# Patient Record
Sex: MALE | Race: BLACK OR AFRICAN AMERICAN | NOT HISPANIC OR LATINO | Employment: UNEMPLOYED | URBAN - METROPOLITAN AREA
[De-identification: names, ages, dates, MRNs, and addresses within clinical notes are randomized per-mention and may not be internally consistent; named-entity substitution may affect disease eponyms.]

---

## 2022-09-30 ENCOUNTER — HOSPITAL ENCOUNTER (EMERGENCY)
Facility: HOSPITAL | Age: 27
Discharge: LEFT BEFORE DISCHARGE | End: 2022-09-30
Attending: EMERGENCY MEDICINE

## 2022-09-30 VITALS
RESPIRATION RATE: 18 BRPM | OXYGEN SATURATION: 100 % | DIASTOLIC BLOOD PRESSURE: 80 MMHG | TEMPERATURE: 99 F | SYSTOLIC BLOOD PRESSURE: 152 MMHG | HEART RATE: 73 BPM

## 2022-09-30 DIAGNOSIS — R05.9 COUGH: ICD-10-CM

## 2022-09-30 DIAGNOSIS — R51.9 RIGHT-SIDED HEADACHE: Primary | ICD-10-CM

## 2022-09-30 PROCEDURE — 99284 EMERGENCY DEPT VISIT MOD MDM: CPT | Mod: ,,, | Performed by: EMERGENCY MEDICINE

## 2022-09-30 PROCEDURE — 25000003 PHARM REV CODE 250: Performed by: STUDENT IN AN ORGANIZED HEALTH CARE EDUCATION/TRAINING PROGRAM

## 2022-09-30 PROCEDURE — 99284 EMERGENCY DEPT VISIT MOD MDM: CPT | Mod: 25

## 2022-09-30 PROCEDURE — 99284 PR EMERGENCY DEPT VISIT,LEVEL IV: ICD-10-PCS | Mod: ,,, | Performed by: EMERGENCY MEDICINE

## 2022-09-30 PROCEDURE — 63600175 PHARM REV CODE 636 W HCPCS: Performed by: STUDENT IN AN ORGANIZED HEALTH CARE EDUCATION/TRAINING PROGRAM

## 2022-09-30 PROCEDURE — 96372 THER/PROPH/DIAG INJ SC/IM: CPT | Performed by: STUDENT IN AN ORGANIZED HEALTH CARE EDUCATION/TRAINING PROGRAM

## 2022-09-30 RX ORDER — ACETAMINOPHEN 500 MG
1000 TABLET ORAL
Status: COMPLETED | OUTPATIENT
Start: 2022-09-30 | End: 2022-09-30

## 2022-09-30 RX ORDER — KETOROLAC TROMETHAMINE 30 MG/ML
15 INJECTION, SOLUTION INTRAMUSCULAR; INTRAVENOUS
Status: COMPLETED | OUTPATIENT
Start: 2022-09-30 | End: 2022-09-30

## 2022-09-30 RX ADMIN — KETOROLAC TROMETHAMINE 15 MG: 30 INJECTION, SOLUTION INTRAMUSCULAR; INTRAVENOUS at 02:09

## 2022-09-30 RX ADMIN — ACETAMINOPHEN 1000 MG: 500 TABLET ORAL at 02:09

## 2022-09-30 NOTE — ED TRIAGE NOTES
Ton Donovan, a 27 y.o. male presents to the ED w/ complaint of right sided head and neck pain that began yesterday. Pt took advil with no relief. A&Ox4, GCS 15     Triage note:  Chief Complaint   Patient presents with    Headache     R sided headache x 24 hours took Advil no relief.     Review of patient's allergies indicates:  No Known Allergies  No past medical history on file.

## 2022-09-30 NOTE — ED PROVIDER NOTES
Encounter Date: 9/30/2022       History     Chief Complaint   Patient presents with    Headache     R sided headache x 24 hours took Advil no relief.     27 y.o. male with no significant past medical history presents for right-sided headache for the past several days.  Patient reports the headache is localized across the right side of his head, is intermittent, and improved with Aleve last night.  The pain is achy without sudden onset.  Patient reports associated rhinorrhea and tearing from his right eye.  Patient also reports a cough and mild shortness of breath.  Patient denies any associated chest pain, neck stiffness, fever/chills    The history is provided by the patient and medical records.   Review of patient's allergies indicates:  No Known Allergies  No past medical history on file.  No past surgical history on file.  No family history on file.  Social History     Tobacco Use    Smoking status: Former     Types: Cigarettes    Smokeless tobacco: Never   Substance Use Topics    Alcohol use: No    Drug use: No     Review of Systems   Constitutional:  Negative for fatigue and fever.   HENT:  Positive for rhinorrhea. Negative for sore throat.    Eyes:  Negative for discharge and redness.   Respiratory:  Positive for cough and shortness of breath.    Cardiovascular:  Negative for chest pain and palpitations.   Gastrointestinal:  Negative for diarrhea, nausea and vomiting.   Endocrine: Negative for cold intolerance and heat intolerance.   Genitourinary:  Negative for dysuria and frequency.   Musculoskeletal:  Negative for myalgias and neck stiffness.   Skin:  Negative for pallor and rash.   Neurological:  Negative for dizziness and headaches.   Psychiatric/Behavioral:  Negative for agitation and confusion.      Physical Exam     Initial Vitals [09/30/22 0115]   BP Pulse Resp Temp SpO2   (!) 152/80 73 18 98.9 °F (37.2 °C) 100 %      MAP       --         Physical Exam    Nursing note and vitals  reviewed.  Constitutional:   Alert, sitting up in chair, no acute distress   HENT:   Head: Normocephalic and atraumatic.   Mouth/Throat: Oropharynx is clear and moist.   Eyes: Conjunctivae and EOM are normal. Pupils are equal, round, and reactive to light. No scleral icterus.   Neck: Neck supple.   Normal range of motion.  Cardiovascular:  Normal rate, regular rhythm, normal heart sounds and intact distal pulses.           Pulmonary/Chest: Breath sounds normal. No stridor. No respiratory distress.   Abdominal: Abdomen is soft. He exhibits no distension. There is no abdominal tenderness.   Musculoskeletal:         General: No tenderness or edema.      Cervical back: Normal range of motion and neck supple.     Neurological: He is alert and oriented to person, place, and time. He has normal strength. No cranial nerve deficit or sensory deficit.   Skin: Skin is warm and dry. No rash noted.   Psychiatric: He has a normal mood and affect. Thought content normal.       ED Course   Procedures  Labs Reviewed   SARS-COV-2 RNA AMPLIFICATION, QUAL          Imaging Results    None          Medications   ketorolac injection 15 mg (15 mg Intramuscular Given 9/30/22 0237)   acetaminophen tablet 1,000 mg (1,000 mg Oral Given 9/30/22 0236)     Medical Decision Making:   History:   Old Medical Records: I decided to obtain old medical records.  Old Records Summarized: records from clinic visits and records from previous admission(s).  Initial Assessment:   27 y.o. male with no significant past medical history presents for right-sided headache for the past several days  Patient well-appearing, no focal deficits, no neck stiffness afebrile  Differentials include cluster headache, sinus headache, migraine, tension headache, URI  No sudden onset constant headache, no focal neurological deficits, no blood thinners, no known history of aneurysms, no history of neck manipulation, no fever, nontoxic appearing, no neck stiffness/pain,  inconsistent with SAH, carotid dissection, meningitis/encephalitis  Toradol, Tylenol ordered for symptoms  Will obtain chest x-ray to evaluate for patient does persistent cough and shortness of breath    Clinical Tests:   Lab Tests: Ordered and Reviewed  Radiological Study: Ordered and Reviewed           ED Course as of 09/30/22 0323   Fri Sep 30, 2022   0322 Unable to find patient on return to ED room.  Suspect patient eloped.  Discharge instructions placed in the chart digitally [OK]      ED Course User Index  [OK] Salinas Hair MD                 Clinical Impression:   Final diagnoses:  [R05.9] Cough  [R51.9] Right-sided headache (Primary)        ED Disposition Condition    Eloped Stable                Salinas Hair MD  Resident  09/30/22 0323

## 2022-09-30 NOTE — ED NOTES
Pt no longer in intake 2.  Called for Pt in lobby. Per ED registration Pt stated he was leaving and walked out of ED doors.

## 2023-07-03 ENCOUNTER — HOSPITAL ENCOUNTER (EMERGENCY)
Facility: HOSPITAL | Age: 28
Discharge: HOME | End: 2023-07-03
Attending: EMERGENCY MEDICINE
Payer: MEDICAID

## 2023-07-03 VITALS
DIASTOLIC BLOOD PRESSURE: 64 MMHG | TEMPERATURE: 100 F | WEIGHT: 175 LBS | OXYGEN SATURATION: 98 % | HEIGHT: 70 IN | BODY MASS INDEX: 25.05 KG/M2 | SYSTOLIC BLOOD PRESSURE: 130 MMHG | HEART RATE: 82 BPM | RESPIRATION RATE: 18 BRPM
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
## 2023-07-03 PROCEDURE — 99283 EMERGENCY DEPT VISIT LOW MDM: CPT

## 2023-07-03 PROCEDURE — 25000003 PHARM REV CODE 250: Performed by: EMERGENCY MEDICINE

## 2023-07-03 RX ORDER — SULFAMETHOXAZOLE AND TRIMETHOPRIM 800; 160 MG/1; MG/1
1 TABLET ORAL 2 TIMES DAILY
Qty: 14 TABLET | Refills: 0 | Status: ON HOLD | OUTPATIENT
Start: 2023-07-03 | End: 2023-07-08 | Stop reason: HOSPADM

## 2023-07-03 RX ORDER — SULFAMETHOXAZOLE AND TRIMETHOPRIM 800; 160 MG/1; MG/1
1 TABLET ORAL
Status: COMPLETED | OUTPATIENT
Start: 2023-07-03 | End: 2023-07-03

## 2023-07-03 RX ADMIN — SULFAMETHOXAZOLE AND TRIMETHOPRIM 1 TABLET: 800; 160 TABLET ORAL at 09:07

## 2023-07-04 NOTE — DISCHARGE INSTRUCTIONS
Your bites do appear infected, there is no drainable fluid collection.  Start antibiotics take as prescribed

## 2023-07-04 NOTE — ED PROVIDER NOTES
Encounter Date: 7/3/2023       History     Chief Complaint   Patient presents with    Insect Bite     R middle finger, fore arm and under L arm       Ton Donovan is a 28-year-old male past medical history of hypertension, inguinal hernia presenting today for multiple bug bites.  He is currently living in an abandoned house.  He woke up yesterday morning with multiple bites on his extremities however initially itchy and now has become red and painful.  On his located on his right middle finger, bilateral forearms, under left armpit.  He is no fever or chills.  No other complaints at this time    Review of patient's allergies indicates:  No Known Allergies  Past Medical History:   Diagnosis Date    Hypertension     Unilateral inguinal hernia, without obstruction or gangrene, not specified as recurrent      History reviewed. No pertinent surgical history.  History reviewed. No pertinent family history.  Social History     Tobacco Use    Smoking status: Former     Types: Cigarettes    Smokeless tobacco: Never   Substance Use Topics    Alcohol use: No    Drug use: Yes     Types: Marijuana     Review of Systems    Physical Exam     Initial Vitals [07/03/23 1843]   BP Pulse Resp Temp SpO2   130/64 82 18 99.6 °F (37.6 °C) 98 %      MAP       --         Physical Exam    Vitals reviewed.  Constitutional: He appears well-developed. No distress.   Eyes: Conjunctivae are normal.   Cardiovascular:  Normal rate and regular rhythm.           Pulmonary/Chest: He has no wheezes. He has no rales.   Abdominal: Abdomen is soft. There is no abdominal tenderness.     Neurological: He is alert and oriented to person, place, and time.   Skin:   + multiple indurated lesions on bilateral forearms, left axilla, right middle finger.  No fluctuance appreciated.  Some appear to have drained       ED Course   Procedures  Labs Reviewed   HIV 1 / 2 ANTIBODY   HEPATITIS C ANTIBODY          Imaging Results    None          Medications    sulfamethoxazole-trimethoprim 800-160mg per tablet 1 tablet (has no administration in time range)     Medical Decision Making:   Initial Assessment:   Urgent evaluation a 28-year-old male presenting with multiple bug bites to the extremities.    Vital signs are stable    Differential includes cellulitis, abscess, local inflammatory reaction    No drainage or fluctuance noted, no indication for I and D    Will discharge with Bactrim                          Clinical Impression:   Final diagnoses:  [W57.XXXA] Bug bite with infection, initial encounter (Primary)        ED Disposition Condition    Discharge Stable          ED Prescriptions       Medication Sig Dispense Start Date End Date Auth. Provider    sulfamethoxazole-trimethoprim 800-160mg (BACTRIM DS) 800-160 mg Tab Take 1 tablet by mouth 2 (two) times daily. for 7 days 14 tablet 7/3/2023 7/10/2023 Emiliana Dinh MD          Follow-up Information       Follow up With Specialties Details Why Contact Info    [facility] Emergency Medicine  If symptoms worsen [address]  [facility] [id number]  [phone]             Emiliana Dinh MD  07/05/23 1277

## 2023-07-04 NOTE — ED NOTES
Patient identifiers verified and correct for  Mr Donovan  C/C:  Mult areas of raised redness SEE NN  APPEARANCE: awake and alert in NAD. PAIN  10/10  SKIN: warm, dry and intact. No breakdown or bruising.  MUSCULOSKELETAL: Patient moving all extremities spontaneously, no obvious swelling or deformities noted. Ambulates independently.  RESPIRATORY: Denies shortness of breath.Respirations unlabored.   CARDIAC: Denies CP, 2+ distal pulses; no peripheral edema  ABDOMEN: S/ND/NT, Denies nausea  : voids spontaneously, denies difficulty  Neurologic: AAO x 4; follows commands equal strength in all extremities; denies numbness/tingling. Denies dizziness Denies new weakness, mainly pain to left hand finger, left elbow

## 2023-07-04 NOTE — ED NOTES
Patient states mult area of raised red bumps right hand middle finger near knuckle, outer RFA with small raised areasmall right forehead, LFA with very small raised area, 2 raised areas to left axilla

## 2023-07-06 ENCOUNTER — HOSPITAL ENCOUNTER (INPATIENT)
Facility: HOSPITAL | Age: 28
LOS: 2 days | Discharge: LEFT BEFORE DISCHARGE | DRG: 501 | End: 2023-07-08
Attending: EMERGENCY MEDICINE | Admitting: INTERNAL MEDICINE
Payer: MEDICAID

## 2023-07-06 DIAGNOSIS — R07.9 CHEST PAIN: ICD-10-CM

## 2023-07-06 DIAGNOSIS — S51.002A OPEN WOUND OF LEFT ELBOW, INITIAL ENCOUNTER: ICD-10-CM

## 2023-07-06 DIAGNOSIS — L02.511 ABSCESS OF RIGHT HAND: ICD-10-CM

## 2023-07-06 DIAGNOSIS — F19.90 SUBSTANCE USE DISORDER: ICD-10-CM

## 2023-07-06 DIAGNOSIS — M00.9 PYOGENIC ARTHRITIS OF RIGHT HAND, DUE TO UNSPECIFIED ORGANISM: Primary | ICD-10-CM

## 2023-07-06 DIAGNOSIS — M00.9 SEPTIC ARTHRITIS OF INTERPHALANGEAL JOINT OF FINGER OF RIGHT HAND: ICD-10-CM

## 2023-07-06 DIAGNOSIS — L02.414 ABSCESS OF LEFT UPPER EXTREMITY: ICD-10-CM

## 2023-07-06 DIAGNOSIS — M71.122 SEPTIC OLECRANON BURSITIS, LEFT: ICD-10-CM

## 2023-07-06 DIAGNOSIS — L02.91 ABSCESS: ICD-10-CM

## 2023-07-06 DIAGNOSIS — L03.011 CELLULITIS OF FINGER, RIGHT: ICD-10-CM

## 2023-07-06 LAB
ALBUMIN SERPL BCP-MCNC: 3.5 G/DL (ref 3.5–5.2)
ALP SERPL-CCNC: 77 U/L (ref 55–135)
ALT SERPL W/O P-5'-P-CCNC: 8 U/L (ref 10–44)
ANION GAP SERPL CALC-SCNC: 9 MMOL/L (ref 8–16)
AST SERPL-CCNC: 22 U/L (ref 10–40)
BASOPHILS # BLD AUTO: 0.03 K/UL (ref 0–0.2)
BASOPHILS NFR BLD: 0.4 % (ref 0–1.9)
BILIRUB SERPL-MCNC: 0.3 MG/DL (ref 0.1–1)
BUN SERPL-MCNC: 14 MG/DL (ref 6–20)
CALCIUM SERPL-MCNC: 9.6 MG/DL (ref 8.7–10.5)
CHLORIDE SERPL-SCNC: 103 MMOL/L (ref 95–110)
CO2 SERPL-SCNC: 27 MMOL/L (ref 23–29)
CREAT SERPL-MCNC: 0.9 MG/DL (ref 0.5–1.4)
CRP SERPL-MCNC: 64.2 MG/L (ref 0–8.2)
DIFFERENTIAL METHOD: ABNORMAL
EOSINOPHIL # BLD AUTO: 0.3 K/UL (ref 0–0.5)
EOSINOPHIL NFR BLD: 3.6 % (ref 0–8)
ERYTHROCYTE [DISTWIDTH] IN BLOOD BY AUTOMATED COUNT: 12.6 % (ref 11.5–14.5)
ERYTHROCYTE [SEDIMENTATION RATE] IN BLOOD BY PHOTOMETRIC METHOD: 58 MM/HR (ref 0–23)
EST. GFR  (NO RACE VARIABLE): >60 ML/MIN/1.73 M^2
GLUCOSE SERPL-MCNC: 102 MG/DL (ref 70–110)
HCT VFR BLD AUTO: 35.5 % (ref 40–54)
HCV AB SERPL QL IA: NORMAL
HGB BLD-MCNC: 11.4 G/DL (ref 14–18)
HIV 1+2 AB+HIV1 P24 AG SERPL QL IA: NORMAL
HIV 1+2 AB+HIV1 P24 AG SERPL QL IA: NORMAL
IMM GRANULOCYTES # BLD AUTO: 0.03 K/UL (ref 0–0.04)
IMM GRANULOCYTES NFR BLD AUTO: 0.4 % (ref 0–0.5)
LYMPHOCYTES # BLD AUTO: 2.4 K/UL (ref 1–4.8)
LYMPHOCYTES NFR BLD: 29.9 % (ref 18–48)
MCH RBC QN AUTO: 28.7 PG (ref 27–31)
MCHC RBC AUTO-ENTMCNC: 32.1 G/DL (ref 32–36)
MCV RBC AUTO: 89 FL (ref 82–98)
MONOCYTES # BLD AUTO: 0.8 K/UL (ref 0.3–1)
MONOCYTES NFR BLD: 10.6 % (ref 4–15)
NEUTROPHILS # BLD AUTO: 4.4 K/UL (ref 1.8–7.7)
NEUTROPHILS NFR BLD: 55.1 % (ref 38–73)
NRBC BLD-RTO: 0 /100 WBC
PLATELET # BLD AUTO: 218 K/UL (ref 150–450)
PMV BLD AUTO: 10.3 FL (ref 9.2–12.9)
POTASSIUM SERPL-SCNC: 4.2 MMOL/L (ref 3.5–5.1)
PROT SERPL-MCNC: 7.2 G/DL (ref 6–8.4)
RBC # BLD AUTO: 3.97 M/UL (ref 4.6–6.2)
SODIUM SERPL-SCNC: 139 MMOL/L (ref 136–145)
WBC # BLD AUTO: 7.95 K/UL (ref 3.9–12.7)

## 2023-07-06 PROCEDURE — 85652 RBC SED RATE AUTOMATED: CPT

## 2023-07-06 PROCEDURE — 25000003 PHARM REV CODE 250

## 2023-07-06 PROCEDURE — G0378 HOSPITAL OBSERVATION PER HR: HCPCS

## 2023-07-06 PROCEDURE — 96365 THER/PROPH/DIAG IV INF INIT: CPT

## 2023-07-06 PROCEDURE — 85025 COMPLETE CBC W/AUTO DIFF WBC: CPT

## 2023-07-06 PROCEDURE — 96366 THER/PROPH/DIAG IV INF ADDON: CPT

## 2023-07-06 PROCEDURE — 87591 N.GONORRHOEAE DNA AMP PROB: CPT

## 2023-07-06 PROCEDURE — 86140 C-REACTIVE PROTEIN: CPT

## 2023-07-06 PROCEDURE — 63600175 PHARM REV CODE 636 W HCPCS

## 2023-07-06 PROCEDURE — 87389 HIV-1 AG W/HIV-1&-2 AB AG IA: CPT | Performed by: EMERGENCY MEDICINE

## 2023-07-06 PROCEDURE — 12000002 HC ACUTE/MED SURGE SEMI-PRIVATE ROOM

## 2023-07-06 PROCEDURE — 80307 DRUG TEST PRSMV CHEM ANLYZR: CPT | Performed by: NURSE PRACTITIONER

## 2023-07-06 PROCEDURE — 86803 HEPATITIS C AB TEST: CPT | Performed by: EMERGENCY MEDICINE

## 2023-07-06 PROCEDURE — 99223 1ST HOSP IP/OBS HIGH 75: CPT | Mod: ,,, | Performed by: NURSE PRACTITIONER

## 2023-07-06 PROCEDURE — 99223 PR INITIAL HOSPITAL CARE,LEVL III: ICD-10-PCS | Mod: ,,, | Performed by: NURSE PRACTITIONER

## 2023-07-06 PROCEDURE — 99406 BEHAV CHNG SMOKING 3-10 MIN: CPT | Mod: ,,, | Performed by: NURSE PRACTITIONER

## 2023-07-06 PROCEDURE — 99406 PR TOBACCO USE CESSATION INTERMEDIATE 3-10 MINUTES: ICD-10-PCS | Mod: ,,, | Performed by: NURSE PRACTITIONER

## 2023-07-06 PROCEDURE — 87389 HIV-1 AG W/HIV-1&-2 AB AG IA: CPT | Mod: 91

## 2023-07-06 PROCEDURE — 81003 URINALYSIS AUTO W/O SCOPE: CPT | Performed by: NURSE PRACTITIONER

## 2023-07-06 PROCEDURE — 99285 EMERGENCY DEPT VISIT HI MDM: CPT

## 2023-07-06 PROCEDURE — 80053 COMPREHEN METABOLIC PANEL: CPT

## 2023-07-06 RX ORDER — SODIUM CHLORIDE 0.9 % (FLUSH) 0.9 %
10 SYRINGE (ML) INJECTION EVERY 12 HOURS PRN
Status: DISCONTINUED | OUTPATIENT
Start: 2023-07-06 | End: 2023-07-08 | Stop reason: HOSPADM

## 2023-07-06 RX ORDER — IBUPROFEN 200 MG
16 TABLET ORAL
Status: DISCONTINUED | OUTPATIENT
Start: 2023-07-06 | End: 2023-07-08 | Stop reason: HOSPADM

## 2023-07-06 RX ORDER — ONDANSETRON 2 MG/ML
4 INJECTION INTRAMUSCULAR; INTRAVENOUS EVERY 8 HOURS PRN
Status: DISCONTINUED | OUTPATIENT
Start: 2023-07-06 | End: 2023-07-08 | Stop reason: HOSPADM

## 2023-07-06 RX ORDER — GLUCAGON 1 MG
1 KIT INJECTION
Status: DISCONTINUED | OUTPATIENT
Start: 2023-07-06 | End: 2023-07-08 | Stop reason: HOSPADM

## 2023-07-06 RX ORDER — KETOROLAC TROMETHAMINE 30 MG/ML
15 INJECTION, SOLUTION INTRAMUSCULAR; INTRAVENOUS EVERY 6 HOURS PRN
Status: DISCONTINUED | OUTPATIENT
Start: 2023-07-06 | End: 2023-07-07

## 2023-07-06 RX ORDER — IBUPROFEN 200 MG
24 TABLET ORAL
Status: DISCONTINUED | OUTPATIENT
Start: 2023-07-06 | End: 2023-07-08 | Stop reason: HOSPADM

## 2023-07-06 RX ORDER — TALC
6 POWDER (GRAM) TOPICAL NIGHTLY PRN
Status: DISCONTINUED | OUTPATIENT
Start: 2023-07-06 | End: 2023-07-08 | Stop reason: HOSPADM

## 2023-07-06 RX ORDER — ACETAMINOPHEN 325 MG/1
650 TABLET ORAL EVERY 4 HOURS PRN
Status: DISCONTINUED | OUTPATIENT
Start: 2023-07-06 | End: 2023-07-07

## 2023-07-06 RX ORDER — NALOXONE HCL 0.4 MG/ML
0.02 VIAL (ML) INJECTION
Status: DISCONTINUED | OUTPATIENT
Start: 2023-07-06 | End: 2023-07-07

## 2023-07-06 RX ADMIN — SODIUM CHLORIDE 1000 ML: 9 INJECTION, SOLUTION INTRAVENOUS at 08:07

## 2023-07-06 RX ADMIN — VANCOMYCIN HYDROCHLORIDE 1500 MG: 1.5 INJECTION, POWDER, LYOPHILIZED, FOR SOLUTION INTRAVENOUS at 08:07

## 2023-07-06 NOTE — FIRST PROVIDER EVALUATION
Emergency Department TeleTriage Encounter Note      CHIEF COMPLAINT    Chief Complaint   Patient presents with    Cellulitis     Has cellulitis to right hand middle finger and under left arm and right forearm; was not able to buy antibiotics and infection is worse; open and draining       VITAL SIGNS   Initial Vitals [07/06/23 1704]   BP Pulse Resp Temp SpO2   (!) 155/77 75 18 98.1 °F (36.7 °C) 98 %      MAP       --            ALLERGIES    Review of patient's allergies indicates:  No Known Allergies    PROVIDER TRIAGE NOTE  This is a teletriage evaluation of a 28 y.o. male presenting to the ED complaining of wound infection. Patient reports multiple spider bites to extremities. He was seen here on 7/3/23 and prescribed antibiotics but could not afford to get them filled. Patient states wounds are worst today.     Patient is alert and oriented. He speaks in complete sentences. He is sitting upright in the chair in no distress. Open wounds with purulent drainage to elbow and finger.    Initial orders will be placed and care will be transferred to an alternate provider when patient is roomed for a full evaluation. Any additional orders and the final disposition will be determined by that provider.         ORDERS  Labs Reviewed   HIV 1 / 2 ANTIBODY   HEPATITIS C ANTIBODY       ED Orders (720h ago, onward)      Start Ordered     Status Ordering Provider    07/06/23 1706 07/06/23 1705  HIV 1/2 Ag/Ab (4th Gen)  STAT         Ordered FAY HARRIS    07/06/23 1706 07/06/23 1705  Hepatitis C Antibody  STAT         Ordered FAY HARRIS              Virtual Visit Note: The provider triage portion of this emergency department evaluation and documentation was performed via [employer], a HIPAA-compliant telemedicine application, in concert with a tele-presenter in the room. A face to face patient evaluation with one of my colleagues will occur once the patient is placed in an emergency department  room.      DISCLAIMER: This note was prepared with [employer] voice recognition transcription software. Garbled syntax, mangled pronouns, and other bizarre constructions may be attributed to that software system.

## 2023-07-07 ENCOUNTER — ANESTHESIA EVENT (OUTPATIENT)
Dept: SURGERY | Facility: HOSPITAL | Age: 28
DRG: 501 | End: 2023-07-07
Payer: MEDICAID

## 2023-07-07 ENCOUNTER — ANESTHESIA (OUTPATIENT)
Dept: SURGERY | Facility: HOSPITAL | Age: 28
DRG: 501 | End: 2023-07-07
Payer: MEDICAID

## 2023-07-07 PROBLEM — M00.9: Status: ACTIVE | Noted: 2023-07-07

## 2023-07-07 PROBLEM — L08.9 INFECTION OF RIGHT HAND: Status: ACTIVE | Noted: 2023-07-07

## 2023-07-07 PROBLEM — L02.414 ABSCESS OF LEFT UPPER EXTREMITY: Status: ACTIVE | Noted: 2023-07-07

## 2023-07-07 PROBLEM — F11.20 HEROIN DEPENDENCE: Status: ACTIVE | Noted: 2023-07-06

## 2023-07-07 PROBLEM — F17.210 CIGARETTE NICOTINE DEPENDENCE WITHOUT COMPLICATION: Status: ACTIVE | Noted: 2023-07-07

## 2023-07-07 PROBLEM — L03.114 CELLULITIS OF LEFT ELBOW: Status: ACTIVE | Noted: 2023-07-07

## 2023-07-07 PROBLEM — M71.122 SEPTIC OLECRANON BURSITIS, LEFT: Status: ACTIVE | Noted: 2023-07-07

## 2023-07-07 PROBLEM — L03.011 CELLULITIS OF FINGER, RIGHT: Status: ACTIVE | Noted: 2023-07-07

## 2023-07-07 PROBLEM — L02.511 ABSCESS OF RIGHT HAND: Status: ACTIVE | Noted: 2023-07-07

## 2023-07-07 PROBLEM — S51.002A OPEN WOUND OF LEFT ELBOW: Status: ACTIVE | Noted: 2023-07-07

## 2023-07-07 LAB
ALBUMIN SERPL BCP-MCNC: 3.1 G/DL (ref 3.5–5.2)
ALP SERPL-CCNC: 73 U/L (ref 55–135)
ALT SERPL W/O P-5'-P-CCNC: 5 U/L (ref 10–44)
AMPHET+METHAMPHET UR QL: NEGATIVE
ANION GAP SERPL CALC-SCNC: 7 MMOL/L (ref 8–16)
AST SERPL-CCNC: 18 U/L (ref 10–40)
BARBITURATES UR QL SCN>200 NG/ML: NEGATIVE
BASOPHILS # BLD AUTO: 0.04 K/UL (ref 0–0.2)
BASOPHILS NFR BLD: 0.5 % (ref 0–1.9)
BENZODIAZ UR QL SCN>200 NG/ML: NEGATIVE
BILIRUB SERPL-MCNC: 0.4 MG/DL (ref 0.1–1)
BILIRUB UR QL STRIP: NEGATIVE
BUN SERPL-MCNC: 12 MG/DL (ref 6–20)
BZE UR QL SCN: NEGATIVE
C TRACH DNA SPEC QL NAA+PROBE: NOT DETECTED
CALCIUM SERPL-MCNC: 9.3 MG/DL (ref 8.7–10.5)
CANNABINOIDS UR QL SCN: ABNORMAL
CHLORIDE SERPL-SCNC: 102 MMOL/L (ref 95–110)
CLARITY UR REFRACT.AUTO: CLEAR
CO2 SERPL-SCNC: 27 MMOL/L (ref 23–29)
COLOR UR AUTO: YELLOW
CREAT SERPL-MCNC: 0.8 MG/DL (ref 0.5–1.4)
CREAT UR-MCNC: 124 MG/DL (ref 23–375)
DIFFERENTIAL METHOD: ABNORMAL
EOSINOPHIL # BLD AUTO: 0.3 K/UL (ref 0–0.5)
EOSINOPHIL NFR BLD: 3.8 % (ref 0–8)
ERYTHROCYTE [DISTWIDTH] IN BLOOD BY AUTOMATED COUNT: 12.4 % (ref 11.5–14.5)
EST. GFR  (NO RACE VARIABLE): >60 ML/MIN/1.73 M^2
ETHANOL UR-MCNC: <10 MG/DL
GLUCOSE SERPL-MCNC: 92 MG/DL (ref 70–110)
GLUCOSE UR QL STRIP: NEGATIVE
GRAM STN SPEC: NORMAL
HCT VFR BLD AUTO: 37.9 % (ref 40–54)
HGB BLD-MCNC: 12 G/DL (ref 14–18)
HGB UR QL STRIP: NEGATIVE
IMM GRANULOCYTES # BLD AUTO: 0.02 K/UL (ref 0–0.04)
IMM GRANULOCYTES NFR BLD AUTO: 0.2 % (ref 0–0.5)
KETONES UR QL STRIP: NEGATIVE
LACTATE SERPL-SCNC: 0.9 MMOL/L (ref 0.5–2.2)
LEUKOCYTE ESTERASE UR QL STRIP: NEGATIVE
LYMPHOCYTES # BLD AUTO: 2.1 K/UL (ref 1–4.8)
LYMPHOCYTES NFR BLD: 24.7 % (ref 18–48)
MAGNESIUM SERPL-MCNC: 1.8 MG/DL (ref 1.6–2.6)
MCH RBC QN AUTO: 27.7 PG (ref 27–31)
MCHC RBC AUTO-ENTMCNC: 31.7 G/DL (ref 32–36)
MCV RBC AUTO: 88 FL (ref 82–98)
METHADONE UR QL SCN>300 NG/ML: NEGATIVE
MONOCYTES # BLD AUTO: 0.9 K/UL (ref 0.3–1)
MONOCYTES NFR BLD: 10.3 % (ref 4–15)
N GONORRHOEA DNA SPEC QL NAA+PROBE: NOT DETECTED
NEUTROPHILS # BLD AUTO: 5.2 K/UL (ref 1.8–7.7)
NEUTROPHILS NFR BLD: 60.5 % (ref 38–73)
NITRITE UR QL STRIP: NEGATIVE
NRBC BLD-RTO: 0 /100 WBC
OPIATES UR QL SCN: ABNORMAL
PCP UR QL SCN>25 NG/ML: NEGATIVE
PH UR STRIP: 7 [PH] (ref 5–8)
PLATELET # BLD AUTO: 232 K/UL (ref 150–450)
PMV BLD AUTO: 10.3 FL (ref 9.2–12.9)
POTASSIUM SERPL-SCNC: 4.4 MMOL/L (ref 3.5–5.1)
PROT SERPL-MCNC: 6.4 G/DL (ref 6–8.4)
PROT UR QL STRIP: NEGATIVE
RBC # BLD AUTO: 4.33 M/UL (ref 4.6–6.2)
SODIUM SERPL-SCNC: 136 MMOL/L (ref 136–145)
SP GR UR STRIP: 1.02 (ref 1–1.03)
TOXICOLOGY INFORMATION: ABNORMAL
URN SPEC COLLECT METH UR: NORMAL
WBC # BLD AUTO: 8.51 K/UL (ref 3.9–12.7)

## 2023-07-07 PROCEDURE — 87075 CULTR BACTERIA EXCEPT BLOOD: CPT

## 2023-07-07 PROCEDURE — 36000706: Performed by: ORTHOPAEDIC SURGERY

## 2023-07-07 PROCEDURE — 64415 NJX AA&/STRD BRCH PLXS IMG: CPT | Mod: 59,RT,, | Performed by: ANESTHESIOLOGY

## 2023-07-07 PROCEDURE — 36556 CENTRAL LINE: ICD-10-PCS | Mod: 59,RT,, | Performed by: ANESTHESIOLOGY

## 2023-07-07 PROCEDURE — 24105 PR REMOVAL OF ELBOW BURSA: ICD-10-PCS | Mod: 51,LT,, | Performed by: ORTHOPAEDIC SURGERY

## 2023-07-07 PROCEDURE — 26080 EXPLORE/TREAT FINGER JOINT: CPT | Mod: 51,,, | Performed by: ORTHOPAEDIC SURGERY

## 2023-07-07 PROCEDURE — 24105 EXCISION OLECRANON BURSA: CPT | Mod: 51,LT,, | Performed by: ORTHOPAEDIC SURGERY

## 2023-07-07 PROCEDURE — 99232 PR SUBSEQUENT HOSPITAL CARE,LEVL II: ICD-10-PCS | Mod: ,,, | Performed by: STUDENT IN AN ORGANIZED HEALTH CARE EDUCATION/TRAINING PROGRAM

## 2023-07-07 PROCEDURE — 63600175 PHARM REV CODE 636 W HCPCS: Performed by: NURSE PRACTITIONER

## 2023-07-07 PROCEDURE — 87040 BLOOD CULTURE FOR BACTERIA: CPT | Mod: 59 | Performed by: NURSE PRACTITIONER

## 2023-07-07 PROCEDURE — D9220A PRA ANESTHESIA: ICD-10-PCS | Mod: CRNA,,, | Performed by: NURSE ANESTHETIST, CERTIFIED REGISTERED

## 2023-07-07 PROCEDURE — 90792 PR PSYCHIATRIC DIAGNOSTIC EVALUATION W/MEDICAL SERVICES: ICD-10-PCS | Mod: ,,, | Performed by: PSYCHIATRY & NEUROLOGY

## 2023-07-07 PROCEDURE — 87077 CULTURE AEROBIC IDENTIFY: CPT | Mod: 59

## 2023-07-07 PROCEDURE — 63600175 PHARM REV CODE 636 W HCPCS: Performed by: STUDENT IN AN ORGANIZED HEALTH CARE EDUCATION/TRAINING PROGRAM

## 2023-07-07 PROCEDURE — 87070 CULTURE OTHR SPECIMN AEROBIC: CPT | Mod: 59 | Performed by: ORTHOPAEDIC SURGERY

## 2023-07-07 PROCEDURE — 83605 ASSAY OF LACTIC ACID: CPT | Performed by: STUDENT IN AN ORGANIZED HEALTH CARE EDUCATION/TRAINING PROGRAM

## 2023-07-07 PROCEDURE — 99223 PR INITIAL HOSPITAL CARE,LEVL III: ICD-10-PCS | Mod: 57,,, | Performed by: ORTHOPAEDIC SURGERY

## 2023-07-07 PROCEDURE — 25000003 PHARM REV CODE 250

## 2023-07-07 PROCEDURE — 83735 ASSAY OF MAGNESIUM: CPT | Performed by: NURSE PRACTITIONER

## 2023-07-07 PROCEDURE — 87102 FUNGUS ISOLATION CULTURE: CPT

## 2023-07-07 PROCEDURE — D9220A PRA ANESTHESIA: ICD-10-PCS | Mod: ANES,,, | Performed by: ANESTHESIOLOGY

## 2023-07-07 PROCEDURE — 14020 PR ADJ TISS XFER SCALP,EXTREM <10 SQCM: ICD-10-PCS | Mod: ,,, | Performed by: ORTHOPAEDIC SURGERY

## 2023-07-07 PROCEDURE — 25000003 PHARM REV CODE 250: Performed by: NURSE ANESTHETIST, CERTIFIED REGISTERED

## 2023-07-07 PROCEDURE — 63600175 PHARM REV CODE 636 W HCPCS: Performed by: ANESTHESIOLOGY

## 2023-07-07 PROCEDURE — 10060 I&D ABSCESS SIMPLE/SINGLE: CPT | Mod: 59,,, | Performed by: ORTHOPAEDIC SURGERY

## 2023-07-07 PROCEDURE — 10060 PR DRAIN SKIN ABSCESS SIMPLE: ICD-10-PCS | Mod: 59,,, | Performed by: ORTHOPAEDIC SURGERY

## 2023-07-07 PROCEDURE — 87205 SMEAR GRAM STAIN: CPT

## 2023-07-07 PROCEDURE — 87206 SMEAR FLUORESCENT/ACID STAI: CPT

## 2023-07-07 PROCEDURE — 64415 NJX AA&/STRD BRCH PLXS IMG: CPT

## 2023-07-07 PROCEDURE — D9220A PRA ANESTHESIA: Mod: CRNA,,, | Performed by: NURSE ANESTHETIST, CERTIFIED REGISTERED

## 2023-07-07 PROCEDURE — 71000015 HC POSTOP RECOV 1ST HR: Performed by: ORTHOPAEDIC SURGERY

## 2023-07-07 PROCEDURE — 63600175 PHARM REV CODE 636 W HCPCS: Performed by: NURSE ANESTHETIST, CERTIFIED REGISTERED

## 2023-07-07 PROCEDURE — 87116 MYCOBACTERIA CULTURE: CPT | Mod: 59 | Performed by: ORTHOPAEDIC SURGERY

## 2023-07-07 PROCEDURE — 71000033 HC RECOVERY, INTIAL HOUR: Performed by: ORTHOPAEDIC SURGERY

## 2023-07-07 PROCEDURE — 99232 SBSQ HOSP IP/OBS MODERATE 35: CPT | Mod: ,,, | Performed by: STUDENT IN AN ORGANIZED HEALTH CARE EDUCATION/TRAINING PROGRAM

## 2023-07-07 PROCEDURE — 36415 COLL VENOUS BLD VENIPUNCTURE: CPT | Performed by: NURSE PRACTITIONER

## 2023-07-07 PROCEDURE — 63600175 PHARM REV CODE 636 W HCPCS

## 2023-07-07 PROCEDURE — 87205 SMEAR GRAM STAIN: CPT | Mod: 59 | Performed by: ORTHOPAEDIC SURGERY

## 2023-07-07 PROCEDURE — 85025 COMPLETE CBC W/AUTO DIFF WBC: CPT | Performed by: NURSE PRACTITIONER

## 2023-07-07 PROCEDURE — 87075 CULTR BACTERIA EXCEPT BLOOD: CPT | Mod: 59 | Performed by: ORTHOPAEDIC SURGERY

## 2023-07-07 PROCEDURE — 37000009 HC ANESTHESIA EA ADD 15 MINS: Performed by: ORTHOPAEDIC SURGERY

## 2023-07-07 PROCEDURE — 80053 COMPREHEN METABOLIC PANEL: CPT | Performed by: NURSE PRACTITIONER

## 2023-07-07 PROCEDURE — 10061 I&D ABSCESS COMP/MULTIPLE: CPT | Mod: 59,,, | Performed by: ORTHOPAEDIC SURGERY

## 2023-07-07 PROCEDURE — 14020 TIS TRNFR S/A/L 10 SQ CM/<: CPT | Mod: ,,, | Performed by: ORTHOPAEDIC SURGERY

## 2023-07-07 PROCEDURE — 87186 SC STD MICRODIL/AGAR DIL: CPT | Mod: 59 | Performed by: ORTHOPAEDIC SURGERY

## 2023-07-07 PROCEDURE — 87015 SPECIMEN INFECT AGNT CONCNTJ: CPT

## 2023-07-07 PROCEDURE — 87206 SMEAR FLUORESCENT/ACID STAI: CPT | Mod: 91 | Performed by: ORTHOPAEDIC SURGERY

## 2023-07-07 PROCEDURE — 90792 PSYCH DIAG EVAL W/MED SRVCS: CPT | Mod: ,,, | Performed by: PSYCHIATRY & NEUROLOGY

## 2023-07-07 PROCEDURE — 26080 PR EXPLORE/TREAT INTERPHALANGEAL JT,EA: ICD-10-PCS | Mod: 51,,, | Performed by: ORTHOPAEDIC SURGERY

## 2023-07-07 PROCEDURE — 27200750 HC INSULATED NEEDLE/ STIMUPLEX: Performed by: ANESTHESIOLOGY

## 2023-07-07 PROCEDURE — 99223 1ST HOSP IP/OBS HIGH 75: CPT | Mod: 57,,, | Performed by: ORTHOPAEDIC SURGERY

## 2023-07-07 PROCEDURE — 36000707: Performed by: ORTHOPAEDIC SURGERY

## 2023-07-07 PROCEDURE — 25000003 PHARM REV CODE 250: Performed by: NURSE PRACTITIONER

## 2023-07-07 PROCEDURE — 99900035 HC TECH TIME PER 15 MIN (STAT)

## 2023-07-07 PROCEDURE — 11000001 HC ACUTE MED/SURG PRIVATE ROOM

## 2023-07-07 PROCEDURE — 64415 SUPRACLAVICULAR NERVE BLOCK: ICD-10-PCS | Mod: 59,RT,, | Performed by: ANESTHESIOLOGY

## 2023-07-07 PROCEDURE — 87070 CULTURE OTHR SPECIMN AEROBIC: CPT

## 2023-07-07 PROCEDURE — 37000008 HC ANESTHESIA 1ST 15 MINUTES: Performed by: ORTHOPAEDIC SURGERY

## 2023-07-07 PROCEDURE — 87015 SPECIMEN INFECT AGNT CONCNTJ: CPT | Mod: 59 | Performed by: ORTHOPAEDIC SURGERY

## 2023-07-07 PROCEDURE — 10061 PR DRAIN SKIN ABSCESS COMPLIC: ICD-10-PCS | Mod: 59,,, | Performed by: ORTHOPAEDIC SURGERY

## 2023-07-07 PROCEDURE — 25000003 PHARM REV CODE 250: Performed by: ANESTHESIOLOGY

## 2023-07-07 PROCEDURE — D9220A PRA ANESTHESIA: Mod: ANES,,, | Performed by: ANESTHESIOLOGY

## 2023-07-07 PROCEDURE — 87102 FUNGUS ISOLATION CULTURE: CPT | Mod: 59 | Performed by: ORTHOPAEDIC SURGERY

## 2023-07-07 PROCEDURE — 87077 CULTURE AEROBIC IDENTIFY: CPT | Performed by: ORTHOPAEDIC SURGERY

## 2023-07-07 PROCEDURE — 63600175 PHARM REV CODE 636 W HCPCS: Performed by: ORTHOPAEDIC SURGERY

## 2023-07-07 PROCEDURE — 36556 INSERT NON-TUNNEL CV CATH: CPT | Performed by: ANESTHESIOLOGY

## 2023-07-07 PROCEDURE — 94761 N-INVAS EAR/PLS OXIMETRY MLT: CPT

## 2023-07-07 PROCEDURE — 87186 SC STD MICRODIL/AGAR DIL: CPT | Mod: 59

## 2023-07-07 PROCEDURE — 87116 MYCOBACTERIA CULTURE: CPT

## 2023-07-07 PROCEDURE — 27000221 HC OXYGEN, UP TO 24 HOURS

## 2023-07-07 RX ORDER — DICYCLOMINE HYDROCHLORIDE 10 MG/1
10 CAPSULE ORAL EVERY 6 HOURS PRN
Status: DISCONTINUED | OUTPATIENT
Start: 2023-07-07 | End: 2023-07-07

## 2023-07-07 RX ORDER — LIDOCAINE HYDROCHLORIDE 10 MG/ML
INJECTION, SOLUTION EPIDURAL; INFILTRATION; INTRACAUDAL; PERINEURAL
Status: DISCONTINUED | OUTPATIENT
Start: 2023-07-07 | End: 2023-07-07

## 2023-07-07 RX ORDER — METHOCARBAMOL 500 MG/1
500 TABLET, FILM COATED ORAL EVERY 6 HOURS PRN
Status: DISCONTINUED | OUTPATIENT
Start: 2023-07-07 | End: 2023-07-08 | Stop reason: HOSPADM

## 2023-07-07 RX ORDER — PROPOFOL 10 MG/ML
VIAL (ML) INTRAVENOUS
Status: DISCONTINUED | OUTPATIENT
Start: 2023-07-07 | End: 2023-07-07

## 2023-07-07 RX ORDER — MIDAZOLAM HYDROCHLORIDE 1 MG/ML
2 INJECTION INTRAMUSCULAR; INTRAVENOUS ONCE
Status: COMPLETED | OUTPATIENT
Start: 2023-07-07 | End: 2023-07-07

## 2023-07-07 RX ORDER — PROCHLORPERAZINE EDISYLATE 5 MG/ML
5 INJECTION INTRAMUSCULAR; INTRAVENOUS EVERY 30 MIN PRN
Status: DISCONTINUED | OUTPATIENT
Start: 2023-07-07 | End: 2023-07-07 | Stop reason: HOSPADM

## 2023-07-07 RX ORDER — SUCCINYLCHOLINE CHLORIDE 20 MG/ML
INJECTION INTRAMUSCULAR; INTRAVENOUS
Status: DISCONTINUED | OUTPATIENT
Start: 2023-07-07 | End: 2023-07-07

## 2023-07-07 RX ORDER — IBUPROFEN 200 MG
1 TABLET ORAL DAILY
Status: DISCONTINUED | OUTPATIENT
Start: 2023-07-07 | End: 2023-07-07

## 2023-07-07 RX ORDER — FENTANYL CITRATE 50 UG/ML
25 INJECTION, SOLUTION INTRAMUSCULAR; INTRAVENOUS EVERY 5 MIN PRN
Status: DISCONTINUED | OUTPATIENT
Start: 2023-07-07 | End: 2023-07-07 | Stop reason: HOSPADM

## 2023-07-07 RX ORDER — LORAZEPAM 2 MG/ML
2 INJECTION INTRAMUSCULAR ONCE
Status: COMPLETED | OUTPATIENT
Start: 2023-07-07 | End: 2023-07-07

## 2023-07-07 RX ORDER — LOPERAMIDE HYDROCHLORIDE 2 MG/1
2 CAPSULE ORAL
Status: DISCONTINUED | OUTPATIENT
Start: 2023-07-07 | End: 2023-07-08 | Stop reason: HOSPADM

## 2023-07-07 RX ORDER — OXYCODONE HYDROCHLORIDE 5 MG/1
5 TABLET ORAL
Status: DISCONTINUED | OUTPATIENT
Start: 2023-07-07 | End: 2023-07-07 | Stop reason: HOSPADM

## 2023-07-07 RX ORDER — HALOPERIDOL 5 MG/ML
5 INJECTION INTRAMUSCULAR ONCE
Status: COMPLETED | OUTPATIENT
Start: 2023-07-07 | End: 2023-07-07

## 2023-07-07 RX ORDER — FENTANYL CITRATE 50 UG/ML
INJECTION, SOLUTION INTRAMUSCULAR; INTRAVENOUS
Status: DISCONTINUED | OUTPATIENT
Start: 2023-07-07 | End: 2023-07-07

## 2023-07-07 RX ORDER — FOLIC ACID 1 MG/1
1 TABLET ORAL DAILY
Status: DISCONTINUED | OUTPATIENT
Start: 2023-07-08 | End: 2023-07-08 | Stop reason: HOSPADM

## 2023-07-07 RX ORDER — OXYCODONE HYDROCHLORIDE 5 MG/1
5 TABLET ORAL EVERY 6 HOURS PRN
Status: DISCONTINUED | OUTPATIENT
Start: 2023-07-07 | End: 2023-07-07

## 2023-07-07 RX ORDER — IBUPROFEN 200 MG
1 TABLET ORAL DAILY
Status: DISCONTINUED | OUTPATIENT
Start: 2023-07-07 | End: 2023-07-08 | Stop reason: HOSPADM

## 2023-07-07 RX ORDER — METHOCARBAMOL 500 MG/1
500 TABLET, FILM COATED ORAL 4 TIMES DAILY
Status: DISCONTINUED | OUTPATIENT
Start: 2023-07-07 | End: 2023-07-07

## 2023-07-07 RX ORDER — DICYCLOMINE HYDROCHLORIDE 10 MG/1
10 CAPSULE ORAL EVERY 6 HOURS PRN
Status: DISCONTINUED | OUTPATIENT
Start: 2023-07-07 | End: 2023-07-08 | Stop reason: HOSPADM

## 2023-07-07 RX ORDER — ACETAMINOPHEN 10 MG/ML
INJECTION, SOLUTION INTRAVENOUS
Status: DISCONTINUED | OUTPATIENT
Start: 2023-07-07 | End: 2023-07-07

## 2023-07-07 RX ORDER — HALOPERIDOL 5 MG/ML
1 INJECTION INTRAMUSCULAR
Status: DISCONTINUED | OUTPATIENT
Start: 2023-07-07 | End: 2023-07-08 | Stop reason: HOSPADM

## 2023-07-07 RX ORDER — PHENYLEPHRINE HYDROCHLORIDE 10 MG/ML
INJECTION INTRAVENOUS
Status: DISCONTINUED | OUTPATIENT
Start: 2023-07-07 | End: 2023-07-07

## 2023-07-07 RX ORDER — METHADONE HYDROCHLORIDE 10 MG/1
10 TABLET ORAL EVERY 4 HOURS PRN
Status: DISCONTINUED | OUTPATIENT
Start: 2023-07-07 | End: 2023-07-07

## 2023-07-07 RX ORDER — MIDAZOLAM HYDROCHLORIDE 1 MG/ML
INJECTION INTRAMUSCULAR; INTRAVENOUS
Status: COMPLETED
Start: 2023-07-07 | End: 2023-07-07

## 2023-07-07 RX ORDER — HALOPERIDOL 5 MG/ML
1 INJECTION INTRAMUSCULAR ONCE
Status: DISCONTINUED | OUTPATIENT
Start: 2023-07-07 | End: 2023-07-07

## 2023-07-07 RX ORDER — MIDAZOLAM HYDROCHLORIDE 1 MG/ML
.5-4 INJECTION INTRAMUSCULAR; INTRAVENOUS
Status: DISCONTINUED | OUTPATIENT
Start: 2023-07-07 | End: 2023-07-07

## 2023-07-07 RX ORDER — FAMOTIDINE 10 MG/ML
INJECTION INTRAVENOUS
Status: DISCONTINUED | OUTPATIENT
Start: 2023-07-07 | End: 2023-07-07

## 2023-07-07 RX ORDER — KETAMINE HCL IN 0.9 % NACL 50 MG/5 ML
SYRINGE (ML) INTRAVENOUS
Status: DISCONTINUED | OUTPATIENT
Start: 2023-07-07 | End: 2023-07-07

## 2023-07-07 RX ORDER — VANCOMYCIN HYDROCHLORIDE 1 G/20ML
INJECTION, POWDER, LYOPHILIZED, FOR SOLUTION INTRAVENOUS
Status: DISCONTINUED | OUTPATIENT
Start: 2023-07-07 | End: 2023-07-07 | Stop reason: HOSPADM

## 2023-07-07 RX ORDER — MIDAZOLAM HYDROCHLORIDE 1 MG/ML
INJECTION, SOLUTION INTRAMUSCULAR; INTRAVENOUS
Status: DISCONTINUED | OUTPATIENT
Start: 2023-07-07 | End: 2023-07-07

## 2023-07-07 RX ORDER — CLONIDINE HYDROCHLORIDE 0.1 MG/1
0.1 TABLET ORAL EVERY 8 HOURS PRN
Status: DISCONTINUED | OUTPATIENT
Start: 2023-07-07 | End: 2023-07-08 | Stop reason: HOSPADM

## 2023-07-07 RX ORDER — ONDANSETRON 2 MG/ML
4 INJECTION INTRAMUSCULAR; INTRAVENOUS DAILY PRN
Status: DISCONTINUED | OUTPATIENT
Start: 2023-07-07 | End: 2023-07-07 | Stop reason: HOSPADM

## 2023-07-07 RX ORDER — SODIUM CHLORIDE 0.9 % (FLUSH) 0.9 %
10 SYRINGE (ML) INJECTION
Status: DISCONTINUED | OUTPATIENT
Start: 2023-07-07 | End: 2023-07-07 | Stop reason: HOSPADM

## 2023-07-07 RX ORDER — FENTANYL CITRATE 50 UG/ML
50 INJECTION, SOLUTION INTRAMUSCULAR; INTRAVENOUS ONCE AS NEEDED
Status: COMPLETED | OUTPATIENT
Start: 2023-07-07 | End: 2023-07-07

## 2023-07-07 RX ORDER — ACETAMINOPHEN 500 MG
1000 TABLET ORAL EVERY 8 HOURS
Status: DISCONTINUED | OUTPATIENT
Start: 2023-07-07 | End: 2023-07-08 | Stop reason: HOSPADM

## 2023-07-07 RX ORDER — ROCURONIUM BROMIDE 10 MG/ML
INJECTION, SOLUTION INTRAVENOUS
Status: DISCONTINUED | OUTPATIENT
Start: 2023-07-07 | End: 2023-07-07

## 2023-07-07 RX ORDER — MUPIROCIN 20 MG/G
OINTMENT TOPICAL
Status: DISCONTINUED | OUTPATIENT
Start: 2023-07-07 | End: 2023-07-07 | Stop reason: HOSPADM

## 2023-07-07 RX ORDER — ONDANSETRON 2 MG/ML
INJECTION INTRAMUSCULAR; INTRAVENOUS
Status: DISCONTINUED | OUTPATIENT
Start: 2023-07-07 | End: 2023-07-07

## 2023-07-07 RX ORDER — FENTANYL CITRATE 50 UG/ML
25-200 INJECTION, SOLUTION INTRAMUSCULAR; INTRAVENOUS
Status: DISCONTINUED | OUTPATIENT
Start: 2023-07-07 | End: 2023-07-07 | Stop reason: HOSPADM

## 2023-07-07 RX ORDER — THIAMINE HCL 100 MG
100 TABLET ORAL DAILY
Status: DISCONTINUED | OUTPATIENT
Start: 2023-07-08 | End: 2023-07-08 | Stop reason: HOSPADM

## 2023-07-07 RX ORDER — NALOXONE HCL 0.4 MG/ML
0.4 VIAL (ML) INJECTION
Status: DISCONTINUED | OUTPATIENT
Start: 2023-07-07 | End: 2023-07-08 | Stop reason: HOSPADM

## 2023-07-07 RX ORDER — METHOCARBAMOL 750 MG/1
750 TABLET, FILM COATED ORAL 4 TIMES DAILY
Status: DISCONTINUED | OUTPATIENT
Start: 2023-07-07 | End: 2023-07-08 | Stop reason: HOSPADM

## 2023-07-07 RX ORDER — HYDROXYZINE HYDROCHLORIDE 25 MG/1
25 TABLET, FILM COATED ORAL 3 TIMES DAILY PRN
Status: DISCONTINUED | OUTPATIENT
Start: 2023-07-07 | End: 2023-07-08 | Stop reason: HOSPADM

## 2023-07-07 RX ORDER — DEXMEDETOMIDINE HYDROCHLORIDE 100 UG/ML
INJECTION, SOLUTION INTRAVENOUS
Status: DISCONTINUED | OUTPATIENT
Start: 2023-07-07 | End: 2023-07-07

## 2023-07-07 RX ORDER — IBUPROFEN 400 MG/1
400 TABLET ORAL EVERY 6 HOURS PRN
Status: DISCONTINUED | OUTPATIENT
Start: 2023-07-07 | End: 2023-07-08 | Stop reason: HOSPADM

## 2023-07-07 RX ORDER — BUPIVACAINE HYDROCHLORIDE AND EPINEPHRINE 2.5; 5 MG/ML; UG/ML
INJECTION, SOLUTION EPIDURAL; INFILTRATION; INTRACAUDAL; PERINEURAL
Status: COMPLETED | OUTPATIENT
Start: 2023-07-07 | End: 2023-07-07

## 2023-07-07 RX ORDER — HYDROMORPHONE HYDROCHLORIDE 1 MG/ML
0.5 INJECTION, SOLUTION INTRAMUSCULAR; INTRAVENOUS; SUBCUTANEOUS EVERY 10 MIN PRN
Status: DISCONTINUED | OUTPATIENT
Start: 2023-07-07 | End: 2023-07-07 | Stop reason: HOSPADM

## 2023-07-07 RX ORDER — HYDROXYZINE HYDROCHLORIDE 25 MG/1
50 TABLET, FILM COATED ORAL EVERY 6 HOURS PRN
Status: DISCONTINUED | OUTPATIENT
Start: 2023-07-07 | End: 2023-07-07

## 2023-07-07 RX ADMIN — DEXMEDETOMIDINE 4 MCG: 100 INJECTION, SOLUTION, CONCENTRATE INTRAVENOUS at 03:07

## 2023-07-07 RX ADMIN — VANCOMYCIN HYDROCHLORIDE 1250 MG: 1.25 INJECTION, POWDER, LYOPHILIZED, FOR SOLUTION INTRAVENOUS at 09:07

## 2023-07-07 RX ADMIN — PHENYLEPHRINE HYDROCHLORIDE 100 MCG: 10 INJECTION INTRAVENOUS at 01:07

## 2023-07-07 RX ADMIN — METHOCARBAMOL 750 MG: 750 TABLET ORAL at 09:07

## 2023-07-07 RX ADMIN — ACETAMINOPHEN 1000 MG: 500 TABLET ORAL at 09:07

## 2023-07-07 RX ADMIN — HALOPERIDOL LACTATE 5 MG: 5 INJECTION, SOLUTION INTRAMUSCULAR at 04:07

## 2023-07-07 RX ADMIN — FENTANYL CITRATE 50 MCG: 50 INJECTION, SOLUTION INTRAMUSCULAR; INTRAVENOUS at 10:07

## 2023-07-07 RX ADMIN — DEXMEDETOMIDINE 8 MCG: 100 INJECTION, SOLUTION, CONCENTRATE INTRAVENOUS at 02:07

## 2023-07-07 RX ADMIN — MIDAZOLAM HYDROCHLORIDE 2 MG: 1 INJECTION INTRAMUSCULAR; INTRAVENOUS at 12:07

## 2023-07-07 RX ADMIN — Medication 50 MG: at 01:07

## 2023-07-07 RX ADMIN — PROPOFOL 50 MG: 10 INJECTION, EMULSION INTRAVENOUS at 02:07

## 2023-07-07 RX ADMIN — ACETAMINOPHEN 1000 MG: 10 INJECTION, SOLUTION INTRAVENOUS at 01:07

## 2023-07-07 RX ADMIN — METHOCARBAMOL 750 MG: 750 TABLET ORAL at 06:07

## 2023-07-07 RX ADMIN — FENTANYL CITRATE 50 MCG: 50 INJECTION, SOLUTION INTRAMUSCULAR; INTRAVENOUS at 12:07

## 2023-07-07 RX ADMIN — MIDAZOLAM 2 MG: 1 INJECTION INTRAMUSCULAR; INTRAVENOUS at 12:07

## 2023-07-07 RX ADMIN — LIDOCAINE HYDROCHLORIDE 50 MG: 10 INJECTION, SOLUTION EPIDURAL; INFILTRATION; INTRACAUDAL; PERINEURAL at 01:07

## 2023-07-07 RX ADMIN — FAMOTIDINE 20 MG: 10 INJECTION, SOLUTION INTRAVENOUS at 01:07

## 2023-07-07 RX ADMIN — MIDAZOLAM HYDROCHLORIDE 1 MG: 1 INJECTION, SOLUTION INTRAMUSCULAR; INTRAVENOUS at 03:07

## 2023-07-07 RX ADMIN — FENTANYL CITRATE 100 MCG: 50 INJECTION, SOLUTION INTRAMUSCULAR; INTRAVENOUS at 09:07

## 2023-07-07 RX ADMIN — LORAZEPAM 2 MG: 2 INJECTION INTRAMUSCULAR; INTRAVENOUS at 06:07

## 2023-07-07 RX ADMIN — CEFAZOLIN 2 G: 2 INJECTION, POWDER, FOR SOLUTION INTRAMUSCULAR; INTRAVENOUS at 01:07

## 2023-07-07 RX ADMIN — SUGAMMADEX 200 MG: 100 INJECTION, SOLUTION INTRAVENOUS at 03:07

## 2023-07-07 RX ADMIN — MUPIROCIN: 20 OINTMENT TOPICAL at 09:07

## 2023-07-07 RX ADMIN — ROCURONIUM BROMIDE 20 MG: 10 INJECTION, SOLUTION INTRAVENOUS at 02:07

## 2023-07-07 RX ADMIN — PROPOFOL 200 MG: 10 INJECTION, EMULSION INTRAVENOUS at 01:07

## 2023-07-07 RX ADMIN — BUPIVACAINE HYDROCHLORIDE AND EPINEPHRINE BITARTRATE 30 ML: 2.5; .0091 INJECTION, SOLUTION EPIDURAL; INFILTRATION; INTRACAUDAL; PERINEURAL at 10:07

## 2023-07-07 RX ADMIN — SODIUM CHLORIDE, SODIUM GLUCONATE, SODIUM ACETATE, POTASSIUM CHLORIDE, MAGNESIUM CHLORIDE, SODIUM PHOSPHATE, DIBASIC, AND POTASSIUM PHOSPHATE: .53; .5; .37; .037; .03; .012; .00082 INJECTION, SOLUTION INTRAVENOUS at 02:07

## 2023-07-07 RX ADMIN — PROPOFOL 25 MG: 10 INJECTION, EMULSION INTRAVENOUS at 02:07

## 2023-07-07 RX ADMIN — SODIUM CHLORIDE: 0.9 INJECTION, SOLUTION INTRAVENOUS at 01:07

## 2023-07-07 RX ADMIN — MIDAZOLAM 2 MG: 1 INJECTION INTRAMUSCULAR; INTRAVENOUS at 10:07

## 2023-07-07 RX ADMIN — PROPOFOL 25 MG: 10 INJECTION, EMULSION INTRAVENOUS at 03:07

## 2023-07-07 RX ADMIN — SUCCINYLCHOLINE CHLORIDE 140 MG: 20 INJECTION, SOLUTION INTRAMUSCULAR; INTRAVENOUS at 01:07

## 2023-07-07 RX ADMIN — ROCURONIUM BROMIDE 30 MG: 10 INJECTION, SOLUTION INTRAVENOUS at 01:07

## 2023-07-07 RX ADMIN — ONDANSETRON 4 MG: 2 INJECTION INTRAMUSCULAR; INTRAVENOUS at 02:07

## 2023-07-07 RX ADMIN — DEXMEDETOMIDINE 12 MCG: 100 INJECTION, SOLUTION, CONCENTRATE INTRAVENOUS at 03:07

## 2023-07-07 RX ADMIN — FENTANYL CITRATE 100 MCG: 50 INJECTION, SOLUTION INTRAMUSCULAR; INTRAVENOUS at 01:07

## 2023-07-07 NOTE — ASSESSMENT & PLAN NOTE
Rohan Donovan is a 28-year-old male with past medical history significant for hypertension, substance abuse, skin infections who presents today with right long finger pain and left elbow pain.  Patient has multiple draining wounds of the right upper extremity, left upper extremity, left axilla. Afebrile with no leukocytosis on labs. Sed rate 58, CRP 64.2. Xray R hand fingers with soft tissue injury along the dorsal aspect of the 3rd digit PIP joint. No radiopaque foreign body. Gross pus was expressed from the draining wound over the dorsal aspect of the PIP joint of the right long finger by orthopedics and sent for Gram stain, aerobic, anaerobic, AFB, fungal cultures. Orthopedics recs are as follows:     -NPO midnight for I&D tomorrow.   -MRI right hand/fingers pending.  -Multimodal pain control limiting narcotics.  -PRN dressing changes.  -Continue antibiotics  -WBAT right upper extremity, left upper extremity

## 2023-07-07 NOTE — NURSING TRANSFER
Nursing Transfer Note      7/7/2023     Reason patient is being transferred: post op     Transfer To: 1151    Transfer via bed    Transported by Charge RN and PCT    Medicines sent: none    Any special needs or follow-up needed: routine    Chart send with patient: Yes    Patient reassessed at: 1645      Pt. Very restless. I have communicated this with receiving RN, 11th floor charge, anesthesia and primary staff.  Team notified that patient could potentially benefit from having a sitter given that he is so restless/agitated and has a central line.

## 2023-07-07 NOTE — SUBJECTIVE & OBJECTIVE
Principal Problem:Cellulitis of finger, right    Principal Orthopedic Problem: infection of R long finger     Interval History: Patient was admitted to the floor overnight and is awaiting I&D today.     Review of patient's allergies indicates:  No Known Allergies    Current Facility-Administered Medications   Medication    acetaminophen tablet 1,000 mg    dextrose 10% bolus 125 mL 125 mL    dextrose 10% bolus 250 mL 250 mL    dicyclomine capsule 10 mg    glucagon (human recombinant) injection 1 mg    glucose chewable tablet 16 g    glucose chewable tablet 24 g    hydrOXYzine HCL tablet 50 mg    ketorolac injection 15 mg    melatonin tablet 6 mg    methocarbamoL tablet 750 mg    naloxone 0.4 mg/mL injection 0.02 mg    nicotine 21 mg/24 hr 1 patch    ondansetron injection 4 mg    sodium chloride 0.9% flush 10 mL    vancomycin 1,250 mg in dextrose 5 % (D5W) 250 mL IVPB (Vial-Mate)     Objective:     Vital Signs (Most Recent):  Temp: 98 °F (36.7 °C) (07/07/23 0010)  Pulse: 74 (07/07/23 0010)  Resp: 18 (07/07/23 0010)  BP: 138/82 (07/07/23 0010)  SpO2: 98 % (07/07/23 0010) Vital Signs (24h Range):  Temp:  [98 °F (36.7 °C)-98.1 °F (36.7 °C)] 98 °F (36.7 °C)  Pulse:  [74-79] 74  Resp:  [17-18] 18  SpO2:  [97 %-99 %] 98 %  BP: (123-155)/(62-82) 138/82     Weight: 79.4 kg (175 lb)     Body mass index is 25.11 kg/m².      Intake/Output Summary (Last 24 hours) at 7/7/2023 0526  Last data filed at 7/6/2023 2149  Gross per 24 hour   Intake 1250 ml   Output --   Net 1250 ml        Ortho/SPM Exam    RUE:   - Dressing c/d/I   - Extremities WWP   - Radial Pulse palpated   - TTP at and distal to the PIP joint of the right long finger  - AROM, PROM of the shoulder, elbow, wrist   - No migratory erythema     LUE:   - Dressing c/d/I   - Extremities WWP   - Radial Pulse palpated   - TTP over the olecranon of the L elbow   - AROM, PROM of the shoulder, elbow, wrist   - Median, ulnar, AIN, PIN, radial nerves grossly intact         Significant Labs: All pertinent labs within the past 24 hours have been reviewed.    Significant Imaging: MRI: I have reviewed all pertinent results/findings and my personal findings are:  There is a hyperintense fluid collection over the PIP joint of the right long finger likely indicative of inflammatory process and infection.

## 2023-07-07 NOTE — OP NOTE
OP NOTE    DOS:  07/07/2023    Preop Dx: Abscess dorsal right long finger    Tissue breakdown/abscess left elbow    Left elbow olecranon bursitis    Left forearm abscess    Left axillary abscess    Postop Dx: Abscess dorsal right long finger    Septic arthritis left long finger PIP joint    Tissue breakdown/abscess left elbow    Left elbow olecranon bursitis    Left forearm abscess    Left axillary abscess    Procedure: Incision and drainage dorsal abscess left long finger - 78749    Arthrotomy with irrigation left long finger proximal interphalangeal joint - 57632    Excisional debridement of skin and subcutaneous tissue 8 sq cm left elbow - 75195    Left olecranon bursectomy - 14956    Left elbow local tissue rearrangement/advancement 6cm with closure- 05734    Incision and drainage simple abscess left forearm - 93561    Incision and drainage left axillary abscess (other part of 38881)    Surgeon: Boy Lamb M.D.    Asst:  Elpidio Mcdaniel M.D    Anesthesia: GETA    EBL:  10 cc    IVF:  1000 cc crystalloid    Specimens: Swab cultures    Findings: As above    Dispo:  To PACU extubated/stable       Indications for Procedure:      28-year-old male presented with multiple abscesses to include his right long finger as well as his left mid forearm axilla and significant wound over the left olecranon bursa which appears to be subacute or chronic in nature.  I am taking the operating room for washout of all these with probable excision of damaged tissue and mobilization for closure.  The risks, benefits and alternatives to surgery were discussed the patient prior to going the operating room.  Informed consent was obtained.    Procedure in Detail:    Patient identified in preoperative holding area the sites were marked.  Patient was wheeled to the operating room placed on the operating table in supine position.  General endotracheal anesthesia induced.  Bilateral upper extremities were prepped and draped sterile  fashion with a tourniquet on the right upper extremity.  A time-out was undertaken to confirm patient, side, sites, surgeries, surgeon.  All agreed we proceeded.  The right arm tourniquet was elevated with the arm raised but not exsanguinated.    Patient had swelling and a small wound overlying the dorsal aspect of the long finger around the area of the PIP.  A dorsal incision was made liberating the abscess which was cultured.  Initial irrigation was undertaken in that area.  I explored down to the level of the PIP joint which was enlarged.  PIP joint arthrotomy was made with a 15 blade.  The joint was then copiously irrigated with normal saline solution as was all the surrounding tissue.  After through irrigation the incision was closed with 3-0 nylon suture in interrupted fashion leaving a small suctioned open for placement of a Penrose drain.  Attention was then turned to left upper extremity.      The patient had a midforearm small pinpoint area which was incised about 1 cm and a small amount of purulence was removed.  This was then irrigated with normal saline solution.  Attention was then turned to the elbow.      The patient had a quarter-sized wound overlying the proximal portion of the ulna just distal to the tip of the elbow.  I drew a straight line incision along the crest of the ulna traversing through the middle of that wound.  I incised through this.  The underlying tissue was very adherent.  I estimated that I could remove the walls of the wound proper and likely get closure after olecranon bursectomy if I were to develop full-thickness skin flaps enough to get a tension-free closure.    I excised the skin subcutaneous tissue in an elliptical fashion on either side with a 10 blade full-thickness.  This was a total about 6 sq cm.  At this point I had fairly straight skin edges but in immobile tissue flap.  I used a combination of a 10 blade electrocautery to mobilize a full-thickness fasciocutaneous  section of skin and underlying tissue radial and ulnar several cm until had a mobile tissue flap on either side for later tension-free closure.  At this point I used a combination of electrocautery and rongeur to excise the olecranon bursa which was clearly infected.    At this point I undertook copious irrigation with normal saline solution and with dilute peroxide.  After significant irrigation again checked and the make sure that the tissue flaps were sufficiently mobile.  I placed vancomycin and cefepime powder deep and closed the fascial layer with 0 Vicryl suture, the subcutaneous layer with 3-0 antimicrobial Vicryl suture and the skin with 3-0 nylon suture in interrupted fashion for a tension-free closure.  The dorsal forearm wound was left open.      Attention was then turned to the axilla.  The patient had a small abscess in the axilla which was enlarged with a 15 blade and tonsil.  Some minimal purulence existed in this area as well.  This was copiously irrigated with normal saline solution.  This was then left open to heal secondarily.  A dressing was placed throughout.  Dressings were also placed on the right upper extremity.      All instrument and sponge counts were reported correct in the case.  There were no complications.  The patient was extubated, awakened and taken to recovery room stable condition.      Plan the patient:     Patient had issues with abscesses several days ago and was given antibiotics in the ER but did not fill the prescription.  We will treat him with IV antibiotics now and follow the cultures.  I will try to ensure that he has oral antibiotics on his 1st when he leaves the hospital.  Multimodal pain management limiting narcotics.    Boy Lamb MD

## 2023-07-07 NOTE — PROGRESS NOTES
[facility]  Orthopedics  Progress Note    Patient Name: Ton Donovan  MRN: [id number]  Admission Date: 7/6/2023  Hospital Length of Stay: 0 days  Attending Provider: Melany Ho MD  Primary Care Provider: Primary Doctor No  Follow-up For: Procedure(s) (LRB):  IRRIGATION AND DEBRIDEMENT, UPPER EXTREMITY - RIGHT HAND (Right)    Subjective:     Principal Problem:Cellulitis of finger, right    Principal Orthopedic Problem: infection of R long finger     Interval History: Patient was admitted to the floor overnight and is awaiting I&D today.     Review of patient's allergies indicates:  No Known Allergies    Current Facility-Administered Medications   Medication    acetaminophen tablet 1,000 mg    dextrose 10% bolus 125 mL 125 mL    dextrose 10% bolus 250 mL 250 mL    dicyclomine capsule 10 mg    glucagon (human recombinant) injection 1 mg    glucose chewable tablet 16 g    glucose chewable tablet 24 g    hydrOXYzine HCL tablet 50 mg    ketorolac injection 15 mg    melatonin tablet 6 mg    methocarbamoL tablet 750 mg    naloxone 0.4 mg/mL injection 0.02 mg    nicotine 21 mg/24 hr 1 patch    ondansetron injection 4 mg    sodium chloride 0.9% flush 10 mL    vancomycin 1,250 mg in dextrose 5 % (D5W) 250 mL IVPB (Vial-Mate)     Objective:     Vital Signs (Most Recent):  Temp: 98 °F (36.7 °C) (07/07/23 0010)  Pulse: 74 (07/07/23 0010)  Resp: 18 (07/07/23 0010)  BP: 138/82 (07/07/23 0010)  SpO2: 98 % (07/07/23 0010) Vital Signs (24h Range):  Temp:  [98 °F (36.7 °C)-98.1 °F (36.7 °C)] 98 °F (36.7 °C)  Pulse:  [74-79] 74  Resp:  [17-18] 18  SpO2:  [97 %-99 %] 98 %  BP: (123-155)/(62-82) 138/82     Weight: 79.4 kg (175 lb)     Body mass index is 25.11 kg/m².      Intake/Output Summary (Last 24 hours) at 7/7/2023 0526  Last data filed at 7/6/2023 2149  Gross per 24 hour   Intake 1250 ml   Output --   Net 1250 ml        Ortho/SPM Exam    RUE:   - Dressing c/d/I   - Extremities WWP   - Radial  Pulse palpated   - TTP at and distal to the PIP joint of the right long finger  - AROM, PROM of the shoulder, elbow, wrist   - No migratory erythema     LUE:   - Dressing c/d/I   - Extremities WWP   - Radial Pulse palpated   - TTP over the olecranon of the L elbow   - AROM, PROM of the shoulder, elbow, wrist   - Median, ulnar, AIN, PIN, radial nerves grossly intact        Significant Labs: All pertinent labs within the past 24 hours have been reviewed.    Significant Imaging: MRI: I have reviewed all pertinent results/findings and my personal findings are:  There is a hyperintense fluid collection over the PIP joint of the right long finger likely indicative of inflammatory process and infection.    Assessment/Plan:     * Cellulitis of finger, right  Rohan Donovan is a 28-year-old male with past medical history significant for hypertension, substance abuse, skin infections who presents today with right long finger pain and left elbow pain.  Patient has multiple draining wounds of the right upper extremity, left upper extremity, left axilla.  Gross pus was expressed from the draining wound over the dorsal aspect of the PIP joint of the right long finger.  Did at the bedside in the ED and sent for Gram stain, aerobic, anaerobic, AFB, fungal cultures.  Patient was educated regarding his current condition, and the need for irrigation and debridement as this issue will likely not resolve without surgical intervention.  Patient understands the risks and benefits of surgery, was consented in the ED, and wishes to proceed with surgery.  MRI of the right hand completed overnight.  There is a hyperintense signal on T2 at the level of the signal on T2 at the dorsal aspect of the PIP joint of the right long finger which correlates with the patient's clinical picture.    NPO for I&D today   Multimodal pain control limiting narcotics.  PRN dressing changes.  Continue antibiotics  WBAT right upper extremity, left upper  arturo Hilliard MD   Orthopedics  [facility] - Observation 11H

## 2023-07-07 NOTE — ASSESSMENT & PLAN NOTE
-Afebrile, no leukocytosis.  -Sed rate 58, CRP 64.2.  -Lactate pending.  -Blood cxs in process.  -Xray L elbow with no visualized findings to suggest osteomyelitis or other acute abnormality involving the osseous structures or left elbow joint. Soft tissue swelling without associated radiopaque foreign body overlying the olecranon with question of laceration or skin breakdown as described.  -MRI L elbow pending.  -Started on vancomycin in the ED, continue for now.

## 2023-07-07 NOTE — PROGRESS NOTES
Patient is very restless in the bed and unable to relax and keep still. Dr. Morales at bedside. Another 2 mg IV Versed ordered for patient.

## 2023-07-07 NOTE — ASSESSMENT & PLAN NOTE
Rohan Donovan is a 28-year-old male with past medical history significant for hypertension, substance abuse, skin infections who presents today with right long finger pain and left elbow pain.  Patient has multiple draining wounds of the right upper extremity, left upper extremity, left axilla.  Gross pus was expressed from the draining wound over the dorsal aspect of the PIP joint of the right long finger.  Did at the bedside in the ED and sent for Gram stain, aerobic, anaerobic, AFB, fungal cultures.  Patient was educated regarding his current condition, and the need for irrigation and debridement as this issue will likely not resolve without surgical intervention.  Patient understands the risks and benefits of surgery, was consented in the ED, and wishes to proceed with surgery.    NPO midnight for I&D tomorrow.   Multimodal pain control limiting narcotics.  PRN dressing changes.  Continue antibiotics  WBAT right upper extremity, left upper extremity

## 2023-07-07 NOTE — PLAN OF CARE
SW met with patient at bedside and completed an assessment. According to the patient he lives in a house alone.     Later after speaking with other staff. Allegedly this patient was found in an abandoned building.    Patient may need homeless resources and help with getting a medical bed at the [place] [facility] upon discharge.     DAILY will send patient's information to [facility] to help him apply for Medicaid.    KATELYNN Marmolejo, MSW-SW  Medical Social Worker/  ER Department

## 2023-07-07 NOTE — HPI
Ton Donovan is a 28 y.o. male with a  PMHx of IVDU and HTN who presents to the ED with complaints of multiple wounds. The patient has been living in his family's old house which is now foreclosed on, so he has no running water or electricity. He states that he fell asleep and woke up with spider bites several days ago which have now progressed to large, open, draining wounds. Patient recently evaluated on 07/03 for wounds and discharged with Bactrim. States that he has been unable to fill the prescription because he can not afford it. Patient complaining of worsening infection with erythema and drainage to multiple wounds. States that wound on his R knuckle opened today. Additionally, patient has wound on his left elbow, fight forearm, and under his left armpit. Denies IVDU but does endorse snorting heroin. Additionally, states that he has had subjective fevers and chills for the last couple days. He reports some myalgias to his lower extremities and back that he attributes to the start of withdrawal symptoms. The patient denies CP, SOB, cough, N/V/D, or dysuria.    In the ED, VSS, afebrile. CBC unremarkable. Sed rate 58. CMP unremarkable. CRP 64.2. L elbow xray with no visualized findings to suggest osteomyelitis or other acute abnormality involving the osseous structures or left elbow joint. Soft tissue swelling without associated radiopaque foreign body overlying the olecranon with question of laceration or skin breakdown as described. Xray fingers R hand with soft tissue injury along the dorsal aspect of the 3rd digit PIP joint. No radiopaque foreign body. Correlation is needed to exclude exposed bone. The patient received IVF bolus and vancomycin. Ortho consulted in the ED who obtained wound cxs from R third digit and recommend NPO for I&D tomorrow.

## 2023-07-07 NOTE — SUBJECTIVE & OBJECTIVE
Past Medical History:   Diagnosis Date    Hypertension     Unilateral inguinal hernia, without obstruction or gangrene, not specified as recurrent        No past surgical history on file.    Review of patient's allergies indicates:  No Known Allergies    Current Facility-Administered Medications   Medication    acetaminophen tablet 650 mg    dextrose 10% bolus 125 mL 125 mL    dextrose 10% bolus 250 mL 250 mL    glucagon (human recombinant) injection 1 mg    glucose chewable tablet 16 g    glucose chewable tablet 24 g    ketorolac injection 15 mg    melatonin tablet 6 mg    naloxone 0.4 mg/mL injection 0.02 mg    ondansetron injection 4 mg    sodium chloride 0.9% flush 10 mL    vancomycin 1,250 mg in dextrose 5 % (D5W) 250 mL IVPB (Vial-Mate)     Current Outpatient Medications   Medication Sig    sulfamethoxazole-trimethoprim 800-160mg (BACTRIM DS) 800-160 mg Tab Take 1 tablet by mouth 2 (two) times daily. for 7 days     Family History    None       Tobacco Use    Smoking status: Former     Types: Cigarettes    Smokeless tobacco: Never   Substance and Sexual Activity    Alcohol use: No    Drug use: Yes     Types: Marijuana    Sexual activity: Not on file     ROS  Constitutional: negative for fevers or chills  Eyes: negative visual changes or eye discharge  ENT: negative for ear pain or sore throat  Respiratory: negative for shortness of breath or cough  Cardiovascular: negative for chest pain or palpitations  Gastrointestinal: negative for abdominal pain, nausea, or vomiting  Genitourinary: negative for dysuria and flank pain  Neurological: negative for headaches or dizziness  Musculoskeletal: positive for open, draining sores on the right ulnar forearm, right long finger, left elbow, left axilla   Objective:     Vital Signs (Most Recent):  Temp: 98 °F (36.7 °C) (07/07/23 0010)  Pulse: 74 (07/07/23 0010)  Resp: 18 (07/07/23 0010)  BP: 138/82 (07/07/23 0010)  SpO2: 98 % (07/07/23 0010) Vital Signs (24h Range):  Temp:   [98 °F (36.7 °C)-98.1 °F (36.7 °C)] 98 °F (36.7 °C)  Pulse:  [74-79] 74  Resp:  [17-18] 18  SpO2:  [97 %-99 %] 98 %  BP: (123-155)/(62-82) 138/82     Weight: 79.4 kg (175 lb)     Body mass index is 25.11 kg/m².      Intake/Output Summary (Last 24 hours) at 7/7/2023 0017  Last data filed at 7/6/2023 2149  Gross per 24 hour   Intake 1250 ml   Output --   Net 1250 ml        Ortho/SPM Exam  General:  no acute distress, appears stated age   Neuro: alert and oriented x3  Psych: normal mood  Head: normocephalic, atraumatic.  Eyes: no scleral icterus  Mouth: moist mucous membranes  CV: extremities warm and well perfused  Pulm: breathing comfortably, equal chest rise bilat  Skin:  Wounds with purulent drainage of the right forearm, right long finger, left elbow, left axilla    MSK:    RUE:  - Sore of the ulnar forearm with minimal drainage   - swollen, large, open draining wound on the dorsal aspect of the right long finger PIP joint; desquamation present  - TTP of the right long finger.  - AROM and PROM of the shoulder, elbow, wrist  - Axillary/AIN/PIN/Radial/Median/Ulnar Nerves assessed in isolation without deficit  - SILT throughout  - Compartments soft  - Radial artery palpated   - Capillary Refill <3s    LUE:  - open draining wound over the left elbow with mild swelling  - TTP of the left elbow  - AROM and PROM of the shoulder, elbow, wrist, and hand intact without pain  - Axillary/AIN/PIN/Radial/Median/Ulnar Nerves assessed in isolation without deficit  - SILT throughout  - Compartments soft  - Radial artery palpated   - Capillary Refill <3s    RLE:  - NonTTP throughout  - AROM and PROM of the hip, knee, ankle, and foot intact without pain  - TA/EHL/Gastroc/FHL assessed in isolation without deficit  - SILT throughout  - DP and PT palpated  - Capillary Refill <3s  - Negative Log roll    LLE:  - NonTTP throughout  - AROM and PROM of the hip, knee, ankle, and foot intact without pain  - TA/EHL/Gastroc/FHL assessed in  isolation without deficit  - SILT throughout  - DP and PT palpated  - Capillary Refill <3s  - Negative Log roll       Significant Labs: All pertinent labs within the past 24 hours have been reviewed.    Significant Imaging: X-Ray: I have reviewed all pertinent results/findings and my personal findings are:  There is soft tissue swelling over the PIP joint of the right long finger.  There is soft tissue swelling over the olecranon on the left elbow.  No evidence of fracture of the left elbow or the right hand.

## 2023-07-07 NOTE — ASSESSMENT & PLAN NOTE
-Afebrile, no leukocytosis.  -Sed rate 58, CRP 64.2.  -Blood cxs in process.  -Xray L elbow with no visualized findings to suggest osteomyelitis or other acute abnormality involving the osseous structures or left elbow joint. Soft tissue swelling without associated radiopaque foreign body overlying the olecranon with question of laceration or skin breakdown as described.  -MRI L elbow pending.  -Started on vancomycin in the ED, continue for now.

## 2023-07-07 NOTE — ED NOTES
Assumed care of pt at this time. VSS, RR even and unlabored. Resting in bed comfortably. No voiced compaints of pain or discomfort at this time. Safety protocols remain. Verified lines/leads attatched to patient at this time.      General: Awake and alert. Foul smell in room  Neck: Supple  Respiratory: Nonlabored respirations. No audible wheeze. Speaking in full sentences.  Cardiac: Well-perfused. No acrocyanosis.  Abdomen: Supple  Neurological: Moves all extremities symmetrically and equally. Answers questions appropriately.       Skin: Lesions noted to left elbow, left axilla, right fingers, and right posterior forearm.

## 2023-07-07 NOTE — ANESTHESIA PREPROCEDURE EVALUATION
[facility]  Anesthesia Pre-Operative Evaluation         Patient Name: Ton Donovan  YOB: 1995  MRN: [id number]    SUBJECTIVE:     Pre-operative evaluation for Procedure(s) (LRB):  IRRIGATION AND DEBRIDEMENT, UPPER EXTREMITY - RIGHT HAND (Right)     07/07/2023    Ton Donovan is a 28 y.o. male w/ a significant PMHx of HTN and drug abuse who presents due to pyogenic arthritis is R hand.    Patient now presents for the above procedure(s).    TTE: None documented.    LDA:       Peripheral IV - Single Lumen 07/06/23 1911 20 G Right Forearm (Active)   Dressing Intervention First dressing 07/06/23 1911   Number of days: 0       Prev airway: None documented.    Drips: None documented.    Patient Active Problem List   Diagnosis    Heroin dependence    Cellulitis of finger, right    Cellulitis of left elbow    Cigarette nicotine dependence without complication       Review of patient's allergies indicates:  No Known Allergies    Current Inpatient Medications:   acetaminophen  1,000 mg Oral Q8H    methocarbamoL  750 mg Oral QID    nicotine  1 patch Transdermal Daily    vancomycin (VANCOCIN) IV (PEDS and ADULTS)  1,250 mg Intravenous Q12H       No current facility-administered medications on file prior to encounter.     Current Outpatient Medications on File Prior to Encounter   Medication Sig Dispense Refill    sulfamethoxazole-trimethoprim 800-160mg (BACTRIM DS) 800-160 mg Tab Take 1 tablet by mouth 2 (two) times daily. for 7 days 14 tablet 0       History reviewed. No pertinent surgical history.    OBJECTIVE:     Vital Signs Range (Last 24H):  Temp:  [36.7 °C (98 °F)-36.7 °C (98.1 °F)]   Pulse:  [74-79]   Resp:  [17-18]   BP: (123-155)/(62-82)   SpO2:  [97 %-99 %]       Significant Labs:  Lab Results   Component Value Date    WBC 7.95 07/06/2023    HGB 11.4 (L) 07/06/2023    HCT 35.5 (L) 07/06/2023     07/06/2023    ALT 8 (L) 07/06/2023    AST 22 07/06/2023      07/06/2023    K 4.2 07/06/2023     07/06/2023    CREATININE 0.9 07/06/2023    BUN 14 07/06/2023    CO2 27 07/06/2023       Diagnostic Studies: No relevant studies.    EKG:   Results for orders placed or performed during the hospital encounter of 08/22/15   EKG 12-lead    Collection Time: 08/22/15  1:33 PM    Narrative    Test Reason : Chest Pain 786.50  Blood Pressure : **/** mmHG  Vent. Rate : 061 BPM     Atrial Rate : 061 BPM     P-R Int : 192 ms          QRS Dur : 088 ms      QT Int : 382 ms       P-R-T Axes : 058 072 037 degrees     QTc Int : 384 ms    Normal sinus rhythm  Normal ECG    Confirmed by Grace Gilliam MD ([id number]) on 8/24/2015 6:53:11 PM    Referred By: SELF REFERRAL           Confirmed By:Grace Gilliam MD       ASSESSMENT/PLAN:                                                                                                                  07/07/2023  Ton Donovan is a 28 y.o., male.      Pre-op Assessment    I have reviewed the Patient Summary Reports.     I have reviewed the Nursing Notes.    I have reviewed the Medications.     Review of Systems  Anesthesia Hx:  No problems with previous Anesthesia  History of prior surgery of interest to airway management or planning: Denies Family Hx of Anesthesia complications.   Denies Personal Hx of Anesthesia complications.   Social:  Drug abuse   Hematology/Oncology:     Oncology Normal     Cardiovascular:   Hypertension  Denies Angina. ECG has been reviewed.    Pulmonary:   Denies Shortness of breath.  Denies Recent URI.    Renal/:  Renal/ Normal     Hepatic/GI:   Denies GERD. Denies Liver Disease.    Musculoskeletal:   Pyogenic arthritis R hand   Neurological:   Denies CVA. Denies Seizures.    Endocrine:  Endocrine Normal Denies Diabetes.        Physical Exam  General: Well nourished, Cooperative and Alert    Airway:  Mallampati: II   Mouth Opening: Normal  TM Distance: Normal  Tongue: Normal  Neck ROM: Normal  ROM    Dental:  Intact        Anesthesia Plan  Type of Anesthesia, risks & benefits discussed:    Anesthesia Type: Gen ETT  Intra-op Monitoring Plan: Standard ASA Monitors  Post Op Pain Control Plan: multimodal analgesia, IV/PO Opioids PRN and peripheral nerve block  Induction:  IV  Airway Plan: Direct, Post-Induction  Informed Consent: Informed consent signed with the Patient and all parties understand the risks and agree with anesthesia plan.  All questions answered.   ASA Score: 3  Day of Surgery Review of History & Physical: H&P Update referred to the surgeon/provider.    Ready For Surgery From Anesthesia Perspective.     .

## 2023-07-07 NOTE — TRANSFER OF CARE
"Anesthesia Transfer of Care Note    Patient: Ton Donovan    Procedure(s) Performed: Procedure(s) (LRB):  IRRIGATION AND DEBRIDEMENT, UPPER EXTREMITY - RIGHT HAND (Bilateral)  ARTHROTOMY, HAND - R LF PIP (Right)  BURSECTOMY, OLECRANON (Left)    Patient location: PACU    Anesthesia Type: general    Transport from OR: Transported from OR on 6-10 L/min O2 by face mask with adequate spontaneous ventilation    Post pain: adequate analgesia    Post assessment: no apparent anesthetic complications    Post vital signs: stable    Level of consciousness: awake    Nausea/Vomiting: no nausea/vomiting    Complications: none    Transfer of care protocol was followed      Last vitals:   Visit Vitals  /89 (BP Location: Right arm, Patient Position: Lying)   Pulse 89   Temp 36.5 °C (97.7 °F) (Skin)   Resp 17   Ht 5' 10" (1.778 m)   Wt 79.4 kg (175 lb)   SpO2 96%   BMI 25.11 kg/m²     "

## 2023-07-07 NOTE — ANESTHESIA PROCEDURE NOTES
Central Line    Diagnosis: Pyogenic arthritis of R hand  Patient location during procedure: pre-op    Staffing  Authorizing Provider: Michael Love MD  Performing Provider: Michael Love MD    Staffing  Performed: anesthesiologist   Anesthesiologist: Michael Love MD  Anesthesiologist was present at the time of the procedure.  Indication   Indication: vascular access     Anesthesia   local infiltration and see MAR for details    Central Line   Skin Prep: skin prepped with ChloraPrep, skin prep agent completely dried prior to procedure  Sterile Barriers Followed: Yes    All five maximal barriers used- gloves, gown, cap, mask, and large sterile sheet    hand hygiene performed prior to central venous catheter insertion  Location: right internal jugular.   Catheter type: triple lumen  Catheter Size: 12 Fr  Inserted Catheter Length: 16 cm  Ultrasound: vascular probe with ultrasound   Vessel Caliber: small, medium, large, patent  Vascular Doppler:  not done, compressibility normal  Needle advanced into vessel with real time Ultrasound guidance.  Guidewire confirmed in vessel.  sterile gel and probe cover used in ultrasound-guided central venous catheter insertion   Manometry: Venous cannualation confirmed by visual estimation of blood vessel pressure using manometry.  Insertion Attempts: 1   Securement:line sutured, chlorhexidine patch, sterile dressing applied and blood return through all ports    Post-Procedure   X-Ray: placement verified by x-ray, successful placement and tip termination   Adverse Events:none      Guidewire  Guidewire removed intact, verified with nurse.

## 2023-07-07 NOTE — ANESTHESIA PROCEDURE NOTES
Supraclavicular nerve block    Patient location during procedure: pre-op   Block not for primary anesthetic.  Reason for block: at surgeon's request and post-op pain management   Post-op Pain Location: right arm   Start time: 7/7/2023 10:05 AM  Timeout: 7/7/2023 10:04 AM   End time: 7/7/2023 10:11 AM    Staffing  Authorizing Provider: Khushbu Brooks MD  Performing Provider: Baltazar Riley DO    Preanesthetic Checklist  Completed: patient identified, IV checked, site marked, risks and benefits discussed, surgical consent, monitors and equipment checked, pre-op evaluation and timeout performed  Peripheral Block  Patient position: supine  Prep: ChloraPrep  Patient monitoring: heart rate, cardiac monitor, continuous pulse ox, continuous capnometry and frequent blood pressure checks  Block type: supraclavicular  Laterality: right  Injection technique: single shot  Needle  Needle type: Stimuplex   Needle gauge: 22 G  Needle length: 2 in  Needle localization: anatomical landmarks and ultrasound guidance   -ultrasound image captured on disc.  Assessment  Injection assessment: negative aspiration, negative parasthesia and local visualized surrounding nerve  Paresthesia pain: none  Heart rate change: no  Slow fractionated injection: yes  Pain Tolerance: comfortable throughout block and no complaints  Medications:    Medications: bupivacaine 0.25%-EPINEPHrine (PF) 1:200,000 injection - Other   30 mL - 7/7/2023 10:11:00 AM    Additional Notes  VSS.  [facility] RN monitoring vitals throughout procedure.  Patient tolerated procedure well.

## 2023-07-07 NOTE — ASSESSMENT & PLAN NOTE
Rohan Donovan is a 28-year-old male with past medical history significant for hypertension, substance abuse, skin infections who presents today with right long finger pain and left elbow pain.  Patient has multiple draining wounds of the right upper extremity, left upper extremity, left axilla.  Gross pus was expressed from the draining wound over the dorsal aspect of the PIP joint of the right long finger.  Did at the bedside in the ED and sent for Gram stain, aerobic, anaerobic, AFB, fungal cultures.  Patient was educated regarding his current condition, and the need for irrigation and debridement as this issue will likely not resolve without surgical intervention.  Patient understands the risks and benefits of surgery, was consented in the ED, and wishes to proceed with surgery.  MRI of the right hand completed overnight.  There is a hyperintense signal on T2 at the level of the signal on T2 at the dorsal aspect of the PIP joint of the right long finger which correlates with the patient's clinical picture.    NPO for I&D today   Multimodal pain control limiting narcotics.  PRN dressing changes.  Continue antibiotics  WBAT right upper extremity, left upper extremity

## 2023-07-07 NOTE — SUBJECTIVE & OBJECTIVE
Interval History: Patient seen and examined shortly after being wheeled into PACU. He is awake but agitated. Answers some questions appropriately, but still not fully recovered from anesthesia    Review of Systems  Objective:     Vital Signs (Most Recent):  Temp: 97.7 °F (36.5 °C) (07/07/23 0932)  Pulse: 84 (07/07/23 1630)  Resp: (!) 25 (07/07/23 1630)  BP: (!) 142/68 (07/07/23 1630)  SpO2: 99 % (07/07/23 1630) Vital Signs (24h Range):  Temp:  [97.4 °F (36.3 °C)-98.4 °F (36.9 °C)] 97.7 °F (36.5 °C)  Pulse:  [] 84  Resp:  [10-54] 25  SpO2:  [85 %-100 %] 99 %  BP: (117-159)/(55-94) 142/68     Weight: 79.4 kg (175 lb)  Body mass index is 25.11 kg/m².    Intake/Output Summary (Last 24 hours) at 7/7/2023 1647  Last data filed at 7/7/2023 1554  Gross per 24 hour   Intake 3400 ml   Output 420 ml   Net 2980 ml         Physical Exam  Vitals reviewed.   Constitutional:       Appearance: He is not toxic-appearing.      Comments: Awake. Agitated    Eyes:      General: No scleral icterus.     Extraocular Movements: Extraocular movements intact.   Cardiovascular:      Rate and Rhythm: Normal rate.   Pulmonary:      Effort: Pulmonary effort is normal.   Musculoskeletal:      Cervical back: Normal range of motion.      Right lower leg: No edema.      Left lower leg: No edema.      Comments: Hands and R elbow with dressing in place   Skin:     General: Skin is warm and dry.      Capillary Refill: Capillary refill takes less than 2 seconds.   Neurological:      General: No focal deficit present.   Psychiatric:      Comments: Agitated, confused       Exam limited 2/2 patient cooperation and agitation     Significant Labs: All pertinent labs within the past 24 hours have been reviewed.    Significant Imaging: I have reviewed all pertinent imaging results/findings within the past 24 hours.

## 2023-07-07 NOTE — H&P
[facility] - Emergency Dept  [facility] Medicine  History & Physical    Patient Name: Ton Donovan  MRN: [id number]  Patient Class: OP- Observation  Admission Date: 7/6/2023  Attending Physician: Melany Ho MD   Primary Care Provider: Primary Doctor No         Patient information was obtained from patient, past medical records, and ER records.     Subjective:     Principal Problem:Cellulitis of finger, right    Chief Complaint:   Chief Complaint   Patient presents with    Cellulitis     Has cellulitis to right hand middle finger and under left arm and right forearm; was not able to buy antibiotics and infection is worse; open and draining        HPI: Ton Donovan is a 28 y.o. male with a  PMHx of IVDU and HTN who presents to the ED with complaints of multiple wounds. The patient has been living in his family's old house which is now foreclosed on, so he has no running water or electricity. He states that he fell asleep and woke up with spider bites several days ago which have now progressed to large, open, draining wounds. Patient recently evaluated on 07/03 for wounds and discharged with Bactrim. States that he has been unable to fill the prescription because he can not afford it. Patient complaining of worsening infection with erythema and drainage to multiple wounds. States that wound on his R knuckle opened today. Additionally, patient has wound on his left elbow, fight forearm, and under his left armpit. Denies IVDU but does endorse snorting heroin. Additionally, states that he has had subjective fevers and chills for the last couple days. He reports some myalgias to his lower extremities and back that he attributes to the start of withdrawal symptoms. The patient denies CP, SOB, cough, N/V/D, or dysuria.    In the ED, VSS, afebrile. CBC unremarkable. Sed rate 58. CMP unremarkable. CRP 64.2. L elbow xray with no visualized findings to suggest osteomyelitis or other acute abnormality involving the osseous  structures or left elbow joint. Soft tissue swelling without associated radiopaque foreign body overlying the olecranon with question of laceration or skin breakdown as described. Xray fingers R hand with soft tissue injury along the dorsal aspect of the 3rd digit PIP joint. No radiopaque foreign body. Correlation is needed to exclude exposed bone. The patient received IVF bolus and vancomycin. Ortho consulted in the ED who obtained wound cxs from R third digit and recommend NPO for I&D tomorrow.    Past Medical History:   Diagnosis Date    Hypertension     Unilateral inguinal hernia, without obstruction or gangrene, not specified as recurrent        No past surgical history on file.    Review of patient's allergies indicates:  No Known Allergies    No current facility-administered medications on file prior to encounter.     Current Outpatient Medications on File Prior to Encounter   Medication Sig    sulfamethoxazole-trimethoprim 800-160mg (BACTRIM DS) 800-160 mg Tab Take 1 tablet by mouth 2 (two) times daily. for 7 days     Family History    None       Tobacco Use    Smoking status: Former     Types: Cigarettes    Smokeless tobacco: Never   Substance and Sexual Activity    Alcohol use: No    Drug use: Yes     Types: Marijuana    Sexual activity: Not on file     Review of Systems   Constitutional:  Positive for chills and fever (subjective). Negative for appetite change, diaphoresis and fatigue.   HENT:  Negative for congestion, rhinorrhea and sore throat.    Eyes:  Negative for photophobia and visual disturbance.   Respiratory:  Negative for cough, shortness of breath and wheezing.    Cardiovascular:  Negative for chest pain, palpitations and leg swelling.   Gastrointestinal:  Negative for abdominal distention, abdominal pain, diarrhea, nausea and vomiting.   Genitourinary:  Negative for dysuria, frequency and hematuria.   Musculoskeletal:  Positive for arthralgias and joint swelling. Negative for gait problem,  myalgias and neck pain.   Skin:  Positive for color change and wound. Negative for pallor and rash.   Neurological:  Negative for dizziness, syncope, weakness, light-headedness and headaches.   Psychiatric/Behavioral:  Negative for agitation and hallucinations. The patient is not nervous/anxious.    Objective:     Vital Signs (Most Recent):  Temp: 98.1 °F (36.7 °C) (07/06/23 1704)  Pulse: 79 (07/06/23 2100)  Resp: 17 (07/06/23 2045)  BP: 134/62 (07/06/23 2100)  SpO2: 99 % (07/06/23 2100) Vital Signs (24h Range):  Temp:  [98.1 °F (36.7 °C)] 98.1 °F (36.7 °C)  Pulse:  [75-79] 79  Resp:  [17-18] 17  SpO2:  [97 %-99 %] 99 %  BP: (123-155)/(62-77) 134/62     Weight: 79.4 kg (175 lb)  Body mass index is 25.11 kg/m².     Physical Exam  Vitals and nursing note reviewed.   Constitutional:       General: He is not in acute distress.     Appearance: He is not toxic-appearing or diaphoretic.   HENT:      Head: Normocephalic and atraumatic.      Nose: Nose normal.      Mouth/Throat:      Mouth: Mucous membranes are moist.   Eyes:      Pupils: Pupils are equal, round, and reactive to light.   Cardiovascular:      Rate and Rhythm: Normal rate and regular rhythm.      Pulses: Normal pulses.           Radial pulses are 2+ on the right side and 2+ on the left side.      Heart sounds: No murmur heard.  Pulmonary:      Effort: Pulmonary effort is normal. No respiratory distress.      Breath sounds: No wheezing, rhonchi or rales.   Abdominal:      General: Bowel sounds are normal. There is no distension.      Palpations: Abdomen is soft.      Tenderness: There is no abdominal tenderness. There is no guarding.   Musculoskeletal:         General: Normal range of motion.      Left elbow: Tenderness present.      Right hand: Tenderness (Along R third digit) present.      Cervical back: Normal range of motion.   Skin:     General: Skin is warm and dry.      Capillary Refill: Capillary refill takes less than 2 seconds.      Findings: Wound  present.      Comments: Dressing noted to R hand and third digit; CDI. Dressing noted to L elbow, CDI. Wound noted to L axillae with scant amount of purulent drainage. See photos.   Neurological:      General: No focal deficit present.      Mental Status: He is alert and oriented to person, place, and time.      Sensory: No sensory deficit.      Motor: No weakness.   Psychiatric:         Mood and Affect: Mood normal.         Speech: Speech normal.         Behavior: Behavior normal.            CRANIAL NERVES     CN III, IV, VI   Pupils are equal, round, and reactive to light.               Significant Labs: All pertinent labs within the past 24 hours have been reviewed.  CBC:   Recent Labs   Lab 07/06/23 1910   WBC 7.95   HGB 11.4*   HCT 35.5*        CMP:   Recent Labs   Lab 07/06/23 1910      K 4.2      CO2 27      BUN 14   CREATININE 0.9   CALCIUM 9.6   PROT 7.2   ALBUMIN 3.5   BILITOT 0.3   ALKPHOS 77   AST 22   ALT 8*   ANIONGAP 9       Significant Imaging: I have reviewed all pertinent imaging results/findings within the past 24 hours.  Imaging Results              X-Ray Elbow Complete Left (Final result)  Result time 07/06/23 20:04:26      Final result by Uzma Lu MD (07/06/23 20:04:26)                   Impression:      No visualized findings to suggest osteomyelitis or other acute abnormality involving the osseous structures or left elbow joint.    Soft tissue swelling without associated radiopaque foreign body overlying the olecranon with question of laceration or skin breakdown as described.      Electronically signed by: Uzma Lu  Date:    07/06/2023  Time:    20:04               Narrative:    EXAMINATION:  XR ELBOW COMPLETE 3 VIEW LEFT    CLINICAL HISTORY:  Cutaneous abscess, unspecified    TECHNIQUE:  AP, lateral, and oblique views of the left elbow were performed.    COMPARISON:  None    FINDINGS:  Is there is no evidence of acute fracture or dislocation.   There is focal mild soft tissue swelling over the olecranon with skin surface irregularity raising question of superficial skin break down or laceration.  No visible gas in the deeper soft tissues, erosive changes or periostitis along the olecranon.  There is no elbow effusion.                                       X-Ray Finger 2 or More Views Right (Final result)  Result time 07/06/23 19:49:33      Final result by Haris Vieira MD (07/06/23 19:49:33)                   Impression:      1. Soft tissue injury along the dorsal aspect of the 3rd digit PIP joint.  No radiopaque foreign body.  Correlation is needed to exclude exposed bone.      Electronically signed by: Haris Vieira MD  Date:    07/06/2023  Time:    19:49               Narrative:    EXAMINATION:  XR FINGER 2 OR MORE VIEWS RIGHT    CLINICAL HISTORY:  abscess;    COMPARISON:  None    FINDINGS:  Three views right 3rd digit.    No acute displaced fracture or dislocation of the 3rd digit.  There is soft tissue injury along the dorsal aspect of the PIP joint.  There is diffuse edema about the region.  No radiopaque foreign body.                                      Assessment/Plan:     * Cellulitis of finger, right  Rohan Donovan is a 28-year-old male with past medical history significant for hypertension, substance abuse, skin infections who presents today with right long finger pain and left elbow pain.  Patient has multiple draining wounds of the right upper extremity, left upper extremity, left axilla. Afebrile with no leukocytosis on labs. Sed rate 58, CRP 64.2. Xray R hand fingers with soft tissue injury along the dorsal aspect of the 3rd digit PIP joint. No radiopaque foreign body. Gross pus was expressed from the draining wound over the dorsal aspect of the PIP joint of the right long finger by orthopedics and sent for Gram stain, aerobic, anaerobic, AFB, fungal cultures. Orthopedics recs are as follows:     -NPO midnight for I&D tomorrow.   -MRI  right hand/fingers pending.  -Multimodal pain control limiting narcotics.  -PRN dressing changes.  -Continue antibiotics  -WBAT right upper extremity, left upper extremity    Cellulitis of left elbow  -Afebrile, no leukocytosis.  -Sed rate 58, CRP 64.2.  -Lactate pending.  -Blood cxs in process.  -Xray L elbow with no visualized findings to suggest osteomyelitis or other acute abnormality involving the osseous structures or left elbow joint. Soft tissue swelling without associated radiopaque foreign body overlying the olecranon with question of laceration or skin breakdown as described.  -MRI L elbow pending.  -Started on vancomycin in the ED, continue for now.    Cigarette nicotine dependence without complication  Dangers of cigarette smoking were reviewed with patient in detail. Patient was Counseled for 3-10 minutes. Nicotine replacement options were discussed. Nicotine replacement was discussed- prescribed    Heroin dependence  Patient endorses snorting heroin daily, reports he is starting to have muscle aches in his legs and back that are consistent with previous withdrawal symptoms. Denies any IVDU.  -Scheduled robaxin.  -PRN bentyl, antiemetics, and atarax  -Psych consult, appreciate recs.  -UDS pending.  -Encouraged cessation.      VTE Risk Mitigation (From admission, onward)           Ordered     IP VTE LOW RISK PATIENT  Once         07/06/23 2249     Place sequential compression device  Until discontinued         07/06/23 2249                         On 07/06/2023, patient should be placed in hospital observation services under my care in collaboration with Renny Lorenzo MD.      Letty Mata, NP  Department of Hospital Medicine  [facility] - Emergency Dept

## 2023-07-07 NOTE — ED TRIAGE NOTES
Ton Donovan, a 28 y.o. male presents to the ED w/ complaint of cellulitis. Noted to right middle finger, under left arm and right forearm. Unable to purchase antibiotics. Wounds open and draining    Adult Physical Assessment  LOC: Ton Donovan, 28 y.o. male verified via two identifiers.  The patient is awake, alert, oriented and speaking appropriately at this time.  APPEARANCE: Patient resting comfortably and appears to be in no acute distress at this time. Patient is clean and well groomed, patient's clothing is properly fastened.  SKIN:The skin is warm and dry, color consistent with ethnicity, patient has normal skin turgor and moist mucus membranes. Cellulitis noted to right middle finger, under left arm and right forearm. Unable to purchase antibiotics. Wounds open and draining  MUSCULOSKELETAL: Patient moving all extremities well, no obvious swelling or deformities noted.  RESPIRATORY: Airway is open and patent, respirations are spontaneous, patient has a normal effort and rate, no accessory muscle use noted.  CARDIAC: Patient has a normal rate and rhythm, no periphreal edema noted in any extremity, capillary refill < 3 seconds in all extremities  ABDOMEN: Soft and non tender to palpation, no abdominal distention noted. Bowel sounds present in all four quadrants.  NEUROLOGIC: Eyes open spontaneously, behavior appropriate to situation, follows commands, facial expression symmetrical, bilateral hand grasp equal and even, purposeful motor response noted, normal sensation in all extremities when touched with a finger.      Triage note:  Chief Complaint   Patient presents with    Cellulitis     Has cellulitis to right hand middle finger and under left arm and right forearm; was not able to buy antibiotics and infection is worse; open and draining     Review of patient's allergies indicates:  No Known Allergies  Past Medical History:   Diagnosis Date    Hypertension     Unilateral inguinal hernia, without  obstruction or gangrene, not specified as recurrent

## 2023-07-07 NOTE — PLAN OF CARE
07/07/23 0142   Post-Acute Status   Post-Acute Authorization Other   Other Status No Post-Acute Service Needs   Discharge Delays None known at this time   Discharge Plan   Discharge Plan A Home     Patient stated that he lives at home alone. Patient stated that he can safely return to his home on discharge.    Patient needs to apply for medicaid.     Patient denies any post acute needs at this time.     KATELYNN Marmolejo, MSW-SW  Medical Social Worker/  ER Department

## 2023-07-07 NOTE — PLAN OF CARE
[facility] Dept  Initial Discharge Assessment       Primary Care Provider: Primary Doctor No    Admission Diagnosis: Abscess [L02.91]    Admission Date: 7/6/2023  Expected Discharge Date:     Transition of Care Barriers: (P) None    Payor: /     Extended Emergency Contact Information  Primary Emergency Contact: Trisha Donovan  Mobile Phone: [phone]  Relation: Sister  Preferred language: English   needed? No    Discharge Plan A: (P) Home       No Pharmacies Listed    Initial Assessment (most recent)       Adult Discharge Assessment - 07/07/23 0135          Discharge Assessment    Assessment Type Discharge Planning Assessment (P)      Confirmed/corrected address, phone number and insurance Yes (P)      Confirmed Demographics Correct on Facesheet (P)      Source of Information patient (P)      When was your last doctors appointment? -- (P)    Patient does not have a PCP at this time    Does patient/caregiver understand observation status Yes (P)      Communicated SENA with patient/caregiver Yes (P)      Reason For Admission Spider  Bites (P)      People in Home alone (P)      Facility Arrived From: Home (P)      Do you expect to return to your current living situation? Yes (P)      Do you have help at home or someone to help you manage your care at home? No (P)      Prior to hospitilization cognitive status: Alert/Oriented (P)      Current cognitive status: Alert/Oriented (P)      Home Accessibility wheelchair accessible (P)      Home Layout Able to live on 1st floor (P)      Equipment Currently Used at Home none (P)      Readmission within 30 days? No (P)      Patient currently being followed by outpatient case management? No (P)      Do you currently have service(s) that help you manage your care at home? No (P)      Do you take prescription medications? No (P)      Do you have prescription coverage? No (P)      Do you have any problems affording any of your prescribed medications? TBD (P)       Is the patient taking medications as prescribed? yes (P)      Who is going to help you get home at discharge? Self (P)      How do you get to doctors appointments? car, drives self (P)      Are you on dialysis? No (P)      Do you take coumadin? No (P)      Discharge Plan A Home (P)      DME Needed Upon Discharge  none (P)      Discharge Plan discussed with: Patient (P)      Transition of Care Barriers None (P)         Physical Activity    On average, how many days per week do you engage in moderate to strenuous exercise (like a brisk walk)? 3 days (P)      On average, how many minutes do you engage in exercise at this level? 60 min (P)         Financial Resource Strain    How hard is it for you to pay for the very basics like food, housing, medical care, and heating? Not hard at all (P)         Housing Stability    In the last 12 months, was there a time when you were not able to pay the mortgage or rent on time? No (P)      In the last 12 months, how many places have you lived? 1 (P)      In the last 12 months, was there a time when you did not have a steady place to sleep or slept in a shelter (including now)? No (P)         Transportation Needs    In the past 12 months, has lack of transportation kept you from medical appointments or from getting medications? No (P)      In the past 12 months, has lack of transportation kept you from meetings, work, or from getting things needed for daily living? No (P)         Food Insecurity    Within the past 12 months, you worried that your food would run out before you got the money to buy more. Never true (P)      Within the past 12 months, the food you bought just didn't last and you didn't have money to get more. Never true (P)         Stress    Do you feel stress - tense, restless, nervous, or anxious, or unable to sleep at night because your mind is troubled all the time - these days? Not at all (P)         Social Connections    In a typical week, how many times do  you talk on the phone with family, friends, or neighbors? Twice a week (P)      How often do you get together with friends or relatives? More than three times a week (P)      How often do you attend Holiness or Moravian services? Never (P)      Do you belong to any clubs or organizations such as Holiness groups, unions, fraternal or athletic groups, or school groups? No (P)      How often do you attend meetings of the clubs or organizations you belong to? Never (P)      Are you , , , , never , or living with a partner? Never  (P)         Alcohol Use    Q1: How often do you have a drink containing alcohol? Never (P)      Q2: How many drinks containing alcohol do you have on a typical day when you are drinking? Patient does not drink (P)      Q3: How often do you have six or more drinks on one occasion? Never (P)

## 2023-07-07 NOTE — CONSULTS
DEPARTMENT OF PSYCHIATRY  SITE: [facility], [address]    ADDICTION CONSULT INITIAL EVALUATION     7/7/2023 8:44 AM  Ton Donovan  1995  [id number]    DATE OF ADMISSION: 7/6/2023  6:30 PM  LENGTH OF STAY: 0 days    EXAMINING PRACTITIONER: Alex Durant    Consults        CHIEF COMPLAINT:     Patient Name: Ton Donovan  YOB: 1995  MRN: [id number]    Ton Donovan is a 28 y.o. male who is seen today for an initial psychiatric evaluation by the addiction psychiatry consult service.  Ton Donovan presents with the chief complaint of: problematic substance use/abuse and alcohol and/or drug addiction    CHART REVIEW:     Available documentation has been reviewed, and pertinent elements of the chart have been incorporated into this evaluation where appropriate.    Per Primary Team:    Mr. Manuel Donovan is a 28 y.o. male with a PMHx of heroin dependence and cigarette nicotine dependence without complication presenting to the ED with cellulitis of the finger and elbow. Patient stated he has been living in his family's prior home which has been foreclosed on, so he is without running water or electricity. He states he fell asleep several days ago and work up with spider bites that progressed to large, open wounds. Patient previously evaluated for the wounds on 7/03 and discharged with a prescription for Bactrim. Patient stated he was unable to fill the prescription and represented with worsening of the infections. Patient to undergo irrigation and debridement of the wounds.     PRESENTATION:     HISTORY OF PRESENT ILLNESS:       During the interview, patient was linear, organized, and cooperative with the interviewer. Interview was conducted in the preoperative staging area prior to induction of sedation or anesthesia. Patient stated he snorts heroin varying in frequency but generally once or twice daily. Patient states daily use has been fairly continuous for past 6 months. Patient  "stated he has had multiple attempts at sobriety, but none last more than a few days. Patient stated he "never intended on getting hooked on heroin" and endorsed motivation at achieving lifetime sobriety after discharge from the hospital. Patient stated he did not currently have a preference for a type of cessation program but was open to consider any options or support that is available.           RELIABILITY, ACCURACY & AGENCY:     The patient is deemed to be a historian of unknown reliability and accuracy.    Inconsistencies between patient reporting, collateral information, and/or chart review are not observed or manifest.    Legal Status: The patient is currently here on a voluntary legal status.      ADDICTION:     SUBSTANCE USE:    I[]I Patient denies any substance use history, and none is known.  I[]I Patient unable or unwilling to provide any substance use history.    Nicotine Use: no  Alcohol Misuse/Abuse: no  Illicit Drug Use/Misuse/Abuse: no  Misuse/Abuse of Prescription Medications: no       Current Alcohol Use:   I[]I U    I[x]I N    I[]I Rare    I[]I Social    I[]I Exceeds Recommended Health Limits    I[]I Binge Pattern    I[]I Daily or Near Daily    I[]I Physiologically Dependent    Average Consumption (e.g. type, quantity, frequency): n/a  Last drink: n/a    Substances Used (Lifetime): heroin    Tobacco Cessation ("Wants to Quit"):   I[]I N/A  I[]I U  II[][]II nterested in Quitting    II[x][]II Uninterested in Quitting   II[][]II Making Efforts to Cut Back or Quit    II[][]II Advised to Quit    II[][]II Assistance Provided    II[][]II Encouragement Provided    II[][]II Motivational Interviewing Provided    II[][]II Resources Provided    II[][]II Referral Made     Meets Criteria for Substance Use Disorder:  yes, severe  Advised to Quit/Cut Back: yes  Motivated to Do So: yes    ADDITIONAL FACTORS RELATED TO ADDICTION:    Hx of IVDU: no; patient states he does not utilize IV access, exclusively " ""snorting"  Hx of Accidental Overdose: no  Hx of DUI: unknown  Hx of Complicated Withdrawal (i.e. Seizures and/or Delirium Tremens): no  Hx of Known/Suspected Substance-Induced Psychiatric Disorder: no  Hx of Detox: no  Hx of Rehab: no  Hx of Medication Assisted Treatment: no  Hx of Twelve Step Program (or Equivalent) Involvement: no  Currently Exhibits Signs of Intoxication: no  Currently Exhibits Signs of Withdrawal: no  Currently Active in Recovery: no     Substance(s) of Choice: Heroin  Substances Used Currently/Recently: Heroin  Duration of Sobriety/Abstinence: Few days  Date of Last Use of Substances: States a day before admission  View/Acceptance of Twelve Step (or Equivalent) Programs: Open  Social Support: Poor, lives alone  Spouse/Partner Consumption: n/a     I[]I N/A  I[x]I Y  I[]I N  I[]I U  I[]I A  Awareness of Biomedical Complications: yes      Understands Need for Lifetime Sobriety: yes   I[]I N/A  I[x]I Y  I[]I N  I[]I U  I[]I A  Acknowledges/Accepts Addiction:   I[]I N/A  I[x]I Y  I[]I N  I[]I U  I[]I A  Cessation of Problematic Alcohol/Drug Use ("Wants to Quit"): Interested in Quitting, Encouragement Provided  I[]I N/A  I[]I Y  I[]I N  I[]I U  I[]I A  Motivation to Pursue Treatment: Enthusiastic, Agreeable    Substance Use Disorder Criteria:  Often take in larger amounts or over a longer period of time than was intended: yes  Persistent desire or unsuccessful efforts to cut down or control use: yes  Great deal of time spent in activities necessary to obtain substance, use, or recover from effects: no  Craving/strong desire for substance or urge to use: yes  Use resulting in failure to fulfill major role obligations at home, work or school: yes  Social, occupational, recreational activities decreased because of use: no  Continued use despite having persistent or recurrent social or interpersonal problems cause or exaserbated by the substance: no  Recurrent use in situations in which it is physically " hazardous: yes  Use despite physical or psychological problems that are likely to have been caused or exacerbated by the substance: No  Tolerance: Yes   Withdrawal: No  Severe (?6)      REVIEW OF SYSTEMS:     Medical Review of Systems:    Complete review of systems performed covering Constitutional, Eyes, ENT/Mouth, Cardiovascular, Respiratory, Gastrointestinal, Genitourinary, Musculoskeletal, Skin, Neurologic, Endocrine, Heme/Lymph, and Allergy/Immune.       Psychiatric Review of System: Is patient experiencing any changes from baseline in:  sleep:  yes   appetite: yes   weight: no   energy/anergy: yes  interest/pleasure/anhedonia: no  somatic symptoms: yes  guilty/hopelessness: no  concentration: no  S.I.B.s/risky behavior: no  SI/SA:  no     anxiety/panic: yes  Agoraphobia:  no  Social phobia:  no  Recurrent nightmares:  no  hyper startle response:  no  Avoidance: no  Recurrent thoughts:  no  Recurrent behaviors:  no     Irritability: no  Racing thoughts: no  Impulsive behaviors: no  Pressured speech:  no     Paranoia:yes  Delusions: no  AVH:no      HISTORY:     Past Psychiatric:  Psychiatric Diagnoses:  denies  Current Psychiatric Provider (if Applicable):  denies  Hx of Psychiatric Hospitalization:  denies  Hx of Outpatient Psychiatric Treatment (psychiatry/psychotherapy):  denies  Psychotropic Trials:  no  Prior Suicide Attempts: no  Hx of Suicidal Ideation: no  Hx of Homicidal Ideation: no  Hx of Self-Injurious Behavior (Non-Suicidal): no  Hx of Violence: no  Documented Hx of Malingering: no      Trauma:  Hx of Trauma/Neglect/Physical Abuse/Sexual Abuse:  denies    Family  Family History: unknown      Social:    Grew Up Locally?: yes  Happy Childhood?: yes  Significant Developmental Delay/Disability?: no  GED/High School Dipoloma?: yes  Post High School Education?: no  Currently Employed?: no  On or Applying for Disability?: unknown  Functions Independently?: yes  Financially Stable?: no  Domiciled?:   "Currenly "squatting" in prior family home which has foreclosed on  Lives Alone?: yes  Heterosexual/Cisgender?: unknown  Currently in a Romantic Relationship?: unknown  Ever ?: denies  Children/Dependents?: no  Evangelical/Spiritual?: no   History?: no  Engaged in Hobbies/Recreational Activities?: yes  Access to a Gun?: no    Legal:    Current Legal Issues: unknown  Past Charges/Convictions: unknown  Hx of Incarceration: unknown        Medical:  The patient's past medical history has been reviewed and updated as appropriate within the electronic medical record system.    General Medical History:     Active Ambulatory Problems     Diagnosis Date Noted    No Active Ambulatory Problems     Resolved Ambulatory Problems     Diagnosis Date Noted    No Resolved Ambulatory Problems     Past Medical History:   Diagnosis Date    Hypertension     Unilateral inguinal hernia, without obstruction or gangrene, not specified as recurrent          Neurological:  Hx of Seizure:  denies  Hx of Significant Head Trauma (e.g., Loss of Consciousness, Concussion, Coma):          MEDICATIONS:     Current Psychotropic Medications:   Hydroxyzine 50 mg PRN  Versed PRN    Scheduled and PRN Medications:   The electronic chart was reviewed and updated as appropriate.  See [facility] for details.    Current Facility-Administered Medications:     acetaminophen tablet 1,000 mg, 1,000 mg, Oral, Q8H, Letty Mata NP    ceFAZolin 2 g in dextrose 5 % in water (D5W) 5 % 50 mL IVPB (MB+), 2 g, Intravenous, On Call Procedure, Ministerio Rizzo MD    dextrose 10% bolus 125 mL 125 mL, 12.5 g, Intravenous, PRN, Letty Mata NP    dextrose 10% bolus 250 mL 250 mL, 25 g, Intravenous, PRN, Letty Mata NP    dicyclomine capsule 10 mg, 10 mg, Oral, Q6H PRN, Letty Mata NP    fentaNYL 50 mcg/mL injection  mcg,  mcg, Intravenous, PRN, William Cm DO, 50 mcg at 07/07/23 1040    glucagon (human recombinant) injection 1 " mg, 1 mg, Intramuscular, PRN, Letty Mata NP    glucose chewable tablet 16 g, 16 g, Oral, PRN, Letty Mata NP    glucose chewable tablet 24 g, 24 g, Oral, PRN, Letty Mata NP    hydrOXYzine HCL tablet 50 mg, 50 mg, Oral, Q6H PRN, Letty Mata NP    ketorolac injection 15 mg, 15 mg, Intravenous, Q6H PRN, Letty Mata NP    melatonin tablet 6 mg, 6 mg, Oral, Nightly PRN, Letty Mata NP    methocarbamoL tablet 750 mg, 750 mg, Oral, QID, Letty Mata NP    midazolam (VERSED) 1 mg/mL injection 0.5-4 mg, 0.5-4 mg, Intravenous, PRN, William Cm DO, 2 mg at 07/07/23 1005    mupirocin 2 % ointment, , Nasal, On Call Procedure, Ministerio Rizzo MD, Given at 07/07/23 0938    naloxone 0.4 mg/mL injection 0.02 mg, 0.02 mg, Intravenous, PRN, Letty Mata NP    nicotine 21 mg/24 hr 1 patch, 1 patch, Transdermal, Daily, Letty Mata NP    ondansetron injection 4 mg, 4 mg, Intravenous, Q8H PRN, Letty Mata NP    sodium chloride 0.9% flush 10 mL, 10 mL, Intravenous, Q12H PRN, Letty Mata NP    sodium chloride 0.9% flush 10 mL, 10 mL, Intravenous, PRN, Ministerio Rizzo MD    vancomycin 1,250 mg in dextrose 5 % (D5W) 250 mL IVPB (Vial-Mate), 1,250 mg, Intravenous, Q12H, Letty Mata NP, Last Rate: 166.7 mL/hr at 07/07/23 0939, 1,250 mg at 07/07/23 0939    ALLERGIES:     Review of patient's allergies indicates:  No Known Allergies        PRESCRIPTION DRUG MONITORING:     LA/MS  AWARE  Site reviewed -        FIREARMS:     Access to Firearms:   See above for screening on access to firearms.  NOTE: patient counseled on gun safety.  NOTE: patient counseled on increased risks associated with gun ownership.      RISK PARAMETERS:     The following risk parameters were assessed during this evaluation:    Suicidal Ideation/Behavior: no    Homicidal Ideation/Behavior: no  Violence: no  Self-Injurious Behavior: no  Minimization of Symptoms Suspected/Evident:  "no  Exaggeration of Symptoms Suspected/Evident: no    CLINICAL RISK ASSESSMENT:     The patient was able to satisfactorily contract for safety and was noted to be future oriented.    [id number]: Currently does not meet or exceed the threshold for psychiatric hospitalization, as the patient can be managed safely and successfully in a less restrictive level of care or the community.    CLINICAL RISK DETERMINATION:     Current risk is judged to be:  I[]I Low    I[]I Moderate   I[]I High    The following criteria were met for involuntary psychiatric admission:   Dangerous to Self: no  Dangerous to Others: no  Gravely Disabled: no      EXAMINATION:     Vitals:    07/07/23 0010 07/07/23 0500 07/07/23 0700 07/07/23 0813   BP: 138/82 133/79  131/67   BP Location:    Right arm   Patient Position:       Pulse: 74 77  74   Resp: 18   18   Temp: 98 °F (36.7 °C) 98.4 °F (36.9 °C)  97.4 °F (36.3 °C)   TempSrc: Oral   Oral   SpO2: 98%   98%   Weight:   79.4 kg (175 lb)    Height:   5' 10" (1.778 m)        MENTAL STATUS EXAMINATION:     General Appearance: appears stated age, well developed and nourished, adequately groomed and appropriately dressed, in no acute distress, dressed in hospital garb, lying in bed  Behavior: normal, cooperative, polite, agitated while IV access being attained  Involuntary Movements and Motor Activity: no abnormal involuntary movements noted; no tics, no tremors, no akathisia, no dystonia, no evidence of tardive dyskinesia; no psychomotor agitation or retardation  Gait and Station: unable to assess - patient lying down or seated  Speech: intact; normal rate, rhythm, volume, tone and pitch; conversational, spontaneous, and coherent  Language: intact; speaks and understands English fluently and proficiently; repeats words and phrases, no word finding difficulties are noted  Mood: "anxious about surgery"  Affect: normal, euthymic, reactive, full-range, mood-congruent, appropriate to " situation and context  Thought Process: intact, linear, goal-directed, organized, logical  Associations: intact, no loosening of associations  Thought Content and Perceptions: no suicidal or homicidal ideation, no auditory or visual hallucinations, no paranoid ideation, no ideas of reference, no evidence of delusions or psychosis  Sensorium: alert with clear sensorium  Orientation: intact; oriented fully to person, place, time and situation  Recent and Remote Memory: grossly intact, able to recall relevant and salient information from the recent and remote past  Attention and Concentration: grossly intact, attentive to the conversation and not readily distractible  Fund of Knowledge: grossly intact, used appropriate vocabulary and demonstrated an awareness of current events  Insight: intact, demonstrates awareness of illness and situation  Judgment: intact, behavior is adequate/appropriate to the circumstances, compliant with health provider's recommendations and instructions      ASSESSMENT:     A diagnostic psychiatric evaluation was performed and responsiveness to treatment was assessed.  The patient demonstrates robust ability/capacity to respond to treatment.     PSYCHOSOCIAL FACTORS  Stressors (Biopsychosocial, Cultural and Environmental): housing, substance use/addiction     STRENGTHS AND LIABILITIES   Strength: Patient is expressive/articulate.  Strength: Patient is motivated for change.  Strength: Patient is accepting of treatment.  Strength: Patient embraces recovery.  Liability: Patient has poor or no support network.  Liability: Patient is in active addiction.     INITIAL DIAGNOSES AND PROBLEMS:      Opioid use disorder, severe         PLAN:     IMPRESSION AND RECOMMENDATIONS:     MANAGEMENT PLAN, TREATMENT GOALS, THERAPEUTIC TECHNIQUES/APPROACHES & CLINICAL REASONING    -Patient currently undergoing procedure which may necessitate pain medication. Will approach topic of medication assisted therapy  during admission.   -- PRN Opioids for withdrawal symptoms:  Clonidine (Catapres) 0.1mg PO Q4H PRN for COWS >5; HOLD: SBP <100, DBP <60, HR <50  Dicyclomine (Bentyl) 10mg PO Q6H PRN for abdominal cramps, GI distress  Methocarbamol (Robaxin) 500mg PO Q8H PRN for muscle cramps, muscle spasms  Ondansetron (Zofran) 4mg PO Q6H PRN for nausea, vomiting  Loperamide (Imodium) 2 mg after each loose stool; not to exceed 16 mg/day  Ibuprofen (Motrin) 400mg PO Q6H PRN for pain   Hydroxyzine (Vistaril) 50mg PO Q6H PRN anxiety, insomnia  Vitals Q4H while awake   NALOXONE 0.4MG IV PRN FOR OPIOID REVERSAL  Enact behavioral protocols per unit policies  **PLEASE PROVIDE PATIENT WITH NALOXONE PRESCRIPTION UPON DISCHARGE**   - Resources provided in patient instructions upon discharge.       Shared medical decision making with the patient was employed (to the extent possible) when selecting, or considering but not selecting, proposed treatment and management options.    ADDICTION COUNSELING AND MANAGEMENT:     Timely and targeted counseling is an important intervention in the treatment of substance use disorders.  Active and ongoing management is a hallmark of good clinical care.    Addiction counseling and management WAS employed during this encounter.    --The patient was counseled on abstinence from alcohol and substances of abuse (illicit and prescription).  --Harm reduction techniques were discussed, as warranted, to mitigate risk from problematic behaviors.  --Serial alcohol and drug laboratory testing (e.g. PETH, urine toxicology) is recommended to provide accountability, as well as to guide and refine substance abuse treatment moving forward.  --Relapse prevention and motivational interviewing was provided.  --Education was provided on 12 step recovery programs.    PRESCRIPTION DRUG MANAGEMENT:     Prescription drug management WAS performed during the encounter.       Prescription Drug Management entails the following:  --The  review, recommendation, or consideration without recommendation of medications during the encounter.  --Discussion (to the extent possible) with the patient and/or other interested parties of the diagnosis, target symptoms, intended outcomes, and possible benefits and risks of medication, as well as alternatives (including no treatment), if not otherwise known or stated prior.  --Discussion (to the extent possible) with the patient and/or other interested parties of possible expectable adverse effects of any proposed individual psychotropic agents, as well as the inherent unpredictability of treatment, if not otherwise known or stated prior.  --Informed consent was sought from the patient (or a guardian if applicable) after a thorough discussion (to the extent possible) of the aforementioned points outlined above.  --The provision of counseling (to the extent possible) to the patient and/or other interested parties on the importance of full compliance with any prescribed medication, if not otherwise known or stated prior.    Information on psychotropic medication can be found at: [employer] [place] [employer]: Information on Mental Health Medications                In cases of emergency, daily coverage provided by Acute/ER Psych MD, NP, or SW, with contact numbers located in [facility] On Call Schedule    Case discussed with staff addiction psychiatrist: MD Alex Meyers MD  [facility] Psychiatry, PGY-2      DATA:     DIAGNOSTIC TESTING:     The chart was reviewed for recent diagnostic investigations, and pertinent results are noted below.      Recent Weights/BMI on File:    Wt Readings from Last 5 Encounters:   07/07/23 79.4 kg (175 lb)   07/03/23 79.4 kg (175 lb)   08/22/15 79.4 kg (175 lb)       Weight Change Since Last Encounter: 0 kg    Current body mass index is 25.11 kg/m².      Most Recent BP/HR on File:    BP Readings from Last 1 Encounters:   07/07/23 131/67       Pulse Readings  from Last 1 Encounters:   07/07/23 74         Most Recent EKG on File:    Results for orders placed or performed during the hospital encounter of 08/22/15   EKG 12-lead    Collection Time: 08/22/15  1:33 PM    Narrative    Test Reason : Chest Pain 786.50  Vent. Rate : 061 BPM     Atrial Rate : 061 BPM     P-R Int : 192 ms          QRS Dur : 088 ms      QT Int : 382 ms       P-R-T Axes : 058 072 037 degrees     QTc Int : 384 ms    Normal sinus rhythm  Normal ECG    Confirmed by Grace Gilliam MD ([id number]) on 8/24/2015 6:53:11 PM    Referred By: SELF REFERRAL           Confirmed By:Grace Gilliam MD           PERTINENT LABORATORY RESULTS    Most Recent Electrolytes on File:  (i.e. Na, K, Ca, Phos, Mg, CO2)    Sodium (mmol/L)   Date Value   07/07/2023 136     Potassium (mmol/L)   Date Value   07/07/2023 4.4     Calcium (mg/dL)   Date Value   07/07/2023 9.3     Magnesium (mg/dL)   Date Value   07/07/2023 1.8     CO2 (mmol/L)   Date Value   07/07/2023 27         Most Recent Blood Glucose & HgA1c on File:    Glucose (mg/dL)   Date Value   07/07/2023 92         Most Recent Renal Function Tests on File:  (i.e. Cr, BUN, GFR, Urine Specific Gravity, Urine Protein)    Creatinine (mg/dL)   Date Value   07/07/2023 0.8     BUN (mg/dL)   Date Value   07/07/2023 12     eGFR if African American (mL/min/1.73 m^2)   Date Value   08/22/2015 >60     eGFR if non African American (mL/min/1.73 m^2)   Date Value   08/22/2015 >60     Specific [place], UA (no units)   Date Value   07/06/2023 1.020     Protein, UA (no units)   Date Value   07/06/2023 Negative         Most Recent Liver Function Tests on File:  (i.e. AST, ALT, Total Bili, Ammonia, Albumin, INR)    AST (U/L)   Date Value   07/07/2023 18     ALT (U/L)   Date Value   07/07/2023 5 (L)     Total Bilirubin (mg/dL)   Date Value   07/07/2023 0.4     Albumin (g/dL)   Date Value   07/07/2023 3.1 (L)         Most Recent Pancreatic Function Tests on File:  (i.e. Amylase, Lipase)    Lipase  (U/L)   Date Value   08/22/2015 18         Most Recent Blood Counts on File:  (i.e. WBC, ANC, RBC, MCV, Platelets)    WBC (K/uL)   Date Value   07/07/2023 8.51     Gran # (ANC) (K/uL)   Date Value   07/07/2023 5.2     Gran % (%)   Date Value   07/07/2023 60.5     RBC (M/uL)   Date Value   07/07/2023 4.33 (L)     MCV (fL)   Date Value   07/07/2023 88     Platelets (K/uL)   Date Value   07/07/2023 232         Most Recent Thyroid/Parathyroid Function Tests on File:  (i.e. TSH, Free T4, Total T4, Free T3, Total T3, PTH)    No results found for: TSH, FREET4, J1LOIVZ, T3FREE, J4CHSLC, PTH      Most Recent Lipid Panel Results on File:  (i.e. Cholesterol, Triglycerides, LDH, HDL)    No results found for: CHOL, TRIG, LDLCALC, HDL      Most Recent CPK & Prolactin on File:    No results found for: CPK, PROLACTIN      Most Recent Vitamin Levels on File:  (i.e. Vitamin B12, Folate, Vitamin D)    No results found for: [id number], FOLATE, [id number]       Most Recent Infection Disease Panel on File:  (i.e. Syphilis, Hepatitis C, Hepatitis B, HIV)     Hepatitis C Ab (no units)   Date Value   07/06/2023 Non-reactive     HIV 1/2 Ag/Ab (no units)   Date Value   07/06/2023 Non-reactive   07/06/2023 Non-reactive         Pregnancy Screenings:    No results found for: PREGTESTUR, PREGNANCYTES, HCGQUANT      Lithium & Valproate Levels on File:    No results found for: LITHIUM    No results found for: VALPROATE      Most Recent Carbamazepine & Lamotrigine Levels on File:    No results found for: CBMZ, LAMOTRIGINE      Most Recent Clozapine Level on File:    No results found for: CLOZAPINE, NORCLOZAP, CLOZNORCLOZ      Results on File for Alcohol Screens (Blood, Urine):    No results found for: ALCOHOLMEDIC    Alcohol, Urine (mg/dL)   Date Value   07/06/2023 <10         Results on File for Urine Drug Screens (Benzodiazepines, Barbiturates, Methadone, Opiates, Cocaine, Amphetamines, THC, PCP):    Benzodiazepines (no units)   Date Value    07/06/2023 Negative     Barbiturate Screen, Ur (no units)   Date Value   07/06/2023 Negative     Methadone metabolites (no units)   Date Value   07/06/2023 Negative     Opiate Scrn, Ur (no units)   Date Value   07/06/2023 Presumptive Positive (A)     Cocaine (Metab.) (no units)   Date Value   07/06/2023 Negative     Amphetamine Screen, Ur (no units)   Date Value   07/06/2023 Negative     THC (no units)   Date Value   07/06/2023 Presumptive Positive (A)     Phencyclidine (no units)   Date Value   07/06/2023 Negative         Most Recent Results for Buprenorphine Testing:    No results found for: BUPRENORPH, NORBUPRENOR      Results on File for Phosphatidylethanol (PETH):    No results found for: PETH  No results found for: [id number]  No results found for: [id number]      Results on File for Ethyl Glucuronide (EtG) and Ethyl Sulfate (EtS):    No results found for: THEYLGLUCU, ETHYLSULF      Most Recent Results on File for Alcohol Biomarkers (GGT, CDT):    No results found for: GGT, CDT        RELEVANT NEUROLOGIC IMAGING:  (i.e. CT/MRI brain)      CT HEAD WITHOUT CONTRAST    No results found for this or any previous visit.      MRI HEAD WITHOUT CONTRAST    No results found for this or any previous visit.      MRI HEAD WITH AND WITHOUT CONTRAST    No results found for this or any previous visit.

## 2023-07-07 NOTE — ED NOTES
Patient identifiers for Ton Donovan 28 y.o. male checked and correct.  Chief Complaint   Patient presents with    Cellulitis     Has cellulitis to right hand middle finger and under left arm and right forearm; was not able to buy antibiotics and infection is worse; open and draining     Past Medical History:   Diagnosis Date    Hypertension     Unilateral inguinal hernia, without obstruction or gangrene, not specified as recurrent      Allergies reported: Review of patient's allergies indicates:  No Known Allergies    Pt reports to ED with complaints of multiple wounds/abscesses. Pt states he first noticed a wound about a week ago on his right hand and believes it may have been a spider bite. Pt reports he has been squatting in his family's abandoned house for the last year. Pt endorses fever, chills, nausea, night sweats over the last few days. Pt denies vomiting, HA, diarrhea.     LOC: Patient is awake, alert, and aware of environment with an appropriate affect. Patient is oriented x 4 and speaking appropriately.  APPEARANCE: Patient resting comfortably and in no acute distress. Patient is clean and well groomed, patient's clothing is properly fastened.  HEENT: WDL  SKIN: The skin is warm and dry. Patient has normal skin turgor and moist mucus membranes. Pt has wound/abscess to left armpit, right hand middle digit, left elbow and right forearm.   MUSKULOSKELETAL: Patient is moving all extremities well, no obvious deformities noted. Pulses intact.   RESPIRATORY: Airway is open and patent. Respirations are spontaneous and non-labored with normal effort and rate.  CARDIAC: Patient has a normal rate and rhythm 75 on cardiac monitor. No peripheral edema noted.   ABDOMEN: No distention noted. Soft and non-tender upon palpation.  NEUROLOGICAL: pupils 3 mm, PERRL. Facial expression is symmetrical. Hand grasps are equal bilaterally. Normal sensation in all extremities when touched with finger.

## 2023-07-07 NOTE — HOSPITAL COURSE
Admitted for abscesses of multiple sites. S/p I/D in the OR with ortho. On 7/8 he attempted to elope several times with a vasc cath in his neck. Removed before discharge with no issue. Advised him at length that IV antibiotics are the standard of care in the setting of septic arthritis, and he voiced understanding and agreement. Discussed that without IV antibiotics he was at risk for worsening infection and overwhelming sepsis that could lead to death. However, he still would like discharge home. AMA paperwork signed. PO doxy and augmentin x 4 weeks prescribed at discharge per peripheral discussion with ID (did not have time to see him before AMA).

## 2023-07-07 NOTE — ED PROVIDER NOTES
Encounter Date: 7/6/2023       History     Chief Complaint   Patient presents with    Cellulitis     Has cellulitis to right hand middle finger and under left arm and right forearm; was not able to buy antibiotics and infection is worse; open and draining     Ton Donovan is a 28 y.o. male with PMH of substance use disorder, presenting to [facility] ED for wounds.  Patient recently evaluated on 07/03 for wounds associated with possible bug bites.  Patient was discharged with Bactrim.  States that he has been unable to fill the prescription because he can not afford it.  Patient complaining of worsening infection.  States that wound on his knuckle opened today.  Additionally, patient has wound on his left elbow and under his left armpit.  Reports IV drug use.  Additionally, states that he has had fevers for the last couple days.      Review of patient's allergies indicates:  No Known Allergies  Past Medical History:   Diagnosis Date    Hypertension     Unilateral inguinal hernia, without obstruction or gangrene, not specified as recurrent      No past surgical history on file.  No family history on file.  Social History     Tobacco Use    Smoking status: Former     Types: Cigarettes    Smokeless tobacco: Never   Substance Use Topics    Alcohol use: No    Drug use: Yes     Types: Marijuana     Review of Systems   Constitutional:  Positive for fever.   HENT:  Negative for congestion, rhinorrhea and sore throat.    Eyes:  Negative for visual disturbance.   Respiratory:  Negative for cough and shortness of breath.    Cardiovascular:  Negative for chest pain and leg swelling.   Gastrointestinal:  Positive for nausea. Negative for abdominal pain, diarrhea and vomiting.   Genitourinary:  Negative for dysuria and hematuria.   Skin:  Positive for wound.   Neurological:  Negative for weakness.     Physical Exam     Initial Vitals [07/06/23 1704]   BP Pulse Resp Temp SpO2   (!) 155/77 75 18 98.1 °F (36.7 °C) 98 %      MAP       --          Physical Exam    Nursing note and vitals reviewed.  Constitutional: He appears well-developed and well-nourished. He is cooperative.  Non-toxic appearance. He does not appear ill.   HENT:   Head: Normocephalic and atraumatic.   Mouth/Throat: Mucous membranes are normal. Mucous membranes are not dry.   Eyes: Conjunctivae are normal. Pupils are equal, round, and reactive to light.   Neck: Trachea normal and phonation normal.   Cardiovascular:  Normal rate, regular rhythm, normal heart sounds, intact distal pulses and normal pulses.     Exam reveals no gallop, no S3, no S4 and no friction rub.       No murmur heard.  Pulmonary/Chest: Breath sounds normal. No respiratory distress. He has no wheezes. He has no rhonchi. He has no rales.   Abdominal: Abdomen is soft. He exhibits no distension. There is no abdominal tenderness.   Musculoskeletal:      Right lower leg: No edema.      Left lower leg: No edema.     Neurological: He is alert.   Skin: Skin is warm, dry and intact. Capillary refill takes less than 2 seconds.   Psychiatric: He has a normal mood and affect. His speech is normal.      Media Information         Media Information         Media Information          ED Course   Procedures  Labs Reviewed   CBC W/ AUTO DIFFERENTIAL - Abnormal; Notable for the following components:       Result Value    RBC 3.97 (*)     Hemoglobin 11.4 (*)     Hematocrit 35.5 (*)     All other components within normal limits    Narrative:     Release to patient->Immediate   COMPREHENSIVE METABOLIC PANEL - Abnormal; Notable for the following components:    ALT 8 (*)     All other components within normal limits    Narrative:     Release to patient->Immediate   SEDIMENTATION RATE - Abnormal; Notable for the following components:    Sed Rate 58 (*)     All other components within normal limits    Narrative:     Release to patient->Immediate   C-REACTIVE PROTEIN - Abnormal; Notable for the following components:    CRP 64.2 (*)     All  other components within normal limits    Narrative:     Release to patient->Immediate   C. TRACHOMATIS/N. GONORRHOEAE BY AMP DNA   CULTURE, BLOOD   CULTURE, BLOOD   HIV 1 / 2 ANTIBODY    Narrative:     Release to patient->Immediate   HEPATITIS C ANTIBODY    Narrative:     Release to patient->Immediate   HIV 1 / 2 ANTIBODY    Narrative:     Release to patient->Immediate   URINALYSIS, REFLEX TO URINE CULTURE   LACTIC ACID, PLASMA   TOXICOLOGY SCREEN, URINE, RANDOM (COMPLIANCE)          Imaging Results              X-Ray Elbow Complete Left (Final result)  Result time 07/06/23 20:04:26      Final result by Uzma Lu MD (07/06/23 20:04:26)                   Impression:      No visualized findings to suggest osteomyelitis or other acute abnormality involving the osseous structures or left elbow joint.    Soft tissue swelling without associated radiopaque foreign body overlying the olecranon with question of laceration or skin breakdown as described.      Electronically signed by: Uzma Lu  Date:    07/06/2023  Time:    20:04               Narrative:    EXAMINATION:  XR ELBOW COMPLETE 3 VIEW LEFT    CLINICAL HISTORY:  Cutaneous abscess, unspecified    TECHNIQUE:  AP, lateral, and oblique views of the left elbow were performed.    COMPARISON:  None    FINDINGS:  Is there is no evidence of acute fracture or dislocation.  There is focal mild soft tissue swelling over the olecranon with skin surface irregularity raising question of superficial skin break down or laceration.  No visible gas in the deeper soft tissues, erosive changes or periostitis along the olecranon.  There is no elbow effusion.                                       X-Ray Finger 2 or More Views Right (Final result)  Result time 07/06/23 19:49:33      Final result by Haris Vieira MD (07/06/23 19:49:33)                   Impression:      1. Soft tissue injury along the dorsal aspect of the 3rd digit PIP joint.  No radiopaque foreign  body.  Correlation is needed to exclude exposed bone.      Electronically signed by: Haris Vieira MD  Date:    07/06/2023  Time:    19:49               Narrative:    EXAMINATION:  XR FINGER 2 OR MORE VIEWS RIGHT    CLINICAL HISTORY:  abscess;    COMPARISON:  None    FINDINGS:  Three views right 3rd digit.    No acute displaced fracture or dislocation of the 3rd digit.  There is soft tissue injury along the dorsal aspect of the PIP joint.  There is diffuse edema about the region.  No radiopaque foreign body.                                       Medications   sodium chloride 0.9% flush 10 mL (has no administration in time range)   naloxone 0.4 mg/mL injection 0.02 mg (has no administration in time range)   glucose chewable tablet 16 g (has no administration in time range)   glucose chewable tablet 24 g (has no administration in time range)   glucagon (human recombinant) injection 1 mg (has no administration in time range)   dextrose 10% bolus 125 mL 125 mL (has no administration in time range)   dextrose 10% bolus 250 mL 250 mL (has no administration in time range)   acetaminophen tablet 650 mg (has no administration in time range)   ketorolac injection 15 mg (has no administration in time range)   ondansetron injection 4 mg (has no administration in time range)   melatonin tablet 6 mg (has no administration in time range)   vancomycin 1,250 mg in dextrose 5 % (D5W) 250 mL IVPB (Vial-Mate) (has no administration in time range)   vancomycin 1,500 mg in dextrose 5 % (D5W) 250 mL IVPB (Vial-Mate) (0 mg Intravenous Stopped 7/6/23 2149)   sodium chloride 0.9% bolus 1,000 mL 1,000 mL (0 mLs Intravenous Stopped 7/6/23 2149)     Medical Decision Making:   Initial Assessment:   28-year-old male with history of IV drug use presenting with multiple wounds.  Initially, patient is hemodynamically stable and well-appearing.  Differential Diagnosis:   Septic joint, MRSA, abscess  Clinical Tests:   Lab Tests: Ordered and  Reviewed  The following lab test(s) were unremarkable: CBC and CMP  Radiological Study: Ordered and Reviewed  Medical Tests: Ordered and Reviewed  ED Management:  Patient presents with multiple wounds.  Based on physical exam, concern for extension of infection into the joint of the right hand.  Additionally, concern for extension into the joint of the left elbow.    Treatments in the emergency department include vancomycin and normal saline.    Workup as detailed below in ED course.  Workup significant for no evidence of osteomyelitis on x-rays.  Patient has elevation in inflammatory markers.    Discussed patient's case with Orthopedic surgery who state patient should come to the OR for washout of his right knuckle.  Discussed patient's case with Hospital Medicine who agreed to admit patient to their service for further management/IV antibiotics.             ED Course as of 07/06/23 2340   Thu Jul 06, 2023 2004 X-Ray Finger 2 or More Views Right  No evidence fracture/dislocations/osteomyelitis [ES]   2007 X-Ray Elbow Complete Left  No evidence of osteomyelitis [ES]   2100 CRP(!): 64.2 [ES]   2101 Sed Rate(!): 58 [ES]   2135 HIV 1/2 Ag/Ab: Non-reactive [ES]      ED Course User Index  [ES] Lennie Kaplan MD                 Clinical Impression:   Final diagnoses:  [L02.91] Abscess  [F19.90] Substance use disorder  [M00.9] Pyogenic arthritis of right hand, due to unspecified organism (Primary)        ED Disposition Condition    Observation Stable                Lennie Kaplan MD  Resident  07/06/23 2326

## 2023-07-07 NOTE — PROGRESS NOTES
Pharmacokinetic Initial Assessment & Plan: IV Vancomycin    IV Vancomycin 1500 mg x once was given in the ED on 07/06 at 2000  Then Vancomycin 1250 mg every 12 hours  Obtain a Vancomycin trough 30 mins prior to the 4 th dose on 07/08 at 0730   Desired empiric serum trough concentration is 10 to 20 mcg/mL    Pharmacy will continue to follow and monitor vancomycin. [id number] with any questions regarding this assessment.     Thank you for the consult,   George Tolbert       Patient brief summary:  Ton Donovan is a 28 y.o. male initiated on antimicrobial therapy with IV Vancomycin for treatment of suspected cellulitis to right hand middle finger     Drug Allergies:   Review of patient's allergies indicates:  No Known Allergies    Actual Body Weight:   79.4 kg    Renal Function:   Estimated Creatinine Clearance: 123.9 mL/min (based on SCr of 0.9 mg/dL).,     CBC (last 72 hours):  Recent Labs   Lab Result Units 07/06/23  1910   WBC K/uL 7.95   Hemoglobin g/dL 11.4*   Hematocrit % 35.5*   Platelets K/uL 218   Gran % % 55.1   Lymph % % 29.9   Mono % % 10.6   Eosinophil % % 3.6   Basophil % % 0.4   Differential Method  Automated       Metabolic Panel (last 72 hours):  Recent Labs   Lab Result Units 07/06/23  1910   Sodium mmol/L 139   Potassium mmol/L 4.2   Chloride mmol/L 103   CO2 mmol/L 27   Glucose mg/dL 102   BUN mg/dL 14   Creatinine mg/dL 0.9   Albumin g/dL 3.5   Total Bilirubin mg/dL 0.3   Alkaline Phosphatase U/L 77   AST U/L 22   ALT U/L 8*       Drug levels (last 3 results):  No results for input(s): VANCOMYCINRA, VANCORANDOM, VANCOMYCINPE, VANCOPEAK, VANCOMYCINTR, VANCOTROUGH in the last 72 hours.    Microbiologic Results:  Microbiology Results (last 7 days)       Procedure Component Value Units Date/Time    Blood culture [[id number]]     Order Status: No result Specimen: Blood     Blood culture [[id number]]     Order Status: No result Specimen: Blood     C. trachomatis/N. gonorrhoeae by AMP DNA [facility]; Urine  [[id number]] Collected: 07/06/23 2145    Order Status: Sent Specimen: Genital Updated: 07/06/23 2148

## 2023-07-07 NOTE — SUBJECTIVE & OBJECTIVE
Past Medical History:   Diagnosis Date    Hypertension     Unilateral inguinal hernia, without obstruction or gangrene, not specified as recurrent        No past surgical history on file.    Review of patient's allergies indicates:  No Known Allergies    No current facility-administered medications on file prior to encounter.     Current Outpatient Medications on File Prior to Encounter   Medication Sig    sulfamethoxazole-trimethoprim 800-160mg (BACTRIM DS) 800-160 mg Tab Take 1 tablet by mouth 2 (two) times daily. for 7 days     Family History    None       Tobacco Use    Smoking status: Former     Types: Cigarettes    Smokeless tobacco: Never   Substance and Sexual Activity    Alcohol use: No    Drug use: Yes     Types: Marijuana    Sexual activity: Not on file     Review of Systems   Constitutional:  Positive for chills and fever (subjective). Negative for appetite change, diaphoresis and fatigue.   HENT:  Negative for congestion, rhinorrhea and sore throat.    Eyes:  Negative for photophobia and visual disturbance.   Respiratory:  Negative for cough, shortness of breath and wheezing.    Cardiovascular:  Negative for chest pain, palpitations and leg swelling.   Gastrointestinal:  Negative for abdominal distention, abdominal pain, diarrhea, nausea and vomiting.   Genitourinary:  Negative for dysuria, frequency and hematuria.   Musculoskeletal:  Positive for arthralgias and joint swelling. Negative for gait problem, myalgias and neck pain.   Skin:  Positive for color change and wound. Negative for pallor and rash.   Neurological:  Negative for dizziness, syncope, weakness, light-headedness and headaches.   Psychiatric/Behavioral:  Negative for agitation and hallucinations. The patient is not nervous/anxious.    Objective:     Vital Signs (Most Recent):  Temp: 98.1 °F (36.7 °C) (07/06/23 1704)  Pulse: 79 (07/06/23 2100)  Resp: 17 (07/06/23 2045)  BP: 134/62 (07/06/23 2100)  SpO2: 99 % (07/06/23 2100) Vital Signs  (24h Range):  Temp:  [98.1 °F (36.7 °C)] 98.1 °F (36.7 °C)  Pulse:  [75-79] 79  Resp:  [17-18] 17  SpO2:  [97 %-99 %] 99 %  BP: (123-155)/(62-77) 134/62     Weight: 79.4 kg (175 lb)  Body mass index is 25.11 kg/m².     Physical Exam  Vitals and nursing note reviewed.   Constitutional:       General: He is not in acute distress.     Appearance: He is not toxic-appearing or diaphoretic.   HENT:      Head: Normocephalic and atraumatic.      Nose: Nose normal.      Mouth/Throat:      Mouth: Mucous membranes are moist.   Eyes:      Pupils: Pupils are equal, round, and reactive to light.   Cardiovascular:      Rate and Rhythm: Normal rate and regular rhythm.      Pulses: Normal pulses.           Radial pulses are 2+ on the right side and 2+ on the left side.      Heart sounds: No murmur heard.  Pulmonary:      Effort: Pulmonary effort is normal. No respiratory distress.      Breath sounds: No wheezing, rhonchi or rales.   Abdominal:      General: Bowel sounds are normal. There is no distension.      Palpations: Abdomen is soft.      Tenderness: There is no abdominal tenderness. There is no guarding.   Musculoskeletal:         General: Normal range of motion.      Left elbow: Tenderness present.      Right hand: Tenderness (Along R third digit) present.      Cervical back: Normal range of motion.   Skin:     General: Skin is warm and dry.      Capillary Refill: Capillary refill takes less than 2 seconds.      Findings: Wound present.      Comments: Dressing noted to R hand and third digit; CDI. Dressing noted to L elbow, CDI. Wound noted to L axillae with scant amount of purulent drainage. See photos.   Neurological:      General: No focal deficit present.      Mental Status: He is alert and oriented to person, place, and time.      Sensory: No sensory deficit.      Motor: No weakness.   Psychiatric:         Mood and Affect: Mood normal.         Speech: Speech normal.         Behavior: Behavior normal.            CRANIAL  NERVES     CN III, IV, VI   Pupils are equal, round, and reactive to light.               Significant Labs: All pertinent labs within the past 24 hours have been reviewed.  CBC:   Recent Labs   Lab 07/06/23 1910   WBC 7.95   HGB 11.4*   HCT 35.5*        CMP:   Recent Labs   Lab 07/06/23 1910      K 4.2      CO2 27      BUN 14   CREATININE 0.9   CALCIUM 9.6   PROT 7.2   ALBUMIN 3.5   BILITOT 0.3   ALKPHOS 77   AST 22   ALT 8*   ANIONGAP 9       Significant Imaging: I have reviewed all pertinent imaging results/findings within the past 24 hours.  Imaging Results              X-Ray Elbow Complete Left (Final result)  Result time 07/06/23 20:04:26      Final result by Uzma Lu MD (07/06/23 20:04:26)                   Impression:      No visualized findings to suggest osteomyelitis or other acute abnormality involving the osseous structures or left elbow joint.    Soft tissue swelling without associated radiopaque foreign body overlying the olecranon with question of laceration or skin breakdown as described.      Electronically signed by: Uzma Lu  Date:    07/06/2023  Time:    20:04               Narrative:    EXAMINATION:  XR ELBOW COMPLETE 3 VIEW LEFT    CLINICAL HISTORY:  Cutaneous abscess, unspecified    TECHNIQUE:  AP, lateral, and oblique views of the left elbow were performed.    COMPARISON:  None    FINDINGS:  Is there is no evidence of acute fracture or dislocation.  There is focal mild soft tissue swelling over the olecranon with skin surface irregularity raising question of superficial skin break down or laceration.  No visible gas in the deeper soft tissues, erosive changes or periostitis along the olecranon.  There is no elbow effusion.                                       X-Ray Finger 2 or More Views Right (Final result)  Result time 07/06/23 19:49:33      Final result by Haris Vieira MD (07/06/23 19:49:33)                   Impression:      1. Soft  tissue injury along the dorsal aspect of the 3rd digit PIP joint.  No radiopaque foreign body.  Correlation is needed to exclude exposed bone.      Electronically signed by: Haris Vieira MD  Date:    07/06/2023  Time:    19:49               Narrative:    EXAMINATION:  XR FINGER 2 OR MORE VIEWS RIGHT    CLINICAL HISTORY:  abscess;    COMPARISON:  None    FINDINGS:  Three views right 3rd digit.    No acute displaced fracture or dislocation of the 3rd digit.  There is soft tissue injury along the dorsal aspect of the PIP joint.  There is diffuse edema about the region.  No radiopaque foreign body.

## 2023-07-07 NOTE — PROGRESS NOTES
[facility] ([address] [place] [address] Medicine  Progress Note    Patient Name: Ton Donovan  MRN: [id number]  Patient Class: IP- Inpatient   Admission Date: 7/6/2023  Length of Stay: 0 days  Attending Physician: Melany Ho MD  Primary Care Provider: Primary Doctor No        Subjective:     Principal Problem:Cellulitis of finger, right        HPI:  Ton Donovan is a 28 y.o. male with a  PMHx of IVDU and HTN who presents to the ED with complaints of multiple wounds. The patient has been living in his family's old house which is now foreclosed on, so he has no running water or electricity. He states that he fell asleep and woke up with spider bites several days ago which have now progressed to large, open, draining wounds. Patient recently evaluated on 07/03 for wounds and discharged with Bactrim. States that he has been unable to fill the prescription because he can not afford it. Patient complaining of worsening infection with erythema and drainage to multiple wounds. States that wound on his R knuckle opened today. Additionally, patient has wound on his left elbow, fight forearm, and under his left armpit. Denies IVDU but does endorse snorting heroin. Additionally, states that he has had subjective fevers and chills for the last couple days. He reports some myalgias to his lower extremities and back that he attributes to the start of withdrawal symptoms. The patient denies CP, SOB, cough, N/V/D, or dysuria.    In the ED, VSS, afebrile. CBC unremarkable. Sed rate 58. CMP unremarkable. CRP 64.2. L elbow xray with no visualized findings to suggest osteomyelitis or other acute abnormality involving the osseous structures or left elbow joint. Soft tissue swelling without associated radiopaque foreign body overlying the olecranon with question of laceration or skin breakdown as described. Xray fingers R hand with soft tissue injury along the dorsal aspect of the 3rd digit PIP joint. No radiopaque foreign body.  Correlation is needed to exclude exposed bone. The patient received IVF bolus and vancomycin. Ortho consulted in the ED who obtained wound cxs from R third digit and recommend NPO for I&D tomorrow.      Overview/Hospital Course:  Admitted for abscesses of multiple sites. S/p I/D in the OR with ortho. Remains admitted pending culture results.       Interval History: Patient seen and examined shortly after being wheeled into PACU. He is awake but agitated. Answers some questions appropriately, but still not fully recovered from anesthesia    Review of Systems  Objective:     Vital Signs (Most Recent):  Temp: 97.7 °F (36.5 °C) (07/07/23 0932)  Pulse: 84 (07/07/23 1630)  Resp: (!) 25 (07/07/23 1630)  BP: (!) 142/68 (07/07/23 1630)  SpO2: 99 % (07/07/23 1630) Vital Signs (24h Range):  Temp:  [97.4 °F (36.3 °C)-98.4 °F (36.9 °C)] 97.7 °F (36.5 °C)  Pulse:  [] 84  Resp:  [10-54] 25  SpO2:  [85 %-100 %] 99 %  BP: (117-159)/(55-94) 142/68     Weight: 79.4 kg (175 lb)  Body mass index is 25.11 kg/m².    Intake/Output Summary (Last 24 hours) at 7/7/2023 1647  Last data filed at 7/7/2023 1554  Gross per 24 hour   Intake 3400 ml   Output 420 ml   Net 2980 ml         Physical Exam  Vitals reviewed.   Constitutional:       Appearance: He is not toxic-appearing.      Comments: Awake. Agitated    Eyes:      General: No scleral icterus.     Extraocular Movements: Extraocular movements intact.   Cardiovascular:      Rate and Rhythm: Normal rate.   Pulmonary:      Effort: Pulmonary effort is normal.   Musculoskeletal:      Cervical back: Normal range of motion.      Right lower leg: No edema.      Left lower leg: No edema.      Comments: Hands and R elbow with dressing in place   Skin:     General: Skin is warm and dry.      Capillary Refill: Capillary refill takes less than 2 seconds.   Neurological:      General: No focal deficit present.   Psychiatric:      Comments: Agitated, confused       Exam limited 2/2 patient  cooperation and agitation     Significant Labs: All pertinent labs within the past 24 hours have been reviewed.    Significant Imaging: I have reviewed all pertinent imaging results/findings within the past 24 hours.      Assessment/Plan:      * Cellulitis of finger, right  Rohan Donovan is a 28-year-old male with past medical history significant for hypertension, substance abuse, skin infections who presents today with right long finger pain and left elbow pain.  Patient has multiple draining wounds of the right upper extremity, left upper extremity, left axilla. Afebrile with no leukocytosis on labs. Sed rate 58, CRP 64.2. Xray R hand fingers with soft tissue injury along the dorsal aspect of the 3rd digit PIP joint. No radiopaque foreign body. Gross pus was expressed from the draining wound over the dorsal aspect of the PIP joint of the right long finger by orthopedics and sent for Gram stain, aerobic, anaerobic, AFB, fungal cultures. Orthopedics recs are as follows:     -s/p I/D 7/7/23  -MRI right hand/fingers with no osteo  -Multimodal pain control limiting narcotics.  -PRN dressing changes.  -Continue antibiotics  -WBAT right upper extremity, left upper extremity    Open wound of left elbow  S/p I/D      Septic arthritis of interphalangeal joint of finger of right hand  Ortho following, continue antibiotics       Cigarette nicotine dependence without complication  Dangers of cigarette smoking were reviewed with patient in detail. Patient was Counseled for 3-10 minutes. Nicotine replacement options were discussed. Nicotine replacement was discussed- prescribed    Cellulitis of left elbow  -Afebrile, no leukocytosis.  -Sed rate 58, CRP 64.2.  -Blood cxs in process.  -Xray L elbow with no visualized findings to suggest osteomyelitis or other acute abnormality involving the osseous structures or left elbow joint. Soft tissue swelling without associated radiopaque foreign body overlying the olecranon with question of  laceration or skin breakdown as described.  -MRI L elbow pending.  -Started on vancomycin in the ED, continue for now.    Heroin dependence  Patient endorses snorting heroin daily, reports he is starting to have muscle aches in his legs and back that are consistent with previous withdrawal symptoms. Denies any IVDU.  -Scheduled robaxin.  -PRN bentyl, antiemetics, and atarax  -Psych consult, appreciate recs.  -UDS pending.  -Encouraged cessation.      VTE Risk Mitigation (From admission, onward)         Ordered     IP VTE LOW RISK PATIENT  Once         07/06/23 2249     Place sequential compression device  Until discontinued         07/06/23 2249                Discharge Planning   SENA: 7/10/2023     Code Status: Full Code   Is the patient medically ready for discharge?: No    Reason for patient still in hospital (select all that apply): Patient trending condition  Discharge Plan A: Home   Discharge Delays: None known at this time              May ALIREZA Ho MD  Department of Hospital Medicine   [facility] - Surgery ([facility])

## 2023-07-07 NOTE — ASSESSMENT & PLAN NOTE
Patient endorses snorting heroin daily, reports he is starting to have muscle aches in his legs and back that are consistent with previous withdrawal symptoms. Denies any IVDU.  -Scheduled robaxin.  -PRN bentyl, antiemetics, and atarax  -Psych consult, appreciate recs.  -UDS pending.  -Encouraged cessation.

## 2023-07-07 NOTE — HPI
Ton Donovan is a 28 y.o. male with PMH significant for HTN, substance abuse, idiopathic infections presenting with pain of his right middle finger and left elbow.  The patient has been living in an abandoned house and states that he fell asleep and woke up with spider bites which have now progressed to large, open, draining wounds.  He reports sores at the right forearm, right long finger, left elbow, left axilla.  Patient says that his best friend is an IV drug user, whom he is with all the time.  However, he denies IV drug use and states that he snorts.  He does not take any medications have.  He does not work.  He has been unable to adequately obtain food prior to today's presentation.    They deny IV drug use.   They endorses tobacco use. 1 ppd over the past 10 years  They deny alcohol use.   They are not on immunosuppressant medications.  They are not on chemotherapy.  They deny radiation therapy.

## 2023-07-07 NOTE — ASSESSMENT & PLAN NOTE
Rohan Donovan is a 28-year-old male with past medical history significant for hypertension, substance abuse, skin infections who presents today with right long finger pain and left elbow pain.  Patient has multiple draining wounds of the right upper extremity, left upper extremity, left axilla. Afebrile with no leukocytosis on labs. Sed rate 58, CRP 64.2. Xray R hand fingers with soft tissue injury along the dorsal aspect of the 3rd digit PIP joint. No radiopaque foreign body. Gross pus was expressed from the draining wound over the dorsal aspect of the PIP joint of the right long finger by orthopedics and sent for Gram stain, aerobic, anaerobic, AFB, fungal cultures. Orthopedics recs are as follows:     -s/p I/D 7/7/23  -MRI right hand/fingers with no osteo  -Multimodal pain control limiting narcotics.  -PRN dressing changes.  -Continue antibiotics  -WBAT right upper extremity, left upper extremity

## 2023-07-07 NOTE — DISCHARGE INSTRUCTIONS
REFERRAL RECOMMENDATIONS FOR SUBSTANCE ABUSE & MENTAL HEALTH      IN CASE OF SUICIDAL THINKING, call the National Suicide Hotline Number: 988    988 Suicide & Crisis Lifeline: 985 , 0-124-399-NIFT (1977)  https://988Simple-Fill.NuMedii       SUBSTANCE ABUSE:     [facility] (formerly known as the ABU)  [x] [phone], Option 2  [x] [address] [facility] 4th [facility], [place] [address]  [x] [email or web address]://[email or web address]/services/[email or web address]-recovery-program  [x] The [facility] delivers comprehensive and collaborative treatment for alcohol and substance use disorders.  Excellent program for working professionals or anyone else seeking recovery.  [x] Requires insurance approval prior to starting program, call number above for more information.  [x] Intensive Outpatient Rehabilitation Program - M-F 9am-3pm - daily groups with psychologists and social workers, sessions with MDs 3x per week   [x] Ambulatory detox and dual diagnosis available      SUBOXONE:     NOTE: some Suboxone clinics require their clients to participate in a structured program (such as an IOP) in order to be prescribed Suboxone.  Some clinics have a long waiting list.  Most of these clinics do not accept walk-in clients, so call first to to learn what must be done to get started on Suboxone.    [facility] - [phone] (can do Sublocade)  [address] [facility], [place] [address]    [address]  [phone]    [facility] - [phone] (can do Sublocade)  [address] [place] [address]    [facility]  [address] [place]  [phone]     [facility] (very short waiting list, may accept some walk-in's but call first if possible)  [address], [place] [address]  [phone]; [phone]    [facility]   [address] [place]    [phone]    [facility] (can usually be seen within a week but is cash only  for appointment)  [address] [place], [place]    [facility] ([facility])  [address] [facility], [address] [place] [address]  [phone]    [facility] ([facility])  [address], [place]  [phone]    [facility] ([facility])  [address] [facility] [address]  [phone]    METHADONE:     [facility] (the only methadone clinic in the [facility], has two locations)  [x] [address] [address], [phone]  [x] [facility] [address] [place] [address], [phone]    12 STEP PROGRAMS (and similar):     Alcoholics Anonymous (local)  [x] [phone]  [x] www.[place].org for schedules for in-person and online meetings  [x] There are AA meetings throughout the day all over [facility]  [x] AA costs nothing to attend; they pass a basket for donations but this is not required    Narcotics Anonymous  [x] [phone]  [x] www.noana.org  [x] There are NA meetings throughout the day all over [facility]  [x] NA costs nothing to attend; they pass a basket for donations but this is not required    Alcoholics Anonymous Online Intergroup (national)  [x] www.aa-intergroup.org  [x] Good resource for large, nation-wide meetings  [x] Can also attend smaller, local meetings in other cities  [x] Countless meetings all day and all night  [x] AA costs nothing to attend; they pass a basket for donations but this is not required    [facility] - 24/7 zoom meetings for women and coed - sign on anytime, anywhere!  https://TrovesoberAcomni/21-8-uiwltnbn/    Online Intergroup of AA - 121 Open AA [place] Meeting - 24/7 zoom meetings  https://aa-intergroup.org/meetings/    LOOKING FOR AN ALTERNATIVE TO 12 STEP PROGRAMS - check out:  SMART Recovery: https://www.smartrecovery.org/about-us  [facility] Recovery: https://recoverydharma.org      DETOX UNITS (USUALLY 5-7 DAYS):     [facility]: [address], [facility]  [phone], call first to ensure bed availability    [facility]: [address] [place]   [phone], Option 1, call first to ensure bed availability    [facility]: [address], [facility]  [phone] (intake by appointment only)    [facility]: [address], [facility]  [phone]      INTENSIVE OUTPATIENT PROGRAMS:     [facility] (formerly known as the ABU)  [x] [phone], Option 2  [x] [address], [facility] 4th Floor, [facility] [address]  [x] https://www.[email or web address]/services/[email or web address]-recovery-program  [x] The [facility] delivers comprehensive and collaborative treatment for alcohol and substance use disorders.  Excellent program for working professionals or anyone else seeking recovery.  [x] Requires insurance approval prior to starting program, call number above for more information.  [x] Intensive Outpatient Rehabilitation Program - M-F 9am-3pm - daily groups with psychologists and social workers, sessions with MDs 3x per week   [x] Ambulatory detox and dual diagnosis available    [facility] Intensive [facility]patient Program  [x] [phone]  [x] [facility] (the clinic not on [facility])  [x] Call number above for more info and to check insurance requirements    [facility]  [address] [place] [address] [phone]    [facility]:  [address] [facility], [address]?, [place], [place] [address]?, [phone]  [address] [facility]?, [place], [place] [address]?, [phone]    RESIDENTIAL REHABS (USUALLY 28 DAYS):     [facility]: [address], [phone]    [facility] Recovery [facility]: [address], [facility]  [phone] (intake by appointment only)    [facility] (men only) [address] [place], [phone]    [facility] (Female only) [address] [place], [phone]    [facility]: [address] [facility], men's program [phone], women's program [phone]    [facility]: [address] [place],  [phone]    [facility]: [address], [place], [phone]    [facility]: Men only, [phone], [address] [place] [facility]: [address] [place], [place], [phone]    [facility]: [facility] [place], [place],  [phone]  New Location: [address] [place] [address] [address], [place] [address], [phone]    [facility]:   ?[address], [place] [address]?[phone]    [place]: [address] [place] [address], [phone]    [place]: [place], [phone]     [facility]: [place], [place], [phone]    [facility]: [place], [place], [phone]    [facility]: [place], [place], [phone]    [place]: [place], [place], [phone]    [facility]: [address] [place] [address], [phone]    [facility] ADDICTION CLINICS:     [facility]: [address] [facility], [address] [place], [phone] -or- [address] [place] [address]    [facility] [place]: [address] [place]  [address]     [facility]: Clinics [phone]; Crisis [phone]    [facility]: [address] [facility] [address]    [facility]: [address] [place] [address]    [facility]: [address] [address],    [facility]: 2nd Floor [address], [place] [address]    [facility]: [address] [place] [address] , [place] [address]    [facility], [address] [place] [address]    [facility]: [address] [place] [address], [facility] [phone]  (serves youth 16-23 years old)    [facility]: [facility]. [place]/Todd/Estela/[facility] [phone]    [facility]: [address] [place] [address] [phone]    [facility]:  [address] [facility] [phone]    [facility]: [address] [facility], [address], [phone]     [facility]: [address] [facility].[place], [phone], [phone]    RESOURCES IN OTHER [facility]:     [facility]: [address] [place], [phone], [phone]    [facility]: [address] [facility], [address], [phone]    [facility], [address] [place] [address], [phone]    [facility]: [address] [facility], [phone]    [facility], [address] [facility], [phone] ([facility])    [facility], [address] [facility], [phone] ([facility])    [facility], [address] [facility], [address], [place], [phone] ([place], [place], and [facility])    [facility], [address] [place] [facility], [phone] ([facility])    [facility] Hotline [phone], [phone]    [facility], [address] [facility], [facility], [address], [facility], [address] [facility] Center, [address] [facility] [address] [facility] Center, [address] [facility], [address], [place] [phone]    [facility], [address] [facility] [phone]    [facility], [address] [facility], [phone]    [facility] for Addiction Recovery, [address] [facility], [phone]    MENTAL HEALTH:     [facility]  Department of Psychiatry - Outpatient  Clinic  [phone]    [facility] Psychiatry - General Psychiatry Intensive Outpatient Program  [facility] (formerly known as the BMU)  [phone], option 3    [facility] Psych[facility] - Dual Diagnosis Intensive Outpatient Program  [facility] (formerly known as the ABU)  [phone], option 2      COMMUNITY MENTAL HEALTH CENTERS:     [facility]  (aka [facility], [facility])  Serves [facility].  Serves uninsured patients & those with Medicaid.  Main location: [address] [place] [id number] [phone]  Walk-in's available during regular business hours.  24/7 Crisis Line: [phone]    [facility]  (aka [facility], [facility])  Serves [facility].  Serves uninsured patients, those with Medicaid and some private plans.  Walk-in's available during regular business hours.  Primary care services available as well.  [facility]: [address] [facility] [place] [address];  [phone]  [place]: [address] [place] [address];  [phone]  24/7 Crisis Line: [phone]    [facility]  Serves uninsured patients & those with Medicaid, call for more info.  Primary care, pediatrics, HIV treatment, and dentistry services available as well.  Three locations.  [phone]    [facility] [place]Corporate Office  Serves patients with Medicaid, Medicare, and private insurance  [address],?[place] [address]  [phone]    [facility]  Serves uninsured on a sliding scale, as well as Medicaid, Medicare, and private plans.  Eight locations around the [facility] area.  [phone]    [facility]  Serves uninsured patients & those with Medicaid, private insurances.  Primary care available  as well.  [phone]  [address] [place] [address]    [facility] Outpatient Psychiatry  Serves veterans who were honorably discharged.  [address] [place] [address]  [phone]  24/7 Veterans Crisis Line: [phone] (Press 1)    If you have private insurance and need to find a specialist, please contact your insurance network to request a list of providers covered by your benefits.      MENTAL HEALTH/ADDICTIVE DISORDERS:     AA ([phone]), NA ([phone])   National Suicide Prevention Lifeline- Call 1-687.179.5887 Available 24 hours everyday  [facility] 957-0544; Crisis Line 904-4344 - Call for options A-F:  Intensive Outpatient Treatment/ Day programs   [facility], please contact   [facility], please contact [phone] or [phone] to speak with an admissions counselor.  [facility] (Methadone Maintenance)   [address] [place] [address], [phone]  [address] [place] [address] [phone]  [facility], [address] [place] [address], [phone]  [facility] [phone]  [facility] [place] please contact [phone]  [facility], [address] [facility], 4th floor [place] [address] Phone: [phone]   [place] Office: [address] [address], [phone]  [place] Office: [address] [facility], [address], [place] [address], [phone]  [place] Office: [address] [place] [address] [phone]    Outpatient Substance Abuse Treatment   [facility] (Methadone Maintenance)   [address] [place] [address], [phone]  [address] [place] [address] [phone]  [facility], [address] [place] [address], [phone]  [place] Office: [address] [address], [phone]  [place] Office: [address]  [facility], [address], [place] [address], [phone]  [place] Office: [address] [place] [address] [phone]  [facility], [address] [place] [address] [phone]   [facility], [address] [facility], [address], [phone]  [facility], [address] [place], [place] [phone]    Residential Substance Abuse Treatment   [facility] [address] [facility], [phone] or [phone]  [facility], [address] [facility], [phone]  [facility] (men only) [address] [place] [address], [phone]  Women at the [facility] (women only) [address] [place] [address] [phone]  [facility], [address] [place] [address] [phone]  [facility] (women only), intakes at [address] [facility], [phone]  [facility] (7-day program, $100, [address], [phone], [phone], [phone])  [facility] (Men only, [phone]), [address], [place] (Vets*/Non-Vets)  [facility] (Men only, $400/month program fee) [address] [facility] [place], [phone]  [facility] (Women over age 39 only), [address] [facility], [phone]    Out of Area:    [place], [address] [facility] [address], [place], [place] [phone])  [facility] (men only), [address] [place] [address], [phone]  [facility], [address] [place], [place] ([phone])  [place], [address] [place] [address] Dr. Kolb, [place] ([phone])  [facility], [address] [place] [address], [phone]  [facility] (Women only, has to have mental illness, can be homeless or substance abuser), [phone]        IN CASE OF SUICIDAL THINKING, call the National Suicide Hotline Number: 988    988 Suicide & Crisis Lifeline: 988 , 4-2875-344-136-TALK (4376)  Provides 24/7, free and confidential support  for people in distress, prevention and crisis resources for you or your loved ones, and best practices for professionals.    Call, text or chat.  https://SIRS-Lab.org

## 2023-07-07 NOTE — ANESTHESIA PROCEDURE NOTES
Intubation    Date/Time: 7/7/2023 1:26 PM  Performed by: Deng Molina CRNA  Authorized by: Michael Love MD     Intubation:     Induction:  Rapid sequence induction    Intubated:  Postinduction    Mask Ventilation:  Not attempted    Attempts:  1    Attempted By:  CRNA    Method of Intubation:  Video laryngoscopy    Blade:  Olivier 4    Laryngeal View Grade: Grade I - full view of cords      Difficult Airway Encountered?: No      Complications:  None    Airway Device:  Oral endotracheal tube    Airway Device Size:  7.5    Style/Cuff Inflation:  Cuffed (inflated to minimal occlusive pressure)    Inflation Amount (mL):  8    Tube secured:  23    Secured at:  The lips    Placement Verified By:  Capnometry and Fiber optic visualization    Complicating Factors:  None    Findings Post-Intubation:  BS equal bilateral and atraumatic/condition of teeth unchanged

## 2023-07-07 NOTE — CONSULTS
[facility]  Orthopedics  Consult Note    Patient Name: Ton Donovan  MRN: [id number]  Admission Date: 7/6/2023  Hospital Length of Stay: 0 days  Attending Provider: Melany Ho MD  Primary Care Provider: Primary Doctor No    Inpatient consult to Orthopedic Surgery  Consult performed by: Jack Hilliard MD  Consult ordered by: Letty Mata NP        Subjective:     Principal Problem:<principal problem not specified>    Chief Complaint:   Chief Complaint   Patient presents with    Cellulitis     Has cellulitis to right hand middle finger and under left arm and right forearm; was not able to buy antibiotics and infection is worse; open and draining        HPI: Ton Donovan is a 28 y.o. male with PMH significant for HTN, substance abuse, idiopathic infections presenting with pain of his right middle finger and left elbow.  The patient has been living in an abandoned house and states that he fell asleep and woke up with spider bites which have now progressed to large, open, draining wounds.  He reports sores at the right forearm, right long finger, left elbow, left axilla.  Patient says that his best friend is an IV drug user, whom he is with all the time.  However, he denies IV drug use and states that he snorts.  He does not take any medications have.  He does not work.  He has been unable to adequately obtain food prior to today's presentation.    They deny IV drug use.   They endorses tobacco use. 1 ppd over the past 10 years  They deny alcohol use.   They are not on immunosuppressant medications.  They are not on chemotherapy.  They deny radiation therapy.        Past Medical History:   Diagnosis Date    Hypertension     Unilateral inguinal hernia, without obstruction or gangrene, not specified as recurrent        No past surgical history on file.    Review of patient's allergies indicates:  No Known Allergies    Current Facility-Administered Medications   Medication    acetaminophen  tablet 650 mg    dextrose 10% bolus 125 mL 125 mL    dextrose 10% bolus 250 mL 250 mL    glucagon (human recombinant) injection 1 mg    glucose chewable tablet 16 g    glucose chewable tablet 24 g    ketorolac injection 15 mg    melatonin tablet 6 mg    naloxone 0.4 mg/mL injection 0.02 mg    ondansetron injection 4 mg    sodium chloride 0.9% flush 10 mL    vancomycin 1,250 mg in dextrose 5 % (D5W) 250 mL IVPB (Vial-Mate)     Current Outpatient Medications   Medication Sig    sulfamethoxazole-trimethoprim 800-160mg (BACTRIM DS) 800-160 mg Tab Take 1 tablet by mouth 2 (two) times daily. for 7 days     Family History    None       Tobacco Use    Smoking status: Former     Types: Cigarettes    Smokeless tobacco: Never   Substance and Sexual Activity    Alcohol use: No    Drug use: Yes     Types: Marijuana    Sexual activity: Not on file     ROS  Constitutional: negative for fevers or chills  Eyes: negative visual changes or eye discharge  ENT: negative for ear pain or sore throat  Respiratory: negative for shortness of breath or cough  Cardiovascular: negative for chest pain or palpitations  Gastrointestinal: negative for abdominal pain, nausea, or vomiting  Genitourinary: negative for dysuria and flank pain  Neurological: negative for headaches or dizziness  Musculoskeletal: positive for open, draining sores on the right ulnar forearm, right long finger, left elbow, left axilla   Objective:     Vital Signs (Most Recent):  Temp: 98 °F (36.7 °C) (07/07/23 0010)  Pulse: 74 (07/07/23 0010)  Resp: 18 (07/07/23 0010)  BP: 138/82 (07/07/23 0010)  SpO2: 98 % (07/07/23 0010) Vital Signs (24h Range):  Temp:  [98 °F (36.7 °C)-98.1 °F (36.7 °C)] 98 °F (36.7 °C)  Pulse:  [74-79] 74  Resp:  [17-18] 18  SpO2:  [97 %-99 %] 98 %  BP: (123-155)/(62-82) 138/82     Weight: 79.4 kg (175 lb)     Body mass index is 25.11 kg/m².      Intake/Output Summary (Last 24 hours) at 7/7/2023 0017  Last data filed at 7/6/2023  2149  Gross per 24 hour   Intake 1250 ml   Output --   Net 1250 ml        Ortho/SPM Exam  General:  no acute distress, appears stated age   Neuro: alert and oriented x3  Psych: normal mood  Head: normocephalic, atraumatic.  Eyes: no scleral icterus  Mouth: moist mucous membranes  CV: extremities warm and well perfused  Pulm: breathing comfortably, equal chest rise bilat  Skin:  Wounds with purulent drainage of the right forearm, right long finger, left elbow, left axilla    MSK:    RUE:  - Sore of the ulnar forearm with minimal drainage   - swollen, large, open draining wound on the dorsal aspect of the right long finger PIP joint; desquamation present  - TTP of the right long finger.  - AROM and PROM of the shoulder, elbow, wrist  - Axillary/AIN/PIN/Radial/Median/Ulnar Nerves assessed in isolation without deficit  - SILT throughout  - Compartments soft  - Radial artery palpated   - Capillary Refill <3s    LUE:  - open draining wound over the left elbow with mild swelling  - TTP of the left elbow  - AROM and PROM of the shoulder, elbow, wrist, and hand intact without pain  - Axillary/AIN/PIN/Radial/Median/Ulnar Nerves assessed in isolation without deficit  - SILT throughout  - Compartments soft  - Radial artery palpated   - Capillary Refill <3s    RLE:  - NonTTP throughout  - AROM and PROM of the hip, knee, ankle, and foot intact without pain  - TA/EHL/Gastroc/FHL assessed in isolation without deficit  - SILT throughout  - DP and PT palpated  - Capillary Refill <3s  - Negative Log roll    LLE:  - NonTTP throughout  - AROM and PROM of the hip, knee, ankle, and foot intact without pain  - TA/EHL/Gastroc/FHL assessed in isolation without deficit  - SILT throughout  - DP and PT palpated  - Capillary Refill <3s  - Negative Log roll       Significant Labs: All pertinent labs within the past 24 hours have been reviewed.    Significant Imaging: X-Ray: I have reviewed all pertinent results/findings and my personal findings  are:  There is soft tissue swelling over the PIP joint of the right long finger.  There is soft tissue swelling over the olecranon on the left elbow.  No evidence of fracture of the left elbow or the right hand.    Assessment/Plan:     Infection of right hand  Rohan Donovan is a 28-year-old male with past medical history significant for hypertension, substance abuse, skin infections who presents today with right long finger pain and left elbow pain.  Patient has multiple draining wounds of the right upper extremity, left upper extremity, left axilla.  Gross pus was expressed from the draining wound over the dorsal aspect of the PIP joint of the right long finger.  Did at the bedside in the ED and sent for Gram stain, aerobic, anaerobic, AFB, fungal cultures.  Patient was educated regarding his current condition, and the need for irrigation and debridement as this issue will likely not resolve without surgical intervention.  Patient understands the risks and benefits of surgery, was consented in the ED, and wishes to proceed with surgery.    NPO midnight for I&D tomorrow.   Multimodal pain control limiting narcotics.  PRN dressing changes.  Continue antibiotics  WBAT right upper extremity, left upper extremity          RILEY Hilliard MD   Orthopedics  [facility]

## 2023-07-07 NOTE — ANESTHESIA RELEASE NOTE
"Anesthesia Release from PACU Note    Patient: Ton Donovan    Procedure(s) Performed: Procedure(s) (LRB):  IRRIGATION AND DEBRIDEMENT, UPPER EXTREMITY - RIGHT HAND (Bilateral)  ARTHROTOMY, HAND - R LF PIP (Right)  BURSECTOMY, OLECRANON (Left)    Anesthesia type: general    Post pain: Adequate analgesia    Post assessment: no apparent anesthetic complications, tolerated procedure well and no evidence of recall    Last Vitals:   Visit Vitals  BP (!) 142/68   Pulse 84   Temp 36.5 °C (97.7 °F) (Skin)   Resp (!) 25   Ht 5' 10" (1.778 m)   Wt 79.4 kg (175 lb)   SpO2 99%   BMI 25.11 kg/m²       Post vital signs: stable    Level of consciousness: awake and alert     Nausea/Vomiting: no nausea/no vomiting    Complications: none    Airway Patency: patent    Respiratory: unassisted, spontaneous ventilation, room air    Cardiovascular: stable and blood pressure at baseline    Hydration: euvolemic  "

## 2023-07-08 VITALS
HEIGHT: 70 IN | BODY MASS INDEX: 25.05 KG/M2 | DIASTOLIC BLOOD PRESSURE: 61 MMHG | OXYGEN SATURATION: 100 % | RESPIRATION RATE: 17 BRPM | TEMPERATURE: 99 F | HEART RATE: 91 BPM | SYSTOLIC BLOOD PRESSURE: 127 MMHG | WEIGHT: 175 LBS

## 2023-07-08 LAB
ALBUMIN SERPL BCP-MCNC: 3.3 G/DL (ref 3.5–5.2)
ALP SERPL-CCNC: 73 U/L (ref 55–135)
ALT SERPL W/O P-5'-P-CCNC: 6 U/L (ref 10–44)
ANION GAP SERPL CALC-SCNC: 9 MMOL/L (ref 8–16)
AST SERPL-CCNC: 29 U/L (ref 10–40)
BASOPHILS # BLD AUTO: 0.01 K/UL (ref 0–0.2)
BASOPHILS NFR BLD: 0.1 % (ref 0–1.9)
BILIRUB SERPL-MCNC: 0.6 MG/DL (ref 0.1–1)
BUN SERPL-MCNC: 11 MG/DL (ref 6–20)
CALCIUM SERPL-MCNC: 9.1 MG/DL (ref 8.7–10.5)
CHLORIDE SERPL-SCNC: 104 MMOL/L (ref 95–110)
CO2 SERPL-SCNC: 25 MMOL/L (ref 23–29)
CREAT SERPL-MCNC: 0.8 MG/DL (ref 0.5–1.4)
DIFFERENTIAL METHOD: ABNORMAL
EOSINOPHIL # BLD AUTO: 0 K/UL (ref 0–0.5)
EOSINOPHIL NFR BLD: 0 % (ref 0–8)
ERYTHROCYTE [DISTWIDTH] IN BLOOD BY AUTOMATED COUNT: 12.1 % (ref 11.5–14.5)
EST. GFR  (NO RACE VARIABLE): >60 ML/MIN/1.73 M^2
GLUCOSE SERPL-MCNC: 139 MG/DL (ref 70–110)
HCT VFR BLD AUTO: 34.1 % (ref 40–54)
HGB BLD-MCNC: 11.4 G/DL (ref 14–18)
IMM GRANULOCYTES # BLD AUTO: 0.03 K/UL (ref 0–0.04)
IMM GRANULOCYTES NFR BLD AUTO: 0.3 % (ref 0–0.5)
LYMPHOCYTES # BLD AUTO: 1.3 K/UL (ref 1–4.8)
LYMPHOCYTES NFR BLD: 13.7 % (ref 18–48)
MAGNESIUM SERPL-MCNC: 1.9 MG/DL (ref 1.6–2.6)
MCH RBC QN AUTO: 28 PG (ref 27–31)
MCHC RBC AUTO-ENTMCNC: 33.4 G/DL (ref 32–36)
MCV RBC AUTO: 84 FL (ref 82–98)
MONOCYTES # BLD AUTO: 0.4 K/UL (ref 0.3–1)
MONOCYTES NFR BLD: 4 % (ref 4–15)
NEUTROPHILS # BLD AUTO: 8 K/UL (ref 1.8–7.7)
NEUTROPHILS NFR BLD: 81.9 % (ref 38–73)
NRBC BLD-RTO: 0 /100 WBC
PLATELET # BLD AUTO: 231 K/UL (ref 150–450)
PMV BLD AUTO: 9.7 FL (ref 9.2–12.9)
POTASSIUM SERPL-SCNC: 3.6 MMOL/L (ref 3.5–5.1)
PROT SERPL-MCNC: 6.8 G/DL (ref 6–8.4)
RBC # BLD AUTO: 4.07 M/UL (ref 4.6–6.2)
SODIUM SERPL-SCNC: 138 MMOL/L (ref 136–145)
VANCOMYCIN TROUGH SERPL-MCNC: 4.1 UG/ML (ref 10–22)
WBC # BLD AUTO: 9.76 K/UL (ref 3.9–12.7)

## 2023-07-08 PROCEDURE — 80202 ASSAY OF VANCOMYCIN: CPT | Performed by: NURSE PRACTITIONER

## 2023-07-08 PROCEDURE — 99239 PR HOSPITAL DISCHARGE DAY,>30 MIN: ICD-10-PCS | Mod: ,,, | Performed by: STUDENT IN AN ORGANIZED HEALTH CARE EDUCATION/TRAINING PROGRAM

## 2023-07-08 PROCEDURE — 99239 HOSP IP/OBS DSCHRG MGMT >30: CPT | Mod: ,,, | Performed by: STUDENT IN AN ORGANIZED HEALTH CARE EDUCATION/TRAINING PROGRAM

## 2023-07-08 PROCEDURE — 83735 ASSAY OF MAGNESIUM: CPT | Performed by: NURSE PRACTITIONER

## 2023-07-08 PROCEDURE — 36415 COLL VENOUS BLD VENIPUNCTURE: CPT | Performed by: NURSE PRACTITIONER

## 2023-07-08 PROCEDURE — 25000003 PHARM REV CODE 250: Performed by: NURSE PRACTITIONER

## 2023-07-08 PROCEDURE — 80053 COMPREHEN METABOLIC PANEL: CPT | Performed by: NURSE PRACTITIONER

## 2023-07-08 PROCEDURE — 25000003 PHARM REV CODE 250: Performed by: STUDENT IN AN ORGANIZED HEALTH CARE EDUCATION/TRAINING PROGRAM

## 2023-07-08 PROCEDURE — S4991 NICOTINE PATCH NONLEGEND: HCPCS | Performed by: NURSE PRACTITIONER

## 2023-07-08 PROCEDURE — 85025 COMPLETE CBC W/AUTO DIFF WBC: CPT | Performed by: NURSE PRACTITIONER

## 2023-07-08 RX ORDER — DOXYCYCLINE 100 MG/1
100 CAPSULE ORAL 2 TIMES DAILY
Qty: 56 CAPSULE | Refills: 0 | Status: SHIPPED | OUTPATIENT
Start: 2023-07-08 | End: 2023-08-05

## 2023-07-08 RX ORDER — AMOXICILLIN AND CLAVULANATE POTASSIUM 875; 125 MG/1; MG/1
1 TABLET, FILM COATED ORAL EVERY 12 HOURS
Qty: 56 TABLET | Refills: 0 | Status: SHIPPED | OUTPATIENT
Start: 2023-07-08 | End: 2023-08-05

## 2023-07-08 RX ADMIN — FOLIC ACID 1 MG: 1 TABLET ORAL at 09:07

## 2023-07-08 RX ADMIN — THERA TABS 1 TABLET: TAB at 09:07

## 2023-07-08 RX ADMIN — METHOCARBAMOL 750 MG: 750 TABLET ORAL at 09:07

## 2023-07-08 RX ADMIN — Medication 100 MG: at 09:07

## 2023-07-08 RX ADMIN — Medication 1 PATCH: at 09:07

## 2023-07-08 RX ADMIN — ACETAMINOPHEN 1000 MG: 500 TABLET ORAL at 06:07

## 2023-07-08 NOTE — ANESTHESIA POSTPROCEDURE EVALUATION
Anesthesia Post Evaluation    Patient: Ton Donovan    Procedure(s) Performed: Procedure(s) (LRB):  IRRIGATION AND DEBRIDEMENT, UPPER EXTREMITY - RIGHT HAND (Bilateral)  ARTHROTOMY, HAND - R LF PIP (Right)  BURSECTOMY, OLECRANON (Left)    Final Anesthesia Type: general      Patient location during evaluation: PACU  Patient participation: Yes- Able to Participate  Level of consciousness: agitated and awake  Post-procedure vital signs: reviewed and stable  Pain management: adequate  Airway patency: patent  ALEX mitigation strategies: Extubation while patient is awake, Multimodal analgesia, Verification of full reversal of neuromuscular block, Extubation and recovery carried out in lateral, semiupright, or other nonsupine position and Use of major conduction anesthesia (spinal/epidural) or peripheral nerve block  PONV status at discharge: No PONV  Anesthetic complications: no      Cardiovascular status: stable  Respiratory status: unassisted and spontaneous ventilation  Hydration status: euvolemic  Follow-up not needed.          Vitals Value Taken Time   /61 07/08/23 0809   Temp 37.3 °C (99.1 °F) 07/08/23 0809   Pulse 91 07/08/23 0809   Resp 17 07/08/23 0809   SpO2 100 % 07/08/23 0809         Event Time   Out of Recovery 07/07/2023 16:30:00         Pain/Brittanie Score: Pain Rating Prior to Med Admin: 0 (7/8/2023  6:51 AM)  Brittanie Score: 9 (7/7/2023  4:30 PM)

## 2023-07-08 NOTE — PROGRESS NOTES
[facility]  Orthopedics  Progress Note    Patient Name: Ton Donovan  MRN: [id number]  Admission Date: 7/6/2023  Hospital Length of Stay: 1 days  Attending Provider: Melany Ho MD  Primary Care Provider: Primary Doctor No  Follow-up For: Procedure(s) (LRB):  IRRIGATION AND DEBRIDEMENT, UPPER EXTREMITY - RIGHT HAND (Bilateral)  ARTHROTOMY, HAND - R LF PIP (Right)  BURSECTOMY, OLECRANON (Left)    Post-Operative Day: 1 Day Post-Op  Subjective:     Principal Problem:Cellulitis of finger, right    Principal Orthopedic Problem: POD1 I&D      Interval History: NAEON, VSS, AF.     Continue IV abx.   Will continue to monitor.     Review of patient's allergies indicates:  No Known Allergies    Current Facility-Administered Medications   Medication    acetaminophen tablet 1,000 mg    cloNIDine tablet 0.1 mg    dextrose 10% bolus 125 mL 125 mL    dextrose 10% bolus 250 mL 250 mL    dicyclomine capsule 10 mg    folic acid tablet 1 mg    glucagon (human recombinant) injection 1 mg    glucose chewable tablet 16 g    glucose chewable tablet 24 g    haloperidol lactate injection 1 mg    hydrOXYzine HCL tablet 25 mg    ibuprofen tablet 400 mg    loperamide capsule 2 mg    melatonin tablet 6 mg    methocarbamoL tablet 500 mg    methocarbamoL tablet 750 mg    multivitamin tablet    naloxone 0.4 mg/mL injection 0.4 mg    nicotine 21 mg/24 hr 1 patch    ondansetron injection 4 mg    sodium chloride 0.9% flush 10 mL    thiamine tablet 100 mg    vancomycin 1,250 mg in dextrose 5 % (D5W) 250 mL IVPB (Vial-Mate)     Objective:     Vital Signs (Most Recent):  Temp: 98.2 °F (36.8 °C) (07/08/23 0451)  Pulse: 80 (07/08/23 0451)  Resp: 18 (07/08/23 0451)  BP: (!) 141/108 (07/08/23 0451)  SpO2: 100 % (07/08/23 0451) Vital Signs (24h Range):  Temp:  [97.4 °F (36.3 °C)-98.2 °F (36.8 °C)] 98.2 °F (36.8 °C)  Pulse:  [] 80  Resp:  [10-54] 18  SpO2:  [85 %-100 %] 100 %  BP: (117-166)/() 141/108  "    Weight: 79.4 kg (175 lb)  Height: 5' 10" (177.8 cm)  Body mass index is 25.11 kg/m².      Intake/Output Summary (Last 24 hours) at 7/8/2023 0731  Last data filed at 7/7/2023 2100  Gross per 24 hour   Intake 2150 ml   Output 1070 ml   Net 1080 ml         Ortho/SPM Exam    RUE:   - Dressing c/d/I   - Extremities WWP   - Radial Pulse palpated   - TTP at and distal to the PIP joint of the right long finger  - AROM, PROM of the shoulder, elbow, wrist   - No migratory erythema     LUE:   - Dressing c/d/I   - Extremities WWP   - Radial Pulse palpated   - TTP over the olecranon of the L elbow   - AROM, PROM of the shoulder, elbow, wrist   - Median, ulnar, AIN, PIN, radial nerves grossly intact        Significant Labs: All pertinent labs within the past 24 hours have been reviewed.    Significant Imaging: MRI: I have reviewed all pertinent results/findings and my personal findings are:  There is a hyperintense fluid collection over the PIP joint of the right long finger likely indicative of inflammatory process and infection.    Assessment/Plan:     * Cellulitis of finger, right  Rohan Donovan is a 28-year-old male with past medical history significant for hypertension, substance abuse, skin infections who presents today with right long finger pain and left elbow pain.  Patient has multiple draining wounds of the right upper extremity, left upper extremity, left axilla.  Gross pus was expressed from the draining wound over the dorsal aspect of the PIP joint of the right long finger.  Did at the bedside in the ED and sent for Gram stain, aerobic, anaerobic, AFB, fungal cultures.  Patient was educated regarding his current condition, and the need for irrigation and debridement as this issue will likely not resolve without surgical intervention.  Patient understands the risks and benefits of surgery, was consented in the ED, and wishes to proceed with surgery.  MRI of the right hand completed overnight.  There is a " hyperintense signal on T2 at the level of the signal on T2 at the dorsal aspect of the PIP joint of the right long finger which correlates with the patient's clinical picture.    POD 1 I&D   Multimodal pain control limiting narcotics.  PRN dressing changes.  Continue antibiotics  WBAT right upper extremity, left upper extremity            IRASEMA SAMAYOA MD  Orthopedics  [facility]

## 2023-07-08 NOTE — ASSESSMENT & PLAN NOTE
Rohan Donovan is a 28-year-old male with past medical history significant for hypertension, substance abuse, skin infections who presents today with right long finger pain and left elbow pain.  Patient has multiple draining wounds of the right upper extremity, left upper extremity, left axilla.  Gross pus was expressed from the draining wound over the dorsal aspect of the PIP joint of the right long finger.  Did at the bedside in the ED and sent for Gram stain, aerobic, anaerobic, AFB, fungal cultures.  Patient was educated regarding his current condition, and the need for irrigation and debridement as this issue will likely not resolve without surgical intervention.  Patient understands the risks and benefits of surgery, was consented in the ED, and wishes to proceed with surgery.  MRI of the right hand completed overnight.  There is a hyperintense signal on T2 at the level of the signal on T2 at the dorsal aspect of the PIP joint of the right long finger which correlates with the patient's clinical picture.    POD 1 I&D   Multimodal pain control limiting narcotics.  PRN dressing changes.  Continue antibiotics  WBAT right upper extremity, left upper extremity

## 2023-07-08 NOTE — PROGRESS NOTES
Pharmacokinetic Assessment Follow Up: IV Vancomycin    Vancomycin serum concentration assessment(s):    The trough level was drawn correctly and can be used to guide therapy at this time. The measurement is below the desired definitive target range of 10 to 20 mcg/mL.    Vancomycin Regimen Plan:    Change regimen to Vancomycin 1500 mg IV every 8 hours with next serum trough concentration measured at 10:30 prior to 4th dose on 7/9/23    Drug levels (last 3 results):  Recent Labs   Lab Result Units 07/08/23  0930   Vancomycin-Trough ug/mL 4.1*       Pharmacy will continue to follow and monitor vancomycin.    Please contact pharmacy at extension  [phone] for questions regarding this assessment.    Thank you for the consult,   Lucas Castillo       Patient brief summary:  Ton Donovan is a 28 y.o. male initiated on antimicrobial therapy with IV Vancomycin for treatment of skin & soft tissue infection    The patient's current regimen is Vancomycin 1250 mg IV every 12 hours    Drug Allergies:   Review of patient's allergies indicates:  No Known Allergies    Actual Body Weight:   79.4 kg    Renal Function:   Estimated Creatinine Clearance: 139.4 mL/min (based on SCr of 0.8 mg/dL).,     Dialysis Method (if applicable):  N/A    CBC (last 72 hours):  Recent Labs   Lab Result Units 07/06/23 1910 07/07/23  0531 07/08/23  0930   WBC K/uL 7.95 8.51 9.76   Hemoglobin g/dL 11.4* 12.0* 11.4*   Hematocrit % 35.5* 37.9* 34.1*   Platelets K/uL 218 232 231   Gran % % 55.1 60.5 81.9*   Lymph % % 29.9 24.7 13.7*   Mono % % 10.6 10.3 4.0   Eosinophil % % 3.6 3.8 0.0   Basophil % % 0.4 0.5 0.1   Differential Method  Automated Automated Automated       Metabolic Panel (last 72 hours):  Recent Labs   Lab Result Units 07/06/23 1910 07/06/23 2358 07/07/23  0531 07/08/23  0930   Sodium mmol/L 139  --  136 138   Potassium mmol/L 4.2  --  4.4 3.6   Chloride mmol/L 103  --  102 104   CO2 mmol/L 27  --  27 25   Glucose mg/dL 102  --  92 139*    Glucose, UA   --  Negative  --   --    BUN mg/dL 14  --  12 11   Creatinine mg/dL 0.9  --  0.8 0.8   Creatinine, Urine mg/dL  --  124.0  --   --    Albumin g/dL 3.5  --  3.1* 3.3*   Total Bilirubin mg/dL 0.3  --  0.4 0.6   Alkaline Phosphatase U/L 77  --  73 73   AST U/L 22  --  18 29   ALT U/L 8*  --  5* 6*   Magnesium mg/dL  --   --  1.8 1.9       Vancomycin Administrations:  vancomycin given in the last 96 hours                     vancomycin 1,250 mg in dextrose 5 % (D5W) 250 mL IVPB (Vial-Mate) (mg) 1,250 mg New Bag 07/07/23 2100     1,250 mg New Bag  0939    vancomycin injection (g) 1 g Given 07/07/23 1432    vancomycin 1,500 mg in dextrose 5 % (D5W) 250 mL IVPB (Vial-Mate) (mg) 1,500 mg New Bag 07/06/23 2005                    Microbiologic Results:  Microbiology Results (last 7 days)       Procedure Component Value Units Date/Time    AFB Culture & Smear [[id number]] Collected: 07/07/23 0010    Order Status: Completed Specimen: Wound from Finger, Right Hand Updated: 07/08/23 0927     AFB Culture & Smear Culture in progress     AFB CULTURE STAIN No acid fast bacilli seen.    Aerobic culture [[id number]]  (Abnormal) Collected: 07/07/23 0010    Order Status: Completed Specimen: Wound from Finger, Right Hand Updated: 07/08/23 0726     Aerobic Bacterial Culture STAPHYLOCOCCUS AUREUS  Many  Susceptibility pending      Aerobic culture [[id number]] Collected: 07/07/23 1451    Order Status: Completed Specimen: Elbow, Left Updated: 07/08/23 0717     Aerobic Bacterial Culture No growth    Narrative:      Left elbow tissue    Blood culture [[id number]] Collected: 07/07/23 0153    Order Status: Completed Specimen: Blood from Peripheral, Antecubital, Right Updated: 07/08/23 0613     Blood Culture, Routine No Growth to date      No Growth to date    Blood culture [[id number]] Collected: 07/07/23 0008    Order Status: Completed Specimen: Blood from Peripheral, Forearm, Right Updated: 07/08/23 0613     Blood Culture, Routine No  Growth to date      No Growth to date    C. trachomatis/N. gonorrhoeae by AMP DNA [facility]; Urine [[id number]] Collected: 07/06/23 2145    Order Status: Completed Specimen: Genital Updated: 07/07/23 1708     Chlamydia, Amplified DNA Not Detected     N gonorrhoeae, amplified DNA Not Detected    Narrative:      Sources by Resulting Lab:->[facility]  Release to patient->Immediate    Gram stain [[id number]] Collected: 07/07/23 1451    Order Status: Completed Specimen: Elbow, Left Updated: 07/07/23 1624     Gram Stain Result No WBC's      No organisms seen    Narrative:      Left elbow tissue    Gram stain [[id number]] Collected: 07/07/23 1439    Order Status: Completed Specimen: Abscess from Arm, Left Updated: 07/07/23 1622     Gram Stain Result No WBC's      No organisms seen    Narrative:      Left forearm abscess    Gram stain [[id number]] Collected: 07/07/23 1439    Order Status: Completed Specimen: Finger, Right Hand Updated: 07/07/23 1621     Gram Stain Result No WBC's      Rare Gram positive cocci    Narrative:      Right long finger #2    Gram stain [[id number]] Collected: 07/07/23 1439    Order Status: Completed Specimen: Finger, Right Hand Updated: 07/07/23 1619     Gram Stain Result No WBC's      Rare Gram positive cocci    Narrative:      Right long finger #1    Fungus culture [[id number]] Collected: 07/07/23 1439    Order Status: Sent Specimen: Abscess from Arm, Left Updated: 07/07/23 1524    AFB Culture & Smear [[id number]] Collected: 07/07/23 1439    Order Status: Sent Specimen: Abscess from Arm, Left Updated: 07/07/23 1524    Culture, Anaerobic [[id number]] Collected: 07/07/23 1439    Order Status: Sent Specimen: Abscess from Arm, Left Updated: 07/07/23 1523    Aerobic culture [[id number]] Collected: 07/07/23 1439    Order Status: Sent Specimen: Abscess from Arm, Left Updated: 07/07/23 1522    AFB Culture & Smear [[id number]] Collected: 07/07/23 1451    Order Status: Sent Specimen: Elbow, Left Updated: 07/07/23 1521     Fungus culture [[id number]] Collected: 07/07/23 1451    Order Status: Sent Specimen: Elbow, Left Updated: 07/07/23 1521    Culture, Anaerobic [[id number]] Collected: 07/07/23 1451    Order Status: Sent Specimen: Elbow, Left Updated: 07/07/23 1520    Fungus culture [[id number]] Collected: 07/07/23 1439    Order Status: Sent Specimen: Finger, Right Hand Updated: 07/07/23 1517    AFB Culture & Smear [[id number]] Collected: 07/07/23 1439    Order Status: Sent Specimen: Finger, Right Hand Updated: 07/07/23 1516    Culture, Anaerobic [[id number]] Collected: 07/07/23 1439    Order Status: Sent Specimen: Finger, Right Hand Updated: 07/07/23 1516    Aerobic culture [[id number]] Collected: 07/07/23 1439    Order Status: Sent Specimen: Finger, Right Hand Updated: 07/07/23 1515    Culture, Anaerobe [[id number]] Collected: 07/07/23 1439    Order Status: Sent Specimen: Finger, Right Hand Updated: 07/07/23 1512    AFB Culture & Smear [[id number]] Collected: 07/07/23 1439    Order Status: Sent Specimen: Finger, Right Hand Updated: 07/07/23 1512    Aerobic culture [[id number]] Collected: 07/07/23 1439    Order Status: Sent Specimen: Finger, Right Hand Updated: 07/07/23 1511    Fungus culture [[id number]] Collected: 07/07/23 1439    Order Status: Sent Specimen: Finger, Right Hand Updated: 07/07/23 1511    Fungus culture [[id number]] Collected: 07/07/23 0010    Order Status: Completed Specimen: Wound from Finger, Right Hand Updated: 07/07/23 0714     Fungus (Mycology) Culture Culture in progress    Gram stain [[id number]] Collected: 07/07/23 0010    Order Status: Completed Specimen: Wound from Finger, Right Hand Updated: 07/07/23 0131     Gram Stain Result Rare WBC's      Moderate Gram positive cocci      Rare Gram negative rods    Culture, Anaerobic [[id number]] Collected: 07/07/23 0010    Order Status: Sent Specimen: Wound from Finger, Right Hand Updated: 07/07/23 0050

## 2023-07-08 NOTE — DISCHARGE SUMMARY
[facility] - Observation [address] [place] [address] Medicine  Discharge Summary      Patient Name: Ton Donovan  MRN: [id number]  BOZENA: [id number]  Patient Class: IP- Inpatient  Admission Date: 7/6/2023  Hospital Length of Stay: 1 days  Discharge Date and Time:  07/08/2023 11:44 AM  Attending Physician: Melany Ho MD   Discharging Provider: Melany Ho MD  Primary Care Provider: Primary Doctor [facility] Medicine Team: [facility] MED  Melany Ho MD  Primary Care Team: [facility]    HPI:   Ton Donovan is a 28 y.o. male with a  PMHx of IVDU and HTN who presents to the ED with complaints of multiple wounds. The patient has been living in his family's old house which is now foreclosed on, so he has no running water or electricity. He states that he fell asleep and woke up with spider bites several days ago which have now progressed to large, open, draining wounds. Patient recently evaluated on 07/03 for wounds and discharged with Bactrim. States that he has been unable to fill the prescription because he can not afford it. Patient complaining of worsening infection with erythema and drainage to multiple wounds. States that wound on his R knuckle opened today. Additionally, patient has wound on his left elbow, fight forearm, and under his left armpit. Denies IVDU but does endorse snorting heroin. Additionally, states that he has had subjective fevers and chills for the last couple days. He reports some myalgias to his lower extremities and back that he attributes to the start of withdrawal symptoms. The patient denies CP, SOB, cough, N/V/D, or dysuria.    In the ED, VSS, afebrile. CBC unremarkable. Sed rate 58. CMP unremarkable. CRP 64.2. L elbow xray with no visualized findings to suggest osteomyelitis or other acute abnormality involving the osseous structures or left elbow joint. Soft tissue swelling without associated radiopaque foreign body overlying the olecranon with question of laceration or skin  breakdown as described. Xray fingers R hand with soft tissue injury along the dorsal aspect of the 3rd digit PIP joint. No radiopaque foreign body. Correlation is needed to exclude exposed bone. The patient received IVF bolus and vancomycin. Ortho consulted in the ED who obtained wound cxs from R third digit and recommend NPO for I&D tomorrow.      Procedure(s) (LRB):  IRRIGATION AND DEBRIDEMENT, UPPER EXTREMITY - RIGHT HAND (Bilateral)  ARTHROTOMY, HAND - R LF PIP (Right)  BURSECTOMY, OLECRANON (Left)      Hospital Course:   Admitted for abscesses of multiple sites. S/p I/D in the OR with ortho. On 7/8 he attempted to elope several times with a vasc cath in his neck. Removed before discharge with no issue. Advised him at length that IV antibiotics are the standard of care in the setting of septic arthritis, and he voiced understanding and agreement. Discussed that without IV antibiotics he was at risk for worsening infection and overwhelming sepsis that could lead to death. However, he still would like discharge home. AMA paperwork signed. PO doxy and augmentin x 4 weeks prescribed at discharge per peripheral discussion with ID (did not have time to see him before AMA).        Goals of Care Treatment Preferences:  Code Status: Full Code      Consults:   Consults (From admission, onward)        Status Ordering Provider     Inpatient consult to Infectious Diseases  Once        Provider:  (Not yet assigned)    Acknowledged RADHA SERRANO     Inpatient consult to Psychiatry  Once        Provider:  (Not yet assigned)    Acknowledged JANICE HOWARD     Pharmacy to dose Vancomycin consult  Once        Provider:  (Not yet assigned)   See [facility] for [facility] Orders Report.    Acknowledged JANICE HOWARD     Inpatient consult to Orthopedic Surgery  Once        Provider:  (Not yet assigned)    Completed JANICE HOWARD          No new Assessment & Plan notes have been filed under this hospital service  since the last note was generated.  Service: Hospital Medicine    Final Active Diagnoses:    Diagnosis Date Noted POA    PRINCIPAL PROBLEM:  Cellulitis of finger, right [L03.011] 07/07/2023 Yes    Cellulitis of left elbow [L03.114] 07/07/2023 Yes    Cigarette nicotine dependence without complication [F17.210] 07/07/2023 Yes    Septic olecranon bursitis, left [M71.122] 07/07/2023 Yes    Septic arthritis of interphalangeal joint of finger of right hand [M00.9] 07/07/2023 Yes    Open wound of left elbow [S51.002A] 07/07/2023 Yes    Abscess of left upper extremity [L02.414] 07/07/2023 Yes    Abscess of right hand [L02.511] 07/07/2023 Yes    Heroin dependence [F11.20] 07/06/2023 Yes      Problems Resolved During this Admission:       Discharged Condition: against medical advice    Disposition:     Follow Up:    Patient Instructions:   No discharge procedures on file.    Significant Diagnostic Studies: Labs: All labs within the past 24 hours have been reviewed    Pending Diagnostic Studies:     None         Medications:  Reconciled Home Medications:      Medication List      START taking these medications    amoxicillin-clavulanate 875-125mg 875-125 mg per tablet  Commonly known as: AUGMENTIN  Take 1 tablet by mouth every 12 (twelve) hours.     doxycycline 100 MG Cap  Commonly known as: VIBRAMYCIN  Take 1 capsule (100 mg total) by mouth 2 (two) times daily.        STOP taking these medications    sulfamethoxazole-trimethoprim 800-160mg 800-160 mg Tab  Commonly known as: BACTRIM DS            Indwelling Lines/Drains at time of discharge:   Lines/Drains/Airways     Drain  Duration                Open Drain 07/07/23 1511 Right;Dorsal Hand Penrose <1 day                Time spent on the discharge of patient: 35 minutes spent at bedside and on discharge         May ALIREZA Ho MD  Department of Hospital Medicine  [facility] - Observation 11H

## 2023-07-08 NOTE — NURSING
Pt came to nursing station stating he wants to leave ama. Dr. Ho notified and she is coming to bedside. Explained to pt that he cannot leave with permacath. Verbalizes understanding.

## 2023-07-08 NOTE — NURSING
Security called and at bedside after pt eloped down stairwell with permacath in place after explaining multiple times that it is not safe for him to leave with it. Dr. Ho notified and at bedside removing cath at this time. AMA paperwork provided, signed and placed in chart. Pt escorted out by security.

## 2023-07-08 NOTE — SUBJECTIVE & OBJECTIVE
"Principal Problem:Cellulitis of finger, right    Principal Orthopedic Problem: POD1 I&D      Interval History: NAEON, VSS, AF.     Continue IV abx.   Will continue to monitor.     Review of patient's allergies indicates:  No Known Allergies    Current Facility-Administered Medications   Medication    acetaminophen tablet 1,000 mg    cloNIDine tablet 0.1 mg    dextrose 10% bolus 125 mL 125 mL    dextrose 10% bolus 250 mL 250 mL    dicyclomine capsule 10 mg    folic acid tablet 1 mg    glucagon (human recombinant) injection 1 mg    glucose chewable tablet 16 g    glucose chewable tablet 24 g    haloperidol lactate injection 1 mg    hydrOXYzine HCL tablet 25 mg    ibuprofen tablet 400 mg    loperamide capsule 2 mg    melatonin tablet 6 mg    methocarbamoL tablet 500 mg    methocarbamoL tablet 750 mg    multivitamin tablet    naloxone 0.4 mg/mL injection 0.4 mg    nicotine 21 mg/24 hr 1 patch    ondansetron injection 4 mg    sodium chloride 0.9% flush 10 mL    thiamine tablet 100 mg    vancomycin 1,250 mg in dextrose 5 % (D5W) 250 mL IVPB (Vial-Mate)     Objective:     Vital Signs (Most Recent):  Temp: 98.2 °F (36.8 °C) (07/08/23 0451)  Pulse: 80 (07/08/23 0451)  Resp: 18 (07/08/23 0451)  BP: (!) 141/108 (07/08/23 0451)  SpO2: 100 % (07/08/23 0451) Vital Signs (24h Range):  Temp:  [97.4 °F (36.3 °C)-98.2 °F (36.8 °C)] 98.2 °F (36.8 °C)  Pulse:  [] 80  Resp:  [10-54] 18  SpO2:  [85 %-100 %] 100 %  BP: (117-166)/() 141/108     Weight: 79.4 kg (175 lb)  Height: 5' 10" (177.8 cm)  Body mass index is 25.11 kg/m².      Intake/Output Summary (Last 24 hours) at 7/8/2023 0731  Last data filed at 7/7/2023 2100  Gross per 24 hour   Intake 2150 ml   Output 1070 ml   Net 1080 ml          Ortho/SPM Exam    RUE:   - Dressing c/d/I   - Extremities WWP   - Radial Pulse palpated   - TTP at and distal to the PIP joint of the right long finger  - AROM, PROM of the shoulder, elbow, wrist   - No migratory erythema     LUE:   - " Dressing c/d/I   - Extremities WWP   - Radial Pulse palpated   - TTP over the olecranon of the L elbow   - AROM, PROM of the shoulder, elbow, wrist   - Median, ulnar, AIN, PIN, radial nerves grossly intact        Significant Labs: All pertinent labs within the past 24 hours have been reviewed.    Significant Imaging: MRI: I have reviewed all pertinent results/findings and my personal findings are:  There is a hyperintense fluid collection over the PIP joint of the right long finger likely indicative of inflammatory process and infection.

## 2023-07-10 LAB
BACTERIA SPEC AEROBE CULT: ABNORMAL
BACTERIA SPEC AEROBE CULT: NO GROWTH

## 2023-07-11 LAB
BACTERIA SPEC ANAEROBE CULT: NORMAL

## 2023-07-12 LAB
BACTERIA BLD CULT: NORMAL
BACTERIA BLD CULT: NORMAL

## 2023-07-14 LAB — BACTERIA SPEC ANAEROBE CULT: NORMAL

## 2023-07-21 ENCOUNTER — TELEPHONE (OUTPATIENT)
Dept: ORTHOPEDICS | Facility: CLINIC | Age: 28
End: 2023-07-21

## 2023-07-21 NOTE — TELEPHONE ENCOUNTER
Left a voice mail for pt to return my call regarding his missed appointment today for 2 wk post op visit.  Pending a return call from pt.

## 2023-08-09 LAB
FUNGUS SPEC CULT: NORMAL

## 2023-08-25 LAB
ACID FAST MOD KINY STN SPEC: NORMAL
MYCOBACTERIUM SPEC QL CULT: NORMAL

## 2023-10-10 ENCOUNTER — HOSPITAL ENCOUNTER (INPATIENT)
Facility: HOSPITAL | Age: 28
LOS: 1 days | Discharge: LEFT BEFORE DISCHARGE | DRG: 603 | End: 2023-10-11
Attending: EMERGENCY MEDICINE | Admitting: STUDENT IN AN ORGANIZED HEALTH CARE EDUCATION/TRAINING PROGRAM
Payer: MEDICAID

## 2023-10-10 DIAGNOSIS — L02.213 CHEST WALL ABSCESS: ICD-10-CM

## 2023-10-10 DIAGNOSIS — R07.9 CHEST PAIN: ICD-10-CM

## 2023-10-10 LAB
ABO + RH BLD: NORMAL
ALBUMIN SERPL BCP-MCNC: 2.2 G/DL (ref 3.5–5.2)
ALLENS TEST: NORMAL
ALP SERPL-CCNC: 147 U/L (ref 55–135)
ALT SERPL W/O P-5'-P-CCNC: 16 U/L (ref 10–44)
ANION GAP SERPL CALC-SCNC: 10 MMOL/L (ref 8–16)
ANISOCYTOSIS BLD QL SMEAR: SLIGHT
AST SERPL-CCNC: 47 U/L (ref 10–40)
BASOPHILS # BLD AUTO: 0.07 K/UL (ref 0–0.2)
BASOPHILS NFR BLD: 0.3 % (ref 0–1.9)
BILIRUB SERPL-MCNC: 0.3 MG/DL (ref 0.1–1)
BILIRUB UR QL STRIP: NEGATIVE
BLD GP AB SCN CELLS X3 SERPL QL: NORMAL
BUN SERPL-MCNC: 15 MG/DL (ref 6–20)
BURR CELLS BLD QL SMEAR: ABNORMAL
CALCIUM SERPL-MCNC: 8.9 MG/DL (ref 8.7–10.5)
CHLORIDE SERPL-SCNC: 93 MMOL/L (ref 95–110)
CLARITY UR REFRACT.AUTO: CLEAR
CO2 SERPL-SCNC: 24 MMOL/L (ref 23–29)
COLOR UR AUTO: COLORLESS
CREAT SERPL-MCNC: 0.8 MG/DL (ref 0.5–1.4)
DACRYOCYTES BLD QL SMEAR: ABNORMAL
DIFFERENTIAL METHOD: ABNORMAL
EOSINOPHIL # BLD AUTO: 0 K/UL (ref 0–0.5)
EOSINOPHIL NFR BLD: 0.1 % (ref 0–8)
ERYTHROCYTE [DISTWIDTH] IN BLOOD BY AUTOMATED COUNT: 16.2 % (ref 11.5–14.5)
EST. GFR  (NO RACE VARIABLE): >60 ML/MIN/1.73 M^2
GLUCOSE SERPL-MCNC: 123 MG/DL (ref 70–110)
GLUCOSE UR QL STRIP: NEGATIVE
HCT VFR BLD AUTO: 24.6 % (ref 40–54)
HGB BLD-MCNC: 8.4 G/DL (ref 14–18)
HGB UR QL STRIP: NEGATIVE
HYPOCHROMIA BLD QL SMEAR: ABNORMAL
IMM GRANULOCYTES # BLD AUTO: 0.9 K/UL (ref 0–0.04)
IMM GRANULOCYTES NFR BLD AUTO: 3.3 % (ref 0–0.5)
KETONES UR QL STRIP: NEGATIVE
LDH SERPL L TO P-CCNC: 1.58 MMOL/L (ref 0.5–2.2)
LEUKOCYTE ESTERASE UR QL STRIP: NEGATIVE
LYMPHOCYTES # BLD AUTO: 2.2 K/UL (ref 1–4.8)
LYMPHOCYTES NFR BLD: 7.9 % (ref 18–48)
MAGNESIUM SERPL-MCNC: 1.8 MG/DL (ref 1.6–2.6)
MCH RBC QN AUTO: 24.7 PG (ref 27–31)
MCHC RBC AUTO-ENTMCNC: 34.1 G/DL (ref 32–36)
MCV RBC AUTO: 72 FL (ref 82–98)
MONOCYTES # BLD AUTO: 2 K/UL (ref 0.3–1)
MONOCYTES NFR BLD: 7.3 % (ref 4–15)
NEUTROPHILS # BLD AUTO: 22.4 K/UL (ref 1.8–7.7)
NEUTROPHILS NFR BLD: 81.1 % (ref 38–73)
NITRITE UR QL STRIP: NEGATIVE
NRBC BLD-RTO: 0 /100 WBC
OVALOCYTES BLD QL SMEAR: ABNORMAL
PH UR STRIP: 6 [PH] (ref 5–8)
PHOSPHATE SERPL-MCNC: 3.4 MG/DL (ref 2.7–4.5)
PLATELET # BLD AUTO: 489 K/UL (ref 150–450)
PLATELET BLD QL SMEAR: ABNORMAL
PMV BLD AUTO: 9.7 FL (ref 9.2–12.9)
POIKILOCYTOSIS BLD QL SMEAR: SLIGHT
POLYCHROMASIA BLD QL SMEAR: ABNORMAL
POTASSIUM SERPL-SCNC: 3.5 MMOL/L (ref 3.5–5.1)
PROT SERPL-MCNC: 7.1 G/DL (ref 6–8.4)
PROT UR QL STRIP: NEGATIVE
RBC # BLD AUTO: 3.4 M/UL (ref 4.6–6.2)
SAMPLE: NORMAL
SITE: NORMAL
SODIUM SERPL-SCNC: 127 MMOL/L (ref 136–145)
SP GR UR STRIP: 1 (ref 1–1.03)
SPECIMEN OUTDATE: NORMAL
TARGETS BLD QL SMEAR: ABNORMAL
TROPONIN I SERPL DL<=0.01 NG/ML-MCNC: <0.006 NG/ML (ref 0–0.03)
URN SPEC COLLECT METH UR: ABNORMAL
WBC # BLD AUTO: 27.57 K/UL (ref 3.9–12.7)

## 2023-10-10 PROCEDURE — 63600175 PHARM REV CODE 636 W HCPCS: Performed by: STUDENT IN AN ORGANIZED HEALTH CARE EDUCATION/TRAINING PROGRAM

## 2023-10-10 PROCEDURE — 83605 ASSAY OF LACTIC ACID: CPT

## 2023-10-10 PROCEDURE — 84100 ASSAY OF PHOSPHORUS: CPT | Performed by: EMERGENCY MEDICINE

## 2023-10-10 PROCEDURE — 63600175 PHARM REV CODE 636 W HCPCS: Performed by: EMERGENCY MEDICINE

## 2023-10-10 PROCEDURE — 83735 ASSAY OF MAGNESIUM: CPT | Performed by: EMERGENCY MEDICINE

## 2023-10-10 PROCEDURE — 93010 EKG 12-LEAD: ICD-10-PCS | Mod: ,,, | Performed by: INTERNAL MEDICINE

## 2023-10-10 PROCEDURE — 80053 COMPREHEN METABOLIC PANEL: CPT | Performed by: EMERGENCY MEDICINE

## 2023-10-10 PROCEDURE — 96366 THER/PROPH/DIAG IV INF ADDON: CPT

## 2023-10-10 PROCEDURE — 93005 ELECTROCARDIOGRAM TRACING: CPT

## 2023-10-10 PROCEDURE — 86850 RBC ANTIBODY SCREEN: CPT | Performed by: EMERGENCY MEDICINE

## 2023-10-10 PROCEDURE — 25000003 PHARM REV CODE 250: Performed by: EMERGENCY MEDICINE

## 2023-10-10 PROCEDURE — 85025 COMPLETE CBC W/AUTO DIFF WBC: CPT | Performed by: EMERGENCY MEDICINE

## 2023-10-10 PROCEDURE — 81003 URINALYSIS AUTO W/O SCOPE: CPT | Performed by: EMERGENCY MEDICINE

## 2023-10-10 PROCEDURE — 87077 CULTURE AEROBIC IDENTIFY: CPT | Performed by: EMERGENCY MEDICINE

## 2023-10-10 PROCEDURE — 87186 SC STD MICRODIL/AGAR DIL: CPT | Performed by: EMERGENCY MEDICINE

## 2023-10-10 PROCEDURE — 96367 TX/PROPH/DG ADDL SEQ IV INF: CPT

## 2023-10-10 PROCEDURE — 99900035 HC TECH TIME PER 15 MIN (STAT)

## 2023-10-10 PROCEDURE — 84484 ASSAY OF TROPONIN QUANT: CPT | Performed by: EMERGENCY MEDICINE

## 2023-10-10 PROCEDURE — 10061 I&D ABSCESS COMP/MULTIPLE: CPT

## 2023-10-10 PROCEDURE — 25500020 PHARM REV CODE 255: Performed by: EMERGENCY MEDICINE

## 2023-10-10 PROCEDURE — 12000002 HC ACUTE/MED SURGE SEMI-PRIVATE ROOM

## 2023-10-10 PROCEDURE — 82962 GLUCOSE BLOOD TEST: CPT

## 2023-10-10 PROCEDURE — 87040 BLOOD CULTURE FOR BACTERIA: CPT | Performed by: EMERGENCY MEDICINE

## 2023-10-10 PROCEDURE — 87150 DNA/RNA AMPLIFIED PROBE: CPT | Performed by: EMERGENCY MEDICINE

## 2023-10-10 PROCEDURE — 96365 THER/PROPH/DIAG IV INF INIT: CPT

## 2023-10-10 RX ORDER — KETOROLAC TROMETHAMINE 30 MG/ML
10 INJECTION, SOLUTION INTRAMUSCULAR; INTRAVENOUS
Status: COMPLETED | OUTPATIENT
Start: 2023-10-10 | End: 2023-10-10

## 2023-10-10 RX ORDER — ACETAMINOPHEN 500 MG
1000 TABLET ORAL
Status: COMPLETED | OUTPATIENT
Start: 2023-10-10 | End: 2023-10-10

## 2023-10-10 RX ORDER — LIDOCAINE HYDROCHLORIDE 10 MG/ML
5 INJECTION, SOLUTION EPIDURAL; INFILTRATION; INTRACAUDAL; PERINEURAL
Status: COMPLETED | OUTPATIENT
Start: 2023-10-10 | End: 2023-10-10

## 2023-10-10 RX ORDER — BUPRENORPHINE AND NALOXONE 8; 2 MG/1; MG/1
1 FILM, SOLUBLE BUCCAL; SUBLINGUAL DAILY
Status: DISCONTINUED | OUTPATIENT
Start: 2023-10-11 | End: 2023-10-10

## 2023-10-10 RX ORDER — BUPRENORPHINE AND NALOXONE 8; 2 MG/1; MG/1
1 FILM, SOLUBLE BUCCAL; SUBLINGUAL DAILY
Status: DISCONTINUED | OUTPATIENT
Start: 2023-10-10 | End: 2023-10-11 | Stop reason: HOSPADM

## 2023-10-10 RX ADMIN — KETOROLAC TROMETHAMINE 10 MG: 30 INJECTION INTRAMUSCULAR; INTRAVENOUS at 09:10

## 2023-10-10 RX ADMIN — IOHEXOL 75 ML: 350 INJECTION, SOLUTION INTRAVENOUS at 10:10

## 2023-10-10 RX ADMIN — BUPRENORPHINE AND NALOXONE 1 FILM: 8; 2 FILM BUCCAL; SUBLINGUAL at 10:10

## 2023-10-10 RX ADMIN — ACETAMINOPHEN 1000 MG: 500 TABLET ORAL at 09:10

## 2023-10-10 RX ADMIN — SODIUM CHLORIDE, POTASSIUM CHLORIDE, SODIUM LACTATE AND CALCIUM CHLORIDE 2382 ML: 600; 310; 30; 20 INJECTION, SOLUTION INTRAVENOUS at 07:10

## 2023-10-10 RX ADMIN — VANCOMYCIN HYDROCHLORIDE 2000 MG: 500 INJECTION, POWDER, LYOPHILIZED, FOR SOLUTION INTRAVENOUS at 07:10

## 2023-10-10 RX ADMIN — LIDOCAINE HYDROCHLORIDE 50 MG: 10 INJECTION, SOLUTION EPIDURAL; INFILTRATION; INTRACAUDAL at 06:10

## 2023-10-10 RX ADMIN — PIPERACILLIN AND TAZOBACTAM 4.5 G: 4; .5 INJECTION, POWDER, LYOPHILIZED, FOR SOLUTION INTRAVENOUS; PARENTERAL at 06:10

## 2023-10-10 NOTE — FIRST PROVIDER EVALUATION
"Medical screening examination initiated.  I have conducted a focused provider triage encounter, findings are as follows:    Brief history of present illness:  Chest pain, confusion, hematoma to chest which she can not explain.  IVDA in the last couple of days.  States he has felt bad for few days.    Vitals:    10/10/23 1650 10/10/23 1724   BP: 130/86 137/66   Pulse: 105 100   Resp: 20 16   Temp: 99.2 °F (37.3 °C) 100.2 °F (37.9 °C)   TempSrc: Oral    SpO2: 98% 97%   Weight: 79.4 kg (175 lb)    Height: 5' 10" (1.778 m)        Pertinent physical exam:  Chest hematoma, diaphoretic, ill-appearing.    Brief workup plan:  Sepsis workup, to main ED.    Preliminary workup initiated; this workup will be continued and followed by the physician or advanced practice provider that is assigned to the patient when roomed.  "

## 2023-10-10 NOTE — ED TRIAGE NOTES
"Ton Donovan, a 28 y.o. male presents to the ED w/ complaint of chest pain. Homeless, hx of IV heroin use, pt reports he last used IV heroin last night and "snored" some today right before coming to the hospital. Lumps noted on bilateral arms and right upper chest.     Triage note:  Chief Complaint   Patient presents with    Chest Pain     Chest pain x few days and left leg pain, denies cardiac history.      Review of patient's allergies indicates:  No Known Allergies  Past Medical History:   Diagnosis Date    Hypertension     Unilateral inguinal hernia, without obstruction or gangrene, not specified as recurrent       "

## 2023-10-10 NOTE — ED NOTES
CN and physician at bedside, patient alert to person and place, reports heart started hurting again, EKG with CP, patient diaphoretic, last use IVDU immed prior to admit, states he is homeless, does not know how he obtained right chest swelling, noted to possibly be increasing, physician made aware

## 2023-10-10 NOTE — ED PROVIDER NOTES
CC: Chest Pain (Chest pain x few days and left leg pain, denies cardiac history. )      History provided by:   Patient     HPI: Ton Donovan is a 28 y.o. year old male hx of IVDA and skin abscesses who presents to the ED for chest pain, right upper chest swelling  Hx of heroin IVDA, injecting in the UE b/l with multiple abscesses  Unsure how he got the selling of the chest, unsure if any trauma but he noticed the pain and swelling approx 1 week ago    Denies pmh or meds at home  Snorted heroin today      PHYSICAL EXAM:  Vitals:    10/10/23 1724   BP: 137/66   Pulse: 100   Resp: 16   Temp: 100.2 °F (37.9 °C)     VS: triage VS reviewed    general: diaphoretic, acutely sick  HENT: neck symmetric, trachea midline  Eyes: PERRL,no conjunctival injection and symmetrical eyelids  CV: RRR, no  murmurs, no rubs, no gallops, no LE edema  Resp: comfortable breathing, speaks in full sentences, CTAB, no wheezing, no crackles, no ronchi  ABD:  soft, ND, no masses, + normal BS, NT  Renal: No CVAT  Neuro: AAO x 3, 5/5 strength x 4 extremities, sensation intact, face symmetric, speech normal  MSK: NC/AT, extremities w/out deformity   Skin: warm, dry  UE: left forearm 5 cm abscess with fluctuance, right medial elbow 3 cm skin induration and fluctuance. Small other areas of skin induration no fluctuance no ttp    Right upper chest large area of swelling with ttp no fluctuance no cellulitic changes    MDM:  Ton Donovan is a 28 y.o. year old male who presents to the ED for right upper chest swelling and pain, also abscess to left forearm and right medial surface antecubital fossa        Right upper chest abscess vs hematoma vs mass    DDX includes but not limited to: abscess vs hematoma vs mass    Labs ordered and reviewed:   Cbc wbc 27, Hg 8.4, plt 489  Cmp na 127  Ua  Trop wnl  Mg wnl  Phos wnl  Ua  Blood cultures  Lactate wnl    Medication given in the ED: ivf, vanc, zosyn, tylenol, toradol, suboxone    CXR (ordered and  reviewed): No acute process.   Interval removal of the previous right-sided IJ catheter.     CT chest with contrast pending  Imagings independently visualized: n/a    Bedside Ultrasound (see report under imaging tab in Epic): Y    EKG (independantly reviewed): , nsr, no acute ischemia    Old records obtained and reviewed: yes    Case discussed with the consultant: n/a    Left forearm medial surface proximal 3rd 5 cm diameter abscess I and D   Right antecubital fossas medial surface 3 cm diameter abscess I and D       Right upper chest large mass fluid collection on ultrasound concern for abscess versus hematoma, close proximity to the subclavian vessels, looks to be intramuscular, will obtain CT chest with contrast for better visualization    I & D - Incision and Drainage    Date/Time: 10/10/2023 8:25 PM  Location procedure was performed: [facility] EMERGENCY DEPARTMENT    Performed by: Eloisa Mills MD  Authorized by: Eloisa Mills MD  Consent Done: Yes  Consent: Verbal consent obtained.  Consent given by: patient  Patient understanding: patient states understanding of the procedure being performed  Patient consent: the patient's understanding of the procedure matches consent given  Site marked: the operative site was not marked  Imaging studies: imaging studies not available  Patient identity confirmed: verbally with patient  Type: abscess  Body area: upper extremity (left forearm)  Anesthesia method: n/a.    Anesthesia:  Anesthetic total: 0 mL    Patient sedated: no  Description of findings: 16 gauge needle aspiration   Drainage: pus  Drainage amount: copious  Wound treatment: wound left open  Complications: No  Specimens: No  Implants: No  Patient tolerance: Patient tolerated the procedure well with no immediate complications      I & D - Incision and Drainage    Date/Time: 10/10/2023 8:27 PM  Location procedure was performed: [facility] EMERGENCY DEPARTMENT    Performed by: Eolisa Mills  MD  Authorized by: Eloisa Mills MD  Consent Done: Yes  Consent: Verbal consent obtained.  Consent given by: patient  Patient identity confirmed: verbally with patient  Type: abscess  Body area: upper extremity (medial surface right antecubital fossa)  Anesthesia: local infiltration    Anesthesia:  Local Anesthetic: lidocaine 1% without epinephrine    Patient sedated: no  Scalpel size: 11  Incision type: single straight  Complexity: simple  Drainage: pus  Drainage amount: copious  Wound treatment: wound left open  Complications: No  Estimated blood loss (mL): 0  Specimens: No  Implants: No  Patient tolerance: Patient tolerated the procedure well with no immediate complications          9:05 PM  Patient reports that he does not want morphine or Dilaudid, he does not want any opiate medication he will like to stop using drugs, he would like to start Suboxone.    Patient was signed-out to Dr. Hair at the change of shift with plan for: CT chest pending, likely admit        IMPRESSION:  1.) right upper chest abscess, sepsis  2. Left forearm abscess, right medial ant cubital fossa abscess  3. IVDA, opioid withdrawal  4. hypoNa      Dispo: admit    Aggregate Critical Care Time by Attending (exclusive of procedural time) = 30 minutes         During the Emergency Depment visit, there was a high probability of imminent or life threatening deterioration in the patient's condition necessitating medical decision  making of a high complexity. Organ systems that were compromised/potentially compromised            sepsis    Pt required constant monitoring because of the potential for them to deteriorate at any moment. Critical care was time spent personally by me on the following activities:  Development of treatment plan with patient or surrogate; discussion with consultant, evaluation of patient's response to treatment, examination of patient, obtaining history from patient or surrogate, ordering and performing  treatments and interventions, ordering and reviewing of laboratory studies, ordering and review of radiographic studies, vitals, re-evaluation of patient' condition and review of old charts            The failure to initiate the interventions performed in the Emergency Department on an urgent basis would have likely resulted in sudden, clinically significant or life threatening deterioration in the patient's condition.                                Past Medical History:   Diagnosis Date    Hypertension     Unilateral inguinal hernia, without obstruction or gangrene, not specified as recurrent      Past Surgical History:   Procedure Laterality Date    HAND ARTHROTOMY Right 7/7/2023    Procedure: ARTHROTOMY, HAND - R LF PIP;  Surgeon: Boy Lamb MD;  Location: [address];  Service: Orthopedics;  Laterality: Right;    IRRIGATION AND DEBRIDEMENT OF UPPER EXTREMITY Bilateral 7/7/2023    Procedure: IRRIGATION AND DEBRIDEMENT, UPPER EXTREMITY - RIGHT HAND;  Surgeon: Boy Lamb MD;  Location: [address];  Service: Orthopedics;  Laterality: Bilateral;    OLECRANON BURSECTOMY Left 7/7/2023    Procedure: BURSECTOMY, OLECRANON;  Surgeon: Boy Lamb MD;  Location: [address];  Service: Orthopedics;  Laterality: Left;     No family history on file.  No current facility-administered medications on file prior to encounter.     No current outpatient medications on file prior to encounter.     Patient has no known allergies.  Social History     Socioeconomic History    Marital status: Single   Tobacco Use    Smoking status: Former     Types: Cigarettes    Smokeless tobacco: Never   Substance and Sexual Activity    Alcohol use: No    Drug use: Yes     Types: Marijuana     Social Determinants of Health     Financial Resource Strain: Low Risk  (7/7/2023)    Overall Financial Resource Strain (CARDIA)     Difficulty of Paying Living Expenses: Not hard at all   Food Insecurity: No Food Insecurity  (7/7/2023)    Hunger Vital Sign     Worried About Running Out of Food in the Last Year: Never true     Ran Out of Food in the Last Year: Never true   Transportation Needs: No Transportation Needs (7/7/2023)    PRAPARE - Transportation     Lack of Transportation (Medical): No     Lack of Transportation (Non-Medical): No   Physical Activity: Sufficiently Active (7/7/2023)    Exercise Vital Sign     Days of Exercise per Week: 3 days     Minutes of Exercise per Session: 60 min   Stress: No Stress Concern Present (7/7/2023)    [employer] [place] [employer] - Occupational Stress Questionnaire     Feeling of Stress : Not at all   Social Connections: Socially Isolated (7/7/2023)    Social Connection and Isolation Panel [NHANES]     Frequency of Communication with Friends and Family: Twice a week     Frequency of Social Gatherings with Friends and Family: More than three times a week     Attends Restorationist Services: Never     Active Member of Clubs or Organizations: No     Attends Club or Organization Meetings: Never     Marital Status: Never    Housing Stability: Low Risk  (7/7/2023)    Housing Stability Vital Sign     Unable to Pay for Housing in the Last Year: No     Number of Places Lived in the Last Year: 1     Unstable Housing in the Last Year: No                 DATA & INTERVENTIONS:    LABS reviewed:  Labs Reviewed   CULTURE, BLOOD   CULTURE, BLOOD   CBC W/ AUTO DIFFERENTIAL   COMPREHENSIVE METABOLIC PANEL   URINALYSIS, REFLEX TO URINE CULTURE   TROPONIN I   MAGNESIUM   PHOSPHORUS   TYPE & SCREEN       RADIOLOGY reviewed:  Imaging Results    None         MEDICATIONS/FLUIDS:  Medications   lactated ringers bolus 2,382 mL (has no administration in time range)   piperacillin-tazobactam (ZOSYN) 4.5 g in dextrose 5 % in water (D5W) 100 mL IVPB (MB+) (has no administration in time range)   vancomycin 2 g in dextrose 5 % 500 mL IVPB (has no administration in time range)              Eloisa Mills,  MD  10/11/23 0796

## 2023-10-11 ENCOUNTER — HOSPITAL ENCOUNTER (INPATIENT)
Facility: HOSPITAL | Age: 28
LOS: 1 days | Discharge: HOME | DRG: 178 | End: 2023-10-12
Attending: EMERGENCY MEDICINE | Admitting: STUDENT IN AN ORGANIZED HEALTH CARE EDUCATION/TRAINING PROGRAM
Payer: MEDICAID

## 2023-10-11 VITALS
HEART RATE: 79 BPM | WEIGHT: 175 LBS | DIASTOLIC BLOOD PRESSURE: 83 MMHG | BODY MASS INDEX: 25.05 KG/M2 | SYSTOLIC BLOOD PRESSURE: 144 MMHG | TEMPERATURE: 98 F | OXYGEN SATURATION: 95 % | RESPIRATION RATE: 18 BRPM | HEIGHT: 70 IN

## 2023-10-11 DIAGNOSIS — J86.9 ABSCESS OF CHEST: ICD-10-CM

## 2023-10-11 DIAGNOSIS — R07.9 CHEST PAIN: ICD-10-CM

## 2023-10-11 DIAGNOSIS — B95.62 MRSA BACTEREMIA: Primary | ICD-10-CM

## 2023-10-11 DIAGNOSIS — L02.213 CHEST WALL ABSCESS: ICD-10-CM

## 2023-10-11 DIAGNOSIS — L02.414 ABSCESS OF LEFT FOREARM: ICD-10-CM

## 2023-10-11 DIAGNOSIS — R78.81 MRSA BACTEREMIA: Primary | ICD-10-CM

## 2023-10-11 PROBLEM — B99.9 ANEMIA OF INFECTION AND CHRONIC DISEASE: Status: ACTIVE | Noted: 2023-10-11

## 2023-10-11 PROBLEM — E87.1 HYPONATREMIA: Status: ACTIVE | Noted: 2023-10-11

## 2023-10-11 PROBLEM — D63.8 ANEMIA OF INFECTION AND CHRONIC DISEASE: Status: ACTIVE | Noted: 2023-10-11

## 2023-10-11 PROBLEM — D50.9 IRON DEFICIENCY ANEMIA: Status: ACTIVE | Noted: 2023-10-11

## 2023-10-11 PROBLEM — F11.93 OPIOID WITHDRAWAL: Status: ACTIVE | Noted: 2023-10-11

## 2023-10-11 LAB
ALBUMIN SERPL BCP-MCNC: 1.8 G/DL (ref 3.5–5.2)
ALBUMIN SERPL BCP-MCNC: 2.1 G/DL (ref 3.5–5.2)
ALP SERPL-CCNC: 124 U/L (ref 55–135)
ALP SERPL-CCNC: 130 U/L (ref 55–135)
ALT SERPL W/O P-5'-P-CCNC: 12 U/L (ref 10–44)
ALT SERPL W/O P-5'-P-CCNC: 15 U/L (ref 10–44)
ANION GAP SERPL CALC-SCNC: 10 MMOL/L (ref 8–16)
ANION GAP SERPL CALC-SCNC: 12 MMOL/L (ref 8–16)
ANISOCYTOSIS BLD QL SMEAR: SLIGHT
ANISOCYTOSIS BLD QL SMEAR: SLIGHT
AST SERPL-CCNC: 35 U/L (ref 10–40)
AST SERPL-CCNC: 40 U/L (ref 10–40)
BASOPHILS # BLD AUTO: 0.07 K/UL (ref 0–0.2)
BASOPHILS # BLD AUTO: 0.1 K/UL (ref 0–0.2)
BASOPHILS NFR BLD: 0.2 % (ref 0–1.9)
BASOPHILS NFR BLD: 0.3 % (ref 0–1.9)
BILIRUB SERPL-MCNC: 0.3 MG/DL (ref 0.1–1)
BILIRUB SERPL-MCNC: 0.5 MG/DL (ref 0.1–1)
BUN SERPL-MCNC: 10 MG/DL (ref 6–20)
BUN SERPL-MCNC: 9 MG/DL (ref 6–20)
BURR CELLS BLD QL SMEAR: ABNORMAL
BURR CELLS BLD QL SMEAR: ABNORMAL
CALCIUM SERPL-MCNC: 8.4 MG/DL (ref 8.7–10.5)
CALCIUM SERPL-MCNC: 8.7 MG/DL (ref 8.7–10.5)
CHLORIDE SERPL-SCNC: 96 MMOL/L (ref 95–110)
CHLORIDE SERPL-SCNC: 97 MMOL/L (ref 95–110)
CO2 SERPL-SCNC: 21 MMOL/L (ref 23–29)
CO2 SERPL-SCNC: 21 MMOL/L (ref 23–29)
CREAT SERPL-MCNC: 0.6 MG/DL (ref 0.5–1.4)
CREAT SERPL-MCNC: 0.7 MG/DL (ref 0.5–1.4)
DACRYOCYTES BLD QL SMEAR: ABNORMAL
DIFFERENTIAL METHOD: ABNORMAL
DIFFERENTIAL METHOD: ABNORMAL
DOHLE BOD BLD QL SMEAR: PRESENT
DOHLE BOD BLD QL SMEAR: PRESENT
EOSINOPHIL # BLD AUTO: 0 K/UL (ref 0–0.5)
EOSINOPHIL # BLD AUTO: 0.1 K/UL (ref 0–0.5)
EOSINOPHIL NFR BLD: 0.1 % (ref 0–8)
EOSINOPHIL NFR BLD: 0.2 % (ref 0–8)
ERYTHROCYTE [DISTWIDTH] IN BLOOD BY AUTOMATED COUNT: 16.1 % (ref 11.5–14.5)
ERYTHROCYTE [DISTWIDTH] IN BLOOD BY AUTOMATED COUNT: 16.5 % (ref 11.5–14.5)
EST. GFR  (NO RACE VARIABLE): >60 ML/MIN/1.73 M^2
EST. GFR  (NO RACE VARIABLE): >60 ML/MIN/1.73 M^2
GLUCOSE SERPL-MCNC: 117 MG/DL (ref 70–110)
GLUCOSE SERPL-MCNC: 96 MG/DL (ref 70–110)
HCT VFR BLD AUTO: 23.9 % (ref 40–54)
HCT VFR BLD AUTO: 24.8 % (ref 40–54)
HGB BLD-MCNC: 8.1 G/DL (ref 14–18)
HGB BLD-MCNC: 8.4 G/DL (ref 14–18)
HYPOCHROMIA BLD QL SMEAR: ABNORMAL
HYPOCHROMIA BLD QL SMEAR: ABNORMAL
IMM GRANULOCYTES # BLD AUTO: 0.74 K/UL (ref 0–0.04)
IMM GRANULOCYTES # BLD AUTO: 1.22 K/UL (ref 0–0.04)
IMM GRANULOCYTES NFR BLD AUTO: 2.5 % (ref 0–0.5)
IMM GRANULOCYTES NFR BLD AUTO: 4.1 % (ref 0–0.5)
IRON SERPL-MCNC: 20 UG/DL (ref 45–160)
LYMPHOCYTES # BLD AUTO: 1.8 K/UL (ref 1–4.8)
LYMPHOCYTES # BLD AUTO: 2.5 K/UL (ref 1–4.8)
LYMPHOCYTES NFR BLD: 6.2 % (ref 18–48)
LYMPHOCYTES NFR BLD: 8.4 % (ref 18–48)
MAGNESIUM SERPL-MCNC: 1.7 MG/DL (ref 1.6–2.6)
MCH RBC QN AUTO: 24.6 PG (ref 27–31)
MCH RBC QN AUTO: 25.1 PG (ref 27–31)
MCHC RBC AUTO-ENTMCNC: 33.9 G/DL (ref 32–36)
MCHC RBC AUTO-ENTMCNC: 33.9 G/DL (ref 32–36)
MCV RBC AUTO: 73 FL (ref 82–98)
MCV RBC AUTO: 74 FL (ref 82–98)
MONOCYTES # BLD AUTO: 2.7 K/UL (ref 0.3–1)
MONOCYTES # BLD AUTO: 2.7 K/UL (ref 0.3–1)
MONOCYTES NFR BLD: 8.9 % (ref 4–15)
MONOCYTES NFR BLD: 9.3 % (ref 4–15)
MRSA ID BY PCR: POSITIVE
NEUTROPHILS # BLD AUTO: 23.5 K/UL (ref 1.8–7.7)
NEUTROPHILS # BLD AUTO: 23.9 K/UL (ref 1.8–7.7)
NEUTROPHILS NFR BLD: 78.1 % (ref 38–73)
NEUTROPHILS NFR BLD: 81.7 % (ref 38–73)
NRBC BLD-RTO: 0 /100 WBC
NRBC BLD-RTO: 0 /100 WBC
OSMOLALITY SERPL: 279 MOSM/KG (ref 280–300)
OSMOLALITY UR: 66 MOSM/KG (ref 50–1200)
OVALOCYTES BLD QL SMEAR: ABNORMAL
OVALOCYTES BLD QL SMEAR: ABNORMAL
PHOSPHATE SERPL-MCNC: 3 MG/DL (ref 2.7–4.5)
PLATELET # BLD AUTO: 446 K/UL (ref 150–450)
PLATELET # BLD AUTO: 498 K/UL (ref 150–450)
PLATELET BLD QL SMEAR: ABNORMAL
PLATELET BLD QL SMEAR: ABNORMAL
PMV BLD AUTO: 9.2 FL (ref 9.2–12.9)
PMV BLD AUTO: 9.6 FL (ref 9.2–12.9)
POCT GLUCOSE: 136 MG/DL (ref 70–110)
POIKILOCYTOSIS BLD QL SMEAR: SLIGHT
POIKILOCYTOSIS BLD QL SMEAR: SLIGHT
POLYCHROMASIA BLD QL SMEAR: ABNORMAL
POTASSIUM SERPL-SCNC: 3.8 MMOL/L (ref 3.5–5.1)
POTASSIUM SERPL-SCNC: 3.8 MMOL/L (ref 3.5–5.1)
PROT SERPL-MCNC: 6.2 G/DL (ref 6–8.4)
PROT SERPL-MCNC: 7 G/DL (ref 6–8.4)
RBC # BLD AUTO: 3.29 M/UL (ref 4.6–6.2)
RBC # BLD AUTO: 3.34 M/UL (ref 4.6–6.2)
SATURATED IRON: 11 % (ref 20–50)
SCHISTOCYTES BLD QL SMEAR: ABNORMAL
SCHISTOCYTES BLD QL SMEAR: ABNORMAL
SCHISTOCYTES BLD QL SMEAR: PRESENT
SMUDGE CELLS BLD QL SMEAR: PRESENT
SODIUM SERPL-SCNC: 128 MMOL/L (ref 136–145)
SODIUM SERPL-SCNC: 129 MMOL/L (ref 136–145)
SODIUM UR-SCNC: <10 MMOL/L (ref 20–250)
STAPH AUREUS ID BY PCR: POSITIVE
TARGETS BLD QL SMEAR: ABNORMAL
TARGETS BLD QL SMEAR: ABNORMAL
TOTAL IRON BINDING CAPACITY: 175 UG/DL (ref 250–450)
TOXIC GRANULES BLD QL SMEAR: PRESENT
TRANSFERRIN SERPL-MCNC: 118 MG/DL (ref 200–375)
WBC # BLD AUTO: 29.25 K/UL (ref 3.9–12.7)
WBC # BLD AUTO: 30.03 K/UL (ref 3.9–12.7)
WBC TOXIC VACUOLES BLD QL SMEAR: PRESENT
WBC TOXIC VACUOLES BLD QL SMEAR: PRESENT

## 2023-10-11 PROCEDURE — 25000003 PHARM REV CODE 250: Performed by: STUDENT IN AN ORGANIZED HEALTH CARE EDUCATION/TRAINING PROGRAM

## 2023-10-11 PROCEDURE — 25000003 PHARM REV CODE 250: Performed by: NURSE PRACTITIONER

## 2023-10-11 PROCEDURE — 63600175 PHARM REV CODE 636 W HCPCS: Performed by: STUDENT IN AN ORGANIZED HEALTH CARE EDUCATION/TRAINING PROGRAM

## 2023-10-11 PROCEDURE — 84300 ASSAY OF URINE SODIUM: CPT

## 2023-10-11 PROCEDURE — 83935 ASSAY OF URINE OSMOLALITY: CPT

## 2023-10-11 PROCEDURE — 99285 EMERGENCY DEPT VISIT HI MDM: CPT

## 2023-10-11 PROCEDURE — 84466 ASSAY OF TRANSFERRIN: CPT

## 2023-10-11 PROCEDURE — 63600175 PHARM REV CODE 636 W HCPCS

## 2023-10-11 PROCEDURE — 83540 ASSAY OF IRON: CPT

## 2023-10-11 PROCEDURE — 83735 ASSAY OF MAGNESIUM: CPT

## 2023-10-11 PROCEDURE — 96366 THER/PROPH/DIAG IV INF ADDON: CPT

## 2023-10-11 PROCEDURE — 80053 COMPREHEN METABOLIC PANEL: CPT

## 2023-10-11 PROCEDURE — 25000003 PHARM REV CODE 250

## 2023-10-11 PROCEDURE — 63600175 PHARM REV CODE 636 W HCPCS: Performed by: NURSE PRACTITIONER

## 2023-10-11 PROCEDURE — 96367 TX/PROPH/DG ADDL SEQ IV INF: CPT

## 2023-10-11 PROCEDURE — 84100 ASSAY OF PHOSPHORUS: CPT

## 2023-10-11 PROCEDURE — 99285 EMERGENCY DEPT VISIT HI MDM: CPT | Mod: 25,27

## 2023-10-11 PROCEDURE — 96375 TX/PRO/DX INJ NEW DRUG ADDON: CPT

## 2023-10-11 PROCEDURE — 83930 ASSAY OF BLOOD OSMOLALITY: CPT

## 2023-10-11 PROCEDURE — 12000002 HC ACUTE/MED SURGE SEMI-PRIVATE ROOM

## 2023-10-11 PROCEDURE — 80053 COMPREHEN METABOLIC PANEL: CPT | Mod: 91 | Performed by: NURSE PRACTITIONER

## 2023-10-11 PROCEDURE — 85025 COMPLETE CBC W/AUTO DIFF WBC: CPT

## 2023-10-11 PROCEDURE — 85025 COMPLETE CBC W/AUTO DIFF WBC: CPT | Mod: 91 | Performed by: NURSE PRACTITIONER

## 2023-10-11 RX ORDER — HYDROXYZINE PAMOATE 25 MG/1
25 CAPSULE ORAL EVERY 8 HOURS PRN
Status: DISCONTINUED | OUTPATIENT
Start: 2023-10-11 | End: 2023-10-11 | Stop reason: HOSPADM

## 2023-10-11 RX ORDER — LOPERAMIDE HYDROCHLORIDE 2 MG/1
2 CAPSULE ORAL ONCE AS NEEDED
Status: DISCONTINUED | OUTPATIENT
Start: 2023-10-11 | End: 2023-10-11 | Stop reason: HOSPADM

## 2023-10-11 RX ORDER — NALOXONE HCL 0.4 MG/ML
0.02 VIAL (ML) INJECTION
Status: DISCONTINUED | OUTPATIENT
Start: 2023-10-11 | End: 2023-10-11 | Stop reason: HOSPADM

## 2023-10-11 RX ORDER — TALC
6 POWDER (GRAM) TOPICAL NIGHTLY PRN
Status: DISCONTINUED | OUTPATIENT
Start: 2023-10-11 | End: 2023-10-11 | Stop reason: HOSPADM

## 2023-10-11 RX ORDER — GLUCAGON 1 MG
1 KIT INJECTION
Status: DISCONTINUED | OUTPATIENT
Start: 2023-10-11 | End: 2023-10-12 | Stop reason: HOSPADM

## 2023-10-11 RX ORDER — TALC
6 POWDER (GRAM) TOPICAL NIGHTLY PRN
Status: DISCONTINUED | OUTPATIENT
Start: 2023-10-11 | End: 2023-10-12 | Stop reason: HOSPADM

## 2023-10-11 RX ORDER — IBUPROFEN 200 MG
16 TABLET ORAL
Status: DISCONTINUED | OUTPATIENT
Start: 2023-10-11 | End: 2023-10-11 | Stop reason: HOSPADM

## 2023-10-11 RX ORDER — ONDANSETRON 2 MG/ML
4 INJECTION INTRAMUSCULAR; INTRAVENOUS EVERY 8 HOURS PRN
Status: DISCONTINUED | OUTPATIENT
Start: 2023-10-11 | End: 2023-10-11 | Stop reason: HOSPADM

## 2023-10-11 RX ORDER — SODIUM CHLORIDE 0.9 % (FLUSH) 0.9 %
10 SYRINGE (ML) INJECTION EVERY 12 HOURS PRN
Status: DISCONTINUED | OUTPATIENT
Start: 2023-10-11 | End: 2023-10-12 | Stop reason: HOSPADM

## 2023-10-11 RX ORDER — GLUCAGON 1 MG
1 KIT INJECTION
Status: DISCONTINUED | OUTPATIENT
Start: 2023-10-11 | End: 2023-10-11 | Stop reason: HOSPADM

## 2023-10-11 RX ORDER — IBUPROFEN 200 MG
24 TABLET ORAL
Status: DISCONTINUED | OUTPATIENT
Start: 2023-10-11 | End: 2023-10-12 | Stop reason: HOSPADM

## 2023-10-11 RX ORDER — NALOXONE HCL 0.4 MG/ML
0.02 VIAL (ML) INJECTION
Status: DISCONTINUED | OUTPATIENT
Start: 2023-10-11 | End: 2023-10-12 | Stop reason: HOSPADM

## 2023-10-11 RX ORDER — OXYCODONE AND ACETAMINOPHEN 5; 325 MG/1; MG/1
1 TABLET ORAL EVERY 8 HOURS PRN
Status: DISCONTINUED | OUTPATIENT
Start: 2023-10-11 | End: 2023-10-12 | Stop reason: HOSPADM

## 2023-10-11 RX ORDER — IBUPROFEN 200 MG
16 TABLET ORAL
Status: DISCONTINUED | OUTPATIENT
Start: 2023-10-11 | End: 2023-10-12 | Stop reason: HOSPADM

## 2023-10-11 RX ORDER — MUPIROCIN 20 MG/G
OINTMENT TOPICAL 2 TIMES DAILY
Status: DISCONTINUED | OUTPATIENT
Start: 2023-10-11 | End: 2023-10-11 | Stop reason: HOSPADM

## 2023-10-11 RX ORDER — ACETAMINOPHEN 500 MG
1000 TABLET ORAL
Status: COMPLETED | OUTPATIENT
Start: 2023-10-11 | End: 2023-10-11

## 2023-10-11 RX ORDER — ACETAMINOPHEN 325 MG/1
650 TABLET ORAL EVERY 4 HOURS PRN
Status: DISCONTINUED | OUTPATIENT
Start: 2023-10-11 | End: 2023-10-12 | Stop reason: HOSPADM

## 2023-10-11 RX ORDER — SODIUM CHLORIDE 0.9 % (FLUSH) 0.9 %
10 SYRINGE (ML) INJECTION EVERY 12 HOURS PRN
Status: DISCONTINUED | OUTPATIENT
Start: 2023-10-11 | End: 2023-10-11 | Stop reason: HOSPADM

## 2023-10-11 RX ORDER — HYDROMORPHONE HYDROCHLORIDE 1 MG/ML
1 INJECTION, SOLUTION INTRAMUSCULAR; INTRAVENOUS; SUBCUTANEOUS
Status: COMPLETED | OUTPATIENT
Start: 2023-10-11 | End: 2023-10-11

## 2023-10-11 RX ORDER — IBUPROFEN 200 MG
24 TABLET ORAL
Status: DISCONTINUED | OUTPATIENT
Start: 2023-10-11 | End: 2023-10-11 | Stop reason: HOSPADM

## 2023-10-11 RX ORDER — CLONIDINE HYDROCHLORIDE 0.1 MG/1
0.1 TABLET ORAL EVERY 8 HOURS PRN
Status: DISCONTINUED | OUTPATIENT
Start: 2023-10-11 | End: 2023-10-11 | Stop reason: HOSPADM

## 2023-10-11 RX ORDER — DICYCLOMINE HYDROCHLORIDE 10 MG/1
10 CAPSULE ORAL EVERY 6 HOURS PRN
Status: DISCONTINUED | OUTPATIENT
Start: 2023-10-11 | End: 2023-10-11 | Stop reason: HOSPADM

## 2023-10-11 RX ORDER — ACETAMINOPHEN 325 MG/1
650 TABLET ORAL EVERY 4 HOURS PRN
Status: DISCONTINUED | OUTPATIENT
Start: 2023-10-11 | End: 2023-10-11 | Stop reason: HOSPADM

## 2023-10-11 RX ORDER — SODIUM CHLORIDE 9 MG/ML
INJECTION, SOLUTION INTRAVENOUS CONTINUOUS
Status: ACTIVE | OUTPATIENT
Start: 2023-10-11 | End: 2023-10-12

## 2023-10-11 RX ORDER — VANCOMYCIN HCL IN 5 % DEXTROSE 1.25 G/25
15 PLASTIC BAG, INJECTION (ML) INTRAVENOUS
Status: DISCONTINUED | OUTPATIENT
Start: 2023-10-11 | End: 2023-10-11 | Stop reason: CLARIF

## 2023-10-11 RX ADMIN — HYDROMORPHONE HYDROCHLORIDE 1 MG: 1 INJECTION, SOLUTION INTRAMUSCULAR; INTRAVENOUS; SUBCUTANEOUS at 08:10

## 2023-10-11 RX ADMIN — VANCOMYCIN HYDROCHLORIDE 1250 MG: 1.25 INJECTION, POWDER, LYOPHILIZED, FOR SOLUTION INTRAVENOUS at 09:10

## 2023-10-11 RX ADMIN — PIPERACILLIN AND TAZOBACTAM 4.5 G: 4; .5 INJECTION, POWDER, LYOPHILIZED, FOR SOLUTION INTRAVENOUS; PARENTERAL at 03:10

## 2023-10-11 RX ADMIN — PIPERACILLIN SODIUM AND TAZOBACTAM SODIUM 4.5 G: 4; .5 INJECTION, POWDER, FOR SOLUTION INTRAVENOUS at 08:10

## 2023-10-11 RX ADMIN — MUPIROCIN: 20 OINTMENT TOPICAL at 08:10

## 2023-10-11 RX ADMIN — SODIUM CHLORIDE: 9 INJECTION, SOLUTION INTRAVENOUS at 11:10

## 2023-10-11 RX ADMIN — ACETAMINOPHEN 1000 MG: 500 TABLET ORAL at 09:10

## 2023-10-11 RX ADMIN — VANCOMYCIN HYDROCHLORIDE 1250 MG: 1.25 INJECTION, POWDER, LYOPHILIZED, FOR SOLUTION INTRAVENOUS at 08:10

## 2023-10-11 RX ADMIN — HYDROXYZINE PAMOATE 25 MG: 25 CAPSULE ORAL at 08:10

## 2023-10-11 NOTE — HPI
Ton Donovan is a 28 year old man with history of heroin use and IVDU who presents to the ED due to right upper chest swelling and pain. Patient reports this swelling started about 1 week ago and has gotten larger and more painful. He reports recent IV injection of heroin and he says he snorted heroin shortly before presenting to the ED on this afternoon of 10/10. He says he was told to present to the ED by his friends who were concerned for him. Pt was recently hospitalized from 7/6 to 7/8 due to infected wounds, abscesses, and septic arthritis in his Right hand for which he underwent I&D and received Right IJ catheter for IV antibiotics; however, pt had repeated attempts to elope with RIJ catheter, and patient wished to leave AMA so the line was removed prior to discharge and pt was given PO antibiotics. He reports resolution of his hand wounds. He denies any fevers, shortness of breath, n/v/d, or abdominal pain.    In the ED, patient tachycardic HR 90-100s, with T up to 100.2; otherwise hemodynamically stable and on room air. Labs notable for leukocytosis WBC 27.57, Hgb 8.4, MCV 72, Na 127, Cl 93, albumin 2.2, and . U/S of upper body showed multiple abscesses (left forearm and right medial antecubital fossa), right upper chest fluid collection likely abscess. CT chest showed abscess in the Right pectoralis major muscle as well as findings concerning for osteomyelitis vs septic arthritis of 1st rib and 1st costochondral joint. Pt given doses of vanc and zosyn. Gen surg consulted in the ED, will plan for I&D in OR on 10/11, and discuss further with thoracic surgery regarding rib findings. Patient admitted to hospital medicine for further management of abscess and possible OM vs septic arthritis.

## 2023-10-11 NOTE — ED NOTES
Pt reminded about the need of a urine sample. Urinal provided to pt @ bedside, pt verbalized understanding.

## 2023-10-11 NOTE — SUBJECTIVE & OBJECTIVE
Past Medical History:   Diagnosis Date    Hypertension     Unilateral inguinal hernia, without obstruction or gangrene, not specified as recurrent        Past Surgical History:   Procedure Laterality Date    HAND ARTHROTOMY Right 7/7/2023    Procedure: ARTHROTOMY, HAND - R LF PIP;  Surgeon: Boy Lamb MD;  Location: [address];  Service: Orthopedics;  Laterality: Right;    IRRIGATION AND DEBRIDEMENT OF UPPER EXTREMITY Bilateral 7/7/2023    Procedure: IRRIGATION AND DEBRIDEMENT, UPPER EXTREMITY - RIGHT HAND;  Surgeon: Boy Lamb MD;  Location: [address];  Service: Orthopedics;  Laterality: Bilateral;    OLECRANON BURSECTOMY Left 7/7/2023    Procedure: BURSECTOMY, OLECRANON;  Surgeon: Boy Lamb MD;  Location: [address];  Service: Orthopedics;  Laterality: Left;       Review of patient's allergies indicates:  No Known Allergies    No current facility-administered medications on file prior to encounter.     No current outpatient medications on file prior to encounter.     Family History    None       Tobacco Use    Smoking status: Former     Types: Cigarettes    Smokeless tobacco: Never   Substance and Sexual Activity    Alcohol use: No    Drug use: Yes     Types: Marijuana, IV     Comment: 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    Sexual activity: Not on file     Review of Systems   Constitutional:  Negative for chills and fever.   HENT:  Negative for congestion and sore throat.    Eyes:  Negative for photophobia and visual disturbance.   Respiratory:  Negative for cough and shortness of breath.    Cardiovascular:  Positive for chest pain. Negative for palpitations.   Gastrointestinal:  Negative for abdominal pain.   Genitourinary:  Negative for dysuria and hematuria.   Musculoskeletal:  Positive for myalgias. Negative for arthralgias and back pain.   Skin:  Negative for rash and wound.   Neurological:  Negative for dizziness and syncope.   Psychiatric/Behavioral:  Positive for  decreased concentration. Negative for agitation and behavioral problems.      Objective:     Vital Signs (Most Recent):  Temp: 99.1 °F (37.3 °C) (10/10/23 2043)  Pulse: 90 (10/11/23 0010)  Resp: 16 (10/10/23 2357)  BP: (!) 150/80 (10/11/23 0005)  SpO2: 100 % (10/11/23 0010) Vital Signs (24h Range):  Temp:  [99.1 °F (37.3 °C)-100.2 °F (37.9 °C)] 99.1 °F (37.3 °C)  Pulse:  [] 90  Resp:  [14-20] 16  SpO2:  [95 %-100 %] 100 %  BP: (119-184)/(58-91) 150/80     Weight: 79.4 kg (175 lb)  Body mass index is 25.11 kg/m².     Physical Exam  Vitals reviewed.   Constitutional:       General: He is not in acute distress.     Appearance: Normal appearance.   HENT:      Head: Normocephalic and atraumatic.      Nose: No congestion or rhinorrhea.      Mouth/Throat:      Mouth: Mucous membranes are dry.      Pharynx: Oropharynx is clear.   Cardiovascular:      Rate and Rhythm: Regular rhythm. Tachycardia present.      Pulses: Normal pulses.      Heart sounds: Normal heart sounds.   Pulmonary:      Effort: Pulmonary effort is normal. No respiratory distress.      Breath sounds: Normal breath sounds.   Abdominal:      General: Bowel sounds are normal.      Palpations: Abdomen is soft.      Tenderness: There is no abdominal tenderness.   Musculoskeletal:         General: Swelling and tenderness present.      Cervical back: Full passive range of motion without pain and normal range of motion.      Comments: Moderate size protuberance on the right upper chest, inferior to the clavicle  Warm to touch and tender to palpation  Some tenderness to palpation of left calf   Skin:     General: Skin is warm and dry.   Neurological:      General: No focal deficit present.      Mental Status: He is alert and oriented to person, place, and time.   Psychiatric:         Mood and Affect: Mood normal.         Behavior: Behavior normal.                Significant Labs: All pertinent labs within the past 24 hours have been reviewed.    Significant  Imaging: I have reviewed all pertinent imaging results/findings within the past 24 hours.

## 2023-10-11 NOTE — CONSULTS
[facility] - Emergency Dept  General Surgery  Consult Note    Inpatient consult to General Surgery  Consult performed by: Vinnie Savage MD  Consult ordered by: Salinas Hair MD        Subjective:     Chief Complaint/Reason for Admission:   Chest wall abscess     History of Present Illness:   28 y.o. male with a  PMHx of IVDU and HTN who presents to the ED with complaints of multiple wounds and chest pain. Patient has been admitted multiple times for superficial abscess related to IVDU. Last hospital stay was on 07/23 for septic arthritis.     Patient was seen in the ED today, stable at arrival Labs Na 128, white count of 27, H/H 8.4/24.6. CT with Fluid collection within the right pectoralis major muscle measuring 6.7 x 12.5 cm, likely an abscess.  Presumed extension of this fluid collection into the pleural space of the anterior right lung apex.  Possible osteomyelitis versus septic arthritis of the anterior aspect of the right 1st rib and 1st costochondral joint.    General surgery was consulted for evaluation. Currently at the ED withdrawing and asking for medications for pain and sleep.     No current facility-administered medications on file prior to encounter.     No current outpatient medications on file prior to encounter.       Review of patient's allergies indicates:  No Known Allergies    Past Medical History:   Diagnosis Date    Hypertension     Unilateral inguinal hernia, without obstruction or gangrene, not specified as recurrent      Past Surgical History:   Procedure Laterality Date    HAND ARTHROTOMY Right 7/7/2023    Procedure: ARTHROTOMY, HAND - R LF PIP;  Surgeon: Boy Lamb MD;  Location: [address];  Service: Orthopedics;  Laterality: Right;    IRRIGATION AND DEBRIDEMENT OF UPPER EXTREMITY Bilateral 7/7/2023    Procedure: IRRIGATION AND DEBRIDEMENT, UPPER EXTREMITY - RIGHT HAND;  Surgeon: Boy Lamb MD;  Location: [facility] OR [address] [place] [address];  Service: Orthopedics;  Laterality:  Bilateral;    OLECRANON BURSECTOMY Left 7/7/2023    Procedure: BURSECTOMY, OLECRANON;  Surgeon: Boy Lamb MD;  Location: [facility] OR [address] [place] [address];  Service: Orthopedics;  Laterality: Left;     Family History    None       Tobacco Use    Smoking status: Former     Types: Cigarettes    Smokeless tobacco: Never   Substance and Sexual Activity    Alcohol use: No    Drug use: Yes     Types: Marijuana, IV     Comment: 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    Sexual activity: Not on file     Review of Systems   HENT:  Positive for dental problem. Negative for congestion.    Cardiovascular:  Positive for chest pain. Negative for leg swelling.   Gastrointestinal:  Negative for abdominal distention and abdominal pain.   Musculoskeletal:  Positive for back pain. Negative for arthralgias.   Psychiatric/Behavioral:  Positive for agitation and behavioral problems.      Objective:     Vital Signs (Most Recent):  Temp: 99.1 °F (37.3 °C) (10/10/23 2043)  Pulse: 92 (10/10/23 2335)  Resp: 16 (10/10/23 2357)  BP: 138/82 (10/10/23 2335)  SpO2: 99 % (10/10/23 2335) Vital Signs (24h Range):  Temp:  [99.1 °F (37.3 °C)-100.2 °F (37.9 °C)] 99.1 °F (37.3 °C)  Pulse:  [] 92  Resp:  [14-20] 16  SpO2:  [95 %-100 %] 99 %  BP: (119-184)/(58-91) 138/82     Weight: 79.4 kg (175 lb)  Body mass index is 25.11 kg/m².      Intake/Output Summary (Last 24 hours) at 10/11/2023 0018  Last data filed at 10/10/2023 2134  Gross per 24 hour   Intake 2900 ml   Output --   Net 2900 ml       Physical Exam  Constitutional:       Appearance: Normal appearance.   Cardiovascular:      Rate and Rhythm: Tachycardia present.   Pulmonary:      Effort: Pulmonary effort is normal.      Comments: Right side chest mass  Fluctuance  Erythematous   No drainage  Painful to touch   Abdominal:      General: Abdomen is flat. Bowel sounds are normal. There is no distension.      Palpations: Abdomen is soft.   Neurological:      Mental Status: He is alert.         Significant  Labs:  CBC:   Recent Labs   Lab 10/10/23  1826   WBC 27.57*   RBC 3.40*   HGB 8.4*   HCT 24.6*   *   MCV 72*   MCH 24.7*   MCHC 34.1     CMP:   Recent Labs   Lab 10/10/23  1826   *   CALCIUM 8.9   ALBUMIN 2.2*   PROT 7.1   *   K 3.5   CO2 24   CL 93*   BUN 15   CREATININE 0.8   ALKPHOS 147*   ALT 16   AST 47*   BILITOT 0.3       Significant Diagnostics:  I have reviewed all pertinent imaging results/findings within the past 24 hours.    Assessment/Plan:     28 y.o. male with a  PMHx of IVDU and HTN who presents to the ED with complaints of multiple wounds and chest pain.     - Physical exam and CT chest consistent with a chest wall abscess.   - Will plan for incision and drainage in the OR today  - NPO.   - Recommend broad spectrum antibiotics   - CT scan with presumed extension of the fluid collection into the pleural space of the anterior right lung apex, and possible sternoclavicular joint septic arthritis.   - Will discuss with thoracic surgery in the morning.   - Patient has a history of leaving AMA, and he is activity withdrawing in the ED.       Thank you for your consult. I will follow-up with patient. Please contact us if you have any additional questions.    Vinnie Hawkins MD  General Surgery  [facility] - Emergency Dept

## 2023-10-11 NOTE — MEDICAL/APP STUDENT
HISTORY & PHYSICAL  Hospital Medicine    Team: [facility]    PRESENTING HISTORY     Chief Complaint/Reason for Admission:  R chest abscess    History of Present Illness:  Mr. Ton Donovan is a 28 y.o. male with HTN and IVDU (heroin) who presented with right upper chest pain and swelling progressively worsening over the past week. Patient reports he is unsure how the mass originated. Endorses subjective fevers, chills, and night sweats for the past couple nights. Denies chest pain, palpitations, SOB, radiating pain, abdominal pain, or dysuria/urgency/frequency. Patient denies any alleviating or aggravating factors.He reports daily heroin use (3x/day on average) with his last use being just prior to ED presentation (10/10 4pm). Denies any other drug use or alcohol use but does report smoking cigarettes. Of note, patient was recently hospitalized 7/6-7/8 for multiple abscesses of BUE and septic arthritis of the R hand for which he underwent I&D, arthrotomy, and bursectomy with ortho and received a R IJ catheter for IV abx. Unfortunately, patient left AMA prompting removal of IJ cath and discharge on PO antibiotics (doxy and augmentin x 4 weeks). He reports completing his antibiotic course with resolution of symptoms.     In ED on arrival, patient was tachycardiac HR 90-100s, temp up to 100.2, HDS and comfortable on RA. Labs significant for leukocytosis WBC 27.57, Hgb 8.4, MCV 72, Na 127, Cl 83. U/S BUE with evidence of left forearm and R medial antecubital abscess. CT chest demonstrated R pectoralis major abscess with findings concerning for ostemyelitis vs septic arthritis of 1st rib and 1st costochondral joint.     Interval hx: Patient in bed with complaints of 2 episodes of diarrhea, piloerection, insomnia, and restlessness. Denies N/V, chest pain, palpitations, SOB. Patient persistently expresses intent to leave AMA after the surgery today despite medical recommendations to stay at the hospital for treatment.      Review of Systems:  Constitutional: positive for chills, fevers, and sweats  Eyes: negative for lacrimation, injection, visual changes  ENT: negative for epistaxis and rhinorrhea  Respiratory: no cough or shortness of breath  Cardiovascular: no chest pain or palpitations  Gastrointestinal: Positive for diarrhea. Negative for abdominal pain, cramping, constipation, or vomiting  Genitourinary: no hematuria or dysuria  Integument/Breast: Negative for pruritis or rash. Positive for piloerection and multiple fluctuant abscesses.   Musculoskeletal: no arthralgias or myalgias  Neurological: Negative for seizures or tremor. Positive for restless legs.     PAST HISTORY:     Past Medical History:   Diagnosis Date    Hypertension     Unilateral inguinal hernia, without obstruction or gangrene, not specified as recurrent        Past Surgical History:   Procedure Laterality Date    HAND ARTHROTOMY Right 7/7/2023    Procedure: ARTHROTOMY, HAND - R LF PIP;  Surgeon: Boy Lamb MD;  Location: [address];  Service: Orthopedics;  Laterality: Right;    IRRIGATION AND DEBRIDEMENT OF UPPER EXTREMITY Bilateral 7/7/2023    Procedure: IRRIGATION AND DEBRIDEMENT, UPPER EXTREMITY - RIGHT HAND;  Surgeon: Boy Lamb MD;  Location: [address];  Service: Orthopedics;  Laterality: Bilateral;    OLECRANON BURSECTOMY Left 7/7/2023    Procedure: BURSECTOMY, OLECRANON;  Surgeon: Boy Lamb MD;  Location: [address];  Service: Orthopedics;  Laterality: Left;       History reviewed. No pertinent family history.    Social History     Socioeconomic History    Marital status: Single   Tobacco Use    Smoking status: Former     Types: Cigarettes    Smokeless tobacco: Never   Substance and Sexual Activity    Alcohol use: No    Drug use: Yes     Types: Marijuana, IV     Comment: 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     Social Determinants of Health     Financial Resource Strain: Low Risk  (7/7/2023)    Overall  Financial Resource Strain (CARDIA)     Difficulty of Paying Living Expenses: Not hard at all   Food Insecurity: No Food Insecurity (7/7/2023)    Hunger Vital Sign     Worried About Running Out of Food in the Last Year: Never true     Ran Out of Food in the Last Year: Never true   Transportation Needs: No Transportation Needs (7/7/2023)    PRAPARE - Transportation     Lack of Transportation (Medical): No     Lack of Transportation (Non-Medical): No   Physical Activity: Sufficiently Active (7/7/2023)    Exercise Vital Sign     Days of Exercise per Week: 3 days     Minutes of Exercise per Session: 60 min   Stress: No Stress Concern Present (7/7/2023)    [employer] - Occupational Stress Questionnaire     Feeling of Stress : Not at all   Social Connections: Socially Isolated (7/7/2023)    Social Connection and Isolation Panel [NHANES]     Frequency of Communication with Friends and Family: Twice a week     Frequency of Social Gatherings with Friends and Family: More than three times a week     Attends [employer] Services: Never     Active Member of Clubs or Organizations: No     Attends Club or Organization Meetings: Never     Marital Status: Never    Housing Stability: Low Risk  (7/7/2023)    Housing Stability Vital Sign     Unable to Pay for Housing in the Last Year: No     Number of Places Lived in the Last Year: 1     Unstable Housing in the Last Year: No       MEDICATIONS & ALLERGIES:     No current facility-administered medications on file prior to encounter.     No current outpatient medications on file prior to encounter.        Review of patient's allergies indicates:  No Known Allergies    OBJECTIVE:     Vital Signs:  Temp:  [98.7 °F (37.1 °C)-100.2 °F (37.9 °C)] 98.7 °F (37.1 °C)  Pulse:  [] 83  Resp:  [14-20] 18  SpO2:  [95 %-100 %] 100 %  BP: (119-184)/(58-91) 159/86  Body mass index is 25.11 kg/m².     Physical Exam:  Constitutional:       General: Restless and  fidgety in bed.   HENT:      Head: Normocephalic and atraumatic.      Nose: No congestion or rhinorrhea.      Mouth/Throat:      Mouth: Mucous membranes are dry.      Pharynx: Oropharynx is clear. Normal dentition.     Eyes: PERRLA  Cardiovascular:      Rate and Rhythm: Regular rhythm.     Pulses: Normal, symmetrical pulses.      Heart sounds: Normal heart sounds. No murmur.   Pulmonary:      Effort: Pulmonary effort is normal. No respiratory distress.      Breath sounds: Normal breath sounds.   Abdominal:      General: Bowel sounds are normal.      Palpations: Abdomen is soft.      Tenderness: There is no abdominal tenderness.   Musculoskeletal:      Moderate sized, fluctuant, erythematous mass in R upper chest. Warm and tender to palpation. Small abscess on left forearm and right antecubital fossa with overlying scab. No erythema, no drainage.   Skin:     General: Skin is warm and dry. Piloerection noted.   Neurological:      General: No focal deficit present.      Mental Status: He is alert and oriented to person, place, and time.   Psychiatric:         Mood and Affect: Mood normal.         Behavior: Behavior normal.      Labs:  CBC reviewed and notable for WBC 29.25, H/H 8.1/23.9, MCV 73  Iron studies reviewed and notable for Iron 20, TIBC 175, Transferrin 118, Saturated Iron 11  CMP reviewed and notable for Na 128, serum osmolality 279  Urine osmolality 66    Diagnostic Results:  UA WNL.   CXR: Trachea midline. Cardiac silhouette normal. Osseous structures normal. No evidence of pleural effusion or pneumothorax. No evidence of any masses, opacities, or consolidation.   CT Chest w/ contrast: Fluid collection within R pectoralis major measuring 6.7 x 12.5 cm with presumed extension of this fluid collection into the pleural space of the anterior R lung apex. Osteomyelitis vs septic arthritis of R 1st rib and 1st costochondral joint. Scatter pulmonary nodules bilaterally. Mild diffuse interlobular septal  thickening.     ASSESSMENT & PLAN:     Current Problems List:  Active Hospital Problems    Diagnosis  POA    *Abscess of chest [J86.9]  Yes    Opioid withdrawal [F11.93]  Yes    Iron deficiency anemia [D50.9]  Yes    Hyponatremia [E87.1]  Yes    Heroin dependence [F11.20]  Yes      Resolved Hospital Problems   No resolved problems to display.     Problem Assessment & Treatment Plan:  28 y.o. male with HTN and IVDU (heroin) admitted for R upper chest abscess with osteomyelitis vs septic arthritis of 1st rib and 1st costochondral joint as demonstrated on CT c/b opioid withdrawal.     Chest Abscess:  R pectoralis major fluid collection measuring 6.7 x 12.5 cm with presumed extension of this fluid collection into the pleural space of the anterior R lung apex. Osteomyelitis vs septic arthritis of R 1st rib and 1st costochondral joint.   - Vanc/zosyn  - F/u Bcx  - Gen surg consulted with plan for I&D 10/11  - NPO in preparation for OR    Opiod dependence:  Patient with active, daily heroin use with last use just prior to arrival, now exhibiting active withdrawal symptoms including diarrhea, piloerection, and restlessness exacerbated by suboxone adminstration in ED.   - Hold suboxone in setting of upcoming surgery with potential need for opioids for post-op pain management.    - Hold suboxone for a minimum of 12 hrs from last opioid use to minimize risk of precipitating withdrawal   - Not amenable to addiction psych at this time    Hyponatremia:  Patient asymptomatic but notable decrease from previous admission (Na 127 today, Na 138 3 months ago). Serum osm 179, urine osm 66. Stable renal function. Likely 2/2 poor nutrition/PO intake  - Fluid restrict  - Recheck BMP    Iron deficiency anemia:   Patient with hgb 8.1 and low iron studies in setting of homelessness and IVDU with presumed poor nutritional intake, consistent with iron deficiency anemia which is likely chronic  - Supplement with oral iron  - Avoid iron infusion  in setting of active infection    Hypertension:  Stable, chronic condition. Not on any home meds  - Clonidine 0.1 mg prn for htn of SBP > 180        Kye Carcamo, MS3

## 2023-10-11 NOTE — PROGRESS NOTES
"Pharmacokinetic Initial Assessment: IV Vancomycin    Assessment/Plan:    Initiate intravenous vancomycin with loading dose of 2000 mg once followed by a maintenance dose of vancomycin 1250mg IV every 12 hours  Desired empiric serum trough concentration is 10 to 20 mcg/mL  Draw vancomycin trough level 30 min prior to fourth dose on 10/12 at approximately 0730  Pharmacy will continue to follow and monitor vancomycin.      Please contact pharmacy at extension [phone] with any questions regarding this assessment.     Thank you for the consult,   Yamila Mehta       Patient brief summary:  Ton Donovan is a 28 y.o. male initiated on antimicrobial therapy with IV Vancomycin for treatment of suspected skin & soft tissue infection    Drug Allergies:   Review of patient's allergies indicates:  No Known Allergies    Actual Body Weight:   79.4kg    Renal Function:   Estimated Creatinine Clearance: 139.4 mL/min (based on SCr of 0.8 mg/dL).,     Dialysis Method (if applicable):  N/A    CBC (last 72 hours):  Recent Labs   Lab Result Units 10/10/23  1826   WBC K/uL 27.57*   Hemoglobin g/dL 8.4*   Hematocrit % 24.6*   Platelets K/uL 489*   Gran % % 81.1*   Lymph % % 7.9*   Mono % % 7.3   Eosinophil % % 0.1   Basophil % % 0.3   Differential Method  Automated       Metabolic Panel (last 72 hours):  Recent Labs   Lab Result Units 10/10/23  1826 10/10/23  1955   Sodium mmol/L 127*  --    Potassium mmol/L 3.5  --    Chloride mmol/L 93*  --    CO2 mmol/L 24  --    Glucose mg/dL 123*  --    Glucose, UA   --  Negative   BUN mg/dL 15  --    Creatinine mg/dL 0.8  --    Albumin g/dL 2.2*  --    Total Bilirubin mg/dL 0.3  --    Alkaline Phosphatase U/L 147*  --    AST U/L 47*  --    ALT U/L 16  --    Magnesium mg/dL 1.8  --    Phosphorus mg/dL 3.4  --        Drug levels (last 3 results):  No results for input(s): "VANCOMYCINRA", "VANCORANDOM", "VANCOMYCINPE", "VANCOPEAK", "VANCOMYCINTR", "VANCOTROUGH" in the last 72 hours.    Microbiologic " Results:  Microbiology Results (last 7 days)       Procedure Component Value Units Date/Time    Blood culture x two cultures. Draw prior to antibiotics. [[id number]] Collected: 10/10/23 1827    Order Status: Sent Specimen: Blood from Peripheral, Upper Arm, Right Updated: 10/10/23 1852    Blood culture x two cultures. Draw prior to antibiotics. [[id number]] Collected: 10/10/23 1827    Order Status: Sent Specimen: Blood from Peripheral, Lower Arm, Right Updated: 10/10/23 1852

## 2023-10-11 NOTE — ED NOTES
Report received from JIMY Raphael. Care assumed at this time. Pt is resting comfortably in ED stretcher, and is awake, alert, and oriented x4. Respirations are even and unlabored, no distress noted at this time. Pt oriented to room and educated on use of call bell. Fall risk reviewed with patient and understanding verbalized. Explanation of wait time and plan of care update provided to patient and patient's family at bedside. Pt verbalized understanding and all questions and concerns addressed. Pt was offered restroom assistance and denies need to void at this time. Pt remains on continuous cardiac monitor, automated BP cuff cycling Q30 minutes, and continuous pulse oximeter. Pt denies pain at this time and verbalizes no further needs. SR raised x2, bed locked and in lowest position. Pt encouraged to call for assistance using call bell when needed and verbalized understanding.

## 2023-10-11 NOTE — PROVIDER PROGRESS NOTES - EMERGENCY DEPT.
"ED [place] of Care Note  9:44 PM 10/10/2023  Ton Donovan is a 28 y.o. male who presented to the ED on 10/10/2023 and has been managed by Dr. Mills, who reports patient C/O chest wall swelling. I assumed care of patient from off-going ED physician team at 9:44 PM pending CT.    On my evaluation, Ton Donovan appears uncomfortable and is hemodynamically stable. Thus far, Ton Donovan has received:  Medications   piperacillin-tazobactam (ZOSYN) 4.5 g in dextrose 5 % in water (D5W) 100 mL IVPB (MB+) (0 g Intravenous Stopped 10/10/23 1915)   buprenorphine-naloxone 8-2 mg SL film 1 Film (has no administration in time range)   lactated ringers bolus 2,382 mL (0 mLs Intravenous Stopped 10/10/23 2134)   vancomycin 2 g in dextrose 5 % 500 mL IVPB (0 mg Intravenous Stopped 10/10/23 2133)   LIDOcaine (PF) 10 mg/ml (1%) injection 50 mg (50 mg Infiltration Given by Provider 10/10/23 1832)   acetaminophen tablet 1,000 mg (1,000 mg Oral Given 10/10/23 2128)   ketorolac injection 9.999 mg (9.999 mg Intravenous Given 10/10/23 2128)       On my exam, I appreciate:  BP (!) 155/72   Pulse 93   Temp 99.1 °F (37.3 °C) (Oral)   Resp 14   Ht 5' 10" (1.778 m)   Wt 79.4 kg (175 lb)   SpO2 98%   BMI 25.11 kg/m²           Additional ED course:  Patient with improvement in withdrawal symptoms after Suboxone.  CT chest shows fluid collection in the right pectoralis major consistent with abscess.  Discussed with general surgery who evaluated patient and will likely proceed with washout in the OR tomorrow, patient admitted to hospital medicine    Disposition:  Admit  I have discussed and counseled Ton Donovan regarding exam, results, diagnosis, treatment, and plan.  ______________________  Salinas Hair MD   Emergency Medicine Physician  10/10/2023    "

## 2023-10-11 NOTE — ASSESSMENT & PLAN NOTE
28M with IVDU and previous septic arthritis who presents with 1 week of increased swelling and pain in right upper chest. WBC elevated to 27.57. Low grade temperature of 100.2 in the ED. Found to have abscess within the right pectoralis major muscle measuring 6.7 x 12.5 cm as well as findings concerning for osteomyelitis vs septic arthritis of 1st rib and 1st costochondral joint. Multiple other abscesses seen on ultrasound in the bilateral upper extremities. Gen Surg consulted in the ED and will take to OR for I&D on 10/11 with further discussion with thoracic surgery regarding bone/joint findings.    Plan:  - Appreciate Gen Surgery recommendations  - F/u cultures  - Continue vanc and zosyn until culture data results to tailor antibiotic therapy  - Pain control with tylenol PRN and can use heating packs or other multimodal methods   - Can possibly use suboxone for pain control but will defer to addiction psych recs  - NPO at MN for OR on 10/11

## 2023-10-11 NOTE — ASSESSMENT & PLAN NOTE
Hgb 8.4 with MCV of 72, with history of housing and financial instability with ongoing IVDU, likely due to iron deficiency as part of nutritional deficiencies.    - Iron studies ordered for the AM, can supplement if MANUEL confirmed

## 2023-10-11 NOTE — DISCHARGE SUMMARY
[facility] - Emergency Dept  Hospital Medicine  Discharge Summary      Patient Name: Ton Donovan  MRN: [id number]  BOZENA: [id number]  Patient Class: IP- Inpatient  Admission Date: 10/10/2023  Hospital Length of Stay: 1 days  Discharge Date and Time:  10/11/2023 2:52 PM  Attending Physician: Leeroy Pringle, *   Discharging Provider: Leeroy Pringle MD  Primary Care Provider: Emily Primary Doctor  [facility] Medicine Team: [facility] 5 Devang Quinonez MD  Primary Care Team: [facility] 5    HPI:   Ton Donovan is a 28 year old man with history of heroin use and IVDU who presents to the ED due to right upper chest swelling and pain. Patient reports this swelling started about 1 week ago and has gotten larger and more painful. He reports recent IV injection of heroin and he says he snorted heroin shortly before presenting to the ED on this afternoon of 10/10. He says he was told to present to the ED by his friends who were concerned for him. Pt was recently hospitalized from 7/6 to 7/8 due to infected wounds, abscesses, and septic arthritis in his Right hand for which he underwent I&D and received Right IJ catheter for IV antibiotics; however, pt had repeated attempts to elope with RIJ catheter, and patient wished to leave A so the line was removed prior to discharge and pt was given PO antibiotics. He reports resolution of his hand wounds. He denies any fevers, shortness of breath, n/v/d, or abdominal pain.    In the ED, patient tachycardic HR 90-100s, with T up to 100.2; otherwise hemodynamically stable and on room air. Labs notable for leukocytosis WBC 27.57, Hgb 8.4, MCV 72, Na 127, Cl 93, albumin 2.2, and . U/S of upper body showed multiple abscesses (left forearm and right medial antecubital fossa), right upper chest fluid collection likely abscess. CT chest showed abscess in the Right pectoralis major muscle as well as findings concerning for osteomyelitis vs septic arthritis of 1st rib and 1st  costochondral joint. Pt given doses of vanc and zosyn. Gen surg consulted in the ED, will plan for I&D in OR on 10/11, and discuss further with thoracic surgery regarding rib findings. Patient admitted to hospital medicine for further management of abscess and possible OM vs septic arthritis.      Procedure(s) (LRB):  Incision and Drainage (N/A)      Hospital Course:   See HPI. Pt eloped while in ED, awaiting I&D procedure. Call from lab after patient already left, stating cultures came back positive for GPCs resembling staph. Tried to call patient but his phone is not currently working. Got in contact with patients sister informing her of blood culture results and that we advise he come back to the hospital. Sister stated that she would get in touch with patient and tell him of our recommendations.        Goals of Care Treatment Preferences:  Code Status: Full Code      Consults:   Consults (From admission, onward)          Status Ordering Provider     Inpatient consult to Psychiatry  Once        Provider:  (Not yet assigned)    Completed AINSLEY TYLER     Pharmacy to dose Vancomycin consult  Once        Provider:  (Not yet assigned)   See [facility] for full Linked Orders Report.    Acknowledged AINSLEY TYLER     Inpatient consult to General Surgery  Once        Provider:  (Not yet assigned)    Completed EBENEZER AMARO              Final Active Diagnoses:    Diagnosis Date Noted POA    PRINCIPAL PROBLEM:  Abscess of chest [J86.9] 10/11/2023 Yes    Opioid withdrawal [F11.93] 10/11/2023 Yes    Iron deficiency anemia [D50.9] 10/11/2023 Yes    Hyponatremia [E87.1] 10/11/2023 Yes    Heroin dependence [F11.20] 07/06/2023 Yes      Problems Resolved During this Admission:       Discharged Condition: against medical advice    Disposition: Home     Follow Up: Did not get to      Patient Instructions:   Did not get opportunity to provide     Significant Diagnostic Studies: Labs: All labs within the past 24 hours have  been reviewed    Pending Diagnostic Studies:       None           Medications:  None    Indwelling Lines/Drains at time of discharge:   Lines/Drains/Airways       Drain  Duration                  Open Drain 07/07/23 1511 Right;Dorsal Hand [place] 95 days                    Time spent on the discharge of patient: 30 minutes         Leeroy Pringle MD  Department of Hospital Medicine  [facility] - Emergency Dept

## 2023-10-11 NOTE — HOSPITAL COURSE
See HPI. Pt elopefavian while in ED, awaiting I&D procedure. Call from lab after patient already left, stating cultures came back positive for GPCs resembling staph. Tried to call patient but his phone is not currently working. Got in contact with patients sister informing her of blood culture results and that we advise he come back to the hospital. Sister stated that she would get in touch with patient and tell him of our recommendations.

## 2023-10-11 NOTE — CARE UPDATE
Was able to reach sister Trisha. She states that patient does not have a phone and will try to reach him after work to advise him to come back to the hospital for treatment for bacteremia.

## 2023-10-11 NOTE — MEDICAL/APP STUDENT
"{  ADHD   AIMS   AUDIT   AUDIT-C   C-SSRS (Screen)   C-SSRS (Short)   C-SSRS (Full)   DAST   DAST-10   RITU-7   MMSE   MOCA   PCL-5   PHQ-9   ELLY   [facility]   :[id number]}        INITIAL VISIT: ADDICTION PSYCHIATRY CONSULTATION SERVICE      ASSESSMENT AND PLAN:     DIAGNOSES & PROBLEMS:  Heroin Dependence  Abscess of chest  Tobacco abuse    In Summary:  Ton Donovan is a 28 year old man with a history of heroin use and IVDU, who presented yesterday evening to the ED with right upper chest swelling and pain for about a week. Reported 1 recent IV heroin injection and snorted heroin shortly prior to arrival in the ED on 10/10 afternoon. Came to ED because friends were concerned about him. Hospitalized in July 2023 for wound infections, abscesses, and septic arthritis in his right hand. Patient now in EDOU, awaiting treatment for possible chest abscess. Received suboxone in the ED on 10/10.     Spoke with patient briefly on 10/11 am, appeared agitated and endorses being in pain and discomfort. He agreed to speak with addiction psychiatry and initially expressed interest in addiction services. Mid way through the interview he asked to stop and requested that psychiatry not come back to speak with him.       Plan:  Hold suboxone, patient refusing, currently in withdrawal, suspect precipitated withdrawal due to recent heroin use prior to admission. Optimize use of PRNs.   {Dispo and Management Options:63995::" "," "}    PRESENTATION:     Ton Donovan presents with the following chief complaint: problematic substance use/abuse    Per Chart:      Per Patient:  Spoke with patient briefly. He endorses using heroin for several months, last used yesterday prior to coming to the hospital. Says his longest period of abstinence has been since coming to the hospital yesterday. When asked about how much heroin he uses, he said "I'm good" and did not elaborate. Also reports regular tobacco use but did not say how much he uses. Reports " "no other current drug use but has also used cocaine, ecstasy, weed, and meth in his lifetime. Endorses having had an OD in the past as well as complicated withdrawal. Endorses having a good support system including family and close friends, who urged him to come to the hospital yesterday. With regard to quitting heroin, he says sometimes he "thinks about it a lot" and might have an interest in sober living but not inpatient treatment, and does not want to use suboxone. Briefly reviewed psych ROS before patient ended the interview and asked for psychiatry not to return; he wants to get his surgery so he can get out of the hospital.     Collateral:   N/A      REVIEW OF SYSTEMS:  I[]I Patient denies any pertinent ROS, and none is known.  I[x]I Patient unable or unwilling to provide any ROS.    [x] Y  [] N  sleep disturbance: ** {Globally:35053:::1} Positive for: insomnia  [x] Y  [] N  appetite/weight change: ** Globally: endorsed but not observed {Positive for:66327:::1}  [] Y  [] N  fatigue/anergia: ** {Globally:00404:::1} {Positive for:71056:::1}  [] Y  [] N  impairment in focus/concentration: ** {Globally:95628:::1} {Positive for:38525:::1}     [] Y  [] N  depression: ** {Globally:40777:::1} {Positive for:42044:::1} {Negative for:10589:::1}  [] Y  [] N  anxiety/worry: ** {Globally:50127:::1} {Positive for:25539:::1} {Negative for:30072:::1}  [] Y  [] N  dysregulated mood/behavior: ** {Globally:64851:::1} {Positive for:79604:::1} {Negative for:84518:::1}  [] Y  [] N  manic symptomatology: ** {Globally:30550:::1} {Positive for:03155:::1} {Negative for:61633:::1}  [] Y  [] N  psychosis: ** {Globally:16646:::1} {Positive for:05929:::1} {Negative for:14708:::1}  {Pertinent +/-:08146::" "} {Positive for:59364:::1} {Negative for:41083:::1}    A pertinent medical review of systems was performed with the following notable findings: ***    CURRENT PSYCHOTROPIC REGIMEN:  I[]I Y  I[]I N  " "I[x]I U    ***      ADDICTION:     I[]I Y  I[]I N  I[]I U  I[x]I Current  I[]I Former  Nicotine Use:   I[]I Y  I[]I N  I[]I U  I[]I Current  I[x]I Former  Alcohol Use:   I[]I Y  I[]I N  I[x]I U  I[]I Current  I[]I Former  Alcohol Misuse/Abuse:   I[]I Y  I[]I N  I[]I U  I[x]I Current  I[]I Former  Illicit Drug Use/Misuse/Abuse:   I[]I Y  I[]I N  I[x]I U  I[]I Current  I[]I Former  Misuse/Abuse of Rx Medications:   I[]I Cannabis  I[]I Cocaine  I[]I Heroin  I[]I Meth  I[]I Opioids  I[]I Stimulants  I[]I Benzos  I[]I Other:     I[]I N/A  I[]I U  Substance(s) of Choice: Heroin  I[]I N/A  I[]I U  Substance(s) Used Currently/Recently: Heroin  I[]I N/A  I[x]I U  Alcohol Consumption: {Alcohol Intake:07769} ***  I[]I N/A  I[x]I U  Last Drink: ***  I[]I N/A  I[x]I U  Last Drug Use: ***  I[]I N/A  I[x]I U  Duration of Sobriety/Abstinence: ***    I[]I Y  I[x]I N  I[]I U  Hx of Detox:   I[]I Y  I[x]I N  I[]I U  Hx of Rehab:   I[x]I Y  I[]I N  I[]I U  Hx of IVDU:   I[x]I Y  I[]I N  I[]I U  Hx of Accidental Overdose:   I[]I Y  I[x]I N  I[]I U  Hx of DUI:   I[x]I Y  I[]I N  I[]I U  Hx of Complicated Withdrawal (i.e. Seizures and/or Delirium Tremens):   I[]I Y  I[]I N  I[x]I U  Hx of Known/Suspected Substance-Induced Psychiatric Disorder:   I[]I Y  I[x]I N  I[]I U  Hx of Medication Assisted Treatment:   I[]I Y  I[x]I N  I[]I U  Hx of Twelve Step Program (or Equivalent) Involvement:   I[]I Y  I[x]I N  I[]I U  Currently Exhibits Signs of Intoxication:   I[x]I Y  I[]I N  I[]I U  Currently Exhibits Signs of Withdrawal:   I[]I Y  I[x]I N  I[]I U  Currently Active in Recovery:   I[x]I Y  I[]I N  I[]I U  Social Support:   I[]I Y  I[]I N  I[x]I U  Spouse/Partner Consumption:     I[]I N/A  I[]I Y  I[]I N  I[x]I U  Acknowledges/Accepts Addiction:   I[]I N/A  I[]I Y  I[]I N  I[x]I U  Advised to Quit/Cut Back:   I[]I N/A  I[]I Y  I[]I N  I[]I U  Alcohol/Drug Cessation ("Wants to Quit"): Interested in Quitting  I[]I N/A  I[]I Y  I[]I N  I[]I U  " "Motivation to Pursue Treatment: Low  I[]I N/A  I[]I Y  I[]I N  I[]I U  Tobacco Cessation ("Wants to Quit"): uninterested in quitting or cutting back    DSM-5-TR SUBSTANCE USE DISORDER CRITERIA:     -- Impaired Control:  I[]I Y  I[]I N  I[x]I U  I[]I A  I[]I D  Often take in larger amounts or over a longer period of time than was intended:   I[]I Y  I[]I N  I[x]I U  I[]I A  I[]I D  Persistent desire or unsuccessful efforts to cut down or control use:   I[]I Y  I[]I N  I[x]I U  I[]I A  I[]I D  Great deal of time spent in activities necessary to obtain substance, use, or recover from effects:   I[]I Y  I[]I N  I[x]I U  I[]I A  I[]I D  Craving/strong desire for substance or urge to use:   -- Social Impairment:  I[]I Y  I[]I N  I[x]I U  I[]I A  I[]I D  Use resulting in failure to fulfill major role obligations at home, work or school:   I[]I Y  I[]I N  I[x]I U  I[]I A  I[]I D  Social, occupational, recreational activities decreased because of use:   I[]I Y  I[]I N  I[x]I U  I[]I A  I[]I D  Continued use despite having persistent or recurrent social or interpersonal problems caused or exacerbated by the substance:   -- Risky Use:  I[]I Y  I[]I N  I[x]I U  I[]I A  I[]I D  Recurrent use in situations in which it is physically hazardous:   I[]I Y  I[]I N  I[x]I U  I[]I A  I[]I D  Use despite physical or psychological problems that are likely to have been caused or exacerbated by the substance:   -- Neuroadaptation:  I[]I Y  I[]I N  I[x]I U  I[]I A  I[]I D  Tolerance, as defined by either of the following: (1) a need for markedly increased amounts of substance to achieve intoxication or desired effect.  -OR- (2) a markedly diminished effect with continued use of the same amount of substance:   I[x]I Y  I[]I N  I[]I U  I[]I A  I[]I D  Withdrawal, as manifested by either of the following: (1) the characteristic withdrawal syndrome for substance.  -OR- (2) substance is taken to relieve or avoid withdrawal symptoms:   -- Mild " (1-3), Moderate (4-5), Severe (?6)    I[]I N/A  I[x]I Y  I[]I N  I[]I U  I[]I A  I[]I D  Active Substance Use Disorder:       HISTORY:     I[]I Patient denies any history, and none is known.  I[]I Patient unable or unwilling to provide any history.    I[]I Y  I[]I N  I[x]I U  Psychiatric Diagnoses: ***  I[]I Y  I[]I N  I[x]I U  Current Psychiatric Provider (if Applicable):   I[]I Y  I[]I N  I[x]I U  Hx of Psychiatric Hospitalization:   I[]I Y  I[]I N  I[x]I U  Hx of Outpatient Psychiatric Treatment (psychiatry/psychotherapy):   I[]I Y  I[]I N  I[x]I U  Psychotropic Trials: ***  I[]I Y  I[]I N  I[x]I U  Prior Suicide Attempts:   I[]I Y  I[]I N  I[x]I U  Hx of Suicidal Ideation:   I[]I Y  I[]I N  I[x]I U  Hx of Homicidal Ideation:   I[]I Y  I[]I N  I[x]I U  Hx of Self-Injurious Behavior (Non-Suicidal):   I[]I Y  I[]I N  I[x]I U  Hx of Violence:   I[]I Y  I[]I N  I[x]I U  Documented Hx of Malingering:     FAMILY HISTORY:  I[]I Y  I[]I N  I[x]I U    ***    I[]I Y  I[]I N  I[x]I U  Hx of Trauma/Neglect:   I[]I Y  I[]I N  I[x]I U  Hx of Physical Abuse:   I[]I Y  I[]I N  I[x]I U  Hx of Sexual Abuse:   I[]I Y  I[]I N  I[x]I U  Grew Up Locally?:   I[]I Y  I[]I N  I[x]I U  Happy Childhood?:   I[]I Y  I[]I N  I[x]I U  Significant Developmental Delay/Disability?:   I[]I Y  I[]I N  I[x]I U  GED/High School Dipoloma?:   I[]I Y  I[]I N  I[x]I U  Post High School Education?:   I[]I Y  I[]I N  I[x]I U  Currently Employed?:   I[]I Y  I[]I N  I[x]I U  On or Applying for Disability?:   I[]I Y  I[]I N  I[x]I U  Functions Independently?:   I[]I Y  I[]I N  I[x]I U  Financially Stable?:   I[]I Y  I[]I N  I[x]I U  Domiciled?:   I[]I Y  I[]I N  I[x]I U  Lives Alone?:   I[]I Y  I[]I N  I[x]I U  Heterosexual/Cisgender?:   I[]I Y  I[]I N  I[x]I U  Currently in a Romantic Relationship?:   I[]I Y  I[]I N  I[x]I U  Ever ?:   I[]I Y  I[]I N  I[x]I U  Children/Dependents?:   I[]I Y  I[]I N  I[x]I U  Protestant/Spiritual?:   I[]I Y  I[]I N   "I[x]I U   History?:   I[]I Y  I[]I N  I[x]I U  Current Legal Issues:   I[]I Y  I[]I N  I[x]I U  Past Charges/Convictions:   I[]I Y  I[]I N  I[x]I U  Hx of Incarceration:   I[]I Y  I[]I N  I[x]I U  Engaged in Hobbies/Recreational Activities?:   I[]I Y  I[]I N  I[x]I U  Access to a Gun?: ***  {XX-GunSafety:65715::"NOTE: patient counseled on gun safety, including safe storage.","NOTE: patient counseled on inherent risks associated with gun ownership."}    I[]I Y  I[]I N  I[x]I U  Hx of Seizure:   I[]I Y  I[]I N  I[x]I U  Hx of Significant Head Trauma (e.g., Loss of Consciousness, Concussion, Coma):    I[]I Y  I[]I N  I[x]I U  Medical History & Diagnoses:       The patient's past medical history has been reviewed and updated as appropriate within the electronic medical record system.  {Problem List & Past Medical History:09671::"     "}    Scheduled and PRN Medications: The electronic chart was reviewed and updated as appropriate.  See Medcard for details.  {Current Medications:24435::"     "}    Allergies:  Patient has no known allergies.    PSYCHOSOCIAL FACTORS:  Stressors (Biopsychosocial, Cultural and Environmental): Unable to Assess  Functioning Relationships: {Psychfxinrelationships:94757}    STRENGTHS AND LIABILITIES:   Strengths: Unable to Assess  Liabilities: Unable to Assess    Additional Relevant History, As Applicable:       EXAMINATION:     BP (!) 144/83 (BP Location: Right arm, Patient Position: Lying)   Pulse 79   Temp 98.3 °F (36.8 °C) (Oral)   Resp 18   Ht 5' 10" (1.778 m)   Wt 79.4 kg (175 lb)   SpO2 95%   BMI 25.11 kg/m²     MENTAL STATUS EXAMINATION:  General Appearance: ** {Free Text:62541::"***"} appears stated age, normal weight, adequately groomed, lying in bed, malodorous  Behavior: ** {Free Text:54086::"***"} under good behavioral control, reluctant to participate, minimal responses, restless and fidgety  Involuntary Movements and Motor Activity: ** {Free Text:65644::"***"} no " "abnormal involuntary movements noted  Gait and Station: ** {Free Text:90398::"***"} normal gait and station  Speech and Language: ** {Free Text:92524::"***"} conversational, spontaneous, speaks and understands English proficiently  Mood: "anxious"  Affect: ** {Free Text:92356::"***"} reactive, mood congruent  Thought Process and Associations: ** {Free Text:80634::"***"} linear and goal-directed, with no loosening of associations  Thought Content and Perceptions: ** {Free Text:28525::"***"} no suicidal or homicidal ideation, no evidence of psychosis  Sensorium: ** {Free Text:09232::"***"} alert and oriented, with clear sensorium  Recent and Remote Memory: ** {Free Text:29702::"***"} grossly intact, no significant impairments noted  Attention and Concentration: ** {Free Text:03817::"***"} attentive to conversation  Fund of Knowledge: ** {Free Text:01135::"***"} grossly intact, used appropriate vocabulary, no significant deficits noted  Insight: ** {Free Text:93132::"***"} demonstrates awareness of situation  Judgment: ** {Free Text:45627::"***"} behavior is adequate/appropriate to circumstances      RISK MANAGEMENT:     I[]I Y  I[x]I N  I[]I U  I[]I A  Suicidal Ideation/Behavior: ** {Specifiers:20428}  I[]I Y  I[x]I N  I[]I U  I[]I A  Homicidal Ideation/Behavior: **  I[]I Y  I[]I N  I[x]I U  I[]I A  Violence: **  I[]I Y  I[x]I N  I[]I U  I[]I A  Self-Injurious Behavior: **    The patient is deemed to be a historian of unknown reliability and accuracy.    I[]I Y  I[]I N  I[x]I U  I[]I A  I[]I N/A  Minimization of Risk Parameters Suspected/Evident: **  I[]I Y  I[]I N  I[x]I U  I[]I A  I[]I N/A  Exaggeration of Risk Parameters Suspected/Evident: **  ***    [] Y  [x] N  Danger to Self:   [] Y  [x] N  Danger to Others:   [] Y  [x] N  Grave Disability:       In cases of emergency, daily coverage provided by Acute/ER Psych MD, NP, PA, or SW, with contact numbers located in [facility] On Call " "Schedule.    [facility]  Department of Psychiatry  [facility]        KEY:     I[]I Y = Yes / Present / Endorses  I[]I N = No / Absent / Denies  I[]I U = Unknown / Unable to Assess / Unwilling to Participate  I[]I A = Ambiguity Exists / Accuracy Uncertain  I[]I D = Denial or Minimization is Suspected/Evident  I[]I N/A = Non-Applicable    CHART REVIEW:     Available documentation has been reviewed, and pertinent elements of the chart have been incorporated into this evaluation where appropriate.    The patient's last Epic encounter in the psychiatry department was on: Visit date not found  The patient's first Epic encounter in the psychiatry department was on:      LA/MS  [facility]WARE  Site reviewed - { Options:52217::" "}  ***    ADVICE AND COUNSELING:     [x] In cases of emergencies (e.g. SI/HI resulting in danger to self or others, functioning deteriorates to the level of grave disability), call 911 or [phone], or present to the emergency department for immediate assistance.  [x] Patient should not operate a motor vehicle or heavy machinery if effects of medications or underlying symptoms/condition impair the ability to safely do so.    Alcohol, Tobacco, and Drug Counseling, as well as resources, has been provided, as warranted.     Shared medical decision making and informed consent are the hallmark and bedrock of good clinical care, and as such have been employed and obtained, respectively, to the degree possible.      Risk Mitigation Strategies, Harm Reduction Techniques, and Safety Netting are important interventions that can reduce acute and chronic risk, and as such have been employed to the degree possible.    Prescription Drug Management entails the review, recommendation, or consideration without recommendation of medications, and as such was employed during the encounter.    Additional Psychoeducation has been provided, as warranted.    Discussed, to the extent possible, diagnosis, risks and benefits " of proposed treatment vs alternative treatments vs no treatment, potential side effects of these treatments and the inherent unpredictability of treatment. {Ability to Consent:30208}    Written material*** has been provided to supplement, augment, and reinforce any discussions and interventions, via the AVS or other pre-printed handouts, as warranted.      DIAGNOSTIC TESTING:     The chart was reviewed for recent diagnostic procedures and investigations, and pertinent results are noted below.    Na 128 (L)  10/11/2023   K 3.8  10/11/2023   Ca 8.4 (L)  10/11/2023  Phos 3.0  10/11/2023   Mg 1.7  10/11/2023     Glu 117 (H)  10/11/2023   HgA1c *   *    BUN 10  10/11/2023   Cr 0.7  10/11/2023   GFR >60.0  10/11/2023   Specific [place] 1.005  10/10/2023   Protein (Urine) Negative  10/10/2023   Microalbumin *   *     T Prot 6.2  10/11/2023   Alb 1.8 (L)  10/11/2023   T Bili 0.5  10/11/2023   Alk Phos 124  10/11/2023   AST 35  10/11/2023   ALT 12  10/11/2023   GGT *   *   NH3 *   *   Amylase *   *   Lipase *   *    TSH *   *   Free T4 *   *  PTH *   *  Prolactin *   *   CPK *   *   Troponin I <0.006  10/10/2023   PT *   *   INR *   *    WBC 29.25 (H)  10/11/2023   RBC 3.29 (L)  10/11/2023   Hgb 8.1 (L)  10/11/2023   HCT 23.9 (L)  10/11/2023   MCV 73 (L)  10/11/2023    10/11/2023   ANC 23.9; 81.7 (H);  (H)  10/11/2023    Cholesterol *   *   Triglycerides *   *   LDL *   *   HDL *   *     B12 *   *   Folate *   *   Thiamine *   *   Vit D *   *     HIV 1/2 Ag/Ab Non-reactive; Non-reactive;   7/6/2023   Hep B Surface *   *   Hep B Core *   *   Hep A *   *   Hep C Non-reactive  7/6/2023   RPR *   *     Lithium *   *   VPA *   *   Clozapine *   *     Alcohol <10  7/6/2023   Benzodiazepines Negative  7/6/2023   Barbiturates Negative  7/6/2023   Cannabis Presumptive Positive (A)  7/6/2023   Cocaine Negative  7/6/2023   Amphetamines Negative  7/6/2023   PCP Negative   7/6/2023   Opiates Presumptive Positive (A)  7/6/2023   Methadone Negative  7/6/2023   Buprenorphine *   *   Fentanyl *   *   Oxycodone *   *   Tramadol *   *     Ethanol *   *  PETH *   *   EtG *   *   EtS *   *   Buprenorphine *   *   Norbuprenorphine *   *     Results for orders placed or performed during the hospital encounter of 08/22/15   EKG 12-lead    Collection Time: 08/22/15  1:33 PM    Narrative    Test Reason : Chest Pain 786.50  Blood Pressure : ***/*** mmHG  Vent. Rate : 061 BPM     Atrial Rate : 061 BPM     P-R Int : 192 ms          QRS Dur : 088 ms      QT Int : 382 ms       P-R-T Axes : 058 072 037 degrees     QTc Int : 384 ms    Normal sinus rhythm  Normal ECG    Confirmed by Grace Gilliam MD ([id number] on 8/24/2015 6:53:11 PM    Referred By: SELF REFERRAL           Confirmed By:Grace Gilliam MD       No results found for this or any previous visit.    CONSULTATION:     A diagnostic psychiatric evaluation was performed and responsiveness to treatment was assessed.  {XX-ResponseTX:31968}    Consults    {XX-Courtesy:60043}

## 2023-10-11 NOTE — CARE UPDATE
Notified by nursing that patient had eloped. Blood cultures came back positive for GPCs resembling staph. Called patient and sister but no answer.

## 2023-10-11 NOTE — H&P
[facility] - Emergency Dept  [facility]  History & Physical    Patient Name: Ton Donovan  MRN: [id number]  Patient Class: IP- Inpatient  Admission Date: 10/10/2023  Attending Physician: Leeroy Pringle, *   Primary Care Provider: Emily, Primary Doctor         Patient information was obtained from patient and ER records.     Subjective:     Principal Problem:Abscess of chest    Chief Complaint:   Chief Complaint   Patient presents with    Chest Pain     Chest pain x few days and left leg pain, denies cardiac history.         HPI: Ton Donovan is a 28 year old man with history of heroin use and IVDU who presents to the ED due to right upper chest swelling and pain. Patient reports this swelling started about 1 week ago and has gotten larger and more painful. He reports recent IV injection of heroin and he says he snorted heroin shortly before presenting to the ED on this afternoon of 10/10. He says he was told to present to the ED by his friends who were concerned for him. Pt was recently hospitalized from 7/6 to 7/8 due to infected wounds, abscesses, and septic arthritis in his Right hand for which he underwent I&D and received Right IJ catheter for IV antibiotics; however, pt had repeated attempts to elope with RIJ catheter, and patient wished to leave AMA so the line was removed prior to discharge and pt was given PO antibiotics. He reports resolution of his hand wounds. He denies any fevers, shortness of breath, n/v/d, or abdominal pain.    In the ED, patient tachycardic HR 90-100s, with T up to 100.2; otherwise hemodynamically stable and on room air. Labs notable for leukocytosis WBC 27.57, Hgb 8.4, MCV 72, Na 127, Cl 93, albumin 2.2, and . U/S of upper body showed multiple abscesses (left forearm and right medial antecubital fossa), right upper chest fluid collection likely abscess. CT chest showed abscess in the Right pectoralis major muscle as well as findings concerning for osteomyelitis  vs septic arthritis of 1st rib and 1st costochondral joint. Pt given doses of vanc and zosyn. Gen surg consulted in the ED, will plan for I&D in OR on 10/11, and discuss further with thoracic surgery regarding rib findings. Patient admitted to hospital medicine for further management of abscess and possible OM vs septic arthritis.      Past Medical History:   Diagnosis Date    Hypertension     Unilateral inguinal hernia, without obstruction or gangrene, not specified as recurrent        Past Surgical History:   Procedure Laterality Date    HAND ARTHROTOMY Right 7/7/2023    Procedure: ARTHROTOMY, HAND - R LF PIP;  Surgeon: Boy Lamb MD;  Location: [address];  Service: Orthopedics;  Laterality: Right;    IRRIGATION AND DEBRIDEMENT OF UPPER EXTREMITY Bilateral 7/7/2023    Procedure: IRRIGATION AND DEBRIDEMENT, UPPER EXTREMITY - RIGHT HAND;  Surgeon: Boy Lamb MD;  Location: [facility] OR [address] [place] [address];  Service: Orthopedics;  Laterality: Bilateral;    OLECRANON BURSECTOMY Left 7/7/2023    Procedure: BURSECTOMY, OLECRANON;  Surgeon: Boy Lamb MD;  Location: [address];  Service: Orthopedics;  Laterality: Left;       Review of patient's allergies indicates:  No Known Allergies    No current facility-administered medications on file prior to encounter.     No current outpatient medications on file prior to encounter.     Family History    None       Tobacco Use    Smoking status: Former     Types: Cigarettes    Smokeless tobacco: Never   Substance and Sexual Activity    Alcohol use: No    Drug use: Yes     Types: Marijuana, IV     Comment: 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    Sexual activity: Not on file     Review of Systems   Constitutional:  Negative for chills and fever.   HENT:  Negative for congestion and sore throat.    Eyes:  Negative for photophobia and visual disturbance.   Respiratory:  Negative for cough and shortness of breath.    Cardiovascular:  Positive for chest pain.  Negative for palpitations.   Gastrointestinal:  Negative for abdominal pain.   Genitourinary:  Negative for dysuria and hematuria.   Musculoskeletal:  Positive for myalgias. Negative for arthralgias and back pain.   Skin:  Negative for rash and wound.   Neurological:  Negative for dizziness and syncope.   Psychiatric/Behavioral:  Positive for decreased concentration. Negative for agitation and behavioral problems.      Objective:     Vital Signs (Most Recent):  Temp: 99.1 °F (37.3 °C) (10/10/23 2043)  Pulse: 90 (10/11/23 0010)  Resp: 16 (10/10/23 2357)  BP: (!) 150/80 (10/11/23 0005)  SpO2: 100 % (10/11/23 0010) Vital Signs (24h Range):  Temp:  [99.1 °F (37.3 °C)-100.2 °F (37.9 °C)] 99.1 °F (37.3 °C)  Pulse:  [] 90  Resp:  [14-20] 16  SpO2:  [95 %-100 %] 100 %  BP: (119-184)/(58-91) 150/80     Weight: 79.4 kg (175 lb)  Body mass index is 25.11 kg/m².     Physical Exam  Vitals reviewed.   Constitutional:       General: He is not in acute distress.     Appearance: Normal appearance.   HENT:      Head: Normocephalic and atraumatic.      Nose: No congestion or rhinorrhea.      Mouth/Throat:      Mouth: Mucous membranes are dry.      Pharynx: Oropharynx is clear.   Cardiovascular:      Rate and Rhythm: Regular rhythm. Tachycardia present.      Pulses: Normal pulses.      Heart sounds: Normal heart sounds.   Pulmonary:      Effort: Pulmonary effort is normal. No respiratory distress.      Breath sounds: Normal breath sounds.   Abdominal:      General: Bowel sounds are normal.      Palpations: Abdomen is soft.      Tenderness: There is no abdominal tenderness.   Musculoskeletal:         General: Swelling and tenderness present.      Cervical back: Full passive range of motion without pain and normal range of motion.      Comments: Moderate size protuberance on the right upper chest, inferior to the clavicle  Warm to touch and tender to palpation  Some tenderness to palpation of left calf   Skin:     General: Skin is  warm and dry.   Neurological:      General: No focal deficit present.      Mental Status: He is alert and oriented to person, place, and time.   Psychiatric:         Mood and Affect: Mood normal.         Behavior: Behavior normal.                Significant Labs: All pertinent labs within the past 24 hours have been reviewed.    Significant Imaging: I have reviewed all pertinent imaging results/findings within the past 24 hours.    Assessment/Plan:     * Abscess of chest  28M with IVDU and previous septic arthritis who presents with 1 week of increased swelling and pain in right upper chest. WBC elevated to 27.57. Low grade temperature of 100.2 in the ED. Found to have abscess within the right pectoralis major muscle measuring 6.7 x 12.5 cm as well as findings concerning for osteomyelitis vs septic arthritis of 1st rib and 1st costochondral joint. Multiple other abscesses seen on ultrasound in the bilateral upper extremities. Gen Surg consulted in the ED and will take to OR for I&D on 10/11 with further discussion with thoracic surgery regarding bone/joint findings.    Plan:  - Appreciate Gen Surgery recommendations  - F/u cultures  - Continue vanc and zosyn until culture data results to tailor antibiotic therapy  - Pain control with tylenol PRN and can use heating packs or other multimodal methods   - Can possibly use suboxone for pain control but will defer to addiction psych recs  - NPO at [facility] for OR on 10/11    Hyponatremia  Patient has hyponatremia Na 127, vs 138 3 months ago.. We will aim to correct the sodium by 4-6mEq in 24 hours. We will monitor sodium Daily.  We will obtain the following studies: Urine sodium, urine osmolality, serum osmolality.   Recent Labs   Lab 10/10/23  1826   *     Plan:  - HypoNA labs ordered to assess for etiology, possibly hypotonic due to poor nutrition or hypovolemia from decreased PO intake and pt does appear dry on exam.  - Management pending labs, pt not overtly  symptomatic    Iron deficiency anemia  Hgb 8.4 with MCV of 72, with history of housing and financial instability with ongoing IVDU, likely due to iron deficiency as part of nutritional deficiencies.    - Iron studies ordered for the AM, can supplement if MANUEL confirmed    Heroin dependence  Opioid withdrawal    Patient with chronic use of heroin, was seen by addiction psychiatry on last admission in July 2023. He reports ongoing regular use of heroin with last use shortly prior to presentation on 10/10. Pt is saying he wishes to quit illicit opioid use and refused morphine or dilaudid in the ED. Was given a dose of suboxone 8-2 by the ED, and patient then reported feeling withdrawal symptoms afterwards.     Plan:  - Addiction psychiatry consult for acute withdrawal management as well as addiction counseling, appreciate assistance  - Continue Suboxone 8-2 mg qd   - If refractory withdrawal, can give a 4-1mg dose  - Supportive care for withdrawal symptoms with adjuncts:    - Clonidine 0.1mg TID PRN for tachycardia, hypertension.   - Insomnia/anxiety with Hydroxyzine 25mg q8h PRN.    - GI upset with Bentyl 10mg q6h PRN, Loperamide 2mg PRN for diarrhea.   - Can offer nicotine patch for tobacco cravings      VTE Risk Mitigation (From admission, onward)         Ordered     IP VTE HIGH RISK PATIENT  Once         10/11/23 0209     Place sequential compression device  Until discontinued         10/11/23 0209     Reason for No Pharmacological VTE Prophylaxis  Once        Question:  Reasons:  Answer:  IV Heparin w/in 24 hrs. Pre or Post-Op    10/11/23 0209                           Nicholas Chapman MD  Department of Hospital Medicine  [facility] - Emergency Dept

## 2023-10-11 NOTE — PLAN OF CARE
"Addiction Psychiatry Plan of Care Note:   Chart reviewed, patient seen.      Upon entering the patients room, I introduced myself with Addiction Psychiatry; patient appeared irritable, anxious. He reported to me that he was not interested in speaking with me, stating that he "didn't need an addiction psychiatrist." When asked if he was feeling like he was in acute withdrawal or if he was interested in speaking with me about medications to manage his withdrawal, he replied, "no." Patient then discontinued interview and stated he did not wish to speak with Addiction Psychiatry any more this admission.    Later on chart review noted pt left AMA.     Recommendations:   -Pt left AMA  -Should he return to the hospital and seek addiction treatment, would recommend waiting at least 12-18 hours after last opioid use or until patient is in mild withdrawal before initiating suboxone to reduce risk of precipitating withdrawal.   -Additionally, hold Suboxone if patient requiring upcoming surgery or full-agonist opioid management  -should patient return to the hospital to continue treatment, please feel free to re-consult Addiction Psychiatry to assist with his management if he would be willing to speak with us.   "

## 2023-10-11 NOTE — NURSING
This nurse and tech went into the patients room to check on patient and saw that patient was no longer in room or bathroom, IV was unhooked, and patients belongings were gone. Charge nurse and provider notified that patient has eloped.

## 2023-10-11 NOTE — ASSESSMENT & PLAN NOTE
Patient has hyponatremia Na 127, vs 138 3 months ago.. We will aim to correct the sodium by 4-6mEq in 24 hours. We will monitor sodium Daily.  We will obtain the following studies: Urine sodium, urine osmolality, serum osmolality.   Recent Labs   Lab 10/10/23  1826   *     Plan:  - HypoNA labs ordered to assess for etiology, possibly hypotonic due to poor nutrition or hypovolemia from decreased PO intake and pt does appear dry on exam.  - Management pending labs, pt not overtly symptomatic

## 2023-10-11 NOTE — ASSESSMENT & PLAN NOTE
Opioid withdrawal    Patient with chronic use of heroin, was seen by addiction psychiatry on last admission in July 2023. He reports ongoing regular use of heroin with last use shortly prior to presentation on 10/10. Pt is saying he wishes to quit illicit opioid use and refused morphine or dilaudid in the ED. Was given a dose of suboxone 8-2 by the ED, and patient then reported feeling withdrawal symptoms afterwards.     Plan:  - Addiction psychiatry consult for acute withdrawal management as well as addiction counseling, appreciate assistance  - Continue Suboxone 8-2 mg qd   - If refractory withdrawal, can give a 4-1mg dose  - Supportive care for withdrawal symptoms with adjuncts:    - Clonidine 0.1mg TID PRN for tachycardia, hypertension.   - Insomnia/anxiety with Hydroxyzine 25mg q8h PRN.    - GI upset with Bentyl 10mg q6h PRN, Loperamide 2mg PRN for diarrhea.   - Can offer nicotine patch for tobacco cravings

## 2023-10-12 ENCOUNTER — ANESTHESIA EVENT (OUTPATIENT)
Dept: SURGERY | Facility: HOSPITAL | Age: 28
DRG: 178 | End: 2023-10-12
Payer: MEDICAID

## 2023-10-12 ENCOUNTER — ANESTHESIA (OUTPATIENT)
Dept: SURGERY | Facility: HOSPITAL | Age: 28
DRG: 178 | End: 2023-10-12
Payer: MEDICAID

## 2023-10-12 VITALS
DIASTOLIC BLOOD PRESSURE: 70 MMHG | TEMPERATURE: 98 F | BODY MASS INDEX: 28.25 KG/M2 | HEART RATE: 76 BPM | HEIGHT: 70 IN | SYSTOLIC BLOOD PRESSURE: 130 MMHG | WEIGHT: 197.31 LBS | OXYGEN SATURATION: 100 % | RESPIRATION RATE: 20 BRPM

## 2023-10-12 PROBLEM — F11.20 HEROIN DEPENDENCE: Chronic | Status: ACTIVE | Noted: 2023-07-06

## 2023-10-12 PROBLEM — B99.9 ANEMIA OF INFECTION AND CHRONIC DISEASE: Chronic | Status: ACTIVE | Noted: 2023-10-11

## 2023-10-12 PROBLEM — D63.8 ANEMIA OF INFECTION AND CHRONIC DISEASE: Chronic | Status: ACTIVE | Noted: 2023-10-11

## 2023-10-12 LAB
ALBUMIN SERPL BCP-MCNC: 1.9 G/DL (ref 3.5–5.2)
ALP SERPL-CCNC: 121 U/L (ref 55–135)
ALT SERPL W/O P-5'-P-CCNC: 13 U/L (ref 10–44)
ANION GAP SERPL CALC-SCNC: 11 MMOL/L (ref 8–16)
ANISOCYTOSIS BLD QL SMEAR: SLIGHT
AST SERPL-CCNC: 31 U/L (ref 10–40)
BASOPHILS # BLD AUTO: 0.07 K/UL (ref 0–0.2)
BASOPHILS NFR BLD: 0.2 % (ref 0–1.9)
BILIRUB SERPL-MCNC: 0.4 MG/DL (ref 0.1–1)
BUN SERPL-MCNC: 7 MG/DL (ref 6–20)
BURR CELLS BLD QL SMEAR: ABNORMAL
CALCIUM SERPL-MCNC: 8.4 MG/DL (ref 8.7–10.5)
CHLORIDE SERPL-SCNC: 94 MMOL/L (ref 95–110)
CO2 SERPL-SCNC: 22 MMOL/L (ref 23–29)
CREAT SERPL-MCNC: 0.7 MG/DL (ref 0.5–1.4)
DIFFERENTIAL METHOD: ABNORMAL
EOSINOPHIL # BLD AUTO: 0.1 K/UL (ref 0–0.5)
EOSINOPHIL NFR BLD: 0.2 % (ref 0–8)
ERYTHROCYTE [DISTWIDTH] IN BLOOD BY AUTOMATED COUNT: 16.4 % (ref 11.5–14.5)
EST. GFR  (NO RACE VARIABLE): >60 ML/MIN/1.73 M^2
GLUCOSE SERPL-MCNC: 96 MG/DL (ref 70–110)
GRAM STN SPEC: NORMAL
GRAM STN SPEC: NORMAL
HCT VFR BLD AUTO: 25 % (ref 40–54)
HGB BLD-MCNC: 8.6 G/DL (ref 14–18)
HYPOCHROMIA BLD QL SMEAR: ABNORMAL
IMM GRANULOCYTES # BLD AUTO: 0.86 K/UL (ref 0–0.04)
IMM GRANULOCYTES NFR BLD AUTO: 3 % (ref 0–0.5)
LYMPHOCYTES # BLD AUTO: 2.2 K/UL (ref 1–4.8)
LYMPHOCYTES NFR BLD: 7.9 % (ref 18–48)
MAGNESIUM SERPL-MCNC: 1.6 MG/DL (ref 1.6–2.6)
MCH RBC QN AUTO: 24.7 PG (ref 27–31)
MCHC RBC AUTO-ENTMCNC: 34.4 G/DL (ref 32–36)
MCV RBC AUTO: 72 FL (ref 82–98)
MONOCYTES # BLD AUTO: 2.5 K/UL (ref 0.3–1)
MONOCYTES NFR BLD: 8.7 % (ref 4–15)
NEUTROPHILS # BLD AUTO: 22.8 K/UL (ref 1.8–7.7)
NEUTROPHILS NFR BLD: 80 % (ref 38–73)
NRBC BLD-RTO: 0 /100 WBC
OVALOCYTES BLD QL SMEAR: ABNORMAL
PHOSPHATE SERPL-MCNC: 3.4 MG/DL (ref 2.7–4.5)
PLATELET # BLD AUTO: 489 K/UL (ref 150–450)
PLATELET BLD QL SMEAR: ABNORMAL
PMV BLD AUTO: 8.9 FL (ref 9.2–12.9)
POIKILOCYTOSIS BLD QL SMEAR: SLIGHT
POLYCHROMASIA BLD QL SMEAR: ABNORMAL
POTASSIUM SERPL-SCNC: 3.9 MMOL/L (ref 3.5–5.1)
PROT SERPL-MCNC: 6.5 G/DL (ref 6–8.4)
RBC # BLD AUTO: 3.48 M/UL (ref 4.6–6.2)
SODIUM SERPL-SCNC: 127 MMOL/L (ref 136–145)
TARGETS BLD QL SMEAR: ABNORMAL
VANCOMYCIN TROUGH SERPL-MCNC: 3.4 UG/ML (ref 10–22)
WBC # BLD AUTO: 28.45 K/UL (ref 3.9–12.7)

## 2023-10-12 PROCEDURE — 87206 SMEAR FLUORESCENT/ACID STAI: CPT | Performed by: SURGERY

## 2023-10-12 PROCEDURE — 87070 CULTURE OTHR SPECIMN AEROBIC: CPT | Performed by: SURGERY

## 2023-10-12 PROCEDURE — 83735 ASSAY OF MAGNESIUM: CPT

## 2023-10-12 PROCEDURE — 99222 1ST HOSP IP/OBS MODERATE 55: CPT | Mod: ,,, | Performed by: STUDENT IN AN ORGANIZED HEALTH CARE EDUCATION/TRAINING PROGRAM

## 2023-10-12 PROCEDURE — 63600175 PHARM REV CODE 636 W HCPCS

## 2023-10-12 PROCEDURE — 21501 PR I&D DEEP ABSC/HEMATOMA NECK/CHEST: ICD-10-PCS | Mod: ,,, | Performed by: SURGERY

## 2023-10-12 PROCEDURE — 80053 COMPREHEN METABOLIC PANEL: CPT

## 2023-10-12 PROCEDURE — D9220A PRA ANESTHESIA: ICD-10-PCS | Mod: CRNA,,, | Performed by: NURSE ANESTHETIST, CERTIFIED REGISTERED

## 2023-10-12 PROCEDURE — 87186 SC STD MICRODIL/AGAR DIL: CPT | Performed by: SURGERY

## 2023-10-12 PROCEDURE — D9220A PRA ANESTHESIA: ICD-10-PCS | Mod: ANES,,, | Performed by: ANESTHESIOLOGY

## 2023-10-12 PROCEDURE — 25000003 PHARM REV CODE 250: Performed by: STUDENT IN AN ORGANIZED HEALTH CARE EDUCATION/TRAINING PROGRAM

## 2023-10-12 PROCEDURE — 99222 PR INITIAL HOSPITAL CARE,LEVL II: ICD-10-PCS | Mod: ,,, | Performed by: STUDENT IN AN ORGANIZED HEALTH CARE EDUCATION/TRAINING PROGRAM

## 2023-10-12 PROCEDURE — 37000009 HC ANESTHESIA EA ADD 15 MINS: Performed by: SURGERY

## 2023-10-12 PROCEDURE — 80202 ASSAY OF VANCOMYCIN: CPT | Performed by: STUDENT IN AN ORGANIZED HEALTH CARE EDUCATION/TRAINING PROGRAM

## 2023-10-12 PROCEDURE — 99223 1ST HOSP IP/OBS HIGH 75: CPT | Mod: ,,, | Performed by: STUDENT IN AN ORGANIZED HEALTH CARE EDUCATION/TRAINING PROGRAM

## 2023-10-12 PROCEDURE — 25000003 PHARM REV CODE 250: Performed by: NURSE ANESTHETIST, CERTIFIED REGISTERED

## 2023-10-12 PROCEDURE — 36000705 HC OR TIME LEV I EA ADD 15 MIN: Performed by: SURGERY

## 2023-10-12 PROCEDURE — D9220A PRA ANESTHESIA: Mod: ANES,,, | Performed by: ANESTHESIOLOGY

## 2023-10-12 PROCEDURE — 63600175 PHARM REV CODE 636 W HCPCS: Performed by: NURSE ANESTHETIST, CERTIFIED REGISTERED

## 2023-10-12 PROCEDURE — 71000039 HC RECOVERY, EACH ADD'L HOUR: Performed by: SURGERY

## 2023-10-12 PROCEDURE — 99223 PR INITIAL HOSPITAL CARE,LEVL III: ICD-10-PCS | Mod: ,,, | Performed by: STUDENT IN AN ORGANIZED HEALTH CARE EDUCATION/TRAINING PROGRAM

## 2023-10-12 PROCEDURE — 85025 COMPLETE CBC W/AUTO DIFF WBC: CPT

## 2023-10-12 PROCEDURE — 87205 SMEAR GRAM STAIN: CPT | Performed by: SURGERY

## 2023-10-12 PROCEDURE — 25000003 PHARM REV CODE 250

## 2023-10-12 PROCEDURE — 63600175 PHARM REV CODE 636 W HCPCS: Performed by: ANESTHESIOLOGY

## 2023-10-12 PROCEDURE — 94761 N-INVAS EAR/PLS OXIMETRY MLT: CPT

## 2023-10-12 PROCEDURE — 36000704 HC OR TIME LEV I 1ST 15 MIN: Performed by: SURGERY

## 2023-10-12 PROCEDURE — 87077 CULTURE AEROBIC IDENTIFY: CPT | Performed by: SURGERY

## 2023-10-12 PROCEDURE — 21501 I&D DP ABSC/HMTMA SFT TS NCK: CPT | Mod: ,,, | Performed by: SURGERY

## 2023-10-12 PROCEDURE — 84100 ASSAY OF PHOSPHORUS: CPT

## 2023-10-12 PROCEDURE — D9220A PRA ANESTHESIA: Mod: CRNA,,, | Performed by: NURSE ANESTHETIST, CERTIFIED REGISTERED

## 2023-10-12 PROCEDURE — 71000033 HC RECOVERY, INTIAL HOUR: Performed by: SURGERY

## 2023-10-12 PROCEDURE — 87102 FUNGUS ISOLATION CULTURE: CPT | Performed by: SURGERY

## 2023-10-12 PROCEDURE — 63600175 PHARM REV CODE 636 W HCPCS: Performed by: STUDENT IN AN ORGANIZED HEALTH CARE EDUCATION/TRAINING PROGRAM

## 2023-10-12 PROCEDURE — 87116 MYCOBACTERIA CULTURE: CPT | Performed by: SURGERY

## 2023-10-12 PROCEDURE — 71000015 HC POSTOP RECOV 1ST HR: Performed by: SURGERY

## 2023-10-12 PROCEDURE — 37000008 HC ANESTHESIA 1ST 15 MINUTES: Performed by: SURGERY

## 2023-10-12 PROCEDURE — 87075 CULTR BACTERIA EXCEPT BLOOD: CPT | Performed by: SURGERY

## 2023-10-12 RX ORDER — ONDANSETRON 2 MG/ML
INJECTION INTRAMUSCULAR; INTRAVENOUS
Status: DISCONTINUED | OUTPATIENT
Start: 2023-10-12 | End: 2023-10-12

## 2023-10-12 RX ORDER — HYDROMORPHONE HYDROCHLORIDE 1 MG/ML
1 INJECTION, SOLUTION INTRAMUSCULAR; INTRAVENOUS; SUBCUTANEOUS EVERY 4 HOURS PRN
Status: DISCONTINUED | OUTPATIENT
Start: 2023-10-12 | End: 2023-10-12 | Stop reason: HOSPADM

## 2023-10-12 RX ORDER — ENOXAPARIN SODIUM 100 MG/ML
40 INJECTION SUBCUTANEOUS EVERY 24 HOURS
Status: DISCONTINUED | OUTPATIENT
Start: 2023-10-12 | End: 2023-10-12 | Stop reason: HOSPADM

## 2023-10-12 RX ORDER — ROCURONIUM BROMIDE 10 MG/ML
INJECTION, SOLUTION INTRAVENOUS
Status: DISCONTINUED | OUTPATIENT
Start: 2023-10-12 | End: 2023-10-12

## 2023-10-12 RX ORDER — PROPOFOL 10 MG/ML
VIAL (ML) INTRAVENOUS
Status: DISCONTINUED | OUTPATIENT
Start: 2023-10-12 | End: 2023-10-12

## 2023-10-12 RX ORDER — LIDOCAINE HYDROCHLORIDE 20 MG/ML
INJECTION, SOLUTION EPIDURAL; INFILTRATION; INTRACAUDAL; PERINEURAL
Status: DISCONTINUED | OUTPATIENT
Start: 2023-10-12 | End: 2023-10-12

## 2023-10-12 RX ORDER — ACETAMINOPHEN 10 MG/ML
INJECTION, SOLUTION INTRAVENOUS
Status: DISCONTINUED | OUTPATIENT
Start: 2023-10-12 | End: 2023-10-12

## 2023-10-12 RX ORDER — FENTANYL CITRATE 50 UG/ML
INJECTION, SOLUTION INTRAMUSCULAR; INTRAVENOUS
Status: DISCONTINUED | OUTPATIENT
Start: 2023-10-12 | End: 2023-10-12

## 2023-10-12 RX ORDER — KETAMINE HCL IN 0.9 % NACL 50 MG/5 ML
SYRINGE (ML) INTRAVENOUS
Status: DISCONTINUED | OUTPATIENT
Start: 2023-10-12 | End: 2023-10-12

## 2023-10-12 RX ORDER — LINEZOLID 600 MG/1
600 TABLET, FILM COATED ORAL EVERY 12 HOURS
Qty: 28 TABLET | Refills: 0 | Status: SHIPPED | OUTPATIENT
Start: 2023-10-12 | End: 2023-10-26

## 2023-10-12 RX ORDER — MIDAZOLAM HYDROCHLORIDE 1 MG/ML
INJECTION, SOLUTION INTRAMUSCULAR; INTRAVENOUS
Status: DISCONTINUED | OUTPATIENT
Start: 2023-10-12 | End: 2023-10-12

## 2023-10-12 RX ORDER — HYDROMORPHONE HYDROCHLORIDE 1 MG/ML
0.2 INJECTION, SOLUTION INTRAMUSCULAR; INTRAVENOUS; SUBCUTANEOUS EVERY 5 MIN PRN
Status: DISCONTINUED | OUTPATIENT
Start: 2023-10-12 | End: 2023-10-12 | Stop reason: HOSPADM

## 2023-10-12 RX ORDER — MUPIROCIN 20 MG/G
OINTMENT TOPICAL 2 TIMES DAILY
Status: DISCONTINUED | OUTPATIENT
Start: 2023-10-12 | End: 2023-10-12 | Stop reason: HOSPADM

## 2023-10-12 RX ORDER — DEXMEDETOMIDINE HYDROCHLORIDE 100 UG/ML
INJECTION, SOLUTION INTRAVENOUS
Status: DISCONTINUED | OUTPATIENT
Start: 2023-10-12 | End: 2023-10-12

## 2023-10-12 RX ORDER — SODIUM CHLORIDE 0.9 % (FLUSH) 0.9 %
10 SYRINGE (ML) INJECTION
Status: DISCONTINUED | OUTPATIENT
Start: 2023-10-12 | End: 2023-10-12 | Stop reason: HOSPADM

## 2023-10-12 RX ADMIN — HYDROMORPHONE HYDROCHLORIDE 0.2 MG: 1 INJECTION, SOLUTION INTRAMUSCULAR; INTRAVENOUS; SUBCUTANEOUS at 12:10

## 2023-10-12 RX ADMIN — ENOXAPARIN SODIUM 40 MG: 40 INJECTION SUBCUTANEOUS at 05:10

## 2023-10-12 RX ADMIN — SUGAMMADEX 200 MG: 100 INJECTION, SOLUTION INTRAVENOUS at 11:10

## 2023-10-12 RX ADMIN — PIPERACILLIN SODIUM AND TAZOBACTAM SODIUM 4.5 G: 4; .5 INJECTION, POWDER, FOR SOLUTION INTRAVENOUS at 04:10

## 2023-10-12 RX ADMIN — Medication 30 MG: at 11:10

## 2023-10-12 RX ADMIN — HYDROMORPHONE HYDROCHLORIDE 1 MG: 1 INJECTION, SOLUTION INTRAMUSCULAR; INTRAVENOUS; SUBCUTANEOUS at 03:10

## 2023-10-12 RX ADMIN — HYDROMORPHONE HYDROCHLORIDE 0.5 MG: 1 INJECTION, SOLUTION INTRAMUSCULAR; INTRAVENOUS; SUBCUTANEOUS at 11:10

## 2023-10-12 RX ADMIN — MIDAZOLAM HYDROCHLORIDE 2 MG: 1 INJECTION, SOLUTION INTRAMUSCULAR; INTRAVENOUS at 11:10

## 2023-10-12 RX ADMIN — Medication 10 MG: at 11:10

## 2023-10-12 RX ADMIN — PROPOFOL 200 MG: 10 INJECTION, EMULSION INTRAVENOUS at 11:10

## 2023-10-12 RX ADMIN — FENTANYL CITRATE 100 MCG: 50 INJECTION, SOLUTION INTRAMUSCULAR; INTRAVENOUS at 11:10

## 2023-10-12 RX ADMIN — VANCOMYCIN HYDROCHLORIDE 1500 MG: 1.5 INJECTION, POWDER, LYOPHILIZED, FOR SOLUTION INTRAVENOUS at 01:10

## 2023-10-12 RX ADMIN — ONDANSETRON 4 MG: 2 INJECTION INTRAMUSCULAR; INTRAVENOUS at 11:10

## 2023-10-12 RX ADMIN — DEXMEDETOMIDINE 8 MCG: 100 INJECTION, SOLUTION, CONCENTRATE INTRAVENOUS at 11:10

## 2023-10-12 RX ADMIN — OXYCODONE HYDROCHLORIDE AND ACETAMINOPHEN 1 TABLET: 5; 325 TABLET ORAL at 12:10

## 2023-10-12 RX ADMIN — MUPIROCIN: 20 OINTMENT TOPICAL at 01:10

## 2023-10-12 RX ADMIN — LIDOCAINE HYDROCHLORIDE 100 MG: 20 INJECTION, SOLUTION EPIDURAL; INFILTRATION; INTRACAUDAL; PERINEURAL at 11:10

## 2023-10-12 RX ADMIN — ROCURONIUM BROMIDE 20 MG: 10 INJECTION INTRAVENOUS at 11:10

## 2023-10-12 RX ADMIN — PIPERACILLIN SODIUM,TAZOBACTAM SODIUM 4.5 G: 3; .375 INJECTION, POWDER, FOR SOLUTION INTRAVENOUS at 11:10

## 2023-10-12 RX ADMIN — SODIUM CHLORIDE: 9 INJECTION, SOLUTION INTRAVENOUS at 11:10

## 2023-10-12 RX ADMIN — OXYCODONE HYDROCHLORIDE AND ACETAMINOPHEN 1 TABLET: 5; 325 TABLET ORAL at 04:10

## 2023-10-12 RX ADMIN — ROCURONIUM BROMIDE 50 MG: 10 INJECTION INTRAVENOUS at 11:10

## 2023-10-12 RX ADMIN — ACETAMINOPHEN 1000 MG: 10 INJECTION, SOLUTION INTRAVENOUS at 11:10

## 2023-10-12 NOTE — H&P
[facility] - Emergency Dept  [facility] Medicine  History & Physical    Patient Name: Ton Donovan  MRN: [id number]  Patient Class: IP- Inpatient  Admission Date: 10/11/2023  Attending Physician: Leeroy Pringle, *   Primary Care Provider: Emily, Primary Doctor         Patient information was obtained from patient and ER records.     Subjective:     Principal Problem:Abscess of chest    Chief Complaint:   Chief Complaint   Patient presents with    Abscess     Abscess to chest x1 week. Was seen here earlier and states nurse told him to come back d/t infection in blood and to drain abscess        HPI: Ton Donovan is a 28 year old man with history of heroin use and IVDU who re-presents to the ED on 10/11/23 due to right upper chest swelling and pain after having left AMA on that same morning. Patient reports that he was brought back by his sister for treatment. Patient tells me that he left AMA because he panicked at the thought of surgery and felt like he was withdrawing, so he left to do more heroin. He initially presented to [facility] on 10/10 for the same problem. From the last HPI: patient reports this swelling started about 1 week ago and has gotten larger and more painful. He reports recent IV injection of heroin and he says he snorted heroin shortly before presenting to the ED on this afternoon of 10/10. He says he was told to present to the ED by his friends who were concerned for him. Pt was recently hospitalized from 7/6 to 7/8 due to infected wounds, abscesses, and septic arthritis in his Right hand for which he underwent I&D and received Right IJ catheter for IV antibiotics; however, pt had repeated attempts to elope with RIJ catheter, and patient wished to leave AMA so the line was removed prior to discharge and pt was given PO antibiotics. He reports resolution of these hand wounds. He denies any fevers, shortness of breath, n/v/d, or abdominal pain.    CT chest showed abscess in the Right pectoralis major  muscle as well as findings concerning for osteomyelitis vs septic arthritis of 1st rib and 1st costochondral joint. Pt given doses of vanc and zosyn. Patient was admitted to hospital medicine for management of abscess and possible OM vs septic arthritis. Gen surgery planned to take pt to OR for I&D on 10/11. After admission to hospital medicine on 10/10, the following morning patient left AMA before being evaluated by a physician from the primary team. In the interval, his blood cultures returned positive with gram positive cocci with PCR testing positive for MRSA. His sister was contacted via phone by the primary team and was asked to reach out to her brother as he was not able to be reached by phone, who then located him and brought him back to the hospital. Patient reports that he snorted and injected heroin this afternoon after leaving AMA.    In the ED, patient afebrile, tachycardic HR 93, and hypertensive . Labs notable for WBC 30, Hgb 8.4, Na 129; generally stable from prior presentation. Pt was restarted on vanc and zosyn and was given 1mg dilaudid in the ED. Gen surg consulted again for I&D. Patient admitted to hospital medicine.      Past Medical History:   Diagnosis Date    Hypertension     Unilateral inguinal hernia, without obstruction or gangrene, not specified as recurrent        Past Surgical History:   Procedure Laterality Date    HAND ARTHROTOMY Right 7/7/2023    Procedure: ARTHROTOMY, HAND - R LF PIP;  Surgeon: Boy Lamb MD;  Location: [address];  Service: Orthopedics;  Laterality: Right;    IRRIGATION AND DEBRIDEMENT OF UPPER EXTREMITY Bilateral 7/7/2023    Procedure: IRRIGATION AND DEBRIDEMENT, UPPER EXTREMITY - RIGHT HAND;  Surgeon: Boy Lamb MD;  Location: [address];  Service: Orthopedics;  Laterality: Bilateral;    OLECRANON BURSECTOMY Left 7/7/2023    Procedure: BURSECTOMY, OLECRANON;  Surgeon: Boy Lamb MD;  Location: [address];   Service: Orthopedics;  Laterality: Left;       Review of patient's allergies indicates:  No Known Allergies    Current Facility-Administered Medications on File Prior to Encounter   Medication    [COMPLETED] acetaminophen tablet 1,000 mg    [COMPLETED] iohexoL (OMNIPAQUE 350) injection 75 mL    [COMPLETED] ketorolac injection 9.999 mg    [COMPLETED] lactated ringers bolus 2,382 mL    [COMPLETED] vancomycin 2 g in dextrose 5 % 500 mL IVPB    [DISCONTINUED] acetaminophen tablet 650 mg    [DISCONTINUED] buprenorphine-naloxone 8-2 mg SL film 1 Film    [DISCONTINUED] buprenorphine-naloxone 8-2 mg SL film 1 Film    [DISCONTINUED] buprenorphine-naloxone 8-2 mg SL film 1 Film    [DISCONTINUED] cloNIDine tablet 0.1 mg    [DISCONTINUED] dextrose 10% bolus 125 mL 125 mL    [DISCONTINUED] dextrose 10% bolus 250 mL 250 mL    [DISCONTINUED] dicyclomine capsule 10 mg    [DISCONTINUED] glucagon (human recombinant) injection 1 mg    [DISCONTINUED] glucose chewable tablet 16 g    [DISCONTINUED] glucose chewable tablet 24 g    [DISCONTINUED] hydrOXYzine pamoate capsule 25 mg    [DISCONTINUED] loperamide capsule 2 mg    [DISCONTINUED] melatonin tablet 6 mg    [DISCONTINUED] mupirocin 2 % ointment    [DISCONTINUED] naloxone 0.4 mg/mL injection 0.02 mg    [DISCONTINUED] ondansetron injection 4 mg    [DISCONTINUED] piperacillin-tazobactam (ZOSYN) 4.5 g in dextrose 5 % in water (D5W) 100 mL IVPB (MB+)    [DISCONTINUED] piperacillin-tazobactam (ZOSYN) 4.5 g in dextrose 5 % in water (D5W) 100 mL IVPB (MB+)    [DISCONTINUED] sodium chloride 0.9% flush 10 mL    [DISCONTINUED] vancomycin (VANCOCIN) 1250 mg in 5 % dextrose 250 mL IVPB    [DISCONTINUED] vancomycin - pharmacy to dose    [DISCONTINUED] vancomycin 1,250 mg in dextrose 5 % (D5W) 250 mL IVPB (Vial-Mate)     No current outpatient medications on file prior to encounter.     Family History    None       Tobacco Use    Smoking status: Former     Types:  Cigarettes    Smokeless tobacco: Never   Substance and Sexual Activity    Alcohol use: No    Drug use: Yes     Types: Marijuana, IV     Comment: 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    Sexual activity: Not on file     Review of Systems   Constitutional:  Negative for chills and fever.   HENT:  Negative for congestion and sore throat.    Eyes:  Negative for photophobia and visual disturbance.   Respiratory:  Negative for cough and shortness of breath.    Cardiovascular:  Positive for chest pain. Negative for palpitations.   Gastrointestinal:  Negative for abdominal pain.   Genitourinary:  Negative for dysuria and hematuria.   Musculoskeletal:  Positive for myalgias. Negative for arthralgias and back pain.   Skin:  Negative for rash and wound.   Neurological:  Negative for dizziness and syncope.   Psychiatric/Behavioral:  Positive for decreased concentration. Negative for agitation and behavioral problems.      Objective:     Vital Signs (Most Recent):  Temp: 98.4 °F (36.9 °C) (10/11/23 1903)  Pulse: 93 (10/11/23 1903)  Resp: 18 (10/11/23 1903)  BP: (!) 164/78 (10/11/23 1903)  SpO2: 100 % (10/11/23 1903) Vital Signs (24h Range):  Temp:  [98.3 °F (36.8 °C)-99.1 °F (37.3 °C)] 98.4 °F (36.9 °C)  Pulse:  [79-97] 93  Resp:  [14-20] 18  SpO2:  [95 %-100 %] 100 %  BP: (138-184)/(72-91) 164/78     Weight: 79.4 kg (175 lb)  Body mass index is 25.11 kg/m².     Physical Exam  Vitals reviewed.   Constitutional:       General: He is not in acute distress.     Appearance: Normal appearance.   HENT:      Head: Normocephalic and atraumatic.      Nose: No congestion or rhinorrhea.      Mouth/Throat:      Mouth: Mucous membranes are dry.      Pharynx: Oropharynx is clear.   Cardiovascular:      Rate and Rhythm: Regular rhythm. Tachycardia present.      Pulses: Normal pulses.      Heart sounds: Normal heart sounds.   Pulmonary:      Effort: Pulmonary effort is normal. No respiratory distress.      Breath sounds: Normal breath  sounds.   Abdominal:      General: Bowel sounds are normal.      Palpations: Abdomen is soft.      Tenderness: There is no abdominal tenderness.   Musculoskeletal:         General: Swelling and tenderness present.      Cervical back: Full passive range of motion without pain and normal range of motion.      Comments: Moderate size protuberance on the right upper chest, inferior to the clavicle  Warm to touch and tender to palpation  Some tenderness to palpation of left calf   Skin:     General: Skin is warm and dry.   Neurological:      General: No focal deficit present.      Mental Status: He is alert and oriented to person, place, and time.   Psychiatric:         Mood and Affect: Mood normal.         Behavior: Behavior normal.                Significant Labs: All pertinent labs within the past 24 hours have been reviewed.    Significant Imaging: I have reviewed all pertinent imaging results/findings within the past 24 hours.    Assessment/Plan:     * Abscess of chest  28M with IVDU and previous septic arthritis who presents with 1 week of increased swelling and pain in right upper chest. WBC elevated to 30. CT chest showed abscess within the right pectoralis major muscle measuring 6.7 x 12.5 cm as well as findings concerning for osteomyelitis vs septic arthritis of 1st rib and 1st costochondral joint. Multiple other abscesses seen on ultrasound in the bilateral upper extremities. Gen Surg had planned to take pt to OR for I&D on 10/11 with further discussion with thoracic surgery regarding bone/joint findings, however patient left AMA prior to receiving treatment. Was brought back by family to the ED for treatment. Bcx from previous admission showing staph aureus with PCR testing positive for MRSA. Unusual location for abscess however likely 2/2 to hematogenous spread of MRSA.     Plan:  - Gen Surgery consulted for I&D, appreciate assistance  - Continue marvel and zosyn, will follow cultures obtained during I&D  -  Pain control with tylenol PRN and full-agonist opioids (Percocet 5 q8h PRN) at this time, per previous Psychiatry recommendations.  - NPO at MN    MRSA bacteremia  Blood cultures from admission showing GPCs, PCR testing positive for MRSA. Patient previously had MRSA growing in right hand wounds on admission in July 2023 for septic arthritis, though blood cultures at that time were negative.    - Continue IV vancomycin  - ID consulted for MRSA bacteremia management, appreciate recs  - TTE ordered to assess for endocarditis in setting of regular IVDU    Hyponatremia  Patient has hyponatremia which is controlled,We will aim to correct the sodium by 4-6mEq in 24 hours. We will monitor sodium Daily. The hyponatremia is due to Dehydration/hypovolemia. We will obtain the following studies: Urine sodium, urine osmolality, serum osmolality. We will treat the hyponatremia with monitoring. The patient's sodium results have been reviewed and are listed below.  Recent Labs   Lab 10/11/23  2001   *     - Previously obtained labs more suggestive of a hypovolemic hyponatremia, possibly due to decreased PO intake.  - Will give slow IVF infusion of NS 75mL/hr for 1L  - Stable and slowly improved from yesterday, can continue to monitor with daily CMPs    Anemia of infection and chronic disease  Pt with Hgb 8.4 and MCV of 74, iron studies showed decreased levels of iron, TIBC, transferrin, more suggestive of anemia of infection given that TIBC is decreased, as expected with an acute phase reactant.    - Will continue to monitor as infection is treated  - May also still have element of iron deficiency given poor nutritional status    Heroin dependence  Patient with chronic use of heroin, was seen by addiction psychiatry on last admission in July 2023. He reports ongoing regular use of heroin with last use shortly prior to presentations on both 10/10 and 10/11. Pt is saying he wishes to quit illicit opioid use and refused morphine  or dilaudid in the ED. Was given a dose of suboxone 8-2 by the ED yesterday, and patient then reported feeling withdrawal symptoms afterwards. Psych had been consulted regarding withdrawal management and recommended against starting suboxone in the setting of recent heroin use due to concern for precipitating more severe withdrawal.    Plan:   - Patient declined Addiction psychiatry consult. On previous admission, Psych recommended waiting 12-18 hours before initiating Suboxone.  - At present, will continue use of full agonist opioids for pain management given that pt is still using heroin, and may require more potent analgesia after surgical interventions.      VTE Risk Mitigation (From admission, onward)         Ordered     IP VTE HIGH RISK PATIENT  Once         10/11/23 2006     Place sequential compression device  Until discontinued         10/11/23 2006     Reason for No Pharmacological VTE Prophylaxis  Once        Question:  Reasons:  Answer:  IV Heparin w/in 24 hrs. Pre or Post-Op    10/11/23 2006                           Nicholas Chapman MD  Department of Hospital Medicine  [facility] - Emergency Dept

## 2023-10-12 NOTE — ED PROVIDER NOTES
"Encounter Date: 10/11/2023       History     Chief Complaint   Patient presents with    Abscess     Abscess to chest x1 week. Was seen here earlier and states nurse told him to come back d/t infection in blood and to drain abscess     27 y/o male which presents back to the ED "They told me to come back." Pt states he was seen yesterday in the ED for multiple abscesses and left because he wanted to use drugs and felt like he was starting to withdraw. Pt states he uses heroin about 3 times a day. Pt is feeling very fatigued. Denies fevers.    The history is provided by the patient.     Review of patient's allergies indicates:  No Known Allergies  Past Medical History:   Diagnosis Date    Hypertension     Unilateral inguinal hernia, without obstruction or gangrene, not specified as recurrent      Past Surgical History:   Procedure Laterality Date    HAND ARTHROTOMY Right 7/7/2023    Procedure: ARTHROTOMY, HAND - R LF PIP;  Surgeon: Boy Lamb MD;  Location: [address];  Service: Orthopedics;  Laterality: Right;    IRRIGATION AND DEBRIDEMENT OF UPPER EXTREMITY Bilateral 7/7/2023    Procedure: IRRIGATION AND DEBRIDEMENT, UPPER EXTREMITY - RIGHT HAND;  Surgeon: Boy Lamb MD;  Location: [address];  Service: Orthopedics;  Laterality: Bilateral;    OLECRANON BURSECTOMY Left 7/7/2023    Procedure: BURSECTOMY, OLECRANON;  Surgeon: Boy Lamb MD;  Location: [address];  Service: Orthopedics;  Laterality: Left;     No family history on file.  Social History     Tobacco Use    Smoking status: Former     Types: Cigarettes    Smokeless tobacco: Never   Substance Use Topics    Alcohol use: No    Drug use: Yes     Types: Marijuana, IV     Comment: 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     Review of Systems   Constitutional:  Negative for fever.   HENT:  Negative for sore throat.    Respiratory:  Negative for shortness of breath.    Cardiovascular:  Negative for chest pain.   Gastrointestinal:  " Negative for nausea.   Genitourinary:  Negative for dysuria.   Musculoskeletal:  Negative for back pain.   Skin:  Positive for color change and wound. Negative for rash.   Neurological:  Negative for weakness.   Hematological:  Does not bruise/bleed easily.   All other systems reviewed and are negative.      Physical Exam     Initial Vitals [10/11/23 1903]   BP Pulse Resp Temp SpO2   (!) 164/78 93 18 98.4 °F (36.9 °C) 100 %      MAP       --         Physical Exam    Constitutional: He appears well-developed and well-nourished. He is cooperative.  Non-toxic appearance.   HENT:   Head: Normocephalic and atraumatic.   Mouth/Throat: Uvula is midline and mucous membranes are normal.   Eyes: Conjunctivae and EOM are normal. Pupils are equal, round, and reactive to light.   Neck:   Normal range of motion.  Cardiovascular:  Normal rate, regular rhythm, S1 normal, S2 normal, normal heart sounds, intact distal pulses and normal pulses.     Exam reveals no gallop and no friction rub.       No murmur heard.  Pulmonary/Chest: Breath sounds normal. No stridor. No respiratory distress. He has no wheezes. He has no rhonchi. He has no rales. He exhibits tenderness.   Abdominal: Abdomen is soft. Bowel sounds are normal. There is no abdominal tenderness.   Musculoskeletal:         General: Normal range of motion.      Cervical back: Normal range of motion.     Lymphadenopathy:     He has no cervical adenopathy.   Neurological: He is alert and oriented to person, place, and time. He has normal strength. GCS eye subscore is 4. GCS verbal subscore is 5. GCS motor subscore is 6.   Skin: Skin is warm and intact. Capillary refill takes less than 2 seconds. Abscess noted. No rash noted. There is erythema.   Large right upper chest wall abscess with erythema and warmth; multiple abscesses in different stages noted to bilateral forearms   Psychiatric: He has a normal mood and affect.             ED Course   Critical Care    Date/Time:  10/11/2023 9:46 PM    Performed by: Juliette Robledo FNP  Authorized by: Leeroy Pringle MD  Direct patient critical care time: 10 minutes  Additional history critical care time: 5 minutes  Ordering / reviewing critical care time: 0 minutes  Documentation critical care time: 5 minutes  Consulting other physicians critical care time: 5 minutes  Total critical care time (exclusive of procedural time) : 25 minutes  Critical care was necessary to treat or prevent imminent or life-threatening deterioration of the following conditions: sepsis.  Critical care was time spent personally by me on the following activities: discussions with consultants, evaluation of patient's response to treatment, examination of patient, obtaining history from patient or surrogate, ordering and performing treatments and interventions, ordering and review of laboratory studies, re-evaluation of patient's condition and review of old charts.        Labs Reviewed   CBC W/ AUTO DIFFERENTIAL - Abnormal; Notable for the following components:       Result Value    WBC 30.03 (*)     RBC 3.34 (*)     Hemoglobin 8.4 (*)     Hematocrit 24.8 (*)     MCV 74 (*)     MCH 25.1 (*)     RDW 16.5 (*)     Platelets 498 (*)     Immature Granulocytes 4.1 (*)     Gran # (ANC) 23.5 (*)     Immature Grans (Abs) 1.22 (*)     Mono # 2.7 (*)     Gran % 78.1 (*)     Lymph % 8.4 (*)     Platelet Estimate Increased (*)     All other components within normal limits   COMPREHENSIVE METABOLIC PANEL - Abnormal; Notable for the following components:    Sodium 129 (*)     CO2 21 (*)     Albumin 2.1 (*)     All other components within normal limits          Imaging Results    None          Medications   vancomycin 1,250 mg in dextrose 5 % (D5W) 250 mL IVPB (Vial-Mate) (1,250 mg Intravenous New Bag 10/11/23 2121)   sodium chloride 0.9% flush 10 mL (has no administration in time range)   naloxone 0.4 mg/mL injection 0.02 mg (has no administration in time range)   glucose  chewable tablet 16 g (has no administration in time range)   glucose chewable tablet 24 g (has no administration in time range)   glucagon (human recombinant) injection 1 mg (has no administration in time range)   acetaminophen tablet 650 mg (has no administration in time range)   melatonin tablet 6 mg (has no administration in time range)   dextrose 10% bolus 125 mL 125 mL (has no administration in time range)   dextrose 10% bolus 250 mL 250 mL (has no administration in time range)   0.9%  NaCl infusion (has no administration in time range)   HYDROmorphone injection 1 mg (1 mg Intravenous Given 10/11/23 2037)   piperacillin-tazobactam (ZOSYN) 4.5 g in dextrose 5 % in water (D5W) 100 mL IVPB (MB+) (0 g Intravenous Stopped 10/11/23 2107)     Medical Decision Making  27 y/o IVDU which presents back to the ED, after eloping yesterday, after he received a phone call that he should return. Pt has positive MRSA blood cultures. He has agreed to be admitted for treatment and would like to go to rehab after getting treated for bacteremia. Pt will need to go to the OR for chest abscess due to size and location. He was given vanc and zosyn in the ED and has been admitted back to Dr. Pringle's team for IV abx. Discussion with Dr. Anderson and Dr. Pringle regarding plan of care and admission.     Known chest wall/pectoralis abscess which extends to the right apex of his lung which was seen on CT chest dated 10/10/23. Pt and I had a very long discussion regarding the importance of getting treated and staying in the hospital for full treatment. He has agreed that if he starts to feel like he is going through withdrawals, that he will notify someone instead of eloping.     Problems Addressed:  Abscess of left forearm: acute illness or injury  Chest wall abscess: acute illness or injury  MRSA bacteremia: acute illness or injury    Amount and/or Complexity of Data Reviewed  Labs: ordered. Decision-making details documented in ED  Course.    Risk  Prescription drug management.  Decision regarding hospitalization.  Minor surgery with identified risk factors.               ED Course as of 10/11/23 2147   Wed Oct 11, 2023   2109 Sodium(!): 129 [AT]   2109 WBC(!): 30.03 [AT]   2109 Hemoglobin(!): 8.4 [AT]   2109 Hematocrit(!): 24.8 [AT]   2109 Sodium(!): 129 [AT]   2109 BP(!): 170/78 [AT]   2109 Temp: 98.6 °F (37 °C) [AT]   2109 Temp Source: Oral [AT]   2109 Resp: 17 [AT]   2109 Pulse: 88 [AT]   2109 SpO2: 98 % [AT]      ED Course User Index  [AT] Juliette Robledo FNP          This patient does have evidence of infective focus  My overall impression is sepsis.  Source: Skin and Soft Tissue (location right chest wall and left forearm)  Antibiotics given-   Antibiotics (72h ago, onward)      Start     Stop Route Frequency Ordered    10/11/23 2100  vancomycin 1,250 mg in dextrose 5 % (D5W) 250 mL IVPB (Vial-Mate)         10/12/23 0859 IV ED 1 Time 10/11/23 2000          Latest lactate reviewed-  Recent Labs   Lab 10/10/23  1830   POCLAC 1.58     Organ dysfunction indicated by  n/a    Fluid challenge Not needed - patient is not hypotensive      Post- resuscitation assessment No - Post resuscitation assessment not needed       Will Not start Pressors  Source control achieved by: vancomycin and zosyn            Clinical Impression:   Final diagnoses:  [R78.81, B95.62] MRSA bacteremia (Primary)  [L02.213] Chest wall abscess  [L02.414] Abscess of left forearm        ED Disposition Condition    Admit Stable                Juliette Robledo FNP  10/11/23 2147

## 2023-10-12 NOTE — PLAN OF CARE
Problem: Adult Inpatient Plan of Care  Goal: Plan of Care Review  Outcome: Ongoing, Progressing  Goal: Patient-Specific Goal (Individualized)  Outcome: Ongoing, Progressing  Goal: Absence of Hospital-Acquired Illness or Injury  Outcome: Ongoing, Progressing  Goal: Optimal Comfort and Wellbeing  Outcome: Ongoing, Progressing  Goal: Readiness for Transition of Care  Outcome: Ongoing, Progressing     Problem: Infection  Goal: Absence of Infection Signs and Symptoms  Outcome: Ongoing, Progressing     Problem: Impaired Wound Healing  Goal: Optimal Wound Healing  Outcome: Ongoing, Progressing     Patient admitted to room 528 after surgery, medicated for pain x1, pt stating he wants to leave MD CATRACHITA notified.

## 2023-10-12 NOTE — CONSULTS
"[facility] - [facility]  Infectious Disease  Consult Note    Patient Name: Ton Donovan  MRN: [id number]  Admission Date: 10/11/2023  Hospital Length of Stay: 1 days  Attending Physician: Leeroy Pringle, *  Primary Care Provider: No, Primary Doctor     Isolation Status: Contact    Patient information was obtained from patient and ER records.      Inpatient consult to Infectious Diseases  Consult performed by: Reema Collins DO  Consult ordered by: Nicholas Chapman MD        Assessment/Plan:     Pulmonary  * Abscess of chest  28M with PMH of IVDU / heroin use, L hand septic arthritis and multiple infected skin wounds in July 2023, presents with 1 week of R upper chest pain and swelling. Leukocytosis in high 20s/30s. CT chest shows R pectoralis muscle abscess, osteomyelitis vs septic arthritis of R first rib / costochondral joint, and scattered subcentimeter pulmonary nodules. Also noted to have L forearm and R antecubital fossa lesions. Blood cultures from 10/10 showing GPCs, rapid ID positive for MRSA. Patient is currently on vancomycin and zosyn. S/p R chest I&D on 10/12.     Recommendations  · Stop zosyn. Continue vancomycin  · Obtain repeat blood cultures today  · Obtain TTE  · Check HIV, hep B, hep C        Thank you for your consult. I will follow-up with patient. Please contact us if you have any additional questions.    Reema Collins DO  Infectious Disease  [facility]    Subjective:     Principal Problem: Abscess of chest    HPI: Mr. Donovan is a 28M with PMH of IVDU / heroin use, L hand septic arthritis and multiple infected skin wounds in July 2023, presents with 1 week of R upper chest pain and swelling. Infectious disease consulted for "28M with regular heroin IVDU, previous septic arthritis, now with large right chest abscess and numerous abscesses in bilateral arms, with concern for sternoclavicular OM vs septic arthritis. Bcx with staph and PCR testing positive for MRSA. TTE pending.".     Per " chart review, patient was hospitalized in July for L hand and elbow septic arthritis and had undergone I&D with ortho. Patient had left AMA (prior to being seen by ID), and was prescribed 4 weeks of doxycycline and augmentin to go home with. Patient states that his wounds had resolved at that time. Cultures at that time had grown MRSA.     Patient had been afebrile here but has leukocytosis in high 20s/30s. CT chest shows R pectoralis muscle abscess, osteomyelitis vs septic arthritis of R first rib / costochondral joint, and scattered subcentimeter pulmonary nodules. Also noted to have L forearm and R antecubital fossa lesions. Blood cultures from 10/10 showing GPCs, rapid ID positive for MRSA. Patient is currently on vancomycin and zosyn.       Past Medical History:   Diagnosis Date    Hypertension     Unilateral inguinal hernia, without obstruction or gangrene, not specified as recurrent        Past Surgical History:   Procedure Laterality Date    HAND ARTHROTOMY Right 7/7/2023    Procedure: ARTHROTOMY, HAND - R LF PIP;  Surgeon: Boy Lamb MD;  Location: [facility] OR [address] [place] [address];  Service: Orthopedics;  Laterality: Right;    IRRIGATION AND DEBRIDEMENT OF UPPER EXTREMITY Bilateral 7/7/2023    Procedure: IRRIGATION AND DEBRIDEMENT, UPPER EXTREMITY - RIGHT HAND;  Surgeon: Boy Lamb MD;  Location: [facility] OR [address] [place] [address];  Service: Orthopedics;  Laterality: Bilateral;    OLECRANON BURSECTOMY Left 7/7/2023    Procedure: BURSECTOMY, OLECRANON;  Surgeon: Boy Lamb MD;  Location: [facility] OR [address] [place] [address];  Service: Orthopedics;  Laterality: Left;       Review of patient's allergies indicates:  No Known Allergies    Medications:  No medications prior to admission.     Antibiotics (From admission, onward)      Start     Stop Route Frequency Ordered    10/12/23 1400  vancomycin 1,500 mg in dextrose 5 % (D5W) 250 mL IVPB (Vial-Mate)         -- IV Every 8 hours 10/12/23 1153    10/12/23 1300  mupirocin 2 % ointment          10/17/23 0859 Nasl 2 times daily 10/12/23 1150    10/12/23 1230  cefTRIAXone (ROCEPHIN) 2 g in dextrose 5 % in water (D5W) 100 mL IVPB (MB+)         -- IV Every 24 hours (non-standard times) 10/12/23 1120    10/12/23 0512  vancomycin - pharmacy to dose  (vancomycin IVPB (PEDS and ADULTS))        See Hyperspace for full Linked Orders Report.    -- IV pharmacy to manage frequency 10/12/23 8822          Antifungals (From admission, onward)      None          Antivirals (From admission, onward)      None               There is no immunization history on file for this patient.    Family History    None       Social History     Socioeconomic History    Marital status: Single   Tobacco Use    Smoking status: Former     Types: Cigarettes    Smokeless tobacco: Never   Substance and Sexual Activity    Alcohol use: No    Drug use: Yes     Types: Marijuana, IV     Comment: 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     Social Determinants of Health     Financial Resource Strain: Low Risk  (7/7/2023)    Overall Financial Resource Strain (CARDIA)     Difficulty of Paying Living Expenses: Not hard at all   Food Insecurity: No Food Insecurity (7/7/2023)    Hunger Vital Sign     Worried About Running Out of Food in the Last Year: Never true     Ran Out of Food in the Last Year: Never true   Transportation Needs: No Transportation Needs (7/7/2023)    PRAPARE - Transportation     Lack of Transportation (Medical): No     Lack of Transportation (Non-Medical): No   Physical Activity: Sufficiently Active (7/7/2023)    Exercise Vital Sign     Days of Exercise per Week: 3 days     Minutes of Exercise per Session: 60 min   Stress: No Stress Concern Present (7/7/2023)    [employer] - Occupational Stress Questionnaire     Feeling of Stress : Not at all   Social Connections: Socially Isolated (7/7/2023)    Social Connection and Isolation Panel [NHANES]     Frequency of Communication with Friends and  Family: Twice a week     Frequency of Social Gatherings with Friends and Family: More than three times a week     Attends Evangelical Services: Never     Active Member of Clubs or Organizations: No     Attends Club or Organization Meetings: Never     Marital Status: Never    Housing Stability: Low Risk  (7/7/2023)    Housing Stability Vital Sign     Unable to Pay for Housing in the Last Year: No     Number of Places Lived in the Last Year: 1     Unstable Housing in the Last Year: No     Review of Systems   Constitutional:  Negative for chills and fever.   Respiratory:  Negative for cough and shortness of breath.    Cardiovascular:  Negative for chest pain.   Gastrointestinal:  Negative for abdominal pain, constipation, diarrhea, nausea and vomiting.   Genitourinary:  Negative for dysuria and hematuria.   Musculoskeletal:  Negative for arthralgias and myalgias.        L leg pain   Neurological:  Negative for weakness and headaches.     Objective:     Vital Signs (Most Recent):  Temp: 98.4 °F (36.9 °C) (10/12/23 1423)  Pulse: 76 (10/12/23 1423)  Resp: 18 (10/12/23 1423)  BP: 130/70 (10/12/23 1423)  SpO2: 100 % (10/12/23 1423) Vital Signs (24h Range):  Temp:  [98 °F (36.7 °C)-99.7 °F (37.6 °C)] 98.4 °F (36.9 °C)  Pulse:  [76-93] 76  Resp:  [14-21] 18  SpO2:  [98 %-100 %] 100 %  BP: (130-170)/(65-82) 130/70     Weight: 79.4 kg (175 lb)  Body mass index is 25.11 kg/m².    Estimated Creatinine Clearance: 159.3 mL/min (based on SCr of 0.7 mg/dL).     Physical Exam  Vitals reviewed.   Constitutional:       General: He is not in acute distress.     Appearance: Normal appearance. He is not ill-appearing.   HENT:      Head: Normocephalic and atraumatic.   Eyes:      Extraocular Movements: Extraocular movements intact.      Conjunctiva/sclera: Conjunctivae normal.   Cardiovascular:      Rate and Rhythm: Normal rate and regular rhythm.      Heart sounds: No murmur heard.  Pulmonary:      Effort: Pulmonary effort is  normal. No respiratory distress.      Breath sounds: Normal breath sounds.   Abdominal:      General: Abdomen is flat. Bowel sounds are normal.      Palpations: Abdomen is soft.      Tenderness: There is no abdominal tenderness.   Musculoskeletal:      Cervical back: Normal range of motion.      Comments: R chest dressing in place, gauze is soaked with serosanguinous fluid. L forearm lesion, no active drainage. R antecubital fossa lesion wrapped in dressing that is clean, dry, intact. L shin firm swelling   Skin:     General: Skin is warm and dry.   Neurological:      Mental Status: He is alert.   Psychiatric:         Behavior: Behavior normal.         Thought Content: Thought content normal.          Significant Labs: All pertinent labs within the past 24 hours have been reviewed.  Recent Lab Results         10/12/23  1134   10/12/23  1040   10/12/23  0431   10/11/23  2001        Albumin     1.9   2.1       ALP     121   130       ALT     13   15       Anion Gap     11   12       Aniso     Slight   Slight       AST     31   40       Baso #     0.07   0.10       Basophil %     0.2   0.3       BILIRUBIN TOTAL     0.4  Comment: For infants and newborns, interpretation of results should be based  on gestational age, weight and in agreement with clinical  observations.    Premature Infant recommended reference ranges:  Up to 24 hours.............<8.0 mg/dL  Up to 48 hours............<12.0 mg/dL  3-5 days..................<15.0 mg/dL  6-29 days.................<15.0 mg/dL     0.3  Comment: For infants and newborns, interpretation of results should be based  on gestational age, weight and in agreement with clinical  observations.    Premature Infant recommended reference ranges:  Up to 24 hours.............<8.0 mg/dL  Up to 48 hours............<12.0 mg/dL  3-5 days..................<15.0 mg/dL  6-29 days.................<15.0 mg/dL         BUN     7   9       Gaurav/Echinocytes     Occasional   Occasional       Calcium      8.4   8.7       Chloride     94   96       CO2     22   21       Creatinine     0.7   0.6       Differential Method     Automated   Automated       Dohle Bodies       Present       eGFR     >60.0   >60.0       Eos #     0.1   0.1       Eosinophil %     0.2   0.2       Fragmented Cells       Occasional       Glucose     96   96       Gram Stain Result No WBC's              No organisms seen             Gran # (ANC)     22.8   23.5       Gran %     80.0   78.1       Hematocrit     25.0   24.8       Hemoglobin     8.6   8.4       Hypo     Occasional   Occasional       Immature Grans (Abs)     0.86  Comment: Mild elevation in immature granulocytes is non specific and   can be seen in a variety of conditions including stress response,   acute inflammation, trauma and pregnancy. Correlation with other   laboratory and clinical findings is essential.     1.22  Comment: Mild elevation in immature granulocytes is non specific and   can be seen in a variety of conditions including stress response,   acute inflammation, trauma and pregnancy. Correlation with other   laboratory and clinical findings is essential.         Immature Granulocytes     3.0   4.1       Lymph #     2.2   2.5       Lymph %     7.9   8.4       Magnesium      1.6         MCH     24.7   25.1       MCHC     34.4   33.9       MCV     72   74       Mono #     2.5   2.7       Mono %     8.7   8.9       MPV     8.9   9.6       nRBC     0   0       Ovalocytes     Occasional   Occasional       Phosphorus Level     3.4         Platelet Estimate     Increased   Increased       Platelet Count     489   498       Poikilocytosis     Slight   Slight       Poly     Occasional   Occasional       Potassium     3.9   3.8       PROTEIN TOTAL     6.5   7.0       RBC     3.48   3.34       RDW     16.4   16.5       Smudge Cells       Present  Comment: Smudge cells present;Substantial numbers may affect the   accuracy of the differential.         Sodium     127   129        Target Cells     Occasional   Occasional       Teardrop Cells       Occasional       Toxic Granulation       Present       Vacuolated Granulocytes       Present       Vancomycin-Trough   3.4           WBC     28.45   30.03               Significant Imaging: I have reviewed all pertinent imaging results/findings within the past 24 hours.

## 2023-10-12 NOTE — TRANSFER OF CARE
"Anesthesia Transfer of Care Note    Patient: Ton Donovan    Procedure(s) Performed: Procedure(s) (LRB):  INCISION AND DRAINAGE, ABSCESS (Right)    Patient location: PACU    Transport from OR: Transported from OR on 6-10 L/min O2 by face mask with adequate spontaneous ventilation    Post pain: adequate analgesia    Post assessment: no apparent anesthetic complications    Post vital signs: stable    Level of consciousness: awake and alert    Nausea/Vomiting: no nausea/vomiting    Complications: none    Transfer of care protocol was followed      Last vitals:   Visit Vitals  /68 (BP Location: Right arm, Patient Position: Lying)   Pulse 88   Temp 37.6 °C (99.7 °F) (Temporal)   Resp 18   Ht 5' 10" (1.778 m)   Wt 79.4 kg (175 lb)   SpO2 100%   BMI 25.11 kg/m²     "

## 2023-10-12 NOTE — PROGRESS NOTES
Pharmacokinetic Assessment Follow Up: IV Vancomycin    Vancomycin serum concentration assessment(s):    The trough level was drawn slightly late, but is still rather below goal. The measurement is below the desired definitive target range of 15 to 20 mcg/mL.    Vancomycin Regimen Plan:    Change regimen to Vancomycin 1500 mg IV every 8 hours with next serum trough concentration measured at 0500 prior to 3rd dose on 10/13    Drug levels (last 3 results):  Recent Labs   Lab Result Units 10/12/23  1040   Vancomycin-Trough ug/mL 3.4*       Pharmacy will continue to follow and monitor vancomycin.    Please contact pharmacy at extension [phone] for questions regarding this assessment.    Thank you for the consult,   Lala Haas       Patient brief summary:  Ton Donovan is a 28 y.o. male initiated on antimicrobial therapy with IV Vancomycin for treatment of bacteremia    Drug Allergies:   Review of patient's allergies indicates:  No Known Allergies    Actual Body Weight:   79.4 kg    Renal Function:   Estimated Creatinine Clearance: 159.3 mL/min (based on SCr of 0.7 mg/dL).,     Dialysis Method (if applicable):  N/A    CBC (last 72 hours):  Recent Labs   Lab Result Units 10/10/23  1826 10/11/23  0543 10/11/23  2001 10/12/23  0431   WBC K/uL 27.57* 29.25* 30.03* 28.45*   Hemoglobin g/dL 8.4* 8.1* 8.4* 8.6*   Hematocrit % 24.6* 23.9* 24.8* 25.0*   Platelets K/uL 489* 446 498* 489*   Gran % % 81.1* 81.7* 78.1* 80.0*   Lymph % % 7.9* 6.2* 8.4* 7.9*   Mono % % 7.3 9.3 8.9 8.7   Eosinophil % % 0.1 0.1 0.2 0.2   Basophil % % 0.3 0.2 0.3 0.2   Differential Method  Automated Automated Automated Automated       Metabolic Panel (last 72 hours):  Recent Labs   Lab Result Units 10/10/23  1826 10/10/23  1955 10/11/23  0543 10/11/23  2001 10/12/23  0431   Sodium mmol/L 127*  --  128* 129* 127*   Potassium mmol/L 3.5  --  3.8 3.8 3.9   Chloride mmol/L 93*  --  97 96 94*   CO2 mmol/L 24  --  21* 21* 22*   Glucose mg/dL 123*  --  117*  96 96   Glucose, UA   --  Negative  --   --   --    BUN mg/dL 15  --  10 9 7   Creatinine mg/dL 0.8  --  0.7 0.6 0.7   Albumin g/dL 2.2*  --  1.8* 2.1* 1.9*   Total Bilirubin mg/dL 0.3  --  0.5 0.3 0.4   Alkaline Phosphatase U/L 147*  --  124 130 121   AST U/L 47*  --  35 40 31   ALT U/L 16  --  12 15 13   Magnesium mg/dL 1.8  --  1.7  --  1.6   Phosphorus mg/dL 3.4  --  3.0  --  3.4       Vancomycin Administrations:  vancomycin given in the last 96 hours                     vancomycin 1,250 mg in dextrose 5 % (D5W) 250 mL IVPB (Vial-Mate) (mg) 1,250 mg New Bag 10/11/23 2126    vancomycin 1,250 mg in dextrose 5 % (D5W) 250 mL IVPB (Vial-Mate) (mg) 1,250 mg New Bag 10/11/23 0802    vancomycin 2 g in dextrose 5 % 500 mL IVPB (mg) 2,000 mg New Bag 10/10/23 1921                    Microbiologic Results:  Microbiology Results (last 7 days)       Procedure Component Value Units Date/Time    AFB Culture & Smear [[id number]] Collected: 10/12/23 1134    Order Status: Sent Specimen: Abscess from Chest, Right Updated: 10/12/23 1142    Fungus culture [[id number]] Collected: 10/12/23 1134    Order Status: Sent Specimen: Abscess from Chest, Right Updated: 10/12/23 1141    Gram stain [[id number]] Collected: 10/12/23 1134    Order Status: Sent Specimen: Abscess from Chest, Right Updated: 10/12/23 1141    Culture, Anaerobic [[id number]] Collected: 10/12/23 1134    Order Status: Sent Specimen: Abscess from Chest, Right Updated: 10/12/23 1140    Aerobic culture [[id number]] Collected: 10/12/23 1134    Order Status: Sent Specimen: Abscess from Chest, Right Updated: 10/12/23 1140    Blood culture [[id number]]     Order Status: No result Specimen: Blood     Blood culture [[id number]]     Order Status: No result Specimen: Blood

## 2023-10-12 NOTE — NURSING
Patient again stating that he wants to leave AMA, again explained the importance of staying and getting IV ABX, pt states he still wants to leave, Hospital Med 5 on call resident paged .     1723: Secure chatted with Dr. Pringle and Dr. Mcgrath regarding above.

## 2023-10-12 NOTE — ED NOTES
"Report received from nightshift. Care taken over. Pt awake and alert; resting quietly on stretcher.  VS stable. Pt denies pain at this time; no acute distress or discomfort reported or observed.  Pt ambulated to restroom with steady gait. Bed locked in lowest position; side rails up and locked x 1; bedside table, and personal belongings within reach. Room assessed for safety measures and cleanliness; no action needed at this time. Plan of care discussed. Pt denies needs or complaints at this time, pt states "I'm just ready to get this surgery over with"          "

## 2023-10-12 NOTE — ASSESSMENT & PLAN NOTE
28M with IVDU and previous septic arthritis who presents with 1 week of increased swelling and pain in right upper chest. WBC elevated to 30. CT chest showed abscess within the right pectoralis major muscle measuring 6.7 x 12.5 cm as well as findings concerning for osteomyelitis vs septic arthritis of 1st rib and 1st costochondral joint. Multiple other abscesses seen on ultrasound in the bilateral upper extremities. Gen Surg had planned to take pt to OR for I&D on 10/11 with further discussion with thoracic surgery regarding bone/joint findings, however patient left AMA prior to receiving treatment. Was brought back by family to the ED for treatment. Bcx from previous admission showing staph aureus with PCR testing positive for MRSA. Unusual location for abscess however likely 2/2 to hematogenous spread of MRSA.     Plan:  - Gen Surgery consulted for I&D, appreciate assistance  - Continue vanc and zosyn, will follow cultures obtained during I&D  - Pain control with tylenol PRN and full-agonist opioids (Percocet 5 q8h PRN) at this time, per previous Psychiatry recommendations.  - NPO at MN

## 2023-10-12 NOTE — ASSESSMENT & PLAN NOTE
Patient has hyponatremia which is controlled,We will aim to correct the sodium by 4-6mEq in 24 hours. We will monitor sodium Daily. The hyponatremia is due to Dehydration/hypovolemia. We will obtain the following studies: Urine sodium, urine osmolality, serum osmolality. We will treat the hyponatremia with monitoring. The patient's sodium results have been reviewed and are listed below.  Recent Labs   Lab 10/11/23  2001   *     - Previously obtained labs more suggestive of a hypovolemic hyponatremia, possibly due to decreased PO intake.  - Will give slow IVF infusion of NS 75mL/hr for 1L  - Stable and slowly improved from yesterday, can continue to monitor with daily CMPs

## 2023-10-12 NOTE — ASSESSMENT & PLAN NOTE
Pt with Hgb 8.4 and MCV of 74, iron studies showed decreased levels of iron, TIBC, transferrin, more suggestive of anemia of infection given that TIBC is decreased, as expected with an acute phase reactant.    - Will continue to monitor as infection is treated  - May also still have element of iron deficiency given poor nutritional status

## 2023-10-12 NOTE — CONSULTS
[facility] - Emergency Dept  General Surgery  Consult Note    Patient Name: Ton Donovan  MRN: [id number]  Code Status: Full Code  Admission Date: 10/11/2023  Hospital Length of Stay: 0 days  Attending Physician: Leeroy Pringle, *  Primary Care Provider: Emily, Primary Doctor    Patient information was obtained from patient, past medical records and ER records.     Inpatient consult to General Surgery  Consult performed by: Maryann Jalloh MD  Consult ordered by: Nicholas Chapman MD        Subjective:     Principal Problem: Abscess of chest    History of Present Illness: 28 y.o. male with a  PMHx of IVDU and HTN who presents to the ED with complaints of multiple wounds and chest pain. Patient has been admitted multiple times for superficial abscess related to IVDU. Last hospital stay was on 07/23 for septic arthritis.      Patient was seen in the ED today, stable at arrival Labs Na 128, white count of 27, H/H 8.4/24.6. CT with Fluid collection within the right pectoralis major muscle measuring 6.7 x 12.5 cm, likely an abscess.  Presumed extension of this fluid collection into the pleural space of the anterior right lung apex.  Possible osteomyelitis versus septic arthritis of the anterior aspect of the right 1st rib and 1st costochondral joint.     General surgery was consulted for evaluation. Currently at the ED withdrawing and asking for medications for pain and sleep.       Interval History   Had planned to undergo incision and drainage of right chest abscess earlier today, but left AMA as he felt he was withdrawing. He had blood cultures which returned with MRSA but he was unable to be reached to return to the hospital so his sister was notified and brought him back for treatment. Upon arrival he is AF and HDS. Labs stable from earlier. Surgery re-consulted for I&D.      Current Facility-Administered Medications on File Prior to Encounter   Medication    [COMPLETED] iohexoL (OMNIPAQUE 350) injection 75 mL     [DISCONTINUED] acetaminophen tablet 650 mg    [DISCONTINUED] buprenorphine-naloxone 8-2 mg SL film 1 Film    [DISCONTINUED] buprenorphine-naloxone 8-2 mg SL film 1 Film    [DISCONTINUED] cloNIDine tablet 0.1 mg    [DISCONTINUED] dextrose 10% bolus 125 mL 125 mL    [DISCONTINUED] dextrose 10% bolus 250 mL 250 mL    [DISCONTINUED] dicyclomine capsule 10 mg    [DISCONTINUED] glucagon (human recombinant) injection 1 mg    [DISCONTINUED] glucose chewable tablet 16 g    [DISCONTINUED] glucose chewable tablet 24 g    [DISCONTINUED] hydrOXYzine pamoate capsule 25 mg    [DISCONTINUED] loperamide capsule 2 mg    [DISCONTINUED] melatonin tablet 6 mg    [DISCONTINUED] mupirocin 2 % ointment    [DISCONTINUED] naloxone 0.4 mg/mL injection 0.02 mg    [DISCONTINUED] ondansetron injection 4 mg    [DISCONTINUED] piperacillin-tazobactam (ZOSYN) 4.5 g in dextrose 5 % in water (D5W) 100 mL IVPB (MB+)    [DISCONTINUED] piperacillin-tazobactam (ZOSYN) 4.5 g in dextrose 5 % in water (D5W) 100 mL IVPB (MB+)    [DISCONTINUED] sodium chloride 0.9% flush 10 mL    [DISCONTINUED] vancomycin (VANCOCIN) 1250 mg in 5 % dextrose 250 mL IVPB    [DISCONTINUED] vancomycin - pharmacy to dose    [DISCONTINUED] vancomycin 1,250 mg in dextrose 5 % (D5W) 250 mL IVPB (Vial-Mate)     No current outpatient medications on file prior to encounter.       Review of patient's allergies indicates:  No Known Allergies    Past Medical History:   Diagnosis Date    Hypertension     Unilateral inguinal hernia, without obstruction or gangrene, not specified as recurrent      Past Surgical History:   Procedure Laterality Date    HAND ARTHROTOMY Right 7/7/2023    Procedure: ARTHROTOMY, HAND - R LF PIP;  Surgeon: Boy Lamb MD;  Location: [facility] OR [address] [place] [address];  Service: Orthopedics;  Laterality: Right;    IRRIGATION AND DEBRIDEMENT OF UPPER EXTREMITY Bilateral 7/7/2023    Procedure: IRRIGATION AND DEBRIDEMENT, UPPER EXTREMITY - RIGHT HAND;   Surgeon: Boy Lamb MD;  Location: [facility] OR [address] [place] [address];  Service: Orthopedics;  Laterality: Bilateral;    OLECRANON BURSECTOMY Left 7/7/2023    Procedure: BURSECTOMY, OLECRANON;  Surgeon: Boy Lamb MD;  Location: [facility] OR [address] [place] [address];  Service: Orthopedics;  Laterality: Left;     Family History    None       Tobacco Use    Smoking status: Former     Types: Cigarettes    Smokeless tobacco: Never   Substance and Sexual Activity    Alcohol use: No    Drug use: Yes     Types: Marijuana, IV     Comment: 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    Sexual activity: Not on file     Review of Systems   Constitutional:  Negative for chills and fever.   HENT:  Negative for congestion and sore throat.    Eyes:  Negative for photophobia and visual disturbance.   Respiratory:  Negative for cough and shortness of breath.    Cardiovascular:  Positive for chest pain. Negative for palpitations.   Gastrointestinal:  Negative for abdominal pain.   Genitourinary:  Negative for dysuria and hematuria.   Musculoskeletal:  Positive for myalgias. Negative for arthralgias and back pain.   Skin:  Positive for wound. Negative for rash.   Neurological:  Negative for dizziness and syncope.   Psychiatric/Behavioral:  Positive for decreased concentration. Negative for agitation and behavioral problems.      Objective:     Vital Signs (Most Recent):  Temp: 99.5 °F (37.5 °C) (10/11/23 2137)  Pulse: 92 (10/11/23 2137)  Resp: 17 (10/11/23 2137)  BP: (!) 144/72 (10/11/23 2137)  SpO2: 99 % (10/11/23 2137) Vital Signs (24h Range):  Temp:  [98.3 °F (36.8 °C)-99.5 °F (37.5 °C)] 99.5 °F (37.5 °C)  Pulse:  [79-97] 92  Resp:  [16-20] 17  SpO2:  [95 %-100 %] 99 %  BP: (138-184)/(72-91) 144/72     Weight: 79.4 kg (175 lb)  Body mass index is 25.11 kg/m².     Physical Exam  Vitals and nursing note reviewed.   Constitutional:       General: He is not in acute distress.     Appearance: He is well-developed. He is not diaphoretic.   HENT:       Mouth/Throat:      Pharynx: Oropharynx is clear. No oropharyngeal exudate.   Eyes:      General: No scleral icterus.     Extraocular Movements: Extraocular movements intact.   Cardiovascular:      Rate and Rhythm: Normal rate and regular rhythm.   Pulmonary:      Effort: Pulmonary effort is normal. No respiratory distress.   Chest:          Comments: Large fluctuant tender mass involving the right chest  Abdominal:      General: There is no distension.      Palpations: Abdomen is soft.      Tenderness: There is no abdominal tenderness.   Musculoskeletal:         General: No deformity. Normal range of motion.   Skin:     General: Skin is warm and dry.      Coloration: Skin is not jaundiced.   Neurological:      General: No focal deficit present.      Mental Status: He is alert.      Cranial Nerves: No cranial nerve deficit.   Psychiatric:         Mood and Affect: Mood normal.         Behavior: Behavior normal.            I have reviewed all pertinent lab results within the past 24 hours.  CBC:   Recent Labs   Lab 10/11/23  2001   WBC 30.03*   RBC 3.34*   HGB 8.4*   HCT 24.8*   *   MCV 74*   MCH 25.1*   MCHC 33.9     CMP:   Recent Labs   Lab 10/11/23  2001   GLU 96   CALCIUM 8.7   ALBUMIN 2.1*   PROT 7.0   *   K 3.8   CO2 21*   CL 96   BUN 9   CREATININE 0.6   ALKPHOS 130   ALT 15   AST 40   BILITOT 0.3       Significant Diagnostics:  I have reviewed all pertinent imaging results/findings within the past 24 hours.        Assessment/Plan:     * Abscess of chest  Ton Donovan is a 28 y.o. male with hx of IVDU presenting with MRSA bacteremia and a right chest abscess    - Patient seen and examined. Labs and imaging reviewed.   - Will plan for I&D in OR tomorrow  - New consent obtained and placed in folder at OR   - Okay for reg diet, NPO at midnight  - Okay for chemical DVT ppx  - Broad spec abx with MRSA coverage       VTE Risk Mitigation (From admission, onward)         Ordered     IP VTE HIGH  RISK PATIENT  Once         10/11/23 2006     Place sequential compression device  Until discontinued         10/11/23 2006     Reason for No Pharmacological VTE Prophylaxis  Once        Question:  Reasons:  Answer:  IV Heparin w/in 24 hrs. Pre or Post-Op    10/11/23 2006                Thank you for your consult. I will follow-up with patient. Please contact us if you have any additional questions.    Maryann Jalloh MD  General Surgery  [facility] - Emergency Dept

## 2023-10-12 NOTE — TRANSFER OF CARE
"Anesthesia Transfer of Care Note    Patient: Ton Donovan    Procedure(s) Performed: Procedure(s) (LRB):  INCISION AND DRAINAGE, ABSCESS (Right)    Patient location: PACU    Anesthesia Type: general    Transport from OR: Transported from OR on 6-10 L/min O2 by face mask with adequate spontaneous ventilation    Post pain: adequate analgesia    Post assessment: no apparent anesthetic complications    Post vital signs: stable    Level of consciousness: awake    Nausea/Vomiting: no nausea/vomiting    Complications: none    Transfer of care protocol was followed      Last vitals:   Visit Vitals  /68 (BP Location: Right arm, Patient Position: Lying)   Pulse 88   Temp 37.6 °C (99.7 °F) (Temporal)   Resp 18   Ht 5' 10" (1.778 m)   Wt 79.4 kg (175 lb)   SpO2 100%   BMI 25.11 kg/m²     "

## 2023-10-12 NOTE — ASSESSMENT & PLAN NOTE
Blood cultures from admission showing GPCs, PCR testing positive for MRSA. Patient previously had MRSA growing in right hand wounds on admission in July 2023 for septic arthritis, though blood cultures at that time were negative.    - Continue IV vancomycin  - ID consulted for MRSA bacteremia management, appreciate recs  - TTE ordered to assess for endocarditis in setting of regular IVDU

## 2023-10-12 NOTE — HPI
Ton Donovan is a 28 year old man with history of heroin use and IVDU who re-presents to the ED on 10/11/23 due to right upper chest swelling and pain after having left AMA on that same morning. Patient reports that he was brought back by his sister for treatment. Patient tells me that he left AMA because he panicked at the thought of surgery and felt like he was withdrawing, so he left to do more heroin. [place] [facility] [place] the same problem. From the last HPI: patient reports this swelling started about 1 week ago and has gotten larger and more painful. He reports recent IV injection of heroin and he says he snorted heroin shortly before presenting to the ED on this afternoon of 10/10. He says he was told to present to the ED by his friends who were concerned for him. Pt was recently hospitalized from 7/6 to 7/8 due to infected wounds, abscesses, and septic arthritis in his Right hand for which he underwent I&D and received Right IJ catheter for IV antibiotics; however, pt had repeated attempts to elope with RIJ catheter, and patient wished to leave AMA so the line was removed prior to discharge and pt was given PO antibiotics. He reports resolution of these hand wounds. He denies any fevers, shortness of breath, n/v/d, or abdominal pain.    CT chest showed abscess in the Right pectoralis major muscle as well as findings concerning for osteomyelitis vs septic arthritis of 1st rib and 1st costochondral joint. Pt given doses of vanc and zosyn. Patient was admitted to hospital medicine for management of abscess and possible OM vs septic arthritis. Gen surgery planned to take pt to OR for I&D on 10/11. After admission to hospital medicine on 10/10, the following morning patient left AMA before being evaluated by a physician from the primary team. In the interval, his blood cultures returned positive with gram positive cocci with PCR testing positive for MRSA. His sister was contacted via phone  by the primary team and was asked to reach out to her brother as he was not able to be reached by phone, who then located him and brought him back to the hospital. Patient reports that he snorted and injected heroin this afternoon after leaving AMA.    In the ED, patient afebrile, tachycardic HR 93, and hypertensive . Labs notable for WBC 30, Hgb 8.4, Na 129; generally stable from prior presentation. Pt was restarted on vanc and zosyn and was given 1mg dilaudid in the ED. Gen surg consulted again for I&D. Patient admitted to hospital medicine.

## 2023-10-12 NOTE — PHARMACY MED REC
"Admission Medication History     The home medication history was taken by Chicho Dsouza.    You may go to "Admission" then "Reconcile Home Medications" tabs to review and/or act upon these items.     The home medication list has been updated by the Pharmacy department.   Please read ALL comments highlighted in yellow.   Please address this information as you see fit.    Feel free to contact us if you have any questions or require assistance.      PATIENT REPORTS NOT TAKING ANY MEDICATIONS AT THIS TIME.          Potential issues to be addressed PRIOR TO DISCHARGE  Please discuss with the patient barriers to adherence with medication treatment plans  Patient requires education regarding drug therapies     Chicho Dsouza  EXT [id number]                  .          "

## 2023-10-12 NOTE — NURSING
Nurses Note -- 4 Eyes      10/12/2023   2:41 PM      Skin assessed during: Admit      [] No Altered Skin Integrity Present    []Prevention Measures Documented      [x] Yes- Altered Skin Integrity Present or Discovered   [x] LDA Added if Not in Epic (Describe Wound)   [x] New Altered Skin Integrity was Present on Admit and Documented in LDA   [] Wound Image Taken    Multiple injection site wounds bilateral upper extremities.     Wound Care Consulted? Yes    Attending Nurse:  Juliette Cardoso RN    Second RN/Staff Member:   Clif Croft RN

## 2023-10-12 NOTE — PLAN OF CARE
Post-operative care:  -Ok for regular diet  -Wound will continue to drain  -Maintain penrose  -Continue broad spectrum antibiotics, and narrow as able based on culture date  -OK to change wound dressing PRN (gauze and ABD)  -General Surgery will continue to follow    PRASHANTH Caraballo-4  General Surgery

## 2023-10-12 NOTE — SUBJECTIVE & OBJECTIVE
Past Medical History:   Diagnosis Date    Hypertension     Unilateral inguinal hernia, without obstruction or gangrene, not specified as recurrent        Past Surgical History:   Procedure Laterality Date    HAND ARTHROTOMY Right 7/7/2023    Procedure: ARTHROTOMY, HAND - R LF PIP;  Surgeon: Boy Lamb MD;  Location: [address];  Service: Orthopedics;  Laterality: Right;    IRRIGATION AND DEBRIDEMENT OF UPPER EXTREMITY Bilateral 7/7/2023    Procedure: IRRIGATION AND DEBRIDEMENT, UPPER EXTREMITY - RIGHT HAND;  Surgeon: Boy Lamb MD;  Location: [address];  Service: Orthopedics;  Laterality: Bilateral;    OLECRANON BURSECTOMY Left 7/7/2023    Procedure: BURSECTOMY, OLECRANON;  Surgeon: Boy Lamb MD;  Location: [address];  Service: Orthopedics;  Laterality: Left;       Review of patient's allergies indicates:  No Known Allergies    Medications:  No medications prior to admission.     Antibiotics (From admission, onward)      Start     Stop Route Frequency Ordered    10/12/23 1400  vancomycin 1,500 mg in dextrose 5 % (D5W) 250 mL IVPB (Vial-Mate)         -- IV Every 8 hours 10/12/23 1153    10/12/23 1300  mupirocin 2 % ointment         10/17/23 0859 Nasl 2 times daily 10/12/23 1150    10/12/23 1230  cefTRIAXone (ROCEPHIN) 2 g in dextrose 5 % in water (D5W) 100 mL IVPB (MB+)         -- IV Every 24 hours (non-standard times) 10/12/23 1120    10/12/23 0512  vancomycin - pharmacy to dose  (vancomycin IVPB (PEDS and ADULTS))        See Hyperspace for full Linked Orders Report.    -- IV pharmacy to manage frequency 10/12/23 0413          Antifungals (From admission, onward)      None          Antivirals (From admission, onward)      None               There is no immunization history on file for this patient.    Family History    None       Social History     Socioeconomic History    Marital status: Single   Tobacco Use    Smoking status: Former     Types: Cigarettes    Smokeless tobacco:  Never   Substance and Sexual Activity    Alcohol use: No    Drug use: Yes     Types: Marijuana, IV     Comment: 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     Social Determinants of Health     Financial Resource Strain: Low Risk  (7/7/2023)    Overall Financial Resource Strain (CARDIA)     Difficulty of Paying Living Expenses: Not hard at all   Food Insecurity: No Food Insecurity (7/7/2023)    Hunger Vital Sign     Worried About Running Out of Food in the Last Year: Never true     Ran Out of Food in the Last Year: Never true   Transportation Needs: No Transportation Needs (7/7/2023)    PRAPARE - Transportation     Lack of Transportation (Medical): No     Lack of Transportation (Non-Medical): No   Physical Activity: Sufficiently Active (7/7/2023)    Exercise Vital Sign     Days of Exercise per Week: 3 days     Minutes of Exercise per Session: 60 min   Stress: No Stress Concern Present (7/7/2023)    [employer] - Occupational Stress Questionnaire     Feeling of Stress : Not at all   Social Connections: Socially Isolated (7/7/2023)    Social Connection and Isolation Panel [NHANES]     Frequency of Communication with Friends and Family: Twice a week     Frequency of Social Gatherings with Friends and Family: More than three times a week     Attends Mu-ism Services: Never     Active Member of Clubs or Organizations: No     Attends Club or Organization Meetings: Never     Marital Status: Never    Housing Stability: Low Risk  (7/7/2023)    Housing Stability Vital Sign     Unable to Pay for Housing in the Last Year: No     Number of Places Lived in the Last Year: 1     Unstable Housing in the Last Year: No     Review of Systems   Constitutional:  Negative for chills and fever.   Respiratory:  Negative for cough and shortness of breath.    Cardiovascular:  Negative for chest pain.   Gastrointestinal:  Negative for abdominal pain, constipation, diarrhea, nausea and vomiting.   Genitourinary:   Negative for dysuria and hematuria.   Musculoskeletal:  Negative for arthralgias and myalgias.        L leg pain   Neurological:  Negative for weakness and headaches.     Objective:     Vital Signs (Most Recent):  Temp: 98.4 °F (36.9 °C) (10/12/23 1423)  Pulse: 76 (10/12/23 1423)  Resp: 18 (10/12/23 1423)  BP: 130/70 (10/12/23 1423)  SpO2: 100 % (10/12/23 1423) Vital Signs (24h Range):  Temp:  [98 °F (36.7 °C)-99.7 °F (37.6 °C)] 98.4 °F (36.9 °C)  Pulse:  [76-93] 76  Resp:  [14-21] 18  SpO2:  [98 %-100 %] 100 %  BP: (130-170)/(65-82) 130/70     Weight: 79.4 kg (175 lb)  Body mass index is 25.11 kg/m².    Estimated Creatinine Clearance: 159.3 mL/min (based on SCr of 0.7 mg/dL).     Physical Exam  Vitals reviewed.   Constitutional:       General: He is not in acute distress.     Appearance: Normal appearance. He is not ill-appearing.   HENT:      Head: Normocephalic and atraumatic.   Eyes:      Extraocular Movements: Extraocular movements intact.      Conjunctiva/sclera: Conjunctivae normal.   Cardiovascular:      Rate and Rhythm: Normal rate and regular rhythm.      Heart sounds: No murmur heard.  Pulmonary:      Effort: Pulmonary effort is normal. No respiratory distress.      Breath sounds: Normal breath sounds.   Abdominal:      General: Abdomen is flat. Bowel sounds are normal.      Palpations: Abdomen is soft.      Tenderness: There is no abdominal tenderness.   Musculoskeletal:      Cervical back: Normal range of motion.      Comments: R chest dressing in place, gauze is soaked with serosanguinous fluid. L forearm lesion, no active drainage. R antecubital fossa lesion wrapped in dressing that is clean, dry, intact. L shin firm swelling   Skin:     General: Skin is warm and dry.   Neurological:      Mental Status: He is alert.   Psychiatric:         Behavior: Behavior normal.         Thought Content: Thought content normal.          Significant Labs: All pertinent labs within the past 24 hours have been  reviewed.  Recent Lab Results         10/12/23  1134   10/12/23  1040   10/12/23  0431   10/11/23  2001        Albumin     1.9   2.1       ALP     121   130       ALT     13   15       Anion Gap     11   12       Aniso     Slight   Slight       AST     31   40       Baso #     0.07   0.10       Basophil %     0.2   0.3       BILIRUBIN TOTAL     0.4  Comment: For infants and newborns, interpretation of results should be based  on gestational age, weight and in agreement with clinical  observations.    Premature Infant recommended reference ranges:  Up to 24 hours.............<8.0 mg/dL  Up to 48 hours............<12.0 mg/dL  3-5 days..................<15.0 mg/dL  6-29 days.................<15.0 mg/dL     0.3  Comment: For infants and newborns, interpretation of results should be based  on gestational age, weight and in agreement with clinical  observations.    Premature Infant recommended reference ranges:  Up to 24 hours.............<8.0 mg/dL  Up to 48 hours............<12.0 mg/dL  3-5 days..................<15.0 mg/dL  6-29 days.................<15.0 mg/dL         BUN     7   9       Winslow/Echinocytes     Occasional   Occasional       Calcium     8.4   8.7       Chloride     94   96       CO2     22   21       Creatinine     0.7   0.6       Differential Method     Automated   Automated       Dohle Bodies       Present       eGFR     >60.0   >60.0       Eos #     0.1   0.1       Eosinophil %     0.2   0.2       Fragmented Cells       Occasional       Glucose     96   96       Gram Stain Result No WBC's              No organisms seen             Gran # (ANC)     22.8   23.5       Gran %     80.0   78.1       Hematocrit     25.0   24.8       Hemoglobin     8.6   8.4       Hypo     Occasional   Occasional       Immature Grans (Abs)     0.86  Comment: Mild elevation in immature granulocytes is non specific and   can be seen in a variety of conditions including stress response,   acute inflammation, trauma and pregnancy.  Correlation with other   laboratory and clinical findings is essential.     1.22  Comment: Mild elevation in immature granulocytes is non specific and   can be seen in a variety of conditions including stress response,   acute inflammation, trauma and pregnancy. Correlation with other   laboratory and clinical findings is essential.         Immature Granulocytes     3.0   4.1       Lymph #     2.2   2.5       Lymph %     7.9   8.4       Magnesium      1.6         MCH     24.7   25.1       MCHC     34.4   33.9       MCV     72   74       Mono #     2.5   2.7       Mono %     8.7   8.9       MPV     8.9   9.6       nRBC     0   0       Ovalocytes     Occasional   Occasional       Phosphorus Level     3.4         Platelet Estimate     Increased   Increased       Platelet Count     489   498       Poikilocytosis     Slight   Slight       Poly     Occasional   Occasional       Potassium     3.9   3.8       PROTEIN TOTAL     6.5   7.0       RBC     3.48   3.34       RDW     16.4   16.5       Smudge Cells       Present  Comment: Smudge cells present;Substantial numbers may affect the   accuracy of the differential.         Sodium     127   129       Target Cells     Occasional   Occasional       Teardrop Cells       Occasional       Toxic Granulation       Present       Vacuolated Granulocytes       Present       Vancomycin-Trough   3.4           WBC     28.45   30.03               Significant Imaging: I have reviewed all pertinent imaging results/findings within the past 24 hours.

## 2023-10-12 NOTE — NURSING TRANSFER
Nursing Transfer Note      10/12/2023   1:53 PM    Nurse giving handoff:LUIS Pizano RN  Nurse receiving handoff:JIMY Segovia    Reason patient is being transferred: per md order    Transfer To: [id number]    Transfer via stretcher    Transfer with cardiac monitoring    Transported by pct    Order for Tele Monitor? Yes    Medicines sent: vancomycin infusing    Any special needs or follow-up needed: n/a    Patient belongings transferred with patient: Yes. Belonging bags x2    Chart send with patient: Yes    Notified: mother

## 2023-10-12 NOTE — ANESTHESIA PREPROCEDURE EVALUATION
[facility]  Anesthesia Pre-Operative Evaluation         Patient Name: Ton Donovan  YOB: 1995  MRN: [id number]    SUBJECTIVE:     Pre-operative evaluation for Procedure(s) (LRB):  INCISION AND DRAINAGE, ABSCESS (Right)     10/12/2023    Ton Donovan is a 28 y.o. male w/ a significant PMHx of HTN and drug abuse who presents due to osteo vs septic arthritis of 1st rib/costochondral joint.    Patient now presents for the above procedure(s).      LDA:        Peripheral IV - Single Lumen 10/11/23 2036 20 G Right Forearm (Active)   Site Assessment Clean;Dry;Intact 10/11/23 2036   Number of days: 0            Open Drain 07/07/23 1511 Right;Dorsal Hand Penrose (Active)   Number of days: 96       Prev airway:   Intubation:     Induction:  Rapid sequence induction    Intubated:  Postinduction    Mask Ventilation:  Not attempted    Attempts:  1    Attempted By:  CRNA    Method of Intubation:  Video laryngoscopy    Blade:  Olivier 4    Laryngeal View Grade: Grade I - full view of cords      Difficult Airway Encountered?: No      Complications:  None    Airway Device:  Oral endotracheal tube    Airway Device Size:  7.5    Style/Cuff Inflation:  Cuffed (inflated to minimal occlusive pressure)    Inflation Amount (mL):  8    Tube secured:  23    Secured at:  The lips    Placement Verified By:  Capnometry and Fiber optic visualization    Complicating Factors:  None    Findings Post-Intubation:  BS equal bilateral and atraumatic/condition of teeth unchanged     Drips:    sodium chloride 0.9% 75 mL/hr at 10/11/23 2312       Patient Active Problem List   Diagnosis    Heroin dependence    Cellulitis of finger, right    Cellulitis of left elbow    Cigarette nicotine dependence without complication    Septic olecranon bursitis, left    Septic arthritis of interphalangeal joint of finger of right hand    Open wound of left elbow    Abscess of left upper extremity    Abscess of right hand     Opioid withdrawal    Abscess of chest    Anemia of infection and chronic disease    Hyponatremia    MRSA bacteremia       Review of patient's allergies indicates:  No Known Allergies    Current Inpatient Medications:   enoxparin  40 mg Subcutaneous Q24H (prophylaxis, 1700)    piperacillin-tazobactam (Zosyn) IV (PEDS and ADULTS) (extended infusion is not appropriate)  4.5 g Intravenous Q8H       No current facility-administered medications on file prior to encounter.     No current outpatient medications on file prior to encounter.       Past Surgical History:   Procedure Laterality Date    HAND ARTHROTOMY Right 7/7/2023    Procedure: ARTHROTOMY, HAND - R LF PIP;  Surgeon: Boy Lamb MD;  Location: [address];  Service: Orthopedics;  Laterality: Right;    IRRIGATION AND DEBRIDEMENT OF UPPER EXTREMITY Bilateral 7/7/2023    Procedure: IRRIGATION AND DEBRIDEMENT, UPPER EXTREMITY - RIGHT HAND;  Surgeon: Boy Lamb MD;  Location: [address];  Service: Orthopedics;  Laterality: Bilateral;    OLECRANON BURSECTOMY Left 7/7/2023    Procedure: BURSECTOMY, OLECRANON;  Surgeon: Boy Lamb MD;  Location: [facility] OR [address] [place] [address];  Service: Orthopedics;  Laterality: Left;       Social History     Socioeconomic History    Marital status: Single   Tobacco Use    Smoking status: Former     Types: Cigarettes    Smokeless tobacco: Never   Substance and Sexual Activity    Alcohol use: No    Drug use: Yes     Types: Marijuana, IV     Comment: 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     Social Determinants of Health     Financial Resource Strain: Low Risk  (7/7/2023)    Overall Financial Resource Strain (CARDIA)     Difficulty of Paying Living Expenses: Not hard at all   Food Insecurity: No Food Insecurity (7/7/2023)    Hunger Vital Sign     Worried About Running Out of Food in the Last Year: Never true     Ran Out of Food in the Last Year: Never true   Transportation Needs: No Transportation Needs  (7/7/2023)    PRAPARE - Transportation     Lack of Transportation (Medical): No     Lack of Transportation (Non-Medical): No   Physical Activity: Sufficiently Active (7/7/2023)    Exercise Vital Sign     Days of Exercise per Week: 3 days     Minutes of Exercise per Session: 60 min   Stress: No Stress Concern Present (7/7/2023)    [employer] - Occupational Stress Questionnaire     Feeling of Stress : Not at all   Social Connections: Socially Isolated (7/7/2023)    Social Connection and Isolation Panel [NHANES]     Frequency of Communication with Friends and Family: Twice a week     Frequency of Social Gatherings with Friends and Family: More than three times a week     Attends Congregational Services: Never     Active Member of Clubs or Organizations: No     Attends Club or Organization Meetings: Never     Marital Status: Never    Housing Stability: Low Risk  (7/7/2023)    Housing Stability Vital Sign     Unable to Pay for Housing in the Last Year: No     Number of Places Lived in the Last Year: 1     Unstable Housing in the Last Year: No       OBJECTIVE:     Vital Signs Range (Last 24H):  Temp:  [36.7 °C (98 °F)-37.5 °C (99.5 °F)]   Pulse:  [79-93]   Resp:  [16-18]   BP: (140-170)/(70-86)   SpO2:  [95 %-100 %]       Significant Labs:  Lab Results   Component Value Date    WBC 28.45 (H) 10/12/2023    HGB 8.6 (L) 10/12/2023    HCT 25.0 (L) 10/12/2023     (H) 10/12/2023    ALT 13 10/12/2023    AST 31 10/12/2023     (L) 10/12/2023    K 3.9 10/12/2023    CL 94 (L) 10/12/2023    CREATININE 0.7 10/12/2023    BUN 7 10/12/2023    CO2 22 (L) 10/12/2023       Diagnostic Studies: No relevant studies.    EKG:   Results for orders placed or performed during the hospital encounter of 10/10/23   EKG 12-lead    Collection Time: 10/10/23  5:26 PM    Narrative    Test Reason : R07.9,    Vent. Rate : 100 BPM     Atrial Rate : 100 BPM     P-R Int : 134 ms          QRS Dur : 084  ms      QT Int : 330 ms       P-R-T Axes : 062 048 051 degrees     QTc Int : 425 ms    Normal sinus rhythm  Possible Left atrial enlargement  Borderline Abnormal ECG  When compared with ECG of 10-OCT-2023 17:25,  No significant change was found  Confirmed by Fito FAIRBANKS MD ([id number]) on 10/11/2023 10:55:15 AM    Referred By: [email or web address]   SELF           Confirmed By:Fito FAIRBANKS MD       2D ECHO:  TTE:  No results found for this or any previous visit.    JULIO CESAR:  No results found for this or any previous visit.    ASSESSMENT/PLAN:                                                                                                                  10/12/2023  Ton Donovan is a 28 y.o., male.      Pre-op Assessment    I have reviewed the Patient Summary Reports.     I have reviewed the Nursing Notes.    I have reviewed the Medications.     Review of Systems  Anesthesia Hx:  No problems with previous Anesthesia  History of prior surgery of interest to airway management or planning: Denies Family Hx of Anesthesia complications.   Denies Personal Hx of Anesthesia complications.   Social:  Drug abuse   Hematology/Oncology:     Oncology Normal     Cardiovascular:   Hypertension  Denies Angina. ECG has been reviewed.    Pulmonary:   Denies Shortness of breath.  Denies Recent URI.    Renal/:  Renal/ Normal     Hepatic/GI:   Denies GERD. Denies Liver Disease.    Neurological:   Denies CVA. Denies Seizures.    Endocrine:  Endocrine Normal Denies Diabetes.        Physical Exam  General: Well nourished, Cooperative and Alert    Airway:  Mallampati: II   Mouth Opening: Normal  TM Distance: Normal  Tongue: Normal  Neck ROM: Normal ROM    Dental:  Intact        Anesthesia Plan  Type of Anesthesia, risks & benefits discussed:    Anesthesia Type: Gen ETT  Intra-op Monitoring Plan: Standard ASA Monitors  Post Op Pain Control Plan: multimodal analgesia, IV/PO Opioids PRN and peripheral nerve block  Induction:  IV  Airway Plan: Direct,  Post-Induction  Informed Consent: Informed consent signed with the Patient and all parties understand the risks and agree with anesthesia plan.  All questions answered.   ASA Score: 3  Day of Surgery Review of History & Physical: H&P Update referred to the surgeon/provider.    Ready For Surgery From Anesthesia Perspective.     .

## 2023-10-12 NOTE — HPI
28 y.o. male with a  PMHx of IVDU and HTN who presents to the ED with complaints of multiple wounds and chest pain. Patient has been admitted multiple times for superficial abscess related to IVDU. Last hospital stay was on 07/23 for septic arthritis.      Patient was seen in the ED today, stable at arrival Labs Na 128, white count of 27, H/H 8.4/24.6. CT with Fluid collection within the right pectoralis major muscle measuring 6.7 x 12.5 cm, likely an abscess.  Presumed extension of this fluid collection into the pleural space of the anterior right lung apex.  Possible osteomyelitis versus septic arthritis of the anterior aspect of the right 1st rib and 1st costochondral joint.     General surgery was consulted for evaluation. Currently at the ED withdrawing and asking for medications for pain and sleep.       Interval History   Had planned to undergo incision and drainage of right chest abscess earlier today, but left AMA as he felt he was withdrawing. He had blood cultures which returned with MRSA but he was unable to be reached to return to the hospital so his sister was notified and brought him back for treatment. Upon arrival he is AF and HDS. Labs stable from earlier. Surgery re-consulted for I&D.

## 2023-10-12 NOTE — ASSESSMENT & PLAN NOTE
Patient with chronic use of heroin, was seen by addiction psychiatry on last admission in July 2023. He reports ongoing regular use of heroin with last use shortly prior to presentations on both 10/10 and 10/11. Pt is saying he wishes to quit illicit opioid use and refused morphine or dilaudid in the ED. Was given a dose of suboxone 8-2 by the ED yesterday, and patient then reported feeling withdrawal symptoms afterwards. Psych had been consulted regarding withdrawal management and recommended against starting suboxone in the setting of recent heroin use due to concern for precipitating more severe withdrawal.    Plan:   - Patient declined Addiction psychiatry consult. On previous admission, Psych recommended waiting 12-18 hours before initiating Suboxone.  - At present, will continue use of full agonist opioids for pain management given that pt is still using heroin, and may require more potent analgesia after surgical interventions.

## 2023-10-12 NOTE — PROGRESS NOTES
Vital signs stable. Afebrile. Alert, oriented and following commands. Pain controlled with PRN meds. Denies nausea. Dressing remains intact with moist drainage present. IV ABX per MD order. POC reviewed and understanding verbalized.

## 2023-10-12 NOTE — HOSPITAL COURSE
Ton Donovan is a 28 year old man with history of heroin use and IVDU who re-presented to the ED on 10/11/23 due to right upper chest swelling and pain after having left AMA on that same morning. Blood cultures positive for MRSA bacteremia. Patient started on IV Vanc and Zosyn. Echo ordered. ID re-consulted. General surgery was re-consulted for I&D of abscess to left chest. Patient underwent I&D with placement of penrose. Abnormal bone/joint findings were noted on the CT and during the I&D procedure for which general surgery suggested discussing with thoracic surgery for intervention. After the procedure the patient started to voice that he was going to leave the hospital against medical advice. Echo was not yet completed. Several discussions were held with the patient about the importance of him receiving IV antibiotics for treatment of his MRSA bacteremia. He was informed that if he left the hospital, even with oral antibiotics, there is a high likelihood that he may die from this condition. We asked if there was anything we could do to get him to stay in the hospital for treatment. He declined. He verbalized that he understands the I&D did not treat his overall infection. He verbalized that he still has a bloodstream infection and may die if he leaves the hospital. He was instructed to return to the emergency department as soon as possible for treatment, at the latest to return in 2-4 days for penrose removal. He voiced understanding and reported that he would report back to the hospital in 2-4 days if not sooner. Patient signed paperwork to leave [place]. Linezolid 600mg PO BID x14 days sent to patient's pharmacy.

## 2023-10-12 NOTE — MEDICAL/APP STUDENT
HISTORY & PHYSICAL  Hospital Medicine    [facility]: [facility]    PRESENTING HISTORY     Chief Complaint/Reason for Admission:  Chest Abscess    History of Present Illness:  Ton Donovan is a 28 y.o. male with HTN and IVDU (heroin) who re-presents to the ED for progressively worsening right upper chest pain and swelling for which he was seen and subsequently left AMA earlier that morning. Patient states he left AMA due to worsening withdrawal symptoms and anxiety regarding his surgery but was subsequently brought back for treatment by his sister who was notified earlier in the day regarding his positive blood cultures. Of note, patient did additionally present with BUE abscesses treated with bedside I&D in the ED on initial presentation. He reports daily heroin use (3x/day on average) with his last use being in the afternoon of 10/11 prior to re-presenting to the ED. Endorses subjective fevers, chills, and night sweats for the past couple nights. Denies chest pain, palpitations, SOB, radiating pain, abdominal pain, or dysuria/urgency/frequency. Patient denies any alleviating or aggravating factors. Denies any other drug use or alcohol use but does report smoking cigarettes. Of note, patient was recently hospitalized 7/6-7/8 for multiple abscesses of BUE and septic arthritis of the R hand for which he underwent I&D, arthrotomy, and bursectomy with ortho and received a R IJ catheter for IV abx. Unfortunately, patient left AMA prompting removal of IJ cath and discharge on PO antibiotics (doxy and augmentin x 4 weeks). He reports completing his antibiotic course with resolution of symptoms.      In ED on re-presentation, patient was AF, tachycardiac to HR 93, ad hypertensive with . Labs significant for leukocytosis WBC 30, Hgb 8.4, MCV 74, Na 129; stable from prior presentation. Imaging from prior presentation includes U/S BUE with evidence of left forearm and R medial antecubital abscess and CT chest  demonstrating R pectoralis major abscess with findings concerning for ostemyelitis vs septic arthritis of 1st rib and 1st costochondral joint. Blood cultures from previous presentation positive for staph aureus with PCR testing positive for MRSA. Patient restarted on vanc and zosyn. Gen surg re-consulted for I&D.     Interval hx: NAEON. HDS. Afebrile. Patient resting comfortably in bed, appears somewhat drowsy. Not exhibiting signs of withdrawal. Reports 5/10 pain over R chest abscess. NPO since midnight, awaiting surgery this AM.     Review of Systems:  Constitutional: positive for chills and fatigue  Eyes: no visual changes  ENT: negative for rhinorrhea or congestion  Respiratory: no cough or shortness of breath  Cardiovascular: no chest pain or palpitations  Gastrointestinal: no nausea or vomiting, no abdominal pain or change in bowel habits  Genitourinary: no hematuria or dysuria  Integument/Breast: no rash or pruritis  Musculoskeletal: no arthralgias or myalgias  Neurological: no seizures or tremors  Behavioral/Psych: negative for visual/auditory hallucinations.     PAST HISTORY:     Past Medical History:   Diagnosis Date    Hypertension     Unilateral inguinal hernia, without obstruction or gangrene, not specified as recurrent        Past Surgical History:   Procedure Laterality Date    HAND ARTHROTOMY Right 7/7/2023    Procedure: ARTHROTOMY, HAND - R LF PIP;  Surgeon: Boy Lamb MD;  Location: [address];  Service: Orthopedics;  Laterality: Right;    IRRIGATION AND DEBRIDEMENT OF UPPER EXTREMITY Bilateral 7/7/2023    Procedure: IRRIGATION AND DEBRIDEMENT, UPPER EXTREMITY - RIGHT HAND;  Surgeon: Boy Lamb MD;  Location: [address];  Service: Orthopedics;  Laterality: Bilateral;    OLECRANON BURSECTOMY Left 7/7/2023    Procedure: BURSECTOMY, OLECRANON;  Surgeon: Boy Lamb MD;  Location: [address];  Service: Orthopedics;  Laterality: Left;       No family history on  file.    Social History     Socioeconomic History    Marital status: Single   Tobacco Use    Smoking status: Former     Types: Cigarettes    Smokeless tobacco: Never   Substance and Sexual Activity    Alcohol use: No    Drug use: Yes     Types: Marijuana, IV     Comment: 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     Social Determinants of Health     Financial Resource Strain: Low Risk  (7/7/2023)    Overall Financial Resource Strain (CARDIA)     Difficulty of Paying Living Expenses: Not hard at all   Food Insecurity: No Food Insecurity (7/7/2023)    Hunger Vital Sign     Worried About Running Out of Food in the Last Year: Never true     Ran Out of Food in the Last Year: Never true   Transportation Needs: No Transportation Needs (7/7/2023)    PRAPARE - Transportation     Lack of Transportation (Medical): No     Lack of Transportation (Non-Medical): No   Physical Activity: Sufficiently Active (7/7/2023)    Exercise Vital Sign     Days of Exercise per Week: 3 days     Minutes of Exercise per Session: 60 min   Stress: No Stress Concern Present (7/7/2023)    [place] [employer] - Occupational Stress Questionnaire     Feeling of Stress : Not at all   Social Connections: Socially Isolated (7/7/2023)    Social Connection and Isolation Panel [NHANES]     Frequency of Communication with Friends and Family: Twice a week     Frequency of Social Gatherings with Friends and Family: More than three times a week     Attends Sabianist Services: Never     Active Member of Clubs or Organizations: No     Attends Club or Organization Meetings: Never     Marital Status: Never    Housing Stability: Low Risk  (7/7/2023)    Housing Stability Vital Sign     Unable to Pay for Housing in the Last Year: No     Number of Places Lived in the Last Year: 1     Unstable Housing in the Last Year: No       MEDICATIONS & ALLERGIES:     Current Facility-Administered Medications on File Prior to Encounter   Medication Dose Route  Frequency Provider Last Rate Last Admin    [DISCONTINUED] acetaminophen tablet 650 mg  650 mg Oral Q4H PRN Nicholas Chapman MD        [DISCONTINUED] buprenorphine-naloxone 8-2 mg SL film 1 Film  1 Film Sublingual Daily Salinas Hair MD   1 Film at 10/10/23 2256    [DISCONTINUED] cloNIDine tablet 0.1 mg  0.1 mg Oral Q8H PRN Nicholas Chapman MD        [DISCONTINUED] dextrose 10% bolus 125 mL 125 mL  12.5 g Intravenous PRN Nicholas Chapman MD        [DISCONTINUED] dextrose 10% bolus 250 mL 250 mL  25 g Intravenous PRN Nicholas Chapman MD        [DISCONTINUED] dicyclomine capsule 10 mg  10 mg Oral Q6H PRN Nicholas Chapman MD        [DISCONTINUED] glucagon (human recombinant) injection 1 mg  1 mg Intramuscular PRN Nicholas Chapman MD        [DISCONTINUED] glucose chewable tablet 16 g  16 g Oral PRN Nicholas Chapman MD        [DISCONTINUED] glucose chewable tablet 24 g  24 g Oral PRN Nicholas Chapman MD        [DISCONTINUED] hydrOXYzine pamoate capsule 25 mg  25 mg Oral Q8H PRN Nicholas Chapman MD   25 mg at 10/11/23 0829    [DISCONTINUED] loperamide capsule 2 mg  2 mg Oral Once PRN Nicholas Chapman MD        [DISCONTINUED] melatonin tablet 6 mg  6 mg Oral Nightly PRN Nicholas Chapman MD        [DISCONTINUED] mupirocin 2 % ointment   Nasal BID Leeroy Pringle MD   Given at 10/11/23 0829    [DISCONTINUED] naloxone 0.4 mg/mL injection 0.02 mg  0.02 mg Intravenous PRN Nicholas Chapman MD        [DISCONTINUED] ondansetron injection 4 mg  4 mg Intravenous Q8H PRN Nicholas Chapman MD        [DISCONTINUED] piperacillin-tazobactam (ZOSYN) 4.5 g in dextrose 5 % in water (D5W) 100 mL IVPB (MB+)  4.5 g Intravenous Q8H Nicholas Chapman MD   Stopped at 10/11/23 0703    [DISCONTINUED] sodium chloride 0.9% flush 10 mL  10 mL Intravenous Q12H PRN Nicholas Chapman MD        [DISCONTINUED] vancomycin - pharmacy to dose   Intravenous pharmacy to manage frequency Nicholas Chapman MD        [DISCONTINUED] vancomycin 1,250 mg in dextrose 5 % (D5W) 250 mL IVPB (Vial-Mate)  15  mg/kg Intravenous Q12H Leeroy Pringle .7 mL/hr at 10/11/23 0802 1,250 mg at 10/11/23 0802     No current outpatient medications on file prior to encounter.        Review of patient's allergies indicates:  No Known Allergies    OBJECTIVE:     Vital Signs:  Temp:  [98 °F (36.7 °C)-99.5 °F (37.5 °C)] 98.4 °F (36.9 °C)  Pulse:  [79-93] 88  Resp:  [16-18] 18  SpO2:  [95 %-100 %] 98 %  BP: (140-170)/(70-86) 159/73  Body mass index is 25.11 kg/m².     Physical Exam:  Constitutional:       General: Resting comfortably in bed. No acute distress.   HENT:      Head: Normocephalic and atraumatic.      Nose: No congestion or rhinorrhea.      Mouth/Throat:      Mouth: Mucous membranes are dry.      Pharynx: Oropharynx is clear. Normal dentition.     Eyes: PERRLA  Cardiovascular:      Rate and Rhythm: Regular rhythm.     Pulses: Normal, symmetrical pulses.      Heart sounds: Normal heart sounds. No murmur.   Pulmonary:      Effort: Pulmonary effort is normal. No respiratory distress.      Breath sounds: Normal breath sounds.   Abdominal:      General: Bowel sounds are normal.      Palpations: Abdomen is soft.      Tenderness: There is no abdominal tenderness.   Musculoskeletal:      Large fluctuant, erythematous mass in R upper chest. Warm and tender to palpation. No drainage.   Skin:     General: Skin is warm and dry.  Neurological:      General: No focal deficit present.      Mental Status: He is alert and oriented to person, place, and time.   Psychiatric:         Mood and Affect: Mood normal.         Behavior: Behavior normal.      Laboratory  CBC reviewed and notable for leukocytosis WBC 28.45, H/H stable 8.6/25, MCV 72, RDW 16.4,   Iron studies reviewed and notable for Iron 20, TIBC 175, Transferrin 118, Saturated Iron 11  CMP reviewed and notable for Na 127    Diagnostic Results:  CXR: Trachea midline. Cardiac silhouette normal. Osseous structures normal. No evidence of pleural effusion or  pneumothorax. No evidence of any masses, opacities, or consolidation.   CT Chest w/ contrast: Fluid collection within R pectoralis major measuring 6.7 x 12.5 cm with presumed extension of this fluid collection into the pleural space of the anterior R lung apex. Osteomyelitis vs septic arthritis of R 1st rib and 1st costochondral joint. Scatter pulmonary nodules bilaterally. Mild diffuse interlobular septal thickening.   Blood cultures positive for staph, PCR positive for MRSA.     ASSESSMENT & PLAN:     Current Problems List:  Active Hospital Problems    Diagnosis  POA    *Abscess of chest [J86.9]  Yes    MRSA bacteremia [R78.81, B95.62]  Yes    Hyponatremia [E87.1]  Yes    Anemia of infection and chronic disease [B99.9, D63.8]  Yes    Heroin dependence [F11.20]  Yes      Resolved Hospital Problems   No resolved problems to display.       Problem Assessment & Treatment Plan:    28 y.o. male with HTN and IVDU (heroin) admitted for R upper chest abscess with osteomyelitis vs septic arthritis of 1st rib and 1st costochondral joint as demonstrated on CT c/b opioid withdrawal.      Chest Abscess:  R pectoralis major fluid collection measuring 6.7 x 12.5 cm with presumed extension of this fluid collection into the pleural space of the anterior R lung apex. Osteomyelitis vs septic arthritis of R 1st rib and 1st costochondral joint.   - Gen surg consulted for I&D, plan for OR 10/12  - Vanc/zosyn  - F/u I&D cultures  - Tylenol & percocet 5mg q8hrs prn for pain    MRSA Bacteremia:  Blood cultures from initial presentation w/ gram positive cocci growth resembling staph with PCR testing positive for MRSA. Patient with previous septic arthritis of R hand in July 2023 positive for MRSA growth as well.   - Continue IV vanc  - ID consulted for MRSA bacteremia management  - TTE ordered for IE workup in setting of MRSA positive blood cultures and known IVDU     Opiod dependence:  Patient with active, daily heroin use with last use  just prior to arrival, now exhibiting active withdrawal symptoms including diarrhea, piloerection, and restlessness previously exacerbated by suboxone adminstration in ED on initial presentation.   - Continue use of full agonist opioids for periop pain control per previous psych recs  - If considering suboxone initiation following post-op period, will wait a minimum of 12-18 hrs from last opioid use to minimize risk of precipitating withdrawal per addiction psych recs  - Not amenable to addiction psych at this time     Hyponatremia:  Patient asymptomatic with labs suggestive of dehyrdation/hypovolemia. Serum osm 179, urine osm 66. Stable renal function. Likely 2/2 poor nutrition/PO intake.   - IVF with 1L NS (75ml/hr) given without improvement in Na level (Na 127 this AM)  - Patient asymptomatic, will continue to monitor following surgery today  - Recheck BMP daily     Iron deficiency anemia:   Patient with hgb 8.6, MCV 72, and low iron studies in setting of homelessness and IVDU with presumed poor nutritional intake consistent with iron deficiency anemia, however low TIBC more consistent with anemia of chronic disease. Likely mixed picture. Patient is asymptomatic.   - continue to monitor with daily CBC, may improve with treatment of infection. Can consider supplementing with oral iron.   - Avoid iron infusion in setting of active infection     Hypertension:  Stable, chronic condition. Not on any home meds  - Clonidine 0.1 mg prn for htn of SBP > 180         Kye Carcamo, MS3

## 2023-10-12 NOTE — DISCHARGE SUMMARY
[facility] - Surgery  [facility] Medicine  Discharge Summary      Patient Name: Ton Donovan  MRN: [id number]  [facility]: [id number]  Patient Class: IP- Inpatient  Admission Date: 10/11/2023  Hospital Length of Stay: 1 days  Discharge Date and Time:  10/12/2023 6:36 PM  Attending Physician: Emily att. providers found   Discharging Provider: Ev Moreno MD  Primary Care Provider: Emily Primary Doctor  [facility] Medicine Team: [facility] Ev Moreno MD  Primary Care Team: [facility]    HPI:   Ton Donovan is a 28 year old man with history of heroin use and IVDU who re-presents to the ED on 10/11/23 due to right upper chest swelling and pain after having left AMA on that same morning. Patient reports that he was brought back by his sister for treatment. Patient tells me that he left AMA because he panicked at the thought of surgery and felt like he was withdrawing, so he left to do more heroin. [facility] [place] [facility] [place] for the same problem. From the last HPI: patient reports this swelling started about 1 week ago and has gotten larger and more painful. He reports recent IV injection of heroin and he says he snorted heroin shortly before presenting to the ED on this afternoon of 10/10. He says he was told to present to the ED by his friends who were concerned for him. Pt was recently hospitalized from 7/6 to 7/8 due to infected wounds, abscesses, and septic arthritis in his Right hand for which he underwent I&D and received Right IJ catheter for IV antibiotics; however, pt had repeated attempts to elope with RIJ catheter, and patient wished to leave AMA so the line was removed prior to discharge and pt was given PO antibiotics. He reports resolution of these hand wounds. He denies any fevers, shortness of breath, n/v/d, or abdominal pain.    CT chest showed abscess in the Right pectoralis major muscle as well as findings concerning for osteomyelitis vs septic arthritis of 1st rib and 1st costochondral joint.  Pt given doses of vanc and zosyn. Patient was admitted to hospital medicine for management of abscess and possible OM vs septic arthritis. Gen surgery planned to take pt to OR for I&D on 10/11. After admission to hospital medicine on 10/10, the following morning patient left AMA before being evaluated by a physician from the primary team. In the interval, his blood cultures returned positive with gram positive cocci with PCR testing positive for MRSA. His sister was contacted via phone by the primary team and was asked to reach out to her brother as he was not able to be reached by phone, who then located him and brought him back to the hospital. Patient reports that he snorted and injected heroin this afternoon after leaving AMA.    In the ED, patient afebrile, tachycardic HR 93, and hypertensive . Labs notable for WBC 30, Hgb 8.4, Na 129; generally stable from prior presentation. Pt was restarted on vanc and zosyn and was given 1mg dilaudid in the ED. Gen surg consulted again for I&D. Patient admitted to hospital medicine.      Procedure(s) (LRB):  INCISION AND DRAINAGE, ABSCESS (Right)      Hospital Course:   Ton Donovan is a 28 year old man with history of heroin use and IVDU who re-presented to the ED on 10/11/23 due to right upper chest swelling and pain after having left AMA on that same morning. Blood cultures positive for MRSA bacteremia. Patient started on IV Vanc and Zosyn. Echo ordered. ID re-consulted. General surgery was re-consulted for I&D of abscess to left chest. Patient underwent I&D with placement of penrose. Abnormal bone/joint findings were noted on the CT and during the I&D procedure for which general surgery suggested discussing with thoracic surgery for intervention. After the procedure the patient started to voice that he was going to leave the hospital against medical advice. Echo was not yet completed. Several discussions were held with the patient about the importance of him  receiving IV antibiotics for treatment of his MRSA bacteremia. He was informed that if he left the hospital, even with oral antibiotics, there is a high likelihood that he may die from this condition. We asked if there was anything we could do to get him to stay in the hospital for treatment. He declined. He verbalized that he understands the I&D did not treat his overall infection. He verbalized that he still has a bloodstream infection and may die if he leaves the hospital. He was instructed to return to the emergency department as soon as possible for treatment, at the latest to return in 2-4 days for penrose removal. He voiced understanding and reported that he would report back to the hospital in 2-4 days if not sooner. Patient signed paperwork to leave AMA. Linezolid 600mg PO BID x14 days sent to patient's pharmacy.       Goals of Care Treatment Preferences:  Code Status: Full Code      Consults:   Consults (From admission, onward)        Status Ordering Provider     Pharmacy to dose Vancomycin consult  Once        Provider:  (Not yet assigned)   See [facility] for full Linked Orders Report.    Acknowledged AINSLEY TYLER     Inpatient consult to Infectious Diseases  Once        Provider:  (Not yet assigned)    Completed AINSLEY TYLER     Inpatient consult to General Surgery  Once        Provider:  (Not yet assigned)    Completed AINSLEY TYLER          No new Assessment & Plan notes have been filed under this hospital service since the last note was generated.  Service: Hospital Medicine    Final Active Diagnoses:    Diagnosis Date Noted POA    PRINCIPAL PROBLEM:  Abscess of chest [J86.9] 10/11/2023 Yes    MRSA bacteremia [R78.81, B95.62] 10/11/2023 Yes    Hyponatremia [E87.1] 10/11/2023 Yes    Anemia of infection and chronic disease [B99.9, D63.8] 10/11/2023 Yes     Chronic    Heroin dependence [F11.20] 07/06/2023 Yes     Chronic      Problems Resolved During this Admission:       Discharged Condition:  against medical advice    Disposition: Home or Self Care    Follow Up: Patient instructed to return to the ED as soon as possible and 2-4 days at the latest for penrose removal.    Patient Instructions:   No discharge procedures on file.    Significant Diagnostic Studies: Labs:   CMP   Recent Labs   Lab 10/11/23  0543 10/11/23  2001 10/12/23  0431   * 129* 127*   K 3.8 3.8 3.9   CL 97 96 94*   CO2 21* 21* 22*   * 96 96   BUN 10 9 7   CREATININE 0.7 0.6 0.7   CALCIUM 8.4* 8.7 8.4*   PROT 6.2 7.0 6.5   ALBUMIN 1.8* 2.1* 1.9*   BILITOT 0.5 0.3 0.4   ALKPHOS 124 130 121   AST 35 40 31   ALT 12 15 13   ANIONGAP 10 12 11    and CBC   Recent Labs   Lab 10/11/23  0543 10/11/23  2001 10/12/23  0431   WBC 29.25* 30.03* 28.45*   HGB 8.1* 8.4* 8.6*   HCT 23.9* 24.8* 25.0*    498* 489*       Pending Diagnostic Studies:     Procedure Component Value Units Date/Time    Echo [[id number]]     Order Status: Sent Lab Status: No result          Medications:  Reconciled Home Medications:      Medication List      START taking these medications    linezolid 600 mg Tab  Commonly known as: ZYVOX  Take 1 tablet (600 mg total) by mouth every 12 (twelve) hours. for 14 days            Indwelling Lines/Drains at time of discharge:   Lines/Drains/Airways     Drain  Duration                Open Drain 07/07/23 1511 Right;Dorsal Hand Penrose 97 days         Open Drain 10/12/23 1139 Tube - 1 Right Chest Penrose 1 inch <1 day                Time spent on the discharge of patient: 35 minutes         Ev Moreno MD   PGY-1  Department of Hospital Medicine  [facility] - Surgery

## 2023-10-12 NOTE — NURSING
Patient stating that he wants to be discharged AMA, explained to patient that he is receiving IV ABX to help resolve the recurring abscesses, and that they will not improve without proper treatment, pt stated that he will stay the night but would like to talk to his sister, sister called for patient, call transferred into pt room.

## 2023-10-12 NOTE — SUBJECTIVE & OBJECTIVE
Current Facility-Administered Medications on File Prior to Encounter   Medication    [COMPLETED] iohexoL (OMNIPAQUE 350) injection 75 mL    [DISCONTINUED] acetaminophen tablet 650 mg    [DISCONTINUED] buprenorphine-naloxone 8-2 mg SL film 1 Film    [DISCONTINUED] buprenorphine-naloxone 8-2 mg SL film 1 Film    [DISCONTINUED] cloNIDine tablet 0.1 mg    [DISCONTINUED] dextrose 10% bolus 125 mL 125 mL    [DISCONTINUED] dextrose 10% bolus 250 mL 250 mL    [DISCONTINUED] dicyclomine capsule 10 mg    [DISCONTINUED] glucagon (human recombinant) injection 1 mg    [DISCONTINUED] glucose chewable tablet 16 g    [DISCONTINUED] glucose chewable tablet 24 g    [DISCONTINUED] hydrOXYzine pamoate capsule 25 mg    [DISCONTINUED] loperamide capsule 2 mg    [DISCONTINUED] melatonin tablet 6 mg    [DISCONTINUED] mupirocin 2 % ointment    [DISCONTINUED] naloxone 0.4 mg/mL injection 0.02 mg    [DISCONTINUED] ondansetron injection 4 mg    [DISCONTINUED] piperacillin-tazobactam (ZOSYN) 4.5 g in dextrose 5 % in water (D5W) 100 mL IVPB (MB+)    [DISCONTINUED] piperacillin-tazobactam (ZOSYN) 4.5 g in dextrose 5 % in water (D5W) 100 mL IVPB (MB+)    [DISCONTINUED] sodium chloride 0.9% flush 10 mL    [DISCONTINUED] vancomycin (VANCOCIN) 1250 mg in 5 % dextrose 250 mL IVPB    [DISCONTINUED] vancomycin - pharmacy to dose    [DISCONTINUED] vancomycin 1,250 mg in dextrose 5 % (D5W) 250 mL IVPB (Vial-Mate)     No current outpatient medications on file prior to encounter.       Review of patient's allergies indicates:  No Known Allergies    Past Medical History:   Diagnosis Date    Hypertension     Unilateral inguinal hernia, without obstruction or gangrene, not specified as recurrent      Past Surgical History:   Procedure Laterality Date    HAND ARTHROTOMY Right 7/7/2023    Procedure: ARTHROTOMY, HAND - R LF PIP;  Surgeon: Boy Lamb MD;  Location: [facility] OR [address] [place] [address];  Service: Orthopedics;  Laterality: Right;    IRRIGATION AND DEBRIDEMENT OF  UPPER EXTREMITY Bilateral 7/7/2023    Procedure: IRRIGATION AND DEBRIDEMENT, UPPER EXTREMITY - RIGHT HAND;  Surgeon: Boy Lamb MD;  Location: [facility] OR [address] [place] [address];  Service: Orthopedics;  Laterality: Bilateral;    OLECRANON BURSECTOMY Left 7/7/2023    Procedure: BURSECTOMY, OLECRANON;  Surgeon: Boy Lamb MD;  Location: [facility] OR [address] [place] [address];  Service: Orthopedics;  Laterality: Left;     Family History    None       Tobacco Use    Smoking status: Former     Types: Cigarettes    Smokeless tobacco: Never   Substance and Sexual Activity    Alcohol use: No    Drug use: Yes     Types: Marijuana, IV     Comment: 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    Sexual activity: Not on file     Review of Systems   Constitutional:  Negative for chills and fever.   HENT:  Negative for congestion and sore throat.    Eyes:  Negative for photophobia and visual disturbance.   Respiratory:  Negative for cough and shortness of breath.    Cardiovascular:  Positive for chest pain. Negative for palpitations.   Gastrointestinal:  Negative for abdominal pain.   Genitourinary:  Negative for dysuria and hematuria.   Musculoskeletal:  Positive for myalgias. Negative for arthralgias and back pain.   Skin:  Positive for wound. Negative for rash.   Neurological:  Negative for dizziness and syncope.   Psychiatric/Behavioral:  Positive for decreased concentration. Negative for agitation and behavioral problems.      Objective:     Vital Signs (Most Recent):  Temp: 99.5 °F (37.5 °C) (10/11/23 2137)  Pulse: 92 (10/11/23 2137)  Resp: 17 (10/11/23 2137)  BP: (!) 144/72 (10/11/23 2137)  SpO2: 99 % (10/11/23 2137) Vital Signs (24h Range):  Temp:  [98.3 °F (36.8 °C)-99.5 °F (37.5 °C)] 99.5 °F (37.5 °C)  Pulse:  [79-97] 92  Resp:  [16-20] 17  SpO2:  [95 %-100 %] 99 %  BP: (138-184)/(72-91) 144/72     Weight: 79.4 kg (175 lb)  Body mass index is 25.11 kg/m².     Physical Exam  Vitals and nursing note reviewed.   Constitutional:       General: He is not in  acute distress.     Appearance: He is well-developed. He is not diaphoretic.   HENT:      Mouth/Throat:      Pharynx: Oropharynx is clear. No oropharyngeal exudate.   Eyes:      General: No scleral icterus.     Extraocular Movements: Extraocular movements intact.   Cardiovascular:      Rate and Rhythm: Normal rate and regular rhythm.   Pulmonary:      Effort: Pulmonary effort is normal. No respiratory distress.   Chest:          Comments: Large fluctuant tender mass involving the right chest  Abdominal:      General: There is no distension.      Palpations: Abdomen is soft.      Tenderness: There is no abdominal tenderness.   Musculoskeletal:         General: No deformity. Normal range of motion.   Skin:     General: Skin is warm and dry.      Coloration: Skin is not jaundiced.   Neurological:      General: No focal deficit present.      Mental Status: He is alert.      Cranial Nerves: No cranial nerve deficit.   Psychiatric:         Mood and Affect: Mood normal.         Behavior: Behavior normal.            I have reviewed all pertinent lab results within the past 24 hours.  CBC:   Recent Labs   Lab 10/11/23  2001   WBC 30.03*   RBC 3.34*   HGB 8.4*   HCT 24.8*   *   MCV 74*   MCH 25.1*   MCHC 33.9     CMP:   Recent Labs   Lab 10/11/23  2001   GLU 96   CALCIUM 8.7   ALBUMIN 2.1*   PROT 7.0   *   K 3.8   CO2 21*   CL 96   BUN 9   CREATININE 0.6   ALKPHOS 130   ALT 15   AST 40   BILITOT 0.3       Significant Diagnostics:  I have reviewed all pertinent imaging results/findings within the past 24 hours.

## 2023-10-12 NOTE — ASSESSMENT & PLAN NOTE
Ton Donovan is a 28 y.o. male with hx of IVDU presenting with MRSA bacteremia and a right chest abscess    - Patient seen and examined. Labs and imaging reviewed.   - Will plan for I&D in OR tomorrow  - New consent obtained and placed in folder at OR   - Okay for reg diet, NPO at midnight  - Okay for chemical DVT ppx  - Broad spec abx with MRSA coverage

## 2023-10-12 NOTE — ED TRIAGE NOTES
Pt presents to the ED with a painful R. Sided shoulder cyst for the past 2 weeks. Patient reports being scheduled for surgery for cyst drainage this afternoon but he did not go.     Patient identifiers for Ton Donovan checked and correct.    LOC: The patient is awake, alert and aware of environment with an appropriate affect, the patient is oriented x 4 and speaking appropriately.    APPEARANCE: Patient resting comfortably and in no acute distress. Patient presents with a R. Sided painful shoulder cyst.    SKIN: The skin is warm and dry, color consistent with ethnicity, patient has normal skin turgor and moist mucus membranes, skin intact, no breakdown or bruising noted.    MUSCULOSKELETAL: Patient moving all extremities well, no obvious swelling or deformities noted.    RESPIRATORY: Airway is open and patent, respirations are spontaneous and even, patient has a normal effort and rate.    CARDIAC: Patient has a normal rate and rhythm, no periphreal edema noted, capillary refill < 3 seconds.    ABDOMEN: Soft and non tender to palpation, no distention noted. Patient denies any nausea, vomiting, diarrhea, or constipation.     NEUROLOGIC: Eyes open spontaneously, PERRL, behavior appropriate to situation, follows commands, facial expression symmetrical, bilateral hand grasp equal and even, purposeful motor response noted, normal sensation in all extremities.     HEENT: No abnormalities noted. White sclera and pupils equal round and reactive to light. Denies headache, dizziness.     : Pt voids independently, denies dysuria, hematuria, frequency.

## 2023-10-12 NOTE — NURSING
Pt admitted to floor via stretcher. Transferred to bed without difficulty. Oriented to room and call light system. Pt displays flat affect and unwilling to answer admission questions at this time. VSS and resting comfortably at this time. Will continue to monitor.

## 2023-10-12 NOTE — ASSESSMENT & PLAN NOTE
28M with PMH of IVDU / heroin use, L hand septic arthritis and multiple infected skin wounds in July 2023, presents with 1 week of R upper chest pain and swelling. Leukocytosis in high 20s/30s. CT chest shows R pectoralis muscle abscess, osteomyelitis vs septic arthritis of R first rib / costochondral joint, and scattered subcentimeter pulmonary nodules. Also noted to have L forearm and R antecubital fossa lesions. Blood cultures from 10/10 showing GPCs, rapid ID positive for MRSA. Patient is currently on vancomycin and zosyn. S/p R chest I&D on 10/12.     Recommendations  · Stop zosyn. Continue vancomycin  · Obtain repeat blood cultures today  · Obtain TTE  · Check HIV, hep B, hep C

## 2023-10-12 NOTE — PROGRESS NOTES
"Received report, pt states pain 8/10 says "ya'll gonna give me the oxy you promised".  Pain med given.  Mod amt serosang drainage under upper right chest abd. pad  "

## 2023-10-12 NOTE — SUBJECTIVE & OBJECTIVE
Past Medical History:   Diagnosis Date    Hypertension     Unilateral inguinal hernia, without obstruction or gangrene, not specified as recurrent        Past Surgical History:   Procedure Laterality Date    HAND ARTHROTOMY Right 7/7/2023    Procedure: ARTHROTOMY, HAND - R LF PIP;  Surgeon: Boy Lamb MD;  Location: [address];  Service: Orthopedics;  Laterality: Right;    IRRIGATION AND DEBRIDEMENT OF UPPER EXTREMITY Bilateral 7/7/2023    Procedure: IRRIGATION AND DEBRIDEMENT, UPPER EXTREMITY - RIGHT HAND;  Surgeon: Boy Lamb MD;  Location: [address];  Service: Orthopedics;  Laterality: Bilateral;    OLECRANON BURSECTOMY Left 7/7/2023    Procedure: BURSECTOMY, OLECRANON;  Surgeon: Boy Lamb MD;  Location: [address];  Service: Orthopedics;  Laterality: Left;       Review of patient's allergies indicates:  No Known Allergies    Current Facility-Administered Medications on File Prior to Encounter   Medication    [COMPLETED] acetaminophen tablet 1,000 mg    [COMPLETED] iohexoL (OMNIPAQUE 350) injection 75 mL    [COMPLETED] ketorolac injection 9.999 mg    [COMPLETED] lactated ringers bolus 2,382 mL    [COMPLETED] vancomycin 2 g in dextrose 5 % 500 mL IVPB    [DISCONTINUED] acetaminophen tablet 650 mg    [DISCONTINUED] buprenorphine-naloxone 8-2 mg SL film 1 Film    [DISCONTINUED] buprenorphine-naloxone 8-2 mg SL film 1 Film    [DISCONTINUED] buprenorphine-naloxone 8-2 mg SL film 1 Film    [DISCONTINUED] cloNIDine tablet 0.1 mg    [DISCONTINUED] dextrose 10% bolus 125 mL 125 mL    [DISCONTINUED] dextrose 10% bolus 250 mL 250 mL    [DISCONTINUED] dicyclomine capsule 10 mg    [DISCONTINUED] glucagon (human recombinant) injection 1 mg    [DISCONTINUED] glucose chewable tablet 16 g    [DISCONTINUED] glucose chewable tablet 24 g    [DISCONTINUED] hydrOXYzine pamoate capsule 25 mg    [DISCONTINUED] loperamide capsule 2 mg    [DISCONTINUED] melatonin tablet 6 mg    [DISCONTINUED]  mupirocin 2 % ointment    [DISCONTINUED] naloxone 0.4 mg/mL injection 0.02 mg    [DISCONTINUED] ondansetron injection 4 mg    [DISCONTINUED] piperacillin-tazobactam (ZOSYN) 4.5 g in dextrose 5 % in water (D5W) 100 mL IVPB (MB+)    [DISCONTINUED] piperacillin-tazobactam (ZOSYN) 4.5 g in dextrose 5 % in water (D5W) 100 mL IVPB (MB+)    [DISCONTINUED] sodium chloride 0.9% flush 10 mL    [DISCONTINUED] vancomycin (VANCOCIN) 1250 mg in 5 % dextrose 250 mL IVPB    [DISCONTINUED] vancomycin - pharmacy to dose    [DISCONTINUED] vancomycin 1,250 mg in dextrose 5 % (D5W) 250 mL IVPB (Vial-Mate)     No current outpatient medications on file prior to encounter.     Family History    None       Tobacco Use    Smoking status: Former     Types: Cigarettes    Smokeless tobacco: Never   Substance and Sexual Activity    Alcohol use: No    Drug use: Yes     Types: Marijuana, IV     Comment: 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    Sexual activity: Not on file     Review of Systems   Constitutional:  Negative for chills and fever.   HENT:  Negative for congestion and sore throat.    Eyes:  Negative for photophobia and visual disturbance.   Respiratory:  Negative for cough and shortness of breath.    Cardiovascular:  Positive for chest pain. Negative for palpitations.   Gastrointestinal:  Negative for abdominal pain.   Genitourinary:  Negative for dysuria and hematuria.   Musculoskeletal:  Positive for myalgias. Negative for arthralgias and back pain.   Skin:  Negative for rash and wound.   Neurological:  Negative for dizziness and syncope.   Psychiatric/Behavioral:  Positive for decreased concentration. Negative for agitation and behavioral problems.      Objective:     Vital Signs (Most Recent):  Temp: 98.4 °F (36.9 °C) (10/11/23 1903)  Pulse: 93 (10/11/23 1903)  Resp: 18 (10/11/23 1903)  BP: (!) 164/78 (10/11/23 1903)  SpO2: 100 % (10/11/23 1903) Vital Signs (24h Range):  Temp:  [98.3 °F (36.8 °C)-99.1 °F (37.3 °C)] 98.4 °F (36.9  °C)  Pulse:  [79-97] 93  Resp:  [14-20] 18  SpO2:  [95 %-100 %] 100 %  BP: (138-184)/(72-91) 164/78     Weight: 79.4 kg (175 lb)  Body mass index is 25.11 kg/m².     Physical Exam  Vitals reviewed.   Constitutional:       General: He is not in acute distress.     Appearance: Normal appearance.   HENT:      Head: Normocephalic and atraumatic.      Nose: No congestion or rhinorrhea.      Mouth/Throat:      Mouth: Mucous membranes are dry.      Pharynx: Oropharynx is clear.   Cardiovascular:      Rate and Rhythm: Regular rhythm. Tachycardia present.      Pulses: Normal pulses.      Heart sounds: Normal heart sounds.   Pulmonary:      Effort: Pulmonary effort is normal. No respiratory distress.      Breath sounds: Normal breath sounds.   Abdominal:      General: Bowel sounds are normal.      Palpations: Abdomen is soft.      Tenderness: There is no abdominal tenderness.   Musculoskeletal:         General: Swelling and tenderness present.      Cervical back: Full passive range of motion without pain and normal range of motion.      Comments: Moderate size protuberance on the right upper chest, inferior to the clavicle  Warm to touch and tender to palpation  Some tenderness to palpation of left calf   Skin:     General: Skin is warm and dry.   Neurological:      General: No focal deficit present.      Mental Status: He is alert and oriented to person, place, and time.   Psychiatric:         Mood and Affect: Mood normal.         Behavior: Behavior normal.                Significant Labs: All pertinent labs within the past 24 hours have been reviewed.    Significant Imaging: I have reviewed all pertinent imaging results/findings within the past 24 hours.

## 2023-10-12 NOTE — PROGRESS NOTES
Pharmacokinetic Initial Assessment: IV Vancomycin    Assessment/Plan:    Initiated intravenous vancomycin with loading dose of 2000 mg once followed by a maintenance dose of vancomycin 1250 mg IV every 12 hours  Desired empiric serum trough concentration is 15 to 20 mcg/mL  Draw vancomycin trough level at 0800 on 10/12/23 to determine subsequent regimen. Pharmacy will continue to follow and monitor vancomycin.      Please contact pharmacy at extension [phone] with any questions regarding this assessment.     Thank you for the consult,   Deneen Brown       Patient brief summary:  Ton Donovan is a 28 y.o. male initiated on antimicrobial therapy with IV Vancomycin for treatment of suspected bacteremia    Drug Allergies:   Review of patient's allergies indicates:  No Known Allergies    Actual Body Weight:   79.4 kg    Renal Function:   Estimated Creatinine Clearance: 185.9 mL/min (based on SCr of 0.6 mg/dL).,     Dialysis Method (if applicable):  N/A    CBC (last 72 hours):  Recent Labs   Lab Result Units 10/10/23  1826 10/11/23  0543 10/11/23  2001   WBC K/uL 27.57* 29.25* 30.03*   Hemoglobin g/dL 8.4* 8.1* 8.4*   Hematocrit % 24.6* 23.9* 24.8*   Platelets K/uL 489* 446 498*   Gran % % 81.1* 81.7* 78.1*   Lymph % % 7.9* 6.2* 8.4*   Mono % % 7.3 9.3 8.9   Eosinophil % % 0.1 0.1 0.2   Basophil % % 0.3 0.2 0.3   Differential Method  Automated Automated Automated       Metabolic Panel (last 72 hours):  Recent Labs   Lab Result Units 10/10/23  1826 10/10/23  1955 10/11/23  0543 10/11/23  2001   Sodium mmol/L 127*  --  128* 129*   Potassium mmol/L 3.5  --  3.8 3.8   Chloride mmol/L 93*  --  97 96   CO2 mmol/L 24  --  21* 21*   Glucose mg/dL 123*  --  117* 96   Glucose, UA   --  Negative  --   --    BUN mg/dL 15  --  10 9   Creatinine mg/dL 0.8  --  0.7 0.6   Albumin g/dL 2.2*  --  1.8* 2.1*   Total Bilirubin mg/dL 0.3  --  0.5 0.3   Alkaline Phosphatase U/L 147*  --  124 130   AST U/L 47*  --  35 40   ALT U/L 16  --  12  "15   Magnesium mg/dL 1.8  --  1.7  --    Phosphorus mg/dL 3.4  --  3.0  --        Drug levels (last 3 results):  No results for input(s): "VANCOMYCINRA", "VANCORANDOM", "VANCOMYCINPE", "VANCOPEAK", "VANCOMYCINTR", "VANCOTROUGH" in the last 72 hours.    Microbiologic Results:  Microbiology Results (last 7 days)       ** No results found for the last 168 hours. **            "

## 2023-10-12 NOTE — HPI
"Mr. Donovan is a 28M with PMH of IVDU / heroin use, L hand septic arthritis and multiple infected skin wounds in July 2023, presents with 1 week of R upper chest pain and swelling. Infectious disease consulted for "28M with regular heroin IVDU, previous septic arthritis, now with large right chest abscess and numerous abscesses in bilateral arms, with concern for sternoclavicular OM vs septic arthritis. Bcx with staph and PCR testing positive for MRSA. TTE pending.".     Per chart review, patient was hospitalized in July for L hand and elbow septic arthritis and had undergone I&D with ortho. Patient had left AMA (prior to being seen by ID), and was prescribed 4 weeks of doxycycline and augmentin to go home with. Patient states that his wounds had resolved at that time. Cultures at that time had grown MRSA.     Patient had been afebrile here but has leukocytosis in high 20s/30s. CT chest shows R pectoralis muscle abscess, osteomyelitis vs septic arthritis of R first rib / costochondral joint, and scattered subcentimeter pulmonary nodules. Also noted to have L forearm and R antecubital fossa lesions. Blood cultures from 10/10 showing GPCs, rapid ID positive for MRSA. Patient is currently on vancomycin and zosyn.   "

## 2023-10-13 LAB
BACTERIA BLD CULT: ABNORMAL

## 2023-10-13 NOTE — OP NOTE
DATE: 10/12/23.    PREOPERATIVE DIAGNOSIS:  Right pectoralis/subpectoral abscess.    POSTOPERATIVE DIAGNOSIS:  Right pectoralis/subpectoral abscess.    PROCEDURE:  Drainage of complex right intramuscular/sub pectoral abscess.    ATTENDING SURGEON: Steve Monteiro MD.    RESIDENT: Jed Feliz MD.    ANESTHESIA:  General.    INDICATION:  Patient is a 28-year-old man with a history of IV drug abuse who presented with a large right chest wall abscess that required operative drainage.    PROCEDURE IN DETAIL:  Patient was taken to the operating room and placed supine.  After induction of general endotracheal tube anesthesia, the right chest was prepped and draped in the standard fashion.  Time-out was performed.  Most lateral extent of the fluctuance was identified incision was made in this area.  Abscess cavity was bluntly entered and approximately 250 mL of purulent material was evacuated.  Cultures were taken.  Loculations were carefully broken throughout the pictorial and subpectoral area with suction and surgical clamps.  After appropriate clearance of purulent material and debris, the pocket was generously irrigated with saline until clear.  A counter incision was made in the most medial aspect of the pocket and a Penrose drain was passed through each incision and was secured with nylon suture.  Hemostasis was confirmed.  Sterile dressing was applied.  Patient was awakened from anesthesia and transferred to the PACU in good condition.  All needle and sponge counts were correct at the conclusion of the case.  I was present for the procedure in its entirety.    EBL:  10 mL.    FINDINGS:  250 mL large right subpectoral abscess drained.

## 2023-10-14 ENCOUNTER — HOSPITAL ENCOUNTER (INPATIENT)
Facility: HOSPITAL | Age: 28
LOS: 2 days | Discharge: LEFT BEFORE DISCHARGE | DRG: 603 | End: 2023-10-16
Attending: EMERGENCY MEDICINE | Admitting: EMERGENCY MEDICINE
Payer: MEDICAID

## 2023-10-14 DIAGNOSIS — B95.62 MRSA BACTEREMIA: Primary | ICD-10-CM

## 2023-10-14 DIAGNOSIS — R07.9 CHEST PAIN: ICD-10-CM

## 2023-10-14 DIAGNOSIS — I38 ENDOCARDITIS: ICD-10-CM

## 2023-10-14 DIAGNOSIS — R78.81 MRSA BACTEREMIA: Primary | ICD-10-CM

## 2023-10-14 DIAGNOSIS — R00.0 TACHYCARDIA: ICD-10-CM

## 2023-10-14 LAB
ABO + RH BLD: NORMAL
ALBUMIN SERPL BCP-MCNC: 1.9 G/DL (ref 3.5–5.2)
ALLENS TEST: NORMAL
ALP SERPL-CCNC: 105 U/L (ref 55–135)
ALT SERPL W/O P-5'-P-CCNC: 14 U/L (ref 10–44)
ANION GAP SERPL CALC-SCNC: 11 MMOL/L (ref 8–16)
AST SERPL-CCNC: 60 U/L (ref 10–40)
BACTERIA SPEC AEROBE CULT: ABNORMAL
BASOPHILS # BLD AUTO: 0.06 K/UL (ref 0–0.2)
BASOPHILS NFR BLD: 0.3 % (ref 0–1.9)
BILIRUB SERPL-MCNC: 0.3 MG/DL (ref 0.1–1)
BILIRUB UR QL STRIP: NEGATIVE
BLD GP AB SCN CELLS X3 SERPL QL: NORMAL
BNP SERPL-MCNC: 36 PG/ML (ref 0–99)
BUN SERPL-MCNC: 17 MG/DL (ref 6–20)
CALCIUM SERPL-MCNC: 8.4 MG/DL (ref 8.7–10.5)
CHLORIDE SERPL-SCNC: 91 MMOL/L (ref 95–110)
CLARITY UR REFRACT.AUTO: CLEAR
CO2 SERPL-SCNC: 23 MMOL/L (ref 23–29)
COLOR UR AUTO: YELLOW
CREAT SERPL-MCNC: 0.8 MG/DL (ref 0.5–1.4)
DIFFERENTIAL METHOD: ABNORMAL
EOSINOPHIL # BLD AUTO: 0.2 K/UL (ref 0–0.5)
EOSINOPHIL NFR BLD: 0.7 % (ref 0–8)
ERYTHROCYTE [DISTWIDTH] IN BLOOD BY AUTOMATED COUNT: 16.6 % (ref 11.5–14.5)
EST. GFR  (NO RACE VARIABLE): >60 ML/MIN/1.73 M^2
GLUCOSE SERPL-MCNC: 115 MG/DL (ref 70–110)
GLUCOSE UR QL STRIP: NEGATIVE
HCT VFR BLD AUTO: 20.4 % (ref 40–54)
HGB BLD-MCNC: 6.9 G/DL (ref 14–18)
HGB UR QL STRIP: NEGATIVE
IMM GRANULOCYTES # BLD AUTO: 0.6 K/UL (ref 0–0.04)
IMM GRANULOCYTES NFR BLD AUTO: 2.7 % (ref 0–0.5)
INR PPP: 1.5 (ref 0.8–1.2)
KETONES UR QL STRIP: NEGATIVE
LDH SERPL L TO P-CCNC: 1.69 MMOL/L (ref 0.5–2.2)
LEUKOCYTE ESTERASE UR QL STRIP: NEGATIVE
LYMPHOCYTES # BLD AUTO: 2.5 K/UL (ref 1–4.8)
LYMPHOCYTES NFR BLD: 11.1 % (ref 18–48)
MCH RBC QN AUTO: 24.2 PG (ref 27–31)
MCHC RBC AUTO-ENTMCNC: 33.8 G/DL (ref 32–36)
MCV RBC AUTO: 72 FL (ref 82–98)
MONOCYTES # BLD AUTO: 1.6 K/UL (ref 0.3–1)
MONOCYTES NFR BLD: 7.1 % (ref 4–15)
NEUTROPHILS # BLD AUTO: 17.7 K/UL (ref 1.8–7.7)
NEUTROPHILS NFR BLD: 78.1 % (ref 38–73)
NITRITE UR QL STRIP: NEGATIVE
NRBC BLD-RTO: 0 /100 WBC
PH UR STRIP: 6 [PH] (ref 5–8)
PLATELET # BLD AUTO: 405 K/UL (ref 150–450)
PMV BLD AUTO: 9.7 FL (ref 9.2–12.9)
POTASSIUM SERPL-SCNC: 4.7 MMOL/L (ref 3.5–5.1)
PROT SERPL-MCNC: 6.6 G/DL (ref 6–8.4)
PROT UR QL STRIP: NEGATIVE
PROTHROMBIN TIME: 15.3 SEC (ref 9–12.5)
RBC # BLD AUTO: 2.85 M/UL (ref 4.6–6.2)
SAMPLE: NORMAL
SITE: NORMAL
SODIUM SERPL-SCNC: 125 MMOL/L (ref 136–145)
SODIUM SERPL-SCNC: 129 MMOL/L (ref 136–145)
SP GR UR STRIP: 1.02 (ref 1–1.03)
SPECIMEN OUTDATE: NORMAL
TROPONIN I SERPL DL<=0.01 NG/ML-MCNC: <0.006 NG/ML (ref 0–0.03)
URN SPEC COLLECT METH UR: NORMAL
WBC # BLD AUTO: 22.61 K/UL (ref 3.9–12.7)

## 2023-10-14 PROCEDURE — 87186 SC STD MICRODIL/AGAR DIL: CPT | Performed by: EMERGENCY MEDICINE

## 2023-10-14 PROCEDURE — 93010 EKG 12-LEAD: ICD-10-PCS | Mod: ,,, | Performed by: INTERNAL MEDICINE

## 2023-10-14 PROCEDURE — 36430 TRANSFUSION BLD/BLD COMPNT: CPT

## 2023-10-14 PROCEDURE — 81003 URINALYSIS AUTO W/O SCOPE: CPT | Performed by: EMERGENCY MEDICINE

## 2023-10-14 PROCEDURE — 63600175 PHARM REV CODE 636 W HCPCS

## 2023-10-14 PROCEDURE — 85610 PROTHROMBIN TIME: CPT | Performed by: EMERGENCY MEDICINE

## 2023-10-14 PROCEDURE — 25000003 PHARM REV CODE 250: Performed by: EMERGENCY MEDICINE

## 2023-10-14 PROCEDURE — 96375 TX/PRO/DX INJ NEW DRUG ADDON: CPT

## 2023-10-14 PROCEDURE — 86901 BLOOD TYPING SEROLOGIC RH(D): CPT | Performed by: EMERGENCY MEDICINE

## 2023-10-14 PROCEDURE — 36410 VNPNXR 3YR/> PHY/QHP DX/THER: CPT | Mod: RT

## 2023-10-14 PROCEDURE — 99223 1ST HOSP IP/OBS HIGH 75: CPT | Mod: ,,, | Performed by: STUDENT IN AN ORGANIZED HEALTH CARE EDUCATION/TRAINING PROGRAM

## 2023-10-14 PROCEDURE — 36415 COLL VENOUS BLD VENIPUNCTURE: CPT

## 2023-10-14 PROCEDURE — 84295 ASSAY OF SERUM SODIUM: CPT

## 2023-10-14 PROCEDURE — 99900035 HC TECH TIME PER 15 MIN (STAT)

## 2023-10-14 PROCEDURE — 25000003 PHARM REV CODE 250

## 2023-10-14 PROCEDURE — 96366 THER/PROPH/DIAG IV INF ADDON: CPT

## 2023-10-14 PROCEDURE — 84484 ASSAY OF TROPONIN QUANT: CPT | Performed by: EMERGENCY MEDICINE

## 2023-10-14 PROCEDURE — 85025 COMPLETE CBC W/AUTO DIFF WBC: CPT | Performed by: EMERGENCY MEDICINE

## 2023-10-14 PROCEDURE — 83880 ASSAY OF NATRIURETIC PEPTIDE: CPT | Performed by: EMERGENCY MEDICINE

## 2023-10-14 PROCEDURE — 87077 CULTURE AEROBIC IDENTIFY: CPT | Performed by: EMERGENCY MEDICINE

## 2023-10-14 PROCEDURE — 99285 EMERGENCY DEPT VISIT HI MDM: CPT | Mod: 25

## 2023-10-14 PROCEDURE — 80053 COMPREHEN METABOLIC PANEL: CPT | Performed by: EMERGENCY MEDICINE

## 2023-10-14 PROCEDURE — 93005 ELECTROCARDIOGRAM TRACING: CPT

## 2023-10-14 PROCEDURE — 86920 COMPATIBILITY TEST SPIN: CPT | Performed by: EMERGENCY MEDICINE

## 2023-10-14 PROCEDURE — 94761 N-INVAS EAR/PLS OXIMETRY MLT: CPT

## 2023-10-14 PROCEDURE — 11000001 HC ACUTE MED/SURG PRIVATE ROOM

## 2023-10-14 PROCEDURE — 96365 THER/PROPH/DIAG IV INF INIT: CPT

## 2023-10-14 PROCEDURE — 83605 ASSAY OF LACTIC ACID: CPT

## 2023-10-14 PROCEDURE — 93010 ELECTROCARDIOGRAM REPORT: CPT | Mod: ,,, | Performed by: INTERNAL MEDICINE

## 2023-10-14 PROCEDURE — 87040 BLOOD CULTURE FOR BACTERIA: CPT | Mod: 59 | Performed by: EMERGENCY MEDICINE

## 2023-10-14 PROCEDURE — 99223 PR INITIAL HOSPITAL CARE,LEVL III: ICD-10-PCS | Mod: ,,, | Performed by: STUDENT IN AN ORGANIZED HEALTH CARE EDUCATION/TRAINING PROGRAM

## 2023-10-14 PROCEDURE — 63600175 PHARM REV CODE 636 W HCPCS: Performed by: EMERGENCY MEDICINE

## 2023-10-14 RX ORDER — HYDROMORPHONE HYDROCHLORIDE 1 MG/ML
1 INJECTION, SOLUTION INTRAMUSCULAR; INTRAVENOUS; SUBCUTANEOUS
Status: DISCONTINUED | OUTPATIENT
Start: 2023-10-14 | End: 2023-10-15

## 2023-10-14 RX ORDER — SODIUM CHLORIDE 0.9 % (FLUSH) 0.9 %
10 SYRINGE (ML) INJECTION EVERY 12 HOURS PRN
Status: DISCONTINUED | OUTPATIENT
Start: 2023-10-14 | End: 2023-10-16 | Stop reason: HOSPADM

## 2023-10-14 RX ORDER — HYDROMORPHONE HYDROCHLORIDE 1 MG/ML
1 INJECTION, SOLUTION INTRAMUSCULAR; INTRAVENOUS; SUBCUTANEOUS EVERY 4 HOURS PRN
Status: DISCONTINUED | OUTPATIENT
Start: 2023-10-14 | End: 2023-10-14

## 2023-10-14 RX ORDER — NALOXONE HCL 0.4 MG/ML
0.02 VIAL (ML) INJECTION
Status: DISCONTINUED | OUTPATIENT
Start: 2023-10-14 | End: 2023-10-16 | Stop reason: HOSPADM

## 2023-10-14 RX ORDER — ACETAMINOPHEN 325 MG/1
650 TABLET ORAL EVERY 4 HOURS PRN
Status: DISCONTINUED | OUTPATIENT
Start: 2023-10-14 | End: 2023-10-16 | Stop reason: HOSPADM

## 2023-10-14 RX ORDER — HYDROCODONE BITARTRATE AND ACETAMINOPHEN 500; 5 MG/1; MG/1
TABLET ORAL
Status: DISCONTINUED | OUTPATIENT
Start: 2023-10-14 | End: 2023-10-16 | Stop reason: HOSPADM

## 2023-10-14 RX ORDER — FUROSEMIDE 10 MG/ML
40 INJECTION INTRAMUSCULAR; INTRAVENOUS
Status: COMPLETED | OUTPATIENT
Start: 2023-10-14 | End: 2023-10-14

## 2023-10-14 RX ORDER — ONDANSETRON 8 MG/1
8 TABLET, ORALLY DISINTEGRATING ORAL EVERY 8 HOURS PRN
Status: DISCONTINUED | OUTPATIENT
Start: 2023-10-14 | End: 2023-10-16 | Stop reason: HOSPADM

## 2023-10-14 RX ORDER — ACETAMINOPHEN 325 MG/1
650 TABLET ORAL
Status: COMPLETED | OUTPATIENT
Start: 2023-10-14 | End: 2023-10-14

## 2023-10-14 RX ORDER — POLYETHYLENE GLYCOL 3350 17 G/17G
17 POWDER, FOR SOLUTION ORAL DAILY
Status: DISCONTINUED | OUTPATIENT
Start: 2023-10-15 | End: 2023-10-16 | Stop reason: HOSPADM

## 2023-10-14 RX ORDER — TALC
6 POWDER (GRAM) TOPICAL NIGHTLY PRN
Status: CANCELLED | OUTPATIENT
Start: 2023-10-14

## 2023-10-14 RX ORDER — IBUPROFEN 200 MG
24 TABLET ORAL
Status: DISCONTINUED | OUTPATIENT
Start: 2023-10-14 | End: 2023-10-16 | Stop reason: HOSPADM

## 2023-10-14 RX ORDER — IBUPROFEN 200 MG
16 TABLET ORAL
Status: DISCONTINUED | OUTPATIENT
Start: 2023-10-14 | End: 2023-10-16 | Stop reason: HOSPADM

## 2023-10-14 RX ORDER — GLUCAGON 1 MG
1 KIT INJECTION
Status: DISCONTINUED | OUTPATIENT
Start: 2023-10-14 | End: 2023-10-16 | Stop reason: HOSPADM

## 2023-10-14 RX ORDER — SODIUM CHLORIDE 0.9 % (FLUSH) 0.9 %
10 SYRINGE (ML) INJECTION
Status: CANCELLED | OUTPATIENT
Start: 2023-10-14

## 2023-10-14 RX ORDER — SODIUM CHLORIDE 9 MG/ML
INJECTION, SOLUTION INTRAVENOUS CONTINUOUS
Status: ACTIVE | OUTPATIENT
Start: 2023-10-14 | End: 2023-10-15

## 2023-10-14 RX ORDER — OXYCODONE HYDROCHLORIDE 10 MG/1
10 TABLET ORAL EVERY 4 HOURS PRN
Status: DISCONTINUED | OUTPATIENT
Start: 2023-10-14 | End: 2023-10-16 | Stop reason: HOSPADM

## 2023-10-14 RX ORDER — AMOXICILLIN 250 MG
1 CAPSULE ORAL 2 TIMES DAILY
Status: DISCONTINUED | OUTPATIENT
Start: 2023-10-14 | End: 2023-10-16 | Stop reason: HOSPADM

## 2023-10-14 RX ADMIN — FUROSEMIDE 40 MG: 10 INJECTION, SOLUTION INTRAVENOUS at 05:10

## 2023-10-14 RX ADMIN — SENNOSIDES AND DOCUSATE SODIUM 1 TABLET: 50; 8.6 TABLET ORAL at 10:10

## 2023-10-14 RX ADMIN — HYDROMORPHONE HYDROCHLORIDE 1 MG: 1 INJECTION, SOLUTION INTRAMUSCULAR; INTRAVENOUS; SUBCUTANEOUS at 10:10

## 2023-10-14 RX ADMIN — ACETAMINOPHEN 650 MG: 325 TABLET ORAL at 04:10

## 2023-10-14 RX ADMIN — HYDROMORPHONE HYDROCHLORIDE 1 MG: 1 INJECTION, SOLUTION INTRAMUSCULAR; INTRAVENOUS; SUBCUTANEOUS at 05:10

## 2023-10-14 RX ADMIN — VANCOMYCIN HYDROCHLORIDE 1500 MG: 1.5 INJECTION, POWDER, LYOPHILIZED, FOR SOLUTION INTRAVENOUS at 11:10

## 2023-10-14 RX ADMIN — VANCOMYCIN HYDROCHLORIDE 2000 MG: 500 INJECTION, POWDER, LYOPHILIZED, FOR SOLUTION INTRAVENOUS at 03:10

## 2023-10-14 RX ADMIN — SODIUM CHLORIDE: 9 INJECTION, SOLUTION INTRAVENOUS at 08:10

## 2023-10-14 NOTE — ASSESSMENT & PLAN NOTE
Blood cultures from admission showing GPCs, PCR testing positive for MRSA. Patient previously had MRSA growing in right hand wounds on admission in July 2023 for septic arthritis, though blood cultures at that time were negative    - Continue IV vancomycin  - ID consulted for MRSA bacteremia management, appreciate recs  - TTE ordered to assess for endocarditis in setting of regular IVDU and new onset LE swelling

## 2023-10-14 NOTE — ASSESSMENT & PLAN NOTE
Patient has hyponatremia which is uncontrolled,We will aim to correct the sodium by 4-6mEq in 24 hours. We will monitor sodium Every 6 hours. The hyponatremia is due to Dehydration/hypovolemia. We will obtain the following studies: N/a. We will treat the hyponatremia with IV fluids as follows: NS 50cc/hr. The patient's sodium results have been reviewed and are listed below.  Recent Labs   Lab 10/14/23  1512   *        - Previously obtained labs more suggestive of a hypovolemic hyponatremia, possibly due to decreased PO intake.  - Will give slow IVF infusion of NS 50mL/hr for 1L  - Stable and slowly improved from yesterday, can continue to monitor with daily CMPs

## 2023-10-14 NOTE — H&P
Aleksandr Bray - Emergency Dept  [facility]  History & Physical    Patient Name: Ton Donovan  MRN: [id number]  Patient Class: IP- Inpatient  Admission Date: 10/14/2023  Attending Physician: Vinod Bae MD   Primary Care Provider: Emily Primary Doctor         Patient information was obtained from ER records.     Subjective:     Principal Problem:MRSA bacteremia    Chief Complaint:   Chief Complaint   Patient presents with    Wound Check     Abscess to r chest removed 2 d ago, wanting tubes removed to go to rehab    Leg Swelling        HPI: Ton Donovan is a 28 year old man with history of heroin use and IVDU who re-presents to the ED on 10/11/23 due to right upper chest swelling and pain after having left AMA on that same morning. Patient reports that he was brought back by his sister for treatment. Patient tells me that he left AMA because he panicked at the thought of surgery and felt like he was withdrawing, so he left to do more heroin. [place] [facility] [place] the same problem. From the last HPI: patient reports this swelling started about 1 week ago and has gotten larger and more painful. He reports recent IV injection of heroin and he says he snorted heroin shortly before presenting to the ED on this afternoon of 10/10. He says he was told to present to the ED by his friends who were concerned for him. Pt was recently hospitalized from 7/6 to 7/8 due to infected wounds, abscesses, and septic arthritis in his Right hand for which he underwent I&D and received Right IJ catheter for IV antibiotics; however, pt had repeated attempts to elope with RIJ catheter, and patient wished to leave AMA so the line was removed prior to discharge and pt was given PO antibiotics. He reports resolution of these hand wounds. He denies any fevers, shortness of breath, n/v/d, or abdominal pain.     CT chest showed abscess in the Right pectoralis major muscle as well as findings concerning for osteomyelitis vs  septic arthritis of 1st rib and 1st costochondral joint. Pt given doses of vanc and zosyn. Patient was admitted to hospital medicine for management of abscess and possible OM vs septic arthritis. Gen surgery planned to take pt to OR for I&D on 10/11. After admission to hospital medicine on 10/10, the following morning patient left AMA before being evaluated by a physician from the primary team. In the interval, his blood cultures returned positive with gram positive cocci with PCR testing positive for MRSA. His sister was contacted via phone by the primary team and was asked to reach out to her brother as he was not able to be reached by phone, who then located him and brought him back to the hospital. Patient reports that he snorted and injected heroin this afternoon after leaving AMA.    Patient left AMA 2 days ago. He was prescribed antibiotics but he never filled them. He is returning today initially stating he just wanted his drain removed so he could go to rehab however he is also complaining of new onset bilateral lower extremity edema.    In ED: Patient AFVSS, WBC 22.6 hxh 6.9x20.4, Na 125, albumin 1.9. Patient ordered 1U pRBCs, and started on IV abx      Past Medical History:   Diagnosis Date    Hypertension     Unilateral inguinal hernia, without obstruction or gangrene, not specified as recurrent        Past Surgical History:   Procedure Laterality Date    HAND ARTHROTOMY Right 7/7/2023    Procedure: ARTHROTOMY, HAND - R LF PIP;  Surgeon: Boy Lamb MD;  Location: [facility] OR [address] [place] [address];  Service: Orthopedics;  Laterality: Right;    INCISION AND DRAINAGE OF ABSCESS Right 10/12/2023    Procedure: INCISION AND DRAINAGE, ABSCESS;  Surgeon: Steve Monteiro MD;  Location: [facility] OR [address] [place] [address];  Service: General;  Laterality: Right;  right chest    IRRIGATION AND DEBRIDEMENT OF UPPER EXTREMITY Bilateral 7/7/2023    Procedure: IRRIGATION AND DEBRIDEMENT, UPPER EXTREMITY - RIGHT HAND;  Surgeon: Boy Lamb  MD;  Location: [address];  Service: Orthopedics;  Laterality: Bilateral;    OLECRANON BURSECTOMY Left 7/7/2023    Procedure: BURSECTOMY, OLECRANON;  Surgeon: Boy Lamb MD;  Location: [address];  Service: Orthopedics;  Laterality: Left;       Review of patient's allergies indicates:  No Known Allergies    No current facility-administered medications on file prior to encounter.     Current Outpatient Medications on File Prior to Encounter   Medication Sig    linezolid (ZYVOX) 600 mg Tab Take 1 tablet (600 mg total) by mouth every 12 (twelve) hours. for 14 days     Family History    None       Tobacco Use    Smoking status: Former     Types: Cigarettes    Smokeless tobacco: Never   Substance and Sexual Activity    Alcohol use: No    Drug use: Yes     Types: Marijuana, IV     Comment: 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    Sexual activity: Not on file     Review of Systems   Constitutional:  Positive for fatigue and fever.   Respiratory:  Negative for cough and shortness of breath.    Cardiovascular:  Positive for chest pain and leg swelling. Negative for palpitations.   Gastrointestinal:  Negative for abdominal distention, blood in stool, constipation, diarrhea and nausea.   Genitourinary:  Negative for dysuria, hematuria and urgency.   Musculoskeletal:  Negative for arthralgias.   Skin:  Negative for rash.     Objective:     Vital Signs (Most Recent):  Temp: 99.1 °F (37.3 °C) (10/14/23 1358)  Pulse: 100 (10/14/23 1514)  Resp: 12 (10/14/23 1514)  BP: (!) 143/68 (10/14/23 1358)  SpO2: 96 % (10/14/23 1514) Vital Signs (24h Range):  Temp:  [99.1 °F (37.3 °C)] 99.1 °F (37.3 °C)  Pulse:  [100] 100  Resp:  [12-20] 12  SpO2:  [96 %-97 %] 96 %  BP: (143)/(68) 143/68     Weight: 87.7 kg (193 lb 5.5 oz)  Body mass index is 26.97 kg/m².     Physical Exam  Constitutional:       Appearance: Normal appearance.   HENT:      Head: Normocephalic.      Mouth/Throat:      Mouth: Mucous membranes are moist.   Eyes:       Extraocular Movements: Extraocular movements intact.      Pupils: Pupils are equal, round, and reactive to light.   Cardiovascular:      Rate and Rhythm: Normal rate.      Pulses: Normal pulses.   Pulmonary:      Effort: Pulmonary effort is normal.   Abdominal:      Palpations: Abdomen is soft.   Musculoskeletal:      Cervical back: Normal range of motion.      Comments: Drain from chest incision from prior abcess drainage w/out purulence surrounding wound   Skin:     Capillary Refill: Capillary refill takes less than 2 seconds.   Neurological:      General: No focal deficit present.      Mental Status: He is alert.   Psychiatric:         Mood and Affect: Mood normal.              CRANIAL NERVES     CN III, IV, VI   Pupils are equal, round, and reactive to light.       Significant Labs: All pertinent labs within the past 24 hours have been reviewed.  CBC:   Recent Labs   Lab 10/14/23  1512   WBC 22.61*   HGB 6.9*   HCT 20.4*        CMP:   Recent Labs   Lab 10/14/23  1512   *   K 4.7   CL 91*   CO2 23   *   BUN 17   CREATININE 0.8   CALCIUM 8.4*   PROT 6.6   ALBUMIN 1.9*   BILITOT 0.3   ALKPHOS 105   AST 60*   ALT 14   ANIONGAP 11       Significant Imaging: I have reviewed all pertinent imaging results/findings within the past 24 hours.    Assessment/Plan:     MRSA bacteremia  Blood cultures from admission showing GPCs, PCR testing positive for MRSA. Patient previously had MRSA growing in right hand wounds on admission in July 2023 for septic arthritis, though blood cultures at that time were negative    - Continue IV vancomycin  - ID consulted for MRSA bacteremia management, appreciate recs  - TTE ordered to assess for endocarditis in setting of regular IVDU and new onset LE swelling      Hyponatremia  Patient has hyponatremia which is uncontrolled,We will aim to correct the sodium by 4-6mEq in 24 hours. We will monitor sodium Every 6 hours. The hyponatremia is due to  Dehydration/hypovolemia. We will obtain the following studies: N/a. We will treat the hyponatremia with IV fluids as follows: NS 50cc/hr. The patient's sodium results have been reviewed and are listed below.  Recent Labs   Lab 10/14/23  1512   *        - Previously obtained labs more suggestive of a hypovolemic hyponatremia, possibly due to decreased PO intake.  - Will give slow IVF infusion of NS 50mL/hr for 1L  - Stable and slowly improved from yesterday, can continue to monitor with daily CMPs    Abscess of chest  28M with IVDU and previous septic arthritis who presents with 1 week of increased swelling and pain in right upper chest. WBC elevated to 30. CT chest showed abscess within the right pectoralis major muscle measuring 6.7 x 12.5 cm as well as findings concerning for osteomyelitis vs septic arthritis of 1st rib and 1st costochondral joint. Multiple other abscesses seen on ultrasound in the bilateral upper extremities. Gen Surg had planned to take pt to OR for I&D on 10/11 with further discussion with thoracic surgery regarding bone/joint findings, however patient left AMA prior to receiving treatment. Was brought back by family to the ED for treatment. Bcx from previous admission showing staph aureus with PCR testing positive for MRSA. Unusual location for abscess however likely 2/2 to hematogenous spread of MRSA. Patient recently left AMA following I&D    - Vancomycin  - gen surg consult for drain removal  - Daily CBC, CMP, mag, phos  - Keep K > 4, Phos > 3, Mg > 2  - Replete electrolytes prn  - q4h vitals        Heroin dependence  Patient with chronic use of heroin, was seen by addiction psychiatry on last admission in July 2023. He reports ongoing regular use of heroin with last use shortly prior to presentations on both 10/10 and 10/11. Pt is saying he wishes to quit illicit opioid use and refused morphine or dilaudid in the ED. Was given a dose of suboxone 8-2 by the ED yesterday, and patient  then reported feeling withdrawal symptoms afterwards. Psych had been consulted regarding withdrawal management and recommended against starting suboxone in the setting of recent heroin use due to concern for precipitating more severe withdrawal. Patient recently discharged after being given suboxone    - Consult psychiatry  - pain control w/ oxy and dilaudid PRN        VTE Risk Mitigation (From admission, onward)           Ordered     IP VTE HIGH RISK PATIENT  Once         10/14/23 1654     Place sequential compression device  Until discontinued         10/14/23 1654                               Terence Toribio MD  Department of Hospital Medicine  [facility] - Emergency Dept

## 2023-10-14 NOTE — SUBJECTIVE & OBJECTIVE
Past Medical History:   Diagnosis Date    Hypertension     Unilateral inguinal hernia, without obstruction or gangrene, not specified as recurrent        Past Surgical History:   Procedure Laterality Date    HAND ARTHROTOMY Right 7/7/2023    Procedure: ARTHROTOMY, HAND - R LF PIP;  Surgeon: Boy Lamb MD;  Location: [address];  Service: Orthopedics;  Laterality: Right;    INCISION AND DRAINAGE OF ABSCESS Right 10/12/2023    Procedure: INCISION AND DRAINAGE, ABSCESS;  Surgeon: Steve Monteiro MD;  Location: [address];  Service: General;  Laterality: Right;  right chest    IRRIGATION AND DEBRIDEMENT OF UPPER EXTREMITY Bilateral 7/7/2023    Procedure: IRRIGATION AND DEBRIDEMENT, UPPER EXTREMITY - RIGHT HAND;  Surgeon: Boy Lamb MD;  Location: [address];  Service: Orthopedics;  Laterality: Bilateral;    OLECRANON BURSECTOMY Left 7/7/2023    Procedure: BURSECTOMY, OLECRANON;  Surgeon: Byo Lamb MD;  Location: [address];  Service: Orthopedics;  Laterality: Left;       Review of patient's allergies indicates:  No Known Allergies    No current facility-administered medications on file prior to encounter.     Current Outpatient Medications on File Prior to Encounter   Medication Sig    linezolid (ZYVOX) 600 mg Tab Take 1 tablet (600 mg total) by mouth every 12 (twelve) hours. for 14 days     Family History    None       Tobacco Use    Smoking status: Former     Types: Cigarettes    Smokeless tobacco: Never   Substance and Sexual Activity    Alcohol use: No    Drug use: Yes     Types: Marijuana, IV     Comment: 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    Sexual activity: Not on file     Review of Systems   Constitutional:  Positive for fatigue and fever.   Respiratory:  Negative for cough and shortness of breath.    Cardiovascular:  Positive for chest pain and leg swelling. Negative for palpitations.   Gastrointestinal:  Negative for abdominal distention, blood in stool,  constipation, diarrhea and nausea.   Genitourinary:  Negative for dysuria, hematuria and urgency.   Musculoskeletal:  Negative for arthralgias.   Skin:  Negative for rash.     Objective:     Vital Signs (Most Recent):  Temp: 99.1 °F (37.3 °C) (10/14/23 1358)  Pulse: 100 (10/14/23 1514)  Resp: 12 (10/14/23 1514)  BP: (!) 143/68 (10/14/23 1358)  SpO2: 96 % (10/14/23 1514) Vital Signs (24h Range):  Temp:  [99.1 °F (37.3 °C)] 99.1 °F (37.3 °C)  Pulse:  [100] 100  Resp:  [12-20] 12  SpO2:  [96 %-97 %] 96 %  BP: (143)/(68) 143/68     Weight: 87.7 kg (193 lb 5.5 oz)  Body mass index is 26.97 kg/m².     Physical Exam  Constitutional:       Appearance: Normal appearance.   HENT:      Head: Normocephalic.      Mouth/Throat:      Mouth: Mucous membranes are moist.   Eyes:      Extraocular Movements: Extraocular movements intact.      Pupils: Pupils are equal, round, and reactive to light.   Cardiovascular:      Rate and Rhythm: Normal rate.      Pulses: Normal pulses.   Pulmonary:      Effort: Pulmonary effort is normal.   Abdominal:      Palpations: Abdomen is soft.   Musculoskeletal:      Cervical back: Normal range of motion.      Comments: Drain from chest incision from prior abcess drainage w/out purulence surrounding wound   Skin:     Capillary Refill: Capillary refill takes less than 2 seconds.   Neurological:      General: No focal deficit present.      Mental Status: He is alert.   Psychiatric:         Mood and Affect: Mood normal.              CRANIAL NERVES     CN III, IV, VI   Pupils are equal, round, and reactive to light.       Significant Labs: All pertinent labs within the past 24 hours have been reviewed.  CBC:   Recent Labs   Lab 10/14/23  1512   WBC 22.61*   HGB 6.9*   HCT 20.4*        CMP:   Recent Labs   Lab 10/14/23  1512   *   K 4.7   CL 91*   CO2 23   *   BUN 17   CREATININE 0.8   CALCIUM 8.4*   PROT 6.6   ALBUMIN 1.9*   BILITOT 0.3   ALKPHOS 105   AST 60*   ALT 14   ANIONGAP 11        Significant Imaging: I have reviewed all pertinent imaging results/findings within the past 24 hours.

## 2023-10-14 NOTE — PHARMACY MED REC
"Admission Medication History     The home medication history was taken by Chicho Dsouza.    You may go to "Admission" then "Reconcile Home Medications" tabs to review and/or act upon these items.     The home medication list has been updated by the Pharmacy department.   Please read ALL comments highlighted in yellow.   Please address this information as you see fit.    Feel free to contact us if you have any questions or require assistance.          Current Outpatient Medications on File Prior to Encounter   Medication Sig    linezolid (ZYVOX) 600 mg Tab Take 1 tablet (600 mg total) by mouth every 12 (twelve) hours. for 14 days   (Patient not taking: Reported on 10/14/2023)           Potential issues to be addressed PRIOR TO DISCHARGE  Please discuss with the patient barriers to adherence with medication treatment plans  Patient requires education regarding drug therapies     Chicho Dsouza  EXT [id number]                  .          " Discharge Instructions for  [address] [place] [address], [address] [place] [address]  Telephone: [phone]    NAME:  Wilner Moyer. YOB: 1946  MEDICAL RECORD NUMBER:  [id number]  DATE:  1/23/2023      Return Appointment:  [] Dressing supply provider:   [x] Home Healthcare:  [facility]   [x] Return Appointment: 1 Week(s)  [] Nurse Visit: [facility])    [] Discharge from [facility]  [] Ordered tests:   [x] Referrals: Please see a podiatrist for toe nails  [x] Rx: measured and orderd Juxtalite stockings BLE  [x] Other: Use Lymphedema pump twice a day x 1 hour each    Wound Cleansing:   Do not scrub or use excessive force. Cleanse wound prior to applying a clean dressing with:  [] Normal Saline   [x] Mild Soap & Water/Baby Shampoo   [] Keep Wound Dry in Shower  [] Other:      Topical Treatments:  Do not apply lotions, creams, or ointments to wound bed unless directed. [x] Apply moisturizing lotion to skin surrounding the wound prior to dressing change.  [] Apply antifungal ointment to skin surrounding the wound prior to dressing change.  [] Apply thin film of moisture barrier/zinc ointment to skin immediately around wound.   [] Other:       Dressings:           Wound Location L & R leg   [x] Apply Primary Dressing:       [] MediHoney Gel    [] MediHoney Alginate               [] Calcium Alginate      [] Calcium Alginate with Silver   [] Collagen                   [] Collagen with Silver                [] Santyl with Moistened saline gauze              [] Hydrofera Blue (cut to size and moistened)  [] Hydrofera Blue Ready (Cut to size)   [] Vashe wet to dry    [] Betadine wet to dry              [] Hydrogel                [] Mepitel     [] Mepilex Transfer                [x] Other: Drawtex    [x] Cover and Secure with:     [x] Gauze [] Orest Drilling [] Kerlix   [] Foam [] Super Absorbant [x] ABD     [] ACE Wrap            [] Other:    Limit contact of tape with skin. [x] Change dressing: [] Daily    [] Every Other Day [] Once per week   [] Twice per week   [x] Three times per week every week  [] Do Not Change Dressing   [] Other:     Edema Control:  Apply: [] Compression Stocking:  mmHg  []Right Leg []Left Leg   [] Tubigrip []Right Leg Double Layer []Left Leg Double Layer      []Right Leg Single Layer []Left Leg Single Layer     [] Elevate leg(s) above the level of the heart when sitting. [] Avoid prolonged standing in one place. Compression:  Apply: [x] Multilayer Compression Wrap: [x]RightLeg [x]Left Leg                                 []Coflex   []Coflex Lite             [x]Unna Lite     [x] Do not get leg(s) with wrap wet. If wraps become too tight call the center or completely remove the wrap. [x] Elevate leg(s) above the level of the heart when sitting. [x] Avoid prolonged standing in one place. Dietary:  [x] Diet as tolerated: [] Calorie Diabetic Diet: [x] No Added Salt:  [x] Increase Protein: [] Other:     Activity:  [x] Activity as tolerated:    [] Patient has no activity restrictions       [] Strict Bedrest:   [] Remain off Work:       [] May return to full duty work:                                     [] Return to work with restrictions:               [address] Information: Should you experience any significant changes in your wound(s) or have questions about your wound care, please contact Errol Barry at [place] [phone] 8:00 am - 4:30. If you need help with your wound outside of these hours and cannot wait until we are again available, contact your PCP or go to the hospital emergency room. PLEASE NOTE: IF YOU ARE UNABLE TO OBTAIN WOUND SUPPLIES, CONTINUE TO USE THE SUPPLIES YOU HAVE AVAILABLE UNTIL YOU ARE ABLE TO REACH US. IT IS MOST IMPORTANT TO KEEP THE WOUND COVERED AT ALL TIMES.     [x] Dr. Sherin Beyer   [] Dr. Kee Becerra  [] Dr. Noemi Ivory

## 2023-10-14 NOTE — ASSESSMENT & PLAN NOTE
Patient with chronic use of heroin, was seen by addiction psychiatry on last admission in July 2023. He reports ongoing regular use of heroin with last use shortly prior to presentations on both 10/10 and 10/11. Pt is saying he wishes to quit illicit opioid use and refused morphine or dilaudid in the ED. Was given a dose of suboxone 8-2 by the ED yesterday, and patient then reported feeling withdrawal symptoms afterwards. Psych had been consulted regarding withdrawal management and recommended against starting suboxone in the setting of recent heroin use due to concern for precipitating more severe withdrawal. Patient recently discharged after being given suboxone    - Consult psychiatry  - pain control w/ oxy and dilaudid PRN

## 2023-10-14 NOTE — ED PROVIDER NOTES
Encounter Date: 10/14/2023       History     Chief Complaint   Patient presents with    Wound Check     Abscess to r chest removed 2 d ago, wanting tubes removed to go to rehab    Leg Swelling      28-year-old male with a history of substance use disorder, IBD a, just admitted this past week for a large right chest wall abscess, taken to the OR by General surgery and I and D performed, blood cultures from October 10th positive for MRSA, then patient left AMA 2 days ago, given a prescription for antibiotics but never filled them, now returning, asking that his Penrose drain be removed that he can go to rehab.  Patient denies any fevers or chills since discharge and states that his right upper chest wound has improved in appearance.  He does however complain of new bilateral lower extremity edema, onset about a week ago.  He denies any shortness of breath, dyspnea on exertion, orthopnea or PND.  Patient  not have a history of heart failure and did not have an echo during his last admission.    The history is provided by the patient and a friend.     Review of patient's allergies indicates:  No Known Allergies  Past Medical History:   Diagnosis Date    Hypertension     Unilateral inguinal hernia, without obstruction or gangrene, not specified as recurrent      Past Surgical History:   Procedure Laterality Date    HAND ARTHROTOMY Right 7/7/2023    Procedure: ARTHROTOMY, HAND - R LF PIP;  Surgeon: Boy Lamb MD;  Location: [address];  Service: Orthopedics;  Laterality: Right;    INCISION AND DRAINAGE OF ABSCESS Right 10/12/2023    Procedure: INCISION AND DRAINAGE, ABSCESS;  Surgeon: Steve Monteiro MD;  Location: [address];  Service: General;  Laterality: Right;  right chest    IRRIGATION AND DEBRIDEMENT OF UPPER EXTREMITY Bilateral 7/7/2023    Procedure: IRRIGATION AND DEBRIDEMENT, UPPER EXTREMITY - RIGHT HAND;  Surgeon: Boy Lamb MD;  Location: [address];  Service: Orthopedics;   Laterality: Bilateral;    OLECRANON BURSECTOMY Left 7/7/2023    Procedure: BURSECTOMY, OLECRANON;  Surgeon: Boy Lamb MD;  Location: [facility] OR [address] [place] [address];  Service: Orthopedics;  Laterality: Left;     No family history on file.  Social History     Tobacco Use    Smoking status: Former     Types: Cigarettes    Smokeless tobacco: Never   Substance Use Topics    Alcohol use: No    Drug use: Yes     Types: Marijuana, IV     Comment: 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     Review of Systems    Physical Exam     Initial Vitals [10/14/23 1358]   BP Pulse Resp Temp SpO2   (!) 143/68 100 20 99.1 °F (37.3 °C) 97 %      MAP       --         Physical Exam    Nursing note and vitals reviewed.  Constitutional: He appears well-developed and well-nourished. He is not diaphoretic. No distress.   HENT:   Mouth/Throat: Oropharynx is clear and moist.   Eyes: Conjunctivae are normal.   Neck: Neck supple.   Cardiovascular:    Tachycardia present.         Pulmonary/Chest: Breath sounds normal. No respiratory distress.   His right upper chest wall abscess with Penrose drains in place.  There is some purulent drainage on the overlying gauze.  The  overlying skin has minimal induration, there is no area of fluctuance.   Abdominal: Abdomen is soft. He exhibits no distension. There is no abdominal tenderness. There is no rebound and no guarding.   Musculoskeletal:         General: Edema present.      Cervical back: Neck supple.      Comments: 2+ edema to the lower extremities bilaterally     Neurological: He is alert and oriented to person, place, and time. He has normal strength.   Skin: Skin is warm and dry.   Psychiatric: He has a normal mood and affect. Thought content normal.         ED Course   External Jugular IV    Date/Time: 10/14/2023 3:01 PM    Performed by: Lala Garner MD  Authorized by: Lala Garner MD  Consent Done: Emergent Situation  Location (Ext Jugular): Right.  Area Prepped With: Chlorohexidine and  Alcohol.  Catheter Size: 20 ga.  Number of attempts: 1  Fixation/Dressing: Sterile Dressing and Tegaderm.  Patient tolerance: Patient tolerated the procedure well with no immediate complications        Labs Reviewed   CBC W/ AUTO DIFFERENTIAL - Abnormal; Notable for the following components:       Result Value    WBC 22.61 (*)     RBC 2.85 (*)     Hemoglobin 6.9 (*)     Hematocrit 20.4 (*)     MCV 72 (*)     MCH 24.2 (*)     RDW 16.6 (*)     Immature Granulocytes 2.7 (*)     Gran # (ANC) 17.7 (*)     Immature Grans (Abs) 0.60 (*)     Mono # 1.6 (*)     Gran % 78.1 (*)     Lymph % 11.1 (*)     All other components within normal limits   COMPREHENSIVE METABOLIC PANEL - Abnormal; Notable for the following components:    Sodium 125 (*)     Chloride 91 (*)     Glucose 115 (*)     Calcium 8.4 (*)     Albumin 1.9 (*)     AST 60 (*)     All other components within normal limits   PROTIME-INR - Abnormal; Notable for the following components:    Prothrombin Time 15.3 (*)     INR 1.5 (*)     All other components within normal limits   SODIUM - Abnormal; Notable for the following components:    Sodium 129 (*)     All other components within normal limits   CULTURE, BLOOD   CULTURE, BLOOD   URINALYSIS, REFLEX TO URINE CULTURE    Narrative:     Specimen Source->Urine   B-TYPE NATRIURETIC PEPTIDE   TROPONIN I   TYPE & SCREEN   ISTAT LACTATE   PREPARE RBC SOFT          Imaging Results              X-Ray Chest AP Portable (Final result)  Result time 10/14/23 15:13:07      Final result by Haris Vieira MD (10/14/23 15:13:07)                   Impression:      1. Pulmonary findings may reflect edema or other nonspecific pneumonitis noting shallow inspiratory effort.  No large focal consolidation.      Electronically signed by: Haris Vieira MD  Date:    10/14/2023  Time:    15:13               Narrative:    EXAMINATION:  XR CHEST AP PORTABLE    CLINICAL HISTORY:  Sepsis;    TECHNIQUE:  Single frontal view of the chest was  performed.    COMPARISON:  10/10/2023, CT chest 10/10/2023    FINDINGS:  The cardiomediastinal silhouette is not enlarged, stable..  There is no pleural effusion.  The trachea is midline.  The lungs are symmetrically expanded bilaterally with coarse interstitial attenuation bilaterally noting left basilar subsegmental atelectasis..  No large focal consolidation seen.  There is no pneumothorax.  The osseous structures are unremarkable.  There is surgical change of the right anterior chest wall..                                       Medications   0.9%  NaCl infusion (for blood administration) (has no administration in time range)   sodium chloride 0.9% flush 10 mL (has no administration in time range)   naloxone 0.4 mg/mL injection 0.02 mg (has no administration in time range)   glucose chewable tablet 16 g (has no administration in time range)   glucose chewable tablet 24 g (has no administration in time range)   glucagon (human recombinant) injection 1 mg (has no administration in time range)   acetaminophen tablet 650 mg (has no administration in time range)   oxyCODONE immediate release tablet 10 mg (has no administration in time range)   polyethylene glycol packet 17 g (has no administration in time range)   senna-docusate 8.6-50 mg per tablet 1 tablet (has no administration in time range)   ondansetron disintegrating tablet 8 mg (has no administration in time range)   dextrose 10% bolus 125 mL 125 mL (has no administration in time range)   dextrose 10% bolus 250 mL 250 mL (has no administration in time range)   HYDROmorphone injection 1 mg (1 mg Intravenous Given 10/14/23 1705)   vancomycin 1,500 mg in dextrose 5 % (D5W) 250 mL IVPB (Vial-Mate) (1,500 mg Intravenous Trough Due As Scheduled Before Dose 10/15/23 2330)   0.9%  NaCl infusion (has no administration in time range)   vancomycin 2 g in dextrose 5 % 500 mL IVPB (2,000 mg Intravenous New Bag 10/14/23 1557)   acetaminophen tablet 650 mg (650 mg Oral Given  10/14/23 1604)   furosemide injection 40 mg (40 mg Intravenous Given 10/14/23 1705)     Medical Decision Making   Patient here for drain removal but on further review of his chart and on exam, patient has MRSA bacteremia  and has only received 2 days of IV antibiotics for this.  In addition he now has new onset bilateral lower extremity edema.  I am worried that there could be cardiac involvement of his bacteremia, I.e. endocarditis causing some valvular dysfunction    Plan  - repeat labs including blood cultures, and will add on CBC, CMP, BNP, troponin  - chest x-ray  - restart vancomycin  - anticipate admission,  needs ECHO  - will also likely need IV Lasix    Amount and/or Complexity of Data Reviewed  External Data Reviewed: labs and ECG.  Labs: ordered. Decision-making details documented in ED Course.  Radiology: ordered and independent interpretation performed.  ECG/medicine tests: ordered and independent interpretation performed.    Risk  OTC drugs.  Prescription drug management.  Decision regarding hospitalization.              Attending Attestation:         Attending Critical Care:   Critical Care Times:   ==============================================================  Total Critical Care Time - exclusive of procedural time: 35 minutes.  ==============================================================  Critical care was necessary to treat or prevent imminent or life-threatening deterioration of the following conditions: sepsis.   The following critical care procedures were done by me (see procedure notes): pulse oximetry.   Critical care was time spent personally by me on the following activities: obtaining history from patient or relative, examination of patient, review of old charts, ordering lab, x-rays, and/or EKG, development of treatment plan with patient or relative, review of x-rays / CT sent with the patient, ordering and performing treatments and interventions, evaluation of patient's response to  treatment, discussion with consultants and re-evaluation of patient's conition.   Critical Care Condition: potentially life-threatening           ED Course as of 10/14/23 1850   Sat Oct 14, 2023   1554 INR(!): 1.5 [AS]   1554 WBC(!): 22.61 [AS]   1554 Hemoglobin(!): 6.9 [AS]   1554 Hematocrit(!): 20.4 [AS]   1554 Platelet Count: 405 [AS]   1554 MCV(!): 72 [AS]   1554  Patient with worsening anemia.  Will consent for 1 unit of blood [AS]      ED Course User Index  [AS] Llaa Garner MD                    Clinical Impression:   Final diagnoses:  [R00.0] Tachycardia  [R78.81, B95.62] MRSA bacteremia (Primary)        ED Disposition Condition    Admit Stable                Lala Garner MD  10/14/23 1850

## 2023-10-14 NOTE — PROGRESS NOTES
Pharmacokinetic Initial Assessment & Plan: IV Vancomycin     IV Vancomycin 2000 mg x once was given in the ED on 10/14 at 1557  Then Vancomycin 1500 mg every 12 hours, next dose to be given 8 hours from the first dose)  Obtain a Vancomycin trough 30 mins prior to the 4 th dose on 10/15 at 2330   Desired empiric serum trough concentration is 10 to 20 mcg/mL     Pharmacy will continue to follow and monitor vancomycin. [id number] with any questions regarding this assessment.      Thank you for the consult,   George Tolbert       Patient brief summary:  Ton Donovan is a 28 y.o. male initiated on antimicrobial therapy with IV Vancomycin for treatment of suspected bacteremia    Drug Allergies:   Review of patient's allergies indicates:  No Known Allergies    Actual Body Weight:   87.7 kg    Renal Function:   Estimated Creatinine Clearance: 143.8 mL/min (based on SCr of 0.8 mg/dL).,     CBC (last 72 hours):  Recent Labs   Lab Result Units 10/11/23  2001 10/12/23  0431 10/14/23  1512   WBC K/uL 30.03* 28.45* 22.61*   Hemoglobin g/dL 8.4* 8.6* 6.9*   Hematocrit % 24.8* 25.0* 20.4*   Platelets K/uL 498* 489* 405   Gran % % 78.1* 80.0* 78.1*   Lymph % % 8.4* 7.9* 11.1*   Mono % % 8.9 8.7 7.1   Eosinophil % % 0.2 0.2 0.7   Basophil % % 0.3 0.2 0.3   Differential Method  Automated Automated Automated       Metabolic Panel (last 72 hours):  Recent Labs   Lab Result Units 10/11/23  2001 10/12/23  0431 10/14/23  1512   Sodium mmol/L 129* 127* 125*   Potassium mmol/L 3.8 3.9 4.7   Chloride mmol/L 96 94* 91*   CO2 mmol/L 21* 22* 23   Glucose mg/dL 96 96 115*   BUN mg/dL 9 7 17   Creatinine mg/dL 0.6 0.7 0.8   Albumin g/dL 2.1* 1.9* 1.9*   Total Bilirubin mg/dL 0.3 0.4 0.3   Alkaline Phosphatase U/L 130 121 105   AST U/L 40 31 60*   ALT U/L 15 13 14   Magnesium mg/dL  --  1.6  --    Phosphorus mg/dL  --  3.4  --        Drug levels (last 3 results):  Recent Labs   Lab Result Units 10/12/23  1040   Vancomycin-Trough ug/mL 3.4*        Microbiologic Results:  Microbiology Results (last 7 days)       Procedure Component Value Units Date/Time    Blood culture x two cultures. Draw prior to antibiotics. [[id number]] Collected: 10/14/23 1512    Order Status: Sent Specimen: Blood from Line, Jugular, External Right Updated: 10/14/23 1530    Blood culture x two cultures. Draw prior to antibiotics. [[id number]] Collected: 10/14/23 1512    Order Status: Sent Specimen: Blood from Line, Jugular, External Right Updated: 10/14/23 153

## 2023-10-14 NOTE — ED NOTES
States he was going to be going to rehab and was told to come to ED to have drain removed from recent abscess removal site. On arrival, pt has abd distention, EASTON pitting edema +2. States the leg swelling  started few days ago. States he has not started the PO antibiotics ordered for him since the procedure - planned to go after this visit.     Patient identifiers for Ton Donovan 28 y.o. male checked and correct.  Chief Complaint   Patient presents with    Wound Check     Abscess to r chest removed 2 d ago, wanting tubes removed to go to rehab    Leg Swelling     Past Medical History:   Diagnosis Date    Hypertension     Unilateral inguinal hernia, without obstruction or gangrene, not specified as recurrent      Allergies reported: Review of patient's allergies indicates:  No Known Allergies    Appearance: Pt awake, alert & oriented to person, place & time. Pt in no acute distress at present time. Pt is unkempt. Flat affect  Skin: Skin warm, dry. Color consistent with ethnicity. Mucous membranes moist. Dressed wound to R upper chest  Musculoskeletal: Patient moving all extremities well, no obvious swelling or deformities noted. Generalized weakness  Respiratory: Respirations spontaneous, even, and non-labored. Visible chest rise noted. Airway is open and patent. No accessory muscle use noted.   Neurologic: Sensation is intact. Speech is clear and appropriate. Eyes open spontaneously, behavior appropriate to situation, follows commands, facial expression symmetrical, bilateral hand grasp equal and even, purposeful motor response noted.  Cardiac: All peripheral pulses present. Bilateral lower extremity edema 2+. Cap refill is <3 seconds.  Abdomen: Abdomen firm and distended, non tender to palpation.   : Pt voids independently, denies dysuria, hematuria, frequency.

## 2023-10-14 NOTE — ASSESSMENT & PLAN NOTE
28M with IVDU and previous septic arthritis who presents with 1 week of increased swelling and pain in right upper chest. WBC elevated to 30. CT chest showed abscess within the right pectoralis major muscle measuring 6.7 x 12.5 cm as well as findings concerning for osteomyelitis vs septic arthritis of 1st rib and 1st costochondral joint. Multiple other abscesses seen on ultrasound in the bilateral upper extremities. Gen Surg had planned to take pt to OR for I&D on 10/11 with further discussion with thoracic surgery regarding bone/joint findings, however patient left AMA prior to receiving treatment. Was brought back by family to the ED for treatment. Bcx from previous admission showing staph aureus with PCR testing positive for MRSA. Unusual location for abscess however likely 2/2 to hematogenous spread of MRSA. Patient recently left AMA following I&D    - Vancomycin  - gen surg consult for drain removal  - Daily CBC, CMP, mag, phos  - Keep K > 4, Phos > 3, Mg > 2  - Replete electrolytes prn  - q4h vitals

## 2023-10-14 NOTE — HPI
Ton Donovan is a 28 year old man with history of heroin use and IVDU who re-presents to the ED on 10/11/23 due to right upper chest swelling and pain after having left AMA on that same morning. Patient reports that he was brought back by his sister for treatment. Patient tells me that he left AMA because he panicked at the thought of surgery and felt like he was withdrawing, so he left to do more heroin. He initially presented to [facility] on 10/10 for the same problem. From the last HPI: patient reports this swelling started about 1 week ago and has gotten larger and more painful. He reports recent IV injection of heroin and he says he snorted heroin shortly before presenting to the ED on this afternoon of 10/10. He says he was told to present to the ED by his friends who were concerned for him. Pt was recently hospitalized from 7/6 to 7/8 due to infected wounds, abscesses, and septic arthritis in his Right hand for which he underwent I&D and received Right IJ catheter for IV antibiotics; however, pt had repeated attempts to elope with RIJ catheter, and patient wished to leave AMA so the line was removed prior to discharge and pt was given PO antibiotics. He reports resolution of these hand wounds. He denies any fevers, shortness of breath, n/v/d, or abdominal pain.     CT chest showed abscess in the Right pectoralis major muscle as well as findings concerning for osteomyelitis vs septic arthritis of 1st rib and 1st costochondral joint. Pt given doses of vanc and zosyn. Patient was admitted to hospital medicine for management of abscess and possible OM vs septic arthritis. Gen surgery planned to take pt to OR for I&D on 10/11. After admission to hospital medicine on 10/10, the following morning patient left AMA before being evaluated by a physician from the primary team. In the interval, his blood cultures returned positive with gram positive cocci with PCR testing positive for MRSA. His sister was contacted via phone  by the primary team and was asked to reach out to her brother as he was not able to be reached by phone, who then located him and brought him back to the hospital. Patient reports that he snorted and injected heroin this afternoon after leaving AMA.    Patient left AMA 2 days ago. He was prescribed antibiotics but he never filled them. He is returning today initially stating he just wanted his drain removed so he could go to rehab however he is also complaining of new onset bilateral lower extremity edema.    In ED: Patient AFVSS, WBC 22.6 hxh 6.9x20.4, Na 125, albumin 1.9. Patient ordered 1U pRBCs, and started on IV abx

## 2023-10-15 LAB
ALBUMIN SERPL BCP-MCNC: 1.8 G/DL (ref 3.5–5.2)
ALP SERPL-CCNC: 102 U/L (ref 55–135)
ALT SERPL W/O P-5'-P-CCNC: 16 U/L (ref 10–44)
ANION GAP SERPL CALC-SCNC: 10 MMOL/L (ref 8–16)
ASCENDING AORTA: 2.43 CM
AST SERPL-CCNC: 49 U/L (ref 10–40)
AV INDEX (PROSTH): 0.79
AV MEAN GRADIENT: 6 MMHG
AV PEAK GRADIENT: 12 MMHG
AV VALVE AREA BY VELOCITY RATIO: 2.57 CM²
AV VALVE AREA: 2.87 CM²
AV VELOCITY RATIO: 0.71
BASOPHILS # BLD AUTO: 0.07 K/UL (ref 0–0.2)
BASOPHILS NFR BLD: 0.3 % (ref 0–1.9)
BILIRUB SERPL-MCNC: 0.6 MG/DL (ref 0.1–1)
BLD PROD TYP BPU: NORMAL
BLOOD UNIT EXPIRATION DATE: NORMAL
BLOOD UNIT TYPE CODE: 6200
BLOOD UNIT TYPE: NORMAL
BSA FOR ECHO PROCEDURE: 2.08 M2
BUN SERPL-MCNC: 11 MG/DL (ref 6–20)
CALCIUM SERPL-MCNC: 8.4 MG/DL (ref 8.7–10.5)
CHLORIDE SERPL-SCNC: 96 MMOL/L (ref 95–110)
CO2 SERPL-SCNC: 24 MMOL/L (ref 23–29)
CODING SYSTEM: NORMAL
CREAT SERPL-MCNC: 0.7 MG/DL (ref 0.5–1.4)
CROSSMATCH INTERPRETATION: NORMAL
CV ECHO LV RWT: 0.35 CM
DIFFERENTIAL METHOD: ABNORMAL
DISPENSE STATUS: NORMAL
DOP CALC AO PEAK VEL: 1.71 M/S
DOP CALC AO VTI: 31.07 CM
DOP CALC LVOT AREA: 3.6 CM2
DOP CALC LVOT DIAMETER: 2.15 CM
DOP CALC LVOT PEAK VEL: 1.21 M/S
DOP CALC LVOT STROKE VOLUME: 89.12 CM3
DOP CALCLVOT PEAK VEL VTI: 24.56 CM
E WAVE DECELERATION TIME: 281.95 MSEC
E/A RATIO: 1.35
E/E' RATIO: 5.83 M/S
ECHO LV POSTERIOR WALL: 0.9 CM (ref 0.6–1.1)
EJECTION FRACTION: 65 %
EOSINOPHIL # BLD AUTO: 0.1 K/UL (ref 0–0.5)
EOSINOPHIL NFR BLD: 0.3 % (ref 0–8)
ERYTHROCYTE [DISTWIDTH] IN BLOOD BY AUTOMATED COUNT: 16.9 % (ref 11.5–14.5)
EST. GFR  (NO RACE VARIABLE): >60 ML/MIN/1.73 M^2
FRACTIONAL SHORTENING: 27 % (ref 28–44)
GLUCOSE SERPL-MCNC: 104 MG/DL (ref 70–110)
HCT VFR BLD AUTO: 27.3 % (ref 40–54)
HGB BLD-MCNC: 9.1 G/DL (ref 14–18)
IMM GRANULOCYTES # BLD AUTO: 0.67 K/UL (ref 0–0.04)
IMM GRANULOCYTES NFR BLD AUTO: 2.9 % (ref 0–0.5)
INTERVENTRICULAR SEPTUM: 0.76 CM (ref 0.6–1.1)
IVRT: 74.22 MSEC
LA MAJOR: 6.3 CM
LA MINOR: 5.94 CM
LA WIDTH: 4.88 CM
LEFT ATRIUM SIZE: 3.53 CM
LEFT ATRIUM VOLUME INDEX MOD: 44.6 ML/M2
LEFT ATRIUM VOLUME INDEX: 43.3 ML/M2
LEFT ATRIUM VOLUME MOD: 92.3 CM3
LEFT ATRIUM VOLUME: 89.53 CM3
LEFT INTERNAL DIMENSION IN SYSTOLE: 3.76 CM (ref 2.1–4)
LEFT VENTRICLE DIASTOLIC VOLUME INDEX: 61.39 ML/M2
LEFT VENTRICLE DIASTOLIC VOLUME: 127.08 ML
LEFT VENTRICLE MASS INDEX: 73 G/M2
LEFT VENTRICLE SYSTOLIC VOLUME INDEX: 29.3 ML/M2
LEFT VENTRICLE SYSTOLIC VOLUME: 60.59 ML
LEFT VENTRICULAR INTERNAL DIMENSION IN DIASTOLE: 5.16 CM (ref 3.5–6)
LEFT VENTRICULAR MASS: 150.22 G
LV LATERAL E/E' RATIO: 5.25 M/S
LV SEPTAL E/E' RATIO: 6.56 M/S
LYMPHOCYTES # BLD AUTO: 2.4 K/UL (ref 1–4.8)
LYMPHOCYTES NFR BLD: 10.4 % (ref 18–48)
MAGNESIUM SERPL-MCNC: 1.7 MG/DL (ref 1.6–2.6)
MCH RBC QN AUTO: 24.5 PG (ref 27–31)
MCHC RBC AUTO-ENTMCNC: 33.3 G/DL (ref 32–36)
MCV RBC AUTO: 74 FL (ref 82–98)
MONOCYTES # BLD AUTO: 1.4 K/UL (ref 0.3–1)
MONOCYTES NFR BLD: 6 % (ref 4–15)
MV PEAK A VEL: 0.78 M/S
MV PEAK E VEL: 1.05 M/S
NEUTROPHILS # BLD AUTO: 18.5 K/UL (ref 1.8–7.7)
NEUTROPHILS NFR BLD: 80.1 % (ref 38–73)
NRBC BLD-RTO: 0 /100 WBC
PHOSPHATE SERPL-MCNC: 2.9 MG/DL (ref 2.7–4.5)
PISA TR MAX VEL: 2.48 M/S
PLATELET # BLD AUTO: 462 K/UL (ref 150–450)
PMV BLD AUTO: 9.5 FL (ref 9.2–12.9)
POTASSIUM SERPL-SCNC: 3.8 MMOL/L (ref 3.5–5.1)
PROT SERPL-MCNC: 6.4 G/DL (ref 6–8.4)
RA MAJOR: 5.13 CM
RA PRESSURE ESTIMATED: 3 MMHG
RA WIDTH: 4.62 CM
RBC # BLD AUTO: 3.71 M/UL (ref 4.6–6.2)
RIGHT VENTRICULAR END-DIASTOLIC DIMENSION: 3.75 CM
RV TB RVSP: 5 MMHG
SINUS: 2.72 CM
SODIUM SERPL-SCNC: 130 MMOL/L (ref 136–145)
SODIUM SERPL-SCNC: 132 MMOL/L (ref 136–145)
STJ: 2.41 CM
TDI LATERAL: 0.2 M/S
TDI SEPTAL: 0.16 M/S
TDI: 0.18 M/S
TR MAX PG: 25 MMHG
TRANS ERYTHROCYTES VOL PATIENT: NORMAL ML
TRICUSPID ANNULAR PLANE SYSTOLIC EXCURSION: 3.19 CM
TV REST PULMONARY ARTERY PRESSURE: 28 MMHG
WBC # BLD AUTO: 23.07 K/UL (ref 3.9–12.7)
Z-SCORE OF LEFT VENTRICULAR DIMENSION IN END DIASTOLE: -1.99
Z-SCORE OF LEFT VENTRICULAR DIMENSION IN END SYSTOLE: -0.19

## 2023-10-15 PROCEDURE — 99233 PR SUBSEQUENT HOSPITAL CARE,LEVL III: ICD-10-PCS | Mod: ,,, | Performed by: STUDENT IN AN ORGANIZED HEALTH CARE EDUCATION/TRAINING PROGRAM

## 2023-10-15 PROCEDURE — 84295 ASSAY OF SERUM SODIUM: CPT

## 2023-10-15 PROCEDURE — 90792 PSYCH DIAG EVAL W/MED SRVCS: CPT | Mod: ,,, | Performed by: PSYCHIATRY & NEUROLOGY

## 2023-10-15 PROCEDURE — 99233 SBSQ HOSP IP/OBS HIGH 50: CPT | Mod: ,,, | Performed by: STUDENT IN AN ORGANIZED HEALTH CARE EDUCATION/TRAINING PROGRAM

## 2023-10-15 PROCEDURE — 85025 COMPLETE CBC W/AUTO DIFF WBC: CPT

## 2023-10-15 PROCEDURE — P9021 RED BLOOD CELLS UNIT: HCPCS | Performed by: EMERGENCY MEDICINE

## 2023-10-15 PROCEDURE — 90792 PR PSYCHIATRIC DIAGNOSTIC EVALUATION W/MEDICAL SERVICES: ICD-10-PCS | Mod: ,,, | Performed by: PSYCHIATRY & NEUROLOGY

## 2023-10-15 PROCEDURE — 36430 TRANSFUSION BLD/BLD COMPNT: CPT

## 2023-10-15 PROCEDURE — 25000003 PHARM REV CODE 250

## 2023-10-15 PROCEDURE — 36415 COLL VENOUS BLD VENIPUNCTURE: CPT

## 2023-10-15 PROCEDURE — 21400001 HC TELEMETRY ROOM

## 2023-10-15 PROCEDURE — 63600175 PHARM REV CODE 636 W HCPCS

## 2023-10-15 PROCEDURE — 80053 COMPREHEN METABOLIC PANEL: CPT

## 2023-10-15 PROCEDURE — 99222 PR INITIAL HOSPITAL CARE,LEVL II: ICD-10-PCS | Mod: ,,, | Performed by: STUDENT IN AN ORGANIZED HEALTH CARE EDUCATION/TRAINING PROGRAM

## 2023-10-15 PROCEDURE — 99222 1ST HOSP IP/OBS MODERATE 55: CPT | Mod: ,,, | Performed by: STUDENT IN AN ORGANIZED HEALTH CARE EDUCATION/TRAINING PROGRAM

## 2023-10-15 PROCEDURE — 84100 ASSAY OF PHOSPHORUS: CPT

## 2023-10-15 PROCEDURE — 83735 ASSAY OF MAGNESIUM: CPT

## 2023-10-15 RX ORDER — HYDROMORPHONE HYDROCHLORIDE 2 MG/1
2 TABLET ORAL EVERY 4 HOURS PRN
Status: DISCONTINUED | OUTPATIENT
Start: 2023-10-15 | End: 2023-10-15

## 2023-10-15 RX ORDER — SODIUM CHLORIDE 9 MG/ML
INJECTION, SOLUTION INTRAVENOUS CONTINUOUS
Status: DISCONTINUED | OUTPATIENT
Start: 2023-10-15 | End: 2023-10-15

## 2023-10-15 RX ORDER — HYDROMORPHONE HYDROCHLORIDE 1 MG/ML
1 INJECTION, SOLUTION INTRAMUSCULAR; INTRAVENOUS; SUBCUTANEOUS EVERY 4 HOURS PRN
Status: DISCONTINUED | OUTPATIENT
Start: 2023-10-15 | End: 2023-10-15

## 2023-10-15 RX ORDER — IBUPROFEN 200 MG
1 TABLET ORAL DAILY
Status: DISCONTINUED | OUTPATIENT
Start: 2023-10-16 | End: 2023-10-16 | Stop reason: HOSPADM

## 2023-10-15 RX ORDER — HYDROMORPHONE HYDROCHLORIDE 1 MG/ML
0.5 INJECTION, SOLUTION INTRAMUSCULAR; INTRAVENOUS; SUBCUTANEOUS EVERY 4 HOURS PRN
Status: DISCONTINUED | OUTPATIENT
Start: 2023-10-15 | End: 2023-10-15

## 2023-10-15 RX ORDER — HYDROMORPHONE HYDROCHLORIDE 2 MG/1
2 TABLET ORAL EVERY 4 HOURS
Status: DISCONTINUED | OUTPATIENT
Start: 2023-10-15 | End: 2023-10-16 | Stop reason: HOSPADM

## 2023-10-15 RX ADMIN — HYDROMORPHONE HYDROCHLORIDE 1 MG: 1 INJECTION, SOLUTION INTRAMUSCULAR; INTRAVENOUS; SUBCUTANEOUS at 04:10

## 2023-10-15 RX ADMIN — SENNOSIDES AND DOCUSATE SODIUM 1 TABLET: 50; 8.6 TABLET ORAL at 08:10

## 2023-10-15 RX ADMIN — VANCOMYCIN HYDROCHLORIDE 1500 MG: 1.5 INJECTION, POWDER, LYOPHILIZED, FOR SOLUTION INTRAVENOUS at 12:10

## 2023-10-15 RX ADMIN — HYDROMORPHONE HYDROCHLORIDE 2 MG: 2 TABLET ORAL at 10:10

## 2023-10-15 RX ADMIN — OXYCODONE HYDROCHLORIDE 10 MG: 10 TABLET ORAL at 07:10

## 2023-10-15 RX ADMIN — OXYCODONE HYDROCHLORIDE 10 MG: 10 TABLET ORAL at 08:10

## 2023-10-15 RX ADMIN — POLYETHYLENE GLYCOL 3350 17 G: 17 POWDER, FOR SOLUTION ORAL at 08:10

## 2023-10-15 RX ADMIN — OXYCODONE HYDROCHLORIDE 10 MG: 10 TABLET ORAL at 02:10

## 2023-10-15 RX ADMIN — HYDROMORPHONE HYDROCHLORIDE 2 MG: 2 TABLET ORAL at 05:10

## 2023-10-15 NOTE — HPI
"Mr. Donovan is a 28M with PMH of IVDU / heroin use, L hand septic arthritis and multiple infected skin wounds in July 2023, R chest abscess s/p I&D on 10/12, left AMA, now re-admitted. Infectious disease consulted for "staph bacteremia known IVDU and leaves AMA frequently or elopes. consider dalbavancin or another long acting abx?".     CT chest had shown R pectoralis muscle abscess, osteomyelitis vs septic arthritis of R first rib / costochondral joint, and scattered subcentimeter pulmonary nodules. Also noted to have L forearm and R antecubital fossa lesions. S/p R chest I&D on 10/12. Blood and abscess cultures growing MRSA. Repeat blood cultures on 10/14 without any growth to date.   "

## 2023-10-15 NOTE — NURSING
Nurses Note -- 4 Eyes      10/15/2023   2:55 AM      Skin assessed during: Admit      [] No Altered Skin Integrity Present    []Prevention Measures Documented      [x] Yes- Altered Skin Integrity Present or Discovered   [] LDA Added if Not in Epic (Describe Wound)   [x] New Altered Skin Integrity was Present on Admit and Documented in LDA   [] Wound Image Taken    Wound Care Consulted? Yes    Attending Nurse:  Kaylynn Woo RN/Staff Member:   Queta HECK

## 2023-10-15 NOTE — SUBJECTIVE & OBJECTIVE
Interval History: Patient seen this morning. Patient stated that he wanted to leave. Discussed with patient the current diagnosis and that if patient leaves, he may die from his bacteremia. Patient understood the diagnosis and the possibility of dying if he left. Patient w/ friend at bedside who stated he will do his best to keep him there.    Review of Systems   Constitutional:  Negative for chills, fatigue and fever.   HENT:  Negative for sore throat.    Respiratory:  Negative for cough, choking and wheezing.    Cardiovascular:  Positive for leg swelling. Negative for chest pain and palpitations.   Gastrointestinal:  Negative for abdominal distention, constipation, diarrhea, nausea and vomiting.   Genitourinary:  Negative for dysuria, frequency and urgency.   Musculoskeletal:  Negative for arthralgias and myalgias.   Skin:  Negative for rash.   Neurological:  Negative for syncope.     Objective:     Vital Signs (Most Recent):  Temp: 98.8 °F (37.1 °C) (10/15/23 1148)  Pulse: 77 (10/15/23 1148)  Resp: 18 (10/15/23 1409)  BP: (!) 143/81 (10/15/23 1148)  SpO2: 96 % (10/15/23 1148) Vital Signs (24h Range):  Temp:  [97.9 °F (36.6 °C)-100.7 °F (38.2 °C)] 98.8 °F (37.1 °C)  Pulse:  [] 77  Resp:  [12-22] 18  SpO2:  [93 %-100 %] 96 %  BP: (127-157)/(59-87) 143/81     Weight: 86.6 kg (191 lb)  Body mass index is 26.64 kg/m².    Intake/Output Summary (Last 24 hours) at 10/15/2023 1444  Last data filed at 10/15/2023 0620  Gross per 24 hour   Intake 1288.25 ml   Output 825 ml   Net 463.25 ml         Physical Exam  HENT:      Head: Normocephalic.      Right Ear: External ear normal.      Left Ear: External ear normal.      Mouth/Throat:      Mouth: Mucous membranes are moist.   Eyes:      Extraocular Movements: Extraocular movements intact.   Cardiovascular:      Rate and Rhythm: Normal rate.   Pulmonary:      Effort: Pulmonary effort is normal.      Comments: Coarse breath sounds, crackles in lung bases  Abdominal:       General: Abdomen is flat.      Palpations: Abdomen is soft.   Musculoskeletal:         General: Normal range of motion.   Skin:     General: Skin is warm.      Capillary Refill: Capillary refill takes less than 2 seconds.   Neurological:      General: No focal deficit present.      Mental Status: He is alert.   Psychiatric:         Mood and Affect: Mood normal.             Significant Labs: All pertinent labs within the past 24 hours have been reviewed.  CBC:   Recent Labs   Lab 10/14/23  1512 10/15/23  0610   WBC 22.61* 23.07*   HGB 6.9* 9.1*   HCT 20.4* 27.3*    462*     CMP:   Recent Labs   Lab 10/14/23  1512 10/14/23  1758 10/14/23  2359 10/15/23  0610 10/15/23  1141   *   < > 130* 130*  130* 130*   K 4.7  --   --  3.8  --    CL 91*  --   --  96  --    CO2 23  --   --  24  --    *  --   --  104  --    BUN 17  --   --  11  --    CREATININE 0.8  --   --  0.7  --    CALCIUM 8.4*  --   --  8.4*  --    PROT 6.6  --   --  6.4  --    ALBUMIN 1.9*  --   --  1.8*  --    BILITOT 0.3  --   --  0.6  --    ALKPHOS 105  --   --  102  --    AST 60*  --   --  49*  --    ALT 14  --   --  16  --    ANIONGAP 11  --   --  10  --     < > = values in this interval not displayed.       Significant Imaging: I have reviewed all pertinent imaging results/findings within the past 24 hours.

## 2023-10-15 NOTE — ASSESSMENT & PLAN NOTE
Patient has hyponatremia which is uncontrolled,We will aim to correct the sodium by 4-6mEq in 24 hours. We will monitor sodium Every 6 hours. The hyponatremia is due to Dehydration/hypovolemia. We will obtain the following studies: N/a. We will treat the hyponatremia with IV fluids as follows: NS 50cc/hr. The patient's sodium results have been reviewed and are listed below.  Recent Labs   Lab 10/15/23  1141   *        - Previously obtained labs more suggestive of a hypovolemic hyponatremia, possibly due to decreased PO intake.  - Will give slow IVF infusion of NS 50mL/hr for 500cc  - Stable and slowly improved from yesterday, can continue to monitor with daily CMPs

## 2023-10-15 NOTE — NURSING
0720 Echo tech in doing Echo on patient    0749 Pt in bed resting with eyes closed, aroused to verbal with tactile stimuli, vitals done, no right jugular distention with palpitation, dressing to right IJ IV intact dressing to left FA CDI, communication board updated , telemetry box reapplied as patient had removed it    0820 Doctor came in med room and discussed patient , wants Oxycodone given around the clock and Dilaudid  given when he is showing pain or anxiety to help keep him from having withdraw symptoms in hopes to be able to prevent him from wanting to go AMA, states if he goes AMA patient will die, he had discussed this with patient prior    0838 Pt noted up in w/c rolling down hallway to elevators to leave the floor    0857 Pt coming back to room, handed cigarettes to , assisted back to room, assessment done, BLE edema 2+ , dressing to right chest with some shadow blood scant, noted lots of raised knots under the skin with skin intact to BUE , reinforced what doctor told patient about infection and affecting his heart and risk of death if he does not remain for treatment, patient voiced understanding, scheduled meds given with Oxycodone will continue to monitor     1025 Micro called reported patient anaerobic and aerobic blood cultures positive for gram + cocci resembling staph , ID doctor Dr. Menard leaving room notified and stated was aware and to put him in contact isolation, room fitted with isolation , Dr. Toribio also notified of culture results and came back and clarified that we are to give opioids only for pain when patient requests for pain    1125 Patient's friend visiting and patient being wheeled down tto go outside and smoke by friends    1410 Patient able to stay in room long enough to complete his antibiotic, bag of fluids still not completed, going downstairs with friend to smoke currently, prn pain medication given for pain when breathing

## 2023-10-15 NOTE — PLAN OF CARE
Problem: Adult Inpatient Plan of Care  Goal: Plan of Care Review  Outcome: Ongoing, Progressing  Goal: Patient-Specific Goal (Individualized)  Outcome: Ongoing, Progressing  Goal: Absence of Hospital-Acquired Illness or Injury  Outcome: Ongoing, Progressing  Goal: Optimal Comfort and Wellbeing  Outcome: Ongoing, Progressing  Goal: Readiness for Transition of Care  Outcome: Ongoing, Progressing     Problem: Impaired Wound Healing  Goal: Optimal Wound Healing  Outcome: Ongoing, Progressing   Alert and orient times 4. Pt with determination to go off the unit to smoke. He disconnected his IV with Blood Infusing . Causing approx 50 Ml of blood to go into floor,Pt informed he cannot leave the unit while he is recieiving blood.. He requested pain med which 1 mg of dilaudid was given.following he slept. He was able to finish the unit of blood then the IV NS was restarted.His gait if unsteady  but no falls this shift

## 2023-10-15 NOTE — CONSULTS
"[facility]  Infectious Disease  Consult Note    Patient Name: Ton Donovan  MRN: [id number]  Admission Date: 10/14/2023  Hospital Length of Stay: 1 days  Attending Physician: Vinod Bae MD  Primary Care Provider: No, Primary Doctor     Isolation Status: No active isolations    Patient information was obtained from patient and ER records.      Inpatient consult to Infectious Diseases  Consult performed by: Reema Collins DO  Consult ordered by: Maria T Toledo MD        Assessment/Plan:     Pulmonary  Abscess of chest  S/p I&D on 10/12. Intraop cultures growing MRSA.     Recommendations  · Continue vancomycin    ID  * MRSA bacteremia  28M with PMH of IVDU / heroin use, L hand septic arthritis and multiple infected skin wounds in July 2023, R chest abscess s/p I&D on 10/12, left AMA, now re-admitted. CT chest had shown R pectoralis muscle abscess, osteomyelitis vs septic arthritis of R first rib / costochondral joint, and scattered subcentimeter pulmonary nodules. Also noted to have L forearm and R antecubital fossa lesions. S/p R chest I&D on 10/12. Blood and abscess cultures growing MRSA. Repeat blood cultures on 10/14 without any growth to date.     Recommendations  · Continue vancomycin  · Repeat blood cultures tomorrow AM (10/16)        Thank you for your consult. I will follow-up with patient. Please contact us if you have any additional questions.    Reema Collins DO  Infectious Disease  [facility]    Subjective:     Principal Problem: MRSA bacteremia    HPI: Mr. Donovan is a 28M with PMH of IVDU / heroin use, L hand septic arthritis and multiple infected skin wounds in July 2023, R chest abscess s/p I&D on 10/12, left AMA, now re-admitted. Infectious disease consulted for "staph bacteremia known IVDU and leaves AMA frequently or elopes. consider dalbavancin or another long acting abx?".     CT chest had shown R pectoralis muscle abscess, osteomyelitis vs septic arthritis of R first rib / " costochondral joint, and scattered subcentimeter pulmonary nodules. Also noted to have L forearm and R antecubital fossa lesions. S/p R chest I&D on 10/12. Blood and abscess cultures growing MRSA. Repeat blood cultures on 10/14 without any growth to date.       Past Medical History:   Diagnosis Date    Hypertension     Unilateral inguinal hernia, without obstruction or gangrene, not specified as recurrent        Past Surgical History:   Procedure Laterality Date    HAND ARTHROTOMY Right 7/7/2023    Procedure: ARTHROTOMY, HAND - R LF PIP;  Surgeon: Boy Lamb MD;  Location: [address];  Service: Orthopedics;  Laterality: Right;    INCISION AND DRAINAGE OF ABSCESS Right 10/12/2023    Procedure: INCISION AND DRAINAGE, ABSCESS;  Surgeon: Steve Monteiro MD;  Location: [address];  Service: General;  Laterality: Right;  right chest    IRRIGATION AND DEBRIDEMENT OF UPPER EXTREMITY Bilateral 7/7/2023    Procedure: IRRIGATION AND DEBRIDEMENT, UPPER EXTREMITY - RIGHT HAND;  Surgeon: Boy Lamb MD;  Location: [address];  Service: Orthopedics;  Laterality: Bilateral;    OLECRANON BURSECTOMY Left 7/7/2023    Procedure: BURSECTOMY, OLECRANON;  Surgeon: Boy Lamb MD;  Location: [facility] OR [address] [place] [address];  Service: Orthopedics;  Laterality: Left;       Review of patient's allergies indicates:  No Known Allergies    Medications:  Medications Prior to Admission   Medication Sig    linezolid (ZYVOX) 600 mg Tab Take 1 tablet (600 mg total) by mouth every 12 (twelve) hours. for 14 days (Patient not taking: Reported on 10/14/2023)     Antibiotics (From admission, onward)      Start     Stop Route Frequency Ordered    10/15/23 0000  vancomycin 1,500 mg in dextrose 5 % (D5W) 250 mL IVPB (Vial-Mate)         -- IV Every 12 hours (non-standard times) 10/14/23 1708          Antifungals (From admission, onward)      None          Antivirals (From admission, onward)      None               There is no  immunization history on file for this patient.    Family History    None       Social History     Socioeconomic History    Marital status: Single   Tobacco Use    Smoking status: Former     Types: Cigarettes    Smokeless tobacco: Never   Substance and Sexual Activity    Alcohol use: No    Drug use: Yes     Types: Marijuana, IV     Comment: 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     Social Determinants of Health     Financial Resource Strain: Low Risk  (7/7/2023)    Overall Financial Resource Strain (CARDIA)     Difficulty of Paying Living Expenses: Not hard at all   Food Insecurity: No Food Insecurity (7/7/2023)    Hunger Vital Sign     Worried About Running Out of Food in the Last Year: Never true     Ran Out of Food in the Last Year: Never true   Transportation Needs: No Transportation Needs (7/7/2023)    PRAPARE - Transportation     Lack of Transportation (Medical): No     Lack of Transportation (Non-Medical): No   Physical Activity: Sufficiently Active (7/7/2023)    Exercise Vital Sign     Days of Exercise per Week: 3 days     Minutes of Exercise per Session: 60 min   Stress: No Stress Concern Present (7/7/2023)    [employer] - Occupational Stress Questionnaire     Feeling of Stress : Not at all   Social Connections: Socially Isolated (7/7/2023)    Social Connection and Isolation Panel [NHANES]     Frequency of Communication with Friends and Family: Twice a week     Frequency of Social Gatherings with Friends and Family: More than three times a week     Attends Orthodoxy Services: Never     Active Member of Clubs or Organizations: No     Attends Club or Organization Meetings: Never     Marital Status: Never    Housing Stability: Low Risk  (7/7/2023)    Housing Stability Vital Sign     Unable to Pay for Housing in the Last Year: No     Number of Places Lived in the Last Year: 1     Unstable Housing in the Last Year: No     Review of Systems    Constitutional:  Negative for chills and fever.   Respiratory:  Negative for cough and shortness of breath.    Cardiovascular:  Positive for chest pain (At I&D site).   Gastrointestinal:  Negative for abdominal pain, constipation, diarrhea, nausea and vomiting.   Genitourinary:  Negative for dysuria and hematuria.   Musculoskeletal:  Negative for arthralgias and myalgias.   Skin:  Positive for wound.   Neurological:  Negative for weakness and headaches.     Objective:     Vital Signs (Most Recent):  Temp: 98.8 °F (37.1 °C) (10/15/23 1148)  Pulse: 77 (10/15/23 1148)  Resp: 18 (10/15/23 1148)  BP: (!) 143/81 (10/15/23 1148)  SpO2: 96 % (10/15/23 1148) Vital Signs (24h Range):  Temp:  [97.9 °F (36.6 °C)-100.7 °F (38.2 °C)] 98.8 °F (37.1 °C)  Pulse:  [] 77  Resp:  [12-22] 18  SpO2:  [93 %-100 %] 96 %  BP: (127-157)/(59-87) 143/81     Weight: 86.6 kg (191 lb)  Body mass index is 26.64 kg/m².    Estimated Creatinine Clearance: 164.3 mL/min (based on SCr of 0.7 mg/dL).     Physical Exam  Vitals reviewed.   Constitutional:       General: He is not in acute distress.     Appearance: Normal appearance. He is not ill-appearing.   HENT:      Head: Normocephalic and atraumatic.   Eyes:      Extraocular Movements: Extraocular movements intact.      Conjunctiva/sclera: Conjunctivae normal.   Cardiovascular:      Rate and Rhythm: Normal rate and regular rhythm.      Heart sounds: No murmur heard.  Pulmonary:      Effort: Pulmonary effort is normal. No respiratory distress.      Breath sounds: Normal breath sounds.   Abdominal:      General: Abdomen is flat. Bowel sounds are normal.      Palpations: Abdomen is soft.      Tenderness: There is no abdominal tenderness.   Musculoskeletal:      Cervical back: Normal range of motion.      Comments: R chest abscess with dressing in place, is clean, dry, intact. Packing present in wound   Skin:     General: Skin is warm and dry.      Findings: Lesion present.   Neurological:       General: No focal deficit present.      Mental Status: He is alert and oriented to person, place, and time.   Psychiatric:         Mood and Affect: Mood normal.         Behavior: Behavior normal.         Thought Content: Thought content normal.          Significant Labs: All pertinent labs within the past 24 hours have been reviewed.  Recent Lab Results  (Last 5 results in the past 24 hours)        10/15/23  1141   10/15/23  0730   10/15/23  0610   10/14/23  2359   10/14/23  1758        Unit Blood Type Code               Unit Expiration               Unit Blood Type               Albumin     1.8           ALP     102           ALT     16           Anion Gap     10           Ascending aorta   2.43             Ao peak reggie   1.71             Ao VTI   31.07             Appearance, UA               AST     49           AV valve area   2.87             LAURY by Velocity Ratio   2.57             AV mean gradient   6             AV index (prosthetic)   0.79             AV peak gradient   12             AV Velocity Ratio   0.71             Baso #     0.07           Basophil %     0.3           Bilirubin (UA)               BILIRUBIN TOTAL     0.6  Comment: For infants and newborns, interpretation of results should be based  on gestational age, weight and in agreement with clinical  observations.    Premature Infant recommended reference ranges:  Up to 24 hours.............<8.0 mg/dL  Up to 48 hours............<12.0 mg/dL  3-5 days..................<15.0 mg/dL  6-29 days.................<15.0 mg/dL             BSA   2.08             BUN     11           Calcium     8.4           Chloride     96           CO2     24           CODING SYSTEM               Color, UA               Creatinine     0.7           Crossmatch Interpretation               Left Ventricle Relative Wall Thickness   0.35             Differential Method     Automated           DISPENSE STATUS               E/A ratio   1.35             E/E' ratio   5.83              eGFR     >60.0           EF   65             Eos #     0.1           Eosinophil %     0.3           E wave deceleration time   281.95             FS   27             Glucose     104           Glucose, UA               Gran # (ANC)     18.5           Gran %     80.1           Group & Rh               Hematocrit     27.3           Hemoglobin     9.1           Immature Grans (Abs)     0.67  Comment: Mild elevation in immature granulocytes is non specific and   can be seen in a variety of conditions including stress response,   acute inflammation, trauma and pregnancy. Correlation with other   laboratory and clinical findings is essential.             Immature Granulocytes     2.9           INDIRECT VARGHESE               IVRT   74.22             IVSd   0.76             Ketones, UA               LA WIDTH   4.88             Left Atrium Major Axis   6.30             Left Atrium Minor Axis   5.94             LA size   3.53             LA volume   89.53                92.30             LA vol index   43.3             LA Volume Index (Mod)   44.6             LVOT area   3.6             Leukocytes, UA               LV LATERAL E/E' RATIO   5.25             LV SEPTAL E/E' RATIO   6.56             LV EDV BP   127.08             LV Diastolic Volume Index   61.39             LVIDd   5.16             LVIDs   3.76             LV mass   150.22             LV Mass Index   73             LVOT diameter   2.15             LVOT peak terell   1.21             LVOT stroke volume   89.12             LVOT peak VTI   24.56             LV ESV BP   60.59             LV Systolic Volume Index   29.3             Lymph #     2.4           Lymph %     10.4           Magnesium      1.7           MCH     24.5           MCHC     33.3           MCV     74           Mean e'   0.18             Mono #     1.4           Mono %     6.0           MPV     9.5           MV Peak A Terell   0.78             MV Peak E Terell   1.05             NITRITE UA               nRBC      0           Occult Blood UA               pH, UA               Phosphorus Level     2.9           Platelet Count     462           Potassium     3.8           Product Code               PROTEIN TOTAL     6.4           Protein, UA               Posterior Wall   0.9             RA Major Axis   5.13             Est. RA pres   3             RA Width   4.62             RBC     3.71           RDW     16.9           RV TB RVSP   5             RVDD   3.75             Sinus   2.72             Sodium 130     130   130   129            130           Specific [place], UA               Specimen Outdate               Specimen UA               STJ   2.41             TAPSE   3.19             TDI SEPTAL   0.16             TDI LATERAL   0.20             Triscuspid Valve Regurgitation Peak Gradient   25             TR Max Terell   2.48             TV resting pulmonary artery pressure   28             UNIT NUMBER               WBC     23.07           ZLVIDD   -1.99             ZLVIDS   -0.19                                    Significant Imaging: I have reviewed all pertinent imaging results/findings within the past 24 hours.

## 2023-10-15 NOTE — HOSPITAL COURSE
Ton Donovan is a 28 year old man with history of heroin use and IVDU who re-presented to the ED on 10/11/23 due to right upper chest swelling and pain after having left AMA on that same morning. Blood cultures positive for MRSA bacteremia. Patient started on IV Vanc and Zosyn. Echo ordered. ID re-consulted. General surgery was re-consulted for I&D of abscess to left chest. Patient underwent I&D with placement of penrose. Patient left AMA 2 days ago. He was prescribed antibiotics but he never filled them. He is returning today initially stating he just wanted his drain removed so he could go to rehab however he is also complaining of new onset bilateral lower extremity edema. In ED: Patient AFVSS, WBC 22.6 hxh 6.9x20.4, Na 125, albumin 1.9, BNP 36. Patient ordered 1U pRBCs, and started on IV abx, sitter at bedside, psychiatry consulted given concern for capacity in setting of patient wanting treating, but repeatedly leaving AMA. Blood cx remain (+), repeating blood cx until (-ve), ID following. Psych consulted and stated that patient has capacity to refuse medical treatment. Patient eloped again on 10/16 and attempted to call patient but was unable to reach him. Patient has prior prescription for linezolid and will discuss with ID about outpatient antibiotic plan.

## 2023-10-15 NOTE — ASSESSMENT & PLAN NOTE
28M with PMH of IVDU / heroin use, L hand septic arthritis and multiple infected skin wounds in July 2023, R chest abscess s/p I&D on 10/12, left AMA, now re-admitted. CT chest had shown R pectoralis muscle abscess, osteomyelitis vs septic arthritis of R first rib / costochondral joint, and scattered subcentimeter pulmonary nodules. Also noted to have L forearm and R antecubital fossa lesions. S/p R chest I&D on 10/12. Blood and abscess cultures growing MRSA. Repeat blood cultures on 10/14 without any growth to date.     Recommendations  · Continue vancomycin  · Repeat blood cultures tomorrow AM (10/16)

## 2023-10-15 NOTE — CONSULTS
10/15/2023 11:01 AM  Ton Donovan  1995  [id number]    Capacity Evaluation    History of Present Illness   Ton Donovan is a 28 year old man with history of heroin use and IVDU who re-presents to the ED on 10/11/23 due to right upper chest swelling and pain after having left AMA on that same morning. Patient reports that he was brought back by his sister for treatment. Patient tells me that he left AMA because he panicked at the thought of surgery and felt like he was withdrawing, so he left to do more heroin. He initially presented to [facility] on 10/10 for the same problem. From the last HPI: patient reports this swelling started about 1 week ago and has gotten larger and more painful. He reports recent IV injection of heroin and he says he snorted heroin shortly before presenting to the ED on this afternoon of 10/10. He says he was told to present to the ED by his friends who were concerned for him. Pt was recently hospitalized from 7/6 to 7/8 due to infected wounds, abscesses, and septic arthritis in his Right hand for which he underwent I&D and received Right IJ catheter for IV antibiotics; however, pt had repeated attempts to elope with RIJ catheter, and patient wished to leave AMA so the line was removed prior to discharge and pt was given PO antibiotics. He reports resolution of these hand wounds. He denies any fevers, shortness of breath, n/v/d, or abdominal pain.     Mental Status Exam     CAM-ICU +: no     Mental Status Exam:  General Appearance: appears stated age, well developed and nourished, adequately groomed and appropriately dressed, in no acute distress  Behavior: normal; cooperative; reasonably friendly, pleasant, and polite; appropriate eye-contact; under good behavioral control  Involuntary Movements and Motor Activity: no abnormal involuntary movements noted; no tics, no tremors, no akathisia, no dystonia, no evidence of tardive dyskinesia; no psychomotor agitation or retardation  Gait and  "Station: unable to assess - patient lying down or seated  Speech and Language: intact; normal rate, rhythm, volume, tone, and pitch; conversational, spontaneous, and coherent; speaks and understands English proficiently and fluently; repeats words and phrases, no word finding difficulties are noted  Mood: "okay"  Affect: blunted, low-key  Thought Process and Associations: intact; linear, goal-directed, organized, and logical; no loosening of associations noted, concrete  Thought Content and Perceptions:: no suicidal or homicidal ideation, no auditory or visual hallucinations, no paranoid ideation, no ideas of reference, no evidence of delusions or psychosis  Sensorium and Orientation: intact; alert with clear sensorium; oriented fully to person, place, time and situation  Recent and Remote Memory: grossly intact, able to recall relevant and salient information from the recent and remote past  Attention and Concentration: grossly intact, attentive to the conversation and not readily distractible  Fund of Knowledge: grossly intact, used appropriate vocabulary and demonstrated an awareness of current events, consistent with educational level achieved  Insight: adequate  Judgment: limited    Capacity Evaluation     Psychiatry was consulted to evaluate the patient's capacity to refuse medical treatment for multiple infections requiring IV antibiotics after leaving the hospital AMA on multiple occasions.     Definition of Capacity for a given decision requires that the patient:  Understands the proposed treatment and/or the proposed options for his care.  Appreciates his own medical situation and can abstract how the treatments/proposed options for care apply to his medical situation.  Understands the consequences of his medical decisions in a reasonable and factual manner supported by the facts and his own values in a consistent fashion.  Demonstrates the ability to communicate a choice clearly and " "consistently.    Assessment of capacity for the above reason for Ton Donovan  :  This patient understands the clinical condition relation to the evaluation:  Yes.  This patient understands the nature of the proposed assessments and/or treatments:  Yes.  This patient understands the risks and benefits of the proposed assessments and/or treatments:  Yes   This patient understands the risks and benefits of refusing the proposed assessments and/or treatments:   patient has limited understanding of the severity of his medical condition and the repercussions if he were to leave AMA. However, patient states "I know I could die so I am going to stay".Patient also reported that in the past he has left because he was withdrawing and would be compliant with treatment if they could keep him comfortable and allow him to smoke.    This patient is in agreement with the assessment and Consents to treatment.  This patient has evidence of impaired insight and/or judgement:  Yes, 2/2 patient leaving AMA on multiple occasions and not receiving proper care for his severe infection.    Recommendations     Based upon my evaluation of Ton Donovan regarding his decision-making capacity for proposed plan of care, I found with reasonable certainty that he does have capacity to make medical decisions on his behalf at the time of this evaluation.    Severe opiate use disorder, nicotine use disorder   Recommend keeping patient out of withdrawals and proper pain management with scheduled dosing of hydromorphone q4 or q6 PO or IV  PRNs:oxycodone 10  mg q4 for break through pain/withdrawals, symptom relief PRNs including zofran, robaxin, hydroxyzine,bentyl   Would also recommend consulting pain management for guidance on pain control if pain regimen is not effective  Recommend starting patient on nicotine patch to limit nicotine cravings and patient leaving the floor for smoke breaks  Patient  not interested in starting suboxone at this time. " If patient is later open to addiction treatment please consult addiction psychiatry.       Please contact ON CALL psychiatry service (24/7) for any acute issues that may arise.    Dr. Juanita Fragoso   Psychiatry  [facility]  10/15/2023 10:40 AM        --------------------------------------------------------------------------------------------------------------------------------------------------------------------------------------------------------------------------------------    CONTINUED HISTORY & OBJECTIVE clinical data & findings reviewed and noted for above decision making    Past Medical/Surgical History:   Past Medical History:   Diagnosis Date    Hypertension     Unilateral inguinal hernia, without obstruction or gangrene, not specified as recurrent      Past Surgical History:   Procedure Laterality Date    HAND ARTHROTOMY Right 7/7/2023    Procedure: ARTHROTOMY, HAND - R LF PIP;  Surgeon: Boy Lamb MD;  Location: [facility] OR [address] [place] [address];  Service: Orthopedics;  Laterality: Right;    INCISION AND DRAINAGE OF ABSCESS Right 10/12/2023    Procedure: INCISION AND DRAINAGE, ABSCESS;  Surgeon: Steve Monteiro MD;  Location: [facility] OR [address] [place] [address];  Service: General;  Laterality: Right;  right chest    IRRIGATION AND DEBRIDEMENT OF UPPER EXTREMITY Bilateral 7/7/2023    Procedure: IRRIGATION AND DEBRIDEMENT, UPPER EXTREMITY - RIGHT HAND;  Surgeon: Boy Lamb MD;  Location: [facility] OR [address] [place] [address];  Service: Orthopedics;  Laterality: Bilateral;    OLECRANON BURSECTOMY Left 7/7/2023    Procedure: BURSECTOMY, OLECRANON;  Surgeon: Boy Lamb MD;  Location: [facility] OR [address] [place] [address];  Service: Orthopedics;  Laterality: Left;       Current Medications:   Scheduled Meds:    polyethylene glycol  17 g Oral Daily    senna-docusate 8.6-50 mg  1 tablet Oral BID    vancomycin (VANCOCIN) IV (PEDS and ADULTS)  1,500 mg Intravenous Q12H     PRN Meds: 0.9%  NaCl infusion (for blood administration), acetaminophen,  dextrose 10%, dextrose 10%, glucagon (human recombinant), glucose, glucose, HYDROmorphone, naloxone, ondansetron, oxyCODONE, sodium chloride 0.9%    Allergies:   Review of patient's allergies indicates:  No Known Allergies    Vitals  Vitals:    10/15/23 0857   BP:    Pulse:    Resp: 18   Temp:        Labs/Imaging/Studies:  Recent Results (from the past 24 hour(s))   Blood culture x two cultures. Draw prior to antibiotics.    Collection Time: 10/14/23  3:12 PM    Specimen: Line, Jugular, External Right; Blood   Result Value Ref Range    Blood Culture, Routine       Gram stain aer bottle: Gram positive cocci in clusters resembling Staph    Blood Culture, Routine       Results called to and read back by: DAIS CAMEJO  10/15/2023  10:25    Blood Culture, Routine       Gram stain manas bottle: Gram positive cocci in clusters resembling Staph    Blood Culture, Routine Positive results previously called 10/15/2023    Blood culture x two cultures. Draw prior to antibiotics.    Collection Time: 10/14/23  3:12 PM    Specimen: Line, Jugular, External Right; Blood   Result Value Ref Range    Blood Culture, Routine       Gram stain aer bottle: Gram positive cocci in clusters resembling Staph    Blood Culture, Routine       Results called to and read back by: ADIS CAMEJO  10/15/2023  10:25    Blood Culture, Routine       Gram stain manas bottle: Gram positive cocci in clusters resembling Staph    Blood Culture, Routine Positive results previously called 10/15/2023    CBC auto differential    Collection Time: 10/14/23  3:12 PM   Result Value Ref Range    WBC 22.61 (H) 3.90 - 12.70 K/uL    RBC 2.85 (L) 4.60 - 6.20 M/uL    Hemoglobin 6.9 (L) 14.0 - 18.0 g/dL    Hematocrit 20.4 (L) 40.0 - 54.0 %    MCV 72 (L) 82 - 98 fL    MCH 24.2 (L) 27.0 - 31.0 pg    MCHC 33.8 32.0 - 36.0 g/dL    RDW 16.6 (H) 11.5 - 14.5 %    Platelets 405 150 - 450 K/uL    MPV 9.7 9.2 - 12.9 fL    Immature Granulocytes 2.7 (H) 0.0 - 0.5 %    Gran # (ANC) 17.7 (H)  1.8 - 7.7 K/uL    Immature Grans (Abs) 0.60 (H) 0.00 - 0.04 K/uL    Lymph # 2.5 1.0 - 4.8 K/uL    Mono # 1.6 (H) 0.3 - 1.0 K/uL    Eos # 0.2 0.0 - 0.5 K/uL    Baso # 0.06 0.00 - 0.20 K/uL    nRBC 0 0 /100 WBC    Gran % 78.1 (H) 38.0 - 73.0 %    Lymph % 11.1 (L) 18.0 - 48.0 %    Mono % 7.1 4.0 - 15.0 %    Eosinophil % 0.7 0.0 - 8.0 %    Basophil % 0.3 0.0 - 1.9 %    Differential Method Automated    Comprehensive metabolic panel    Collection Time: 10/14/23  3:12 PM   Result Value Ref Range    Sodium 125 (L) 136 - 145 mmol/L    Potassium 4.7 3.5 - 5.1 mmol/L    Chloride 91 (L) 95 - 110 mmol/L    CO2 23 23 - 29 mmol/L    Glucose 115 (H) 70 - 110 mg/dL    BUN 17 6 - 20 mg/dL    Creatinine 0.8 0.5 - 1.4 mg/dL    Calcium 8.4 (L) 8.7 - 10.5 mg/dL    Total Protein 6.6 6.0 - 8.4 g/dL    Albumin 1.9 (L) 3.5 - 5.2 g/dL    Total Bilirubin 0.3 0.1 - 1.0 mg/dL    Alkaline Phosphatase 105 55 - 135 U/L    AST 60 (H) 10 - 40 U/L    ALT 14 10 - 44 U/L    eGFR >60.0 >60 mL/min/1.73 m^2    Anion Gap 11 8 - 16 mmol/L   Brain natriuretic peptide    Collection Time: 10/14/23  3:12 PM   Result Value Ref Range    BNP 36 0 - 99 pg/mL   Protime-INR    Collection Time: 10/14/23  3:12 PM   Result Value Ref Range    Prothrombin Time 15.3 (H) 9.0 - 12.5 sec    INR 1.5 (H) 0.8 - 1.2   Troponin I    Collection Time: 10/14/23  3:12 PM   Result Value Ref Range    Troponin I <0.006 0.000 - 0.026 ng/mL   ISTAT Lactate    Collection Time: 10/14/23  3:14 PM   Result Value Ref Range    POC Lactate 1.69 0.5 - 2.2 mmol/L    Sample VENOUS     Site Other     Allens Test N/A    Prepare RBC 1 Unit    Collection Time: 10/14/23  4:29 PM   Result Value Ref Range    UNIT NUMBER [id number]     Product Code [id number]     DISPENSE STATUS ISSUED     CODING SYSTEM [id number]     Unit Blood Type Code 6200     Unit Blood Type A POS     Unit Expiration [id number]     CROSSMATCH INTERPRETATION Compatible    Type & Screen    Collection Time: 10/14/23  4:29 PM   Result Value  Ref Range    Group & Rh A POS     Indirect Jordan NEG     Specimen Outdate 10/17/2023 23:59    Urinalysis, Reflex to Urine Culture Urine, Clean Catch    Collection Time: 10/14/23  5:14 PM    Specimen: Urine   Result Value Ref Range    Specimen UA Urine, Clean Catch     Color, UA Yellow Yellow, Straw, Elida    Appearance, UA Clear Clear    pH, UA 6.0 5.0 - 8.0    Specific Gravity, UA 1.020 1.005 - 1.030    Protein, UA Negative Negative    Glucose, UA Negative Negative    Ketones, UA Negative Negative    Bilirubin (UA) Negative Negative    Occult Blood UA Negative Negative    Nitrite, UA Negative Negative    Leukocytes, UA Negative Negative   Sodium    Collection Time: 10/14/23  5:58 PM   Result Value Ref Range    Sodium 129 (L) 136 - 145 mmol/L   Sodium    Collection Time: 10/14/23 11:59 PM   Result Value Ref Range    Sodium 130 (L) 136 - 145 mmol/L   CBC auto differential    Collection Time: 10/15/23  6:10 AM   Result Value Ref Range    WBC 23.07 (H) 3.90 - 12.70 K/uL    RBC 3.71 (L) 4.60 - 6.20 M/uL    Hemoglobin 9.1 (L) 14.0 - 18.0 g/dL    Hematocrit 27.3 (L) 40.0 - 54.0 %    MCV 74 (L) 82 - 98 fL    MCH 24.5 (L) 27.0 - 31.0 pg    MCHC 33.3 32.0 - 36.0 g/dL    RDW 16.9 (H) 11.5 - 14.5 %    Platelets 462 (H) 150 - 450 K/uL    MPV 9.5 9.2 - 12.9 fL    Immature Granulocytes 2.9 (H) 0.0 - 0.5 %    Gran # (ANC) 18.5 (H) 1.8 - 7.7 K/uL    Immature Grans (Abs) 0.67 (H) 0.00 - 0.04 K/uL    Lymph # 2.4 1.0 - 4.8 K/uL    Mono # 1.4 (H) 0.3 - 1.0 K/uL    Eos # 0.1 0.0 - 0.5 K/uL    Baso # 0.07 0.00 - 0.20 K/uL    nRBC 0 0 /100 WBC    Gran % 80.1 (H) 38.0 - 73.0 %    Lymph % 10.4 (L) 18.0 - 48.0 %    Mono % 6.0 4.0 - 15.0 %    Eosinophil % 0.3 0.0 - 8.0 %    Basophil % 0.3 0.0 - 1.9 %    Differential Method Automated    Comprehensive metabolic panel    Collection Time: 10/15/23  6:10 AM   Result Value Ref Range    Sodium 130 (L) 136 - 145 mmol/L    Potassium 3.8 3.5 - 5.1 mmol/L    Chloride 96 95 - 110 mmol/L    CO2 24 23 -  29 mmol/L    Glucose 104 70 - 110 mg/dL    BUN 11 6 - 20 mg/dL    Creatinine 0.7 0.5 - 1.4 mg/dL    Calcium 8.4 (L) 8.7 - 10.5 mg/dL    Total Protein 6.4 6.0 - 8.4 g/dL    Albumin 1.8 (L) 3.5 - 5.2 g/dL    Total Bilirubin 0.6 0.1 - 1.0 mg/dL    Alkaline Phosphatase 102 55 - 135 U/L    AST 49 (H) 10 - 40 U/L    ALT 16 10 - 44 U/L    eGFR >60.0 >60 mL/min/1.73 m^2    Anion Gap 10 8 - 16 mmol/L   Magnesium    Collection Time: 10/15/23  6:10 AM   Result Value Ref Range    Magnesium 1.7 1.6 - 2.6 mg/dL   Phosphorus    Collection Time: 10/15/23  6:10 AM   Result Value Ref Range    Phosphorus 2.9 2.7 - 4.5 mg/dL   Sodium    Collection Time: 10/15/23  6:10 AM   Result Value Ref Range    Sodium 130 (L) 136 - 145 mmol/L   Echo    Collection Time: 10/15/23  7:30 AM   Result Value Ref Range    Ascending aorta 2.43 cm    STJ 2.41 cm    AV mean gradient 6 mmHg    Ao peak terell 1.71 m/s    Ao VTI 31.07 cm    IVRT 74.22 msec    IVS 0.76 0.6 - 1.1 cm    LA size 3.53 cm    Left Atrium Major Axis 6.30 cm    Left Atrium Minor Axis 5.94 cm    LVIDd 5.16 3.5 - 6.0 cm    LVIDs 3.76 2.1 - 4.0 cm    LVOT diameter 2.15 cm    LVOT peak VTI 24.56 cm    Posterior Wall 1.05 0.6 - 1.1 cm    MV Peak A Terell 0.78 m/s    E wave deceleration time 281.95 msec    MV Peak E Terell 1.05 m/s    RA Major Axis 5.13 cm    RA Width 4.62 cm    RVDD 3.75 cm    Sinus 2.72 cm    TAPSE 3.19 cm    TR Max Terell 2.48 m/s    LA WIDTH 4.88 cm    LV Diastolic Volume 127.08 mL    LV Systolic Volume 60.59 mL    LVOT peak terell 1.21 m/s    TDI LATERAL 0.20 m/s    TDI SEPTAL 0.16 m/s    LA volume (mod) 92.30 cm3    LV LATERAL E/E' RATIO 5.25 m/s    LV SEPTAL E/E' RATIO 6.56 m/s    FS 27 %    LA volume 89.53 cm3    LV mass 168.02 g    ZLVIDD -1.99     ZLVIDS -0.19     Left Ventricle Relative Wall Thickness 0.41 cm    AV valve area 2.87 cm²    AV Velocity Ratio 0.71     AV index (prosthetic) 0.79     E/A ratio 1.35     Mean e' 0.18 m/s    LVOT area 3.6 cm2    LVOT stroke volume 89.12  cm3    AV peak gradient 12 mmHg    E/E' ratio 5.83 m/s    LV Systolic Volume Index 29.3 mL/m2    LV Diastolic Volume Index 61.39 mL/m2    LA Volume Index 43.3 mL/m2    LV Mass Index 81 g/m2    Triscuspid Valve Regurgitation Peak Gradient 25 mmHg    LA Volume Index (Mod) 44.6 mL/m2    LAURY by Velocity Ratio 2.57 cm²    BSA 2.08 m2     Imaging Results              X-Ray Chest AP Portable (Final result)  Result time 10/14/23 15:13:07      Final result by Haris Vieira MD (10/14/23 15:13:07)                   Impression:      1. Pulmonary findings may reflect edema or other nonspecific pneumonitis noting shallow inspiratory effort.  No large focal consolidation.      Electronically signed by: Haris Vieira MD  Date:    10/14/2023  Time:    15:13               Narrative:    EXAMINATION:  XR CHEST AP PORTABLE    CLINICAL HISTORY:  Sepsis;    TECHNIQUE:  Single frontal view of the chest was performed.    COMPARISON:  10/10/2023, CT chest 10/10/2023    FINDINGS:  The cardiomediastinal silhouette is not enlarged, stable..  There is no pleural effusion.  The trachea is midline.  The lungs are symmetrically expanded bilaterally with coarse interstitial attenuation bilaterally noting left basilar subsegmental atelectasis..  No large focal consolidation seen.  There is no pneumothorax.  The osseous structures are unremarkable.  There is surgical change of the right anterior chest wall..

## 2023-10-15 NOTE — ASSESSMENT & PLAN NOTE
28M with IVDU and previous septic arthritis who presents with 1 week of increased swelling and pain in right upper chest. WBC elevated to 30. CT chest showed abscess within the right pectoralis major muscle measuring 6.7 x 12.5 cm as well as findings concerning for osteomyelitis vs septic arthritis of 1st rib and 1st costochondral joint. Multiple other abscesses seen on ultrasound in the bilateral upper extremities. Gen Surg had planned to take pt to OR for I&D on 10/11 with further discussion with thoracic surgery regarding bone/joint findings, however patient left AMA prior to receiving treatment. Was brought back by family to the ED for treatment. Bcx from previous admission showing staph aureus with PCR testing positive for MRSA. Unusual location for abscess however likely 2/2 to hematogenous spread of MRSA. Patient recently left AMA following I&D    - Vancomycin  - gen surg consult for drain removal  - pain control w/ oxy and dilaudid PRN  - Daily CBC, CMP, mag, phos  - Keep K > 4, Phos > 3, Mg > 2  - Replete electrolytes prn  - q4h vitals

## 2023-10-15 NOTE — PROGRESS NOTES
Patient smoking in his room. Queta HECK had a discussion with patient that no one is allowed to smoke in their rooms. IF he has to go smoke he can go outside . Queta HECK retrieved patient cigarettes and lighter. A few minutes after pt was found smoking He wanted to be disconnected from his IV fluids to go off unit to smoke. I discuss with him about my concern due to his gait is very unsteady. He said he wanted to go before he received his blood. Pt walk to nursing station and was provided a wheelchair. He stated I just want to smoke one more then I want do any more,Instructed to stay for a few minutes. MARIA R Birch informed him she will go with him but just this one time.

## 2023-10-15 NOTE — SUBJECTIVE & OBJECTIVE
Past Medical History:   Diagnosis Date    Hypertension     Unilateral inguinal hernia, without obstruction or gangrene, not specified as recurrent        Past Surgical History:   Procedure Laterality Date    HAND ARTHROTOMY Right 7/7/2023    Procedure: ARTHROTOMY, HAND - R LF PIP;  Surgeon: Boy Lamb MD;  Location: [address];  Service: Orthopedics;  Laterality: Right;    INCISION AND DRAINAGE OF ABSCESS Right 10/12/2023    Procedure: INCISION AND DRAINAGE, ABSCESS;  Surgeon: Steve Monterio MD;  Location: [address];  Service: General;  Laterality: Right;  right chest    IRRIGATION AND DEBRIDEMENT OF UPPER EXTREMITY Bilateral 7/7/2023    Procedure: IRRIGATION AND DEBRIDEMENT, UPPER EXTREMITY - RIGHT HAND;  Surgeon: Boy Lamb MD;  Location: [facility] OR [address] [place] [address];  Service: Orthopedics;  Laterality: Bilateral;    OLECRANON BURSECTOMY Left 7/7/2023    Procedure: BURSECTOMY, OLECRANON;  Surgeon: Boy Lamb MD;  Location: [address];  Service: Orthopedics;  Laterality: Left;       Review of patient's allergies indicates:  No Known Allergies    Medications:  Medications Prior to Admission   Medication Sig    linezolid (ZYVOX) 600 mg Tab Take 1 tablet (600 mg total) by mouth every 12 (twelve) hours. for 14 days (Patient not taking: Reported on 10/14/2023)     Antibiotics (From admission, onward)      Start     Stop Route Frequency Ordered    10/15/23 0000  vancomycin 1,500 mg in dextrose 5 % (D5W) 250 mL IVPB (Vial-Mate)         -- IV Every 12 hours (non-standard times) 10/14/23 1708          Antifungals (From admission, onward)      None          Antivirals (From admission, onward)      None               There is no immunization history on file for this patient.    Family History    None       Social History     Socioeconomic History    Marital status: Single   Tobacco Use    Smoking status: Former     Types: Cigarettes    Smokeless tobacco: Never   Substance and Sexual Activity     Alcohol use: No    Drug use: Yes     Types: Marijuana, IV     Comment: 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     Social Determinants of Health     Financial Resource Strain: Low Risk  (7/7/2023)    Overall Financial Resource Strain (CARDIA)     Difficulty of Paying Living Expenses: Not hard at all   Food Insecurity: No Food Insecurity (7/7/2023)    Hunger Vital Sign     Worried About Running Out of Food in the Last Year: Never true     Ran Out of Food in the Last Year: Never true   Transportation Needs: No Transportation Needs (7/7/2023)    PRAPARE - Transportation     Lack of Transportation (Medical): No     Lack of Transportation (Non-Medical): No   Physical Activity: Sufficiently Active (7/7/2023)    Exercise Vital Sign     Days of Exercise per Week: 3 days     Minutes of Exercise per Session: 60 min   Stress: No Stress Concern Present (7/7/2023)    [employer] - Occupational Stress Questionnaire     Feeling of Stress : Not at all   Social Connections: Socially Isolated (7/7/2023)    Social Connection and Isolation Panel [NHANES]     Frequency of Communication with Friends and Family: Twice a week     Frequency of Social Gatherings with Friends and Family: More than three times a week     Attends Yarsanism Services: Never     Active Member of Clubs or Organizations: No     Attends Club or Organization Meetings: Never     Marital Status: Never    Housing Stability: Low Risk  (7/7/2023)    Housing Stability Vital Sign     Unable to Pay for Housing in the Last Year: No     Number of Places Lived in the Last Year: 1     Unstable Housing in the Last Year: No     Review of Systems   Constitutional:  Negative for chills and fever.   Respiratory:  Negative for cough and shortness of breath.    Cardiovascular:  Positive for chest pain (At I&D site).   Gastrointestinal:  Negative for abdominal pain, constipation, diarrhea, nausea and vomiting.   Genitourinary:  Negative for dysuria and  hematuria.   Musculoskeletal:  Negative for arthralgias and myalgias.   Skin:  Positive for wound.   Neurological:  Negative for weakness and headaches.     Objective:     Vital Signs (Most Recent):  Temp: 98.8 °F (37.1 °C) (10/15/23 1148)  Pulse: 77 (10/15/23 1148)  Resp: 18 (10/15/23 1148)  BP: (!) 143/81 (10/15/23 1148)  SpO2: 96 % (10/15/23 1148) Vital Signs (24h Range):  Temp:  [97.9 °F (36.6 °C)-100.7 °F (38.2 °C)] 98.8 °F (37.1 °C)  Pulse:  [] 77  Resp:  [12-22] 18  SpO2:  [93 %-100 %] 96 %  BP: (127-157)/(59-87) 143/81     Weight: 86.6 kg (191 lb)  Body mass index is 26.64 kg/m².    Estimated Creatinine Clearance: 164.3 mL/min (based on SCr of 0.7 mg/dL).     Physical Exam  Vitals reviewed.   Constitutional:       General: He is not in acute distress.     Appearance: Normal appearance. He is not ill-appearing.   HENT:      Head: Normocephalic and atraumatic.   Eyes:      Extraocular Movements: Extraocular movements intact.      Conjunctiva/sclera: Conjunctivae normal.   Cardiovascular:      Rate and Rhythm: Normal rate and regular rhythm.      Heart sounds: No murmur heard.  Pulmonary:      Effort: Pulmonary effort is normal. No respiratory distress.      Breath sounds: Normal breath sounds.   Abdominal:      General: Abdomen is flat. Bowel sounds are normal.      Palpations: Abdomen is soft.      Tenderness: There is no abdominal tenderness.   Musculoskeletal:      Cervical back: Normal range of motion.      Comments: R chest abscess with dressing in place, is clean, dry, intact. Packing present in wound   Skin:     General: Skin is warm and dry.      Findings: Lesion present.   Neurological:      General: No focal deficit present.      Mental Status: He is alert and oriented to person, place, and time.   Psychiatric:         Mood and Affect: Mood normal.         Behavior: Behavior normal.         Thought Content: Thought content normal.          Significant Labs: All pertinent labs within the past 24  hours have been reviewed.  Recent Lab Results  (Last 5 results in the past 24 hours)        10/15/23  1141   10/15/23  0730   10/15/23  0610   10/14/23  2359   10/14/23  1758        Unit Blood Type Code               Unit Expiration               Unit Blood Type               Albumin     1.8           ALP     102           ALT     16           Anion Gap     10           Ascending aorta   2.43             Ao peak reggie   1.71             Ao VTI   31.07             Appearance, UA               AST     49           AV valve area   2.87             LAURY by Velocity Ratio   2.57             AV mean gradient   6             AV index (prosthetic)   0.79             AV peak gradient   12             AV Velocity Ratio   0.71             Baso #     0.07           Basophil %     0.3           Bilirubin (UA)               BILIRUBIN TOTAL     0.6  Comment: For infants and newborns, interpretation of results should be based  on gestational age, weight and in agreement with clinical  observations.    Premature Infant recommended reference ranges:  Up to 24 hours.............<8.0 mg/dL  Up to 48 hours............<12.0 mg/dL  3-5 days..................<15.0 mg/dL  6-29 days.................<15.0 mg/dL             BSA   2.08             BUN     11           Calcium     8.4           Chloride     96           CO2     24           CODING SYSTEM               Color, UA               Creatinine     0.7           Crossmatch Interpretation               Left Ventricle Relative Wall Thickness   0.35             Differential Method     Automated           DISPENSE STATUS               E/A ratio   1.35             E/E' ratio   5.83             eGFR     >60.0           EF   65             Eos #     0.1           Eosinophil %     0.3           E wave deceleration time   281.95             FS   27             Glucose     104           Glucose, UA               Gran # (ANC)     18.5           Gran %     80.1           Group & Rh                Hematocrit     27.3           Hemoglobin     9.1           Immature Grans (Abs)     0.67  Comment: Mild elevation in immature granulocytes is non specific and   can be seen in a variety of conditions including stress response,   acute inflammation, trauma and pregnancy. Correlation with other   laboratory and clinical findings is essential.             Immature Granulocytes     2.9           INDIRECT VARGHESE               IVRT   74.22             IVSd   0.76             Ketones, UA               LA WIDTH   4.88             Left Atrium Major Axis   6.30             Left Atrium Minor Axis   5.94             LA size   3.53             LA volume   89.53                92.30             LA vol index   43.3             LA Volume Index (Mod)   44.6             LVOT area   3.6             Leukocytes, UA               LV LATERAL E/E' RATIO   5.25             LV SEPTAL E/E' RATIO   6.56             LV EDV BP   127.08             LV Diastolic Volume Index   61.39             LVIDd   5.16             LVIDs   3.76             LV mass   150.22             LV Mass Index   73             LVOT diameter   2.15             LVOT peak terell   1.21             LVOT stroke volume   89.12             LVOT peak VTI   24.56             LV ESV BP   60.59             LV Systolic Volume Index   29.3             Lymph #     2.4           Lymph %     10.4           Magnesium      1.7           MCH     24.5           MCHC     33.3           MCV     74           Mean e'   0.18             Mono #     1.4           Mono %     6.0           MPV     9.5           MV Peak A Terell   0.78             MV Peak E Terell   1.05             NITRITE UA               nRBC     0           Occult Blood UA               pH, UA               Phosphorus Level     2.9           Platelet Count     462           Potassium     3.8           Product Code               PROTEIN TOTAL     6.4           Protein, UA               Posterior Wall   0.9             RA Major Axis   5.13              Est. RA pres   3             RA Width   4.62             RBC     3.71           RDW     16.9           RV TB RVSP   5             RVDD   3.75             Sinus   2.72             Sodium 130     130   130   129            130           Specific [place], UA               Specimen Outdate               Specimen UA               STJ   2.41             TAPSE   3.19             TDI SEPTAL   0.16             TDI LATERAL   0.20             Triscuspid Valve Regurgitation Peak Gradient   25             TR Max Terell   2.48             TV resting pulmonary artery pressure   28             UNIT NUMBER               WBC     23.07           ZLVIDD   -1.99             ZLVIDS   -0.19                                    Significant Imaging: I have reviewed all pertinent imaging results/findings within the past 24 hours.

## 2023-10-15 NOTE — PROGRESS NOTES
[facility] Medicine  Progress Note    Patient Name: Ton Donovan  MRN: [id number]  Patient Class: IP- Inpatient   Admission Date: 10/14/2023  Length of Stay: 1 days  Attending Physician: Vinod Bae MD  Primary Care Provider: Emily, Primary Doctor        Subjective:     Principal Problem:MRSA bacteremia        HPI:  Ton Donovan is a 28 year old man with history of heroin use and IVDU who re-presents to the ED on 10/11/23 due to right upper chest swelling and pain after having left AMA on that same morning. Patient reports that he was brought back by his sister for treatment. Patient tells me that he left AMA because he panicked at the thought of surgery and felt like he was withdrawing, so he left to do more heroin. He initially presented to [facility] on 10/10 for the same problem. From the last HPI: patient reports this swelling started about 1 week ago and has gotten larger and more painful. He reports recent IV injection of heroin and he says he snorted heroin shortly before presenting to the ED on this afternoon of 10/10. He says he was told to present to the ED by his friends who were concerned for him. Pt was recently hospitalized from 7/6 to 7/8 due to infected wounds, abscesses, and septic arthritis in his Right hand for which he underwent I&D and received Right IJ catheter for IV antibiotics; however, pt had repeated attempts to elope with RIJ catheter, and patient wished to leave AMA so the line was removed prior to discharge and pt was given PO antibiotics. He reports resolution of these hand wounds. He denies any fevers, shortness of breath, n/v/d, or abdominal pain.     CT chest showed abscess in the Right pectoralis major muscle as well as findings concerning for osteomyelitis vs septic arthritis of 1st rib and 1st costochondral joint. Pt given doses of vanc and zosyn. Patient was admitted to hospital medicine for management of abscess and possible OM vs septic arthritis. Gen surgery  planned to take pt to OR for I&D on 10/11. After admission to hospital medicine on 10/10, the following morning patient left AMA before being evaluated by a physician from the primary team. In the interval, his blood cultures returned positive with gram positive cocci with PCR testing positive for MRSA. His sister was contacted via phone by the primary team and was asked to reach out to her brother as he was not able to be reached by phone, who then located him and brought him back to the hospital. Patient reports that he snorted and injected heroin this afternoon after leaving AMA.    Patient left AMA 2 days ago. He was prescribed antibiotics but he never filled them. He is returning today initially stating he just wanted his drain removed so he could go to rehab however he is also complaining of new onset bilateral lower extremity edema.    In ED: Patient AFVSS, WBC 22.6 hxh 6.9x20.4, Na 125, albumin 1.9. Patient ordered 1U pRBCs, and started on IV abx      Overview/Hospital Course:  No notes on file    Interval History: Patient seen this morning. Patient stated that he wanted to leave. Discussed with patient the current diagnosis and that if patient leaves, he may die from his bacteremia. Patient understood the diagnosis and the possibility of dying if he left. Patient w/ friend at bedside who stated he will do his best to keep him there.    Review of Systems   Constitutional:  Negative for chills, fatigue and fever.   HENT:  Negative for sore throat.    Respiratory:  Negative for cough, choking and wheezing.    Cardiovascular:  Positive for leg swelling. Negative for chest pain and palpitations.   Gastrointestinal:  Negative for abdominal distention, constipation, diarrhea, nausea and vomiting.   Genitourinary:  Negative for dysuria, frequency and urgency.   Musculoskeletal:  Negative for arthralgias and myalgias.   Skin:  Negative for rash.   Neurological:  Negative for syncope.     Objective:     Vital Signs  (Most Recent):  Temp: 98.8 °F (37.1 °C) (10/15/23 1148)  Pulse: 77 (10/15/23 1148)  Resp: 18 (10/15/23 1409)  BP: (!) 143/81 (10/15/23 1148)  SpO2: 96 % (10/15/23 1148) Vital Signs (24h Range):  Temp:  [97.9 °F (36.6 °C)-100.7 °F (38.2 °C)] 98.8 °F (37.1 °C)  Pulse:  [] 77  Resp:  [12-22] 18  SpO2:  [93 %-100 %] 96 %  BP: (127-157)/(59-87) 143/81     Weight: 86.6 kg (191 lb)  Body mass index is 26.64 kg/m².    Intake/Output Summary (Last 24 hours) at 10/15/2023 1444  Last data filed at 10/15/2023 0620  Gross per 24 hour   Intake 1288.25 ml   Output 825 ml   Net 463.25 ml         Physical Exam  HENT:      Head: Normocephalic.      Right Ear: External ear normal.      Left Ear: External ear normal.      Mouth/Throat:      Mouth: Mucous membranes are moist.   Eyes:      Extraocular Movements: Extraocular movements intact.   Cardiovascular:      Rate and Rhythm: Normal rate.   Pulmonary:      Effort: Pulmonary effort is normal.      Comments: Coarse breath sounds, crackles in lung bases  Abdominal:      General: Abdomen is flat.      Palpations: Abdomen is soft.   Musculoskeletal:         General: Normal range of motion.   Skin:     General: Skin is warm.      Capillary Refill: Capillary refill takes less than 2 seconds.   Neurological:      General: No focal deficit present.      Mental Status: He is alert.   Psychiatric:         Mood and Affect: Mood normal.             Significant Labs: All pertinent labs within the past 24 hours have been reviewed.  CBC:   Recent Labs   Lab 10/14/23  1512 10/15/23  0610   WBC 22.61* 23.07*   HGB 6.9* 9.1*   HCT 20.4* 27.3*    462*     CMP:   Recent Labs   Lab 10/14/23  1512 10/14/23  1758 10/14/23  2359 10/15/23  0610 10/15/23  1141   *   < > 130* 130*  130* 130*   K 4.7  --   --  3.8  --    CL 91*  --   --  96  --    CO2 23  --   --  24  --    *  --   --  104  --    BUN 17  --   --  11  --    CREATININE 0.8  --   --  0.7  --    CALCIUM 8.4*  --   --  8.4*   --    PROT 6.6  --   --  6.4  --    ALBUMIN 1.9*  --   --  1.8*  --    BILITOT 0.3  --   --  0.6  --    ALKPHOS 105  --   --  102  --    AST 60*  --   --  49*  --    ALT 14  --   --  16  --    ANIONGAP 11  --   --  10  --     < > = values in this interval not displayed.       Significant Imaging: I have reviewed all pertinent imaging results/findings within the past 24 hours.      Assessment/Plan:      * MRSA bacteremia  Blood cultures from admission showing GPCs, PCR testing positive for MRSA. Patient previously had MRSA growing in right hand wounds on admission in July 2023 for septic arthritis, though blood cultures at that time were negative    - Continue IV vancomycin  - ID consulted for MRSA bacteremia management, appreciate recs  - TTE ordered to assess for endocarditis in setting of regular IVDU and new onset LE swelling      Hyponatremia  Patient has hyponatremia which is uncontrolled,We will aim to correct the sodium by 4-6mEq in 24 hours. We will monitor sodium Every 6 hours. The hyponatremia is due to Dehydration/hypovolemia. We will obtain the following studies: N/a. We will treat the hyponatremia with IV fluids as follows: NS 50cc/hr. The patient's sodium results have been reviewed and are listed below.  Recent Labs   Lab 10/15/23  1141   *        - Previously obtained labs more suggestive of a hypovolemic hyponatremia, possibly due to decreased PO intake.  - Will give slow IVF infusion of NS 50mL/hr for 500cc  - Stable and slowly improved from yesterday, can continue to monitor with daily CMPs    Abscess of chest  28M with IVDU and previous septic arthritis who presents with 1 week of increased swelling and pain in right upper chest. WBC elevated to 30. CT chest showed abscess within the right pectoralis major muscle measuring 6.7 x 12.5 cm as well as findings concerning for osteomyelitis vs septic arthritis of 1st rib and 1st costochondral joint. Multiple other abscesses seen on ultrasound in  the bilateral upper extremities. Gen Surg had planned to take pt to OR for I&D on 10/11 with further discussion with thoracic surgery regarding bone/joint findings, however patient left AMA prior to receiving treatment. Was brought back by family to the ED for treatment. Bcx from previous admission showing staph aureus with PCR testing positive for MRSA. Unusual location for abscess however likely 2/2 to hematogenous spread of MRSA. Patient recently left AMA following I&D    - Vancomycin  - gen surg consult for drain removal  - pain control w/ oxy and dilaudid PRN  - Daily CBC, CMP, mag, phos  - Keep K > 4, Phos > 3, Mg > 2  - Replete electrolytes prn  - q4h vitals        Heroin dependence  Patient with chronic use of heroin, was seen by addiction psychiatry on last admission in July 2023. He reports ongoing regular use of heroin with last use shortly prior to presentations on both 10/10 and 10/11. Pt is saying he wishes to quit illicit opioid use and refused morphine or dilaudid in the ED. Was given a dose of suboxone 8-2 by the ED yesterday, and patient then reported feeling withdrawal symptoms afterwards. Psych had been consulted regarding withdrawal management and recommended against starting suboxone in the setting of recent heroin use due to concern for precipitating more severe withdrawal. Patient recently discharged after being given suboxone    - Consult psychiatry          VTE Risk Mitigation (From admission, onward)         Ordered     IP VTE HIGH RISK PATIENT  Once         10/14/23 1654     Place sequential compression device  Until discontinued         10/14/23 1654                Discharge Planning   SENA:      Code Status: Full Code   Is the patient medically ready for discharge?:     Reason for patient still in hospital (select all that apply): Patient trending condition                     Terence Toribio MD  Department of Hospital Medicine   [facility]

## 2023-10-15 NOTE — ASSESSMENT & PLAN NOTE
Patient with chronic use of heroin, was seen by addiction psychiatry on last admission in July 2023. He reports ongoing regular use of heroin with last use shortly prior to presentations on both 10/10 and 10/11. Pt is saying he wishes to quit illicit opioid use and refused morphine or dilaudid in the ED. Was given a dose of suboxone 8-2 by the ED yesterday, and patient then reported feeling withdrawal symptoms afterwards. Psych had been consulted regarding withdrawal management and recommended against starting suboxone in the setting of recent heroin use due to concern for precipitating more severe withdrawal. Patient recently discharged after being given suboxone    - Consult psychiatry

## 2023-10-16 VITALS
SYSTOLIC BLOOD PRESSURE: 122 MMHG | DIASTOLIC BLOOD PRESSURE: 87 MMHG | TEMPERATURE: 98 F | WEIGHT: 191 LBS | HEART RATE: 76 BPM | BODY MASS INDEX: 26.74 KG/M2 | HEIGHT: 71 IN | RESPIRATION RATE: 16 BRPM | OXYGEN SATURATION: 96 %

## 2023-10-16 LAB
BACTERIA SPEC ANAEROBE CULT: NORMAL
SODIUM SERPL-SCNC: 129 MMOL/L (ref 136–145)
VANCOMYCIN TROUGH SERPL-MCNC: 5.8 UG/ML (ref 10–22)

## 2023-10-16 PROCEDURE — 63600175 PHARM REV CODE 636 W HCPCS

## 2023-10-16 PROCEDURE — 84295 ASSAY OF SERUM SODIUM: CPT

## 2023-10-16 PROCEDURE — 25000003 PHARM REV CODE 250

## 2023-10-16 PROCEDURE — 80202 ASSAY OF VANCOMYCIN: CPT | Performed by: STUDENT IN AN ORGANIZED HEALTH CARE EDUCATION/TRAINING PROGRAM

## 2023-10-16 RX ORDER — DICYCLOMINE HYDROCHLORIDE 10 MG/1
10 CAPSULE ORAL 4 TIMES DAILY
Status: DISCONTINUED | OUTPATIENT
Start: 2023-10-16 | End: 2023-10-16 | Stop reason: HOSPADM

## 2023-10-16 RX ORDER — HYDROXYZINE HYDROCHLORIDE 25 MG/1
25 TABLET, FILM COATED ORAL 3 TIMES DAILY PRN
Status: DISCONTINUED | OUTPATIENT
Start: 2023-10-16 | End: 2023-10-16 | Stop reason: HOSPADM

## 2023-10-16 RX ORDER — METHOCARBAMOL 500 MG/1
500 TABLET, FILM COATED ORAL 4 TIMES DAILY PRN
Status: DISCONTINUED | OUTPATIENT
Start: 2023-10-16 | End: 2023-10-16 | Stop reason: HOSPADM

## 2023-10-16 RX ADMIN — HYDROMORPHONE HYDROCHLORIDE 2 MG: 2 TABLET ORAL at 02:10

## 2023-10-16 RX ADMIN — OXYCODONE HYDROCHLORIDE 10 MG: 10 TABLET ORAL at 12:10

## 2023-10-16 RX ADMIN — HYDROMORPHONE HYDROCHLORIDE 2 MG: 2 TABLET ORAL at 06:10

## 2023-10-16 RX ADMIN — VANCOMYCIN HYDROCHLORIDE 1500 MG: 1.5 INJECTION, POWDER, LYOPHILIZED, FOR SOLUTION INTRAVENOUS at 12:10

## 2023-10-16 NOTE — CARE UPDATE
Patient not returned to room x 4 hours. Patient presumed eloped also given prior elopement. Attempted to call patient x3 no answer. Prior antibiotic outpatient therapy was not picked up by patient and is still active. Will discuss with ID about outpatient antibiotic plan.

## 2023-10-16 NOTE — NURSING
Pt alert orient times 4. Pt by wheel chair twice and ambulated once  took himself to go off unit to smoke.  Discuss with pt his gait is unsteady to walk that far. He then use wheelchair to go smoke. Each time he remove tele box. I informed telemetry that patient is non compliant of keeping monitor on.. Patient has received both schedule pain med as well as PRN . He has remain calm and not as anxious as last PM shift so far

## 2023-10-16 NOTE — PLAN OF CARE
[facility] - Med Surg  Discharge Final Note    Primary Care Provider: Emily, Primary Doctor    Expected Discharge Date: 10/16/2023    Patient left AMA.  Final Discharge Note (most recent)       Final Note - 10/16/23 1216          Final Note    Assessment Type Final Discharge Note (P)      Anticipated Discharge Disposition -- (P)    Patient left AMA       Post-Acute Status    Discharge Delays None known at this time (P)                      Important Message from Medicare             Contact Info       No, Primary Doctor   Relationship: PCP - General        Next Steps: Follow up            KARLO Oviedo  Case Management  [phone]

## 2023-10-16 NOTE — PROGRESS NOTES
Pharmacokinetic Assessment Follow Up: IV Vancomycin    Vancomycin serum concentration assessment(s):    The trough level was drawn correctly and can be used to guide therapy at this time. The measurement is below the desired definitive target range of 15 to 20 mcg/mL.    Vancomycin Regimen Plan:    Change regimen to Vancomycin 1250 mg IV every 8 hours with next serum trough concentration measured at 0730 prior to 4th dose on 10/17    Drug levels (last 3 results):  Recent Labs   Lab Result Units 10/16/23  0028   Vancomycin-Trough ug/mL 5.8*       Pharmacy will continue to follow and monitor vancomycin.    Please contact pharmacy at extension [phone] for questions regarding this assessment.    Thank you for the consult,   Juliette Segura       Patient brief summary:  Ton Donovan is a 28 y.o. male initiated on antimicrobial therapy with IV Vancomycin for treatment of bacteremia      Drug Allergies:   Review of patient's allergies indicates:  No Known Allergies    Actual Body Weight:   86.6 kg    Renal Function:   Estimated Creatinine Clearance: 164.3 mL/min (based on SCr of 0.7 mg/dL).    Dialysis Method (if applicable):  N/A    CBC (last 72 hours):  Recent Labs   Lab Result Units 10/14/23  1512 10/15/23  0610   WBC K/uL 22.61* 23.07*   Hemoglobin g/dL 6.9* 9.1*   Hematocrit % 20.4* 27.3*   Platelets K/uL 405 462*   Gran % % 78.1* 80.1*   Lymph % % 11.1* 10.4*   Mono % % 7.1 6.0   Eosinophil % % 0.7 0.3   Basophil % % 0.3 0.3   Differential Method  Automated Automated       Metabolic Panel (last 72 hours):  Recent Labs   Lab Result Units 10/14/23  1512 10/14/23  1714 10/14/23  1758 10/14/23  2359 10/15/23  0610 10/15/23  1141 10/15/23  1745 10/16/23  0028   Sodium mmol/L 125*  --  129* 130* 130*  130* 130* 132* 129*   Potassium mmol/L 4.7  --   --   --  3.8  --   --   --    Chloride mmol/L 91*  --   --   --  96  --   --   --    CO2 mmol/L 23  --   --   --  24  --   --   --    Glucose mg/dL 115*  --   --   --  104  --    --   --    Glucose, UA   --  Negative  --   --   --   --   --   --    BUN mg/dL 17  --   --   --  11  --   --   --    Creatinine mg/dL 0.8  --   --   --  0.7  --   --   --    Albumin g/dL 1.9*  --   --   --  1.8*  --   --   --    Total Bilirubin mg/dL 0.3  --   --   --  0.6  --   --   --    Alkaline Phosphatase U/L 105  --   --   --  102  --   --   --    AST U/L 60*  --   --   --  49*  --   --   --    ALT U/L 14  --   --   --  16  --   --   --    Magnesium mg/dL  --   --   --   --  1.7  --   --   --    Phosphorus mg/dL  --   --   --   --  2.9  --   --   --        Vancomycin Administrations:  vancomycin given in the last 96 hours                     vancomycin 1,500 mg in dextrose 5 % (D5W) 250 mL IVPB (Vial-Mate) (mg) 1,500 mg New Bag 10/16/23 0029     1,500 mg New Bag 10/15/23 1225     1,500 mg New Bag 10/14/23 2318    vancomycin 2 g in dextrose 5 % 500 mL IVPB (mg) 2,000 mg New Bag 10/14/23 1557    vancomycin 1,500 mg in dextrose 5 % (D5W) 250 mL IVPB (Vial-Mate) (mg) 1,500 mg New Bag 10/12/23 1332                    Microbiologic Results:  Microbiology Results (last 7 days)       Procedure Component Value Units Date/Time    Blood culture [[id number]]     Order Status: Sent Specimen: Blood     Blood culture [[id number]]     Order Status: Sent Specimen: Blood     Blood culture x two cultures. Draw prior to antibiotics. [[id number]] Collected: 10/14/23 1512    Order Status: Completed Specimen: Blood from Line, Jugular, External Right Updated: 10/15/23 1026     Blood Culture, Routine Gram stain aer bottle: Gram positive cocci in clusters resembling Staph      Results called to and read back by: ADIS CAMEJO  10/15/2023  10:25      Gram stain manas bottle: Gram positive cocci in clusters resembling Staph      Positive results previously called 10/15/2023    Narrative:      Aerobic and anaerobic    Blood culture x two cultures. Draw prior to antibiotics. [[id number]] Collected: 10/14/23 1512    Order Status: Completed  Specimen: Blood from Line, Jugular, External Right Updated: 10/15/23 1026     Blood Culture, Routine Gram stain aer bottle: Gram positive cocci in clusters resembling Staph      Results called to and read back by: ADIS CAMEJO  10/15/2023  10:25      Gram stain manas bottle: Gram positive cocci in clusters resembling Staph      Positive results previously called 10/15/2023    Narrative:      Aerobic and anaerobic

## 2023-10-16 NOTE — PROGRESS NOTES
I entered patient room Patient report he was in a hurry to go to the bathroom and pulled out . The old IV was from the external jugular on Rt side of his neck out. I place a gauge over old IV site . It had mostly stopped bleeding when I entered his room. Blood was on his chest. He did a partial bath to remove the blood.I attempt to place one peripheral but was unsuccessful.Pt with scattered nodule looking area to Nile arm. Md on call was page and inform . A midline consult was ordered. Dressing was removed by patient to old Drain site to his Rt chest And removed from HIs Left arm. Both area was clean with NS pat dry and placed sterile 4x4 dressing . The drain site was covered with ABD pad as well. Secure with silk tape since paper tape keeps coming off. Patient instructed to please call his nurse if he has to go to the BR while being hook up to his IV. And not reo remove the dressing .Leaving area open can increase possible chance of infectio

## 2023-10-16 NOTE — DISCHARGE SUMMARY
[facility] Medicine  Discharge Summary      Patient Name: Ton Donovan  MRN: [id number]  [facility]: [id number]  Patient Class: IP- Inpatient  Admission Date: 10/14/2023  Hospital Length of Stay: 2 days  Discharge Date and Time:  10/16/2023 11:18 AM  Attending Physician: Vinod Bae MD   Discharging Provider: Maria T Toledo MD  Primary Care Provider: Emily Primary Doctor  [facility] Medicine Team: [facility] 5 Maria T Toledo MD  Primary Care Team: [facility] 5    HPI:   Ton Donovan is a 28 year old man with history of heroin use and IVDU who re-presents to the ED on 10/11/23 due to right upper chest swelling and pain after having left AMA on that same morning. Patient reports that he was brought back by his sister for treatment. Patient tells me that he left AMA because he panicked at the thought of surgery and felt like he was withdrawing, so he left to do more heroin. He initially presented to [facility] on 10/10 for the same problem. From the last HPI: patient reports this swelling started about 1 week ago and has gotten larger and more painful. He reports recent IV injection of heroin and he says he snorted heroin shortly before presenting to the ED on this afternoon of 10/10. He says he was told to present to the ED by his friends who were concerned for him. Pt was recently hospitalized from 7/6 to 7/8 due to infected wounds, abscesses, and septic arthritis in his Right hand for which he underwent I&D and received Right IJ catheter for IV antibiotics; however, pt had repeated attempts to elope with RIJ catheter, and patient wished to leave AMA so the line was removed prior to discharge and pt was given PO antibiotics. He reports resolution of these hand wounds. He denies any fevers, shortness of breath, n/v/d, or abdominal pain.     CT chest showed abscess in the Right pectoralis major muscle as well as findings concerning for osteomyelitis vs septic arthritis of 1st rib and 1st costochondral joint. Pt  given doses of vanc and zosyn. Patient was admitted to hospital medicine for management of abscess and possible OM vs septic arthritis. Gen surgery planned to take pt to OR for I&D on 10/11. After admission to hospital medicine on 10/10, the following morning patient left AMA before being evaluated by a physician from the primary team. In the interval, his blood cultures returned positive with gram positive cocci with PCR testing positive for MRSA. His sister was contacted via phone by the primary team and was asked to reach out to her brother as he was not able to be reached by phone, who then located him and brought him back to the hospital. Patient reports that he snorted and injected heroin this afternoon after leaving AMA.    Patient left AMA 2 days ago. He was prescribed antibiotics but he never filled them. He is returning today initially stating he just wanted his drain removed so he could go to rehab however he is also complaining of new onset bilateral lower extremity edema.    In ED: Patient AFVSS, WBC 22.6 hxh 6.9x20.4, Na 125, albumin 1.9. Patient ordered 1U pRBCs, and started on IV abx      * No surgery found *      Hospital Course:   Ton Donovan is a 28 year old man with history of heroin use and IVDU who re-presented to the ED on 10/11/23 due to right upper chest swelling and pain after having left AMA on that same morning. Blood cultures positive for MRSA bacteremia. Patient started on IV Vanc and Zosyn. Echo ordered. ID re-consulted. General surgery was re-consulted for I&D of abscess to left chest. Patient underwent I&D with placement of penrose. Patient left AMA 2 days ago. He was prescribed antibiotics but he never filled them. He is returning today initially stating he just wanted his drain removed so he could go to rehab however he is also complaining of new onset bilateral lower extremity edema. In ED: Patient AFVSS, WBC 22.6 hxh 6.9x20.4, Na 125, albumin 1.9, BNP 36. Patient ordered 1U  "pRBCs, and started on IV abx, sitter at bedside, psychiatry consulted given concern for capacity in setting of patient wanting treating, but repeatedly leaving AMA. Blood cx remain (+), repeating blood cx until (-ve), ID following. Psych consulted and stated that patient has capacity to refuse medical treatment. Patient eloped again on 10/16 and attempted to call patient but was unable to reach him. Patient has prior prescription for linezolid and will discuss with ID about outpatient antibiotic plan.        Goals of Care Treatment Preferences:  Code Status: Full Code    /87   Pulse 76   Temp 98.2 °F (36.8 °C)   Resp 16   Ht 5' 11" (1.803 m)   Wt 86.6 kg (191 lb)   SpO2 96%   BMI 26.64 kg/m²     Physical exam:  -patient eloped and was unable to perform physical exam    Consults:   Consults (From admission, onward)        Status Ordering Provider     Inpatient consult to Midline team  Once        Provider:  (Not yet assigned)    Completed JANET CARVALHO     Inpatient consult to Psychiatry  Once        Provider:  (Not yet assigned)    Completed DANYELLE PEREZ     Inpatient consult to Infectious Diseases  Once        Provider:  (Not yet assigned)    Completed FLORENTIN TORRES     Pharmacy to dose Vancomycin consult  Once        Provider:  (Not yet assigned)   See [facility] for full Linked Orders Report.    Acknowledged DANYELLE PEREZ          No new Assessment & Plan notes have been filed under this hospital service since the last note was generated.  Service: Hospital Medicine    Final Active Diagnoses:    Diagnosis Date Noted POA    PRINCIPAL PROBLEM:  MRSA bacteremia [R78.81, B95.62] 10/11/2023 Yes    Abscess of chest [J86.9] 10/11/2023 Yes    Hyponatremia [E87.1] 10/11/2023 Yes    Heroin dependence [F11.20] 07/06/2023 Yes     Chronic      Problems Resolved During this Admission:       Discharged Condition: elopment    Disposition:      Follow Up:    Patient Instructions:   No discharge procedures on " file.    Significant Diagnostic Studies: Labs: All labs within the past 24 hours have been reviewed    Pending Diagnostic Studies:     Procedure Component Value Units Date/Time    CBC auto differential [[id number]]     Order Status: Sent Lab Status: No result     Specimen: Blood     Comprehensive metabolic panel [[id number]]     Order Status: Sent Lab Status: No result     Specimen: Blood     HIV 1/2 Ag/Ab (4th Gen) [[id number]]     Order Status: Sent Lab Status: No result     Specimen: Blood     Hepatitis B surface antibody [[id number]]     Order Status: Sent Lab Status: No result     Specimen: Blood     Hepatitis B surface antigen [[id number]]     Order Status: Sent Lab Status: No result     Specimen: Blood     Hepatitis C antibody [[id number]]     Order Status: Sent Lab Status: No result     Specimen: Blood     Magnesium [[id number]]     Order Status: Sent Lab Status: No result     Specimen: Blood     Phosphorus [[id number]]     Order Status: Sent Lab Status: No result     Specimen: Blood     Sodium [[id number]]     Order Status: Sent Lab Status: No result     Specimen: Blood     Sodium [[id number]]     Order Status: Sent Lab Status: No result     Specimen: Blood          Medications:  Reconciled Home Medications:      Medication List      START taking these medications    linezolid 600 mg Tab  Commonly known as: ZYVOX  Take 1 tablet (600 mg total) by mouth every 12 (twelve) hours. for 14 days            Indwelling Lines/Drains at time of discharge:   Lines/Drains/Airways     Drain  Duration                Open Drain 07/07/23 1511 Right;Dorsal Hand Penrose 100 days         Open Drain 10/12/23 1139 Tube - 1 Right Chest Penrose 1 inch 3 days                Time spent on the discharge of patient: 45 minutes         Maria T Toledo MD  Department of Hospital Medicine  [facility] S[facility]

## 2023-10-16 NOTE — PROGRESS NOTES
In wheelchair to go off unit for ultrasound. Pt is seating when asked why was I sweating by unit Charge RN pt stated I am going through withdrawal

## 2023-10-17 LAB
BACTERIA BLD CULT: ABNORMAL

## 2023-11-16 LAB — FUNGUS SPEC CULT: NORMAL

## 2023-11-20 ENCOUNTER — HOSPITAL ENCOUNTER (INPATIENT)
Facility: HOSPITAL | Age: 28
LOS: 2 days | Discharge: LEFT BEFORE DISCHARGE | DRG: 988 | End: 2023-11-22
Attending: EMERGENCY MEDICINE | Admitting: STUDENT IN AN ORGANIZED HEALTH CARE EDUCATION/TRAINING PROGRAM
Payer: MEDICAID

## 2023-11-20 DIAGNOSIS — M79.89 LEG SWELLING: ICD-10-CM

## 2023-11-20 DIAGNOSIS — F19.90 IVDU (INTRAVENOUS DRUG USER): ICD-10-CM

## 2023-11-20 DIAGNOSIS — B95.62 MRSA BACTEREMIA: ICD-10-CM

## 2023-11-20 DIAGNOSIS — M79.605 BILATERAL LEG PAIN: ICD-10-CM

## 2023-11-20 DIAGNOSIS — I38 ENDOCARDITIS: ICD-10-CM

## 2023-11-20 DIAGNOSIS — M79.604 BILATERAL LEG PAIN: ICD-10-CM

## 2023-11-20 DIAGNOSIS — R07.9 CHEST PAIN: ICD-10-CM

## 2023-11-20 DIAGNOSIS — R78.81 MRSA BACTEREMIA: ICD-10-CM

## 2023-11-20 DIAGNOSIS — L02.414 ABSCESS OF LEFT UPPER EXTREMITY: ICD-10-CM

## 2023-11-20 DIAGNOSIS — K68.12 PSOAS ABSCESS, LEFT: Primary | ICD-10-CM

## 2023-11-20 PROBLEM — L02.413 ABSCESS OF RIGHT UPPER EXTREMITY: Status: ACTIVE | Noted: 2023-07-07

## 2023-11-20 PROBLEM — M46.58 SEPTIC ARTHRITIS OF SACROILIAC JOINT: Status: ACTIVE | Noted: 2023-11-20

## 2023-11-20 PROBLEM — M47.817 OSTEOARTHRITIS OF FACET JOINT AT L5-S1 LEVEL OF LUMBOSACRAL SPINE: Status: ACTIVE | Noted: 2023-11-20

## 2023-11-20 PROBLEM — R60.0 BILATERAL LOWER EXTREMITY EDEMA: Status: ACTIVE | Noted: 2023-11-20

## 2023-11-20 LAB
ALBUMIN SERPL BCP-MCNC: 2.2 G/DL (ref 3.5–5.2)
ALP SERPL-CCNC: 75 U/L (ref 55–135)
ALT SERPL W/O P-5'-P-CCNC: 6 U/L (ref 10–44)
ANION GAP SERPL CALC-SCNC: 9 MMOL/L (ref 8–16)
ANISOCYTOSIS BLD QL SMEAR: SLIGHT
AST SERPL-CCNC: 32 U/L (ref 10–40)
BASOPHILS # BLD AUTO: 0.02 K/UL (ref 0–0.2)
BASOPHILS NFR BLD: 0.2 % (ref 0–1.9)
BILIRUB SERPL-MCNC: 0.2 MG/DL (ref 0.1–1)
BNP SERPL-MCNC: 18 PG/ML (ref 0–99)
BUN SERPL-MCNC: 12 MG/DL (ref 6–20)
CALCIUM SERPL-MCNC: 8.6 MG/DL (ref 8.7–10.5)
CHLORIDE SERPL-SCNC: 99 MMOL/L (ref 95–110)
CO2 SERPL-SCNC: 22 MMOL/L (ref 23–29)
CREAT SERPL-MCNC: 0.7 MG/DL (ref 0.5–1.4)
DIFFERENTIAL METHOD: ABNORMAL
EOSINOPHIL # BLD AUTO: 0.2 K/UL (ref 0–0.5)
EOSINOPHIL NFR BLD: 1.6 % (ref 0–8)
ERYTHROCYTE [DISTWIDTH] IN BLOOD BY AUTOMATED COUNT: 18.8 % (ref 11.5–14.5)
EST. GFR  (NO RACE VARIABLE): >60 ML/MIN/1.73 M^2
GLUCOSE SERPL-MCNC: 87 MG/DL (ref 70–110)
HCT VFR BLD AUTO: 23.5 % (ref 40–54)
HGB BLD-MCNC: 7.2 G/DL (ref 14–18)
HYPOCHROMIA BLD QL SMEAR: ABNORMAL
IMM GRANULOCYTES # BLD AUTO: 0.05 K/UL (ref 0–0.04)
IMM GRANULOCYTES NFR BLD AUTO: 0.5 % (ref 0–0.5)
LACTATE SERPL-SCNC: 1 MMOL/L (ref 0.5–2.2)
LYMPHOCYTES # BLD AUTO: 2.6 K/UL (ref 1–4.8)
LYMPHOCYTES NFR BLD: 25.8 % (ref 18–48)
MCH RBC QN AUTO: 22.5 PG (ref 27–31)
MCHC RBC AUTO-ENTMCNC: 30.6 G/DL (ref 32–36)
MCV RBC AUTO: 73 FL (ref 82–98)
MONOCYTES # BLD AUTO: 0.9 K/UL (ref 0.3–1)
MONOCYTES NFR BLD: 8.9 % (ref 4–15)
NEUTROPHILS # BLD AUTO: 6.4 K/UL (ref 1.8–7.7)
NEUTROPHILS NFR BLD: 63 % (ref 38–73)
NRBC BLD-RTO: 0 /100 WBC
PLATELET # BLD AUTO: 340 K/UL (ref 150–450)
PLATELET BLD QL SMEAR: ABNORMAL
PMV BLD AUTO: 9.3 FL (ref 9.2–12.9)
POIKILOCYTOSIS BLD QL SMEAR: SLIGHT
POLYCHROMASIA BLD QL SMEAR: ABNORMAL
POTASSIUM SERPL-SCNC: 4.9 MMOL/L (ref 3.5–5.1)
PROT SERPL-MCNC: 8.4 G/DL (ref 6–8.4)
RBC # BLD AUTO: 3.2 M/UL (ref 4.6–6.2)
SCHISTOCYTES BLD QL SMEAR: ABNORMAL
SCHISTOCYTES BLD QL SMEAR: PRESENT
SODIUM SERPL-SCNC: 130 MMOL/L (ref 136–145)
TARGETS BLD QL SMEAR: ABNORMAL
TOXIC GRANULES BLD QL SMEAR: PRESENT
TROPONIN I SERPL DL<=0.01 NG/ML-MCNC: <0.006 NG/ML (ref 0–0.03)
WBC # BLD AUTO: 10.17 K/UL (ref 3.9–12.7)

## 2023-11-20 PROCEDURE — 11000001 HC ACUTE MED/SURG PRIVATE ROOM

## 2023-11-20 PROCEDURE — 96365 THER/PROPH/DIAG IV INF INIT: CPT

## 2023-11-20 PROCEDURE — 96375 TX/PRO/DX INJ NEW DRUG ADDON: CPT

## 2023-11-20 PROCEDURE — 80053 COMPREHEN METABOLIC PANEL: CPT | Performed by: EMERGENCY MEDICINE

## 2023-11-20 PROCEDURE — 25000003 PHARM REV CODE 250

## 2023-11-20 PROCEDURE — 63600175 PHARM REV CODE 636 W HCPCS

## 2023-11-20 PROCEDURE — 63600175 PHARM REV CODE 636 W HCPCS: Performed by: EMERGENCY MEDICINE

## 2023-11-20 PROCEDURE — 83605 ASSAY OF LACTIC ACID: CPT | Performed by: EMERGENCY MEDICINE

## 2023-11-20 PROCEDURE — 25000003 PHARM REV CODE 250: Performed by: EMERGENCY MEDICINE

## 2023-11-20 PROCEDURE — 87040 BLOOD CULTURE FOR BACTERIA: CPT | Performed by: EMERGENCY MEDICINE

## 2023-11-20 PROCEDURE — 25500020 PHARM REV CODE 255: Performed by: EMERGENCY MEDICINE

## 2023-11-20 PROCEDURE — 99285 EMERGENCY DEPT VISIT HI MDM: CPT | Mod: 25

## 2023-11-20 PROCEDURE — 96361 HYDRATE IV INFUSION ADD-ON: CPT

## 2023-11-20 PROCEDURE — 83880 ASSAY OF NATRIURETIC PEPTIDE: CPT | Performed by: EMERGENCY MEDICINE

## 2023-11-20 PROCEDURE — 84484 ASSAY OF TROPONIN QUANT: CPT | Performed by: EMERGENCY MEDICINE

## 2023-11-20 PROCEDURE — 85025 COMPLETE CBC W/AUTO DIFF WBC: CPT | Performed by: EMERGENCY MEDICINE

## 2023-11-20 RX ORDER — GLUCAGON 1 MG
1 KIT INJECTION
Status: DISCONTINUED | OUTPATIENT
Start: 2023-11-20 | End: 2023-11-22 | Stop reason: HOSPADM

## 2023-11-20 RX ORDER — IBUPROFEN 200 MG
16 TABLET ORAL
Status: DISCONTINUED | OUTPATIENT
Start: 2023-11-20 | End: 2023-11-22 | Stop reason: HOSPADM

## 2023-11-20 RX ORDER — TALC
6 POWDER (GRAM) TOPICAL NIGHTLY PRN
Status: DISCONTINUED | OUTPATIENT
Start: 2023-11-20 | End: 2023-11-22 | Stop reason: HOSPADM

## 2023-11-20 RX ORDER — IBUPROFEN 200 MG
800 TABLET ORAL EVERY 6 HOURS PRN
Status: ON HOLD | COMMUNITY
End: 2023-11-22 | Stop reason: HOSPADM

## 2023-11-20 RX ORDER — METHOCARBAMOL 500 MG/1
500 TABLET, FILM COATED ORAL 4 TIMES DAILY PRN
Status: DISCONTINUED | OUTPATIENT
Start: 2023-11-20 | End: 2023-11-21

## 2023-11-20 RX ORDER — NALOXONE HCL 0.4 MG/ML
0.02 VIAL (ML) INJECTION
Status: DISCONTINUED | OUTPATIENT
Start: 2023-11-20 | End: 2023-11-22 | Stop reason: HOSPADM

## 2023-11-20 RX ORDER — SODIUM CHLORIDE 0.9 % (FLUSH) 0.9 %
10 SYRINGE (ML) INJECTION EVERY 12 HOURS PRN
Status: DISCONTINUED | OUTPATIENT
Start: 2023-11-20 | End: 2023-11-22 | Stop reason: HOSPADM

## 2023-11-20 RX ORDER — ENOXAPARIN SODIUM 100 MG/ML
40 INJECTION SUBCUTANEOUS EVERY 24 HOURS
Status: DISCONTINUED | OUTPATIENT
Start: 2023-11-20 | End: 2023-11-22 | Stop reason: HOSPADM

## 2023-11-20 RX ORDER — ACETAMINOPHEN 500 MG
1000 TABLET ORAL EVERY 8 HOURS PRN
Status: DISCONTINUED | OUTPATIENT
Start: 2023-11-20 | End: 2023-11-21

## 2023-11-20 RX ORDER — ONDANSETRON 2 MG/ML
4 INJECTION INTRAMUSCULAR; INTRAVENOUS
Status: COMPLETED | OUTPATIENT
Start: 2023-11-20 | End: 2023-11-20

## 2023-11-20 RX ORDER — ACETAMINOPHEN 500 MG
1000 TABLET ORAL
Status: COMPLETED | OUTPATIENT
Start: 2023-11-20 | End: 2023-11-20

## 2023-11-20 RX ORDER — LOPERAMIDE HYDROCHLORIDE 2 MG/1
2 CAPSULE ORAL 4 TIMES DAILY PRN
Status: DISCONTINUED | OUTPATIENT
Start: 2023-11-20 | End: 2023-11-22 | Stop reason: HOSPADM

## 2023-11-20 RX ORDER — IBUPROFEN 200 MG
24 TABLET ORAL
Status: DISCONTINUED | OUTPATIENT
Start: 2023-11-20 | End: 2023-11-22 | Stop reason: HOSPADM

## 2023-11-20 RX ORDER — ONDANSETRON 2 MG/ML
4 INJECTION INTRAMUSCULAR; INTRAVENOUS EVERY 8 HOURS PRN
Status: DISCONTINUED | OUTPATIENT
Start: 2023-11-20 | End: 2023-11-22 | Stop reason: HOSPADM

## 2023-11-20 RX ORDER — ACETAMINOPHEN 325 MG/1
650 TABLET ORAL EVERY 4 HOURS PRN
Status: DISCONTINUED | OUTPATIENT
Start: 2023-11-20 | End: 2023-11-20

## 2023-11-20 RX ADMIN — ONDANSETRON 4 MG: 2 INJECTION INTRAMUSCULAR; INTRAVENOUS at 01:11

## 2023-11-20 RX ADMIN — METHOCARBAMOL 500 MG: 500 TABLET ORAL at 08:11

## 2023-11-20 RX ADMIN — VANCOMYCIN HYDROCHLORIDE 1000 MG: 1 INJECTION, POWDER, LYOPHILIZED, FOR SOLUTION INTRAVENOUS at 01:11

## 2023-11-20 RX ADMIN — PIPERACILLIN SODIUM AND TAZOBACTAM SODIUM 4.5 G: 4; .5 INJECTION, POWDER, FOR SOLUTION INTRAVENOUS at 11:11

## 2023-11-20 RX ADMIN — PIPERACILLIN SODIUM AND TAZOBACTAM SODIUM 4.5 G: 4; .5 INJECTION, POWDER, FOR SOLUTION INTRAVENOUS at 08:11

## 2023-11-20 RX ADMIN — Medication 6 MG: at 08:11

## 2023-11-20 RX ADMIN — IOHEXOL 100 ML: 350 INJECTION, SOLUTION INTRAVENOUS at 01:11

## 2023-11-20 RX ADMIN — ACETAMINOPHEN 1000 MG: 500 TABLET ORAL at 11:11

## 2023-11-20 NOTE — ED TRIAGE NOTES
Ton Donovan, a 28 y.o. male presents to the ED w/ complaint of BLE edema x 2 weeks.  Denies CP or SOB.  Patient states he also has pain mainly in the left hip/buttock that radiates down the leg.  Hx IVDU    Triage note:  Chief Complaint   Patient presents with    Leg Swelling     Pt presents with bilateral lower extremity edema x 2 weeks; Hx of IVDU     Review of patient's allergies indicates:  No Known Allergies  Past Medical History:   Diagnosis Date    Hypertension     Unilateral inguinal hernia, without obstruction or gangrene, not specified as recurrent

## 2023-11-20 NOTE — ED NOTES
Assumed care of pt at this time. VSS, RR even and unlabored. Resting in bed comfortably. No voiced compaints of pain or discomfort at this time. Safety protocols remain intact. Patient changed into gown and placed on continuous cardiac monitoring, blood pressure cuff and pulse ox. Family at bedside.

## 2023-11-20 NOTE — ASSESSMENT & PLAN NOTE
27 yo M w/ hx of IVDU and MRSA bacteremia presenting w/ left buttock/hip pain x 1 week. Has significant history for multiple wounds and abscesses. Admitted in October and found to have MRSA bacteremia; unclear if patient obtained outpatient abx for treatment after eloping from hospital. Febrile to 101F in ED. No leukocytosis. CT A/P w/ concern for osteomyelitis of the S1 vertebral body, septic arthritis of the L sacroiliac joint and a left iliopsoas muscle abscess. Patient received IVFs, vanc/zosyn and admitted to  for further management.     - Blood cultures ordered  - Empiric vancomycin and zosyn ordered  - MRI pending for further evaluation infections. Will consult [facility] if there is evidence of epidural abscess/fluid collection. Otherwise, will likely medically management S1 OM and sacroiliac septic arthritis w/ IV abx. Will consider ID consult in the AM for antimicrobial management.   - IR consulted for L iliopsoas abscess. NPO at midnight for possible percutaneous drain placement tomorrow.

## 2023-11-20 NOTE — HPI
Mr. Donovan is a 28 year old male w/ hx of IVDU and MRSA bacteremia who presents w/ LE edema x 1 week. Additionally, he has noticed bilateral upper extremity masses that he believes are abscesses from where he injects heroin. He also reports significant left buttock and hip pain for the last week which is worse with ambulation and pressure. Patient last used heroin before presenting to the ED. He is unfortunately homeless but has a sister who is his main support system. Patient denies HA, vision changes, SOB, CP, orthopnea, PND, abdominal pain, urinary sxs or stool changes. Of note, patient has had multiple recent admissions for infected wounds and abscesses. Most recent admission in 10/2023 for abscess of R pectoralis major muscle and septic arthritis of 1st rib and 1st costochondral joint. He underwent I&D with placement of a penrose but left AMA shortly afterward w/ plans to obtain outpatient abx. Blood cultures (+) MRSA in the interim. He returned to have his penrose drain removed but eloped again; unclear if he filled prescription for Linezolid at that time.     In the ED, T 101F but otherwise HDS. Labs notable for WBC 10.17k, Hgb 7.2, Na 130. CT A/P w/ concern for osteomyelitis of the S1 vertebral body, septic arthritis of the L sacroiliac joint and a left iliopsoas muscle abscess. Patient received IVFs, vanc/zosyn and admitted to  for further management.

## 2023-11-20 NOTE — PROGRESS NOTES
"Pharmacokinetic Initial Assessment: IV Vancomycin    Assessment/Plan:    Vancomycin 1000 mg IVPB x 1 given in ED.  Administer additional vancomycin 1000 mg IVPB x 1 for 2000 mg total load dose.   Follow with vancomycin 1500 mg IVPB every 12 hours.  Desired empiric serum trough concentration is 10 to 20 mcg/mL.  Draw vancomycin trough level 60 min prior to fourth dose on 11/22/2023 at approximately 0030.  Pharmacy will continue to follow and monitor vancomycin.      Please contact pharmacy at extension [phone] with any questions regarding this assessment.     Thank you for the consult,   Nessa Morrison       Patient brief summary:  Ton Donovan is a 28 y.o. male initiated on antimicrobial therapy with IV Vancomycin for treatment of suspected skin & soft tissue infection.    Drug Allergies:   Review of patient's allergies indicates:  No Known Allergies    Actual Body Weight:   74.8 kg    Renal Function:   Estimated Creatinine Clearance: 163.3 mL/min (based on SCr of 0.7 mg/dL).     CBC (last 72 hours):  Recent Labs   Lab Result Units 11/20/23  1155   WBC K/uL 10.17   Hemoglobin g/dL 7.2*   Hematocrit % 23.5*   Platelets K/uL 340   Gran % % 63.0   Lymph % % 25.8   Mono % % 8.9   Eosinophil % % 1.6   Basophil % % 0.2   Differential Method  Automated       Metabolic Panel (last 72 hours):  Recent Labs   Lab Result Units 11/20/23  1155   Sodium mmol/L 130*   Potassium mmol/L 4.9   Chloride mmol/L 99   CO2 mmol/L 22*   Glucose mg/dL 87   BUN mg/dL 12   Creatinine mg/dL 0.7   Albumin g/dL 2.2*   Total Bilirubin mg/dL 0.2   Alkaline Phosphatase U/L 75   AST U/L 32   ALT U/L 6*       Drug levels (last 3 results):  No results for input(s): "VANCOMYCINRA", "VANCORANDOM", "VANCOMYCINPE", "VANCOPEAK", "VANCOMYCINTR", "VANCOTROUGH" in the last 72 hours.    Microbiologic Results:  Microbiology Results (last 7 days)       Procedure Component Value Units Date/Time    Blood culture #2 **CANNOT BE ORDERED STAT** [[id number]] " Collected: 11/20/23 1155    Order Status: Sent Specimen: Blood from Peripheral, Hand, Right Updated: 11/20/23 1206    Blood culture #1 **CANNOT BE ORDERED STAT** [[id number]] Collected: 11/20/23 1155    Order Status: Sent Specimen: Blood from Peripheral, Hand, Left Updated: 11/20/23 1204

## 2023-11-20 NOTE — ASSESSMENT & PLAN NOTE
Patient's anemia is currently controlled. Has not received any PRBCs to date. Etiology likely d/t chronic disease due to infection.   Current CBC reviewed-   Lab Results   Component Value Date    HGB 7.2 (L) 11/20/2023    HCT 23.5 (L) 11/20/2023     Type and screen ordered   Monitor serial CBC and transfuse if patient becomes hemodynamically unstable, symptomatic or H/H drops below 7/21.

## 2023-11-20 NOTE — ASSESSMENT & PLAN NOTE
- Na 130 on admission; possible in the setting of infection/hypovolemia   - Will continue to monitor w/ daily BMP

## 2023-11-20 NOTE — SUBJECTIVE & OBJECTIVE
Past Medical History:   Diagnosis Date    Hypertension     Unilateral inguinal hernia, without obstruction or gangrene, not specified as recurrent        Past Surgical History:   Procedure Laterality Date    HAND ARTHROTOMY Right 7/7/2023    Procedure: ARTHROTOMY, HAND - R LF PIP;  Surgeon: Boy Lamb MD;  Location: [address];  Service: Orthopedics;  Laterality: Right;    INCISION AND DRAINAGE OF ABSCESS Right 10/12/2023    Procedure: INCISION AND DRAINAGE, ABSCESS;  Surgeon: Steve Monteiro MD;  Location: [address];  Service: General;  Laterality: Right;  right chest    IRRIGATION AND DEBRIDEMENT OF UPPER EXTREMITY Bilateral 7/7/2023    Procedure: IRRIGATION AND DEBRIDEMENT, UPPER EXTREMITY - RIGHT HAND;  Surgeon: Boy Lamb MD;  Location: [address];  Service: Orthopedics;  Laterality: Bilateral;    OLECRANON BURSECTOMY Left 7/7/2023    Procedure: BURSECTOMY, OLECRANON;  Surgeon: Boy Lamb MD;  Location: [address];  Service: Orthopedics;  Laterality: Left;       Review of patient's allergies indicates:  No Known Allergies    No current facility-administered medications on file prior to encounter.     Current Outpatient Medications on File Prior to Encounter   Medication Sig    ibuprofen (ADVIL,MOTRIN) 200 MG tablet Take 800 mg by mouth every 6 (six) hours as needed for Pain.    mv-min/vit C/glut/lysine/hb124 (IMMUNE SUPPORT ORAL) Take 1 tablet by mouth once daily.     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   Respiratory:  Negative for cough and shortness of breath.    Cardiovascular:  Positive for leg swelling. Negative  for chest pain and palpitations.   Gastrointestinal:  Negative for abdominal distention, abdominal pain, diarrhea, nausea and vomiting.   Musculoskeletal:  Positive for back pain and gait problem.   Skin:  Negative for color change and pallor.   Neurological:  Negative for dizziness and headaches.     Objective:     Vital Signs (Most Recent):  Temp: 98.5 °F (36.9 °C) (11/20/23 1342)  Pulse: 89 (11/20/23 1605)  Resp: 16 (11/20/23 1605)  BP: 117/61 (11/20/23 1605)  SpO2: 100 % (11/20/23 1605) Vital Signs (24h Range):  Temp:  [98.5 °F (36.9 °C)-101 °F (38.3 °C)] 98.5 °F (36.9 °C)  Pulse:  [] 89  Resp:  [14-18] 16  SpO2:  [97 %-100 %] 100 %  BP: (117-140)/(53-80) 117/61     Weight: 74.8 kg (165 lb)  Body mass index is 23.01 kg/m².     Physical Exam  Constitutional:       Appearance: Normal appearance.   HENT:      Head: Normocephalic and atraumatic.      Mouth/Throat:      Mouth: Mucous membranes are moist.      Pharynx: Oropharynx is clear.   Eyes:      Extraocular Movements: Extraocular movements intact.      Conjunctiva/sclera: Conjunctivae normal.   Pulmonary:      Effort: No respiratory distress.      Breath sounds: Normal breath sounds.   Abdominal:      General: Bowel sounds are normal.      Palpations: Abdomen is soft.   Musculoskeletal:         General: No swelling or tenderness. Normal range of motion.   Skin:     Capillary Refill: Capillary refill takes less than 2 seconds.   Neurological:      General: No focal deficit present.      Mental Status: He is alert and oriented to person, place, and time.                Significant Labs: All pertinent labs within the past 24 hours have been reviewed.  CBC:   Recent Labs   Lab 11/20/23  1155   WBC 10.17   HGB 7.2*   HCT 23.5*        CMP:   Recent Labs   Lab 11/20/23  1155   *   K 4.9   CL 99   CO2 22*   GLU 87   BUN 12   CREATININE 0.7   CALCIUM 8.6*   PROT 8.4   ALBUMIN 2.2*   BILITOT 0.2   ALKPHOS 75   AST 32   ALT 6*   ANIONGAP 9        Significant Imaging: I have reviewed all pertinent imaging results/findings within the past 24 hours.    CT Abdomen Pelvis With IV Contrast  Narrative: EXAMINATION:  CT ABDOMEN PELVIS WITH IV CONTRAST    CLINICAL HISTORY:  LLQ abdominal pain;    TECHNIQUE:  Routine axial CT images of the abdomen were obtained after administration 100cc of IV Omnipaque 350.  Coronal and Sagittal reformatted images were also obtained.    COMPARISON:  None.    FINDINGS:  Heart: Normal in size with no pericardial effusion.    Lungs: Bilateral bandlike opacifications in the lung bases favored to represent scarring or subsegmental atelectasis.  no consolidation or pleural effusion.    Liver: The liver is enlarged measures 22.5 cm.  Normal hepatic attenuation.  No focal hepatic lesion.    Gallbladder/biliary system: No calcified gallstones.  No intrahepatic or extrahepatic ductal dilatation.    Pancreas: No mass or peripancreatic fat stranding.    Spleen: Unremarkable.    GI Tract/ Mesentery: The stomach is unremarkable.  No evidence of bowel obstruction or inflammation. Structure thought to likely represent the appendix is seen in the right lower quadrant and visualized portions appear normal in size with foci of air within the lumen, however, the tip is not definitely visualized.    Adrenals: Unremarkable.    Kidneys/ Ureters: Normal in size and location. No hydronephrosis or nephrolithiasis. Both ureters are normal in course caliber with no ureteral stones or hydroureter.    Bladder: Normal in contour with no bladder wall thickening.    Reproductive organs: The prostate is unremarkable.    Retroperitoneum: No significant adenopathy.  There is loss of the normal fat planes between the left iliacus and psoas muscles within the upper pelvis in the muscles appear enlarged compared to the right.  Ill-defined low-density areas within the left psoas muscle are noted measuring approximately 3.2 x 1.3 cm (image 119, series 2) and 2.1 x 1.1  cm (image 134, series 2) worrisome for abscesses.    Peritoneal space: Small volume of free fluid within the abdomen.  No free air.    Abdominal wall: Mild body wall edema.    Vasculature: No calcific atherosclerosis of the abdominal aorta.  No evidence of aneurysmal dilatation.  The celiac artery, FER, bilateral renal arteries, and FER are patent.    Bones: Degenerative disease of the spine.  Erosion of the S1 vertebral body with cortical irregularity of the superior endplate.  Additional unilateral erosive changes of the left sacroiliac joint.  Impression: 1. No acute intra-abdominal finding.  2. Hepatomegaly.  3. Small volume peritoneal free fluid.  4. Erosion of the S1 vertebral body with cortical irregularity of the superior endplate.  Finding concerning for destructive osseous process such as osteomyelitis/discitis.  Consider further imaging with MRI.  5. Unilateral erosive changes of the left sacroiliac joint.  Finding also concerning for possible septic arthritis.  Consider further evaluation with MRI.  6. Low-density foci within the left psoas muscle with enlargement of the iliopsoas muscles on the left compared to the right with stranding of the adjacent fat.  Findings are worrisome for intramuscular abscesses.  This report was flagged in Epic as abnormal.    Electronically signed by resident: Lennie Byrnes  Date:    11/20/2023  Time:    13:41    Electronically signed by: Champ Yanez MD  Date:    11/20/2023  Time:    14:12

## 2023-11-20 NOTE — PHARMACY MED REC
"Admission Medication History     The home medication history was taken by Justin Silva.    You may go to "Admission" then "Reconcile Home Medications" tabs to review and/or act upon these items.     The home medication list has been updated by the Pharmacy department.   Please read ALL comments highlighted in yellow.   Please address this information as you see fit.    Feel free to contact us if you have any questions or require assistance.      Medications listed below were obtained from: Patient/family  Current Outpatient Medications on File Prior to Encounter   Medication Sig    ibuprofen (ADVIL,MOTRIN) 200 MG tablet Take 800 mg by mouth every 6 (six) hours as needed for Pain.      mv-min/vit C/glut/lysine/hb124 (IMMUNE SUPPORT ORAL) Take 1 tablet by mouth once daily.               Justin Silva  EXT [id number]                  .          "

## 2023-11-20 NOTE — ED PROVIDER NOTES
Encounter Date: 11/20/2023       History     Chief Complaint   Patient presents with    Leg Swelling     Pt presents with bilateral lower extremity edema x 2 weeks; Hx of IVDU     Patient is a 28-year-old male with past medical history of hypertension, IV drug abuse who presents with concern for bilateral lower extremity swelling.  He also notes an abscess to his left upper arm. He also notes pain in his groin and buttocks.  He states he has left against medical advice twice and was told he had bacteria in his blood stream.  Pt reports ongoing subjective fevers.    Blood cultures 10/14/23 positive for MRSA- sensitive to vanc    The history is provided by the patient and medical records. The history is limited by the condition of the patient.     Review of patient's allergies indicates:  No Known Allergies  Past Medical History:   Diagnosis Date    Hypertension     Unilateral inguinal hernia, without obstruction or gangrene, not specified as recurrent      Past Surgical History:   Procedure Laterality Date    HAND ARTHROTOMY Right 7/7/2023    Procedure: ARTHROTOMY, HAND - R LF PIP;  Surgeon: Boy Lamb MD;  Location: [address];  Service: Orthopedics;  Laterality: Right;    INCISION AND DRAINAGE OF ABSCESS Right 10/12/2023    Procedure: INCISION AND DRAINAGE, ABSCESS;  Surgeon: Steve Monteiro MD;  Location: [address];  Service: General;  Laterality: Right;  right chest    IRRIGATION AND DEBRIDEMENT OF UPPER EXTREMITY Bilateral 7/7/2023    Procedure: IRRIGATION AND DEBRIDEMENT, UPPER EXTREMITY - RIGHT HAND;  Surgeon: Boy Lamb MD;  Location: [address];  Service: Orthopedics;  Laterality: Bilateral;    OLECRANON BURSECTOMY Left 7/7/2023    Procedure: BURSECTOMY, OLECRANON;  Surgeon: Boy Lamb MD;  Location: [address];  Service: Orthopedics;  Laterality: Left;     History reviewed. No pertinent family history.  Social History     Tobacco Use    Smoking status: Former      Types: Cigarettes    Smokeless tobacco: Never   Substance Use Topics    Alcohol use: No    Drug use: Yes     Types: Marijuana, IV     Comment: 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         Physical Exam     Initial Vitals [11/20/23 1029]   BP Pulse Resp Temp SpO2   (!) 140/80 107 14 (!) 101 °F (38.3 °C) 99 %      MAP       --         Physical Exam    Nursing note and vitals reviewed.  Constitutional: He appears well-developed and well-nourished. He is not diaphoretic. No distress.   HENT:   Head: Normocephalic and atraumatic.   Eyes: EOM are normal.   Cardiovascular:  Normal rate, regular rhythm and intact distal pulses.           Pulmonary/Chest: No respiratory distress.   Abdominal: He exhibits no distension.   Genitourinary:    Genitourinary Comments: Nurse chaperone present  No pain to perineum or perirectal area to palpation  No crepitus  There is tenderness in upper buttocks without overlying skin changes     Musculoskeletal:         General: Tenderness (to bilateral lower extremities) and edema (pitting edema to BLE) present. Normal range of motion.      Comments: L anterior upper arm with 3 x 3 cm area of induration and fluctuance     Neurological: He is alert and oriented to person, place, and time. GCS score is 15. GCS eye subscore is 4. GCS verbal subscore is 5. GCS motor subscore is 6.   Skin: Skin is warm and dry.   Psychiatric: He has a normal mood and affect. His behavior is normal. Judgment and thought content normal.         ED Course   Procedures  Labs Reviewed   COMPREHENSIVE METABOLIC PANEL - Abnormal; Notable for the following components:       Result Value    Sodium 130 (*)     CO2 22 (*)     Calcium 8.6 (*)     Albumin 2.2 (*)     ALT 6 (*)     All other components within normal limits   CBC W/ AUTO DIFFERENTIAL - Abnormal; Notable for the following components:    RBC 3.20 (*)     Hemoglobin 7.2 (*)     Hematocrit 23.5 (*)     MCV 73 (*)     MCH 22.5 (*)     MCHC 30.6 (*)     RDW 18.8 (*)      Immature Grans (Abs) 0.05 (*)     All other components within normal limits   LACTIC ACID, PLASMA   B-TYPE NATRIURETIC PEPTIDE   TROPONIN I   TROPONIN I    Narrative:     add on troponin per Iveth Fuentes RN/Goldie Bergman MD   order# [id number] 11/20/2023 @ 12:47    B-TYPE NATRIURETIC PEPTIDE    Narrative:     Add on BNP per Iveth Bueno RN on  11/20/2023  13:48   add on troponin per Iveth Fuentes RN/Goldie Bergman MD   order# [id number] 11/20/2023 @ 12:47           Imaging Results               MRI Pelvis W WO Contrast (Final result)  Result time 11/21/23 01:24:46      Final result by Son Wolf MD (11/21/23 01:24:46)                   Impression:      Osteomyelitis/Spondylodiscitis centered about the S1 vertebral body, which extends from L5-S2, please see same day MRI report dictated separately for further details including associated epidural phlegmon L5 through S2.    Marrow edema involving the sacrum (left sacral ala greater than right) with erosive change and increased fluid signal about the left SI joint, compatible with septic arthritis of the left SI joint.    Large left psoas abscess extends into the left iliacus muscle.  This abscess appears to arise from/communicate with the L5-S1 disc.    Muscle edema, likely myositis, extending to the involve the posterior paraspinal muscles, left more than right as well as the left gluteal muscles.    This report was flagged in Epic as abnormal.    Electronically signed by resident: Gilma Burdick  Date:    11/21/2023  Time:    00:59    Electronically signed by: Son Wolf MD  Date:    11/21/2023  Time:    01:24               Narrative:    EXAMINATION:  MRI PELVIS W WO CONTRAST    CLINICAL HISTORY:  Osteomyelitis suspected, pelvis, xray done;Soft tissue infection suspected, pelvis, xray done;Soft tissue mass, pelvis, deep;.    TECHNIQUE:  MRI pelvis performed before and after administration of 8 mL Gadavist intravenous  contrast.    COMPARISON:  CT 11/20/2023    FINDINGS:  Spondylodiscitis centered about the S1 vertebral body please see same day MRI report for further details.    Large left psoas collection, compatible with abscess, appears to extend from the L5-S1 disc to involve the left psoas muscle and the left iliacus.    There is marrow edema extending through the sacrum (left greater than right) with increased fluid signal and erosive change about the left SI joint, compatible with septic sacroiliitis.    Muscle edema, likely myositis, extending to the involve the posterior paraspinal muscles, left more than right as well as the left gluteal muscles.    No pelvic ascites or lymphadenopathy.    Visualized bowel loops are unremarkable.  Partially visualized bilateral hydroceles.                                        MRI Lumbar Spine W WO Cont (Final result)  Result time 11/21/23 01:17:56      Final result by Son Wolf MD (11/21/23 01:17:56)                   Impression:      Findings compatible with osteomyelitis/Spondylodiscitis most severe at the S1 vertebral body, which extends from L5-S2.  There are osseous destructive changes at these levels and apparent disease involvement of the L5-S1 disc.    Phlegmon within the spinal canal extending from L5-S2 and resulting in at least moderate spinal canal narrowing at these levels.    Bilobed left psoas abscess which measures up to 10 cm in maximum dimension, which may be contiguous with the L5-S1 disc space.    This report was flagged in Epic as abnormal..    Electronically signed by resident: Gilma Burdick  Date:    11/21/2023  Time:    00:40    Electronically signed by: Son Wolf MD  Date:    11/21/2023  Time:    01:17               Narrative:    EXAMINATION:  MRI LUMBAR SPINE W WO CONTRAST    CLINICAL HISTORY:  Low back pain, infection suspected;    TECHNIQUE:  Multiplanar, multisequence MR images were acquired from the thoracolumbar junction to the sacrum  before and after the administration of 8 mL IV Gadavist.    COMPARISON:  CT 11/20/2023    FINDINGS:  Evaluation is somewhat limited by motion artifact.    Alignment: Normal.    Vertebrae: There is increased STIR signal within the S1 and S2 vertebral bodies.  There is cortical erosion and osseous destruction at these levels.  This there is abnormal postcontrast enhancement.  There is mild edema within the L5 inferior endplate and abnormal postcontrast enhancement.    Discs: Abnormal disc signal and enhancement within the L5-S1 disc.  Normal height and signal elsewhere..    Canal: There is diffusely enhancing material within the canal which extends from L5-S2 and is concerning for phlegmon.    Soft tissues: Bilobed left psoas fluid collection measuring 10.3 x 3.0 cm in maximum dimension, which may be contiguous with the L5-S1 disc space.  This is concerning compatible with abscess.    Degenerative findings:    No significant degenerative changes.  Probable small disc protrusion at L5-S1 with annular fissure, though this is difficult to differentiate from active discitis.  There is probable moderate to severe neural foraminal narrowing at L5-S1 as a result of spondylo discitis and phlegmon.                                       US Lower Extremity Veins Bilateral (Final result)  Result time 11/20/23 19:02:57      Final result by Chi Cade MD (11/20/23 19:02:57)                   Impression:      No evidence of deep venous thrombosis in either lower extremity.  See above comments.      Electronically signed by: Chi Cade  Date:    11/20/2023  Time:    19:02               Narrative:    EXAMINATION:  US LOWER EXTREMITY VEINS BILATERAL    CLINICAL HISTORY:  Pain in right leg    TECHNIQUE:  Duplex and color flow Doppler and dynamic compression was performed of the bilateral lower extremity veins was performed.    COMPARISON:  None    FINDINGS:  Right thigh veins: The common femoral, femoral, popliteal, upper  greater saphenous, and deep femoral veins are patent and free of thrombus. The veins are normally compressible and have normal phasic flow and augmentation response.    Right calf veins: The visualized calf veins are patent.    Left thigh veins: The common femoral, femoral, popliteal, upper greater saphenous, and deep femoral veins are patent and free of thrombus. The veins are normally compressible and have normal phasic flow and augmentation response.    Left calf veins: The visualized calf veins are patent.    Miscellaneous: Few nonspecific borderline enlarged lymph nodes in the inguinal region bilaterally.                                        US Soft Tissue Upper Extremity, Right (Final result)  Result time 11/20/23 19:16:10      Final result by Chi Cade MD (11/20/23 19:16:10)                   Impression:      See above comments.  Recommend clinical correlation and follow-up.    This report was flagged in Epic as abnormal.      Electronically signed by: Chi Cade  Date:    11/20/2023  Time:    19:16               Narrative:    EXAMINATION:  US SOFT TISSUE, UPPER EXTREMITY, RIGHT    CLINICAL HISTORY:  R forearm abscess;    TECHNIQUE:  Grayscale and color flow images of the right superficial forearm.    COMPARISON:  None    FINDINGS:  There is a small superficial complex collection in the subcutaneous tissues measuring approximately 1.9 x 0.5 x 1.3 cm.  No significant color flow within the collection.  This could represent a small superficial abscess.  Recommend clinical correlation and follow-up.                                       US Soft Tissue Upper Extremity, Left (Final result)  Result time 11/20/23 19:24:01      Final result by Chi aCde MD (11/20/23 19:24:01)                   Impression:      Probable mildly enlarged superficial lymph node in the left upper arm.  See above comments.  Follow-up is recommended.      Electronically signed by: Chi  Hector  Date:    11/20/2023  Time:    19:24               Narrative:    EXAMINATION:  US SOFT TISSUE, UPPER EXTREMITY, LEFT    CLINICAL HISTORY:  L upper arm abscess;    TECHNIQUE:  Grayscale and color flow images of the left upper arm.    COMPARISON:  None    FINDINGS:  There is a superficial mass in the left upper arm measuring approximately 4.4 x 1.4 x 2.8 cm.  Lobulations and/or septations at the margins.  Mild hypervascularity.  Static image 3 suggest a minimally fatty hilum.  I favor a mildly enlarged lymph node.  An abscess is unlikely.  Follow-up recommended.                                        CT Abdomen Pelvis With IV Contrast (Final result)  Result time 11/20/23 14:12:30      Final result by Champ Yanez MD (11/20/23 14:12:30)                   Impression:      1. No acute intra-abdominal finding.  2. Hepatomegaly.  3. Small volume peritoneal free fluid.  4. Erosion of the S1 vertebral body with cortical irregularity of the superior endplate.  Finding concerning for destructive osseous process such as osteomyelitis/discitis.  Consider further imaging with MRI.  5. Unilateral erosive changes of the left sacroiliac joint.  Finding also concerning for possible septic arthritis.  Consider further evaluation with MRI.  6. Low-density foci within the left psoas muscle with enlargement of the iliopsoas muscles on the left compared to the right with stranding of the adjacent fat.  Findings are worrisome for intramuscular abscesses.  This report was flagged in Epic as abnormal.    Electronically signed by resident: Lennie Byrnes  Date:    11/20/2023  Time:    13:41    Electronically signed by: Champ Yanez MD  Date:    11/20/2023  Time:    14:12               Narrative:    EXAMINATION:  CT ABDOMEN PELVIS WITH IV CONTRAST    CLINICAL HISTORY:  LLQ abdominal pain;    TECHNIQUE:  Routine axial CT images of the abdomen were obtained after administration 100cc of IV Omnipaque 350.  Coronal and Sagittal  reformatted images were also obtained.    COMPARISON:  None.    FINDINGS:  Heart: Normal in size with no pericardial effusion.    Lungs: Bilateral bandlike opacifications in the lung bases favored to represent scarring or subsegmental atelectasis.  no consolidation or pleural effusion.    Liver: The liver is enlarged measures 22.5 cm.  Normal hepatic attenuation.  No focal hepatic lesion.    Gallbladder/biliary system: No calcified gallstones.  No intrahepatic or extrahepatic ductal dilatation.    Pancreas: No mass or peripancreatic fat stranding.    Spleen: Unremarkable.    GI Tract/ Mesentery: The stomach is unremarkable.  No evidence of bowel obstruction or inflammation. Structure thought to likely represent the appendix is seen in the right lower quadrant and visualized portions appear normal in size with foci of air within the lumen, however, the tip is not definitely visualized.    Adrenals: Unremarkable.    Kidneys/ Ureters: Normal in size and location. No hydronephrosis or nephrolithiasis. Both ureters are normal in course caliber with no ureteral stones or hydroureter.    Bladder: Normal in contour with no bladder wall thickening.    Reproductive organs: The prostate is unremarkable.    Retroperitoneum: No significant adenopathy.  There is loss of the normal fat planes between the left iliacus and psoas muscles within the upper pelvis in the muscles appear enlarged compared to the right.  Ill-defined low-density areas within the left psoas muscle are noted measuring approximately 3.2 x 1.3 cm (image 119, series 2) and 2.1 x 1.1 cm (image 134, series 2) worrisome for abscesses.    Peritoneal space: Small volume of free fluid within the abdomen.  No free air.    Abdominal wall: Mild body wall edema.    Vasculature: No calcific atherosclerosis of the abdominal aorta.  No evidence of aneurysmal dilatation.  The celiac artery, FER, bilateral renal arteries, and FER are patent.    Bones: Degenerative disease of  the spine.  Erosion of the S1 vertebral body with cortical irregularity of the superior endplate.  Additional unilateral erosive changes of the left sacroiliac joint.                                       Medications   sodium chloride 0.9% flush 10 mL (has no administration in time range)   naloxone 0.4 mg/mL injection 0.02 mg (has no administration in time range)   glucose chewable tablet 16 g (has no administration in time range)   glucose chewable tablet 24 g (has no administration in time range)   glucagon (human recombinant) injection 1 mg (has no administration in time range)   enoxaparin injection 40 mg (has no administration in time range)   ondansetron injection 4 mg (has no administration in time range)   dextrose 10% bolus 125 mL 125 mL (has no administration in time range)   dextrose 10% bolus 250 mL 250 mL (has no administration in time range)   vancomycin - pharmacy to dose (has no administration in time range)   piperacillin-tazobactam (ZOSYN) 4.5 g in dextrose 5 % in water (D5W) 100 mL IVPB (MB+) (0 g Intravenous Stopped 11/21/23 0734)   vancomycin (VANCOCIN) 1,000 mg in dextrose 5 % (D5W) 250 mL IVPB (Vial-Mate) (1,000 mg Intravenous Not Given 11/20/23 1615)   vancomycin 1,500 mg in dextrose 5 % (D5W) 250 mL IVPB (Vial-Mate) (1,500 mg Intravenous Trough Due As Scheduled Before Dose 11/22/23 0030)   acetaminophen tablet 1,000 mg (has no administration in time range)   methocarbamoL tablet 500 mg (500 mg Oral Given 11/20/23 2037)   melatonin tablet 6 mg (6 mg Oral Given 11/20/23 2037)   loperamide capsule 2 mg (has no administration in time range)   lactated ringers bolus 1,000 mL (has no administration in time range)   acetaminophen tablet 1,000 mg (1,000 mg Oral Given 11/20/23 1151)   piperacillin-tazobactam (ZOSYN) 4.5 g in dextrose 5 % in water (D5W) 100 mL IVPB (MB+) (0 g Intravenous Stopped 11/20/23 1318)   vancomycin (VANCOCIN) 1,000 mg in dextrose 5 % (D5W) 250 mL IVPB (Vial-Mate) (0 mg  Intravenous Stopped 11/20/23 1514)   ondansetron injection 4 mg (4 mg Intravenous Given 11/20/23 1315)   iohexoL (OMNIPAQUE 350) injection 100 mL (100 mLs Intravenous Given 11/20/23 1334)   gadobutroL (GADAVIST) injection 8 mL (8 mLs Intravenous Given 11/21/23 0023)   midazolam (VERSED) 1 mg/mL injection (1 mg  Given 11/21/23 1034)   fentaNYL 50 mcg/mL injection (50 mcg Intravenous Given 11/21/23 1034)     Medical Decision Making  DDX: abscess, bacteremia, endocarditis, osteomyelitis  Septic work up initiated and broad spectrum antibiotics started  CT concerning for S1 osteomyelitis and psoas abscess  Discussed with gen surgery and they defer to IR  Discussed with CM And HM and patient admitted for further treatment    Amount and/or Complexity of Data Reviewed  Labs: ordered.  Radiology: ordered.    Risk  OTC drugs.  Prescription drug management.  Decision regarding hospitalization.              Attending Attestation:         Attending Critical Care:   Critical Care Times:   ==============================================================  Total Critical Care Time - exclusive of procedural time: 35 minutes.  ==============================================================  Critical care was time spent personally by me on the following activities: obtaining history from patient or relative, examination of patient, review of old charts, development of treatment plan with patient or relative, ordering and performing treatments and interventions, evaluation of patient's response to treatment, discussion with consultants and re-evaluation of patient's conition.   Critical Care Condition: potentially life-threatening                               Clinical Impression:  Final diagnoses:  [M79.604, M79.605] Bilateral leg pain  [L02.414] Abscess of left upper extremity  [M79.89] Leg swelling  [K68.12] Psoas abscess, left (Primary)          ED Disposition Condition    Admit                 Goldie Iqbal MD  11/21/23  [id number]

## 2023-11-20 NOTE — H&P
[facility] - Emergency Dept  [facility]  History & Physical    Patient Name: Ton Donovan  MRN: [id number]  Patient Class: IP- Inpatient  Admission Date: 11/20/2023  Attending Physician: Shoaib Campbell MD   Primary Care Provider: Emily, Primary Doctor         Patient information was obtained from patient and ER records.     Subjective:     Principal Problem:Psoas abscess, left    Chief Complaint:   Chief Complaint   Patient presents with    Leg Swelling     Pt presents with bilateral lower extremity edema x 2 weeks; Hx of IVDU        HPI: Mr. Donovan is a 28 year old male w/ hx of IVDU and MRSA bacteremia who presents w/ LE edema x 1 week. Additionally, he has noticed bilateral upper extremity masses that he believes are abscesses from where he injects heroin. He also reports significant left buttock and hip pain for the last week which is worse with ambulation and pressure. Patient last used heroin before presenting to the ED. He is unfortunately homeless but has a sister who is his main support system. Patient denies HA, vision changes, SOB, CP, orthopnea, PND, abdominal pain, urinary sxs or stool changes. Of note, patient has had multiple recent admissions for infected wounds and abscesses. Most recent admission in 10/2023 for abscess of R pectoralis major muscle and septic arthritis of 1st rib and 1st costochondral joint. He underwent I&D with placement of a penrose but left AMA shortly afterward w/ plans to obtain outpatient abx. Blood cultures (+) MRSA in the interim. He returned to have his penrose drain removed but eloped again; unclear if he filled prescription for Linezolid at that time.     In the ED, T 101F but otherwise HDS. Labs notable for WBC 10.17k, Hgb 7.2, Na 130. CT A/P w/ concern for osteomyelitis of the S1 vertebral body, septic arthritis of the L sacroiliac joint and a left iliopsoas muscle abscess. Patient received IVFs, vanc/zosyn and admitted to  for further management.     Past  Medical History:   Diagnosis Date    Hypertension     Unilateral inguinal hernia, without obstruction or gangrene, not specified as recurrent        Past Surgical History:   Procedure Laterality Date    HAND ARTHROTOMY Right 7/7/2023    Procedure: ARTHROTOMY, HAND - R LF PIP;  Surgeon: Boy Lamb MD;  Location: [address];  Service: Orthopedics;  Laterality: Right;    INCISION AND DRAINAGE OF ABSCESS Right 10/12/2023    Procedure: INCISION AND DRAINAGE, ABSCESS;  Surgeon: Steve Monteiro MD;  Location: [address];  Service: General;  Laterality: Right;  right chest    IRRIGATION AND DEBRIDEMENT OF UPPER EXTREMITY Bilateral 7/7/2023    Procedure: IRRIGATION AND DEBRIDEMENT, UPPER EXTREMITY - RIGHT HAND;  Surgeon: Boy Lamb MD;  Location: [address];  Service: Orthopedics;  Laterality: Bilateral;    OLECRANON BURSECTOMY Left 7/7/2023    Procedure: BURSECTOMY, OLECRANON;  Surgeon: Boy Lamb MD;  Location: [address];  Service: Orthopedics;  Laterality: Left;       Review of patient's allergies indicates:  No Known Allergies    No current facility-administered medications on file prior to encounter.     Current Outpatient Medications on File Prior to Encounter   Medication Sig    ibuprofen (ADVIL,MOTRIN) 200 MG tablet Take 800 mg by mouth every 6 (six) hours as needed for Pain.    mv-min/vit C/glut/lysine/hb124 (IMMUNE SUPPORT ORAL) Take 1 tablet by mouth once daily.     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   Respiratory:  Negative for cough and shortness of breath.    Cardiovascular:  Positive for leg swelling. Negative for  chest pain and palpitations.   Gastrointestinal:  Negative for abdominal distention, abdominal pain, diarrhea, nausea and vomiting.   Musculoskeletal:  Positive for back pain and gait problem.   Skin:  Negative for color change and pallor.   Neurological:  Negative for dizziness and headaches.     Objective:     Vital Signs (Most Recent):  Temp: 98.5 °F (36.9 °C) (11/20/23 1342)  Pulse: 89 (11/20/23 1605)  Resp: 16 (11/20/23 1605)  BP: 117/61 (11/20/23 1605)  SpO2: 100 % (11/20/23 1605) Vital Signs (24h Range):  Temp:  [98.5 °F (36.9 °C)-101 °F (38.3 °C)] 98.5 °F (36.9 °C)  Pulse:  [] 89  Resp:  [14-18] 16  SpO2:  [97 %-100 %] 100 %  BP: (117-140)/(53-80) 117/61     Weight: 74.8 kg (165 lb)  Body mass index is 23.01 kg/m².     Physical Exam  Constitutional:       Appearance: Normal appearance.   HENT:      Head: Normocephalic and atraumatic.      Mouth/Throat:      Mouth: Mucous membranes are moist.      Pharynx: Oropharynx is clear.   Eyes:      Extraocular Movements: Extraocular movements intact.      Conjunctiva/sclera: Conjunctivae normal.   Pulmonary:      Effort: No respiratory distress.      Breath sounds: Normal breath sounds.   Abdominal:      General: Bowel sounds are normal.      Palpations: Abdomen is soft.   Musculoskeletal:         General: No swelling or tenderness. Normal range of motion.   Skin:     Capillary Refill: Capillary refill takes less than 2 seconds.   Neurological:      General: No focal deficit present.      Mental Status: He is alert and oriented to person, place, and time.                Significant Labs: All pertinent labs within the past 24 hours have been reviewed.  CBC:   Recent Labs   Lab 11/20/23  1155   WBC 10.17   HGB 7.2*   HCT 23.5*        CMP:   Recent Labs   Lab 11/20/23  1155   *   K 4.9   CL 99   CO2 22*   GLU 87   BUN 12   CREATININE 0.7   CALCIUM 8.6*   PROT 8.4   ALBUMIN 2.2*   BILITOT 0.2   ALKPHOS 75   AST 32   ALT 6*   ANIONGAP 9       Significant  Imaging: I have reviewed all pertinent imaging results/findings within the past 24 hours.    CT Abdomen Pelvis With IV Contrast  Narrative: EXAMINATION:  CT ABDOMEN PELVIS WITH IV CONTRAST    CLINICAL HISTORY:  LLQ abdominal pain;    TECHNIQUE:  Routine axial CT images of the abdomen were obtained after administration 100cc of IV Omnipaque 350.  Coronal and Sagittal reformatted images were also obtained.    COMPARISON:  None.    FINDINGS:  Heart: Normal in size with no pericardial effusion.    Lungs: Bilateral bandlike opacifications in the lung bases favored to represent scarring or subsegmental atelectasis.  no consolidation or pleural effusion.    Liver: The liver is enlarged measures 22.5 cm.  Normal hepatic attenuation.  No focal hepatic lesion.    Gallbladder/biliary system: No calcified gallstones.  No intrahepatic or extrahepatic ductal dilatation.    Pancreas: No mass or peripancreatic fat stranding.    Spleen: Unremarkable.    GI Tract/ Mesentery: The stomach is unremarkable.  No evidence of bowel obstruction or inflammation. Structure thought to likely represent the appendix is seen in the right lower quadrant and visualized portions appear normal in size with foci of air within the lumen, however, the tip is not definitely visualized.    Adrenals: Unremarkable.    Kidneys/ Ureters: Normal in size and location. No hydronephrosis or nephrolithiasis. Both ureters are normal in course caliber with no ureteral stones or hydroureter.    Bladder: Normal in contour with no bladder wall thickening.    Reproductive organs: The prostate is unremarkable.    Retroperitoneum: No significant adenopathy.  There is loss of the normal fat planes between the left iliacus and psoas muscles within the upper pelvis in the muscles appear enlarged compared to the right.  Ill-defined low-density areas within the left psoas muscle are noted measuring approximately 3.2 x 1.3 cm (image 119, series 2) and 2.1 x 1.1 cm (image 134,  series 2) worrisome for abscesses.    Peritoneal space: Small volume of free fluid within the abdomen.  No free air.    Abdominal wall: Mild body wall edema.    Vasculature: No calcific atherosclerosis of the abdominal aorta.  No evidence of aneurysmal dilatation.  The celiac artery, FER, bilateral renal arteries, and FER are patent.    Bones: Degenerative disease of the spine.  Erosion of the S1 vertebral body with cortical irregularity of the superior endplate.  Additional unilateral erosive changes of the left sacroiliac joint.  Impression: 1. No acute intra-abdominal finding.  2. Hepatomegaly.  3. Small volume peritoneal free fluid.  4. Erosion of the S1 vertebral body with cortical irregularity of the superior endplate.  Finding concerning for destructive osseous process such as osteomyelitis/discitis.  Consider further imaging with MRI.  5. Unilateral erosive changes of the left sacroiliac joint.  Finding also concerning for possible septic arthritis.  Consider further evaluation with MRI.  6. Low-density foci within the left psoas muscle with enlargement of the iliopsoas muscles on the left compared to the right with stranding of the adjacent fat.  Findings are worrisome for intramuscular abscesses.  This report was flagged in Epic as abnormal.    Electronically signed by resident: Lennie Byrnes  Date:    11/20/2023  Time:    13:41    Electronically signed by: Champ Yanez MD  Date:    11/20/2023  Time:    14:12      Assessment/Plan:     Psoas abscess, left  Septic arthritis of sacroiliac joint  Osteoarthritis of S1  - see above  29 yo M w/ hx of IVDU and MRSA bacteremia presenting w/ left buttock/hip pain x 1 week. Has significant history for multiple wounds and abscesses. Admitted in October and found to have MRSA bacteremia; unclear if patient obtained outpatient abx for treatment after eloping from hospital. Febrile to 101F in ED. No leukocytosis. CT A/P w/ concern for osteomyelitis of the S1 vertebral  body, septic arthritis of the L sacroiliac joint and a left iliopsoas muscle abscess. Patient received IVFs, vanc/zosyn and admitted to  for further management.     - Blood cultures ordered  - Empiric vancomycin and zosyn ordered  - MRI pelvis pending for further evaluation. Will consult [facility] if there is evidence of epidural abscess/fluid collection. Otherwise, will likely medically management S1 OM and sacroiliac septic arthritis w/ IV abx. Will consider ID consult in the AM for antimicrobial management.   - IR consulted for L iliopsoas abscess. NPO at midnight for possible percutaneous drain placement tomorrow.   - TTE pending to evaluate for endocarditis       Bilateral lower extremity edema  - Unclear etiology of LE edema. 1+ pitting edema w/ overlying warmth. No open wounds or erythematous changes. On broad spectrum abx for empiric treatment of soft tissue infection   - Low suspicion for acute heart failure given patient's lack of SOB, orthopnea etc. Additionally, BNP wnl. However, given history of untreated MRSA bacteremia, will obtain TTE to evaluate for valvular abnormalities.   - LE US ordered to rule out DVT      Hyponatremia  - Na 130 on admission; possible in the setting of infection/hypovolemia   - Will continue to monitor w/ daily BMP      Anemia of infection and chronic disease  Patient's anemia is currently controlled. Has not received any PRBCs to date. Etiology likely d/t chronic disease due to infection.   Current CBC reviewed-   Lab Results   Component Value Date    HGB 7.2 (L) 11/20/2023    HCT 23.5 (L) 11/20/2023     Type and screen ordered   Monitor serial CBC and transfuse if patient becomes hemodynamically unstable, symptomatic or H/H drops below 7/21.    Abscess of right upper extremity  Abscess of left upper extremity  - soft tissue ultrasounds ordered. Will consider general surgery consult for potential I&D based on findings.       Heroin dependence  - PRN medications available for  withdrawal symptoms        VTE Risk Mitigation (From admission, onward)           Ordered     enoxaparin injection 40 mg  Daily         11/20/23 1555     IP VTE HIGH RISK PATIENT  Once         11/20/23 1555     Place sequential compression device  Until discontinued         11/20/23 1555                      Jackelyn Anthony MD  Department of Hospital Medicine  [facility] - Emergency Dept    DUE - declined by patient  Family history is reviewed and has not changed   Pertinent information:

## 2023-11-20 NOTE — ASSESSMENT & PLAN NOTE
- Unclear etiology of LE edema. 1+ pitting edema w/ overlying warmth. No open wounds or erythematous changes. On broad spectrum abx for empiric treatment of soft tissue infection   - Low suspicion for acute heart failure given patient's lack of SOB, orthopnea etc. Additionally, BNP wnl. However, given history of untreated MRSA bacteremia, will obtain TTE to evaluate for valvular abnormalities.   - LE US ordered to rule out DVT       115

## 2023-11-21 PROBLEM — G06.2 EPIDURAL ABSCESS: Status: ACTIVE | Noted: 2023-11-21

## 2023-11-21 PROBLEM — F19.90 IVDU (INTRAVENOUS DRUG USER): Status: ACTIVE | Noted: 2023-11-21

## 2023-11-21 PROBLEM — R22.32 MASS OF LEFT UPPER EXTREMITY: Status: ACTIVE | Noted: 2023-07-07

## 2023-11-21 LAB
ABO + RH BLD: NORMAL
ALBUMIN SERPL BCP-MCNC: 1.9 G/DL (ref 3.5–5.2)
ALP SERPL-CCNC: 75 U/L (ref 55–135)
ALT SERPL W/O P-5'-P-CCNC: 6 U/L (ref 10–44)
ANION GAP SERPL CALC-SCNC: 9 MMOL/L (ref 8–16)
ANISOCYTOSIS BLD QL SMEAR: SLIGHT
AST SERPL-CCNC: 17 U/L (ref 10–40)
BASOPHILS # BLD AUTO: 0.02 K/UL (ref 0–0.2)
BASOPHILS NFR BLD: 0.2 % (ref 0–1.9)
BILIRUB SERPL-MCNC: 0.3 MG/DL (ref 0.1–1)
BLD GP AB SCN CELLS X3 SERPL QL: NORMAL
BUN SERPL-MCNC: 7 MG/DL (ref 6–20)
CALCIUM SERPL-MCNC: 8.5 MG/DL (ref 8.7–10.5)
CHLORIDE SERPL-SCNC: 98 MMOL/L (ref 95–110)
CO2 SERPL-SCNC: 25 MMOL/L (ref 23–29)
CREAT SERPL-MCNC: 0.7 MG/DL (ref 0.5–1.4)
DIFFERENTIAL METHOD: ABNORMAL
EOSINOPHIL # BLD AUTO: 0.1 K/UL (ref 0–0.5)
EOSINOPHIL NFR BLD: 1 % (ref 0–8)
ERYTHROCYTE [DISTWIDTH] IN BLOOD BY AUTOMATED COUNT: 18.7 % (ref 11.5–14.5)
EST. GFR  (NO RACE VARIABLE): >60 ML/MIN/1.73 M^2
GLUCOSE SERPL-MCNC: 85 MG/DL (ref 70–110)
GRAM STN SPEC: NORMAL
GRAM STN SPEC: NORMAL
HCT VFR BLD AUTO: 23.3 % (ref 40–54)
HGB BLD-MCNC: 7.4 G/DL (ref 14–18)
HYPOCHROMIA BLD QL SMEAR: ABNORMAL
IMM GRANULOCYTES # BLD AUTO: 0.04 K/UL (ref 0–0.04)
IMM GRANULOCYTES NFR BLD AUTO: 0.4 % (ref 0–0.5)
INR PPP: 1.3 (ref 0.8–1.2)
LYMPHOCYTES # BLD AUTO: 2.7 K/UL (ref 1–4.8)
LYMPHOCYTES NFR BLD: 26.4 % (ref 18–48)
MAGNESIUM SERPL-MCNC: 1.7 MG/DL (ref 1.6–2.6)
MCH RBC QN AUTO: 22.8 PG (ref 27–31)
MCHC RBC AUTO-ENTMCNC: 31.8 G/DL (ref 32–36)
MCV RBC AUTO: 72 FL (ref 82–98)
MONOCYTES # BLD AUTO: 1 K/UL (ref 0.3–1)
MONOCYTES NFR BLD: 9.9 % (ref 4–15)
NEUTROPHILS # BLD AUTO: 6.4 K/UL (ref 1.8–7.7)
NEUTROPHILS NFR BLD: 62.1 % (ref 38–73)
NRBC BLD-RTO: 0 /100 WBC
PLATELET # BLD AUTO: 390 K/UL (ref 150–450)
PLATELET BLD QL SMEAR: ABNORMAL
PMV BLD AUTO: 9 FL (ref 9.2–12.9)
POIKILOCYTOSIS BLD QL SMEAR: SLIGHT
POTASSIUM SERPL-SCNC: 4.2 MMOL/L (ref 3.5–5.1)
PROT SERPL-MCNC: 7.6 G/DL (ref 6–8.4)
PROTHROMBIN TIME: 13.8 SEC (ref 9–12.5)
RBC # BLD AUTO: 3.25 M/UL (ref 4.6–6.2)
SODIUM SERPL-SCNC: 132 MMOL/L (ref 136–145)
SPECIMEN OUTDATE: NORMAL
WBC # BLD AUTO: 10.29 K/UL (ref 3.9–12.7)

## 2023-11-21 PROCEDURE — 87015 SPECIMEN INFECT AGNT CONCNTJ: CPT

## 2023-11-21 PROCEDURE — 99223 1ST HOSP IP/OBS HIGH 75: CPT | Mod: ,,,

## 2023-11-21 PROCEDURE — 87070 CULTURE OTHR SPECIMN AEROBIC: CPT

## 2023-11-21 PROCEDURE — 85610 PROTHROMBIN TIME: CPT

## 2023-11-21 PROCEDURE — 25000003 PHARM REV CODE 250

## 2023-11-21 PROCEDURE — 87102 FUNGUS ISOLATION CULTURE: CPT

## 2023-11-21 PROCEDURE — 36415 COLL VENOUS BLD VENIPUNCTURE: CPT

## 2023-11-21 PROCEDURE — 63600175 PHARM REV CODE 636 W HCPCS: Performed by: STUDENT IN AN ORGANIZED HEALTH CARE EDUCATION/TRAINING PROGRAM

## 2023-11-21 PROCEDURE — 87206 SMEAR FLUORESCENT/ACID STAI: CPT

## 2023-11-21 PROCEDURE — 11000001 HC ACUTE MED/SURG PRIVATE ROOM

## 2023-11-21 PROCEDURE — 63600175 PHARM REV CODE 636 W HCPCS

## 2023-11-21 PROCEDURE — 87186 SC STD MICRODIL/AGAR DIL: CPT

## 2023-11-21 PROCEDURE — 25000003 PHARM REV CODE 250: Performed by: STUDENT IN AN ORGANIZED HEALTH CARE EDUCATION/TRAINING PROGRAM

## 2023-11-21 PROCEDURE — 83735 ASSAY OF MAGNESIUM: CPT

## 2023-11-21 PROCEDURE — 87116 MYCOBACTERIA CULTURE: CPT

## 2023-11-21 PROCEDURE — 87075 CULTR BACTERIA EXCEPT BLOOD: CPT

## 2023-11-21 PROCEDURE — A9585 GADOBUTROL INJECTION: HCPCS | Performed by: STUDENT IN AN ORGANIZED HEALTH CARE EDUCATION/TRAINING PROGRAM

## 2023-11-21 PROCEDURE — 87077 CULTURE AEROBIC IDENTIFY: CPT

## 2023-11-21 PROCEDURE — 86901 BLOOD TYPING SEROLOGIC RH(D): CPT

## 2023-11-21 PROCEDURE — 80053 COMPREHEN METABOLIC PANEL: CPT

## 2023-11-21 PROCEDURE — 25500020 PHARM REV CODE 255: Performed by: STUDENT IN AN ORGANIZED HEALTH CARE EDUCATION/TRAINING PROGRAM

## 2023-11-21 PROCEDURE — 85025 COMPLETE CBC W/AUTO DIFF WBC: CPT

## 2023-11-21 PROCEDURE — 87205 SMEAR GRAM STAIN: CPT

## 2023-11-21 PROCEDURE — 99223 PR INITIAL HOSPITAL CARE,LEVL III: ICD-10-PCS | Mod: ,,,

## 2023-11-21 PROCEDURE — S4991 NICOTINE PATCH NONLEGEND: HCPCS | Performed by: STUDENT IN AN ORGANIZED HEALTH CARE EDUCATION/TRAINING PROGRAM

## 2023-11-21 RX ORDER — NICOTINE 7MG/24HR
1 PATCH, TRANSDERMAL 24 HOURS TRANSDERMAL DAILY
Status: DISCONTINUED | OUTPATIENT
Start: 2023-11-21 | End: 2023-11-22 | Stop reason: HOSPADM

## 2023-11-21 RX ORDER — NICOTINE 7MG/24HR
1 PATCH, TRANSDERMAL 24 HOURS TRANSDERMAL DAILY
Status: DISCONTINUED | OUTPATIENT
Start: 2023-11-22 | End: 2023-11-21

## 2023-11-21 RX ORDER — FENTANYL CITRATE 50 UG/ML
INJECTION, SOLUTION INTRAMUSCULAR; INTRAVENOUS
Status: COMPLETED | OUTPATIENT
Start: 2023-11-21 | End: 2023-11-21

## 2023-11-21 RX ORDER — DICYCLOMINE HYDROCHLORIDE 10 MG/1
10 CAPSULE ORAL EVERY 6 HOURS PRN
Status: DISCONTINUED | OUTPATIENT
Start: 2023-11-21 | End: 2023-11-22 | Stop reason: HOSPADM

## 2023-11-21 RX ORDER — IBUPROFEN 400 MG/1
400 TABLET ORAL EVERY 6 HOURS PRN
Status: DISCONTINUED | OUTPATIENT
Start: 2023-11-21 | End: 2023-11-22 | Stop reason: HOSPADM

## 2023-11-21 RX ORDER — ACETAMINOPHEN 500 MG
1000 TABLET ORAL EVERY 8 HOURS
Status: DISCONTINUED | OUTPATIENT
Start: 2023-11-21 | End: 2023-11-21

## 2023-11-21 RX ORDER — MIDAZOLAM HYDROCHLORIDE 1 MG/ML
INJECTION INTRAMUSCULAR; INTRAVENOUS
Status: COMPLETED | OUTPATIENT
Start: 2023-11-21 | End: 2023-11-21

## 2023-11-21 RX ORDER — GADOBUTROL 604.72 MG/ML
8 INJECTION INTRAVENOUS
Status: COMPLETED | OUTPATIENT
Start: 2023-11-21 | End: 2023-11-21

## 2023-11-21 RX ORDER — TIZANIDINE 4 MG/1
4 TABLET ORAL EVERY 8 HOURS PRN
Status: DISCONTINUED | OUTPATIENT
Start: 2023-11-21 | End: 2023-11-22 | Stop reason: HOSPADM

## 2023-11-21 RX ORDER — METHOCARBAMOL 500 MG/1
500 TABLET, FILM COATED ORAL 4 TIMES DAILY
Status: DISCONTINUED | OUTPATIENT
Start: 2023-11-21 | End: 2023-11-22 | Stop reason: HOSPADM

## 2023-11-21 RX ORDER — HYDROXYZINE HYDROCHLORIDE 25 MG/1
50 TABLET, FILM COATED ORAL EVERY 6 HOURS PRN
Status: DISCONTINUED | OUTPATIENT
Start: 2023-11-21 | End: 2023-11-22 | Stop reason: HOSPADM

## 2023-11-21 RX ORDER — ACETAMINOPHEN 500 MG
1000 TABLET ORAL EVERY 8 HOURS
Status: DISCONTINUED | OUTPATIENT
Start: 2023-11-21 | End: 2023-11-22 | Stop reason: HOSPADM

## 2023-11-21 RX ADMIN — METHOCARBAMOL 500 MG: 500 TABLET ORAL at 08:11

## 2023-11-21 RX ADMIN — FENTANYL CITRATE 50 MCG: 50 INJECTION, SOLUTION INTRAMUSCULAR; INTRAVENOUS at 10:11

## 2023-11-21 RX ADMIN — ACETAMINOPHEN 1000 MG: 500 TABLET ORAL at 05:11

## 2023-11-21 RX ADMIN — MIDAZOLAM HYDROCHLORIDE 1 MG: 2 INJECTION, SOLUTION INTRAMUSCULAR; INTRAVENOUS at 10:11

## 2023-11-21 RX ADMIN — GADOBUTROL 8 ML: 604.72 INJECTION INTRAVENOUS at 12:11

## 2023-11-21 RX ADMIN — NICOTINE 1 PATCH: 7 PATCH, EXTENDED RELEASE TRANSDERMAL at 09:11

## 2023-11-21 RX ADMIN — VANCOMYCIN HYDROCHLORIDE 1500 MG: 1.5 INJECTION, POWDER, LYOPHILIZED, FOR SOLUTION INTRAVENOUS at 03:11

## 2023-11-21 RX ADMIN — VANCOMYCIN HYDROCHLORIDE 1500 MG: 1.5 INJECTION, POWDER, LYOPHILIZED, FOR SOLUTION INTRAVENOUS at 12:11

## 2023-11-21 RX ADMIN — ACETAMINOPHEN 1000 MG: 500 TABLET ORAL at 09:11

## 2023-11-21 RX ADMIN — PIPERACILLIN SODIUM AND TAZOBACTAM SODIUM 4.5 G: 4; .5 INJECTION, POWDER, FOR SOLUTION INTRAVENOUS at 08:11

## 2023-11-21 RX ADMIN — PIPERACILLIN SODIUM AND TAZOBACTAM SODIUM 4.5 G: 4; .5 INJECTION, POWDER, FOR SOLUTION INTRAVENOUS at 03:11

## 2023-11-21 RX ADMIN — METHOCARBAMOL 500 MG: 500 TABLET ORAL at 04:11

## 2023-11-21 NOTE — H&P
Please see IR consult note dated 11/21/2023    Haritha Leach PA-C  Interventional Radiology  Spectra [phone]  11/21/2023

## 2023-11-21 NOTE — ASSESSMENT & PLAN NOTE
- Unclear etiology of LE edema. 1+ pitting edema w/ overlying warmth. No open wounds or erythematous changes. On broad spectrum abx for empiric treatment of soft tissue infection   - Low suspicion for acute heart failure given patient's lack of SOB, orthopnea etc. Additionally, BNP wnl. However, given history of untreated MRSA bacteremia, will obtain TTE to evaluate for valvular abnormalities.   - LE US w/ no evidence of DVT

## 2023-11-21 NOTE — PLAN OF CARE
Procedure complete. Pt tolerated well. Recovery for 1 hr.  Site CDI. Pt transferred to MPU bay7 and report to be given bedside.

## 2023-11-21 NOTE — ASSESSMENT & PLAN NOTE
- LUE US w/ superficial mass measuring 4.4 x 1.4 x 2.8cm thought to be a enlarged superficial lymph node

## 2023-11-21 NOTE — PLAN OF CARE
Mr. Donovan is a 29 yo M with ongoing IVDU, recent hx of MRSA bactermia 10/2023 with elopement who presented with lower extremity edema and bilateral upper extremity masses near heroin injection sites x 1 week. He also complains of left buttocks and hip pain worsened with ambulation. Recently admitted 10/2023 for abscess of R pectoralis major muscle and septic arthritis of 1st rib and costochondral joint. Underwnt I&D with penrose placement,but eloped shortly after. Was discharged with e-script linezolid (unsure if filled). Bcx from october returned MRSA.     On this admission, MRI pelvis with epidural phlegmon L5-S2, Osteomyelitis/Spondylodiscitis S1 extends from L5-S2, Septic arthritis of the left SI joint. Bcx NGTD. Patient now s/p L iliopsoas abscess drainage, aspirate cultures pending.    Patient not seen, FERNANDO     Recommendations  --obtain echo  --screen HIV, HepC  --agree with empiric vancomycin and zosyn, pending abscess cultures    Thank you for the consult, will continue to follow.    Allan Izquierdo MD  Department of Internal Medicine  [facility] - [facility]

## 2023-11-21 NOTE — NURSING
Offered to straighten up the patient's bed 2x. Patient refused and stated the bed was fine and did not want to move too much

## 2023-11-21 NOTE — NURSING
Nurses Note -- 4 Eyes      11/20/2023   11:51 PM      Skin assessed during: Admit      [] No Altered Skin Integrity Present    []Prevention Measures Documented      [x] Yes- Altered Skin Integrity Present or Discovered   [x] LDA Added if Not in Epic (Describe Wound)   [x] New Altered Skin Integrity was Present on Admit and Documented in LDA   [x] Wound Image Taken    Wound Care Consulted? No    Attending Nurse:  Chris     Second RN/Staff Member:   Herman Mireles RN

## 2023-11-21 NOTE — PROGRESS NOTES
[facility] Medicine  Progress Note    Patient Name: Ton Donovan  MRN: [id number]  Patient Class: IP- Inpatient   Admission Date: 11/20/2023  Length of Stay: 1 days  Attending Physician: Shoaib Campbell MD  Primary Care Provider: Emily, Primary Doctor        Subjective:     Principal Problem:Psoas abscess, left        HPI:  Mr. Donovan is a 28 year old male w/ hx of IVDU and MRSA bacteremia who presents w/ LE edema x 1 week. Additionally, he has noticed bilateral upper extremity masses that he believes are abscesses from where he injects heroin. He also reports significant left buttock and hip pain for the last week which is worse with ambulation and pressure. Patient last used heroin before presenting to the ED. He is unfortunately homeless but has a sister who is his main support system. Patient denies HA, vision changes, SOB, CP, orthopnea, PND, abdominal pain, urinary sxs or stool changes. Of note, patient has had multiple recent admissions for infected wounds and abscesses. Most recent admission in 10/2023 for abscess of R pectoralis major muscle and septic arthritis of 1st rib and 1st costochondral joint. He underwent I&D with placement of a penrose but left AMA shortly afterward w/ plans to obtain outpatient abx. Blood cultures (+) MRSA in the interim. He returned to have his penrose drain removed but eloped again; unclear if he filled prescription for Linezolid at that time.     In the ED, T 101F but otherwise HDS. Labs notable for WBC 10.17k, Hgb 7.2, Na 130. CT A/P w/ concern for osteomyelitis of the S1 vertebral body, septic arthritis of the L sacroiliac joint and a left iliopsoas muscle abscess. Patient received IVFs, vanc/zosyn and admitted to  for further management.     Overview/Hospital Course:  MRI pelvis and lumbar spine with OM/spondylodiscitis of the S1 vertebral body extending from L5-S2, phlegmon within the spinal canal extending from L5-S2 resulting in moderate spinal canal  narrowing and a bilobed left psoas abscess measuring up to 10cm which may be contiguous with the L5-S1 disc space. IR consulted for aspiration of L psoas abscess; intra-operative cultures ordered. NRSY consulted for epidural phegmon. ID consulted for antibiotic management. Bcx NGTD.    Interval History: Patient HDS and afebrile. No acute concerns this morning. Neurological examination intact. No urinary retention. Updated brother at bedside this morning. Patient motivated to stop IVDU. IR, NRSY and ID consulted.     Review of Systems   Constitutional:  Positive for activity change, chills, fatigue and fever.   Respiratory:  Negative for cough and shortness of breath.    Cardiovascular:  Positive for leg swelling. Negative for chest pain and palpitations.   Gastrointestinal:  Negative for abdominal distention, abdominal pain, diarrhea, nausea and vomiting.   Musculoskeletal:  Positive for back pain and gait problem.   Skin:  Negative for color change and pallor.   Neurological:  Negative for dizziness and headaches.     Objective:     Vital Signs (Most Recent):  Temp: 98.3 °F (36.8 °C) (11/21/23 0723)  Pulse: 80 (11/21/23 0723)  Resp: 20 (11/21/23 0723)  BP: (!) 104/51 (11/21/23 0723)  SpO2: 97 % (11/21/23 0723) Vital Signs (24h Range):  Temp:  [98.2 °F (36.8 °C)-101 °F (38.3 °C)] 98.3 °F (36.8 °C)  Pulse:  [] 80  Resp:  [14-20] 20  SpO2:  [97 %-100 %] 97 %  BP: (104-140)/(51-80) 104/51     Weight: 74.8 kg (165 lb)  Body mass index is 23.01 kg/m².    Intake/Output Summary (Last 24 hours) at 11/21/2023 0904  Last data filed at 11/21/2023 0623  Gross per 24 hour   Intake 330 ml   Output 3200 ml   Net -2870 ml         Physical Exam  Constitutional:       Appearance: Normal appearance.   HENT:      Head: Normocephalic and atraumatic.      Mouth/Throat:      Mouth: Mucous membranes are moist.      Pharynx: Oropharynx is clear.   Eyes:      Extraocular Movements: Extraocular movements intact.      Conjunctiva/sclera:  Conjunctivae normal.   Pulmonary:      Effort: No respiratory distress.      Breath sounds: Normal breath sounds.   Abdominal:      General: Bowel sounds are normal.      Palpations: Abdomen is soft.   Musculoskeletal:         General: Tenderness (Lower back spinal tenderness and L superior gluteal tenderess to deep palptation) present. No swelling. Normal range of motion.   Skin:     Capillary Refill: Capillary refill takes less than 2 seconds.   Neurological:      General: No focal deficit present.      Mental Status: He is alert and oriented to person, place, and time.             Significant Labs: All pertinent labs within the past 24 hours have been reviewed.  CBC:   Recent Labs   Lab 11/20/23  1155   WBC 10.17   HGB 7.2*   HCT 23.5*        CMP:   Recent Labs   Lab 11/20/23  1155 11/21/23  0555   * 132*   K 4.9 4.2   CL 99 98   CO2 22* 25   GLU 87 85   BUN 12 7   CREATININE 0.7 0.7   CALCIUM 8.6* 8.5*   PROT 8.4 7.6   ALBUMIN 2.2* 1.9*   BILITOT 0.2 0.3   ALKPHOS 75 75   AST 32 17   ALT 6* 6*   ANIONGAP 9 9       Significant Imaging: I have reviewed all pertinent imaging results/findings within the past 24 hours.    MRI Pelvis W WO Contrast  Narrative: EXAMINATION:  MRI PELVIS W WO CONTRAST    CLINICAL HISTORY:  Osteomyelitis suspected, pelvis, xray done;Soft tissue infection suspected, pelvis, xray done;Soft tissue mass, pelvis, deep;.    TECHNIQUE:  MRI pelvis performed before and after administration of 8 mL Gadavist intravenous contrast.    COMPARISON:  CT 11/20/2023    FINDINGS:  Spondylodiscitis centered about the S1 vertebral body please see same day MRI report for further details.    Large left psoas collection, compatible with abscess, appears to extend from the L5-S1 disc to involve the left psoas muscle and the left iliacus.    There is marrow edema extending through the sacrum (left greater than right) with increased fluid signal and erosive change about the left SI joint, compatible  with septic sacroiliitis.    Muscle edema, likely myositis, extending to the involve the posterior paraspinal muscles, left more than right as well as the left gluteal muscles.    No pelvic ascites or lymphadenopathy.    Visualized bowel loops are unremarkable.  Partially visualized bilateral hydroceles.  Impression: Osteomyelitis/Spondylodiscitis centered about the S1 vertebral body, which extends from L5-S2, please see same day MRI report dictated separately for further details including associated epidural phlegmon L5 through S2.    Marrow edema involving the sacrum (left sacral ala greater than right) with erosive change and increased fluid signal about the left SI joint, compatible with septic arthritis of the left SI joint.    Large left psoas abscess extends into the left iliacus muscle.  This abscess appears to arise from/communicate with the L5-S1 disc.    Muscle edema, likely myositis, extending to the involve the posterior paraspinal muscles, left more than right as well as the left gluteal muscles.    This report was flagged in Epic as abnormal.    Electronically signed by resident: Gilma Burdick  Date:    11/21/2023  Time:    00:59    Electronically signed by: Son Wolf MD  Date:    11/21/2023  Time:    01:24  MRI Lumbar Spine W WO Cont  Narrative: EXAMINATION:  MRI LUMBAR SPINE W WO CONTRAST    CLINICAL HISTORY:  Low back pain, infection suspected;    TECHNIQUE:  Multiplanar, multisequence MR images were acquired from the thoracolumbar junction to the sacrum before and after the administration of 8 mL IV Gadavist.    COMPARISON:  CT 11/20/2023    FINDINGS:  Evaluation is somewhat limited by motion artifact.    Alignment: Normal.    Vertebrae: There is increased STIR signal within the S1 and S2 vertebral bodies.  There is cortical erosion and osseous destruction at these levels.  This there is abnormal postcontrast enhancement.  There is mild edema within the L5 inferior endplate and abnormal  postcontrast enhancement.    Discs: Abnormal disc signal and enhancement within the L5-S1 disc.  Normal height and signal elsewhere..    Canal: There is diffusely enhancing material within the canal which extends from L5-S2 and is concerning for phlegmon.    Soft tissues: Bilobed left psoas fluid collection measuring 10.3 x 3.0 cm in maximum dimension, which may be contiguous with the L5-S1 disc space.  This is concerning compatible with abscess.    Degenerative findings:    No significant degenerative changes.  Probable small disc protrusion at L5-S1 with annular fissure, though this is difficult to differentiate from active discitis.  There is probable moderate to severe neural foraminal narrowing at L5-S1 as a result of spondylo discitis and phlegmon.  Impression: Findings compatible with osteomyelitis/Spondylodiscitis most severe at the S1 vertebral body, which extends from L5-S2.  There are osseous destructive changes at these levels and apparent disease involvement of the L5-S1 disc.    Phlegmon within the spinal canal extending from L5-S2 and resulting in at least moderate spinal canal narrowing at these levels.    Bilobed left psoas abscess which measures up to 10 cm in maximum dimension, which may be contiguous with the L5-S1 disc space.    This report was flagged in Epic as abnormal..    Electronically signed by resident: Gilma Burdick  Date:    11/21/2023  Time:    00:40    Electronically signed by: Son Wolf MD  Date:    11/21/2023  Time:    01:17        Assessment/Plan:      * Psoas abscess, left  Septic arthritis of sacroiliac joint  Osteoarthritis of S1  L5-S2 epidural phlegmon  29 yo M w/ hx of IVDU and MRSA bacteremia presenting w/ left buttock/hip pain x 1 week. Has significant history for multiple wounds and abscesses. Admitted in October and found to have MRSA bacteremia; unclear if patient obtained outpatient abx for treatment after eloping from hospital. Febrile to 101F in ED. No  leukocytosis. CT A/P w/ concern for osteomyelitis of the S1 vertebral body, septic arthritis of the L sacroiliac joint and a left iliopsoas muscle abscess. Patient received IVFs, vanc/zosyn and admitted to  for further management.     - Blood cultures NGTD  - Infectious disease consulted; appreciate recs   - Empiric vancomycin and zosyn ordered  - MRI pelvis and lumbar spine with OM/spondylodiscitis of the S1 vertebral body extending from L5-S2, phlegmon within the spinal canal extending from L5-S2 resulting in moderate spinal canal narrowing and a bilobed left psoas abscess measuring up to 10cm which may be contiguous with the L5-S1 disc space.   - IR consulted for aspiration of L psoas abscess; intra-operative cultures ordered.   - NRSY consulted for epidural phegmon      Bilateral lower extremity edema  - Unclear etiology of LE edema. 1+ pitting edema w/ overlying warmth. No open wounds or erythematous changes. On broad spectrum abx for empiric treatment of soft tissue infection   - Low suspicion for acute heart failure given patient's lack of SOB, orthopnea etc. Additionally, BNP wnl. However, given history of untreated MRSA bacteremia, will obtain TTE to evaluate for valvular abnormalities.   - LE US w/ no evidence of DVT      Hyponatremia  - Na 130 on admission; possible in the setting of infection/hypovolemia   - Will continue to monitor w/ daily BMP  - improving       Anemia of infection and chronic disease  Patient's anemia is currently controlled. Has not received any PRBCs to date. Etiology likely d/t chronic disease due to infection.   Current CBC reviewed-   Lab Results   Component Value Date    HGB 7.2 (L) 11/20/2023    HCT 23.5 (L) 11/20/2023     Type and screen ordered   Monitor serial CBC and transfuse if patient becomes hemodynamically unstable, symptomatic or H/H drops below 7/21.    Abscess of right upper extremity  -RUE US w/ small superficial mass measuring 1.9 x 0.5 x 1.3cm thought to be a  small superficial abscess  - Will consult general surgery for potential I&D    Mass of left upper extremity  - LUE US w/ superficial mass measuring 4.4 x 1.4 x 2.8cm thought to be a enlarged superficial lymph node       Heroin dependence  - PRN medications available for withdrawal symptoms        VTE Risk Mitigation (From admission, onward)           Ordered     enoxaparin injection 40 mg  Daily         11/20/23 1555     IP VTE HIGH RISK PATIENT  Once         11/20/23 1555     Place sequential compression device  Until discontinued         11/20/23 1555                    Discharge Planning   SENA: 11/24/2023     Code Status: Full Code   Is the patient medically ready for discharge?: No    Reason for patient still in hospital (select all that apply): Patient trending condition             Jackelyn Anthony MD  Department of Hospital Medicine   [facility]

## 2023-11-21 NOTE — NURSING
"Pt had IV abx infusing through IV. Pt called and requested to be unhooked to go to the restroom for BM. Pt was unhooked from his IV abx to go to the restroom   when pt got back in bed bedside nurse went to hook him back to infusion. Machine beeped that it was occluded. Bedside nurse attempted to flush IV, and was unable to. Upon assessing IV there was a white substance in the extension tubing of the IV. Bedside nurse asked pt if he did anything to the IV or if he put anything in the IV. Pt laughed and stated, "no, I dont even have anything, and how would I know how to mess with an IV."     Bedside nurse changed extension tubing and hub of IV and attempted to flush with no success. IV removed and placed a new one.     MD and Charge nurse made aware.   "

## 2023-11-21 NOTE — SUBJECTIVE & OBJECTIVE
Interval History: Patient HDS and afebrile. No acute concerns this morning. Neurological examination intact. No urinary retention. Updated brother at bedside this morning. Patient motivated to stop IVDU. IR, NRSY and ID consulted.     Review of Systems   Constitutional:  Positive for activity change, chills, fatigue and fever.   Respiratory:  Negative for cough and shortness of breath.    Cardiovascular:  Positive for leg swelling. Negative for chest pain and palpitations.   Gastrointestinal:  Negative for abdominal distention, abdominal pain, diarrhea, nausea and vomiting.   Musculoskeletal:  Positive for back pain and gait problem.   Skin:  Negative for color change and pallor.   Neurological:  Negative for dizziness and headaches.     Objective:     Vital Signs (Most Recent):  Temp: 98.3 °F (36.8 °C) (11/21/23 0723)  Pulse: 80 (11/21/23 0723)  Resp: 20 (11/21/23 0723)  BP: (!) 104/51 (11/21/23 0723)  SpO2: 97 % (11/21/23 0723) Vital Signs (24h Range):  Temp:  [98.2 °F (36.8 °C)-101 °F (38.3 °C)] 98.3 °F (36.8 °C)  Pulse:  [] 80  Resp:  [14-20] 20  SpO2:  [97 %-100 %] 97 %  BP: (104-140)/(51-80) 104/51     Weight: 74.8 kg (165 lb)  Body mass index is 23.01 kg/m².    Intake/Output Summary (Last 24 hours) at 11/21/2023 0904  Last data filed at 11/21/2023 0623  Gross per 24 hour   Intake 330 ml   Output 3200 ml   Net -2870 ml         Physical Exam  Constitutional:       Appearance: Normal appearance.   HENT:      Head: Normocephalic and atraumatic.      Mouth/Throat:      Mouth: Mucous membranes are moist.      Pharynx: Oropharynx is clear.   Eyes:      Extraocular Movements: Extraocular movements intact.      Conjunctiva/sclera: Conjunctivae normal.   Pulmonary:      Effort: No respiratory distress.      Breath sounds: Normal breath sounds.   Abdominal:      General: Bowel sounds are normal.      Palpations: Abdomen is soft.   Musculoskeletal:         General: Tenderness (Lower back spinal tenderness and L  superior gluteal tenderess to deep palptation) present. No swelling. Normal range of motion.   Skin:     Capillary Refill: Capillary refill takes less than 2 seconds.   Neurological:      General: No focal deficit present.      Mental Status: He is alert and oriented to person, place, and time.             Significant Labs: All pertinent labs within the past 24 hours have been reviewed.  CBC:   Recent Labs   Lab 11/20/23  1155   WBC 10.17   HGB 7.2*   HCT 23.5*        CMP:   Recent Labs   Lab 11/20/23  1155 11/21/23  0555   * 132*   K 4.9 4.2   CL 99 98   CO2 22* 25   GLU 87 85   BUN 12 7   CREATININE 0.7 0.7   CALCIUM 8.6* 8.5*   PROT 8.4 7.6   ALBUMIN 2.2* 1.9*   BILITOT 0.2 0.3   ALKPHOS 75 75   AST 32 17   ALT 6* 6*   ANIONGAP 9 9       Significant Imaging: I have reviewed all pertinent imaging results/findings within the past 24 hours.    MRI Pelvis W WO Contrast  Narrative: EXAMINATION:  MRI PELVIS W WO CONTRAST    CLINICAL HISTORY:  Osteomyelitis suspected, pelvis, xray done;Soft tissue infection suspected, pelvis, xray done;Soft tissue mass, pelvis, deep;.    TECHNIQUE:  MRI pelvis performed before and after administration of 8 mL Gadavist intravenous contrast.    COMPARISON:  CT 11/20/2023    FINDINGS:  Spondylodiscitis centered about the S1 vertebral body please see same day MRI report for further details.    Large left psoas collection, compatible with abscess, appears to extend from the L5-S1 disc to involve the left psoas muscle and the left iliacus.    There is marrow edema extending through the sacrum (left greater than right) with increased fluid signal and erosive change about the left SI joint, compatible with septic sacroiliitis.    Muscle edema, likely myositis, extending to the involve the posterior paraspinal muscles, left more than right as well as the left gluteal muscles.    No pelvic ascites or lymphadenopathy.    Visualized bowel loops are unremarkable.  Partially visualized  bilateral hydroceles.  Impression: Osteomyelitis/Spondylodiscitis centered about the S1 vertebral body, which extends from L5-S2, please see same day MRI report dictated separately for further details including associated epidural phlegmon L5 through S2.    Marrow edema involving the sacrum (left sacral ala greater than right) with erosive change and increased fluid signal about the left SI joint, compatible with septic arthritis of the left SI joint.    Large left psoas abscess extends into the left iliacus muscle.  This abscess appears to arise from/communicate with the L5-S1 disc.    Muscle edema, likely myositis, extending to the involve the posterior paraspinal muscles, left more than right as well as the left gluteal muscles.    This report was flagged in Epic as abnormal.    Electronically signed by resident: Gilma Burdick  Date:    11/21/2023  Time:    00:59    Electronically signed by: Son Wolf MD  Date:    11/21/2023  Time:    01:24  MRI Lumbar Spine W WO Cont  Narrative: EXAMINATION:  MRI LUMBAR SPINE W WO CONTRAST    CLINICAL HISTORY:  Low back pain, infection suspected;    TECHNIQUE:  Multiplanar, multisequence MR images were acquired from the thoracolumbar junction to the sacrum before and after the administration of 8 mL IV Gadavist.    COMPARISON:  CT 11/20/2023    FINDINGS:  Evaluation is somewhat limited by motion artifact.    Alignment: Normal.    Vertebrae: There is increased STIR signal within the S1 and S2 vertebral bodies.  There is cortical erosion and osseous destruction at these levels.  This there is abnormal postcontrast enhancement.  There is mild edema within the L5 inferior endplate and abnormal postcontrast enhancement.    Discs: Abnormal disc signal and enhancement within the L5-S1 disc.  Normal height and signal elsewhere..    Canal: There is diffusely enhancing material within the canal which extends from L5-S2 and is concerning for phlegmon.    Soft tissues: Bilobed left  psoas fluid collection measuring 10.3 x 3.0 cm in maximum dimension, which may be contiguous with the L5-S1 disc space.  This is concerning compatible with abscess.    Degenerative findings:    No significant degenerative changes.  Probable small disc protrusion at L5-S1 with annular fissure, though this is difficult to differentiate from active discitis.  There is probable moderate to severe neural foraminal narrowing at L5-S1 as a result of spondylo discitis and phlegmon.  Impression: Findings compatible with osteomyelitis/Spondylodiscitis most severe at the S1 vertebral body, which extends from L5-S2.  There are osseous destructive changes at these levels and apparent disease involvement of the L5-S1 disc.    Phlegmon within the spinal canal extending from L5-S2 and resulting in at least moderate spinal canal narrowing at these levels.    Bilobed left psoas abscess which measures up to 10 cm in maximum dimension, which may be contiguous with the L5-S1 disc space.    This report was flagged in Epic as abnormal..    Electronically signed by resident: Gilma Burdick  Date:    11/21/2023  Time:    00:40    Electronically signed by: Son Wolf MD  Date:    11/21/2023  Time:    01:17

## 2023-11-21 NOTE — CONSULTS
Pt off the floor at time of ID rounds.   See plan of care note from today.  Full consult note to follow pending formal eval of patient tomorrow.

## 2023-11-21 NOTE — HOSPITAL COURSE
MRI pelvis and lumbar spine with OM/spondylodiscitis of the S1 vertebral body extending from L5-S2, phlegmon within the spinal canal extending from L5-S2 resulting in moderate spinal canal narrowing and a bilobed left psoas abscess measuring up to 10cm which may be contiguous with the L5-S1 disc space. S/p IR guided aspiration of L psoas abscess on 11/21 of which 0.5cc of bloody fluid was aspirated. Preliminary intra op abscess cultures w/ staph aureus pending susceptibilities. NRSY consulted for epidural phegmon. Given lack of neurological deficits, deemed stable for outpatient follow up w/ plans for repeat MRI in 6 weeks. ID consulted for antibiotic management; recommended continuing vanc and zosyn pending culture data and to obtain TTE. Bcx from 11/20 NGTD.     On 11/22, notified by nursing staff that patient unhooked his IV and left the unit despite being told not to leave. Security was called to have patient return to have IV removed. As per security, patient was found at the [facility] on [facility]. He did not return to his room afterward and was deemed to have eloped AMA.

## 2023-11-21 NOTE — PLAN OF CARE
Pt arrived to ct rm 173 for Left psoas abscess drainage. Pt oriented to unit and staff, Pt safely transferred from stretcher to procedural table. Fall risk reviewed and comfort measures utilized with interventions. Safety strap applied, position pillows to minimize pressure points. Blankets applied. Pt prepped and draped utilizing standard sterile technique. Patient placed on continuous monitoring, as required by sedation policy. Timeouts implemented utilizing standard universal time-out per department and facility policy. CRNA to remain at bedside with continuous monitoring. Pt resting comfortably. Denies pain/discomfort. Will continue to monitor. See flow sheets for monitoring, medication administration, and updates. patient verbalizes understanding.

## 2023-11-21 NOTE — PLAN OF CARE
[facility]r [facility] - Med Surg  Initial Discharge Assessment       Primary Care Provider: No, Primary Doctor    Admission Diagnosis: Leg swelling [M79.89]  Bilateral leg pain [M79.604, M79.605]  Psoas abscess, left [K68.12]  Chest pain [R07.9]  MRSA bacteremia [R78.81, B95.62]  Abscess of left upper extremity [L02.414]    Admission Date: 11/20/2023  Expected Discharge Date: 11/24/2023    Transition of Care Barriers: None    Payor: MEDICAID / Plan: [place] [employer] / Product Type: Managed Medicaid /     Extended Emergency Contact Information  Primary Emergency Contact: Trisha Donovan  Mobile Phone: [phone]  Relation: Sister  Preferred language: English   needed? No  Secondary Emergency Contact: Benjamin Morales  Mobile Phone: [phone]  Relation: Brother  Preferred language: English   needed? No    Discharge Plan A: Long-term acute care facility (LTAC)  Discharge Plan B: Shelter      [id number] - [place] - [address]  [address]  [facility] [address]  Phone: [phone] Fax: [phone]      Initial Assessment (most recent)       Adult Discharge Assessment - 11/21/23 1624          Discharge Assessment    Assessment Type Discharge Planning Assessment     Confirmed/corrected address, phone number and insurance --   Pt is homeless.    Confirmed Demographics --   Pt is homeless.    Source of Information patient     Communicated SENA with patient/caregiver Yes     Reason For Admission Psoas abscess     People in Home alone   Homeless    Do you expect to return to your current living situation? No   Homeless-may need IV abx.    Do you have help at home or someone to help you manage your care at home? No     Current cognitive status: Alert/Oriented     Equipment Currently Used at Home none     Readmission within 30 days? No     Patient currently being followed by outpatient case management? No     Do you currently have service(s) that help you manage your care at home? No      Do you take prescription medications? Yes     Do you have prescription coverage? Yes     Coverage [place] [employer]     Do you have any problems affording any of your prescribed medications? No     Is the patient taking medications as prescribed? yes     Who is going to help you get home at discharge? Pt will need transportation.     How do you get to doctors appointments? public transportation     Are you on dialysis? No     Do you take coumadin? No     DME Needed Upon Discharge  none     Discharge Plan discussed with: Patient     Transition of Care Barriers None     Discharge Plan A Long-term acute care facility (LTAC)     Discharge Plan B Shelter        Physical Activity    On average, how many days per week do you engage in moderate to strenuous exercise (like a brisk walk)? 0 days     On average, how many minutes do you engage in exercise at this level? 0 min        Financial Resource Strain    How hard is it for you to pay for the very basics like food, housing, medical care, and heating? Somewhat hard        Housing Stability    In the last 12 months, was there a time when you were not able to pay the mortgage or rent on time? Yes   Homeless for the last year.    In the last 12 months, was there a time when you did not have a steady place to sleep or slept in a shelter (including now)? Yes        Transportation Needs    In the past 12 months, has lack of transportation kept you from medical appointments or from getting medications? Yes     In the past 12 months, has lack of transportation kept you from meetings, work, or from getting things needed for daily living? Yes        Food Insecurity    Within the past 12 months, you worried that your food would run out before you got the money to buy more. Often true     Within the past 12 months, the food you bought just didn't last and you didn't have money to get more. Often true        Stress    Do you feel stress - tense, restless, nervous, or anxious, or unable  to sleep at night because your mind is troubled all the time - these days? Very much        Social Connections    In a typical week, how many times do you talk on the phone with family, friends, or neighbors? Never     How often do you get together with friends or relatives? Never     How often do you attend Episcopalian or Gnosticism services? Never     Do you belong to any clubs or organizations such as Episcopalian groups, unions, fraternal or athletic groups, or school groups? No     How often do you attend meetings of the clubs or organizations you belong to? Never     Are you , , , , never , or living with a partner? Never         Alcohol Use    Q1: How often do you have a drink containing alcohol? --   Heroin user.    Q2: How many drinks containing alcohol do you have on a typical day when you are drinking? --   Heroin/opiate user.                  W completed initial discharge assessment with patient.  No hospital readmissions within the last 30 days.  Pt will need transportation.     Pt is homeless and does not have a home.  Pt independent with ADLs, and mobility.  Pt does not use any DME for ambulation.  Pt's parents .  Pt has a brother an sister but does not have interaction with them.      Pt does not receive coumadin, dialysis, or HH.  Pt has history of heroin use.      Disp:  1.  LTAC    Discharge Plan A and Plan B have been determined by review of patient's clinical status, future medical and therapeutic needs, and coverage/benefits for post-acute care in coordination with multidisciplinary team members.    Ting Youngblood LMSW  Part-Time-  [facility]  Ext. [phone]

## 2023-11-21 NOTE — PROCEDURES
IR Post-Procedure Note      Pre Op Diagnosis:  L psoas fluid collection    Post Op Diagnosis:  same    Procedure:  Image guided aspiration    Procedure performed by:  Dayana Pacheco MD  /  Cassidy Nolan MD    Written Informed Consent Obtained: Yes    Specimen Removed:  Yes; aspirate from fluid collection    Estimated Blood Loss: minimal    Findings:    CT was used for localization of abnormal fluid collection.     Aspiration of 0.5 cc of bloody fluid. A specimen was sent to the lab for further analysis and culture.    The patient tolerated procedure well and there were no complications. Please see procedure report under Imaging for further details.    Plan:    Sent specimen to lab for analysis.      Dayana Pacheco MD MSCR  PGY-5 Radiology Resident

## 2023-11-21 NOTE — CONSULTS
Consult Note  Interventional Radiology      Date: 11/21/2023   Primary team: [facility] Adrian ROUSE Patrick, MD   Room/bed: 622/622 A    Inpatient consult to Interventional Radiology  Consult performed by: Haritha Leach PA-C  Consult ordered by: Jackelyn Anthony MD         SUBJECTIVE:     Chief Complaint:  L psoas abscess    History of Present Illness:  Ton Donovan is a 28 y.o. male with a past medical history of IVDU who was admitted on 11/20/23 for a L psoas abscess.    Recent MRI on 11/21/23 which revealed a large left psoas abscess extending into the left iliacus muscle - the abscess appears to arise from/communicate with the L5-S1 disc space. The pt's WBC is 10, currently afebrile but spiked a temp to 101F in the ED, and is hemodynamically stable. He has a history of MRSA bacteremia during 10/2023 admission. Blood cultures from 11/20/23 currently without growth.     Interventional Radiology has been consulted for image guided percutaneous aspiration/drain placement.       Review of Systems   Constitutional: Negative.    HENT: Negative.     Respiratory: Negative.     Cardiovascular: Negative.    Gastrointestinal: Negative.    Musculoskeletal: Negative.    Skin: Negative.    Neurological: Negative.    Psychiatric/Behavioral: Negative.          Scheduled Meds:   enoxparin  40 mg Subcutaneous Daily    lactated ringers  1,000 mL Intravenous Once    piperacillin-tazobactam (Zosyn) IV (PEDS and ADULTS) (extended infusion is not appropriate)  4.5 g Intravenous Q8H    vancomycin (VANCOCIN) IV (PEDS and ADULTS)  1,500 mg Intravenous Q12H     Continuous Infusions:  PRN Meds:acetaminophen, dextrose 10%, dextrose 10%, glucagon (human recombinant), glucose, glucose, loperamide, melatonin, methocarbamoL, naloxone, ondansetron, sodium chloride 0.9%, Pharmacy to dose Vancomycin consult **AND** vancomycin - pharmacy to dose    Review of patient's allergies indicates:  No Known Allergies    Past Medical History:    Diagnosis Date    Hypertension     Unilateral inguinal hernia, without obstruction or gangrene, not specified as recurrent      Past Surgical History:   Procedure Laterality Date    HAND ARTHROTOMY Right 7/7/2023    Procedure: ARTHROTOMY, HAND - R LF PIP;  Surgeon: Boy Lamb MD;  Location: [address];  Service: Orthopedics;  Laterality: Right;    INCISION AND DRAINAGE OF ABSCESS Right 10/12/2023    Procedure: INCISION AND DRAINAGE, ABSCESS;  Surgeon: Steve Monteiro MD;  Location: [facility] OR [address] [place] [address];  Service: General;  Laterality: Right;  right chest    IRRIGATION AND DEBRIDEMENT OF UPPER EXTREMITY Bilateral 7/7/2023    Procedure: IRRIGATION AND DEBRIDEMENT, UPPER EXTREMITY - RIGHT HAND;  Surgeon: Boy Lamb MD;  Location: [facility] OR [address] [place] [address];  Service: Orthopedics;  Laterality: Bilateral;    OLECRANON BURSECTOMY Left 7/7/2023    Procedure: BURSECTOMY, OLECRANON;  Surgeon: Boy Lamb MD;  Location: [address];  Service: Orthopedics;  Laterality: Left;     History reviewed. No pertinent family history.  Social History     Tobacco Use    Smoking status: Former     Types: Cigarettes    Smokeless tobacco: Never   Substance Use Topics    Alcohol use: No    Drug use: Yes     Types: Marijuana, IV     Comment: 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       OBJECTIVE:     Vital Signs (Most Recent)  Temp: 98.3 °F (36.8 °C) (11/21/23 0723)  Pulse: 80 (11/21/23 0723)  Resp: 20 (11/21/23 0723)  BP: (!) 104/51 (11/21/23 0723)  SpO2: 97 % (11/21/23 0723)    Physical Exam:   Physical Exam  Constitutional:       General: He is not in acute distress.     Appearance: Normal appearance.   HENT:      Head: Normocephalic.   Cardiovascular:      Rate and Rhythm: Normal rate.   Pulmonary:      Effort: Pulmonary effort is normal.   Abdominal:      General: Abdomen is flat.   Neurological:      Mental Status: He is alert and oriented to person, place, and time. Mental status is at baseline.   Psychiatric:          Mood and Affect: Mood normal.         Behavior: Behavior normal.         Laboratory  Lab Results   Component Value Date    INR 1.5 (H) 10/14/2023       Lab Results   Component Value Date    WBC 10.17 11/20/2023    HGB 7.2 (L) 11/20/2023    HCT 23.5 (L) 11/20/2023    MCV 73 (L) 11/20/2023     11/20/2023      Lab Results   Component Value Date    GLU 85 11/21/2023     (L) 11/21/2023    K 4.2 11/21/2023    CL 98 11/21/2023    CO2 25 11/21/2023    BUN 7 11/21/2023    CREATININE 0.7 11/21/2023    CALCIUM 8.5 (L) 11/21/2023    MG 1.7 11/21/2023    ALT 6 (L) 11/21/2023    AST 17 11/21/2023    ALBUMIN 1.9 (L) 11/21/2023    BILITOT 0.3 11/21/2023         Anticoagulants/Antiplatelets:   No anticoagulation    ASA/Mallampati  ASA: 2  Mallampati: 2    Imaging:  Reviewed by Cassidy Nolan MD.     ASSESSMENT/PLAN:     Assessment:  28 y.o. male with a past medical history of IVDU who has been referred to IR for image guided percutaneous drain placement/aspiration for a L psoas abscess. Will attempt drain placement- however, the abscess is small and there may be difficulty accessing the collection. Additionally, there is an increased damage to bowel and adjacent vasculature given the location of the psoas abscess.     Plan:  Will proceed with CT guided percutaneous aspiration/drain placement for a L psoas abscess on 11/21/23.   Sedation plan: up to moderate  Anticoagulation history reviewed.Coagulation labs reviewed.  Thank you for the consult. Please contact with questions via [employer] secure chat.    Haritha Leach PA-C  Interventional Radiology  11/21/2023

## 2023-11-21 NOTE — ASSESSMENT & PLAN NOTE
-RUE US w/ small superficial mass measuring 1.9 x 0.5 x 1.3cm thought to be a small superficial abscess  - Will consult general surgery for potential I&D

## 2023-11-21 NOTE — HPI
Pt is a 28M with pmh of IVDU and MRSA bacteremia and multiple wound infection who presented yesterday with back pain and buttock pain bilaterally. He was found to have fever, S1 vertebral osteo, L SI joint septic arthritis, L IP abscesses and L5-S2 anterior plegmon with compression on CT spine.

## 2023-11-22 VITALS
HEART RATE: 91 BPM | TEMPERATURE: 97 F | WEIGHT: 165 LBS | DIASTOLIC BLOOD PRESSURE: 68 MMHG | SYSTOLIC BLOOD PRESSURE: 127 MMHG | OXYGEN SATURATION: 100 % | BODY MASS INDEX: 23.01 KG/M2 | RESPIRATION RATE: 19 BRPM

## 2023-11-22 LAB
ALBUMIN SERPL BCP-MCNC: 2 G/DL (ref 3.5–5.2)
ALP SERPL-CCNC: 68 U/L (ref 55–135)
ALT SERPL W/O P-5'-P-CCNC: <5 U/L (ref 10–44)
ANION GAP SERPL CALC-SCNC: 11 MMOL/L (ref 8–16)
AST SERPL-CCNC: 15 U/L (ref 10–40)
BASOPHILS # BLD AUTO: 0.03 K/UL (ref 0–0.2)
BASOPHILS NFR BLD: 0.3 % (ref 0–1.9)
BILIRUB SERPL-MCNC: 0.5 MG/DL (ref 0.1–1)
BUN SERPL-MCNC: 7 MG/DL (ref 6–20)
CALCIUM SERPL-MCNC: 8.9 MG/DL (ref 8.7–10.5)
CHLORIDE SERPL-SCNC: 101 MMOL/L (ref 95–110)
CO2 SERPL-SCNC: 22 MMOL/L (ref 23–29)
CREAT SERPL-MCNC: 0.7 MG/DL (ref 0.5–1.4)
DIFFERENTIAL METHOD: ABNORMAL
EOSINOPHIL # BLD AUTO: 0 K/UL (ref 0–0.5)
EOSINOPHIL NFR BLD: 0.3 % (ref 0–8)
ERYTHROCYTE [DISTWIDTH] IN BLOOD BY AUTOMATED COUNT: 19.3 % (ref 11.5–14.5)
EST. GFR  (NO RACE VARIABLE): >60 ML/MIN/1.73 M^2
GLUCOSE SERPL-MCNC: 100 MG/DL (ref 70–110)
HBV CORE AB SERPL QL IA: NORMAL
HBV SURFACE AB SER-ACNC: 58.74 MIU/ML
HBV SURFACE AB SER-ACNC: REACTIVE M[IU]/ML
HBV SURFACE AG SERPL QL IA: NORMAL
HCT VFR BLD AUTO: 29.1 % (ref 40–54)
HCV AB SERPL QL IA: NORMAL
HGB BLD-MCNC: 9.2 G/DL (ref 14–18)
HIV 1+2 AB+HIV1 P24 AG SERPL QL IA: NORMAL
IMM GRANULOCYTES # BLD AUTO: 0.05 K/UL (ref 0–0.04)
IMM GRANULOCYTES NFR BLD AUTO: 0.5 % (ref 0–0.5)
LYMPHOCYTES # BLD AUTO: 2.2 K/UL (ref 1–4.8)
LYMPHOCYTES NFR BLD: 20.7 % (ref 18–48)
MAGNESIUM SERPL-MCNC: 1.8 MG/DL (ref 1.6–2.6)
MCH RBC QN AUTO: 22.5 PG (ref 27–31)
MCHC RBC AUTO-ENTMCNC: 31.6 G/DL (ref 32–36)
MCV RBC AUTO: 71 FL (ref 82–98)
MONOCYTES # BLD AUTO: 0.6 K/UL (ref 0.3–1)
MONOCYTES NFR BLD: 5.7 % (ref 4–15)
NEUTROPHILS # BLD AUTO: 7.6 K/UL (ref 1.8–7.7)
NEUTROPHILS NFR BLD: 72.5 % (ref 38–73)
NRBC BLD-RTO: 0 /100 WBC
PLATELET # BLD AUTO: 454 K/UL (ref 150–450)
PMV BLD AUTO: 9.1 FL (ref 9.2–12.9)
POTASSIUM SERPL-SCNC: 4.2 MMOL/L (ref 3.5–5.1)
PROT SERPL-MCNC: 8.2 G/DL (ref 6–8.4)
RBC # BLD AUTO: 4.08 M/UL (ref 4.6–6.2)
SODIUM SERPL-SCNC: 134 MMOL/L (ref 136–145)
VANCOMYCIN TROUGH SERPL-MCNC: 6.7 UG/ML (ref 10–22)
WBC # BLD AUTO: 10.46 K/UL (ref 3.9–12.7)

## 2023-11-22 PROCEDURE — 86704 HEP B CORE ANTIBODY TOTAL: CPT | Performed by: INTERNAL MEDICINE

## 2023-11-22 PROCEDURE — 25000003 PHARM REV CODE 250

## 2023-11-22 PROCEDURE — 80202 ASSAY OF VANCOMYCIN: CPT | Performed by: STUDENT IN AN ORGANIZED HEALTH CARE EDUCATION/TRAINING PROGRAM

## 2023-11-22 PROCEDURE — 63600175 PHARM REV CODE 636 W HCPCS: Performed by: STUDENT IN AN ORGANIZED HEALTH CARE EDUCATION/TRAINING PROGRAM

## 2023-11-22 PROCEDURE — 87340 HEPATITIS B SURFACE AG IA: CPT | Performed by: INTERNAL MEDICINE

## 2023-11-22 PROCEDURE — 83735 ASSAY OF MAGNESIUM: CPT

## 2023-11-22 PROCEDURE — 80053 COMPREHEN METABOLIC PANEL: CPT

## 2023-11-22 PROCEDURE — 86706 HEP B SURFACE ANTIBODY: CPT | Mod: 91 | Performed by: INTERNAL MEDICINE

## 2023-11-22 PROCEDURE — 85025 COMPLETE CBC W/AUTO DIFF WBC: CPT

## 2023-11-22 PROCEDURE — 86803 HEPATITIS C AB TEST: CPT | Performed by: INTERNAL MEDICINE

## 2023-11-22 PROCEDURE — 36415 COLL VENOUS BLD VENIPUNCTURE: CPT | Performed by: INTERNAL MEDICINE

## 2023-11-22 PROCEDURE — 63600175 PHARM REV CODE 636 W HCPCS

## 2023-11-22 PROCEDURE — 87389 HIV-1 AG W/HIV-1&-2 AB AG IA: CPT | Performed by: INTERNAL MEDICINE

## 2023-11-22 PROCEDURE — 25000003 PHARM REV CODE 250: Performed by: STUDENT IN AN ORGANIZED HEALTH CARE EDUCATION/TRAINING PROGRAM

## 2023-11-22 PROCEDURE — 36415 COLL VENOUS BLD VENIPUNCTURE: CPT | Performed by: STUDENT IN AN ORGANIZED HEALTH CARE EDUCATION/TRAINING PROGRAM

## 2023-11-22 RX ORDER — BUPRENORPHINE AND NALOXONE 2; .5 MG/1; MG/1
1 FILM, SOLUBLE BUCCAL; SUBLINGUAL DAILY
Status: DISCONTINUED | OUTPATIENT
Start: 2023-11-22 | End: 2023-11-22

## 2023-11-22 RX ADMIN — PIPERACILLIN SODIUM AND TAZOBACTAM SODIUM 4.5 G: 4; .5 INJECTION, POWDER, FOR SOLUTION INTRAVENOUS at 04:11

## 2023-11-22 RX ADMIN — VANCOMYCIN HYDROCHLORIDE 1500 MG: 1.5 INJECTION, POWDER, LYOPHILIZED, FOR SOLUTION INTRAVENOUS at 01:11

## 2023-11-22 RX ADMIN — ACETAMINOPHEN 1000 MG: 500 TABLET ORAL at 06:11

## 2023-11-22 NOTE — NURSING
0725 - While receiving report by night nurse, informed by night nurse Me'Eva that pt has left hospital and was on [address] by [facility] [employer].  Went to pt's room and pt was not in room and belongings was gone but walking cane was still in room. IV antibiotics was still beeping and running, this nurse turned off pump.

## 2023-11-22 NOTE — CONSULTS
[facility]  Neurosurgery  Consult Note    Inpatient consult to Neurosurgery  Consult performed by: Gary Mireles MD  Consult ordered by: Jackelyn Anthony MD        Subjective:     Chief Complaint/Reason for Admission: Epidural abscess    History of Present Illness: Pt is a 28M with pmh of IVDU and MRSA bacteremia and multiple wound infection who presented yesterday with back pain and buttock pain bilaterally. He was found to have fever, S1 vertebral osteo, L SI joint septic arthritis, L IP abscesses and L5-S2 anterior plegmon with compression on CT spine.     Medications Prior to Admission   Medication Sig Dispense Refill Last Dose    ibuprofen (ADVIL,MOTRIN) 200 MG tablet Take 800 mg by mouth every 6 (six) hours as needed for Pain.       mv-min/vit C/glut/lysine/hb124 (IMMUNE SUPPORT ORAL) Take 1 tablet by mouth once daily.          Review of patient's allergies indicates:  No Known Allergies    Past Medical History:   Diagnosis Date    Hypertension     Unilateral inguinal hernia, without obstruction or gangrene, not specified as recurrent      Past Surgical History:   Procedure Laterality Date    HAND ARTHROTOMY Right 7/7/2023    Procedure: ARTHROTOMY, HAND - R LF PIP;  Surgeon: Boy Lamb MD;  Location: [address];  Service: Orthopedics;  Laterality: Right;    INCISION AND DRAINAGE OF ABSCESS Right 10/12/2023    Procedure: INCISION AND DRAINAGE, ABSCESS;  Surgeon: Steve Monteiro MD;  Location: [address];  Service: General;  Laterality: Right;  right chest    IRRIGATION AND DEBRIDEMENT OF UPPER EXTREMITY Bilateral 7/7/2023    Procedure: IRRIGATION AND DEBRIDEMENT, UPPER EXTREMITY - RIGHT HAND;  Surgeon: Boy Lamb MD;  Location: [address];  Service: Orthopedics;  Laterality: Bilateral;    OLECRANON BURSECTOMY Left 7/7/2023    Procedure: BURSECTOMY, OLECRANON;  Surgeon: Boy Lamb MD;  Location: [address];  Service: Orthopedics;  Laterality: Left;     Family  History    None       Tobacco Use    Smoking status: Former     Types: Cigarettes    Smokeless tobacco: Never   Substance and Sexual Activity    Alcohol use: No    Drug use: Yes     Types: Marijuana, IV     Comment: 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    Sexual activity: Not on file     Review of Systems   Constitutional:  Positive for fever.   HENT:  Negative for congestion.    Respiratory:  Negative for apnea.    Cardiovascular:  Negative for chest pain.   Gastrointestinal:  Negative for abdominal distention.   Genitourinary:  Negative for difficulty urinating.   Musculoskeletal:  Positive for back pain. Negative for myalgias.   Skin:  Negative for wound.   Neurological:  Negative for dizziness, syncope and weakness.   Psychiatric/Behavioral:  Negative for agitation.      Objective:     Weight: 74.8 kg (165 lb)  Body mass index is 23.01 kg/m².  Vital Signs (Most Recent):  Temp: 98 °F (36.7 °C) (11/21/23 1633)  Pulse: 85 (11/21/23 1633)  Resp: 18 (11/21/23 1633)  BP: (!) 124/59 (11/21/23 1633)  SpO2: 98 % (11/21/23 1633) Vital Signs (24h Range):  Temp:  [97.8 °F (36.6 °C)-98.7 °F (37.1 °C)] 98 °F (36.7 °C)  Pulse:  [74-94] 85  Resp:  [9-20] 18  SpO2:  [97 %-100 %] 98 %  BP: (104-152)/(51-84) 124/59                                 Physical Exam  Constitutional:       Appearance: He is well-developed and well-nourished.   Eyes:      Pupils: Pupils are equal, round, and reactive to light.   Cardiovascular:      Rate and Rhythm: Normal rate.   Abdominal:      Palpations: Abdomen is soft.   Neurological:      Mental Status: He is alert and oriented to person, place, and time.              Physical Exam:    Constitutional: He appears well-developed and well-nourished.     Eyes: Pupils are equal, round, and reactive to light.     Cardiovascular: Normal rate.     Abdominal: Soft.     Psych/Behavior: He is alert. He is oriented to person, place, and time.     Musculoskeletal:        Neck: Range of motion is full.      Pulm: Comf on RA    Neuro Exam: intact except HF weakness 3/5 L>R no saddle anesthesia     Significant Labs:  Recent Labs   Lab 11/20/23  1155 11/21/23  0555   GLU 87 85   * 132*   K 4.9 4.2   CL 99 98   CO2 22* 25   BUN 12 7   CREATININE 0.7 0.7   CALCIUM 8.6* 8.5*   MG  --  1.7     Recent Labs   Lab 11/20/23  1155 11/21/23  0555   WBC 10.17 10.29   HGB 7.2* 7.4*   HCT 23.5* 23.3*    390     Recent Labs   Lab 11/21/23  0900   INR 1.3*     Microbiology Results (last 7 days)       Procedure Component Value Units Date/Time    Blood culture #2 **CANNOT BE ORDERED STAT** [[id number]] Collected: 11/20/23 1155    Order Status: Completed Specimen: Blood from Peripheral, Hand, Right Updated: 11/21/23 1412     Blood Culture, Routine No Growth to date      No Growth to date    Blood culture #1 **CANNOT BE ORDERED STAT** [[id number]] Collected: 11/20/23 1155    Order Status: Completed Specimen: Blood from Peripheral, Hand, Left Updated: 11/21/23 1412     Blood Culture, Routine No Growth to date      No Growth to date    Gram stain [[id number]] Collected: 11/21/23 1041    Order Status: Completed Specimen: Abscess from Buttocks, Left Updated: 11/21/23 1347     Gram Stain Result No WBC's      No organisms seen    AFB Culture & Smear [[id number]] Collected: 11/21/23 1041    Order Status: Sent Specimen: Abscess from Buttocks, Left Updated: 11/21/23 1256    Fungus culture [[id number]] Collected: 11/21/23 1041    Order Status: Sent Specimen: Abscess from Buttocks, Left Updated: 11/21/23 1256    Culture, Anaerobic [[id number]] Collected: 11/21/23 1041    Order Status: Sent Specimen: Abscess from Buttocks, Left Updated: 11/21/23 1255    Aerobic culture [[id number]] Collected: 11/21/23 1041    Order Status: Sent Specimen: Abscess from Buttocks, Left Updated: 11/21/23 1254          All pertinent labs from the last 24 hours have been reviewed.    Significant Diagnostics:  I have reviewed and interpreted all pertinent  imaging results/findings within the past 24 hours.  Assessment/Plan:     L5-S2 epidural phlegmon  Pt is a 28M with pmh of IVDU and MRSA bacteremia and multiple wound infection who presented yesterday with back pain and buttock pain bilaterally. He was found to have fever, S1 vertebral osteo, L SI joint septic arthritis, L IP abscesses and L5-S2 anterior plegmon with compression on CT spine.         No acute neurosurgical intervention  Pt should be on Abx per ID for the abscess  Bug specificity based on IR biopsy  Pt can follow up in 6 weeks with repeat MRI in out patient neurosurgery [place] clinic  Post void residuals have been O  NSGY will follow peripherally, page with any questions    Dispo per primary  Discussed with Dr. Avilez          Thank you for your consult. I will sign off. Please contact us if you have any additional questions.    Gary Mireles MD  Neurosurgery  [facility]

## 2023-11-22 NOTE — NURSING
0705: Patient witnessed walking off floor.   0705: Security notified/ MD notified  0705: house supervisor notified patient left unit with IV in place.   0710: Attempted to contact next of kin, no response. Patient has no address on file.   0800: Patient still has not returned to unit.

## 2023-11-22 NOTE — SUBJECTIVE & OBJECTIVE
Medications Prior to Admission   Medication Sig Dispense Refill Last Dose    ibuprofen (ADVIL,MOTRIN) 200 MG tablet Take 800 mg by mouth every 6 (six) hours as needed for Pain.       mv-min/vit C/glut/lysine/hb124 (IMMUNE SUPPORT ORAL) Take 1 tablet by mouth once daily.          Review of patient's allergies indicates:  No Known Allergies    Past Medical History:   Diagnosis Date    Hypertension     Unilateral inguinal hernia, without obstruction or gangrene, not specified as recurrent      Past Surgical History:   Procedure Laterality Date    HAND ARTHROTOMY Right 7/7/2023    Procedure: ARTHROTOMY, HAND - R LF PIP;  Surgeon: Boy Lamb MD;  Location: [address];  Service: Orthopedics;  Laterality: Right;    INCISION AND DRAINAGE OF ABSCESS Right 10/12/2023    Procedure: INCISION AND DRAINAGE, ABSCESS;  Surgeon: Steve Monteiro MD;  Location: [address];  Service: General;  Laterality: Right;  right chest    IRRIGATION AND DEBRIDEMENT OF UPPER EXTREMITY Bilateral 7/7/2023    Procedure: IRRIGATION AND DEBRIDEMENT, UPPER EXTREMITY - RIGHT HAND;  Surgeon: Boy Lamb MD;  Location: [address];  Service: Orthopedics;  Laterality: Bilateral;    OLECRANON BURSECTOMY Left 7/7/2023    Procedure: BURSECTOMY, OLECRANON;  Surgeon: Boy Lamb MD;  Location: [address];  Service: Orthopedics;  Laterality: Left;     Family History    None       Tobacco Use    Smoking status: Former     Types: Cigarettes    Smokeless tobacco: Never   Substance and Sexual Activity    Alcohol use: No    Drug use: Yes     Types: Marijuana, IV     Comment: 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    Sexual activity: Not on file     Review of Systems   Constitutional:  Positive for fever.   HENT:  Negative for congestion.    Respiratory:  Negative for apnea.    Cardiovascular:  Negative for chest pain.   Gastrointestinal:  Negative for abdominal distention.   Genitourinary:  Negative for difficulty urinating.    Musculoskeletal:  Positive for back pain. Negative for myalgias.   Skin:  Negative for wound.   Neurological:  Negative for dizziness, syncope and weakness.   Psychiatric/Behavioral:  Negative for agitation.      Objective:     Weight: 74.8 kg (165 lb)  Body mass index is 23.01 kg/m².  Vital Signs (Most Recent):  Temp: 98 °F (36.7 °C) (11/21/23 1633)  Pulse: 85 (11/21/23 1633)  Resp: 18 (11/21/23 1633)  BP: (!) 124/59 (11/21/23 1633)  SpO2: 98 % (11/21/23 1633) Vital Signs (24h Range):  Temp:  [97.8 °F (36.6 °C)-98.7 °F (37.1 °C)] 98 °F (36.7 °C)  Pulse:  [74-94] 85  Resp:  [9-20] 18  SpO2:  [97 %-100 %] 98 %  BP: (104-152)/(51-84) 124/59                                 Physical Exam  Constitutional:       Appearance: He is well-developed and well-nourished.   Eyes:      Pupils: Pupils are equal, round, and reactive to light.   Cardiovascular:      Rate and Rhythm: Normal rate.   Abdominal:      Palpations: Abdomen is soft.   Neurological:      Mental Status: He is alert and oriented to person, place, and time.              Physical Exam:    Constitutional: He appears well-developed and well-nourished.     Eyes: Pupils are equal, round, and reactive to light.     Cardiovascular: Normal rate.     Abdominal: Soft.     Psych/Behavior: He is alert. He is oriented to person, place, and time.     Musculoskeletal:        Neck: Range of motion is full.     Pulm: Comf on RA    Neuro Exam: intact except HF weakness 3/5 L>R no saddle anesthesia     Significant Labs:  Recent Labs   Lab 11/20/23  1155 11/21/23  0555   GLU 87 85   * 132*   K 4.9 4.2   CL 99 98   CO2 22* 25   BUN 12 7   CREATININE 0.7 0.7   CALCIUM 8.6* 8.5*   MG  --  1.7     Recent Labs   Lab 11/20/23  1155 11/21/23  0555   WBC 10.17 10.29   HGB 7.2* 7.4*   HCT 23.5* 23.3*    390     Recent Labs   Lab 11/21/23  0900   INR 1.3*     Microbiology Results (last 7 days)       Procedure Component Value Units Date/Time    Blood culture #2 **CANNOT BE  ORDERED STAT** [[id number]] Collected: 11/20/23 1155    Order Status: Completed Specimen: Blood from Peripheral, Hand, Right Updated: 11/21/23 1412     Blood Culture, Routine No Growth to date      No Growth to date    Blood culture #1 **CANNOT BE ORDERED STAT** [[id number]] Collected: 11/20/23 1155    Order Status: Completed Specimen: Blood from Peripheral, Hand, Left Updated: 11/21/23 1412     Blood Culture, Routine No Growth to date      No Growth to date    Gram stain [[id number]] Collected: 11/21/23 1041    Order Status: Completed Specimen: Abscess from Buttocks, Left Updated: 11/21/23 1347     Gram Stain Result No WBC's      No organisms seen    AFB Culture & Smear [[id number]] Collected: 11/21/23 1041    Order Status: Sent Specimen: Abscess from Buttocks, Left Updated: 11/21/23 1256    Fungus culture [[id number]] Collected: 11/21/23 1041    Order Status: Sent Specimen: Abscess from Buttocks, Left Updated: 11/21/23 1256    Culture, Anaerobic [[id number]] Collected: 11/21/23 1041    Order Status: Sent Specimen: Abscess from Buttocks, Left Updated: 11/21/23 1255    Aerobic culture [[id number]] Collected: 11/21/23 1041    Order Status: Sent Specimen: Abscess from Buttocks, Left Updated: 11/21/23 1254          All pertinent labs from the last 24 hours have been reviewed.    Significant Diagnostics:  I have reviewed and interpreted all pertinent imaging results/findings within the past 24 hours.

## 2023-11-22 NOTE — PROGRESS NOTES
Pharmacokinetic Assessment Follow Up: IV Vancomycin    Vancomycin serum concentration assessment(s):    The trough level was drawn correctly and can be used to guide therapy at this time. The measurement is below the desired definitive target range of 10 to 20 mcg/mL.    Vancomycin Regimen Plan:    Change regimen to Vancomycin 1500 mg IV every 8 hours with next serum trough concentration measured at 08:30 prior to 4th  dose on 11/23/23  or sooner if clinically indicated    Drug levels (last 3 results):  Recent Labs   Lab Result Units 11/22/23  0016   Vancomycin-Trough ug/mL 6.7*       Pharmacy will continue to follow and monitor vancomycin.    Please contact pharmacy at extension [phone] for questions regarding this assessment.    Thank you for the consult,   Lucas Castillo       Patient brief summary:  Ton Donovan is a 28 y.o. male initiated on antimicrobial therapy with IV Vancomycin for treatment of skin & soft tissue infection    The patient's current regimen is Vancomycin 1500 mg iv every 12 hrs    Drug Allergies:   Review of patient's allergies indicates:  No Known Allergies    Actual Body Weight:   74.8 kg    Renal Function:   Estimated Creatinine Clearance: 163.3 mL/min (based on SCr of 0.7 mg/dL).,     Dialysis Method (if applicable):  N/A    CBC (last 72 hours):  Recent Labs   Lab Result Units 11/20/23  1155 11/21/23  0555   WBC K/uL 10.17 10.29   Hemoglobin g/dL 7.2* 7.4*   Hematocrit % 23.5* 23.3*   Platelets K/uL 340 390   Gran % % 63.0 62.1   Lymph % % 25.8 26.4   Mono % % 8.9 9.9   Eosinophil % % 1.6 1.0   Basophil % % 0.2 0.2   Differential Method  Automated Automated       Metabolic Panel (last 72 hours):  Recent Labs   Lab Result Units 11/20/23  1155 11/21/23  0555   Sodium mmol/L 130* 132*   Potassium mmol/L 4.9 4.2   Chloride mmol/L 99 98   CO2 mmol/L 22* 25   Glucose mg/dL 87 85   BUN mg/dL 12 7   Creatinine mg/dL 0.7 0.7   Albumin g/dL 2.2* 1.9*   Total Bilirubin mg/dL 0.2 0.3   Alkaline  Phosphatase U/L 75 75   AST U/L 32 17   ALT U/L 6* 6*   Magnesium mg/dL  --  1.7       Vancomycin Administrations:  vancomycin given in the last 96 hours                     vancomycin 1,500 mg in dextrose 5 % (D5W) 250 mL IVPB (Vial-Mate) (mg) 1,500 mg New Bag 11/21/23 1531     1,500 mg New Bag  0058    vancomycin (VANCOCIN) 1,000 mg in dextrose 5 % (D5W) 250 mL IVPB (Vial-Mate) (mg) 1,000 mg New Bag 11/20/23 1340                    Microbiologic Results:  Microbiology Results (last 7 days)       Procedure Component Value Units Date/Time    Blood culture #2 **CANNOT BE ORDERED STAT** [[id number]] Collected: 11/20/23 1155    Order Status: Completed Specimen: Blood from Peripheral, Hand, Right Updated: 11/21/23 1412     Blood Culture, Routine No Growth to date      No Growth to date    Blood culture #1 **CANNOT BE ORDERED STAT** [[id number]] Collected: 11/20/23 1155    Order Status: Completed Specimen: Blood from Peripheral, Hand, Left Updated: 11/21/23 1412     Blood Culture, Routine No Growth to date      No Growth to date    Gram stain [[id number]] Collected: 11/21/23 1041    Order Status: Completed Specimen: Abscess from Buttocks, Left Updated: 11/21/23 1347     Gram Stain Result No WBC's      No organisms seen    AFB Culture & Smear [[id number]] Collected: 11/21/23 1041    Order Status: Sent Specimen: Abscess from Buttocks, Left Updated: 11/21/23 1256    Fungus culture [[id number]] Collected: 11/21/23 1041    Order Status: Sent Specimen: Abscess from Buttocks, Left Updated: 11/21/23 1256    Culture, Anaerobic [[id number]] Collected: 11/21/23 1041    Order Status: Sent Specimen: Abscess from Buttocks, Left Updated: 11/21/23 1255    Aerobic culture [[id number]] Collected: 11/21/23 1041    Order Status: Sent Specimen: Abscess from Buttocks, Left Updated: 11/21/23 1254

## 2023-11-22 NOTE — ASSESSMENT & PLAN NOTE
Pt is a 28M with pmh of IVDU and MRSA bacteremia and multiple wound infection who presented yesterday with back pain and buttock pain bilaterally. He was found to have fever, S1 vertebral osteo, L SI joint septic arthritis, L IP abscesses and L5-S2 anterior plegmon with compression on CT spine.         No acute neurosurgical intervention  Pt should be on Abx per ID for the abscess  Bug specificity based on IR biopsy  Pt can follow up in 6 weeks with repeat MRI in out patient neurosurgery [facility] clinic  Post void residuals have been O  NSGY will follow peripherally, page with any questions    Dispo per primary  Discussed with Dr. Avilez

## 2023-11-22 NOTE — NURSING
Patient is aware that he is not suppose to leave the unit or unhook himself from the IV. The patient was told this several times during day and night shift and stated that he understood. Patient was just seen walking off the unit at 0705.

## 2023-11-22 NOTE — PLAN OF CARE
[facility] - Med Surg  Discharge Final Note    Primary Care Provider: No, Primary Doctor    Expected Discharge Date: 11/22/2023    Per medical team, pt left AMA.      Final Discharge Note (most recent)       Final Note - 11/22/23 0915          Final Note    Assessment Type Final Discharge Note     Anticipated Discharge Disposition Left Against Medical Advice        Post-Acute Status    Post-Acute Authorization Other     Coverage [facility] Connect.     Discharge Delays None known at this time                     Important Message from Medicare Sandra Encalade, LMSW  Part-Time-  [facility]  Ext. [phone]

## 2023-11-22 NOTE — PROGRESS NOTES
11/22/23 0600   WOCN Assessment   WOCN Total Time (mins) 20   Visit Date 11/22/23   Visit Time 0600   Consult Type New   WOCN Speciality Wound   Intervention assessed;changed;applied;chart review;coordination of care;orders   Positioning   Body Position position changed independently   Head of Bed (HOB) Positioning HOB elevated   Positioning/Transfer Devices pillows        Altered Skin Integrity 10/12/23 1435 Left anterior;distal;upper Arm Other (comment) Partial thickness tissue loss. Shallow open ulcer with a red or pink wound bed, without slough. Intact or Open/Ruptured Serum-filled blister.   Date First Assessed/Time First Assessed: 10/12/23 1435   Altered Skin Integrity Present on Admission - Did Patient arrive to the hospital with altered skin?: yes  Side: Left  Orientation: anterior;distal;upper  Location: Arm  Primary Wound Type: Other...   Wound Image    Description of Altered Skin Integrity Partial thickness tissue loss. Shallow open ulcer with a red or pink wound bed, without slough. Intact or Open/Ruptured Serum-filled blister.   Drainage Amount None   Red (%), Wound Tissue Color 100 %     [facility] Surg  Wound Care    Patient Name:  Tno Donovan   MRN:  [id number]  Date: 11/22/2023  Diagnosis: Psoas abscess, left    History:     Past Medical History:   Diagnosis Date    Hypertension     Unilateral inguinal hernia, without obstruction or gangrene, not specified as recurrent        Social History     Socioeconomic History    Marital status: Single   Tobacco Use    Smoking status: Former     Types: Cigarettes    Smokeless tobacco: Never   Substance and Sexual Activity    Alcohol use: No    Drug use: Yes     Types: Marijuana, IV     Comment: 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     Social Determinants of Health     Financial Resource Strain: Medium Risk (11/21/2023)    Overall Financial Resource Strain (CARDIA)     Difficulty of Paying Living Expenses: Somewhat hard   Food Insecurity: Food  Insecurity Present (11/21/2023)    Hunger Vital Sign     Worried About Running Out of Food in the Last Year: Often true     Ran Out of Food in the Last Year: Often true   Transportation Needs: Unmet Transportation Needs (11/21/2023)    PRAPARE - Transportation     Lack of Transportation (Medical): Yes     Lack of Transportation (Non-Medical): Yes   Physical Activity: Inactive (11/21/2023)    Exercise Vital Sign     Days of Exercise per Week: 0 days     Minutes of Exercise per Session: 0 min   Stress: Stress Concern Present (11/21/2023)    [employer] [employer] - Occupational Stress Questionnaire     Feeling of Stress : Very much   Social Connections: Socially Isolated (11/21/2023)    Social Connection and Isolation Panel [NHANES]     Frequency of Communication with Friends and Family: Never     Frequency of Social Gatherings with Friends and Family: Never     Attends Congregational Services: Never     Active Member of Clubs or Organizations: No     Attends Club or Organization Meetings: Never     Marital Status: Never    Housing Stability: High Risk (11/21/2023)    Housing Stability Vital Sign     Unable to Pay for Housing in the Last Year: Yes     Number of Places Lived in the Last Year: 1     Unstable Housing in the Last Year: Yes       Precautions:     Allergies as of 11/20/2023    (No Known Allergies)       WO Assessment Details/Treatment   Patient consult for wound care to left arm above the A.C. there is an abscess. The patient states that he received the abscess from shooting up heroin. The patient is currently receiving I.V. antibiotic therapy. The treatment to the left arm abscess to cleanse site with normal saline apply Aquacel ag border every two days and prn for saturation.    Recommendations made to primary team for  the above Orders placed.     11/22/2023

## 2023-11-22 NOTE — DISCHARGE SUMMARY
[facility] Medicine  Discharge Summary      Patient Name: Ton Donovan  MRN: [id number]  BOZENA: [id number]  Patient Class: IP- Inpatient  Admission Date: 11/20/2023  Hospital Length of Stay: 2 days  Discharge Date and Time:  11/22/2023 9:01 AM  Attending Physician: Shoaib Campbell MD   Discharging Provider: Jackelyn Anthony MD  Primary Care Provider: Emily Primary Doctor  [facility] Medicine Team: [facility]OSP MED V Jackelyn Anthony MD  Primary Care Team: [facility] HOSP MED V    HPI:   Mr. Donovan is a 28 year old male w/ hx of IVDU and MRSA bacteremia who presents w/ LE edema x 1 week. Additionally, he has noticed bilateral upper extremity masses that he believes are abscesses from where he injects heroin. He also reports significant left buttock and hip pain for the last week which is worse with ambulation and pressure. Patient last used heroin before presenting to the ED. He is unfortunately homeless but has a sister who is his main support system. Patient denies HA, vision changes, SOB, CP, orthopnea, PND, abdominal pain, urinary sxs or stool changes. Of note, patient has had multiple recent admissions for infected wounds and abscesses. Most recent admission in 10/2023 for abscess of R pectoralis major muscle and septic arthritis of 1st rib and 1st costochondral joint. He underwent I&D with placement of a penrose but left AMA shortly afterward w/ plans to obtain outpatient abx. Blood cultures (+) MRSA in the interim. He returned to have his penrose drain removed but eloped again; unclear if he filled prescription for Linezolid at that time.     In the ED, T 101F but otherwise HDS. Labs notable for WBC 10.17k, Hgb 7.2, Na 130. CT A/P w/ concern for osteomyelitis of the S1 vertebral body, septic arthritis of the L sacroiliac joint and a left iliopsoas muscle abscess. Patient received IVFs, vanc/zosyn and admitted to  for further management.     * No surgery found *      Hospital Course:   MRI pelvis and  lumbar spine with OM/spondylodiscitis of the S1 vertebral body extending from L5-S2, phlegmon within the spinal canal extending from L5-S2 resulting in moderate spinal canal narrowing and a bilobed left psoas abscess measuring up to 10cm which may be contiguous with the L5-S1 disc space. S/p IR guided aspiration of L psoas abscess on 11/21 of which 0.5cc of bloody fluid was aspirated. Preliminary intra op abscess cultures w/ staph aureus pending susceptibilities. NRSY consulted for epidural phegmon. Given lack of neurological deficits, deemed stable for outpatient follow up w/ plans for repeat MRI in 6 weeks. ID consulted for antibiotic management; recommended continuing vanc and zosyn pending culture data and to obtain TTE. Bcx from 11/20 NGTD.     On 11/22, notified by nursing staff that patient unhooked his IV and left the unit despite being told not to leave. Security was called to have patient return to have IV removed. As per security, patient was found at the [facility]. He did not return to his room afterward and was deemed to have eloped AMA.      Goals of Care Treatment Preferences:  Code Status: Full Code      Consults:   Consults (From admission, onward)          Status Ordering Provider     Inpatient consult to Infectious Diseases  Once        Provider:  (Not yet assigned)    Completed WALLACE LYMAN     Inpatient consult to Neurosurgery  Once        Provider:  (Not yet assigned)    Completed WALLACE LYMAN     Inpatient consult to Social Work  Once        Provider:  (Not yet assigned)    Acknowledged WALLACE LYMAN     Inpatient consult to Interventional Radiology  Once        Provider:  (Not yet assigned)    Completed WALLACE LYMAN     Pharmacy to dose Vancomycin consult  Once        Provider:  (Not yet assigned)   See [facility] for full Linked Orders Report.    Acknowledged WALLACE LYMAN            No new Assessment & Plan notes have been filed under this hospital  service since the last note was generated.  Service: Hospital Medicine    Final Active Diagnoses:    Diagnosis Date Noted POA    PRINCIPAL PROBLEM:  Psoas abscess, left [K68.12] 11/20/2023 Unknown    Septic arthritis of sacroiliac joint [M46.58] 11/20/2023 Unknown    Osteoarthritis of S1 [M47.817] 11/20/2023 Unknown    L5-S2 epidural phlegmon [G06.2] 11/21/2023 Yes    IVDU (intravenous drug user) [F19.90] 11/21/2023 Unknown    Bilateral lower extremity edema [R60.0] 11/20/2023 Unknown    Anemia of infection and chronic disease [B99.9, D63.8] 10/11/2023 Yes     Chronic    Hyponatremia [E87.1] 10/11/2023 Yes    Mass of left upper extremity [R22.32] 07/07/2023 Yes    Abscess of right upper extremity [L02.413] 07/07/2023 Yes    Heroin dependence [F11.20] 07/06/2023 Yes     Chronic      Problems Resolved During this Admission:       Discharged Condition: against medical advice    Disposition:     Follow Up:    Patient Instructions:      MRI Lumbar Spine W WO Contrast   Standing Status: Future Standing Exp. Date: 11/21/24     Order Specific Question Answer Comments   Does the patient have or ever had a pacemaker or a defibrillator (Note: Some facilities may not be able to schedule an MRI for patients with pacemakers and defibrillators. You should contact your local radiology dept to determine if this is the case.)? No    Does the patient have an aneurysm or surgical clip, pump, nerve/brain stimulator, middle/inner ear prosthesis, or other metal implant or foreign object (bullet, shrapnel)? If they have a card related to their implant, ask them to bring it. Issues related to the implant may cause the MRI to be delayed. No    Will the patient require sedation? No    May the Radiologist modify the order per protocol to meet the clinical needs of the patient? Yes    Recist criteria? Yes    Does the patient have on a skin patch for medication with aluminized backing? No        Significant Diagnostic Studies: Labs: All labs  within the past 24 hours have been reviewed  Microbiology: Blood Culture   Lab Results   Component Value Date    LABBLOO No Growth to date 11/20/2023    LABBLOO No Growth to date 11/20/2023    LABBLOO No Growth to date 11/20/2023    LABBLOO No Growth to date 11/20/2023     Radiology: Reviewed     Pending Diagnostic Studies:       Procedure Component Value Units Date/Time    Echo [[id number]]     Order Status: Sent Lab Status: No result            Medications:  Reconciled Home Medications:      Medication List        CONTINUE taking these medications      IMMUNE SUPPORT ORAL  Take 1 tablet by mouth once daily.            STOP taking these medications      ibuprofen 200 MG tablet  Commonly known as: ADVIL,MOTRIN              Indwelling Lines/Drains at time of discharge:   Lines/Drains/Airways       None                   Time spent on the discharge of patient: 35 minutes         Jackelyn Anthony MD  Department of Hospital Medicine  [facility] Surg

## 2023-11-23 LAB — BACTERIA SPEC AEROBE CULT: ABNORMAL

## 2023-11-25 LAB
BACTERIA BLD CULT: NORMAL
BACTERIA BLD CULT: NORMAL

## 2023-11-26 LAB — BACTERIA SPEC ANAEROBE CULT: NORMAL

## 2023-11-30 LAB
ACID FAST MOD KINY STN SPEC: NORMAL
MYCOBACTERIUM SPEC QL CULT: NORMAL

## 2023-12-20 LAB — FUNGUS SPEC CULT: NORMAL

## 2024-01-09 LAB
ACID FAST MOD KINY STN SPEC: NORMAL
MYCOBACTERIUM SPEC QL CULT: NORMAL

## 2024-02-04 ENCOUNTER — HOSPITAL ENCOUNTER (INPATIENT)
Facility: HOSPITAL | Age: 29
LOS: 18 days | Discharge: ANOTHER HOSPITAL | DRG: 459 | End: 2024-02-22
Attending: EMERGENCY MEDICINE | Admitting: STUDENT IN AN ORGANIZED HEALTH CARE EDUCATION/TRAINING PROGRAM
Payer: MEDICAID

## 2024-02-04 DIAGNOSIS — M48.061 SPINAL STENOSIS OF LUMBAR REGION WITHOUT NEUROGENIC CLAUDICATION: ICD-10-CM

## 2024-02-04 DIAGNOSIS — M54.41 ACUTE BILATERAL LOW BACK PAIN WITH BILATERAL SCIATICA: ICD-10-CM

## 2024-02-04 DIAGNOSIS — F19.90 IVDU (INTRAVENOUS DRUG USER): Primary | ICD-10-CM

## 2024-02-04 DIAGNOSIS — K65.1 PELVIC ABSCESS IN MALE: ICD-10-CM

## 2024-02-04 DIAGNOSIS — F11.93 OPIOID WITHDRAWAL: ICD-10-CM

## 2024-02-04 DIAGNOSIS — F19.94 SUBSTANCE OR MEDICATION-INDUCED DEPRESSIVE DISORDER: Chronic | ICD-10-CM

## 2024-02-04 DIAGNOSIS — R07.9 CHEST PAIN: ICD-10-CM

## 2024-02-04 DIAGNOSIS — G06.2 EPIDURAL ABSCESS: ICD-10-CM

## 2024-02-04 DIAGNOSIS — M46.27 OSTEOMYELITIS OF VERTEBRA OF LUMBOSACRAL REGION: ICD-10-CM

## 2024-02-04 DIAGNOSIS — F17.210 CIGARETTE NICOTINE DEPENDENCE WITHOUT COMPLICATION: ICD-10-CM

## 2024-02-04 DIAGNOSIS — F14.20 COCAINE USE DISORDER, SEVERE, DEPENDENCE: Chronic | ICD-10-CM

## 2024-02-04 DIAGNOSIS — M54.42 ACUTE BILATERAL LOW BACK PAIN WITH BILATERAL SCIATICA: ICD-10-CM

## 2024-02-04 DIAGNOSIS — Z22.322 MRSA (METHICILLIN RESISTANT STAPH AUREUS) CULTURE POSITIVE: ICD-10-CM

## 2024-02-04 DIAGNOSIS — F11.20 HEROIN USE DISORDER, SEVERE: ICD-10-CM

## 2024-02-04 DIAGNOSIS — M46.1 SACROILIITIS: ICD-10-CM

## 2024-02-04 PROBLEM — Z00.8 MEDICAL CLEARANCE FOR PSYCHIATRIC ADMISSION: Status: ACTIVE | Noted: 2019-11-13

## 2024-02-04 LAB
ALBUMIN SERPL BCP-MCNC: 2.5 G/DL (ref 3.5–5.2)
ALP SERPL-CCNC: 68 U/L (ref 55–135)
ALT SERPL W/O P-5'-P-CCNC: 7 U/L (ref 10–44)
ANION GAP SERPL CALC-SCNC: 7 MMOL/L (ref 8–16)
AST SERPL-CCNC: 20 U/L (ref 10–40)
BASOPHILS # BLD AUTO: 0.02 K/UL (ref 0–0.2)
BASOPHILS NFR BLD: 0.2 % (ref 0–1.9)
BILIRUB SERPL-MCNC: 0.2 MG/DL (ref 0.1–1)
BUN SERPL-MCNC: 25 MG/DL (ref 6–20)
CALCIUM SERPL-MCNC: 8.6 MG/DL (ref 8.7–10.5)
CHLORIDE SERPL-SCNC: 102 MMOL/L (ref 95–110)
CK SERPL-CCNC: 168 U/L (ref 20–200)
CO2 SERPL-SCNC: 24 MMOL/L (ref 23–29)
CREAT SERPL-MCNC: 1 MG/DL (ref 0.5–1.4)
CRP SERPL-MCNC: 33.7 MG/L (ref 0–8.2)
DIFFERENTIAL METHOD BLD: ABNORMAL
EOSINOPHIL # BLD AUTO: 0 K/UL (ref 0–0.5)
EOSINOPHIL NFR BLD: 0.1 % (ref 0–8)
ERYTHROCYTE [DISTWIDTH] IN BLOOD BY AUTOMATED COUNT: 18.6 % (ref 11.5–14.5)
ERYTHROCYTE [SEDIMENTATION RATE] IN BLOOD BY PHOTOMETRIC METHOD: 75 MM/HR (ref 0–23)
EST. GFR  (NO RACE VARIABLE): >60 ML/MIN/1.73 M^2
GLUCOSE SERPL-MCNC: 109 MG/DL (ref 70–110)
HCT VFR BLD AUTO: 26.1 % (ref 40–54)
HGB BLD-MCNC: 8 G/DL (ref 14–18)
IMM GRANULOCYTES # BLD AUTO: 0.03 K/UL (ref 0–0.04)
IMM GRANULOCYTES NFR BLD AUTO: 0.3 % (ref 0–0.5)
LACTATE SERPL-SCNC: 0.6 MMOL/L (ref 0.5–2.2)
LYMPHOCYTES # BLD AUTO: 1.7 K/UL (ref 1–4.8)
LYMPHOCYTES NFR BLD: 16.5 % (ref 18–48)
MCH RBC QN AUTO: 21.1 PG (ref 27–31)
MCHC RBC AUTO-ENTMCNC: 30.7 G/DL (ref 32–36)
MCV RBC AUTO: 69 FL (ref 82–98)
MONOCYTES # BLD AUTO: 0.7 K/UL (ref 0.3–1)
MONOCYTES NFR BLD: 7.4 % (ref 4–15)
NEUTROPHILS # BLD AUTO: 7.6 K/UL (ref 1.8–7.7)
NEUTROPHILS NFR BLD: 75.5 % (ref 38–73)
NRBC BLD-RTO: 0 /100 WBC
PLATELET # BLD AUTO: 410 K/UL (ref 150–450)
PMV BLD AUTO: 8.3 FL (ref 9.2–12.9)
POTASSIUM SERPL-SCNC: 4 MMOL/L (ref 3.5–5.1)
PROT SERPL-MCNC: 8 G/DL (ref 6–8.4)
RBC # BLD AUTO: 3.79 M/UL (ref 4.6–6.2)
SODIUM SERPL-SCNC: 133 MMOL/L (ref 136–145)
WBC # BLD AUTO: 10.02 K/UL (ref 3.9–12.7)

## 2024-02-04 PROCEDURE — 86140 C-REACTIVE PROTEIN: CPT | Performed by: PHYSICIAN ASSISTANT

## 2024-02-04 PROCEDURE — A9585 GADOBUTROL INJECTION: HCPCS | Performed by: EMERGENCY MEDICINE

## 2024-02-04 PROCEDURE — 12000002 HC ACUTE/MED SURGE SEMI-PRIVATE ROOM

## 2024-02-04 PROCEDURE — 83605 ASSAY OF LACTIC ACID: CPT | Performed by: PHYSICIAN ASSISTANT

## 2024-02-04 PROCEDURE — 25000003 PHARM REV CODE 250: Performed by: PHYSICIAN ASSISTANT

## 2024-02-04 PROCEDURE — 87040 BLOOD CULTURE FOR BACTERIA: CPT | Mod: 59 | Performed by: PHYSICIAN ASSISTANT

## 2024-02-04 PROCEDURE — 96361 HYDRATE IV INFUSION ADD-ON: CPT

## 2024-02-04 PROCEDURE — 63600175 PHARM REV CODE 636 W HCPCS: Performed by: PHYSICIAN ASSISTANT

## 2024-02-04 PROCEDURE — 96375 TX/PRO/DX INJ NEW DRUG ADDON: CPT

## 2024-02-04 PROCEDURE — 99285 EMERGENCY DEPT VISIT HI MDM: CPT | Mod: 25

## 2024-02-04 PROCEDURE — 85652 RBC SED RATE AUTOMATED: CPT | Performed by: PHYSICIAN ASSISTANT

## 2024-02-04 PROCEDURE — 25500020 PHARM REV CODE 255: Performed by: EMERGENCY MEDICINE

## 2024-02-04 PROCEDURE — 82550 ASSAY OF CK (CPK): CPT | Performed by: PHYSICIAN ASSISTANT

## 2024-02-04 PROCEDURE — 85025 COMPLETE CBC W/AUTO DIFF WBC: CPT | Performed by: PHYSICIAN ASSISTANT

## 2024-02-04 PROCEDURE — 80053 COMPREHEN METABOLIC PANEL: CPT | Performed by: PHYSICIAN ASSISTANT

## 2024-02-04 RX ORDER — GADOBUTROL 604.72 MG/ML
8 INJECTION INTRAVENOUS
Status: COMPLETED | OUTPATIENT
Start: 2024-02-04 | End: 2024-02-04

## 2024-02-04 RX ORDER — NALOXONE HYDROCHLORIDE 4 MG/.1ML
4 SPRAY NASAL
COMMUNITY
Start: 2023-05-07

## 2024-02-04 RX ORDER — KETOROLAC TROMETHAMINE 30 MG/ML
10 INJECTION, SOLUTION INTRAMUSCULAR; INTRAVENOUS
Status: COMPLETED | OUTPATIENT
Start: 2024-02-04 | End: 2024-02-04

## 2024-02-04 RX ADMIN — GADOBUTROL 8 ML: 604.72 INJECTION INTRAVENOUS at 10:02

## 2024-02-04 RX ADMIN — SODIUM CHLORIDE 1000 ML: 9 INJECTION, SOLUTION INTRAVENOUS at 07:02

## 2024-02-04 RX ADMIN — KETOROLAC TROMETHAMINE 10 MG: 30 INJECTION, SOLUTION INTRAMUSCULAR; INTRAVENOUS at 07:02

## 2024-02-05 PROBLEM — M54.42 ACUTE BILATERAL LOW BACK PAIN WITH BILATERAL SCIATICA: Status: ACTIVE | Noted: 2024-02-05

## 2024-02-05 PROBLEM — M46.27: Status: ACTIVE | Noted: 2024-02-05

## 2024-02-05 PROBLEM — K65.1 PELVIC ABSCESS IN MALE: Status: ACTIVE | Noted: 2024-02-05

## 2024-02-05 PROBLEM — M54.41 ACUTE BILATERAL LOW BACK PAIN WITH BILATERAL SCIATICA: Status: ACTIVE | Noted: 2024-02-05

## 2024-02-05 PROBLEM — F14.20 COCAINE USE DISORDER, SEVERE, DEPENDENCE: Chronic | Status: ACTIVE | Noted: 2024-02-05

## 2024-02-05 PROBLEM — F19.94 SUBSTANCE OR MEDICATION-INDUCED DEPRESSIVE DISORDER: Chronic | Status: ACTIVE | Noted: 2024-02-05

## 2024-02-05 PROBLEM — M48.061 SPINAL STENOSIS OF LUMBAR REGION WITHOUT NEUROGENIC CLAUDICATION: Status: ACTIVE | Noted: 2024-02-05

## 2024-02-05 LAB
ALBUMIN SERPL BCP-MCNC: 2.1 G/DL (ref 3.5–5.2)
ALP SERPL-CCNC: 58 U/L (ref 55–135)
ALT SERPL W/O P-5'-P-CCNC: 6 U/L (ref 10–44)
ANION GAP SERPL CALC-SCNC: 6 MMOL/L (ref 8–16)
AST SERPL-CCNC: 16 U/L (ref 10–40)
BASOPHILS # BLD AUTO: 0.01 K/UL (ref 0–0.2)
BASOPHILS NFR BLD: 0.2 % (ref 0–1.9)
BILIRUB SERPL-MCNC: 0.2 MG/DL (ref 0.1–1)
BUN SERPL-MCNC: 21 MG/DL (ref 6–20)
CALCIUM SERPL-MCNC: 8.2 MG/DL (ref 8.7–10.5)
CHLORIDE SERPL-SCNC: 106 MMOL/L (ref 95–110)
CO2 SERPL-SCNC: 21 MMOL/L (ref 23–29)
CREAT SERPL-MCNC: 0.8 MG/DL (ref 0.5–1.4)
DIFFERENTIAL METHOD BLD: ABNORMAL
EOSINOPHIL # BLD AUTO: 0 K/UL (ref 0–0.5)
EOSINOPHIL NFR BLD: 0.6 % (ref 0–8)
ERYTHROCYTE [DISTWIDTH] IN BLOOD BY AUTOMATED COUNT: 18.7 % (ref 11.5–14.5)
EST. GFR  (NO RACE VARIABLE): >60 ML/MIN/1.73 M^2
ESTIMATED AVG GLUCOSE: 131 MG/DL (ref 68–131)
FERRITIN SERPL-MCNC: 199 NG/ML (ref 20–300)
GLUCOSE SERPL-MCNC: 90 MG/DL (ref 70–110)
HBA1C MFR BLD: 6.2 % (ref 4–5.6)
HCT VFR BLD AUTO: 25.3 % (ref 40–54)
HGB BLD-MCNC: 7.7 G/DL (ref 14–18)
HIV 1+2 AB+HIV1 P24 AG SERPL QL IA: NORMAL
IMM GRANULOCYTES # BLD AUTO: 0.03 K/UL (ref 0–0.04)
IMM GRANULOCYTES NFR BLD AUTO: 0.5 % (ref 0–0.5)
IRON SERPL-MCNC: 12 UG/DL (ref 45–160)
LYMPHOCYTES # BLD AUTO: 2 K/UL (ref 1–4.8)
LYMPHOCYTES NFR BLD: 30.5 % (ref 18–48)
MAGNESIUM SERPL-MCNC: 1.7 MG/DL (ref 1.6–2.6)
MCH RBC QN AUTO: 21.1 PG (ref 27–31)
MCHC RBC AUTO-ENTMCNC: 30.4 G/DL (ref 32–36)
MCV RBC AUTO: 69 FL (ref 82–98)
MONOCYTES # BLD AUTO: 0.7 K/UL (ref 0.3–1)
MONOCYTES NFR BLD: 10.6 % (ref 4–15)
NEUTROPHILS # BLD AUTO: 3.8 K/UL (ref 1.8–7.7)
NEUTROPHILS NFR BLD: 57.6 % (ref 38–73)
NRBC BLD-RTO: 0 /100 WBC
PHOSPHATE SERPL-MCNC: 3.3 MG/DL (ref 2.7–4.5)
PLATELET # BLD AUTO: 402 K/UL (ref 150–450)
PMV BLD AUTO: 8.6 FL (ref 9.2–12.9)
POTASSIUM SERPL-SCNC: 3.7 MMOL/L (ref 3.5–5.1)
PROT SERPL-MCNC: 7 G/DL (ref 6–8.4)
RBC # BLD AUTO: 3.65 M/UL (ref 4.6–6.2)
SATURATED IRON: 5 % (ref 20–50)
SODIUM SERPL-SCNC: 133 MMOL/L (ref 136–145)
TOTAL IRON BINDING CAPACITY: 243 UG/DL (ref 250–450)
TRANSFERRIN SERPL-MCNC: 164 MG/DL (ref 200–375)
WBC # BLD AUTO: 6.5 K/UL (ref 3.9–12.7)

## 2024-02-05 PROCEDURE — 83735 ASSAY OF MAGNESIUM: CPT | Performed by: STUDENT IN AN ORGANIZED HEALTH CARE EDUCATION/TRAINING PROGRAM

## 2024-02-05 PROCEDURE — 63600175 PHARM REV CODE 636 W HCPCS: Performed by: EMERGENCY MEDICINE

## 2024-02-05 PROCEDURE — 83540 ASSAY OF IRON: CPT | Performed by: STUDENT IN AN ORGANIZED HEALTH CARE EDUCATION/TRAINING PROGRAM

## 2024-02-05 PROCEDURE — 94761 N-INVAS EAR/PLS OXIMETRY MLT: CPT

## 2024-02-05 PROCEDURE — 25000003 PHARM REV CODE 250: Performed by: STUDENT IN AN ORGANIZED HEALTH CARE EDUCATION/TRAINING PROGRAM

## 2024-02-05 PROCEDURE — 84100 ASSAY OF PHOSPHORUS: CPT | Performed by: STUDENT IN AN ORGANIZED HEALTH CARE EDUCATION/TRAINING PROGRAM

## 2024-02-05 PROCEDURE — 25000003 PHARM REV CODE 250: Performed by: EMERGENCY MEDICINE

## 2024-02-05 PROCEDURE — 87389 HIV-1 AG W/HIV-1&-2 AB AG IA: CPT | Performed by: STUDENT IN AN ORGANIZED HEALTH CARE EDUCATION/TRAINING PROGRAM

## 2024-02-05 PROCEDURE — 83036 HEMOGLOBIN GLYCOSYLATED A1C: CPT | Performed by: STUDENT IN AN ORGANIZED HEALTH CARE EDUCATION/TRAINING PROGRAM

## 2024-02-05 PROCEDURE — 85025 COMPLETE CBC W/AUTO DIFF WBC: CPT | Performed by: STUDENT IN AN ORGANIZED HEALTH CARE EDUCATION/TRAINING PROGRAM

## 2024-02-05 PROCEDURE — 11000001 HC ACUTE MED/SURG PRIVATE ROOM

## 2024-02-05 PROCEDURE — 80053 COMPREHEN METABOLIC PANEL: CPT | Performed by: STUDENT IN AN ORGANIZED HEALTH CARE EDUCATION/TRAINING PROGRAM

## 2024-02-05 PROCEDURE — 63600175 PHARM REV CODE 636 W HCPCS: Performed by: STUDENT IN AN ORGANIZED HEALTH CARE EDUCATION/TRAINING PROGRAM

## 2024-02-05 PROCEDURE — 82728 ASSAY OF FERRITIN: CPT | Performed by: STUDENT IN AN ORGANIZED HEALTH CARE EDUCATION/TRAINING PROGRAM

## 2024-02-05 PROCEDURE — 96365 THER/PROPH/DIAG IV INF INIT: CPT

## 2024-02-05 PROCEDURE — 51798 US URINE CAPACITY MEASURE: CPT

## 2024-02-05 PROCEDURE — 99222 1ST HOSP IP/OBS MODERATE 55: CPT | Mod: ,,, | Performed by: PSYCHIATRY & NEUROLOGY

## 2024-02-05 RX ORDER — SODIUM CHLORIDE 0.9 % (FLUSH) 0.9 %
10 SYRINGE (ML) INJECTION
Status: DISCONTINUED | OUTPATIENT
Start: 2024-02-05 | End: 2024-02-22 | Stop reason: HOSPADM

## 2024-02-05 RX ORDER — ACETAMINOPHEN 500 MG
1000 TABLET ORAL EVERY 8 HOURS PRN
Status: DISCONTINUED | OUTPATIENT
Start: 2024-02-05 | End: 2024-02-07

## 2024-02-05 RX ORDER — CLONIDINE HYDROCHLORIDE 0.1 MG/1
0.1 TABLET ORAL EVERY 12 HOURS
Status: DISCONTINUED | OUTPATIENT
Start: 2024-02-05 | End: 2024-02-22 | Stop reason: HOSPADM

## 2024-02-05 RX ORDER — IBUPROFEN 200 MG
24 TABLET ORAL
Status: DISCONTINUED | OUTPATIENT
Start: 2024-02-05 | End: 2024-02-22 | Stop reason: HOSPADM

## 2024-02-05 RX ORDER — ENOXAPARIN SODIUM 100 MG/ML
40 INJECTION SUBCUTANEOUS EVERY 24 HOURS
Status: DISCONTINUED | OUTPATIENT
Start: 2024-02-05 | End: 2024-02-13

## 2024-02-05 RX ORDER — LOPERAMIDE HYDROCHLORIDE 2 MG/1
2 CAPSULE ORAL
Status: DISCONTINUED | OUTPATIENT
Start: 2024-02-05 | End: 2024-02-22 | Stop reason: HOSPADM

## 2024-02-05 RX ORDER — GLUCAGON 1 MG
1 KIT INJECTION
Status: DISCONTINUED | OUTPATIENT
Start: 2024-02-05 | End: 2024-02-22 | Stop reason: HOSPADM

## 2024-02-05 RX ORDER — DICYCLOMINE HYDROCHLORIDE 10 MG/1
10 CAPSULE ORAL EVERY 6 HOURS PRN
Status: DISCONTINUED | OUTPATIENT
Start: 2024-02-05 | End: 2024-02-22 | Stop reason: HOSPADM

## 2024-02-05 RX ORDER — NALOXONE HCL 0.4 MG/ML
0.02 VIAL (ML) INJECTION
Status: DISCONTINUED | OUTPATIENT
Start: 2024-02-05 | End: 2024-02-22 | Stop reason: HOSPADM

## 2024-02-05 RX ORDER — ACETAMINOPHEN 325 MG/1
650 TABLET ORAL EVERY 4 HOURS PRN
Status: DISCONTINUED | OUTPATIENT
Start: 2024-02-05 | End: 2024-02-07

## 2024-02-05 RX ORDER — HYDROXYZINE HYDROCHLORIDE 25 MG/1
50 TABLET, FILM COATED ORAL EVERY 6 HOURS PRN
Status: DISCONTINUED | OUTPATIENT
Start: 2024-02-05 | End: 2024-02-22 | Stop reason: HOSPADM

## 2024-02-05 RX ORDER — MAGNESIUM SULFATE HEPTAHYDRATE 40 MG/ML
2 INJECTION, SOLUTION INTRAVENOUS ONCE
Status: COMPLETED | OUTPATIENT
Start: 2024-02-05 | End: 2024-02-05

## 2024-02-05 RX ORDER — IBUPROFEN 600 MG/1
600 TABLET ORAL EVERY 6 HOURS PRN
Status: DISCONTINUED | OUTPATIENT
Start: 2024-02-05 | End: 2024-02-05

## 2024-02-05 RX ORDER — IBUPROFEN 600 MG/1
600 TABLET ORAL EVERY 6 HOURS PRN
Status: DISCONTINUED | OUTPATIENT
Start: 2024-02-05 | End: 2024-02-13

## 2024-02-05 RX ORDER — PROMETHAZINE HYDROCHLORIDE 25 MG/1
25 TABLET ORAL EVERY 6 HOURS PRN
Status: DISCONTINUED | OUTPATIENT
Start: 2024-02-05 | End: 2024-02-22 | Stop reason: HOSPADM

## 2024-02-05 RX ORDER — TALC
6 POWDER (GRAM) TOPICAL NIGHTLY PRN
Status: DISCONTINUED | OUTPATIENT
Start: 2024-02-05 | End: 2024-02-22 | Stop reason: HOSPADM

## 2024-02-05 RX ORDER — IBUPROFEN 200 MG
16 TABLET ORAL
Status: DISCONTINUED | OUTPATIENT
Start: 2024-02-05 | End: 2024-02-22 | Stop reason: HOSPADM

## 2024-02-05 RX ORDER — HYDROMORPHONE HYDROCHLORIDE 1 MG/ML
1 INJECTION, SOLUTION INTRAMUSCULAR; INTRAVENOUS; SUBCUTANEOUS EVERY 6 HOURS PRN
Status: DISCONTINUED | OUTPATIENT
Start: 2024-02-05 | End: 2024-02-07

## 2024-02-05 RX ORDER — SODIUM CHLORIDE 0.9 % (FLUSH) 0.9 %
10 SYRINGE (ML) INJECTION
Status: DISCONTINUED | OUTPATIENT
Start: 2024-02-05 | End: 2024-02-05

## 2024-02-05 RX ORDER — HYDROMORPHONE HYDROCHLORIDE 1 MG/ML
1 INJECTION, SOLUTION INTRAMUSCULAR; INTRAVENOUS; SUBCUTANEOUS
Status: COMPLETED | OUTPATIENT
Start: 2024-02-05 | End: 2024-02-05

## 2024-02-05 RX ORDER — HYDROMORPHONE HYDROCHLORIDE 1 MG/ML
0.5 INJECTION, SOLUTION INTRAMUSCULAR; INTRAVENOUS; SUBCUTANEOUS EVERY 6 HOURS PRN
Status: DISCONTINUED | OUTPATIENT
Start: 2024-02-05 | End: 2024-02-07

## 2024-02-05 RX ADMIN — POTASSIUM BICARBONATE 25 MEQ: 978 TABLET, EFFERVESCENT ORAL at 10:02

## 2024-02-05 RX ADMIN — CLONIDINE HYDROCHLORIDE 0.1 MG: 0.1 TABLET ORAL at 09:02

## 2024-02-05 RX ADMIN — MAGNESIUM SULFATE HEPTAHYDRATE 2 G: 40 INJECTION, SOLUTION INTRAVENOUS at 10:02

## 2024-02-05 RX ADMIN — HYDROMORPHONE HYDROCHLORIDE 1 MG: 1 INJECTION, SOLUTION INTRAMUSCULAR; INTRAVENOUS; SUBCUTANEOUS at 12:02

## 2024-02-05 RX ADMIN — MELATONIN TAB 3 MG 6 MG: 3 TAB at 09:02

## 2024-02-05 RX ADMIN — IBUPROFEN 600 MG: 600 TABLET ORAL at 09:02

## 2024-02-05 RX ADMIN — SODIUM CHLORIDE 1000 ML: 9 INJECTION, SOLUTION INTRAVENOUS at 03:02

## 2024-02-05 RX ADMIN — HYDROMORPHONE HYDROCHLORIDE 1 MG: 1 INJECTION, SOLUTION INTRAMUSCULAR; INTRAVENOUS; SUBCUTANEOUS at 06:02

## 2024-02-05 RX ADMIN — CLONIDINE HYDROCHLORIDE 0.1 MG: 0.1 TABLET ORAL at 10:02

## 2024-02-05 RX ADMIN — HYDROXYZINE HYDROCHLORIDE 50 MG: 25 TABLET, FILM COATED ORAL at 09:02

## 2024-02-05 RX ADMIN — PIPERACILLIN SODIUM AND TAZOBACTAM SODIUM 4.5 G: 4; .5 INJECTION, POWDER, FOR SOLUTION INTRAVENOUS at 12:02

## 2024-02-05 RX ADMIN — HYDROMORPHONE HYDROCHLORIDE 1 MG: 1 INJECTION, SOLUTION INTRAMUSCULAR; INTRAVENOUS; SUBCUTANEOUS at 11:02

## 2024-02-05 RX ADMIN — IBUPROFEN 600 MG: 600 TABLET ORAL at 06:02

## 2024-02-05 RX ADMIN — ENOXAPARIN SODIUM 40 MG: 40 INJECTION SUBCUTANEOUS at 06:02

## 2024-02-05 NOTE — ED PROVIDER NOTES
"Encounter Date: 2/4/2024       History     Chief Complaint   Patient presents with    Back Pain     Pt arrived via EMS with complaint of back pain that started 4 weeks ago claims its from a blood infection, back is hot to the touch per ems and pt is having spasms, pt received 30mg IV toroaol     27 y/o male with PMHx of MRSA bacteremia, IVDU, and psoas abscess presents with back pain that intermittent radiation down bilateral legs x 2w. He complains of bilateral leg weakness in which he has issues walking and moving his legs. Pt states "it feels like there are pins and needles in my legs." He states that laying on his stomach provides slight relief. Pt has tried Ibuprofen for the pain with relief. Pt last injected heroin before EMS arrived. Pt states he normally injects in his arms if he can find a vein. Pt denies fever, N/V/D, and dizziness/fainting. Pt denies urinary/bowel incontinence and numbness in rectal/genital area.       Review of patient's allergies indicates:  No Known Allergies  Past Medical History:   Diagnosis Date    Hypertension     Unilateral inguinal hernia, without obstruction or gangrene, not specified as recurrent      Past Surgical History:   Procedure Laterality Date    HAND ARTHROTOMY Right 7/7/2023    Procedure: ARTHROTOMY, HAND - R LF PIP;  Surgeon: Boy Lamb MD;  Location: [address];  Service: Orthopedics;  Laterality: Right;    INCISION AND DRAINAGE OF ABSCESS Right 10/12/2023    Procedure: INCISION AND DRAINAGE, ABSCESS;  Surgeon: Steve Monteiro MD;  Location: [address];  Service: General;  Laterality: Right;  right chest    IRRIGATION AND DEBRIDEMENT OF UPPER EXTREMITY Bilateral 7/7/2023    Procedure: IRRIGATION AND DEBRIDEMENT, UPPER EXTREMITY - RIGHT HAND;  Surgeon: Boy Lamb MD;  Location: [address];  Service: Orthopedics;  Laterality: Bilateral;    OLECRANON BURSECTOMY Left 7/7/2023    Procedure: BURSECTOMY, OLECRANON;  Surgeon: Boy BOX" MD Adonis;  Location: [facility] OR [address] [place] [address];  Service: Orthopedics;  Laterality: Left;     History reviewed. No pertinent family history.  Social History     Tobacco Use    Smoking status: Former     Types: Cigarettes    Smokeless tobacco: Never   Substance Use Topics    Alcohol use: No    Drug use: Yes     Types: Marijuana, IV     Comment: 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     Review of Systems    Physical Exam     Initial Vitals [02/04/24 1647]   BP Pulse Resp Temp SpO2   (!) 145/84 102 18 98.5 °F (36.9 °C) 100 %      MAP       --         Physical Exam    Nursing note and vitals reviewed.  Constitutional: He appears well-developed and well-nourished. He is not diaphoretic. No distress.   HENT:   Head: Normocephalic and atraumatic.   Eyes: EOM are normal. Pupils are equal, round, and reactive to light.   Neck: Neck supple.   Normal range of motion.  Cardiovascular:  Normal rate, regular rhythm, normal heart sounds and intact distal pulses.     Exam reveals no gallop and no friction rub.       No murmur heard.  Pulmonary/Chest: Breath sounds normal. No respiratory distress. He has no wheezes. He has no rhonchi. He has no rales. He exhibits no tenderness.   Abdominal: Abdomen is soft. Bowel sounds are normal. He exhibits no distension. There is no abdominal tenderness.   Musculoskeletal:         General: Normal range of motion.      Cervical back: Normal range of motion and neck supple.      Comments: No midline tenderness to palpation to C or T-spine.  There is swelling overlying the lumbar spine and sacrum, no significant tenderness.     Neurological: He is alert and oriented to person, place, and time. He has normal strength. A sensory deficit is present.   PA student present as chaperone for rectal exam.  Normal rectal tone and sensation.    Subjectively decreased sensation to light touch on the distal posterior calves.  Strength intact   Skin: Skin is warm and dry.   Psychiatric: He has a normal mood and  affect.         ED Course   Procedures  Labs Reviewed   CBC W/ AUTO DIFFERENTIAL - Abnormal; Notable for the following components:       Result Value    RBC 3.79 (*)     Hemoglobin 8.0 (*)     Hematocrit 26.1 (*)     MCV 69 (*)     MCH 21.1 (*)     MCHC 30.7 (*)     RDW 18.6 (*)     MPV 8.3 (*)     Gran % 75.5 (*)     Lymph % 16.5 (*)     All other components within normal limits    Narrative:     ADD ON CPK PER [facility]/ORDER# [id number] @ 19:02   COMPREHENSIVE METABOLIC PANEL - Abnormal; Notable for the following components:    Sodium 133 (*)     BUN 25 (*)     Calcium 8.6 (*)     Albumin 2.5 (*)     ALT 7 (*)     Anion Gap 7 (*)     All other components within normal limits    Narrative:     ADD ON CPK PER [facility]/ORDER# [id number] @ 19:02   C-REACTIVE PROTEIN - Abnormal; Notable for the following components:    CRP 33.7 (*)     All other components within normal limits    Narrative:     ADD ON CPK PER [facility]/ORDER# [id number] @ 19:02   SEDIMENTATION RATE - Abnormal; Notable for the following components:    Sed Rate 75 (*)     All other components within normal limits    Narrative:     ADD ON CPK PER [facility]/ORDER# [id number] @ 19:02   CULTURE, BLOOD   CULTURE, BLOOD   LACTIC ACID, PLASMA   CK   CK    Narrative:     ADD ON CPK PER [facility]/ORDER# [id number] @ 19:02          Imaging Results              MRI Lumbar Spine W WO Cont (In process)  Result time 02/04/24 20:55:45                     MRI Pelvis W WO Contrast (In process)  Result time 02/04/24 20:55:39                     Medications   ketorolac injection 9.999 mg (9.999 mg Intravenous Given 2/4/24 1930)   sodium chloride 0.9% bolus 1,000 mL 1,000 mL (1,000 mLs Intravenous New Bag 2/4/24 1933)     Medical Decision Making  28-year-old male with history of multiple spinal infections presenting for back pain.  /84, mildly tachycardic with heart rate of 102.  Otherwise normal vitals.  Nontoxic  appearing.    Differential diagnosis:  Initial concern for recurrence of the epidural abscess   Osteomyelitis  Diskitis  So as abscess felt less likely   Clinical presentation seems inconsistent with cauda equina syndrome  Bacteremia    Will check labs, give analgesics, MRI lumbar spine and pelvis and reassess.    Lab workup is notable for elevated inflammatory markers.  No leukocytosis.  Dispo pending results of MRI.  I discussed this patient with my supervising physician and I am signing out to Miguel Whitney MD.    9:24 PM  Fabienne Zhao PA-C      Amount and/or Complexity of Data Reviewed  Labs: ordered. Decision-making details documented in ED Course.  Radiology: ordered.    Risk  Prescription drug management.  Decision regarding hospitalization.               ED Course as of 02/04/24 2124   Sun Feb 04, 2024 1909 WBC: 10.02 [CC]   1909 Hemoglobin(!): 8.0  Chronic anemia [CC]   1927 CRP(!): 33.7 [CC]   1927 CPK: 168 [CC]   1933 Lactic Acid Level: 0.6 [CC]   1933 BUN(!): 25 [CC]   1933 Creatinine: 1.0 [CC]   2019 Sed Rate(!): 75 [CC]   2019 CPK: 168  Doubt myositis [CC]      ED Course User Index  [CC] Fabienne Zhao PA-C                           Clinical Impression:  Final diagnoses:  [F19.90] IVDU (intravenous drug user) (Primary)  [M54.42, M54.41] Acute bilateral low back pain with bilateral sciatica                 Fabienne Zhao PA-C  02/04/24 2124

## 2024-02-05 NOTE — ASSESSMENT & PLAN NOTE
Patient is a 28-year-old male with past medical history significant for IVDU who is presenting with worsening abdominal pain, back pain, and lower extremity sensory changes.  Patient has a known intra-abdominal and intrapelvic abscesses and has recently had septic arthritis of the sacroiliac joint.  Overall clinical picture is concerning for progression of epidural abscess.  There was abnormal signal on MRI of L5-S1 disc space with anteriolisthesis and large epidural abscess at L5 with worsening central canal stenosis (severe question sean).  MRI also showed additional abscess in the left psoas muscle, left iliacus muscle, possible SI joint septic arthritis, possible pyomyositis of the paraspinal muscles, possible facet joint arthritis, and possible left iliac osteomyelitis.    Patient received 1/3 of a dose of piperacillin/tazobactam before was stopped by the emergency physician.    Plan:  - NPO  - follow up neurosurgery note  - per handoff by emergency physician, no antibiotics at this time.

## 2024-02-05 NOTE — H&P
[facility] - Emergency Dept  [facility]  History & Physical    Patient Name: Ton Donovan  MRN: [id number]  Patient Class: IP- Inpatient  Admission Date: 2/4/2024  Attending Physician: Brenna Belcher MD   Primary Care Provider: [facility]         Patient information was obtained from patient, past medical records, and ER records.     Subjective:     Principal Problem:Osteomyelitis of vertebra of lumbosacral region    Chief Complaint:   Chief Complaint   Patient presents with    Back Pain     Pt arrived via EMS with complaint of back pain that started 4 weeks ago claims its from a blood infection, back is hot to the touch per ems and pt is having spasms, pt received 30mg IV toroaol        HPI: Patient is a 28-year-old male with past medical history significant for MRSA bacteremia, IV drug use, and multiple septic joints and pelvic/intra-abdominal abscesses who presents with progressive back pain with radiation down the legs.  He has had bilateral numb this and neuropathic pain over the dorsum of his foot.  This has been ongoing and is not necessarily a new problem.  He does state though that the pins and needles that have been moving down his legs have been progressively worsening and that laying on his stomach provide significant relief.  The patient is actively using heroin and did an injection prior to being taken in by EMS.  He denies any fevers, chills, night sweats, he is not having any shortness of breath or cough.  He denies any bowel or bladder incontinence or saddle anesthesia.  He has good muscle strength in his lower extremities bilaterally.    Past Medical History:   Diagnosis Date    Hypertension     Unilateral inguinal hernia, without obstruction or gangrene, not specified as recurrent        Past Surgical History:   Procedure Laterality Date    HAND ARTHROTOMY Right 7/7/2023    Procedure: ARTHROTOMY, HAND - R LF PIP;  Surgeon: Boy Lamb MD;  Location: [facility] OR [address] [place] [address];   Service: Orthopedics;  Laterality: Right;    INCISION AND DRAINAGE OF ABSCESS Right 10/12/2023    Procedure: INCISION AND DRAINAGE, ABSCESS;  Surgeon: Steve Monteiro MD;  Location: [address];  Service: General;  Laterality: Right;  right chest    IRRIGATION AND DEBRIDEMENT OF UPPER EXTREMITY Bilateral 7/7/2023    Procedure: IRRIGATION AND DEBRIDEMENT, UPPER EXTREMITY - RIGHT HAND;  Surgeon: Boy Lamb MD;  Location: [address];  Service: Orthopedics;  Laterality: Bilateral;    OLECRANON BURSECTOMY Left 7/7/2023    Procedure: BURSECTOMY, OLECRANON;  Surgeon: Boy Lamb MD;  Location: [address];  Service: Orthopedics;  Laterality: Left;       Review of patient's allergies indicates:  No Known Allergies    No current facility-administered medications on file prior to encounter.     Current Outpatient Medications on File Prior to Encounter   Medication Sig    naloxone (NARCAN) 4 mg/actuation Spry 4 mg by Nasal route.    mv-min/vit C/glut/lysine/hb124 (IMMUNE SUPPORT ORAL) Take 1 tablet by mouth once daily.     Family History    None       Tobacco Use    Smoking status: Former     Types: Cigarettes    Smokeless tobacco: Never   Substance and Sexual Activity    Alcohol use: No    Drug use: Yes     Types: Marijuana, IV     Comment: 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    Sexual activity: Not on file     Review of Systems   Constitutional:  Positive for activity change and fatigue. Negative for chills, diaphoresis and fever.   Respiratory:  Negative for cough and shortness of breath.    Cardiovascular:  Negative for chest pain and palpitations.   Gastrointestinal:  Positive for diarrhea, nausea and vomiting. Negative for abdominal pain.   Genitourinary:  Negative for flank pain.   Musculoskeletal:  Positive for arthralgias and back pain.   Neurological:  Negative for dizziness, tremors, light-headedness, numbness and headaches.     Objective:     Vital Signs (Most Recent):  Temp: 99 °F  (37.2 °C) (02/05/24 0103)  Pulse: 68 (02/05/24 0002)  Resp: 16 (02/05/24 0044)  BP: 135/74 (02/05/24 0002)  SpO2: 100 % (02/05/24 0002) Vital Signs (24h Range):  Temp:  [98.5 °F (36.9 °C)-99 °F (37.2 °C)] 99 °F (37.2 °C)  Pulse:  [] 68  Resp:  [16-18] 16  SpO2:  [100 %] 100 %  BP: (129-145)/(60-84) 135/74     Weight: 74.8 kg (165 lb)  Body mass index is 23.01 kg/m².     Physical Exam  Vitals and nursing note reviewed.   Constitutional:       Appearance: Normal appearance.      Comments: Muscle strength is normal.  Patient has numbness and decreased sensation of the dorsum of both feet bilaterally.  He has a abdominal tenderness and pelvic tenderness to deep palpation. He is somnolent   HENT:      Head: Normocephalic and atraumatic.   Eyes:      General: No scleral icterus.     Pupils: Pupils are equal, round, and reactive to light.   Cardiovascular:      Rate and Rhythm: Regular rhythm. Tachycardia present.      Pulses: Normal pulses.      Heart sounds: Normal heart sounds.   Pulmonary:      Effort: Pulmonary effort is normal.      Breath sounds: Normal breath sounds.   Abdominal:      General: Abdomen is flat. There is no distension.      Palpations: Abdomen is soft.      Tenderness: There is no abdominal tenderness.   Musculoskeletal:      Right lower leg: No edema.      Left lower leg: No edema.   Lymphadenopathy:      Cervical: No cervical adenopathy.   Skin:     General: Skin is warm and dry.      Capillary Refill: Capillary refill takes less than 2 seconds.   Neurological:      General: No focal deficit present.      Mental Status: He is alert and oriented to person, place, and time.              CRANIAL NERVES     CN III, IV, VI   Pupils are equal, round, and reactive to light.       Significant Labs: All pertinent labs within the past 24 hours have been reviewed.  CBC:   Recent Labs   Lab 02/04/24 1853   WBC 10.02   HGB 8.0*   HCT 26.1*        CMP:   Recent Labs   Lab 02/04/24 1853   *    K 4.0      CO2 24      BUN 25*   CREATININE 1.0   CALCIUM 8.6*   PROT 8.0   ALBUMIN 2.5*   BILITOT 0.2   ALKPHOS 68   AST 20   ALT 7*   ANIONGAP 7*       Significant Imaging: I have reviewed all pertinent imaging results/findings within the past 24 hours.  Assessment/Plan:     * Osteomyelitis of vertebra of lumbosacral region  Patient is a 28-year-old male with past medical history significant for IVDU who is presenting with worsening abdominal pain, back pain, and lower extremity sensory changes.  Patient has a known intra-abdominal and intrapelvic abscesses and has recently had septic arthritis of the sacroiliac joint.  Overall clinical picture is concerning for progression of epidural abscess.  There was abnormal signal on MRI of L5-S1 disc space with anteriolisthesis and large epidural abscess at L5 with worsening central canal stenosis (severe question sean).  MRI also showed additional abscess in the left psoas muscle, left iliacus muscle, possible SI joint septic arthritis, possible pyomyositis of the paraspinal muscles, possible facet joint arthritis, and possible left iliac osteomyelitis.    Patient received 1/3 of a dose of piperacillin/tazobactam before was stopped by the emergency physician.    Plan:  - NPO  - follow up neurosurgery note  - per handoff by emergency physician, no antibiotics at this time.      Spinal stenosis of lumbar region without neurogenic claudication  See osteomyelitis      Pelvic abscess in male  Presumed osteomyelitis/discitis at L5-S1 with osteomyelitis in the sacrum and left iliac bone and probable septic arthritis in the left SI joint and lower lumbar spine facet joints.     Worsening anterolisthesis of L5 with respect to S1 with increased size of epidural abscess at the level of L5-S1, and probable severe central canal stenosis at this level.     Additional abscesses in the left iliopsoas muscle, paraspinal muscles, pelvis, and left sacrum as discussed above.      Additional findings discussed in the body of the report and on same day pelvic MRI.      Septic arthritis of sacroiliac joint  See osteomyelitis      IVDU (intravenous drug user)  Patient has history of IV drug. Used heroin early in the night.    - Opiate Withdrawal Protocol:  clonidine 0.1 mg po q4 hours prn opiate withdrawal HTN  dicylomine 10 mg q6 hours prn abdominal muscle cramps  loperamide 2 mg q 1 hour prn diarrhea (max= 16 mg/24 hours)  methocarbamol 500 mg po q 6 hours prn muscle spasm  ibuprofen 400mg po q6hrs PRN pain  zofran 4mg PO q8hrs PRN OR phenergan 12.5mg PO q8hrs PRN nausea vistaril 50mg PO q8hrs PRN anxiety         Hyponatremia  Patient has hyponatremia which is controlled,We will aim to correct the sodium by 4-6mEq in 24 hours. We will monitor sodium Daily. The hyponatremia is due to Dehydration/hypovolemia. We will obtain the following studies:  No additional studies until repeat CMP. We will treat the hyponatremia with IV fluids as follows: 1L. The patient's sodium results have been reviewed and are listed below.  Recent Labs   Lab 02/04/24  1853   *       Anemia of infection and chronic disease  Patient's anemia is currently controlled. Has not received any PRBCs to date. Etiology likely d/t chronic disease due to chronic infection  Current CBC reviewed-   Lab Results   Component Value Date    HGB 8.0 (L) 02/04/2024    HCT 26.1 (L) 02/04/2024     Monitor serial CBC and transfuse if patient becomes hemodynamically unstable, symptomatic or H/H drops below 7/21.    Cigarette nicotine dependence without complication  Dangers of cigarette smoking were reviewed with patient in detail. Patient was  offered a nicotinepatch  Nicotine replacement options were discussed. Nicotine replacement was discussed      VTE Risk Mitigation (From admission, onward)           Ordered     enoxaparin injection 40 mg  Daily         02/05/24 0126     IP VTE HIGH RISK PATIENT  Once         02/05/24 0126      Place sequential compression device  Until discontinued         02/05/24 0126     IP VTE HIGH RISK PATIENT  Once         02/05/24 0126     Place sequential compression device  Until discontinued         02/05/24 0126                                    Vinod Bae MD  Department of Hospital Medicine  [facility] - Emergency Dept

## 2024-02-05 NOTE — CONSULTS
[id number]}        INITIAL VISIT: ADDICTION PSYCHIATRY CONSULTATION SERVICE      ASSESSMENT AND PLAN:     DIAGNOSES & PROBLEMS:  Heroin use disorder, severe  Substance-induced mood disorder    In Summary:  Patient is a 28-year-old male with past medical history significant for MRSA bacteremia, IV drug use, and multiple septic joints and pelvic/intra-abdominal abscesses who presents with progressive back pain with radiation down the legs.  He has had bilateral numb this and neuropathic pain over the dorsum of his foot. Pt reports he has been using heroin for about one year and that he has not had treatment for his substance use. Endorses many depressive symptoms in the context of homelessness; denies SI or thoughts of self-harm.     Plan:  -Start Remeron 15mg QHS  -Hold off on starting suboxone as there is potentially need for neurosurgical intervention with need for subsequent pain regimen.   -Resources in AVS.      With reasonable medical certainty, based on history, chart review, available collateral information, and a present-state examination:  - the patient is deemed to be currently best managed on a medical unit, owing to the need for medical stabilization  - the need for psychiatric hospitalization is currently NOT anticipated upon stabilization of acute medical condition  - PEC is not indicated  - options for medication-assisted treatment (MAT) for opioid use disorder were discussed  - recommend patient enroll in addiction rehab program after discharge and medical stabilization  - counseled on full abstinence from alcohol and substances of abuse (illicit and prescription)  - full engagement in 12 step (or equivalent) recovery program(s), including meeting attendance and acquisition/maintenance of sponsor  - relapse prevention and motivational interviewing provided  - provided resources for various addiction rehabilitation options as part of aftercare planning   Addiction Psychiatry will sign off now; please do not  hesitate to reach out for further questions.     PRESENTATION:     Ton Donovan presents with the following chief complaint: problematic substance use/abuse and alcohol and/or drug addiction    Per Chart:  Primary Team HPI:   Patient is a 28-year-old male with past medical history significant for MRSA bacteremia, IV drug use, and multiple septic joints and pelvic/intra-abdominal abscesses who presents with progressive back pain with radiation down the legs.  He has had bilateral numb this and neuropathic pain over the dorsum of his foot.  This has been ongoing and is not necessarily a new problem.  He does state though that the pins and needles that have been moving down his legs have been progressively worsening and that laying on his stomach provide significant relief.  The patient is actively using heroin and did an injection prior to being taken in by EMS.  He denies any fevers, chills, night sweats, he is not having any shortness of breath or cough.  He denies any bowel or bladder incontinence or saddle anesthesia.  He has good muscle strength in his lower extremities bilaterally.     Per Patient:  Patient reports that he has been using heroin for the past year due to great stressors of his mother, aunt, and grandmother passing away in a short amountOf time.  Patient reports that he lost his interface with housing and has been homeless on the streets since then.  Patient reports that he has been dealing with depression in this time but denies thoughts of suicide or self-harm currently.  Patient was initially irritable in the beginning of the interview but more pleasant and cooperative towards the end.  Patient reports that he was not always like this as he had worked at [employer] previously and had held down a job in the past.  When speaking about substance use specifically, patient reports that if it were not for his abscess he would be out there using heroin once again.  When asked what is stopping him from  receiving help on the journey to sobriety, patient reports I do not know.   Patient is receptive to starting antidepressant and discussion of Suboxone after potential surgery takes place.     Collateral:         REVIEW OF SYSTEMS:  I[]I Patient denies any pertinent ROS, and none is known.  I[]I Patient unable or unwilling to provide any ROS.    [x] Y  [] N  sleep disturbance: ** Globally: endorsed but not observed Positive for: insomnia  [] Y  [x] N  appetite/weight change: **   [x] Y  [] N  fatigue/anergia: ** Globally: observed Positive for: sedation  [x] Y  [] N  impairment in focus/concentration: ** Globally: observed Positive for: diminished ability to think or concentrate     [x] Y  [] N  depression: ** Globally: observed Positive for: intermittent sadness, depressed mood, anhedonia, helplessness, clinical depression   [x] Y  [] N  anxiety/worry: ** Globally: endorsed but not observed Positive for: more anxiety than the average person   [] Y  [x] N  dysregulated mood/behavior: **     [] Y  [x] N  manic symptomatology: **     [] Y  [x] N  psychosis: **           A pertinent medical review of systems was performed with the following notable findings:     CURRENT PSYCHOTROPIC REGIMEN:  I[]I Y  I[x]I N  I[]I U          ADDICTION:     I[]I Y  I[x]I N  I[]I U  I[]I Current  I[]I Former  Nicotine Use:   I[]I Y  I[x]I N  I[]I U  I[]I Current  I[]I Former  Alcohol Use:   I[]I Y  I[x]I N  I[]I U  I[]I Current  I[]I Former  Alcohol Misuse/Abuse:   I[x]I Y  I[]I N  I[]I U  I[x]I Current  I[]I Former  Illicit Drug Use/Misuse/Abuse: heroin   I[]I Y  I[]I N  I[]I U  I[]I Current  I[]I Former  Misuse/Abuse of Rx Medications:   I[]I Cannabis  I[]I Cocaine  I[x]I Heroin  I[]I Meth  I[]I Opioids  I[]I Stimulants  I[]I Benzos  I[]I Other:     I[]I N/A  I[]I U  Substance(s) of Choice: heroin  I[]I N/A  I[]I U  Substance(s) Used Currently/Recently: heroin  I[]I N/A  I[]I U  Alcohol Consumption:  "rare   I[]I N/A  I[]I U  Last Drink: unk  I[]I N/A  I[]I U  Last Drug Use: right before admission  I[]I N/A  I[]I U  Duration of Sobriety/Abstinence: before 1 year ago, for many years    I[]I Y  I[x]I N  I[]I U  Hx of Detox:   I[]I Y  I[x]I N  I[]I U  Hx of Rehab:   I[x]I Y  I[]I N  I[]I U  Hx of IVDU:   I[x]I Y  I[]I N  I[]I U  Hx of Accidental Overdose: reports that he has woken up on the ground after injecting heroin  I[]I Y  I[]I N  I[x]I U  Hx of DUI:   I[]I Y  I[]I N  I[x]I U  Hx of Complicated Withdrawal (i.e. Seizures and/or Delirium Tremens):   I[]I Y  I[]I N  I[x]I U  Hx of Known/Suspected Substance-Induced Psychiatric Disorder:   I[]I Y  I[x]I N  I[]I U  Hx of Medication Assisted Treatment:   I[]I Y  I[x]I N  I[]I U  Hx of Twelve Step Program (or Equivalent) Involvement:   I[]I Y  I[x]I N  I[]I U  Currently Exhibits Signs of Intoxication:   I[x]I Y  I[]I N  I[]I U  Currently Exhibits Signs of Withdrawal:   I[]I Y  I[x]I N  I[]I U  Currently Active in Recovery:   I[]I Y  I[]I N  I[]I U  Social Support: reports he has family nearby who helps him   I[]I Y  I[]I N  I[x]I U  Spouse/Partner Consumption:     I[]I N/A  I[x]I Y  I[]I N  I[]I U  Acknowledges/Accepts Addiction:   I[]I N/A  I[x]I Y  I[]I N  I[]I U  Advised to Quit/Cut Back:   I[]I N/A  I[]I Y  I[x]I N  I[]I U  Alcohol/Drug Cessation ("Wants to Quit"): Uninterested in Quitting or Cutting Back  I[]I N/A  I[]I Y  I[]I N  I[]I U  Motivation to Pursue Treatment: Virtually Non-Existent  I[]I N/A  I[]I Y  I[]I N  I[]I U  Tobacco Cessation ("Wants to Quit"): uninterested in quitting or cutting back    DSM-5-TR SUBSTANCE USE DISORDER CRITERIA:     -- Impaired Control:  I[x]I Y  I[]I N  I[]I U  I[x]I A  I[]I D  Often take in larger amounts or over a longer period of time than was intended:   I[x]I Y  I[]I N  I[]I U  I[x]I A  I[]I D  Persistent desire or unsuccessful efforts to cut down or control use:   I[x]I Y  I[]I N  I[]I U  I[x]I A  I[]I D  Great deal of " time spent in activities necessary to obtain substance, use, or recover from effects:   I[x]I Y  I[]I N  I[]I U  I[x]I A  I[]I D  Craving/strong desire for substance or urge to use:   -- Social Impairment:  I[x]I Y  I[]I N  I[]I U  I[x]I A  I[]I D  Use resulting in failure to fulfill major role obligations at home, work or school:   I[x]I Y  I[]I N  I[]I U  I[x]I A  I[]I D  Social, occupational, recreational activities decreased because of use:   I[x]I Y  I[]I N  I[]I U  I[x]I A  I[]I D  Continued use despite having persistent or recurrent social or interpersonal problems caused or exacerbated by the substance:   -- Risky Use:  I[]I Y  I[x]I N  I[]I U  I[]I A  I[x]I D  Recurrent use in situations in which it is physically hazardous:   I[x]I Y  I[]I N  I[]I U  I[]I A  I[]I D  Use despite physical or psychological problems that are likely to have been caused or exacerbated by the substance:   -- Neuroadaptation:  I[x]I Y  I[]I N  I[]I U  I[]I A  I[]I D  Tolerance, as defined by either of the following: (1) a need for markedly increased amounts of substance to achieve intoxication or desired effect.  -OR- (2) a markedly diminished effect with continued use of the same amount of substance:   I[x]I Y  I[]I N  I[]I U  I[]I A  I[]I D  Withdrawal, as manifested by either of the following: (1) the characteristic withdrawal syndrome for substance.  -OR- (2) substance is taken to relieve or avoid withdrawal symptoms:   -- Mild (1-3), Moderate (4-5), Severe (?6)    I[]I N/A  I[x]I Y  I[]I N  I[]I U  I[]I A  I[]I D  Active Substance Use Disorder:       HISTORY:     I[]I Patient denies any history, and none is known.  I[]I Patient unable or unwilling to provide any history.    On chart review; updated as necessary    Past Psychiatric:  Psychiatric Diagnoses:  denies  Current Psychiatric Provider (if Applicable):  denies  Hx of Psychiatric Hospitalization:  denies  Hx of Outpatient Psychiatric Treatment (psychiatry/psychotherapy):  " denies  Psychotropic Trials:  no  Prior Suicide Attempts: no  Hx of Suicidal Ideation: no  Hx of Homicidal Ideation: no  Hx of Self-Injurious Behavior (Non-Suicidal): no  Hx of Violence: no  Documented Hx of Malingering: no        Trauma:  Hx of Trauma/Neglect/Physical Abuse/Sexual Abuse:  denies     Family  Family History: unknown     Social:     Grew Up Locally?: yes  Happy Childhood?: yes  Significant Developmental Delay/Disability?: no  GED/High School Dipoloma?: yes  Post High School Education?: no  Currently Employed?: no  On or Applying for Disability?: unknown  Functions Independently?: yes  Financially Stable?: no  Domiciled?:  Currenly "squatting" in prior family home which has foreclosed on  Lives Alone?: yes  Heterosexual/Cisgender?: unknown  Currently in a Romantic Relationship?: unknown  Ever ?: denies  Children/Dependents?: no  Denominational/Spiritual?: no   History?: no  Engaged in Hobbies/Recreational Activities?: yes  Access to a Gun?: no     Legal:     Current Legal Issues: unknown  Past Charges/Convictions: unknown  Hx of Incarceration: unknown     Medical:  The patient's past medical history has been reviewed and updated as appropriate within the electronic medical record system.    I[]I Y  I[x]I N  I[]I U  Hx of Seizure:   I[]I Y  I[x]I N  I[]I U  Hx of Significant Head Trauma (e.g., Loss of Consciousness, Concussion, Coma):    I[x]I Y  I[]I N  I[]I U  Medical History & Diagnoses:       The patient's past medical history has been reviewed and updated as appropriate within the electronic medical record system.    Osteomyelitis of vertebra of lumbosacral region    Heroin use disorder, severe    Cigarette nicotine dependence without complication    Opioid withdrawal    Anemia of infection and chronic disease    Hyponatremia    Septic arthritis of sacroiliac joint    IVDU (intravenous drug user)    Pelvic abscess in male    Spinal stenosis of lumbar region without neurogenic " claudication    Cocaine use disorder, severe, dependence    Substance or medication-induced depressive disorder    Acute bilateral low back pain with bilateral sciatica     Scheduled and PRN Medications: The electronic chart was reviewed and updated as appropriate.  See Medcard for details.    Current Facility-Administered Medications:     acetaminophen tablet 1,000 mg, 1,000 mg, Oral, Q8H PRN, Vinod Bae MD    acetaminophen tablet 650 mg, 650 mg, Oral, Q4H PRN, Vinod Bae MD    cloNIDine tablet 0.1 mg, 0.1 mg, Oral, Q12H, Vinod Bae MD, 0.1 mg at 02/05/24 1009    dextrose 10% bolus 125 mL 125 mL, 12.5 g, Intravenous, PRN, Vinod Bae MD    dextrose 10% bolus 250 mL 250 mL, 25 g, Intravenous, PRN, Vinod Bae MD    dicyclomine capsule 10 mg, 10 mg, Oral, Q6H PRN, Vinod Bae MD    enoxaparin injection 40 mg, 40 mg, Subcutaneous, Daily, Vinod Bae MD    glucagon (human recombinant) injection 1 mg, 1 mg, Intramuscular, PRN, Vinod Bae MD    glucose chewable tablet 16 g, 16 g, Oral, PRN, Vinod Bae MD    glucose chewable tablet 24 g, 24 g, Oral, PRNCatalino Graham, MD    HYDROmorphone injection 0.5 mg, 0.5 mg, Intravenous, Q6H PRN, Vinod Bae MD    HYDROmorphone injection 1 mg, 1 mg, Intravenous, Q6H PRN, Vinod Bae MD, 1 mg at 02/05/24 1103    hydrOXYzine HCL tablet 50 mg, 50 mg, Oral, Q6H PRNCatalino Graham, MD    ibuprofen tablet 600 mg, 600 mg, Oral, Q6H PRN, Vinod Bae MD, 600 mg at 02/05/24 0622    loperamide capsule 2 mg, 2 mg, Oral, PRN, Vinod Bae MD    melatonin tablet 6 mg, 6 mg, Oral, Nightly PRN, Andres Whitney MD    naloxone 0.4 mg/mL injection 0.02 mg, 0.02 mg, Intravenous, PRN, Vinod Bae MD    promethazine tablet 25 mg, 25 mg, Oral, Q6H PRN, Vinod Bae MD    sodium chloride 0.9% flush 10 mL, 10 mL, Intravenous, PRN, Andres Whitney MD    Allergies:  Patient has no known allergies.    PSYCHOSOCIAL FACTORS:  Stressors (Biopsychosocial, Cultural and  "Environmental): job/career, housing, mental health, physical health, pain, substance use/addiction, grief  Functioning Relationships:     STRENGTHS AND LIABILITIES:   Reports he has family members who help him out  Liability: Patient is defensive.  Liability: Patient is dependent.  Liability: Patient is impulsive.  Liability: Patient has poor health.  Liability: Patient has limited or diminished insight.  Liability: Patient is unstable.  Liability: Patient is acutely decompensated.  Liability: Patient is help-rejecting.  Liability: Patient is in active addiction.    Additional Relevant History, As Applicable:       EXAMINATION:     /69   Pulse 68   Temp 99 °F (37.2 °C) (Oral)   Resp 18   Wt 74.8 kg (165 lb)   SpO2 99%   BMI 23.01 kg/m²     MENTAL STATUS EXAMINATION:  General Appearance: **   dressed in hospital garb  Behavior: **   cooperative, at first irritable and angry but becomes calmer throughout interview  Involuntary Movements and Motor Activity: **   no abnormal involuntary movements noted, no psychomotor agitation or retardation  Gait and Station: **   unable to assess - patient lying down or seated  Speech and Language: **     conversational, spontaneous, speaks and understands English proficiently  Mood: "Not feeling great"  Affect: **   reactive, mood congruent  Thought Process and Associations: **   linear and goal-directed, with no loosening of associations  Thought Content and Perceptions: **   no suicidal or homicidal ideation, no evidence of psychosis  Sensorium: **   alert and oriented, with clear sensorium  Recent and Remote Memory: **   grossly intact, no significant impairments noted  Attention and Concentration: **   attentive, not readily distractible  Fund of Knowledge: **   grossly intact, used appropriate vocabulary, no significant deficits noted  Insight: **   intact, demonstrates awareness of illness  Judgment: **   poor as he is in active substance use       RISK MANAGEMENT: "     I[]I Y  I[x]I N  I[]I U  I[]I A  Suicidal Ideation/Behavior: **   I[]I Y  I[x]I N  I[]I U  I[]I A  Homicidal Ideation/Behavior: **  I[]I Y  I[x]I N  I[]I U  I[]I A  Violence: **  I[]I Y  I[x]I N  I[]I U  I[]I A  Self-Injurious Behavior: **    The patient is deemed to be a historian of unknown reliability and accuracy.    I[]I Y  I[x]I N  I[]I U  I[]I A  I[]I N/A  Minimization of Risk Parameters Suspected/Evident: **  I[]I Y  I[x]I N  I[]I U  I[]I A  I[]I N/A  Exaggeration of Risk Parameters Suspected/Evident: **      [] Y  [x] N  Danger to Self:   [] Y  [x] N  Danger to Others:   [] Y  [x] N  Grave Disability:       In cases of emergency, daily coverage provided by Acute/ER Psych MD, NP, PA, or SW, with contact numbers located in [facility] On Call Schedule.    Hermelindo Perales  Department of Psychiatry  [facility]        KEY:     I[]I Y = Yes / Present / Endorses  I[]I N = No / Absent / Denies  I[]I U = Unknown / Unable to Assess / Unwilling to Participate  I[]I A = Ambiguity Exists / Accuracy Uncertain  I[]I D = Denial or Minimization is Suspected/Evident  I[]I N/A = Non-Applicable    CHART REVIEW:     Available documentation has been reviewed, and pertinent elements of the chart have been incorporated into this evaluation where appropriate.    The patient's last Epic encounter in the psychiatry department was on: Visit date not found  The patient's first Epic encounter in the psychiatry department was on:      LA/MS  [place]  Site reviewed - No recent discrepancies or irregularities are noted.      ADVICE AND COUNSELING:     [x] In cases of emergencies (e.g. SI/HI resulting in danger to self or others, functioning deteriorates to the level of grave disability), call 911 or [phone], or present to the emergency department for immediate assistance.  [x] Patient should not operate a motor vehicle or heavy machinery if effects of medications or underlying symptoms/condition impair the  ability to safely do so.    Alcohol, Tobacco, and Drug Counseling, as well as resources, has been provided, as warranted.     Shared medical decision making and informed consent are the hallmark and bedrock of good clinical care, and as such have been employed and obtained, respectively, to the degree possible.      Risk Mitigation Strategies, Harm Reduction Techniques, and Safety Netting are important interventions that can reduce acute and chronic risk, and as such have been employed to the degree possible.    Prescription Drug Management entails the review, recommendation, or consideration without recommendation of medications, and as such was employed during the encounter.    Additional Psychoeducation has been provided, as warranted.    Discussed, to the extent possible, diagnosis, risks and benefits of proposed treatment vs alternative treatments vs no treatment, potential side effects of these treatments and the inherent unpredictability of treatment. The patient expresses understanding of the above and displays the capacity to agree with this treatment given said understanding. Patient also agrees that, currently, the benefits outweigh the risks and consents to treatment at this time.     Written material has been provided to supplement, augment, and reinforce any discussions and interventions, via the AVS or other pre-printed handouts, as warranted.      DIAGNOSTIC TESTING:     The chart was reviewed for recent diagnostic procedures and investigations, and pertinent results are noted below.    Na 133 (L)  2/5/2024   K 3.7  2/5/2024   Ca 8.2 (L)  2/5/2024  Phos 3.3  2/5/2024   Mg 1.7  2/5/2024     Glu 90  2/5/2024   HgA1c 6.2 (H)  2/5/2024    BUN 21 (H)  2/5/2024   Cr 0.8  2/5/2024   GFR >60.0  2/5/2024   Specific Gravity 1.020  10/14/2023   Protein (Urine) Negative  10/14/2023   Microalbumin *   *     T Prot 7.0  2/5/2024   Alb 2.1 (L)  2/5/2024   T Bili 0.2  2/5/2024   Alk Phos 58   2/5/2024   AST 16  2/5/2024   ALT 6 (L)  2/5/2024   GGT *   *   NH3 *   *   Amylase *   *   Lipase *   *    TSH *   *   Free T4 *   *  PTH *   *  Prolactin *   *     2/4/2024   Troponin I <0.006  11/20/2023   PT 13.8 (H)  11/21/2023   INR 1.3 (H)  11/21/2023    WBC 6.50  2/5/2024   RBC 3.65 (L)  2/5/2024   Hgb 7.7 (L)  2/5/2024   HCT 25.3 (L)  2/5/2024   MCV 69 (L)  2/5/2024    2/5/2024   ANC 3.8; 57.6;   2/5/2024    Cholesterol *   *   Triglycerides *   *   LDL *   *   HDL *   *     B12 *   *   Folate *   *   Thiamine *   *   Vit D *   *     HIV 1/2 Ag/Ab Non-reactive  2/5/2024   Hep B Surface Non-reactive  11/22/2023   Hep B Core *   *   Hep A *   *   Hep C Non-reactive  11/22/2023   RPR *   *     Lithium *   *   VPA *   *   Clozapine *   *     Alcohol <10  7/6/2023   Benzodiazepines Negative  7/6/2023   Barbiturates Negative  7/6/2023   Cannabis Presumptive Positive (A)  7/6/2023   Cocaine Negative  7/6/2023   Amphetamines Negative  7/6/2023   PCP Negative  7/6/2023   Opiates Presumptive Positive (A)  7/6/2023   Methadone Negative  7/6/2023   Buprenorphine *   *   Fentanyl *   *   Oxycodone *   *   Tramadol *   *     Ethanol *   *  PETH *   *   EtG *   *   EtS *   *   Buprenorphine *   *   Norbuprenorphine *   *     Results for orders placed or performed in visit on 11/20/23   EKG 12-lead    Collection Time: 11/20/23 11:02 AM    Narrative    Test Reason :     Vent. Rate : 097 BPM     Atrial Rate : 097 BPM     P-R Int : 126 ms          QRS Dur : 078 ms      QT Int : 332 ms       P-R-T Axes : 026 045 049 degrees     QTc Int : 421 ms    Normal sinus rhythm  Normal ECG  When compared with ECG of 14-OCT-2023 14:47,  No significant change was found  Confirmed by JAYE SCHMID MD ([id number]) on 11/20/2023 1:10:28 PM    Referred By: System System           Confirmed By:JAYE SCHMID MD       No results found for this or any previous  visit.    CONSULTATION:     A diagnostic psychiatric evaluation was performed and responsiveness to treatment was assessed.  The patient demonstrates adequate ability/capacity to respond to treatment.    Inpatient consult to Psychiatry  Consult performed by: Hermleindo Perales MD  Consult ordered by: Brenna Belcher MD        - The case was discussed and care was coordinated with the treatment team, including clinical impression, assessment, and treatment recommendations.

## 2024-02-05 NOTE — CONSULTS
Aleksandr Bray - Neurosurgery ([facility])  Neurosurgery  Consult Note    Inpatient consult to Neurosurgery  Consult performed by: Dee Dee Phipps MD  Consult ordered by: Andres Whitney MD        Subjective:     Chief Complaint/Reason for Admission: lumbar osteo    History of Present Illness: 28M w/ hx MRSA bactermia, IVDU, pelvic/intra-abdominal abscesses who presents with progressive back pain with radiation down the leg over the past couple of weeks.  He has had bilateral numbness of his anterior thigh as well. He denies bowel/bladder sx or saddle anesthesia. He denies focal weakness. Per chart review, the patient is actively using heroin and did an injection prior to being taken in by EMS.      Medications Prior to Admission   Medication Sig Dispense Refill Last Dose    naloxone (NARCAN) 4 mg/actuation Spry 4 mg by Nasal route.       mv-min/vit C/glut/lysine/hb124 (IMMUNE SUPPORT ORAL) Take 1 tablet by mouth once daily.          Review of patient's allergies indicates:  No Known Allergies    Past Medical History:   Diagnosis Date    Hypertension     Unilateral inguinal hernia, without obstruction or gangrene, not specified as recurrent      Past Surgical History:   Procedure Laterality Date    HAND ARTHROTOMY Right 7/7/2023    Procedure: ARTHROTOMY, HAND - R LF PIP;  Surgeon: Boy Lamb MD;  Location: [facility] OR [address] [place] [address];  Service: Orthopedics;  Laterality: Right;    INCISION AND DRAINAGE OF ABSCESS Right 10/12/2023    Procedure: INCISION AND DRAINAGE, ABSCESS;  Surgeon: Steve Monteiro MD;  Location: [address];  Service: General;  Laterality: Right;  right chest    IRRIGATION AND DEBRIDEMENT OF UPPER EXTREMITY Bilateral 7/7/2023    Procedure: IRRIGATION AND DEBRIDEMENT, UPPER EXTREMITY - RIGHT HAND;  Surgeon: Boy Lamb MD;  Location: [address];  Service: Orthopedics;  Laterality: Bilateral;    OLECRANON BURSECTOMY Left 7/7/2023    Procedure: BURSECTOMY, OLECRANON;  Surgeon: Boy BOX  "MD Adonis;  Location: [facility] OR [address] [place] [address];  Service: Orthopedics;  Laterality: Left;     Family History    None       Tobacco Use    Smoking status: Former     Types: Cigarettes    Smokeless tobacco: Never   Substance and Sexual Activity    Alcohol use: No    Drug use: Yes     Types: Marijuana, IV     Comment: 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    Sexual activity: Not on file     Review of Systems   Unable to perform ROS: Other     Objective:     Weight: 74.8 kg (165 lb)  Body mass index is 23.01 kg/m².  Vital Signs (Most Recent):  Temp: 98.6 °F (37 °C) (02/05/24 0905)  Pulse: 70 (02/05/24 0905)  Resp: 16 (02/05/24 0905)  BP: 127/74 (02/05/24 0905)  SpO2: 98 % (02/05/24 0905) Vital Signs (24h Range):  Temp:  [98.5 °F (36.9 °C)-99 °F (37.2 °C)] 98.6 °F (37 °C)  Pulse:  [] 70  Resp:  [16-18] 16  SpO2:  [97 %-100 %] 98 %  BP: (127-145)/(60-84) 127/74                                 Physical Exam         Neurosurgery Physical Exam    AOX  CN grossly intact  Motor 5/5  RLE decreased sensation nondermatomal below R knee to R foot  No hyperreflexia    Significant Labs:  Recent Labs   Lab 02/04/24 1853 02/05/24  0308    90   * 133*   K 4.0 3.7    106   CO2 24 21*   BUN 25* 21*   CREATININE 1.0 0.8   CALCIUM 8.6* 8.2*   MG  --  1.7     Recent Labs   Lab 02/04/24 1853 02/05/24  0308   WBC 10.02 6.50   HGB 8.0* 7.7*   HCT 26.1* 25.3*    402     No results for input(s): "LABPT", "INR", "APTT" in the last 48 hours.  Microbiology Results (last 7 days)       Procedure Component Value Units Date/Time    Blood culture #1 **CANNOT BE ORDERED STAT** [[id number]] Collected: 02/04/24 1853    Order Status: Completed Specimen: Blood from Peripheral, Forearm, Left Updated: 02/05/24 0315     Blood Culture, Routine No Growth to date    Blood culture #2 **CANNOT BE ORDERED STAT** [[id number]] Collected: 02/04/24 1822    Order Status: Completed Specimen: Blood from Peripheral, Forearm, Left Updated: 02/05/24 " 0313     Blood Culture, Routine No Growth to date          All pertinent labs from the last 24 hours have been reviewed.    Significant Diagnostics:  CT: No results found in the last 24 hours.  MRI: No results found in the last 24 hours.  Assessment/Plan:     * Osteomyelitis of vertebra of lumbosacral region  28M w/ hx MRSA bactermia, IVDU, pelvic/intra-abdominal abscesses who presents with progressive back pain with radiation down the leg over the past couple of weeks. Motor intact.     MRI L Sp w/wo 2/4: L5 epidural absess with central stenosis, additional abscesses inferior to sacrum, in psoas. spondylodiscitis at L5-S1     - admitted to   - ID consult, f/u Bcx  - 2/4 ESR 75, CRP 33.7    - abx held pending intervention, if patient begins to show signs of sepsis, please start abx  - f/u CT L spine  - f/u PVR  - operative plan pending further discussion with spine staff, will discuss with Dr. Pozo. Consideration of sacral washout          Thank you for your consult. I will follow-up with patient. Please contact us if you have any additional questions.    Dee Dee Phipps MD  Neurosurgery  Aleksandr lakesha - Neurosurgery ([facility])

## 2024-02-05 NOTE — HPI
28M w/ hx MRSA bactermia, IVDU, pelvic/intra-abdominal abscesses who presents with progressive back pain with radiation down the leg over the past couple of weeks.  He has had bilateral numbness of his anterior thigh as well. He denies bowel/bladder sx or saddle anesthesia. He denies focal weakness. Per chart review, the patient is actively using heroin and did an injection prior to being taken in by EMS.

## 2024-02-05 NOTE — ASSESSMENT & PLAN NOTE
Patient has hyponatremia which is controlled,We will aim to correct the sodium by 4-6mEq in 24 hours. We will monitor sodium Daily. The hyponatremia is due to Dehydration/hypovolemia. We will obtain the following studies:  No additional studies until repeat CMP. We will treat the hyponatremia with IV fluids as follows: 1L. The patient's sodium results have been reviewed and are listed below.  Recent Labs   Lab 02/04/24  1853   *

## 2024-02-05 NOTE — ASSESSMENT & PLAN NOTE
Patient's anemia is currently controlled. Has not received any PRBCs to date. Etiology likely d/t chronic disease due to chronic infection  Current CBC reviewed-   Lab Results   Component Value Date    HGB 8.0 (L) 02/04/2024    HCT 26.1 (L) 02/04/2024     Monitor serial CBC and transfuse if patient becomes hemodynamically unstable, symptomatic or H/H drops below 7/21.

## 2024-02-05 NOTE — ASSESSMENT & PLAN NOTE
Presumed osteomyelitis/discitis at L5-S1 with osteomyelitis in the sacrum and left iliac bone and probable septic arthritis in the left SI joint and lower lumbar spine facet joints.     Worsening anterolisthesis of L5 with respect to S1 with increased size of epidural abscess at the level of L5-S1, and probable severe central canal stenosis at this level.     Additional abscesses in the left iliopsoas muscle, paraspinal muscles, pelvis, and left sacrum as discussed above.     Additional findings discussed in the body of the report and on same day pelvic MRI.

## 2024-02-05 NOTE — HPI
"Ton Donovan is a 28-year-old male with PMHx of IVDU w/ assoc complications of MRSA bacteremia, multiple septic joints & abscesses, who presented 2/4/24 with progressive back pain with radiation down his legs b/l. Endorses bilateral numbness & neuropathic pain over the dorsum of his feet. This has been ongoing and is not necessarily a new problem, but has been worsening. Reports "pins & needles"/paresthesias down his legs that have been progressing, although laying on his stomach seems to provide significant relief. Admits to continued active IV heroin use; last used just prior to EMS arrival. Denies any fevers, chills, night sweats, cough, or shortness of breath. Denies any bowel or bladder incontinence/retention or saddle anesthesia.   "

## 2024-02-05 NOTE — ASSESSMENT & PLAN NOTE
Patient has history of IV drug. Used heroin early in the night.    - Opiate Withdrawal Protocol:  clonidine 0.1 mg po q4 hours prn opiate withdrawal HTN  dicylomine 10 mg q6 hours prn abdominal muscle cramps  loperamide 2 mg q 1 hour prn diarrhea (max= 16 mg/24 hours)  methocarbamol 500 mg po q 6 hours prn muscle spasm  ibuprofen 400mg po q6hrs PRN pain  zofran 4mg PO q8hrs PRN OR phenergan 12.5mg PO q8hrs PRN nausea vistaril 50mg PO q8hrs PRN anxiety

## 2024-02-05 NOTE — ASSESSMENT & PLAN NOTE
28M w/ hx MRSA bactermia, IVDU, pelvic/intra-abdominal abscesses who presents with progressive back pain with radiation down the leg over the past couple of weeks. Motor intact.     MRI L Sp w/wo 2/4: L5 epidural absess with central stenosis, additional abscesses inferior to sacrum, in psoas. spondylodiscitis at L5-S1     - admitted to   - ID consult, f/u Bcx  - 2/4 ESR 75, CRP 33.7    - abx held pending intervention, if patient begins to show signs of sepsis, please start abx  - f/u CT L spine  - f/u PVR  - operative plan pending further discussion with spine staff, will discuss with Dr. Pozo. Consideration of sacral washout

## 2024-02-05 NOTE — SUBJECTIVE & OBJECTIVE
Past Medical History:   Diagnosis Date    Hypertension     Unilateral inguinal hernia, without obstruction or gangrene, not specified as recurrent        Past Surgical History:   Procedure Laterality Date    HAND ARTHROTOMY Right 7/7/2023    Procedure: ARTHROTOMY, HAND - R LF PIP;  Surgeon: Boy Lamb MD;  Location: [address];  Service: Orthopedics;  Laterality: Right;    INCISION AND DRAINAGE OF ABSCESS Right 10/12/2023    Procedure: INCISION AND DRAINAGE, ABSCESS;  Surgeon: Steve Monteiro MD;  Location: [address];  Service: General;  Laterality: Right;  right chest    IRRIGATION AND DEBRIDEMENT OF UPPER EXTREMITY Bilateral 7/7/2023    Procedure: IRRIGATION AND DEBRIDEMENT, UPPER EXTREMITY - RIGHT HAND;  Surgeon: Boy Lamb MD;  Location: [address];  Service: Orthopedics;  Laterality: Bilateral;    OLECRANON BURSECTOMY Left 7/7/2023    Procedure: BURSECTOMY, OLECRANON;  Surgeon: Boy Lamb MD;  Location: [address];  Service: Orthopedics;  Laterality: Left;       Review of patient's allergies indicates:  No Known Allergies    No current facility-administered medications on file prior to encounter.     Current Outpatient Medications on File Prior to Encounter   Medication Sig    naloxone (NARCAN) 4 mg/actuation Spry 4 mg by Nasal route.    mv-min/vit C/glut/lysine/hb124 (IMMUNE SUPPORT ORAL) Take 1 tablet by mouth once daily.     Family History    None       Tobacco Use    Smoking status: Former     Types: Cigarettes    Smokeless tobacco: Never   Substance and Sexual Activity    Alcohol use: No    Drug use: Yes     Types: Marijuana, IV     Comment: 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    Sexual activity: Not on file     Review of Systems   Constitutional:  Positive for activity change and fatigue. Negative for chills, diaphoresis and fever.   Respiratory:  Negative for cough and shortness of breath.    Cardiovascular:  Negative for chest pain and palpitations.    Gastrointestinal:  Positive for diarrhea, nausea and vomiting. Negative for abdominal pain.   Genitourinary:  Negative for flank pain.   Musculoskeletal:  Positive for arthralgias and back pain.   Neurological:  Negative for dizziness, tremors, light-headedness, numbness and headaches.     Objective:     Vital Signs (Most Recent):  Temp: 99 °F (37.2 °C) (02/05/24 0103)  Pulse: 68 (02/05/24 0002)  Resp: 16 (02/05/24 0044)  BP: 135/74 (02/05/24 0002)  SpO2: 100 % (02/05/24 0002) Vital Signs (24h Range):  Temp:  [98.5 °F (36.9 °C)-99 °F (37.2 °C)] 99 °F (37.2 °C)  Pulse:  [] 68  Resp:  [16-18] 16  SpO2:  [100 %] 100 %  BP: (129-145)/(60-84) 135/74     Weight: 74.8 kg (165 lb)  Body mass index is 23.01 kg/m².     Physical Exam  Vitals and nursing note reviewed.   Constitutional:       Appearance: Normal appearance.      Comments: Muscle strength is normal.  Patient has numbness and decreased sensation of the dorsum of both feet bilaterally.  He has a abdominal tenderness and pelvic tenderness to deep palpation. He is somnolent   HENT:      Head: Normocephalic and atraumatic.   Eyes:      General: No scleral icterus.     Pupils: Pupils are equal, round, and reactive to light.   Cardiovascular:      Rate and Rhythm: Regular rhythm. Tachycardia present.      Pulses: Normal pulses.      Heart sounds: Normal heart sounds.   Pulmonary:      Effort: Pulmonary effort is normal.      Breath sounds: Normal breath sounds.   Abdominal:      General: Abdomen is flat. There is no distension.      Palpations: Abdomen is soft.      Tenderness: There is no abdominal tenderness.   Musculoskeletal:      Right lower leg: No edema.      Left lower leg: No edema.   Lymphadenopathy:      Cervical: No cervical adenopathy.   Skin:     General: Skin is warm and dry.      Capillary Refill: Capillary refill takes less than 2 seconds.   Neurological:      General: No focal deficit present.      Mental Status: He is alert and oriented to  person, place, and time.              CRANIAL NERVES     CN III, IV, VI   Pupils are equal, round, and reactive to light.       Significant Labs: All pertinent labs within the past 24 hours have been reviewed.  CBC:   Recent Labs   Lab 02/04/24  1853   WBC 10.02   HGB 8.0*   HCT 26.1*        CMP:   Recent Labs   Lab 02/04/24  1853   *   K 4.0      CO2 24      BUN 25*   CREATININE 1.0   CALCIUM 8.6*   PROT 8.0   ALBUMIN 2.5*   BILITOT 0.2   ALKPHOS 68   AST 20   ALT 7*   ANIONGAP 7*       Significant Imaging: I have reviewed all pertinent imaging results/findings within the past 24 hours.

## 2024-02-05 NOTE — ED NOTES
Patient identifiers for Ton Donovan checked and correct.    LOC: The patient is awake, alert and aware of environment with an appropriate affect, the patient is oriented x 4 and speaking appropriately.    APPEARANCE: Patient resting comfortably and in no acute distress, patient is clean and well groomed, patient's clothing is properly fastened.    SKIN: The skin is warm and dry, color consistent with ethnicity, patient has normal skin turgor and moist mucus membranes, skin intact, no breakdown or bruising noted.    MUSCULOSKELETAL: Pt endorsing lower back pain w/ bowel and bladder incontinence.    RESPIRATORY: Airway is open and patent, respirations are spontaneous and even, patient has a normal effort and rate.    CARDIAC: Patient has a normal rate and rhythm, no periphreal edema noted, capillary refill < 3 seconds.    ABDOMEN: Soft and non tender to palpation, no distention noted. Patient denies any nausea, vomiting, diarrhea, or constipation.     NEUROLOGIC: Eyes open spontaneously, PERRL, behavior appropriate to situation, follows commands, facial expression symmetrical, bilateral hand grasp equal and even, purposeful motor response noted, normal sensation in all extremities.     HEENT: No abnormalities noted. White sclera and pupils equal round and reactive to light. Denies headache, dizziness.

## 2024-02-05 NOTE — PROVIDER PROGRESS NOTES - EMERGENCY DEPT.
Encounter Date: 2/4/2024    ED Physician Progress Notes        Physician Note:   Signed out to me.  Here with history of active IVDU history of psoas abscess epidural abscess coming in with low back pain.  Inflammatory markers are up, pending MRI of the pelvis and lower back to look for signs of infection.  High suspicion for infection at this time.  Likely admission.    Update:  MRI as noted, multiple abscesses, spinal epidural abscess, diskitis.  Neurosurgery consulted.  They asked that we hold antibiotics for now until biopsy given the fact that he is hemodynamically stable.  Given 1 mg of Dilaudid for further pain control as he is having 10/10 pain.  Admit to Hospital Medicine with Neurosurgery consult.

## 2024-02-05 NOTE — DISCHARGE INSTRUCTIONS
REFERRAL RECOMMENDATIONS FOR SUBSTANCE ABUSE & MENTAL HEALTH      IN CASE OF SUICIDAL THINKING, call the National Suicide Hotline Number: 988    988 Suicide & Crisis Lifeline: 980 , 7-500-358-HJIY (3046)  https://988[employer]       SUBSTANCE ABUSE:     [facility] (formerly known as the ABU)  [x] [phone], Option 2  [x] [address] [facility] 4th [facility], [place] [address]  [x] [email or web address]/services/[email or web address]-program  [x] The [facility] delivers comprehensive and collaborative treatment for alcohol and substance use disorders.  Excellent program for working professionals or anyone else seeking recovery.  [x] Requires insurance approval prior to starting program, call number above for more information.  [x] Intensive Outpatient Rehabilitation Program - M-F 9am-3pm - daily groups with psychologists and social workers, sessions with MDs 3x per week   [x] Ambulatory detox and dual diagnosis available      SUBOXONE:     NOTE: some Suboxone clinics require their clients to participate in a structured program (such as an IOP) in order to be prescribed Suboxone.  Some clinics have a long waiting list.  Most of these clinics do not accept walk-in clients, so call first to to learn what must be done to get started on Suboxone.    [facility] - [phone] (can do Sublocade)  [address] [facility], [place] [address]    [address]  [phone]    [facility] - [phone] (can do Sublocade)  [address] [place] [address]    [facility]  [address] [place]  [phone]     [facility] (very short waiting list, may accept some walk-in's but call first if possible)  [address], [place] [address]  [phone]; [phone]    [facility]   [address] [place]    [phone]    [facility] (can usually be seen within a week but is cash only  for appointment)  [address] [place], [place]    [facility] ([facility])  [address] [facility] [facility], [address] [place] [id number] [phone]    [facility] ([facility])  [address], [place]  [phone]    [facility] ([facility])  [address] [facility] [address]  [phone]    [place], [place]:    [facility] - [address] [place] [address] - Tel: [phone]    Elpidio Abdi - [address] [place] [address] - Tel: [phone]    Naren Katz - [address] [place] [place] [address] - Tel: [phone]    Félix Robledo - [address] [place] [address] - Tel: [phone]    Darren Martin - [address] [place] [address] - Tel: [phone]    [place], [place]:     Dr. Mindy Baron and Dr. Nicanor Hui - [address] [place], [place] - Tel: [phone]    Dr. Kylah Benz - [address], [place] - Tel: [phone]    Dr. Clay Jiang - Tel: [phone]    Dr. Jorge A Lim - [facility] - [phone]      METHADONE:     [facility] (the only methadone clinic in the [facility], has two locations)  [x] [place] [facility] [place] [address], [phone]  [x] [facility] [place] [address], [phone]    12 STEP PROGRAMS (and similar):     Alcoholics Anonymous (local)  [x] [phone]  [x] www.[place].org for schedules for in-person and online meetings  [x] There are AA meetings throughout the day all over town  [x] AA costs nothing to attend; they pass a basket for donations but this is not required    Narcotics Anonymous  [x] [phone]  [x] www.noana.org  [x] There are NA meetings throughout the day all over town  [x] NA costs nothing to attend; they pass a basket for donations but this is not required    Alcoholics Anonymous Online Intergroup (national)  [x] www.aa-intergroup.org  [x] Good resource for large, nation-wide meetings  [x] Can also attend smaller, local meetings in other cities  [x] Countless meetings  all day and all night  [x] AA costs nothing to attend; they pass a basket for donations but this is not required    [employer]lying Sober - 24/7 zoom meetings for women and coed - sign on anytime, anywhere!  [email or web address].comsober.Storwize/85-1-peifoyuh/    Online Intergroup of AA - 121 Open AA [place] Meeting - 24/7 zoom meetings  https://aa-intergroup.org/meetings/    LOOKING FOR AN ALTERNATIVE TO 12 STEP PROGRAMS - check out:  SMART Recovery: https://www.smartrecovery.org/about-us  Luc Recovery: https://recoverydharma.org      DETOX UNITS (USUALLY 5-7 DAYS):     [facility]: [address], [place]  [phone], call first to ensure bed availability    [facility]: [address] [place]  [phone], Option 1, call first to ensure bed availability    [facility]: [facility] [address], [facility]  [phone] (intake by appointment only)    [facility]: [address], [place]  [phone]      INTENSIVE OUTPATIENT PROGRAMS:     [facility] (formerly known as the ABU)  [x] [phone], Option 2  [x] [address] [facility] 4th Floor, [facility] [address]  [x] [email or web address]://www.[email or web address]/[email or web address]-program  [x] [facility] delivers comprehensive and collaborative treatment for alcohol and substance use disorders.  Excellent program for working professionals or anyone else seeking recovery.  [x] Requires insurance approval prior to starting program, call number above for more information.  [x] Intensive Outpatient Rehabilitation Program - M-F 9am-3pm - daily groups with psychologists and social workers, sessions with MDs 3x per week   [x] Ambulatory detox and dual diagnosis available    [facility] Intensive Outpatient Program  [x] [phone]  [x] [address] [facility] (the clinic not [facility])  [x] Call number above for more info and to check insurance requirements    [address] [place] [address]  [phone]    [place] [facility]:  [address] [facility], [address]?, [place], [place] [address]?, [phone]  [address] [facility]?, [place], [place] [address]?, [phone]    RESIDENTIAL REHABS (USUALLY 28 DAYS):     [facility]: [address], [phone]    [place] [facility]: [address] [place] [address]  [phone] (intake by appointment only)    [facility] (men only) [address], [phone]    [facility] (Female only) [address], [phone]    [facility]: [address], [place], men's program [phone], women's program [phone]    [facility]: [address] [place] [address], [phone]    [facility]: [address], [place], [place], [phone]    [facility]: Men only, [phone], [address] [facility] Brandon Malik [facility]: [address], [place], [place], [phone]    [facility]: [address] [place] [address],  [phone]  New Location: [address] [place] [address] [address], [place] [address], [phone]    [place] [facility]:   ?[address]?[place], [place] [address]?[phone]    [place]: [address] [place] [address], [place] [address], [phone]    [place]: [place], [phone]     [facility]: [place], [place], [phone]    [facility]: GANESH Pinon, [phone]    [facility]: [place], [place], [phone]    [place]: GANESH [address]on, [phone]    [facility]: [address], [place] [address], [phone]    COMMUNITY ADDICTION CLINICS:     [facility]: [address] [facility], [address] [place] [phone] -or- [address] [place] [address]    [facility] [place]: [address] [facility] [facility] [facility], [place]  [address]     [facility]: Clinics [phone]; Crisis [phone]    [facility]: [address] [place] [address]    [facility]: [address] [facility] [place] [address]    [facility]: [address] [place] [address],    [facility]: 2nd Floor [address] [place] [address]    [facility]: [address], [place] [address]    [facility], [address] [place] [address]    [facility]: [address] [facility], [phone]  (serves youth 16-23 years old)    [facility]: [facility] [phone]    [facility]: [address] [facility], [phone]    [facility]: [address] [facility] [phone]    [facility]al [facility]: [address] [facility] [facility], [address], [phone]     [facility]: [address] [facility], [phone], [phone]    RESOURCES IN OTHER [facility]:     [facility]: [address] [facility], [phone], [phone]    [facility]: [address] [facility], [address], [phone]    [facility], [address] [place] [address], [phone]    [facility]: [address] [facility], [phone]    [facility], [address] [facility], [phone] ([facility])    [facility], [address] [facility], [phone] ([facility])    [facility], [address], [address], [place], [phone] ([facility], and [facility])    [facility], [address] [place], [phone] ([facility])    [facility] Hotline [phone], [phone]    [facility], [address] [facility], [facility], [address] [place] [address]  "Blvd[address], [facility], [address] [facility] [address], [facility], [address] [facility] [address] [place] [facility], [address], [place]    [facility] Human Services, [address], [place] [phone]    [facility], 302 [facility] [phone]    [facility], [address] [facility] [phone]    [facility], 8[address] [facility], [phone]      Kazakh SPEAKING (en español):     Información de la reunión de Alcohólicos Anónimos  [facility]in [facility], 10:00 am  Habla [facility]  Esta reunión está abierta y cualquiera puede asistir.    Burundian speaking Alcoholics anonymous meetings:  El "Aydin [place] AA Skype" es un aydin on line de Alcohólicos Anónimos en [facility]. El aydin es bertha, gratuito y virtual a través de Skype Audio. El aydin funciona mediante audie llamada grupal de voz, por lo que no se utiliza la videollamada, ni se pueden mariela las imágenes o rostros de los participantes. Hace river años y medio abrimos el primer Aydin de AA por Skype en [facility], mayela actualmente asisten personas desde [place], [place], [place], [place], [place], [place], [place], [place], [place], [place], [place] y [place], entre otros.    El aydin es muy útil para los alcohólicos que residen en lugares donde no se celebran reuniones de AA, o residen en lugares donde las reuniones de AA son un número limitado de días a la semana, o para aquellos compañeros que se hayan de viaje o que, por cualquier motivo, se hayan convalecientes y no pueden desplazarse. Todos los días nos reuniones a las 21:00 (hora española)    Podéis obtener más información sobre el aydin y skylar sesiones en la página web [email or web address]      MENTAL HEALTH:     [facility]  Department of Psychiatry - " Outpatient Clinic  [phone]    [facility] of Psychiatry - General Psychiatry Intensive Outpatient Program  [facility] (formerly known as the BMU)  [phone], option 3    [facility] of Psychiat[facility] - Dual Diagnosis Intensive Outpatient Program  [facility] (formerly known as the ABU)  [phone], option 2      [facility] MENTAL HEALTH CENTERS:     [facility]  (aka [facility], [facility])  Serves [facility].  Serves uninsured patients & those with Medicaid.  Main location: [address] [place] [id number] [phone]  Walk-in's available during regular business hours.  24/7 Crisis Line: [phone]    [facility]  ([facility])  Serves [facility].  Serves uninsured patients, those with Medicaid and some private plans.  Walk-in's available during regular business hours.  Primary care services available as well.  [facility]: [address] [facility] [place] [address];  [phone]  [place]: [address] [place] [address];  [phone]  24/7 Crisis Line: [phone]    [facility]  Serves uninsured patients & those with Medicaid, call for more info.  Primary care, pediatrics, HIV treatment, and dentistry services available as well.  Three locations.  [phone]    [facility] [place]Corporate Office  Serves patients with Medicaid, Medicare, and private insurance  [address]  [place],?[place] [address]  [phone]    [facility]  Serves uninsured on a sliding scale, as well as Medicaid, Medicare, and private plans.  Eight locations around the [facility] area.  [phone]    [facility]  Serves uninsured patients & those with Medicaid, private insurances.  Primary care  available as well.  [phone]  [address] [place] [address]    [facility]  Serves veterans who were honorably discharged.  [address] [place] [address]  [phone]  24/7 Veterans Crisis Line: [phone] (Press 1)    If you have private insurance and need to find a specialist, please contact your insurance network to request a list of providers covered by your benefits.      MENTAL HEALTH/ADDICTIVE DISORDERS:     AA ([phone]), NA ([phone])   National Suicide Prevention Lifeline- Call 1-483.426.7686 Available 24 hours everyday  [facility] [phone]; Crisis Line 055-2372 - Call for options A-F:  Intensive Outpatient Treatment/ Day programs   [facility], please contact   [facility], please contact [phone] or [phone] to speak with an admissions counselor.  [facility] (Methadone Maintenance)   [address] [place] [address], [phone]  [address] [place] [address] [phone]  [facility], [address] [place] [address], [phone]  [place] [phone]  [facility] please contact [phone]  [facility], [address] [facility], 4th floor [place] [address] Phone: [phone]   [place] [facility] Office: [address] [address], [phone]  [place] Office: [address] [facility], [address], [place] [address], [phone]  [place] Office: [address] [place] [address] [phone]    Outpatient Substance Abuse Treatment   [facility] (Methadone Maintenance)   [address] [place] [address], [phone]  [address] [place] [address] [phone]  [facility], [address] [place] [address], [phone]  [facility] Office: [address], [place] [address], [phone]  [place]  Office: [address] [facility], [address], [place] [address], [phone]  [place] Office: [address] [facility], [place] [address] [phone]  [facility], [address] [place] [address] [phone]   [facility], [address] [facility], [address], [phone]  [facility]over, [address] [place], [place] [phone]    Residential Substance Abuse Treatment   [facility] [address] [facility], [phone]  [facility], [address] [facility], [phone]  [facility] (men only) [address] [place] [address], [phone]  [facility] at the [facility] (women only) [address] [place] [address] [phone]  [facility], [address] [place] [address] [phone]  [facility] (women only), intakes at 4[address] [facility], [phone]  [facility] (7-day program, $100, [address] Center[facility], [phone], [phone], [phone])  [facility] (Men only, [phone]), [address], [place] (Vets*/Non-Vets)  [facility] (Men only, $400/month program fee) [address] [facility], [phone]  [place] (Women over age 39 only), [address] [facility], [phone]    Out of Area:    [place], [address] [facility] [address], [place], [place] [phone])  [facility] (men only), [address] [facility] [address], [phone]  [facility], [address] [place], [place] [phone])  [place], [address] [place] [address] Dr. Kolb, [place] ([phone])  [facility], [address] [place] [address], [phone]  [facility] (Women only, has to have mental illness, can be homeless or substance abuser), [phone]        DOMESTIC VIOLENCE RESOURCES:     Advocacy  [facility] (NOFJC)  [address] [address]    [facility] [place] ? [phone]  Services  provided: emergency shelter, individual advocacy, information and referrals, group support, children's program, medical advocacy, forensic medical exams, primary care, legal assistance, counseling, safety planning, and caregiver support    [facility] HEALING AND EMPOWERMENT [place]  Confidential location  [facility] ? [phone]  Services provided: short term emergency shelter, all services provided are free of charge    [facility] FOR COMMUNITY ADVOCACY  Multiple locations in [facility], [place], and [facility] ([place], [place], and [place])    [facility] ? [phone]  Services provided: emergency shelter, individual advocacy, information and referrals, group support, children's program, medical advocacy, legal assistance in obtaining restraining orders, counseling, safety planning, and caregiver support    [facility]   Emergency Shelter   [phone]  Emergency Services ,Legal and Financial Assistance Services ,Housing Services ,Support Services     [facility]ial   [phone]  Emergency Services ,Counseling Services , Housing Services ,Support Services ,Children's Services     [facility]OMEN WITH A VISION  [address] [place] [address]  WWAV ? [phone]  Services provided: advocacy, health education and supportive services, specializing in free healing services for marginalized groups, including LGBTQ individuals and sex workers    SEXUAL TRAUMA AWARENESS AND RESPONSE (STAR)  [address] [place] [address]    STAR ? [phone] [phone]  Services provided: individual advocacy, information and referrals, group support, medical advocacy, legal assistance, counseling, and safety planning for survivors of sexual assault    [facility])  [address] [place] [address]  [facility] Forensic Program ? [phone]  Services provided: free forensic medical exams for sexual assault and  domestic violence, which can be performed up to 5 days after an incident. It is not necessary to make a police report to receive a forensic medical exam    Legal  [facility]  [address], [place] [address]  Project [facility] ? [phone]  Services provided: free emergency legal representation for survivors of doemstic violence residing in [facility]. Legal services may include temporary restraining orders, temporary child support, custody, and use of property    [facility] LEGAL SERVICES (SLLS)  [address], [place] [address]  SLLS ? [phone]  Services provided: free legal representation for survivors of domestic violence residing in [facility]. Legal services may include temporary child support, custody, and divorce      HOTLINES:     [facility] DOMESTIC VIOLENCE HOTLINE  [phone]    Services provided: free and confidential hotline for victims and survivors of domestic violence. All calls will be routed to a domestic violence service provided in the victim or survivor's area    NATIONAL HUMAN TRAFFICKING HOTLINE  (211) 880-6548    Services provided: national anti-trafficking hotline serving victims and survivors of human trafficking. Provides information about local resources, and access to safe space to report tips, seek services, and ask for help    VIA LINK  211 or [phone]    Service provided: counselors can provide crisis counseling. Counselors can also provide information and referrals to programs which can help with needs such as food, shelter, medical care, financial assistance, mental health services, substance abuse treatment, senior services, childcare, and more      HOMELESS SHELTERS:      Homeless shelters  [facility]  Emergency shelter for individuals and families  [address]  [phone]  [facility]  Emergency shelter for men only  Meals daily 6am, 2pm, & 6pm  Clothing, case management M-F by appointment  (ID/job/housing/legal assistance), mail  [address] [facility]  [phone]  [place] [facility]  Emergency shelter for men  [address] [facility]  [phone]  Emergency shelter for women  [address] [facility]  [phone]  Breakfast & lunch daily, dinner M-F  Case management, job counseling services   [facility]  Emergency shelter for teens and young adults up to 20yo  [address]  [phone]  [place] [facility]  Emergency shelter for women over 17yo and their kids  [address] [place] [address]  [phone]  [facility]  Day program, meals M-F 1PM (arrive early)  Showers, laundry, hygiene kits, showers, phones, , notary services, case management, ID assistance  [address] [facility]  [phone] M-F 8am-2:30pm  [employer]  Day program  [facility] 7:30am-3:30pm,  8:30am-3:30pm  Crisis intervention, employment assistance, food/clothing, hygiene kits, bus tokens, mail  [address] [facility] [address]  [phone]  [facility]  Mobile outreach for homeless persons in [facility]  [phone]  [facility] for the Homeless  Primary healthcare, case management, dental services, TB placement  Call ahead  [address] [facility] 2nd Floor  [phone]  [facility] at the [facility]  Connects homeless people with their loved ones in other cities by providing transportation costs   [phone]      [place] RESOURCES:     [place] Mobile Mental Health Crisis Response Team:    Region 12 ([place], [place], [place], and [facility]) ([facility])  [phone]      Outpatient Mental Health & Addiction Clinic Resources for both [facility] and [facility]:    [facility] Mental Healthcare Resources  Website: www.pbmhr.org  Main Number: [phone]    [facility] ([facility])  [address] ([address] [facility]) [place] [address]  [phone]    [facility] ([place]  Area)  [address] ([address]) [place] [address]  [phone]    [facility]  [address] ([address]) [place] [address]  [phone]    [facility]  [address] ([address] [facility]) [place] [address]  [phone]      Addiction Treatment Resources for both [facility] and [facility]:    [facility] (Detox, Residential, PHP, IOP, and Aftercare Programs)  [address] [place] [address]  [phone]    [facility] (Residential, IOP, Transitional Living, and Aftercare Programs)  [address] [place] [address]  [phone]    [facility] (Inpatient, Residential, Detox, IOP, Outpatient, and Aftercare Programs)  [address] [place] [address]  [phone] or toll free at [phone]      Outpatient Mental Health Psychotherapy Resources for both [facility] and [facility] [place]:    Carrie Jay, [facility]  [address]  [place] [address]  [phone]  Specialties: Depression, Anxiety, and Life Transitions    Bella Gutierrez, PhD  [address]  [place] [address]  [phone]  Specialties: Testing and Evaluation, Education and Learning Disabilities, and ADHD    Xena Trevizo, [facility] [address] [facility] [place] [address]  [phone]  Specialties: Obsessive-Compulsive (OCD), Depression, and Relationship Issues    Keke Rios LPC [address] [place] [address]  [phone]  Specialties: Trauma & PTSD, Mood Disorders, and Anxiety    Keke Brunson, PhD, [facility] [address] [place] [address]  [phone]  Specialties: Family Conflict, Child, and Relationship Issues    Bella Bartholomew LPC [facility] [place] [address] [phone]  Specialties: Anxiety, Depression, and  Anger Management        IN CASE OF SUICIDAL THINKING, call the National Suicide Hotline Number: 988    988 Suicide & Crisis Lifeline: 131 , 2-814-637-JRDR (5994)  Provides 24/7, free and confidential support for people in distress, prevention and crisis resources for you or your loved ones, and best practices for professionals.    Call, text or chat.  https://988Realm.Lively                Post op Spine Patient Instructions    Activity Restrictions:  [x]  Return to work will be determined on an individual basis.  [x]  No lifting greater than 5-10 pounds.  [x]  Avoid bending and twisting the area of your surgery more than 45 degrees from neutral position in any direction.  [x]  No driving or operating machinery:  [x]  until cleared by your surgeon.  [x]  while taking narcotic pain medications or muscle relaxants.  [x]  Wear your brace when walking.   [x]  Increase ambulation over the next 2 weeks. Walk on paved surfaces only. It is okay to walk up and down stairs while holding onto a side rail.    Discharge Medication/Follow-up:  [x]  Please refer to discharge medication reconciliation form.  [x]  Take Tylenol (acetaminophen) 650 mg every 6 hours as needed for additional pain control.  [x]  Do not take ANY Aspirin or Aspirin containing products for 2 weeks after surgery (unless otherwise directed in discharge medication list).   [x]  Do not take ANY herbal supplements for 2 weeks after surgery.    [x]  Do not take ANY non-steroidal anti-inflammatory drugs (NSAIDS), including the following: ibuprofen, naprosyn, Aleve, Advil, Indocin, Mobic, or Celebrex for 12 weeks after surgery.   [x]  Prescriptions for appropriate medication will be given upon discharge.  [x]  Take the pain medication as prescribed. We recommend to wean use of your pain medication after 1 week of taking as prescribed (Ex: After 1 week of taking every 4 hours as needed, wean down to taking your pain medication every 6 hours as needed).  [x]  Take  docusate (Colace 100 mg): take one capsule a day as needed for constipation. You can get this over the counter.  [x]  Follow-up appointment:  [x]  4-6 weeks with MD:  [x]  with x-rays  [x]  An appointment will be mailed to you.    Wound Care:  [x]  Remove the small dressing near your incision in 2 days.   [x]  No bandage required. Keep your incision open to the air.   [x]  You may shower on the 2nd day after your surgery. Keep the incision clean and dry at all times. Do not allow the force of water to hit the incision. If the incision gets damp, pat it dry. Do not rub or scrub the incision.  [x]  You cannot take a bath until 8 weeks after surgery.  [x]  Remove staples/sutures 3/1 at LTAC/IPR if incision is healed.      Call your doctor or go to the Emergency Room for any signs of infection, including: increased redness, drainage, pain, or fever (temperature ?101). Call your doctor or go to the Emergency Room if there are any localized neurological changes; problems with speech, vision, numbness, tingling, weakness, or severe headache; inability to control urination or bowel movements; inability to urinate; or for other concerns.      Special Instructions:  [x]  No use of tobacco products.  [x]  Diet: Please eat a regular diet as tolerated.      Physicians need 3 days' notice for pain medicine to be refilled. Pain medicine will only be refilled between 8 AM and 5 PM, Monday through Friday, due to Food and Drug Administration regulation of documentation.    If you have any questions about this form, please call [phone].    Form No. 37435 (Revised 10/31/2013)

## 2024-02-05 NOTE — ASSESSMENT & PLAN NOTE
Dangers of cigarette smoking were reviewed with patient in detail. Patient was  offered a nicotinepatch  Nicotine replacement options were discussed. Nicotine replacement was discussed

## 2024-02-05 NOTE — SUBJECTIVE & OBJECTIVE
Medications Prior to Admission   Medication Sig Dispense Refill Last Dose    naloxone (NARCAN) 4 mg/actuation Spry 4 mg by Nasal route.       mv-min/vit C/glut/lysine/hb124 (IMMUNE SUPPORT ORAL) Take 1 tablet by mouth once daily.          Review of patient's allergies indicates:  No Known Allergies    Past Medical History:   Diagnosis Date    Hypertension     Unilateral inguinal hernia, without obstruction or gangrene, not specified as recurrent      Past Surgical History:   Procedure Laterality Date    HAND ARTHROTOMY Right 7/7/2023    Procedure: ARTHROTOMY, HAND - R LF PIP;  Surgeon: Boy Lamb MD;  Location: [facility] OR [address] [place] [address];  Service: Orthopedics;  Laterality: Right;    INCISION AND DRAINAGE OF ABSCESS Right 10/12/2023    Procedure: INCISION AND DRAINAGE, ABSCESS;  Surgeon: Steve Monteiro MD;  Location: [address];  Service: General;  Laterality: Right;  right chest    IRRIGATION AND DEBRIDEMENT OF UPPER EXTREMITY Bilateral 7/7/2023    Procedure: IRRIGATION AND DEBRIDEMENT, UPPER EXTREMITY - RIGHT HAND;  Surgeon: Boy Lamb MD;  Location: [address];  Service: Orthopedics;  Laterality: Bilateral;    OLECRANON BURSECTOMY Left 7/7/2023    Procedure: BURSECTOMY, OLECRANON;  Surgeon: Boy Lamb MD;  Location: [address];  Service: Orthopedics;  Laterality: Left;     Family History    None       Tobacco Use    Smoking status: Former     Types: Cigarettes    Smokeless tobacco: Never   Substance and Sexual Activity    Alcohol use: No    Drug use: Yes     Types: Marijuana, IV     Comment: 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    Sexual activity: Not on file     Review of Systems   Unable to perform ROS: Other     Objective:     Weight: 74.8 kg (165 lb)  Body mass index is 23.01 kg/m².  Vital Signs (Most Recent):  Temp: 98.6 °F (37 °C) (02/05/24 0905)  Pulse: 70 (02/05/24 0905)  Resp: 16 (02/05/24 0905)  BP: 127/74 (02/05/24 0905)  SpO2: 98 % (02/05/24 0905) Vital Signs (24h  "Range):  Temp:  [98.5 °F (36.9 °C)-99 °F (37.2 °C)] 98.6 °F (37 °C)  Pulse:  [] 70  Resp:  [16-18] 16  SpO2:  [97 %-100 %] 98 %  BP: (127-145)/(60-84) 127/74                                 Physical Exam         Neurosurgery Physical Exam    AOX  CN grossly intact  Motor 5/5  RLE decreased sensation nondermatomal below R knee to R foot  No hyperreflexia    Significant Labs:  Recent Labs   Lab 02/04/24 1853 02/05/24  0308    90   * 133*   K 4.0 3.7    106   CO2 24 21*   BUN 25* 21*   CREATININE 1.0 0.8   CALCIUM 8.6* 8.2*   MG  --  1.7     Recent Labs   Lab 02/04/24 1853 02/05/24  0308   WBC 10.02 6.50   HGB 8.0* 7.7*   HCT 26.1* 25.3*    402     No results for input(s): "LABPT", "INR", "APTT" in the last 48 hours.  Microbiology Results (last 7 days)       Procedure Component Value Units Date/Time    Blood culture #1 **CANNOT BE ORDERED STAT** [[id number]] Collected: 02/04/24 1853    Order Status: Completed Specimen: Blood from Peripheral, Forearm, Left Updated: 02/05/24 0315     Blood Culture, Routine No Growth to date    Blood culture #2 **CANNOT BE ORDERED STAT** [[id number]] Collected: 02/04/24 1822    Order Status: Completed Specimen: Blood from Peripheral, Forearm, Left Updated: 02/05/24 0315     Blood Culture, Routine No Growth to date          All pertinent labs from the last 24 hours have been reviewed.    Significant Diagnostics:  CT: No results found in the last 24 hours.  MRI: No results found in the last 24 hours.  "

## 2024-02-06 PROBLEM — R73.03 PREDIABETES: Status: ACTIVE | Noted: 2024-02-06

## 2024-02-06 PROBLEM — Z59.819 HOUSING INSECURITY: Status: ACTIVE | Noted: 2024-02-06

## 2024-02-06 LAB
ANION GAP SERPL CALC-SCNC: 5 MMOL/L (ref 8–16)
BASOPHILS # BLD AUTO: 0.02 K/UL (ref 0–0.2)
BASOPHILS NFR BLD: 0.3 % (ref 0–1.9)
BUN SERPL-MCNC: 10 MG/DL (ref 6–20)
CALCIUM SERPL-MCNC: 8.6 MG/DL (ref 8.7–10.5)
CHLORIDE SERPL-SCNC: 106 MMOL/L (ref 95–110)
CO2 SERPL-SCNC: 22 MMOL/L (ref 23–29)
CREAT SERPL-MCNC: 0.7 MG/DL (ref 0.5–1.4)
DIFFERENTIAL METHOD BLD: ABNORMAL
EOSINOPHIL # BLD AUTO: 0 K/UL (ref 0–0.5)
EOSINOPHIL NFR BLD: 0.2 % (ref 0–8)
ERYTHROCYTE [DISTWIDTH] IN BLOOD BY AUTOMATED COUNT: 18.3 % (ref 11.5–14.5)
EST. GFR  (NO RACE VARIABLE): >60 ML/MIN/1.73 M^2
GLUCOSE SERPL-MCNC: 87 MG/DL (ref 70–110)
HCT VFR BLD AUTO: 27.5 % (ref 40–54)
HGB BLD-MCNC: 8.5 G/DL (ref 14–18)
IMM GRANULOCYTES # BLD AUTO: 0.01 K/UL (ref 0–0.04)
IMM GRANULOCYTES NFR BLD AUTO: 0.2 % (ref 0–0.5)
LYMPHOCYTES # BLD AUTO: 1.8 K/UL (ref 1–4.8)
LYMPHOCYTES NFR BLD: 28 % (ref 18–48)
MCH RBC QN AUTO: 21.5 PG (ref 27–31)
MCHC RBC AUTO-ENTMCNC: 30.9 G/DL (ref 32–36)
MCV RBC AUTO: 69 FL (ref 82–98)
MONOCYTES # BLD AUTO: 0.7 K/UL (ref 0.3–1)
MONOCYTES NFR BLD: 10.4 % (ref 4–15)
NEUTROPHILS # BLD AUTO: 4 K/UL (ref 1.8–7.7)
NEUTROPHILS NFR BLD: 60.9 % (ref 38–73)
NRBC BLD-RTO: 0 /100 WBC
PLATELET # BLD AUTO: 466 K/UL (ref 150–450)
PMV BLD AUTO: 9 FL (ref 9.2–12.9)
POTASSIUM SERPL-SCNC: 3.8 MMOL/L (ref 3.5–5.1)
RBC # BLD AUTO: 3.96 M/UL (ref 4.6–6.2)
SODIUM SERPL-SCNC: 133 MMOL/L (ref 136–145)
WBC # BLD AUTO: 6.47 K/UL (ref 3.9–12.7)

## 2024-02-06 PROCEDURE — 36415 COLL VENOUS BLD VENIPUNCTURE: CPT | Performed by: STUDENT IN AN ORGANIZED HEALTH CARE EDUCATION/TRAINING PROGRAM

## 2024-02-06 PROCEDURE — 63600175 PHARM REV CODE 636 W HCPCS: Performed by: STUDENT IN AN ORGANIZED HEALTH CARE EDUCATION/TRAINING PROGRAM

## 2024-02-06 PROCEDURE — S4991 NICOTINE PATCH NONLEGEND: HCPCS | Performed by: HOSPITALIST

## 2024-02-06 PROCEDURE — 25000003 PHARM REV CODE 250: Performed by: STUDENT IN AN ORGANIZED HEALTH CARE EDUCATION/TRAINING PROGRAM

## 2024-02-06 PROCEDURE — 11000001 HC ACUTE MED/SURG PRIVATE ROOM

## 2024-02-06 PROCEDURE — 25000003 PHARM REV CODE 250: Performed by: HOSPITALIST

## 2024-02-06 PROCEDURE — 99232 SBSQ HOSP IP/OBS MODERATE 35: CPT | Mod: ,,, | Performed by: PHYSICIAN ASSISTANT

## 2024-02-06 PROCEDURE — 80048 BASIC METABOLIC PNL TOTAL CA: CPT | Performed by: STUDENT IN AN ORGANIZED HEALTH CARE EDUCATION/TRAINING PROGRAM

## 2024-02-06 PROCEDURE — 85025 COMPLETE CBC W/AUTO DIFF WBC: CPT | Performed by: STUDENT IN AN ORGANIZED HEALTH CARE EDUCATION/TRAINING PROGRAM

## 2024-02-06 RX ORDER — MIRTAZAPINE 15 MG/1
15 TABLET, FILM COATED ORAL NIGHTLY
Status: DISCONTINUED | OUTPATIENT
Start: 2024-02-06 | End: 2024-02-22 | Stop reason: HOSPADM

## 2024-02-06 RX ORDER — IBUPROFEN 200 MG
1 TABLET ORAL EVERY 24 HOURS
Status: DISCONTINUED | OUTPATIENT
Start: 2024-02-06 | End: 2024-02-22 | Stop reason: HOSPADM

## 2024-02-06 RX ADMIN — CLONIDINE HYDROCHLORIDE 0.1 MG: 0.1 TABLET ORAL at 08:02

## 2024-02-06 RX ADMIN — CLONIDINE HYDROCHLORIDE 0.1 MG: 0.1 TABLET ORAL at 11:02

## 2024-02-06 RX ADMIN — ENOXAPARIN SODIUM 40 MG: 40 INJECTION SUBCUTANEOUS at 04:02

## 2024-02-06 RX ADMIN — HYDROMORPHONE HYDROCHLORIDE 1 MG: 1 INJECTION, SOLUTION INTRAMUSCULAR; INTRAVENOUS; SUBCUTANEOUS at 10:02

## 2024-02-06 RX ADMIN — IBUPROFEN 600 MG: 600 TABLET ORAL at 08:02

## 2024-02-06 RX ADMIN — MIRTAZAPINE 15 MG: 15 TABLET, FILM COATED ORAL at 08:02

## 2024-02-06 RX ADMIN — HYDROXYZINE HYDROCHLORIDE 50 MG: 25 TABLET, FILM COATED ORAL at 08:02

## 2024-02-06 RX ADMIN — NICOTINE 1 PATCH: 14 PATCH, EXTENDED RELEASE TRANSDERMAL at 10:02

## 2024-02-06 RX ADMIN — HYDROMORPHONE HYDROCHLORIDE 1 MG: 1 INJECTION, SOLUTION INTRAMUSCULAR; INTRAVENOUS; SUBCUTANEOUS at 04:02

## 2024-02-06 NOTE — PLAN OF CARE
Problem: Adult Inpatient Plan of Care  Goal: Optimal Comfort and Wellbeing  2/6/2024 0326 by Saray Baron RN  Outcome: Ongoing, Progressing  2/6/2024 0325 by Saray Baron RN  Outcome: Ongoing, Progressing     Problem: Adult Inpatient Plan of Care  Goal: Readiness for Transition of Care  2/6/2024 0326 by Saray Baron RN  Outcome: Ongoing, Progressing  2/6/2024 0325 by Saray Baron RN  Outcome: Ongoing, Progressing     Problem: Impaired Wound Healing  Goal: Optimal Wound Healing  2/6/2024 0326 by Saray Baron RN  Outcome: Ongoing, Progressing  2/6/2024 0325 by Saray Baron RN  Outcome: Ongoing, Progressing     Problem: Skin Injury Risk Increased  Goal: Skin Health and Integrity  2/6/2024 0326 by Saray Baron RN  Outcome: Ongoing, Progressing  2/6/2024 0325 by Saray Baron RN  Outcome: Ongoing, Progressing     Problem: Infection  Goal: Absence of Infection Signs and Symptoms  Outcome: Ongoing, Progressing     Problem: Fall Injury Risk  Goal: Absence of Fall and Fall-Related Injury  Outcome: Ongoing, Progressing     Problem: Pain Acute  Goal: Acceptable Pain Control and Functional Ability  Outcome: Ongoing, Progressing     Care plan reviewed with patient, goals of care discussed patient voiced understanding, patient slept all shift.

## 2024-02-06 NOTE — ASSESSMENT & PLAN NOTE
On admission, Hgb 8.0 (more or less c/w baseline as has been downtrending for past year) w/ MCV 69. No e/o active bleeding. Iron panel w/ low iron, TIBC, %sat & transferrin; ferritin nml. Although ferritin nml, likely anemia of chronic dx 2/2 chronic infxn re: IVDU.   - Monitor CBC & for e/o active bleeding  - Transfuse PRN for goal Hgb > 7  - Mgmt of infxns as above

## 2024-02-06 NOTE — ASSESSMENT & PLAN NOTE
28M w/ hx MRSA bactermia, IVDU, pelvic/intra-abdominal abscesses who presents with progressive back pain with radiation down the leg over the past couple of weeks. Motor intact.     MRI L Sp w/wo 2/4: L5 epidural absess with central stenosis, additional abscesses inferior to sacrum, in psoas. spondylodiscitis at L5-S1     - admitted to   - Neuro exam stable  - No emergent neurosurgical intervention warranted at this time  - Neuro checks q4h  - All pertinent labs and imaging reviewed  - 2/4 ESR 75, CRP 33.7. Blood cx NGTD.   - MRI lumbar w/wo 2/4: L5-S1 osteodiscitis extending into the sacrum, left iliac bone, SI joint, facet joints. Anterolisthesis L5-S1, epidural abscess L5-S1 with canal stenosis. Left psoas and paraspinal muscle abscesses. No clinical evidence of cauda equina syndrome.   - CT lumbar obtained with destructive changes at L5-S1.   - Abx held pending intervention, if patient begins to show signs of sepsis, please start abx. ID consult.   - Voiding spontaneously. Bladder scan prn.   - Plan for OR 2/14 for L4-pelvis fusion with debridement.  - Discussed with Dr. Pozo  - Contact with acute changes in exam

## 2024-02-06 NOTE — PROGRESS NOTES
"Aleksandr Bray - Neurosurgery ([facility])  [facility] Medicine  Progress Note    Patient Name: Ton Donovan  MRN: [id number]  Patient Class: IP- Inpatient   Admission Date: 2/4/2024  Length of Stay: 1 days  Attending Physician: Brenna Belcher MD  Primary Care Provider: Emily, Primary Doctor        Subjective:     Principal Problem:Osteomyelitis of vertebra of lumbosacral region      HPI:  Ton Donovan is a 28-year-old male with PMHx of IVDU w/ assoc complications of MRSA bacteremia, multiple septic joints & abscesses, who presented 2/4/24 with progressive back pain with radiation down his legs b/l. Endorses bilateral numbness & neuropathic pain over the dorsum of his feet. This has been ongoing and is not necessarily a new problem, but has been worsening. Reports "pins & needles"/paresthesias down his legs that have been progressing, although laying on his stomach seems to provide significant relief. Admits to continued active IV heroin use; last used just prior to EMS arrival. Denies any fevers, chills, night sweats, cough, or shortness of breath. Denies any bowel or bladder incontinence/retention or saddle anesthesia.     On arrival, HDS & 0/4 SIRS. MRI showed osteomyelitis/discitis of L5-S1, osteomyelitis of sacrum & L iliac bone, probable septic arthritis of L SI joint & lower lumbar spine facet joints, worsening anterolisthesis of L5 with respect to S1 with increased size of epidural abscess at the level of L5-S1 and probable severe central canal stenosis at this level, along w/ additional abscesses in the L iliopsoas muscle, paraspinal muscles, pelvis, & L sacrum. Received single dose of Zosyn in ED on arrival, but given that pt HDS w/o sepsis, further abx held to optimize intra-op cx/pathology. Admitted to hospital medicine for further mgmt of osteomyelitis, septic joints & abscesses.    Overview/Hospital Course:  Addiction medicine, along w/ NSGY, orthopedic surgery, IR & ID consulted.       Interval History: Doing " alright this afternoon. Endorsing mild sx of opioid withdrawal w/ anxiety, jitteriness & aches/pains; no N/V/D. Reports back pain is doing okay while supine, but exacerbated upon standing w/ assoc b/l sciatica & weakness w/ standing. Endorses diminished sensation of BLE (R>L) up to shins.     Review of Systems   Constitutional:  Positive for activity change, diaphoresis and fatigue. Negative for chills and fever.   HENT:  Negative for congestion, rhinorrhea and sore throat.    Respiratory:  Negative for cough and shortness of breath.    Cardiovascular:  Negative for chest pain and palpitations.   Gastrointestinal:  Positive for abdominal pain. Negative for blood in stool, constipation, diarrhea, nausea and vomiting.   Genitourinary:  Negative for difficulty urinating and dysuria.   Musculoskeletal:  Positive for arthralgias, back pain and gait problem. Negative for neck pain.   Skin:  Negative for rash and wound.   Neurological:  Positive for weakness and numbness. Negative for dizziness, light-headedness and headaches.   Psychiatric/Behavioral:  Negative for agitation and confusion. The patient is nervous/anxious.      Objective:     Vital Signs (Most Recent):  Temp: 98.7 °F (37.1 °C) (02/06/24 1605)  Pulse: 61 (02/06/24 1605)  Resp: (!) 24 (02/06/24 1605)  BP: 128/79 (02/06/24 1605)  SpO2: 98 % (02/06/24 1605) Vital Signs (24h Range):  Temp:  [98 °F (36.7 °C)-99 °F (37.2 °C)] 98.7 °F (37.1 °C)  Pulse:  [57-69] 61  Resp:  [16-28] 24  SpO2:  [98 %-100 %] 98 %  BP: (128-155)/(70-81) 128/79     Weight: 74.8 kg (164 lb 14.5 oz)  Body mass index is 23 kg/m².    Intake/Output Summary (Last 24 hours) at 2/6/2024 1722  Last data filed at 2/6/2024 0410  Gross per 24 hour   Intake 580 ml   Output 2450 ml   Net -1870 ml      Physical Exam  Constitutional:       Appearance: He is normal weight. He is not toxic-appearing.   HENT:      Head: Normocephalic and atraumatic.   Eyes:      Conjunctiva/sclera: Conjunctivae normal.  "  Cardiovascular:      Rate and Rhythm: Normal rate and regular rhythm.      Heart sounds: Normal heart sounds.   Pulmonary:      Effort: Pulmonary effort is normal. No respiratory distress.      Breath sounds: Normal breath sounds. No wheezing.   Abdominal:      General: Bowel sounds are normal. There is no distension.      Palpations: Abdomen is soft.      Tenderness: There is abdominal tenderness.   Skin:     General: Skin is warm and dry.      Findings: No rash.   Neurological:      Mental Status: He is alert and oriented to person, place, and time.      Sensory: Sensory deficit (diminished sensation of BLE (R>L)) present.      Motor: No weakness.   Psychiatric:         Mood and Affect: Mood normal.         Behavior: Behavior normal.         Significant Labs:  CBC:  Recent Labs   Lab 02/04/24 1853 02/05/24 0308 02/06/24  0344   WBC 10.02 6.50 6.47   HGB 8.0* 7.7* 8.5*   HCT 26.1* 25.3* 27.5*    402 466*     CMP:  Recent Labs   Lab 02/04/24 1853 02/05/24 0308 02/06/24  0344   * 133* 133*   K 4.0 3.7 3.8    106 106   CO2 24 21* 22*    90 87   BUN 25* 21* 10   CREATININE 1.0 0.8 0.7   CALCIUM 8.6* 8.2* 8.6*   PROT 8.0 7.0  --    ALBUMIN 2.5* 2.1*  --    BILITOT 0.2 0.2  --    ALKPHOS 68 58  --    AST 20 16  --    ALT 7* 6*  --    ANIONGAP 7* 6* 5*     PTINR:  No results for input(s): "INR" in the last 48 hours.      Assessment/Plan:      Osteomyelitis, discitis, & myositis of lumbosacral region  Septic arthritis of SI joint(s)  Epidural abscesses   Multiple abscesses of pelvis, sacrum, psoas, iliacus, & paraspinals  Lower back pain w/ b/l sciatica   Severe spinal canal stenosis   Hx of known spinal osteomyelitis, various abscesses, & MRSA bacteremia i/s/o IVDU. Presented w/ progressive lower back & abdominal pain w/ assoc LE weakness & sensory changes. HDS & 0/4 SIRS on admission. MRI showed osteomyelitis/discitis of L5-S1, osteomyelitis of sacrum & L iliac bone, probable septic " arthritis of L SI joint & lower lumbar spine facet joints, worsening anterolisthesis of L5 with respect to S1 with increased size of epidural abscess at the level of L5-S1 and probable severe central canal stenosis at this level, along w/ additional abscesses in the L iliopsoas muscle, paraspinal muscles, pelvis, & L sacrum. CT L-spine w/ similar findings. No urinary or bowel sx retention/incontinence. Received single dose of Zosyn in ED on arrival, but given that pt HDS w/o sepsis, further abx held to optimize intra-op cx/pathology.   - Neurosurgery consulted; appreciate recs/assistance   - No plans for intervention/OR until 2/16  - IR consulted for potential aspiration of abscess(es); appreciate assistance  - ID consulted; appreciate recs  - Ortho consulted for mgmt of septic SI joint; appreciate recs/assistance  - Holding antibiotics given absence of sepsis  - Multimodal analgesia  - PT/OT     IVDU (intravenous drug user)  Heroin abuse w/ opioid withdrawal   PSDU  Tobacco use   Known hx of IVDU (heroin) w/ extensive assoc medical complications (discussed above). No recent UDS (only one on file from July 2023- positive for opiates & THC) and none collected on admission. Endorses daily IV heroin use (1g/day); last used just prior to calling EMS. Active tobacco use, but denies EtOH or other illicit substance use. Endorsing sx of opioid w/d.   - Addiction medicine consulted; appreciate recs  - Deferring initiating methadone/suboxone given potential anesthesia/surgical intervention  - PRN dilaudid per COWS protocol  - Supportive care w/ PRN antiemetics, analgesics, & anxiolytics   - NRT PRN  - Cessation strongly encouraged     Anemia of infection and chronic disease  On admission, Hgb 8.0 (more or less c/w baseline as has been downtrending for past year) w/ MCV 69. No e/o active bleeding. Iron panel w/ low iron, TIBC, %sat & transferrin; ferritin nml. Although ferritin nml, likely anemia of chronic dx 2/2 chronic  infxn re: IVDU.   - Monitor CBC & for e/o active bleeding  - Transfuse PRN for goal Hgb > 7  - Mgmt of infxns as above    Chronic hyponatremia  On admission, Na 133 (baseline 129-133). 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   - Deferring IVF  - Encourage good PO intake  - Monitor BMP  - Mgmt of infxns as above    Substance or medication-induced depressive disorder  Pt endorses sx of depression, no SI/SA.   - Addiction medicine/psychiatry consult as above; appreciate recs   - Initiating mirtazapine 15mg QHS    Prediabetes  A1c 6.2% w/ glucose 109 on admission.   - Deferring SSI or CBGs   - Hypoglycemia protocol     Housing insecurity  Previously worked at [employer] & held down job in the past but started using heroin last year following multiple family deaths (mother, aunt & grandmother) in a short period of time. Subsequently lost housing & has been living on streets since then.   - SW/CM        VTE Risk Mitigation (From admission, onward)           Ordered     enoxaparin injection 40 mg  Daily         02/05/24 0126     Place sequential compression device  Until discontinued         02/05/24 0126     IP VTE HIGH RISK PATIENT  Once         02/05/24 0126                    Discharge Planning   SENA: 2/11/2024     Code Status: Full Code   Is the patient medically ready for discharge?: No    Reason for patient still in hospital (select all that apply): Patient trending condition, Treatment, and Consult recommendations                 Brenna Belcher MD  Department of Hospital Medicine   [facility] - Neurosurgery ([facility])

## 2024-02-06 NOTE — SUBJECTIVE & OBJECTIVE
Interval History: NAEON. AFVSS. Exam stable. Lying flat in bed 2/2 severe back and radiating leg pain down the posterior aspect with standing/walking. Also notes numbness to the plantar surface. Plan for OR next week for debridement and L4-pelvis fusion.     Medications:  Continuous Infusions:  Scheduled Meds:   cloNIDine  0.1 mg Oral Q12H    enoxparin  40 mg Subcutaneous Daily    mirtazapine  15 mg Oral QHS     PRN Meds:acetaminophen, acetaminophen, dextrose 10%, dextrose 10%, dicyclomine, glucagon (human recombinant), glucose, glucose, HYDROmorphone, HYDROmorphone, hydrOXYzine HCL, ibuprofen, loperamide, melatonin, naloxone, promethazine, sodium chloride 0.9%     Review of Systems  Objective:     Weight: 74.8 kg (164 lb 14.5 oz)  Body mass index is 23 kg/m².  Vital Signs (Most Recent):  Temp: 98.6 °F (37 °C) (02/06/24 1302)  Pulse: (!) 57 (02/06/24 1302)  Resp: (!) 28 (02/06/24 1302)  BP: 128/70 (02/06/24 1302)  SpO2: 100 % (02/06/24 1302) Vital Signs (24h Range):  Temp:  [98 °F (36.7 °C)-99 °F (37.2 °C)] 98.6 °F (37 °C)  Pulse:  [57-69] 57  Resp:  [16-28] 28  SpO2:  [99 %-100 %] 100 %  BP: (128-155)/(70-81) 128/70                                 Physical Exam     Neurosurgery Physical Exam    General: well developed, well nourished, no distress.   Head: normocephalic, atraumatic  Neurologic: Alert and oriented. Thought content appropriate.  GCS: Motor: 6/Verbal: 5/Eyes: 4 GCS Total: 15  Mental Status: Awake, Alert, Oriented x 4  Language: No aphasia  Speech: No dysarthria  Cranial nerves: face symmetric, tongue midline, CN II-XII grossly intact, EOMI.   Pulmonary: normal respirations, no signs of respiratory distress  Sensory: intact to light touch throughout  Motor Strength: Moves all extremities spontaneously with good tone.  No abnormal movements seen.     Strength  Deltoids Triceps Biceps Wrist Extension Wrist Flexion Hand    Upper: R 5/5 5/5 5/5 5/5 5/5 5/5    L 5/5 5/5 5/5 5/5 5/5 5/5     Iliopsoas  "Quadriceps Knee  Flexion Tibialis  anterior Gastro- cnemius EHL   Lower: R 5/5 5/5 5/5 5/5 5/5 4+/5    L 5/5 5/5 5/5 5/5 5/5 4+/5     Khalil: absent  Skin: Skin is warm, dry and intact.        Significant Labs:  Recent Labs   Lab 02/04/24 1853 02/05/24 0308 02/06/24  0344    90 87   * 133* 133*   K 4.0 3.7 3.8    106 106   CO2 24 21* 22*   BUN 25* 21* 10   CREATININE 1.0 0.8 0.7   CALCIUM 8.6* 8.2* 8.6*   MG  --  1.7  --      Recent Labs   Lab 02/04/24 1853 02/05/24 0308 02/06/24  0344   WBC 10.02 6.50 6.47   HGB 8.0* 7.7* 8.5*   HCT 26.1* 25.3* 27.5*    402 466*     No results for input(s): "LABPT", "INR", "APTT" in the last 48 hours.  Microbiology Results (last 7 days)       Procedure Component Value Units Date/Time    Blood culture #1 **CANNOT BE ORDERED STAT** [[id number]] Collected: 02/04/24 1853    Order Status: Completed Specimen: Blood from Peripheral, Forearm, Left Updated: 02/05/24 2012     Blood Culture, Routine No Growth to date      No Growth to date    Blood culture #2 **CANNOT BE ORDERED STAT** [[id number]] Collected: 02/04/24 1822    Order Status: Completed Specimen: Blood from Peripheral, Forearm, Left Updated: 02/05/24 2012     Blood Culture, Routine No Growth to date      No Growth to date          Recent Lab Results         02/06/24  0344        Anion Gap 5       Baso # 0.02       Basophil % 0.3       BUN 10       Calcium 8.6       Chloride 106       CO2 22       Creatinine 0.7       Differential Method Automated       eGFR >60.0       Eos # 0.0       Eos % 0.2       Glucose 87       Gran # (ANC) 4.0       Gran % 60.9       Hematocrit 27.5       Hemoglobin 8.5       Immature Grans (Abs) 0.01  Comment: Mild elevation in immature granulocytes is non specific and   can be seen in a variety of conditions including stress response,   acute inflammation, trauma and pregnancy. Correlation with other   laboratory and clinical findings is essential.         Immature " Granulocytes 0.2       Lymph # 1.8       Lymph % 28.0       MCH 21.5       MCHC 30.9       MCV 69       Mono # 0.7       Mono % 10.4       MPV 9.0       nRBC 0       Platelet Count 466       Potassium 3.8       RBC 3.96       RDW 18.3       Sodium 133       WBC 6.47             All pertinent labs from the last 24 hours have been reviewed.    Significant Diagnostics:  I have reviewed all pertinent imaging results/findings within the past 24 hours.

## 2024-02-06 NOTE — PROGRESS NOTES
Aleksandr Bray - Neurosurgery ([facility])  Neurosurgery  Progress Note    Subjective:     History of Present Illness: 28M w/ hx MRSA bactermia, IVDU, pelvic/intra-abdominal abscesses who presents with progressive back pain with radiation down the leg over the past couple of weeks.  He has had bilateral numbness of his anterior thigh as well. He denies bowel/bladder sx or saddle anesthesia. He denies focal weakness. Per chart review, the patient is actively using heroin and did an injection prior to being taken in by EMS.      Post-Op Info:  Procedure(s) (LRB):  FUSION, SPINE, LUMBAR (N/A)       Interval History: NAEON. AFVSS. Exam stable. Lying flat in bed 2/2 severe back and radiating leg pain down the posterior aspect with standing/walking. Also notes numbness to the plantar surface. Plan for OR next week for debridement and L4-pelvis fusion.     Medications:  Continuous Infusions:  Scheduled Meds:   cloNIDine  0.1 mg Oral Q12H    enoxparin  40 mg Subcutaneous Daily    mirtazapine  15 mg Oral QHS     PRN Meds:acetaminophen, acetaminophen, dextrose 10%, dextrose 10%, dicyclomine, glucagon (human recombinant), glucose, glucose, HYDROmorphone, HYDROmorphone, hydrOXYzine HCL, ibuprofen, loperamide, melatonin, naloxone, promethazine, sodium chloride 0.9%     Review of Systems  Objective:     Weight: 74.8 kg (164 lb 14.5 oz)  Body mass index is 23 kg/m².  Vital Signs (Most Recent):  Temp: 98.6 °F (37 °C) (02/06/24 1302)  Pulse: (!) 57 (02/06/24 1302)  Resp: (!) 28 (02/06/24 1302)  BP: 128/70 (02/06/24 1302)  SpO2: 100 % (02/06/24 1302) Vital Signs (24h Range):  Temp:  [98 °F (36.7 °C)-99 °F (37.2 °C)] 98.6 °F (37 °C)  Pulse:  [57-69] 57  Resp:  [16-28] 28  SpO2:  [99 %-100 %] 100 %  BP: (128-155)/(70-81) 128/70                                 Physical Exam     Neurosurgery Physical Exam    General: well developed, well nourished, no distress.   Head: normocephalic, atraumatic  Neurologic: Alert and oriented. Thought content  "appropriate.  GCS: Motor: 6/Verbal: 5/Eyes: 4 GCS Total: 15  Mental Status: Awake, Alert, Oriented x 4  Language: No aphasia  Speech: No dysarthria  Cranial nerves: face symmetric, tongue midline, CN II-XII grossly intact, EOMI.   Pulmonary: normal respirations, no signs of respiratory distress  Sensory: intact to light touch throughout  Motor Strength: Moves all extremities spontaneously with good tone.  No abnormal movements seen.     Strength  Deltoids Triceps Biceps Wrist Extension Wrist Flexion Hand    Upper: R 5/5 5/5 5/5 5/5 5/5 5/5    L 5/5 5/5 5/5 5/5 5/5 5/5     Iliopsoas Quadriceps Knee  Flexion Tibialis  anterior Gastro- cnemius EHL   Lower: R 5/5 5/5 5/5 5/5 5/5 4+/5    L 5/5 5/5 5/5 5/5 5/5 4+/5     Khalil: absent  Skin: Skin is warm, dry and intact.        Significant Labs:  Recent Labs   Lab 02/04/24 1853 02/05/24  0308 02/06/24  0344    90 87   * 133* 133*   K 4.0 3.7 3.8    106 106   CO2 24 21* 22*   BUN 25* 21* 10   CREATININE 1.0 0.8 0.7   CALCIUM 8.6* 8.2* 8.6*   MG  --  1.7  --      Recent Labs   Lab 02/04/24 1853 02/05/24  0308 02/06/24  0344   WBC 10.02 6.50 6.47   HGB 8.0* 7.7* 8.5*   HCT 26.1* 25.3* 27.5*    402 466*     No results for input(s): "LABPT", "INR", "APTT" in the last 48 hours.  Microbiology Results (last 7 days)       Procedure Component Value Units Date/Time    Blood culture #1 **CANNOT BE ORDERED STAT** [[id number]] Collected: 02/04/24 1853    Order Status: Completed Specimen: Blood from Peripheral, Forearm, Left Updated: 02/05/24 2012     Blood Culture, Routine No Growth to date      No Growth to date    Blood culture #2 **CANNOT BE ORDERED STAT** [[id number]] Collected: 02/04/24 1822    Order Status: Completed Specimen: Blood from Peripheral, Forearm, Left Updated: 02/05/24 2012     Blood Culture, Routine No Growth to date      No Growth to date          Recent Lab Results         02/06/24  0344        Anion Gap 5       Baso # 0.02       " Basophil % 0.3       BUN 10       Calcium 8.6       Chloride 106       CO2 22       Creatinine 0.7       Differential Method Automated       eGFR >60.0       Eos # 0.0       Eos % 0.2       Glucose 87       Gran # (ANC) 4.0       Gran % 60.9       Hematocrit 27.5       Hemoglobin 8.5       Immature Grans (Abs) 0.01  Comment: Mild elevation in immature granulocytes is non specific and   can be seen in a variety of conditions including stress response,   acute inflammation, trauma and pregnancy. Correlation with other   laboratory and clinical findings is essential.         Immature Granulocytes 0.2       Lymph # 1.8       Lymph % 28.0       MCH 21.5       MCHC 30.9       MCV 69       Mono # 0.7       Mono % 10.4       MPV 9.0       nRBC 0       Platelet Count 466       Potassium 3.8       RBC 3.96       RDW 18.3       Sodium 133       WBC 6.47             All pertinent labs from the last 24 hours have been reviewed.    Significant Diagnostics:  I have reviewed all pertinent imaging results/findings within the past 24 hours.  Assessment/Plan:     * Osteomyelitis of vertebra of lumbosacral region  28M w/ hx MRSA bactermia, IVDU, pelvic/intra-abdominal abscesses who presents with progressive back pain with radiation down the leg over the past couple of weeks. Motor intact.     MRI L Sp w/wo 2/4: L5 epidural absess with central stenosis, additional abscesses inferior to sacrum, in psoas. spondylodiscitis at L5-S1     - admitted to   - Neuro exam stable  - No emergent neurosurgical intervention warranted at this time  - Neuro checks q4h  - All pertinent labs and imaging reviewed  - 2/4 ESR 75, CRP 33.7. Blood cx NGTD.   - MRI lumbar w/wo 2/4: L5-S1 osteodiscitis extending into the sacrum, left iliac bone, SI joint, facet joints. Anterolisthesis L5-S1, epidural abscess L5-S1 with canal stenosis. Left psoas and paraspinal muscle abscesses. No clinical evidence of cauda equina syndrome.   - CT lumbar obtained with  destructive changes at L5-S1.   - Abx held pending intervention, if patient begins to show signs of sepsis, please start abx. ID consult.   - Voiding spontaneously. Bladder scan prn.   - Plan for OR 2/14 for L4-pelvis fusion with debridement.  - Discussed with Dr. Pozo  - Contact with acute changes in exam          Joselyn Ventura PA-C  Neurosurgery  Aleksandr Bray - Neurosurgery ([facility])
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
## 2024-02-06 NOTE — NURSING
Nurses Note -- 4 Eyes      2/5/2024   8:00 AM      Skin assessed during: Admit      [x] No Altered Skin Integrity Present    [x]Prevention Measures Documented    Wound Care Consulted? No    Attending Nurse:  JIMY So    Second RN/Staff Member:  JIMY Grant

## 2024-02-06 NOTE — SUBJECTIVE & OBJECTIVE
Interval History: Doing alright this afternoon. Endorsing mild sx of opioid withdrawal w/ anxiety, jitteriness & aches/pains; no N/V/D. Reports back pain is doing okay while supine, but exacerbated upon standing w/ assoc b/l sciatica & weakness w/ standing. Endorses diminished sensation of BLE (R>L) up to shins.     Review of Systems   Constitutional:  Positive for activity change, diaphoresis and fatigue. Negative for chills and fever.   HENT:  Negative for congestion, rhinorrhea and sore throat.    Respiratory:  Negative for cough and shortness of breath.    Cardiovascular:  Negative for chest pain and palpitations.   Gastrointestinal:  Positive for abdominal pain. Negative for blood in stool, constipation, diarrhea, nausea and vomiting.   Genitourinary:  Negative for difficulty urinating and dysuria.   Musculoskeletal:  Positive for arthralgias, back pain and gait problem. Negative for neck pain.   Skin:  Negative for rash and wound.   Neurological:  Positive for weakness and numbness. Negative for dizziness, light-headedness and headaches.   Psychiatric/Behavioral:  Negative for agitation and confusion. The patient is nervous/anxious.      Objective:     Vital Signs (Most Recent):  Temp: 98.7 °F (37.1 °C) (02/06/24 1605)  Pulse: 61 (02/06/24 1605)  Resp: (!) 24 (02/06/24 1605)  BP: 128/79 (02/06/24 1605)  SpO2: 98 % (02/06/24 1605) Vital Signs (24h Range):  Temp:  [98 °F (36.7 °C)-99 °F (37.2 °C)] 98.7 °F (37.1 °C)  Pulse:  [57-69] 61  Resp:  [16-28] 24  SpO2:  [98 %-100 %] 98 %  BP: (128-155)/(70-81) 128/79     Weight: 74.8 kg (164 lb 14.5 oz)  Body mass index is 23 kg/m².    Intake/Output Summary (Last 24 hours) at 2/6/2024 1722  Last data filed at 2/6/2024 0410  Gross per 24 hour   Intake 580 ml   Output 2450 ml   Net -1870 ml      Physical Exam  Constitutional:       Appearance: He is normal weight. He is not toxic-appearing.   HENT:      Head: Normocephalic and atraumatic.   Eyes:      Conjunctiva/sclera:  "Conjunctivae normal.   Cardiovascular:      Rate and Rhythm: Normal rate and regular rhythm.      Heart sounds: Normal heart sounds.   Pulmonary:      Effort: Pulmonary effort is normal. No respiratory distress.      Breath sounds: Normal breath sounds. No wheezing.   Abdominal:      General: Bowel sounds are normal. There is no distension.      Palpations: Abdomen is soft.      Tenderness: There is abdominal tenderness.   Skin:     General: Skin is warm and dry.      Findings: No rash.   Neurological:      Mental Status: He is alert and oriented to person, place, and time.      Sensory: Sensory deficit (diminished sensation of BLE (R>L)) present.      Motor: No weakness.   Psychiatric:         Mood and Affect: Mood normal.         Behavior: Behavior normal.         Significant Labs:  CBC:  Recent Labs   Lab 02/04/24 1853 02/05/24  0308 02/06/24  0344   WBC 10.02 6.50 6.47   HGB 8.0* 7.7* 8.5*   HCT 26.1* 25.3* 27.5*    402 466*     CMP:  Recent Labs   Lab 02/04/24 1853 02/05/24  0308 02/06/24  0344   * 133* 133*   K 4.0 3.7 3.8    106 106   CO2 24 21* 22*    90 87   BUN 25* 21* 10   CREATININE 1.0 0.8 0.7   CALCIUM 8.6* 8.2* 8.6*   PROT 8.0 7.0  --    ALBUMIN 2.5* 2.1*  --    BILITOT 0.2 0.2  --    ALKPHOS 68 58  --    AST 20 16  --    ALT 7* 6*  --    ANIONGAP 7* 6* 5*     PTINR:  No results for input(s): "INR" in the last 48 hours.    "

## 2024-02-06 NOTE — ASSESSMENT & PLAN NOTE
Heroin abuse w/ opioid withdrawal   PSDU  Tobacco use   Known hx of IVDU (heroin) w/ extensive assoc medical complications (discussed above). No recent UDS (only one on file from July 2023- positive for opiates & THC) and none collected on admission. Endorses daily IV heroin use (1g/day); last used just prior to calling EMS. Active tobacco use, but denies EtOH or other illicit substance use. Endorsing sx of opioid w/d.   - Addiction medicine consulted; appreciate recs  - Deferring initiating methadone/suboxone given potential anesthesia/surgical intervention  - PRN dilaudid per [facility]OWS protocol  - Supportive care w/ PRN antiemetics, analgesics, & anxiolytics   - NRT PRN  - Cessation strongly encouraged

## 2024-02-06 NOTE — HOSPITAL COURSE
On arrival, HDS & 0/4 SIRS. MRI showed osteomyelitis/discitis of L5-S1, osteomyelitis of sacrum & L iliac bone, probable septic arthritis of L SI joint & lower lumbar spine facet joints, worsening anterolisthesis of L5 with respect to S1 with increased size of epidural abscess at the level of L5-S1 and probable severe central canal stenosis at this level, along w/ additional abscesses in the L iliopsoas muscle, paraspinal muscles, pelvis, & L sacrum. Received single dose of Zosyn in ED on arrival, but given that pt HDS w/o sepsis, further abx held to optimize intra-op cx/pathology. Admitted to hospital medicine for further mgmt of osteomyelitis, septic joints & abscesses.    Addiction medicine, along w/ NSGY, orthopedic surgery, IR & ID consulted. Underwent aspiration of psoas abscess per IR on 2/7. Aspiration of SI joint attempted but unable to aspirate hardly any fluid. Underwent LOOP X FUSION, SPINE, LUMBAR and epidural phlegmon debridement, intra-op cx's growing MRSA. Echo unremarkable. Antibiotics per ID. Plan for discharge to LTAC to complete ABX. Patient deemed ready for discharge. Plan discussed with pt, who was agreeable and amenable; medications were discussed and reviewed, outpatient follow-up arranged, ER precautions were given, all questions were answered to the pt's satisfaction, and Ton Donovan  was subsequently discharged.

## 2024-02-06 NOTE — ASSESSMENT & PLAN NOTE
Osteomyelitis, discitis, & myositis of lumbosacral region  Septic arthritis of SI joint(s)  Epidural abscesses   Multiple abscesses of pelvis, sacrum, psoas, iliacus, & paraspinals  Lower back pain w/ b/l sciatica   Severe spinal canal stenosis   Hx of known spinal osteomyelitis, various abscesses, & MRSA bacteremia i/s/o IVDU. Presented w/ progressive lower back & abdominal pain w/ assoc LE weakness & sensory changes. HDS & 0/4 SIRS on admission. MRI showed osteomyelitis/discitis of L5-S1, osteomyelitis of sacrum & L iliac bone, probable septic arthritis of L SI joint & lower lumbar spine facet joints, worsening anterolisthesis of L5 with respect to S1 with increased size of epidural abscess at the level of L5-S1 and probable severe central canal stenosis at this level, along w/ additional abscesses in the L iliopsoas muscle, paraspinal muscles, pelvis, & L sacrum. CT L-spine w/ similar findings. No urinary or bowel sx retention/incontinence. Received single dose of Zosyn in ED on arrival, but given that pt HDS w/o sepsis, further abx held to optimize intra-op cx/pathology.   - Neurosurgery consulted; appreciate recs/assistance   - No plans for intervention/OR until 2/16  - IR consulted for potential aspiration of abscess(es); appreciate assistance  - ID consulted; appreciate recs  - Ortho consulted for mgmt of septic SI joint; appreciate recs/assistance  - Holding antibiotics given absence of sepsis  - Multimodal analgesia   - PT/OT

## 2024-02-06 NOTE — ASSESSMENT & PLAN NOTE
On admission, Na 133 (baseline 129-133). 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   - Deferring IVF  - Encourage good PO intake  - Monitor BMP  - Mgmt of infxns as above

## 2024-02-06 NOTE — ASSESSMENT & PLAN NOTE
Pt endorses sx of depression, no SI/SA.   - Addiction medicine/psychiatry consult as above; appreciate recs   - Initiating mirtazapine 15mg QHS

## 2024-02-07 PROBLEM — R52 PAIN: Status: ACTIVE | Noted: 2024-02-07

## 2024-02-07 PROBLEM — R20.0 SENSORY LOSS: Status: ACTIVE | Noted: 2024-02-07

## 2024-02-07 PROBLEM — F19.20 DRUG DEPENDENCE: Status: ACTIVE | Noted: 2023-11-21

## 2024-02-07 PROBLEM — R53.81 DEBILITY: Status: ACTIVE | Noted: 2024-02-07

## 2024-02-07 PROBLEM — F19.939 DRUG WITHDRAWAL: Status: ACTIVE | Noted: 2023-10-11

## 2024-02-07 PROBLEM — M46.47 DISCITIS OF LUMBOSACRAL REGION: Status: ACTIVE | Noted: 2024-02-07

## 2024-02-07 PROBLEM — M46.1 SACROILIITIS: Status: ACTIVE | Noted: 2024-02-07

## 2024-02-07 LAB
ANION GAP SERPL CALC-SCNC: 9 MMOL/L (ref 8–16)
BUN SERPL-MCNC: 13 MG/DL (ref 6–20)
CALCIUM SERPL-MCNC: 8.9 MG/DL (ref 8.7–10.5)
CHLORIDE SERPL-SCNC: 108 MMOL/L (ref 95–110)
CO2 SERPL-SCNC: 16 MMOL/L (ref 23–29)
CREAT SERPL-MCNC: 0.8 MG/DL (ref 0.5–1.4)
ERYTHROCYTE [DISTWIDTH] IN BLOOD BY AUTOMATED COUNT: 18.6 % (ref 11.5–14.5)
EST. GFR  (NO RACE VARIABLE): >60 ML/MIN/1.73 M^2
GLUCOSE SERPL-MCNC: 87 MG/DL (ref 70–110)
GRAM STN SPEC: NORMAL
GRAM STN SPEC: NORMAL
HCT VFR BLD AUTO: 31.4 % (ref 40–54)
HGB BLD-MCNC: 9.9 G/DL (ref 14–18)
INR PPP: 1.2 (ref 0.8–1.2)
MCH RBC QN AUTO: 21.5 PG (ref 27–31)
MCHC RBC AUTO-ENTMCNC: 31.5 G/DL (ref 32–36)
MCV RBC AUTO: 68 FL (ref 82–98)
PLATELET # BLD AUTO: 428 K/UL (ref 150–450)
PMV BLD AUTO: 9.6 FL (ref 9.2–12.9)
POTASSIUM SERPL-SCNC: 4.5 MMOL/L (ref 3.5–5.1)
PROTHROMBIN TIME: 12.5 SEC (ref 9–12.5)
RBC # BLD AUTO: 4.61 M/UL (ref 4.6–6.2)
SODIUM SERPL-SCNC: 133 MMOL/L (ref 136–145)
WBC # BLD AUTO: 5.91 K/UL (ref 3.9–12.7)

## 2024-02-07 PROCEDURE — 63600175 PHARM REV CODE 636 W HCPCS

## 2024-02-07 PROCEDURE — 87176 TISSUE HOMOGENIZATION CULTR: CPT | Performed by: STUDENT IN AN ORGANIZED HEALTH CARE EDUCATION/TRAINING PROGRAM

## 2024-02-07 PROCEDURE — 87075 CULTR BACTERIA EXCEPT BLOOD: CPT | Performed by: STUDENT IN AN ORGANIZED HEALTH CARE EDUCATION/TRAINING PROGRAM

## 2024-02-07 PROCEDURE — S4991 NICOTINE PATCH NONLEGEND: HCPCS | Performed by: HOSPITALIST

## 2024-02-07 PROCEDURE — 87206 SMEAR FLUORESCENT/ACID STAI: CPT | Performed by: STUDENT IN AN ORGANIZED HEALTH CARE EDUCATION/TRAINING PROGRAM

## 2024-02-07 PROCEDURE — 63600175 PHARM REV CODE 636 W HCPCS: Performed by: STUDENT IN AN ORGANIZED HEALTH CARE EDUCATION/TRAINING PROGRAM

## 2024-02-07 PROCEDURE — 85610 PROTHROMBIN TIME: CPT | Performed by: PHYSICIAN ASSISTANT

## 2024-02-07 PROCEDURE — 36415 COLL VENOUS BLD VENIPUNCTURE: CPT | Performed by: STUDENT IN AN ORGANIZED HEALTH CARE EDUCATION/TRAINING PROGRAM

## 2024-02-07 PROCEDURE — 87070 CULTURE OTHR SPECIMN AEROBIC: CPT | Performed by: STUDENT IN AN ORGANIZED HEALTH CARE EDUCATION/TRAINING PROGRAM

## 2024-02-07 PROCEDURE — 87070 CULTURE OTHR SPECIMN AEROBIC: CPT | Mod: 59 | Performed by: STUDENT IN AN ORGANIZED HEALTH CARE EDUCATION/TRAINING PROGRAM

## 2024-02-07 PROCEDURE — 87102 FUNGUS ISOLATION CULTURE: CPT | Mod: 59 | Performed by: STUDENT IN AN ORGANIZED HEALTH CARE EDUCATION/TRAINING PROGRAM

## 2024-02-07 PROCEDURE — 36415 COLL VENOUS BLD VENIPUNCTURE: CPT | Mod: XB | Performed by: PHYSICIAN ASSISTANT

## 2024-02-07 PROCEDURE — 94761 N-INVAS EAR/PLS OXIMETRY MLT: CPT

## 2024-02-07 PROCEDURE — 25000003 PHARM REV CODE 250: Performed by: HOSPITALIST

## 2024-02-07 PROCEDURE — 87205 SMEAR GRAM STAIN: CPT | Performed by: STUDENT IN AN ORGANIZED HEALTH CARE EDUCATION/TRAINING PROGRAM

## 2024-02-07 PROCEDURE — 99223 1ST HOSP IP/OBS HIGH 75: CPT | Mod: 25,,, | Performed by: PHYSICIAN ASSISTANT

## 2024-02-07 PROCEDURE — 87116 MYCOBACTERIA CULTURE: CPT | Performed by: STUDENT IN AN ORGANIZED HEALTH CARE EDUCATION/TRAINING PROGRAM

## 2024-02-07 PROCEDURE — 87102 FUNGUS ISOLATION CULTURE: CPT | Performed by: STUDENT IN AN ORGANIZED HEALTH CARE EDUCATION/TRAINING PROGRAM

## 2024-02-07 PROCEDURE — 87075 CULTR BACTERIA EXCEPT BLOOD: CPT | Mod: 59 | Performed by: STUDENT IN AN ORGANIZED HEALTH CARE EDUCATION/TRAINING PROGRAM

## 2024-02-07 PROCEDURE — 87186 SC STD MICRODIL/AGAR DIL: CPT | Performed by: STUDENT IN AN ORGANIZED HEALTH CARE EDUCATION/TRAINING PROGRAM

## 2024-02-07 PROCEDURE — 11000001 HC ACUTE MED/SURG PRIVATE ROOM

## 2024-02-07 PROCEDURE — 25000003 PHARM REV CODE 250: Performed by: STUDENT IN AN ORGANIZED HEALTH CARE EDUCATION/TRAINING PROGRAM

## 2024-02-07 PROCEDURE — 85027 COMPLETE CBC AUTOMATED: CPT | Performed by: STUDENT IN AN ORGANIZED HEALTH CARE EDUCATION/TRAINING PROGRAM

## 2024-02-07 PROCEDURE — 97530 THERAPEUTIC ACTIVITIES: CPT

## 2024-02-07 PROCEDURE — 97116 GAIT TRAINING THERAPY: CPT

## 2024-02-07 PROCEDURE — 87205 SMEAR GRAM STAIN: CPT | Mod: 59 | Performed by: STUDENT IN AN ORGANIZED HEALTH CARE EDUCATION/TRAINING PROGRAM

## 2024-02-07 PROCEDURE — 87077 CULTURE AEROBIC IDENTIFY: CPT | Performed by: STUDENT IN AN ORGANIZED HEALTH CARE EDUCATION/TRAINING PROGRAM

## 2024-02-07 PROCEDURE — 99223 1ST HOSP IP/OBS HIGH 75: CPT | Mod: ,,, | Performed by: STUDENT IN AN ORGANIZED HEALTH CARE EDUCATION/TRAINING PROGRAM

## 2024-02-07 PROCEDURE — 97166 OT EVAL MOD COMPLEX 45 MIN: CPT

## 2024-02-07 PROCEDURE — 87206 SMEAR FLUORESCENT/ACID STAI: CPT | Mod: 91 | Performed by: STUDENT IN AN ORGANIZED HEALTH CARE EDUCATION/TRAINING PROGRAM

## 2024-02-07 PROCEDURE — 87116 MYCOBACTERIA CULTURE: CPT | Mod: 59 | Performed by: STUDENT IN AN ORGANIZED HEALTH CARE EDUCATION/TRAINING PROGRAM

## 2024-02-07 PROCEDURE — 99232 SBSQ HOSP IP/OBS MODERATE 35: CPT | Mod: ,,, | Performed by: PHYSICIAN ASSISTANT

## 2024-02-07 PROCEDURE — 97162 PT EVAL MOD COMPLEX 30 MIN: CPT

## 2024-02-07 PROCEDURE — 80048 BASIC METABOLIC PNL TOTAL CA: CPT | Performed by: STUDENT IN AN ORGANIZED HEALTH CARE EDUCATION/TRAINING PROGRAM

## 2024-02-07 PROCEDURE — 63600175 PHARM REV CODE 636 W HCPCS: Performed by: INTERNAL MEDICINE

## 2024-02-07 RX ORDER — OXYCODONE HYDROCHLORIDE 5 MG/1
5 TABLET ORAL EVERY 6 HOURS
Status: DISCONTINUED | OUTPATIENT
Start: 2024-02-07 | End: 2024-02-08

## 2024-02-07 RX ORDER — HYDROMORPHONE HYDROCHLORIDE 2 MG/1
4 TABLET ORAL EVERY 4 HOURS PRN
Status: DISCONTINUED | OUTPATIENT
Start: 2024-02-07 | End: 2024-02-08

## 2024-02-07 RX ORDER — ACETAMINOPHEN 500 MG
1000 TABLET ORAL EVERY 8 HOURS
Status: DISCONTINUED | OUTPATIENT
Start: 2024-02-08 | End: 2024-02-22 | Stop reason: HOSPADM

## 2024-02-07 RX ORDER — HYDROMORPHONE HYDROCHLORIDE 1 MG/ML
INJECTION, SOLUTION INTRAMUSCULAR; INTRAVENOUS; SUBCUTANEOUS
Status: COMPLETED
Start: 2024-02-07 | End: 2024-02-07

## 2024-02-07 RX ORDER — KETOROLAC TROMETHAMINE 30 MG/ML
15 INJECTION, SOLUTION INTRAMUSCULAR; INTRAVENOUS ONCE
Status: COMPLETED | OUTPATIENT
Start: 2024-02-07 | End: 2024-02-07

## 2024-02-07 RX ORDER — MIDAZOLAM HYDROCHLORIDE 1 MG/ML
INJECTION INTRAMUSCULAR; INTRAVENOUS
Status: COMPLETED | OUTPATIENT
Start: 2024-02-07 | End: 2024-02-07

## 2024-02-07 RX ORDER — HYDROMORPHONE HYDROCHLORIDE 1 MG/ML
1 INJECTION, SOLUTION INTRAMUSCULAR; INTRAVENOUS; SUBCUTANEOUS EVERY 6 HOURS PRN
Status: DISCONTINUED | OUTPATIENT
Start: 2024-02-07 | End: 2024-02-14

## 2024-02-07 RX ORDER — ACETAMINOPHEN 325 MG/1
650 TABLET ORAL EVERY 4 HOURS PRN
Status: DISCONTINUED | OUTPATIENT
Start: 2024-02-07 | End: 2024-02-22 | Stop reason: HOSPADM

## 2024-02-07 RX ORDER — DIPHENHYDRAMINE HYDROCHLORIDE 50 MG/ML
INJECTION INTRAMUSCULAR; INTRAVENOUS
Status: COMPLETED | OUTPATIENT
Start: 2024-02-07 | End: 2024-02-07

## 2024-02-07 RX ORDER — OXYCODONE HYDROCHLORIDE 5 MG/1
5 TABLET ORAL EVERY 4 HOURS PRN
Status: DISCONTINUED | OUTPATIENT
Start: 2024-02-07 | End: 2024-02-18

## 2024-02-07 RX ADMIN — HYDROMORPHONE HYDROCHLORIDE 1 MG: 1 INJECTION, SOLUTION INTRAMUSCULAR; INTRAVENOUS; SUBCUTANEOUS at 04:02

## 2024-02-07 RX ADMIN — CLONIDINE HYDROCHLORIDE 0.1 MG: 0.1 TABLET ORAL at 08:02

## 2024-02-07 RX ADMIN — HYDROXYZINE HYDROCHLORIDE 50 MG: 25 TABLET, FILM COATED ORAL at 08:02

## 2024-02-07 RX ADMIN — DIPHENHYDRAMINE HYDROCHLORIDE 25 MG: 50 INJECTION, SOLUTION INTRAMUSCULAR; INTRAVENOUS at 01:02

## 2024-02-07 RX ADMIN — HYDROMORPHONE HYDROCHLORIDE 1 MG: 1 INJECTION, SOLUTION INTRAMUSCULAR; INTRAVENOUS; SUBCUTANEOUS at 09:02

## 2024-02-07 RX ADMIN — MIDAZOLAM HYDROCHLORIDE 1 MG: 2 INJECTION, SOLUTION INTRAMUSCULAR; INTRAVENOUS at 01:02

## 2024-02-07 RX ADMIN — MIRTAZAPINE 15 MG: 15 TABLET, FILM COATED ORAL at 08:02

## 2024-02-07 RX ADMIN — HYDROMORPHONE HYDROCHLORIDE 1 MG: 0.5 INJECTION, SOLUTION INTRAMUSCULAR; INTRAVENOUS; SUBCUTANEOUS at 03:02

## 2024-02-07 RX ADMIN — OXYCODONE 5 MG: 5 TABLET ORAL at 11:02

## 2024-02-07 RX ADMIN — NICOTINE 1 PATCH: 14 PATCH, EXTENDED RELEASE TRANSDERMAL at 09:02

## 2024-02-07 RX ADMIN — DIPHENHYDRAMINE HYDROCHLORIDE 25 MG: 50 INJECTION, SOLUTION INTRAMUSCULAR; INTRAVENOUS at 02:02

## 2024-02-07 RX ADMIN — HYDROMORPHONE HYDROCHLORIDE 1 MG: 1 INJECTION, SOLUTION INTRAMUSCULAR; INTRAVENOUS; SUBCUTANEOUS at 03:02

## 2024-02-07 RX ADMIN — ENOXAPARIN SODIUM 40 MG: 40 INJECTION SUBCUTANEOUS at 05:02

## 2024-02-07 RX ADMIN — KETOROLAC TROMETHAMINE 15 MG: 30 INJECTION, SOLUTION INTRAMUSCULAR; INTRAVENOUS at 06:02

## 2024-02-07 RX ADMIN — MIDAZOLAM HYDROCHLORIDE 0.5 MG: 2 INJECTION, SOLUTION INTRAMUSCULAR; INTRAVENOUS at 02:02

## 2024-02-07 RX ADMIN — OXYCODONE 5 MG: 5 TABLET ORAL at 06:02

## 2024-02-07 RX ADMIN — HYDROXYZINE HYDROCHLORIDE 50 MG: 25 TABLET, FILM COATED ORAL at 05:02

## 2024-02-07 RX ADMIN — ACETAMINOPHEN 1000 MG: 500 TABLET ORAL at 05:02

## 2024-02-07 RX ADMIN — MIDAZOLAM HYDROCHLORIDE 0.5 MG: 2 INJECTION, SOLUTION INTRAMUSCULAR; INTRAVENOUS at 01:02

## 2024-02-07 RX ADMIN — HYDROMORPHONE HYDROCHLORIDE 1 MG: 1 INJECTION, SOLUTION INTRAMUSCULAR; INTRAVENOUS; SUBCUTANEOUS at 10:02

## 2024-02-07 RX ADMIN — HYDROMORPHONE HYDROCHLORIDE 0.5 MG: 1 INJECTION, SOLUTION INTRAMUSCULAR; INTRAVENOUS; SUBCUTANEOUS at 05:02

## 2024-02-07 NOTE — PLAN OF CARE
Problem: Occupational Therapy  Goal: Occupational Therapy Goal  Description: Goals to be met by: 3/7/23     Patient will increase functional independence with ADLs by performing:    LE Dressing with Minimal Assistance.  Grooming while standing with Moderate Assistance.  Toileting from toilet with Moderate Assistance for hygiene and clothing management.   Stand pivot transfers with Minimal Assistance.  Step transfer with Minimal Assistance    Outcome: Ongoing, Progressing
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
## 2024-02-07 NOTE — PLAN OF CARE
POC established and functional mobility goals were created to help pt return to PLOF. Will be reassessed as appropriate to measure pt progress.    Problem: Physical Therapy  Goal: Physical Therapy Goal  Description: Goals to be met by: 24     Patient will increase functional independence with mobility by performin. Supine to sit with Supervision  2. Sit to supine with Supervision  3. Sit to stand transfer with Supervision  4. Bed to chair transfer with Supervision using LRAD as needed  5. Gait  x 150 feet with Supervision using LRAD as needed.   6. Ascend/descend 5 stair with right Handrails and Supervision  7. Lower extremity exercise program x15 reps per handout, with assistance as needed    Outcome: Ongoing, Progressing

## 2024-02-07 NOTE — SEDATION DOCUMENTATION
Pt arrived to IR Room 73 for Psoas aspiration. Pt oriented to unit and staff, Pt safely transferred from stretcher to procedural table. Fall risk reviewed and comfort measures utilized with interventions. Safety strap applied, position pillows to minimize pressure points. Blankets applied. Pt prepped and draped utilizing standard sterile technique. Patient placed on continuous monitoring, as required by sedation policy. Timeouts implemented utilizing standard universal time-out per department and facility policy.

## 2024-02-07 NOTE — SEDATION DOCUMENTATION
Psaos and SI joint aspiration complete. Pt tolerated well with no signs of distress noted. Dressing to each site is clean, dry, and intact. Pt will be transported to MPU for 1 hour recovery prior to returning to unit.

## 2024-02-07 NOTE — PROCEDURES
VIR Post-Procedure Note      Pre Op Diagnosis:  psoas fluid collection    Post Op Diagnosis:  same    Procedure:  Image Guided aspiration X2    Procedure performed by:  Ash Padron MD  /  ALEXANDRA Nolan MD    Written Informed Consent Obtained: Yes    Specimen Removed:  Yes; aspirate from fluid collections    Estimated Blood Loss: minimal    Findings:    CT was used for localization of SI joint and abnormal fluid collections.     Aspiration of left SI joint yeiled only 0.1 cc fluid.     Successful aspiration of psoas fluid collection yielding 4 mL serosanguinous fluid.             Ash Padron MD  VIR Fellow

## 2024-02-07 NOTE — HPI
28M with h/o IVDU (injects heroin, last used 02/03), with associated complex infectious h/o of disseminated MRSA bacteremia, and multiple septic joints / abscesses, c/b poor adherence, and deferring care / leaving AMA.  - Pt returns now (02/04) for progression of chronic back pain with radiculopathy (numbness / pain) to legs B/L.     MRI L-spine / pelvis (02/04): showed osteomyelitis/discitis of L5-S1, osteomyelitis of sacrum & L iliac bone, probable septic arthritis of L SI joint & lower lumbar spine facet joints; worsening anterolisthesis of L5 with respect to S1 with increased size of epidural abscess at the level of L5-S1 and probable severe central canal stenosis at this level, along w/ additional abscesses in the L iliopsoas muscle, paraspinal muscles, pelvis, & L sacrum; -- the additional abscesses in the left iliopsoas muscle [3.4 x 1.7cm; with other micro-abscesses adjacent measuring 2.3cm), paraspinal muscles, pelvis, and left sacrum. Additional abscess in the superior rectal/presacral region measuring roughly 3.1 x 2.4 cm; and probable additional micro abscesses at left sacrum.  CT lumbar obtained with destructive changes at L5-S1.       -- Received single dose of Zosyn in ED on arrival, but given that pt HDS w/o sepsis, further abx held to optimize intra-op cx/pathology. Admitted to hospital medicine for further mgmt of osteomyelitis, septic joints & abscesses. Addiction medicine, along w/ NSGY, orthopedic surgery, IR & ID consulted.     Reports back pain is doing okay while supine, but exacerbated upon standing w/ assoc b/l sciatica & weakness w/ standing. Endorses diminished sensation of BLE (R>L) up to shins.   He denies bowel/bladder sx or saddle anesthesia. He denies focal weakness.    Pt off abx pending tentative spinal bx for path / cx; pt remains AF, VSS, HDS. CRP 33. Bld cxs NGTD.    Abx held pending intervention, if patient begins to show signs of sepsis, please start abx. ID consult.   Voiding  spontaneously. Bladder scan prn.     Plan for OR 2/14 for L4-pelvis fusion with debridement. IR consulted for source control of SI / L psoas abscesses, tentative aspiration today (02/07) of L SI joint and L psoas. Orders placed for micro send of samples  (gram stain, AFB, fungal, aerobic, and anaerobic).

## 2024-02-07 NOTE — PT/OT/SLP EVAL
Physical Therapy Co-Evaluation and Co-Treatment with OT    Patient Name:  Ton Donovan   MRN:  [id number]    Recent Surgery: Procedure(s) (LRB):  **AIRO** FUSION, SPINE, LUMBAR (N/A)      Patient required co-tx with OT secondary to need for multiple set of skilled hands to provide safest therapy and best outcomes.      Recommendations:     Discharge Recommendations:    High Intensity Therapy  Discharge Equipment Recommendations: walker, rolling, bedside commode   Barriers to discharge: Increased level of assist    Highest Level of Mobility: Gait 14'  Assistance Required: Min(A) w/ RW    Assessment:     Ton Donovan is a 28 y.o. male admitted with a medical diagnosis of Osteomyelitis of vertebra of lumbosacral region. He presents with the following impairments/functional limitations:  weakness, impaired endurance, impaired sensation, impaired coordination, impaired self care skills, impaired functional mobility, gait instability, pain, impaired balance, decreased safety awareness, decreased lower extremity function    Pt met with HOB flat and agreeable to PT session. Pt reports his PLOF is (I) with functional mobility using no DME. Pt reports increased difficulty with ambulation due to R LE weakness, pain, and poor balance. He reports multiple near falls at home. Currently, pt requires min(A) to ambulate with a RW. He has increased knee FL throughout gait trial and relies on B UEs heavily. He is a high fall risk at this time.    Pt would benefit from continued skilled acute PT 4x/wk to address above listed functional deficits, provide patient/caregiver education, reduce fall risk, and maximize (I) and safety with functional mobility.     Rehab Prognosis: Good; patient would benefit from acute skilled PT services to address these deficits and reach maximum level of function.      Plan:     During this hospitalization, patient to be seen 4 x/week to address the identified rehab impairments via gait training,  "therapeutic activities, therapeutic exercises, neuromuscular re-education and progress toward the following goals:    Plan of Care Expires:  03/07/24    This plan of care has been discussed with the patient/caregiver, who was included in its development and is in agreement with the identified goals and treatment plan.     Subjective     Communicated with RN prior to session.  Patient agreeable to participate.     Chief Complaint: Osteomyelitis of vertebra  Patient/Family Comments/goals: "I'm too young to be walking like this"    Pain/Comfort:  Pain Rating 1:  (unrated)  Location - Orientation 1: generalized  Location 1: back  Pain Addressed 1: Reposition, Distraction, Cessation of Activity  Pain Rating Post-Intervention 1:  (unrated)    Patients cultural, spiritual, Anabaptism conflicts given the current situation: no    Patient's living environment is as follows:  Living Environment: Pt lives alone in an abandoned house in [place]. SSH with 4-5 MALINA, R HR. He states the bathroom is functional. Bathroom set-up: walk-in shower  Prior Level of Function: Pt reports his PLOF is (I) with functional mobility using no DME. Pt reports increased difficulty with ambulation due to R LE weakness, pain, and poor balance. He reports multiple near falls at home.  DME used: none  DME owned (not currently used): none  Upon discharge, patient will have assistance from: Unknown    Objective:     Patient found HOB elevated with telemetry  upon PT entry to room.    General Precautions: Standard, fall   Orthopedic Precautions:N/A   Braces: N/A   BP (!) 152/94 (BP Location: Left arm)   Pulse 60   Temp 97.1 °F (36.2 °C) (Temporal)   Resp 15   Ht 5' 10.98" (1.803 m)   Wt 76.7 kg (169 lb 1.5 oz)   SpO2 100%   BMI 23.59 kg/m²   Oxygen Device:  room air      Exams:    Cognition:  Patient is oriented to Person, Place, Time, Situation  Follows multistep  commands  Insight to deficits/safety awareness: intact    Edema: None " present    Tone: None present    Postural examination/scapula alignment: Rounded shoulder    Lower Extremity Range of Motion:  Right Lower Extremity: WNL  Left Lower Extremity: WNL    Lower Extremity Strength  *Pain-limited exam    Right LE  Left LE    Hip Flexion: 3/5 Hip Flexion: 3+/5   Knee Extension: 3+/5 Knee Extension: 4/5   Knee Flexion: 3+/5 Knee Flexion: 4/5   Ankle Dorsiflexion:  3+/5 Ankle Dorsiflexion: 4/5   Ankle Plantarflexion: 3+/5 Ankle Plantarflexion: 4/5        Sensation:   Light touch sensation: Impaired LLE  and Impaired RLE   Pt reports numbness to B LEs, R>L.    Functional Mobility:    Bed Mobility:  Supine to Sit: Moderate Assistance on L side of bed  Via logroll   Sit to Supine: Minimal Assistance  Scooting anteriorly to EOB to plant feet on floor: Contact Guard Assistance    Transfers:   Sit to Stand Transfer: Contact Guard Assistance  from EOB with RW   Increased time    Gait:  Patient received gait training 14 feet with Minimal Assistance and rolling walker  Gait Assessment: unsteady gait, narrow base of support, flexed posture, decreased samantha, and decreased bilateral knee stability  Gait Pattern Observed: Step-to  Comments: All lines remained intact throughout ambulation trial, gait belt utilized. Pt relies heavily on use of B UEs on RW. He demos a shuffled gait with increased knee FL B. PT provided cues for RW management and pursed lip breathing due to increased pain    Balance:  Static Sit:   Supervision at EOB  Static Stand:   Contact-Guard Assist with Rolling walker  Dynamic Stand:  Min Assist with Rolling walker    Therapeutic Activities/Exercises     Patient assisted with functional mobility as noted above  Discussed at length benefits of PT as well as d/c recommendations. Pt agreeable  Patient educated on the importance of early mobility, OOB to prevent functional decline during hospital stay  Patient was instructed to utilize staff assistance for mobility/transfers.  Patient is  appropriate to transfer with min(A) and RN/PCT assist  Patient educated on PT POC and role of PT in acute care  White board updated to include patient's safest level of mobility with staff assistance, RN also updated    AM-PAC 6 CLICK MOBILITY  Turning over in bed (including adjusting bedclothes, sheets and blankets)?: 3  Sitting down on and standing up from a chair with arms (e.g., wheelchair, bedside commode, etc.): 3  Moving from lying on back to sitting on the side of the bed?: 2  Moving to and from a bed to a chair (including a wheelchair)?: 3  Need to walk in hospital room?: 3  Climbing 3-5 steps with a railing?: 2  Basic Mobility Total Score: 16      Patient left HOB elevated with all lines intact, call button in reach, bed alarm on, and rn notified.      History/Goals:     PAST MEDICAL HISTORY:  Past Medical History:   Diagnosis Date    Hypertension     Unilateral inguinal hernia, without obstruction or gangrene, not specified as recurrent        Past Surgical History:   Procedure Laterality Date    HAND ARTHROTOMY Right 7/7/2023    Procedure: ARTHROTOMY, HAND - R LF PIP;  Surgeon: Boy Lamb MD;  Location: [address];  Service: Orthopedics;  Laterality: Right;    INCISION AND DRAINAGE OF ABSCESS Right 10/12/2023    Procedure: INCISION AND DRAINAGE, ABSCESS;  Surgeon: Steve Monteiro MD;  Location: [address];  Service: General;  Laterality: Right;  right chest    IRRIGATION AND DEBRIDEMENT OF UPPER EXTREMITY Bilateral 7/7/2023    Procedure: IRRIGATION AND DEBRIDEMENT, UPPER EXTREMITY - RIGHT HAND;  Surgeon: Boy Lamb MD;  Location: [address];  Service: Orthopedics;  Laterality: Bilateral;    OLECRANON BURSECTOMY Left 7/7/2023    Procedure: BURSECTOMY, OLECRANON;  Surgeon: Boy Lamb MD;  Location: [address];  Service: Orthopedics;  Laterality: Left;       GOALS:   Multidisciplinary Problems       Physical Therapy Goals          Problem: Physical Therapy     Goal Priority Disciplines Outcome Goal Variances Interventions   Physical Therapy Goal     PT, PT/OT Ongoing, Progressing     Description: Goals to be met by: 24     Patient will increase functional independence with mobility by performin. Supine to sit with Supervision  2. Sit to supine with Supervision  3. Sit to stand transfer with Supervision  4. Bed to chair transfer with Supervision using LRAD as needed  5. Gait  x 150 feet with Supervision using LRAD as needed.   6. Ascend/descend 5 stair with right Handrails and Supervision  7. Lower extremity exercise program x15 reps per handout, with assistance as needed                         Time Tracking:     PT Received On: 24  PT Start Time: 1105     PT Stop Time: 1125  PT Total Time (min): 20 min     Billable Minutes: Evaluation 10 and Gait Training 10      Cathi Sanchez, PT  2024  Pager# [phone]

## 2024-02-07 NOTE — HPI
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  He has been seen by Neurosurgery who are planning a lumbar spinal fusion from L4 to pelvis on 02/14.  Orthopedics has been consulted for evaluation of SI joint septic arthritis. Patient states that he was using injection drugs as recently as 4 days ago, and complains numbness the lower extremities bilaterally but worse on the right both of which start from the hips and progress down of the toes. Denies any other musculoskeletal pain or injuries. He has prior history of PIP joint septic arthritis and olecranon bursitis requiring I&D and soft tissue abscess. Patient denies bowel or bladder incontinence, saddle anesthesia, or muscle weakness. Walks w/out assisted devices at baseline. Doesn't take any anticoagulation at baseline.   They endorse tobacco use.   They deny alcohol use.   They deny immunosuppressant medications.  They deny chemotherapy.  They deny radiation therapy.

## 2024-02-07 NOTE — NURSING
Nurses Note -- 4 Eyes      2/7/2024   10:47 AM      Skin assessed during: Daily Assessment      [x] No Altered Skin Integrity Present    []Prevention Measures Documented      [] Yes- Altered Skin Integrity Present or Discovered   [] LDA Added if Not in Epic (Describe Wound)   [] New Altered Skin Integrity was Present on Admit and Documented in LDA   [] Wound Image Taken    Wound Care Consulted? No    Attending Nurse:  Maggie Woo RN/Staff Member:  Valentina Guerra

## 2024-02-07 NOTE — SUBJECTIVE & OBJECTIVE
Interval History: NAEON. Exam stable. IR aspiration today. OR next week.    Medications:  Continuous Infusions:  Scheduled Meds:   cloNIDine  0.1 mg Oral Q12H    enoxparin  40 mg Subcutaneous Daily    mirtazapine  15 mg Oral QHS    nicotine  1 patch Transdermal Daily     PRN Meds:acetaminophen, acetaminophen, dextrose 10%, dextrose 10%, dicyclomine, glucagon (human recombinant), glucose, glucose, HYDROmorphone, HYDROmorphone, hydrOXYzine HCL, ibuprofen, loperamide, melatonin, naloxone, promethazine, sodium chloride 0.9%     Review of Systems  Objective:     Weight: 76.7 kg (169 lb 1.5 oz)  Body mass index is 23.59 kg/m².  Vital Signs (Most Recent):  Temp: 97.1 °F (36.2 °C) (02/07/24 1445)  Pulse: 72 (02/07/24 1545)  Resp: 20 (02/07/24 1545)  BP: (!) 152/92 (02/07/24 1545)  SpO2: 100 % (02/07/24 1545) Vital Signs (24h Range):  Temp:  [97.1 °F (36.2 °C)-98.6 °F (37 °C)] 97.1 °F (36.2 °C)  Pulse:  [49-75] 72  Resp:  [15-20] 20  SpO2:  [99 %-100 %] 100 %  BP: (109-160)/(56-96) 152/92     Date 02/07/24 0700 - 02/08/24 0659   Shift 4948-8119 0150-1673 3454-9584 24 Hour Total   INTAKE   Shift Total(mL/kg)       OUTPUT   Urine(mL/kg/hr) 1300(2.1)   1300   Shift Total(mL/kg) 1300(16.9)   1300(16.9)   Weight (kg) 76.7 76.7 76.7 76.7                               Physical Exam     Neurosurgery Physical Exam    General: well developed, well nourished, no distress.   Head: normocephalic, atraumatic  Neurologic: Alert and oriented. Thought content appropriate.  GCS: Motor: 6/Verbal: 5/Eyes: 4 GCS Total: 15  Mental Status: Awake, Alert, Oriented x 4  Language: No aphasia  Speech: No dysarthria  Cranial nerves: face symmetric, tongue midline, CN II-XII grossly intact, EOMI.   Pulmonary: normal respirations, no signs of respiratory distress  Sensory: intact to light touch throughout  Motor Strength: Moves all extremities spontaneously with good tone.  No abnormal movements seen. Pain limited weakness b/l hip flexors.     Strength   Deltoids Triceps Biceps Wrist Extension Wrist Flexion Hand    Upper: R 5/5 5/5 5/5 5/5 5/5 5/5    L 5/5 5/5 5/5 5/5 5/5 5/5     Iliopsoas Quadriceps Knee  Flexion Tibialis  anterior Gastro- cnemius EHL   Lower: R 4/5 5/5 5/5 5/5 5/5 4+/5    L 4/5 5/5 5/5 5/5 5/5 4+/5     Khalil: absent  Clonus: absent  Skin: Skin is warm, dry and intact.        Significant Labs:  Recent Labs   Lab 02/06/24  0344 02/07/24  0502   GLU 87 87   * 133*   K 3.8 4.5    108   CO2 22* 16*   BUN 10 13   CREATININE 0.7 0.8   CALCIUM 8.6* 8.9       Recent Labs   Lab 02/06/24  0344 02/07/24  0502   WBC 6.47 5.91   HGB 8.5* 9.9*   HCT 27.5* 31.4*   * 428       Recent Labs   Lab 02/07/24  0947   INR 1.2     Microbiology Results (last 7 days)       Procedure Component Value Units Date/Time    Culture, Anaerobe [[id number]] Collected: 02/07/24 1423    Order Status: Sent Specimen: Abscess from Back Updated: 02/07/24 1423    Aerobic culture [[id number]] Collected: 02/07/24 1423    Order Status: Sent Specimen: Abscess from Back Updated: 02/07/24 1423    AFB Culture & Smear [[id number]] Collected: 02/07/24 1423    Order Status: Sent Specimen: Abscess from Back Updated: 02/07/24 1423    Gram stain [[id number]] Collected: 02/07/24 1423    Order Status: Sent Specimen: Abscess from Back Updated: 02/07/24 1423    Fungus culture [[id number]] Collected: 02/07/24 1423    Order Status: Sent Specimen: Abscess from Back Updated: 02/07/24 1423    Gram stain [[id number]] Collected: 02/07/24 1413    Order Status: Sent Specimen: Joint Fluid from Back Updated: 02/07/24 1413    AFB Culture & Smear [[id number]] Collected: 02/07/24 1413    Order Status: Sent Specimen: Joint Fluid from Back Updated: 02/07/24 1413    Fungus culture [[id number]] Collected: 02/07/24 1413    Order Status: Sent Specimen: Joint Fluid from Back Updated: 02/07/24 1413    Aerobic culture [[id number]] Collected: 02/07/24 1412    Order Status: Sent Specimen: Bone from Back  Updated: 02/07/24 1412    Culture, Anaerobe [[id number]] Collected: 02/07/24 1411    Order Status: Sent Specimen: Joint Fluid from Back Updated: 02/07/24 1412    Culture, Anaerobe [[id number]]     Order Status: No result Specimen: Abscess from Sacrum     Aerobic culture [[id number]]     Order Status: No result Specimen: Abscess from Sacrum     AFB Culture & Smear [[id number]]     Order Status: No result Specimen: Abscess from Sacrum     Gram stain [[id number]]     Order Status: No result Specimen: Abscess from Sacrum     Fungus culture [[id number]]     Order Status: No result Specimen: Abscess from Sacrum     Culture, Anaerobe [[id number]]     Order Status: No result Specimen: Abscess     Aerobic culture [[id number]]     Order Status: No result Specimen: Abscess     Fungus culture [[id number]]     Order Status: No result Specimen: Abscess     AFB Culture & Smear [[id number]]     Order Status: No result Specimen: Abscess     Gram stain [[id number]]     Order Status: No result Specimen: Abscess     Blood culture #1 **CANNOT BE ORDERED STAT** [[id number]] Collected: 02/04/24 1853    Order Status: Completed Specimen: Blood from Peripheral, Forearm, Left Updated: 02/06/24 2012     Blood Culture, Routine No Growth to date      No Growth to date      No Growth to date    Blood culture #2 **CANNOT BE ORDERED STAT** [[id number]] Collected: 02/04/24 1822    Order Status: Completed Specimen: Blood from Peripheral, Forearm, Left Updated: 02/06/24 2012     Blood Culture, Routine No Growth to date      No Growth to date      No Growth to date          Recent Lab Results         02/07/24  0947   02/07/24  0502        Anion Gap   9       BUN   13       Calcium   8.9       Chloride   108       CO2   16       Creatinine   0.8       eGFR   >60.0       Glucose   87       Hematocrit   31.4       Hemoglobin   9.9       INR 1.2  Comment: Coumadin Therapy:  2.0 - 3.0 for INR for all indicators except mechanical heart valves  and  antiphospholipid syndromes which should use 2.5 - 3.5.           MCH   21.5       MCHC   31.5       MCV   68       MPV   9.6       Platelet Count   428       Potassium   4.5       PT 12.5         RBC   4.61       RDW   18.6       Sodium   133       WBC   5.91             All pertinent labs from the last 24 hours have been reviewed.    Significant Diagnostics:  I have reviewed all pertinent imaging results/findings within the past 24 hours.

## 2024-02-07 NOTE — PT/OT/SLP EVAL
Occupational Therapy  Co- Evaluation    Name: Ton Donovan  MRN: [id number]  Admitting Diagnosis: Osteomyelitis of vertebra of lumbosacral region  Recent Surgery: Procedure(s) (LRB):  **AIRO** FUSION, SPINE, LUMBAR (N/A)      Recommendations:     Discharge Recommendations: High Intensity Therapy  Discharge Equipment Recommendations:  walker, rolling, bedside commode  Barriers to discharge:  None    Assessment:     Ton Donovan is a 28 y.o. male with a medical diagnosis of Osteomyelitis of vertebra of lumbosacral region.  He presents with decrease functional status secondary to medical diagnosis. Performance deficits affecting function: weakness, impaired endurance, impaired sensation, impaired self care skills, gait instability, impaired functional mobility, impaired balance, decreased lower extremity function, decreased ROM.  Patient with fair tolerance to OT session but patient limited by pain and weakness at this time. Patient reporting increased difficulty with mobility and self care tasks over past month due to LE instability. Patient is therefore appropriate for acute OT services to increase patient self care performance and functional mobility. Following DC from [facility] patient should continue with a high intensity therapy to ensure patient safety and promote return to independence.     Rehab Prognosis: Fair; patient would benefit from acute skilled OT services to address these deficits and reach maximum level of function.       Plan:     Patient to be seen 4 x/week to address the above listed problems via self-care/home management, therapeutic activities, therapeutic exercises, neuromuscular re-education  Plan of Care Expires: 03/07/24  Plan of Care Reviewed with: patient    Subjective     Chief Complaint: P! In legs  Patient/Family Comments/goals: Increase functional status     Occupational Profile:  Living Environment: Pt lives alone in an abandoned house in [place]. [facility] with 4-5 MALINA, R HR. He states the  bathroom is functional. Bathroom set-up: walk-in shower   Previous level of function: Pt reports his PLOF is (I) with functional mobility using no DME. Pt reports increased difficulty with ambulation due to R LE weakness, pain, and poor balance. He reports multiple near falls at home.   Roles and Routines: Unknown   Equipment Used at Home: none  Assistance upon Discharge: Unknown     Pain/Comfort:  Pain Rating 1: 0/10  Location - Orientation 1: generalized  Location 1: back  Pain Addressed 1: Reposition, Distraction, Cessation of Activity    Patients cultural, spiritual, Protestant conflicts given the current situation: no    Objective:     Communicated with: RN prior to session.  Patient found supine with telemetry upon OT entry to room.    General Precautions: Standard, fall  Orthopedic Precautions: N/A  Braces: N/A  Respiratory Status: Room air    Occupational Performance:    Bed Mobility:    Patient completed Scooting/Bridging with moderate assistance  Patient completed Supine to Sit with minimum assistance  Patient completed Sit to Supine with contact guard assistance  Patient sat EOB with CGA-supervision    Functional Mobility/Transfers:  Patient completed Sit <> Stand Transfer with contact guard assistance  with  rolling walker   Functional Mobility: Patient ambulated 14 feet with Minimal Assistance and rolling walker; patient with noted narrow RUPINDER and decreased LE stability/strength    Activities of Daily Living:  Upper Body Dressing: maximal assistance    Lower Body Dressing: maximal assistance      Cognitive/Visual Perceptual:  Cognitive/Psychosocial Skills:     -       Oriented to: Person, Place, Time, and Situation   -       Follows Commands/attention:Follows multistep  commands  -       Communication: clear/fluent  -       Safety awareness/insight to disability: intact   Visual/Perceptual:      -Intact      Physical Exam:  Sensation:    -       Intact  Upper Extremity Range of Motion:     -       Right  Upper Extremity: WFL  -       Left Upper Extremity: WFL  Upper Extremity Strength:    -       Right Upper Extremity: WFL  -       Left Upper Extremity: WFL   Strength:    -       Right Upper Extremity: WFL  -       Left Upper Extremity: WFL  Fine Motor Coordination:    -       Intact    AMPAC 6 Click ADL:  AMPAC Total Score: 16    Treatment & Education:  Co-evaluation completed due to patient medical instability and to ensure patient safety. Provided education regarding role of OT, POC, & discharge recommendations.  Pt with no further questions/concerns at this time. OT provided education on home recommendations and fall prevention in preparation for D/C.     Patient left supine with all lines intact and call button in reach    GOALS:   Multidisciplinary Problems       Occupational Therapy Goals          Problem: Occupational Therapy    Goal Priority Disciplines Outcome Interventions   Occupational Therapy Goal     OT, PT/OT Ongoing, Progressing    Description: Goals to be met by: 3/7/23     Patient will increase functional independence with ADLs by performing:    LE Dressing with Minimal Assistance.  Grooming while standing with Moderate Assistance.  Toileting from toilet with Moderate Assistance for hygiene and clothing management.   Stand pivot transfers with Minimal Assistance.  Step transfer with Minimal Assistance                         History:     Past Medical History:   Diagnosis Date    Hypertension     Unilateral inguinal hernia, without obstruction or gangrene, not specified as recurrent          Past Surgical History:   Procedure Laterality Date    HAND ARTHROTOMY Right 7/7/2023    Procedure: ARTHROTOMY, HAND - R LF PIP;  Surgeon: Boy Lamb MD;  Location: [facility] OR [address] [place] [address];  Service: Orthopedics;  Laterality: Right;    INCISION AND DRAINAGE OF ABSCESS Right 10/12/2023    Procedure: INCISION AND DRAINAGE, ABSCESS;  Surgeon: Steve Monteiro MD;  Location: [facility] OR [address] [place] [address];  Service: General;   Laterality: Right;  right chest    IRRIGATION AND DEBRIDEMENT OF UPPER EXTREMITY Bilateral 7/7/2023    Procedure: IRRIGATION AND DEBRIDEMENT, UPPER EXTREMITY - RIGHT HAND;  Surgeon: Boy Lamb MD;  Location: [facility] OR [address] [place] [address];  Service: Orthopedics;  Laterality: Bilateral;    OLECRANON BURSECTOMY Left 7/7/2023    Procedure: BURSECTOMY, OLECRANON;  Surgeon: Boy Lamb MD;  Location: [facility] OR [address] [place] [address];  Service: Orthopedics;  Laterality: Left;       Time Tracking:     OT Date of Treatment: 02/07/24  OT Start Time: 1105  OT Stop Time: 1126  OT Total Time (min): 21 min    Billable Minutes:Evaluation 10 minutes  Therapeutic Activity 11 minutes    2/7/2024

## 2024-02-07 NOTE — PLAN OF CARE
Problem: Adult Inpatient Plan of Care  Goal: Optimal Comfort and Wellbeing  Outcome: Ongoing, Progressing     Problem: Adult Inpatient Plan of Care  Goal: Readiness for Transition of Care  Outcome: Ongoing, Progressing     Problem: Impaired Wound Healing  Goal: Optimal Wound Healing  Outcome: Ongoing, Progressing     Problem: Skin Injury Risk Increased  Goal: Skin Health and Integrity  Outcome: Ongoing, Progressing     Problem: Infection  Goal: Absence of Infection Signs and Symptoms  Outcome: Ongoing, Progressing     Problem: Fall Injury Risk  Goal: Absence of Fall and Fall-Related Injury  Outcome: Ongoing, Progressing     Problem: Pain Acute  Goal: Acceptable Pain Control and Functional Ability  Outcome: Ongoing, Progressing     Care plan reviewed with patient, patient understands and agrees, NPO for IR procedure today, patient with c/o pain, no other complaints voiced

## 2024-02-07 NOTE — PLAN OF CARE
Problem: Pain Acute  Goal: Acceptable Pain Control and Functional Ability  Outcome: Ongoing, Progressing     Problem: Skin or Soft Tissue Infection  Goal: Absence of Infection Signs and Symptoms  Outcome: Ongoing, Progressing     POC reviewed with the patient and they verbalized understanding. PRN around the clock, waiting for IR guided abscess drain. All comments and concerns addressed. Bed locked in lowest position with bed alarm set, call light within reach. Safety precautions maintained. VSS, see flowsheets. . Will continue to monitor for changes to POC and clinical condition.

## 2024-02-07 NOTE — CONSULTS
Percutaneous Drain Placement/Aspiration Consult Note  Interventional Radiology    Consult Requested By: Brenna Belcher MD  Reason for Consult: Abscess drainage    SUBJECTIVE:     Chief Complaint:  LBP with LLE radiculopathy    History of Present Illness:  Ton Donovan is a 28 y.o. male with a PMHx of IVDU, h/o MRSA bacteremia with multiple septic joints and pelvic/intraabdominal abscesses who was admitted on 2/5/24 for LBP and LLE radiculopathy, was found to have multilevel osteomyelitis/diskitis and likely left sided SI joint septic arthritis. Interventional Radiology has been consulted for image guided percutaneous aspiration of his L SI joint as well one/multiple of his L sided pelvic abscesses. Pt has had recent imaging including a MRI L spine (2/4/24) and a CT L spine (2/5/24) which revealed L sided sacroilitis with associated abscess as well as additional abscesses in the L psoas muscle at the level of L4-L5 measuring 2.3 x 1.4 cm and in the L iliacus muscle abscess measuring roughly 2.5 x 1.6 cm. The pt has been evaluated by NSGY, plan for L4-pelvis fusion on 2/16/24. ID also consulted who recommend IR aspiration for source control and to identify a target for antibiotics as his blood cultures drawn on admission remain NGTD. The pt's WBC is 5.91 and is trending down. He is currently afebrile. The pt is hemodynamically stable.       Scheduled Meds:   cloNIDine  0.1 mg Oral Q12H    enoxparin  40 mg Subcutaneous Daily    mirtazapine  15 mg Oral QHS    nicotine  1 patch Transdermal Daily     Continuous Infusions:  PRN Meds:acetaminophen, acetaminophen, dextrose 10%, dextrose 10%, dicyclomine, glucagon (human recombinant), glucose, glucose, HYDROmorphone, HYDROmorphone, hydrOXYzine HCL, ibuprofen, loperamide, melatonin, naloxone, promethazine, sodium chloride 0.9%    Review of patient's allergies indicates:  No Known Allergies    Past Medical History:   Diagnosis Date    Hypertension     Unilateral inguinal  hernia, without obstruction or gangrene, not specified as recurrent      Past Surgical History:   Procedure Laterality Date    HAND ARTHROTOMY Right 7/7/2023    Procedure: ARTHROTOMY, HAND - R LF PIP;  Surgeon: Boy Lamb MD;  [facility];  Service: Orthopedics;  Laterality: Right;    INCISION AND DRAINAGE OF ABSCESS Right 10/12/2023    Procedure: INCISION AND DRAINAGE, ABSCESS;  Surgeon: Steve Monteiro MD;  [facility];  Service: General;  Laterality: Right;  right chest    IRRIGATION AND DEBRIDEMENT OF UPPER EXTREMITY Bilateral 7/7/2023    Procedure: IRRIGATION AND DEBRIDEMENT, UPPER EXTREMITY - RIGHT HAND;  Surgeon: Boy Lamb MD;  [facility];  Service: Orthopedics;  Laterality: Bilateral;    OLECRANON BURSECTOMY Left 7/7/2023    Procedure: BURSECTOMY, OLECRANON;  Surgeon: Boy Lamb MD;  Location: [facility] OR [address] [place] [address];  Service: Orthopedics;  Laterality: Left;     History reviewed. No pertinent family history.  Social History     Tobacco Use    Smoking status: Former     Types: Cigarettes    Smokeless tobacco: Never   Substance Use Topics    Alcohol use: No    Drug use: Yes     Types: Marijuana, IV     Comment: 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       OBJECTIVE:     Vital Signs (Most Recent)  Temp: 98.5 °F (36.9 °C) (02/07/24 0734)  Pulse: (!) 50 (02/07/24 0734)  Resp: 16 (02/07/24 0734)  BP: 129/78 (02/07/24 0734)  SpO2: 100 % (02/07/24 0734)    Physical Exam:  Physical Exam  Vitals and nursing note reviewed.   Constitutional:       General: He is not in acute distress.     Appearance: He is not ill-appearing.   HENT:      Head: Normocephalic and atraumatic.   Eyes:      Extraocular Movements: Extraocular movements intact.      Conjunctiva/sclera: Conjunctivae normal.      Pupils: Pupils are equal, round, and reactive to light.   Pulmonary:      Effort: Pulmonary effort is normal. No respiratory distress.   Abdominal:      General: Abdomen is  "flat. There is no distension.   Skin:     General: Skin is warm and dry.      Coloration: Skin is not jaundiced.   Neurological:      General: No focal deficit present.      Mental Status: He is alert and oriented to person, place, and time.   Psychiatric:         Mood and Affect: Mood normal.         Behavior: Behavior normal.         Thought Content: Thought content normal.         Judgment: Judgment normal.         Laboratory  I have reviewed all pertinent lab results within the past 24 hours.  CBC:   Recent Labs   Lab 02/07/24  0502   WBC 5.91   RBC 4.61   HGB 9.9*   HCT 31.4*      MCV 68*   MCH 21.5*   MCHC 31.5*     BMP:   Recent Labs   Lab 02/05/24  0308 02/06/24  0344 02/07/24  0502   GLU 90   < > 87   *   < > 133*   K 3.7   < > 4.5      < > 108   CO2 21*   < > 16*   BUN 21*   < > 13   CREATININE 0.8   < > 0.8   CALCIUM 8.2*   < > 8.9   MG 1.7  --   --     < > = values in this interval not displayed.     CMP:   Recent Labs   Lab 02/05/24  0308 02/06/24  0344 02/07/24  0502   GLU 90   < > 87   CALCIUM 8.2*   < > 8.9   ALBUMIN 2.1*  --   --    PROT 7.0  --   --    *   < > 133*   K 3.7   < > 4.5   CO2 21*   < > 16*      < > 108   BUN 21*   < > 13   CREATININE 0.8   < > 0.8   ALKPHOS 58  --   --    ALT 6*  --   --    AST 16  --   --    BILITOT 0.2  --   --     < > = values in this interval not displayed.     LFTs:   Recent Labs   Lab 02/05/24  0308   ALT 6*   AST 16   ALKPHOS 58   BILITOT 0.2   PROT 7.0   ALBUMIN 2.1*     Coagulation: No results for input(s): "LABPROT", "INR", "APTT" in the last 168 hours.  Microbiology Results (last 7 days)       Procedure Component Value Units Date/Time    Culture, Anaerobe [[id number]]     Order Status: No result Specimen: Abscess     Aerobic culture [[id number]]     Order Status: No result Specimen: Abscess     Culture, Anaerobe [[id number]]     Order Status: No result Specimen: Joint Fluid     Aerobic culture [[id number]]     Order Status: No " result Specimen: Bone     Fungus culture [[id number]]     Order Status: No result Specimen: Abscess     Fungus culture [[id number]]     Order Status: No result Specimen: Joint Fluid     AFB Culture & Smear [[id number]]     Order Status: No result Specimen: Abscess     AFB Culture & Smear [[id number]]     Order Status: No result Specimen: Joint Fluid     Gram stain [[id number]]     Order Status: No result Specimen: Abscess     Gram stain [[id number]]     Order Status: No result Specimen: Joint Fluid     Blood culture #1 **CANNOT BE ORDERED STAT** [[id number]] Collected: 02/04/24 1853    Order Status: Completed Specimen: Blood from Peripheral, Forearm, Left Updated: 02/06/24 2012     Blood Culture, Routine No Growth to date      No Growth to date      No Growth to date    Blood culture #2 **CANNOT BE ORDERED STAT** [[id number]] Collected: 02/04/24 1822    Order Status: Completed Specimen: Blood from Peripheral, Forearm, Left Updated: 02/06/24 2012     Blood Culture, Routine No Growth to date      No Growth to date      No Growth to date            ASA/Mallampati  ASA: 2  Mallampati: 2    Imaging:  Recent imaging studies including  MRI L spine  on 2/4/24 which was independently reviewed by Cassidy Nolan MD.     EXAMINATION:  MRI LUMBAR SPINE W WO CONTRAST     CLINICAL HISTORY:  Low back pain, infection suspected;     TECHNIQUE:  Multiplanar, multisequence MR imaging of the lumbar spine was obtained with and without 8 mL Gadavist IV contrast.     COMPARISON:  Lumbar spine MRI, 11/20/2023.     FINDINGS:  Exam quality is limited by motion.     The distal cord terminates at L1 and demonstrates no obvious signal abnormality in this region.     L1 through L4 vertebral bodies demonstrate normal signal intensity.  There is abnormal signal of the L1 vertebral body and sacrum with abnormal height loss and abnormal signal and enhancement involving the L5-S1 disc space.  Worsening anterolisthesis of L5 with respect to S1.  Small  epidural abscess at the level of L4-L5.  Larger epidural abscess at the level of L5 vertebral body measuring roughly 1.2 cm in thickness (series 19, image 8).  Worsening central canal stenosis at this level, likely severe.  Additional epidural abscesses extend along the sacrum.  Worsening anterolisthesis of L5 with respect to S1, presumably related to spondylodiscitis.     No additional central canal stenosis throughout the lumbar spine.  Minimal multilevel disc undulation throughout the lumbar spine without severe central canal stenosis.  There is probable bilateral neural foraminal narrowing at L5-S1.  Minimal neural foraminal narrowing at L4-L5.     Abscess along the anterior aspect S1 measuring approximately 4.4 x 2.4 cm (series 13, image 7).     Additional abscesses in the left psoas muscle which appears smaller compared to prior study.  Abscess in the left psoas muscle at the level of L4-L5 measuring 2.3 x 1.4 cm (series 17, image 39; previously 3.4 x 1.9 cm).  Left iliacus muscle abscess measuring roughly 2.5 x 1.6 cm (series 17, image 50; previously 3.1 x 2.3 cm).  Inflammatory changes in the left SI joint with probable micro abscesses in the left sacrum (series 17, image 48).  Inflammatory changes in the paraspinal muscles in the lower lumbar spine.  Associated micro abscesses or phlegmon in this region not excluded.  Facet joint infection or inflammation would also be difficult to exclude.  There are questionable micro abscesses adjacent to the facets at L4-S1.  Abnormal signal in the left iliac bone better evaluated on same day pelvic MRI.     Impression:     Presumed osteomyelitis/discitis at L5-S1 with osteomyelitis in the sacrum and left iliac bone and probable septic arthritis in the left SI joint and lower lumbar spine facet joints.     Worsening anterolisthesis of L5 with respect to S1 with increased size of epidural abscess at the level of L5-S1, and probable severe central canal stenosis at this  level.     Additional abscesses in the left iliopsoas muscle, paraspinal muscles, pelvis, and left sacrum as discussed above.     Additional findings discussed in the body of the report and on same day pelvic MRI.     Motion limited study.     This report was flagged in Epic as abnormal.        Electronically signed by: Tre Pham MD  Date:                                            02/05/2024  Time:                                           00:02    ASSESSMENT/PLAN:     Assessment:  28 y.o. male with a PMHx of IVDU, h/o MRSA bacteremia with multiple septic joints and pelvic/intraabdominal abscesses  who has been referred to IR for image guided percutaneous aspiration of his L SI joint and associated L sided pelvic/paraspinal asbcesses. Case discussed with Dr. Nolan. Plan to aspirate both the L SI joint as well as either the L psoas abscess or the L iliacus abscess, which ever appears larger during time of procedure. The procedure was discussed in great detail with the patient including thorough explanations of the potential risks and benefits of percutaneous drain placement/aspiration. Risks include but are not limited to sepsis, severe infection, hemorrhage, damage to surrounding structures, catheter malfunction, inability to remove catheter, and catheter dislodgment. The patient is a candidate for CT guided percutaneous drain aspiration of L SI joint and L psoas abscess vs L iliacus abscess under moderate sedation. Pt currently agreeable to proceeding with moderate sedation. He is aware that if he is unable to tolerate moderate sedation, we will need to arrange for anesthesia for his case which will likely delay his procedure and his subsequent NSGY surgery for several days. Plan discussed with ordering physician and pt who verbalized understanding of the plan and would like to proceed.    Plan:  Will proceed with  CT guided percutaneous drain aspiration of L SI joint and L psoas abscess vs L iliacus  abscess under moderate sedation on 2/7/24 if scheduling allows  Please keep pt NPO   Primary team is responsible for ordering any labs/cultures to be drawn on fluid sample collected during the procedure.  Anticoagulation history reviewed.   Coagulation labs reviewed. STAT INR pending  Thank you for the consult. Please contact with questions via [employer] secure chat or [employer]    Rand Del Castillo PA-C  Interventional Radiology  Spectra: [id number]

## 2024-02-07 NOTE — PROGRESS NOTES
Aleksandr Bray - Neurosurgery ([facility])  Neurosurgery  Progress Note    Subjective:     History of Present Illness: 28M w/ hx MRSA bactermia, IVDU, pelvic/intra-abdominal abscesses who presents with progressive back pain with radiation down the leg over the past couple of weeks.  He has had bilateral numbness of his anterior thigh as well. He denies bowel/bladder sx or saddle anesthesia. He denies focal weakness. Per chart review, the patient is actively using heroin and did an injection prior to being taken in by EMS.      Post-Op Info:  Procedure(s) (LRB):  **AIRO** FUSION, SPINE, LUMBAR (N/A)       Interval History: NAEON. Exam stable. IR aspiration today. OR next week.    Medications:  Continuous Infusions:  Scheduled Meds:   cloNIDine  0.1 mg Oral Q12H    enoxparin  40 mg Subcutaneous Daily    mirtazapine  15 mg Oral QHS    nicotine  1 patch Transdermal Daily     PRN Meds:acetaminophen, acetaminophen, dextrose 10%, dextrose 10%, dicyclomine, glucagon (human recombinant), glucose, glucose, HYDROmorphone, HYDROmorphone, hydrOXYzine HCL, ibuprofen, loperamide, melatonin, naloxone, promethazine, sodium chloride 0.9%     Review of Systems  Objective:     Weight: 76.7 kg (169 lb 1.5 oz)  Body mass index is 23.59 kg/m².  Vital Signs (Most Recent):  Temp: 97.1 °F (36.2 °C) (02/07/24 1445)  Pulse: 72 (02/07/24 1545)  Resp: 20 (02/07/24 1545)  BP: (!) 152/92 (02/07/24 1545)  SpO2: 100 % (02/07/24 1545) Vital Signs (24h Range):  Temp:  [97.1 °F (36.2 °C)-98.6 °F (37 °C)] 97.1 °F (36.2 °C)  Pulse:  [49-75] 72  Resp:  [15-20] 20  SpO2:  [99 %-100 %] 100 %  BP: (109-160)/(56-96) 152/92     Date 02/07/24 0700 - 02/08/24 0659   Shift 0679-7865 1032-4699 3845-3787 24 Hour Total   INTAKE   Shift Total(mL/kg)       OUTPUT   Urine(mL/kg/hr) 1300(2.1)   1300   Shift Total(mL/kg) 1300(16.9)   1300(16.9)   Weight (kg) 76.7 76.7 76.7 76.7                              Physical Exam     Neurosurgery Physical Exam    General: well developed,  well nourished, no distress.   Head: normocephalic, atraumatic  Neurologic: Alert and oriented. Thought content appropriate.  GCS: Motor: 6/Verbal: 5/Eyes: 4 GCS Total: 15  Mental Status: Awake, Alert, Oriented x 4  Language: No aphasia  Speech: No dysarthria  Cranial nerves: face symmetric, tongue midline, CN II-XII grossly intact, EOMI.   Pulmonary: normal respirations, no signs of respiratory distress  Sensory: intact to light touch throughout  Motor Strength: Moves all extremities spontaneously with good tone.  No abnormal movements seen. Pain limited weakness b/l hip flexors.     Strength  Deltoids Triceps Biceps Wrist Extension Wrist Flexion Hand    Upper: R 5/5 5/5 5/5 5/5 5/5 5/5    L 5/5 5/5 5/5 5/5 5/5 5/5     Iliopsoas Quadriceps Knee  Flexion Tibialis  anterior Gastro- cnemius EHL   Lower: R 4/5 5/5 5/5 5/5 5/5 4+/5    L 4/5 5/5 5/5 5/5 5/5 4+/5     Khalil: absent  Clonus: absent  Skin: Skin is warm, dry and intact.        Significant Labs:  Recent Labs   Lab 02/06/24  0344 02/07/24  0502   GLU 87 87   * 133*   K 3.8 4.5    108   CO2 22* 16*   BUN 10 13   CREATININE 0.7 0.8   CALCIUM 8.6* 8.9       Recent Labs   Lab 02/06/24  0344 02/07/24  0502   WBC 6.47 5.91   HGB 8.5* 9.9*   HCT 27.5* 31.4*   * 428       Recent Labs   Lab 02/07/24  0947   INR 1.2     Microbiology Results (last 7 days)       Procedure Component Value Units Date/Time    Culture, Anaerobe [[id number]] Collected: 02/07/24 1423    Order Status: Sent Specimen: Abscess from Back Updated: 02/07/24 1423    Aerobic culture [[id number]] Collected: 02/07/24 1423    Order Status: Sent Specimen: Abscess from Back Updated: 02/07/24 1423    AFB Culture & Smear [[id number]] Collected: 02/07/24 1423    Order Status: Sent Specimen: Abscess from Back Updated: 02/07/24 1423    Gram stain [[id number]] Collected: 02/07/24 1423    Order Status: Sent Specimen: Abscess from Back Updated: 02/07/24 1423    Fungus culture [[id number]]  Collected: 02/07/24 1423    Order Status: Sent Specimen: Abscess from Back Updated: 02/07/24 1423    Gram stain [[id number]] Collected: 02/07/24 1413    Order Status: Sent Specimen: Joint Fluid from Back Updated: 02/07/24 1413    AFB Culture & Smear [[id number]] Collected: 02/07/24 1413    Order Status: Sent Specimen: Joint Fluid from Back Updated: 02/07/24 1413    Fungus culture [[id number]] Collected: 02/07/24 1413    Order Status: Sent Specimen: Joint Fluid from Back Updated: 02/07/24 1413    Aerobic culture [[id number]] Collected: 02/07/24 1412    Order Status: Sent Specimen: Bone from Back Updated: 02/07/24 1412    Culture, Anaerobe [[id number]] Collected: 02/07/24 1411    Order Status: Sent Specimen: Joint Fluid from Back Updated: 02/07/24 1412    Culture, Anaerobe [[id number]]     Order Status: No result Specimen: Abscess from Sacrum     Aerobic culture [[id number]]     Order Status: No result Specimen: Abscess from Sacrum     AFB Culture & Smear [[id number]]     Order Status: No result Specimen: Abscess from Sacrum     Gram stain [[id number]]     Order Status: No result Specimen: Abscess from Sacrum     Fungus culture [[id number]]     Order Status: No result Specimen: Abscess from Sacrum     Culture, Anaerobe [[id number]]     Order Status: No result Specimen: Abscess     Aerobic culture [[id number]]     Order Status: No result Specimen: Abscess     Fungus culture [[id number]]     Order Status: No result Specimen: Abscess     AFB Culture & Smear [[id number]]     Order Status: No result Specimen: Abscess     Gram stain [[id number]]     Order Status: No result Specimen: Abscess     Blood culture #1 **CANNOT BE ORDERED STAT** [[id number]] Collected: 02/04/24 1853    Order Status: Completed Specimen: Blood from Peripheral, Forearm, Left Updated: 02/06/24 2012     Blood Culture, Routine No Growth to date      No Growth to date      No Growth to date    Blood culture #2 **CANNOT BE ORDERED STAT** [[id number]]  Collected: 02/04/24 1822    Order Status: Completed Specimen: Blood from Peripheral, Forearm, Left Updated: 02/06/24 2012     Blood Culture, Routine No Growth to date      No Growth to date      No Growth to date          Recent Lab Results         02/07/24  0947   02/07/24  0502        Anion Gap   9       BUN   13       Calcium   8.9       Chloride   108       CO2   16       Creatinine   0.8       eGFR   >60.0       Glucose   87       Hematocrit   31.4       Hemoglobin   9.9       INR 1.2  Comment: Coumadin Therapy:  2.0 - 3.0 for INR for all indicators except mechanical heart valves  and antiphospholipid syndromes which should use 2.5 - 3.5.           MCH   21.5       MCHC   31.5       MCV   68       MPV   9.6       Platelet Count   428       Potassium   4.5       PT 12.5         RBC   4.61       RDW   18.6       Sodium   133       WBC   5.91             All pertinent labs from the last 24 hours have been reviewed.    Significant Diagnostics:  I have reviewed all pertinent imaging results/findings within the past 24 hours.  Assessment/Plan:     * Osteomyelitis of vertebra of lumbosacral region  28M w/ hx MRSA bactermia, IVDU, pelvic/intra-abdominal abscesses who presents with progressive back pain with radiation down the leg over the past couple of weeks. Motor intact.     MRI L Sp w/wo 2/4: L5 epidural absess with central stenosis, additional abscesses inferior to sacrum, in psoas. spondylodiscitis at L5-S1     - admitted to   - Neuro exam stable  - No emergent neurosurgical intervention warranted at this time  - Neuro checks q4h  - All pertinent labs and imaging reviewed  - 2/4 ESR 75, CRP 33.7. Blood cx NGTD.   - MRI lumbar w/wo 2/4: L5-S1 osteodiscitis extending into the sacrum, left iliac bone, SI joint, facet joints. Anterolisthesis L5-S1, epidural abscess L5-S1 with canal stenosis. Left psoas and paraspinal muscle abscesses. No clinical evidence of cauda equina syndrome.   - CT lumbar obtained with  destructive changes at L5-S1.   - Abx held pending intervention, if patient begins to show signs of sepsis, please start abx. ID consulted. IR aspiration of psoas abscess today.   - Voiding spontaneously. Bladder scan prn.   - Plan for OR 2/14 for L4-pelvis fusion with debridement.  - Discussed with Dr. Pozo  - Contact with acute changes in exam          Joselyn Ventura PA-C  Neurosurgery  Aleksandr Bray - Neurosurgery ([facility])

## 2024-02-07 NOTE — SUBJECTIVE & OBJECTIVE
Past Medical History:   Diagnosis Date    Hypertension     Unilateral inguinal hernia, without obstruction or gangrene, not specified as recurrent        Past Surgical History:   Procedure Laterality Date    HAND ARTHROTOMY Right 7/7/2023    Procedure: ARTHROTOMY, HAND - R LF PIP;  Surgeon: Boy Lamb MD;  Location: [address];  Service: Orthopedics;  Laterality: Right;    INCISION AND DRAINAGE OF ABSCESS Right 10/12/2023    Procedure: INCISION AND DRAINAGE, ABSCESS;  Surgeon: Steve Monteiro MD;  Location: [address];  Service: General;  Laterality: Right;  right chest    IRRIGATION AND DEBRIDEMENT OF UPPER EXTREMITY Bilateral 7/7/2023    Procedure: IRRIGATION AND DEBRIDEMENT, UPPER EXTREMITY - RIGHT HAND;  Surgeon: Boy Lamb MD;  Location: [address];  Service: Orthopedics;  Laterality: Bilateral;    OLECRANON BURSECTOMY Left 7/7/2023    Procedure: BURSECTOMY, OLECRANON;  Surgeon: Boy Lamb MD;  Location: [address];  Service: Orthopedics;  Laterality: Left;       Review of patient's allergies indicates:  No Known Allergies    Current Facility-Administered Medications   Medication    acetaminophen tablet 1,000 mg    acetaminophen tablet 650 mg    cloNIDine tablet 0.1 mg    dextrose 10% bolus 125 mL 125 mL    dextrose 10% bolus 250 mL 250 mL    dicyclomine capsule 10 mg    enoxaparin injection 40 mg    glucagon (human recombinant) injection 1 mg    glucose chewable tablet 16 g    glucose chewable tablet 24 g    HYDROmorphone injection 0.5 mg    HYDROmorphone injection 1 mg    hydrOXYzine HCL tablet 50 mg    ibuprofen tablet 600 mg    loperamide capsule 2 mg    melatonin tablet 6 mg    mirtazapine tablet 15 mg    naloxone 0.4 mg/mL injection 0.02 mg    promethazine tablet 25 mg    sodium chloride 0.9% flush 10 mL     Family History    None       Tobacco Use    Smoking status: Former     Types: Cigarettes    Smokeless tobacco: Never   Substance and Sexual Activity     "Alcohol use: No    Drug use: Yes     Types: Marijuana, IV     Comment: 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    Sexual activity: Not on file     ROS    Constitutional: negative for fevers  Eyes: negative visual changes  ENT: negative for hearing loss  Respiratory: negative for dyspnea  Cardiovascular: negative for chest pain  Gastrointestinal: negative for abdominal pain  Genitourinary: negative for dysuria  Neurological: negative for headaches  Behavioral/Psych: negative for hallucinations  Endocrine: negative for temperature intolerance      Objective:     Vital Signs (Most Recent):  Temp: 98.7 °F (37.1 °C) (02/06/24 1605)  Pulse: 61 (02/06/24 1605)  Resp: (!) 24 (02/06/24 1605)  BP: 128/79 (02/06/24 1605)  SpO2: 98 % (02/06/24 1605) Vital Signs (24h Range):  Temp:  [98 °F (36.7 °C)-99 °F (37.2 °C)] 98.7 °F (37.1 °C)  Pulse:  [57-65] 61  Resp:  [16-28] 24  SpO2:  [98 %-100 %] 98 %  BP: (128-155)/(70-81) 128/79     Weight: 74.8 kg (164 lb 14.5 oz)  Height: 5' 11" (180.3 cm)  Body mass index is 23 kg/m².      Intake/Output Summary (Last 24 hours) at 2/6/2024 1846  Last data filed at 2/6/2024 0410  Gross per 24 hour   Intake --   Output 1350 ml   Net -1350 ml        Ortho/SPM Exam     Gen:  No acute distress  CV:  Peripherally well-perfused.    Lungs:  Normal respiratory effort.  Head/Neck:  Normocephalic.  Atraumatic.     Spine/pelvis/axial body:  No tenderness to palpation of cervical thoracic spine  Tender to palpation over lumbar spine and sacroiliac joints bilaterally  Pain with compression of pelvis  No chest wall or abdominal tenderness  No decubitus ulcers    Motor                                                                                                    RIGHT             LEFT  Deltoid(C5)                                             5/5                  5/5  Biceps(C5)                                             5/5                  5/5  Extensor Carpi Radialis Longus(C6)     5/5                  5/5 "       Triceps(C7)                                            5/5                  5/5                               Flexor Carpi Radialis(C7)                      5/5                  5/5  Flexor Digitorum Superficialis(C8)         5/5                  5/5  Interossei(T1)                                         5/5                  5/5          Sensation (a=absent, i=impaired, n=normal)                                                              RIGHT             LEFT  C5 dermatome                                    n                         n  C6 dermatome                                    n                         n  C7 dermatome                                    n                         n  C8 dermatome                                    n                         n  T1 dermatome                                    n                         n     Reflexes                                                              RIGHT             LEFT  Triceps                                                2+                       2+  Biceps                                                 2+                       2+  Brachioradialis                                    2+                       2+  Hoffmans's                                          neg                    neg        Motor                                                                                                                RIGHT             LEFT  Iliopsoas (L2,3)                                      3/5                  3/5  Quadriceps (L3,4)                                  5/5                  5/5   Tibialis Anterior (L4.5)                            5/5                  5/5       Extensor Halucis Longus (L5)               5/5                  5/5                               Gastrocnemius/Soleus (S1)                   4/5                  5/5  Flexor Hallucis Longus(S2)                   4/5                  5/5     Sensation (a=absent, i=impaired,  n=normal)                                                              RIGHT             LEFT  L2 dermatome                                         i                     i  L3 dermatome                                         i                     i  L4 dermatome                                         i                     i  L5 dermatome                                         i                     i  S1 dermatome                                        i                     i  S2 dermatome                                        i                     i     Reflexes                                                              RIGHT             LEFT  Patellar                                                   2+                    2+  Achilles                                                   2+                    2+  Babinski                                                neg                  neg  Clonus                                                   neg                  neg      Significant Labs: Blood Culture:   Recent Labs   Lab 02/04/24 1853   LABBLOO No Growth to date  No Growth to date     CBC:   Recent Labs   Lab 02/04/24 1853 02/05/24 0308 02/06/24 0344   WBC 10.02 6.50 6.47   HGB 8.0* 7.7* 8.5*   HCT 26.1* 25.3* 27.5*    402 466*     CMP:   Recent Labs   Lab 02/04/24 1853 02/05/24 0308 02/06/24  0344   * 133* 133*   K 4.0 3.7 3.8    106 106   CO2 24 21* 22*    90 87   BUN 25* 21* 10   CREATININE 1.0 0.8 0.7   CALCIUM 8.6* 8.2* 8.6*   PROT 8.0 7.0  --    ALBUMIN 2.5* 2.1*  --    BILITOT 0.2 0.2  --    ALKPHOS 68 58  --    AST 20 16  --    ALT 7* 6*  --    ANIONGAP 7* 6* 5*     CRP:   Recent Labs   Lab 02/04/24 1853   CRP 33.7*     Lactic Acid:   Recent Labs   Lab 02/04/24 1853   LACTATE 0.6     All pertinent labs within the past 24 hours have been reviewed.    Significant Imaging: CT: I have reviewed all pertinent results/findings and my personal findings are:  CT lumbar  spine shows erosive changes to left-sided sacroiliac joint.  No acute fractures, dislocations, or tumors visualized at this time.  X-Ray: I have reviewed all pertinent results/findings and my personal findings are:  X-ray lumbar spine pending.  MRI: I have reviewed all pertinent results/findings and my personal findings are:  MRI lumbar spine and pelvis show disc height loss between L5-S1, anterolisthesis of L5 over S1 and small epidural abscess of the level L4-L5 extending the level of L5 roughly 1.2 cm in thickness.  There is also an abscess anterior to S1 4.4 x 2.4 cm as well as the left psoas and left iliacus muscle.  Erosive T2 hyperintensity of the left SI joint likely due to infection.  I have reviewed and interpreted all pertinent imaging results/findings.

## 2024-02-07 NOTE — CARE UPDATE
I have reviewed the chart of Ton Donovan and participated in the care of the patient who is hospitalized for the following:    Active Hospital Problems    Diagnosis    *Osteomyelitis of vertebra of lumbosacral region    Sacroiliitis    Debility     Weak therapy recc high intensive therapy      Discitis of lumbosacral region     See primary problem      Sensory loss      Also notes numbness to the plantar surface.   See primary problem      Pain     Lying flat in bed 2/2 severe back and radiating leg pain down the posterior aspect with standing/walking.   Prn tylenol  Pending surgery        Prediabetes    Housing insecurity    Pelvic abscess in male    Spinal stenosis of lumbar region without neurogenic claudication    Substance or medication-induced depressive disorder    Acute bilateral low back pain with bilateral sciatica    Drug dependence    L5-S2 epidural phlegmon     See primary problem      Septic arthritis of sacroiliac joint    Anemia of infection and chronic disease    Chronic hyponatremia    Drug withdrawal    Cigarette nicotine dependence without complication    Heroin use disorder, severe          I have reviewed the Ton Donovan with the multidisciplinary team during rounds.      Harleen Sam NP  [facility]

## 2024-02-07 NOTE — H&P
See IR consult dated 2/7/24    Rand Del Castillo PA-C  Interventional Radiology   Spectra: [phone]

## 2024-02-07 NOTE — ASSESSMENT & PLAN NOTE
"Ton Donovan is a 28 y.o. male with PMH of IVDU and abscess presenting with multilevel osteomyelitis/diskitis and likely left sided SI joint septic arthritis. Blood cultures NGTD, Pending L4-pelvis fusion with NSGY 2/14. Pending aspiration of abscess and SI joint per IR. Currently, he is not receiving antibiotics as HDS pending organism identification per NSGY recommendations. ID consulted.     - once an organism is identified, recommend antibiotic therapy per Infectious Disease recommendations  - weightbearing as tolerated  - fusion to pelvis should allow for joint decompression and drainage thereby treating the septic arthritis    The literature pertaining to septic arthritis of the sacroiliac joint recommends an extended 6-8 week course of IV antibiotic therapy that varies depending on the organism.  Should that fail, surgical decompression is generally warranted usually in the form of drilling decompression of the SI joint through the trajectory taken for  S1 alar iliac screw placement.       Mariaelena Reno, et al. "Septic arthritis in heroin addicts." Seminars in arthritis and rheumatism. Vol. 21. No. 2. GINETTE Dorman, 1991.     Maribel Medrano, and Juan Rehman. "Acute bacterial sacroiliitis in an adult: a case report and review of the literature." Archives of physical medicine and rehabilitation 88.10 (2007): 0287-5151.     Idris QUIROS, Elias Delgado, and David Altman. "Septic sacroiliitis." Seminars in arthritis and rheumatism. Vol. 26. No. 3. GINETTE Dorman, 1996.   "

## 2024-02-07 NOTE — CONSULTS
Aleksandr [facility]ray - Neurosurgery ([facility])  Orthopedics  Consult Note    Patient Name: Ton Donovan  MRN: [id number]  Admission Date: 2/4/2024  Hospital Length of Stay: 1 days  Attending Provider: Brenna Belcher MD  Primary Care Provider: Emily, Primary Doctor    Inpatient consult to Orthopedics  Consult performed by: Nagi Chacon MD  Consult ordered by: Brenna Belcher MD        Subjective:     Principal Problem:Osteomyelitis of vertebra of lumbosacral region    Chief Complaint:   Chief Complaint   Patient presents with    Back Pain     Pt arrived via EMS with complaint of back pain that started 4 weeks ago claims its from a blood infection, back is hot to the touch per ems and pt is having spasms, pt received 30mg IV toroaol        HPI: Ton Donovan is a 28-year-old male with PMHx of IVDU w/ assoc complications of MRSA bacteremia, multiple septic joints & abscesses, who presented 2/4/24 with progressive back pain with radiation down his legs b/l.  He has been seen by Neurosurgery who are planning a lumbar spinal fusion from L4 to pelvis on 02/14.  Orthopedics has been consulted for evaluation of SI joint septic arthritis. Patient states that he was using injection drugs as recently as 4 days ago, and complains numbness the lower extremities bilaterally but worse on the right both of which start from the hips and progress down of the toes. Denies any other musculoskeletal pain or injuries. He has prior history of PIP joint septic arthritis and olecranon bursitis requiring I&D and soft tissue abscess. Patient denies bowel or bladder incontinence, saddle anesthesia, or muscle weakness. Walks w/out assisted devices at baseline. Doesn't take any anticoagulation at baseline.   They endorse tobacco use.   They deny alcohol use.   They deny immunosuppressant medications.  They deny chemotherapy.  They deny radiation therapy.     Past Medical History:   Diagnosis Date    Hypertension     Unilateral inguinal hernia,  without obstruction or gangrene, not specified as recurrent        Past Surgical History:   Procedure Laterality Date    HAND ARTHROTOMY Right 7/7/2023    Procedure: ARTHROTOMY, HAND - R LF PIP;  Surgeon: Boy Lamb MD;  [facility];  Service: Orthopedics;  Laterality: Right;    INCISION AND DRAINAGE OF ABSCESS Right 10/12/2023    Procedure: INCISION AND DRAINAGE, ABSCESS;  Surgeon: Steve Monteiro MD;  Location: [address];  Service: General;  Laterality: Right;  right chest    IRRIGATION AND DEBRIDEMENT OF UPPER EXTREMITY Bilateral 7/7/2023    Procedure: IRRIGATION AND DEBRIDEMENT, UPPER EXTREMITY - RIGHT HAND;  Surgeon: Boy Lamb MD;  Location: [facility] OR [address] [place] [address];  Service: Orthopedics;  Laterality: Bilateral;    OLECRANON BURSECTOMY Left 7/7/2023    Procedure: BURSECTOMY, OLECRANON;  Surgeon: Boy Lamb MD;  Location: [facility] OR [address] [place] [address];  Service: Orthopedics;  Laterality: Left;       Review of patient's allergies indicates:  No Known Allergies    Current Facility-Administered Medications   Medication    acetaminophen tablet 1,000 mg    acetaminophen tablet 650 mg    cloNIDine tablet 0.1 mg    dextrose 10% bolus 125 mL 125 mL    dextrose 10% bolus 250 mL 250 mL    dicyclomine capsule 10 mg    enoxaparin injection 40 mg    glucagon (human recombinant) injection 1 mg    glucose chewable tablet 16 g    glucose chewable tablet 24 g    HYDROmorphone injection 0.5 mg    HYDROmorphone injection 1 mg    hydrOXYzine HCL tablet 50 mg    ibuprofen tablet 600 mg    loperamide capsule 2 mg    melatonin tablet 6 mg    mirtazapine tablet 15 mg    naloxone 0.4 mg/mL injection 0.02 mg    promethazine tablet 25 mg    sodium chloride 0.9% flush 10 mL     Family History    None       Tobacco Use    Smoking status: Former     Types: Cigarettes    Smokeless tobacco: Never   Substance and Sexual Activity    Alcohol use: No    Drug use: Yes     Types: Marijuana, IV     Comment: IVDU heroin immed  "prior to admit to ED    Sexual activity: Not on file     ROS    Constitutional: negative for fevers  Eyes: negative visual changes  ENT: negative for hearing loss  Respiratory: negative for dyspnea  Cardiovascular: negative for chest pain  Gastrointestinal: negative for abdominal pain  Genitourinary: negative for dysuria  Neurological: negative for headaches  Behavioral/Psych: negative for hallucinations  Endocrine: negative for temperature intolerance      Objective:     Vital Signs (Most Recent):  Temp: 98.7 °F (37.1 °C) (02/06/24 1605)  Pulse: 61 (02/06/24 1605)  Resp: (!) 24 (02/06/24 1605)  BP: 128/79 (02/06/24 1605)  SpO2: 98 % (02/06/24 1605) Vital Signs (24h Range):  Temp:  [98 °F (36.7 °C)-99 °F (37.2 °C)] 98.7 °F (37.1 °C)  Pulse:  [57-65] 61  Resp:  [16-28] 24  SpO2:  [98 %-100 %] 98 %  BP: (128-155)/(70-81) 128/79     Weight: 74.8 kg (164 lb 14.5 oz)  Height: 5' 11" (180.3 cm)  Body mass index is 23 kg/m².      Intake/Output Summary (Last 24 hours) at 2/6/2024 1846  Last data filed at 2/6/2024 0410  Gross per 24 hour   Intake --   Output 1350 ml   Net -1350 ml        Ortho/SPM Exam     Gen:  No acute distress  CV:  Peripherally well-perfused.    Lungs:  Normal respiratory effort.  Head/Neck:  Normocephalic.  Atraumatic.     Spine/pelvis/axial body:  No tenderness to palpation of cervical thoracic spine  Tender to palpation over lumbar spine and sacroiliac joints bilaterally  Pain with compression of pelvis  No chest wall or abdominal tenderness  No decubitus ulcers    Motor                                                                                                    RIGHT             LEFT  Deltoid(C5)                                             5/5                  5/5  Biceps(C5)                                             5/5                  5/5  Extensor Carpi Radialis Longus(C6)     5/5                  5/5       Triceps(C7)                                            5/5                  5/5    "                            Flexor Carpi Radialis(C7)                      5/5                  5/5  Flexor Digitorum Superficialis(C8)         5/5                  5/5  Interossei(T1)                                         5/5                  5/5          Sensation (a=absent, i=impaired, n=normal)                                                              RIGHT             LEFT  C5 dermatome                                    n                         n  C6 dermatome                                    n                         n  C7 dermatome                                    n                         n  C8 dermatome                                    n                         n  T1 dermatome                                    n                         n     Reflexes                                                              RIGHT             LEFT  Triceps                                                2+                       2+  Biceps                                                 2+                       2+  Brachioradialis                                    2+                       2+  Hoffmans's                                          neg                    neg        Motor                                                                                                                RIGHT             LEFT  Iliopsoas (L2,3)                                      3/5                  3/5  Quadriceps (L3,4)                                  5/5                  5/5   Tibialis Anterior (L4.5)                            5/5                  5/5       Extensor Halucis Longus (L5)               5/5                  5/5                               Gastrocnemius/Soleus (S1)                   4/5                  5/5  Flexor Hallucis Longus(S2)                   4/5                  5/5     Sensation (a=absent, i=impaired, n=normal)                                                              RIGHT              LEFT  L2 dermatome                                         i                     i  L3 dermatome                                         i                     i  L4 dermatome                                         i                     i  L5 dermatome                                         i                     i  S1 dermatome                                        i                     i  S2 dermatome                                        i                     i     Reflexes                                                              RIGHT             LEFT  Patellar                                                   2+                    2+  Achilles                                                   2+                    2+  Babinski                                                neg                  neg  Clonus                                                   neg                  neg      Significant Labs: Blood Culture:   Recent Labs   Lab 02/04/24 1853   LABBLOO No Growth to date  No Growth to date     CBC:   Recent Labs   Lab 02/04/24 1853 02/05/24 0308 02/06/24  0344   WBC 10.02 6.50 6.47   HGB 8.0* 7.7* 8.5*   HCT 26.1* 25.3* 27.5*    402 466*     CMP:   Recent Labs   Lab 02/04/24 1853 02/05/24 0308 02/06/24  0344   * 133* 133*   K 4.0 3.7 3.8    106 106   CO2 24 21* 22*    90 87   BUN 25* 21* 10   CREATININE 1.0 0.8 0.7   CALCIUM 8.6* 8.2* 8.6*   PROT 8.0 7.0  --    ALBUMIN 2.5* 2.1*  --    BILITOT 0.2 0.2  --    ALKPHOS 68 58  --    AST 20 16  --    ALT 7* 6*  --    ANIONGAP 7* 6* 5*     CRP:   Recent Labs   Lab 02/04/24 1853   CRP 33.7*     Lactic Acid:   Recent Labs   Lab 02/04/24 1853   LACTATE 0.6     All pertinent labs within the past 24 hours have been reviewed.    Significant Imaging: CT: I have reviewed all pertinent results/findings and my personal findings are:  CT lumbar spine shows erosive changes to left-sided sacroiliac joint.  No acute fractures,  "dislocations, or tumors visualized at this time.  X-Ray: I have reviewed all pertinent results/findings and my personal findings are:  X-ray lumbar spine pending.  MRI: I have reviewed all pertinent results/findings and my personal findings are:  MRI lumbar spine and pelvis show disc height loss between L5-S1, anterolisthesis of L5 over S1 and small epidural abscess of the level L4-L5 extending the level of L5 roughly 1.2 cm in thickness.  There is also an abscess anterior to S1 4.4 x 2.4 cm as well as the left psoas and left iliacus muscle.  Erosive T2 hyperintensity of the left SI joint likely due to infection.  I have reviewed and interpreted all pertinent imaging results/findings.  Assessment/Plan:     Septic arthritis of sacroiliac joint  Ton Donovan is a 28 y.o. male with PMH of IVDU and abscess presenting with multilevel osteomyelitis/diskitis and likely left sided SI joint septic arthritis. Blood cultures NGTD, Pending L4-pelvis fusion with NSGY 2/14. Pending aspiration of abscess and SI joint per IR. Currently, he is not receiving antibiotics as HDS pending organism identification per NSGY recommendations. ID consulted.     - once an organism is identified, recommend antibiotic therapy per Infectious Disease recommendations  - weightbearing as tolerated  - fusion to pelvis should allow for joint decompression and drainage thereby treating the septic arthritis    The literature pertaining to septic arthritis of the sacroiliac joint recommends an extended 6-8 week course of IV antibiotic therapy that varies depending on the organism usually resulting in resolution without surgical debridement.  Should that fail, surgical decompression is generally warranted usually in the form of drilling decompression of the SI joint through the trajectory taken for  S1 alar iliac screw placement.       Mariaelena Reno et al. "Septic arthritis in heroin addicts." Seminars in arthritis and rheumatism. Vol. 21. No. 2. WB " "[place], 1991.     Maribel Medrano and Juan Rehman. "Acute bacterial sacroiliitis in an adult: a case report and review of the literature." Archives of physical medicine and rehabilitation 88.10 (2007): 9462-7295.     Idris QUIROS, Elias Delgado, and David Altman. "Septic sacroiliitis." Seminars in arthritis and rheumatism. Vol. 26. No. 3. WB [place], 1996.       IRASEMA SAMAYOA MD  Orthopedics  [facility] - Neurosurgery ([facility])        "

## 2024-02-07 NOTE — ASSESSMENT & PLAN NOTE
28M w/ hx MRSA bactermia, IVDU, pelvic/intra-abdominal abscesses who presents with progressive back pain with radiation down the leg over the past couple of weeks. Motor intact.     MRI L Sp w/wo 2/4: L5 epidural absess with central stenosis, additional abscesses inferior to sacrum, in psoas. spondylodiscitis at L5-S1     - admitted to   - Neuro exam stable  - No emergent neurosurgical intervention warranted at this time  - Neuro checks q4h  - All pertinent labs and imaging reviewed  - 2/4 ESR 75, CRP 33.7. Blood cx NGTD.   - MRI lumbar w/wo 2/4: L5-S1 osteodiscitis extending into the sacrum, left iliac bone, SI joint, facet joints. Anterolisthesis L5-S1, epidural abscess L5-S1 with canal stenosis. Left psoas and paraspinal muscle abscesses. No clinical evidence of cauda equina syndrome.   - CT lumbar obtained with destructive changes at L5-S1.   - Abx held pending intervention, if patient begins to show signs of sepsis, please start abx. ID consulted. IR aspiration of psoas abscess today.   - Voiding spontaneously. Bladder scan prn.   - Plan for OR 2/14 for L4-pelvis fusion with debridement.  - Discussed with Dr. Pozo  - Contact with acute changes in exam

## 2024-02-08 LAB
ANION GAP SERPL CALC-SCNC: 9 MMOL/L (ref 8–16)
BUN SERPL-MCNC: 19 MG/DL (ref 6–20)
CALCIUM SERPL-MCNC: 9.1 MG/DL (ref 8.7–10.5)
CHLORIDE SERPL-SCNC: 106 MMOL/L (ref 95–110)
CO2 SERPL-SCNC: 20 MMOL/L (ref 23–29)
CREAT SERPL-MCNC: 0.8 MG/DL (ref 0.5–1.4)
ERYTHROCYTE [DISTWIDTH] IN BLOOD BY AUTOMATED COUNT: 18.9 % (ref 11.5–14.5)
EST. GFR  (NO RACE VARIABLE): >60 ML/MIN/1.73 M^2
GLUCOSE SERPL-MCNC: 107 MG/DL (ref 70–110)
GRAM STN SPEC: NORMAL
GRAM STN SPEC: NORMAL
HCT VFR BLD AUTO: 31.9 % (ref 40–54)
HGB BLD-MCNC: 9.8 G/DL (ref 14–18)
MCH RBC QN AUTO: 21.2 PG (ref 27–31)
MCHC RBC AUTO-ENTMCNC: 30.7 G/DL (ref 32–36)
MCV RBC AUTO: 69 FL (ref 82–98)
PLATELET # BLD AUTO: 490 K/UL (ref 150–450)
PMV BLD AUTO: 8.8 FL (ref 9.2–12.9)
POTASSIUM SERPL-SCNC: 4.3 MMOL/L (ref 3.5–5.1)
RBC # BLD AUTO: 4.63 M/UL (ref 4.6–6.2)
SODIUM SERPL-SCNC: 135 MMOL/L (ref 136–145)
WBC # BLD AUTO: 4.95 K/UL (ref 3.9–12.7)

## 2024-02-08 PROCEDURE — 85027 COMPLETE CBC AUTOMATED: CPT | Performed by: STUDENT IN AN ORGANIZED HEALTH CARE EDUCATION/TRAINING PROGRAM

## 2024-02-08 PROCEDURE — 63600175 PHARM REV CODE 636 W HCPCS: Performed by: STUDENT IN AN ORGANIZED HEALTH CARE EDUCATION/TRAINING PROGRAM

## 2024-02-08 PROCEDURE — 99232 SBSQ HOSP IP/OBS MODERATE 35: CPT | Mod: ,,, | Performed by: PHYSICIAN ASSISTANT

## 2024-02-08 PROCEDURE — 25000003 PHARM REV CODE 250: Performed by: STUDENT IN AN ORGANIZED HEALTH CARE EDUCATION/TRAINING PROGRAM

## 2024-02-08 PROCEDURE — S4991 NICOTINE PATCH NONLEGEND: HCPCS | Performed by: HOSPITALIST

## 2024-02-08 PROCEDURE — 80048 BASIC METABOLIC PNL TOTAL CA: CPT | Performed by: STUDENT IN AN ORGANIZED HEALTH CARE EDUCATION/TRAINING PROGRAM

## 2024-02-08 PROCEDURE — 25000003 PHARM REV CODE 250: Performed by: EMERGENCY MEDICINE

## 2024-02-08 PROCEDURE — 36415 COLL VENOUS BLD VENIPUNCTURE: CPT | Performed by: STUDENT IN AN ORGANIZED HEALTH CARE EDUCATION/TRAINING PROGRAM

## 2024-02-08 PROCEDURE — 99233 SBSQ HOSP IP/OBS HIGH 50: CPT | Mod: ,,, | Performed by: STUDENT IN AN ORGANIZED HEALTH CARE EDUCATION/TRAINING PROGRAM

## 2024-02-08 PROCEDURE — 11000001 HC ACUTE MED/SURG PRIVATE ROOM

## 2024-02-08 PROCEDURE — 25000003 PHARM REV CODE 250: Performed by: HOSPITALIST

## 2024-02-08 RX ORDER — OXYCODONE HYDROCHLORIDE 10 MG/1
10 TABLET ORAL EVERY 4 HOURS PRN
Status: DISCONTINUED | OUTPATIENT
Start: 2024-02-08 | End: 2024-02-20

## 2024-02-08 RX ADMIN — HYDROMORPHONE HYDROCHLORIDE 1 MG: 1 INJECTION, SOLUTION INTRAMUSCULAR; INTRAVENOUS; SUBCUTANEOUS at 01:02

## 2024-02-08 RX ADMIN — ACETAMINOPHEN 1000 MG: 500 TABLET ORAL at 05:02

## 2024-02-08 RX ADMIN — OXYCODONE 5 MG: 5 TABLET ORAL at 05:02

## 2024-02-08 RX ADMIN — ACETAMINOPHEN 1000 MG: 500 TABLET ORAL at 09:02

## 2024-02-08 RX ADMIN — ENOXAPARIN SODIUM 40 MG: 40 INJECTION SUBCUTANEOUS at 04:02

## 2024-02-08 RX ADMIN — NICOTINE 1 PATCH: 14 PATCH, EXTENDED RELEASE TRANSDERMAL at 09:02

## 2024-02-08 RX ADMIN — CLONIDINE HYDROCHLORIDE 0.1 MG: 0.1 TABLET ORAL at 07:02

## 2024-02-08 RX ADMIN — MELATONIN TAB 3 MG 6 MG: 3 TAB at 09:02

## 2024-02-08 RX ADMIN — HYDROMORPHONE HYDROCHLORIDE 1 MG: 1 INJECTION, SOLUTION INTRAMUSCULAR; INTRAVENOUS; SUBCUTANEOUS at 06:02

## 2024-02-08 RX ADMIN — OXYCODONE HYDROCHLORIDE 10 MG: 10 TABLET ORAL at 04:02

## 2024-02-08 RX ADMIN — CEFTRIAXONE SODIUM 2 G: 2 INJECTION, POWDER, FOR SOLUTION INTRAMUSCULAR; INTRAVENOUS at 05:02

## 2024-02-08 RX ADMIN — OXYCODONE HYDROCHLORIDE 10 MG: 10 TABLET ORAL at 09:02

## 2024-02-08 RX ADMIN — MIRTAZAPINE 15 MG: 15 TABLET, FILM COATED ORAL at 09:02

## 2024-02-08 RX ADMIN — VANCOMYCIN HYDROCHLORIDE 2000 MG: 500 INJECTION, POWDER, LYOPHILIZED, FOR SOLUTION INTRAVENOUS at 05:02

## 2024-02-08 RX ADMIN — CLONIDINE HYDROCHLORIDE 0.1 MG: 0.1 TABLET ORAL at 09:02

## 2024-02-08 RX ADMIN — HYDROMORPHONE HYDROCHLORIDE 4 MG: 2 TABLET ORAL at 07:02

## 2024-02-08 RX ADMIN — ACETAMINOPHEN 1000 MG: 500 TABLET ORAL at 01:02

## 2024-02-08 RX ADMIN — HYDROXYZINE HYDROCHLORIDE 50 MG: 25 TABLET, FILM COATED ORAL at 07:02

## 2024-02-08 RX ADMIN — HYDROMORPHONE HYDROCHLORIDE 1 MG: 1 INJECTION, SOLUTION INTRAMUSCULAR; INTRAVENOUS; SUBCUTANEOUS at 04:02

## 2024-02-08 RX ADMIN — OXYCODONE 5 MG: 5 TABLET ORAL at 11:02

## 2024-02-08 NOTE — SUBJECTIVE & OBJECTIVE
Interval History: Briefly seen this AM, although pt on phone at the time. Attempted to return later, but pt down for IR procedure. Plan for surgical intervention per NSG on 2/16, however in attempt to progress pt care in the interim, pursued aspiration of one of pt's abscesses and SI joint in order to obtain cx data prior to 2/16 procedure. Undergoing IR aspiration or psoas abscess & hopefully SI joint w/ IR this afternoon.    Review of Systems   Constitutional:  Positive for activity change, diaphoresis and fatigue. Negative for chills and fever.   HENT:  Negative for congestion, rhinorrhea and sore throat.    Respiratory:  Negative for cough and shortness of breath.    Cardiovascular:  Negative for chest pain and palpitations.   Gastrointestinal:  Positive for abdominal pain. Negative for blood in stool, constipation, diarrhea, nausea and vomiting.   Genitourinary:  Negative for difficulty urinating and dysuria.   Musculoskeletal:  Positive for arthralgias, back pain and gait problem. Negative for neck pain.   Skin:  Negative for rash and wound.   Neurological:  Positive for weakness and numbness. Negative for dizziness, light-headedness and headaches.   Psychiatric/Behavioral:  Negative for agitation and confusion. The patient is nervous/anxious.      Objective:     Vital Signs (Most Recent):  Temp: 98 °F (36.7 °C) (02/07/24 1928)  Pulse: 62 (02/07/24 1928)  Resp: 18 (02/07/24 2112)  BP: (!) 126/57 (02/07/24 1928)  SpO2: 99 % (02/07/24 1928) Vital Signs (24h Range):  Temp:  [97.1 °F (36.2 °C)-98.6 °F (37 °C)] 98 °F (36.7 °C)  Pulse:  [49-75] 62  Resp:  [15-20] 18  SpO2:  [98 %-100 %] 99 %  BP: (109-160)/(56-96) 126/57     Weight: 76.7 kg (169 lb 1.5 oz)  Body mass index is 23.59 kg/m².    Intake/Output Summary (Last 24 hours) at 2/7/2024 6131  Last data filed at 2/7/2024 1217  Gross per 24 hour   Intake --   Output 2200 ml   Net -2200 ml        Physical Exam  Constitutional:       Appearance: He is normal weight.  He is not toxic-appearing.   HENT:      Head: Normocephalic and atraumatic.   Eyes:      Conjunctiva/sclera: Conjunctivae normal.   Cardiovascular:      Rate and Rhythm: Normal rate and regular rhythm.      Heart sounds: Normal heart sounds.   Pulmonary:      Effort: Pulmonary effort is normal. No respiratory distress.      Breath sounds: Normal breath sounds. No wheezing.   Abdominal:      General: Bowel sounds are normal. There is no distension.      Palpations: Abdomen is soft.      Tenderness: There is abdominal tenderness. There is no guarding.   Skin:     General: Skin is warm and dry.      Findings: No rash.   Neurological:      Mental Status: He is alert and oriented to person, place, and time.      Sensory: Sensory deficit (diminished sensation of BLE (R>L)) present.      Motor: No weakness.   Psychiatric:         Mood and Affect: Mood normal.         Behavior: Behavior normal.         Significant Labs:  CBC:  Recent Labs   Lab 02/06/24  0344 02/07/24  0502   WBC 6.47 5.91   HGB 8.5* 9.9*   HCT 27.5* 31.4*   * 428       CMP:  Recent Labs   Lab 02/06/24  0344 02/07/24  0502   * 133*   K 3.8 4.5    108   CO2 22* 16*   GLU 87 87   BUN 10 13   CREATININE 0.7 0.8   CALCIUM 8.6* 8.9   ANIONGAP 5* 9       PTINR:  Recent Labs   Lab 02/07/24  0947   INR 1.2

## 2024-02-08 NOTE — PROGRESS NOTES
"Aleksandr Bray - Neurosurgery ([facility])  Hospital Medicine  Progress Note    Patient Name: Ton Donovan  MRN: [id number]  Patient Class: IP- Inpatient   Admission Date: 2/4/2024  Length of Stay: 3 days  Attending Physician: Nagi Angel*  Primary Care Provider: Emily, Primary Doctor        Subjective:     Principal Problem:Osteomyelitis of vertebra of lumbosacral region        HPI:  Ton Donovan is a 28-year-old male with PMHx of IVDU w/ assoc complications of MRSA bacteremia, multiple septic joints & abscesses, who presented 2/4/24 with progressive back pain with radiation down his legs b/l. Endorses bilateral numbness & neuropathic pain over the dorsum of his feet. This has been ongoing and is not necessarily a new problem, but has been worsening. Reports "pins & needles"/paresthesias down his legs that have been progressing, although laying on his stomach seems to provide significant relief. Admits to continued active IV heroin use; last used just prior to EMS arrival. Denies any fevers, chills, night sweats, cough, or shortness of breath. Denies any bowel or bladder incontinence/retention or saddle anesthesia.     On arrival, HDS & 0/4 SIRS. MRI showed osteomyelitis/discitis of L5-S1, osteomyelitis of sacrum & L iliac bone, probable septic arthritis of L SI joint & lower lumbar spine facet joints, worsening anterolisthesis of L5 with respect to S1 with increased size of epidural abscess at the level of L5-S1 and probable severe central canal stenosis at this level, along w/ additional abscesses in the L iliopsoas muscle, paraspinal muscles, pelvis, & L sacrum. Received single dose of Zosyn in ED on arrival, but given that pt HDS w/o sepsis, further abx held to optimize intra-op cx/pathology. Admitted to hospital medicine for further mgmt of osteomyelitis, septic joints & abscesses.    Overview/Hospital Course:  Addiction medicine, along w/ NSGY, orthopedic surgery, IR & ID consulted. Underwent aspiration " of psoas abscess per IR on 2/7. Aspiration of SI joint attempted but unable to aspirate hardly any fluid.     Interval History: No acute events. No growth on cultures from IR aspiration  Afebrile    Review of Systems  Objective:     Vital Signs (Most Recent):  Temp: 98.5 °F (36.9 °C) (02/08/24 1546)  Pulse: 71 (02/08/24 1546)  Resp: 20 (02/08/24 1546)  BP: 131/60 (02/08/24 1546)  SpO2: 100 % (02/08/24 1546) Vital Signs (24h Range):  Temp:  [97.4 °F (36.3 °C)-99.5 °F (37.5 °C)] 98.5 °F (36.9 °C)  Pulse:  [49-72] 71  Resp:  [16-20] 20  SpO2:  [95 %-100 %] 100 %  BP: (111-134)/(57-85) 131/60     Weight: 76.7 kg (169 lb 1.5 oz)  Body mass index is 23.59 kg/m².    Intake/Output Summary (Last 24 hours) at 2/8/2024 1547  Last data filed at 2/8/2024 0035  Gross per 24 hour   Intake --   Output 800 ml   Net -800 ml         Physical Exam  Constitutional:       Appearance: He is normal weight. He is not toxic-appearing.   HENT:      Head: Normocephalic and atraumatic.   Eyes:      Conjunctiva/sclera: Conjunctivae normal.   Cardiovascular:      Rate and Rhythm: Normal rate and regular rhythm.      Heart sounds: Normal heart sounds.   Pulmonary:      Effort: Pulmonary effort is normal. No respiratory distress.      Breath sounds: Normal breath sounds. No wheezing.   Abdominal:      General: Bowel sounds are normal. There is no distension.      Palpations: Abdomen is soft.      Tenderness: There is no abdominal tenderness. There is no guarding.   Skin:     General: Skin is warm and dry.      Findings: No rash.   Neurological:      Mental Status: He is alert and oriented to person, place, and time.      Sensory: Sensory deficit (diminished sensation of BLE (R>L)) present.      Motor: No weakness.   Psychiatric:         Mood and Affect: Mood normal.         Behavior: Behavior normal.             Significant Labs: All pertinent labs within the past 24 hours have been reviewed.    Significant Imaging: I have reviewed all pertinent  imaging results/findings within the past 24 hours.    Assessment/Plan:      * Osteomyelitis of vertebra of lumbosacral region  Osteomyelitis, discitis, & myositis of lumbosacral region  Septic arthritis of SI joint(s)  Epidural abscesses   Multiple abscesses of pelvis, sacrum, psoas, iliacus, & paraspinals  Lower back pain w/ b/l sciatica   Severe spinal canal stenosis   Hx of known spinal osteomyelitis, various abscesses, & MRSA bacteremia i/s/o IVDU. Presented w/ progressive lower back & abdominal pain w/ assoc LE weakness & sensory changes. HDS & 0/4 SIRS on admission. MRI showed osteomyelitis/discitis of L5-S1, osteomyelitis of sacrum & L iliac bone, probable septic arthritis of L SI joint & lower lumbar spine facet joints, worsening anterolisthesis of L5 with respect to S1 with increased size of epidural abscess at the level of L5-S1 and probable severe central canal stenosis at this level, along w/ additional abscesses in the L iliopsoas muscle, paraspinal muscles, pelvis, & L sacrum. CT L-spine w/ similar findings. No urinary or bowel sx retention/incontinence. Received single dose of Zosyn in ED on arrival, but given that pt HDS w/o sepsis, further abx held to optimize intra-op cx/pathology.   - Neurosurgery consulted; appreciate recs/assistance   - No plans for intervention/OR until 2/14  - IR consulted for potential aspiration of abscess(es); appreciate assistance   - Plan for aspiration of psoas abscess & SI joint 2/7   - ID consulted; appreciate recs  - Ortho consulted for mgmt of septic SI joint; appreciate recs/assistance  - Holding antibiotics given absence of sepsis  - Multimodal analgesia   - PT/OT    Housing insecurity  Previously worked at [employer] & held down job in the past but started using heroin last year following multiple family deaths (mother, aunt & grandmother) in a short period of time. Subsequently lost housing & has been living on streets since then.   - SW/CM    Prediabetes  A1c 6.2%  w/ glucose 109 on admission.   - Deferring SSI or CBGs   - Hypoglycemia protocol     Substance or medication-induced depressive disorder  Pt endorses sx of depression, no SI/SA.   - Addiction medicine/psychiatry consult as above; appreciate recs   - Initiating mirtazapine 15mg QHS    Spinal stenosis of lumbar region without neurogenic claudication  See osteomyelitis      Pelvic abscess in male  Presumed osteomyelitis/discitis at L5-S1 with osteomyelitis in the sacrum and left iliac bone and probable septic arthritis in the left SI joint and lower lumbar spine facet joints.     Worsening anterolisthesis of L5 with respect to S1 with increased size of epidural abscess at the level of L5-S1, and probable severe central canal stenosis at this level.     Additional abscesses in the left iliopsoas muscle, paraspinal muscles, pelvis, and left sacrum as discussed above.     Additional findings discussed in the body of the report and on same day pelvic MRI.      IVDU (intravenous drug user)  Heroin abuse w/ opioid withdrawal   PSDU  Tobacco use   Known hx of IVDU (heroin) w/ extensive assoc medical complications (discussed above). No recent UDS (only one on file from July 2023- positive for opiates & THC) and none collected on admission. Endorses daily IV heroin use (1g/day); last used just prior to calling EMS. Active tobacco use, but denies EtOH or other illicit substance use. Endorsing sx of opioid w/d.   - Addiction medicine consulted; appreciate recs  - Deferring initiating methadone/suboxone given potential anesthesia/surgical intervention  - PRN oxycodone per [facility]OWS protocol  - Supportive care w/ PRN antiemetics, analgesics, & anxiolytics   - NRT PRN  - Cessation strongly encouraged     Septic arthritis of sacroiliac joint  See osteomyelitis      Chronic hyponatremia  On admission, Na 133 (baseline 129-133). 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   - Deferring IVF  -  Encourage good PO intake  - Monitor BMP  - Mgmt of infxns as above    Anemia of infection and chronic disease  On admission, Hgb 8.0 (more or less c/w baseline as has been downtrending for past year) w/ MCV 69. No e/o active bleeding. Iron panel w/ low iron, TIBC, %sat & transferrin; ferritin nml. Although ferritin nml, likely anemia of chronic dx 2/2 chronic infxn re: IVDU.   - Monitor CBC & for e/o active bleeding  - Transfuse PRN for goal Hgb > 7  - Mgmt of infxns as above    Cigarette nicotine dependence without complication  Dangers of cigarette smoking were reviewed with patient in detail. Patient was  offered a nicotinepatch  Nicotine replacement options were discussed. Nicotine replacement was discussed      VTE Risk Mitigation (From admission, onward)           Ordered     enoxaparin injection 40 mg  Daily         02/05/24 0126     Place sequential compression device  Until discontinued         02/05/24 0126     IP VTE HIGH RISK PATIENT  Once         02/05/24 0126                    Discharge Planning   SENA: 2/19/2024     Code Status: Full Code   Is the patient medically ready for discharge?: No    Reason for patient still in hospital (select all that apply): Patient trending condition, Treatment, and Consult recommendations  Discharge Plan A: Long-term acute care facility (LTAC)          Nagi Angel MD  Department of Hospital Medicine   [facility] - Neurosurgery ([facility])

## 2024-02-08 NOTE — SUBJECTIVE & OBJECTIVE
Interval History: NAEON. Exam stable. S/p IR aspiration psoas w/ 4 ml serosanguinous fluid removed. OR next week.    Medications:  Continuous Infusions:  Scheduled Meds:   acetaminophen  1,000 mg Oral Q8H    cefTRIAXone (Rocephin) IV (PEDS and ADULTS)  2 g Intravenous Q24H    cloNIDine  0.1 mg Oral Q12H    enoxparin  40 mg Subcutaneous Daily    mirtazapine  15 mg Oral QHS    nicotine  1 patch Transdermal Daily    [START ON 2/9/2024] vancomycin (VANCOCIN) IV (PEDS and ADULTS)  15 mg/kg Intravenous Q12H    vancomycin (VANCOCIN) IV (PEDS and ADULTS)  2,000 mg Intravenous Once     PRN Meds:acetaminophen, dextrose 10%, dextrose 10%, dicyclomine, glucagon (human recombinant), glucose, glucose, HYDROmorphone, hydrOXYzine HCL, ibuprofen, loperamide, melatonin, naloxone, oxyCODONE, oxyCODONE, promethazine, sodium chloride 0.9%, Pharmacy to dose Vancomycin consult **AND** vancomycin - pharmacy to dose     Review of Systems  Objective:     Weight: 76.7 kg (169 lb 1.5 oz)  Body mass index is 23.59 kg/m².  Vital Signs (Most Recent):  Temp: 98.5 °F (36.9 °C) (02/08/24 1646)  Pulse: 74 (02/08/24 1646)  Resp: 16 (02/08/24 1651)  BP: (!) 135/59 (02/08/24 1646)  SpO2: 99 % (02/08/24 1646) Vital Signs (24h Range):  Temp:  [97.4 °F (36.3 °C)-99.5 °F (37.5 °C)] 98.5 °F (36.9 °C)  Pulse:  [49-74] 74  Resp:  [16-20] 16  SpO2:  [95 %-100 %] 99 %  BP: (111-135)/(57-85) 135/59     Date 02/08/24 0700 - 02/09/24 0659   Shift 9355-5611 6372-3145 0829-9921 24 Hour Total   INTAKE   Shift Total(mL/kg)       OUTPUT   Urine(mL/kg/hr)  500  500   Shift Total(mL/kg)  500(6.5)  500(6.5)   Weight (kg) 76.7 76.7 76.7 76.7                                 Physical Exam     Neurosurgery Physical Exam    General: well developed, well nourished, no distress.   Head: normocephalic, atraumatic  Neurologic: Alert and oriented. Thought content appropriate.  GCS: Motor: 6/Verbal: 5/Eyes: 4 GCS Total: 15  Mental Status: Awake, Alert, Oriented x 4  Language: No  aphasia  Speech: No dysarthria  Cranial nerves: face symmetric, tongue midline, CN II-XII grossly intact, EOMI.   Pulmonary: normal respirations, no signs of respiratory distress  Sensory: intact to light touch throughout  Motor Strength: Moves all extremities spontaneously with good tone.  No abnormal movements seen. Pain limited weakness b/l hip flexors.     Strength  Deltoids Triceps Biceps Wrist Extension Wrist Flexion Hand    Upper: R 5/5 5/5 5/5 5/5 5/5 5/5    L 5/5 5/5 5/5 5/5 5/5 5/5     Iliopsoas Quadriceps Knee  Flexion Tibialis  anterior Gastro- cnemius EHL   Lower: R 4+/5 5/5 5/5 5/5 5/5 4+/5    L 4-/5 5/5 5/5 5/5 5/5 4+/5     Khalil: absent  Clonus: absent  Skin: Skin is warm, dry and intact.        Significant Labs:  Recent Labs   Lab 02/07/24  0502 02/08/24  0432   GLU 87 107   * 135*   K 4.5 4.3    106   CO2 16* 20*   BUN 13 19   CREATININE 0.8 0.8   CALCIUM 8.9 9.1       Recent Labs   Lab 02/07/24  0502 02/08/24  0432   WBC 5.91 4.95   HGB 9.9* 9.8*   HCT 31.4* 31.9*    490*       Recent Labs   Lab 02/07/24  0947   INR 1.2       Microbiology Results (last 7 days)       Procedure Component Value Units Date/Time    AFB Culture & Smear [[id number]] Collected: 02/07/24 1423    Order Status: Completed Specimen: Abscess from Back Updated: 02/08/24 1152     AFB CULTURE STAIN No acid fast bacilli seen.    Narrative:      L psoas    AFB Culture & Smear [[id number]] Collected: 02/07/24 1413    Order Status: Completed Specimen: Joint Fluid from Back Updated: 02/08/24 1152     AFB CULTURE STAIN No acid fast bacilli seen.    Culture, Anaerobe [[id number]] Collected: 02/07/24 1423    Order Status: Completed Specimen: Abscess from Back Updated: 02/08/24 0852     Anaerobic Culture Culture in progress    Narrative:      L psoas    Aerobic culture [[id number]] Collected: 02/07/24 1423    Order Status: Completed Specimen: Abscess from Back Updated: 02/08/24 0718     Aerobic Bacterial Culture No  growth    Narrative:      L psoas    Gram stain [[id number]] Collected: 02/07/24 1413    Order Status: Completed Specimen: Joint Fluid from Back Updated: 02/08/24 0041     Gram Stain Result No WBC's      No organisms seen    Fungus culture [[id number]] Collected: 02/07/24 1413    Order Status: Sent Specimen: Joint Fluid from Back Updated: 02/07/24 2110    Aerobic culture [[id number]] Collected: 02/07/24 1412    Order Status: Sent Specimen: Bone from Back Updated: 02/07/24 2109    Culture, Anaerobe [[id number]] Collected: 02/07/24 1411    Order Status: Sent Specimen: Joint Fluid from Back Updated: 02/07/24 2109    Blood culture #2 **CANNOT BE ORDERED STAT** [[id number]] Collected: 02/04/24 1822    Order Status: Completed Specimen: Blood from Peripheral, Forearm, Left Updated: 02/07/24 2012     Blood Culture, Routine No Growth to date      No Growth to date      No Growth to date      No Growth to date    Blood culture #1 **CANNOT BE ORDERED STAT** [[id number]] Collected: 02/04/24 1853    Order Status: Completed Specimen: Blood from Peripheral, Forearm, Left Updated: 02/07/24 2012     Blood Culture, Routine No Growth to date      No Growth to date      No Growth to date      No Growth to date    Gram stain [[id number]] Collected: 02/07/24 1423    Order Status: Completed Specimen: Abscess from Back Updated: 02/07/24 1934     Gram Stain Result Few WBC's      No organisms seen    Narrative:      L psoas    Fungus culture [[id number]] Collected: 02/07/24 1423    Order Status: Sent Specimen: Abscess from Back Updated: 02/07/24 1733    Culture, Anaerobe [[id number]]     Order Status: No result Specimen: Abscess from Sacrum     Aerobic culture [[id number]]     Order Status: No result Specimen: Abscess from Sacrum     AFB Culture & Smear [[id number]]     Order Status: No result Specimen: Abscess from Sacrum     Gram stain [[id number]]     Order Status: No result Specimen: Abscess from Sacrum     Fungus culture [[id number]]      Order Status: No result Specimen: Abscess from Sacrum     Culture, Anaerobe [[id number]]     Order Status: No result Specimen: Abscess     Aerobic culture [[id number]]     Order Status: No result Specimen: Abscess     Fungus culture [[id number]]     Order Status: No result Specimen: Abscess     AFB Culture & Smear [[id number]]     Order Status: No result Specimen: Abscess     Gram stain [[id number]]     Order Status: No result Specimen: Abscess           Recent Lab Results         02/08/24  0432        Anion Gap 9       BUN 19       Calcium 9.1       Chloride 106       CO2 20       Creatinine 0.8       eGFR >60.0       Glucose 107       Hematocrit 31.9       Hemoglobin 9.8       MCH 21.2       MCHC 30.7       MCV 69       MPV 8.8       Platelet Count 490       Potassium 4.3       RBC 4.63       RDW 18.9       Sodium 135       WBC 4.95             All pertinent labs from the last 24 hours have been reviewed.    Significant Diagnostics:  I have reviewed all pertinent imaging results/findings within the past 24 hours.

## 2024-02-08 NOTE — PROGRESS NOTES
"Pharmacokinetic Initial Assessment: IV Vancomycin    Assessment/Plan:    Initiate intravenous vancomycin with loading dose of 2000 mg once followed by a maintenance dose of vancomycin 1250mg IV every 12 hours  Desired empiric serum trough concentration is 10 to 20 mcg/mL  Draw vancomycin trough level 60 min prior to fourth dose on 2/10 at approximately 0500  Pharmacy will continue to follow and monitor vancomycin.      Please contact pharmacy at extension [phone] with any questions regarding this assessment.     Thank you for the consult,   Lucy Chinchilla       Patient brief summary:  Ton Donovan is a 28 y.o. male initiated on antimicrobial therapy with IV Vancomycin for treatment of suspected bone/joint infection    Drug Allergies:   Review of patient's allergies indicates:  No Known Allergies    Actual Body Weight:   76.7 kg    Renal Function:   Estimated Creatinine Clearance: 143.8 mL/min (based on SCr of 0.8 mg/dL).,     Dialysis Method (if applicable):  N/A    CBC (last 72 hours):  Recent Labs   Lab Result Units 02/06/24 0344 02/07/24 0502 02/08/24  0432   WBC K/uL 6.47 5.91 4.95   Hemoglobin g/dL 8.5* 9.9* 9.8*   Hematocrit % 27.5* 31.4* 31.9*   Platelets K/uL 466* 428 490*   Gran % % 60.9  --   --    Lymph % % 28.0  --   --    Mono % % 10.4  --   --    Eosinophil % % 0.2  --   --    Basophil % % 0.3  --   --    Differential Method  Automated  --   --        Metabolic Panel (last 72 hours):  Recent Labs   Lab Result Units 02/06/24 0344 02/07/24  0502 02/08/24  0432   Sodium mmol/L 133* 133* 135*   Potassium mmol/L 3.8 4.5 4.3   Chloride mmol/L 106 108 106   CO2 mmol/L 22* 16* 20*   Glucose mg/dL 87 87 107   BUN mg/dL 10 13 19   Creatinine mg/dL 0.7 0.8 0.8       Drug levels (last 3 results):  No results for input(s): "VANCOMYCINRA", "VANCORANDOM", "VANCOMYCINPE", "VANCOPEAK", "VANCOMYCINTR", "VANCOTROUGH" in the last 72 hours.    Microbiologic Results:  Microbiology Results (last 7 days)       Procedure " Component Value Units Date/Time    AFB Culture & Smear [[id number]] Collected: 02/07/24 1423    Order Status: Completed Specimen: Abscess from Back Updated: 02/08/24 1152     AFB CULTURE STAIN No acid fast bacilli seen.    Narrative:      L psoas    AFB Culture & Smear [[id number]] Collected: 02/07/24 1413    Order Status: Completed Specimen: Joint Fluid from Back Updated: 02/08/24 1152     AFB CULTURE STAIN No acid fast bacilli seen.    Culture, Anaerobe [[id number]] Collected: 02/07/24 1423    Order Status: Completed Specimen: Abscess from Back Updated: 02/08/24 0852     Anaerobic Culture Culture in progress    Narrative:      L psoas    Aerobic culture [[id number]] Collected: 02/07/24 1423    Order Status: Completed Specimen: Abscess from Back Updated: 02/08/24 0718     Aerobic Bacterial Culture No growth    Narrative:      L psoas    Gram stain [[id number]] Collected: 02/07/24 1413    Order Status: Completed Specimen: Joint Fluid from Back Updated: 02/08/24 0041     Gram Stain Result No WBC's      No organisms seen    Fungus culture [[id number]] Collected: 02/07/24 1413    Order Status: Sent Specimen: Joint Fluid from Back Updated: 02/07/24 2110    Aerobic culture [[id number]] Collected: 02/07/24 1412    Order Status: Sent Specimen: Bone from Back Updated: 02/07/24 2109    Culture, Anaerobe [[id number]] Collected: 02/07/24 1411    Order Status: Sent Specimen: Joint Fluid from Back Updated: 02/07/24 2109    Blood culture #2 **CANNOT BE ORDERED STAT** [[id number]] Collected: 02/04/24 1822    Order Status: Completed Specimen: Blood from Peripheral, Forearm, Left Updated: 02/07/24 2012     Blood Culture, Routine No Growth to date      No Growth to date      No Growth to date      No Growth to date    Blood culture #1 **CANNOT BE ORDERED STAT** [[id number]] Collected: 02/04/24 1853    Order Status: Completed Specimen: Blood from Peripheral, Forearm, Left Updated: 02/07/24 2012     Blood Culture, Routine No Growth to  date      No Growth to date      No Growth to date      No Growth to date    Gram stain [[id number]] Collected: 02/07/24 1423    Order Status: Completed Specimen: Abscess from Back Updated: 02/07/24 1934     Gram Stain Result Few WBC's      No organisms seen    Narrative:      L psoas    Fungus culture [[id number]] Collected: 02/07/24 1423    Order Status: Sent Specimen: Abscess from Back Updated: 02/07/24 1733    Culture, Anaerobe [[id number]]     Order Status: No result Specimen: Abscess from Sacrum     Aerobic culture [[id number]]     Order Status: No result Specimen: Abscess from Sacrum     AFB Culture & Smear [[id number]]     Order Status: No result Specimen: Abscess from Sacrum     Gram stain [[id number]]     Order Status: No result Specimen: Abscess from Sacrum     Fungus culture [[id number]]     Order Status: No result Specimen: Abscess from Sacrum     Culture, Anaerobe [[id number]]     Order Status: No result Specimen: Abscess     Aerobic culture [[id number]]     Order Status: No result Specimen: Abscess     Fungus culture [[id number]]     Order Status: No result Specimen: Abscess     AFB Culture & Smear [[id number]]     Order Status: No result Specimen: Abscess     Gram stain [[id number]]     Order Status: No result Specimen: Abscess

## 2024-02-08 NOTE — SUBJECTIVE & OBJECTIVE
Past Medical History:   Diagnosis Date    Hypertension     Unilateral inguinal hernia, without obstruction or gangrene, not specified as recurrent        Past Surgical History:   Procedure Laterality Date    HAND ARTHROTOMY Right 7/7/2023    Procedure: ARTHROTOMY, HAND - R LF PIP;  Surgeon: Boy Lamb MD;  Location: [address];  Service: Orthopedics;  Laterality: Right;    INCISION AND DRAINAGE OF ABSCESS Right 10/12/2023    Procedure: INCISION AND DRAINAGE, ABSCESS;  Surgeon: Steve Monteiro MD;  Location: [address];  Service: General;  Laterality: Right;  right chest    IRRIGATION AND DEBRIDEMENT OF UPPER EXTREMITY Bilateral 7/7/2023    Procedure: IRRIGATION AND DEBRIDEMENT, UPPER EXTREMITY - RIGHT HAND;  Surgeon: Boy Lamb MD;  Location: [address];  Service: Orthopedics;  Laterality: Bilateral;    OLECRANON BURSECTOMY Left 7/7/2023    Procedure: BURSECTOMY, OLECRANON;  Surgeon: Boy Lamb MD;  Location: [address];  Service: Orthopedics;  Laterality: Left;       Review of patient's allergies indicates:  No Known Allergies    Medications:  Medications Prior to Admission   Medication Sig    mv-min/vit C/glut/lysine/hb124 (IMMUNE SUPPORT ORAL) Take 1 tablet by mouth once daily.    naloxone (NARCAN) 4 mg/actuation Spry 4 mg by Nasal route.     Antibiotics (From admission, onward)      None          Antifungals (From admission, onward)      None          Antivirals (From admission, onward)      None             Immunization History   Administered Date(s) Administered    COVID-19, MRNA, LN-S, PF (Pfizer) ([place]) 03/09/2022    Hepatitis B 10/18/2001, 10/21/2002, 08/14/2004    IPV 10/18/2001, 10/21/2002, 08/14/2004, 03/30/2009    MMR 10/18/2001, 08/14/2004, 03/25/2009    Meningococcal Conjugate (MCV4P) 05/12/2009    Td (ADULT) 10/18/2001, 10/21/2002, 08/14/2004    Tdap 03/25/2009       Family History    None       Social History     Socioeconomic History    Marital  status: Single   Tobacco Use    Smoking status: Former     Types: Cigarettes    Smokeless tobacco: Never   Substance and Sexual Activity    Alcohol use: No    Drug use: Yes     Types: Marijuana, IV     Comment: 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     Social Determinants of Health     Financial Resource Strain: High Risk (2/6/2024)    Overall Financial Resource Strain (CARDIA)     Difficulty of Paying Living Expenses: Very hard   Food Insecurity: Food Insecurity Present (2/6/2024)    Hunger Vital Sign     Worried About Running Out of Food in the Last Year: Sometimes true     Ran Out of Food in the Last Year: Sometimes true   Transportation Needs: Unmet Transportation Needs (2/6/2024)    PRAPARE - Transportation     Lack of Transportation (Medical): Yes     Lack of Transportation (Non-Medical): No   Physical Activity: Inactive (11/21/2023)    Exercise Vital Sign     Days of Exercise per Week: 0 days     Minutes of Exercise per Session: 0 min   Stress: Stress Concern Present (2/6/2024)    [employer] [place] [place] [employer] - Occupational Stress Questionnaire     Feeling of Stress : Very much   Social Connections: Socially Isolated (11/21/2023)    Social Connection and Isolation Panel [NHANES]     Frequency of Communication with Friends and Family: Never     Frequency of Social Gatherings with Friends and Family: Never     Attends Gnosticism Services: Never     Active Member of Clubs or Organizations: No     Attends Club or Organization Meetings: Never     Marital Status: Never    Housing Stability: High Risk (2/6/2024)    Housing Stability Vital Sign     Unable to Pay for Housing in the Last Year: Yes     Number of Places Lived in the Last Year: 0     Unstable Housing in the Last Year: Yes     Review of Systems   Constitutional:  Positive for diaphoresis and fatigue. Negative for chills and fever.   HENT:  Negative for congestion, mouth sores and sore throat.    Eyes:  Negative for pain.   Respiratory:   Positive for chest tightness. Negative for cough and shortness of breath.    Cardiovascular:  Negative for chest pain and leg swelling.   Gastrointestinal:  Positive for nausea. Negative for abdominal pain and vomiting.   Genitourinary:  Negative for difficulty urinating, dysuria and flank pain.   Musculoskeletal:  Positive for back pain. Negative for arthralgias, joint swelling, myalgias and neck pain.   Skin:  Negative for color change, rash and wound.   Neurological:  Positive for numbness. Negative for seizures and syncope.   Psychiatric/Behavioral:  Negative for confusion.      Objective:     Vital Signs (Most Recent):  Temp: 98.5 °F (36.9 °C) (02/07/24 1625)  Pulse: 60 (02/07/24 1625)  Resp: 16 (02/07/24 1714)  BP: 133/63 (02/07/24 1625)  SpO2: 98 % (02/07/24 1625) Vital Signs (24h Range):  Temp:  [97.1 °F (36.2 °C)-98.6 °F (37 °C)] 98.5 °F (36.9 °C)  Pulse:  [49-75] 60  Resp:  [15-20] 16  SpO2:  [98 %-100 %] 98 %  BP: (109-160)/(56-96) 133/63     Weight: 76.7 kg (169 lb 1.5 oz)  Body mass index is 23.59 kg/m².    Estimated Creatinine Clearance: 143.8 mL/min (based on SCr of 0.8 mg/dL).     Physical Exam  Vitals and nursing note reviewed.   Constitutional:       General: He is not in acute distress.     Appearance: He is not toxic-appearing or diaphoretic.   HENT:      Nose: No congestion.      Mouth/Throat:      Pharynx: Oropharynx is clear.   Eyes:      General: No scleral icterus.     Conjunctiva/sclera: Conjunctivae normal.   Cardiovascular:      Rate and Rhythm: Normal rate.      Heart sounds: Normal heart sounds. No murmur heard.  Pulmonary:      Effort: Pulmonary effort is normal. No respiratory distress.      Breath sounds: Normal breath sounds.   Abdominal:      General: Abdomen is flat. Bowel sounds are normal. There is no distension.      Palpations: Abdomen is soft.      Tenderness: There is no abdominal tenderness.   Musculoskeletal:         General: No swelling.      Cervical back: Normal range  of motion. No rigidity or tenderness.      Right lower leg: No edema.      Left lower leg: No edema.   Skin:     General: Skin is warm and dry.      Coloration: Skin is not jaundiced.      Findings: No erythema, lesion or rash.   Neurological:      Mental Status: He is alert and oriented to person, place, and time. Mental status is at baseline.   Psychiatric:         Behavior: Behavior normal.         Thought Content: Thought content normal.          Significant Labs: Blood Culture:   Recent Labs   Lab 10/10/23  1827 10/14/23  1512 11/20/23  1155 02/04/24  1822 02/04/24  1853   LABBLOO Gram stain manas bottle: Gram positive cocci in clusters resembling Staph  Results called to and read back by: Dr. Childers  10/11/2023  11:26  Gram stain aer bottle: Gram positive cocci in clusters resembling Staph  Positive results previously called 10/11/2023  14:31  METHICILLIN RESISTANT STAPHYLOCOCCUS AUREUS  For susceptibility see order #[id number]  *  Gram stain aer bottle: Gram positive cocci in clusters resembling Staph  Results called to and read back by: Dr. Childers  10/11/2023  11:26  Gram stain manas bottle: Gram positive cocci in clusters resembling Staph  Positive results previously called 10/11/2023  12:57  METHICILLIN RESISTANT STAPHYLOCOCCUS AUREUS  ID consult required at [facility].  * Gram stain aer bottle: Gram positive cocci in clusters resembling Staph  Results called to and read back by: ADIS CAMEJO  10/15/2023  10:25  Gram stain manas bottle: Gram positive cocci in clusters resembling Staph  Positive results previously called 10/15/2023  METHICILLIN RESISTANT STAPHYLOCOCCUS AUREUS  ID consult required at [facility].  For susceptibility see order #[id number]  *  Gram stain aer bottle: Gram positive cocci in clusters resembling Staph  Results called to and read back by: ADIS CAMEJO  10/15/2023  10:25  Gram stain manas bottle: Gram  positive cocci in clusters resembling Staph  Positive results previously called 10/15/2023  METHICILLIN RESISTANT STAPHYLOCOCCUS AUREUS  ID consult required at [facility] and [facility] locations.  * No growth after 5 days.  No growth after 5 days. No Growth to date  No Growth to date  No Growth to date No Growth to date  No Growth to date  No Growth to date     CBC:   Recent Labs   Lab 02/06/24  0344 02/07/24  0502   WBC 6.47 5.91   HGB 8.5* 9.9*   HCT 27.5* 31.4*   * 428     CMP:   Recent Labs   Lab 02/06/24  0344 02/07/24  0502   * 133*   K 3.8 4.5    108   CO2 22* 16*   GLU 87 87   BUN 10 13   CREATININE 0.7 0.8   CALCIUM 8.6* 8.9   ANIONGAP 5* 9     Microbiology Results (last 7 days)       Procedure Component Value Units Date/Time    AFB Culture & Smear [[id number]] Collected: 02/07/24 1423    Order Status: Sent Specimen: Abscess from Back Updated: 02/07/24 1734    Culture, Anaerobe [[id number]] Collected: 02/07/24 1423    Order Status: Sent Specimen: Abscess from Back Updated: 02/07/24 1734    Gram stain [[id number]] Collected: 02/07/24 1423    Order Status: Sent Specimen: Abscess from Back Updated: 02/07/24 1734    Aerobic culture [[id number]] Collected: 02/07/24 1423    Order Status: Sent Specimen: Abscess from Back Updated: 02/07/24 1733    Fungus culture [[id number]] Collected: 02/07/24 1423    Order Status: Sent Specimen: Abscess from Back Updated: 02/07/24 1733    Gram stain [[id number]] Collected: 02/07/24 1413    Order Status: Sent Specimen: Joint Fluid from Back Updated: 02/07/24 1413    AFB Culture & Smear [[id number]] Collected: 02/07/24 1413    Order Status: Sent Specimen: Joint Fluid from Back Updated: 02/07/24 1413    Fungus culture [[id number]] Collected: 02/07/24 1413    Order Status: Sent Specimen: Joint Fluid from Back Updated: 02/07/24 1413    Aerobic culture [[id number]] Collected: 02/07/24 1412    Order Status: Sent Specimen: Bone from Back Updated: 02/07/24  1412    Culture, Anaerobe [[id number]] Collected: 02/07/24 1411    Order Status: Sent Specimen: Joint Fluid from Back Updated: 02/07/24 1412    Culture, Anaerobe [[id number]]     Order Status: No result Specimen: Abscess from Sacrum     Aerobic culture [[id number]]     Order Status: No result Specimen: Abscess from Sacrum     AFB Culture & Smear [[id number]]     Order Status: No result Specimen: Abscess from Sacrum     Gram stain [[id number]]     Order Status: No result Specimen: Abscess from Sacrum     Fungus culture [[id number]]     Order Status: No result Specimen: Abscess from Sacrum     Culture, Anaerobe [[id number]]     Order Status: No result Specimen: Abscess     Aerobic culture [[id number]]     Order Status: No result Specimen: Abscess     Fungus culture [[id number]]     Order Status: No result Specimen: Abscess     AFB Culture & Smear [[id number]]     Order Status: No result Specimen: Abscess     Gram stain [[id number]]     Order Status: No result Specimen: Abscess     Blood culture #1 **CANNOT BE ORDERED STAT** [[id number]] Collected: 02/04/24 1853    Order Status: Completed Specimen: Blood from Peripheral, Forearm, Left Updated: 02/06/24 2012     Blood Culture, Routine No Growth to date      No Growth to date      No Growth to date    Blood culture #2 **CANNOT BE ORDERED STAT** [[id number]] Collected: 02/04/24 1822    Order Status: Completed Specimen: Blood from Peripheral, Forearm, Left Updated: 02/06/24 2012     Blood Culture, Routine No Growth to date      No Growth to date      No Growth to date          Pathology Results  (Last 10 years)      None          All pertinent labs within the past 24 hours have been reviewed.    Significant Imaging: I have reviewed all pertinent imaging results/findings within the past 24 hours.    I have personally reviewed records / hospital notes from   service and other specialty providers. I have also reviewed CBC, CMP/BMP,  cultures and imaging with my  interpretation as documented in my assessment / plan.    Patient is high risk for infectious complications given pt's age, multiple co-morbidities, and case complexity.      Time: 75 minutes   50% of time spent on face-to-face counseling and coordination of care. Counseling included review of test results, diagnosis, and treatment plan with patient and/or family.

## 2024-02-08 NOTE — CONSULTS
Aleksandr Bray - Neurosurgery ([facility])  Infectious Disease  Consult Note    Patient Name: Ton Donovan  MRN: [id number]  Admission Date: 2/4/2024  Hospital Length of Stay: 2 days  Attending Physician: Brenna Belcher MD  Primary Care Provider: No, Primary Doctor     Isolation Status: No active isolations    Patient information was obtained from patient and ER records.      Inpatient consult to Infectious Diseases  Consult performed by: Elpidio Paredes PA-C  Consult ordered by: Brenna Belcher MD        Assessment/Plan:     ID  * Osteomyelitis of vertebra of lumbosacral region  I have reviewed hospital notes from   service and other specialty providers. I have also reviewed CBC, CMP/BMP,  cultures and imaging with my interpretation as documented.      28M with h/o IVDU (injects heroin, last used 02/03), with associated complex infectious h/o of disseminated MRSA bacteremia, and multiple septic joints / abscesses, c/b poor adherence, and deferring care / leaving AMA.  - Pt returns now (02/04) for progression of chronic back pain with radiculopathy (numbness / pain) to legs B/L.     MRI L-spine / pelvis (02/04): showed osteomyelitis/discitis of L5-S1, osteomyelitis of sacrum & L iliac bone, probable septic arthritis of L SI joint & lower lumbar spine facet joints; worsening anterolisthesis of L5 with respect to S1 with increased size of epidural abscess at the level of L5-S1 and probable severe central canal stenosis at this level, along w/ additional abscesses in the L iliopsoas muscle, paraspinal muscles, pelvis, & L sacrum; -- the additional abscesses in the left iliopsoas muscle [3.4 x 1.7cm; with other micro-abscesses adjacent measuring 2.3cm), paraspinal muscles, pelvis, and left sacrum. Additional abscess in the superior rectal/presacral region measuring roughly 3.1 x 2.4 cm; and probable additional micro abscesses at left sacrum.  CT lumbar obtained with destructive changes at L5-S1.     Pt off abx pending  tentative spinal bx for path / cx; pt remains AF, VSS, HDS. CRP 33. Bld cxs NGTD.    NSGY plans for OR 2/14 for L4-pelvis fusion with debridement.  IR consulted for source control of SI / L psoas abscesses, tentative aspiration today (02/07) of L SI joint and L psoas. Orders placed for micro send of samples  (gram stain, AFB, fungal, aerobic, and anaerobic).      Recommendations / Plan:  So long as pt remains AF, stable, non-septic; may continue to monitor off abx while pending results of IR cxs.   Will f/u IR cxs and adjust abx accordingly.  Anticipate abx duration of at-least 6wks s/p NSGY debridement (02/14).    -- Discussed with ID staff and primary team   -- ID will continue to follow w/ further recs.          Thank you for your consult. I will follow-up with patient. Please contact us if you have any additional questions.    Elpidio Paredes PA-C  Infectious Disease  [facility] - Neurosurgery ([facility])    Subjective:     Principal Problem: Osteomyelitis of vertebra of lumbosacral region    HPI: 28M with h/o IVDU (injects heroin, last used 02/03), with associated complex infectious h/o of disseminated MRSA bacteremia, and multiple septic joints / abscesses, c/b poor adherence, and deferring care / leaving AMA.  - Pt returns now (02/04) for progression of chronic back pain with radiculopathy (numbness / pain) to legs B/L.     MRI L-spine / pelvis (02/04): showed osteomyelitis/discitis of L5-S1, osteomyelitis of sacrum & L iliac bone, probable septic arthritis of L SI joint & lower lumbar spine facet joints; worsening anterolisthesis of L5 with respect to S1 with increased size of epidural abscess at the level of L5-S1 and probable severe central canal stenosis at this level, along w/ additional abscesses in the L iliopsoas muscle, paraspinal muscles, pelvis, & L sacrum; -- the additional abscesses in the left iliopsoas muscle [3.4 x 1.7cm; with other micro-abscesses adjacent measuring 2.3cm), paraspinal muscles,  pelvis, and left sacrum. Additional abscess in the superior rectal/presacral region measuring roughly 3.1 x 2.4 cm; and probable additional micro abscesses at left sacrum.  CT lumbar obtained with destructive changes at L5-S1.       -- Received single dose of Zosyn in ED on arrival, but given that pt HDS w/o sepsis, further abx held to optimize intra-op cx/pathology. Admitted to hospital medicine for further mgmt of osteomyelitis, septic joints & abscesses. Addiction medicine, along w/ NSGY, orthopedic surgery, IR & ID consulted.     Reports back pain is doing okay while supine, but exacerbated upon standing w/ assoc b/l sciatica & weakness w/ standing. Endorses diminished sensation of BLE (R>L) up to shins.   He denies bowel/bladder sx or saddle anesthesia. He denies focal weakness.    Pt off abx pending tentative spinal bx for path / cx; pt remains AF, VSS, HDS. CRP 33. Bld cxs NGTD.    Abx held pending intervention, if patient begins to show signs of sepsis, please start abx. ID consult.   Voiding spontaneously. Bladder scan prn.     Plan for OR 2/14 for L4-pelvis fusion with debridement. IR consulted for source control of SI / L psoas abscesses, tentative aspiration today (02/07) of L SI joint and L psoas. Orders placed for micro send of samples  (gram stain, AFB, fungal, aerobic, and anaerobic).              Past Medical History:   Diagnosis Date    Hypertension     Unilateral inguinal hernia, without obstruction or gangrene, not specified as recurrent        Past Surgical History:   Procedure Laterality Date    HAND ARTHROTOMY Right 7/7/2023    Procedure: ARTHROTOMY, HAND - R LF PIP;  Surgeon: Boy Lamb MD;  Location: [facility] OR [address] [place] [address];  Service: Orthopedics;  Laterality: Right;    INCISION AND DRAINAGE OF ABSCESS Right 10/12/2023    Procedure: INCISION AND DRAINAGE, ABSCESS;  Surgeon: Steve Monteiro MD;  Location: [facility] OR [address] [place] [address];  Service: General;  Laterality: Right;  right chest    IRRIGATION  AND DEBRIDEMENT OF UPPER EXTREMITY Bilateral 7/7/2023    Procedure: IRRIGATION AND DEBRIDEMENT, UPPER EXTREMITY - RIGHT HAND;  Surgeon: Boy Lamb MD;  Location: [facility] OR [address] [place] [address];  Service: Orthopedics;  Laterality: Bilateral;    OLECRANON BURSECTOMY Left 7/7/2023    Procedure: BURSECTOMY, OLECRANON;  Surgeon: Boy Lamb MD;  Location: [facility] OR [address] [place] [address];  Service: Orthopedics;  Laterality: Left;       Review of patient's allergies indicates:  No Known Allergies    Medications:  Medications Prior to Admission   Medication Sig    mv-min/vit C/glut/lysine/hb124 (IMMUNE SUPPORT ORAL) Take 1 tablet by mouth once daily.    naloxone (NARCAN) 4 mg/actuation Spry 4 mg by Nasal route.     Antibiotics (From admission, onward)      None          Antifungals (From admission, onward)      None          Antivirals (From admission, onward)      None             Immunization History   Administered Date(s) Administered    COVID-19, MRNA, LN-S, PF (Pfizer) ([place]) 03/09/2022    Hepatitis B 10/18/2001, 10/21/2002, 08/14/2004    IPV 10/18/2001, 10/21/2002, 08/14/2004, 03/30/2009    MMR 10/18/2001, 08/14/2004, 03/25/2009    Meningococcal Conjugate (MCV4P) 05/12/2009    Td (ADULT) 10/18/2001, 10/21/2002, 08/14/2004    Tdap 03/25/2009       Family History    None       Social History     Socioeconomic History    Marital status: Single   Tobacco Use    Smoking status: Former     Types: Cigarettes    Smokeless tobacco: Never   Substance and Sexual Activity    Alcohol use: No    Drug use: Yes     Types: Marijuana, IV     Comment: 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     Social Determinants of Health     Financial Resource Strain: High Risk (2/6/2024)    Overall Financial Resource Strain (CARDIA)     Difficulty of Paying Living Expenses: Very hard   Food Insecurity: Food Insecurity Present (2/6/2024)    Hunger Vital Sign     Worried About Running Out of Food in the Last Year: Sometimes true     Ran Out of Food in the Last Year:  Sometimes true   Transportation Needs: Unmet Transportation Needs (2/6/2024)    PRAPARE - Transportation     Lack of Transportation (Medical): Yes     Lack of Transportation (Non-Medical): No   Physical Activity: Inactive (11/21/2023)    Exercise Vital Sign     Days of Exercise per Week: 0 days     Minutes of Exercise per Session: 0 min   Stress: Stress Concern Present (2/6/2024)    [place] [employer] - Occupational Stress Questionnaire     Feeling of Stress : Very much   Social Connections: Socially Isolated (11/21/2023)    Social Connection and Isolation Panel [NHANES]     Frequency of Communication with Friends and Family: Never     Frequency of Social Gatherings with Friends and Family: Never     Attends [employer] Services: Never     Active Member of Clubs or Organizations: No     Attends Club or Organization Meetings: Never     Marital Status: Never    Housing Stability: High Risk (2/6/2024)    Housing Stability Vital Sign     Unable to Pay for Housing in the Last Year: Yes     Number of Places Lived in the Last Year: 0     Unstable Housing in the Last Year: Yes     Review of Systems   Constitutional:  Positive for diaphoresis and fatigue. Negative for chills and fever.   HENT:  Negative for congestion, mouth sores and sore throat.    Eyes:  Negative for pain.   Respiratory:  Positive for chest tightness. Negative for cough and shortness of breath.    Cardiovascular:  Negative for chest pain and leg swelling.   Gastrointestinal:  Positive for nausea. Negative for abdominal pain and vomiting.   Genitourinary:  Negative for difficulty urinating, dysuria and flank pain.   Musculoskeletal:  Positive for back pain. Negative for arthralgias, joint swelling, myalgias and neck pain.   Skin:  Negative for color change, rash and wound.   Neurological:  Positive for numbness. Negative for seizures and syncope.   Psychiatric/Behavioral:  Negative for confusion.      Objective:     Vital Signs  (Most Recent):  Temp: 98.5 °F (36.9 °C) (02/07/24 1625)  Pulse: 60 (02/07/24 1625)  Resp: 16 (02/07/24 1714)  BP: 133/63 (02/07/24 1625)  SpO2: 98 % (02/07/24 1625) Vital Signs (24h Range):  Temp:  [97.1 °F (36.2 °C)-98.6 °F (37 °C)] 98.5 °F (36.9 °C)  Pulse:  [49-75] 60  Resp:  [15-20] 16  SpO2:  [98 %-100 %] 98 %  BP: (109-160)/(56-96) 133/63     Weight: 76.7 kg (169 lb 1.5 oz)  Body mass index is 23.59 kg/m².    Estimated Creatinine Clearance: 143.8 mL/min (based on SCr of 0.8 mg/dL).     Physical Exam  Vitals and nursing note reviewed.   Constitutional:       General: He is not in acute distress.     Appearance: He is not toxic-appearing or diaphoretic.   HENT:      Nose: No congestion.      Mouth/Throat:      Pharynx: Oropharynx is clear.   Eyes:      General: No scleral icterus.     Conjunctiva/sclera: Conjunctivae normal.   Cardiovascular:      Rate and Rhythm: Normal rate.      Heart sounds: Normal heart sounds. No murmur heard.  Pulmonary:      Effort: Pulmonary effort is normal. No respiratory distress.      Breath sounds: Normal breath sounds.   Abdominal:      General: Abdomen is flat. Bowel sounds are normal. There is no distension.      Palpations: Abdomen is soft.      Tenderness: There is no abdominal tenderness.   Musculoskeletal:         General: No swelling.      Cervical back: Normal range of motion. No rigidity or tenderness.      Right lower leg: No edema.      Left lower leg: No edema.   Skin:     General: Skin is warm and dry.      Coloration: Skin is not jaundiced.      Findings: No erythema, lesion or rash.   Neurological:      Mental Status: He is alert and oriented to person, place, and time. Mental status is at baseline.   Psychiatric:         Behavior: Behavior normal.         Thought Content: Thought content normal.          Significant Labs: Blood Culture:   Recent Labs   Lab 10/10/23  1827 10/14/23  1512 11/20/23  1155 02/04/24  1822 02/04/24  1853   LABBLOO Gram stain manas bottle:  Gram positive cocci in clusters resembling Staph  Results called to and read back by: Dr. Childers  10/11/2023  11:26  Gram stain aer bottle: Gram positive cocci in clusters resembling Staph  Positive results previously called 10/11/2023  14:31  METHICILLIN RESISTANT STAPHYLOCOCCUS AUREUS  For susceptibility see order #[id number]  *  Gram stain aer bottle: Gram positive cocci in clusters resembling Staph  Results called to and read back by: Dr. Childers  10/11/2023  11:26  Gram stain manas bottle: Gram positive cocci in clusters resembling Staph  Positive results previously called 10/11/2023  12:57  METHICILLIN RESISTANT STAPHYLOCOCCUS AUREUS  ID consult required at [facility].  * Gram stain aer bottle: Gram positive cocci in clusters resembling Staph  Results called to and read back by: ADIS CAMEJO  10/15/2023  10:25  Gram stain manas bottle: Gram positive cocci in clusters resembling Staph  Positive results previously called 10/15/2023  METHICILLIN RESISTANT STAPHYLOCOCCUS AUREUS  ID consult required at [facility].  For susceptibility see order #[id number]  *  Gram stain aer bottle: Gram positive cocci in clusters resembling Staph  Results called to and read back by: ADIS CAMEJO  10/15/2023  10:25  Gram stain manas bottle: Gram positive cocci in clusters resembling Staph  Positive results previously called 10/15/2023  METHICILLIN RESISTANT STAPHYLOCOCCUS AUREUS  ID consult required at [facility].  * No growth after 5 days.  No growth after 5 days. No Growth to date  No Growth to date  No Growth to date No Growth to date  No Growth to date  No Growth to date     CBC:   Recent Labs   Lab 02/06/24  0344 02/07/24  0502   WBC 6.47 5.91   HGB 8.5* 9.9*   HCT 27.5* 31.4*   * 428     CMP:   Recent Labs   Lab 02/06/24  0344 02/07/24  0502   * 133*   K 3.8 4.5    108   CO2 22* 16*   GLU 87 87    BUN 10 13   CREATININE 0.7 0.8   CALCIUM 8.6* 8.9   ANIONGAP 5* 9     Microbiology Results (last 7 days)       Procedure Component Value Units Date/Time    AFB Culture & Smear [[id number]] Collected: 02/07/24 1423    Order Status: Sent Specimen: Abscess from Back Updated: 02/07/24 1734    Culture, Anaerobe [[id number]] Collected: 02/07/24 1423    Order Status: Sent Specimen: Abscess from Back Updated: 02/07/24 1734    Gram stain [[id number]] Collected: 02/07/24 1423    Order Status: Sent Specimen: Abscess from Back Updated: 02/07/24 1734    Aerobic culture [[id number]] Collected: 02/07/24 1423    Order Status: Sent Specimen: Abscess from Back Updated: 02/07/24 1733    Fungus culture [[id number]] Collected: 02/07/24 1423    Order Status: Sent Specimen: Abscess from Back Updated: 02/07/24 1733    Gram stain [[id number]] Collected: 02/07/24 1413    Order Status: Sent Specimen: Joint Fluid from Back Updated: 02/07/24 1413    AFB Culture & Smear [[id number]] Collected: 02/07/24 1413    Order Status: Sent Specimen: Joint Fluid from Back Updated: 02/07/24 1413    Fungus culture [[id number]] Collected: 02/07/24 1413    Order Status: Sent Specimen: Joint Fluid from Back Updated: 02/07/24 1413    Aerobic culture [[id number]] Collected: 02/07/24 1412    Order Status: Sent Specimen: Bone from Back Updated: 02/07/24 1412    Culture, Anaerobe [[id number]] Collected: 02/07/24 1411    Order Status: Sent Specimen: Joint Fluid from Back Updated: 02/07/24 1412    Culture, Anaerobe [[id number]]     Order Status: No result Specimen: Abscess from Sacrum     Aerobic culture [[id number]]     Order Status: No result Specimen: Abscess from Sacrum     AFB Culture & Smear [[id number]]     Order Status: No result Specimen: Abscess from Sacrum     Gram stain [[id number]]     Order Status: No result Specimen: Abscess from Sacrum     Fungus culture [[id number]]     Order Status: No result Specimen: Abscess from Sacrum     Culture, Anaerobe  [[id number]]     Order Status: No result Specimen: Abscess     Aerobic culture [[id number]]     Order Status: No result Specimen: Abscess     Fungus culture [[id number]]     Order Status: No result Specimen: Abscess     AFB Culture & Smear [[id number]]     Order Status: No result Specimen: Abscess     Gram stain [[id number]]     Order Status: No result Specimen: Abscess     Blood culture #1 **CANNOT BE ORDERED STAT** [[id number]] Collected: 02/04/24 1853    Order Status: Completed Specimen: Blood from Peripheral, Forearm, Left Updated: 02/06/24 2012     Blood Culture, Routine No Growth to date      No Growth to date      No Growth to date    Blood culture #2 **CANNOT BE ORDERED STAT** [[id number]] Collected: 02/04/24 1822    Order Status: Completed Specimen: Blood from Peripheral, Forearm, Left Updated: 02/06/24 2012     Blood Culture, Routine No Growth to date      No Growth to date      No Growth to date          Pathology Results  (Last 10 years)      None          All pertinent labs within the past 24 hours have been reviewed.    Significant Imaging: I have reviewed all pertinent imaging results/findings within the past 24 hours.    I have personally reviewed records / hospital notes from   service and other specialty providers. I have also reviewed CBC, CMP/BMP,  cultures and imaging with my interpretation as documented in my assessment / plan.    Patient is high risk for infectious complications given pt's age, multiple co-morbidities, and case complexity.      Time: 75 minutes   50% of time spent on face-to-face counseling and coordination of care. Counseling included review of test results, diagnosis, and treatment plan with patient and/or family.

## 2024-02-08 NOTE — PLAN OF CARE
Problem: Adult Inpatient Plan of Care  Goal: Plan of Care Review  Outcome: Ongoing, Progressing  Goal: Patient-Specific Goal (Individualized)  Outcome: Ongoing, Progressing  Goal: Absence of Hospital-Acquired Illness or Injury  Outcome: Ongoing, Progressing  Goal: Optimal Comfort and Wellbeing  Outcome: Ongoing, Progressing  Goal: Readiness for Transition of Care  Outcome: Ongoing, Progressing     Problem: Impaired Wound Healing  Goal: Optimal Wound Healing  Outcome: Ongoing, Progressing     Problem: Skin Injury Risk Increased  Goal: Skin Health and Integrity  Outcome: Ongoing, Progressing     Problem: Infection  Goal: Absence of Infection Signs and Symptoms  Outcome: Ongoing, Progressing     Problem: Fall Injury Risk  Goal: Absence of Fall and Fall-Related Injury  Outcome: Ongoing, Progressing     Problem: Pain Acute  Goal: Acceptable Pain Control and Functional Ability  Outcome: Ongoing, Progressing     Problem: Skin or Soft Tissue Infection  Goal: Absence of Infection Signs and Symptoms  Outcome: Ongoing, Progressing

## 2024-02-08 NOTE — ASSESSMENT & PLAN NOTE
Osteomyelitis, discitis, & myositis of lumbosacral region  Septic arthritis of SI joint(s)  Epidural abscesses   Multiple abscesses of pelvis, sacrum, psoas, iliacus, & paraspinals  Lower back pain w/ b/l sciatica   Severe spinal canal stenosis   Hx of known spinal osteomyelitis, various abscesses, & MRSA bacteremia i/s/o IVDU. Presented w/ progressive lower back & abdominal pain w/ assoc LE weakness & sensory changes. HDS & 0/4 SIRS on admission. MRI showed osteomyelitis/discitis of L5-S1, osteomyelitis of sacrum & L iliac bone, probable septic arthritis of L SI joint & lower lumbar spine facet joints, worsening anterolisthesis of L5 with respect to S1 with increased size of epidural abscess at the level of L5-S1 and probable severe central canal stenosis at this level, along w/ additional abscesses in the L iliopsoas muscle, paraspinal muscles, pelvis, & L sacrum. CT L-spine w/ similar findings. No urinary or bowel sx retention/incontinence. Received single dose of Zosyn in ED on arrival, but given that pt HDS w/o sepsis, further abx held to optimize intra-op cx/pathology.   - Neurosurgery consulted; appreciate recs/assistance   - No plans for intervention/OR until 2/16  - IR consulted for potential aspiration of abscess(es); appreciate assistance   - Plan for aspiration of psoas abscess & SI joint 2/7   - ID consulted; appreciate recs  - Ortho consulted for mgmt of septic SI joint; appreciate recs/assistance  - Holding antibiotics given absence of sepsis  - Multimodal analgesia   - PT/OT

## 2024-02-08 NOTE — ASSESSMENT & PLAN NOTE
I have reviewed hospital notes from   service and other specialty providers. I have also reviewed CBC, CMP/BMP,  cultures and imaging with my interpretation as documented.      28M with h/o IVDU (injects heroin, last used 02/03), with associated complex infectious h/o of disseminated MRSA bacteremia, and multiple septic joints / abscesses, c/b poor adherence, and deferring care / leaving AMA.  - Pt returns now (02/04) for progression of chronic back pain with radiculopathy (numbness / pain) to legs B/L.     MRI L-spine / pelvis (02/04): showed osteomyelitis/discitis of L5-S1, osteomyelitis of sacrum & L iliac bone, probable septic arthritis of L SI joint & lower lumbar spine facet joints; worsening anterolisthesis of L5 with respect to S1 with increased size of epidural abscess at the level of L5-S1 and probable severe central canal stenosis at this level, along w/ additional abscesses in the L iliopsoas muscle, paraspinal muscles, pelvis, & L sacrum; -- the additional abscesses in the left iliopsoas muscle [3.4 x 1.7cm; with other micro-abscesses adjacent measuring 2.3cm), paraspinal muscles, pelvis, and left sacrum. Additional abscess in the superior rectal/presacral region measuring roughly 3.1 x 2.4 cm; and probable additional micro abscesses at left sacrum.  CT lumbar obtained with destructive changes at L5-S1.     Pt off abx pending tentative spinal bx for path / cx; pt remains AF, VSS, HDS. CRP 33. Bld cxs NGTD.    NSGY plans for OR 2/14 for L4-pelvis fusion with debridement.  IR consulted for source control of SI / L psoas abscesses, tentative aspiration today (02/07) of L SI joint and L psoas. Orders placed for micro send of samples  (gram stain, AFB, fungal, aerobic, and anaerobic).      Recommendations / Plan:  So long as pt remains AF, stable, non-septic; may continue to monitor off abx while pending results of IR cxs.   Will f/u IR cxs and adjust abx accordingly.  Anticipate abx duration of at-least  6wks s/p NSGY debridement (02/14).    -- Discussed with ID staff and primary team   -- ID will continue to follow w/ further recs.

## 2024-02-08 NOTE — PROGRESS NOTES
"[facility] - Neurosurgery ([facility])  [facility] Medicine  Progress Note    Patient Name: Ton Donovan  MRN: [id number]  Patient Class: IP- Inpatient   Admission Date: 2/4/2024  Length of Stay: 2 days  Attending Physician: Brenna Belcher MD  Primary Care Provider: Emily, Primary Doctor      Subjective:     Principal Problem:Osteomyelitis of vertebra of lumbosacral region      HPI:  Ton Donovan is a 28-year-old male with PMHx of IVDU w/ assoc complications of MRSA bacteremia, multiple septic joints & abscesses, who presented 2/4/24 with progressive back pain with radiation down his legs b/l. Endorses bilateral numbness & neuropathic pain over the dorsum of his feet. This has been ongoing and is not necessarily a new problem, but has been worsening. Reports "pins & needles"/paresthesias down his legs that have been progressing, although laying on his stomach seems to provide significant relief. Admits to continued active IV heroin use; last used just prior to EMS arrival. Denies any fevers, chills, night sweats, cough, or shortness of breath. Denies any bowel or bladder incontinence/retention or saddle anesthesia.     On arrival, HDS & 0/4 SIRS. MRI showed osteomyelitis/discitis of L5-S1, osteomyelitis of sacrum & L iliac bone, probable septic arthritis of L SI joint & lower lumbar spine facet joints, worsening anterolisthesis of L5 with respect to S1 with increased size of epidural abscess at the level of L5-S1 and probable severe central canal stenosis at this level, along w/ additional abscesses in the L iliopsoas muscle, paraspinal muscles, pelvis, & L sacrum. Received single dose of Zosyn in ED on arrival, but given that pt HDS w/o sepsis, further abx held to optimize intra-op cx/pathology. Admitted to hospital medicine for further mgmt of osteomyelitis, septic joints & abscesses.    Overview/Hospital Course:  Addiction medicine, along w/ NSGY, orthopedic surgery, IR & ID consulted. Underwent aspiration of psoas " abscess per IR on 2/7. Aspiration of SI joint attempted but unable to aspirate hardly any fluid.       Interval History: Briefly seen this AM, although pt on phone at the time. Attempted to return later, but pt down for IR procedure. Plan for surgical intervention per NSG on 2/16, however in attempt to progress pt care in the interim, pursued aspiration of one of pt's abscesses and SI joint in order to obtain cx data prior to 2/16 procedure. Undergoing IR aspiration or psoas abscess & hopefully SI joint w/ IR this afternoon.    Review of Systems   Constitutional:  Positive for activity change, diaphoresis and fatigue. Negative for chills and fever.   HENT:  Negative for congestion, rhinorrhea and sore throat.    Respiratory:  Negative for cough and shortness of breath.    Cardiovascular:  Negative for chest pain and palpitations.   Gastrointestinal:  Positive for abdominal pain. Negative for blood in stool, constipation, diarrhea, nausea and vomiting.   Genitourinary:  Negative for difficulty urinating and dysuria.   Musculoskeletal:  Positive for arthralgias, back pain and gait problem. Negative for neck pain.   Skin:  Negative for rash and wound.   Neurological:  Positive for weakness and numbness. Negative for dizziness, light-headedness and headaches.   Psychiatric/Behavioral:  Negative for agitation and confusion. The patient is nervous/anxious.      Objective:     Vital Signs (Most Recent):  Temp: 98 °F (36.7 °C) (02/07/24 1928)  Pulse: 62 (02/07/24 1928)  Resp: 18 (02/07/24 2112)  BP: (!) 126/57 (02/07/24 1928)  SpO2: 99 % (02/07/24 1928) Vital Signs (24h Range):  Temp:  [97.1 °F (36.2 °C)-98.6 °F (37 °C)] 98 °F (36.7 °C)  Pulse:  [49-75] 62  Resp:  [15-20] 18  SpO2:  [98 %-100 %] 99 %  BP: (109-160)/(56-96) 126/57     Weight: 76.7 kg (169 lb 1.5 oz)  Body mass index is 23.59 kg/m².    Intake/Output Summary (Last 24 hours) at 2/7/2024 3571  Last data filed at 2/7/2024 1217  Gross per 24 hour   Intake --    Output 2200 ml   Net -2200 ml        Physical Exam  Constitutional:       Appearance: He is normal weight. He is not toxic-appearing.   HENT:      Head: Normocephalic and atraumatic.   Eyes:      Conjunctiva/sclera: Conjunctivae normal.   Cardiovascular:      Rate and Rhythm: Normal rate and regular rhythm.      Heart sounds: Normal heart sounds.   Pulmonary:      Effort: Pulmonary effort is normal. No respiratory distress.      Breath sounds: Normal breath sounds. No wheezing.   Abdominal:      General: Bowel sounds are normal. There is no distension.      Palpations: Abdomen is soft.      Tenderness: There is abdominal tenderness. There is no guarding.   Skin:     General: Skin is warm and dry.      Findings: No rash.   Neurological:      Mental Status: He is alert and oriented to person, place, and time.      Sensory: Sensory deficit (diminished sensation of BLE (R>L)) present.      Motor: No weakness.   Psychiatric:         Mood and Affect: Mood normal.         Behavior: Behavior normal.         Significant Labs:  CBC:  Recent Labs   Lab 02/06/24  0344 02/07/24  0502   WBC 6.47 5.91   HGB 8.5* 9.9*   HCT 27.5* 31.4*   * 428       CMP:  Recent Labs   Lab 02/06/24  0344 02/07/24  0502   * 133*   K 3.8 4.5    108   CO2 22* 16*   GLU 87 87   BUN 10 13   CREATININE 0.7 0.8   CALCIUM 8.6* 8.9   ANIONGAP 5* 9       PTINR:  Recent Labs   Lab 02/07/24  0947   INR 1.2         Assessment/Plan:     Osteomyelitis, discitis, & myositis of lumbosacral region  Septic arthritis of SI joint(s)  Epidural abscesses   Multiple abscesses of pelvis, sacrum, psoas, iliacus, & paraspinals  Lower back pain w/ b/l sciatica   Severe spinal canal stenosis   Hx of known spinal osteomyelitis, various abscesses, & MRSA bacteremia i/s/o IVDU. Presented w/ progressive lower back & abdominal pain w/ assoc LE weakness & sensory changes. HDS & 0/4 SIRS on admission. MRI showed osteomyelitis/discitis of L5-S1, osteomyelitis of  sacrum & L iliac bone, probable septic arthritis of L SI joint & lower lumbar spine facet joints, worsening anterolisthesis of L5 with respect to S1 with increased size of epidural abscess at the level of L5-S1 and probable severe central canal stenosis at this level, along w/ additional abscesses in the L iliopsoas muscle, paraspinal muscles, pelvis, & L sacrum. CT L-spine w/ similar findings. No urinary or bowel sx retention/incontinence. Received single dose of Zosyn in ED on arrival, but given that pt HDS w/o sepsis, further abx held to optimize intra-op cx/pathology.   - Neurosurgery consulted; appreciate recs/assistance   - No plans for intervention/OR until 2/16  - IR consulted for potential aspiration of abscess(es); appreciate assistance   - Plan for aspiration of psoas abscess & SI joint 2/7   - ID consulted; appreciate recs  - Ortho consulted for mgmt of septic SI joint; appreciate recs/assistance  - Holding antibiotics given absence of sepsis  - Multimodal analgesia   - PT/OT    IVDU (intravenous drug user)   Heroin abuse w/ opioid withdrawal   PSDU  Tobacco use   Known hx of IVDU (heroin) w/ extensive assoc medical complications (discussed above). No recent UDS (only one on file from July 2023- positive for opiates & THC) and none collected on admission. Endorses daily IV heroin use (1g/day); last used just prior to calling EMS. Active tobacco use, but denies EtOH or other illicit substance use. Endorsing sx of opioid w/d.   - Addiction medicine consulted; appreciate recs  - Deferring initiating methadone/suboxone given potential anesthesia/surgical intervention  - PRN oxycodone per COWS protocol  - Supportive care w/ PRN antiemetics, analgesics, & anxiolytics   - NRT PRN  - Cessation strongly encouraged     Anemia of infection and chronic disease  On admission, Hgb 8.0 (more or less c/w baseline as has been downtrending for past year) w/ MCV 69. No e/o active bleeding. Iron panel w/ low iron, TIBC,  %sat & transferrin; ferritin nml. Although ferritin nml, likely anemia of chronic dx 2/2 chronic infxn re: IVDU.   - Monitor CBC & for e/o active bleeding  - Transfuse PRN for goal Hgb > 7  - Mgmt of infxns as above    Chronic hyponatremia  On admission, Na 133 (baseline 129-133). 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   - Deferring IVF  - Encourage good PO intake  - Monitor BMP  - Mgmt of infxns as above    Substance or medication-induced depressive disorder  Pt endorses sx of depression, no SI/SA.   - Addiction medicine/psychiatry consult as above; appreciate recs   - Initiating mirtazapine 15mg QHS    Prediabetes  A1c 6.2% w/ glucose 109 on admission.   - Deferring SSI or CBGs   - Hypoglycemia protocol     Housing insecurity  Previously worked at [employer] & held down job in the past but started using heroin last year following multiple family deaths (mother, aunt & grandmother) in a short period of time. Subsequently lost housing & has been living on streets since then.   - SW/CM        VTE Risk Mitigation (From admission, onward)           Ordered     enoxaparin injection 40 mg  Daily         02/05/24 0126     Place sequential compression device  Until discontinued         02/05/24 0126     IP VTE HIGH RISK PATIENT  Once         02/05/24 0126                    Discharge Planning   SENA: 2/19/2024     Code Status: Full Code   Is the patient medically ready for discharge?: No    Reason for patient still in hospital (select all that apply): Patient trending condition  Discharge Plan A: Long-term acute care facility (LTAC)            Brenna Belcher MD  Department of Hospital Medicine   [facility] - Neurosurgery ([facility])

## 2024-02-08 NOTE — ASSESSMENT & PLAN NOTE
Previously worked at [employer] & held down job in the past but started using heroin last year following multiple family deaths (mother, aunt & grandmother) in a short period of time. Subsequently lost housing & has been living on streets since then.   - SW/NAM

## 2024-02-08 NOTE — ASSESSMENT & PLAN NOTE
Heroin abuse w/ opioid withdrawal   PSDU  Tobacco use   Known hx of IVDU (heroin) w/ extensive assoc medical complications (discussed above). No recent UDS (only one on file from July 2023- positive for opiates & THC) and none collected on admission. Endorses daily IV heroin use (1g/day); last used just prior to calling EMS. Active tobacco use, but denies EtOH or other illicit substance use. Endorsing sx of opioid w/d.   - Addiction medicine consulted; appreciate recs  - Deferring initiating methadone/suboxone given potential anesthesia/surgical intervention  - PRN oxycodone per COWS protocol  - Supportive care w/ PRN antiemetics, analgesics, & anxiolytics   - NRT PRN  - Cessation strongly encouraged

## 2024-02-08 NOTE — PROGRESS NOTES
[facility]r [facility]ray - Neurosurgery ([facility])  Neurosurgery  Progress Note    Subjective:     History of Present Illness: 28M w/ hx MRSA bactermia, IVDU, pelvic/intra-abdominal abscesses who presents with progressive back pain with radiation down the leg over the past couple of weeks.  He has had bilateral numbness of his anterior thigh as well. He denies bowel/bladder sx or saddle anesthesia. He denies focal weakness. Per chart review, the patient is actively using heroin and did an injection prior to being taken in by EMS.      Post-Op Info:  Procedure(s) (LRB):  **AIRO** FUSION, SPINE, LUMBAR (N/A)       Interval History: NAEON. Exam stable. S/p IR aspiration psoas w/ 4 ml serosanguinous fluid removed. OR next week.    Medications:  Continuous Infusions:  Scheduled Meds:   acetaminophen  1,000 mg Oral Q8H    cefTRIAXone (Rocephin) IV (PEDS and ADULTS)  2 g Intravenous Q24H    cloNIDine  0.1 mg Oral Q12H    enoxparin  40 mg Subcutaneous Daily    mirtazapine  15 mg Oral QHS    nicotine  1 patch Transdermal Daily    [START ON 2/9/2024] vancomycin (VANCOCIN) IV (PEDS and ADULTS)  15 mg/kg Intravenous Q12H    vancomycin (VANCOCIN) IV (PEDS and ADULTS)  2,000 mg Intravenous Once     PRN Meds:acetaminophen, dextrose 10%, dextrose 10%, dicyclomine, glucagon (human recombinant), glucose, glucose, HYDROmorphone, hydrOXYzine HCL, ibuprofen, loperamide, melatonin, naloxone, oxyCODONE, oxyCODONE, promethazine, sodium chloride 0.9%, Pharmacy to dose Vancomycin consult **AND** vancomycin - pharmacy to dose     Review of Systems  Objective:     Weight: 76.7 kg (169 lb 1.5 oz)  Body mass index is 23.59 kg/m².  Vital Signs (Most Recent):  Temp: 98.5 °F (36.9 °C) (02/08/24 1646)  Pulse: 74 (02/08/24 1646)  Resp: 16 (02/08/24 1651)  BP: (!) 135/59 (02/08/24 1646)  SpO2: 99 % (02/08/24 1646) Vital Signs (24h Range):  Temp:  [97.4 °F (36.3 °C)-99.5 °F (37.5 °C)] 98.5 °F (36.9 °C)  Pulse:  [49-74] 74  Resp:  [16-20] 16  SpO2:  [95 %-100 %] 99  %  BP: (111-135)/(57-85) 135/59     Date 02/08/24 0700 - 02/09/24 0659   Shift 2649-8692 7397-3454 0749-0310 24 Hour Total   INTAKE   Shift Total(mL/kg)       OUTPUT   Urine(mL/kg/hr)  500  500   Shift Total(mL/kg)  500(6.5)  500(6.5)   Weight (kg) 76.7 76.7 76.7 76.7                                Physical Exam     Neurosurgery Physical Exam    General: well developed, well nourished, no distress.   Head: normocephalic, atraumatic  Neurologic: Alert and oriented. Thought content appropriate.  GCS: Motor: 6/Verbal: 5/Eyes: 4 GCS Total: 15  Mental Status: Awake, Alert, Oriented x 4  Language: No aphasia  Speech: No dysarthria  Cranial nerves: face symmetric, tongue midline, CN II-XII grossly intact, EOMI.   Pulmonary: normal respirations, no signs of respiratory distress  Sensory: intact to light touch throughout  Motor Strength: Moves all extremities spontaneously with good tone.  No abnormal movements seen. Pain limited weakness b/l hip flexors.     Strength  Deltoids Triceps Biceps Wrist Extension Wrist Flexion Hand    Upper: R 5/5 5/5 5/5 5/5 5/5 5/5    L 5/5 5/5 5/5 5/5 5/5 5/5     Iliopsoas Quadriceps Knee  Flexion Tibialis  anterior Gastro- cnemius EHL   Lower: R 4+/5 5/5 5/5 5/5 5/5 4+/5    L 4-/5 5/5 5/5 5/5 5/5 4+/5     Khalil: absent  Clonus: absent  Skin: Skin is warm, dry and intact.        Significant Labs:  Recent Labs   Lab 02/07/24  0502 02/08/24  0432   GLU 87 107   * 135*   K 4.5 4.3    106   CO2 16* 20*   BUN 13 19   CREATININE 0.8 0.8   CALCIUM 8.9 9.1       Recent Labs   Lab 02/07/24  0502 02/08/24  0432   WBC 5.91 4.95   HGB 9.9* 9.8*   HCT 31.4* 31.9*    490*       Recent Labs   Lab 02/07/24  0947   INR 1.2       Microbiology Results (last 7 days)       Procedure Component Value Units Date/Time    AFB Culture & Smear [[id number]] Collected: 02/07/24 1423    Order Status: Completed Specimen: Abscess from Back Updated: 02/08/24 1152     AFB CULTURE STAIN No acid fast  bacilli seen.    Narrative:      L psoas    AFB Culture & Smear [[id number]] Collected: 02/07/24 1413    Order Status: Completed Specimen: Joint Fluid from Back Updated: 02/08/24 1152     AFB CULTURE STAIN No acid fast bacilli seen.    Culture, Anaerobe [[id number]] Collected: 02/07/24 1423    Order Status: Completed Specimen: Abscess from Back Updated: 02/08/24 0852     Anaerobic Culture Culture in progress    Narrative:      L psoas    Aerobic culture [[id number]] Collected: 02/07/24 1423    Order Status: Completed Specimen: Abscess from Back Updated: 02/08/24 0718     Aerobic Bacterial Culture No growth    Narrative:      L psoas    Gram stain [[id number]] Collected: 02/07/24 1413    Order Status: Completed Specimen: Joint Fluid from Back Updated: 02/08/24 0041     Gram Stain Result No WBC's      No organisms seen    Fungus culture [[id number]] Collected: 02/07/24 1413    Order Status: Sent Specimen: Joint Fluid from Back Updated: 02/07/24 2110    Aerobic culture [[id number]] Collected: 02/07/24 1412    Order Status: Sent Specimen: Bone from Back Updated: 02/07/24 2109    Culture, Anaerobe [[id number]] Collected: 02/07/24 1411    Order Status: Sent Specimen: Joint Fluid from Back Updated: 02/07/24 2109    Blood culture #2 **CANNOT BE ORDERED STAT** [[id number]] Collected: 02/04/24 1822    Order Status: Completed Specimen: Blood from Peripheral, Forearm, Left Updated: 02/07/24 2012     Blood Culture, Routine No Growth to date      No Growth to date      No Growth to date      No Growth to date    Blood culture #1 **CANNOT BE ORDERED STAT** [[id number]] Collected: 02/04/24 1853    Order Status: Completed Specimen: Blood from Peripheral, Forearm, Left Updated: 02/07/24 2012     Blood Culture, Routine No Growth to date      No Growth to date      No Growth to date      No Growth to date    Gram stain [[id number]] Collected: 02/07/24 1423    Order Status: Completed Specimen: Abscess from Back Updated: 02/07/24 1934      Gram Stain Result Few WBC's      No organisms seen    Narrative:      L psoas    Fungus culture [[id number]] Collected: 02/07/24 1423    Order Status: Sent Specimen: Abscess from Back Updated: 02/07/24 1733    Culture, Anaerobe [[id number]]     Order Status: No result Specimen: Abscess from Sacrum     Aerobic culture [[id number]]     Order Status: No result Specimen: Abscess from Sacrum     AFB Culture & Smear [[id number]]     Order Status: No result Specimen: Abscess from Sacrum     Gram stain [[id number]]     Order Status: No result Specimen: Abscess from Sacrum     Fungus culture [[id number]]     Order Status: No result Specimen: Abscess from Sacrum     Culture, Anaerobe [[id number]]     Order Status: No result Specimen: Abscess     Aerobic culture [[id number]]     Order Status: No result Specimen: Abscess     Fungus culture [[id number]]     Order Status: No result Specimen: Abscess     AFB Culture & Smear [[id number]]     Order Status: No result Specimen: Abscess     Gram stain [[id number]]     Order Status: No result Specimen: Abscess           Recent Lab Results         02/08/24  0432        Anion Gap 9       BUN 19       Calcium 9.1       Chloride 106       CO2 20       Creatinine 0.8       eGFR >60.0       Glucose 107       Hematocrit 31.9       Hemoglobin 9.8       MCH 21.2       MCHC 30.7       MCV 69       MPV 8.8       Platelet Count 490       Potassium 4.3       RBC 4.63       RDW 18.9       Sodium 135       WBC 4.95             All pertinent labs from the last 24 hours have been reviewed.    Significant Diagnostics:  I have reviewed all pertinent imaging results/findings within the past 24 hours.  Assessment/Plan:     * Osteomyelitis of vertebra of lumbosacral region  28M w/ hx MRSA bactermia, IVDU, pelvic/intra-abdominal abscesses who presents with progressive back pain with radiation down the leg over the past couple of weeks. Motor intact.     MRI L Sp w/wo 2/4: L5 epidural absess with central  stenosis, additional abscesses inferior to sacrum, in psoas. spondylodiscitis at L5-S1     - admitted to   - Neuro exam stable  - No emergent neurosurgical intervention warranted at this time  - Neuro checks q4h  - All pertinent labs and imaging reviewed  - 2/4 ESR 75, CRP 33.7. Blood cx NGTD.   - MRI lumbar w/wo 2/4: L5-S1 osteodiscitis extending into the sacrum, left iliac bone, SI joint, facet joints. Anterolisthesis L5-S1, epidural abscess L5-S1 with canal stenosis. Left psoas and paraspinal muscle abscesses. No clinical evidence of cauda equina syndrome.   - CT lumbar obtained with destructive changes at L5-S1.   - Abx held pending intervention, if patient begins to show signs of sepsis, please start abx. ID consulted. F/u IR cx 2/7.  - Voiding spontaneously. Bladder scan prn.   - Plan for OR 2/14 for L4-pelvis fusion with debridement.  - Discussed with Dr. Pozo  - Contact with acute changes in exam          Joselyn Ventura PA-C  Neurosurgery  [facility] - Neurosurgery ([facility])

## 2024-02-08 NOTE — NURSING
Care assumed from Me'Eva Perla RN. Introduced self to patient who was resting in bed. No s/s nor c/o pain. Emptied urinal. CLWR, BR upx3, bed low, locked in position, bed alarm engaged. Safety maintained, will continue to monitor.

## 2024-02-08 NOTE — ASSESSMENT & PLAN NOTE
Osteomyelitis, discitis, & myositis of lumbosacral region  Septic arthritis of SI joint(s)  Epidural abscesses   Multiple abscesses of pelvis, sacrum, psoas, iliacus, & paraspinals  Lower back pain w/ b/l sciatica   Severe spinal canal stenosis   Hx of known spinal osteomyelitis, various abscesses, & MRSA bacteremia i/s/o IVDU. Presented w/ progressive lower back & abdominal pain w/ assoc LE weakness & sensory changes. HDS & 0/4 SIRS on admission. MRI showed osteomyelitis/discitis of L5-S1, osteomyelitis of sacrum & L iliac bone, probable septic arthritis of L SI joint & lower lumbar spine facet joints, worsening anterolisthesis of L5 with respect to S1 with increased size of epidural abscess at the level of L5-S1 and probable severe central canal stenosis at this level, along w/ additional abscesses in the L iliopsoas muscle, paraspinal muscles, pelvis, & L sacrum. CT L-spine w/ similar findings. No urinary or bowel sx retention/incontinence. Received single dose of Zosyn in ED on arrival, but given that pt HDS w/o sepsis, further abx held to optimize intra-op cx/pathology.   - Neurosurgery consulted; appreciate recs/assistance   - No plans for intervention/OR until 2/14  - IR consulted for potential aspiration of abscess(es); appreciate assistance   - Plan for aspiration of psoas abscess & SI joint 2/7   - ID consulted; appreciate recs  - Ortho consulted for mgmt of septic SI joint; appreciate recs/assistance  - Holding antibiotics given absence of sepsis  - Multimodal analgesia   - PT/OT

## 2024-02-08 NOTE — ASSESSMENT & PLAN NOTE
28M w/ hx MRSA bactermia, IVDU, pelvic/intra-abdominal abscesses who presents with progressive back pain with radiation down the leg over the past couple of weeks. Motor intact.     MRI L Sp w/wo 2/4: L5 epidural absess with central stenosis, additional abscesses inferior to sacrum, in psoas. spondylodiscitis at L5-S1     - admitted to   - Neuro exam stable  - No emergent neurosurgical intervention warranted at this time  - Neuro checks q4h  - All pertinent labs and imaging reviewed  - 2/4 ESR 75, CRP 33.7. Blood cx NGTD.   - MRI lumbar w/wo 2/4: L5-S1 osteodiscitis extending into the sacrum, left iliac bone, SI joint, facet joints. Anterolisthesis L5-S1, epidural abscess L5-S1 with canal stenosis. Left psoas and paraspinal muscle abscesses. No clinical evidence of cauda equina syndrome.   - CT lumbar obtained with destructive changes at L5-S1.   - Abx held pending intervention, if patient begins to show signs of sepsis, please start abx. ID consulted. F/u IR cx 2/7.  - Voiding spontaneously. Bladder scan prn.   - Plan for OR 2/14 for L4-pelvis fusion with debridement.  - Discussed with Dr. Pozo  - Contact with acute changes in exam

## 2024-02-08 NOTE — PLAN OF CARE
Problem: Infection  Goal: Absence of Infection Signs and Symptoms  Outcome: Ongoing, Progressing     Problem: Pain Acute  Goal: Acceptable Pain Control and Functional Ability  Outcome: Ongoing, Progressing   POC reviewed with the patient and they verbalized understanding. All comments and concerns addressed. Bed locked in lowest position with bed alarm set, call light within reach. Safety precautions maintained. VSS, see flowsheets.  Will continue to monitor for changes to POC and clinical condition.

## 2024-02-08 NOTE — PLAN OF CARE
Problem: Adult Inpatient Plan of Care  Goal: Plan of Care Review  2/7/2024 2203 by Chris Perla RN  Outcome: Ongoing, Progressing  2/7/2024 2203 by Chris Perla RN  Outcome: Ongoing, Progressing  Goal: Patient-Specific Goal (Individualized)  2/7/2024 2203 by Chris Perla RN  Outcome: Ongoing, Progressing  2/7/2024 2203 by Chris Pelra RN  Outcome: Ongoing, Progressing  Goal: Absence of Hospital-Acquired Illness or Injury  2/7/2024 2203 by Chris Perla RN  Outcome: Ongoing, Progressing  2/7/2024 2203 by Chris Perla RN  Outcome: Ongoing, Progressing  Goal: Optimal Comfort and Wellbeing  2/7/2024 2203 by Chris Perla RN  Outcome: Ongoing, Progressing  2/7/2024 2203 by Chris Perla RN  Outcome: Ongoing, Progressing  Goal: Readiness for Transition of Care  2/7/2024 2203 by Chris Perla RN  Outcome: Ongoing, Progressing  2/7/2024 2203 by Chris Perla RN  Outcome: Ongoing, Progressing     Problem: Impaired Wound Healing  Goal: Optimal Wound Healing  2/7/2024 2203 by Chris Perla RN  Outcome: Ongoing, Progressing  2/7/2024 2203 by Chris Perla RN  Outcome: Ongoing, Progressing     Problem: Skin Injury Risk Increased  Goal: Skin Health and Integrity  2/7/2024 2203 by Chris Perla RN  Outcome: Ongoing, Progressing  2/7/2024 2203 by Chris Perla RN  Outcome: Ongoing, Progressing     Problem: Infection  Goal: Absence of Infection Signs and Symptoms  2/7/2024 2203 by Chris Perla RN  Outcome: Ongoing, Progressing  2/7/2024 2203 by Chris Perla RN  Outcome: Ongoing, Progressing     Problem: Fall Injury Risk  Goal: Absence of Fall and Fall-Related Injury  2/7/2024 2203 by Chris Perla RN  Outcome: Ongoing, Progressing  2/7/2024 2203 by Chris Perla RN  Outcome: Ongoing, Progressing     Problem: Pain Acute  Goal: Acceptable Pain Control and Functional Ability  2/7/2024 2203 by Chris Perla RN  Outcome: Ongoing, Progressing  2/7/2024  2203 by Chris Perla, JIMY  Outcome: Ongoing, Progressing     Problem: Skin or Soft Tissue Infection  Goal: Absence of Infection Signs and Symptoms  2/7/2024 2203 by Chris Perla RN  Outcome: Ongoing, Progressing  2/7/2024 2203 by Chris Perla, JIMY  Outcome: Ongoing, Progressing

## 2024-02-08 NOTE — SUBJECTIVE & OBJECTIVE
Interval History: No acute events. No growth on cultures from IR aspiration  Afebrile    Review of Systems  Objective:     Vital Signs (Most Recent):  Temp: 98.5 °F (36.9 °C) (02/08/24 1546)  Pulse: 71 (02/08/24 1546)  Resp: 20 (02/08/24 1546)  BP: 131/60 (02/08/24 1546)  SpO2: 100 % (02/08/24 1546) Vital Signs (24h Range):  Temp:  [97.4 °F (36.3 °C)-99.5 °F (37.5 °C)] 98.5 °F (36.9 °C)  Pulse:  [49-72] 71  Resp:  [16-20] 20  SpO2:  [95 %-100 %] 100 %  BP: (111-134)/(57-85) 131/60     Weight: 76.7 kg (169 lb 1.5 oz)  Body mass index is 23.59 kg/m².    Intake/Output Summary (Last 24 hours) at 2/8/2024 1547  Last data filed at 2/8/2024 0035  Gross per 24 hour   Intake --   Output 800 ml   Net -800 ml         Physical Exam  Constitutional:       Appearance: He is normal weight. He is not toxic-appearing.   HENT:      Head: Normocephalic and atraumatic.   Eyes:      Conjunctiva/sclera: Conjunctivae normal.   Cardiovascular:      Rate and Rhythm: Normal rate and regular rhythm.      Heart sounds: Normal heart sounds.   Pulmonary:      Effort: Pulmonary effort is normal. No respiratory distress.      Breath sounds: Normal breath sounds. No wheezing.   Abdominal:      General: Bowel sounds are normal. There is no distension.      Palpations: Abdomen is soft.      Tenderness: There is no abdominal tenderness. There is no guarding.   Skin:     General: Skin is warm and dry.      Findings: No rash.   Neurological:      Mental Status: He is alert and oriented to person, place, and time.      Sensory: Sensory deficit (diminished sensation of BLE (R>L)) present.      Motor: No weakness.   Psychiatric:         Mood and Affect: Mood normal.         Behavior: Behavior normal.             Significant Labs: All pertinent labs within the past 24 hours have been reviewed.    Significant Imaging: I have reviewed all pertinent imaging results/findings within the past 24 hours.

## 2024-02-09 LAB
ANION GAP SERPL CALC-SCNC: 7 MMOL/L (ref 8–16)
BACTERIA BLD CULT: NORMAL
BACTERIA BLD CULT: NORMAL
BUN SERPL-MCNC: 15 MG/DL (ref 6–20)
CALCIUM SERPL-MCNC: 8.7 MG/DL (ref 8.7–10.5)
CHLORIDE SERPL-SCNC: 106 MMOL/L (ref 95–110)
CO2 SERPL-SCNC: 22 MMOL/L (ref 23–29)
CREAT SERPL-MCNC: 0.8 MG/DL (ref 0.5–1.4)
ERYTHROCYTE [DISTWIDTH] IN BLOOD BY AUTOMATED COUNT: 18.9 % (ref 11.5–14.5)
EST. GFR  (NO RACE VARIABLE): >60 ML/MIN/1.73 M^2
GLUCOSE SERPL-MCNC: 99 MG/DL (ref 70–110)
HCT VFR BLD AUTO: 30.2 % (ref 40–54)
HGB BLD-MCNC: 8.9 G/DL (ref 14–18)
MCH RBC QN AUTO: 20.8 PG (ref 27–31)
MCHC RBC AUTO-ENTMCNC: 29.5 G/DL (ref 32–36)
MCV RBC AUTO: 71 FL (ref 82–98)
PLATELET # BLD AUTO: 455 K/UL (ref 150–450)
PMV BLD AUTO: 8.8 FL (ref 9.2–12.9)
POTASSIUM SERPL-SCNC: 4.5 MMOL/L (ref 3.5–5.1)
RBC # BLD AUTO: 4.28 M/UL (ref 4.6–6.2)
SODIUM SERPL-SCNC: 135 MMOL/L (ref 136–145)
WBC # BLD AUTO: 6.53 K/UL (ref 3.9–12.7)

## 2024-02-09 PROCEDURE — 97535 SELF CARE MNGMENT TRAINING: CPT

## 2024-02-09 PROCEDURE — S4991 NICOTINE PATCH NONLEGEND: HCPCS | Performed by: HOSPITALIST

## 2024-02-09 PROCEDURE — 36415 COLL VENOUS BLD VENIPUNCTURE: CPT | Performed by: STUDENT IN AN ORGANIZED HEALTH CARE EDUCATION/TRAINING PROGRAM

## 2024-02-09 PROCEDURE — 11000001 HC ACUTE MED/SURG PRIVATE ROOM

## 2024-02-09 PROCEDURE — 99232 SBSQ HOSP IP/OBS MODERATE 35: CPT | Mod: ,,, | Performed by: PHYSICIAN ASSISTANT

## 2024-02-09 PROCEDURE — 85027 COMPLETE CBC AUTOMATED: CPT | Performed by: STUDENT IN AN ORGANIZED HEALTH CARE EDUCATION/TRAINING PROGRAM

## 2024-02-09 PROCEDURE — C1751 CATH, INF, PER/CENT/MIDLINE: HCPCS

## 2024-02-09 PROCEDURE — 63600175 PHARM REV CODE 636 W HCPCS: Performed by: STUDENT IN AN ORGANIZED HEALTH CARE EDUCATION/TRAINING PROGRAM

## 2024-02-09 PROCEDURE — 80048 BASIC METABOLIC PNL TOTAL CA: CPT | Performed by: STUDENT IN AN ORGANIZED HEALTH CARE EDUCATION/TRAINING PROGRAM

## 2024-02-09 PROCEDURE — 25000003 PHARM REV CODE 250: Performed by: EMERGENCY MEDICINE

## 2024-02-09 PROCEDURE — 25000003 PHARM REV CODE 250: Performed by: STUDENT IN AN ORGANIZED HEALTH CARE EDUCATION/TRAINING PROGRAM

## 2024-02-09 PROCEDURE — 25000003 PHARM REV CODE 250: Performed by: HOSPITALIST

## 2024-02-09 PROCEDURE — 36410 VNPNXR 3YR/> PHY/QHP DX/THER: CPT

## 2024-02-09 PROCEDURE — 97116 GAIT TRAINING THERAPY: CPT | Mod: CQ

## 2024-02-09 PROCEDURE — 97530 THERAPEUTIC ACTIVITIES: CPT

## 2024-02-09 PROCEDURE — 76937 US GUIDE VASCULAR ACCESS: CPT

## 2024-02-09 PROCEDURE — 97110 THERAPEUTIC EXERCISES: CPT | Mod: CQ

## 2024-02-09 RX ADMIN — IBUPROFEN 600 MG: 600 TABLET ORAL at 05:02

## 2024-02-09 RX ADMIN — OXYCODONE HYDROCHLORIDE 10 MG: 10 TABLET ORAL at 08:02

## 2024-02-09 RX ADMIN — HYDROMORPHONE HYDROCHLORIDE 1 MG: 1 INJECTION, SOLUTION INTRAMUSCULAR; INTRAVENOUS; SUBCUTANEOUS at 04:02

## 2024-02-09 RX ADMIN — MELATONIN TAB 3 MG 6 MG: 3 TAB at 11:02

## 2024-02-09 RX ADMIN — NICOTINE 1 PATCH: 14 PATCH, EXTENDED RELEASE TRANSDERMAL at 10:02

## 2024-02-09 RX ADMIN — CEFTRIAXONE SODIUM 2 G: 2 INJECTION, POWDER, FOR SOLUTION INTRAMUSCULAR; INTRAVENOUS at 05:02

## 2024-02-09 RX ADMIN — ENOXAPARIN SODIUM 40 MG: 40 INJECTION SUBCUTANEOUS at 05:02

## 2024-02-09 RX ADMIN — MIRTAZAPINE 15 MG: 15 TABLET, FILM COATED ORAL at 09:02

## 2024-02-09 RX ADMIN — CLONIDINE HYDROCHLORIDE 0.1 MG: 0.1 TABLET ORAL at 08:02

## 2024-02-09 RX ADMIN — OXYCODONE HYDROCHLORIDE 10 MG: 10 TABLET ORAL at 11:02

## 2024-02-09 RX ADMIN — OXYCODONE 5 MG: 5 TABLET ORAL at 12:02

## 2024-02-09 RX ADMIN — HYDROMORPHONE HYDROCHLORIDE 1 MG: 1 INJECTION, SOLUTION INTRAMUSCULAR; INTRAVENOUS; SUBCUTANEOUS at 07:02

## 2024-02-09 RX ADMIN — CLONIDINE HYDROCHLORIDE 0.1 MG: 0.1 TABLET ORAL at 09:02

## 2024-02-09 RX ADMIN — ACETAMINOPHEN 1000 MG: 500 TABLET ORAL at 09:02

## 2024-02-09 RX ADMIN — OXYCODONE HYDROCHLORIDE 10 MG: 10 TABLET ORAL at 02:02

## 2024-02-09 RX ADMIN — VANCOMYCIN HYDROCHLORIDE 1250 MG: 1.25 INJECTION, POWDER, LYOPHILIZED, FOR SOLUTION INTRAVENOUS at 06:02

## 2024-02-09 RX ADMIN — ACETAMINOPHEN 1000 MG: 500 TABLET ORAL at 06:02

## 2024-02-09 RX ADMIN — HYDROXYZINE HYDROCHLORIDE 50 MG: 25 TABLET, FILM COATED ORAL at 02:02

## 2024-02-09 RX ADMIN — IBUPROFEN 600 MG: 600 TABLET ORAL at 11:02

## 2024-02-09 RX ADMIN — ACETAMINOPHEN 1000 MG: 500 TABLET ORAL at 03:02

## 2024-02-09 RX ADMIN — OXYCODONE HYDROCHLORIDE 10 MG: 10 TABLET ORAL at 03:02

## 2024-02-09 NOTE — PROGRESS NOTES
"[facility] - Neurosurgery ([facility])  Hospital Medicine  Progress Note    Patient Name: Ton Donovan  MRN: [id number]  Patient Class: IP- Inpatient   Admission Date: 2/4/2024  Length of Stay: 4 days  Attending Physician: Nagi Angel*  Primary Care Provider: Emily, Primary Doctor        Subjective:     Principal Problem:Osteomyelitis of vertebra of lumbosacral region        HPI:  Ton Donovan is a 28-year-old male with PMHx of IVDU w/ assoc complications of MRSA bacteremia, multiple septic joints & abscesses, who presented 2/4/24 with progressive back pain with radiation down his legs b/l. Endorses bilateral numbness & neuropathic pain over the dorsum of his feet. This has been ongoing and is not necessarily a new problem, but has been worsening. Reports "pins & needles"/paresthesias down his legs that have been progressing, although laying on his stomach seems to provide significant relief. Admits to continued active IV heroin use; last used just prior to EMS arrival. Denies any fevers, chills, night sweats, cough, or shortness of breath. Denies any bowel or bladder incontinence/retention or saddle anesthesia.     On arrival, HDS & 0/4 SIRS. MRI showed osteomyelitis/discitis of L5-S1, osteomyelitis of sacrum & L iliac bone, probable septic arthritis of L SI joint & lower lumbar spine facet joints, worsening anterolisthesis of L5 with respect to S1 with increased size of epidural abscess at the level of L5-S1 and probable severe central canal stenosis at this level, along w/ additional abscesses in the L iliopsoas muscle, paraspinal muscles, pelvis, & L sacrum. Received single dose of Zosyn in ED on arrival, but given that pt HDS w/o sepsis, further abx held to optimize intra-op cx/pathology. Admitted to hospital medicine for further mgmt of osteomyelitis, septic joints & abscesses.    Overview/Hospital Course:  Addiction medicine, along w/ NSGY, orthopedic surgery, IR & ID consulted. Underwent aspiration " of psoas abscess per IR on 2/7. Aspiration of SI joint attempted but unable to aspirate hardly any fluid.     Interval History: No acute changes. Afebrile. Patient has no new complaints. Awaiting surgery next week  Cultures with SA, susceptibility pending    Review of Systems  Objective:     Vital Signs (Most Recent):  Temp: 97.5 °F (36.4 °C) (02/09/24 1245)  Pulse: 102 (02/09/24 1245)  Resp: 20 (02/09/24 1503)  BP: (!) 111/55 (02/09/24 1245)  SpO2: 100 % (02/09/24 1245) Vital Signs (24h Range):  Temp:  [97.5 °F (36.4 °C)-99.7 °F (37.6 °C)] 97.5 °F (36.4 °C)  Pulse:  [] 102  Resp:  [16-20] 20  SpO2:  [99 %-100 %] 100 %  BP: (111-135)/(55-73) 111/55     Weight: 76.7 kg (169 lb 1.5 oz)  Body mass index is 23.59 kg/m².    Intake/Output Summary (Last 24 hours) at 2/9/2024 1538  Last data filed at 2/9/2024 1056  Gross per 24 hour   Intake --   Output 1825 ml   Net -1825 ml         Physical Exam  Constitutional:       Appearance: He is normal weight. He is not toxic-appearing.   HENT:      Head: Normocephalic and atraumatic.   Eyes:      Conjunctiva/sclera: Conjunctivae normal.   Cardiovascular:      Rate and Rhythm: Normal rate and regular rhythm.      Heart sounds: Normal heart sounds.   Pulmonary:      Effort: Pulmonary effort is normal. No respiratory distress.      Breath sounds: Normal breath sounds. No wheezing.   Abdominal:      General: Bowel sounds are normal. There is no distension.      Palpations: Abdomen is soft.      Tenderness: There is no abdominal tenderness. There is no guarding.   Skin:     General: Skin is warm and dry.      Findings: No rash.   Neurological:      Mental Status: He is alert and oriented to person, place, and time.      Sensory: Sensory deficit (diminished sensation of BLE (R>L)) present.      Motor: No weakness.   Psychiatric:         Mood and Affect: Mood normal.         Behavior: Behavior normal.             Significant Labs: All pertinent labs within the past 24 hours have  been reviewed.    Significant Imaging: I have reviewed all pertinent imaging results/findings within the past 24 hours.    Assessment/Plan:      * Osteomyelitis of vertebra of lumbosacral region  Osteomyelitis, discitis, & myositis of lumbosacral region  Septic arthritis of SI joint(s)  Epidural abscesses   Multiple abscesses of pelvis, sacrum, psoas, iliacus, & paraspinals  Lower back pain w/ b/l sciatica   Severe spinal canal stenosis   Hx of known spinal osteomyelitis, various abscesses, & MRSA bacteremia i/s/o IVDU. Presented w/ progressive lower back & abdominal pain w/ assoc LE weakness & sensory changes. HDS & 0/4 SIRS on admission. MRI showed osteomyelitis/discitis of L5-S1, osteomyelitis of sacrum & L iliac bone, probable septic arthritis of L SI joint & lower lumbar spine facet joints, worsening anterolisthesis of L5 with respect to S1 with increased size of epidural abscess at the level of L5-S1 and probable severe central canal stenosis at this level, along w/ additional abscesses in the L iliopsoas muscle, paraspinal muscles, pelvis, & L sacrum. CT L-spine w/ similar findings. No urinary or bowel sx retention/incontinence. Received single dose of Zosyn in ED on arrival, but given that pt HDS w/o sepsis, further abx held to optimize intra-op cx/pathology.   - Neurosurgery consulted; appreciate recs/assistance   - Plan for OR 2/14 for L4-pelvis fusion with debridement.   - IR consulted for potential aspiration of abscess(es); appreciate assistance   - Plan for aspiration of psoas abscess & SI joint 2/7   - ID consulted; appreciate recs  - Ortho consulted for mgmt of septic SI joint; appreciate recs/assistance  - Holding antibiotics given absence of sepsis  - Multimodal analgesia   - PT/OT    Housing insecurity  Previously worked at [employer] & held down job in the past but started using heroin last year following multiple family deaths (mother, aunt & grandmother) in a short period of time. Subsequently  lost housing & has been living on streets since then.   - SW/CM    Prediabetes  A1c 6.2% w/ glucose 109 on admission.   - Deferring SSI or CBGs   - Hypoglycemia protocol     Substance or medication-induced depressive disorder  Pt endorses sx of depression, no SI/SA.   - Addiction medicine/psychiatry consult as above; appreciate recs   - Initiating mirtazapine 15mg QHS    Spinal stenosis of lumbar region without neurogenic claudication  See osteomyelitis      Pelvic abscess in male  Presumed osteomyelitis/discitis at L5-S1 with osteomyelitis in the sacrum and left iliac bone and probable septic arthritis in the left SI joint and lower lumbar spine facet joints.     Worsening anterolisthesis of L5 with respect to S1 with increased size of epidural abscess at the level of L5-S1, and probable severe central canal stenosis at this level.     Additional abscesses in the left iliopsoas muscle, paraspinal muscles, pelvis, and left sacrum as discussed above.     Additional findings discussed in the body of the report and on same day pelvic MRI.      IVDU (intravenous drug user)  Heroin abuse w/ opioid withdrawal   PSDU  Tobacco use   Known hx of IVDU (heroin) w/ extensive assoc medical complications (discussed above). No recent UDS (only one on file from July 2023- positive for opiates & THC) and none collected on admission. Endorses daily IV heroin use (1g/day); last used just prior to calling EMS. Active tobacco use, but denies EtOH or other illicit substance use. Endorsing sx of opioid w/d.   - Addiction medicine consulted; appreciate recs  - Deferring initiating methadone/suboxone given potential anesthesia/surgical intervention  - PRN oxycodone per [facility] protocol  - Supportive care w/ PRN antiemetics, analgesics, & anxiolytics   - NRT PRN  - Cessation strongly encouraged     Septic arthritis of sacroiliac joint  See osteomyelitis      Chronic hyponatremia  On admission, Na 133 (baseline 129-133). Likely SIADH 2/2 chronic  infxn, but w/ tea/toast & poor PO intake likely also contributing.   - Deferring IVF  - Encourage good PO intake  - Monitor BMP  - Mgmt of infxns as above    Anemia of infection and chronic disease  On admission, Hgb 8.0 (more or less c/w baseline as has been downtrending for past year) w/ MCV 69. No e/o active bleeding. Iron panel w/ low iron, TIBC, %sat & transferrin; ferritin nml. Although ferritin nml, likely anemia of chronic dx 2/2 chronic infxn re: IVDU.   - Monitor CBC & for e/o active bleeding  - Transfuse PRN for goal Hgb > 7  - Mgmt of infxns as above    Cigarette nicotine dependence without complication  Dangers of cigarette smoking were reviewed with patient in detail. Patient was  offered a nicotinepatch  Nicotine replacement options were discussed. Nicotine replacement was discussed      VTE Risk Mitigation (From admission, onward)           Ordered     enoxaparin injection 40 mg  Daily         02/05/24 0126     Place sequential compression device  Until discontinued         02/05/24 0126     IP VTE HIGH RISK PATIENT  Once         02/05/24 0126                    Discharge Planning   SENA: 2/19/2024     Code Status: Full Code   Is the patient medically ready for discharge?: No    Reason for patient still in hospital (select all that apply): Patient trending condition, Treatment, and Consult recommendations  Discharge Plan A: Long-term acute care facility (LTAC)          Nagi Angel MD  Department of Hospital Medicine   [facility] - Neurosurgery ([facility])

## 2024-02-09 NOTE — PLAN OF CARE
Problem: Adult Inpatient Plan of Care  Goal: Plan of Care Review  Outcome: Ongoing, Progressing  Goal: Patient-Specific Goal (Individualized)  Outcome: Ongoing, Progressing  Goal: Absence of Hospital-Acquired Illness or Injury  Outcome: Ongoing, Progressing  Goal: Optimal Comfort and Wellbeing  Outcome: Ongoing, Progressing  Goal: Readiness for Transition of Care  Outcome: Ongoing, Progressing   Pt slept well this shift. A&Ox4, VSS see flow sheet,  Pain treated per Orders-see Mar.Safety precautions in place. Call light in reach. No further concerns noted at this time.

## 2024-02-09 NOTE — PROGRESS NOTES
Aleksandr Bray - Neurosurgery ([facility])  Infectious Disease  Progress Note    Patient Name: Ton Donovan  MRN: [id number]  Admission Date: 2/4/2024  Length of Stay: 3 days  Attending Physician: Nagi Angel*  Primary Care Provider: No, Primary Doctor    Isolation Status: No active isolations  Assessment/Plan:      ID  * Osteomyelitis of vertebra of lumbosacral region  I have reviewed hospital notes from   service and other specialty providers. I have also reviewed CBC, CMP/BMP,  cultures and imaging with my interpretation as documented.      28M with h/o IVDU (injects heroin, last used 02/03), with associated complex infectious h/o of disseminated MRSA bacteremia, and multiple septic joints / abscesses, c/b poor adherence, and deferring care / leaving AMA.  - Pt returns now (02/04) for progression of chronic back pain with radiculopathy (numbness / pain) to legs B/L.     MRI L-spine / pelvis (02/04): showed osteomyelitis/discitis of L5-S1, osteomyelitis of sacrum & L iliac bone, probable septic arthritis of L SI joint & lower lumbar spine facet joints; worsening anterolisthesis of L5 with respect to S1 with increased size of epidural abscess at the level of L5-S1 and probable severe central canal stenosis at this level, along w/ additional abscesses in the L iliopsoas muscle, paraspinal muscles, pelvis, & L sacrum; -- the additional abscesses in the left iliopsoas muscle [3.4 x 1.7cm; with other micro-abscesses adjacent measuring 2.3cm), paraspinal muscles, pelvis, and left sacrum. Additional abscess in the superior rectal/presacral region measuring roughly 3.1 x 2.4 cm; and probable additional micro abscesses at left sacrum.  CT lumbar obtained with destructive changes at L5-S1.       NSGY plans for OR 2/14 for L4-pelvis fusion with debridement.  IR consulted; s/p IR aspirtion (02/07) of left SI joint (yeilding only 0.1 cc fluid) and psoas fluid collection (yielding 4 mL SS fluid); sent for micro.   -- IR  cxs (02/07) NGTD.  Post-op 02/08, started on Iv-vanc / ctx. Pt remains AF, VSS, HDS. CRP 33. Bld cxs NGTD.      Recommendations / Plan:  Continue Iv-vanc and empiric CTX.  Inpatient / (infusion if outpt) pharmD to dose vanc with target trough level of 15-20.   Will f/u IR cxs and adjust abx accordingly.  Anticipate abx duration of at-least 6-8wks s/p NSGY debridement (02/14). [Consider 8wks considering likely incomplete source control multiple abscess of pelvis / sacrum].   Pt will require placement for IV-abx, PT, rehab.     -- Discussed with ID staff and primary team   -- ID will continue to follow w/ further recs.        Thank you for your consult. I will follow-up with patient. Please contact us if you have any additional questions.    Elpidio Paredes PA-C  Infectious Disease  [facility] - Neurosurgery ([facility])    Subjective:     Principal Problem:Osteomyelitis of vertebra of lumbosacral region    HPI: 28M with h/o IVDU (injects heroin, last used 02/03), with associated complex infectious h/o of disseminated MRSA bacteremia, and multiple septic joints / abscesses, c/b poor adherence, and deferring care / leaving AMA.  - Pt returns now (02/04) for progression of chronic back pain with radiculopathy (numbness / pain) to legs B/L.     MRI L-spine / pelvis (02/04): showed osteomyelitis/discitis of L5-S1, osteomyelitis of sacrum & L iliac bone, probable septic arthritis of L SI joint & lower lumbar spine facet joints; worsening anterolisthesis of L5 with respect to S1 with increased size of epidural abscess at the level of L5-S1 and probable severe central canal stenosis at this level, along w/ additional abscesses in the L iliopsoas muscle, paraspinal muscles, pelvis, & L sacrum; -- the additional abscesses in the left iliopsoas muscle [3.4 x 1.7cm; with other micro-abscesses adjacent measuring 2.3cm), paraspinal muscles, pelvis, and left sacrum. Additional abscess in the superior rectal/presacral region measuring  roughly 3.1 x 2.4 cm; and probable additional micro abscesses at left sacrum.  CT lumbar obtained with destructive changes at L5-S1.       -- Received single dose of Zosyn in ED on arrival, but given that pt HDS w/o sepsis, further abx held to optimize intra-op cx/pathology. Admitted to hospital medicine for further mgmt of osteomyelitis, septic joints & abscesses. Addiction medicine, along w/ NSGY, orthopedic surgery, IR & ID consulted.     Reports back pain is doing okay while supine, but exacerbated upon standing w/ assoc b/l sciatica & weakness w/ standing. Endorses diminished sensation of BLE (R>L) up to shins.   He denies bowel/bladder sx or saddle anesthesia. He denies focal weakness.    Pt off abx pending tentative spinal bx for path / cx; pt remains AF, VSS, HDS. CRP 33. Bld cxs NGTD.    Abx held pending intervention, if patient begins to show signs of sepsis, please start abx. ID consult.   Voiding spontaneously. Bladder scan prn.     Plan for OR 2/14 for L4-pelvis fusion with debridement. IR consulted for source control of SI / L psoas abscesses, tentative aspiration today (02/07) of L SI joint and L psoas. Orders placed for micro send of samples  (gram stain, AFB, fungal, aerobic, and anaerobic).            Interval History: Remains AF, VSS, HDS, wbc nml. S/p IR bx 02/07; post-op 02/08 started on Iv-vanc / CTX.     Review of Systems   Constitutional:  Positive for chills (opiod withdrawl?) and fatigue. Negative for diaphoresis and fever.   HENT:  Negative for congestion, mouth sores and sore throat.    Eyes:  Negative for pain.   Respiratory:  Positive for chest tightness. Negative for cough and shortness of breath.    Cardiovascular:  Negative for chest pain and leg swelling.   Gastrointestinal:  Positive for nausea. Negative for abdominal pain and vomiting.   Genitourinary:  Negative for difficulty urinating, dysuria and flank pain.   Musculoskeletal:  Positive for back pain. Negative for arthralgias,  joint swelling, myalgias and neck pain.   Skin:  Negative for color change, rash and wound.   Neurological:  Positive for numbness. Negative for seizures and syncope.   Psychiatric/Behavioral:  Negative for confusion.      Objective:     Vital Signs (Most Recent):  Temp: 98.5 °F (36.9 °C) (02/08/24 1646)  Pulse: 74 (02/08/24 1646)  Resp: 16 (02/08/24 1841)  BP: (!) 135/59 (02/08/24 1646)  SpO2: 99 % (02/08/24 1646) Vital Signs (24h Range):  Temp:  [97.4 °F (36.3 °C)-99.5 °F (37.5 °C)] 98.5 °F (36.9 °C)  Pulse:  [49-74] 74  Resp:  [16-20] 16  SpO2:  [95 %-100 %] 99 %  BP: (111-135)/(57-85) 135/59     Weight: 76.7 kg (169 lb 1.5 oz)  Body mass index is 23.59 kg/m².    Estimated Creatinine Clearance: 143.8 mL/min (based on SCr of 0.8 mg/dL).     Physical Exam  Vitals and nursing note reviewed.   Constitutional:       General: He is not in acute distress.     Appearance: He is not toxic-appearing or diaphoretic.   HENT:      Nose: No congestion.      Mouth/Throat:      Pharynx: Oropharynx is clear.   Eyes:      General: No scleral icterus.     Conjunctiva/sclera: Conjunctivae normal.   Cardiovascular:      Rate and Rhythm: Normal rate.      Heart sounds: Normal heart sounds. No murmur heard.  Pulmonary:      Effort: Pulmonary effort is normal. No respiratory distress.      Breath sounds: Normal breath sounds.   Abdominal:      General: Abdomen is flat. Bowel sounds are normal. There is no distension.      Palpations: Abdomen is soft.      Tenderness: There is no abdominal tenderness.   Musculoskeletal:         General: Tenderness (lumbar/sacral) present. No swelling.      Cervical back: Normal range of motion. No rigidity or tenderness.      Right lower leg: No edema.      Left lower leg: No edema.   Skin:     General: Skin is warm and dry.      Coloration: Skin is not jaundiced.      Findings: No erythema, lesion or rash.   Neurological:      Mental Status: He is alert and oriented to person, place, and time. Mental  status is at baseline.      Sensory: Sensory deficit present.   Psychiatric:         Behavior: Behavior normal.         Thought Content: Thought content normal.          Significant Labs: Blood Culture:   Recent Labs   Lab 10/10/23  1827 10/14/23  1512 11/20/23  1155 02/04/24  1822 02/04/24  1853   LABBLOO Gram stain manas bottle: Gram positive cocci in clusters resembling Staph  Results called to and read back by: Dr. Childers  10/11/2023  11:26  Gram stain aer bottle: Gram positive cocci in clusters resembling Staph  Positive results previously called 10/11/2023  14:31  METHICILLIN RESISTANT STAPHYLOCOCCUS AUREUS  For susceptibility see order #[id number]  *  Gram stain aer bottle: Gram positive cocci in clusters resembling Staph  Results called to and read back by: Dr. Childers  10/11/2023  11:26  Gram stain manas bottle: Gram positive cocci in clusters resembling Staph  Positive results previously called 10/11/2023  12:57  METHICILLIN RESISTANT STAPHYLOCOCCUS AUREUS  ID consult required at [facility].  * Gram stain aer bottle: Gram positive cocci in clusters resembling Staph  Results called to and read back by: ADIS CAMEJO  10/15/2023  10:25  Gram stain manas bottle: Gram positive cocci in clusters resembling Staph  Positive results previously called 10/15/2023  METHICILLIN RESISTANT STAPHYLOCOCCUS AUREUS  ID consult required at [facility].  For susceptibility see order #[id number]  *  Gram stain aer bottle: Gram positive cocci in clusters resembling Staph  Results called to and read back by: ADIS CAMEJO  10/15/2023  10:25  Gram stain manas bottle: Gram positive cocci in clusters resembling Staph  Positive results previously called 10/15/2023  METHICILLIN RESISTANT STAPHYLOCOCCUS AUREUS  ID consult required at [facility].  * No growth after 5 days.  No growth after 5 days. No Growth to date  No Growth to  date  No Growth to date  No Growth to date No Growth to date  No Growth to date  No Growth to date  No Growth to date     CBC:   Recent Labs   Lab 02/07/24  0502 02/08/24  0432   WBC 5.91 4.95   HGB 9.9* 9.8*   HCT 31.4* 31.9*    490*     CMP:   Recent Labs   Lab 02/07/24  0502 02/08/24  0432   * 135*   K 4.5 4.3    106   CO2 16* 20*   GLU 87 107   BUN 13 19   CREATININE 0.8 0.8   CALCIUM 8.9 9.1   ANIONGAP 9 9     Microbiology Results (last 7 days)       Procedure Component Value Units Date/Time    AFB Culture & Smear [[id number]] Collected: 02/07/24 1423    Order Status: Completed Specimen: Abscess from Back Updated: 02/08/24 1152     AFB CULTURE STAIN No acid fast bacilli seen.    Narrative:      L psoas    AFB Culture & Smear [[id number]] Collected: 02/07/24 1413    Order Status: Completed Specimen: Joint Fluid from Back Updated: 02/08/24 1152     AFB CULTURE STAIN No acid fast bacilli seen.    Culture, Anaerobe [[id number]] Collected: 02/07/24 1423    Order Status: Completed Specimen: Abscess from Back Updated: 02/08/24 0852     Anaerobic Culture Culture in progress    Narrative:      L psoas    Aerobic culture [[id number]] Collected: 02/07/24 1423    Order Status: Completed Specimen: Abscess from Back Updated: 02/08/24 0718     Aerobic Bacterial Culture No growth    Narrative:      L psoas    Gram stain [[id number]] Collected: 02/07/24 1413    Order Status: Completed Specimen: Joint Fluid from Back Updated: 02/08/24 0041     Gram Stain Result No WBC's      No organisms seen    Fungus culture [[id number]] Collected: 02/07/24 1413    Order Status: Sent Specimen: Joint Fluid from Back Updated: 02/07/24 2110    Aerobic culture [[id number]] Collected: 02/07/24 1412    Order Status: Sent Specimen: Bone from Back Updated: 02/07/24 2109    Culture, Anaerobe [[id number]] Collected: 02/07/24 1411    Order Status: Sent Specimen: Joint Fluid from Back Updated: 02/07/24 2109    Blood culture #2  **CANNOT BE ORDERED STAT** [[id number]] Collected: 02/04/24 1822    Order Status: Completed Specimen: Blood from Peripheral, Forearm, Left Updated: 02/07/24 2012     Blood Culture, Routine No Growth to date      No Growth to date      No Growth to date      No Growth to date    Blood culture #1 **CANNOT BE ORDERED STAT** [[id number]] Collected: 02/04/24 1853    Order Status: Completed Specimen: Blood from Peripheral, Forearm, Left Updated: 02/07/24 2012     Blood Culture, Routine No Growth to date      No Growth to date      No Growth to date      No Growth to date    Gram stain [[id number]] Collected: 02/07/24 1423    Order Status: Completed Specimen: Abscess from Back Updated: 02/07/24 1934     Gram Stain Result Few WBC's      No organisms seen    Narrative:      L psoas    Fungus culture [[id number]] Collected: 02/07/24 1423    Order Status: Sent Specimen: Abscess from Back Updated: 02/07/24 1733    Culture, Anaerobe [[id number]]     Order Status: No result Specimen: Abscess from Sacrum     Aerobic culture [[id number]]     Order Status: No result Specimen: Abscess from Sacrum     AFB Culture & Smear [[id number]]     Order Status: No result Specimen: Abscess from Sacrum     Gram stain [[id number]]     Order Status: No result Specimen: Abscess from Sacrum     Fungus culture [[id number]]     Order Status: No result Specimen: Abscess from Sacrum     Culture, Anaerobe [[id number]]     Order Status: No result Specimen: Abscess     Aerobic culture [[id number]]     Order Status: No result Specimen: Abscess     Fungus culture [[id number]]     Order Status: No result Specimen: Abscess     AFB Culture & Smear [[id number]]     Order Status: No result Specimen: Abscess     Gram stain [[id number]]     Order Status: No result Specimen: Abscess           Pathology Results  (Last 10 years)      None          All pertinent labs within the past 24 hours have been reviewed.    Significant Imaging: I have reviewed all pertinent  imaging results/findings within the past 24 hours.    I have personally reviewed records / hospital notes from   service and other specialty providers. I have also reviewed CBC, CMP/BMP,  cultures and imaging with my interpretation as documented in my assessment / plan.    Patient is high risk for infectious complications given pt's age, multiple co-morbidities, and case complexity.      Time: 50 minutes   50% of time spent on face-to-face counseling and coordination of care. Counseling included review of test results, diagnosis, and treatment plan with patient and/or family.

## 2024-02-09 NOTE — ASSESSMENT & PLAN NOTE
I have reviewed hospital notes from   service and other specialty providers. I have also reviewed CBC, CMP/BMP,  cultures and imaging with my interpretation as documented.      28M with h/o IVDU (injects heroin, last used 02/03), with associated complex infectious h/o of disseminated MRSA bacteremia, and multiple septic joints / abscesses, c/b poor adherence, and deferring care / leaving AMA.  - Pt returns now (02/04) for progression of chronic back pain with radiculopathy (numbness / pain) to legs B/L.     MRI L-spine / pelvis (02/04): showed osteomyelitis/discitis of L5-S1, osteomyelitis of sacrum & L iliac bone, probable septic arthritis of L SI joint & lower lumbar spine facet joints; worsening anterolisthesis of L5 with respect to S1 with increased size of epidural abscess at the level of L5-S1 and probable severe central canal stenosis at this level, along w/ additional abscesses in the L iliopsoas muscle, paraspinal muscles, pelvis, & L sacrum; -- the additional abscesses in the left iliopsoas muscle [3.4 x 1.7cm; with other micro-abscesses adjacent measuring 2.3cm), paraspinal muscles, pelvis, and left sacrum. Additional abscess in the superior rectal/presacral region measuring roughly 3.1 x 2.4 cm; and probable additional micro abscesses at left sacrum.  CT lumbar obtained with destructive changes at L5-S1.       NSGY plans for OR 2/14 for L4-pelvis fusion with debridement.  IR consulted; s/p IR aspirtion (02/07) of left SI joint (yeilding only 0.1 cc fluid) and psoas fluid collection (yielding 4 mL SS fluid); sent for micro.   -- IR cxs (02/07) NGTD.  Post-op 02/08, started on Iv-vanc / ctx. Pt remains AF, VSS, HDS. CRP 33. Bld cxs NGTD.      Recommendations / Plan:  Continue Iv-vanc and empiric CTX.  Inpatient / (infusion if outpt) pharmD to dose vanc with target trough level of 15-20.   Will f/u IR cxs and adjust abx accordingly.  Anticipate abx duration of at-least 6-8wks s/p NSGY debridement (02/14).  [Consider 8wks considering likely incomplete source control multiple abscess of pelvis / sacrum].   Pt will require placement for IV-abx, PT, rehab.     -- Discussed with ID staff and primary team   -- ID will continue to follow w/ further recs.

## 2024-02-09 NOTE — ASSESSMENT & PLAN NOTE
28M w/ hx MRSA bactermia, IVDU, pelvic/intra-abdominal abscesses who presents with progressive back pain with radiation down the leg over the past couple of weeks. Motor intact.     MRI L Sp w/wo 2/4: L5 epidural absess with central stenosis, additional abscesses inferior to sacrum, in psoas. spondylodiscitis at L5-S1     - admitted to   - Neuro exam stable  - No emergent neurosurgical intervention warranted at this time  - Neuro checks q4h  - All pertinent labs and imaging reviewed  - 2/4 ESR 75, CRP 33.7. Blood cx NGTD.   - MRI lumbar w/wo 2/4: L5-S1 osteodiscitis extending into the sacrum, left iliac bone, SI joint, facet joints. Anterolisthesis L5-S1, epidural abscess L5-S1 with canal stenosis. Left psoas and paraspinal muscle abscesses. No clinical evidence of cauda equina syndrome.   - CT lumbar obtained with destructive changes at L5-S1.   - ID following and started patient on abx following IR bx. Currently on vanc + rocephin.   - Voiding spontaneously. Bladder scan prn.   - Plan for OR 2/14 for L4-pelvis fusion with debridement.  - Discussed with Dr. Pozo  - Contact with acute changes in exam

## 2024-02-09 NOTE — ASSESSMENT & PLAN NOTE
Osteomyelitis, discitis, & myositis of lumbosacral region  Septic arthritis of SI joint(s)  Epidural abscesses   Multiple abscesses of pelvis, sacrum, psoas, iliacus, & paraspinals  Lower back pain w/ b/l sciatica   Severe spinal canal stenosis   Hx of known spinal osteomyelitis, various abscesses, & MRSA bacteremia i/s/o IVDU. Presented w/ progressive lower back & abdominal pain w/ assoc LE weakness & sensory changes. HDS & 0/4 SIRS on admission. MRI showed osteomyelitis/discitis of L5-S1, osteomyelitis of sacrum & L iliac bone, probable septic arthritis of L SI joint & lower lumbar spine facet joints, worsening anterolisthesis of L5 with respect to S1 with increased size of epidural abscess at the level of L5-S1 and probable severe central canal stenosis at this level, along w/ additional abscesses in the L iliopsoas muscle, paraspinal muscles, pelvis, & L sacrum. CT L-spine w/ similar findings. No urinary or bowel sx retention/incontinence. Received single dose of Zosyn in ED on arrival, but given that pt HDS w/o sepsis, further abx held to optimize intra-op cx/pathology.   - Neurosurgery consulted; appreciate recs/assistance   - Plan for OR 2/14 for L4-pelvis fusion with debridement.   - IR consulted for potential aspiration of abscess(es); appreciate assistance   - Plan for aspiration of psoas abscess & SI joint 2/7   - ID consulted; appreciate recs  - Ortho consulted for mgmt of septic SI joint; appreciate recs/assistance  - Holding antibiotics given absence of sepsis  - Multimodal analgesia   - PT/OT

## 2024-02-09 NOTE — SUBJECTIVE & OBJECTIVE
Interval History: No acute changes. Afebrile. Patient has no new complaints. Awaiting surgery next week  Cultures with SA, susceptibility pending    Review of Systems  Objective:     Vital Signs (Most Recent):  Temp: 97.5 °F (36.4 °C) (02/09/24 1245)  Pulse: 102 (02/09/24 1245)  Resp: 20 (02/09/24 1503)  BP: (!) 111/55 (02/09/24 1245)  SpO2: 100 % (02/09/24 1245) Vital Signs (24h Range):  Temp:  [97.5 °F (36.4 °C)-99.7 °F (37.6 °C)] 97.5 °F (36.4 °C)  Pulse:  [] 102  Resp:  [16-20] 20  SpO2:  [99 %-100 %] 100 %  BP: (111-135)/(55-73) 111/55     Weight: 76.7 kg (169 lb 1.5 oz)  Body mass index is 23.59 kg/m².    Intake/Output Summary (Last 24 hours) at 2/9/2024 1538  Last data filed at 2/9/2024 1056  Gross per 24 hour   Intake --   Output 1825 ml   Net -1825 ml         Physical Exam  Constitutional:       Appearance: He is normal weight. He is not toxic-appearing.   HENT:      Head: Normocephalic and atraumatic.   Eyes:      Conjunctiva/sclera: Conjunctivae normal.   Cardiovascular:      Rate and Rhythm: Normal rate and regular rhythm.      Heart sounds: Normal heart sounds.   Pulmonary:      Effort: Pulmonary effort is normal. No respiratory distress.      Breath sounds: Normal breath sounds. No wheezing.   Abdominal:      General: Bowel sounds are normal. There is no distension.      Palpations: Abdomen is soft.      Tenderness: There is no abdominal tenderness. There is no guarding.   Skin:     General: Skin is warm and dry.      Findings: No rash.   Neurological:      Mental Status: He is alert and oriented to person, place, and time.      Sensory: Sensory deficit (diminished sensation of BLE (R>L)) present.      Motor: No weakness.   Psychiatric:         Mood and Affect: Mood normal.         Behavior: Behavior normal.             Significant Labs: All pertinent labs within the past 24 hours have been reviewed.    Significant Imaging: I have reviewed all pertinent imaging results/findings within the past 24  hours.

## 2024-02-09 NOTE — SUBJECTIVE & OBJECTIVE
Interval History: Remains AF, VSS, HDS, wbc nml. S/p IR bx 02/07; post-op 02/08 started on Iv-vanc / CTX.     Review of Systems   Constitutional:  Positive for chills (opiod withdrawl?) and fatigue. Negative for diaphoresis and fever.   HENT:  Negative for congestion, mouth sores and sore throat.    Eyes:  Negative for pain.   Respiratory:  Positive for chest tightness. Negative for cough and shortness of breath.    Cardiovascular:  Negative for chest pain and leg swelling.   Gastrointestinal:  Positive for nausea. Negative for abdominal pain and vomiting.   Genitourinary:  Negative for difficulty urinating, dysuria and flank pain.   Musculoskeletal:  Positive for back pain. Negative for arthralgias, joint swelling, myalgias and neck pain.   Skin:  Negative for color change, rash and wound.   Neurological:  Positive for numbness. Negative for seizures and syncope.   Psychiatric/Behavioral:  Negative for confusion.      Objective:     Vital Signs (Most Recent):  Temp: 98.5 °F (36.9 °C) (02/08/24 1646)  Pulse: 74 (02/08/24 1646)  Resp: 16 (02/08/24 1841)  BP: (!) 135/59 (02/08/24 1646)  SpO2: 99 % (02/08/24 1646) Vital Signs (24h Range):  Temp:  [97.4 °F (36.3 °C)-99.5 °F (37.5 °C)] 98.5 °F (36.9 °C)  Pulse:  [49-74] 74  Resp:  [16-20] 16  SpO2:  [95 %-100 %] 99 %  BP: (111-135)/(57-85) 135/59     Weight: 76.7 kg (169 lb 1.5 oz)  Body mass index is 23.59 kg/m².    Estimated Creatinine Clearance: 143.8 mL/min (based on SCr of 0.8 mg/dL).     Physical Exam  Vitals and nursing note reviewed.   Constitutional:       General: He is not in acute distress.     Appearance: He is not toxic-appearing or diaphoretic.   HENT:      Nose: No congestion.      Mouth/Throat:      Pharynx: Oropharynx is clear.   Eyes:      General: No scleral icterus.     Conjunctiva/sclera: Conjunctivae normal.   Cardiovascular:      Rate and Rhythm: Normal rate.      Heart sounds: Normal heart sounds. No murmur heard.  Pulmonary:      Effort:  Pulmonary effort is normal. No respiratory distress.      Breath sounds: Normal breath sounds.   Abdominal:      General: Abdomen is flat. Bowel sounds are normal. There is no distension.      Palpations: Abdomen is soft.      Tenderness: There is no abdominal tenderness.   Musculoskeletal:         General: Tenderness (lumbar/sacral) present. No swelling.      Cervical back: Normal range of motion. No rigidity or tenderness.      Right lower leg: No edema.      Left lower leg: No edema.   Skin:     General: Skin is warm and dry.      Coloration: Skin is not jaundiced.      Findings: No erythema, lesion or rash.   Neurological:      Mental Status: He is alert and oriented to person, place, and time. Mental status is at baseline.      Sensory: Sensory deficit present.   Psychiatric:         Behavior: Behavior normal.         Thought Content: Thought content normal.          Significant Labs: Blood Culture:   Recent Labs   Lab 10/10/23  1827 10/14/23  1512 11/20/23  1155 02/04/24  1822 02/04/24  1853   LABBLOO Gram stain manas bottle: Gram positive cocci in clusters resembling Staph  Results called to and read back by: Dr. Childers  10/11/2023  11:26  Gram stain aer bottle: Gram positive cocci in clusters resembling Staph  Positive results previously called 10/11/2023  14:31  METHICILLIN RESISTANT STAPHYLOCOCCUS AUREUS  For susceptibility see order #[id number]  *  Gram stain aer bottle: Gram positive cocci in clusters resembling Staph  Results called to and read back by: Dr. Childers  10/11/2023  11:26  Gram stain manas bottle: Gram positive cocci in clusters resembling Staph  Positive results previously called 10/11/2023  12:57  METHICILLIN RESISTANT STAPHYLOCOCCUS AUREUS  ID consult required at [facility] [place].[facility],[place] and [facility] locations.  * Gram stain aer bottle: Gram positive cocci in clusters resembling Staph  Results called to and read back by: ADIS CAMEJO  10/15/2023  10:25  Gram stain manas bottle: Gram  positive cocci in clusters resembling Staph  Positive results previously called 10/15/2023  METHICILLIN RESISTANT STAPHYLOCOCCUS AUREUS  ID consult required at [facility] and [facility].  For susceptibility see order #[id number]  *  Gram stain aer bottle: Gram positive cocci in clusters resembling Staph  Results called to and read back by: ADIS CAMEJO  10/15/2023  10:25  Gram stain manas bottle: Gram positive cocci in clusters resembling Staph  Positive results previously called 10/15/2023  METHICILLIN RESISTANT STAPHYLOCOCCUS AUREUS  ID consult required at [facility] and [facility].  * No growth after 5 days.  No growth after 5 days. No Growth to date  No Growth to date  No Growth to date  No Growth to date No Growth to date  No Growth to date  No Growth to date  No Growth to date     CBC:   Recent Labs   Lab 02/07/24  0502 02/08/24  0432   WBC 5.91 4.95   HGB 9.9* 9.8*   HCT 31.4* 31.9*    490*     CMP:   Recent Labs   Lab 02/07/24  0502 02/08/24  0432   * 135*   K 4.5 4.3    106   CO2 16* 20*   GLU 87 107   BUN 13 19   CREATININE 0.8 0.8   CALCIUM 8.9 9.1   ANIONGAP 9 9     Microbiology Results (last 7 days)       Procedure Component Value Units Date/Time    AFB Culture & Smear [[id number]] Collected: 02/07/24 1423    Order Status: Completed Specimen: Abscess from Back Updated: 02/08/24 1152     AFB CULTURE STAIN No acid fast bacilli seen.    Narrative:      L psoas    AFB Culture & Smear [[id number]] Collected: 02/07/24 1413    Order Status: Completed Specimen: Joint Fluid from Back Updated: 02/08/24 1152     AFB CULTURE STAIN No acid fast bacilli seen.    Culture, Anaerobe [[id number]] Collected: 02/07/24 1423    Order Status: Completed Specimen: Abscess from Back Updated: 02/08/24 0852     Anaerobic Culture Culture in progress    Narrative:      L psoas    Aerobic culture [[id number]] Collected: 02/07/24 1423    Order Status: Completed Specimen:  Abscess from Back Updated: 02/08/24 0718     Aerobic Bacterial Culture No growth    Narrative:      L psoas    Gram stain [[id number]] Collected: 02/07/24 1413    Order Status: Completed Specimen: Joint Fluid from Back Updated: 02/08/24 0041     Gram Stain Result No WBC's      No organisms seen    Fungus culture [[id number]] Collected: 02/07/24 1413    Order Status: Sent Specimen: Joint Fluid from Back Updated: 02/07/24 2110    Aerobic culture [[id number]] Collected: 02/07/24 1412    Order Status: Sent Specimen: Bone from Back Updated: 02/07/24 2109    Culture, Anaerobe [[id number]] Collected: 02/07/24 1411    Order Status: Sent Specimen: Joint Fluid from Back Updated: 02/07/24 2109    Blood culture #2 **CANNOT BE ORDERED STAT** [[id number]] Collected: 02/04/24 1822    Order Status: Completed Specimen: Blood from Peripheral, Forearm, Left Updated: 02/07/24 2012     Blood Culture, Routine No Growth to date      No Growth to date      No Growth to date      No Growth to date    Blood culture #1 **CANNOT BE ORDERED STAT** [[id number]] Collected: 02/04/24 1853    Order Status: Completed Specimen: Blood from Peripheral, Forearm, Left Updated: 02/07/24 2012     Blood Culture, Routine No Growth to date      No Growth to date      No Growth to date      No Growth to date    Gram stain [[id number]] Collected: 02/07/24 1423    Order Status: Completed Specimen: Abscess from Back Updated: 02/07/24 1934     Gram Stain Result Few WBC's      No organisms seen    Narrative:      L psoas    Fungus culture [[id number]] Collected: 02/07/24 1423    Order Status: Sent Specimen: Abscess from Back Updated: 02/07/24 1733    Culture, Anaerobe [[id number]]     Order Status: No result Specimen: Abscess from Sacrum     Aerobic culture [[id number]]     Order Status: No result Specimen: Abscess from Sacrum     AFB Culture & Smear [[id number]]     Order Status: No result Specimen: Abscess from Sacrum     Gram stain [[id number]]     Order Status:  No result Specimen: Abscess from Sacrum     Fungus culture [[id number]]     Order Status: No result Specimen: Abscess from Sacrum     Culture, Anaerobe [[id number]]     Order Status: No result Specimen: Abscess     Aerobic culture [[id number]]     Order Status: No result Specimen: Abscess     Fungus culture [[id number]]     Order Status: No result Specimen: Abscess     AFB Culture & Smear [[id number]]     Order Status: No result Specimen: Abscess     Gram stain [[id number]]     Order Status: No result Specimen: Abscess           Pathology Results  (Last 10 years)      None          All pertinent labs within the past 24 hours have been reviewed.    Significant Imaging: I have reviewed all pertinent imaging results/findings within the past 24 hours.    I have personally reviewed records / hospital notes from   service and other specialty providers. I have also reviewed CBC, CMP/BMP,  cultures and imaging with my interpretation as documented in my assessment / plan.    Patient is high risk for infectious complications given pt's age, multiple co-morbidities, and case complexity.      Time: 50 minutes   50% of time spent on face-to-face counseling and coordination of care. Counseling included review of test results, diagnosis, and treatment plan with patient and/or family.

## 2024-02-09 NOTE — PROCEDURES
RAPID RESPONSE VASCULAR ACCESS NOTE       Single lumen 18G, 10CM midline placed in the left basilic vein. Needle advanced into the vessel under real time ultrasound guidance.    Max dwell date: 3/10/24   Lot number: [id number]

## 2024-02-09 NOTE — SUBJECTIVE & OBJECTIVE
"Interval History: NAEON. Neuro exam stable. Stable paresthesias to BLE. Cx pending. Pending OR next week for L4-pelvis     Medications:  Continuous Infusions:  Scheduled Meds:   acetaminophen  1,000 mg Oral Q8H    cefTRIAXone (Rocephin) IV (PEDS and ADULTS)  2 g Intravenous Q24H    cloNIDine  0.1 mg Oral Q12H    enoxparin  40 mg Subcutaneous Daily    mirtazapine  15 mg Oral QHS    nicotine  1 patch Transdermal Daily    vancomycin (VANCOCIN) IV (PEDS and ADULTS)  15 mg/kg Intravenous Q12H     PRN Meds:acetaminophen, dextrose 10%, dextrose 10%, dicyclomine, glucagon (human recombinant), glucose, glucose, HYDROmorphone, hydrOXYzine HCL, ibuprofen, loperamide, melatonin, naloxone, oxyCODONE, oxyCODONE, promethazine, sodium chloride 0.9%, Pharmacy to dose Vancomycin consult **AND** vancomycin - pharmacy to dose     Review of Systems  Objective:     Weight: 76.7 kg (169 lb 1.5 oz)  Body mass index is 23.59 kg/m².  Vital Signs (Most Recent):  Temp: 98.9 °F (37.2 °C) (02/09/24 0809)  Pulse: 63 (02/09/24 0809)  Resp: 18 (02/09/24 0853)  BP: 133/73 (02/09/24 0809)  SpO2: 100 % (02/09/24 0809) Vital Signs (24h Range):  Temp:  [98 °F (36.7 °C)-98.9 °F (37.2 °C)] 98.9 °F (37.2 °C)  Pulse:  [63-76] 63  Resp:  [16-20] 18  SpO2:  [99 %-100 %] 100 %  BP: (112-135)/(58-73) 133/73                                 Physical Exam         Neurosurgery Physical Exam    Significant Labs:  Recent Labs   Lab 02/08/24  0432 02/09/24  0234    99   * 135*   K 4.3 4.5    106   CO2 20* 22*   BUN 19 15   CREATININE 0.8 0.8   CALCIUM 9.1 8.7     Recent Labs   Lab 02/08/24  0432 02/09/24  0234   WBC 4.95 6.53   HGB 9.8* 8.9*   HCT 31.9* 30.2*   * 455*     No results for input(s): "LABPT", "INR", "APTT" in the last 48 hours.  Microbiology Results (last 7 days)       Procedure Component Value Units Date/Time    Culture, Anaerobe [[id number]] Collected: 02/07/24 1423    Order Status: Completed Specimen: Abscess from Back " Updated: 02/09/24 0934     Anaerobic Culture Culture in progress    Narrative:      L psoas    AFB Culture & Smear [[id number]] Collected: 02/07/24 1413    Order Status: Completed Specimen: Joint Fluid from Back Updated: 02/09/24 0927     AFB Culture & Smear Culture in progress     AFB CULTURE STAIN No acid fast bacilli seen.    Aerobic culture [[id number]] Collected: 02/07/24 1412    Order Status: Completed Specimen: Bone from Back Updated: 02/09/24 0745     Aerobic Bacterial Culture No growth    Narrative:      Joint    Culture, Anaerobe [[id number]] Collected: 02/07/24 1411    Order Status: Completed Specimen: Joint Fluid from Back Updated: 02/09/24 0728     Anaerobic Culture Culture in progress    AFB Culture & Smear [[id number]] Collected: 02/07/24 1423    Order Status: Completed Specimen: Abscess from Back Updated: 02/08/24 2127     AFB Culture & Smear Culture in progress     AFB CULTURE STAIN No acid fast bacilli seen.    Narrative:      L psoas    Blood culture #1 **CANNOT BE ORDERED STAT** [[id number]] Collected: 02/04/24 1853    Order Status: Completed Specimen: Blood from Peripheral, Forearm, Left Updated: 02/08/24 2012     Blood Culture, Routine No Growth to date      No Growth to date      No Growth to date      No Growth to date      No Growth to date    Blood culture #2 **CANNOT BE ORDERED STAT** [[id number]] Collected: 02/04/24 1822    Order Status: Completed Specimen: Blood from Peripheral, Forearm, Left Updated: 02/08/24 2012     Blood Culture, Routine No Growth to date      No Growth to date      No Growth to date      No Growth to date      No Growth to date    Aerobic culture [[id number]] Collected: 02/07/24 1423    Order Status: Completed Specimen: Abscess from Back Updated: 02/08/24 0718     Aerobic Bacterial Culture No growth    Narrative:      L psoas    Gram stain [[id number]] Collected: 02/07/24 1413    Order Status: Completed Specimen: Joint Fluid from Back Updated: 02/08/24 0041     Gram  Stain Result No WBC's      No organisms seen    Fungus culture [[id number]] Collected: 02/07/24 1413    Order Status: Sent Specimen: Joint Fluid from Back Updated: 02/07/24 2110    Gram stain [[id number]] Collected: 02/07/24 1423    Order Status: Completed Specimen: Abscess from Back Updated: 02/07/24 1934     Gram Stain Result Few WBC's      No organisms seen    Narrative:      L psoas    Fungus culture [[id number]] Collected: 02/07/24 1423    Order Status: Sent Specimen: Abscess from Back Updated: 02/07/24 1733    Culture, Anaerobe [[id number]]     Order Status: No result Specimen: Abscess from Sacrum     Aerobic culture [[id number]]     Order Status: No result Specimen: Abscess from Sacrum     AFB Culture & Smear [[id number]]     Order Status: No result Specimen: Abscess from Sacrum     Gram stain [[id number]]     Order Status: No result Specimen: Abscess from Sacrum     Fungus culture [[id number]]     Order Status: No result Specimen: Abscess from Sacrum     Culture, Anaerobe [[id number]]     Order Status: No result Specimen: Abscess     Aerobic culture [[id number]]     Order Status: No result Specimen: Abscess     Fungus culture [[id number]]     Order Status: No result Specimen: Abscess     AFB Culture & Smear [[id number]]     Order Status: No result Specimen: Abscess     Gram stain [[id number]]     Order Status: No result Specimen: Abscess             Significant Diagnostics:

## 2024-02-09 NOTE — PROGRESS NOTES
Aleksandr Bray - Neurosurgery ([facility])  Neurosurgery  Progress Note    Subjective:     History of Present Illness: 28M w/ hx MRSA bactermia, IVDU, pelvic/intra-abdominal abscesses who presents with progressive back pain with radiation down the leg over the past couple of weeks.  He has had bilateral numbness of his anterior thigh as well. He denies bowel/bladder sx or saddle anesthesia. He denies focal weakness. Per chart review, the patient is actively using heroin and did an injection prior to being taken in by EMS.      Post-Op Info:  Procedure(s) (LRB):  **AIRO** FUSION, SPINE, LUMBAR (N/A)       Interval History: NAEON. Neuro exam stable. Stable paresthesias to BLE. Cx pending. Pending OR next week for L4-pelvis     Medications:  Continuous Infusions:  Scheduled Meds:   acetaminophen  1,000 mg Oral Q8H    cefTRIAXone (Rocephin) IV (PEDS and ADULTS)  2 g Intravenous Q24H    cloNIDine  0.1 mg Oral Q12H    enoxparin  40 mg Subcutaneous Daily    mirtazapine  15 mg Oral QHS    nicotine  1 patch Transdermal Daily    vancomycin (VANCOCIN) IV (PEDS and ADULTS)  15 mg/kg Intravenous Q12H     PRN Meds:acetaminophen, dextrose 10%, dextrose 10%, dicyclomine, glucagon (human recombinant), glucose, glucose, HYDROmorphone, hydrOXYzine HCL, ibuprofen, loperamide, melatonin, naloxone, oxyCODONE, oxyCODONE, promethazine, sodium chloride 0.9%, Pharmacy to dose Vancomycin consult **AND** vancomycin - pharmacy to dose     Review of Systems  Objective:     Weight: 76.7 kg (169 lb 1.5 oz)  Body mass index is 23.59 kg/m².  Vital Signs (Most Recent):  Temp: 98.9 °F (37.2 °C) (02/09/24 0809)  Pulse: 63 (02/09/24 0809)  Resp: 18 (02/09/24 0853)  BP: 133/73 (02/09/24 0809)  SpO2: 100 % (02/09/24 0809) Vital Signs (24h Range):  Temp:  [98 °F (36.7 °C)-98.9 °F (37.2 °C)] 98.9 °F (37.2 °C)  Pulse:  [63-76] 63  Resp:  [16-20] 18  SpO2:  [99 %-100 %] 100 %  BP: (112-135)/(58-73) 133/73                                 Physical Exam    "      Neurosurgery Physical Exam    Significant Labs:  Recent Labs   Lab 02/08/24  0432 02/09/24  0234    99   * 135*   K 4.3 4.5    106   CO2 20* 22*   BUN 19 15   CREATININE 0.8 0.8   CALCIUM 9.1 8.7     Recent Labs   Lab 02/08/24  0432 02/09/24  0234   WBC 4.95 6.53   HGB 9.8* 8.9*   HCT 31.9* 30.2*   * 455*     No results for input(s): "LABPT", "INR", "APTT" in the last 48 hours.  Microbiology Results (last 7 days)       Procedure Component Value Units Date/Time    Culture, Anaerobe [[id number]] Collected: 02/07/24 1423    Order Status: Completed Specimen: Abscess from Back Updated: 02/09/24 0934     Anaerobic Culture Culture in progress    Narrative:      L psoas    AFB Culture & Smear [[id number]] Collected: 02/07/24 1413    Order Status: Completed Specimen: Joint Fluid from Back Updated: 02/09/24 0927     AFB Culture & Smear Culture in progress     AFB CULTURE STAIN No acid fast bacilli seen.    Aerobic culture [[id number]] Collected: 02/07/24 1412    Order Status: Completed Specimen: Bone from Back Updated: 02/09/24 0745     Aerobic Bacterial Culture No growth    Narrative:      Joint    Culture, Anaerobe [[id number]] Collected: 02/07/24 1411    Order Status: Completed Specimen: Joint Fluid from Back Updated: 02/09/24 0728     Anaerobic Culture Culture in progress    AFB Culture & Smear [[id number]] Collected: 02/07/24 1423    Order Status: Completed Specimen: Abscess from Back Updated: 02/08/24 2127     AFB Culture & Smear Culture in progress     AFB CULTURE STAIN No acid fast bacilli seen.    Narrative:      L psoas    Blood culture #1 **CANNOT BE ORDERED STAT** [[id number]] Collected: 02/04/24 1853    Order Status: Completed Specimen: Blood from Peripheral, Forearm, Left Updated: 02/08/24 2012     Blood Culture, Routine No Growth to date      No Growth to date      No Growth to date      No Growth to date      No Growth to date    Blood culture #2 **CANNOT BE ORDERED STAT** " [[id number]] Collected: 02/04/24 1822    Order Status: Completed Specimen: Blood from Peripheral, Forearm, Left Updated: 02/08/24 2012     Blood Culture, Routine No Growth to date      No Growth to date      No Growth to date      No Growth to date      No Growth to date    Aerobic culture [[id number]] Collected: 02/07/24 1423    Order Status: Completed Specimen: Abscess from Back Updated: 02/08/24 0718     Aerobic Bacterial Culture No growth    Narrative:      L psoas    Gram stain [[id number]] Collected: 02/07/24 1413    Order Status: Completed Specimen: Joint Fluid from Back Updated: 02/08/24 0041     Gram Stain Result No WBC's      No organisms seen    Fungus culture [[id number]] Collected: 02/07/24 1413    Order Status: Sent Specimen: Joint Fluid from Back Updated: 02/07/24 2110    Gram stain [[id number]] Collected: 02/07/24 1423    Order Status: Completed Specimen: Abscess from Back Updated: 02/07/24 1934     Gram Stain Result Few WBC's      No organisms seen    Narrative:      L psoas    Fungus culture [[id number]] Collected: 02/07/24 1423    Order Status: Sent Specimen: Abscess from Back Updated: 02/07/24 1733    Culture, Anaerobe [[id number]]     Order Status: No result Specimen: Abscess from Sacrum     Aerobic culture [[id number]]     Order Status: No result Specimen: Abscess from Sacrum     AFB Culture & Smear [[id number]]     Order Status: No result Specimen: Abscess from Sacrum     Gram stain [[id number]]     Order Status: No result Specimen: Abscess from Sacrum     Fungus culture [[id number]]     Order Status: No result Specimen: Abscess from Sacrum     Culture, Anaerobe [[id number]]     Order Status: No result Specimen: Abscess     Aerobic culture [[id number]]     Order Status: No result Specimen: Abscess     Fungus culture [[id number]]     Order Status: No result Specimen: Abscess     AFB Culture & Smear [[id number]]     Order Status: No result Specimen: Abscess     Gram stain [[id number]]     Order  Status: No result Specimen: Abscess             Significant Diagnostics:    Assessment/Plan:     * Osteomyelitis of vertebra of lumbosacral region  28M w/ hx MRSA bactermia, IVDU, pelvic/intra-abdominal abscesses who presents with progressive back pain with radiation down the leg over the past couple of weeks. Motor intact.     MRI L Sp w/wo 2/4: L5 epidural absess with central stenosis, additional abscesses inferior to sacrum, in psoas. spondylodiscitis at L5-S1     - admitted to   - Neuro exam stable  - No emergent neurosurgical intervention warranted at this time  - Neuro checks q4h  - All pertinent labs and imaging reviewed  - 2/4 ESR 75, CRP 33.7. Blood cx NGTD.   - MRI lumbar w/wo 2/4: L5-S1 osteodiscitis extending into the sacrum, left iliac bone, SI joint, facet joints. Anterolisthesis L5-S1, epidural abscess L5-S1 with canal stenosis. Left psoas and paraspinal muscle abscesses. No clinical evidence of cauda equina syndrome.   - CT lumbar obtained with destructive changes at L5-S1.   - ID following and started patient on abx following IR bx. Currently on vanc + rocephin.   - Voiding spontaneously. Bladder scan prn.   - Plan for OR 2/14 for L4-pelvis fusion with debridement.  - Discussed with Dr. Pozo  - Contact with acute changes in exam          Karina Lozada PA-C  Neurosurgery  [facility]ray - Neurosurgery ([facility])

## 2024-02-09 NOTE — PT/OT/SLP PROGRESS
"Occupational Therapy   Treatment    Name: Ton Donovan  MRN: [id number]  Admitting Diagnosis:  Osteomyelitis of vertebra of lumbosacral region       Recommendations:     Discharge Recommendations: High Intensity Therapy  Discharge Equipment Recommendations:  walker, rolling, bedside commode  Barriers to discharge:  None    Assessment:     Ton Donovan is a 28 y.o. male with a medical diagnosis of Osteomyelitis of vertebra of lumbosacral region.  He presents with the following performance deficits affecting function: weakness, impaired endurance, impaired self care skills, impaired functional mobility, gait instability, decreased lower extremity function, impaired fine motor. Pt willing to participate and tolerated well overall. Pt anticipated pain during t/f's but able to complete without assistance. Pt demonstrated improved activity tolerance standing at sink to brush teeth, wash face and is motivated to progress with therapy.    Rehab Prognosis:  Good; patient would benefit from acute skilled OT services to address these deficits and reach maximum level of function.       Plan:     Patient to be seen 4 x/week to address the above listed problems via self-care/home management, therapeutic activities, therapeutic exercises, neuromuscular re-education  Plan of Care Expires: 03/07/24  Plan of Care Reviewed with: patient    Subjective     Chief Complaint: back pain  Patient/Family Comments/goals: "I've been in bed 5 days I've never been like this."  Pain/Comfort:  Pain Rating 1: 8/10  Location - Orientation 1: generalized  Location 1: back  Pain Addressed 1: Pre-medicate for activity, Reposition    Objective:     Communicated with: RN prior to session.  Patient found supine with bed alarm upon OT entry to room.    General Precautions: Standard, fall    Orthopedic Precautions:N/A  Braces: N/A  Respiratory Status: Room air     Occupational Performance:     Bed Mobility:    Patient completed Rolling/Turning to Left with "  stand by assistance  Patient completed Scooting/Bridging with stand by assistance  Patient completed Supine to Sit with stand by assistance  Patient completed Sit to Supine with stand by assistance     Functional Mobility/Transfers:  Patient completed Sit <> Stand Transfer with stand by assistance  with  rolling walker   Functional Mobility: from bed to sink back to bed; CGA c/ RW; pt walking on toes c/ extended lumbar spine posture 2/2 to anticipating back pain; min vc's for walker management    Activities of Daily Living:  Grooming: contact guard assistance brushed teeth, washed face, used wipes to clean upper body; stood at sink ~12 min; no LOB      AMPAC 6 Click ADL: 17    Treatment & Education:  Pt edu on role of OT, POC, safety when performing self care tasks , benefit of performing OOB activity, and safety when performing functional transfers and mobility.  - White board updated  - Self care tasks completed-- as noted above      Patient left supine with all lines intact, call button in reach, bed alarm on, and RN notified    GOALS:   Multidisciplinary Problems       Occupational Therapy Goals          Problem: Occupational Therapy    Goal Priority Disciplines Outcome Interventions   Occupational Therapy Goal     OT, PT/OT Ongoing, Progressing    Description: Goals to be met by: 3/7/23     Patient will increase functional independence with ADLs by performing:    LE Dressing with Minimal Assistance.  Grooming while standing with Moderate Assistance.  Toileting from toilet with Moderate Assistance for hygiene and clothing management.   Stand pivot transfers with Minimal Assistance.  Step transfer with Minimal Assistance                         Time Tracking:     OT Date of Treatment: 02/09/24  OT Start Time: 1458  OT Stop Time: 1525  OT Total Time (min): 27 min    Billable Minutes:Self Care/Home Management 12  Therapeutic Activity 15    OT/ASHLEY: OT          2/9/2024

## 2024-02-09 NOTE — PT/OT/SLP PROGRESS
Physical Therapy Treatment    Patient Name:  Ton Donovan   MRN:  [id number]    Recommendations:     Discharge Recommendations: High Intensity Therapy  Discharge Equipment Recommendations: walker, rolling, bedside commode  Barriers to discharge: Inaccessible home and Decreased caregiver support    Assessment:     Ton Donovan is a 28 y.o. male admitted with a medical diagnosis of Osteomyelitis of vertebra of lumbosacral region.  He presents with the following impairments/functional limitations: weakness, impaired endurance, impaired functional mobility, gait instability, decreased coordination, decreased lower extremity function, impaired fine motor.  Pt was agreeable and tolerated session well. Pt demonstrated better bed mobility and increase total gait distance today. Pt was initially more unsteady, but improved to more fluid gait towards the end. Pt is progressing well and has demonstrated sufficient progression to warrant high intensity therapy evidenced by objectives noted below.    Rehab Prognosis: Good; patient would benefit from acute skilled PT services to address these deficits and reach maximum level of function.    Recent Surgery: Procedure(s) (LRB):  **AIRO** FUSION, SPINE, LUMBAR (N/A)      Plan:     During this hospitalization, patient to be seen 4 x/week to address the identified rehab impairments via gait training, therapeutic activities, therapeutic exercises, neuromuscular re-education and progress toward the following goals:    Plan of Care Expires:  03/07/24    Subjective     Chief Complaint: pain   Patient/Family Comments/goals: to improve   Pain/Comfort:  Pain Rating 1: 2/10  Location - Orientation 1: generalized  Location 1: back  Pain Addressed 1: Pre-medicate for activity, Reposition  Pain Rating Post-Intervention 1: 2/10      Objective:     Communicated with RN prior to session.  Patient found supine with bed alarm (no active lines) upon PT entry to room.     General Precautions:  Standard, fall  Orthopedic Precautions: N/A  Braces: N/A  Respiratory Status: Room air     Functional Mobility:  Additional staff present: N/A  Bed Mobility:   Scooting   to HOB: via supine bridge: minimum assistance  to EOB: minimum assistance  Supine to Sit: minimum assistance; HOB elevated  Assisted with trunk management   Sit to Supine: minimum assistance  Assisted with LE management   Transfers:   Sit <> Stand Transfer: contact guard assistance with rolling walker   Cues to push from bed/chair for safety   Gait:  Pt ambulated ~120 ft with minimum assistance and rolling walker. Bedside chair following   Occasional standing rest break and no overt LOB  Donned gown on backside   Gait Deviation(s): initially more unsteady step to gait, but improve for a more fluid and steadier gait with decrease samantha and decrease heel strike and decrease step length   Verbal/tactile cues for pacing as needed     AM-PAC 6 CLICK MOBILITY  Turning over in bed (including adjusting bedclothes, sheets and blankets)?: 3  Sitting down on and standing up from a chair with arms (e.g., wheelchair, bedside commode, etc.): 3  Moving from lying on back to sitting on the side of the bed?: 3  Moving to and from a bed to a chair (including a wheelchair)?: 3  Need to walk in hospital room?: 3  Climbing 3-5 steps with a railing?: 2  Basic Mobility Total Score: 17       Treatment & Education:  Supine BLE therex x10 reps: ankle pumps, glut sets, heel slides, hip abduction  Patient educated on role of therapy, goals of session, and benefits of out of bed mobility.   Instructed on use of call button and importance of calling nursing staff for assistance with mobility   Questions/concerns addressed within PTA scope of practice  Pt verbalized understanding.      Patient left HOB elevated with all lines intact, call button in reach, and bed alarm on..    GOALS:   Multidisciplinary Problems       Physical Therapy Goals          Problem: Physical Therapy     Goal Priority Disciplines Outcome Goal Variances Interventions   Physical Therapy Goal     PT, PT/OT Ongoing, Progressing     Description: Goals to be met by: 24     Patient will increase functional independence with mobility by performin. Supine to sit with Supervision  2. Sit to supine with Supervision  3. Sit to stand transfer with Supervision  4. Bed to chair transfer with Supervision using LRAD as needed  5. Gait  x 150 feet with Supervision using LRAD as needed.   6. Ascend/descend 5 stair with right Handrails and Supervision  7. Lower extremity exercise program x15 reps per handout, with assistance as needed                         Time Tracking:     PT Received On: 24  PT Start Time: 1123     PT Stop Time: 1146  PT Total Time (min): 23 min     Billable Minutes: Gait Training 12 and Therapeutic Exercise 11    Treatment Type: Treatment  PT/PTA: PTA     Number of PTA visits since last PT visit: 2024

## 2024-02-10 LAB
ANION GAP SERPL CALC-SCNC: 7 MMOL/L (ref 8–16)
BUN SERPL-MCNC: 20 MG/DL (ref 6–20)
CALCIUM SERPL-MCNC: 8.8 MG/DL (ref 8.7–10.5)
CHLORIDE SERPL-SCNC: 105 MMOL/L (ref 95–110)
CO2 SERPL-SCNC: 26 MMOL/L (ref 23–29)
CREAT SERPL-MCNC: 0.9 MG/DL (ref 0.5–1.4)
ERYTHROCYTE [DISTWIDTH] IN BLOOD BY AUTOMATED COUNT: 19.4 % (ref 11.5–14.5)
EST. GFR  (NO RACE VARIABLE): >60 ML/MIN/1.73 M^2
GLUCOSE SERPL-MCNC: 98 MG/DL (ref 70–110)
HCT VFR BLD AUTO: 32.9 % (ref 40–54)
HGB BLD-MCNC: 9.6 G/DL (ref 14–18)
MCH RBC QN AUTO: 20.8 PG (ref 27–31)
MCHC RBC AUTO-ENTMCNC: 29.2 G/DL (ref 32–36)
MCV RBC AUTO: 71 FL (ref 82–98)
PLATELET # BLD AUTO: 471 K/UL (ref 150–450)
PMV BLD AUTO: 9 FL (ref 9.2–12.9)
POTASSIUM SERPL-SCNC: 4.7 MMOL/L (ref 3.5–5.1)
RBC # BLD AUTO: 4.61 M/UL (ref 4.6–6.2)
SODIUM SERPL-SCNC: 138 MMOL/L (ref 136–145)
VANCOMYCIN TROUGH SERPL-MCNC: 11.7 UG/ML (ref 10–22)
WBC # BLD AUTO: 6.92 K/UL (ref 3.9–12.7)

## 2024-02-10 PROCEDURE — 25000003 PHARM REV CODE 250: Performed by: EMERGENCY MEDICINE

## 2024-02-10 PROCEDURE — 99232 SBSQ HOSP IP/OBS MODERATE 35: CPT | Mod: ,,, | Performed by: STUDENT IN AN ORGANIZED HEALTH CARE EDUCATION/TRAINING PROGRAM

## 2024-02-10 PROCEDURE — 25000003 PHARM REV CODE 250: Performed by: HOSPITALIST

## 2024-02-10 PROCEDURE — 63600175 PHARM REV CODE 636 W HCPCS: Performed by: STUDENT IN AN ORGANIZED HEALTH CARE EDUCATION/TRAINING PROGRAM

## 2024-02-10 PROCEDURE — 85027 COMPLETE CBC AUTOMATED: CPT | Performed by: STUDENT IN AN ORGANIZED HEALTH CARE EDUCATION/TRAINING PROGRAM

## 2024-02-10 PROCEDURE — S4991 NICOTINE PATCH NONLEGEND: HCPCS | Performed by: HOSPITALIST

## 2024-02-10 PROCEDURE — 11000001 HC ACUTE MED/SURG PRIVATE ROOM

## 2024-02-10 PROCEDURE — 25000003 PHARM REV CODE 250: Performed by: STUDENT IN AN ORGANIZED HEALTH CARE EDUCATION/TRAINING PROGRAM

## 2024-02-10 PROCEDURE — 80202 ASSAY OF VANCOMYCIN: CPT | Performed by: STUDENT IN AN ORGANIZED HEALTH CARE EDUCATION/TRAINING PROGRAM

## 2024-02-10 PROCEDURE — 80048 BASIC METABOLIC PNL TOTAL CA: CPT | Performed by: STUDENT IN AN ORGANIZED HEALTH CARE EDUCATION/TRAINING PROGRAM

## 2024-02-10 RX ADMIN — ENOXAPARIN SODIUM 40 MG: 40 INJECTION SUBCUTANEOUS at 04:02

## 2024-02-10 RX ADMIN — HYDROMORPHONE HYDROCHLORIDE 1 MG: 1 INJECTION, SOLUTION INTRAMUSCULAR; INTRAVENOUS; SUBCUTANEOUS at 10:02

## 2024-02-10 RX ADMIN — VANCOMYCIN HYDROCHLORIDE 1500 MG: 1.5 INJECTION, POWDER, LYOPHILIZED, FOR SOLUTION INTRAVENOUS at 08:02

## 2024-02-10 RX ADMIN — IBUPROFEN 600 MG: 600 TABLET ORAL at 03:02

## 2024-02-10 RX ADMIN — VANCOMYCIN HYDROCHLORIDE 1500 MG: 1.5 INJECTION, POWDER, LYOPHILIZED, FOR SOLUTION INTRAVENOUS at 10:02

## 2024-02-10 RX ADMIN — NICOTINE 1 PATCH: 14 PATCH, EXTENDED RELEASE TRANSDERMAL at 10:02

## 2024-02-10 RX ADMIN — ACETAMINOPHEN 1000 MG: 500 TABLET ORAL at 10:02

## 2024-02-10 RX ADMIN — CEFTRIAXONE SODIUM 2 G: 2 INJECTION, POWDER, FOR SOLUTION INTRAMUSCULAR; INTRAVENOUS at 05:02

## 2024-02-10 RX ADMIN — MELATONIN TAB 3 MG 6 MG: 3 TAB at 10:02

## 2024-02-10 RX ADMIN — CLONIDINE HYDROCHLORIDE 0.1 MG: 0.1 TABLET ORAL at 08:02

## 2024-02-10 RX ADMIN — CLONIDINE HYDROCHLORIDE 0.1 MG: 0.1 TABLET ORAL at 10:02

## 2024-02-10 RX ADMIN — OXYCODONE HYDROCHLORIDE 10 MG: 10 TABLET ORAL at 08:02

## 2024-02-10 RX ADMIN — HYDROMORPHONE HYDROCHLORIDE 1 MG: 1 INJECTION, SOLUTION INTRAMUSCULAR; INTRAVENOUS; SUBCUTANEOUS at 04:02

## 2024-02-10 RX ADMIN — OXYCODONE HYDROCHLORIDE 10 MG: 10 TABLET ORAL at 01:02

## 2024-02-10 RX ADMIN — MIRTAZAPINE 15 MG: 15 TABLET, FILM COATED ORAL at 10:02

## 2024-02-10 RX ADMIN — ACETAMINOPHEN 1000 MG: 500 TABLET ORAL at 01:02

## 2024-02-10 RX ADMIN — OXYCODONE HYDROCHLORIDE 10 MG: 10 TABLET ORAL at 04:02

## 2024-02-10 NOTE — PLAN OF CARE
Problem: Adult Inpatient Plan of Care  Goal: Plan of Care Review  Outcome: Ongoing, Progressing  Goal: Patient-Specific Goal (Individualized)  Outcome: Ongoing, Progressing  Goal: Absence of Hospital-Acquired Illness or Injury  Outcome: Ongoing, Progressing  Goal: Optimal Comfort and Wellbeing  Outcome: Ongoing, Progressing  Goal: Readiness for Transition of Care  Outcome: Ongoing, Progressing  POC and meds reviewed.Patient is awake and alert, asks pain meds around the clock.Pain better between doses.Will continue to monitor.

## 2024-02-10 NOTE — ASSESSMENT & PLAN NOTE
28M w/ hx MRSA bactermia, IVDU, pelvic/intra-abdominal abscesses who presents with progressive back pain with radiation down the leg over the past couple of weeks. Motor intact.     MRI L Sp w/wo 2/4: L5 epidural absess with central stenosis, additional abscesses inferior to sacrum, in psoas. spondylodiscitis at L5-S1     - admitted to   - Neuro exam stable  - Neuro checks q4h  - All pertinent labs and imaging reviewed  - 2/4 ESR 75, CRP 33.7. Blood cx NGTD.   - MRI lumbar w/wo 2/4: L5-S1 osteodiscitis extending into the sacrum, left iliac bone, SI joint, facet joints. Anterolisthesis L5-S1, epidural abscess L5-S1 with canal stenosis. Left psoas and paraspinal muscle abscesses. No clinical evidence of cauda equina syndrome.   - CT lumbar obtained with destructive changes at L5-S1.   - ID following and started patient on abx following IR bx. Currently on vanc + rocephin.   - Voiding spontaneously. Bladder scan prn.   - Plan for OR 2/14 for L4-pelvis fusion with debridement.  - Discussed with Dr. Pozo  - Contact with acute changes in exam    Case and plan discussed with attending physician Dr. Pozo

## 2024-02-10 NOTE — PROGRESS NOTES
Pharmacokinetic Assessment Follow Up: IV Vancomycin    Vancomycin serum concentration assessment(s):    The trough level was drawn correctly and can be used to guide therapy at this time. The measurement is below the desired definitive target range of 15 to 20 mcg/mL.    Vancomycin Regimen Plan:    Change regimen to Vancomycin 1500 mg IV every 12 hours with next serum trough concentration measured at 1830 prior to 4th dose on 2/11/2024    Drug levels (last 3 results):  Recent Labs   Lab Result Units 02/10/24  0525   Vancomycin-Trough ug/mL 11.7       Pharmacy will continue to follow and monitor vancomycin.    Please contact pharmacy at extension [phone] for questions regarding this assessment.    Thank you for the consult,   Jina Adam       Patient brief summary:  Ton Donovan is a 28 y.o. male initiated on antimicrobial therapy with IV Vancomycin for treatment of bone/joint infection    The patient's current regimen is 1500 mg q12h    Drug Allergies:   Review of patient's allergies indicates:  No Known Allergies    Actual Body Weight:   76.7 kg    Renal Function:   Estimated Creatinine Clearance: 127.8 mL/min (based on SCr of 0.9 mg/dL).,     Dialysis Method (if applicable):  N/A

## 2024-02-10 NOTE — HOSPITAL COURSE
2/10: NAEO. AFVSS. Remains on Ceftriaxone and Rocephin. Planning for OR 2/14.   2/11: NAEON. AFVSS. On Vanc/Rocephin. Back culture 2/7 with Staph aureus. OR 2/14. Exam stable  2/19: NAEO. Denies any new complaints. Symptoms stable. HV drain in place, no output recordings x 3 days. Will plan to discontinue today. ID recommends Daptomycin through 4/10.

## 2024-02-10 NOTE — PROGRESS NOTES
"[facility] - Neurosurgery ([facility])  Hospital Medicine  Progress Note    Patient Name: Ton Donovan  MRN: [id number]  Patient Class: IP- Inpatient   Admission Date: 2/4/2024  Length of Stay: 5 days  Attending Physician: Nagi Angel*  Primary Care Provider: Emily, Primary Doctor        Subjective:     Principal Problem:Osteomyelitis of vertebra of lumbosacral region        HPI:  Ton Donovan is a 28-year-old male with PMHx of IVDU w/ assoc complications of MRSA bacteremia, multiple septic joints & abscesses, who presented 2/4/24 with progressive back pain with radiation down his legs b/l. Endorses bilateral numbness & neuropathic pain over the dorsum of his feet. This has been ongoing and is not necessarily a new problem, but has been worsening. Reports "pins & needles"/paresthesias down his legs that have been progressing, although laying on his stomach seems to provide significant relief. Admits to continued active IV heroin use; last used just prior to EMS arrival. Denies any fevers, chills, night sweats, cough, or shortness of breath. Denies any bowel or bladder incontinence/retention or saddle anesthesia.     On arrival, HDS & 0/4 SIRS. MRI showed osteomyelitis/discitis of L5-S1, osteomyelitis of sacrum & L iliac bone, probable septic arthritis of L SI joint & lower lumbar spine facet joints, worsening anterolisthesis of L5 with respect to S1 with increased size of epidural abscess at the level of L5-S1 and probable severe central canal stenosis at this level, along w/ additional abscesses in the L iliopsoas muscle, paraspinal muscles, pelvis, & L sacrum. Received single dose of Zosyn in ED on arrival, but given that pt HDS w/o sepsis, further abx held to optimize intra-op cx/pathology. Admitted to hospital medicine for further mgmt of osteomyelitis, septic joints & abscesses.    Overview/Hospital Course:  Addiction medicine, along w/ NSGY, orthopedic surgery, IR & ID consulted. Underwent aspiration " of psoas abscess per IR on 2/7. Aspiration of SI joint attempted but unable to aspirate hardly any fluid.     Interval History: No acute changes. Afebrile. Patient has no new complaints. Awaiting surgery next week  Cultures with SA, susceptibility pending    Physical Exam  Constitutional:       Appearance: He is normal weight. He is not toxic-appearing.   HENT:      Head: Normocephalic and atraumatic.   Eyes:      Conjunctiva/sclera: Conjunctivae normal.   Cardiovascular:      Rate and Rhythm: Normal rate and regular rhythm.      Heart sounds: Normal heart sounds.   Pulmonary:      Effort: Pulmonary effort is normal. No respiratory distress.      Breath sounds: Normal breath sounds. No wheezing.   Abdominal:      General: Bowel sounds are normal. There is no distension.      Palpations: Abdomen is soft.      Tenderness: There is no abdominal tenderness. There is no guarding.   Skin:     General: Skin is warm and dry.      Findings: No rash.   Neurological:      Mental Status: He is alert and oriented to person, place, and time.      Sensory: Sensory deficit (diminished sensation of BLE (R>L)) present.      Motor: No weakness.   Psychiatric:         Mood and Affect: Mood normal.         Behavior: Behavior normal.   Assessment/Plan:      * Osteomyelitis of vertebra of lumbosacral region  Osteomyelitis, discitis, & myositis of lumbosacral region  Septic arthritis of SI joint(s)  Epidural abscesses   Multiple abscesses of pelvis, sacrum, psoas, iliacus, & paraspinals  Lower back pain w/ b/l sciatica   Severe spinal canal stenosis   Hx of known spinal osteomyelitis, various abscesses, & MRSA bacteremia i/s/o IVDU. Presented w/ progressive lower back & abdominal pain w/ assoc LE weakness & sensory changes. HDS & 0/4 SIRS on admission. MRI showed osteomyelitis/discitis of L5-S1, osteomyelitis of sacrum & L iliac bone, probable septic arthritis of L SI joint & lower lumbar spine facet joints, worsening anterolisthesis of L5  with respect to S1 with increased size of epidural abscess at the level of L5-S1 and probable severe central canal stenosis at this level, along w/ additional abscesses in the L iliopsoas muscle, paraspinal muscles, pelvis, & L sacrum. CT L-spine w/ similar findings. No urinary or bowel sx retention/incontinence. Received single dose of Zosyn in ED on arrival, but given that pt HDS w/o sepsis, further abx held to optimize intra-op cx/pathology.   - Neurosurgery consulted; appreciate recs/assistance   - Plan for OR 2/14 for L4-pelvis fusion with debridement.   - IR consulted for potential aspiration of abscess(es); appreciate assistance   - Plan for aspiration of psoas abscess & SI joint 2/7   - ID consulted; appreciate recs  - Ortho consulted for mgmt of septic SI joint; appreciate recs/assistance  - Holding antibiotics given absence of sepsis  - Multimodal analgesia   - PT/OT    Housing insecurity  Previously worked at [employer] & held down job in the past but started using heroin last year following multiple family deaths (mother, aunt & grandmother) in a short period of time. Subsequently lost housing & has been living on streets since then.   - SW/CM    Prediabetes  A1c 6.2% w/ glucose 109 on admission.   - Deferring SSI or CBGs   - Hypoglycemia protocol     Substance or medication-induced depressive disorder  Pt endorses sx of depression, no SI/SA.   - Addiction medicine/psychiatry consult as above; appreciate recs   - Initiating mirtazapine 15mg QHS    Spinal stenosis of lumbar region without neurogenic claudication  See osteomyelitis      Pelvic abscess in male  Presumed osteomyelitis/discitis at L5-S1 with osteomyelitis in the sacrum and left iliac bone and probable septic arthritis in the left SI joint and lower lumbar spine facet joints.     Worsening anterolisthesis of L5 with respect to S1 with increased size of epidural abscess at the level of L5-S1, and probable severe central canal stenosis at this  level.     Additional abscesses in the left iliopsoas muscle, paraspinal muscles, pelvis, and left sacrum as discussed above.     Additional findings discussed in the body of the report and on same day pelvic MRI.      IVDU (intravenous drug user)  Heroin abuse w/ opioid withdrawal   PSDU  Tobacco use   Known hx of IVDU (heroin) w/ extensive assoc medical complications (discussed above). No recent UDS (only one on file from July 2023- positive for opiates & THC) and none collected on admission. Endorses daily IV heroin use (1g/day); last used just prior to calling EMS. Active tobacco use, but denies EtOH or other illicit substance use. Endorsing sx of opioid w/d.   - Addiction medicine consulted; appreciate recs  - Deferring initiating methadone/suboxone given potential anesthesia/surgical intervention  - PRN oxycodone per COWS protocol  - Supportive care w/ PRN antiemetics, analgesics, & anxiolytics   - NRT PRN  - Cessation strongly encouraged     Septic arthritis of sacroiliac joint  See osteomyelitis      Chronic hyponatremia  On admission, Na 133 (baseline 129-133). 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   - Deferring IVF  - Encourage good PO intake  - Monitor BMP  - Mgmt of infxns as above    Anemia of infection and chronic disease  On admission, Hgb 8.0 (more or less c/w baseline as has been downtrending for past year) w/ MCV 69. No e/o active bleeding. Iron panel w/ low iron, TIBC, %sat & transferrin; ferritin nml. Although ferritin nml, likely anemia of chronic dx 2/2 chronic infxn re: IVDU.   - Monitor CBC & for e/o active bleeding  - Transfuse PRN for goal Hgb > 7  - Mgmt of infxns as above    Cigarette nicotine dependence without complication  Dangers of cigarette smoking were reviewed with patient in detail. Patient was  offered a nicotinepatch  Nicotine replacement options were discussed. Nicotine replacement was discussed      VTE Risk Mitigation  (From admission, onward)           Ordered     enoxaparin injection 40 mg  Daily         02/05/24 0126     Place sequential compression device  Until discontinued         02/05/24 0126     IP VTE HIGH RISK PATIENT  Once         02/05/24 0126                    Discharge Planning   SENA: 2/19/2024     Code Status: Full Code   Is the patient medically ready for discharge?: No    Reason for patient still in hospital (select all that apply): Patient trending condition, Treatment, and Consult recommendations  Discharge Plan A: Long-term acute care facility (LTAC)          Nagi Angel MD  Department of Hospital Medicine   [facility] - Neurosurgery ([facility])

## 2024-02-10 NOTE — SUBJECTIVE & OBJECTIVE
Interval History: 2/10: NAEO. AFVSS. Remains on Ceftriaxone and Rocephin. Planning for OR 2/14.     Medications:  Continuous Infusions:  Scheduled Meds:   acetaminophen  1,000 mg Oral Q8H    cefTRIAXone (Rocephin) IV (PEDS and ADULTS)  2 g Intravenous Q24H    cloNIDine  0.1 mg Oral Q12H    enoxparin  40 mg Subcutaneous Daily    mirtazapine  15 mg Oral QHS    nicotine  1 patch Transdermal Daily    vancomycin (VANCOCIN) IV (PEDS and ADULTS)  20 mg/kg Intravenous Q12H     PRN Meds:acetaminophen, dextrose 10%, dextrose 10%, dicyclomine, glucagon (human recombinant), glucose, glucose, HYDROmorphone, hydrOXYzine HCL, ibuprofen, loperamide, melatonin, naloxone, oxyCODONE, oxyCODONE, promethazine, sodium chloride 0.9%, Pharmacy to dose Vancomycin consult **AND** vancomycin - pharmacy to dose     Review of Systems  Objective:     Weight: 76.7 kg (169 lb 1.5 oz)  Body mass index is 23.59 kg/m².  Vital Signs (Most Recent):  Temp: 96.6 °F (35.9 °C) (02/10/24 0800)  Pulse: 68 (02/10/24 0800)  Resp: 18 (02/10/24 0856)  BP: (!) 122/56 (02/10/24 0800)  SpO2: 100 % (02/10/24 0800) Vital Signs (24h Range):  Temp:  [96.6 °F (35.9 °C)-99.7 °F (37.6 °C)] 96.6 °F (35.9 °C)  Pulse:  [] 68  Resp:  [16-20] 18  SpO2:  [98 %-100 %] 100 %  BP: (111-122)/(55-59) 122/56     Date 02/10/24 0700 - 02/11/24 0659   Shift 9334-7580 2144-7353 1355-4453 24 Hour Total   INTAKE   P.O. 360   360   Shift Total(mL/kg) 360(4.7)   360(4.7)   OUTPUT   Shift Total(mL/kg)       Weight (kg) 76.7 76.7 76.7 76.7      Physical Exam  General: well developed, well nourished, no distress.   Head: normocephalic, atraumatic  Mental Status: Awake, Alert, Oriented  Speech: Clear with content appropriate to conversation  Cranial nerves: face symmetric, CN II-XII grossly intact.   Sensory: intact to light touch throughout    Motor Strength: Motor Strength: Moves all extremities spontaneously with good tone. No abnormal movements seen. Pain limited weakness b/l hip  "flexors.     Significant Labs:  Recent Labs   Lab 02/09/24  0234 02/10/24  0526   GLU 99 98   * 138   K 4.5 4.7    105   CO2 22* 26   BUN 15 20   CREATININE 0.8 0.9   CALCIUM 8.7 8.8     Recent Labs   Lab 02/09/24  0234 02/10/24  0526   WBC 6.53 6.92   HGB 8.9* 9.6*   HCT 30.2* 32.9*   * 471*     No results for input(s): "LABPT", "INR", "APTT" in the last 48 hours.  Microbiology Results (last 7 days)       Procedure Component Value Units Date/Time    Blood culture #1 **CANNOT BE ORDERED STAT** [[id number]] Collected: 02/04/24 1853    Order Status: Completed Specimen: Blood from Peripheral, Forearm, Left Updated: 02/09/24 2012     Blood Culture, Routine No growth after 5 days.    Blood culture #2 **CANNOT BE ORDERED STAT** [[id number]] Collected: 02/04/24 1822    Order Status: Completed Specimen: Blood from Peripheral, Forearm, Left Updated: 02/09/24 2012     Blood Culture, Routine No growth after 5 days.    Aerobic culture [[id number]]  (Abnormal) Collected: 02/07/24 1423    Order Status: Completed Specimen: Abscess from Back Updated: 02/09/24 1138     Aerobic Bacterial Culture STAPHYLOCOCCUS AUREUS  Rare  Susceptibility pending      Narrative:      L psoas    Culture, Anaerobe [[id number]] Collected: 02/07/24 1423    Order Status: Completed Specimen: Abscess from Back Updated: 02/09/24 0934     Anaerobic Culture Culture in progress    Narrative:      L psoas    AFB Culture & Smear [[id number]] Collected: 02/07/24 1413    Order Status: Completed Specimen: Joint Fluid from Back Updated: 02/09/24 0927     AFB Culture & Smear Culture in progress     AFB CULTURE STAIN No acid fast bacilli seen.    Aerobic culture [[id number]] Collected: 02/07/24 1412    Order Status: Completed Specimen: Bone from Back Updated: 02/09/24 0745     Aerobic Bacterial Culture No growth    Narrative:      Joint    Culture, Anaerobe [[id number]] Collected: 02/07/24 1411    Order Status: Completed Specimen: Joint Fluid from " Back Updated: 02/09/24 0728     Anaerobic Culture Culture in progress    AFB Culture & Smear [[id number]] Collected: 02/07/24 1423    Order Status: Completed Specimen: Abscess from Back Updated: 02/08/24 2127     AFB Culture & Smear Culture in progress     AFB CULTURE STAIN No acid fast bacilli seen.    Narrative:      L psoas    Gram stain [[id number]] Collected: 02/07/24 1413    Order Status: Completed Specimen: Joint Fluid from Back Updated: 02/08/24 0041     Gram Stain Result No WBC's      No organisms seen    Fungus culture [[id number]] Collected: 02/07/24 1413    Order Status: Sent Specimen: Joint Fluid from Back Updated: 02/07/24 2110    Gram stain [[id number]] Collected: 02/07/24 1423    Order Status: Completed Specimen: Abscess from Back Updated: 02/07/24 1934     Gram Stain Result Few WBC's      No organisms seen    Narrative:      L psoas    Fungus culture [[id number]] Collected: 02/07/24 1423    Order Status: Sent Specimen: Abscess from Back Updated: 02/07/24 1733    Culture, Anaerobe [[id number]]     Order Status: No result Specimen: Abscess from Sacrum     Aerobic culture [[id number]]     Order Status: No result Specimen: Abscess from Sacrum     AFB Culture & Smear [[id number]]     Order Status: No result Specimen: Abscess from Sacrum     Gram stain [[id number]]     Order Status: No result Specimen: Abscess from Sacrum     Fungus culture [[id number]]     Order Status: No result Specimen: Abscess from Sacrum     Culture, Anaerobe [[id number]]     Order Status: No result Specimen: Abscess     Aerobic culture [[id number]]     Order Status: No result Specimen: Abscess     Fungus culture [[id number]]     Order Status: No result Specimen: Abscess     AFB Culture & Smear [[id number]]     Order Status: No result Specimen: Abscess     Gram stain [[id number]]     Order Status: No result Specimen: Abscess           All pertinent labs from the last 24 hours have been reviewed.    Significant Diagnostics:  No new  relevant imaging to discuss.

## 2024-02-10 NOTE — PROGRESS NOTES
[facility] - Neurosurgery ([facility])  Neurosurgery  Progress Note    Subjective:     History of Present Illness: 28M w/ hx MRSA bactermia, IVDU, pelvic/intra-abdominal abscesses who presents with progressive back pain with radiation down the leg over the past couple of weeks.  He has had bilateral numbness of his anterior thigh as well. He denies bowel/bladder sx or saddle anesthesia. He denies focal weakness. Per chart review, the patient is actively using heroin and did an injection prior to being taken in by EMS.      Post-Op Info:  Procedure(s) (LRB):  **AIRO** FUSION, SPINE, LUMBAR (N/A)       Interval History: 2/10: NAEO. AFVSS. Remains on Ceftriaxone and Rocephin. Planning for OR 2/14.     Medications:  Continuous Infusions:  Scheduled Meds:   acetaminophen  1,000 mg Oral Q8H    cefTRIAXone (Rocephin) IV (PEDS and ADULTS)  2 g Intravenous Q24H    cloNIDine  0.1 mg Oral Q12H    enoxparin  40 mg Subcutaneous Daily    mirtazapine  15 mg Oral QHS    nicotine  1 patch Transdermal Daily    vancomycin (VANCOCIN) IV (PEDS and ADULTS)  20 mg/kg Intravenous Q12H     PRN Meds:acetaminophen, dextrose 10%, dextrose 10%, dicyclomine, glucagon (human recombinant), glucose, glucose, HYDROmorphone, hydrOXYzine HCL, ibuprofen, loperamide, melatonin, naloxone, oxyCODONE, oxyCODONE, promethazine, sodium chloride 0.9%, Pharmacy to dose Vancomycin consult **AND** vancomycin - pharmacy to dose     Review of Systems  Objective:     Weight: 76.7 kg (169 lb 1.5 oz)  Body mass index is 23.59 kg/m².  Vital Signs (Most Recent):  Temp: 96.6 °F (35.9 °C) (02/10/24 0800)  Pulse: 68 (02/10/24 0800)  Resp: 18 (02/10/24 0856)  BP: (!) 122/56 (02/10/24 0800)  SpO2: 100 % (02/10/24 0800) Vital Signs (24h Range):  Temp:  [96.6 °F (35.9 °C)-99.7 °F (37.6 °C)] 96.6 °F (35.9 °C)  Pulse:  [] 68  Resp:  [16-20] 18  SpO2:  [98 %-100 %] 100 %  BP: (111-122)/(55-59) 122/56     Date 02/10/24 0700 - 02/11/24 0659   Shift 5815-7897 7633-94907500 0672-2875  "24 Hour Total   INTAKE   P.O. 360   360   Shift Total(mL/kg) 360(4.7)   360(4.7)   OUTPUT   Shift Total(mL/kg)       Weight (kg) 76.7 76.7 76.7 76.7      Physical Exam  General: well developed, well nourished, no distress.   Head: normocephalic, atraumatic  Mental Status: Awake, Alert, Oriented  Speech: Clear with content appropriate to conversation  Cranial nerves: face symmetric, CN II-XII grossly intact.   Sensory: intact to light touch throughout    Motor Strength: Motor Strength: Moves all extremities spontaneously with good tone. No abnormal movements seen. Pain limited weakness b/l hip flexors.     Significant Labs:  Recent Labs   Lab 02/09/24  0234 02/10/24  0526   GLU 99 98   * 138   K 4.5 4.7    105   CO2 22* 26   BUN 15 20   CREATININE 0.8 0.9   CALCIUM 8.7 8.8     Recent Labs   Lab 02/09/24  0234 02/10/24  0526   WBC 6.53 6.92   HGB 8.9* 9.6*   HCT 30.2* 32.9*   * 471*     No results for input(s): "LABPT", "INR", "APTT" in the last 48 hours.  Microbiology Results (last 7 days)       Procedure Component Value Units Date/Time    Blood culture #1 **CANNOT BE ORDERED STAT** [[id number]] Collected: 02/04/24 1853    Order Status: Completed Specimen: Blood from Peripheral, Forearm, Left Updated: 02/09/24 2012     Blood Culture, Routine No growth after 5 days.    Blood culture #2 **CANNOT BE ORDERED STAT** [[id number]] Collected: 02/04/24 1822    Order Status: Completed Specimen: Blood from Peripheral, Forearm, Left Updated: 02/09/24 2012     Blood Culture, Routine No growth after 5 days.    Aerobic culture [[id number]]  (Abnormal) Collected: 02/07/24 1423    Order Status: Completed Specimen: Abscess from Back Updated: 02/09/24 1138     Aerobic Bacterial Culture STAPHYLOCOCCUS AUREUS  Rare  Susceptibility pending      Narrative:      L psoas    Culture, Anaerobe [[id number]] Collected: 02/07/24 1423    Order Status: Completed Specimen: Abscess from Back Updated: 02/09/24 0934     Anaerobic " Culture Culture in progress    Narrative:      L psoas    AFB Culture & Smear [[id number]] Collected: 02/07/24 1413    Order Status: Completed Specimen: Joint Fluid from Back Updated: 02/09/24 0927     AFB Culture & Smear Culture in progress     AFB CULTURE STAIN No acid fast bacilli seen.    Aerobic culture [[id number]] Collected: 02/07/24 1412    Order Status: Completed Specimen: Bone from Back Updated: 02/09/24 0745     Aerobic Bacterial Culture No growth    Narrative:      Joint    Culture, Anaerobe [[id number]] Collected: 02/07/24 1411    Order Status: Completed Specimen: Joint Fluid from Back Updated: 02/09/24 0728     Anaerobic Culture Culture in progress    AFB Culture & Smear [[id number]] Collected: 02/07/24 1423    Order Status: Completed Specimen: Abscess from Back Updated: 02/08/24 2127     AFB Culture & Smear Culture in progress     AFB CULTURE STAIN No acid fast bacilli seen.    Narrative:      L psoas    Gram stain [[id number]] Collected: 02/07/24 1413    Order Status: Completed Specimen: Joint Fluid from Back Updated: 02/08/24 0041     Gram Stain Result No WBC's      No organisms seen    Fungus culture [[id number]] Collected: 02/07/24 1413    Order Status: Sent Specimen: Joint Fluid from Back Updated: 02/07/24 2110    Gram stain [[id number]] Collected: 02/07/24 1423    Order Status: Completed Specimen: Abscess from Back Updated: 02/07/24 1934     Gram Stain Result Few WBC's      No organisms seen    Narrative:      L psoas    Fungus culture [[id number]] Collected: 02/07/24 1423    Order Status: Sent Specimen: Abscess from Back Updated: 02/07/24 1733    Culture, Anaerobe [[id number]]     Order Status: No result Specimen: Abscess from Sacrum     Aerobic culture [[id number]]     Order Status: No result Specimen: Abscess from Sacrum     AFB Culture & Smear [[id number]]     Order Status: No result Specimen: Abscess from Sacrum     Gram stain [[id number]]     Order Status: No result Specimen: Abscess from  Sacrum     Fungus culture [[id number]]     Order Status: No result Specimen: Abscess from Sacrum     Culture, Anaerobe [[id number]]     Order Status: No result Specimen: Abscess     Aerobic culture [[id number]]     Order Status: No result Specimen: Abscess     Fungus culture [[id number]]     Order Status: No result Specimen: Abscess     AFB Culture & Smear [[id number]]     Order Status: No result Specimen: Abscess     Gram stain [[id number]]     Order Status: No result Specimen: Abscess           All pertinent labs from the last 24 hours have been reviewed.    Significant Diagnostics:  No new relevant imaging to discuss.   Assessment/Plan:     * Osteomyelitis of vertebra of lumbosacral region  28M w/ hx MRSA bactermia, IVDU, pelvic/intra-abdominal abscesses who presents with progressive back pain with radiation down the leg over the past couple of weeks. Motor intact.     MRI L Sp w/wo 2/4: L5 epidural absess with central stenosis, additional abscesses inferior to sacrum, in psoas. spondylodiscitis at L5-S1     - admitted to   - Neuro exam stable  - Neuro checks q4h  - All pertinent labs and imaging reviewed  - 2/4 ESR 75, CRP 33.7. Blood cx NGTD.   - MRI lumbar w/wo 2/4: L5-S1 osteodiscitis extending into the sacrum, left iliac bone, SI joint, facet joints. Anterolisthesis L5-S1, epidural abscess L5-S1 with canal stenosis. Left psoas and paraspinal muscle abscesses. No clinical evidence of cauda equina syndrome.   - CT lumbar obtained with destructive changes at L5-S1.   - ID following and started patient on abx following IR bx. Currently on vanc + rocephin.   - Voiding spontaneously. Bladder scan prn.   - Plan for OR 2/14 for L4-pelvis fusion with debridement.  - Discussed with Dr. Pozo  - Contact with acute changes in exam    Case and plan discussed with attending physician Dr. Nohelia Varela PA-C  Neurosurgery  [facility] - Neurosurgery ([facility])

## 2024-02-10 NOTE — PLAN OF CARE
Problem: Adult Inpatient Plan of Care  Goal: Plan of Care Review  Outcome: Ongoing, Progressing  Goal: Patient-Specific Goal (Individualized)  Outcome: Ongoing, Progressing  Goal: Absence of Hospital-Acquired Illness or Injury  Outcome: Ongoing, Progressing     Patient pulled out midline while sleeping. Notified charge nurse, unable to get Iv  notified anesthesia who will try to come place another IV in.   Pt slept well this shift. A&Ox4, VSS see flow sheet,  Pain treated per Orders-see Mar.Safety precautions in place. Call light in reach. No further concerns noted at this time.

## 2024-02-11 LAB — VANCOMYCIN TROUGH SERPL-MCNC: 8.8 UG/ML (ref 10–22)

## 2024-02-11 PROCEDURE — 63600175 PHARM REV CODE 636 W HCPCS: Performed by: STUDENT IN AN ORGANIZED HEALTH CARE EDUCATION/TRAINING PROGRAM

## 2024-02-11 PROCEDURE — 36415 COLL VENOUS BLD VENIPUNCTURE: CPT | Performed by: STUDENT IN AN ORGANIZED HEALTH CARE EDUCATION/TRAINING PROGRAM

## 2024-02-11 PROCEDURE — 11000001 HC ACUTE MED/SURG PRIVATE ROOM

## 2024-02-11 PROCEDURE — 80202 ASSAY OF VANCOMYCIN: CPT | Performed by: STUDENT IN AN ORGANIZED HEALTH CARE EDUCATION/TRAINING PROGRAM

## 2024-02-11 PROCEDURE — S4991 NICOTINE PATCH NONLEGEND: HCPCS | Performed by: HOSPITALIST

## 2024-02-11 PROCEDURE — 25000003 PHARM REV CODE 250: Performed by: STUDENT IN AN ORGANIZED HEALTH CARE EDUCATION/TRAINING PROGRAM

## 2024-02-11 PROCEDURE — 25000003 PHARM REV CODE 250: Performed by: HOSPITALIST

## 2024-02-11 RX ORDER — MUPIROCIN 20 MG/G
OINTMENT TOPICAL 2 TIMES DAILY
Status: DISPENSED | OUTPATIENT
Start: 2024-02-11 | End: 2024-02-16

## 2024-02-11 RX ADMIN — VANCOMYCIN HYDROCHLORIDE 1250 MG: 1.25 INJECTION, POWDER, LYOPHILIZED, FOR SOLUTION INTRAVENOUS at 08:02

## 2024-02-11 RX ADMIN — ACETAMINOPHEN 1000 MG: 500 TABLET ORAL at 06:02

## 2024-02-11 RX ADMIN — OXYCODONE HYDROCHLORIDE 10 MG: 10 TABLET ORAL at 06:02

## 2024-02-11 RX ADMIN — ACETAMINOPHEN 1000 MG: 500 TABLET ORAL at 02:02

## 2024-02-11 RX ADMIN — VANCOMYCIN HYDROCHLORIDE 1500 MG: 1.5 INJECTION, POWDER, LYOPHILIZED, FOR SOLUTION INTRAVENOUS at 08:02

## 2024-02-11 RX ADMIN — CLONIDINE HYDROCHLORIDE 0.1 MG: 0.1 TABLET ORAL at 09:02

## 2024-02-11 RX ADMIN — MIRTAZAPINE 15 MG: 15 TABLET, FILM COATED ORAL at 08:02

## 2024-02-11 RX ADMIN — HYDROMORPHONE HYDROCHLORIDE 1 MG: 1 INJECTION, SOLUTION INTRAMUSCULAR; INTRAVENOUS; SUBCUTANEOUS at 08:02

## 2024-02-11 RX ADMIN — ACETAMINOPHEN 1000 MG: 500 TABLET ORAL at 09:02

## 2024-02-11 RX ADMIN — MUPIROCIN: 20 OINTMENT TOPICAL at 08:02

## 2024-02-11 RX ADMIN — CLONIDINE HYDROCHLORIDE 0.1 MG: 0.1 TABLET ORAL at 08:02

## 2024-02-11 RX ADMIN — OXYCODONE HYDROCHLORIDE 10 MG: 10 TABLET ORAL at 11:02

## 2024-02-11 RX ADMIN — OXYCODONE HYDROCHLORIDE 10 MG: 10 TABLET ORAL at 10:02

## 2024-02-11 RX ADMIN — HYDROMORPHONE HYDROCHLORIDE 1 MG: 1 INJECTION, SOLUTION INTRAMUSCULAR; INTRAVENOUS; SUBCUTANEOUS at 02:02

## 2024-02-11 RX ADMIN — ENOXAPARIN SODIUM 40 MG: 40 INJECTION SUBCUTANEOUS at 05:02

## 2024-02-11 RX ADMIN — NICOTINE 1 PATCH: 14 PATCH, EXTENDED RELEASE TRANSDERMAL at 09:02

## 2024-02-11 RX ADMIN — OXYCODONE HYDROCHLORIDE 10 MG: 10 TABLET ORAL at 04:02

## 2024-02-11 NOTE — ASSESSMENT & PLAN NOTE
28M w/ hx MRSA bactermia, IVDU, pelvic/intra-abdominal abscesses who presents with progressive back pain with radiation down the leg over the past couple of weeks. Motor intact.     MRI L Sp w/wo 2/4: L5 epidural absess with central stenosis, additional abscesses inferior to sacrum, in psoas. spondylodiscitis at L5-S1     - admitted to   - Neuro exam stable  - Neuro checks q4h  - All pertinent labs and imaging reviewed  - 2/4 ESR 75, CRP 33.7. Blood cx NGTD.   - back culture 2/7 with staph aureus  - MRI lumbar w/wo 2/4: L5-S1 osteodiscitis extending into the sacrum, left iliac bone, SI joint, facet joints. Anterolisthesis L5-S1, epidural abscess L5-S1 with canal stenosis. Left psoas and paraspinal muscle abscesses. No clinical evidence of cauda equina syndrome.   - CT lumbar obtained with destructive changes at L5-S1.   - ID following and started patient on abx following IR bx. Currently on vanc + rocephin.   - Voiding spontaneously. Bladder scan prn.   - Plan for OR 2/14 for L4-pelvis fusion with debridement.  - Discussed with Dr. Pozo  - Contact with acute changes in exam    Case and plan discussed with attending physician Dr. Pozo

## 2024-02-11 NOTE — PROGRESS NOTES
Aleksandr Bray - Neurosurgery ([facility])  Neurosurgery  Progress Note    Subjective:     History of Present Illness: 28M w/ hx MRSA bactermia, IVDU, pelvic/intra-abdominal abscesses who presents with progressive back pain with radiation down the leg over the past couple of weeks.  He has had bilateral numbness of his anterior thigh as well. He denies bowel/bladder sx or saddle anesthesia. He denies focal weakness. Per chart review, the patient is actively using heroin and did an injection prior to being taken in by EMS.      Post-Op Info:  Procedure(s) (LRB):  **AIRO** FUSION, SPINE, LUMBAR (N/A)       Interval History: 2/11: NAEON. AFVSS. On Vanc/Rocephin. Back culture 2/7 with Staph aureus. OR 2/14. Exam stable    Medications:  Continuous Infusions:  Scheduled Meds:   acetaminophen  1,000 mg Oral Q8H    cefTRIAXone (Rocephin) IV (PEDS and ADULTS)  2 g Intravenous Q24H    cloNIDine  0.1 mg Oral Q12H    enoxparin  40 mg Subcutaneous Daily    mirtazapine  15 mg Oral QHS    nicotine  1 patch Transdermal Daily    vancomycin (VANCOCIN) IV (PEDS and ADULTS)  20 mg/kg Intravenous Q12H     PRN Meds:acetaminophen, dextrose 10%, dextrose 10%, dicyclomine, glucagon (human recombinant), glucose, glucose, HYDROmorphone, hydrOXYzine HCL, ibuprofen, loperamide, melatonin, naloxone, oxyCODONE, oxyCODONE, promethazine, sodium chloride 0.9%, Pharmacy to dose Vancomycin consult **AND** vancomycin - pharmacy to dose     Review of Systems  Objective:     Weight: 76.7 kg (169 lb 1.5 oz)  Body mass index is 23.59 kg/m².  Vital Signs (Most Recent):  Temp: 98.7 °F (37.1 °C) (02/11/24 0609)  Pulse: 72 (02/11/24 0609)  Resp: 18 (02/11/24 0609)  BP: 119/63 (02/11/24 0609)  SpO2: 100 % (02/11/24 0609) Vital Signs (24h Range):  Temp:  [96.6 °F (35.9 °C)-99.4 °F (37.4 °C)] 98.7 °F (37.1 °C)  Pulse:  [65-75] 72  Resp:  [17-20] 18  SpO2:  [99 %-100 %] 100 %  BP: (113-132)/(52-63) 119/63         Physical Exam  General: well developed, well nourished, no  "distress.   Head: normocephalic, atraumatic  Mental Status: Awake, Alert, Oriented  Speech: Clear with content appropriate to conversation  Cranial nerves: face symmetric, CN II-XII grossly intact.   Sensory: intact to light touch throughout    Motor Strength: Motor Strength: Moves all extremities spontaneously with good tone. No abnormal movements seen. Pain limited weakness b/l hip flexors.     Significant Labs:  Recent Labs   Lab 02/10/24  0526   GLU 98      K 4.7      CO2 26   BUN 20   CREATININE 0.9   CALCIUM 8.8       Recent Labs   Lab 02/10/24  0526   WBC 6.92   HGB 9.6*   HCT 32.9*   *       No results for input(s): "LABPT", "INR", "APTT" in the last 48 hours.  Microbiology Results (last 7 days)       Procedure Component Value Units Date/Time    Blood culture #1 **CANNOT BE ORDERED STAT** [[id number]] Collected: 02/04/24 1853    Order Status: Completed Specimen: Blood from Peripheral, Forearm, Left Updated: 02/09/24 2012     Blood Culture, Routine No growth after 5 days.    Blood culture #2 **CANNOT BE ORDERED STAT** [[id number]] Collected: 02/04/24 1822    Order Status: Completed Specimen: Blood from Peripheral, Forearm, Left Updated: 02/09/24 2012     Blood Culture, Routine No growth after 5 days.    Aerobic culture [[id number]]  (Abnormal) Collected: 02/07/24 1423    Order Status: Completed Specimen: Abscess from Back Updated: 02/09/24 1138     Aerobic Bacterial Culture STAPHYLOCOCCUS AUREUS  Rare  Susceptibility pending      Narrative:      L psoas    Culture, Anaerobe [[id number]] Collected: 02/07/24 1423    Order Status: Completed Specimen: Abscess from Back Updated: 02/09/24 0934     Anaerobic Culture Culture in progress    Narrative:      L psoas    AFB Culture & Smear [[id number]] Collected: 02/07/24 1413    Order Status: Completed Specimen: Joint Fluid from Back Updated: 02/09/24 0927     AFB Culture & Smear Culture in progress     AFB CULTURE STAIN No acid fast bacilli seen.    " Aerobic culture [[id number]] Collected: 02/07/24 1412    Order Status: Completed Specimen: Bone from Back Updated: 02/09/24 0745     Aerobic Bacterial Culture No growth    Narrative:      Joint    Culture, Anaerobe [[id number]] Collected: 02/07/24 1411    Order Status: Completed Specimen: Joint Fluid from Back Updated: 02/09/24 0728     Anaerobic Culture Culture in progress    AFB Culture & Smear [[id number]] Collected: 02/07/24 1423    Order Status: Completed Specimen: Abscess from Back Updated: 02/08/24 2127     AFB Culture & Smear Culture in progress     AFB CULTURE STAIN No acid fast bacilli seen.    Narrative:      L psoas    Gram stain [[id number]] Collected: 02/07/24 1413    Order Status: Completed Specimen: Joint Fluid from Back Updated: 02/08/24 0041     Gram Stain Result No WBC's      No organisms seen    Fungus culture [[id number]] Collected: 02/07/24 1413    Order Status: Sent Specimen: Joint Fluid from Back Updated: 02/07/24 2110    Gram stain [[id number]] Collected: 02/07/24 1423    Order Status: Completed Specimen: Abscess from Back Updated: 02/07/24 1934     Gram Stain Result Few WBC's      No organisms seen    Narrative:      L psoas    Fungus culture [[id number]] Collected: 02/07/24 1423    Order Status: Sent Specimen: Abscess from Back Updated: 02/07/24 1733    Culture, Anaerobe [[id number]]     Order Status: No result Specimen: Abscess from Sacrum     Aerobic culture [[id number]]     Order Status: No result Specimen: Abscess from Sacrum     AFB Culture & Smear [[id number]]     Order Status: No result Specimen: Abscess from Sacrum     Gram stain [[id number]]     Order Status: No result Specimen: Abscess from Sacrum     Fungus culture [[id number]]     Order Status: No result Specimen: Abscess from Sacrum     Culture, Anaerobe [[id number]]     Order Status: No result Specimen: Abscess     Aerobic culture [[id number]]     Order Status: No result Specimen: Abscess     Fungus culture [[id number]]      Order Status: No result Specimen: Abscess     AFB Culture & Smear [[id number]]     Order Status: No result Specimen: Abscess     Gram stain [[id number]]     Order Status: No result Specimen: Abscess           All pertinent labs from the last 24 hours have been reviewed.    Significant Diagnostics:  No results found in the last 24 hours.    Assessment/Plan:     * Osteomyelitis of vertebra of lumbosacral region  28M w/ hx MRSA bactermia, IVDU, pelvic/intra-abdominal abscesses who presents with progressive back pain with radiation down the leg over the past couple of weeks. Motor intact.     MRI L Sp w/wo 2/4: L5 epidural absess with central stenosis, additional abscesses inferior to sacrum, in psoas. spondylodiscitis at L5-S1     - admitted to   - Neuro exam stable  - Neuro checks q4h  - All pertinent labs and imaging reviewed  - 2/4 ESR 75, CRP 33.7. Blood cx NGTD.   - back culture 2/7 with staph aureus  - MRI lumbar w/wo 2/4: L5-S1 osteodiscitis extending into the sacrum, left iliac bone, SI joint, facet joints. Anterolisthesis L5-S1, epidural abscess L5-S1 with canal stenosis. Left psoas and paraspinal muscle abscesses. No clinical evidence of cauda equina syndrome.   - CT lumbar obtained with destructive changes at L5-S1.   - ID following and started patient on abx following IR bx. Currently on vanc + rocephin.   - Voiding spontaneously. Bladder scan prn.   - Plan for OR 2/14 for L4-pelvis fusion with debridement.  - Discussed with Dr. Pozo  - Contact with acute changes in exam    Case and plan discussed with attending physician Dr. Nohelia Villasenor MD  Neurosurgery  [facility] - Neurosurgery ([facility])

## 2024-02-11 NOTE — SUBJECTIVE & OBJECTIVE
Interval History: 2/11: NAEON. AFVSS. On Vanc/Rocephin. Back culture 2/7 with Staph aureus. OR 2/14. Exam stable    Medications:  Continuous Infusions:  Scheduled Meds:   acetaminophen  1,000 mg Oral Q8H    cefTRIAXone (Rocephin) IV (PEDS and ADULTS)  2 g Intravenous Q24H    cloNIDine  0.1 mg Oral Q12H    enoxparin  40 mg Subcutaneous Daily    mirtazapine  15 mg Oral QHS    nicotine  1 patch Transdermal Daily    vancomycin (VANCOCIN) IV (PEDS and ADULTS)  20 mg/kg Intravenous Q12H     PRN Meds:acetaminophen, dextrose 10%, dextrose 10%, dicyclomine, glucagon (human recombinant), glucose, glucose, HYDROmorphone, hydrOXYzine HCL, ibuprofen, loperamide, melatonin, naloxone, oxyCODONE, oxyCODONE, promethazine, sodium chloride 0.9%, Pharmacy to dose Vancomycin consult **AND** vancomycin - pharmacy to dose     Review of Systems  Objective:     Weight: 76.7 kg (169 lb 1.5 oz)  Body mass index is 23.59 kg/m².  Vital Signs (Most Recent):  Temp: 98.7 °F (37.1 °C) (02/11/24 0609)  Pulse: 72 (02/11/24 0609)  Resp: 18 (02/11/24 0609)  BP: 119/63 (02/11/24 0609)  SpO2: 100 % (02/11/24 0609) Vital Signs (24h Range):  Temp:  [96.6 °F (35.9 °C)-99.4 °F (37.4 °C)] 98.7 °F (37.1 °C)  Pulse:  [65-75] 72  Resp:  [17-20] 18  SpO2:  [99 %-100 %] 100 %  BP: (113-132)/(52-63) 119/63          Physical Exam  General: well developed, well nourished, no distress.   Head: normocephalic, atraumatic  Mental Status: Awake, Alert, Oriented  Speech: Clear with content appropriate to conversation  Cranial nerves: face symmetric, CN II-XII grossly intact.   Sensory: intact to light touch throughout    Motor Strength: Motor Strength: Moves all extremities spontaneously with good tone. No abnormal movements seen. Pain limited weakness b/l hip flexors.     Significant Labs:  Recent Labs   Lab 02/10/24  0526   GLU 98      K 4.7      CO2 26   BUN 20   CREATININE 0.9   CALCIUM 8.8       Recent Labs   Lab 02/10/24  0526   WBC 6.92   HGB 9.6*   HCT  "32.9*   *       No results for input(s): "LABPT", "INR", "APTT" in the last 48 hours.  Microbiology Results (last 7 days)       Procedure Component Value Units Date/Time    Blood culture #1 **CANNOT BE ORDERED STAT** [[id number]] Collected: 02/04/24 1853    Order Status: Completed Specimen: Blood from Peripheral, Forearm, Left Updated: 02/09/24 2012     Blood Culture, Routine No growth after 5 days.    Blood culture #2 **CANNOT BE ORDERED STAT** [[id number]] Collected: 02/04/24 1822    Order Status: Completed Specimen: Blood from Peripheral, Forearm, Left Updated: 02/09/24 2012     Blood Culture, Routine No growth after 5 days.    Aerobic culture [[id number]]  (Abnormal) Collected: 02/07/24 1423    Order Status: Completed Specimen: Abscess from Back Updated: 02/09/24 1138     Aerobic Bacterial Culture STAPHYLOCOCCUS AUREUS  Rare  Susceptibility pending      Narrative:      L psoas    Culture, Anaerobe [[id number]] Collected: 02/07/24 1423    Order Status: Completed Specimen: Abscess from Back Updated: 02/09/24 0934     Anaerobic Culture Culture in progress    Narrative:      L psoas    AFB Culture & Smear [[id number]] Collected: 02/07/24 1413    Order Status: Completed Specimen: Joint Fluid from Back Updated: 02/09/24 0927     AFB Culture & Smear Culture in progress     AFB CULTURE STAIN No acid fast bacilli seen.    Aerobic culture [[id number]] Collected: 02/07/24 1412    Order Status: Completed Specimen: Bone from Back Updated: 02/09/24 0745     Aerobic Bacterial Culture No growth    Narrative:      Joint    Culture, Anaerobe [[id number]] Collected: 02/07/24 1411    Order Status: Completed Specimen: Joint Fluid from Back Updated: 02/09/24 0728     Anaerobic Culture Culture in progress    AFB Culture & Smear [[id number]] Collected: 02/07/24 1423    Order Status: Completed Specimen: Abscess from Back Updated: 02/08/24 2127     AFB Culture & Smear Culture in progress     AFB CULTURE STAIN No acid fast bacilli " seen.    Narrative:      L psoas    Gram stain [[id number]] Collected: 02/07/24 1413    Order Status: Completed Specimen: Joint Fluid from Back Updated: 02/08/24 0041     Gram Stain Result No WBC's      No organisms seen    Fungus culture [[id number]] Collected: 02/07/24 1413    Order Status: Sent Specimen: Joint Fluid from Back Updated: 02/07/24 2110    Gram stain [[id number]] Collected: 02/07/24 1423    Order Status: Completed Specimen: Abscess from Back Updated: 02/07/24 1934     Gram Stain Result Few WBC's      No organisms seen    Narrative:      L psoas    Fungus culture [[id number]] Collected: 02/07/24 1423    Order Status: Sent Specimen: Abscess from Back Updated: 02/07/24 1733    Culture, Anaerobe [[id number]]     Order Status: No result Specimen: Abscess from Sacrum     Aerobic culture [[id number]]     Order Status: No result Specimen: Abscess from Sacrum     AFB Culture & Smear [[id number]]     Order Status: No result Specimen: Abscess from Sacrum     Gram stain [[id number]]     Order Status: No result Specimen: Abscess from Sacrum     Fungus culture [[id number]]     Order Status: No result Specimen: Abscess from Sacrum     Culture, Anaerobe [[id number]]     Order Status: No result Specimen: Abscess     Aerobic culture [[id number]]     Order Status: No result Specimen: Abscess     Fungus culture [[id number]]     Order Status: No result Specimen: Abscess     AFB Culture & Smear [[id number]]     Order Status: No result Specimen: Abscess     Gram stain [[id number]]     Order Status: No result Specimen: Abscess           All pertinent labs from the last 24 hours have been reviewed.    Significant Diagnostics:  No results found in the last 24 hours.

## 2024-02-11 NOTE — SUBJECTIVE & OBJECTIVE
Interval History: No acute events. Afebrile  Patient has no new complaints  Cultures with MRSA    Review of Systems  Objective:     Vital Signs (Most Recent):  Temp: 98.7 °F (37.1 °C) (02/11/24 1200)  Pulse: 71 (02/11/24 1200)  Resp: 18 (02/11/24 1424)  BP: (!) 121/53 (02/11/24 1200)  SpO2: 99 % (02/11/24 1200) Vital Signs (24h Range):  Temp:  [98 °F (36.7 °C)-99.4 °F (37.4 °C)] 98.7 °F (37.1 °C)  Pulse:  [65-75] 71  Resp:  [17-18] 18  SpO2:  [99 %-100 %] 99 %  BP: (113-132)/(52-63) 121/53     Weight: 76.7 kg (169 lb 1.5 oz)  Body mass index is 23.59 kg/m².  No intake or output data in the 24 hours ending 02/11/24 1428      Physical Exam  Constitutional:       Appearance: He is normal weight. He is not toxic-appearing.   HENT:      Head: Normocephalic and atraumatic.   Eyes:      Conjunctiva/sclera: Conjunctivae normal.   Cardiovascular:      Rate and Rhythm: Normal rate and regular rhythm.      Heart sounds: Normal heart sounds.   Pulmonary:      Effort: Pulmonary effort is normal. No respiratory distress.      Breath sounds: Normal breath sounds. No wheezing.   Abdominal:      General: Bowel sounds are normal. There is no distension.      Palpations: Abdomen is soft.      Tenderness: There is no abdominal tenderness. There is no guarding.   Skin:     General: Skin is warm and dry.      Findings: No rash.   Neurological:      Mental Status: He is alert and oriented to person, place, and time.      Sensory: Sensory deficit (diminished sensation of BLE (R>L)) present.      Motor: No weakness.   Psychiatric:         Mood and Affect: Mood normal.         Behavior: Behavior normal.             Significant Labs: All pertinent labs within the past 24 hours have been reviewed.    Significant Imaging: I have reviewed all pertinent imaging results/findings within the past 24 hours.

## 2024-02-11 NOTE — ASSESSMENT & PLAN NOTE
Osteomyelitis, discitis, & myositis of lumbosacral region  Septic arthritis of SI joint(s)  Epidural abscesses   Multiple abscesses of pelvis, sacrum, psoas, iliacus, & paraspinals  Lower back pain w/ b/l sciatica   Severe spinal canal stenosis   Hx of known spinal osteomyelitis, various abscesses, & MRSA bacteremia i/s/o IVDU. Presented w/ progressive lower back & abdominal pain w/ assoc LE weakness & sensory changes. HDS & 0/4 SIRS on admission. MRI showed osteomyelitis/discitis of L5-S1, osteomyelitis of sacrum & L iliac bone, probable septic arthritis of L SI joint & lower lumbar spine facet joints, worsening anterolisthesis of L5 with respect to S1 with increased size of epidural abscess at the level of L5-S1 and probable severe central canal stenosis at this level, along w/ additional abscesses in the L iliopsoas muscle, paraspinal muscles, pelvis, & L sacrum. CT L-spine w/ similar findings. No urinary or bowel sx retention/incontinence. Received single dose of Zosyn in ED on arrival, but given that pt HDS w/o sepsis, further abx held to optimize intra-op cx/pathology.   - Neurosurgery consulted; appreciate recs/assistance   - Plan for OR 2/14 for L4-pelvis fusion with debridement.   - IR consulted for potential aspiration of abscess(es); appreciate assistance   - Plan for aspiration of psoas abscess & SI joint 2/7   - ID consulted; appreciate recs  - Ortho consulted for mgmt of septic SI joint; appreciate recs/assistance  - Continue vancomycin for MRSA growth  - Multimodal analgesia   - PT/OT

## 2024-02-11 NOTE — PROGRESS NOTES
"[facility] - Neurosurgery ([facility])  Hospital Medicine  Progress Note    Patient Name: Ton Donovan  MRN: [id number]  Patient Class: IP- Inpatient   Admission Date: 2/4/2024  Length of Stay: 6 days  Attending Physician: Nagi Angel*  Primary Care Provider: Emily, Primary Doctor        Subjective:     Principal Problem:Osteomyelitis of vertebra of lumbosacral region        HPI:  Ton Donovan is a 28-year-old male with PMHx of IVDU w/ assoc complications of MRSA bacteremia, multiple septic joints & abscesses, who presented 2/4/24 with progressive back pain with radiation down his legs b/l. Endorses bilateral numbness & neuropathic pain over the dorsum of his feet. This has been ongoing and is not necessarily a new problem, but has been worsening. Reports "pins & needles"/paresthesias down his legs that have been progressing, although laying on his stomach seems to provide significant relief. Admits to continued active IV heroin use; last used just prior to EMS arrival. Denies any fevers, chills, night sweats, cough, or shortness of breath. Denies any bowel or bladder incontinence/retention or saddle anesthesia.     On arrival, HDS & 0/4 SIRS. MRI showed osteomyelitis/discitis of L5-S1, osteomyelitis of sacrum & L iliac bone, probable septic arthritis of L SI joint & lower lumbar spine facet joints, worsening anterolisthesis of L5 with respect to S1 with increased size of epidural abscess at the level of L5-S1 and probable severe central canal stenosis at this level, along w/ additional abscesses in the L iliopsoas muscle, paraspinal muscles, pelvis, & L sacrum. Received single dose of Zosyn in ED on arrival, but given that pt HDS w/o sepsis, further abx held to optimize intra-op cx/pathology. Admitted to hospital medicine for further mgmt of osteomyelitis, septic joints & abscesses.    Overview/Hospital Course:  Addiction medicine, along w/ NSGY, orthopedic surgery, IR & ID consulted. Underwent aspiration " of psoas abscess per IR on 2/7. Aspiration of SI joint attempted but unable to aspirate hardly any fluid.     Interval History: No acute events. Afebrile  Patient has no new complaints  Cultures with MRSA    Review of Systems  Objective:     Vital Signs (Most Recent):  Temp: 98.7 °F (37.1 °C) (02/11/24 1200)  Pulse: 71 (02/11/24 1200)  Resp: 18 (02/11/24 1424)  BP: (!) 121/53 (02/11/24 1200)  SpO2: 99 % (02/11/24 1200) Vital Signs (24h Range):  Temp:  [98 °F (36.7 °C)-99.4 °F (37.4 °C)] 98.7 °F (37.1 °C)  Pulse:  [65-75] 71  Resp:  [17-18] 18  SpO2:  [99 %-100 %] 99 %  BP: (113-132)/(52-63) 121/53     Weight: 76.7 kg (169 lb 1.5 oz)  Body mass index is 23.59 kg/m².  No intake or output data in the 24 hours ending 02/11/24 1428      Physical Exam  Constitutional:       Appearance: He is normal weight. He is not toxic-appearing.   HENT:      Head: Normocephalic and atraumatic.   Eyes:      Conjunctiva/sclera: Conjunctivae normal.   Cardiovascular:      Rate and Rhythm: Normal rate and regular rhythm.      Heart sounds: Normal heart sounds.   Pulmonary:      Effort: Pulmonary effort is normal. No respiratory distress.      Breath sounds: Normal breath sounds. No wheezing.   Abdominal:      General: Bowel sounds are normal. There is no distension.      Palpations: Abdomen is soft.      Tenderness: There is no abdominal tenderness. There is no guarding.   Skin:     General: Skin is warm and dry.      Findings: No rash.   Neurological:      Mental Status: He is alert and oriented to person, place, and time.      Sensory: Sensory deficit (diminished sensation of BLE (R>L)) present.      Motor: No weakness.   Psychiatric:         Mood and Affect: Mood normal.         Behavior: Behavior normal.             Significant Labs: All pertinent labs within the past 24 hours have been reviewed.    Significant Imaging: I have reviewed all pertinent imaging results/findings within the past 24 hours.    Assessment/Plan:      *  Osteomyelitis of vertebra of lumbosacral region  Osteomyelitis, discitis, & myositis of lumbosacral region  Septic arthritis of SI joint(s)  Epidural abscesses   Multiple abscesses of pelvis, sacrum, psoas, iliacus, & paraspinals  Lower back pain w/ b/l sciatica   Severe spinal canal stenosis   Hx of known spinal osteomyelitis, various abscesses, & MRSA bacteremia i/s/o IVDU. Presented w/ progressive lower back & abdominal pain w/ assoc LE weakness & sensory changes. HDS & 0/4 SIRS on admission. MRI showed osteomyelitis/discitis of L5-S1, osteomyelitis of sacrum & L iliac bone, probable septic arthritis of L SI joint & lower lumbar spine facet joints, worsening anterolisthesis of L5 with respect to S1 with increased size of epidural abscess at the level of L5-S1 and probable severe central canal stenosis at this level, along w/ additional abscesses in the L iliopsoas muscle, paraspinal muscles, pelvis, & L sacrum. CT L-spine w/ similar findings. No urinary or bowel sx retention/incontinence. Received single dose of Zosyn in ED on arrival, but given that pt HDS w/o sepsis, further abx held to optimize intra-op cx/pathology.   - Neurosurgery consulted; appreciate recs/assistance   - Plan for OR 2/14 for L4-pelvis fusion with debridement.   - IR consulted for potential aspiration of abscess(es); appreciate assistance   - Plan for aspiration of psoas abscess & SI joint 2/7   - ID consulted; appreciate recs  - Ortho consulted for mgmt of septic SI joint; appreciate recs/assistance  - Continue vancomycin for MRSA growth  - Multimodal analgesia   - PT/OT    Housing insecurity  Previously worked at [employer] & held down job in the past but started using heroin last year following multiple family deaths (mother, aunt & grandmother) in a short period of time. Subsequently lost housing & has been living on streets since then.   - SW/CM    Prediabetes  A1c 6.2% w/ glucose 109 on admission.   - Deferring SSI or CBGs   -  Hypoglycemia protocol     Substance or medication-induced depressive disorder  Pt endorses sx of depression, no SI/SA.   - Addiction medicine/psychiatry consult as above; appreciate recs   - Initiating mirtazapine 15mg QHS    Spinal stenosis of lumbar region without neurogenic claudication  See osteomyelitis      Pelvic abscess in male  Presumed osteomyelitis/discitis at L5-S1 with osteomyelitis in the sacrum and left iliac bone and probable septic arthritis in the left SI joint and lower lumbar spine facet joints.     Worsening anterolisthesis of L5 with respect to S1 with increased size of epidural abscess at the level of L5-S1, and probable severe central canal stenosis at this level.     Additional abscesses in the left iliopsoas muscle, paraspinal muscles, pelvis, and left sacrum as discussed above.     Additional findings discussed in the body of the report and on same day pelvic MRI.      IVDU (intravenous drug user)  Heroin abuse w/ opioid withdrawal   PSDU  Tobacco use   Known hx of IVDU (heroin) w/ extensive assoc medical complications (discussed above). No recent UDS (only one on file from July 2023- positive for opiates & THC) and none collected on admission. Endorses daily IV heroin use (1g/day); last used just prior to calling EMS. Active tobacco use, but denies EtOH or other illicit substance use. Endorsing sx of opioid w/d.   - Addiction medicine consulted; appreciate recs  - Deferring initiating methadone/suboxone given potential anesthesia/surgical intervention  - PRN oxycodone per [facility]OWS protocol  - Supportive care w/ PRN antiemetics, analgesics, & anxiolytics   - NRT PRN  - Cessation strongly encouraged     Septic arthritis of sacroiliac joint  See osteomyelitis      Chronic hyponatremia  On admission, Na 133 (baseline 129-133). 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   - Deferring IVF  - Encourage good PO intake  - Monitor BMP  - Mgmt of infxns  as above    Anemia of infection and chronic disease  On admission, Hgb 8.0 (more or less c/w baseline as has been downtrending for past year) w/ MCV 69. No e/o active bleeding. Iron panel w/ low iron, TIBC, %sat & transferrin; ferritin nml. Although ferritin nml, likely anemia of chronic dx 2/2 chronic infxn re: IVDU.   - Monitor CBC & for e/o active bleeding  - Transfuse PRN for goal Hgb > 7  - Mgmt of infxns as above    Cigarette nicotine dependence without complication  Dangers of cigarette smoking were reviewed with patient in detail. Patient was  offered a nicotinepatch  Nicotine replacement options were discussed. Nicotine replacement was discussed      VTE Risk Mitigation (From admission, onward)           Ordered     enoxaparin injection 40 mg  Daily         02/05/24 0126     Place sequential compression device  Until discontinued         02/05/24 0126     IP VTE HIGH RISK PATIENT  Once         02/05/24 0126                    Discharge Planning   SENA: 2/19/2024     Code Status: Full Code   Is the patient medically ready for discharge?: No    Reason for patient still in hospital (select all that apply): Patient trending condition, Treatment, and Consult recommendations  Discharge Plan A: Long-term acute care facility (LTAC)          Nagi Angel MD  Department of Hospital Medicine   [facility] - Neurosurgery ([facility])

## 2024-02-12 ENCOUNTER — ANESTHESIA EVENT (OUTPATIENT)
Dept: SURGERY | Facility: HOSPITAL | Age: 29
DRG: 459 | End: 2024-02-12
Payer: MEDICAID

## 2024-02-12 LAB — BACTERIA SPEC AEROBE CULT: NO GROWTH

## 2024-02-12 PROCEDURE — 25000003 PHARM REV CODE 250: Performed by: HOSPITALIST

## 2024-02-12 PROCEDURE — 25000003 PHARM REV CODE 250: Performed by: STUDENT IN AN ORGANIZED HEALTH CARE EDUCATION/TRAINING PROGRAM

## 2024-02-12 PROCEDURE — S4991 NICOTINE PATCH NONLEGEND: HCPCS | Performed by: HOSPITALIST

## 2024-02-12 PROCEDURE — 63600175 PHARM REV CODE 636 W HCPCS: Performed by: STUDENT IN AN ORGANIZED HEALTH CARE EDUCATION/TRAINING PROGRAM

## 2024-02-12 PROCEDURE — 11000001 HC ACUTE MED/SURG PRIVATE ROOM

## 2024-02-12 PROCEDURE — 25000003 PHARM REV CODE 250: Performed by: EMERGENCY MEDICINE

## 2024-02-12 RX ADMIN — CLONIDINE HYDROCHLORIDE 0.1 MG: 0.1 TABLET ORAL at 09:02

## 2024-02-12 RX ADMIN — DAPTOMYCIN 615 MG: 350 INJECTION, POWDER, LYOPHILIZED, FOR SOLUTION INTRAVENOUS at 03:02

## 2024-02-12 RX ADMIN — VANCOMYCIN HYDROCHLORIDE 1250 MG: 1.25 INJECTION, POWDER, LYOPHILIZED, FOR SOLUTION INTRAVENOUS at 04:02

## 2024-02-12 RX ADMIN — HYDROMORPHONE HYDROCHLORIDE 1 MG: 1 INJECTION, SOLUTION INTRAMUSCULAR; INTRAVENOUS; SUBCUTANEOUS at 10:02

## 2024-02-12 RX ADMIN — MUPIROCIN: 20 OINTMENT TOPICAL at 08:02

## 2024-02-12 RX ADMIN — ACETAMINOPHEN 1000 MG: 500 TABLET ORAL at 09:02

## 2024-02-12 RX ADMIN — MIRTAZAPINE 15 MG: 15 TABLET, FILM COATED ORAL at 09:02

## 2024-02-12 RX ADMIN — NICOTINE 1 PATCH: 14 PATCH, EXTENDED RELEASE TRANSDERMAL at 09:02

## 2024-02-12 RX ADMIN — OXYCODONE HYDROCHLORIDE 10 MG: 10 TABLET ORAL at 11:02

## 2024-02-12 RX ADMIN — OXYCODONE HYDROCHLORIDE 10 MG: 10 TABLET ORAL at 06:02

## 2024-02-12 RX ADMIN — CLONIDINE HYDROCHLORIDE 0.1 MG: 0.1 TABLET ORAL at 08:02

## 2024-02-12 RX ADMIN — HYDROMORPHONE HYDROCHLORIDE 1 MG: 1 INJECTION, SOLUTION INTRAMUSCULAR; INTRAVENOUS; SUBCUTANEOUS at 04:02

## 2024-02-12 RX ADMIN — ENOXAPARIN SODIUM 40 MG: 40 INJECTION SUBCUTANEOUS at 05:02

## 2024-02-12 RX ADMIN — ACETAMINOPHEN 1000 MG: 500 TABLET ORAL at 01:02

## 2024-02-12 RX ADMIN — MUPIROCIN: 20 OINTMENT TOPICAL at 09:02

## 2024-02-12 RX ADMIN — MELATONIN TAB 3 MG 6 MG: 3 TAB at 09:02

## 2024-02-12 NOTE — PROGRESS NOTES
Pharmacokinetic Assessment Follow Up: IV Vancomycin    Vancomycin serum concentration assessment(s):    The trough level was drawn correctly and can be used to guide therapy at this time. The measurement is below the desired definitive target range of 15 to 20 mcg/mL.    Vancomycin Regimen Plan:    Change regimen to Vancomycin 1250 mg IV every 8 hours with next serum trough concentration measured at 2000 prior to next 4th dose on 02/12/2024    Drug levels (last 3 results):  Recent Labs   Lab Result Units 02/10/24  0525 02/11/24  1842   Vancomycin-Trough ug/mL 11.7 8.8*       Pharmacy will continue to follow and monitor vancomycin.    Please contact pharmacy at extension [phone] for questions regarding this assessment.    Thank you for the consult,   BRETT TERAN       Patient brief summary:  Ton Donovan is a 28 y.o. male initiated on antimicrobial therapy with IV Vancomycin for treatment of bone/joint infection    Drug Allergies:   Review of patient's allergies indicates:  No Known Allergies    Actual Body Weight:   76.7 kg  BMI: 23.59    Renal Function:   Estimated Creatinine Clearance: 127.8 mL/min (based on SCr of 0.9 mg/dL).,     Dialysis Method (if applicable):  N/A    CBC (last 72 hours):  Recent Labs   Lab Result Units 02/09/24  0234 02/10/24  0526   WBC K/uL 6.53 6.92   Hemoglobin g/dL 8.9* 9.6*   Hematocrit % 30.2* 32.9*   Platelets K/uL 455* 471*       Metabolic Panel (last 72 hours):  Recent Labs   Lab Result Units 02/09/24  0234 02/10/24  0526   Sodium mmol/L 135* 138   Potassium mmol/L 4.5 4.7   Chloride mmol/L 106 105   CO2 mmol/L 22* 26   Glucose mg/dL 99 98   BUN mg/dL 15 20   Creatinine mg/dL 0.8 0.9       Vancomycin Administrations:  vancomycin given in the last 96 hours                     vancomycin 1,500 mg in dextrose 5 % (D5W) 250 mL IVPB (Vial-Mate) (mg) 1,500 mg New Bag 02/11/24 0820     1,500 mg New Bag 02/10/24 2003     1,500 mg New Bag  1024    vancomycin 1,250 mg in dextrose 5 %  (D5W) 250 mL IVPB (Vial-Mate) (mg) 1,250 mg New Bag 02/09/24 1825     1,250 mg New Bag  0643    vancomycin 2 g in dextrose 5 % 500 mL IVPB (mg) 2,000 mg New Bag 02/08/24 1753                    Microbiologic Results:  Microbiology Results (last 7 days)       Procedure Component Value Units Date/Time    Aerobic culture [[id number]] Collected: 02/07/24 1412    Order Status: Completed Specimen: Bone from Back Updated: 02/11/24 1335     Aerobic Bacterial Culture No growth    Narrative:      Joint    Aerobic culture [[id number]]  (Abnormal)  (Susceptibility) Collected: 02/07/24 1423    Order Status: Completed Specimen: Abscess from Back Updated: 02/11/24 1330     Aerobic Bacterial Culture METHICILLIN RESISTANT STAPHYLOCOCCUS AUREUS  Rare      Narrative:      L psoas    Blood culture #1 **CANNOT BE ORDERED STAT** [[id number]] Collected: 02/04/24 1853    Order Status: Completed Specimen: Blood from Peripheral, Forearm, Left Updated: 02/09/24 2012     Blood Culture, Routine No growth after 5 days.    Blood culture #2 **CANNOT BE ORDERED STAT** [[id number]] Collected: 02/04/24 1822    Order Status: Completed Specimen: Blood from Peripheral, Forearm, Left Updated: 02/09/24 2012     Blood Culture, Routine No growth after 5 days.    Culture, Anaerobe [[id number]] Collected: 02/07/24 1423    Order Status: Completed Specimen: Abscess from Back Updated: 02/09/24 0934     Anaerobic Culture Culture in progress    Narrative:      L psoas    AFB Culture & Smear [[id number]] Collected: 02/07/24 1413    Order Status: Completed Specimen: Joint Fluid from Back Updated: 02/09/24 0927     AFB Culture & Smear Culture in progress     AFB CULTURE STAIN No acid fast bacilli seen.    Culture, Anaerobe [[id number]] Collected: 02/07/24 1411    Order Status: Completed Specimen: Joint Fluid from Back Updated: 02/09/24 0728     Anaerobic Culture Culture in progress    AFB Culture & Smear [[id number]] Collected: 02/07/24 1423    Order Status:  Completed Specimen: Abscess from Back Updated: 02/08/24 2127     AFB Culture & Smear Culture in progress     AFB CULTURE STAIN No acid fast bacilli seen.    Narrative:      L psoas    Gram stain [[id number]] Collected: 02/07/24 1413    Order Status: Completed Specimen: Joint Fluid from Back Updated: 02/08/24 0041     Gram Stain Result No WBC's      No organisms seen    Fungus culture [[id number]] Collected: 02/07/24 1413    Order Status: Sent Specimen: Joint Fluid from Back Updated: 02/07/24 2110    Gram stain [[id number]] Collected: 02/07/24 1423    Order Status: Completed Specimen: Abscess from Back Updated: 02/07/24 1934     Gram Stain Result Few WBC's      No organisms seen    Narrative:      L psoas    Fungus culture [[id number]] Collected: 02/07/24 1423    Order Status: Sent Specimen: Abscess from Back Updated: 02/07/24 1733    Culture, Anaerobe [[id number]]     Order Status: No result Specimen: Abscess from Sacrum     Aerobic culture [[id number]]     Order Status: No result Specimen: Abscess from Sacrum     AFB Culture & Smear [[id number]]     Order Status: No result Specimen: Abscess from Sacrum     Gram stain [[id number]]     Order Status: No result Specimen: Abscess from Sacrum     Fungus culture [[id number]]     Order Status: No result Specimen: Abscess from Sacrum     Culture, Anaerobe [[id number]]     Order Status: No result Specimen: Abscess     Aerobic culture [[id number]]     Order Status: No result Specimen: Abscess     Fungus culture [[id number]]     Order Status: No result Specimen: Abscess     AFB Culture & Smear [[id number]]     Order Status: No result Specimen: Abscess     Gram stain [[id number]]     Order Status: No result Specimen: Abscess

## 2024-02-12 NOTE — SUBJECTIVE & OBJECTIVE
Interval History:      NAEON. AFVSS.OR 2/14. Exam stable    Medications:  Continuous Infusions:  Scheduled Meds:   acetaminophen  1,000 mg Oral Q8H    cefTRIAXone (Rocephin) IV (PEDS and ADULTS)  2 g Intravenous Q24H    cloNIDine  0.1 mg Oral Q12H    enoxparin  40 mg Subcutaneous Daily    mirtazapine  15 mg Oral QHS    nicotine  1 patch Transdermal Daily    vancomycin (VANCOCIN) IV (PEDS and ADULTS)  20 mg/kg Intravenous Q12H     PRN Meds:acetaminophen, dextrose 10%, dextrose 10%, dicyclomine, glucagon (human recombinant), glucose, glucose, HYDROmorphone, hydrOXYzine HCL, ibuprofen, loperamide, melatonin, naloxone, oxyCODONE, oxyCODONE, promethazine, sodium chloride 0.9%, Pharmacy to dose Vancomycin consult **AND** vancomycin - pharmacy to dose     Review of Systems  Objective:     Weight: 76.7 kg (169 lb 1.5 oz)  Body mass index is 23.59 kg/m².  Vital Signs (Most Recent):  Temp: 98.7 °F (37.1 °C) (02/11/24 0609)  Pulse: 72 (02/11/24 0609)  Resp: 18 (02/11/24 0609)  BP: 119/63 (02/11/24 0609)  SpO2: 100 % (02/11/24 0609) Vital Signs (24h Range):  Temp:  [96.6 °F (35.9 °C)-99.4 °F (37.4 °C)] 98.7 °F (37.1 °C)  Pulse:  [65-75] 72  Resp:  [17-20] 18  SpO2:  [99 %-100 %] 100 %  BP: (113-132)/(52-63) 119/63          Physical Exam  General: well developed, well nourished, no distress.   Head: normocephalic, atraumatic  Mental Status: Awake, Alert, Oriented  Speech: Clear with content appropriate to conversation  Cranial nerves: face symmetric, CN II-XII grossly intact.   Sensory: intact to light touch throughout  Motor Strength: Motor Strength: Moves all extremities spontaneously with good tone. No abnormal movements seen. Pain limited weakness b/l hip flexors.   DF/EHL/PF 4- on R and  4+ L      Significant Labs:  Recent Labs   Lab 02/10/24  0526   GLU 98      K 4.7      CO2 26   BUN 20   CREATININE 0.9   CALCIUM 8.8       Recent Labs   Lab 02/10/24  0526   WBC 6.92   HGB 9.6*   HCT 32.9*   *       No  "results for input(s): "LABPT", "INR", "APTT" in the last 48 hours.  Microbiology Results (last 7 days)       Procedure Component Value Units Date/Time    Blood culture #1 **CANNOT BE ORDERED STAT** [[id number]] Collected: 02/04/24 1853    Order Status: Completed Specimen: Blood from Peripheral, Forearm, Left Updated: 02/09/24 2012     Blood Culture, Routine No growth after 5 days.    Blood culture #2 **CANNOT BE ORDERED STAT** [[id number]] Collected: 02/04/24 1822    Order Status: Completed Specimen: Blood from Peripheral, Forearm, Left Updated: 02/09/24 2012     Blood Culture, Routine No growth after 5 days.    Aerobic culture [[id number]]  (Abnormal) Collected: 02/07/24 1423    Order Status: Completed Specimen: Abscess from Back Updated: 02/09/24 1138     Aerobic Bacterial Culture STAPHYLOCOCCUS AUREUS  Rare  Susceptibility pending      Narrative:      L psoas    Culture, Anaerobe [[id number]] Collected: 02/07/24 1423    Order Status: Completed Specimen: Abscess from Back Updated: 02/09/24 0934     Anaerobic Culture Culture in progress    Narrative:      L psoas    AFB Culture & Smear [[id number]] Collected: 02/07/24 1413    Order Status: Completed Specimen: Joint Fluid from Back Updated: 02/09/24 0927     AFB Culture & Smear Culture in progress     AFB CULTURE STAIN No acid fast bacilli seen.    Aerobic culture [[id number]] Collected: 02/07/24 1412    Order Status: Completed Specimen: Bone from Back Updated: 02/09/24 0745     Aerobic Bacterial Culture No growth    Narrative:      Joint    Culture, Anaerobe [[id number]] Collected: 02/07/24 1411    Order Status: Completed Specimen: Joint Fluid from Back Updated: 02/09/24 0728     Anaerobic Culture Culture in progress    AFB Culture & Smear [[id number]] Collected: 02/07/24 1423    Order Status: Completed Specimen: Abscess from Back Updated: 02/08/24 2127     AFB Culture & Smear Culture in progress     AFB CULTURE STAIN No acid fast bacilli seen.    Narrative:      L " psoas    Gram stain [[id number]] Collected: 02/07/24 1413    Order Status: Completed Specimen: Joint Fluid from Back Updated: 02/08/24 0041     Gram Stain Result No WBC's      No organisms seen    Fungus culture [[id number]] Collected: 02/07/24 1413    Order Status: Sent Specimen: Joint Fluid from Back Updated: 02/07/24 2110    Gram stain [[id number]] Collected: 02/07/24 1423    Order Status: Completed Specimen: Abscess from Back Updated: 02/07/24 1934     Gram Stain Result Few WBC's      No organisms seen    Narrative:      L psoas    Fungus culture [[id number]] Collected: 02/07/24 1423    Order Status: Sent Specimen: Abscess from Back Updated: 02/07/24 1733    Culture, Anaerobe [[id number]]     Order Status: No result Specimen: Abscess from Sacrum     Aerobic culture [[id number]]     Order Status: No result Specimen: Abscess from Sacrum     AFB Culture & Smear [[id number]]     Order Status: No result Specimen: Abscess from Sacrum     Gram stain [[id number]]     Order Status: No result Specimen: Abscess from Sacrum     Fungus culture [[id number]]     Order Status: No result Specimen: Abscess from Sacrum     Culture, Anaerobe [[id number]]     Order Status: No result Specimen: Abscess     Aerobic culture [[id number]]     Order Status: No result Specimen: Abscess     Fungus culture [[id number]]     Order Status: No result Specimen: Abscess     AFB Culture & Smear [[id number]]     Order Status: No result Specimen: Abscess     Gram stain [[id number]]     Order Status: No result Specimen: Abscess           All pertinent labs from the last 24 hours have been reviewed.    Significant Diagnostics:  No results found in the last 24 hours.

## 2024-02-12 NOTE — ASSESSMENT & PLAN NOTE
28M w/ hx MRSA bactermia, IVDU, pelvic/intra-abdominal abscesses who presents with progressive back pain with radiation down the leg over the past couple of weeks. Motor intact.     - Admitted to   - Neuro exam stable  - Neuro checks q4h  - All pertinent labs and imaging reviewed  - 2/4 ESR 75, CRP 33.7. Blood cx NGTD.   - Back culture 2/7 with staph aureus  - MRI lumbar w/wo 2/4: L5-S1 osteodiscitis extending into the sacrum, left iliac bone, SI joint, facet joints. Anterolisthesis L5-S1, epidural abscess L5-S1 with canal stenosis. Left psoas and paraspinal muscle abscesses. No clinical evidence of cauda equina syndrome.   - CT lumbar obtained with destructive changes at L5-S1.   - ID following and started patient on abx following IR bx. Currently on vanc + rocephin.   - Voiding spontaneously. Bladder scan prn.   - Plan for OR 2/14 for L4-pelvis fusion with debridement  - Discussed with Dr. Pozo  - Contact with acute changes in exam    Case and plan discussed with attending physician Dr. Pozo

## 2024-02-12 NOTE — ANESTHESIA PREPROCEDURE EVALUATION
[facility]  Anesthesia Pre-Operative Evaluation         Patient Name: Ton Donovan  YOB: 1995  MRN: [id number]    SUBJECTIVE:     Pre-operative evaluation for Procedure(s) (LRB):  **AIRO** FUSION, SPINE, LUMBAR (N/A)     02/12/2024    Ton Donovan is a 28 y.o. male w/ a significant PMHx of IVDU (heroin and cocaine) w/ assoc complications of MRSA bacteremia, multiple septic joints & abscesses, anemia, who presented 2/4/24 with progressive back pain with radiation down his legs b/l. Endorses bilateral numbness & neuropathic pain over the dorsum of his feet.     MRI showed osteomyelitis/discitis of L5-S1, osteomyelitis of sacrum & L iliac bone, probable septic arthritis of L SI joint & lower lumbar spine facet joints, worsening anterolisthesis of L5 with respect to S1 with increased size of epidural abscess at the level of L5-S1 and probable severe central canal stenosis at this level, along w/ additional abscesses in the L iliopsoas muscle, paraspinal muscles, pelvis, & L sacrum.     Patient now presents for the above procedure(s).      TTE Summary 10/2023:    Left Ventricle: The left ventricle is normal in size. Ventricular mass is normal. Normal wall thickness. Normal wall motion. There is normal systolic function. Ejection fraction by visual approximation is 65%. There is normal diastolic function.    Left Atrium: Left atrium is moderately dilated.    Right Ventricle: Normal right ventricular cavity size. Wall thickness is normal. Right ventricle wall motion  is normal. Systolic function is normal.    Tricuspid Valve: There is mild regurgitation.    Pulmonary Artery: The estimated pulmonary artery systolic pressure is 28 mmHg.    IVC/SVC: Normal venous pressure at 3 mmHg.    No vegetations seen.    LDA:        Peripheral IV - Single Lumen 02/10/24 1010 22 G Anterior;Proximal;Right Forearm (Active)   Site Assessment Clean;Dry;Intact;No swelling;No redness 02/12/24 0400    Extremity Assessment Distal to IV No abnormal discoloration;No redness;No swelling 02/11/24 2044   Line Status Saline locked 02/12/24 0400   Dressing Status Clean;Dry;Intact 02/12/24 0400   Dressing Intervention Integrity maintained 02/12/24 0400   Number of days: 1            Peripheral IV - Single Lumen 02/10/24 1452 20 G;1 3/4 in Anterior;Left Forearm (Active)   Site Assessment Clean;Dry;Intact;No redness;No swelling 02/12/24 0400   Extremity Assessment Distal to IV No abnormal discoloration 02/10/24 1452   Line Status Saline locked 02/12/24 0400   Dressing Status Clean;Dry;Intact 02/12/24 0400   Dressing Intervention Integrity maintained 02/12/24 0400   Site Change Due 02/14/24 02/10/24 1452   Number of days: 1       Prev airway: Intubation:     Induction:  Rapid sequence induction    Intubated:  Postinduction    Mask Ventilation:  Not attempted    Attempts:  1    Attempted By:  CRNA    Method of Intubation:  Video laryngoscopy    Blade:  Olivier 4    Laryngeal View Grade: Grade I - full view of cords      Difficult Airway Encountered?: No      Complications:  None    Airway Device:  Oral endotracheal tube    Airway Device Size:  7.5    Style/Cuff Inflation:  Cuffed (inflated to minimal occlusive pressure)    Inflation Amount (mL):  8    Tube secured:  23    Secured at:  The lips    Placement Verified By:  Capnometry and Fiber optic visualization    Complicating Factors:  None    Findings Post-Intubation:  BS equal bilateral and atraumatic/condition of teeth unchanged        Patient Active Problem List   Diagnosis    Heroin use disorder, severe    Cellulitis of finger, right    Cellulitis of left elbow    Cigarette nicotine dependence without complication    Septic olecranon bursitis, left    Septic arthritis of interphalangeal joint of finger of right hand    Open wound of left elbow    Mass of left upper extremity    Abscess of right upper extremity    Drug withdrawal    Abscess of chest    Anemia of  infection and chronic disease    Chronic hyponatremia    MRSA bacteremia    Psoas abscess, left    Septic arthritis of sacroiliac joint    Osteoarthritis 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    Bilateral lower extremity edema    L5-S2 epidural phlegmon    IVDU (intravenous drug user)    Medical clearance for psychiatric admission    Pelvic abscess in male    Osteomyelitis of vertebra of lumbosacral region    Spinal stenosis of lumbar region without neurogenic claudication    Cocaine use disorder, severe, dependence    Substance or medication-induced depressive disorder    Acute bilateral low back pain with bilateral sciatica    Prediabetes    Housing insecurity    Sacroiliitis    Debility    Discitis of lumbosacral region    Sensory loss    Pain       Review of patient's allergies indicates:  No Known Allergies    Current Inpatient Medications:   acetaminophen  1,000 mg Oral Q8H    cloNIDine  0.1 mg Oral Q12H    enoxparin  40 mg Subcutaneous Daily    mirtazapine  15 mg Oral QHS    mupirocin   Nasal BID    nicotine  1 patch Transdermal Daily    vancomycin (VANCOCIN) IV (PEDS and ADULTS)  1,250 mg Intravenous Q8H       No current facility-administered medications on file prior to encounter.     Current Outpatient Medications on File Prior to Encounter   Medication Sig Dispense Refill    mv-min/vit C/glut/lysine/hb124 (IMMUNE SUPPORT ORAL) Take 1 tablet by mouth once daily.      naloxone (NARCAN) 4 mg/actuation Spry 4 mg by Nasal route.         Past Surgical History:   Procedure Laterality Date    HAND ARTHROTOMY Right 7/7/2023    Procedure: ARTHROTOMY, HAND - R LF PIP;  Surgeon: Boy Lamb MD;  Location: [address];  Service: Orthopedics;  Laterality: Right;    INCISION AND DRAINAGE OF ABSCESS Right 10/12/2023    Procedure: INCISION AND DRAINAGE, ABSCESS;  Surgeon: Steve Monteiro MD;  Location: [facility] OR [address] [place] [address];  Service: General;  Laterality: Right;  right chest    IRRIGATION AND DEBRIDEMENT OF UPPER EXTREMITY Bilateral 7/7/2023     Procedure: IRRIGATION AND DEBRIDEMENT, UPPER EXTREMITY - RIGHT HAND;  Surgeon: Boy Lamb MD;  [facility] [facility] [place] [address] [place] [address];  Service: Orthopedics;  Laterality: Bilateral;    OLECRANON BURSECTOMY Left 7/7/2023    Procedure: BURSECTOMY, OLECRANON;  Surgeon: Boy Lamb MD;  Location[place] [facility] [place] [address] [place] [address];  Service: Orthopedics;  Laterality: Left;       Social History     Socioeconomic History    Marital status: Single   Tobacco Use    Smoking status: Former     Types: Cigarettes    Smokeless tobacco: Never   Substance and Sexual Activity    Alcohol use: No    Drug use: Yes     Types: Marijuana, IV     Comment: 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     Social Determinants of Health     Financial Resource Strain: High Risk (2/6/2024)    Overall Financial Resource Strain (CARDIA)     Difficulty of Paying Living Expenses: Very hard   Food Insecurity: Food Insecurity Present (2/6/2024)    Hunger Vital Sign     Worried About Running Out of Food in the Last Year: Sometimes true     Ran Out of Food in the Last Year: Sometimes true   Transportation Needs: Unmet Transportation Needs (2/6/2024)    PRAPARE - Transportation     Lack of Transportation (Medical): Yes     Lack of Transportation (Non-Medical): No   Physical Activity: Inactive (11/21/2023)    Exercise Vital Sign     Days of Exercise per Week: 0 days     Minutes of Exercise per Session: 0 min   Stress: Stress Concern Present (2/6/2024)    [employer] - Occupational Stress Questionnaire     Feeling of Stress : Very much   Social Connections: Socially Isolated (11/21/2023)    Social Connection and Isolation Panel [NHANES]     Frequency of Communication with Friends and Family: Never     Frequency of Social Gatherings with Friends and Family: Never     Attends Rastafarian Services: Never     Active Member of Clubs or Organizations: No     Attends Club or Organization Meetings: Never     Marital Status: Never    Housing Stability:  High Risk (2/6/2024)    Housing Stability Vital Sign     Unable to Pay for Housing in the Last Year: Yes     Number of Places Lived in the Last Year: 0     Unstable Housing in the Last Year: Yes       OBJECTIVE:     Vital Signs Range (Last 24H):  Temp:  [36.7 °C (98 °F)-37.1 °C (98.7 °F)]   Pulse:  [64-83]   Resp:  [17-18]   BP: (112-130)/(53-72)   SpO2:  [99 %-100 %]       Significant Labs:  Lab Results   Component Value Date    WBC 6.92 02/10/2024    HGB 9.6 (L) 02/10/2024    HCT 32.9 (L) 02/10/2024     (H) 02/10/2024    ALT 6 (L) 02/05/2024    AST 16 02/05/2024     02/10/2024    K 4.7 02/10/2024     02/10/2024    CREATININE 0.9 02/10/2024    BUN 20 02/10/2024    CO2 26 02/10/2024    INR 1.2 02/07/2024    HGBA1C 6.2 (H) 02/05/2024       Diagnostic Studies: No relevant studies.    EKG:   Results for orders placed or performed in visit on 11/20/23   EKG 12-lead    Collection Time: 11/20/23 11:02 AM    Narrative    Test Reason :     Vent. Rate : 097 BPM     Atrial Rate : 097 BPM     P-R Int : 126 ms          QRS Dur : 078 ms      QT Int : 332 ms       P-R-T Axes : 026 045 049 degrees     QTc Int : 421 ms    Normal sinus rhythm  Normal ECG  When compared with ECG of 14-OCT-2023 14:47,  No significant change was found  Confirmed by JAYE SCHMID MD ([id number]) on 11/20/2023 1:10:28 PM    Referred By: System System           Confirmed By:JAYE SCHMID MD       2D ECHO:  TTE:  Results for orders placed or performed during the hospital encounter of 10/14/23   Echo   Result Value Ref Range    Ascending aorta 2.43 cm    STJ 2.41 cm    AV mean gradient 6 mmHg    Ao peak terell 1.71 m/s    Ao VTI 31.07 cm    IVRT 74.22 msec    IVS 0.76 0.6 - 1.1 cm    LA size 3.53 cm    Left Atrium Major Axis 6.30 cm    Left Atrium Minor Axis 5.94 cm    LVIDd 5.16 3.5 - 6.0 cm    LVIDs 3.76 2.1 - 4.0 cm    LVOT diameter 2.15 cm    LVOT peak VTI 24.56 cm    Posterior Wall 0.9 0.6 - 1.1 cm    MV Peak A Terell 0.78 m/s    E wave  deceleration time 281.95 msec    MV Peak E Terell 1.05 m/s    RA Major Axis 5.13 cm    RA Width 4.62 cm    RVDD 3.75 cm    Sinus 2.72 cm    TAPSE 3.19 cm    TR Max Terell 2.48 m/s    LA WIDTH 4.88 cm    LV Diastolic Volume 127.08 mL    LV Systolic Volume 60.59 mL    LVOT peak terell 1.21 m/s    TDI LATERAL 0.20 m/s    TDI SEPTAL 0.16 m/s    LA volume (mod) 92.30 cm3    LV LATERAL E/E' RATIO 5.25 m/s    LV SEPTAL E/E' RATIO 6.56 m/s    FS 27 %    LA volume 89.53 cm3    LV mass 150.22 g    ZLVIDD -1.99     ZLVIDS -0.19     Left Ventricle Relative Wall Thickness 0.35 cm    AV valve area 2.87 cm²    AV Velocity Ratio 0.71     AV index (prosthetic) 0.79     E/A ratio 1.35     Mean e' 0.18 m/s    LVOT area 3.6 cm2    LVOT stroke volume 89.12 cm3    AV peak gradient 12 mmHg    E/E' ratio 5.83 m/s    LV Systolic Volume Index 29.3 mL/m2    LV Diastolic Volume Index 61.39 mL/m2    LA Volume Index 43.3 mL/m2    LV Mass Index 73 g/m2    Triscuspid Valve Regurgitation Peak Gradient 25 mmHg    LA Volume Index (Mod) 44.6 mL/m2    LAURY by Velocity Ratio 2.57 cm²    BSA 2.08 m2    TV resting pulmonary artery pressure 28 mmHg    RV TB RVSP 5 mmHg    Est. RA pres 3 mmHg    EF 65 %    Narrative      Left Ventricle: The left ventricle is normal in size. Ventricular mass   is normal. Normal wall thickness. Normal wall motion. There is normal   systolic function. Ejection fraction by visual approximation is 65%. There   is normal diastolic function.    Left Atrium: Left atrium is moderately dilated.    Right Ventricle: Normal right ventricular cavity size. Wall thickness   is normal. Right ventricle wall motion  is normal. Systolic function is   normal.    Tricuspid Valve: There is mild regurgitation.    Pulmonary Artery: The estimated pulmonary artery systolic pressure is   28 mmHg.    IVC/SVC: Normal venous pressure at 3 mmHg.    No vegetations seen.         ASSESSMENT/PLAN:       Pre-op Assessment    I have reviewed the Patient Summary Reports.      I have reviewed the Nursing Notes.    I have reviewed the Medications.     Review of Systems  Anesthesia Hx:  No problems with previous Anesthesia   History of prior surgery of interest to airway management or planning:          Denies Family Hx of Anesthesia complications.    Denies Personal Hx of Anesthesia complications.                    Social:  Recreational Drugs       Hematology/Oncology:    Oncology Normal    -- Anemia:                                  EENT/Dental:  EENT/Dental Normal           Cardiovascular:     Hypertension   Denies MI.         Denies CHF.                                 Pulmonary:  Pulmonary Normal   Denies COPD.      Denies Sleep Apnea.                Renal/:  Renal/ Normal                 Hepatic/GI:  Hepatic/GI Normal                 Musculoskeletal:  Arthritis               Neurological:  Neurology Normal   Denies CVA.                                    Endocrine:  Endocrine Normal Denies Diabetes.           Dermatological:  Skin Normal    Psych:  Psychiatric History                     Anesthesia Plan  Type of Anesthesia, risks & benefits discussed:    Anesthesia Type: Gen ETT  Intra-op Monitoring Plan: Standard ASA Monitors and Art Line  Post Op Pain Control Plan: multimodal analgesia and IV/PO Opioids PRN  Induction:  IV  Airway Plan: Direct, Post-Induction  Informed Consent: Informed consent signed with the Patient and all parties understand the risks and agree with anesthesia plan.  All questions answered.   ASA Score: 3  Day of Surgery Review of History & Physical: H&P Update referred to the surgeon/provider.    Ready For Surgery From Anesthesia Perspective.     .

## 2024-02-12 NOTE — SUBJECTIVE & OBJECTIVE
Interval History: No acute events. Cultures growing MRSA. Per ID will switch to daptomycin    Review of Systems  Objective:     Vital Signs (Most Recent):  Temp: 98.3 °F (36.8 °C) (02/12/24 1200)  Pulse: 75 (02/12/24 1200)  Resp: 18 (02/12/24 1200)  BP: (!) 114/57 (02/12/24 1200)  SpO2: 100 % (02/12/24 1200) Vital Signs (24h Range):  Temp:  [98 °F (36.7 °C)-98.6 °F (37 °C)] 98.3 °F (36.8 °C)  Pulse:  [64-83] 75  Resp:  [17-18] 18  SpO2:  [99 %-100 %] 100 %  BP: (112-130)/(57-72) 114/57     Weight: 76.7 kg (169 lb 1.5 oz)  Body mass index is 23.59 kg/m².    Intake/Output Summary (Last 24 hours) at 2/12/2024 1555  Last data filed at 2/11/2024 1955  Gross per 24 hour   Intake --   Output 700 ml   Net -700 ml         Physical Exam  Constitutional:       Appearance: He is normal weight. He is not toxic-appearing.   HENT:      Head: Normocephalic and atraumatic.   Eyes:      Conjunctiva/sclera: Conjunctivae normal.   Cardiovascular:      Rate and Rhythm: Normal rate and regular rhythm.      Heart sounds: Normal heart sounds.   Pulmonary:      Effort: Pulmonary effort is normal. No respiratory distress.      Breath sounds: Normal breath sounds. No wheezing.   Abdominal:      General: Bowel sounds are normal. There is no distension.      Palpations: Abdomen is soft.      Tenderness: There is no abdominal tenderness. There is no guarding.   Skin:     General: Skin is warm and dry.      Findings: No rash.   Neurological:      Mental Status: He is alert and oriented to person, place, and time.      Sensory: Sensory deficit (diminished sensation of BLE (R>L)) present.      Motor: No weakness.   Psychiatric:         Mood and Affect: Mood normal.         Behavior: Behavior normal.             Significant Labs: All pertinent labs within the past 24 hours have been reviewed.    Significant Imaging: I have reviewed all pertinent imaging results/findings within the past 24 hours.

## 2024-02-12 NOTE — PROGRESS NOTES
VANCOMYCIN DOSING BY PHARMACY DISCONTINUATION NOTE    Ton Donovan is a 28 y.o. male who had been consulted for vancomycin dosing.    The pharmacy consult for vancomycin dosing has been discontinued.     Vancomycin Dosing by Pharmacy Consult will sign-off. Please reconsult if necessary. Thank you for allowing us to participate in this patient's care.     Deneen Magdaleno, PharmD  Ext. [phone]

## 2024-02-12 NOTE — PLAN OF CARE
Aleksandr Bray - Neurosurgery ([facility])  Discharge Reassessment    Primary Care Provider: No, Primary Doctor    Expected Discharge Date: 2/19/2024    Patient is not medically ready for discharge.  Patient has a plan to go to surgery on Wednesday 2/14/2024.  Patient will need longterm ivab therapy. CM to send referrals.    Discharge Plan A and Plan B have been determined by review of patient's clinical status, future medical and therapeutic needs, and coverage/benefits for post-acute care in coordination with multidisciplinary team members.     Reassessment (most recent)       Discharge Reassessment - 02/12/24 1200          Discharge Reassessment    Assessment Type Discharge Planning Reassessment     Did the patient's condition or plan change since previous assessment? No     Discharge Plan discussed with: Patient     Communicated SENA with patient/caregiver Yes     Discharge Plan A Long-term acute care facility (LTAC)     Discharge Plan B Long-term acute care facility (LTAC)     DME Needed Upon Discharge  none     Transition of Care Barriers Does not adhere to care plan;Homeless;Substance Abuse;No family/friends to help     Why the patient remains in the hospital Requires continued medical care        Post-Acute Status    Post-Acute Authorization Placement     Post-Acute Placement Status Referrals Sent     Discharge Delays None known at this time

## 2024-02-12 NOTE — PROGRESS NOTES
Aleksandr Bray - Neurosurgery ([facility])  Neurosurgery  Progress Note    Subjective:     History of Present Illness: 28M w/ hx MRSA bactermia, IVDU, pelvic/intra-abdominal abscesses who presents with progressive back pain with radiation down the leg over the past couple of weeks.  He has had bilateral numbness of his anterior thigh as well. He denies bowel/bladder sx or saddle anesthesia. He denies focal weakness. Per chart review, the patient is actively using heroin and did an injection prior to being taken in by EMS.      Post-Op Info:  Procedure(s) (LRB):  LOOP X FUSION, SPINE, LUMBAR (N/A)       Interval History:      NAEON. AFVSS.OR 2/14. Exam stable    Medications:  Continuous Infusions:  Scheduled Meds:   acetaminophen  1,000 mg Oral Q8H    cefTRIAXone (Rocephin) IV (PEDS and ADULTS)  2 g Intravenous Q24H    cloNIDine  0.1 mg Oral Q12H    enoxparin  40 mg Subcutaneous Daily    mirtazapine  15 mg Oral QHS    nicotine  1 patch Transdermal Daily    vancomycin (VANCOCIN) IV (PEDS and ADULTS)  20 mg/kg Intravenous Q12H     PRN Meds:acetaminophen, dextrose 10%, dextrose 10%, dicyclomine, glucagon (human recombinant), glucose, glucose, HYDROmorphone, hydrOXYzine HCL, ibuprofen, loperamide, melatonin, naloxone, oxyCODONE, oxyCODONE, promethazine, sodium chloride 0.9%, Pharmacy to dose Vancomycin consult **AND** vancomycin - pharmacy to dose     Review of Systems  Objective:     Weight: 76.7 kg (169 lb 1.5 oz)  Body mass index is 23.59 kg/m².  Vital Signs (Most Recent):  Temp: 98.7 °F (37.1 °C) (02/11/24 0609)  Pulse: 72 (02/11/24 0609)  Resp: 18 (02/11/24 0609)  BP: 119/63 (02/11/24 0609)  SpO2: 100 % (02/11/24 0609) Vital Signs (24h Range):  Temp:  [96.6 °F (35.9 °C)-99.4 °F (37.4 °C)] 98.7 °F (37.1 °C)  Pulse:  [65-75] 72  Resp:  [17-20] 18  SpO2:  [99 %-100 %] 100 %  BP: (113-132)/(52-63) 119/63          Physical Exam  General: well developed, well nourished, no distress.   Head: normocephalic, atraumatic  Mental  "Status: Awake, Alert, Oriented  Speech: Clear with content appropriate to conversation  Cranial nerves: face symmetric, CN II-XII grossly intact.   Sensory: intact to light touch throughout  Motor Strength: Motor Strength: Moves all extremities spontaneously with good tone. No abnormal movements seen. Pain limited weakness b/l hip flexors.   DF/EHL/PF 4- on R and  4+ L      Significant Labs:  Recent Labs   Lab 02/10/24  0526   GLU 98      K 4.7      CO2 26   BUN 20   CREATININE 0.9   CALCIUM 8.8       Recent Labs   Lab 02/10/24  0526   WBC 6.92   HGB 9.6*   HCT 32.9*   *       No results for input(s): "LABPT", "INR", "APTT" in the last 48 hours.  Microbiology Results (last 7 days)       Procedure Component Value Units Date/Time    Blood culture #1 **CANNOT BE ORDERED STAT** [[id number]] Collected: 02/04/24 1853    Order Status: Completed Specimen: Blood from Peripheral, Forearm, Left Updated: 02/09/24 2012     Blood Culture, Routine No growth after 5 days.    Blood culture #2 **CANNOT BE ORDERED STAT** [[id number]] Collected: 02/04/24 1822    Order Status: Completed Specimen: Blood from Peripheral, Forearm, Left Updated: 02/09/24 2012     Blood Culture, Routine No growth after 5 days.    Aerobic culture [[id number]]  (Abnormal) Collected: 02/07/24 1423    Order Status: Completed Specimen: Abscess from Back Updated: 02/09/24 1138     Aerobic Bacterial Culture STAPHYLOCOCCUS AUREUS  Rare  Susceptibility pending      Narrative:      L psoas    Culture, Anaerobe [[id number]] Collected: 02/07/24 1423    Order Status: Completed Specimen: Abscess from Back Updated: 02/09/24 0934     Anaerobic Culture Culture in progress    Narrative:      L psoas    AFB Culture & Smear [[id number]] Collected: 02/07/24 1413    Order Status: Completed Specimen: Joint Fluid from Back Updated: 02/09/24 0927     AFB Culture & Smear Culture in progress     AFB CULTURE STAIN No acid fast bacilli seen.    Aerobic culture " [[id number]] Collected: 02/07/24 1412    Order Status: Completed Specimen: Bone from Back Updated: 02/09/24 0745     Aerobic Bacterial Culture No growth    Narrative:      Joint    Culture, Anaerobe [[id number]] Collected: 02/07/24 1411    Order Status: Completed Specimen: Joint Fluid from Back Updated: 02/09/24 0728     Anaerobic Culture Culture in progress    AFB Culture & Smear [[id number]] Collected: 02/07/24 1423    Order Status: Completed Specimen: Abscess from Back Updated: 02/08/24 2127     AFB Culture & Smear Culture in progress     AFB CULTURE STAIN No acid fast bacilli seen.    Narrative:      L psoas    Gram stain [[id number]] Collected: 02/07/24 1413    Order Status: Completed Specimen: Joint Fluid from Back Updated: 02/08/24 0041     Gram Stain Result No WBC's      No organisms seen    Fungus culture [[id number]] Collected: 02/07/24 1413    Order Status: Sent Specimen: Joint Fluid from Back Updated: 02/07/24 2110    Gram stain [[id number]] Collected: 02/07/24 1423    Order Status: Completed Specimen: Abscess from Back Updated: 02/07/24 1934     Gram Stain Result Few WBC's      No organisms seen    Narrative:      L psoas    Fungus culture [[id number]] Collected: 02/07/24 1423    Order Status: Sent Specimen: Abscess from Back Updated: 02/07/24 1733    Culture, Anaerobe [[id number]]     Order Status: No result Specimen: Abscess from Sacrum     Aerobic culture [[id number]]     Order Status: No result Specimen: Abscess from Sacrum     AFB Culture & Smear [[id number]]     Order Status: No result Specimen: Abscess from Sacrum     Gram stain [[id number]]     Order Status: No result Specimen: Abscess from Sacrum     Fungus culture [[id number]]     Order Status: No result Specimen: Abscess from Sacrum     Culture, Anaerobe [[id number]]     Order Status: No result Specimen: Abscess     Aerobic culture [[id number]]     Order Status: No result Specimen: Abscess     Fungus culture [[id number]]     Order Status: No  result Specimen: Abscess     AFB Culture & Smear [[id number]]     Order Status: No result Specimen: Abscess     Gram stain [[id number]]     Order Status: No result Specimen: Abscess           All pertinent labs from the last 24 hours have been reviewed.    Significant Diagnostics:  No results found in the last 24 hours.  Assessment/Plan:     * Osteomyelitis of vertebra of lumbosacral region  28M w/ hx MRSA bactermia, IVDU, pelvic/intra-abdominal abscesses who presents with progressive back pain with radiation down the leg over the past couple of weeks. Motor intact.     - Admitted to   - Neuro exam stable  - Neuro checks q4h  - All pertinent labs and imaging reviewed  - 2/4 ESR 75, CRP 33.7. Blood cx NGTD.   - Back culture 2/7 with staph aureus  - MRI lumbar w/wo 2/4: L5-S1 osteodiscitis extending into the sacrum, left iliac bone, SI joint, facet joints. Anterolisthesis L5-S1, epidural abscess L5-S1 with canal stenosis. Left psoas and paraspinal muscle abscesses. No clinical evidence of cauda equina syndrome.   - CT lumbar obtained with destructive changes at L5-S1.   - ID following and started patient on abx following IR bx. Currently on vanc + rocephin.   - Voiding spontaneously. Bladder scan prn.   - Plan for OR 2/14 for L4-pelvis fusion with debridement  - Discussed with Dr. Pozo  - Contact with acute changes in exam    Case and plan discussed with attending physician Dr. Nohelia Wallace MD  Neurosurgery  [facility] - Neurosurgery ([facility])

## 2024-02-12 NOTE — PROGRESS NOTES
"Aleksandr Bray - Neurosurgery ([facility])  Hospital Medicine  Progress Note    Patient Name: Ton Donovan  MRN: [id number]  Patient Class: IP- Inpatient   Admission Date: 2/4/2024  Length of Stay: 7 days  Attending Physician: Nagi Angel*  Primary Care Provider: Emily, Primary Doctor        Subjective:     Principal Problem:Osteomyelitis of vertebra of lumbosacral region        HPI:  Ton Donovan is a 28-year-old male with PMHx of IVDU w/ assoc complications of MRSA bacteremia, multiple septic joints & abscesses, who presented 2/4/24 with progressive back pain with radiation down his legs b/l. Endorses bilateral numbness & neuropathic pain over the dorsum of his feet. This has been ongoing and is not necessarily a new problem, but has been worsening. Reports "pins & needles"/paresthesias down his legs that have been progressing, although laying on his stomach seems to provide significant relief. Admits to continued active IV heroin use; last used just prior to EMS arrival. Denies any fevers, chills, night sweats, cough, or shortness of breath. Denies any bowel or bladder incontinence/retention or saddle anesthesia.     On arrival, HDS & 0/4 SIRS. MRI showed osteomyelitis/discitis of L5-S1, osteomyelitis of sacrum & L iliac bone, probable septic arthritis of L SI joint & lower lumbar spine facet joints, worsening anterolisthesis of L5 with respect to S1 with increased size of epidural abscess at the level of L5-S1 and probable severe central canal stenosis at this level, along w/ additional abscesses in the L iliopsoas muscle, paraspinal muscles, pelvis, & L sacrum. Received single dose of Zosyn in ED on arrival, but given that pt HDS w/o sepsis, further abx held to optimize intra-op cx/pathology. Admitted to hospital medicine for further mgmt of osteomyelitis, septic joints & abscesses.    Overview/Hospital Course:  Addiction medicine, along w/ NSGY, orthopedic surgery, IR & ID consulted. Underwent aspiration " of psoas abscess per IR on 2/7. Aspiration of SI joint attempted but unable to aspirate hardly any fluid.   NSGY plan for OR Wednesday 2/14  Cultures grew MRSA    Interval History: No acute events. Cultures growing MRSA. Per ID will switch to daptomycin    Review of Systems  Objective:     Vital Signs (Most Recent):  Temp: 98.3 °F (36.8 °C) (02/12/24 1200)  Pulse: 75 (02/12/24 1200)  Resp: 18 (02/12/24 1200)  BP: (!) 114/57 (02/12/24 1200)  SpO2: 100 % (02/12/24 1200) Vital Signs (24h Range):  Temp:  [98 °F (36.7 °C)-98.6 °F (37 °C)] 98.3 °F (36.8 °C)  Pulse:  [64-83] 75  Resp:  [17-18] 18  SpO2:  [99 %-100 %] 100 %  BP: (112-130)/(57-72) 114/57     Weight: 76.7 kg (169 lb 1.5 oz)  Body mass index is 23.59 kg/m².    Intake/Output Summary (Last 24 hours) at 2/12/2024 1555  Last data filed at 2/11/2024 1955  Gross per 24 hour   Intake --   Output 700 ml   Net -700 ml         Physical Exam  Constitutional:       Appearance: He is normal weight. He is not toxic-appearing.   HENT:      Head: Normocephalic and atraumatic.   Eyes:      Conjunctiva/sclera: Conjunctivae normal.   Cardiovascular:      Rate and Rhythm: Normal rate and regular rhythm.      Heart sounds: Normal heart sounds.   Pulmonary:      Effort: Pulmonary effort is normal. No respiratory distress.      Breath sounds: Normal breath sounds. No wheezing.   Abdominal:      General: Bowel sounds are normal. There is no distension.      Palpations: Abdomen is soft.      Tenderness: There is no abdominal tenderness. There is no guarding.   Skin:     General: Skin is warm and dry.      Findings: No rash.   Neurological:      Mental Status: He is alert and oriented to person, place, and time.      Sensory: Sensory deficit (diminished sensation of BLE (R>L)) present.      Motor: No weakness.   Psychiatric:         Mood and Affect: Mood normal.         Behavior: Behavior normal.             Significant Labs: All pertinent labs within the past 24 hours have been  reviewed.    Significant Imaging: I have reviewed all pertinent imaging results/findings within the past 24 hours.    Assessment/Plan:      * Osteomyelitis of vertebra of lumbosacral region  Osteomyelitis, discitis, & myositis of lumbosacral region  Septic arthritis of SI joint(s)  Epidural abscesses   Multiple abscesses of pelvis, sacrum, psoas, iliacus, & paraspinals  Lower back pain w/ b/l sciatica   Severe spinal canal stenosis   Hx of known spinal osteomyelitis, various abscesses, & MRSA bacteremia i/s/o IVDU. Presented w/ progressive lower back & abdominal pain w/ assoc LE weakness & sensory changes. HDS & 0/4 SIRS on admission. MRI showed osteomyelitis/discitis of L5-S1, osteomyelitis of sacrum & L iliac bone, probable septic arthritis of L SI joint & lower lumbar spine facet joints, worsening anterolisthesis of L5 with respect to S1 with increased size of epidural abscess at the level of L5-S1 and probable severe central canal stenosis at this level, along w/ additional abscesses in the L iliopsoas muscle, paraspinal muscles, pelvis, & L sacrum. CT L-spine w/ similar findings. No urinary or bowel sx retention/incontinence. Received single dose of Zosyn in ED on arrival, but given that pt HDS w/o sepsis, further abx held to optimize intra-op cx/pathology.   - Neurosurgery consulted; appreciate recs/assistance   - Plan for OR 2/14 for L4-pelvis fusion with debridement.   - IR consulted for potential aspiration of abscess(es); appreciate assistance   - Plan for aspiration of psoas abscess & SI joint 2/7   - ID consulted; appreciate recs  - Ortho consulted for mgmt of septic SI joint; appreciate recs/assistance  - Continue vancomycin for MRSA growth  - Multimodal analgesia   - PT/OT    Housing insecurity  Previously worked at [employer] & held down job in the past but started using heroin last year following multiple family deaths (mother, aunt & grandmother) in a short period of time. Subsequently lost housing &  has been living on streets since then.   - SW/CM    Prediabetes  A1c 6.2% w/ glucose 109 on admission.   - Deferring SSI or CBGs   - Hypoglycemia protocol     Substance or medication-induced depressive disorder  Pt endorses sx of depression, no SI/SA.   - Addiction medicine/psychiatry consult as above; appreciate recs   - Initiating mirtazapine 15mg QHS    Spinal stenosis of lumbar region without neurogenic claudication  See osteomyelitis      Pelvic abscess in male  Presumed osteomyelitis/discitis at L5-S1 with osteomyelitis in the sacrum and left iliac bone and probable septic arthritis in the left SI joint and lower lumbar spine facet joints.     Worsening anterolisthesis of L5 with respect to S1 with increased size of epidural abscess at the level of L5-S1, and probable severe central canal stenosis at this level.     Additional abscesses in the left iliopsoas muscle, paraspinal muscles, pelvis, and left sacrum as discussed above.     Additional findings discussed in the body of the report and on same day pelvic MRI.      IVDU (intravenous drug user)  Heroin abuse w/ opioid withdrawal   PSDU  Tobacco use   Known hx of IVDU (heroin) w/ extensive assoc medical complications (discussed above). No recent UDS (only one on file from July 2023- positive for opiates & THC) and none collected on admission. Endorses daily IV heroin use (1g/day); last used just prior to calling EMS. Active tobacco use, but denies EtOH or other illicit substance use. Endorsing sx of opioid w/d.   - Addiction medicine consulted; appreciate recs  - Deferring initiating methadone/suboxone given potential anesthesia/surgical intervention  - PRN oxycodone per [facility] protocol  - Supportive care w/ PRN antiemetics, analgesics, & anxiolytics   - NRT PRN  - Cessation strongly encouraged     Septic arthritis of sacroiliac joint  See osteomyelitis      Chronic hyponatremia  On admission, Na 133 (baseline 129-133). Likely SIADH 2/2 chronic infxn, but w/  tea/toast & poor PO intake likely also contributing.   - Deferring IVF  - Encourage good PO intake  - Monitor BMP  - Mgmt of infxns as above    Anemia of infection and chronic disease  On admission, Hgb 8.0 (more or less c/w baseline as has been downtrending for past year) w/ MCV 69. No e/o active bleeding. Iron panel w/ low iron, TIBC, %sat & transferrin; ferritin nml. Although ferritin nml, likely anemia of chronic dx 2/2 chronic infxn re: IVDU.   - Monitor CBC & for e/o active bleeding  - Transfuse PRN for goal Hgb > 7  - Mgmt of infxns as above    Cigarette nicotine dependence without complication  Dangers of cigarette smoking were reviewed with patient in detail. Patient was  offered a nicotinepatch  Nicotine replacement options were discussed. Nicotine replacement was discussed      VTE Risk Mitigation (From admission, onward)           Ordered     enoxaparin injection 40 mg  Daily         02/05/24 0126     Place sequential compression device  Until discontinued         02/05/24 0126     IP VTE HIGH RISK PATIENT  Once         02/05/24 0126                    Discharge Planning   SENA: 2/19/2024     Code Status: Full Code   Is the patient medically ready for discharge?: No    Reason for patient still in hospital (select all that apply): Treatment and Consult recommendations  Discharge Plan A: Long-term acute care facility (LTAC)   Discharge Delays: None known at this time        Nagi Angel MD  Department of Hospital Medicine   [facility] - Neurosurgery ([facility]

## 2024-02-13 LAB
ABO + RH BLD: NORMAL
APTT PPP: 30.1 SEC (ref 21–32)
ASCENDING AORTA: 2.66 CM
AV INDEX (PROSTH): 1.1
AV MEAN GRADIENT: 4 MMHG
AV PEAK GRADIENT: 6 MMHG
AV VALVE AREA BY VELOCITY RATIO: 3.25 CM²
AV VALVE AREA: 3.88 CM²
AV VELOCITY RATIO: 0.92
BLD GP AB SCN CELLS X3 SERPL QL: NORMAL
BSA FOR ECHO PROCEDURE: 1.95 M2
CK SERPL-CCNC: 42 U/L (ref 20–200)
CV ECHO LV RWT: 0.33 CM
DOP CALC AO PEAK VEL: 1.26 M/S
DOP CALC AO VTI: 19.68 CM
DOP CALC LVOT AREA: 3.5 CM2
DOP CALC LVOT DIAMETER: 2.12 CM
DOP CALC LVOT PEAK VEL: 1.16 M/S
DOP CALC LVOT STROKE VOLUME: 76.42 CM3
DOP CALCLVOT PEAK VEL VTI: 21.66 CM
E WAVE DECELERATION TIME: 283.75 MSEC
E/A RATIO: 1.25
E/E' RATIO: 5.79 M/S
ECHO LV POSTERIOR WALL: 0.83 CM (ref 0.6–1.1)
EJECTION FRACTION: 65 %
FRACTIONAL SHORTENING: 38 % (ref 28–44)
INR PPP: 1.1 (ref 0.8–1.2)
INTERVENTRICULAR SEPTUM: 0.87 CM (ref 0.6–1.1)
IVRT: 104.66 MSEC
LA MAJOR: 5.05 CM
LA MINOR: 5.07 CM
LA WIDTH: 4.29 CM
LEFT ATRIUM SIZE: 3.38 CM
LEFT ATRIUM VOLUME INDEX MOD: 36.7 ML/M2
LEFT ATRIUM VOLUME INDEX: 32.1 ML/M2
LEFT ATRIUM VOLUME MOD: 71.18 CM3
LEFT ATRIUM VOLUME: 62.37 CM3
LEFT INTERNAL DIMENSION IN SYSTOLE: 3.12 CM (ref 2.1–4)
LEFT VENTRICLE DIASTOLIC VOLUME INDEX: 62.51 ML/M2
LEFT VENTRICLE DIASTOLIC VOLUME: 121.26 ML
LEFT VENTRICLE MASS INDEX: 77 G/M2
LEFT VENTRICLE SYSTOLIC VOLUME INDEX: 19.9 ML/M2
LEFT VENTRICLE SYSTOLIC VOLUME: 38.53 ML
LEFT VENTRICULAR INTERNAL DIMENSION IN DIASTOLE: 5.05 CM (ref 3.5–6)
LEFT VENTRICULAR MASS: 149.33 G
LV LATERAL E/E' RATIO: 5.06 M/S
LV SEPTAL E/E' RATIO: 6.75 M/S
MV PEAK A VEL: 0.65 M/S
MV PEAK E VEL: 0.81 M/S
PROTHROMBIN TIME: 12.1 SEC (ref 9–12.5)
RA MAJOR: 4.01 CM
RA PRESSURE ESTIMATED: 3 MMHG
RA WIDTH: 3.61 CM
RIGHT VENTRICULAR END-DIASTOLIC DIMENSION: 2.96 CM
SINUS: 2.83 CM
SPECIMEN OUTDATE: NORMAL
STJ: 2.4 CM
TDI LATERAL: 0.16 M/S
TDI SEPTAL: 0.12 M/S
TDI: 0.14 M/S
TRICUSPID ANNULAR PLANE SYSTOLIC EXCURSION: 2.3 CM
Z-SCORE OF LEFT VENTRICULAR DIMENSION IN END DIASTOLE: -0.87
Z-SCORE OF LEFT VENTRICULAR DIMENSION IN END SYSTOLE: -0.66

## 2024-02-13 PROCEDURE — 82550 ASSAY OF CK (CPK): CPT | Performed by: STUDENT IN AN ORGANIZED HEALTH CARE EDUCATION/TRAINING PROGRAM

## 2024-02-13 PROCEDURE — 97116 GAIT TRAINING THERAPY: CPT | Mod: CQ

## 2024-02-13 PROCEDURE — 63600175 PHARM REV CODE 636 W HCPCS: Performed by: STUDENT IN AN ORGANIZED HEALTH CARE EDUCATION/TRAINING PROGRAM

## 2024-02-13 PROCEDURE — 86850 RBC ANTIBODY SCREEN: CPT | Performed by: STUDENT IN AN ORGANIZED HEALTH CARE EDUCATION/TRAINING PROGRAM

## 2024-02-13 PROCEDURE — 25000003 PHARM REV CODE 250: Performed by: STUDENT IN AN ORGANIZED HEALTH CARE EDUCATION/TRAINING PROGRAM

## 2024-02-13 PROCEDURE — 11000001 HC ACUTE MED/SURG PRIVATE ROOM

## 2024-02-13 PROCEDURE — 85610 PROTHROMBIN TIME: CPT | Performed by: STUDENT IN AN ORGANIZED HEALTH CARE EDUCATION/TRAINING PROGRAM

## 2024-02-13 PROCEDURE — 25000003 PHARM REV CODE 250: Performed by: HOSPITALIST

## 2024-02-13 PROCEDURE — 85730 THROMBOPLASTIN TIME PARTIAL: CPT | Performed by: STUDENT IN AN ORGANIZED HEALTH CARE EDUCATION/TRAINING PROGRAM

## 2024-02-13 PROCEDURE — 25000003 PHARM REV CODE 250: Performed by: EMERGENCY MEDICINE

## 2024-02-13 PROCEDURE — 36415 COLL VENOUS BLD VENIPUNCTURE: CPT | Mod: XB | Performed by: STUDENT IN AN ORGANIZED HEALTH CARE EDUCATION/TRAINING PROGRAM

## 2024-02-13 PROCEDURE — 36415 COLL VENOUS BLD VENIPUNCTURE: CPT | Performed by: STUDENT IN AN ORGANIZED HEALTH CARE EDUCATION/TRAINING PROGRAM

## 2024-02-13 PROCEDURE — 97530 THERAPEUTIC ACTIVITIES: CPT | Mod: CQ

## 2024-02-13 PROCEDURE — S4991 NICOTINE PATCH NONLEGEND: HCPCS | Performed by: HOSPITALIST

## 2024-02-13 RX ADMIN — ACETAMINOPHEN 1000 MG: 500 TABLET ORAL at 03:02

## 2024-02-13 RX ADMIN — NICOTINE 1 PATCH: 14 PATCH, EXTENDED RELEASE TRANSDERMAL at 09:02

## 2024-02-13 RX ADMIN — OXYCODONE HYDROCHLORIDE 10 MG: 10 TABLET ORAL at 03:02

## 2024-02-13 RX ADMIN — HYDROMORPHONE HYDROCHLORIDE 1 MG: 1 INJECTION, SOLUTION INTRAMUSCULAR; INTRAVENOUS; SUBCUTANEOUS at 06:02

## 2024-02-13 RX ADMIN — MIRTAZAPINE 15 MG: 15 TABLET, FILM COATED ORAL at 08:02

## 2024-02-13 RX ADMIN — DAPTOMYCIN 615 MG: 350 INJECTION, POWDER, LYOPHILIZED, FOR SOLUTION INTRAVENOUS at 03:02

## 2024-02-13 RX ADMIN — MELATONIN TAB 3 MG 6 MG: 3 TAB at 08:02

## 2024-02-13 RX ADMIN — OXYCODONE HYDROCHLORIDE 10 MG: 10 TABLET ORAL at 10:02

## 2024-02-13 RX ADMIN — HYDROMORPHONE HYDROCHLORIDE 1 MG: 1 INJECTION, SOLUTION INTRAMUSCULAR; INTRAVENOUS; SUBCUTANEOUS at 12:02

## 2024-02-13 RX ADMIN — MUPIROCIN: 20 OINTMENT TOPICAL at 09:02

## 2024-02-13 RX ADMIN — ACETAMINOPHEN 1000 MG: 500 TABLET ORAL at 05:02

## 2024-02-13 RX ADMIN — CLONIDINE HYDROCHLORIDE 0.1 MG: 0.1 TABLET ORAL at 08:02

## 2024-02-13 RX ADMIN — OXYCODONE HYDROCHLORIDE 10 MG: 10 TABLET ORAL at 08:02

## 2024-02-13 RX ADMIN — ACETAMINOPHEN 1000 MG: 500 TABLET ORAL at 09:02

## 2024-02-13 NOTE — SUBJECTIVE & OBJECTIVE
Interval History: Getting echo done this am  OR tomorrow    Review of Systems  Objective:     Vital Signs (Most Recent):  Temp: 98.9 °F (37.2 °C) (02/13/24 1125)  Pulse: 74 (02/13/24 1125)  Resp: 16 (02/13/24 1241)  BP: (!) 122/58 (02/13/24 1125)  SpO2: 99 % (02/13/24 1125) Vital Signs (24h Range):  Temp:  [97.8 °F (36.6 °C)-99 °F (37.2 °C)] 98.9 °F (37.2 °C)  Pulse:  [61-76] 74  Resp:  [16-20] 16  SpO2:  [96 %-100 %] 99 %  BP: ()/(47-71) 122/58     Weight: 76.7 kg (169 lb)  Body mass index is 24.25 kg/m².    Intake/Output Summary (Last 24 hours) at 2/13/2024 1313  Last data filed at 2/13/2024 1255  Gross per 24 hour   Intake 480 ml   Output 1950 ml   Net -1470 ml         Physical Exam  Constitutional:       Appearance: He is normal weight. He is not toxic-appearing.   HENT:      Head: Normocephalic and atraumatic.   Eyes:      Conjunctiva/sclera: Conjunctivae normal.   Cardiovascular:      Rate and Rhythm: Normal rate and regular rhythm.      Heart sounds: Normal heart sounds.   Pulmonary:      Effort: Pulmonary effort is normal. No respiratory distress.      Breath sounds: Normal breath sounds. No wheezing.   Abdominal:      General: Bowel sounds are normal. There is no distension.      Palpations: Abdomen is soft.      Tenderness: There is no abdominal tenderness. There is no guarding.   Skin:     General: Skin is warm and dry.      Findings: No rash.   Neurological:      Mental Status: He is alert and oriented to person, place, and time.      Sensory: Sensory deficit (diminished sensation of BLE (R>L)) present.      Motor: No weakness.   Psychiatric:         Mood and Affect: Mood normal.         Behavior: Behavior normal.             Significant Labs: All pertinent labs within the past 24 hours have been reviewed.    Significant Imaging: I have reviewed all pertinent imaging results/findings within the past 24 hours.

## 2024-02-13 NOTE — PROGRESS NOTES
"Aleksandr [facility] - Neurosurgery ([facility]  Hospital Medicine  Progress Note    Patient Name: Ton Donovan  MRN: [id number]  Patient Class: IP- Inpatient   Admission Date: 2/4/2024  Length of Stay: 8 days  Attending Physician: Nagi Angel*  Primary Care Provider: Emily, Primary Doctor        Subjective:     Principal Problem:Osteomyelitis of vertebra of lumbosacral region        HPI:  Ton Donovan is a 28-year-old male with PMHx of IVDU w/ assoc complications of MRSA bacteremia, multiple septic joints & abscesses, who presented 2/4/24 with progressive back pain with radiation down his legs b/l. Endorses bilateral numbness & neuropathic pain over the dorsum of his feet. This has been ongoing and is not necessarily a new problem, but has been worsening. Reports "pins & needles"/paresthesias down his legs that have been progressing, although laying on his stomach seems to provide significant relief. Admits to continued active IV heroin use; last used just prior to EMS arrival. Denies any fevers, chills, night sweats, cough, or shortness of breath. Denies any bowel or bladder incontinence/retention or saddle anesthesia.     On arrival, HDS & 0/4 SIRS. MRI showed osteomyelitis/discitis of L5-S1, osteomyelitis of sacrum & L iliac bone, probable septic arthritis of L SI joint & lower lumbar spine facet joints, worsening anterolisthesis of L5 with respect to S1 with increased size of epidural abscess at the level of L5-S1 and probable severe central canal stenosis at this level, along w/ additional abscesses in the L iliopsoas muscle, paraspinal muscles, pelvis, & L sacrum. Received single dose of Zosyn in ED on arrival, but given that pt HDS w/o sepsis, further abx held to optimize intra-op cx/pathology. Admitted to hospital medicine for further mgmt of osteomyelitis, septic joints & abscesses.    Overview/Hospital Course:  Addiction medicine, along w/ NSGY, orthopedic surgery, IR & ID consulted. Underwent aspiration " of psoas abscess per IR on 2/7. Aspiration of SI joint attempted but unable to aspirate hardly any fluid.   NSGY plan for OR Wednesday 2/14  Cultures grew MRSA    Interval History: Getting echo done this am  OR tomorrow    Review of Systems  Objective:     Vital Signs (Most Recent):  Temp: 98.9 °F (37.2 °C) (02/13/24 1125)  Pulse: 74 (02/13/24 1125)  Resp: 16 (02/13/24 1241)  BP: (!) 122/58 (02/13/24 1125)  SpO2: 99 % (02/13/24 1125) Vital Signs (24h Range):  Temp:  [97.8 °F (36.6 °C)-99 °F (37.2 °C)] 98.9 °F (37.2 °C)  Pulse:  [61-76] 74  Resp:  [16-20] 16  SpO2:  [96 %-100 %] 99 %  BP: ()/(47-71) 122/58     Weight: 76.7 kg (169 lb)  Body mass index is 24.25 kg/m².    Intake/Output Summary (Last 24 hours) at 2/13/2024 1313  Last data filed at 2/13/2024 1255  Gross per 24 hour   Intake 480 ml   Output 1950 ml   Net -1470 ml         Physical Exam  Constitutional:       Appearance: He is normal weight. He is not toxic-appearing.   HENT:      Head: Normocephalic and atraumatic.   Eyes:      Conjunctiva/sclera: Conjunctivae normal.   Cardiovascular:      Rate and Rhythm: Normal rate and regular rhythm.      Heart sounds: Normal heart sounds.   Pulmonary:      Effort: Pulmonary effort is normal. No respiratory distress.      Breath sounds: Normal breath sounds. No wheezing.   Abdominal:      General: Bowel sounds are normal. There is no distension.      Palpations: Abdomen is soft.      Tenderness: There is no abdominal tenderness. There is no guarding.   Skin:     General: Skin is warm and dry.      Findings: No rash.   Neurological:      Mental Status: He is alert and oriented to person, place, and time.      Sensory: Sensory deficit (diminished sensation of BLE (R>L)) present.      Motor: No weakness.   Psychiatric:         Mood and Affect: Mood normal.         Behavior: Behavior normal.             Significant Labs: All pertinent labs within the past 24 hours have been reviewed.    Significant Imaging: I have  reviewed all pertinent imaging results/findings within the past 24 hours.    Assessment/Plan:      * Osteomyelitis of vertebra of lumbosacral region  Osteomyelitis, discitis, & myositis of lumbosacral region  Septic arthritis of SI joint(s)  Epidural abscesses   Multiple abscesses of pelvis, sacrum, psoas, iliacus, & paraspinals  Lower back pain w/ b/l sciatica   Severe spinal canal stenosis   Hx of known spinal osteomyelitis, various abscesses, & MRSA bacteremia i/s/o IVDU. Presented w/ progressive lower back & abdominal pain w/ assoc LE weakness & sensory changes. HDS & 0/4 SIRS on admission. MRI showed osteomyelitis/discitis of L5-S1, osteomyelitis of sacrum & L iliac bone, probable septic arthritis of L SI joint & lower lumbar spine facet joints, worsening anterolisthesis of L5 with respect to S1 with increased size of epidural abscess at the level of L5-S1 and probable severe central canal stenosis at this level, along w/ additional abscesses in the L iliopsoas muscle, paraspinal muscles, pelvis, & L sacrum. CT L-spine w/ similar findings. No urinary or bowel sx retention/incontinence. Received single dose of Zosyn in ED on arrival, but given that pt HDS w/o sepsis, further abx held to optimize intra-op cx/pathology.   - Neurosurgery consulted; appreciate recs/assistance   - Plan for OR 2/14 for L4-pelvis fusion with debridement.   - IR consulted for potential aspiration of abscess(es); appreciate assistance   - Plan for aspiration of psoas abscess & SI joint 2/7   - ID consulted; appreciate recs  - Ortho consulted for mgmt of septic SI joint; appreciate recs/assistance  - Continue vancomycin for MRSA growth --> switched to daptomycin  - Multimodal analgesia   - PT/OT    Housing insecurity  Previously worked at [employer] & held down job in the past but started using heroin last year following multiple family deaths (mother, aunt & grandmother) in a short period of time. Subsequently lost housing & has been living  on streets since then.   - SW/CM    Prediabetes  A1c 6.2% w/ glucose 109 on admission.   - Deferring SSI or CBGs   - Hypoglycemia protocol     Substance or medication-induced depressive disorder  Pt endorses sx of depression, no SI/SA.   - Addiction medicine/psychiatry consult as above; appreciate recs   - Initiating mirtazapine 15mg QHS    Spinal stenosis of lumbar region without neurogenic claudication  See osteomyelitis      Pelvic abscess in male  Presumed osteomyelitis/discitis at L5-S1 with osteomyelitis in the sacrum and left iliac bone and probable septic arthritis in the left SI joint and lower lumbar spine facet joints.     Worsening anterolisthesis of L5 with respect to S1 with increased size of epidural abscess at the level of L5-S1, and probable severe central canal stenosis at this level.     Additional abscesses in the left iliopsoas muscle, paraspinal muscles, pelvis, and left sacrum as discussed above.     Additional findings discussed in the body of the report and on same day pelvic MRI.      IVDU (intravenous drug user)  Heroin abuse w/ opioid withdrawal   PSDU  Tobacco use   Known hx of IVDU (heroin) w/ extensive assoc medical complications (discussed above). No recent UDS (only one on file from July 2023- positive for opiates & THC) and none collected on admission. Endorses daily IV heroin use (1g/day); last used just prior to calling EMS. Active tobacco use, but denies EtOH or other illicit substance use. Endorsing sx of opioid w/d.   - Addiction medicine consulted; appreciate recs  - Deferring initiating methadone/suboxone given potential anesthesia/surgical intervention  - PRN oxycodone per COWS protocol  - Supportive care w/ PRN antiemetics, analgesics, & anxiolytics   - NRT PRN  - Cessation strongly encouraged     Septic arthritis of sacroiliac joint  See osteomyelitis      Chronic hyponatremia  On admission, Na 133 (baseline 129-133). Likely SIADH 2/2 chronic infxn, but w/ tea/toast & poor  PO intake likely also contributing.   - Deferring IVF  - Encourage good PO intake  - Monitor BMP  - Mgmt of infxns as above    Anemia of infection and chronic disease  On admission, Hgb 8.0 (more or less c/w baseline as has been downtrending for past year) w/ MCV 69. No e/o active bleeding. Iron panel w/ low iron, TIBC, %sat & transferrin; ferritin nml. Although ferritin nml, likely anemia of chronic dx 2/2 chronic infxn re: IVDU.   - Monitor CBC & for e/o active bleeding  - Transfuse PRN for goal Hgb > 7  - Mgmt of infxns as above    Cigarette nicotine dependence without complication  Dangers of cigarette smoking were reviewed with patient in detail. Patient was  offered a nicotinepatch  Nicotine replacement options were discussed. Nicotine replacement was discussed      VTE Risk Mitigation (From admission, onward)           Ordered     Place sequential compression device  Until discontinued         02/05/24 0126     IP VTE HIGH RISK PATIENT  Once         02/05/24 0126                    Discharge Planning   SENA: 2/19/2024     Code Status: Full Code   Is the patient medically ready for discharge?: No    Reason for patient still in hospital (select all that apply): Patient trending condition, Treatment, and Consult recommendations  Discharge Plan A: Long-term acute care facility (LTAC)   Discharge Delays: None known at this time      Nagi Angel MD  Department of Hospital Medicine   [facility] - Neurosurgery ([facility])

## 2024-02-13 NOTE — PLAN OF CARE
Problem: Adult Inpatient Plan of Care  Goal: Plan of Care Review  Outcome: Ongoing, Progressing  Goal: Patient-Specific Goal (Individualized)  Outcome: Ongoing, Progressing  Goal: Absence of Hospital-Acquired Illness or Injury  Outcome: Ongoing, Progressing  Goal: Optimal Comfort and Wellbeing  Outcome: Ongoing, Progressing     Pt slept well this shift. A&Ox4, VSS see flow sheet,  Pain treated per Orders-see Mar.Safety precautions in place. Call light in reach. No further concerns noted at this time.

## 2024-02-13 NOTE — SUBJECTIVE & OBJECTIVE
Interval History:      NAEON. AFVSS.OR 2/14. Exam stable    Medications:  Continuous Infusions:  Scheduled Meds:   acetaminophen  1,000 mg Oral Q8H    cefTRIAXone (Rocephin) IV (PEDS and ADULTS)  2 g Intravenous Q24H    cloNIDine  0.1 mg Oral Q12H    enoxparin  40 mg Subcutaneous Daily    mirtazapine  15 mg Oral QHS    nicotine  1 patch Transdermal Daily    vancomycin (VANCOCIN) IV (PEDS and ADULTS)  20 mg/kg Intravenous Q12H     PRN Meds:acetaminophen, dextrose 10%, dextrose 10%, dicyclomine, glucagon (human recombinant), glucose, glucose, HYDROmorphone, hydrOXYzine HCL, ibuprofen, loperamide, melatonin, naloxone, oxyCODONE, oxyCODONE, promethazine, sodium chloride 0.9%, Pharmacy to dose Vancomycin consult **AND** vancomycin - pharmacy to dose     Review of Systems  Objective:     Weight: 76.7 kg (169 lb 1.5 oz)  Body mass index is 23.59 kg/m².  Vital Signs (Most Recent):  Temp: 98.7 °F (37.1 °C) (02/11/24 0609)  Pulse: 72 (02/11/24 0609)  Resp: 18 (02/11/24 0609)  BP: 119/63 (02/11/24 0609)  SpO2: 100 % (02/11/24 0609) Vital Signs (24h Range):  Temp:  [96.6 °F (35.9 °C)-99.4 °F (37.4 °C)] 98.7 °F (37.1 °C)  Pulse:  [65-75] 72  Resp:  [17-20] 18  SpO2:  [99 %-100 %] 100 %  BP: (113-132)/(52-63) 119/63          Physical Exam  General: well developed, well nourished, no distress.   Head: normocephalic, atraumatic  Mental Status: Awake, Alert, Oriented  Speech: Clear with content appropriate to conversation  Cranial nerves: face symmetric, CN II-XII grossly intact.   Sensory: intact to light touch throughout  Motor Strength: Motor Strength: Moves all extremities spontaneously with good tone. No abnormal movements seen. Pain limited weakness b/l hip flexors.   DF/EHL/PF 4- on R and  4+ L      Significant Labs:  Recent Labs   Lab 02/10/24  0526   GLU 98      K 4.7      CO2 26   BUN 20   CREATININE 0.9   CALCIUM 8.8       Recent Labs   Lab 02/10/24  0526   WBC 6.92   HGB 9.6*   HCT 32.9*   *       No  "results for input(s): "LABPT", "INR", "APTT" in the last 48 hours.  Microbiology Results (last 7 days)       Procedure Component Value Units Date/Time    Blood culture #1 **CANNOT BE ORDERED STAT** [[id number]] Collected: 02/04/24 1853    Order Status: Completed Specimen: Blood from Peripheral, Forearm, Left Updated: 02/09/24 2012     Blood Culture, Routine No growth after 5 days.    Blood culture #2 **CANNOT BE ORDERED STAT** [[id number]] Collected: 02/04/24 1822    Order Status: Completed Specimen: Blood from Peripheral, Forearm, Left Updated: 02/09/24 2012     Blood Culture, Routine No growth after 5 days.    Aerobic culture [[id number]]  (Abnormal) Collected: 02/07/24 1423    Order Status: Completed Specimen: Abscess from Back Updated: 02/09/24 1138     Aerobic Bacterial Culture STAPHYLOCOCCUS AUREUS  Rare  Susceptibility pending      Narrative:      L psoas    Culture, Anaerobe [[id number]] Collected: 02/07/24 1423    Order Status: Completed Specimen: Abscess from Back Updated: 02/09/24 0934     Anaerobic Culture Culture in progress    Narrative:      L psoas    AFB Culture & Smear [[id number]] Collected: 02/07/24 1413    Order Status: Completed Specimen: Joint Fluid from Back Updated: 02/09/24 0927     AFB Culture & Smear Culture in progress     AFB CULTURE STAIN No acid fast bacilli seen.    Aerobic culture [[id number]] Collected: 02/07/24 1412    Order Status: Completed Specimen: Bone from Back Updated: 02/09/24 0745     Aerobic Bacterial Culture No growth    Narrative:      Joint    Culture, Anaerobe [[id number]] Collected: 02/07/24 1411    Order Status: Completed Specimen: Joint Fluid from Back Updated: 02/09/24 0728     Anaerobic Culture Culture in progress    AFB Culture & Smear [[id number]] Collected: 02/07/24 1423    Order Status: Completed Specimen: Abscess from Back Updated: 02/08/24 2127     AFB Culture & Smear Culture in progress     AFB CULTURE STAIN No acid fast bacilli seen.    Narrative:      L " psoas    Gram stain [[id number]] Collected: 02/07/24 1413    Order Status: Completed Specimen: Joint Fluid from Back Updated: 02/08/24 0041     Gram Stain Result No WBC's      No organisms seen    Fungus culture [[id number]] Collected: 02/07/24 1413    Order Status: Sent Specimen: Joint Fluid from Back Updated: 02/07/24 2110    Gram stain [[id number]] Collected: 02/07/24 1423    Order Status: Completed Specimen: Abscess from Back Updated: 02/07/24 1934     Gram Stain Result Few WBC's      No organisms seen    Narrative:      L psoas    Fungus culture [[id number]] Collected: 02/07/24 1423    Order Status: Sent Specimen: Abscess from Back Updated: 02/07/24 1733    Culture, Anaerobe [[id number]]     Order Status: No result Specimen: Abscess from Sacrum     Aerobic culture [[id number]]     Order Status: No result Specimen: Abscess from Sacrum     AFB Culture & Smear [[id number]]     Order Status: No result Specimen: Abscess from Sacrum     Gram stain [[id number]]     Order Status: No result Specimen: Abscess from Sacrum     Fungus culture [[id number]]     Order Status: No result Specimen: Abscess from Sacrum     Culture, Anaerobe [[id number]]     Order Status: No result Specimen: Abscess     Aerobic culture [[id number]]     Order Status: No result Specimen: Abscess     Fungus culture [[id number]]     Order Status: No result Specimen: Abscess     AFB Culture & Smear [[id number]]     Order Status: No result Specimen: Abscess     Gram stain [[id number]]     Order Status: No result Specimen: Abscess           All pertinent labs from the last 24 hours have been reviewed.    Significant Diagnostics:  No results found in the last 24 hours.

## 2024-02-13 NOTE — PLAN OF CARE
Problem: Adult Inpatient Plan of Care  Goal: Plan of Care Review  Outcome: Ongoing, Progressing  Goal: Absence of Hospital-Acquired Illness or Injury  Outcome: Ongoing, Progressing  Goal: Readiness for Transition of Care  Outcome: Ongoing, Progressing     Problem: Impaired Wound Healing  Goal: Optimal Wound Healing  Outcome: Ongoing, Progressing     Problem: Skin Injury Risk Increased  Goal: Skin Health and Integrity  Outcome: Ongoing, Progressing     Problem: Infection  Goal: Absence of Infection Signs and Symptoms  Outcome: Ongoing, Progressing     Problem: Fall Injury Risk  Goal: Absence of Fall and Fall-Related Injury  Outcome: Ongoing, Progressing     Problem: Pain Acute  Goal: Acceptable Pain Control and Functional Ability  Outcome: Ongoing, Progressing     Problem: Skin or Soft Tissue Infection  Goal: Absence of Infection Signs and Symptoms  Outcome: Ongoing, Progressing     Pt reports pain as a 10/10 with movement, pain medication administered per MAR. Pain & comfort managed throughout shift. Pt ordered to be NPO at midnight for cervical fusion tomorrow/Wednesday morning. Pt ambulated with support from PT.

## 2024-02-13 NOTE — ASSESSMENT & PLAN NOTE
Osteomyelitis, discitis, & myositis of lumbosacral region  Septic arthritis of SI joint(s)  Epidural abscesses   Multiple abscesses of pelvis, sacrum, psoas, iliacus, & paraspinals  Lower back pain w/ b/l sciatica   Severe spinal canal stenosis   Hx of known spinal osteomyelitis, various abscesses, & MRSA bacteremia i/s/o IVDU. Presented w/ progressive lower back & abdominal pain w/ assoc LE weakness & sensory changes. HDS & 0/4 SIRS on admission. MRI showed osteomyelitis/discitis of L5-S1, osteomyelitis of sacrum & L iliac bone, probable septic arthritis of L SI joint & lower lumbar spine facet joints, worsening anterolisthesis of L5 with respect to S1 with increased size of epidural abscess at the level of L5-S1 and probable severe central canal stenosis at this level, along w/ additional abscesses in the L iliopsoas muscle, paraspinal muscles, pelvis, & L sacrum. CT L-spine w/ similar findings. No urinary or bowel sx retention/incontinence. Received single dose of Zosyn in ED on arrival, but given that pt HDS w/o sepsis, further abx held to optimize intra-op cx/pathology.   - Neurosurgery consulted; appreciate recs/assistance   - Plan for OR 2/14 for L4-pelvis fusion with debridement.   - IR consulted for potential aspiration of abscess(es); appreciate assistance   - Plan for aspiration of psoas abscess & SI joint 2/7   - ID consulted; appreciate recs  - Ortho consulted for mgmt of septic SI joint; appreciate recs/assistance  - Continue vancomycin for MRSA growth --> switched to daptomycin  - Multimodal analgesia   - PT/OT

## 2024-02-13 NOTE — PLAN OF CARE
ID Note:    Patient not seen today.  Chart reviewed.        IR cultures of 2/7 with MRSA  Blood cultures 2/4 NGTD   Afebrile, HDS   Currently on IV vancomycin q 8 hours, troughs subtherapeutic   Pending lumbar-pelvis fusion 2/14    Plan/recommendations:   Discontinue IV vancomycin and start daptomycin 8 mg/kg IV q 24 hours given difficulty obtaining therapeutic vanc levels.  Baseline CK and weekly thereafter.   Recommend TTE (last echo in October)   Please send surgical cultures (aerobic, anaerobic, fungal, AFB) with surgery on 2/14.   Will follow up after surgery on 2/15.     Discussed with ID staff and Primary Team       Thank you.   Please Secure Chat for any questions or concerns.  CAROL Majano, ANP-C  Infectious Disease

## 2024-02-13 NOTE — PT/OT/SLP PROGRESS
"Physical Therapy Treatment    Patient Name:  Ton Donovan   MRN:  [id number]    Recommendations:     Discharge Recommendations: High Intensity Therapy  Discharge Equipment Recommendations: walker, rolling, bedside commode  Barriers to discharge: Inaccessible home and Decreased caregiver support    Assessment:     Ton Donovan is a 28 y.o. male admitted with a medical diagnosis of Osteomyelitis of vertebra of lumbosacral region.  He presents with the following impairments/functional limitations: weakness, impaired endurance, impaired self care skills, impaired functional mobility, gait instability, decreased lower extremity function, impaired fine motor.    Pt agreeable and motivated for therapy and tolerates session well with emphasis on bed mobility, transfers, and gait training. Pt making progress towards therapy goals as indicated by increased total ambulation distance.  Pt will continue to benefit from skilled therapy services.    Rehab Prognosis: Good; patient would benefit from acute skilled PT services to address these deficits and reach maximum level of function.    Recent Surgery: Procedure(s) (LRB):  LOOP X FUSION, SPINE, LUMBAR (N/A)      Plan:     During this hospitalization, patient to be seen 4 x/week to address the identified rehab impairments via gait training, therapeutic activities, therapeutic exercises, neuromuscular re-education and progress toward the following goals:    Plan of Care Expires:  03/07/24    Subjective     Chief Complaint: 10/10 lower back/sacral pain  Patient/Family Comments/goals: "I'm really hungry"  Pain/Comfort:  Pain Rating 1: 10/10  Location - Orientation 1: generalized  Location 1: back  Pain Addressed 1: Pre-medicate for activity, Reposition, Distraction  Pain Rating Post-Intervention 1: 10/10  Pain Rating 2: 10/10  Location - Orientation 2: generalized  Location 2: sacral spine  Pain Addressed 2: Reposition, Pre-medicate for activity, Distraction  Pain Rating " Post-Intervention 2: 10/10      Objective:     Communicated with RN (Elida) prior to session.  Patient found supine with  (no active lines) upon PTA entry to room.     General Precautions: Standard, fall  Orthopedic Precautions: N/A  Braces: N/A  Respiratory Status: Room air     Functional Mobility:  Bed Mobility:     Rolling Left:  stand by assistance  Scooting: anteriorly to EOB stand by assistance  Supine to Sit: minimum assistance for Trunk/BLEs  Sit to Supine: minimum assistance for Trunk/BLEs  Transfers:     Sit to Stand: x2 from EOB  contact guard assistance with rolling walker  Gait: Pt ambulates x2 trials 72 feet each Min A with RW and chair follow. Seated rest break between trials required. Pt unsteady with decreased samantha, B knees flexed and posterior lean.   Balance:   Dynamic standing: Pt stands ~3 minutes Min A with RW and performs reaching and dual task activity.       AM-PAC 6 CLICK MOBILITY  Turning over in bed (including adjusting bedclothes, sheets and blankets)?: 3  Sitting down on and standing up from a chair with arms (e.g., wheelchair, bedside commode, etc.): 3  Moving from lying on back to sitting on the side of the bed?: 3  Moving to and from a bed to a chair (including a wheelchair)?: 3  Need to walk in hospital room?: 3  Climbing 3-5 steps with a railing?: 2  Basic Mobility Total Score: 17       Treatment & Education:  Patient provided with daily orientation and goals of this PT session.     Pt educated to call for assistance and to transfer with hospital staff only.  Also, pt was educated on the effects of prolonged immobility and the importance of performing OOB activity and exercises to promote healing and reduce recovery time.    Patient left HOB elevated with all lines intact, call button in reach, and RN notified.    GOALS:   Multidisciplinary Problems       Physical Therapy Goals          Problem: Physical Therapy    Goal Priority Disciplines Outcome Goal Variances Interventions    Physical Therapy Goal     PT, PT/OT Ongoing, Progressing     Description: Goals to be met by: 24     Patient will increase functional independence with mobility by performin. Supine to sit with Supervision  2. Sit to supine with Supervision  3. Sit to stand transfer with Supervision  4. Bed to chair transfer with Supervision using LRAD as needed  5. Gait  x 150 feet with Supervision using LRAD as needed.   6. Ascend/descend 5 stair with right Handrails and Supervision  7. Lower extremity exercise program x15 reps per handout, with assistance as needed                         Time Tracking:     PT Received On: 24  PT Start Time: 934     PT Stop Time: 1000  PT Total Time (min): 26 min     Billable Minutes: Gait Training 15 and Therapeutic Activity 11    Treatment Type: Treatment  PT/PTA: PTA     Number of PTA visits since last PT visit: 2024

## 2024-02-13 NOTE — PROGRESS NOTES
Aleksandr Bray - Neurosurgery ([facility])  Neurosurgery  Progress Note    Subjective:     History of Present Illness: 28M w/ hx MRSA bactermia, IVDU, pelvic/intra-abdominal abscesses who presents with progressive back pain with radiation down the leg over the past couple of weeks.  He has had bilateral numbness of his anterior thigh as well. He denies bowel/bladder sx or saddle anesthesia. He denies focal weakness. Per chart review, the patient is actively using heroin and did an injection prior to being taken in by EMS.      Post-Op Info:  Procedure(s) (LRB):  LOOP X FUSION, SPINE, LUMBAR (N/A)       Interval History:      NAEON. AFVSS.OR 2/14. Exam stable    Medications:  Continuous Infusions:  Scheduled Meds:   acetaminophen  1,000 mg Oral Q8H    cefTRIAXone (Rocephin) IV (PEDS and ADULTS)  2 g Intravenous Q24H    cloNIDine  0.1 mg Oral Q12H    enoxparin  40 mg Subcutaneous Daily    mirtazapine  15 mg Oral QHS    nicotine  1 patch Transdermal Daily    vancomycin (VANCOCIN) IV (PEDS and ADULTS)  20 mg/kg Intravenous Q12H     PRN Meds:acetaminophen, dextrose 10%, dextrose 10%, dicyclomine, glucagon (human recombinant), glucose, glucose, HYDROmorphone, hydrOXYzine HCL, ibuprofen, loperamide, melatonin, naloxone, oxyCODONE, oxyCODONE, promethazine, sodium chloride 0.9%, Pharmacy to dose Vancomycin consult **AND** vancomycin - pharmacy to dose     Review of Systems  Objective:     Weight: 76.7 kg (169 lb 1.5 oz)  Body mass index is 23.59 kg/m².  Vital Signs (Most Recent):  Temp: 98.7 °F (37.1 °C) (02/11/24 0609)  Pulse: 72 (02/11/24 0609)  Resp: 18 (02/11/24 0609)  BP: 119/63 (02/11/24 0609)  SpO2: 100 % (02/11/24 0609) Vital Signs (24h Range):  Temp:  [96.6 °F (35.9 °C)-99.4 °F (37.4 °C)] 98.7 °F (37.1 °C)  Pulse:  [65-75] 72  Resp:  [17-20] 18  SpO2:  [99 %-100 %] 100 %  BP: (113-132)/(52-63) 119/63          Physical Exam  General: well developed, well nourished, no distress.   Head: normocephalic, atraumatic  Mental  "Status: Awake, Alert, Oriented  Speech: Clear with content appropriate to conversation  Cranial nerves: face symmetric, CN II-XII grossly intact.   Sensory: intact to light touch throughout  Motor Strength: Motor Strength: Moves all extremities spontaneously with good tone. No abnormal movements seen. Pain limited weakness b/l hip flexors.   DF/EHL/PF 4- on R and  4+ L      Significant Labs:  Recent Labs   Lab 02/10/24  0526   GLU 98      K 4.7      CO2 26   BUN 20   CREATININE 0.9   CALCIUM 8.8       Recent Labs   Lab 02/10/24  0526   WBC 6.92   HGB 9.6*   HCT 32.9*   *       No results for input(s): "LABPT", "INR", "APTT" in the last 48 hours.  Microbiology Results (last 7 days)       Procedure Component Value Units Date/Time    Blood culture #1 **CANNOT BE ORDERED STAT** [[id number]] Collected: 02/04/24 1853    Order Status: Completed Specimen: Blood from Peripheral, Forearm, Left Updated: 02/09/24 2012     Blood Culture, Routine No growth after 5 days.    Blood culture #2 **CANNOT BE ORDERED STAT** [[id number]] Collected: 02/04/24 1822    Order Status: Completed Specimen: Blood from Peripheral, Forearm, Left Updated: 02/09/24 2012     Blood Culture, Routine No growth after 5 days.    Aerobic culture [[id number]]  (Abnormal) Collected: 02/07/24 1423    Order Status: Completed Specimen: Abscess from Back Updated: 02/09/24 1138     Aerobic Bacterial Culture STAPHYLOCOCCUS AUREUS  Rare  Susceptibility pending      Narrative:      L psoas    Culture, Anaerobe [[id number]] Collected: 02/07/24 1423    Order Status: Completed Specimen: Abscess from Back Updated: 02/09/24 0934     Anaerobic Culture Culture in progress    Narrative:      L psoas    AFB Culture & Smear [[id number]] Collected: 02/07/24 1413    Order Status: Completed Specimen: Joint Fluid from Back Updated: 02/09/24 0927     AFB Culture & Smear Culture in progress     AFB CULTURE STAIN No acid fast bacilli seen.    Aerobic culture " [[id number]] Collected: 02/07/24 1412    Order Status: Completed Specimen: Bone from Back Updated: 02/09/24 0745     Aerobic Bacterial Culture No growth    Narrative:      Joint    Culture, Anaerobe [[id number]] Collected: 02/07/24 1411    Order Status: Completed Specimen: Joint Fluid from Back Updated: 02/09/24 0728     Anaerobic Culture Culture in progress    AFB Culture & Smear [[id number]] Collected: 02/07/24 1423    Order Status: Completed Specimen: Abscess from Back Updated: 02/08/24 2127     AFB Culture & Smear Culture in progress     AFB CULTURE STAIN No acid fast bacilli seen.    Narrative:      L psoas    Gram stain [[id number]] Collected: 02/07/24 1413    Order Status: Completed Specimen: Joint Fluid from Back Updated: 02/08/24 0041     Gram Stain Result No WBC's      No organisms seen    Fungus culture [[id number]] Collected: 02/07/24 1413    Order Status: Sent Specimen: Joint Fluid from Back Updated: 02/07/24 2110    Gram stain [[id number]] Collected: 02/07/24 1423    Order Status: Completed Specimen: Abscess from Back Updated: 02/07/24 1934     Gram Stain Result Few WBC's      No organisms seen    Narrative:      L psoas    Fungus culture [[id number]] Collected: 02/07/24 1423    Order Status: Sent Specimen: Abscess from Back Updated: 02/07/24 1733    Culture, Anaerobe [[id number]]     Order Status: No result Specimen: Abscess from Sacrum     Aerobic culture [[id number]]     Order Status: No result Specimen: Abscess from Sacrum     AFB Culture & Smear [[id number]]     Order Status: No result Specimen: Abscess from Sacrum     Gram stain [[id number]]     Order Status: No result Specimen: Abscess from Sacrum     Fungus culture [[id number]]     Order Status: No result Specimen: Abscess from Sacrum     Culture, Anaerobe [[id number]]     Order Status: No result Specimen: Abscess     Aerobic culture [[id number]]     Order Status: No result Specimen: Abscess     Fungus culture [[id number]]     Order Status: No  result Specimen: Abscess     AFB Culture & Smear [[id number]]     Order Status: No result Specimen: Abscess     Gram stain [[id number]]     Order Status: No result Specimen: Abscess           All pertinent labs from the last 24 hours have been reviewed.    Significant Diagnostics:  No results found in the last 24 hours.  Assessment/Plan:     * Osteomyelitis of vertebra of lumbosacral region  28M w/ hx MRSA bactermia, IVDU, pelvic/intra-abdominal abscesses who presents with progressive back pain with radiation down the leg over the past couple of weeks. Motor intact.     - Admitted to   - Neuro exam stable  - Neuro checks q4h  - All pertinent labs and imaging reviewed  - 2/4 ESR 75, CRP 33.7. Blood cx NGTD.   - Back culture 2/7 with staph aureus  - MRI lumbar w/wo 2/4: L5-S1 osteodiscitis extending into the sacrum, left iliac bone, SI joint, facet joints. Anterolisthesis L5-S1, epidural abscess L5-S1 with canal stenosis. Left psoas and paraspinal muscle abscesses. No clinical evidence of cauda equina syndrome.   - CT lumbar obtained with destructive changes at L5-S1.   - ID following and started patient on abx following IR bx. Currently on vanc + rocephin.   - Voiding spontaneously. Bladder scan prn.   - Plan for OR 2/14 for L4-pelvis fusion with debridement  - Discussed with Dr. Pozo  - Contact with acute changes in exam    Case and plan discussed with attending physician Dr. Nohelia Pepe MD  Neurosurgery  [facility] - Neurosurgery ([facility])

## 2024-02-14 ENCOUNTER — ANESTHESIA (OUTPATIENT)
Dept: SURGERY | Facility: HOSPITAL | Age: 29
DRG: 459 | End: 2024-02-14
Payer: MEDICAID

## 2024-02-14 LAB
ANION GAP SERPL CALC-SCNC: 7 MMOL/L (ref 8–16)
BACTERIA SPEC AEROBE CULT: ABNORMAL
BACTERIA SPEC ANAEROBE CULT: NORMAL
BACTERIA SPEC ANAEROBE CULT: NORMAL
BASOPHILS # BLD AUTO: 0.02 K/UL (ref 0–0.2)
BASOPHILS NFR BLD: 0.3 % (ref 0–1.9)
BUN SERPL-MCNC: 25 MG/DL (ref 6–20)
CALCIUM SERPL-MCNC: 9.6 MG/DL (ref 8.7–10.5)
CHLORIDE SERPL-SCNC: 101 MMOL/L (ref 95–110)
CO2 SERPL-SCNC: 25 MMOL/L (ref 23–29)
CREAT SERPL-MCNC: 0.7 MG/DL (ref 0.5–1.4)
DIFFERENTIAL METHOD BLD: ABNORMAL
EOSINOPHIL # BLD AUTO: 0.1 K/UL (ref 0–0.5)
EOSINOPHIL NFR BLD: 1.4 % (ref 0–8)
ERYTHROCYTE [DISTWIDTH] IN BLOOD BY AUTOMATED COUNT: 19.7 % (ref 11.5–14.5)
EST. GFR  (NO RACE VARIABLE): >60 ML/MIN/1.73 M^2
GLUCOSE SERPL-MCNC: 97 MG/DL (ref 70–110)
GRAM STN SPEC: NORMAL
HCT VFR BLD AUTO: 29.3 % (ref 40–54)
HGB BLD-MCNC: 8.8 G/DL (ref 14–18)
IMM GRANULOCYTES # BLD AUTO: 0.01 K/UL (ref 0–0.04)
IMM GRANULOCYTES NFR BLD AUTO: 0.2 % (ref 0–0.5)
LYMPHOCYTES # BLD AUTO: 1.8 K/UL (ref 1–4.8)
LYMPHOCYTES NFR BLD: 29.3 % (ref 18–48)
MCH RBC QN AUTO: 21.3 PG (ref 27–31)
MCHC RBC AUTO-ENTMCNC: 30 G/DL (ref 32–36)
MCV RBC AUTO: 71 FL (ref 82–98)
MONOCYTES # BLD AUTO: 0.6 K/UL (ref 0.3–1)
MONOCYTES NFR BLD: 8.8 % (ref 4–15)
NEUTROPHILS # BLD AUTO: 3.7 K/UL (ref 1.8–7.7)
NEUTROPHILS NFR BLD: 60 % (ref 38–73)
NRBC BLD-RTO: 0 /100 WBC
PLATELET # BLD AUTO: 399 K/UL (ref 150–450)
PMV BLD AUTO: 8.9 FL (ref 9.2–12.9)
POTASSIUM SERPL-SCNC: 4.8 MMOL/L (ref 3.5–5.1)
RBC # BLD AUTO: 4.14 M/UL (ref 4.6–6.2)
SODIUM SERPL-SCNC: 133 MMOL/L (ref 136–145)
WBC # BLD AUTO: 6.24 K/UL (ref 3.9–12.7)

## 2024-02-14 PROCEDURE — 87077 CULTURE AEROBIC IDENTIFY: CPT | Performed by: STUDENT IN AN ORGANIZED HEALTH CARE EDUCATION/TRAINING PROGRAM

## 2024-02-14 PROCEDURE — 22614 ARTHRD PST TQ 1NTRSPC EA ADD: CPT | Mod: ,,, | Performed by: NEUROLOGICAL SURGERY

## 2024-02-14 PROCEDURE — 22842 INSERT SPINE FIXATION DEVICE: CPT | Mod: ,,, | Performed by: NEUROLOGICAL SURGERY

## 2024-02-14 PROCEDURE — 85025 COMPLETE CBC W/AUTO DIFF WBC: CPT | Performed by: STUDENT IN AN ORGANIZED HEALTH CARE EDUCATION/TRAINING PROGRAM

## 2024-02-14 PROCEDURE — 87070 CULTURE OTHR SPECIMN AEROBIC: CPT | Mod: 59 | Performed by: STUDENT IN AN ORGANIZED HEALTH CARE EDUCATION/TRAINING PROGRAM

## 2024-02-14 PROCEDURE — 63600175 PHARM REV CODE 636 W HCPCS: Performed by: NURSE ANESTHETIST, CERTIFIED REGISTERED

## 2024-02-14 PROCEDURE — 20930 SP BONE ALGRFT MORSEL ADD-ON: CPT | Mod: ,,, | Performed by: NEUROLOGICAL SURGERY

## 2024-02-14 PROCEDURE — S4991 NICOTINE PATCH NONLEGEND: HCPCS | Performed by: HOSPITALIST

## 2024-02-14 PROCEDURE — D9220A PRA ANESTHESIA: Mod: ANES,,, | Performed by: ANESTHESIOLOGY

## 2024-02-14 PROCEDURE — 87075 CULTR BACTERIA EXCEPT BLOOD: CPT | Mod: 59 | Performed by: STUDENT IN AN ORGANIZED HEALTH CARE EDUCATION/TRAINING PROGRAM

## 2024-02-14 PROCEDURE — 11000001 HC ACUTE MED/SURG PRIVATE ROOM

## 2024-02-14 PROCEDURE — 37000009 HC ANESTHESIA EA ADD 15 MINS: Performed by: NEUROLOGICAL SURGERY

## 2024-02-14 PROCEDURE — 25000003 PHARM REV CODE 250: Performed by: HOSPITALIST

## 2024-02-14 PROCEDURE — C1729 CATH, DRAINAGE: HCPCS | Performed by: NEUROLOGICAL SURGERY

## 2024-02-14 PROCEDURE — C1734 ORTH/DEVIC/DRUG BN/BN,TIS/BN: HCPCS | Performed by: NEUROLOGICAL SURGERY

## 2024-02-14 PROCEDURE — D9220A PRA ANESTHESIA: Mod: CRNA,,, | Performed by: NURSE ANESTHETIST, CERTIFIED REGISTERED

## 2024-02-14 PROCEDURE — 25000003 PHARM REV CODE 250: Performed by: NURSE ANESTHETIST, CERTIFIED REGISTERED

## 2024-02-14 PROCEDURE — 87205 SMEAR GRAM STAIN: CPT | Mod: 59 | Performed by: STUDENT IN AN ORGANIZED HEALTH CARE EDUCATION/TRAINING PROGRAM

## 2024-02-14 PROCEDURE — 63600175 PHARM REV CODE 636 W HCPCS: Performed by: NEUROLOGICAL SURGERY

## 2024-02-14 PROCEDURE — 87102 FUNGUS ISOLATION CULTURE: CPT | Mod: 59 | Performed by: STUDENT IN AN ORGANIZED HEALTH CARE EDUCATION/TRAINING PROGRAM

## 2024-02-14 PROCEDURE — 87116 MYCOBACTERIA CULTURE: CPT | Mod: 59 | Performed by: STUDENT IN AN ORGANIZED HEALTH CARE EDUCATION/TRAINING PROGRAM

## 2024-02-14 PROCEDURE — 25000003 PHARM REV CODE 250: Performed by: NEUROLOGICAL SURGERY

## 2024-02-14 PROCEDURE — 25000003 PHARM REV CODE 250: Performed by: STUDENT IN AN ORGANIZED HEALTH CARE EDUCATION/TRAINING PROGRAM

## 2024-02-14 PROCEDURE — 36000710: Performed by: NEUROLOGICAL SURGERY

## 2024-02-14 PROCEDURE — 99233 SBSQ HOSP IP/OBS HIGH 50: CPT | Mod: ,,, | Performed by: NURSE PRACTITIONER

## 2024-02-14 PROCEDURE — 37000008 HC ANESTHESIA 1ST 15 MINUTES: Performed by: NEUROLOGICAL SURGERY

## 2024-02-14 PROCEDURE — 36415 COLL VENOUS BLD VENIPUNCTURE: CPT | Performed by: STUDENT IN AN ORGANIZED HEALTH CARE EDUCATION/TRAINING PROGRAM

## 2024-02-14 PROCEDURE — 71000016 HC POSTOP RECOV ADDL HR: Performed by: NEUROLOGICAL SURGERY

## 2024-02-14 PROCEDURE — 22848 INSERT PELV FIXATION DEVICE: CPT | Mod: ,,, | Performed by: NEUROLOGICAL SURGERY

## 2024-02-14 PROCEDURE — 94761 N-INVAS EAR/PLS OXIMETRY MLT: CPT

## 2024-02-14 PROCEDURE — 27800903 OPTIME MED/SURG SUP & DEVICES OTHER IMPLANTS: Performed by: NEUROLOGICAL SURGERY

## 2024-02-14 PROCEDURE — 87186 SC STD MICRODIL/AGAR DIL: CPT | Mod: 59 | Performed by: STUDENT IN AN ORGANIZED HEALTH CARE EDUCATION/TRAINING PROGRAM

## 2024-02-14 PROCEDURE — 71000033 HC RECOVERY, INTIAL HOUR: Performed by: NEUROLOGICAL SURGERY

## 2024-02-14 PROCEDURE — 87206 SMEAR FLUORESCENT/ACID STAI: CPT | Performed by: STUDENT IN AN ORGANIZED HEALTH CARE EDUCATION/TRAINING PROGRAM

## 2024-02-14 PROCEDURE — 63267 EXCISE INTRSPINL LESION LMBR: CPT | Mod: 59,,, | Performed by: NEUROLOGICAL SURGERY

## 2024-02-14 PROCEDURE — 27201037 HC PRESSURE MONITORING SET UP

## 2024-02-14 PROCEDURE — 63600175 PHARM REV CODE 636 W HCPCS: Performed by: ANESTHESIOLOGY

## 2024-02-14 PROCEDURE — C1713 ANCHOR/SCREW BN/BN,TIS/BN: HCPCS | Performed by: NEUROLOGICAL SURGERY

## 2024-02-14 PROCEDURE — 63600175 PHARM REV CODE 636 W HCPCS: Performed by: STUDENT IN AN ORGANIZED HEALTH CARE EDUCATION/TRAINING PROGRAM

## 2024-02-14 PROCEDURE — 71000015 HC POSTOP RECOV 1ST HR: Performed by: NEUROLOGICAL SURGERY

## 2024-02-14 PROCEDURE — 36000711: Performed by: NEUROLOGICAL SURGERY

## 2024-02-14 PROCEDURE — 80048 BASIC METABOLIC PNL TOTAL CA: CPT | Performed by: STUDENT IN AN ORGANIZED HEALTH CARE EDUCATION/TRAINING PROGRAM

## 2024-02-14 PROCEDURE — 27201423 OPTIME MED/SURG SUP & DEVICES STERILE SUPPLY: Performed by: NEUROLOGICAL SURGERY

## 2024-02-14 PROCEDURE — 61783 SCAN PROC SPINAL: CPT | Mod: ,,, | Performed by: NEUROLOGICAL SURGERY

## 2024-02-14 PROCEDURE — 22633 ARTHRD CMBN 1NTRSPC LUMBAR: CPT | Mod: 22,,, | Performed by: NEUROLOGICAL SURGERY

## 2024-02-14 DEVICE — KIT MED BONE INFUSE: Type: IMPLANTABLE DEVICE | Site: SPINE LUMBAR | Status: FUNCTIONAL

## 2024-02-14 DEVICE — CONNECTOR EXPEDIUM 5.5 30MM: Type: IMPLANTABLE DEVICE | Site: SPINE LUMBAR | Status: FUNCTIONAL

## 2024-02-14 DEVICE — SCREW INNER SINGLE SET TITANIU: Type: IMPLANTABLE DEVICE | Site: SPINE LUMBAR | Status: FUNCTIONAL

## 2024-02-14 DEVICE — SCREW BONE SPINAL 5.5 6 X 50MM: Type: IMPLANTABLE DEVICE | Site: SPINE LUMBAR | Status: FUNCTIONAL

## 2024-02-14 DEVICE — SET SCREW EXPEDIUM VERSE UNITZ: Type: IMPLANTABLE DEVICE | Site: SPINE LUMBAR | Status: FUNCTIONAL

## 2024-02-14 DEVICE — ROD SPINE MTRX STR 400X80MM: Type: IMPLANTABLE DEVICE | Site: SPINE LUMBAR | Status: FUNCTIONAL

## 2024-02-14 DEVICE — IMPLANTABLE DEVICE: Type: IMPLANTABLE DEVICE | Site: SPINE LUMBAR | Status: FUNCTIONAL

## 2024-02-14 DEVICE — SCREW EXPEDIUM VERSE 5.5 6X45: Type: IMPLANTABLE DEVICE | Site: SPINE LUMBAR | Status: FUNCTIONAL

## 2024-02-14 DEVICE — SCREW EXPEDIUM VERSE PA 8X80MM: Type: IMPLANTABLE DEVICE | Site: SPINE LUMBAR | Status: FUNCTIONAL

## 2024-02-14 DEVICE — BONE 30CC CANCELLOUS CRUSHED: Type: IMPLANTABLE DEVICE | Site: SPINE LUMBAR | Status: FUNCTIONAL

## 2024-02-14 RX ORDER — PROPOFOL 10 MG/ML
VIAL (ML) INTRAVENOUS
Status: DISCONTINUED | OUTPATIENT
Start: 2024-02-14 | End: 2024-02-15

## 2024-02-14 RX ORDER — SODIUM CHLORIDE 9 MG/ML
INJECTION, SOLUTION INTRAVENOUS CONTINUOUS
Status: DISCONTINUED | OUTPATIENT
Start: 2024-02-14 | End: 2024-02-17

## 2024-02-14 RX ORDER — MIDAZOLAM HYDROCHLORIDE 1 MG/ML
INJECTION, SOLUTION INTRAMUSCULAR; INTRAVENOUS
Status: DISCONTINUED | OUTPATIENT
Start: 2024-02-14 | End: 2024-02-15

## 2024-02-14 RX ORDER — DEXMEDETOMIDINE HYDROCHLORIDE 100 UG/ML
INJECTION, SOLUTION INTRAVENOUS
Status: DISCONTINUED | OUTPATIENT
Start: 2024-02-14 | End: 2024-02-15

## 2024-02-14 RX ORDER — HYDROMORPHONE HYDROCHLORIDE 1 MG/ML
1 INJECTION, SOLUTION INTRAMUSCULAR; INTRAVENOUS; SUBCUTANEOUS
Status: DISCONTINUED | OUTPATIENT
Start: 2024-02-14 | End: 2024-02-17

## 2024-02-14 RX ORDER — CEFAZOLIN SODIUM 1 G/3ML
INJECTION, POWDER, FOR SOLUTION INTRAMUSCULAR; INTRAVENOUS
Status: DISCONTINUED | OUTPATIENT
Start: 2024-02-14 | End: 2024-02-15

## 2024-02-14 RX ORDER — ENOXAPARIN SODIUM 100 MG/ML
40 INJECTION SUBCUTANEOUS EVERY 24 HOURS
Status: DISCONTINUED | OUTPATIENT
Start: 2024-02-15 | End: 2024-02-22 | Stop reason: HOSPADM

## 2024-02-14 RX ORDER — HYDROMORPHONE HYDROCHLORIDE 1 MG/ML
0.2 INJECTION, SOLUTION INTRAMUSCULAR; INTRAVENOUS; SUBCUTANEOUS EVERY 5 MIN PRN
Status: COMPLETED | OUTPATIENT
Start: 2024-02-14 | End: 2024-02-14

## 2024-02-14 RX ORDER — LIDOCAINE HYDROCHLORIDE 20 MG/ML
INJECTION INTRAVENOUS
Status: DISCONTINUED | OUTPATIENT
Start: 2024-02-14 | End: 2024-02-15

## 2024-02-14 RX ORDER — HYDROMORPHONE HYDROCHLORIDE 2 MG/ML
INJECTION, SOLUTION INTRAMUSCULAR; INTRAVENOUS; SUBCUTANEOUS
Status: DISCONTINUED | OUTPATIENT
Start: 2024-02-14 | End: 2024-02-15

## 2024-02-14 RX ORDER — FENTANYL CITRATE 50 UG/ML
INJECTION, SOLUTION INTRAMUSCULAR; INTRAVENOUS
Status: DISCONTINUED | OUTPATIENT
Start: 2024-02-14 | End: 2024-02-15

## 2024-02-14 RX ORDER — HYDROMORPHONE HYDROCHLORIDE 1 MG/ML
0.2 INJECTION, SOLUTION INTRAMUSCULAR; INTRAVENOUS; SUBCUTANEOUS EVERY 5 MIN PRN
Status: DISCONTINUED | OUTPATIENT
Start: 2024-02-14 | End: 2024-02-14 | Stop reason: HOSPADM

## 2024-02-14 RX ORDER — PHENYLEPHRINE HYDROCHLORIDE 10 MG/ML
INJECTION INTRAVENOUS
Status: DISCONTINUED | OUTPATIENT
Start: 2024-02-14 | End: 2024-02-15

## 2024-02-14 RX ORDER — DROPERIDOL 2.5 MG/ML
0.62 INJECTION, SOLUTION INTRAMUSCULAR; INTRAVENOUS ONCE AS NEEDED
Status: DISCONTINUED | OUTPATIENT
Start: 2024-02-14 | End: 2024-02-14 | Stop reason: HOSPADM

## 2024-02-14 RX ORDER — REMIFENTANIL HYDROCHLORIDE 1 MG/ML
INJECTION, POWDER, LYOPHILIZED, FOR SOLUTION INTRAVENOUS CONTINUOUS PRN
Status: DISCONTINUED | OUTPATIENT
Start: 2024-02-14 | End: 2024-02-15

## 2024-02-14 RX ORDER — ONDANSETRON HYDROCHLORIDE 2 MG/ML
4 INJECTION, SOLUTION INTRAVENOUS ONCE AS NEEDED
Status: DISCONTINUED | OUTPATIENT
Start: 2024-02-14 | End: 2024-02-14 | Stop reason: HOSPADM

## 2024-02-14 RX ORDER — TRANEXAMIC ACID 100 MG/ML
INJECTION, SOLUTION INTRAVENOUS CONTINUOUS PRN
Status: DISCONTINUED | OUTPATIENT
Start: 2024-02-14 | End: 2024-02-15

## 2024-02-14 RX ORDER — ACETAMINOPHEN 10 MG/ML
INJECTION, SOLUTION INTRAVENOUS
Status: DISCONTINUED | OUTPATIENT
Start: 2024-02-14 | End: 2024-02-15

## 2024-02-14 RX ORDER — TRANEXAMIC ACID 100 MG/ML
INJECTION, SOLUTION INTRAVENOUS
Status: DISCONTINUED | OUTPATIENT
Start: 2024-02-14 | End: 2024-02-15

## 2024-02-14 RX ORDER — ONDANSETRON HYDROCHLORIDE 2 MG/ML
INJECTION, SOLUTION INTRAVENOUS
Status: DISCONTINUED | OUTPATIENT
Start: 2024-02-14 | End: 2024-02-15

## 2024-02-14 RX ORDER — KETAMINE HCL IN 0.9 % NACL 50 MG/5 ML
SYRINGE (ML) INTRAVENOUS
Status: DISCONTINUED | OUTPATIENT
Start: 2024-02-14 | End: 2024-02-15

## 2024-02-14 RX ORDER — LABETALOL HCL 20 MG/4 ML
10 SYRINGE (ML) INTRAVENOUS EVERY 10 MIN PRN
Status: DISCONTINUED | OUTPATIENT
Start: 2024-02-14 | End: 2024-02-22 | Stop reason: HOSPADM

## 2024-02-14 RX ORDER — ROCURONIUM BROMIDE 10 MG/ML
INJECTION, SOLUTION INTRAVENOUS
Status: DISCONTINUED | OUTPATIENT
Start: 2024-02-14 | End: 2024-02-15

## 2024-02-14 RX ORDER — SUCCINYLCHOLINE CHLORIDE 20 MG/ML
INJECTION INTRAMUSCULAR; INTRAVENOUS
Status: DISCONTINUED | OUTPATIENT
Start: 2024-02-14 | End: 2024-02-15

## 2024-02-14 RX ORDER — DIAZEPAM 5 MG/1
5 TABLET ORAL EVERY 8 HOURS
Status: DISCONTINUED | OUTPATIENT
Start: 2024-02-14 | End: 2024-02-20

## 2024-02-14 RX ORDER — DEXAMETHASONE SODIUM PHOSPHATE 4 MG/ML
INJECTION, SOLUTION INTRA-ARTICULAR; INTRALESIONAL; INTRAMUSCULAR; INTRAVENOUS; SOFT TISSUE
Status: DISCONTINUED | OUTPATIENT
Start: 2024-02-14 | End: 2024-02-15

## 2024-02-14 RX ADMIN — DEXMEDETOMIDINE 4 MCG: 100 INJECTION, SOLUTION, CONCENTRATE INTRAVENOUS at 05:02

## 2024-02-14 RX ADMIN — SUGAMMADEX 200 MG: 100 INJECTION, SOLUTION INTRAVENOUS at 04:02

## 2024-02-14 RX ADMIN — HYDROMORPHONE HYDROCHLORIDE 0.2 MG: 1 INJECTION, SOLUTION INTRAMUSCULAR; INTRAVENOUS; SUBCUTANEOUS at 07:02

## 2024-02-14 RX ADMIN — PROPOFOL 50 MG: 10 INJECTION, EMULSION INTRAVENOUS at 02:02

## 2024-02-14 RX ADMIN — FENTANYL CITRATE 100 MCG: 50 INJECTION, SOLUTION INTRAMUSCULAR; INTRAVENOUS at 02:02

## 2024-02-14 RX ADMIN — HYDROMORPHONE HYDROCHLORIDE 0.2 MG: 1 INJECTION, SOLUTION INTRAMUSCULAR; INTRAVENOUS; SUBCUTANEOUS at 08:02

## 2024-02-14 RX ADMIN — SODIUM CHLORIDE: 9 INJECTION, SOLUTION INTRAVENOUS at 07:02

## 2024-02-14 RX ADMIN — ROCURONIUM BROMIDE 50 MG: 10 INJECTION, SOLUTION INTRAVENOUS at 03:02

## 2024-02-14 RX ADMIN — SODIUM CHLORIDE: 9 INJECTION, SOLUTION INTRAVENOUS at 02:02

## 2024-02-14 RX ADMIN — SODIUM CHLORIDE 0.4 MCG/KG/MIN: 9 INJECTION, SOLUTION INTRAVENOUS at 04:02

## 2024-02-14 RX ADMIN — HYDROMORPHONE HYDROCHLORIDE 0.4 MG: 2 INJECTION INTRAMUSCULAR; INTRAVENOUS; SUBCUTANEOUS at 06:02

## 2024-02-14 RX ADMIN — DEXMEDETOMIDINE 8 MCG: 100 INJECTION, SOLUTION, CONCENTRATE INTRAVENOUS at 05:02

## 2024-02-14 RX ADMIN — HYDROMORPHONE HYDROCHLORIDE 1 MG: 1 INJECTION, SOLUTION INTRAMUSCULAR; INTRAVENOUS; SUBCUTANEOUS at 10:02

## 2024-02-14 RX ADMIN — TRANEXAMIC ACID 1 MG/KG/HR: 100 INJECTION INTRAVENOUS at 03:02

## 2024-02-14 RX ADMIN — HYDROMORPHONE HYDROCHLORIDE 0.2 MG: 2 INJECTION INTRAMUSCULAR; INTRAVENOUS; SUBCUTANEOUS at 06:02

## 2024-02-14 RX ADMIN — OXYCODONE HYDROCHLORIDE 10 MG: 10 TABLET ORAL at 07:02

## 2024-02-14 RX ADMIN — MUPIROCIN: 20 OINTMENT TOPICAL at 10:02

## 2024-02-14 RX ADMIN — ACETAMINOPHEN 1000 MG: 10 INJECTION, SOLUTION INTRAVENOUS at 05:02

## 2024-02-14 RX ADMIN — DEXMEDETOMIDINE 20 MCG: 100 INJECTION, SOLUTION, CONCENTRATE INTRAVENOUS at 02:02

## 2024-02-14 RX ADMIN — DIAZEPAM 5 MG: 5 TABLET ORAL at 09:02

## 2024-02-14 RX ADMIN — LIDOCAINE HYDROCHLORIDE 100 MG: 20 INJECTION INTRAVENOUS at 02:02

## 2024-02-14 RX ADMIN — SODIUM CHLORIDE, SODIUM ACETATE ANHYDROUS, SODIUM GLUCONATE, POTASSIUM CHLORIDE, AND MAGNESIUM CHLORIDE: 526; 222; 502; 37; 30 INJECTION, SOLUTION INTRAVENOUS at 02:02

## 2024-02-14 RX ADMIN — PHENYLEPHRINE HYDROCHLORIDE 100 MCG: 10 INJECTION INTRAVENOUS at 04:02

## 2024-02-14 RX ADMIN — CLONIDINE HYDROCHLORIDE 0.1 MG: 0.1 TABLET ORAL at 09:02

## 2024-02-14 RX ADMIN — OXYCODONE HYDROCHLORIDE 10 MG: 10 TABLET ORAL at 04:02

## 2024-02-14 RX ADMIN — MIDAZOLAM HYDROCHLORIDE 2 MG: 1 INJECTION, SOLUTION INTRAMUSCULAR; INTRAVENOUS at 02:02

## 2024-02-14 RX ADMIN — ONDANSETRON 4 MG: 2 INJECTION INTRAMUSCULAR; INTRAVENOUS at 02:02

## 2024-02-14 RX ADMIN — NICOTINE 1 PATCH: 14 PATCH, EXTENDED RELEASE TRANSDERMAL at 10:02

## 2024-02-14 RX ADMIN — ACETAMINOPHEN 1000 MG: 500 TABLET ORAL at 05:02

## 2024-02-14 RX ADMIN — REMIFENTANIL HYDROCHLORIDE 0.2 MCG/KG/MIN: 1 INJECTION, POWDER, LYOPHILIZED, FOR SOLUTION INTRAVENOUS at 03:02

## 2024-02-14 RX ADMIN — HYDROMORPHONE HYDROCHLORIDE 1 MG: 1 INJECTION, SOLUTION INTRAMUSCULAR; INTRAVENOUS; SUBCUTANEOUS at 01:02

## 2024-02-14 RX ADMIN — ACETAMINOPHEN 1000 MG: 500 TABLET ORAL at 10:02

## 2024-02-14 RX ADMIN — TRANEXAMIC ACID 800 MG: 100 INJECTION INTRAVENOUS at 03:02

## 2024-02-14 RX ADMIN — Medication 50 MG: at 02:02

## 2024-02-14 RX ADMIN — PHENYLEPHRINE HYDROCHLORIDE 100 MCG: 10 INJECTION INTRAVENOUS at 03:02

## 2024-02-14 RX ADMIN — PROPOFOL 100 MG: 10 INJECTION, EMULSION INTRAVENOUS at 02:02

## 2024-02-14 RX ADMIN — ROCURONIUM BROMIDE 50 MG: 10 INJECTION, SOLUTION INTRAVENOUS at 04:02

## 2024-02-14 RX ADMIN — CEFAZOLIN 2 G: 330 INJECTION, POWDER, FOR SOLUTION INTRAMUSCULAR; INTRAVENOUS at 03:02

## 2024-02-14 RX ADMIN — SUCCINYLCHOLINE CHLORIDE 100 MG: 20 INJECTION, SOLUTION INTRAMUSCULAR; INTRAVENOUS at 02:02

## 2024-02-14 RX ADMIN — DEXAMETHASONE SODIUM PHOSPHATE 8 MG: 4 INJECTION, SOLUTION INTRAMUSCULAR; INTRAVENOUS at 03:02

## 2024-02-14 RX ADMIN — OXYCODONE HYDROCHLORIDE 10 MG: 10 TABLET ORAL at 10:02

## 2024-02-14 RX ADMIN — HYDROMORPHONE HYDROCHLORIDE 1 MG: 1 INJECTION, SOLUTION INTRAMUSCULAR; INTRAVENOUS; SUBCUTANEOUS at 08:02

## 2024-02-14 RX ADMIN — MIRTAZAPINE 15 MG: 15 TABLET, FILM COATED ORAL at 10:02

## 2024-02-14 NOTE — PROGRESS NOTES
"Aleksandr Bray - Surgery ([facility])  [facility] Medicine  Progress Note    Patient Name: Ton Donovan  MRN: [id number]  Patient Class: IP- Inpatient   Admission Date: 2/4/2024  Length of Stay: 9 days  Attending Physician: Nagi Angel*  Primary Care Provider: Emily, Primary Doctor        Subjective:     Principal Problem:Osteomyelitis of vertebra of lumbosacral region        HPI:  Ton Donovan is a 28-year-old male with PMHx of IVDU w/ assoc complications of MRSA bacteremia, multiple septic joints & abscesses, who presented 2/4/24 with progressive back pain with radiation down his legs b/l. Endorses bilateral numbness & neuropathic pain over the dorsum of his feet. This has been ongoing and is not necessarily a new problem, but has been worsening. Reports "pins & needles"/paresthesias down his legs that have been progressing, although laying on his stomach seems to provide significant relief. Admits to continued active IV heroin use; last used just prior to EMS arrival. Denies any fevers, chills, night sweats, cough, or shortness of breath. Denies any bowel or bladder incontinence/retention or saddle anesthesia.     On arrival, HDS & 0/4 SIRS. MRI showed osteomyelitis/discitis of L5-S1, osteomyelitis of sacrum & L iliac bone, probable septic arthritis of L SI joint & lower lumbar spine facet joints, worsening anterolisthesis of L5 with respect to S1 with increased size of epidural abscess at the level of L5-S1 and probable severe central canal stenosis at this level, along w/ additional abscesses in the L iliopsoas muscle, paraspinal muscles, pelvis, & L sacrum. Received single dose of Zosyn in ED on arrival, but given that pt HDS w/o sepsis, further abx held to optimize intra-op cx/pathology. Admitted to hospital medicine for further mgmt of osteomyelitis, septic joints & abscesses.    Overview/Hospital Course:  Addiction medicine, along w/ NSGY, orthopedic surgery, IR & ID consulted. Underwent aspiration of psoas " abscess per IR on 2/7. Aspiration of SI joint attempted but unable to aspirate hardly any fluid.   NSGY plan for OR Wednesday 2/14  Cultures grew MRSA. TTE ordered.    Interval History: To OR today  Afebrile    Review of Systems  Objective:     Vital Signs (Most Recent):  Temp: 97.7 °F (36.5 °C) (02/14/24 1328)  Pulse: 69 (02/14/24 1328)  Resp: 16 (02/14/24 1328)  BP: 111/68 (02/14/24 1329)  SpO2: 100 % (02/14/24 1328) Vital Signs (24h Range):  Temp:  [97.7 °F (36.5 °C)-98.5 °F (36.9 °C)] 97.7 °F (36.5 °C)  Pulse:  [60-71] 69  Resp:  [12-17] 16  SpO2:  [98 %-100 %] 100 %  BP: (110-119)/(55-68) 111/68     Weight: 76.7 kg (169 lb 1.5 oz)  Body mass index is 24.26 kg/m².    Intake/Output Summary (Last 24 hours) at 2/14/2024 1538  Last data filed at 2/14/2024 1300  Gross per 24 hour   Intake 300 ml   Output 1475 ml   Net -1175 ml         Physical Exam  Constitutional:       Appearance: He is normal weight. He is not toxic-appearing.   HENT:      Head: Normocephalic and atraumatic.   Eyes:      Conjunctiva/sclera: Conjunctivae normal.   Cardiovascular:      Rate and Rhythm: Normal rate and regular rhythm.      Heart sounds: Normal heart sounds.   Pulmonary:      Effort: Pulmonary effort is normal. No respiratory distress.      Breath sounds: Normal breath sounds. No wheezing.   Abdominal:      General: Bowel sounds are normal. There is no distension.      Palpations: Abdomen is soft.      Tenderness: There is no abdominal tenderness. There is no guarding.   Skin:     General: Skin is warm and dry.      Findings: No rash.   Neurological:      Mental Status: He is alert and oriented to person, place, and time.      Sensory: Sensory deficit present.      Motor: No weakness.   Psychiatric:         Mood and Affect: Mood normal.         Behavior: Behavior normal.             Significant Labs: All pertinent labs within the past 24 hours have been reviewed.    Significant Imaging: I have reviewed all pertinent imaging  results/findings within the past 24 hours.    Assessment/Plan:      * Osteomyelitis of vertebra of lumbosacral region  Osteomyelitis, discitis, & myositis of lumbosacral region  Septic arthritis of SI joint(s)  Epidural abscesses   Multiple abscesses of pelvis, sacrum, psoas, iliacus, & paraspinals  Lower back pain w/ b/l sciatica   Severe spinal canal stenosis   Hx of known spinal osteomyelitis, various abscesses, & MRSA bacteremia i/s/o IVDU. Presented w/ progressive lower back & abdominal pain w/ assoc LE weakness & sensory changes. HDS & 0/4 SIRS on admission. MRI showed osteomyelitis/discitis of L5-S1, osteomyelitis of sacrum & L iliac bone, probable septic arthritis of L SI joint & lower lumbar spine facet joints, worsening anterolisthesis of L5 with respect to S1 with increased size of epidural abscess at the level of L5-S1 and probable severe central canal stenosis at this level, along w/ additional abscesses in the L iliopsoas muscle, paraspinal muscles, pelvis, & L sacrum. CT L-spine w/ similar findings. No urinary or bowel sx retention/incontinence. Received single dose of Zosyn in ED on arrival, but given that pt HDS w/o sepsis, further abx held to optimize intra-op cx/pathology.   - Neurosurgery consulted; appreciate recs/assistance   - Plan for OR 2/14 for L4-pelvis fusion with debridement.   - IR consulted for potential aspiration of abscess(es); appreciate assistance   - Plan for aspiration of psoas abscess & SI joint 2/7   - ID consulted; appreciate recs  - Ortho consulted for mgmt of septic SI joint; appreciate recs/assistance  - Continue vancomycin for MRSA growth --> switched to daptomycin  - Multimodal analgesia   - PT/OT    Housing insecurity  Previously worked at [employer] & held down job in the past but started using heroin last year following multiple family deaths (mother, aunt & grandmother) in a short period of time. Subsequently lost housing & has been living on streets since then.   -  SW/CM    Prediabetes  A1c 6.2% w/ glucose 109 on admission.   - Deferring SSI or CBGs   - Hypoglycemia protocol     Substance or medication-induced depressive disorder  Pt endorses sx of depression, no SI/SA.   - Addiction medicine/psychiatry consult as above; appreciate recs   - Initiating mirtazapine 15mg QHS    Spinal stenosis of lumbar region without neurogenic claudication  See osteomyelitis      Pelvic abscess in male  Presumed osteomyelitis/discitis at L5-S1 with osteomyelitis in the sacrum and left iliac bone and probable septic arthritis in the left SI joint and lower lumbar spine facet joints.     Worsening anterolisthesis of L5 with respect to S1 with increased size of epidural abscess at the level of L5-S1, and probable severe central canal stenosis at this level.     Additional abscesses in the left iliopsoas muscle, paraspinal muscles, pelvis, and left sacrum as discussed above.     Additional findings discussed in the body of the report and on same day pelvic MRI.      IVDU (intravenous drug user)  Heroin abuse w/ opioid withdrawal   PSDU  Tobacco use   Known hx of IVDU (heroin) w/ extensive assoc medical complications (discussed above). No recent UDS (only one on file from July 2023- positive for opiates & THC) and none collected on admission. Endorses daily IV heroin use (1g/day); last used just prior to calling EMS. Active tobacco use, but denies EtOH or other illicit substance use. Endorsing sx of opioid w/d.   - Addiction medicine consulted; appreciate recs  - Deferring initiating methadone/suboxone given potential anesthesia/surgical intervention  - PRN oxycodone per COWS protocol  - Supportive care w/ PRN antiemetics, analgesics, & anxiolytics   - NRT PRN  - Cessation strongly encouraged     Septic arthritis of sacroiliac joint  See osteomyelitis      Chronic hyponatremia  On admission, Na 133 (baseline 129-133). Likely SIADH 2/2 chronic infxn, but w/ tea/toast & poor PO intake likely also  contributing.   - Deferring IVF  - Encourage good PO intake  - Monitor BMP  - Mgmt of infxns as above    Anemia of infection and chronic disease  On admission, Hgb 8.0 (more or less c/w baseline as has been downtrending for past year) w/ MCV 69. No e/o active bleeding. Iron panel w/ low iron, TIBC, %sat & transferrin; ferritin nml. Although ferritin nml, likely anemia of chronic dx 2/2 chronic infxn re: IVDU.   - Monitor CBC & for e/o active bleeding  - Transfuse PRN for goal Hgb > 7  - Mgmt of infxns as above    Cigarette nicotine dependence without complication  Dangers of cigarette smoking were reviewed with patient in detail. Patient was  offered a nicotinepatch  Nicotine replacement options were discussed. Nicotine replacement was discussed      VTE Risk Mitigation (From admission, onward)           Ordered     Place sequential compression device  Until discontinued         02/05/24 0126     IP VTE HIGH RISK PATIENT  Once         02/05/24 0126                    Discharge Planning   SENA: 2/19/2024     Code Status: Full Code   Is the patient medically ready for discharge?: No    Reason for patient still in hospital (select all that apply): Patient trending condition, Treatment, and Consult recommendations  Discharge Plan A: Long-term acute care facility (LTAC)   Discharge Delays: None known at this time      Nagi Angel MD  Department of Hospital Medicine   [facility] - Surgery ([facility])

## 2024-02-14 NOTE — PT/OT/SLP DISCHARGE
Physical Therapy Discharge Summary    Name: Ton Donovan  MRN: [id number]   Principal Problem: Osteomyelitis of vertebra of lumbosacral region     Patient Discharged from acute Physical Therapy on 24.  Please refer to prior PT noted date on 24 for functional status.     Assessment:     Pt to OR for lumbar-pelvis fusion, will require new orders post op    Objective:     GOALS:   Multidisciplinary Problems       Physical Therapy Goals          Problem: Physical Therapy    Goal Priority Disciplines Outcome Goal Variances Interventions   Physical Therapy Goal     PT, PT/OT Ongoing, Progressing     Description: Goals to be met by: 24     Patient will increase functional independence with mobility by performin. Supine to sit with Supervision  2. Sit to supine with Supervision  3. Sit to stand transfer with Supervision  4. Bed to chair transfer with Supervision using LRAD as needed  5. Gait  x 150 feet with Supervision using LRAD as needed.   6. Ascend/descend 5 stair with right Handrails and Supervision  7. Lower extremity exercise program x15 reps per handout, with assistance as needed                         Reasons for Discontinuation of Therapy Services  Pt to OR       Plan:     Patient Discharged to:  NPU .      2024

## 2024-02-14 NOTE — PT/OT/SLP PROGRESS
Occupational Therapy      Patient Name:  Ton Donovan   MRN:  [id number]    Patient not seen today secondary to Off the floor for procedure/surgery. Will follow-up as able.    2/14/2024

## 2024-02-14 NOTE — PROGRESS NOTES
"Aleksandr Bray - Neurosurgery ([facility])  Infectious Disease  Progress Note    Patient Name: Ton Donovan  MRN: [id number]  Admission Date: 2/4/2024  Length of Stay: 9 days  Attending Physician: Nagi Angel*  Primary Care Provider: No, Primary Doctor    Isolation Status: No active isolations  Assessment/Plan:      ID  * Osteomyelitis of vertebra of lumbosacral region      28 year old IV drug user with h/o of MRSA bacteremia and prior disseminated MRSA infection  with poor adherence with care (history of leaving AMA), readmitted 2/4 with progressive back pain.  Imaging significant for lumbosacral osteomyelitis with associated epidural abscess, osteomyelitis of sacrum & L iliac bone, probable septic arthritis of L SI joint & lower lumbar spine facet joints, and left iliopsoas abscess, abscess in superior rectal/presacral region and micro-abscesses in paraspinal muscles, pelvis, and left sacrum.  Blood cultures have been negative.  2D echo is negative.  Underwent IR aspiration of left SI joint and psoas on 2/7.  Cx + for MRSA    Pending lumbar-pelvis fusion with NSGY today.       Afebrile, no leukocytosis.  Back pain "tolerable".  Hemodynamically and neurologically stable.  Currently on IV daptomycin (subtherapeutic vancomycin levels with q 8 hr dosing)    Recommendations / Plan:  Increased daptomycin to 10 mg/kg IV q 24 hours   Please send surgical cultures (aerobic, anaerobic, fungal, afb)  Anticipate 8 weeks of IV antibiotics from date of surgery  Pt will require placement for IV abx, PT, rehab.   Will follow     Data reviewed and plan discussed with ID staff, Dr. Frost            Thank you.   Please Secure Chat for any questions or concerns.  CAROL Majano, ANP-C  Infectious Disease         50 minutes of total time was spent on this encounter, which includes face to face time and non-face to face time preparing to see the patient (eg, review of tests), Obtaining and/or reviewing separately obtained " history, documenting clinical information in the electronic or other health record, independently interpreting results (not separately reported) and communicating results to the patient/family/caregiver, or care coordination (not separately reported).   Subjective:     Principal Problem:Osteomyelitis of vertebra of lumbosacral region    HPI: 28M with h/o IVDU (injects heroin, last used 02/03), with associated complex infectious h/o of disseminated MRSA bacteremia, and multiple septic joints / abscesses, c/b poor adherence, and deferring care / leaving AMA.  - Pt returns now (02/04) for progression of chronic back pain with radiculopathy (numbness / pain) to legs B/L.     MRI L-spine / pelvis (02/04): showed osteomyelitis/discitis of L5-S1, osteomyelitis of sacrum & L iliac bone, probable septic arthritis of L SI joint & lower lumbar spine facet joints; worsening anterolisthesis of L5 with respect to S1 with increased size of epidural abscess at the level of L5-S1 and probable severe central canal stenosis at this level, along w/ additional abscesses in the L iliopsoas muscle, paraspinal muscles, pelvis, & L sacrum; -- the additional abscesses in the left iliopsoas muscle [3.4 x 1.7cm; with other micro-abscesses adjacent measuring 2.3cm), paraspinal muscles, pelvis, and left sacrum. Additional abscess in the superior rectal/presacral region measuring roughly 3.1 x 2.4 cm; and probable additional micro abscesses at left sacrum.  CT lumbar obtained with destructive changes at L5-S1.       -- Received single dose of Zosyn in ED on arrival, but given that pt HDS w/o sepsis, further abx held to optimize intra-op cx/pathology. Admitted to hospital medicine for further mgmt of osteomyelitis, septic joints & abscesses. Addiction medicine, along w/ NSGY, orthopedic surgery, IR & ID consulted.     Reports back pain is doing okay while supine, but exacerbated upon standing w/ assoc b/l sciatica & weakness w/ standing. Endorses  diminished sensation of BLE (R>L) up to shins.   He denies bowel/bladder sx or saddle anesthesia. He denies focal weakness.    Pt off abx pending tentative spinal bx for path / cx; pt remains AF, VSS, HDS. CRP 33. Bld cxs NGTD.    Abx held pending intervention, if patient begins to show signs of sepsis, please start abx. ID consult.   Voiding spontaneously. Bladder scan prn.     Plan for OR 2/14 for L4-pelvis fusion with debridement. IR consulted for source control of SI / L psoas abscesses, tentative aspiration today (02/07) of L SI joint and L psoas. Orders placed for micro send of samples  (gram stain, AFB, fungal, aerobic, and anaerobic).            Interval History:   OR today   No significant complaints except hunger.   Back pain controlled    Review of Systems   Constitutional:  Negative for appetite change, chills, diaphoresis, fatigue and fever.   HENT:  Negative for congestion, mouth sores and sore throat.    Eyes:  Negative for pain.   Respiratory:  Negative for cough, chest tightness and shortness of breath.    Cardiovascular:  Negative for chest pain and leg swelling.   Gastrointestinal:  Negative for abdominal pain, nausea and vomiting.   Genitourinary:  Negative for difficulty urinating, dysuria and flank pain.   Musculoskeletal:  Positive for back pain (at baseline.). Negative for arthralgias, joint swelling, myalgias and neck pain.   Skin:  Negative for color change, rash and wound.   Neurological:  Positive for numbness.   Psychiatric/Behavioral:  Negative for confusion.      Objective:     Vital Signs (Most Recent):  Temp: 98.5 °F (36.9 °C) (02/14/24 0758)  Pulse: 63 (02/14/24 0758)  Resp: 14 (02/14/24 1020)  BP: 116/61 (02/14/24 0758)  SpO2: 100 % (02/14/24 0758) Vital Signs (24h Range):  Temp:  [97.8 °F (36.6 °C)-98.5 °F (36.9 °C)] 98.5 °F (36.9 °C)  Pulse:  [60-74] 63  Resp:  [12-20] 14  SpO2:  [98 %-100 %] 100 %  BP: (110-127)/(55-62) 116/61     Weight: 76.7 kg (169 lb)  Body mass index is  24.25 kg/m².    Estimated Creatinine Clearance: 159.3 mL/min (based on SCr of 0.7 mg/dL).     Physical Exam  Vitals and nursing note reviewed.   Constitutional:       General: He is not in acute distress.     Appearance: Normal appearance. He is not ill-appearing, toxic-appearing or diaphoretic.   HENT:      Head: Normocephalic.      Nose: No congestion.      Mouth/Throat:      Mouth: Mucous membranes are moist.   Eyes:      General: No scleral icterus.     Conjunctiva/sclera: Conjunctivae normal.   Cardiovascular:      Rate and Rhythm: Normal rate.   Pulmonary:      Effort: Pulmonary effort is normal. No respiratory distress.      Breath sounds: Normal breath sounds.   Abdominal:      General: Abdomen is flat.      Palpations: Abdomen is soft.   Musculoskeletal:         General: Tenderness present. No swelling.      Cervical back: Normal range of motion.      Right lower leg: No edema.      Left lower leg: No edema.   Skin:     General: Skin is warm and dry.   Neurological:      Mental Status: He is alert and oriented to person, place, and time. Mental status is at baseline.   Psychiatric:         Behavior: Behavior normal.         Thought Content: Thought content normal.          Significant Labs: Blood Culture:   Recent Labs   Lab 10/10/23  1827 10/14/23  1512 11/20/23  1155 02/04/24  1822 02/04/24  1853   LABBLOO Gram stain manas bottle: Gram positive cocci in clusters resembling Staph  Results called to and read back by: Dr. Childers  10/11/2023  11:26  Gram stain aer bottle: Gram positive cocci in clusters resembling Staph  Positive results previously called 10/11/2023  14:31  METHICILLIN RESISTANT STAPHYLOCOCCUS AUREUS  For susceptibility see order #[id number]  *  Gram stain aer bottle: Gram positive cocci in clusters resembling Staph  Results called to and read back by: Dr. Childers  10/11/2023  11:26  Gram stain manas bottle: Gram positive cocci in clusters resembling Staph  Positive results previously  called 10/11/2023  12:57  METHICILLIN RESISTANT STAPHYLOCOCCUS AUREUS  ID consult required at [facility].  * Gram stain aer bottle: Gram positive cocci in clusters resembling Staph  Results called to and read back by: ADIS CAMEJO  10/15/2023  10:25  Gram stain manas bottle: Gram positive cocci in clusters resembling Staph  Positive results previously called 10/15/2023  METHICILLIN RESISTANT STAPHYLOCOCCUS AUREUS  ID consult required at [facility].  For susceptibility see order #[id number]  *  Gram stain aer bottle: Gram positive cocci in clusters resembling Staph  Results called to and read back by: ADIS CAMEJO  10/15/2023  10:25  Gram stain manas bottle: Gram positive cocci in clusters resembling Staph  Positive results previously called 10/15/2023  METHICILLIN RESISTANT STAPHYLOCOCCUS AUREUS  ID consult required at [facility].  * No growth after 5 days.  No growth after 5 days. No growth after 5 days. No growth after 5 days.       CBC:   Recent Labs   Lab 02/14/24  0555   WBC 6.24   HGB 8.8*   HCT 29.3*          CMP:   Recent Labs   Lab 02/14/24  0555   *   K 4.8      CO2 25   GLU 97   BUN 25*   CREATININE 0.7   CALCIUM 9.6   ANIONGAP 7*       Microbiology Results (last 7 days)       Procedure Component Value Units Date/Time    Culture, Anaerobe [[id number]] Collected: 02/07/24 1423    Order Status: Completed Specimen: Abscess from Back Updated: 02/14/24 0922     Anaerobic Culture No anaerobes isolated    Narrative:      L psoas    Culture, Anaerobe [[id number]] Collected: 02/07/24 1411    Order Status: Completed Specimen: Joint Fluid from Back Updated: 02/14/24 0908     Anaerobic Culture No anaerobes isolated    Fungus culture [[id number]] Collected: 02/07/24 1423    Order Status: Completed Specimen: Abscess from Back Updated: 02/14/24 0547     Fungus (Mycology) Culture Culture in progress     Narrative:      L psoas    Fungus culture [[id number]] Collected: 02/07/24 1413    Order Status: Completed Specimen: Joint Fluid from Back Updated: 02/14/24 0544     Fungus (Mycology) Culture Culture in progress    Aerobic culture [[id number]] Collected: 02/07/24 1412    Order Status: Completed Specimen: Bone from Back Updated: 02/12/24 0750     Aerobic Bacterial Culture No growth    Narrative:      Joint    Aerobic culture [[id number]]  (Abnormal)  (Susceptibility) Collected: 02/07/24 1423    Order Status: Completed Specimen: Abscess from Back Updated: 02/11/24 1330     Aerobic Bacterial Culture METHICILLIN RESISTANT STAPHYLOCOCCUS AUREUS  Rare      Narrative:      L psoas    Blood culture #1 **CANNOT BE ORDERED STAT** [[id number]] Collected: 02/04/24 1853    Order Status: Completed Specimen: Blood from Peripheral, Forearm, Left Updated: 02/09/24 2012     Blood Culture, Routine No growth after 5 days.    Blood culture #2 **CANNOT BE ORDERED STAT** [[id number]] Collected: 02/04/24 1822    Order Status: Completed Specimen: Blood from Peripheral, Forearm, Left Updated: 02/09/24 2012     Blood Culture, Routine No growth after 5 days.    AFB Culture & Smear [[id number]] Collected: 02/07/24 1413    Order Status: Completed Specimen: Joint Fluid from Back Updated: 02/09/24 0927     AFB Culture & Smear Culture in progress     AFB CULTURE STAIN No acid fast bacilli seen.    AFB Culture & Smear [[id number]] Collected: 02/07/24 1423    Order Status: Completed Specimen: Abscess from Back Updated: 02/08/24 2127     AFB Culture & Smear Culture in progress     AFB CULTURE STAIN No acid fast bacilli seen.    Narrative:      L psoas    Gram stain [[id number]] Collected: 02/07/24 1413    Order Status: Completed Specimen: Joint Fluid from Back Updated: 02/08/24 0041     Gram Stain Result No WBC's      No organisms seen    Gram stain [[id number]] Collected: 02/07/24 1423    Order Status: Completed Specimen: Abscess from Back Updated:  02/07/24 1934     Gram Stain Result Few WBC's      No organisms seen    Narrative:      L psoas          Pathology Results  (Last 10 years)      None          All pertinent labs within the past 24 hours have been reviewed.    Significant Imaging: I have reviewed all pertinent imaging results/findings within the past 24 hours.    I have personally reviewed records / hospital notes from   service and other specialty providers. I have also reviewed CBC, CMP/BMP,  cultures and imaging with my interpretation as documented in my assessment / plan.    Patient is high risk for infectious complications given pt's age, multiple co-morbidities, and case complexity.      Time: 50 minutes   50% of time spent on face-to-face counseling and coordination of care. Counseling included review of test results, diagnosis, and treatment plan with patient and/or family.

## 2024-02-14 NOTE — ASSESSMENT & PLAN NOTE
"    28 year old IV drug user with h/o of MRSA bacteremia and prior disseminated MRSA infection  with poor adherence with care (history of leaving AMA), readmitted 2/4 with progressive back pain.  Imaging significant for lumbosacral osteomyelitis with associated epidural abscess, osteomyelitis of sacrum & L iliac bone, probable septic arthritis of L SI joint & lower lumbar spine facet joints, and left iliopsoas abscess, abscess in superior rectal/presacral region and micro-abscesses in paraspinal muscles, pelvis, and left sacrum.  Blood cultures have been negative.  2D echo is negative.  Underwent IR aspiration of left SI joint and psoas on 2/7.  Cx + for MRSA    Pending lumbar-pelvis fusion with NSGY today.       Afebrile, no leukocytosis.  Back pain "tolerable".  Hemodynamically and neurologically stable.  Currently on IV daptomycin (subtherapeutic vancomycin levels with q 8 hr dosing)    Recommendations / Plan:  Increased daptomycin to 10 mg/kg IV q 24 hours   Please send surgical cultures (aerobic, anaerobic, fungal, afb)  Anticipate 8 weeks of IV antibiotics from date of surgery  Pt will require placement for IV abx, PT, rehab.   Will follow     Data reviewed and plan discussed with ID staff, Dr. Frost          "

## 2024-02-14 NOTE — ANESTHESIA PROCEDURE NOTES
Intubation    Date/Time: 2/14/2024 2:57 PM    Performed by: Fátima Champion  Authorized by: Naren Campos MD    Intubation:     Induction:  Intravenous    Intubated:  Postinduction    Mask Ventilation:  Easy mask    Attempts:  1    Attempted By:  Student    Method of Intubation:  Direct    Blade:  Olivier 3    Laryngeal View Grade: Grade I - full view of cords      Difficult Airway Encountered?: No      Airway Device:  Oral endotracheal tube    Airway Device Size:  7.5    Style/Cuff Inflation:  Cuffed (inflated to minimal occlusive pressure)    Tube secured:  23    Secured at:  The lips    Placement Verified By:  Capnometry and Revisualization with laryngoscopy    Complicating Factors:  None    Findings Post-Intubation:  BS equal bilateral and atraumatic/condition of teeth unchanged

## 2024-02-14 NOTE — PT/OT/SLP DISCHARGE
Occupational Therapy Discharge Summary    Ton Donovan  MRN: [id number]   Principal Problem: Osteomyelitis of vertebra of lumbosacral region      Patient Discharged from acute Occupational Therapy on 2/14/23.  Please refer to prior OT note dated 02/09/24 for functional status.    Assessment:       Pt to OR for lumbar-pelvis fusion, will require new orders post op    Objective:     GOALS:   Multidisciplinary Problems       Occupational Therapy Goals          Problem: Occupational Therapy    Goal Priority Disciplines Outcome Interventions   Occupational Therapy Goal     OT, PT/OT Ongoing, Progressing    Description: Goals to be met by: 3/7/23     Patient will increase functional independence with ADLs by performing:    LE Dressing with Minimal Assistance.  Grooming while standing with Moderate Assistance.  Toileting from toilet with Moderate Assistance for hygiene and clothing management.   Stand pivot transfers with Minimal Assistance.  Step transfer with Minimal Assistance                         Reasons for Discontinuation of Therapy Services  Pt to OR for lumbar fusion        Plan:     Patient Discharged to:  NPU    2/14/2024

## 2024-02-14 NOTE — PLAN OF CARE
Problem: Adult Inpatient Plan of Care  Goal: Plan of Care Review  Outcome: Ongoing, Progressing  Goal: Absence of Hospital-Acquired Illness or Injury  Outcome: Ongoing, Progressing  Goal: Optimal Comfort and Wellbeing  Outcome: Ongoing, Progressing  Goal: Readiness for Transition of Care  Outcome: Ongoing, Progressing     Problem: Impaired Wound Healing  Goal: Optimal Wound Healing  Outcome: Ongoing, Progressing     Problem: Skin Injury Risk Increased  Goal: Skin Health and Integrity  Outcome: Ongoing, Progressing     Problem: Infection  Goal: Absence of Infection Signs and Symptoms  Outcome: Ongoing, Progressing     Problem: Fall Injury Risk  Goal: Absence of Fall and Fall-Related Injury  Outcome: Ongoing, Progressing     Problem: Pain Acute  Goal: Acceptable Pain Control and Functional Ability  Outcome: Ongoing, Progressing     Problem: Skin or Soft Tissue Infection  Goal: Absence of Infection Signs and Symptoms  Outcome: Ongoing, Progressing     Pt priority is pain management, reported pain as 9/10 at start of shift, pain medication administered per MAR, nonpharmacologic pain management promoted by RN. Pt ambulated halls with PT. Pt in OR for Loop X Fusion, Spine, Lumbar surgery, pt NPO since midnight, brought to OR at 1300.  Awaiting report from OR RN and transport back to NPU.

## 2024-02-14 NOTE — SUBJECTIVE & OBJECTIVE
Interval History:   OR today   No significant complaints except hunger.   Back pain controlled    Review of Systems   Constitutional:  Negative for appetite change, chills, diaphoresis, fatigue and fever.   HENT:  Negative for congestion, mouth sores and sore throat.    Eyes:  Negative for pain.   Respiratory:  Negative for cough, chest tightness and shortness of breath.    Cardiovascular:  Negative for chest pain and leg swelling.   Gastrointestinal:  Negative for abdominal pain, nausea and vomiting.   Genitourinary:  Negative for difficulty urinating, dysuria and flank pain.   Musculoskeletal:  Positive for back pain (at baseline.). Negative for arthralgias, joint swelling, myalgias and neck pain.   Skin:  Negative for color change, rash and wound.   Neurological:  Positive for numbness.   Psychiatric/Behavioral:  Negative for confusion.      Objective:     Vital Signs (Most Recent):  Temp: 98.5 °F (36.9 °C) (02/14/24 0758)  Pulse: 63 (02/14/24 0758)  Resp: 14 (02/14/24 1020)  BP: 116/61 (02/14/24 0758)  SpO2: 100 % (02/14/24 0758) Vital Signs (24h Range):  Temp:  [97.8 °F (36.6 °C)-98.5 °F (36.9 °C)] 98.5 °F (36.9 °C)  Pulse:  [60-74] 63  Resp:  [12-20] 14  SpO2:  [98 %-100 %] 100 %  BP: (110-127)/(55-62) 116/61     Weight: 76.7 kg (169 lb)  Body mass index is 24.25 kg/m².    Estimated Creatinine Clearance: 159.3 mL/min (based on SCr of 0.7 mg/dL).     Physical Exam  Vitals and nursing note reviewed.   Constitutional:       General: He is not in acute distress.     Appearance: Normal appearance. He is not ill-appearing, toxic-appearing or diaphoretic.   HENT:      Head: Normocephalic.      Nose: No congestion.      Mouth/Throat:      Mouth: Mucous membranes are moist.   Eyes:      General: No scleral icterus.     Conjunctiva/sclera: Conjunctivae normal.   Cardiovascular:      Rate and Rhythm: Normal rate.   Pulmonary:      Effort: Pulmonary effort is normal. No respiratory distress.      Breath sounds: Normal  breath sounds.   Abdominal:      General: Abdomen is flat.      Palpations: Abdomen is soft.   Musculoskeletal:         General: Tenderness present. No swelling.      Cervical back: Normal range of motion.      Right lower leg: No edema.      Left lower leg: No edema.   Skin:     General: Skin is warm and dry.   Neurological:      Mental Status: He is alert and oriented to person, place, and time. Mental status is at baseline.   Psychiatric:         Behavior: Behavior normal.         Thought Content: Thought content normal.          Significant Labs: Blood Culture:   Recent Labs   Lab 10/10/23  1827 10/14/23  1512 11/20/23  1155 02/04/24  1822 02/04/24  1853   LABBLOO Gram stain manas bottle: Gram positive cocci in clusters resembling Staph  Results called to and read back by: Dr. Childers  10/11/2023  11:26  Gram stain aer bottle: Gram positive cocci in clusters resembling Staph  Positive results previously called 10/11/2023  14:31  METHICILLIN RESISTANT STAPHYLOCOCCUS AUREUS  For susceptibility see order #[id number]  *  Gram stain aer bottle: Gram positive cocci in clusters resembling Staph  Results called to and read back by: Dr. Childers  10/11/2023  11:26  Gram stain manas bottle: Gram positive cocci in clusters resembling Staph  Positive results previously called 10/11/2023  12:57  METHICILLIN RESISTANT STAPHYLOCOCCUS AUREUS  ID consult required at [facility].  * Gram stain aer bottle: Gram positive cocci in clusters resembling Staph  Results called to and read back by: ADIS CAMEJO  10/15/2023  10:25  Gram stain manas bottle: Gram positive cocci in clusters resembling Staph  Positive results previously called 10/15/2023  METHICILLIN RESISTANT STAPHYLOCOCCUS AUREUS  ID consult required at [facility].  For susceptibility see order #[id number]  *  Gram stain aer bottle: Gram positive cocci in clusters resembling Staph  Results called to and  read back by: ADIS CAMEJO  10/15/2023  10:25  Gram stain manas bottle: Gram positive cocci in clusters resembling Staph  Positive results previously called 10/15/2023  METHICILLIN RESISTANT STAPHYLOCOCCUS AUREUS  ID consult required at [facility],[address] and [facility] locations.  * No growth after 5 days.  No growth after 5 days. No growth after 5 days. No growth after 5 days.       CBC:   Recent Labs   Lab 02/14/24  0555   WBC 6.24   HGB 8.8*   HCT 29.3*          CMP:   Recent Labs   Lab 02/14/24  0555   *   K 4.8      CO2 25   GLU 97   BUN 25*   CREATININE 0.7   CALCIUM 9.6   ANIONGAP 7*       Microbiology Results (last 7 days)       Procedure Component Value Units Date/Time    Culture, Anaerobe [[id number]] Collected: 02/07/24 1423    Order Status: Completed Specimen: Abscess from Back Updated: 02/14/24 0922     Anaerobic Culture No anaerobes isolated    Narrative:      L psoas    Culture, Anaerobe [[id number]] Collected: 02/07/24 1411    Order Status: Completed Specimen: Joint Fluid from Back Updated: 02/14/24 0908     Anaerobic Culture No anaerobes isolated    Fungus culture [[id number]] Collected: 02/07/24 1423    Order Status: Completed Specimen: Abscess from Back Updated: 02/14/24 0547     Fungus (Mycology) Culture Culture in progress    Narrative:      L psoas    Fungus culture [[id number]] Collected: 02/07/24 1413    Order Status: Completed Specimen: Joint Fluid from Back Updated: 02/14/24 0544     Fungus (Mycology) Culture Culture in progress    Aerobic culture [[id number]] Collected: 02/07/24 1412    Order Status: Completed Specimen: Bone from Back Updated: 02/12/24 0750     Aerobic Bacterial Culture No growth    Narrative:      Joint    Aerobic culture [[id number]]  (Abnormal)  (Susceptibility) Collected: 02/07/24 1423    Order Status: Completed Specimen: Abscess from Back Updated: 02/11/24 1330     Aerobic Bacterial Culture METHICILLIN RESISTANT STAPHYLOCOCCUS AUREUS  Rare       Narrative:      L psoas    Blood culture #1 **CANNOT BE ORDERED STAT** [[id number]] Collected: 02/04/24 1853    Order Status: Completed Specimen: Blood from Peripheral, Forearm, Left Updated: 02/09/24 2012     Blood Culture, Routine No growth after 5 days.    Blood culture #2 **CANNOT BE ORDERED STAT** [[id number]] Collected: 02/04/24 1822    Order Status: Completed Specimen: Blood from Peripheral, Forearm, Left Updated: 02/09/24 2012     Blood Culture, Routine No growth after 5 days.    AFB Culture & Smear [[id number]] Collected: 02/07/24 1413    Order Status: Completed Specimen: Joint Fluid from Back Updated: 02/09/24 0927     AFB Culture & Smear Culture in progress     AFB CULTURE STAIN No acid fast bacilli seen.    AFB Culture & Smear [[id number]] Collected: 02/07/24 1423    Order Status: Completed Specimen: Abscess from Back Updated: 02/08/24 2127     AFB Culture & Smear Culture in progress     AFB CULTURE STAIN No acid fast bacilli seen.    Narrative:      L psoas    Gram stain [[id number]] Collected: 02/07/24 1413    Order Status: Completed Specimen: Joint Fluid from Back Updated: 02/08/24 0041     Gram Stain Result No WBC's      No organisms seen    Gram stain [[id number]] Collected: 02/07/24 1423    Order Status: Completed Specimen: Abscess from Back Updated: 02/07/24 1934     Gram Stain Result Few WBC's      No organisms seen    Narrative:      L psoas          Pathology Results  (Last 10 years)      None          All pertinent labs within the past 24 hours have been reviewed.    Significant Imaging: I have reviewed all pertinent imaging results/findings within the past 24 hours.    I have personally reviewed records / hospital notes from   service and other specialty providers. I have also reviewed CBC, CMP/BMP,  cultures and imaging with my interpretation as documented in my assessment / plan.    Patient is high risk for infectious complications given pt's age, multiple co-morbidities, and case  complexity.      Time: 50 minutes   50% of time spent on face-to-face counseling and coordination of care. Counseling included review of test results, diagnosis, and treatment plan with patient and/or family.

## 2024-02-15 PROBLEM — F17.210 CIGARETTE NICOTINE DEPENDENCE WITHOUT COMPLICATION: Status: RESOLVED | Noted: 2023-07-07 | Resolved: 2024-02-15

## 2024-02-15 PROBLEM — D63.8 ANEMIA OF INFECTION AND CHRONIC DISEASE: Chronic | Status: RESOLVED | Noted: 2023-10-11 | Resolved: 2024-02-15

## 2024-02-15 PROBLEM — B99.9 ANEMIA OF INFECTION AND CHRONIC DISEASE: Chronic | Status: RESOLVED | Noted: 2023-10-11 | Resolved: 2024-02-15

## 2024-02-15 PROBLEM — R73.03 PREDIABETES: Status: RESOLVED | Noted: 2024-02-06 | Resolved: 2024-02-15

## 2024-02-15 PROBLEM — K65.1 PELVIC ABSCESS IN MALE: Status: RESOLVED | Noted: 2024-02-05 | Resolved: 2024-02-15

## 2024-02-15 LAB
ANION GAP SERPL CALC-SCNC: 7 MMOL/L (ref 8–16)
BASOPHILS # BLD AUTO: 0.02 K/UL (ref 0–0.2)
BASOPHILS NFR BLD: 0.3 % (ref 0–1.9)
BUN SERPL-MCNC: 26 MG/DL (ref 6–20)
CALCIUM SERPL-MCNC: 8.9 MG/DL (ref 8.7–10.5)
CHLORIDE SERPL-SCNC: 102 MMOL/L (ref 95–110)
CO2 SERPL-SCNC: 24 MMOL/L (ref 23–29)
CREAT SERPL-MCNC: 0.8 MG/DL (ref 0.5–1.4)
DIFFERENTIAL METHOD BLD: ABNORMAL
EOSINOPHIL # BLD AUTO: 0 K/UL (ref 0–0.5)
EOSINOPHIL NFR BLD: 0 % (ref 0–8)
ERYTHROCYTE [DISTWIDTH] IN BLOOD BY AUTOMATED COUNT: 19.7 % (ref 11.5–14.5)
EST. GFR  (NO RACE VARIABLE): >60 ML/MIN/1.73 M^2
GLUCOSE SERPL-MCNC: 140 MG/DL (ref 70–110)
HCT VFR BLD AUTO: 29.6 % (ref 40–54)
HGB BLD-MCNC: 8.8 G/DL (ref 14–18)
IMM GRANULOCYTES # BLD AUTO: 0.02 K/UL (ref 0–0.04)
IMM GRANULOCYTES NFR BLD AUTO: 0.3 % (ref 0–0.5)
LYMPHOCYTES # BLD AUTO: 1.3 K/UL (ref 1–4.8)
LYMPHOCYTES NFR BLD: 16.2 % (ref 18–48)
MCH RBC QN AUTO: 20.9 PG (ref 27–31)
MCHC RBC AUTO-ENTMCNC: 29.7 G/DL (ref 32–36)
MCV RBC AUTO: 70 FL (ref 82–98)
MONOCYTES # BLD AUTO: 0.4 K/UL (ref 0.3–1)
MONOCYTES NFR BLD: 5.7 % (ref 4–15)
NEUTROPHILS # BLD AUTO: 6 K/UL (ref 1.8–7.7)
NEUTROPHILS NFR BLD: 77.5 % (ref 38–73)
NRBC BLD-RTO: 0 /100 WBC
PLATELET # BLD AUTO: 390 K/UL (ref 150–450)
PMV BLD AUTO: 8.6 FL (ref 9.2–12.9)
POTASSIUM SERPL-SCNC: 4.9 MMOL/L (ref 3.5–5.1)
RBC # BLD AUTO: 4.22 M/UL (ref 4.6–6.2)
SODIUM SERPL-SCNC: 133 MMOL/L (ref 136–145)
WBC # BLD AUTO: 7.78 K/UL (ref 3.9–12.7)

## 2024-02-15 PROCEDURE — 36415 COLL VENOUS BLD VENIPUNCTURE: CPT | Performed by: STUDENT IN AN ORGANIZED HEALTH CARE EDUCATION/TRAINING PROGRAM

## 2024-02-15 PROCEDURE — 80048 BASIC METABOLIC PNL TOTAL CA: CPT | Performed by: STUDENT IN AN ORGANIZED HEALTH CARE EDUCATION/TRAINING PROGRAM

## 2024-02-15 PROCEDURE — 25000003 PHARM REV CODE 250: Performed by: HOSPITALIST

## 2024-02-15 PROCEDURE — 97116 GAIT TRAINING THERAPY: CPT

## 2024-02-15 PROCEDURE — 94761 N-INVAS EAR/PLS OXIMETRY MLT: CPT

## 2024-02-15 PROCEDURE — 97162 PT EVAL MOD COMPLEX 30 MIN: CPT

## 2024-02-15 PROCEDURE — 63600175 PHARM REV CODE 636 W HCPCS: Performed by: STUDENT IN AN ORGANIZED HEALTH CARE EDUCATION/TRAINING PROGRAM

## 2024-02-15 PROCEDURE — 85025 COMPLETE CBC W/AUTO DIFF WBC: CPT | Performed by: STUDENT IN AN ORGANIZED HEALTH CARE EDUCATION/TRAINING PROGRAM

## 2024-02-15 PROCEDURE — 25000003 PHARM REV CODE 250: Performed by: STUDENT IN AN ORGANIZED HEALTH CARE EDUCATION/TRAINING PROGRAM

## 2024-02-15 PROCEDURE — 97530 THERAPEUTIC ACTIVITIES: CPT

## 2024-02-15 PROCEDURE — 11000001 HC ACUTE MED/SURG PRIVATE ROOM

## 2024-02-15 PROCEDURE — 99233 SBSQ HOSP IP/OBS HIGH 50: CPT | Mod: ,,, | Performed by: NURSE PRACTITIONER

## 2024-02-15 PROCEDURE — 63600175 PHARM REV CODE 636 W HCPCS: Performed by: NURSE PRACTITIONER

## 2024-02-15 PROCEDURE — 25000003 PHARM REV CODE 250: Performed by: EMERGENCY MEDICINE

## 2024-02-15 PROCEDURE — S4991 NICOTINE PATCH NONLEGEND: HCPCS | Performed by: HOSPITALIST

## 2024-02-15 PROCEDURE — 25000003 PHARM REV CODE 250: Performed by: NURSE PRACTITIONER

## 2024-02-15 PROCEDURE — 97168 OT RE-EVAL EST PLAN CARE: CPT

## 2024-02-15 RX ADMIN — OXYCODONE HYDROCHLORIDE 10 MG: 10 TABLET ORAL at 11:02

## 2024-02-15 RX ADMIN — MELATONIN TAB 3 MG 6 MG: 3 TAB at 09:02

## 2024-02-15 RX ADMIN — NICOTINE 1 PATCH: 14 PATCH, EXTENDED RELEASE TRANSDERMAL at 10:02

## 2024-02-15 RX ADMIN — DIAZEPAM 5 MG: 5 TABLET ORAL at 09:02

## 2024-02-15 RX ADMIN — CLONIDINE HYDROCHLORIDE 0.1 MG: 0.1 TABLET ORAL at 09:02

## 2024-02-15 RX ADMIN — HYDROMORPHONE HYDROCHLORIDE 1 MG: 1 INJECTION, SOLUTION INTRAMUSCULAR; INTRAVENOUS; SUBCUTANEOUS at 03:02

## 2024-02-15 RX ADMIN — HYDROMORPHONE HYDROCHLORIDE 1 MG: 1 INJECTION, SOLUTION INTRAMUSCULAR; INTRAVENOUS; SUBCUTANEOUS at 07:02

## 2024-02-15 RX ADMIN — OXYCODONE HYDROCHLORIDE 10 MG: 10 TABLET ORAL at 02:02

## 2024-02-15 RX ADMIN — OXYCODONE HYDROCHLORIDE 10 MG: 10 TABLET ORAL at 06:02

## 2024-02-15 RX ADMIN — DIAZEPAM 5 MG: 5 TABLET ORAL at 05:02

## 2024-02-15 RX ADMIN — DAPTOMYCIN 765 MG: 350 INJECTION, POWDER, LYOPHILIZED, FOR SOLUTION INTRAVENOUS at 03:02

## 2024-02-15 RX ADMIN — DIAZEPAM 5 MG: 5 TABLET ORAL at 03:02

## 2024-02-15 RX ADMIN — ACETAMINOPHEN 1000 MG: 500 TABLET ORAL at 05:02

## 2024-02-15 RX ADMIN — OXYCODONE HYDROCHLORIDE 10 MG: 10 TABLET ORAL at 04:02

## 2024-02-15 RX ADMIN — ACETAMINOPHEN 1000 MG: 500 TABLET ORAL at 09:02

## 2024-02-15 RX ADMIN — HYDROMORPHONE HYDROCHLORIDE 1 MG: 1 INJECTION, SOLUTION INTRAMUSCULAR; INTRAVENOUS; SUBCUTANEOUS at 10:02

## 2024-02-15 RX ADMIN — ACETAMINOPHEN 1000 MG: 500 TABLET ORAL at 03:02

## 2024-02-15 RX ADMIN — HYDROMORPHONE HYDROCHLORIDE 1 MG: 1 INJECTION, SOLUTION INTRAMUSCULAR; INTRAVENOUS; SUBCUTANEOUS at 11:02

## 2024-02-15 RX ADMIN — MIRTAZAPINE 15 MG: 15 TABLET, FILM COATED ORAL at 09:02

## 2024-02-15 RX ADMIN — OXYCODONE HYDROCHLORIDE 10 MG: 10 TABLET ORAL at 09:02

## 2024-02-15 RX ADMIN — HYDROMORPHONE HYDROCHLORIDE 1 MG: 1 INJECTION, SOLUTION INTRAMUSCULAR; INTRAVENOUS; SUBCUTANEOUS at 08:02

## 2024-02-15 RX ADMIN — ENOXAPARIN SODIUM 40 MG: 40 INJECTION SUBCUTANEOUS at 06:02

## 2024-02-15 NOTE — PLAN OF CARE
Problem: Adult Inpatient Plan of Care  Goal: Plan of Care Review  Outcome: Ongoing, Progressing  Goal: Absence of Hospital-Acquired Illness or Injury  Outcome: Ongoing, Progressing  Goal: Optimal Comfort and Wellbeing  Outcome: Ongoing, Progressing  Goal: Readiness for Transition of Care  Outcome: Ongoing, Progressing     Problem: Impaired Wound Healing  Goal: Optimal Wound Healing  Outcome: Ongoing, Progressing     Problem: Infection  Goal: Absence of Infection Signs and Symptoms  Outcome: Ongoing, Progressing     Problem: Fall Injury Risk  Goal: Absence of Fall and Fall-Related Injury  Outcome: Ongoing, Progressing     Problem: Pain Acute  Goal: Acceptable Pain Control and Functional Ability  Outcome: Ongoing, Progressing     Problem: Skin or Soft Tissue Infection  Goal: Absence of Infection Signs and Symptoms  Outcome: Ongoing, Progressing  Pt priority is pain management, reported pain as 9/10 at start of shift, pain medication administered per MAR, nonpharmacologic pain management promoted by RN. Pt ambulated halls with PT. Day 1 post-op from  Loop X Fusion, Spine, Lumbar surgery. Pt pain well managed, promoted sleep and rest. Pt remained safe throughout shift, side rails up x2, bed in lowest position, wheels locked, bed alarm in use, safety equipment at bedside, provided quiet environment to promote rest.

## 2024-02-15 NOTE — PLAN OF CARE
Problem: Occupational Therapy  Goal: Occupational Therapy Goal  Description: Goals to be met by: 3/15/24     Patient will increase functional independence with ADLs by performing:    UE Dressing with Minimal Assistance.  LE Dressing with Minimal Assistance.  Grooming while standing with Minimal Assistance.  Toileting from toilet with Minimal Assistance for hygiene and clothing management.   Bathing from  shower chair/bench with Minimal Assistance.  Stand pivot transfers with Minimal Assistance.  Step transfer with Minimal Assistance  Toilet transfer to toilet with Minimal Assistance.      2/15/2024 1100 by Marley Mckeon OT  Outcome: Ongoing, Progressing

## 2024-02-15 NOTE — PROGRESS NOTES
"Aleksandr Bray - Neurosurgery ([facility])  Adult Nutrition  Progress Note- LOS    SUMMARY     Recommendation/Intervention:   1) Continue current regular diet as tolerated, encourage PO intake   2) Continue Boost Plus chocolate TID to promote adequate kcal/protein consumption   3) RD to monitor weight, labs, PO intake/tolerance, skin and follow-up as needed    Goals: Meet % of EEN/EPN by next RD follow-up  Nutrition Goal Status: new  Communication of RD Recs: other (comment) (POC)    Assessment and Plan    Nutrition Problem  Increased nutrient needs (protein)    Related to (etiology):   Wound healing    Signs and Symptoms (as evidenced by):   S/p spinal fusion     Interventions/Recommendations (treatment strategy):  Collaboration with other providers  General diet  Commercial beverage    Nutrition Diagnosis Status:   New      Malnutrition Assessment  NFPE not completed at this time- pt seen remotely. Will attempt at f/u visit.    Reason for Assessment    Reason For Assessment: length of stay  Diagnosis: other (see comments) (Osteomyelitis of vertebra of lumbosacral region)  Relevant Medical History: MRSA bacteremia, IV drug use, and multiple septic joints and pelvic/intra-abdominal abscesses  Interdisciplinary Rounds: did not attend  General Information Comments: Pt assessed by RD for LOS. On regular diet receiving Boost Plus with all meals, good appetite consuming 100%  Nutrition Discharge Planning: General Diet    Nutrition Risk Screen    Nutrition Risk Screen: no indicators present    Nutrition/Diet History    Spiritual, Cultural Beliefs, Methodist Practices, Values that Affect Care: no  Food Allergies: NKFA    Anthropometrics    Temp: 99 °F (37.2 °C)  Height Method: Stated  Height: 5' 10" (177.8 cm)  Height (inches): 70 in  Weight Method: Bed Scale  Weight: 77 kg (169 lb 12.1 oz)  Weight (lb): 169.76 lb  Ideal Body Weight (IBW), Male: 166 lb  % Ideal Body Weight, Male (lb): 102.27 %  BMI (Calculated): 24.4  BMI " Grade: 18.5-24.9 - normal     Lab/Procedures/Meds    Pertinent Labs Reviewed: reviewed  Pertinent Labs Comments: Hgb: 8.8, Hct: 29.6, Na: 133, BUN: 26, Glucose: 140  Pertinent Medications Reviewed: reviewed   acetaminophen  1,000 mg Oral Q8H    cloNIDine  0.1 mg Oral Q12H    DAPTOmycin (CUBICIN) IV (PEDS and ADULTS)  10 mg/kg Intravenous Q24H    diazePAM  5 mg Oral Q8H    enoxparin  40 mg Subcutaneous Q24H (prophylaxis, 1700)    mirtazapine  15 mg Oral QHS    mupirocin   Nasal BID    nicotine  1 patch Transdermal Daily     Estimated/Assessed Needs    Weight Used For Calorie Calculations: 76.7 kg (169 lb)  Energy Calorie Requirements (kcal): 2078 kcal  Energy Need Method: [place]-St Jeor (x 1.2)  Protein Requirements: 76-92 g (1-1.2 g/kg)  Weight Used For Protein Calculations: 76.7 kg (169 lb)  Fluid Requirements (mL): 1 mL/kcal or per MD  Estimated Fluid Requirement Method: RDA Method  RDA Method (mL): 2078    Nutrition Prescription Ordered    Current Diet Order: Regular Diet  Nutrition Order Comments: PO intake 100%  Oral Nutrition Supplement: Boost Plus chocolate TID    Evaluation of Received Nutrient/Fluid Intake    I/O: - 8.4 L since admit  Energy Calories Required: meeting needs  Protein Required: meeting needs  Fluid Required: other (see comments) (As per MD)  Comments: LBM 2/12  Tolerance: tolerating  % Intake of Estimated Energy Needs: 75 - 100 %  % Meal Intake: 75 - 100 %    Nutrition Risk    Level of Risk/Frequency of Follow-up: low (F/u 1x/week)     Monitor and Evaluation    Food and Nutrient Intake: energy intake, food and beverage intake  Food and Nutrient Adminstration: diet order  Knowledge/Beliefs/Attitudes: food and nutrition knowledge/skill  Physical Activity and Function: nutrition-related ADLs and IADLs  Anthropometric Measurements: weight, weight change, body mass index  Biochemical Data, Medical Tests and Procedures: electrolyte and renal panel, gastrointestinal profile, glucose/endocrine  profile, inflammatory profile, lipid profile  Nutrition-Focused Physical Findings: overall appearance     Nutrition Follow-Up    RD Follow-up?: Yes

## 2024-02-15 NOTE — PLAN OF CARE
Aleksandr Bray - Neurosurgery ([facility]  Discharge Reassessment    Primary Care Provider: Emily, Primary Doctor    Expected Discharge Date: 2/19/2024      Patient is not medically ready for discharge.  Patient is POD 1.  Patient requires longterm ivab therapy.  Referrals sent.    Discharge Plan A and Plan B have been determined by review of patient's clinical status, future medical and therapeutic needs, and coverage/benefits for post-acute care in coordination with multidisciplinary team members.     Reassessment (most recent)       Discharge Reassessment - 02/15/24 1031          Discharge Reassessment    Assessment Type Discharge Planning Reassessment     Did the patient's condition or plan change since previous assessment? Yes     Discharge Plan discussed with: Patient     Communicated SENA with patient/caregiver Yes     Discharge Plan A Long-term acute care facility (LTAC)     Discharge Plan B Long-term acute care facility (LTAC)     DME Needed Upon Discharge  none     Transition of Care Barriers Substance Abuse;Homeless;No family/friends to help     Why the patient remains in the hospital Requires continued medical care        Post-Acute Status    Post-Acute Authorization Placement     Post-Acute Placement Status Pending post-acute provider review/more information requested     Discharge Delays None known at this time

## 2024-02-15 NOTE — ASSESSMENT & PLAN NOTE
Osteomyelitis, discitis, & myositis of lumbosacral region  Septic arthritis of SI joint(s)  Epidural abscesses   Multiple abscesses of pelvis, sacrum, psoas, iliacus, & paraspinals  Lower back pain w/ b/l sciatica   Severe spinal canal stenosis   MRI: osteomyelitis/discitis of L5-S1, osteomyelitis of sacrum & L iliac bone, probable septic arthritis of L SI joint & lower lumbar spine facet joints, worsening anterolisthesis of L5 with respect to S1 with increased size of epidural abscess at the level of L5-S1 and probable severe central canal stenosis at this level, along w/ additional abscesses in the L iliopsoas muscle, paraspinal muscles, pelvis, & L sacrum.    - Underwent  2/14/24 epidural abscess/phlegmon debridement and LOOP X FUSION, SPINE, LUMBAR w/   - Left sacroiliac joint aspiration  on 2/7 by IR growing MRSA  - Ortho consulted for mgmt of septic SI joint; appreciate recs/assistance  - Continue vancomycin for MRSA growth --> switched to daptomycin

## 2024-02-15 NOTE — PT/OT/SLP RE-EVAL
Physical Therapy Wb-Sn-Mglkgngvlf and Co-Treatment    Patient Name:  Ton Donovan   MRN:  [id number]    Iw-md-frtbhyxkuv and co-treatment performed for this visit due to suspected patient need for two skilled therapists to ensure patient and staff safety and to accommodate for patient activity tolerance/pain management   Recommendations:     Discharge Recommendations: High Intensity Therapy   Discharge Equipment Recommendations: walker, rolling, bedside commode   Barriers to discharge: Increased level of assist, Inaccessible home, and Decreased caregiver support    Assessment:     Ton Donovan is a 28 y.o. male admitted with a medical diagnosis of Osteomyelitis of vertebra of lumbosacral region. He presents with the following impairments/functional limitations: weakness, impaired endurance, impaired self care skills, impaired functional mobility, gait instability, impaired balance, impaired coordination, decreased lower extremity function, decreased safety awareness, pain, orthopedic precautions. Patient presents for re-evaluation s/p L4 to pelvis fusion. Pt with excellent tolerance to therapy session on this date. Pt highly motivated post surgery to progress with OOB mobility and return to PLOF. Pt continues to demonstrate significant gait impairments including poor BLE clearance and progression placing pt at increased risk for falls. Pt operating well below his functional baseline. Recommend high intensity therapy following discharge once medically stable in order to reduce fall risk, reduce caregiver burden, improve quality of life, and return to PLOF.  Patient presents with good participation, motivation, and family support to return to prior level of function and demonstrates appropriate endurance, strength, pain control, and fine motor control to participate in up to 3 hours daily or 15hrs weekly of multidisciplinary intensive therapy. Pt would continue to benefit from skilled acute PT in order to address  "current deficits and progress functional mobility.     Rehab Prognosis: Good; patient would benefit from acute skilled PT services 4 x/week to address these deficits and reach maximum level of function. Patient is most appropriate to go to  .  Recent Surgery: Procedure(s) (LRB):  LOOP X FUSION, SPINE, LUMBAR (N/A) 1 Day Post-Op    Plan:     During this hospitalization, patient to be seen 4 x/week to address the identified rehab impairments via gait training, therapeutic activities, therapeutic exercises, neuromuscular re-education and progress toward the following goals:    Plan of Care Expires:  03/15/24    Subjective     Chief Complaint: Pain at lower back  Patient/Family Comments/Goals: "I need to push myself. Let's walk to the coffee machine."  Pain/Comfort:  Pain Rating 1:  (unrated)  Location - Orientation 1: generalized  Location 1: back  Pain Addressed 1: Pre-medicate for activity, Reposition, Distraction, Nurse notified  Pain Rating Post-Intervention 1:  (unrated)    Patients cultural, spiritual, Hindu conflicts given the current situation: no    Objective:     Communicated with nursing prior to session.  Patient found HOB elevated with hemovac, telemetry, wound vac upon PT entry to room.    General Precautions: Standard, fall   Orthopedic Precautions:spinal precautions   Braces: LSO; donned at EOB    Exams:  Cognitive Exam:  Patient is oriented to Person, Place, Time, Situation  RLE ROM: WFL  RLE Strength: WFL  LLE ROM: WFL  LLE Strength: WFL  Sensation: Intact light touch to BLEs    Functional Mobility:  Bed Mobility:  Verbal cues for sequencing and technique  Rolling Left:  contact guard assistance  Supine to Sit: minimum assistance for trunk management  Transfers:     Sit to Stand: minimum assistance with rolling walker with cues for hand placement and foot placement  Gait: Patient ambulated 52' + 60' with standing rest break with rolling walker and minimum assistance.  Patient demonstrates " unsteady gait, decreased step length, narrow base of support, decreased weight shift, decreased foot clearance, ambulates outside RUPINDER of RW, flexed posture, decreased samantha, heavy UE reliance on RW, inconsistent bilateral foot placement, decreased bilateral knee stability, and scissored gait.   Patient required cues for upright posture, gluteal activation, sequencing, rolling walker management, obstacle navigation, safe rolling walker usage, to ambulate within RUPINDER of RW, increased step size, increased foot clearance, and increased RUPINDER.  All lines remained intact throughout ambulation trial, gait belt utilized, chair follow for patient safety.  Balance:   Static Sitting: Good, able to maintain for 3 minute(s) with stand by assistance  Dynamic Sitting: Good: Patient accepts moderate challenge, contact guard assistance  Static Standing: Fair, able to maintain for 2 minute(s) with minimum assistance  Dynamic Standing: Fair: Patient accepts minimal challenge, minimum assistance    Therapeutic Activities and Exercises:  Patient educated on role of acute care PT and PT POC, safety while in hospital including calling nurse for mobility, and call light usage  Pt educated on the effects of bed rest and the importance of OOB activity. Pt encouraged to sit UIC majority of day as tolerated and continue daily transfers with nursing assist. Pt verbalized understanding.  Pt educated on importance of maximal participation in therapy session in order to reduce negative effects of prolonged sedentary positioning.   Educated on spinal precautions including avoidance of bending, lifting, and twisting. Patient expresses understanding  Educated on log roll technique, performed to left  Answered all questions within PT scope of practice and addressed functional mobility concerns.    AM-PAC 6 CLICK MOBILITY  Total Score:17     Patient left up in chair with all lines intact, call button in reach, and RN notified.    GOALS:    Multidisciplinary Problems       Physical Therapy Goals          Problem: Physical Therapy    Goal Priority Disciplines Outcome Goal Variances Interventions   Physical Therapy Goal     PT, PT/OT Ongoing, Progressing     Description: Goals to be met by: 3/15/2024    Patient will increase functional independence with mobility by performin. Supine to sit with Stand-by Assistance  2. Sit to supine with Stand-by Assistance  3. Sit to stand transfer with Stand-by Assistance  4. Gait  x 100 feet with Contact Guard Assistance using LRAD.   5. Lower extremity exercise program x15 reps per handout, with independence                         History:     Past Medical History:   Diagnosis Date    Hypertension     Unilateral inguinal hernia, without obstruction or gangrene, not specified as recurrent        Past Surgical History:   Procedure Laterality Date    HAND ARTHROTOMY Right 2023    Procedure: ARTHROTOMY, HAND - R LF PIP;  Surgeon: Boy Lamb MD;  Location: [facility] OR [address] [place] [address];  Service: Orthopedics;  Laterality: Right;    INCISION AND DRAINAGE OF ABSCESS Right 10/12/2023    Procedure: INCISION AND DRAINAGE, ABSCESS;  Surgeon: Steve Monteiro MD;  Location: [facility] OR [address] [place] [address];  Service: General;  Laterality: Right;  right chest    IRRIGATION AND DEBRIDEMENT OF UPPER EXTREMITY Bilateral 2023    Procedure: IRRIGATION AND DEBRIDEMENT, UPPER EXTREMITY - RIGHT HAND;  Surgeon: Boy Lamb MD;  Location: [facility] OR [address] [place] [address];  Service: Orthopedics;  Laterality: Bilateral;    LUMBAR FUSION N/A 2024    Procedure: LOOP X FUSION, SPINE, LUMBAR;  Surgeon: Boy Pozo MD;  Location: [facility] OR [address] [place] [address];  Service: Neurosurgery;  Laterality: N/A;  L4-Pelvis fusion; *LOOP-X* depuy, neuromonitoring    OLECRANON BURSECTOMY Left 2023    Procedure: BURSECTOMY, OLECRANON;  Surgeon: Boy Lamb MD;  Location: [facility] OR [address] [place] [address];  Service: Orthopedics;  Laterality: Left;       Time Tracking:     PT Received On:  02/15/24  PT Start Time: 0903     PT Stop Time: 0926  PT Total Time (min): 23 min     Billable Minutes: Re-Evaluation 10 Gait Training 13    02/15/2024

## 2024-02-15 NOTE — BRIEF OP NOTE
Aleksandr Bray - Surgery ([facility])  Brief Operative Note    SUMMARY     Surgery Date: 2/14/2024     Surgeon(s) and Role:     * Boy Pozo MD - Primary     * Jed Greenberg MD - Resident - Assisting        Pre-op Diagnosis:  Epidural abscess [G06.2]  Spinal stenosis of lumbar region without neurogenic claudication [M48.061]    Post-op Diagnosis:  Post-Op Diagnosis Codes:     * Epidural abscess [G06.2]     * Spinal stenosis of lumbar region without neurogenic claudication [M48.061]    Procedure(s) (LRB):  LOOP X FUSION, SPINE, LUMBAR (N/A)    Anesthesia: General    Implants:  Implant Name Type Inv. Item Serial No.  Lot No. LRB No. Used Action   SCREW EXPEDIUM VERSE 5.5 6X45 - [id number]  SCREW EXPEDIUM VERSE 5.5 6X45  [employer]  N/A 2 Implanted   SCREW EXPEDIUM VERSE PA 8X80MM - [id number]  SCREW EXPEDIUM VERSE PA 8X80MM  [employer]  N/A 2 Implanted   SCREW BONE SPINAL 5.5 6 X 50MM - [id number]  SCREW BONE SPINAL 5.5 6 X 50MM  [employer]  N/A 2 Implanted   SET SCREW EXPEDIUM VERSE UNITZ - [id number]  SET SCREW EXPEDIUM VERSE UNITZ  [employer]  N/A 4 Implanted   SCREW INNER SINGLE SET TITANIU - [id number]  SCREW INNER SINGLE SET TITANIU  [employer]  N/A 5 Implanted   KIT MED BONE INFUSE - [id number]  KIT MED BONE INFUSE  [employer] [facility] [id number] N/A 1 Implanted   COLEEN SPINE MTRX STR [id number] - [id number]  COLEEN SPINE MTRX STR [id number]  [employer]  N/A 2 Implanted   SCREW BONE SPINAL 6X60MM TI - [id number]  SCREW BONE SPINAL 6X60MM TI  [employer]  N/A 1 Implanted   CONNECTOR EXPEDIUM 5.5 30MM - [id number]  CONNECTOR EXPEDIUM 5.5 30MM  [employer]  N/A 1 Implanted   BONE 30CC CANCELLOUS CRUSHED - [id number]  BONE 30CC CANCELLOUS CRUSHED [id number] MUSCULOSKELETAL TRANSPLANT FND  N/A 1 Implanted       Operative Findings:   L4-Pelvis posterior spinal instrumented fusion    L4, L5 pedicle screws   R S2 AI screw, L Iliac bolt  Debrided R L5-S1 disc  space  Epidural and L5-S1 disc yellow appearing phlegmon sent for culture.   2 HV drains, Prevena wound vac        See full op note for further details    Estimated Blood Loss: 400 mL             Specimens:   Specimen (24h ago, onward)      None            [id number]

## 2024-02-15 NOTE — PLAN OF CARE
PT re-evaluation complete and appropriate goals established.    Problem: Physical Therapy  Goal: Physical Therapy Goal  Description: Goals to be met by: 3/15/2024    Patient will increase functional independence with mobility by performin. Supine to sit with Stand-by Assistance  2. Sit to supine with Stand-by Assistance  3. Sit to stand transfer with Stand-by Assistance  4. Gait  x 100 feet with Contact Guard Assistance using LRAD.   5. Lower extremity exercise program x15 reps per handout, with independence    Outcome: Ongoing, Progressing     2/15/2024

## 2024-02-15 NOTE — PROGRESS NOTES
"Aleksandr Bray - Neurosurgery ([facility])  [facility] Medicine  Progress Note    Patient Name: Ton Donovan  MRN: [id number]  Patient Class: IP- Inpatient   Admission Date: 2/4/2024  Length of Stay: 10 days  Attending Physician: Reese Schaefer MD  Primary Care Provider: Emily, Primary Doctor        Subjective:     Principal Problem:Osteomyelitis of vertebra of lumbosacral region        HPI:  Ton Donovan is a 28-year-old male with PMHx of IVDU w/ assoc complications of MRSA bacteremia, multiple septic joints & abscesses, who presented 2/4/24 with progressive back pain with radiation down his legs b/l. Endorses bilateral numbness & neuropathic pain over the dorsum of his feet. This has been ongoing and is not necessarily a new problem, but has been worsening. Reports "pins & needles"/paresthesias down his legs that have been progressing, although laying on his stomach seems to provide significant relief. Admits to continued active IV heroin use; last used just prior to EMS arrival. Denies any fevers, chills, night sweats, cough, or shortness of breath. Denies any bowel or bladder incontinence/retention or saddle anesthesia.     On arrival, HDS & 0/4 SIRS. MRI showed osteomyelitis/discitis of L5-S1, osteomyelitis of sacrum & L iliac bone, probable septic arthritis of L SI joint & lower lumbar spine facet joints, worsening anterolisthesis of L5 with respect to S1 with increased size of epidural abscess at the level of L5-S1 and probable severe central canal stenosis at this level, along w/ additional abscesses in the L iliopsoas muscle, paraspinal muscles, pelvis, & L sacrum. Received single dose of Zosyn in ED on arrival, but given that pt HDS w/o sepsis, further abx held to optimize intra-op cx/pathology. Admitted to hospital medicine for further mgmt of osteomyelitis, septic joints & abscesses.    Overview/Hospital Course:  Addiction medicine, along w/ NSGY, orthopedic surgery, IR & ID consulted. Underwent aspiration of " psoas abscess per IR on 2/7. Aspiration of SI joint attempted but unable to aspirate hardly any fluid. Underwent LOOP X FUSION, SPINE, LUMBAR and epidural phlegmon debridement. Echo unremarkable    Interval History:  Patient denies any chest pain.  No shortness a breath.  He reports he is in postop pain but he appears very comfortable eating a burger.  Afebrile.    Review of Systems  Objective:     Vital Signs (Most Recent):  Temp: 99 °F (37.2 °C) (02/15/24 1546)  Pulse: 91 (02/15/24 1546)  Resp: 16 (02/15/24 1647)  BP: (!) 126/56 (02/15/24 1546)  SpO2: 99 % (02/15/24 1546) Vital Signs (24h Range):  Temp:  [97.7 °F (36.5 °C)-99 °F (37.2 °C)] 99 °F (37.2 °C)  Pulse:  [62-91] 91  Resp:  [10-29] 16  SpO2:  [96 %-100 %] 99 %  BP: (102-147)/(54-93) 126/56     Weight: 77 kg (169 lb 12.1 oz)  Body mass index is 24.36 kg/m².    Intake/Output Summary (Last 24 hours) at 2/15/2024 1708  Last data filed at 2/15/2024 0654  Gross per 24 hour   Intake 1981.9 ml   Output 1955 ml   Net 26.9 ml         Physical Exam      Clear lungs bilaterally, unlabored breathing, on room air, no cyanosis   Heart sounds indicate a regular rate and rhythm  Awake alert, no acute distress   No facial droop, no slurred speech  5/5 fist  bilaterally   3/5 hip flexor strength bilaterally   No obvious lower extremity edema         Assessment/Plan:      * Osteomyelitis of vertebra of lumbosacral region  Osteomyelitis, discitis, & myositis of lumbosacral region  Septic arthritis of SI joint(s)  Epidural abscesses   Multiple abscesses of pelvis, sacrum, psoas, iliacus, & paraspinals  Lower back pain w/ b/l sciatica   Severe spinal canal stenosis   MRI: osteomyelitis/discitis of L5-S1, osteomyelitis of sacrum & L iliac bone, probable septic arthritis of L SI joint & lower lumbar spine facet joints, worsening anterolisthesis of L5 with respect to S1 with increased size of epidural abscess at the level of L5-S1 and probable severe central canal stenosis at  this level, along w/ additional abscesses in the L iliopsoas muscle, paraspinal muscles, pelvis, & L sacrum.    - Underwent  2/14/24 epidural abscess/phlegmon debridement and LOOP X FUSION, SPINE, LUMBAR w/   - Left sacroiliac joint aspiration  on 2/7 by IR growing MRSA  - Ortho consulted for mgmt of septic SI joint; appreciate recs/assistance  - Continue vancomycin for MRSA growth --> switched to daptomycin    L5-S2 epidural phlegmon        Housing insecurity  Previously worked at [employer] & held down job in the past but started using heroin last year following multiple family deaths (mother, aunt & grandmother) in a short period of time. Subsequently lost housing & has been living on streets since then.   - SW/CM    Substance or medication-induced depressive disorder  Pt endorses sx of depression, no SI/SA.   - Addiction medicine/psychiatry consult as above; appreciate recs   - Initiating mirtazapine 15mg QHS    Spinal stenosis of lumbar region without neurogenic claudication  See osteomyelitis      IVDU (intravenous drug user)  Heroin abuse w/ opioid withdrawal   PSDU  Tobacco use   Known hx of IVDU (heroin) w/ extensive assoc medical complications (discussed above). No recent UDS (only one on file from July 2023- positive for opiates & THC) and none collected on admission. Endorses daily IV heroin use (1g/day); last used just prior to calling EMS. Active tobacco use, but denies EtOH or other illicit substance use. Endorsing sx of opioid w/d.   - Addiction medicine consulted; appreciate recs  - Deferring initiating methadone/suboxone given potential anesthesia/surgical intervention  - PRN oxycodone per COWS protocol  - Supportive care w/ PRN antiemetics, analgesics, & anxiolytics   - NRT PRN  - Cessation strongly encouraged     Septic arthritis of sacroiliac joint  See osteomyelitis      Chronic hyponatremia  On admission, Na 133 (baseline 129-133). Likely SIADH 2/2 chronic infxn, but w/ tea/toast & poor PO intake  likely also contributing.   - Deferring IVF  - Encourage good PO intake  - Monitor BMP  - Mgmt of infxns as above      VTE Risk Mitigation (From admission, onward)           Ordered     enoxaparin injection 40 mg  Every 24 hours         02/14/24 1851     Place sequential compression device  Until discontinued         02/05/24 0126     IP VTE HIGH RISK PATIENT  Once         02/05/24 0126                    Discharge Planning   SENA: 2/19/2024     Code Status: Full Code   Is the patient medically ready for discharge?: Yes    Reason for patient still in hospital (select all that apply): Patient trending condition, Laboratory test, Treatment, and Consult recommendations  Discharge Plan A: Long-term acute care facility (LTAC)   Discharge Delays: None known at this time              Reese Schaefer MD  Department of Hospital Medicine   [facility] - Neurosurgery ([facility])

## 2024-02-15 NOTE — NURSING TRANSFER
Nursing Transfer Note      2/14/2024       Nurse giving handoff:Aaron AHN PACU    Nurse receiving handoff:Za AHN NPU    Reason patient is being transferred: post procedure    Transfer To: Room 959    Transfer via stretcher    Transfer with IV Infusion    Transported by Aaron Last RN    Transfer Vital Signs:see flowsheet    Telemetry: Box Number n/a    Order for Tele Monitor? No    Additional Lines: Hernandez Catheter    4eyes on Skin: yes    Medicines sent: none    Any special needs or follow-up needed: routine    Patient belongings transferred with patient:  none    Chart send with patient: Yes    Notified: relative    Patient reassessed at: 2/14/2024 2130 (date, time)

## 2024-02-15 NOTE — PLAN OF CARE
Recommendation/Intervention:   1) Continue current regular diet as tolerated, encourage PO intake   2) Continue Boost Plus chocolate TID to promote adequate kcal/protein consumption   3) RD to monitor weight, labs, PO intake/tolerance, skin and follow-up as needed     Goals: Meet % of EEN/EPN by next RD follow-up  Nutrition Goal Status: new  Communication of RD Recs: other (comment) (POC)

## 2024-02-15 NOTE — SUBJECTIVE & OBJECTIVE
Interval History:  Patient denies any chest pain.  No shortness a breath.  He reports he is in postop pain but he appears very comfortable eating a burger.  Afebrile.    Review of Systems  Objective:     Vital Signs (Most Recent):  Temp: 99 °F (37.2 °C) (02/15/24 1546)  Pulse: 91 (02/15/24 1546)  Resp: 16 (02/15/24 1647)  BP: (!) 126/56 (02/15/24 1546)  SpO2: 99 % (02/15/24 1546) Vital Signs (24h Range):  Temp:  [97.7 °F (36.5 °C)-99 °F (37.2 °C)] 99 °F (37.2 °C)  Pulse:  [62-91] 91  Resp:  [10-29] 16  SpO2:  [96 %-100 %] 99 %  BP: (102-147)/(54-93) 126/56     Weight: 77 kg (169 lb 12.1 oz)  Body mass index is 24.36 kg/m².    Intake/Output Summary (Last 24 hours) at 2/15/2024 1708  Last data filed at 2/15/2024 0654  Gross per 24 hour   Intake 1981.9 ml   Output 1955 ml   Net 26.9 ml         Physical Exam      Clear lungs bilaterally, unlabored breathing, on room air, no cyanosis   Heart sounds indicate a regular rate and rhythm  Awake alert, no acute distress   No facial droop, no slurred speech  5/5 fist  bilaterally   3/5 hip flexor strength bilaterally   No obvious lower extremity edema

## 2024-02-15 NOTE — TRANSFER OF CARE
"Anesthesia Transfer of Care Note    Patient: Ton Donovan    Procedure(s) Performed: Procedure(s) (LRB):  LOOP X FUSION, SPINE, LUMBAR (N/A)    Patient location: PACU    Anesthesia Type: general    Transport from OR: Transported from OR on 6-10 L/min O2 by face mask with adequate spontaneous ventilation    Post pain: pain needs to be addressed    Post assessment: no apparent anesthetic complications    Post vital signs: stable    Level of consciousness: awake, alert and oriented    Nausea/Vomiting: no nausea/vomiting    Complications: none    Transfer of care protocol was followed      Last vitals: Visit Vitals  /68   Pulse 69   Temp 36.5 °C (97.7 °F) (Oral)   Resp 16   Ht 5' 10" (1.778 m)   Wt 76.7 kg (169 lb 1.5 oz)   SpO2 100%   BMI 24.26 kg/m²     "

## 2024-02-15 NOTE — PT/OT/SLP RE-EVAL
Occupational Therapy   Re-evaluation  Co-evaluation with PT     Name: Ton Donovan  MRN: [id number]  Admitting Diagnosis:  Osteomyelitis of vertebra of lumbosacral region  Recent Surgery: Procedure(s) (LRB):  LOOP X FUSION, SPINE, LUMBAR (N/A) 1 Day Post-Op    Recommendations:     Discharge Recommendations: High Intensity Therapy  Discharge Equipment Recommendations: walker, rolling, bedside commode  Barriers to discharge:  None    Assessment:     Ton Donovan is a 28 y.o. male with a medical diagnosis of Osteomyelitis of vertebra of lumbosacral region.  He presents with decrease functional status secondary to medical diagnosis.  Performance deficits affecting function are weakness, impaired endurance, impaired self care skills, impaired functional mobility, gait instability, decreased lower extremity function, impaired fine motor.  Patient with good tolerance to OT session this date; limited by pain and weakness post surgical intervention. Patient continues to show high motivation to accomplish therapy goals and presents with good functional prognosis. Patient will continue to progress with OT services to address standing tolerance, functional mobility and ADL completion at this time.  Patient is therefore appropriate for acute OT services to increase patient self care performance and functional mobility. Following DC from [facility] patient should continue with a high intensity therapy to ensure patient safety and promote return to independence.       Rehab Prognosis:  Good; patient would benefit from acute skilled OT services to address these deficits and reach maximum level of function.       Plan:     Patient to be seen 4 x/week to address the above listed problems via self-care/home management, therapeutic activities, therapeutic exercises, neuromuscular re-education  Plan of Care Expires: 03/15/24  Plan of Care Reviewed with: patient    Subjective     Chief Complaint: Back P!   Patient/Family stated goals:  Increase mobility  Communicated with: RN prior to session.  Pain/Comfort:       Objective:     Communicated with: RN prior to session.  Patient found supine with: telemetry upon OT entry to room.    General Precautions: Standard, fall  Orthopedic Precautions: Spinal Precautions   Braces: LSO  Respiratory Status: Room air    Occupational Performance:    Bed Mobility:    Patient completed Rolling/Turning to Left with  minimum assistance  Patient completed Scooting/Bridging with stand by assistance  Patient completed Supine to Sit with minimum assistance    Functional Mobility/Transfers:  Patient completed Sit <> Stand Transfer with minimum assistance  with  rolling walker   Functional Mobility: Patient ambulated 52' for first bout requiring standing RB and able to continue 60' with RW and min assist provided in both trials; patient heavily requiring on UE due to gait instability at this time. Patient presenting with step crossover and forward posture during ambulation.     Activities of Daily Living:  Grooming: supervision /set up to complete facial grooming while sitting in chair   Upper Body Dressing: maximal assistance to don LSO EOB  Lower Body Dressing: maximal assistance to don socks EOB     Cognitive/Visual Perceptual:  Cognitive/Psychosocial Skills:     -       Oriented to: Person, Place, Time, and Situation   -       Follows Commands/attention:Follows multistep  commands  -       Communication: clear/fluent  -       Safety awareness/insight to disability: intact   Visual/Perceptual:      -Intact      Physical Exam:  Sensation:    -       Intact  Upper Extremity Range of Motion:     -       Right Upper Extremity: WFL  -       Left Upper Extremity: WFL  Upper Extremity Strength:    -       Right Upper Extremity: WFL  -       Left Upper Extremity: WFL   Strength:    -       Right Upper Extremity: WFL  -       Left Upper Extremity: WFL  Fine Motor Coordination:    -       Intact    AMPA 6 Click:  AMPAC Total  Score:      Treatment & Education:  Co-evaluation completed due to patient medical instability and to ensure patient safety. Provided education regarding role of OT, POC, & discharge recommendations.  Pt with no further questions/concerns at this time. OT provided education on home recommendations and fall prevention in preparation for D/C.     Patient left up in chair with all lines intact and call button in reach    GOALS:   Multidisciplinary Problems       Occupational Therapy Goals          Problem: Occupational Therapy    Goal Priority Disciplines Outcome Interventions   Occupational Therapy Goal     OT, PT/OT Ongoing, Progressing    Description: Goals to be met by: 3/15/24     Patient will increase functional independence with ADLs by performing:    UE Dressing with Minimal Assistance.  LE Dressing with Minimal Assistance.  Grooming while standing with Minimal Assistance.  Toileting from toilet with Minimal Assistance for hygiene and clothing management.   Bathing from  shower chair/bench with Minimal Assistance.  Stand pivot transfers with Minimal Assistance.  Step transfer with Minimal Assistance  Toilet transfer to toilet with Minimal Assistance.                           History:     Past Medical History:   Diagnosis Date    Hypertension     Unilateral inguinal hernia, without obstruction or gangrene, not specified as recurrent          Past Surgical History:   Procedure Laterality Date    HAND ARTHROTOMY Right 7/7/2023    Procedure: ARTHROTOMY, HAND - R LF PIP;  Surgeon: Boy Lamb MD;  Location: [facility] OR [address] [place] [address];  Service: Orthopedics;  Laterality: Right;    INCISION AND DRAINAGE OF ABSCESS Right 10/12/2023    Procedure: INCISION AND DRAINAGE, ABSCESS;  Surgeon: Steve Monteiro MD;  Location: [facility] OR [address] [place] [address];  Service: General;  Laterality: Right;  right chest    IRRIGATION AND DEBRIDEMENT OF UPPER EXTREMITY Bilateral 7/7/2023    Procedure: IRRIGATION AND DEBRIDEMENT, UPPER EXTREMITY - RIGHT  HAND;  Surgeon: Boy Lamb MD;  Location: [address];  Service: Orthopedics;  Laterality: Bilateral;    LUMBAR FUSION N/A 2/14/2024    Procedure: LOOP X FUSION, SPINE, LUMBAR;  Surgeon: Boy Pozo MD;  Location: [facility] OR [address] [place] [address];  Service: Neurosurgery;  Laterality: N/A;  L4-Pelvis fusion; *LOOP-X* depuy, neuromonitoring    OLECRANON BURSECTOMY Left 7/7/2023    Procedure: BURSECTOMY, OLECRANON;  Surgeon: Boy Lamb MD;  Location: [address];  Service: Orthopedics;  Laterality: Left;       Time Tracking:     OT Date of Treatment:   OT Start Time: 0903  OT Stop Time: 0926  OT Total Time (min):23 minutes      Billable Minutes:   2/15/2024

## 2024-02-15 NOTE — PROGRESS NOTES
Patient is not able to lift his legs,says he was able to lift them before the surgery, says feels weak, Informed  (neurosurgery), says will check on the patient in PACU.    @ 2130 called Dr. Mandel again to check on the patient, Dr. Mandel asked to check patient's toe and ankle movements, patient able to wiggle toes and ankles, Dr. Mandel states nil concerns,happy for patient to go to the room.    LSO Brace to be delivered to the room.

## 2024-02-16 LAB
ANION GAP SERPL CALC-SCNC: 4 MMOL/L (ref 8–16)
BASOPHILS # BLD AUTO: 0.02 K/UL (ref 0–0.2)
BASOPHILS NFR BLD: 0.2 % (ref 0–1.9)
BUN SERPL-MCNC: 24 MG/DL (ref 6–20)
CALCIUM SERPL-MCNC: 9.1 MG/DL (ref 8.7–10.5)
CHLORIDE SERPL-SCNC: 105 MMOL/L (ref 95–110)
CO2 SERPL-SCNC: 26 MMOL/L (ref 23–29)
CREAT SERPL-MCNC: 0.8 MG/DL (ref 0.5–1.4)
DIFFERENTIAL METHOD BLD: ABNORMAL
EOSINOPHIL # BLD AUTO: 0 K/UL (ref 0–0.5)
EOSINOPHIL NFR BLD: 0.2 % (ref 0–8)
ERYTHROCYTE [DISTWIDTH] IN BLOOD BY AUTOMATED COUNT: 19.9 % (ref 11.5–14.5)
EST. GFR  (NO RACE VARIABLE): >60 ML/MIN/1.73 M^2
GLUCOSE SERPL-MCNC: 98 MG/DL (ref 70–110)
HCT VFR BLD AUTO: 27.5 % (ref 40–54)
HGB BLD-MCNC: 8.3 G/DL (ref 14–18)
IMM GRANULOCYTES # BLD AUTO: 0.02 K/UL (ref 0–0.04)
IMM GRANULOCYTES NFR BLD AUTO: 0.2 % (ref 0–0.5)
LYMPHOCYTES # BLD AUTO: 2.7 K/UL (ref 1–4.8)
LYMPHOCYTES NFR BLD: 32.2 % (ref 18–48)
MCH RBC QN AUTO: 21.9 PG (ref 27–31)
MCHC RBC AUTO-ENTMCNC: 30.2 G/DL (ref 32–36)
MCV RBC AUTO: 73 FL (ref 82–98)
MONOCYTES # BLD AUTO: 0.6 K/UL (ref 0.3–1)
MONOCYTES NFR BLD: 7.6 % (ref 4–15)
NEUTROPHILS # BLD AUTO: 4.9 K/UL (ref 1.8–7.7)
NEUTROPHILS NFR BLD: 59.6 % (ref 38–73)
NRBC BLD-RTO: 0 /100 WBC
PLATELET # BLD AUTO: 351 K/UL (ref 150–450)
PMV BLD AUTO: 8.9 FL (ref 9.2–12.9)
POTASSIUM SERPL-SCNC: 4.5 MMOL/L (ref 3.5–5.1)
RBC # BLD AUTO: 3.79 M/UL (ref 4.6–6.2)
SODIUM SERPL-SCNC: 135 MMOL/L (ref 136–145)
WBC # BLD AUTO: 8.29 K/UL (ref 3.9–12.7)

## 2024-02-16 PROCEDURE — 0SG00AJ FUSION OF LUMBAR VERTEBRAL JOINT WITH INTERBODY FUSION DEVICE, POSTERIOR APPROACH, ANTERIOR COLUMN, OPEN APPROACH: ICD-10-PCS | Performed by: NEUROLOGICAL SURGERY

## 2024-02-16 PROCEDURE — 009U0ZZ DRAINAGE OF SPINAL CANAL, OPEN APPROACH: ICD-10-PCS | Performed by: NEUROLOGICAL SURGERY

## 2024-02-16 PROCEDURE — 25000003 PHARM REV CODE 250: Performed by: STUDENT IN AN ORGANIZED HEALTH CARE EDUCATION/TRAINING PROGRAM

## 2024-02-16 PROCEDURE — 25000003 PHARM REV CODE 250: Performed by: HOSPITALIST

## 2024-02-16 PROCEDURE — 25000003 PHARM REV CODE 250: Performed by: NURSE PRACTITIONER

## 2024-02-16 PROCEDURE — 80048 BASIC METABOLIC PNL TOTAL CA: CPT | Performed by: STUDENT IN AN ORGANIZED HEALTH CARE EDUCATION/TRAINING PROGRAM

## 2024-02-16 PROCEDURE — S4991 NICOTINE PATCH NONLEGEND: HCPCS | Performed by: HOSPITALIST

## 2024-02-16 PROCEDURE — 85025 COMPLETE CBC W/AUTO DIFF WBC: CPT | Performed by: STUDENT IN AN ORGANIZED HEALTH CARE EDUCATION/TRAINING PROGRAM

## 2024-02-16 PROCEDURE — 97535 SELF CARE MNGMENT TRAINING: CPT

## 2024-02-16 PROCEDURE — 99233 SBSQ HOSP IP/OBS HIGH 50: CPT | Mod: ,,, | Performed by: NURSE PRACTITIONER

## 2024-02-16 PROCEDURE — 25000003 PHARM REV CODE 250: Performed by: EMERGENCY MEDICINE

## 2024-02-16 PROCEDURE — 63600175 PHARM REV CODE 636 W HCPCS: Performed by: STUDENT IN AN ORGANIZED HEALTH CARE EDUCATION/TRAINING PROGRAM

## 2024-02-16 PROCEDURE — 97530 THERAPEUTIC ACTIVITIES: CPT

## 2024-02-16 PROCEDURE — 11000001 HC ACUTE MED/SURG PRIVATE ROOM

## 2024-02-16 PROCEDURE — 02HV33Z INSERTION OF INFUSION DEVICE INTO SUPERIOR VENA CAVA, PERCUTANEOUS APPROACH: ICD-10-PCS | Performed by: NEUROLOGICAL SURGERY

## 2024-02-16 PROCEDURE — 0SG30AJ FUSION OF LUMBOSACRAL JOINT WITH INTERBODY FUSION DEVICE, POSTERIOR APPROACH, ANTERIOR COLUMN, OPEN APPROACH: ICD-10-PCS | Performed by: NEUROLOGICAL SURGERY

## 2024-02-16 PROCEDURE — 0SB40ZZ EXCISION OF LUMBOSACRAL DISC, OPEN APPROACH: ICD-10-PCS | Performed by: NEUROLOGICAL SURGERY

## 2024-02-16 PROCEDURE — 63600175 PHARM REV CODE 636 W HCPCS: Performed by: NURSE PRACTITIONER

## 2024-02-16 PROCEDURE — 36415 COLL VENOUS BLD VENIPUNCTURE: CPT | Performed by: STUDENT IN AN ORGANIZED HEALTH CARE EDUCATION/TRAINING PROGRAM

## 2024-02-16 RX ADMIN — ACETAMINOPHEN 1000 MG: 500 TABLET ORAL at 02:02

## 2024-02-16 RX ADMIN — CLONIDINE HYDROCHLORIDE 0.1 MG: 0.1 TABLET ORAL at 08:02

## 2024-02-16 RX ADMIN — MUPIROCIN: 20 OINTMENT TOPICAL at 08:02

## 2024-02-16 RX ADMIN — ENOXAPARIN SODIUM 40 MG: 40 INJECTION SUBCUTANEOUS at 04:02

## 2024-02-16 RX ADMIN — DIAZEPAM 5 MG: 5 TABLET ORAL at 02:02

## 2024-02-16 RX ADMIN — OXYCODONE HYDROCHLORIDE 10 MG: 10 TABLET ORAL at 05:02

## 2024-02-16 RX ADMIN — HYDROMORPHONE HYDROCHLORIDE 1 MG: 1 INJECTION, SOLUTION INTRAMUSCULAR; INTRAVENOUS; SUBCUTANEOUS at 03:02

## 2024-02-16 RX ADMIN — DIAZEPAM 5 MG: 5 TABLET ORAL at 05:02

## 2024-02-16 RX ADMIN — HYDROMORPHONE HYDROCHLORIDE 1 MG: 1 INJECTION, SOLUTION INTRAMUSCULAR; INTRAVENOUS; SUBCUTANEOUS at 05:02

## 2024-02-16 RX ADMIN — DIAZEPAM 5 MG: 5 TABLET ORAL at 10:02

## 2024-02-16 RX ADMIN — OXYCODONE HYDROCHLORIDE 10 MG: 10 TABLET ORAL at 04:02

## 2024-02-16 RX ADMIN — MELATONIN TAB 3 MG 6 MG: 3 TAB at 10:02

## 2024-02-16 RX ADMIN — HYDROMORPHONE HYDROCHLORIDE 1 MG: 1 INJECTION, SOLUTION INTRAMUSCULAR; INTRAVENOUS; SUBCUTANEOUS at 02:02

## 2024-02-16 RX ADMIN — ACETAMINOPHEN 1000 MG: 500 TABLET ORAL at 10:02

## 2024-02-16 RX ADMIN — OXYCODONE HYDROCHLORIDE 10 MG: 10 TABLET ORAL at 10:02

## 2024-02-16 RX ADMIN — MIRTAZAPINE 15 MG: 15 TABLET, FILM COATED ORAL at 08:02

## 2024-02-16 RX ADMIN — OXYCODONE HYDROCHLORIDE 10 MG: 10 TABLET ORAL at 02:02

## 2024-02-16 RX ADMIN — HYDROMORPHONE HYDROCHLORIDE 1 MG: 1 INJECTION, SOLUTION INTRAMUSCULAR; INTRAVENOUS; SUBCUTANEOUS at 08:02

## 2024-02-16 RX ADMIN — HYDROMORPHONE HYDROCHLORIDE 1 MG: 1 INJECTION, SOLUTION INTRAMUSCULAR; INTRAVENOUS; SUBCUTANEOUS at 11:02

## 2024-02-16 RX ADMIN — DAPTOMYCIN 765 MG: 350 INJECTION, POWDER, LYOPHILIZED, FOR SOLUTION INTRAVENOUS at 04:02

## 2024-02-16 RX ADMIN — OXYCODONE HYDROCHLORIDE 10 MG: 10 TABLET ORAL at 09:02

## 2024-02-16 RX ADMIN — NICOTINE 1 PATCH: 14 PATCH, EXTENDED RELEASE TRANSDERMAL at 10:02

## 2024-02-16 RX ADMIN — ACETAMINOPHEN 1000 MG: 500 TABLET ORAL at 05:02

## 2024-02-16 NOTE — ASSESSMENT & PLAN NOTE
Osteomyelitis, discitis, & myositis of lumbosacral region  Septic arthritis of SI joint(s)  Epidural abscesses   Multiple abscesses of pelvis, sacrum, psoas, iliacus, & paraspinals  Lower back pain w/ b/l sciatica   Severe spinal canal stenosis   MRI: osteomyelitis/discitis of L5-S1, osteomyelitis of sacrum & L iliac bone, probable septic arthritis of L SI joint & lower lumbar spine facet joints, worsening anterolisthesis of L5 with respect to S1 with increased size of epidural abscess at the level of L5-S1 and probable severe central canal stenosis at this level, along w/ additional abscesses in the L iliopsoas muscle, paraspinal muscles, pelvis, & L sacrum.    - Underwent  2/14/24 epidural abscess/phlegmon debridement and LOOP X FUSION, SPINE, LUMBAR w/   - Left sacroiliac joint aspiration  on 2/7 by IR growing MRSA  - Ortho consulted for mgmt of septic SI joint; appreciate recs/assistance  - Continue vancomycin for MRSA growth --> switched to daptomycin 8 week course from surg date

## 2024-02-16 NOTE — SUBJECTIVE & OBJECTIVE
Interval History:  Patient does affirm having similar back pain as yesterday.  No chest pain, no shortness Of breath.  Afebrile.    Review of Systems  Objective:     Vital Signs (Most Recent):  Temp: 99.3 °F (37.4 °C) (02/16/24 1529)  Pulse: 93 (02/16/24 1529)  Resp: 18 (02/16/24 1529)  BP: (!) 124/58 (02/16/24 1529)  SpO2: 100 % (02/16/24 1529) Vital Signs (24h Range):  Temp:  [97.7 °F (36.5 °C)-99.3 °F (37.4 °C)] 99.3 °F (37.4 °C)  Pulse:  [80-93] 93  Resp:  [15-18] 18  SpO2:  [98 %-100 %] 100 %  BP: (118-130)/(57-66) 124/58     Weight: 77 kg (169 lb 12.1 oz)  Body mass index is 24.36 kg/m².    Intake/Output Summary (Last 24 hours) at 2/16/2024 1642  Last data filed at 2/16/2024 1441  Gross per 24 hour   Intake 480 ml   Output 3650 ml   Net -3170 ml         Physical Exam      Clear lungs bilaterally, unlabored breathing, on room air, no cyanosis  Heart sounds indicate a regular rate and rhythm  Awake alert, no acute distress   Wound VAC into postop drains in place over spine   No obvious lower extremity edema   5/5 proximal upper and lower extremity strength bilaterally including fist  and hip flexor strength

## 2024-02-16 NOTE — SUBJECTIVE & OBJECTIVE
Interval History:   POD#1  Complaining of surgical pain  Surgical cx - Staph aureus - susceptibilities pending.     Review of Systems   Constitutional:  Negative for appetite change, chills, diaphoresis, fatigue and fever.   HENT:  Negative for congestion, mouth sores and sore throat.    Eyes:  Negative for pain.   Respiratory:  Negative for cough, chest tightness and shortness of breath.    Cardiovascular:  Negative for chest pain and leg swelling.   Gastrointestinal:  Negative for abdominal pain, nausea and vomiting.   Genitourinary:  Negative for difficulty urinating, dysuria and flank pain.   Musculoskeletal:  Positive for back pain. Negative for arthralgias, joint swelling, myalgias and neck pain.   Skin:  Negative for color change, rash and wound.   Neurological:  Positive for numbness.   Psychiatric/Behavioral:  Negative for confusion.      Objective:     Vital Signs (Most Recent):  Temp: 99 °F (37.2 °C) (02/15/24 1546)  Pulse: 91 (02/15/24 1546)  Resp: 18 (02/15/24 1912)  BP: (!) 126/56 (02/15/24 1546)  SpO2: 99 % (02/15/24 1546) Vital Signs (24h Range):  Temp:  [98.2 °F (36.8 °C)-99 °F (37.2 °C)] 99 °F (37.2 °C)  Pulse:  [65-91] 91  Resp:  [11-24] 18  SpO2:  [98 %-99 %] 99 %  BP: (102-126)/(54-59) 126/56     Weight: 77 kg (169 lb 12.1 oz)  Body mass index is 24.36 kg/m².    Estimated Creatinine Clearance: 139.4 mL/min (based on SCr of 0.8 mg/dL).     Physical Exam  Vitals and nursing note reviewed.   Constitutional:       General: He is not in acute distress.     Appearance: Normal appearance. He is not ill-appearing, toxic-appearing or diaphoretic.   HENT:      Head: Normocephalic.      Nose: No congestion.      Mouth/Throat:      Mouth: Mucous membranes are moist.   Eyes:      General: No scleral icterus.     Conjunctiva/sclera: Conjunctivae normal.   Cardiovascular:      Rate and Rhythm: Normal rate and regular rhythm.   Pulmonary:      Effort: Pulmonary effort is normal. No respiratory distress.    Abdominal:      General: Abdomen is flat. There is no distension.      Palpations: Abdomen is soft.   Musculoskeletal:         General: Tenderness present. No swelling.      Cervical back: Normal range of motion.      Right lower leg: No edema.      Left lower leg: No edema.      Comments: Bilateral accordion drains with serosanguinous drainage   Incisional wound vac in place   Skin:     General: Skin is warm and dry.   Neurological:      Mental Status: He is alert and oriented to person, place, and time.   Psychiatric:         Behavior: Behavior normal.         Thought Content: Thought content normal.          Significant Labs: Blood Culture:   Recent Labs   Lab 10/10/23  1827 10/14/23  1512 11/20/23  1155 02/04/24  1822 02/04/24  1853   LABBLOO Gram stain manas bottle: Gram positive cocci in clusters resembling Staph  Results called to and read back by: Dr. Childers  10/11/2023  11:26  Gram stain aer bottle: Gram positive cocci in clusters resembling Staph  Positive results previously called 10/11/2023  14:31  METHICILLIN RESISTANT STAPHYLOCOCCUS AUREUS  For susceptibility see order #[id number]  *  Gram stain aer bottle: Gram positive cocci in clusters resembling Staph  Results called to and read back by: Dr. Childers  10/11/2023  11:26  Gram stain manas bottle: Gram positive cocci in clusters resembling Staph  Positive results previously called 10/11/2023  12:57  METHICILLIN RESISTANT STAPHYLOCOCCUS AUREUS  ID consult required at [facility] and [facility].  * Gram stain aer bottle: Gram positive cocci in clusters resembling Staph  Results called to and read back by: ADIS CAMEJO  10/15/2023  10:25  Gram stain manas bottle: Gram positive cocci in clusters resembling Staph  Positive results previously called 10/15/2023  METHICILLIN RESISTANT STAPHYLOCOCCUS AUREUS  ID consult required at [facility] and [facility].  For susceptibility see order #[id number]  *  Gram stain aer  bottle: Gram positive cocci in clusters resembling Staph  Results called to and read back by: ADIS CAMEJO  10/15/2023  10:25  Gram stain manas bottle: Gram positive cocci in clusters resembling Staph  Positive results previously called 10/15/2023  METHICILLIN RESISTANT STAPHYLOCOCCUS AUREUS  ID consult required at [facility] and [facility] locations.  * No growth after 5 days.  No growth after 5 days. No growth after 5 days. No growth after 5 days.       CBC:   Recent Labs   Lab 02/14/24  0555 02/15/24  0326   WBC 6.24 7.78   HGB 8.8* 8.8*   HCT 29.3* 29.6*    390       CMP:   Recent Labs   Lab 02/14/24  0555 02/15/24  0326   * 133*   K 4.8 4.9    102   CO2 25 24   GLU 97 140*   BUN 25* 26*   CREATININE 0.7 0.8   CALCIUM 9.6 8.9   ANIONGAP 7* 7*       Microbiology Results (last 7 days)       Procedure Component Value Units Date/Time    AFB Culture & Smear [[id number]] Collected: 02/14/24 1712    Order Status: Completed Specimen: Incision site from Back Updated: 02/15/24 1331     AFB CULTURE STAIN No acid fast bacilli seen.    Narrative:      Epidural infection culture lumbar spine    AFB Culture & Smear [[id number]] Collected: 02/14/24 1712    Order Status: Completed Specimen: Incision site from Back Updated: 02/15/24 1331     AFB CULTURE STAIN No acid fast bacilli seen.    Narrative:      L5-S1 disc space    Aerobic culture [[id number]] Collected: 02/14/24 1712    Order Status: Completed Specimen: Incision site from Back Updated: 02/15/24 1241     Aerobic Bacterial Culture Insufficient incubation, culture in progress    Narrative:      Epidural infection culture lumbar spine    Aerobic culture [[id number]]  (Abnormal) Collected: 02/14/24 1712    Order Status: Completed Specimen: Incision site from Back Updated: 02/15/24 1239     Aerobic Bacterial Culture STAPHYLOCOCCUS AUREUS  Few  Susceptibility pending      Narrative:      L5-S1 disc space    Culture, Anaerobe [[id number]] Collected:  02/14/24 1712    Order Status: Completed Specimen: Incision site from Back Updated: 02/15/24 0734     Anaerobic Culture Culture in progress    Narrative:      Epidural infection culture lumbar spine    Culture, Anaerobe [[id number]] Collected: 02/14/24 1712    Order Status: Completed Specimen: Incision site from Back Updated: 02/15/24 0734     Anaerobic Culture Culture in progress    Narrative:      L5-S1 disc space    Gram stain [[id number]] Collected: 02/14/24 1712    Order Status: Completed Specimen: Incision site from Back Updated: 02/14/24 1955     Gram Stain Result Rare WBC's      No organisms seen    Narrative:      Epidural infection culture lumbar spine    Gram stain [[id number]] Collected: 02/14/24 1712    Order Status: Completed Specimen: Incision site from Back Updated: 02/14/24 1952     Gram Stain Result Rare WBC's      No organisms seen    Narrative:      L5-S1 disc space    Fungus culture [[id number]] Collected: 02/14/24 1712    Order Status: Sent Specimen: Incision site from Back Updated: 02/14/24 1739    Fungus culture [[id number]] Collected: 02/14/24 1712    Order Status: Sent Specimen: Incision site from Back Updated: 02/14/24 1738    Culture, Anaerobe [[id number]] Collected: 02/14/24 1712    Order Status: Canceled Specimen: Incision site from Back     Aerobic culture [[id number]] Collected: 02/14/24 1712    Order Status: Canceled Specimen: Incision site from Back     Fungus culture [[id number]] Collected: 02/14/24 1712    Order Status: Canceled Specimen: Incision site from Back     AFB Culture & Smear [[id number]] Collected: 02/14/24 1712    Order Status: Canceled Specimen: Incision site from Back     Gram stain [[id number]] Collected: 02/14/24 1712    Order Status: Canceled Specimen: Incision site from Back     Culture, Anaerobe [[id number]] Collected: 02/14/24 1712    Order Status: Canceled Specimen: Incision site from Back     Aerobic culture [[id number]] Collected: 02/14/24 1712    Order  Status: Canceled Specimen: Incision site from Back     AFB Culture & Smear [[id number]] Collected: 02/14/24 1712    Order Status: Canceled Specimen: Incision site from Back     Gram stain [[id number]] Collected: 02/14/24 1712    Order Status: Canceled Specimen: Incision site from Back     Fungus culture [[id number]] Collected: 02/14/24 1712    Order Status: Canceled Specimen: Incision site from Back     Aerobic culture [[id number]]  (Abnormal)  (Susceptibility) Collected: 02/07/24 1423    Order Status: Completed Specimen: Abscess from Back Updated: 02/14/24 1243     Aerobic Bacterial Culture METHICILLIN RESISTANT STAPHYLOCOCCUS AUREUS  Rare      Narrative:      L psoas    Culture, Anaerobe [[id number]] Collected: 02/07/24 1423    Order Status: Completed Specimen: Abscess from Back Updated: 02/14/24 0922     Anaerobic Culture No anaerobes isolated    Narrative:      L psoas    Culture, Anaerobe [[id number]] Collected: 02/07/24 1411    Order Status: Completed Specimen: Joint Fluid from Back Updated: 02/14/24 0908     Anaerobic Culture No anaerobes isolated    Fungus culture [[id number]] Collected: 02/07/24 1423    Order Status: Completed Specimen: Abscess from Back Updated: 02/14/24 0547     Fungus (Mycology) Culture Culture in progress    Narrative:      L psoas    Fungus culture [[id number]] Collected: 02/07/24 1413    Order Status: Completed Specimen: Joint Fluid from Back Updated: 02/14/24 0544     Fungus (Mycology) Culture Culture in progress    Aerobic culture [[id number]] Collected: 02/07/24 1412    Order Status: Completed Specimen: Bone from Back Updated: 02/12/24 0750     Aerobic Bacterial Culture No growth    Narrative:      Joint    Blood culture #1 **CANNOT BE ORDERED STAT** [[id number]] Collected: 02/04/24 1853    Order Status: Completed Specimen: Blood from Peripheral, Forearm, Left Updated: 02/09/24 2012     Blood Culture, Routine No growth after 5 days.    Blood culture #2 **CANNOT BE ORDERED STAT**  [[id number]] Collected: 02/04/24 1822    Order Status: Completed Specimen: Blood from Peripheral, Forearm, Left Updated: 02/09/24 2012     Blood Culture, Routine No growth after 5 days.    AFB Culture & Smear [[id number]] Collected: 02/07/24 1413    Order Status: Completed Specimen: Joint Fluid from Back Updated: 02/09/24 0927     AFB Culture & Smear Culture in progress     AFB CULTURE STAIN No acid fast bacilli seen.    AFB Culture & Smear [[id number]] Collected: 02/07/24 1423    Order Status: Completed Specimen: Abscess from Back Updated: 02/08/24 2127     AFB Culture & Smear Culture in progress     AFB CULTURE STAIN No acid fast bacilli seen.    Narrative:      L psoas          Pathology Results  (Last 10 years)      None          All pertinent labs within the past 24 hours have been reviewed.    Significant Imaging: I have reviewed all pertinent imaging results/findings within the past 24 hours.    I have personally reviewed records / hospital notes from   service and other specialty providers. I have also reviewed CBC, CMP/BMP,  cultures and imaging with my interpretation as documented in my assessment / plan.    Patient is high risk for infectious complications given pt's age, multiple co-morbidities, and case complexity.      Time: 50 minutes   50% of time spent on face-to-face counseling and coordination of care. Counseling included review of test results, diagnosis, and treatment plan with patient and/or family.

## 2024-02-16 NOTE — ASSESSMENT & PLAN NOTE
28M w/ hx MRSA bactermia, IVDU, pelvic/intra-abdominal abscesses who presents with progressive back pain with radiation down the leg over the past couple of weeks.     - All pertinent labs and imaging reviewed  - 2/4 ESR 75, CRP 33.7. Blood cx NGTD.   - Back culture 2/7 with staph aureus  - MRI lumbar w/wo 2/4: L5-S1 osteodiscitis extending into the sacrum, left iliac bone, SI joint, facet joints. Anterolisthesis L5-S1, epidural abscess L5-S1 with canal stenosis. Left psoas and paraspinal muscle abscesses. No clinical evidence of cauda equina syndrome.   - CT lumbar obtained with destructive changes at L5-S1.    Now he is status post L4 to pelvis fusion  on 02/14/2024   Postop day 1      Ton Donovan  Is a 28 y.o. male with L4-5 spondylolisthesis and L3-L5 severe stenosis with neurogenic claudication and lumbar radiculopathy who presented for elective TLIF.    Now s/p L3-L5 TLIF 12/12      POD#1    Continue floor care with vital and neurocheck per protocol  Multimodal pain medications  Continue HV in place to full suction  L spine xray pending   Sating well at RA, encourage IS  Blood pressure goal normotensive  Advance diet as tolerated  H/H 8/29, CTM  Intraop cultures no growth to date.  ID team following appreciate recs.  Continue antibiotic per ID team  SubQ heparin for DVT prophylaxis  PTOT  LSO brace when out of bed  SW/CM for discharge planning and DME    Seen and discussed with

## 2024-02-16 NOTE — NURSING
Charge nurse informed that day nurse found patients prevena WV not working this morning. It seems the WV is dead and wont turn on at all. There is no  in the patients room. Neurosurgery called and notified. They said they would try to locate a  for the patient. RN will continue to monitor the patient.

## 2024-02-16 NOTE — PT/OT/SLP PROGRESS
Physical Therapy Co-Treatment    Patient Name: Ton Donovan   MRN: [id number]    Co-treatment performed for this visit due to patient need for two skilled therapists to ensure patient and staff safety and to accommodate for patient activity tolerance/pain management   Recommendations:     Discharge Recommendations: High Intensity Therapy  Discharge Equipment Recommendations: walker, rolling, bedside commode  Barriers to discharge: Increased level of assist, Inaccessible home, and Decreased caregiver support    Assessment:     Ton Donovan is a 28 y.o. male admitted with a medical diagnosis of Osteomyelitis of vertebra of lumbosacral region. He presents with the following impairments/functional limitations: weakness, impaired endurance, impaired self care skills, impaired functional mobility, gait instability, impaired balance, decreased lower extremity function, decreased safety awareness, pain, abnormal tone, decreased ROM, impaired coordination, orthopedic precautions. Pt with fair tolerance to therapy session on this date. Upon initial attempt, pt requesting therapists return in 1 hour when pain medications are due to be administered. Upon second attempt, pt hesitant to perform EOB/OOB mobility 2/2 feeling tired from pain medications. Following maximal encouragement and education, pt agreeable to participation in therapy treatment. Pt continues to require limited assistance to complete bed mobility and sitting postural stability; however, movement does appear to increase pain levels. Following STS, pt stating he felt unwell and needed to return to supine. RN notified; BP taken and was 119/54 mmHg. Further transfers and ambulation trial deferred by pt at this time. Pt educated on importance of OOB mobility and increasing upright tolerance using bed in chair position and bedside chair; pt stating he would try to be more upright during day. Pt would continue to benefit from skilled acute PT in order to address  "current deficits and progress functional mobility.     Rehab Prognosis: Good; patient continues to benefit from acute skilled PT services to address these deficits and reach maximum level of function.  Recent Surgery: Procedure(s) (LRB):  LOOP X FUSION, SPINE, LUMBAR (N/A) 2 Days Post-Op    Plan:     During this hospitalization, patient to be seen 4 x/week to address the identified rehab impairments via gait training, therapeutic activities, therapeutic exercises, neuromuscular re-education and progress toward the following goals:    Plan of Care Expires:  03/15/24    Subjective     Chief Complaint: Pain in back (increased with movement and sitting upright)  Patient/Family Comments/Goals: "I need to lay back down. I just don't feel well."  Pain/Comfort:  Pain Rating 1:  (unrated)  Location - Orientation 1: generalized  Location 1: back  Pain Addressed 1: Reposition, Pre-medicate for activity, Distraction, Cessation of Activity, Nurse notified  Pain Rating Post-Intervention 1:  (unrated)    Objective:     Communicated with RN prior to session. Patient found supine with hemovac, telemetry, wound vac upon PT entry to room.     General Precautions: Standard, fall  Orthopedic Precautions: spinal precautions  Braces: LSO; donned at EOB prior to mobility    Functional Mobility:  Bed Mobility:  Verbal cues for sequencing and technique  Rolling Left:  stand by assistance  Supine to Sit: contact guard assistance  Sit to Supine: minimum assistance for LE management  Transfers:    Sit to Stand: minimum assistance with rolling walker with cues for hand placement and foot placement  Gait:   Deferred this date 2/2 pt refusal and request to return supine  Balance:   Static Sitting: Good, able to maintain for 6 minute(s) with stand by assistance  Dynamic Sitting: Good: Patient accepts moderate challenge, stand by assistance  Pt completed oral care while seated at EOB with OT  Static Standing: Good, able to maintain for 3 minute(s) " with contact guard assistance  Dynamic Standing: not assessed this visit    AM-PAC 6 CLICK MOBILITY  Turning over in bed (including adjusting bedclothes, sheets and blankets)?: 3  Sitting down on and standing up from a chair with arms (e.g., wheelchair, bedside commode, etc.): 3  Moving from lying on back to sitting on the side of the bed?: 3  Moving to and from a bed to a chair (including a wheelchair)?: 3  Need to walk in hospital room?: 2  Climbing 3-5 steps with a railing?: 1  Basic Mobility Total Score: 15     Therapeutic Activities and Exercises:  Patient educated on role of acute care PT and PT POC, safety while in hospital including calling nurse for mobility, and call light usage  Pt educated on the effects of bed rest and the importance of OOB activity. Pt encouraged to sit UIC majority of day as tolerated and continue daily transfers with nursing assist. Pt verbalized understanding.  Pt educated on importance of maximal participation in therapy session in order to reduce negative effects of prolonged sedentary positioning.   Educated on spinal precautions including avoidance of bending, lifting, and twisting. Patient expresses understanding  Educated on log roll technique, performed to left  Answered all questions within PT scope of practice and addressed functional mobility concerns.      Patient left HOB elevated with all lines intact, call button in reach, RN notified, and family present.    GOALS:   Multidisciplinary Problems       Physical Therapy Goals          Problem: Physical Therapy    Goal Priority Disciplines Outcome Goal Variances Interventions   Physical Therapy Goal     PT, PT/OT Ongoing, Progressing     Description: Goals to be met by: 3/15/2024    Patient will increase functional independence with mobility by performin. Supine to sit with Stand-by Assistance  2. Sit to supine with Stand-by Assistance  3. Sit to stand transfer with Stand-by Assistance  4. Gait  x 100 feet with  Contact Guard Assistance using LRAD.   5. Lower extremity exercise program x15 reps per handout, with independence                         Time Tracking:     PT Received On: 02/16/24  PT Start Time: 1439     PT Stop Time: 1457  PT Total Time (min): 18 min     Billable Minutes: Therapeutic Activity 18      Treatment Type: Treatment  PT/PTA: PT     Number of PTA visits since last PT visit: 0     02/16/2024

## 2024-02-16 NOTE — PT/OT/SLP PROGRESS
Occupational Therapy   Co-Treatment    Name: Ton Donovan  MRN: [id number]  Admitting Diagnosis:  Osteomyelitis of vertebra of lumbosacral region  2 Days Post-Op    Recommendations:     Discharge Recommendations: High Intensity Therapy  Discharge Equipment Recommendations:  walker, rolling, bedside commode  Barriers to discharge:  None    Assessment:     Ton Donovan is a 28 y.o. male with a medical diagnosis of Osteomyelitis of vertebra of lumbosacral region.  He presents with decrease functional status secondary to medical diagnosis. Performance deficits affecting function are weakness, impaired endurance, impaired self care skills, impaired functional mobility, gait instability, impaired balance, decreased safety awareness, decreased ROM, pain. Patient limited by pain in back this session; upon standing patient reporting feelings of lightheadedness and requested to return to sitting. BP checked and found as 119/54; patient unable to return to standing due to dizziness and pain. Patient able to complete ADLs in sitting; patient remains appropriate candidate for acute OT services.       Rehab Prognosis:  Good; patient would benefit from acute skilled OT services to address these deficits and reach maximum level of function.       Plan:     Patient to be seen 4 x/week to address the above listed problems via self-care/home management, therapeutic activities, therapeutic exercises, neuromuscular re-education  Plan of Care Expires: 03/07/24  Plan of Care Reviewed with: patient, family    Subjective     Chief Complaint: Pain   Patient/Family Comments/goals: DC  Pain/Comfort:  Pain Rating 1: 0/10  Location - Side 1: Bilateral  Location - Orientation 1: generalized  Location 1: back  Pain Addressed 1: Reposition, Distraction, Cessation of Activity, Nurse notified, Pre-medicate for activity    Objective:     Communicated with: RN prior to session.  Patient found supine with hemovac, telemetry, wound vac upon OT entry to  room.    General Precautions: Standard, fall    Orthopedic Precautions:spinal precautions  Braces: LSO  Respiratory Status: Room air     Occupational Performance:     Bed Mobility:    Patient completed Supine to Sit with contact guard assistance  Patient completed Sit to Supine with minimum assistance   Patient sat EOB with standby assist     Functional Mobility/Transfers:  Patient completed Sit <> Stand Transfer with minimum assistance  with  rolling walker   Functional Mobility: Patient unable to attempt due to dizziness/lightheaded     Activities of Daily Living:  Grooming: stand by assistance patient completed oral care and facial grooming EOB       [facility] 6 Click ADL: 12    Treatment & Education:  Co-Treatment conducted due to patient medical instability and to promote patient safety. Provided education regarding role of OT, POC, & discharge recommendations.  Pt with no further questions/concerns at this time.     Patient left supine with all lines intact and call button in reach    GOALS:   Multidisciplinary Problems       Occupational Therapy Goals          Problem: Occupational Therapy    Goal Priority Disciplines Outcome Interventions   Occupational Therapy Goal     OT, PT/OT Ongoing, Progressing    Description: Goals to be met by: 3/15/24     Patient will increase functional independence with ADLs by performing:    UE Dressing with Minimal Assistance.  LE Dressing with Minimal Assistance.  Grooming while standing with Minimal Assistance.  Toileting from toilet with Minimal Assistance for hygiene and clothing management.   Bathing from  shower chair/bench with Minimal Assistance.  Stand pivot transfers with Minimal Assistance.  Step transfer with Minimal Assistance  Toilet transfer to toilet with Minimal Assistance.                           Time Tracking:     OT Date of Treatment: 02/16/24  OT Start Time: 1440  OT Stop Time: 1500  OT Total Time (min): 20 min    Billable Minutes:Self Care/Home Management 20  minutes     OT/ASHLEY: OT          2/16/2024

## 2024-02-16 NOTE — PROGRESS NOTES
"[facility] - Neurosurgery ([facility])  [facility] Medicine  Progress Note    Patient Name: Ton Donovan  MRN: [id number]  Patient Class: IP- Inpatient   Admission Date: 2/4/2024  Length of Stay: 11 days  Attending Physician: Reese Schaefer MD  Primary Care Provider: Emily, Primary Doctor        Subjective:     Principal Problem:Osteomyelitis of vertebra of lumbosacral region        HPI:  Ton Donovan is a 28-year-old male with PMHx of IVDU w/ assoc complications of MRSA bacteremia, multiple septic joints & abscesses, who presented 2/4/24 with progressive back pain with radiation down his legs b/l. Endorses bilateral numbness & neuropathic pain over the dorsum of his feet. This has been ongoing and is not necessarily a new problem, but has been worsening. Reports "pins & needles"/paresthesias down his legs that have been progressing, although laying on his stomach seems to provide significant relief. Admits to continued active IV heroin use; last used just prior to EMS arrival. Denies any fevers, chills, night sweats, cough, or shortness of breath. Denies any bowel or bladder incontinence/retention or saddle anesthesia.     On arrival, HDS & 0/4 SIRS. MRI showed osteomyelitis/discitis of L5-S1, osteomyelitis of sacrum & L iliac bone, probable septic arthritis of L SI joint & lower lumbar spine facet joints, worsening anterolisthesis of L5 with respect to S1 with increased size of epidural abscess at the level of L5-S1 and probable severe central canal stenosis at this level, along w/ additional abscesses in the L iliopsoas muscle, paraspinal muscles, pelvis, & L sacrum. Received single dose of Zosyn in ED on arrival, but given that pt HDS w/o sepsis, further abx held to optimize intra-op cx/pathology. Admitted to hospital medicine for further mgmt of osteomyelitis, septic joints & abscesses.    Overview/Hospital Course:  Addiction medicine, along w/ NSGY, orthopedic surgery, IR & ID consulted. Underwent aspiration of " psoas abscess per IR on 2/7. Aspiration of SI joint attempted but unable to aspirate hardly any fluid. Underwent LOOP X FUSION, SPINE, LUMBAR and epidural phlegmon debridement. Echo unremarkable    Interval History:  Patient does affirm having similar back pain as yesterday.  No chest pain, no shortness Of breath.  Afebrile.    Review of Systems  Objective:     Vital Signs (Most Recent):  Temp: 99.3 °F (37.4 °C) (02/16/24 1529)  Pulse: 93 (02/16/24 1529)  Resp: 18 (02/16/24 1529)  BP: (!) 124/58 (02/16/24 1529)  SpO2: 100 % (02/16/24 1529) Vital Signs (24h Range):  Temp:  [97.7 °F (36.5 °C)-99.3 °F (37.4 °C)] 99.3 °F (37.4 °C)  Pulse:  [80-93] 93  Resp:  [15-18] 18  SpO2:  [98 %-100 %] 100 %  BP: (118-130)/(57-66) 124/58     Weight: 77 kg (169 lb 12.1 oz)  Body mass index is 24.36 kg/m².    Intake/Output Summary (Last 24 hours) at 2/16/2024 1642  Last data filed at 2/16/2024 1441  Gross per 24 hour   Intake 480 ml   Output 3650 ml   Net -3170 ml         Physical Exam      Clear lungs bilaterally, unlabored breathing, on room air, no cyanosis  Heart sounds indicate a regular rate and rhythm  Awake alert, no acute distress   Wound VAC into postop drains in place over spine   No obvious lower extremity edema   5/5 proximal upper and lower extremity strength bilaterally including fist  and hip flexor strength     Assessment/Plan:      * Osteomyelitis of vertebra of lumbosacral region  Osteomyelitis, discitis, & myositis of lumbosacral region  Septic arthritis of SI joint(s)  Epidural abscesses   Multiple abscesses of pelvis, sacrum, psoas, iliacus, & paraspinals  Lower back pain w/ b/l sciatica   Severe spinal canal stenosis   MRI: osteomyelitis/discitis of L5-S1, osteomyelitis of sacrum & L iliac bone, probable septic arthritis of L SI joint & lower lumbar spine facet joints, worsening anterolisthesis of L5 with respect to S1 with increased size of epidural abscess at the level of L5-S1 and probable severe central  canal stenosis at this level, along w/ additional abscesses in the L iliopsoas muscle, paraspinal muscles, pelvis, & L sacrum.    - Underwent  2/14/24 epidural abscess/phlegmon debridement and LOOP X FUSION, SPINE, LUMBAR w/   - Left sacroiliac joint aspiration  on 2/7 by IR growing MRSA  - Ortho consulted for mgmt of septic SI joint; appreciate recs/assistance  - Continue vancomycin for MRSA growth --> switched to daptomycin 8 week course from surg date    L5-S2 epidural phlegmon        Housing insecurity  Previously worked at [employer] & held down job in the past but started using heroin last year following multiple family deaths (mother, aunt & grandmother) in a short period of time. Subsequently lost housing & has been living on streets since then.   - SW/CM    Substance or medication-induced depressive disorder  Pt endorses sx of depression, no SI/SA.   -per psych, mirtazapine 15mg QHS    Spinal stenosis of lumbar region without neurogenic claudication  See osteomyelitis      IVDU (intravenous drug user)  Heroin abuse w/ opioid withdrawal   PSDU  Tobacco use   Known hx of IVDU (heroin) w/ extensive assoc medical complications (discussed above). No recent UDS (only one on file from July 2023- positive for opiates & THC) and none collected on admission. Endorses daily IV heroin use (1g/day); last used just prior to calling EMS. Active tobacco use, but denies EtOH or other illicit substance use. Endorsing sx of opioid w/d.   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,   - fluid restriction; protein intake encouraged      VTE Risk Mitigation (From  admission, onward)           Ordered     enoxaparin injection 40 mg  Every 24 hours         02/14/24 1851     Place sequential compression device  Until discontinued         02/05/24 0126     IP VTE HIGH RISK PATIENT  Once         02/05/24 0126                    Discharge Planning   SENA: 2/19/2024     Code Status: Full Code   Is the patient medically ready for discharge?: Yes    Reason for patient still in hospital (select all that apply): Consult recommendations and Pending disposition  Discharge Plan A: Long-term acute care facility (LTAC)   Discharge Delays: None known at this time              Reese Schaefer MD  Department of Hospital Medicine   [facility] - Neurosurgery ([facility])

## 2024-02-16 NOTE — PROGRESS NOTES
Aleksandr Bray - Neurosurgery ([facility])  Neurosurgery  Progress Note    Subjective:     History of Present Illness: 28M w/ hx MRSA bactermia, IVDU, pelvic/intra-abdominal abscesses who presents with progressive back pain with radiation down the leg over the past couple of weeks.  He has had bilateral numbness of his anterior thigh as well. He denies bowel/bladder sx or saddle anesthesia. He denies focal weakness. Per chart review, the patient is actively using heroin and did an injection prior to being taken in by EMS.      Post-Op Info:  Procedure(s) (LRB):  LOOP X FUSION, SPINE, LUMBAR (N/A)   1 Day Post-Op   Interval History:     No acute events overnight.  Postop day 1.  Mechanical back pain was resolved.  Only complaining of surgical pain.  Out of bed today with physical therapy.  Intraop cultures no growth to date.  On antibiotic    Medications:  Continuous Infusions:  Scheduled Meds:   acetaminophen  1,000 mg Oral Q8H    cefTRIAXone (Rocephin) IV (PEDS and ADULTS)  2 g Intravenous Q24H    cloNIDine  0.1 mg Oral Q12H    enoxparin  40 mg Subcutaneous Daily    mirtazapine  15 mg Oral QHS    nicotine  1 patch Transdermal Daily    vancomycin (VANCOCIN) IV (PEDS and ADULTS)  20 mg/kg Intravenous Q12H     PRN Meds:acetaminophen, dextrose 10%, dextrose 10%, dicyclomine, glucagon (human recombinant), glucose, glucose, HYDROmorphone, hydrOXYzine HCL, ibuprofen, loperamide, melatonin, naloxone, oxyCODONE, oxyCODONE, promethazine, sodium chloride 0.9%, Pharmacy to dose Vancomycin consult **AND** vancomycin - pharmacy to dose     Review of Systems  Objective:     Weight: 76.7 kg (169 lb 1.5 oz)  Body mass index is 23.59 kg/m².  Vital Signs (Most Recent):  Temp: 98.7 °F (37.1 °C) (02/11/24 0609)  Pulse: 72 (02/11/24 0609)  Resp: 18 (02/11/24 0609)  BP: 119/63 (02/11/24 0609)  SpO2: 100 % (02/11/24 0609) Vital Signs (24h Range):  Temp:  [96.6 °F (35.9 °C)-99.4 °F (37.4 °C)] 98.7 °F (37.1 °C)  Pulse:  [65-75] 72  Resp:  [17-20]  "18  SpO2:  [99 %-100 %] 100 %  BP: (113-132)/(52-63) 119/63          Physical Exam  General: well developed, well nourished, no distress.   Head: normocephalic, atraumatic  Mental Status: Awake, Alert, Oriented  Speech: Clear with content appropriate to conversation  Cranial nerves: face symmetric, CN II-XII grossly intact.   Sensory: intact to light touch throughout  Motor Strength: Motor Strength: Moves all extremities spontaneously with good tone. No abnormal movements seen. Pain limited weakness b/l hip flexors.   DF/EHL/PF 4 on R and  4+ L  Wound VAC in place  Hemovac in place      Significant Labs:  Recent Labs   Lab 02/10/24  0526   GLU 98      K 4.7      CO2 26   BUN 20   CREATININE 0.9   CALCIUM 8.8       Recent Labs   Lab 02/10/24  0526   WBC 6.92   HGB 9.6*   HCT 32.9*   *       No results for input(s): "LABPT", "INR", "APTT" in the last 48 hours.  Microbiology Results (last 7 days)       Procedure Component Value Units Date/Time    Blood culture #1 **CANNOT BE ORDERED STAT** [[id number]] Collected: 02/04/24 1853    Order Status: Completed Specimen: Blood from Peripheral, Forearm, Left Updated: 02/09/24 2012     Blood Culture, Routine No growth after 5 days.    Blood culture #2 **CANNOT BE ORDERED STAT** [[id number]] Collected: 02/04/24 1822    Order Status: Completed Specimen: Blood from Peripheral, Forearm, Left Updated: 02/09/24 2012     Blood Culture, Routine No growth after 5 days.    Aerobic culture [[id number]]  (Abnormal) Collected: 02/07/24 1423    Order Status: Completed Specimen: Abscess from Back Updated: 02/09/24 1138     Aerobic Bacterial Culture STAPHYLOCOCCUS AUREUS  Rare  Susceptibility pending      Narrative:      L psoas    Culture, Anaerobe [[id number]] Collected: 02/07/24 1423    Order Status: Completed Specimen: Abscess from Back Updated: 02/09/24 0934     Anaerobic Culture Culture in progress    Narrative:      L psoas    AFB Culture & Smear [[id number]] " Collected: 02/07/24 1413    Order Status: Completed Specimen: Joint Fluid from Back Updated: 02/09/24 0927     AFB Culture & Smear Culture in progress     AFB CULTURE STAIN No acid fast bacilli seen.    Aerobic culture [[id number]] Collected: 02/07/24 1412    Order Status: Completed Specimen: Bone from Back Updated: 02/09/24 0745     Aerobic Bacterial Culture No growth    Narrative:      Joint    Culture, Anaerobe [[id number]] Collected: 02/07/24 1411    Order Status: Completed Specimen: Joint Fluid from Back Updated: 02/09/24 0728     Anaerobic Culture Culture in progress    AFB Culture & Smear [[id number]] Collected: 02/07/24 1423    Order Status: Completed Specimen: Abscess from Back Updated: 02/08/24 2127     AFB Culture & Smear Culture in progress     AFB CULTURE STAIN No acid fast bacilli seen.    Narrative:      L psoas    Gram stain [[id number]] Collected: 02/07/24 1413    Order Status: Completed Specimen: Joint Fluid from Back Updated: 02/08/24 0041     Gram Stain Result No WBC's      No organisms seen    Fungus culture [[id number]] Collected: 02/07/24 1413    Order Status: Sent Specimen: Joint Fluid from Back Updated: 02/07/24 2110    Gram stain [[id number]] Collected: 02/07/24 1423    Order Status: Completed Specimen: Abscess from Back Updated: 02/07/24 1934     Gram Stain Result Few WBC's      No organisms seen    Narrative:      L psoas    Fungus culture [[id number]] Collected: 02/07/24 1423    Order Status: Sent Specimen: Abscess from Back Updated: 02/07/24 1733    Culture, Anaerobe [[id number]]     Order Status: No result Specimen: Abscess from Sacrum     Aerobic culture [[id number]]     Order Status: No result Specimen: Abscess from Sacrum     AFB Culture & Smear [[id number]]     Order Status: No result Specimen: Abscess from Sacrum     Gram stain [[id number]]     Order Status: No result Specimen: Abscess from Sacrum     Fungus culture [[id number]]     Order Status: No result Specimen: Abscess from  Sacrum     Culture, Anaerobe [[id number]]     Order Status: No result Specimen: Abscess     Aerobic culture [[id number]]     Order Status: No result Specimen: Abscess     Fungus culture [[id number]]     Order Status: No result Specimen: Abscess     AFB Culture & Smear [[id number]]     Order Status: No result Specimen: Abscess     Gram stain [[id number]]     Order Status: No result Specimen: Abscess           All pertinent labs from the last 24 hours have been reviewed.    Significant Diagnostics:  No results found in the last 24 hours.  Assessment/Plan:     * Osteomyelitis of vertebra of lumbosacral region  28M w/ hx MRSA bactermia, IVDU, pelvic/intra-abdominal abscesses who presents with progressive back pain with radiation down the leg over the past couple of weeks.     - All pertinent labs and imaging reviewed  - 2/4 ESR 75, CRP 33.7. Blood cx NGTD.   - Back culture 2/7 with staph aureus  - MRI lumbar w/wo 2/4: L5-S1 osteodiscitis extending into the sacrum, left iliac bone, SI joint, facet joints. Anterolisthesis L5-S1, epidural abscess L5-S1 with canal stenosis. Left psoas and paraspinal muscle abscesses. No clinical evidence of cauda equina syndrome.   - CT lumbar obtained with destructive changes at L5-S1.    Now he is status post L4 to pelvis fusion  on 02/14/2024   Postop day 1      Ton Donovan  Is a 28 y.o. male with L4-5 spondylolisthesis and L3-L5 severe stenosis with neurogenic claudication and lumbar radiculopathy who presented for elective TLIF.    Now s/p L3-L5 TLIF 12/12      POD#1    Continue floor care with vital and neurocheck per protocol  Multimodal pain medications  Continue HV in place to full suction  L spine xray pending   Sating well at RA, encourage IS  Blood pressure goal normotensive  Advance diet as tolerated  H/H 8/29, CTM  Intraop cultures no growth to date.  ID team following appreciate recs.  Continue antibiotic per ID team  SubQ heparin for DVT prophylaxis  PTOT  LSO brace when  out of bed  SW/CM for discharge planning and DME    Seen and discussed with Dr.Kalyvas Allison Wallace MD  Neurosurgery  [facility] - Neurosurgery ([facility])

## 2024-02-16 NOTE — ASSESSMENT & PLAN NOTE
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  Imaging significant for lumbosacral osteomyelitis with associated epidural abscess, osteomyelitis of sacrum & L iliac bone, probable septic arthritis of L SI joint & lower lumbar spine facet joints, and left iliopsoas abscess, abscess in superior rectal/presacral region and micro-abscesses in paraspinal muscles, pelvis, and left sacrum.  Blood cultures have been negative.  2D echo is negative.  Underwent IR aspiration of left SI joint and psoas on 2/7.  Cx + for MRSA.    Now POD#1 lumbar-pelvis fusion (2/14). Surgical cx from disc space showing Staph Aureus - susceptibilities pending. .       Afebrile, no leukocytosis.  Pain controlled.  Hemodynamically and neurologically stable.  Currently on IV daptomycin (subtherapeutic vancomycin levels with q 8 hr dosing)    Recommendations / Plan:  Continue daptomycin 10 mg/kg IV q 24 hours.  Weekly CK while on daptomycin  Follow surgical cultures  Anticipate 8 weeks of IV antibiotics from date of surgery  Pt will require placement for IV abx, PT, rehab.   Will follow     Data reviewed and plan discussed with ID staff, Dr. Frost

## 2024-02-16 NOTE — SUBJECTIVE & OBJECTIVE
Interval History:     No acute events overnight.  Postop day 1.  Mechanical back pain was resolved.  Only complaining of surgical pain.  Out of bed today with physical therapy.  Intraop cultures no growth to date.  On antibiotic    Medications:  Continuous Infusions:  Scheduled Meds:   acetaminophen  1,000 mg Oral Q8H    cefTRIAXone (Rocephin) IV (PEDS and ADULTS)  2 g Intravenous Q24H    cloNIDine  0.1 mg Oral Q12H    enoxparin  40 mg Subcutaneous Daily    mirtazapine  15 mg Oral QHS    nicotine  1 patch Transdermal Daily    vancomycin (VANCOCIN) IV (PEDS and ADULTS)  20 mg/kg Intravenous Q12H     PRN Meds:acetaminophen, dextrose 10%, dextrose 10%, dicyclomine, glucagon (human recombinant), glucose, glucose, HYDROmorphone, hydrOXYzine HCL, ibuprofen, loperamide, melatonin, naloxone, oxyCODONE, oxyCODONE, promethazine, sodium chloride 0.9%, Pharmacy to dose Vancomycin consult **AND** vancomycin - pharmacy to dose     Review of Systems  Objective:     Weight: 76.7 kg (169 lb 1.5 oz)  Body mass index is 23.59 kg/m².  Vital Signs (Most Recent):  Temp: 98.7 °F (37.1 °C) (02/11/24 0609)  Pulse: 72 (02/11/24 0609)  Resp: 18 (02/11/24 0609)  BP: 119/63 (02/11/24 0609)  SpO2: 100 % (02/11/24 0609) Vital Signs (24h Range):  Temp:  [96.6 °F (35.9 °C)-99.4 °F (37.4 °C)] 98.7 °F (37.1 °C)  Pulse:  [65-75] 72  Resp:  [17-20] 18  SpO2:  [99 %-100 %] 100 %  BP: (113-132)/(52-63) 119/63          Physical Exam  General: well developed, well nourished, no distress.   Head: normocephalic, atraumatic  Mental Status: Awake, Alert, Oriented  Speech: Clear with content appropriate to conversation  Cranial nerves: face symmetric, CN II-XII grossly intact.   Sensory: intact to light touch throughout  Motor Strength: Motor Strength: Moves all extremities spontaneously with good tone. No abnormal movements seen. Pain limited weakness b/l hip flexors.   DF/EHL/PF 4 on R and  4+ L  Wound VAC in place  Hemovac in place      Significant  "Labs:  Recent Labs   Lab 02/10/24  0526   GLU 98      K 4.7      CO2 26   BUN 20   CREATININE 0.9   CALCIUM 8.8       Recent Labs   Lab 02/10/24  0526   WBC 6.92   HGB 9.6*   HCT 32.9*   *       No results for input(s): "LABPT", "INR", "APTT" in the last 48 hours.  Microbiology Results (last 7 days)       Procedure Component Value Units Date/Time    Blood culture #1 **CANNOT BE ORDERED STAT** [[id number]] Collected: 02/04/24 1853    Order Status: Completed Specimen: Blood from Peripheral, Forearm, Left Updated: 02/09/24 2012     Blood Culture, Routine No growth after 5 days.    Blood culture #2 **CANNOT BE ORDERED STAT** [[id number]] Collected: 02/04/24 1822    Order Status: Completed Specimen: Blood from Peripheral, Forearm, Left Updated: 02/09/24 2012     Blood Culture, Routine No growth after 5 days.    Aerobic culture [[id number]]  (Abnormal) Collected: 02/07/24 1423    Order Status: Completed Specimen: Abscess from Back Updated: 02/09/24 1138     Aerobic Bacterial Culture STAPHYLOCOCCUS AUREUS  Rare  Susceptibility pending      Narrative:      L psoas    Culture, Anaerobe [[id number]] Collected: 02/07/24 1423    Order Status: Completed Specimen: Abscess from Back Updated: 02/09/24 0934     Anaerobic Culture Culture in progress    Narrative:      L psoas    AFB Culture & Smear [[id number]] Collected: 02/07/24 1413    Order Status: Completed Specimen: Joint Fluid from Back Updated: 02/09/24 0927     AFB Culture & Smear Culture in progress     AFB CULTURE STAIN No acid fast bacilli seen.    Aerobic culture [[id number]] Collected: 02/07/24 1412    Order Status: Completed Specimen: Bone from Back Updated: 02/09/24 0745     Aerobic Bacterial Culture No growth    Narrative:      Joint    Culture, Anaerobe [[id number]] Collected: 02/07/24 1411    Order Status: Completed Specimen: Joint Fluid from Back Updated: 02/09/24 0728     Anaerobic Culture Culture in progress    AFB Culture & Smear [[id number]] " Collected: 02/07/24 1423    Order Status: Completed Specimen: Abscess from Back Updated: 02/08/24 2127     AFB Culture & Smear Culture in progress     AFB CULTURE STAIN No acid fast bacilli seen.    Narrative:      L psoas    Gram stain [[id number]] Collected: 02/07/24 1413    Order Status: Completed Specimen: Joint Fluid from Back Updated: 02/08/24 0041     Gram Stain Result No WBC's      No organisms seen    Fungus culture [[id number]] Collected: 02/07/24 1413    Order Status: Sent Specimen: Joint Fluid from Back Updated: 02/07/24 2110    Gram stain [[id number]] Collected: 02/07/24 1423    Order Status: Completed Specimen: Abscess from Back Updated: 02/07/24 1934     Gram Stain Result Few WBC's      No organisms seen    Narrative:      L psoas    Fungus culture [[id number]] Collected: 02/07/24 1423    Order Status: Sent Specimen: Abscess from Back Updated: 02/07/24 1733    Culture, Anaerobe [[id number]]     Order Status: No result Specimen: Abscess from Sacrum     Aerobic culture [[id number]]     Order Status: No result Specimen: Abscess from Sacrum     AFB Culture & Smear [[id number]]     Order Status: No result Specimen: Abscess from Sacrum     Gram stain [[id number]]     Order Status: No result Specimen: Abscess from Sacrum     Fungus culture [[id number]]     Order Status: No result Specimen: Abscess from Sacrum     Culture, Anaerobe [[id number]]     Order Status: No result Specimen: Abscess     Aerobic culture [[id number]]     Order Status: No result Specimen: Abscess     Fungus culture [[id number]]     Order Status: No result Specimen: Abscess     AFB Culture & Smear [[id number]]     Order Status: No result Specimen: Abscess     Gram stain [[id number]]     Order Status: No result Specimen: Abscess           All pertinent labs from the last 24 hours have been reviewed.    Significant Diagnostics:  No results found in the last 24 hours.

## 2024-02-16 NOTE — OP NOTE
DATE OF SURGERY: 2/14/24     PREOPERATIVE DIAGNOSIS:  1. L5-S1 spondylodiscitis causing high grade spondylolisthesis and instability  2. Compressive epidural phlegmon at L5-S1  3. Septic left SI joint  4. Active IV drug abuse     POSTOPERATIVE DIAGNOSIS:  Same.     PROCEDURE PERFORMED:  1. Posterior spinal fusion, L4 to pelvis   2. Posterior segmental spinal fixation, L4 to S1 (DePuy [employer]ynthes)  3. Pelvic fixation with right S2AI screw and left iliac bolt (Depuy Synthes)  4. L5-S1 discectomy for debridement of discitis  5. Posterior lumbar interbody fusion, L5-S1  6. L5-S1 laminectomy for evacuation of epidural abscess  7.  Use of intraoperative fluoroscopy  8.  Use of intraoperative neuro-monitoring with triggered EMG  9. Use of intraoperative 3D Nikki neuronavigation (LoopX)  10. Infuse and cancellous chips bone grafting     SURGEON: Boy Pozo M.D.     ASSISTANT: Jed Greenberg M.D.     ANESTHESIA: GETA     ESTIMATED BLOOD LOSS: 400mL     COMPLICATIONS: None     DRAINS: Two deep Hemovacs and a Prevena incisional woundvac     SPECIMENS SENT: Cultures of L5-S1 disc space     FINDINGS: None     INDICATIONS:     28M IV drug abuser presents with axial low back pain and radicular right leg pain preventing ambulation. Imaging showed high grade spondylolisthesis at L5-S1 secondary to spondylodiscitis. I recommended the above procedure. R/B/A/I/M were reviewed in detail and the patient wished to proceed. All questions were answered and no guarantees were made.  .  PROCEDURE:     The patient was brought into the operating room where he was intubated and placed under general anesthesia without difficulty.  All lines were placed. He was repositioned prone onto the operating room table with appropriate padding all pressure points. The region of interest was localized with AP and lateral x-ray.  The skin was marked and the area was prepped and draped in the usual sterile fashion.  A timeout was performed prior to procedure.   20 mL's of lidocaine with epinephrine were injected in the skin.     A midline linear incision was made with a 10 blade from approximately L4 to sacrum.  Supra and subfascial midline dissection was carried out with Bovie electrocautery.  Subperiosteal dissection was carried out with Bovie electrocautery and Acosta elevators to expose the posterior elements from L4 to sacrum.  A pedicle finder was placed into the presumed right L4 pedicle and confirmed on lateral x-ray. Medial facetectomies were performed bilaterally at L4-5 through L5-S1 with the osteotome.       The neuro navigation array was docked onto the right PSIS throught a separate stab incision and a LoopX spin was acquired.  The intraoperative 3D Nikki images confirmed levels.  Using neuro-navigation pedicle screws were placed bilaterally from L4 to L5. All lumbar screws were stimulated with the neuromonitoring probe and found to stimulate within safe range. Bilateral pelvic fixation was achieved with a right S2AI screw and a left iliac bolt, using navigation. Spinal xrays confirmed good hardware position.      Attention was turned to performance of a posterior lumbar interbody fusion at L5-S1 from a right-sided approach. First an L5-S1 laminectomy for evacuation of an epidural abscess was performed in standard fashion to decompress the thecal sac and nerve roots. THIS DECOMPRESSION WAS MORE THAN WHAT WAS REQUIRED FOR AN INTERBODY FUSION. It was performed in standard fashion with the high speed drill and rongeurs. We peeled epidural phlegmon off of the thecal sac with a Penfield 1. Adequate decompression of the thecal sac and nerve roots were confirmed with Payne palpation.  Next,  a nerve root retractor was used to retract the thecal sac medially and expose the L5-S1 disc space.  Epidural veins were cauterized and divided.  An annulotomy was performed with a 15 blade.  A pituitary rongeur was used to remove disc material. An L5-S1 discectomy was performed  for sharp debridement of discitis using mone and curettes. The disc space was cultured and then irrigated with betadine. Interbody fusion was achieved at L5-S1 by placing a small infuse sponge into the discectomy defect.    Bilateral titanium rods were sized, contoured and reduced into the tulip heads. An iliac bolt side connector was placed on the left side. Set screws were tightened. Final xrays showed excellent position of all hardware.  The wound was copiously irrigated with sterile normal saline and a dilute Betadine solution. Exposed bony surfaces from L4 to sacrum were decorticated bilaterally with a high-speed drill.  Infuse and cancellous chips were placed posteriorly for arthrodesis from L4-S1. Two deep Hemovacs were placed.  A watertight fascial closure was achieved with interrupted 0 Vicryl sutures and a running Stratafix suture.  The soft tissues were closed in layers. Staples and vertical mattress nylon sutures were placed at the skin and a Prevena dressing was applied.     The patient appeared to tolerate the procedure well from a hemodynamic and neuromonitoring standpoint. I was present for all critical portions of the case, and at the end of the case all counts were correct. The patient was repositioned supine onto the hospital bed where he was extubated and allowed to emerge from anesthesia. He was sent to the PACU in stable condition for recovery.    JUSTIFICATION OF MODIFER 22: This was a complex lumbosacral fusion for high grade spondylolisthesis in an IV drug abuser with spondylodiscitis and instability. Inherent risks of the procedure and patient noncompliance with medical treatment are elevated over similar procedures.

## 2024-02-16 NOTE — PROGRESS NOTES
Aleksandr Bray - Neurosurgery ([facility])  Infectious Disease  Progress Note    Patient Name: Ton Donovan  MRN: [id number]  Admission Date: 2/4/2024  Length of Stay: 10 days  Attending Physician: Reese Schaefer MD  Primary Care Provider: No, Primary Doctor    Isolation Status: No active isolations  Assessment/Plan:      ID  * Osteomyelitis of vertebra of lumbosacral region      28 year old IV drug user with h/o of MRSA bacteremia and prior disseminated MRSA infection  with poor adherence with care (history of leaving AMA), readmitted 2/4 with progressive back pain.  Imaging significant for lumbosacral osteomyelitis with associated epidural abscess, osteomyelitis of sacrum & L iliac bone, probable septic arthritis of L SI joint & lower lumbar spine facet joints, and left iliopsoas abscess, abscess in superior rectal/presacral region and micro-abscesses in paraspinal muscles, pelvis, and left sacrum.  Blood cultures have been negative.  2D echo is negative.  Underwent IR aspiration of left SI joint and psoas on 2/7.  Cx + for MRSA.    Now POD#1 lumbar-pelvis fusion (2/14). Surgical cx from disc space showing Staph Aureus - susceptibilities pending. .       Afebrile, no leukocytosis.  Pain controlled.  Hemodynamically and neurologically stable.  Currently on IV daptomycin (subtherapeutic vancomycin levels with q 8 hr dosing)    Recommendations / Plan:  Continue daptomycin 10 mg/kg IV q 24 hours.  Weekly CK while on daptomycin  Follow surgical cultures  Anticipate 8 weeks of IV antibiotics from date of surgery  Pt will require placement for IV abx, PT, rehab.   Will follow     Data reviewed and plan discussed with ID staff, Dr. Frost        Thank you.   Please Secure Chat for any questions or concerns.  CAROL Majano, ANP-C  Infectious Disease         >50  minutes of total time was spent on this encounter, which includes face to face time and non-face to face time preparing to see the patient (eg, review of tests),  Obtaining and/or reviewing separately obtained history, documenting clinical information in the electronic or other health record, independently interpreting results (not separately reported) and communicating results to the patient/family/caregiver, or care coordination (not separately reported).     Subjective:     Principal Problem:Osteomyelitis of vertebra of lumbosacral region    HPI: 28M with h/o IVDU (injects heroin, last used 02/03), with associated complex infectious h/o of disseminated MRSA bacteremia, and multiple septic joints / abscesses, c/b poor adherence, and deferring care / leaving AMA.  - Pt returns now (02/04) for progression of chronic back pain with radiculopathy (numbness / pain) to legs B/L.     MRI L-spine / pelvis (02/04): showed osteomyelitis/discitis of L5-S1, osteomyelitis of sacrum & L iliac bone, probable septic arthritis of L SI joint & lower lumbar spine facet joints; worsening anterolisthesis of L5 with respect to S1 with increased size of epidural abscess at the level of L5-S1 and probable severe central canal stenosis at this level, along w/ additional abscesses in the L iliopsoas muscle, paraspinal muscles, pelvis, & L sacrum; -- the additional abscesses in the left iliopsoas muscle [3.4 x 1.7cm; with other micro-abscesses adjacent measuring 2.3cm), paraspinal muscles, pelvis, and left sacrum. Additional abscess in the superior rectal/presacral region measuring roughly 3.1 x 2.4 cm; and probable additional micro abscesses at left sacrum.  CT lumbar obtained with destructive changes at L5-S1.       -- Received single dose of Zosyn in ED on arrival, but given that pt HDS w/o sepsis, further abx held to optimize intra-op cx/pathology. Admitted to hospital medicine for further mgmt of osteomyelitis, septic joints & abscesses. Addiction medicine, along w/ NSGY, orthopedic surgery, IR & ID consulted.     Reports back pain is doing okay while supine, but exacerbated upon standing w/  assoc b/l sciatica & weakness w/ standing. Endorses diminished sensation of BLE (R>L) up to shins.   He denies bowel/bladder sx or saddle anesthesia. He denies focal weakness.    Pt off abx pending tentative spinal bx for path / cx; pt remains AF, VSS, HDS. CRP 33. Bld cxs NGTD.    Abx held pending intervention, if patient begins to show signs of sepsis, please start abx. ID consult.   Voiding spontaneously. Bladder scan prn.     Plan for OR 2/14 for L4-pelvis fusion with debridement. IR consulted for source control of SI / L psoas abscesses, tentative aspiration today (02/07) of L SI joint and L psoas. Orders placed for micro send of samples  (gram stain, AFB, fungal, aerobic, and anaerobic).            Interval History:   POD#1  Complaining of surgical pain  Surgical cx - Staph aureus - susceptibilities pending.     Review of Systems   Constitutional:  Negative for appetite change, chills, diaphoresis, fatigue and fever.   HENT:  Negative for congestion, mouth sores and sore throat.    Eyes:  Negative for pain.   Respiratory:  Negative for cough, chest tightness and shortness of breath.    Cardiovascular:  Negative for chest pain and leg swelling.   Gastrointestinal:  Negative for abdominal pain, nausea and vomiting.   Genitourinary:  Negative for difficulty urinating, dysuria and flank pain.   Musculoskeletal:  Positive for back pain. Negative for arthralgias, joint swelling, myalgias and neck pain.   Skin:  Negative for color change, rash and wound.   Neurological:  Positive for numbness.   Psychiatric/Behavioral:  Negative for confusion.      Objective:     Vital Signs (Most Recent):  Temp: 99 °F (37.2 °C) (02/15/24 1546)  Pulse: 91 (02/15/24 1546)  Resp: 18 (02/15/24 1912)  BP: (!) 126/56 (02/15/24 1546)  SpO2: 99 % (02/15/24 1546) Vital Signs (24h Range):  Temp:  [98.2 °F (36.8 °C)-99 °F (37.2 °C)] 99 °F (37.2 °C)  Pulse:  [65-91] 91  Resp:  [11-24] 18  SpO2:  [98 %-99 %] 99 %  BP: (102-126)/(54-59) 126/56      Weight: 77 kg (169 lb 12.1 oz)  Body mass index is 24.36 kg/m².    Estimated Creatinine Clearance: 139.4 mL/min (based on SCr of 0.8 mg/dL).     Physical Exam  Vitals and nursing note reviewed.   Constitutional:       General: He is not in acute distress.     Appearance: Normal appearance. He is not ill-appearing, toxic-appearing or diaphoretic.   HENT:      Head: Normocephalic.      Nose: No congestion.      Mouth/Throat:      Mouth: Mucous membranes are moist.   Eyes:      General: No scleral icterus.     Conjunctiva/sclera: Conjunctivae normal.   Cardiovascular:      Rate and Rhythm: Normal rate and regular rhythm.   Pulmonary:      Effort: Pulmonary effort is normal. No respiratory distress.   Abdominal:      General: Abdomen is flat. There is no distension.      Palpations: Abdomen is soft.   Musculoskeletal:         General: Tenderness present. No swelling.      Cervical back: Normal range of motion.      Right lower leg: No edema.      Left lower leg: No edema.      Comments: Bilateral accordion drains with serosanguinous drainage   Incisional wound vac in place   Skin:     General: Skin is warm and dry.   Neurological:      Mental Status: He is alert and oriented to person, place, and time.   Psychiatric:         Behavior: Behavior normal.         Thought Content: Thought content normal.          Significant Labs: Blood Culture:   Recent Labs   Lab 10/10/23  1827 10/14/23  1512 11/20/23  1155 02/04/24  1822 02/04/24  1853   LABBLOO Gram stain manas bottle: Gram positive cocci in clusters resembling Staph  Results called to and read back by: Dr. Childers  10/11/2023  11:26  Gram stain aer bottle: Gram positive cocci in clusters resembling Staph  Positive results previously called 10/11/2023  14:31  METHICILLIN RESISTANT STAPHYLOCOCCUS AUREUS  For susceptibility see order #[id number]  *  Gram stain aer bottle: Gram positive cocci in clusters resembling Staph  Results called to and read back by:   Susanaasa  10/11/2023  11:26  Gram stain manas bottle: Gram positive cocci in clusters resembling Staph  Positive results previously called 10/11/2023  12:57  METHICILLIN RESISTANT STAPHYLOCOCCUS AUREUS  ID consult required at [facility].  * Gram stain aer bottle: Gram positive cocci in clusters resembling Staph  Results called to and read back by: ADIS CAMEJO  10/15/2023  10:25  Gram stain manas bottle: Gram positive cocci in clusters resembling Staph  Positive results previously called 10/15/2023  METHICILLIN RESISTANT STAPHYLOCOCCUS AUREUS  ID consult required at [facility].  For susceptibility see order #[id number]  *  Gram stain aer bottle: Gram positive cocci in clusters resembling Staph  Results called to and read back by: ADIS CAMEJO  10/15/2023  10:25  Gram stain manas bottle: Gram positive cocci in clusters resembling Staph  Positive results previously called 10/15/2023  METHICILLIN RESISTANT STAPHYLOCOCCUS AUREUS  ID consult required at [facility].  * No growth after 5 days.  No growth after 5 days. No growth after 5 days. No growth after 5 days.       CBC:   Recent Labs   Lab 02/14/24  0555 02/15/24  0326   WBC 6.24 7.78   HGB 8.8* 8.8*   HCT 29.3* 29.6*    390       CMP:   Recent Labs   Lab 02/14/24  0555 02/15/24  0326   * 133*   K 4.8 4.9    102   CO2 25 24   GLU 97 140*   BUN 25* 26*   CREATININE 0.7 0.8   CALCIUM 9.6 8.9   ANIONGAP 7* 7*       Microbiology Results (last 7 days)       Procedure Component Value Units Date/Time    AFB Culture & Smear [[id number]] Collected: 02/14/24 1712    Order Status: Completed Specimen: Incision site from Back Updated: 02/15/24 1331     AFB CULTURE STAIN No acid fast bacilli seen.    Narrative:      Epidural infection culture lumbar spine    AFB Culture & Smear [[id number]] Collected: 02/14/24 1712    Order Status: Completed Specimen: Incision  site from Back Updated: 02/15/24 1331     AFB CULTURE STAIN No acid fast bacilli seen.    Narrative:      L5-S1 disc space    Aerobic culture [[id number]] Collected: 02/14/24 1712    Order Status: Completed Specimen: Incision site from Back Updated: 02/15/24 1241     Aerobic Bacterial Culture Insufficient incubation, culture in progress    Narrative:      Epidural infection culture lumbar spine    Aerobic culture [[id number]]  (Abnormal) Collected: 02/14/24 1712    Order Status: Completed Specimen: Incision site from Back Updated: 02/15/24 1239     Aerobic Bacterial Culture STAPHYLOCOCCUS AUREUS  Few  Susceptibility pending      Narrative:      L5-S1 disc space    Culture, Anaerobe [[id number]] Collected: 02/14/24 1712    Order Status: Completed Specimen: Incision site from Back Updated: 02/15/24 0734     Anaerobic Culture Culture in progress    Narrative:      Epidural infection culture lumbar spine    Culture, Anaerobe [[id number]] Collected: 02/14/24 1712    Order Status: Completed Specimen: Incision site from Back Updated: 02/15/24 0734     Anaerobic Culture Culture in progress    Narrative:      L5-S1 disc space    Gram stain [[id number]] Collected: 02/14/24 1712    Order Status: Completed Specimen: Incision site from Back Updated: 02/14/24 1955     Gram Stain Result Rare WBC's      No organisms seen    Narrative:      Epidural infection culture lumbar spine    Gram stain [[id number]] Collected: 02/14/24 1712    Order Status: Completed Specimen: Incision site from Back Updated: 02/14/24 1952     Gram Stain Result Rare WBC's      No organisms seen    Narrative:      L5-S1 disc space    Fungus culture [[id number]] Collected: 02/14/24 1712    Order Status: Sent Specimen: Incision site from Back Updated: 02/14/24 1739    Fungus culture [[id number]] Collected: 02/14/24 1712    Order Status: Sent Specimen: Incision site from Back Updated: 02/14/24 1738    Culture, Anaerobe [[id number]] Collected: 02/14/24 1712     Order Status: Canceled Specimen: Incision site from Back     Aerobic culture [[id number]] Collected: 02/14/24 1712    Order Status: Canceled Specimen: Incision site from Back     Fungus culture [[id number]] Collected: 02/14/24 1712    Order Status: Canceled Specimen: Incision site from Back     AFB Culture & Smear [[id number]] Collected: 02/14/24 1712    Order Status: Canceled Specimen: Incision site from Back     Gram stain [[id number]] Collected: 02/14/24 1712    Order Status: Canceled Specimen: Incision site from Back     Culture, Anaerobe [[id number]] Collected: 02/14/24 1712    Order Status: Canceled Specimen: Incision site from Back     Aerobic culture [[id number]] Collected: 02/14/24 1712    Order Status: Canceled Specimen: Incision site from Back     AFB Culture & Smear [[id number]] Collected: 02/14/24 1712    Order Status: Canceled Specimen: Incision site from Back     Gram stain [[id number]] Collected: 02/14/24 1712    Order Status: Canceled Specimen: Incision site from Back     Fungus culture [[id number]] Collected: 02/14/24 1712    Order Status: Canceled Specimen: Incision site from Back     Aerobic culture [[id number]]  (Abnormal)  (Susceptibility) Collected: 02/07/24 1423    Order Status: Completed Specimen: Abscess from Back Updated: 02/14/24 1243     Aerobic Bacterial Culture METHICILLIN RESISTANT STAPHYLOCOCCUS AUREUS  Rare      Narrative:      L psoas    Culture, Anaerobe [[id number]] Collected: 02/07/24 1423    Order Status: Completed Specimen: Abscess from Back Updated: 02/14/24 0922     Anaerobic Culture No anaerobes isolated    Narrative:      L psoas    Culture, Anaerobe [[id number]] Collected: 02/07/24 1411    Order Status: Completed Specimen: Joint Fluid from Back Updated: 02/14/24 0908     Anaerobic Culture No anaerobes isolated    Fungus culture [[id number]] Collected: 02/07/24 1423    Order Status: Completed Specimen: Abscess from Back Updated: 02/14/24 0547     Fungus (Mycology) Culture  Culture in progress    Narrative:      L psoas    Fungus culture [[id number]] Collected: 02/07/24 1413    Order Status: Completed Specimen: Joint Fluid from Back Updated: 02/14/24 0544     Fungus (Mycology) Culture Culture in progress    Aerobic culture [[id number]] Collected: 02/07/24 1412    Order Status: Completed Specimen: Bone from Back Updated: 02/12/24 0750     Aerobic Bacterial Culture No growth    Narrative:      Joint    Blood culture #1 **CANNOT BE ORDERED STAT** [[id number]] Collected: 02/04/24 1853    Order Status: Completed Specimen: Blood from Peripheral, Forearm, Left Updated: 02/09/24 2012     Blood Culture, Routine No growth after 5 days.    Blood culture #2 **CANNOT BE ORDERED STAT** [[id number]] Collected: 02/04/24 1822    Order Status: Completed Specimen: Blood from Peripheral, Forearm, Left Updated: 02/09/24 2012     Blood Culture, Routine No growth after 5 days.    AFB Culture & Smear [[id number]] Collected: 02/07/24 1413    Order Status: Completed Specimen: Joint Fluid from Back Updated: 02/09/24 0927     AFB Culture & Smear Culture in progress     AFB CULTURE STAIN No acid fast bacilli seen.    AFB Culture & Smear [[id number]] Collected: 02/07/24 1423    Order Status: Completed Specimen: Abscess from Back Updated: 02/08/24 2127     AFB Culture & Smear Culture in progress     AFB CULTURE STAIN No acid fast bacilli seen.    Narrative:      L psoas          Pathology Results  (Last 10 years)      None          All pertinent labs within the past 24 hours have been reviewed.    Significant Imaging: I have reviewed all pertinent imaging results/findings within the past 24 hours.    I have personally reviewed records / hospital notes from   service and other specialty providers. I have also reviewed CBC, CMP/BMP,  cultures and imaging with my interpretation as documented in my assessment / plan.    Patient is high risk for infectious complications given pt's age, multiple co-morbidities, and case  complexity.      Time: 50 minutes   50% of time spent on face-to-face counseling and coordination of care. Counseling included review of test results, diagnosis, and treatment plan with patient and/or family.

## 2024-02-17 LAB
ANION GAP SERPL CALC-SCNC: 7 MMOL/L (ref 8–16)
BASOPHILS # BLD AUTO: 0.02 K/UL (ref 0–0.2)
BASOPHILS NFR BLD: 0.2 % (ref 0–1.9)
BUN SERPL-MCNC: 18 MG/DL (ref 6–20)
CALCIUM SERPL-MCNC: 9.2 MG/DL (ref 8.7–10.5)
CHLORIDE SERPL-SCNC: 103 MMOL/L (ref 95–110)
CO2 SERPL-SCNC: 25 MMOL/L (ref 23–29)
CREAT SERPL-MCNC: 0.7 MG/DL (ref 0.5–1.4)
DIFFERENTIAL METHOD BLD: ABNORMAL
EOSINOPHIL # BLD AUTO: 0.1 K/UL (ref 0–0.5)
EOSINOPHIL NFR BLD: 0.8 % (ref 0–8)
ERYTHROCYTE [DISTWIDTH] IN BLOOD BY AUTOMATED COUNT: 20.3 % (ref 11.5–14.5)
EST. GFR  (NO RACE VARIABLE): >60 ML/MIN/1.73 M^2
GLUCOSE SERPL-MCNC: 140 MG/DL (ref 70–110)
HCT VFR BLD AUTO: 24.9 % (ref 40–54)
HGB BLD-MCNC: 7.4 G/DL (ref 14–18)
IMM GRANULOCYTES # BLD AUTO: 0.03 K/UL (ref 0–0.04)
IMM GRANULOCYTES NFR BLD AUTO: 0.3 % (ref 0–0.5)
LYMPHOCYTES # BLD AUTO: 2.4 K/UL (ref 1–4.8)
LYMPHOCYTES NFR BLD: 26.5 % (ref 18–48)
MCH RBC QN AUTO: 21 PG (ref 27–31)
MCHC RBC AUTO-ENTMCNC: 29.7 G/DL (ref 32–36)
MCV RBC AUTO: 71 FL (ref 82–98)
MONOCYTES # BLD AUTO: 0.8 K/UL (ref 0.3–1)
MONOCYTES NFR BLD: 8.4 % (ref 4–15)
NEUTROPHILS # BLD AUTO: 5.7 K/UL (ref 1.8–7.7)
NEUTROPHILS NFR BLD: 63.8 % (ref 38–73)
NRBC BLD-RTO: 0 /100 WBC
PLATELET # BLD AUTO: 343 K/UL (ref 150–450)
PMV BLD AUTO: 8.6 FL (ref 9.2–12.9)
POTASSIUM SERPL-SCNC: 4 MMOL/L (ref 3.5–5.1)
RBC # BLD AUTO: 3.53 M/UL (ref 4.6–6.2)
SODIUM SERPL-SCNC: 135 MMOL/L (ref 136–145)
WBC # BLD AUTO: 8.89 K/UL (ref 3.9–12.7)

## 2024-02-17 PROCEDURE — S4991 NICOTINE PATCH NONLEGEND: HCPCS | Performed by: HOSPITALIST

## 2024-02-17 PROCEDURE — 25000003 PHARM REV CODE 250: Performed by: STUDENT IN AN ORGANIZED HEALTH CARE EDUCATION/TRAINING PROGRAM

## 2024-02-17 PROCEDURE — 25000003 PHARM REV CODE 250: Performed by: HOSPITALIST

## 2024-02-17 PROCEDURE — 63600175 PHARM REV CODE 636 W HCPCS: Performed by: HOSPITALIST

## 2024-02-17 PROCEDURE — 63600175 PHARM REV CODE 636 W HCPCS: Performed by: STUDENT IN AN ORGANIZED HEALTH CARE EDUCATION/TRAINING PROGRAM

## 2024-02-17 PROCEDURE — 36415 COLL VENOUS BLD VENIPUNCTURE: CPT | Performed by: STUDENT IN AN ORGANIZED HEALTH CARE EDUCATION/TRAINING PROGRAM

## 2024-02-17 PROCEDURE — 25000003 PHARM REV CODE 250: Performed by: NURSE PRACTITIONER

## 2024-02-17 PROCEDURE — 85025 COMPLETE CBC W/AUTO DIFF WBC: CPT | Performed by: STUDENT IN AN ORGANIZED HEALTH CARE EDUCATION/TRAINING PROGRAM

## 2024-02-17 PROCEDURE — 11000001 HC ACUTE MED/SURG PRIVATE ROOM

## 2024-02-17 PROCEDURE — 80048 BASIC METABOLIC PNL TOTAL CA: CPT | Performed by: STUDENT IN AN ORGANIZED HEALTH CARE EDUCATION/TRAINING PROGRAM

## 2024-02-17 PROCEDURE — 63600175 PHARM REV CODE 636 W HCPCS: Performed by: NURSE PRACTITIONER

## 2024-02-17 RX ORDER — HYDROMORPHONE HYDROCHLORIDE 1 MG/ML
1 INJECTION, SOLUTION INTRAMUSCULAR; INTRAVENOUS; SUBCUTANEOUS EVERY 6 HOURS PRN
Status: DISCONTINUED | OUTPATIENT
Start: 2024-02-17 | End: 2024-02-18

## 2024-02-17 RX ADMIN — DAPTOMYCIN 765 MG: 350 INJECTION, POWDER, LYOPHILIZED, FOR SOLUTION INTRAVENOUS at 03:02

## 2024-02-17 RX ADMIN — HYDROMORPHONE HYDROCHLORIDE 1 MG: 1 INJECTION, SOLUTION INTRAMUSCULAR; INTRAVENOUS; SUBCUTANEOUS at 04:02

## 2024-02-17 RX ADMIN — ACETAMINOPHEN 1000 MG: 500 TABLET ORAL at 10:02

## 2024-02-17 RX ADMIN — HYDROMORPHONE HYDROCHLORIDE 1 MG: 1 INJECTION, SOLUTION INTRAMUSCULAR; INTRAVENOUS; SUBCUTANEOUS at 11:02

## 2024-02-17 RX ADMIN — HYDROMORPHONE HYDROCHLORIDE 1 MG: 1 INJECTION, SOLUTION INTRAMUSCULAR; INTRAVENOUS; SUBCUTANEOUS at 10:02

## 2024-02-17 RX ADMIN — OXYCODONE HYDROCHLORIDE 10 MG: 10 TABLET ORAL at 09:02

## 2024-02-17 RX ADMIN — ACETAMINOPHEN 1000 MG: 500 TABLET ORAL at 01:02

## 2024-02-17 RX ADMIN — METHOCARBAMOL 1000 MG: 100 INJECTION INTRAMUSCULAR; INTRAVENOUS at 02:02

## 2024-02-17 RX ADMIN — DIAZEPAM 5 MG: 5 TABLET ORAL at 01:02

## 2024-02-17 RX ADMIN — OXYCODONE HYDROCHLORIDE 10 MG: 10 TABLET ORAL at 02:02

## 2024-02-17 RX ADMIN — OXYCODONE HYDROCHLORIDE 10 MG: 10 TABLET ORAL at 03:02

## 2024-02-17 RX ADMIN — CLONIDINE HYDROCHLORIDE 0.1 MG: 0.1 TABLET ORAL at 08:02

## 2024-02-17 RX ADMIN — OXYCODONE HYDROCHLORIDE 10 MG: 10 TABLET ORAL at 08:02

## 2024-02-17 RX ADMIN — ACETAMINOPHEN 1000 MG: 500 TABLET ORAL at 05:02

## 2024-02-17 RX ADMIN — DIAZEPAM 5 MG: 5 TABLET ORAL at 05:02

## 2024-02-17 RX ADMIN — HYDROMORPHONE HYDROCHLORIDE 1 MG: 1 INJECTION, SOLUTION INTRAMUSCULAR; INTRAVENOUS; SUBCUTANEOUS at 05:02

## 2024-02-17 RX ADMIN — DIAZEPAM 5 MG: 5 TABLET ORAL at 10:02

## 2024-02-17 RX ADMIN — NICOTINE 1 PATCH: 14 PATCH, EXTENDED RELEASE TRANSDERMAL at 10:02

## 2024-02-17 RX ADMIN — MIRTAZAPINE 15 MG: 15 TABLET, FILM COATED ORAL at 08:02

## 2024-02-17 RX ADMIN — ENOXAPARIN SODIUM 40 MG: 40 INJECTION SUBCUTANEOUS at 04:02

## 2024-02-17 RX ADMIN — HYDROMORPHONE HYDROCHLORIDE 1 MG: 1 INJECTION, SOLUTION INTRAMUSCULAR; INTRAVENOUS; SUBCUTANEOUS at 12:02

## 2024-02-17 NOTE — PROGRESS NOTES
"Aleksandr Bray - Neurosurgery ([facility])  [facility] Medicine  Progress Note    Patient Name: Ton Donovan  MRN: [id number]  Patient Class: IP- Inpatient   Admission Date: 2/4/2024  Length of Stay: 12 days  Attending Physician: Reese Schaefer MD  Primary Care Provider: Emily, Primary Doctor        Subjective:     Principal Problem:Osteomyelitis of vertebra of lumbosacral region        HPI:  Ton Donovan is a 28-year-old male with PMHx of IVDU w/ assoc complications of MRSA bacteremia, multiple septic joints & abscesses, who presented 2/4/24 with progressive back pain with radiation down his legs b/l. Endorses bilateral numbness & neuropathic pain over the dorsum of his feet. This has been ongoing and is not necessarily a new problem, but has been worsening. Reports "pins & needles"/paresthesias down his legs that have been progressing, although laying on his stomach seems to provide significant relief. Admits to continued active IV heroin use; last used just prior to EMS arrival. Denies any fevers, chills, night sweats, cough, or shortness of breath. Denies any bowel or bladder incontinence/retention or saddle anesthesia.     On arrival, HDS & 0/4 SIRS. MRI showed osteomyelitis/discitis of L5-S1, osteomyelitis of sacrum & L iliac bone, probable septic arthritis of L SI joint & lower lumbar spine facet joints, worsening anterolisthesis of L5 with respect to S1 with increased size of epidural abscess at the level of L5-S1 and probable severe central canal stenosis at this level, along w/ additional abscesses in the L iliopsoas muscle, paraspinal muscles, pelvis, & L sacrum. Received single dose of Zosyn in ED on arrival, but given that pt HDS w/o sepsis, further abx held to optimize intra-op cx/pathology. Admitted to hospital medicine for further mgmt of osteomyelitis, septic joints & abscesses.    Overview/Hospital Course:  Addiction medicine, along w/ NSGY, orthopedic surgery, IR & ID consulted. Underwent aspiration of " psoas abscess per IR on 2/7. Aspiration of SI joint attempted but unable to aspirate hardly any fluid. Underwent LOOP X FUSION, SPINE, LUMBAR and epidural phlegmon debridement, intra-op cx's growing MRSA. Echo unremarkable    Interval History:  No chest pain, no shortness a breath.  Patient reports bothersome back pain, he does affirm heavy heroin use prior to hospitalization.    Review of Systems  Objective:     Vital Signs (Most Recent):  Temp: 98.1 °F (36.7 °C) (02/17/24 1117)  Pulse: 81 (02/17/24 1117)  Resp: 15 (02/17/24 1126)  BP: (!) 126/56 (02/17/24 1117)  SpO2: 100 % (02/17/24 1117) Vital Signs (24h Range):  Temp:  [98.1 °F (36.7 °C)-99.3 °F (37.4 °C)] 98.1 °F (36.7 °C)  Pulse:  [79-98] 81  Resp:  [15-18] 15  SpO2:  [99 %-100 %] 100 %  BP: (111-126)/(56-58) 126/56     Weight: 77 kg (169 lb 12.1 oz)  Body mass index is 24.36 kg/m².    Intake/Output Summary (Last 24 hours) at 2/17/2024 1211  Last data filed at 2/17/2024 0618  Gross per 24 hour   Intake 240 ml   Output 2605 ml   Net -2365 ml         Physical Exam      Clear lungs bilaterally, unlabored breathing, on room air, no cyanosis  Heart sounds indicate a regular rate and rhythm  Awake alert, no acute distress   No facial droop, no slurred speech   5/5 proximal upper and lower extremity strength bilaterally including fist  and hip flexor strength   No obvious lower extremity edema    Assessment/Plan:      * Osteomyelitis of vertebra of lumbosacral region  Osteomyelitis, discitis, & myositis of lumbosacral region  Septic arthritis of SI joint(s)  Epidural abscesses   Multiple abscesses of pelvis, sacrum, psoas, iliacus, & paraspinals  Lower back pain w/ b/l sciatica   Severe spinal canal stenosis   MRI: osteomyelitis/discitis of L5-S1, osteomyelitis of sacrum & L iliac bone, probable septic arthritis of L SI joint & lower lumbar spine facet joints, worsening anterolisthesis of L5 with respect to S1 with increased size of epidural abscess at the level  of L5-S1 and probable severe central canal stenosis at this level, along w/ additional abscesses in the L iliopsoas muscle, paraspinal muscles, pelvis, & L sacrum.    - Underwent  2/14/24 epidural abscess/phlegmon debridement and LOOP X FUSION, SPINE, LUMBAR w/   - Left sacroiliac joint aspiration  on 2/7 by IR growing MRSA  - Ortho consulted for mgmt of septic SI joint; appreciate recs/assistance  - MRSA growing from multiple sites;  --> switched to daptomycin 8 week course from surg date per ID    L5-S2 epidural phlegmon  See above; intra-op cx's growing MRSA      Housing insecurity  Previously worked at [email or web address] & held down job in the past but started using heroin last year following multiple family deaths (mother, aunt & grandmother) in a short period of time. Subsequently lost housing & has been living on streets since then.   - SW/CM    Substance or medication-induced depressive disorder  Pt endorses sx of depression, no SI/SA.   -per psych, mirtazapine 15mg QHS    Spinal stenosis of lumbar region without neurogenic claudication  See osteomyelitis      IVDU (intravenous drug user)  Heroin abuse w/ opioid withdrawal   PSDU  Tobacco use   Known hx of IVDU (heroin) w/ extensive assoc medical complications (discussed above). No recent UDS (only one on file from July 2023- positive for opiates & THC) and none collected on admission. Endorses daily IV heroin use (1g/day); last used just prior to calling EMS. Active tobacco use, but denies EtOH or other illicit substance use. Endorsing sx of opioid w/d.   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,   -  fluid restriction; protein intake encouraged      VTE Risk Mitigation (From admission, onward)           Ordered     enoxaparin injection 40 mg  Every 24 hours         02/14/24 1851     Place sequential compression device  Until discontinued         02/05/24 0126     IP VTE HIGH RISK PATIENT  Once         02/05/24 0126                    Discharge Planning   SENA: 2/19/2024     Code Status: Full Code   Is the patient medically ready for discharge?: Yes    Reason for patient still in hospital (select all that apply): Patient trending condition and Pending disposition  Discharge Plan A: Long-term acute care facility (LTAC)   Discharge Delays: None known at this time              Reese Schaefer MD  Department of Hospital Medicine   [facility] - Neurosurgery ([facility])

## 2024-02-17 NOTE — PROGRESS NOTES
Aleksandr Brya - Neurosurgery ([facility])  Neurosurgery  Progress Note    Subjective:     History of Present Illness: 28M w/ hx MRSA bactermia, IVDU, pelvic/intra-abdominal abscesses who presents with progressive back pain with radiation down the leg over the past couple of weeks.  He has had bilateral numbness of his anterior thigh as well. He denies bowel/bladder sx or saddle anesthesia. He denies focal weakness. Per chart review, the patient is actively using heroin and did an injection prior to being taken in by EMS.      Post-Op Info:  Procedure(s) (LRB):  LOOP X FUSION, SPINE, LUMBAR (N/A)   2 Days Post-Op   Interval History:     Postop day 2.  Only surgical back pain  Out of bed today with physical therapy.    Intraop cultures grew staph.  On antibiotic    Medications:  Continuous Infusions:  Scheduled Meds:   acetaminophen  1,000 mg Oral Q8H    cefTRIAXone (Rocephin) IV (PEDS and ADULTS)  2 g Intravenous Q24H    cloNIDine  0.1 mg Oral Q12H    enoxparin  40 mg Subcutaneous Daily    mirtazapine  15 mg Oral QHS    nicotine  1 patch Transdermal Daily    vancomycin (VANCOCIN) IV (PEDS and ADULTS)  20 mg/kg Intravenous Q12H     PRN Meds:acetaminophen, dextrose 10%, dextrose 10%, dicyclomine, glucagon (human recombinant), glucose, glucose, HYDROmorphone, hydrOXYzine HCL, ibuprofen, loperamide, melatonin, naloxone, oxyCODONE, oxyCODONE, promethazine, sodium chloride 0.9%, Pharmacy to dose Vancomycin consult **AND** vancomycin - pharmacy to dose     Review of Systems  Objective:     Weight: 76.7 kg (169 lb 1.5 oz)  Body mass index is 23.59 kg/m².  Vital Signs (Most Recent):  Temp: 98.7 °F (37.1 °C) (02/11/24 0609)  Pulse: 72 (02/11/24 0609)  Resp: 18 (02/11/24 0609)  BP: 119/63 (02/11/24 0609)  SpO2: 100 % (02/11/24 0609) Vital Signs (24h Range):  Temp:  [96.6 °F (35.9 °C)-99.4 °F (37.4 °C)] 98.7 °F (37.1 °C)  Pulse:  [65-75] 72  Resp:  [17-20] 18  SpO2:  [99 %-100 %] 100 %  BP: (113-132)/(52-63) 119/63          Physical  "Exam  General: well developed, well nourished, no distress.   Head: normocephalic, atraumatic  Mental Status: Awake, Alert, Oriented  Speech: Clear with content appropriate to conversation  Cranial nerves: face symmetric, CN II-XII grossly intact.   Sensory: intact to light touch throughout  Motor Strength: Motor Strength: Moves all extremities spontaneously with good tone. No abnormal movements seen. Pain limited weakness b/l hip flexors.   DF/EHL/PF 4 on R and  4+ L  Wound VAC in place  Hemovac in place      Significant Labs:  Recent Labs   Lab 02/10/24  0526   GLU 98      K 4.7      CO2 26   BUN 20   CREATININE 0.9   CALCIUM 8.8       Recent Labs   Lab 02/10/24  0526   WBC 6.92   HGB 9.6*   HCT 32.9*   *       No results for input(s): "LABPT", "INR", "APTT" in the last 48 hours.  Microbiology Results (last 7 days)       Procedure Component Value Units Date/Time    Blood culture #1 **CANNOT BE ORDERED STAT** [[id number]] Collected: 02/04/24 1853    Order Status: Completed Specimen: Blood from Peripheral, Forearm, Left Updated: 02/09/24 2012     Blood Culture, Routine No growth after 5 days.    Blood culture #2 **CANNOT BE ORDERED STAT** [[id number]] Collected: 02/04/24 1822    Order Status: Completed Specimen: Blood from Peripheral, Forearm, Left Updated: 02/09/24 2012     Blood Culture, Routine No growth after 5 days.    Aerobic culture [[id number]]  (Abnormal) Collected: 02/07/24 1423    Order Status: Completed Specimen: Abscess from Back Updated: 02/09/24 1138     Aerobic Bacterial Culture STAPHYLOCOCCUS AUREUS  Rare  Susceptibility pending      Narrative:      L psoas    Culture, Anaerobe [[id number]] Collected: 02/07/24 1423    Order Status: Completed Specimen: Abscess from Back Updated: 02/09/24 0934     Anaerobic Culture Culture in progress    Narrative:      L psoas    AFB Culture & Smear [[id number]] Collected: 02/07/24 1413    Order Status: Completed Specimen: Joint Fluid from Back " Updated: 02/09/24 0927     AFB Culture & Smear Culture in progress     AFB CULTURE STAIN No acid fast bacilli seen.    Aerobic culture [[id number]] Collected: 02/07/24 1412    Order Status: Completed Specimen: Bone from Back Updated: 02/09/24 0745     Aerobic Bacterial Culture No growth    Narrative:      Joint    Culture, Anaerobe [[id number]] Collected: 02/07/24 1411    Order Status: Completed Specimen: Joint Fluid from Back Updated: 02/09/24 0728     Anaerobic Culture Culture in progress    AFB Culture & Smear [[id number]] Collected: 02/07/24 1423    Order Status: Completed Specimen: Abscess from Back Updated: 02/08/24 2127     AFB Culture & Smear Culture in progress     AFB CULTURE STAIN No acid fast bacilli seen.    Narrative:      L psoas    Gram stain [[id number]] Collected: 02/07/24 1413    Order Status: Completed Specimen: Joint Fluid from Back Updated: 02/08/24 0041     Gram Stain Result No WBC's      No organisms seen    Fungus culture [[id number]] Collected: 02/07/24 1413    Order Status: Sent Specimen: Joint Fluid from Back Updated: 02/07/24 2110    Gram stain [[id number]] Collected: 02/07/24 1423    Order Status: Completed Specimen: Abscess from Back Updated: 02/07/24 1934     Gram Stain Result Few WBC's      No organisms seen    Narrative:      L psoas    Fungus culture [[id number]] Collected: 02/07/24 1423    Order Status: Sent Specimen: Abscess from Back Updated: 02/07/24 1733    Culture, Anaerobe [[id number]]     Order Status: No result Specimen: Abscess from Sacrum     Aerobic culture [[id number]]     Order Status: No result Specimen: Abscess from Sacrum     AFB Culture & Smear [[id number]]     Order Status: No result Specimen: Abscess from Sacrum     Gram stain [[id number]]     Order Status: No result Specimen: Abscess from Sacrum     Fungus culture [[id number]]     Order Status: No result Specimen: Abscess from Sacrum     Culture, Anaerobe [[id number]]     Order Status: No result Specimen:  Abscess     Aerobic culture [[id number]]     Order Status: No result Specimen: Abscess     Fungus culture [[id number]]     Order Status: No result Specimen: Abscess     AFB Culture & Smear [[id number]]     Order Status: No result Specimen: Abscess     Gram stain [[id number]]     Order Status: No result Specimen: Abscess           All pertinent labs from the last 24 hours have been reviewed.    Significant Diagnostics:  No results found in the last 24 hours  Assessment/Plan:     * Osteomyelitis of vertebra of lumbosacral region  28M w/ hx MRSA bactermia, IVDU, pelvic/intra-abdominal abscesses who presents with progressive back pain with radiation down the leg over the past couple of weeks.     - All pertinent labs and imaging reviewed  - 2/4 ESR 75, CRP 33.7. Blood cx NGTD.   - Back culture 2/7 with staph aureus  - MRI lumbar w/wo 2/4: L5-S1 osteodiscitis extending into the sacrum, left iliac bone, SI joint, facet joints. Anterolisthesis L5-S1, epidural abscess L5-S1 with canal stenosis. Left psoas and paraspinal muscle abscesses. No clinical evidence of cauda equina syndrome.   - CT lumbar obtained with destructive changes at L5-S1.    Now he is status post L4 to pelvis fusion  on 02/14/2024   Postop day 2      Continue floor care with vital and neurocheck per protocol  Multimodal pain medications  Continue HV in place to full suction  L spine xray satisfactory    Sating well at RA  Blood pressure goal normotensive  Advance diet as tolerated  H/H 8/27, CTM  Intraop cultures grew staph.  ID team following appreciate recs.  Continue antibiotic per ID team  SubQ heparin for DVT prophylaxis  PTOT  LSO brace when out of bed  SW/CM for discharge planning and DME    Seen and discussed with Dr.Kalyvas Allison Wallace MD  Neurosurgery  Aleksandr lakesha - Neurosurgery ([facility])

## 2024-02-17 NOTE — SUBJECTIVE & OBJECTIVE
Interval History:     Postop day 2.  Only surgical back pain  Out of bed today with physical therapy.    Intraop cultures grew staph.  On antibiotic    Medications:  Continuous Infusions:  Scheduled Meds:   acetaminophen  1,000 mg Oral Q8H    cefTRIAXone (Rocephin) IV (PEDS and ADULTS)  2 g Intravenous Q24H    cloNIDine  0.1 mg Oral Q12H    enoxparin  40 mg Subcutaneous Daily    mirtazapine  15 mg Oral QHS    nicotine  1 patch Transdermal Daily    vancomycin (VANCOCIN) IV (PEDS and ADULTS)  20 mg/kg Intravenous Q12H     PRN Meds:acetaminophen, dextrose 10%, dextrose 10%, dicyclomine, glucagon (human recombinant), glucose, glucose, HYDROmorphone, hydrOXYzine HCL, ibuprofen, loperamide, melatonin, naloxone, oxyCODONE, oxyCODONE, promethazine, sodium chloride 0.9%, Pharmacy to dose Vancomycin consult **AND** vancomycin - pharmacy to dose     Review of Systems  Objective:     Weight: 76.7 kg (169 lb 1.5 oz)  Body mass index is 23.59 kg/m².  Vital Signs (Most Recent):  Temp: 98.7 °F (37.1 °C) (02/11/24 0609)  Pulse: 72 (02/11/24 0609)  Resp: 18 (02/11/24 0609)  BP: 119/63 (02/11/24 0609)  SpO2: 100 % (02/11/24 0609) Vital Signs (24h Range):  Temp:  [96.6 °F (35.9 °C)-99.4 °F (37.4 °C)] 98.7 °F (37.1 °C)  Pulse:  [65-75] 72  Resp:  [17-20] 18  SpO2:  [99 %-100 %] 100 %  BP: (113-132)/(52-63) 119/63          Physical Exam  General: well developed, well nourished, no distress.   Head: normocephalic, atraumatic  Mental Status: Awake, Alert, Oriented  Speech: Clear with content appropriate to conversation  Cranial nerves: face symmetric, CN II-XII grossly intact.   Sensory: intact to light touch throughout  Motor Strength: Motor Strength: Moves all extremities spontaneously with good tone. No abnormal movements seen. Pain limited weakness b/l hip flexors.   DF/EHL/PF 4 on R and  4+ L  Wound VAC in place  Hemovac in place      Significant Labs:  Recent Labs   Lab 02/10/24  0526   GLU 98      K 4.7      CO2 26   BUN  "20   CREATININE 0.9   CALCIUM 8.8       Recent Labs   Lab 02/10/24  0526   WBC 6.92   HGB 9.6*   HCT 32.9*   *       No results for input(s): "LABPT", "INR", "APTT" in the last 48 hours.  Microbiology Results (last 7 days)       Procedure Component Value Units Date/Time    Blood culture #1 **CANNOT BE ORDERED STAT** [[id number]] Collected: 02/04/24 1853    Order Status: Completed Specimen: Blood from Peripheral, Forearm, Left Updated: 02/09/24 2012     Blood Culture, Routine No growth after 5 days.    Blood culture #2 **CANNOT BE ORDERED STAT** [[id number]] Collected: 02/04/24 1822    Order Status: Completed Specimen: Blood from Peripheral, Forearm, Left Updated: 02/09/24 2012     Blood Culture, Routine No growth after 5 days.    Aerobic culture [[id number]]  (Abnormal) Collected: 02/07/24 1423    Order Status: Completed Specimen: Abscess from Back Updated: 02/09/24 1138     Aerobic Bacterial Culture STAPHYLOCOCCUS AUREUS  Rare  Susceptibility pending      Narrative:      L psoas    Culture, Anaerobe [[id number]] Collected: 02/07/24 1423    Order Status: Completed Specimen: Abscess from Back Updated: 02/09/24 0934     Anaerobic Culture Culture in progress    Narrative:      L psoas    AFB Culture & Smear [[id number]] Collected: 02/07/24 1413    Order Status: Completed Specimen: Joint Fluid from Back Updated: 02/09/24 0927     AFB Culture & Smear Culture in progress     AFB CULTURE STAIN No acid fast bacilli seen.    Aerobic culture [[id number]] Collected: 02/07/24 1412    Order Status: Completed Specimen: Bone from Back Updated: 02/09/24 0745     Aerobic Bacterial Culture No growth    Narrative:      Joint    Culture, Anaerobe [[id number]] Collected: 02/07/24 1411    Order Status: Completed Specimen: Joint Fluid from Back Updated: 02/09/24 0728     Anaerobic Culture Culture in progress    AFB Culture & Smear [[id number]] Collected: 02/07/24 1423    Order Status: Completed Specimen: Abscess from Back Updated: " 02/08/24 2127     AFB Culture & Smear Culture in progress     AFB CULTURE STAIN No acid fast bacilli seen.    Narrative:      L psoas    Gram stain [[id number]] Collected: 02/07/24 1413    Order Status: Completed Specimen: Joint Fluid from Back Updated: 02/08/24 0041     Gram Stain Result No WBC's      No organisms seen    Fungus culture [[id number]] Collected: 02/07/24 1413    Order Status: Sent Specimen: Joint Fluid from Back Updated: 02/07/24 2110    Gram stain [[id number]] Collected: 02/07/24 1423    Order Status: Completed Specimen: Abscess from Back Updated: 02/07/24 1934     Gram Stain Result Few WBC's      No organisms seen    Narrative:      L psoas    Fungus culture [[id number]] Collected: 02/07/24 1423    Order Status: Sent Specimen: Abscess from Back Updated: 02/07/24 1733    Culture, Anaerobe [[id number]]     Order Status: No result Specimen: Abscess from Sacrum     Aerobic culture [[id number]]     Order Status: No result Specimen: Abscess from Sacrum     AFB Culture & Smear [[id number]]     Order Status: No result Specimen: Abscess from Sacrum     Gram stain [[id number]]     Order Status: No result Specimen: Abscess from Sacrum     Fungus culture [[id number]]     Order Status: No result Specimen: Abscess from Sacrum     Culture, Anaerobe [[id number]]     Order Status: No result Specimen: Abscess     Aerobic culture [[id number]]     Order Status: No result Specimen: Abscess     Fungus culture [[id number]]     Order Status: No result Specimen: Abscess     AFB Culture & Smear [[id number]]     Order Status: No result Specimen: Abscess     Gram stain [[id number]]     Order Status: No result Specimen: Abscess           All pertinent labs from the last 24 hours have been reviewed.    Significant Diagnostics:  No results found in the last 24 hours

## 2024-02-17 NOTE — ASSESSMENT & PLAN NOTE
28M w/ hx MRSA bactermia, IVDU, pelvic/intra-abdominal abscesses who presents with progressive back pain with radiation down the leg over the past couple of weeks.     - All pertinent labs and imaging reviewed  - 2/4 ESR 75, CRP 33.7. Blood cx NGTD.   - Back culture 2/7 with staph aureus  - MRI lumbar w/wo 2/4: L5-S1 osteodiscitis extending into the sacrum, left iliac bone, SI joint, facet joints. Anterolisthesis L5-S1, epidural abscess L5-S1 with canal stenosis. Left psoas and paraspinal muscle abscesses. No clinical evidence of cauda equina syndrome.   - CT lumbar obtained with destructive changes at L5-S1.    Now he is status post L4 to pelvis fusion  on 02/14/2024   Postop day 3      Continue floor care with vital and neurocheck per protocol  Multimodal pain medications.  He is seemed to be overmedicated, please wean IV medications  Continue HV in place to full suction  L spine xray satisfactory    Sating well at RA  Blood pressure goal normotensive  Advance diet as tolerated  H/H 7.4/24, CTM.  Blood transfusion per hospital medicine  Intraop cultures grew staph.  ID team following appreciate recs.  Continue antibiotic per ID team  SubQ heparin for DVT prophylaxis  PTOT  Please obtain DVT ultrasound today  LSO brace when out of bed  SW/CM for discharge planning and DME

## 2024-02-17 NOTE — SUBJECTIVE & OBJECTIVE
Interval History:     Postop day 3  Out of bed to the edge yesterday   Drains in place  Intraop cultures growing staph   Denies any residual leg pain        Medications:  Continuous Infusions:  Scheduled Meds:   acetaminophen  1,000 mg Oral Q8H    cefTRIAXone (Rocephin) IV (PEDS and ADULTS)  2 g Intravenous Q24H    cloNIDine  0.1 mg Oral Q12H    enoxparin  40 mg Subcutaneous Daily    mirtazapine  15 mg Oral QHS    nicotine  1 patch Transdermal Daily    vancomycin (VANCOCIN) IV (PEDS and ADULTS)  20 mg/kg Intravenous Q12H     PRN Meds:acetaminophen, dextrose 10%, dextrose 10%, dicyclomine, glucagon (human recombinant), glucose, glucose, HYDROmorphone, hydrOXYzine HCL, ibuprofen, loperamide, melatonin, naloxone, oxyCODONE, oxyCODONE, promethazine, sodium chloride 0.9%, Pharmacy to dose Vancomycin consult **AND** vancomycin - pharmacy to dose     Review of Systems  Objective:     Weight: 76.7 kg (169 lb 1.5 oz)  Body mass index is 23.59 kg/m².  Vital Signs (Most Recent):  Temp: 98.7 °F (37.1 °C) (02/11/24 0609)  Pulse: 72 (02/11/24 0609)  Resp: 18 (02/11/24 0609)  BP: 119/63 (02/11/24 0609)  SpO2: 100 % (02/11/24 0609) Vital Signs (24h Range):  Temp:  [96.6 °F (35.9 °C)-99.4 °F (37.4 °C)] 98.7 °F (37.1 °C)  Pulse:  [65-75] 72  Resp:  [17-20] 18  SpO2:  [99 %-100 %] 100 %  BP: (113-132)/(52-63) 119/63          Physical Exam  General: well developed, well nourished, no distress.   Head: normocephalic, atraumatic  Mental Status: Awake, Alert, Oriented  Speech: Clear with content appropriate to conversation  Cranial nerves: face symmetric, CN II-XII grossly intact.   Sensory: intact to light touch throughout  Motor Strength: Motor Strength: Moves all extremities spontaneously with good tone. No abnormal movements seen. Pain limited weakness b/l hip flexors.   DF/EHL/PF 4 on R and  4+ L  Wound VAC in place  Hemovac in place      Significant Labs:  Recent Labs   Lab 02/10/24  0526   GLU 98      K 4.7      CO2 26  "  BUN 20   CREATININE 0.9   CALCIUM 8.8       Recent Labs   Lab 02/10/24  0526   WBC 6.92   HGB 9.6*   HCT 32.9*   *       No results for input(s): "LABPT", "INR", "APTT" in the last 48 hours.  Microbiology Results (last 7 days)       Procedure Component Value Units Date/Time    Blood culture #1 **CANNOT BE ORDERED STAT** [[id number]] Collected: 02/04/24 1853    Order Status: Completed Specimen: Blood from Peripheral, Forearm, Left Updated: 02/09/24 2012     Blood Culture, Routine No growth after 5 days.    Blood culture #2 **CANNOT BE ORDERED STAT** [[id number]] Collected: 02/04/24 1822    Order Status: Completed Specimen: Blood from Peripheral, Forearm, Left Updated: 02/09/24 2012     Blood Culture, Routine No growth after 5 days.    Aerobic culture [[id number]]  (Abnormal) Collected: 02/07/24 1423    Order Status: Completed Specimen: Abscess from Back Updated: 02/09/24 1138     Aerobic Bacterial Culture STAPHYLOCOCCUS AUREUS  Rare  Susceptibility pending      Narrative:      L psoas    Culture, Anaerobe [[id number]] Collected: 02/07/24 1423    Order Status: Completed Specimen: Abscess from Back Updated: 02/09/24 0934     Anaerobic Culture Culture in progress    Narrative:      L psoas    AFB Culture & Smear [[id number]] Collected: 02/07/24 1413    Order Status: Completed Specimen: Joint Fluid from Back Updated: 02/09/24 0927     AFB Culture & Smear Culture in progress     AFB CULTURE STAIN No acid fast bacilli seen.    Aerobic culture [[id number]] Collected: 02/07/24 1412    Order Status: Completed Specimen: Bone from Back Updated: 02/09/24 0745     Aerobic Bacterial Culture No growth    Narrative:      Joint    Culture, Anaerobe [[id number]] Collected: 02/07/24 1411    Order Status: Completed Specimen: Joint Fluid from Back Updated: 02/09/24 0728     Anaerobic Culture Culture in progress    AFB Culture & Smear [[id number]] Collected: 02/07/24 1423    Order Status: Completed Specimen: Abscess from Back " Updated: 02/08/24 2127     AFB Culture & Smear Culture in progress     AFB CULTURE STAIN No acid fast bacilli seen.    Narrative:      L psoas    Gram stain [[id number]] Collected: 02/07/24 1413    Order Status: Completed Specimen: Joint Fluid from Back Updated: 02/08/24 0041     Gram Stain Result No WBC's      No organisms seen    Fungus culture [[id number]] Collected: 02/07/24 1413    Order Status: Sent Specimen: Joint Fluid from Back Updated: 02/07/24 2110    Gram stain [[id number]] Collected: 02/07/24 1423    Order Status: Completed Specimen: Abscess from Back Updated: 02/07/24 1934     Gram Stain Result Few WBC's      No organisms seen    Narrative:      L psoas    Fungus culture [[id number]] Collected: 02/07/24 1423    Order Status: Sent Specimen: Abscess from Back Updated: 02/07/24 1733    Culture, Anaerobe [[id number]]     Order Status: No result Specimen: Abscess from Sacrum     Aerobic culture [[id number]]     Order Status: No result Specimen: Abscess from Sacrum     AFB Culture & Smear [[id number]]     Order Status: No result Specimen: Abscess from Sacrum     Gram stain [[id number]]     Order Status: No result Specimen: Abscess from Sacrum     Fungus culture [[id number]]     Order Status: No result Specimen: Abscess from Sacrum     Culture, Anaerobe [[id number]]     Order Status: No result Specimen: Abscess     Aerobic culture [[id number]]     Order Status: No result Specimen: Abscess     Fungus culture [[id number]]     Order Status: No result Specimen: Abscess     AFB Culture & Smear [[id number]]     Order Status: No result Specimen: Abscess     Gram stain [[id number]]     Order Status: No result Specimen: Abscess           All pertinent labs from the last 24 hours have been reviewed.    Significant Diagnostics:  No results found in the last 24 hoursI

## 2024-02-17 NOTE — SUBJECTIVE & OBJECTIVE
Interval History:   POD#2 lumbar-pelvis fusion  Complaining of surgical pain, but controlled  Working with PT  Surgical cx - Staph aureus - susceptibilities pending     Review of Systems   Constitutional:  Negative for appetite change, chills, diaphoresis, fatigue and fever.   HENT:  Negative for congestion, mouth sores and sore throat.    Eyes:  Negative for pain.   Respiratory:  Negative for cough, chest tightness and shortness of breath.    Cardiovascular:  Negative for chest pain and leg swelling.   Gastrointestinal:  Negative for abdominal pain, nausea and vomiting.   Genitourinary:  Negative for difficulty urinating, dysuria and flank pain.   Musculoskeletal:  Positive for back pain. Negative for arthralgias, joint swelling, myalgias and neck pain.   Skin:  Negative for color change, rash and wound.   Neurological:  Positive for numbness.   Psychiatric/Behavioral:  Negative for confusion.      Objective:     Vital Signs (Most Recent):  Temp: 99.3 °F (37.4 °C) (02/16/24 1529)  Pulse: 93 (02/16/24 1529)  Resp: 18 (02/16/24 2043)  BP: (!) 124/58 (02/16/24 1529)  SpO2: 100 % (02/16/24 1529) Vital Signs (24h Range):  Temp:  [97.7 °F (36.5 °C)-99.3 °F (37.4 °C)] 99.3 °F (37.4 °C)  Pulse:  [80-93] 93  Resp:  [15-18] 18  SpO2:  [98 %-100 %] 100 %  BP: (118-130)/(57-66) 124/58     Weight: 77 kg (169 lb 12.1 oz)  Body mass index is 24.36 kg/m².    Estimated Creatinine Clearance: 139.4 mL/min (based on SCr of 0.8 mg/dL).     Physical Exam  Vitals and nursing note reviewed.   Constitutional:       General: He is not in acute distress.     Appearance: Normal appearance. He is not ill-appearing, toxic-appearing or diaphoretic.   HENT:      Head: Normocephalic.      Nose: No congestion.      Mouth/Throat:      Mouth: Mucous membranes are moist.   Eyes:      General: No scleral icterus.     Conjunctiva/sclera: Conjunctivae normal.   Cardiovascular:      Rate and Rhythm: Normal rate and regular rhythm.   Pulmonary:       Effort: Pulmonary effort is normal. No respiratory distress.   Abdominal:      General: Abdomen is flat. There is no distension.      Palpations: Abdomen is soft.   Musculoskeletal:         General: Tenderness present. No swelling.      Cervical back: Normal range of motion.      Right lower leg: No edema.      Left lower leg: No edema.      Comments: Bilateral accordion drains with serosanguinous drainage   Incisional wound vac in place   Skin:     General: Skin is warm and dry.   Neurological:      Mental Status: He is alert and oriented to person, place, and time.   Psychiatric:         Behavior: Behavior normal.         Thought Content: Thought content normal.          Significant Labs: Blood Culture:   Recent Labs   Lab 10/10/23  1827 10/14/23  1512 11/20/23  1155 02/04/24  1822 02/04/24  1853   LABBLOO Gram stain manas bottle: Gram positive cocci in clusters resembling Staph  Results called to and read back by: Dr. Childers  10/11/2023  11:26  Gram stain aer bottle: Gram positive cocci in clusters resembling Staph  Positive results previously called 10/11/2023  14:31  METHICILLIN RESISTANT STAPHYLOCOCCUS AUREUS  For susceptibility see order #[id number]  *  Gram stain aer bottle: Gram positive cocci in clusters resembling Staph  Results called to and read back by: Dr. Childers  10/11/2023  11:26  Gram stain manas bottle: Gram positive cocci in clusters resembling Staph  Positive results previously called 10/11/2023  12:57  METHICILLIN RESISTANT STAPHYLOCOCCUS AUREUS  [facility] [place] [facility]  * Gram stain aer bottle: Gram positive cocci in clusters resembling Staph  Results called to and read back by: ADIS CAMEJO  10/15/2023  10:25  Gram stain manas bottle: Gram positive cocci in clusters resembling Staph  Positive results previously called 10/15/2023  METHICILLIN RESISTANT STAPHYLOCOCCUS AUREUS  [facility] [place]  locations.  For susceptibility see order #[id number]  *  Gram stain aer bottle: Gram positive cocci in clusters resembling Staph  Results called to and read back by: ADIS CAMEJO  10/15/2023  10:25  Gram stain manas bottle: Gram positive cocci in clusters resembling Staph  Positive results previously called 10/15/2023  METHICILLIN RESISTANT STAPHYLOCOCCUS AUREUS  ID consult required at [facility],[place] and [facility] locations.  * No growth after 5 days.  No growth after 5 days. No growth after 5 days. No growth after 5 days.       CBC:   Recent Labs   Lab 02/15/24  0326 02/16/24  0206   WBC 7.78 8.29   HGB 8.8* 8.3*   HCT 29.6* 27.5*    351       CMP:   Recent Labs   Lab 02/15/24  0326 02/16/24  0206   * 135*   K 4.9 4.5    105   CO2 24 26   * 98   BUN 26* 24*   CREATININE 0.8 0.8   CALCIUM 8.9 9.1   ANIONGAP 7* 4*       Microbiology Results (last 7 days)       Procedure Component Value Units Date/Time    Culture, Anaerobe [[id number]] Collected: 02/14/24 1712    Order Status: Completed Specimen: Incision site from Back Updated: 02/16/24 1142     Anaerobic Culture Culture in progress    Narrative:      L5-S1 disc space    Culture, Anaerobe [[id number]] Collected: 02/14/24 1712    Order Status: Completed Specimen: Incision site from Back Updated: 02/16/24 1142     Anaerobic Culture Culture in progress    Narrative:      Epidural infection culture lumbar spine    Aerobic culture [[id number]]  (Abnormal)  (Susceptibility) Collected: 02/14/24 1712    Order Status: Completed Specimen: Incision site from Back Updated: 02/16/24 1110     Aerobic Bacterial Culture METHICILLIN RESISTANT STAPHYLOCOCCUS AUREUS  Few      Narrative:      L5-S1 disc space    Aerobic culture [[id number]]  (Abnormal) Collected: 02/14/24 1712    Order Status: Completed Specimen: Incision site from Back Updated: 02/16/24 0743     Aerobic Bacterial Culture STAPHYLOCOCCUS AUREUS  Few  Susceptibility pending       Narrative:      Epidural infection culture lumbar spine    AFB Culture & Smear [[id number]] Collected: 02/14/24 1712    Order Status: Completed Specimen: Incision site from Back Updated: 02/15/24 2127     AFB Culture & Smear Culture in progress     AFB CULTURE STAIN No acid fast bacilli seen.    Narrative:      L5-S1 disc space    AFB Culture & Smear [[id number]] Collected: 02/14/24 1712    Order Status: Completed Specimen: Incision site from Back Updated: 02/15/24 2127     AFB Culture & Smear Culture in progress     AFB CULTURE STAIN No acid fast bacilli seen.    Narrative:      Epidural infection culture lumbar spine    Gram stain [[id number]] Collected: 02/14/24 1712    Order Status: Completed Specimen: Incision site from Back Updated: 02/14/24 1955     Gram Stain Result Rare WBC's      No organisms seen    Narrative:      Epidural infection culture lumbar spine    Gram stain [[id number]] Collected: 02/14/24 1712    Order Status: Completed Specimen: Incision site from Back Updated: 02/14/24 1952     Gram Stain Result Rare WBC's      No organisms seen    Narrative:      L5-S1 disc space    Fungus culture [[id number]] Collected: 02/14/24 1712    Order Status: Sent Specimen: Incision site from Back Updated: 02/14/24 1739    Fungus culture [[id number]] Collected: 02/14/24 1712    Order Status: Sent Specimen: Incision site from Back Updated: 02/14/24 1738    Culture, Anaerobe [[id number]] Collected: 02/14/24 1712    Order Status: Canceled Specimen: Incision site from Back     Aerobic culture [[id number]] Collected: 02/14/24 1712    Order Status: Canceled Specimen: Incision site from Back     Fungus culture [[id number]] Collected: 02/14/24 1712    Order Status: Canceled Specimen: Incision site from Back     AFB Culture & Smear [[id number]] Collected: 02/14/24 1712    Order Status: Canceled Specimen: Incision site from Back     Gram stain [[id number]] Collected: 02/14/24 1712    Order Status: Canceled Specimen: Incision  site from Back     Culture, Anaerobe [[id number]] Collected: 02/14/24 1712    Order Status: Canceled Specimen: Incision site from Back     Aerobic culture [[id number]] Collected: 02/14/24 1712    Order Status: Canceled Specimen: Incision site from Back     AFB Culture & Smear [[id number]] Collected: 02/14/24 1712    Order Status: Canceled Specimen: Incision site from Back     Gram stain [[id number]] Collected: 02/14/24 1712    Order Status: Canceled Specimen: Incision site from Back     Fungus culture [[id number]] Collected: 02/14/24 1712    Order Status: Canceled Specimen: Incision site from Back     Aerobic culture [[id number]]  (Abnormal)  (Susceptibility) Collected: 02/07/24 1423    Order Status: Completed Specimen: Abscess from Back Updated: 02/14/24 1243     Aerobic Bacterial Culture METHICILLIN RESISTANT STAPHYLOCOCCUS AUREUS  Rare      Narrative:      L psoas    Culture, Anaerobe [[id number]] Collected: 02/07/24 1423    Order Status: Completed Specimen: Abscess from Back Updated: 02/14/24 0922     Anaerobic Culture No anaerobes isolated    Narrative:      L psoas    Culture, Anaerobe [[id number]] Collected: 02/07/24 1411    Order Status: Completed Specimen: Joint Fluid from Back Updated: 02/14/24 0908     Anaerobic Culture No anaerobes isolated    Fungus culture [[id number]] Collected: 02/07/24 1423    Order Status: Completed Specimen: Abscess from Back Updated: 02/14/24 0547     Fungus (Mycology) Culture Culture in progress    Narrative:      L psoas    Fungus culture [[id number]] Collected: 02/07/24 1413    Order Status: Completed Specimen: Joint Fluid from Back Updated: 02/14/24 0544     Fungus (Mycology) Culture Culture in progress    Aerobic culture [[id number]] Collected: 02/07/24 1412    Order Status: Completed Specimen: Bone from Back Updated: 02/12/24 0750     Aerobic Bacterial Culture No growth    Narrative:      Joint          Pathology Results  (Last 10 years)      None          All pertinent  labs within the past 24 hours have been reviewed.    Significant Imaging: I have reviewed all pertinent imaging results/findings within the past 24 hours.    I have personally reviewed records / hospital notes from   service and other specialty providers. I have also reviewed CBC, CMP/BMP,  cultures and imaging with my interpretation as documented in my assessment / plan.    Patient is high risk for infectious complications given pt's age, multiple co-morbidities, and case complexity.      Time: 50 minutes   50% of time spent on face-to-face counseling and coordination of care. Counseling included review of test results, diagnosis, and treatment plan with patient and/or family.

## 2024-02-17 NOTE — NURSING
Nurses Note -- 4 Eyes      2/16/2024   10:38 PM      Skin assessed during: Daily Assessment      [] No Altered Skin Integrity Present    []Prevention Measures Documented      [x] Yes- Altered Skin Integrity Present or Discovered   [x] LDA Added if Not in Epic (Describe Wound)   [] New Altered Skin Integrity was Present on Admit and Documented in LDA   [] Wound Image Taken  No new areas of skin breakdown all areas previously documented on LDA     Wound Care Consulted? No    Attending Nurse:  Za AHN     Second RN/Staff Member:  Bereket AHN

## 2024-02-17 NOTE — ASSESSMENT & PLAN NOTE
Osteomyelitis, discitis, & myositis of lumbosacral region  Septic arthritis of SI joint(s)  Epidural abscesses   Multiple abscesses of pelvis, sacrum, psoas, iliacus, & paraspinals  Lower back pain w/ b/l sciatica   Severe spinal canal stenosis   MRI: osteomyelitis/discitis of L5-S1, osteomyelitis of sacrum & L iliac bone, probable septic arthritis of L SI joint & lower lumbar spine facet joints, worsening anterolisthesis of L5 with respect to S1 with increased size of epidural abscess at the level of L5-S1 and probable severe central canal stenosis at this level, along w/ additional abscesses in the L iliopsoas muscle, paraspinal muscles, pelvis, & L sacrum.    - Underwent  2/14/24 epidural abscess/phlegmon debridement and LOOP X FUSION, SPINE, LUMBAR w/   - Left sacroiliac joint aspiration  on 2/7 by IR growing MRSA  - Ortho consulted for mgmt of septic SI joint; appreciate recs/assistance  - MRSA growing from multiple sites;  --> switched to daptomycin 8 week course from surg date per ID

## 2024-02-17 NOTE — ASSESSMENT & PLAN NOTE
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  Imaging significant for lumbosacral osteomyelitis with associated epidural abscess, osteomyelitis of sacrum & L iliac bone, probable septic arthritis of L SI joint & lower lumbar spine facet joints, and left iliopsoas abscess, abscess in superior rectal/presacral region and micro-abscesses in paraspinal muscles, pelvis, and left sacrum.  Blood cultures have been negative.  2D echo is negative.  Underwent IR aspiration of left SI joint and psoas on 2/7.  Cx + for MRSA.    Now POD#2 lumbar-pelvis fusion (2/14). Surgical cx from disc space showing Staph Aureus - susceptibilities pending.  Likely MRSA.       Remains afebrile, no leukocytosis.  Pain controlled.  Hemodynamically and neurologically stable.  Currently on IV daptomycin (subtherapeutic vancomycin levels with q 8 hr dosing)    Discharge recommendations  Daptomycin 10 mg/kg IV q 24 hours.  Weekly CK while on daptomycin  Anticipated duration of IV antibiotics 8 weeks from date of surgery - estimated EOC 4/10/24  Weekly cbc, cmp, crp, and CK and fax results to ID, brent Segovia NP, at fax [phone].    Pt pending LTAC placement.  Please consult ID to follow at LTAC   Recommend repeat MRI imaging at 6 weeks, prior to end of therapy, to ensure resolution of all fluid collections  ID follow up with Neurosurgery follow up and at end of care.    Will need oral suppressive therapy (doxycycline) after completion of IV therapy   See OPAT summary below  Will sign off.  Please call with questions or re-consult as needed.      Data reviewed and plan discussed with ID staff, Dr. Frost  Secure chat with Primary Team, Dr. Schaefer

## 2024-02-17 NOTE — PROGRESS NOTES
Aleksandr Bray - Neurosurgery ([facility]  Infectious Disease  Progress Note    Patient Name: Ton Donovan  MRN: [id number]  Admission Date: 2/4/2024  Length of Stay: 11 days  Attending Physician: Reese Schaefer MD  Primary Care Provider: No, Primary Doctor    Isolation Status: No active isolations  Assessment/Plan:      ID  * Osteomyelitis of vertebra of lumbosacral region      28 year old IV drug user with h/o of MRSA bacteremia and prior disseminated MRSA infection  with poor adherence with care (history of leaving AMA), readmitted 2/4 with progressive back pain.  Imaging significant for lumbosacral osteomyelitis with associated epidural abscess, osteomyelitis of sacrum & L iliac bone, probable septic arthritis of L SI joint & lower lumbar spine facet joints, and left iliopsoas abscess, abscess in superior rectal/presacral region and micro-abscesses in paraspinal muscles, pelvis, and left sacrum.  Blood cultures have been negative.  2D echo is negative.  Underwent IR aspiration of left SI joint and psoas on 2/7.  Cx + for MRSA.    Now POD#2 lumbar-pelvis fusion (2/14). Surgical cx from disc space showing Staph Aureus - susceptibilities pending.  Likely MRSA.       Remains afebrile, no leukocytosis.  Pain controlled.  Hemodynamically and neurologically stable.  Currently on IV daptomycin (subtherapeutic vancomycin levels with q 8 hr dosing)    Discharge recommendations  Daptomycin 10 mg/kg IV q 24 hours.  Weekly CK while on daptomycin  Anticipated duration of IV antibiotics 8 weeks from date of surgery - estimated EOC 4/10/24  Weekly cbc, cmp, crp, and CK and fax results to ID, attention Vidal Segovia NP, at fax [phone].    Pt pending LTAC placement.  Please consult ID to follow at LTAC   Recommend repeat MRI imaging at 6 weeks, prior to end of therapy, to ensure resolution of all fluid collections  ID follow up with Neurosurgery follow up and at end of care.    Will need oral suppressive therapy (doxycycline)  after completion of IV therapy   See OPAT summary below  Will sign off.  Please call with questions or re-consult as needed.      Data reviewed and plan discussed with ID staff, Dr. Frost  Secure chat with Primary Team, Dr. Schaefer      Outpatient Antibiotic Therapy Plan:    Please send referral to [facility].    1) Infection: MRSA osteomyelitis of Lumbar/Sacral spine, s/p lumbar-pelvis fusion    2) Discharge Antibiotics:    Intravenous antibiotics:  Daptomycin 10 mg/kg IV q 24 hours.   Will need to be followed by suppressive oral doxycycline after completion of IV daptomycin      3) Therapy Duration:  8 weeks    Estimated end date of IV antibiotics: 04/10/24    4) Outpatient Weekly Labs:    Order the following labs to be drawn on Mondays:   CBC  CMP   CRP  CPK (when on Daptomycin)      5) Fax Lab Results to Infectious Diseases Provider: Vidal Segovia NP    [facility] Fax Number: [phone]    6) Outpatient Infectious Diseases Follow-up    Follow-up appointment will be arranged by the ID clinic and will be found in the patient's appointments tab.  Prior to discharge, please ensure the patient's follow-up has been scheduled.    If there is still no follow-up scheduled prior to discharge, please send an EPIC message to Carol Gann in Infectious Diseases.       Thank you.   Please Secure Chat for any questions or concerns.  CAROL Majano, ANP-C  Infectious Disease       >50 minutes of total time was spent on this encounter, which includes face to face time and non-face to face time preparing to see the patient (eg, review of tests), Obtaining and/or reviewing separately obtained history, documenting clinical information in the electronic or other health record, independently interpreting results (not separately reported) and communicating results to the patient/family/caregiver, or care coordination (not separately reported).     Subjective:     Principal  Problem:Osteomyelitis of vertebra of lumbosacral region    HPI: 28M with h/o IVDU (injects heroin, last used 02/03), with associated complex infectious h/o of disseminated MRSA bacteremia, and multiple septic joints / abscesses, c/b poor adherence, and deferring care / leaving AMA.  - Pt returns now (02/04) for progression of chronic back pain with radiculopathy (numbness / pain) to legs B/L.     MRI L-spine / pelvis (02/04): showed osteomyelitis/discitis of L5-S1, osteomyelitis of sacrum & L iliac bone, probable septic arthritis of L SI joint & lower lumbar spine facet joints; worsening anterolisthesis of L5 with respect to S1 with increased size of epidural abscess at the level of L5-S1 and probable severe central canal stenosis at this level, along w/ additional abscesses in the L iliopsoas muscle, paraspinal muscles, pelvis, & L sacrum; -- the additional abscesses in the left iliopsoas muscle [3.4 x 1.7cm; with other micro-abscesses adjacent measuring 2.3cm), paraspinal muscles, pelvis, and left sacrum. Additional abscess in the superior rectal/presacral region measuring roughly 3.1 x 2.4 cm; and probable additional micro abscesses at left sacrum.  CT lumbar obtained with destructive changes at L5-S1.       -- Received single dose of Zosyn in ED on arrival, but given that pt HDS w/o sepsis, further abx held to optimize intra-op cx/pathology. Admitted to hospital medicine for further mgmt of osteomyelitis, septic joints & abscesses. Addiction medicine, along w/ NSGY, orthopedic surgery, IR & ID consulted.     Reports back pain is doing okay while supine, but exacerbated upon standing w/ assoc b/l sciatica & weakness w/ standing. Endorses diminished sensation of BLE (R>L) up to shins.   He denies bowel/bladder sx or saddle anesthesia. He denies focal weakness.    Pt off abx pending tentative spinal bx for path / cx; pt remains AF, VSS, HDS. CRP 33. Bld cxs NGTD.    Abx held pending intervention, if patient begins  to show signs of sepsis, please start abx. ID consult.   Voiding spontaneously. Bladder scan prn.     Plan for OR 2/14 for L4-pelvis fusion with debridement. IR consulted for source control of SI / L psoas abscesses, tentative aspiration today (02/07) of L SI joint and L psoas. Orders placed for micro send of samples  (gram stain, AFB, fungal, aerobic, and anaerobic).            Interval History:   POD#2 lumbar-pelvis fusion  Complaining of surgical pain, but controlled  Working with PT  Surgical cx - Staph aureus - susceptibilities pending     Review of Systems   Constitutional:  Negative for appetite change, chills, diaphoresis, fatigue and fever.   HENT:  Negative for congestion, mouth sores and sore throat.    Eyes:  Negative for pain.   Respiratory:  Negative for cough, chest tightness and shortness of breath.    Cardiovascular:  Negative for chest pain and leg swelling.   Gastrointestinal:  Negative for abdominal pain, nausea and vomiting.   Genitourinary:  Negative for difficulty urinating, dysuria and flank pain.   Musculoskeletal:  Positive for back pain. Negative for arthralgias, joint swelling, myalgias and neck pain.   Skin:  Negative for color change, rash and wound.   Neurological:  Positive for numbness.   Psychiatric/Behavioral:  Negative for confusion.      Objective:     Vital Signs (Most Recent):  Temp: 99.3 °F (37.4 °C) (02/16/24 1529)  Pulse: 93 (02/16/24 1529)  Resp: 18 (02/16/24 2043)  BP: (!) 124/58 (02/16/24 1529)  SpO2: 100 % (02/16/24 1529) Vital Signs (24h Range):  Temp:  [97.7 °F (36.5 °C)-99.3 °F (37.4 °C)] 99.3 °F (37.4 °C)  Pulse:  [80-93] 93  Resp:  [15-18] 18  SpO2:  [98 %-100 %] 100 %  BP: (118-130)/(57-66) 124/58     Weight: 77 kg (169 lb 12.1 oz)  Body mass index is 24.36 kg/m².    Estimated Creatinine Clearance: 139.4 mL/min (based on SCr of 0.8 mg/dL).     Physical Exam  Vitals and nursing note reviewed.   Constitutional:       General: He is not in acute distress.      Appearance: Normal appearance. He is not ill-appearing, toxic-appearing or diaphoretic.   HENT:      Head: Normocephalic.      Nose: No congestion.      Mouth/Throat:      Mouth: Mucous membranes are moist.   Eyes:      General: No scleral icterus.     Conjunctiva/sclera: Conjunctivae normal.   Cardiovascular:      Rate and Rhythm: Normal rate and regular rhythm.   Pulmonary:      Effort: Pulmonary effort is normal. No respiratory distress.   Abdominal:      General: Abdomen is flat. There is no distension.      Palpations: Abdomen is soft.   Musculoskeletal:         General: Tenderness present. No swelling.      Cervical back: Normal range of motion.      Right lower leg: No edema.      Left lower leg: No edema.      Comments: Bilateral accordion drains with serosanguinous drainage   Incisional wound vac in place   Skin:     General: Skin is warm and dry.   Neurological:      Mental Status: He is alert and oriented to person, place, and time.   Psychiatric:         Behavior: Behavior normal.         Thought Content: Thought content normal.          Significant Labs: Blood Culture:   Recent Labs   Lab 10/10/23  1827 10/14/23  1512 11/20/23  1155 02/04/24  1822 02/04/24  1853   LABBLOO Gram stain manas bottle: Gram positive cocci in clusters resembling Staph  Results called to and read back by: Dr. Childers  10/11/2023  11:26  Gram stain aer bottle: Gram positive cocci in clusters resembling Staph  Positive results previously called 10/11/2023  14:31  METHICILLIN RESISTANT STAPHYLOCOCCUS AUREUS  For susceptibility see order #[id number]  *  Gram stain aer bottle: Gram positive cocci in clusters resembling Staph  Results called to and read back by: Dr. Childers  10/11/2023  11:26  Gram stain manas bottle: Gram positive cocci in clusters resembling Staph  Positive results previously called 10/11/2023  12:57  METHICILLIN RESISTANT STAPHYLOCOCCUS AUREUS  ID consult required at [facility] [address] and [place]  locations.  * Gram stain aer bottle: Gram positive cocci in clusters resembling Staph  Results called to and read back by: ADIS CAMEJO  10/15/2023  10:25  Gram stain manas bottle: Gram positive cocci in clusters resembling Staph  Positive results previously called 10/15/2023  METHICILLIN RESISTANT STAPHYLOCOCCUS AUREUS  ID consult required at [facility].  For susceptibility see order #[id number]  *  Gram stain aer bottle: Gram positive cocci in clusters resembling Staph  Results called to and read back by: ADIS CAMEJO  10/15/2023  10:25  Gram stain manas bottle: Gram positive cocci in clusters resembling Staph  Positive results previously called 10/15/2023  METHICILLIN RESISTANT STAPHYLOCOCCUS AUREUS  ID consult required at [facility].  * No growth after 5 days.  No growth after 5 days. No growth after 5 days. No growth after 5 days.       CBC:   Recent Labs   Lab 02/15/24  0326 02/16/24  0206   WBC 7.78 8.29   HGB 8.8* 8.3*   HCT 29.6* 27.5*    351       CMP:   Recent Labs   Lab 02/15/24  0326 02/16/24  0206   * 135*   K 4.9 4.5    105   CO2 24 26   * 98   BUN 26* 24*   CREATININE 0.8 0.8   CALCIUM 8.9 9.1   ANIONGAP 7* 4*       Microbiology Results (last 7 days)       Procedure Component Value Units Date/Time    Culture, Anaerobe [[id number]] Collected: 02/14/24 1712    Order Status: Completed Specimen: Incision site from Back Updated: 02/16/24 1142     Anaerobic Culture Culture in progress    Narrative:      L5-S1 disc space    Culture, Anaerobe [[id number]] Collected: 02/14/24 1712    Order Status: Completed Specimen: Incision site from Back Updated: 02/16/24 1142     Anaerobic Culture Culture in progress    Narrative:      Epidural infection culture lumbar spine    Aerobic culture [[id number]]  (Abnormal)  (Susceptibility) Collected: 02/14/24 1712    Order Status: Completed Specimen: Incision site from Back  Updated: 02/16/24 1110     Aerobic Bacterial Culture METHICILLIN RESISTANT STAPHYLOCOCCUS AUREUS  Few      Narrative:      L5-S1 disc space    Aerobic culture [[id number]]  (Abnormal) Collected: 02/14/24 1712    Order Status: Completed Specimen: Incision site from Back Updated: 02/16/24 0743     Aerobic Bacterial Culture STAPHYLOCOCCUS AUREUS  Few  Susceptibility pending      Narrative:      Epidural infection culture lumbar spine    AFB Culture & Smear [[id number]] Collected: 02/14/24 1712    Order Status: Completed Specimen: Incision site from Back Updated: 02/15/24 2127     AFB Culture & Smear Culture in progress     AFB CULTURE STAIN No acid fast bacilli seen.    Narrative:      L5-S1 disc space    AFB Culture & Smear [[id number]] Collected: 02/14/24 1712    Order Status: Completed Specimen: Incision site from Back Updated: 02/15/24 2127     AFB Culture & Smear Culture in progress     AFB CULTURE STAIN No acid fast bacilli seen.    Narrative:      Epidural infection culture lumbar spine    Gram stain [[id number]] Collected: 02/14/24 1712    Order Status: Completed Specimen: Incision site from Back Updated: 02/14/24 1955     Gram Stain Result Rare WBC's      No organisms seen    Narrative:      Epidural infection culture lumbar spine    Gram stain [[id number]] Collected: 02/14/24 1712    Order Status: Completed Specimen: Incision site from Back Updated: 02/14/24 1952     Gram Stain Result Rare WBC's      No organisms seen    Narrative:      L5-S1 disc space    Fungus culture [[id number]] Collected: 02/14/24 1712    Order Status: Sent Specimen: Incision site from Back Updated: 02/14/24 1739    Fungus culture [[id number]] Collected: 02/14/24 1712    Order Status: Sent Specimen: Incision site from Back Updated: 02/14/24 1738    Culture, Anaerobe [[id number]] Collected: 02/14/24 1712    Order Status: Canceled Specimen: Incision site from Back     Aerobic culture [[id number]] Collected: 02/14/24 1712    Order Status:  Canceled Specimen: Incision site from Back     Fungus culture [[id number]] Collected: 02/14/24 1712    Order Status: Canceled Specimen: Incision site from Back     AFB Culture & Smear [[id number]] Collected: 02/14/24 1712    Order Status: Canceled Specimen: Incision site from Back     Gram stain [[id number]] Collected: 02/14/24 1712    Order Status: Canceled Specimen: Incision site from Back     Culture, Anaerobe [[id number]] Collected: 02/14/24 1712    Order Status: Canceled Specimen: Incision site from Back     Aerobic culture [[id number]] Collected: 02/14/24 1712    Order Status: Canceled Specimen: Incision site from Back     AFB Culture & Smear [[id number]] Collected: 02/14/24 1712    Order Status: Canceled Specimen: Incision site from Back     Gram stain [[id number]] Collected: 02/14/24 1712    Order Status: Canceled Specimen: Incision site from Back     Fungus culture [[id number]] Collected: 02/14/24 1712    Order Status: Canceled Specimen: Incision site from Back     Aerobic culture [[id number]]  (Abnormal)  (Susceptibility) Collected: 02/07/24 1423    Order Status: Completed Specimen: Abscess from Back Updated: 02/14/24 1243     Aerobic Bacterial Culture METHICILLIN RESISTANT STAPHYLOCOCCUS AUREUS  Rare      Narrative:      L psoas    Culture, Anaerobe [[id number]] Collected: 02/07/24 1423    Order Status: Completed Specimen: Abscess from Back Updated: 02/14/24 0922     Anaerobic Culture No anaerobes isolated    Narrative:      L psoas    Culture, Anaerobe [[id number]] Collected: 02/07/24 1411    Order Status: Completed Specimen: Joint Fluid from Back Updated: 02/14/24 0908     Anaerobic Culture No anaerobes isolated    Fungus culture [[id number]] Collected: 02/07/24 1423    Order Status: Completed Specimen: Abscess from Back Updated: 02/14/24 0547     Fungus (Mycology) Culture Culture in progress    Narrative:      L psoas    Fungus culture [[id number]] Collected: 02/07/24 1413    Order Status: Completed  Specimen: Joint Fluid from Back Updated: 02/14/24 0544     Fungus (Mycology) Culture Culture in progress    Aerobic culture [[id number]] Collected: 02/07/24 1412    Order Status: Completed Specimen: Bone from Back Updated: 02/12/24 0750     Aerobic Bacterial Culture No growth    Narrative:      Joint          Pathology Results  (Last 10 years)      None          All pertinent labs within the past 24 hours have been reviewed.    Significant Imaging: I have reviewed all pertinent imaging results/findings within the past 24 hours.    I have personally reviewed records / hospital notes from   service and other specialty providers. I have also reviewed CBC, CMP/BMP,  cultures and imaging with my interpretation as documented in my assessment / plan.    Patient is high risk for infectious complications given pt's age, multiple co-morbidities, and case complexity.      Time: 50 minutes   50% of time spent on face-to-face counseling and coordination of care. Counseling included review of test results, diagnosis, and treatment plan with patient and/or family.

## 2024-02-17 NOTE — PROGRESS NOTES
Aleksandr Bray - Neurosurgery ([facility])  Neurosurgery  Progress Note    Subjective:     History of Present Illness: 28M w/ hx MRSA bactermia, IVDU, pelvic/intra-abdominal abscesses who presents with progressive back pain with radiation down the leg over the past couple of weeks.  He has had bilateral numbness of his anterior thigh as well. He denies bowel/bladder sx or saddle anesthesia. He denies focal weakness. Per chart review, the patient is actively using heroin and did an injection prior to being taken in by EMS.      Post-Op Info:  Procedure(s) (LRB):  LOOP X FUSION, SPINE, LUMBAR (N/A)   3 Days Post-Op   Interval History:     Postop day 3  Out of bed to the edge yesterday   Drains in place  Intraop cultures growing staph   Denies any residual leg pain        Medications:  Continuous Infusions:  Scheduled Meds:   acetaminophen  1,000 mg Oral Q8H    cefTRIAXone (Rocephin) IV (PEDS and ADULTS)  2 g Intravenous Q24H    cloNIDine  0.1 mg Oral Q12H    enoxparin  40 mg Subcutaneous Daily    mirtazapine  15 mg Oral QHS    nicotine  1 patch Transdermal Daily    vancomycin (VANCOCIN) IV (PEDS and ADULTS)  20 mg/kg Intravenous Q12H     PRN Meds:acetaminophen, dextrose 10%, dextrose 10%, dicyclomine, glucagon (human recombinant), glucose, glucose, HYDROmorphone, hydrOXYzine HCL, ibuprofen, loperamide, melatonin, naloxone, oxyCODONE, oxyCODONE, promethazine, sodium chloride 0.9%, Pharmacy to dose Vancomycin consult **AND** vancomycin - pharmacy to dose     Review of Systems  Objective:     Weight: 76.7 kg (169 lb 1.5 oz)  Body mass index is 23.59 kg/m².  Vital Signs (Most Recent):  Temp: 98.7 °F (37.1 °C) (02/11/24 0609)  Pulse: 72 (02/11/24 0609)  Resp: 18 (02/11/24 0609)  BP: 119/63 (02/11/24 0609)  SpO2: 100 % (02/11/24 0609) Vital Signs (24h Range):  Temp:  [96.6 °F (35.9 °C)-99.4 °F (37.4 °C)] 98.7 °F (37.1 °C)  Pulse:  [65-75] 72  Resp:  [17-20] 18  SpO2:  [99 %-100 %] 100 %  BP: (113-132)/(52-63) 119/63         "  Physical Exam  General: well developed, well nourished, no distress.   Head: normocephalic, atraumatic  Mental Status: Awake, Alert, Oriented  Speech: Clear with content appropriate to conversation  Cranial nerves: face symmetric, CN II-XII grossly intact.   Sensory: intact to light touch throughout  Motor Strength: Motor Strength: Moves all extremities spontaneously with good tone. No abnormal movements seen. Pain limited weakness b/l hip flexors.   DF/EHL/PF 4 on R and  4+ L  Wound VAC in place  Hemovac in place      Significant Labs:  Recent Labs   Lab 02/10/24  0526   GLU 98      K 4.7      CO2 26   BUN 20   CREATININE 0.9   CALCIUM 8.8       Recent Labs   Lab 02/10/24  0526   WBC 6.92   HGB 9.6*   HCT 32.9*   *       No results for input(s): "LABPT", "INR", "APTT" in the last 48 hours.  Microbiology Results (last 7 days)       Procedure Component Value Units Date/Time    Blood culture #1 **CANNOT BE ORDERED STAT** [[id number]] Collected: 02/04/24 1853    Order Status: Completed Specimen: Blood from Peripheral, Forearm, Left Updated: 02/09/24 2012     Blood Culture, Routine No growth after 5 days.    Blood culture #2 **CANNOT BE ORDERED STAT** [[id number]] Collected: 02/04/24 1822    Order Status: Completed Specimen: Blood from Peripheral, Forearm, Left Updated: 02/09/24 2012     Blood Culture, Routine No growth after 5 days.    Aerobic culture [[id number]]  (Abnormal) Collected: 02/07/24 1423    Order Status: Completed Specimen: Abscess from Back Updated: 02/09/24 1138     Aerobic Bacterial Culture STAPHYLOCOCCUS AUREUS  Rare  Susceptibility pending      Narrative:      L psoas    Culture, Anaerobe [[id number]] Collected: 02/07/24 1423    Order Status: Completed Specimen: Abscess from Back Updated: 02/09/24 0934     Anaerobic Culture Culture in progress    Narrative:      L psoas    AFB Culture & Smear [[id number]] Collected: 02/07/24 1413    Order Status: Completed Specimen: Joint Fluid " from Back Updated: 02/09/24 0927     AFB Culture & Smear Culture in progress     AFB CULTURE STAIN No acid fast bacilli seen.    Aerobic culture [[id number]] Collected: 02/07/24 1412    Order Status: Completed Specimen: Bone from Back Updated: 02/09/24 0745     Aerobic Bacterial Culture No growth    Narrative:      Joint    Culture, Anaerobe [[id number]] Collected: 02/07/24 1411    Order Status: Completed Specimen: Joint Fluid from Back Updated: 02/09/24 0728     Anaerobic Culture Culture in progress    AFB Culture & Smear [[id number]] Collected: 02/07/24 1423    Order Status: Completed Specimen: Abscess from Back Updated: 02/08/24 2127     AFB Culture & Smear Culture in progress     AFB CULTURE STAIN No acid fast bacilli seen.    Narrative:      L psoas    Gram stain [[id number]] Collected: 02/07/24 1413    Order Status: Completed Specimen: Joint Fluid from Back Updated: 02/08/24 0041     Gram Stain Result No WBC's      No organisms seen    Fungus culture [[id number]] Collected: 02/07/24 1413    Order Status: Sent Specimen: Joint Fluid from Back Updated: 02/07/24 2110    Gram stain [[id number]] Collected: 02/07/24 1423    Order Status: Completed Specimen: Abscess from Back Updated: 02/07/24 1934     Gram Stain Result Few WBC's      No organisms seen    Narrative:      L psoas    Fungus culture [[id number]] Collected: 02/07/24 1423    Order Status: Sent Specimen: Abscess from Back Updated: 02/07/24 1733    Culture, Anaerobe [[id number]]     Order Status: No result Specimen: Abscess from Sacrum     Aerobic culture [[id number]]     Order Status: No result Specimen: Abscess from Sacrum     AFB Culture & Smear [[id number]]     Order Status: No result Specimen: Abscess from Sacrum     Gram stain [[id number]]     Order Status: No result Specimen: Abscess from Sacrum     Fungus culture [[id number]]     Order Status: No result Specimen: Abscess from Sacrum     Culture, Anaerobe [[id number]]     Order Status: No result  Specimen: Abscess     Aerobic culture [[id number]]     Order Status: No result Specimen: Abscess     Fungus culture [[id number]]     Order Status: No result Specimen: Abscess     AFB Culture & Smear [[id number]]     Order Status: No result Specimen: Abscess     Gram stain [[id number]]     Order Status: No result Specimen: Abscess           All pertinent labs from the last 24 hours have been reviewed.    Significant Diagnostics:  No results found in the last 24 hoursI  Assessment/Plan:     * Osteomyelitis of vertebra of lumbosacral region  28M w/ hx MRSA bactermia, IVDU, pelvic/intra-abdominal abscesses who presents with progressive back pain with radiation down the leg over the past couple of weeks.     - All pertinent labs and imaging reviewed  - 2/4 ESR 75, CRP 33.7. Blood cx NGTD.   - Back culture 2/7 with staph aureus  - MRI lumbar w/wo 2/4: L5-S1 osteodiscitis extending into the sacrum, left iliac bone, SI joint, facet joints. Anterolisthesis L5-S1, epidural abscess L5-S1 with canal stenosis. Left psoas and paraspinal muscle abscesses. No clinical evidence of cauda equina syndrome.   - CT lumbar obtained with destructive changes at L5-S1.    Now he is status post L4 to pelvis fusion  on 02/14/2024   Postop day 3      Continue floor care with vital and neurocheck per protocol  Multimodal pain medications.  He is seemed to be overmedicated, please wean IV medications  Continue HV in place to full suction  L spine xray satisfactory    Sating well at RA  Blood pressure goal normotensive  Advance diet as tolerated  H/H 7.4/24, CTM.  Blood transfusion per hospital medicine  Intraop cultures grew staph.  ID team following appreciate recs.  Continue antibiotic per ID team  SubQ heparin for DVT prophylaxis  PTOT  Please obtain DVT ultrasound today  LSO brace when out of bed  SW/CM for discharge planning and DME        Allison Wallace MD  Neurosurgery  Aleksandr Bray - Neurosurgery ([facility])

## 2024-02-17 NOTE — SUBJECTIVE & OBJECTIVE
Interval History:  No chest pain, no shortness a breath.  Patient reports bothersome back pain, he does affirm heavy heroin use prior to hospitalization.    Review of Systems  Objective:     Vital Signs (Most Recent):  Temp: 98.1 °F (36.7 °C) (02/17/24 1117)  Pulse: 81 (02/17/24 1117)  Resp: 15 (02/17/24 1126)  BP: (!) 126/56 (02/17/24 1117)  SpO2: 100 % (02/17/24 1117) Vital Signs (24h Range):  Temp:  [98.1 °F (36.7 °C)-99.3 °F (37.4 °C)] 98.1 °F (36.7 °C)  Pulse:  [79-98] 81  Resp:  [15-18] 15  SpO2:  [99 %-100 %] 100 %  BP: (111-126)/(56-58) 126/56     Weight: 77 kg (169 lb 12.1 oz)  Body mass index is 24.36 kg/m².    Intake/Output Summary (Last 24 hours) at 2/17/2024 1211  Last data filed at 2/17/2024 0618  Gross per 24 hour   Intake 240 ml   Output 2605 ml   Net -2365 ml         Physical Exam      Clear lungs bilaterally, unlabored breathing, on room air, no cyanosis  Heart sounds indicate a regular rate and rhythm  Awake alert, no acute distress   No facial droop, no slurred speech   5/5 proximal upper and lower extremity strength bilaterally including fist  and hip flexor strength   No obvious lower extremity edema

## 2024-02-17 NOTE — ASSESSMENT & PLAN NOTE
28M w/ hx MRSA bactermia, IVDU, pelvic/intra-abdominal abscesses who presents with progressive back pain with radiation down the leg over the past couple of weeks.     - All pertinent labs and imaging reviewed  - 2/4 ESR 75, CRP 33.7. Blood cx NGTD.   - Back culture 2/7 with staph aureus  - MRI lumbar w/wo 2/4: L5-S1 osteodiscitis extending into the sacrum, left iliac bone, SI joint, facet joints. Anterolisthesis L5-S1, epidural abscess L5-S1 with canal stenosis. Left psoas and paraspinal muscle abscesses. No clinical evidence of cauda equina syndrome.   - CT lumbar obtained with destructive changes at L5-S1.    Now he is status post L4 to pelvis fusion  on 02/14/2024   Postop day 2      Continue floor care with vital and neurocheck per protocol  Multimodal pain medications  Continue HV in place to full suction  L spine xray satisfactory    Sating well at RA  Blood pressure goal normotensive  Advance diet as tolerated  H/H 8/27, CTM  Intraop cultures grew staph.  ID team following appreciate recs.  Continue antibiotic per ID team  SubQ heparin for DVT prophylaxis  PTOT  LSO brace when out of bed  SW/CM for discharge planning and DME    Seen and discussed with

## 2024-02-18 LAB
ANION GAP SERPL CALC-SCNC: 7 MMOL/L (ref 8–16)
BASOPHILS # BLD AUTO: 0.02 K/UL (ref 0–0.2)
BASOPHILS NFR BLD: 0.3 % (ref 0–1.9)
BUN SERPL-MCNC: 17 MG/DL (ref 6–20)
CALCIUM SERPL-MCNC: 9 MG/DL (ref 8.7–10.5)
CHLORIDE SERPL-SCNC: 102 MMOL/L (ref 95–110)
CO2 SERPL-SCNC: 26 MMOL/L (ref 23–29)
CREAT SERPL-MCNC: 0.6 MG/DL (ref 0.5–1.4)
DIFFERENTIAL METHOD BLD: ABNORMAL
EOSINOPHIL # BLD AUTO: 0.1 K/UL (ref 0–0.5)
EOSINOPHIL NFR BLD: 1 % (ref 0–8)
ERYTHROCYTE [DISTWIDTH] IN BLOOD BY AUTOMATED COUNT: 19.9 % (ref 11.5–14.5)
EST. GFR  (NO RACE VARIABLE): >60 ML/MIN/1.73 M^2
GLUCOSE SERPL-MCNC: 104 MG/DL (ref 70–110)
HCT VFR BLD AUTO: 25.1 % (ref 40–54)
HGB BLD-MCNC: 7.6 G/DL (ref 14–18)
IMM GRANULOCYTES # BLD AUTO: 0.01 K/UL (ref 0–0.04)
IMM GRANULOCYTES NFR BLD AUTO: 0.2 % (ref 0–0.5)
LYMPHOCYTES # BLD AUTO: 1.9 K/UL (ref 1–4.8)
LYMPHOCYTES NFR BLD: 30.3 % (ref 18–48)
MCH RBC QN AUTO: 21.8 PG (ref 27–31)
MCHC RBC AUTO-ENTMCNC: 30.3 G/DL (ref 32–36)
MCV RBC AUTO: 72 FL (ref 82–98)
MONOCYTES # BLD AUTO: 0.6 K/UL (ref 0.3–1)
MONOCYTES NFR BLD: 9.5 % (ref 4–15)
NEUTROPHILS # BLD AUTO: 3.6 K/UL (ref 1.8–7.7)
NEUTROPHILS NFR BLD: 58.7 % (ref 38–73)
NRBC BLD-RTO: 0 /100 WBC
PLATELET # BLD AUTO: 345 K/UL (ref 150–450)
PMV BLD AUTO: 9.3 FL (ref 9.2–12.9)
POTASSIUM SERPL-SCNC: 4.3 MMOL/L (ref 3.5–5.1)
RBC # BLD AUTO: 3.49 M/UL (ref 4.6–6.2)
SODIUM SERPL-SCNC: 135 MMOL/L (ref 136–145)
WBC # BLD AUTO: 6.2 K/UL (ref 3.9–12.7)

## 2024-02-18 PROCEDURE — 25000003 PHARM REV CODE 250: Performed by: HOSPITALIST

## 2024-02-18 PROCEDURE — 25000003 PHARM REV CODE 250: Performed by: STUDENT IN AN ORGANIZED HEALTH CARE EDUCATION/TRAINING PROGRAM

## 2024-02-18 PROCEDURE — 85025 COMPLETE CBC W/AUTO DIFF WBC: CPT | Performed by: STUDENT IN AN ORGANIZED HEALTH CARE EDUCATION/TRAINING PROGRAM

## 2024-02-18 PROCEDURE — 63600175 PHARM REV CODE 636 W HCPCS: Performed by: NURSE PRACTITIONER

## 2024-02-18 PROCEDURE — S4991 NICOTINE PATCH NONLEGEND: HCPCS | Performed by: HOSPITALIST

## 2024-02-18 PROCEDURE — 63600175 PHARM REV CODE 636 W HCPCS: Performed by: STUDENT IN AN ORGANIZED HEALTH CARE EDUCATION/TRAINING PROGRAM

## 2024-02-18 PROCEDURE — 25000003 PHARM REV CODE 250: Performed by: NURSE PRACTITIONER

## 2024-02-18 PROCEDURE — 80048 BASIC METABOLIC PNL TOTAL CA: CPT | Performed by: STUDENT IN AN ORGANIZED HEALTH CARE EDUCATION/TRAINING PROGRAM

## 2024-02-18 PROCEDURE — 63600175 PHARM REV CODE 636 W HCPCS: Performed by: HOSPITALIST

## 2024-02-18 PROCEDURE — 36415 COLL VENOUS BLD VENIPUNCTURE: CPT | Performed by: STUDENT IN AN ORGANIZED HEALTH CARE EDUCATION/TRAINING PROGRAM

## 2024-02-18 PROCEDURE — 11000001 HC ACUTE MED/SURG PRIVATE ROOM

## 2024-02-18 RX ORDER — HYDROMORPHONE HYDROCHLORIDE 1 MG/ML
1 INJECTION, SOLUTION INTRAMUSCULAR; INTRAVENOUS; SUBCUTANEOUS EVERY 4 HOURS PRN
Status: DISCONTINUED | OUTPATIENT
Start: 2024-02-18 | End: 2024-02-20

## 2024-02-18 RX ADMIN — OXYCODONE HYDROCHLORIDE 10 MG: 10 TABLET ORAL at 08:02

## 2024-02-18 RX ADMIN — CLONIDINE HYDROCHLORIDE 0.1 MG: 0.1 TABLET ORAL at 08:02

## 2024-02-18 RX ADMIN — ACETAMINOPHEN 1000 MG: 500 TABLET ORAL at 05:02

## 2024-02-18 RX ADMIN — HYDROMORPHONE HYDROCHLORIDE 1 MG: 1 INJECTION, SOLUTION INTRAMUSCULAR; INTRAVENOUS; SUBCUTANEOUS at 05:02

## 2024-02-18 RX ADMIN — CLONIDINE HYDROCHLORIDE 0.1 MG: 0.1 TABLET ORAL at 09:02

## 2024-02-18 RX ADMIN — DAPTOMYCIN 765 MG: 350 INJECTION, POWDER, LYOPHILIZED, FOR SOLUTION INTRAVENOUS at 04:02

## 2024-02-18 RX ADMIN — OXYCODONE HYDROCHLORIDE 10 MG: 10 TABLET ORAL at 02:02

## 2024-02-18 RX ADMIN — DIAZEPAM 5 MG: 5 TABLET ORAL at 09:02

## 2024-02-18 RX ADMIN — ENOXAPARIN SODIUM 40 MG: 40 INJECTION SUBCUTANEOUS at 05:02

## 2024-02-18 RX ADMIN — DIAZEPAM 5 MG: 5 TABLET ORAL at 05:02

## 2024-02-18 RX ADMIN — HYDROMORPHONE HYDROCHLORIDE 1 MG: 1 INJECTION, SOLUTION INTRAMUSCULAR; INTRAVENOUS; SUBCUTANEOUS at 11:02

## 2024-02-18 RX ADMIN — NICOTINE 1 PATCH: 14 PATCH, EXTENDED RELEASE TRANSDERMAL at 09:02

## 2024-02-18 RX ADMIN — ACETAMINOPHEN 1000 MG: 500 TABLET ORAL at 02:02

## 2024-02-18 RX ADMIN — OXYCODONE HYDROCHLORIDE 10 MG: 10 TABLET ORAL at 07:02

## 2024-02-18 RX ADMIN — MIRTAZAPINE 15 MG: 15 TABLET, FILM COATED ORAL at 09:02

## 2024-02-18 RX ADMIN — DIAZEPAM 5 MG: 5 TABLET ORAL at 02:02

## 2024-02-18 RX ADMIN — METHOCARBAMOL 1000 MG: 100 INJECTION INTRAMUSCULAR; INTRAVENOUS at 09:02

## 2024-02-18 RX ADMIN — METHOCARBAMOL 1000 MG: 100 INJECTION INTRAMUSCULAR; INTRAVENOUS at 02:02

## 2024-02-18 NOTE — PROGRESS NOTES
"Aleksandr Bray - Neurosurgery ([facility])  [facility] Medicine  Progress Note    Patient Name: Ton Donovan  MRN: [id number]  Patient Class: IP- Inpatient   Admission Date: 2/4/2024  Length of Stay: 13 days  Attending Physician: Reese Schaefer MD  Primary Care Provider: Emily, Primary Doctor        Subjective:     Principal Problem:Osteomyelitis of vertebra of lumbosacral region        HPI:  Ton Donovan is a 28-year-old male with PMHx of IVDU w/ assoc complications of MRSA bacteremia, multiple septic joints & abscesses, who presented 2/4/24 with progressive back pain with radiation down his legs b/l. Endorses bilateral numbness & neuropathic pain over the dorsum of his feet. This has been ongoing and is not necessarily a new problem, but has been worsening. Reports "pins & needles"/paresthesias down his legs that have been progressing, although laying on his stomach seems to provide significant relief. Admits to continued active IV heroin use; last used just prior to EMS arrival. Denies any fevers, chills, night sweats, cough, or shortness of breath. Denies any bowel or bladder incontinence/retention or saddle anesthesia.     On arrival, HDS & 0/4 SIRS. MRI showed osteomyelitis/discitis of L5-S1, osteomyelitis of sacrum & L iliac bone, probable septic arthritis of L SI joint & lower lumbar spine facet joints, worsening anterolisthesis of L5 with respect to S1 with increased size of epidural abscess at the level of L5-S1 and probable severe central canal stenosis at this level, along w/ additional abscesses in the L iliopsoas muscle, paraspinal muscles, pelvis, & L sacrum. Received single dose of Zosyn in ED on arrival, but given that pt HDS w/o sepsis, further abx held to optimize intra-op cx/pathology. Admitted to hospital medicine for further mgmt of osteomyelitis, septic joints & abscesses.    Overview/Hospital Course:  Addiction medicine, along w/ NSGY, orthopedic surgery, IR & ID consulted. Underwent aspiration of " psoas abscess per IR on 2/7. Aspiration of SI joint attempted but unable to aspirate hardly any fluid. Underwent LOOP X FUSION, SPINE, LUMBAR and epidural phlegmon debridement, intra-op cx's growing MRSA. Echo unremarkable    Interval History:  No chest pain, no shortness a breath.  Patient reports expected postop pain.  Denies any leg swelling or leg pains.    Review of Systems  Objective:     Vital Signs (Most Recent):  Temp: 98.5 °F (36.9 °C) (02/18/24 0746)  Pulse: 80 (02/18/24 0746)  Resp: 18 (02/18/24 0807)  BP: 118/61 (02/18/24 0746)  SpO2: 100 % (02/18/24 0746) Vital Signs (24h Range):  Temp:  [97.2 °F (36.2 °C)-98.5 °F (36.9 °C)] 98.5 °F (36.9 °C)  Pulse:  [80-93] 80  Resp:  [15-18] 18  SpO2:  [99 %-100 %] 100 %  BP: (117-133)/(56-67) 118/61     Weight: 77 kg (169 lb 12.1 oz)  Body mass index is 24.36 kg/m².    Intake/Output Summary (Last 24 hours) at 2/18/2024 1056  Last data filed at 2/17/2024 1408  Gross per 24 hour   Intake --   Output 325 ml   Net -325 ml         Physical Exam      Clear lungs bilaterally, unlabored breathing, on room air, no cyanosis  Heart sounds indicate a regular rate and rhythm  Awake alert, no acute distress  No facial droop, no slurred speech  5/5 proximal upper and lower extremity strength bilaterally including fist  and hip flexor strength   No obvious lower extremity edema        Assessment/Plan:      * Osteomyelitis of vertebra of lumbosacral region  Osteomyelitis, discitis, & myositis of lumbosacral region  Septic arthritis of SI joint(s)  Epidural abscesses   Multiple abscesses of pelvis, sacrum, psoas, iliacus, & paraspinals  Lower back pain w/ b/l sciatica   Severe spinal canal stenosis   MRI: osteomyelitis/discitis of L5-S1, osteomyelitis of sacrum & L iliac bone, probable septic arthritis of L SI joint & lower lumbar spine facet joints, worsening anterolisthesis of L5 with respect to S1 with increased size of epidural abscess at the level of L5-S1 and probable  severe central canal stenosis at this level, along w/ additional abscesses in the L iliopsoas muscle, paraspinal muscles, pelvis, & L sacrum.    - Underwent  2/14/24 epidural abscess/phlegmon debridement and LOOP X FUSION, SPINE, LUMBAR w/   - Left sacroiliac joint aspiration  on 2/7 by IR growing MRSA  - Ortho consulted for mgmt of septic SI joint; appreciate recs/assistance  - MRSA growing from multiple sites;  --> switched to daptomycin 8 week course from surg date per ID    L5-S2 epidural phlegmon  See above; intra-op cx's growing MRSA      Housing insecurity  Previously worked at [employer] & held down job in the past but started using heroin last year following multiple family deaths (mother, aunt & grandmother) in a short period of time. Subsequently lost housing & has been living on streets since then.   - SW/CM    Substance or medication-induced depressive disorder  Pt endorses sx of depression, no SI/SA.   -per psych, mirtazapine 15mg QHS    Spinal stenosis of lumbar region without neurogenic claudication  See osteomyelitis      IVDU (intravenous drug user)  Heroin abuse w/ opioid withdrawal   PSDU  Tobacco use   Known hx of IVDU (heroin) w/ extensive assoc medical complications (discussed above). No recent UDS (only one on file from July 2023- positive for opiates & THC) and none collected on admission. Endorses daily IV heroin use (1g/day); last used just prior to calling EMS. Active tobacco use, but denies EtOH or other illicit substance use. Endorsing sx of opioid w/d.   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,   - fluid restriction; protein  intake encouraged      VTE Risk Mitigation (From admission, onward)           Ordered     enoxaparin injection 40 mg  Every 24 hours         02/14/24 1851     Place sequential compression device  Until discontinued         02/05/24 0126     IP VTE HIGH RISK PATIENT  Once         02/05/24 0126                    Discharge Planning   SENA: 2/19/2024     Code Status: Full Code   Is the patient medically ready for discharge?: Yes    Reason for patient still in hospital (select all that apply): PT / OT recommendations and Pending disposition  Discharge Plan A: Long-term acute care facility (LTAC)   Discharge Delays: None known at this time              Reese Schaefer MD  Department of Hospital Medicine   [facility] - Neurosurgery ([facility])

## 2024-02-18 NOTE — PROGRESS NOTES
Aleksandr Bray - Neurosurgery ([facility])  Neurosurgery  Progress Note    Subjective:     History of Present Illness: 28M w/ hx MRSA bactermia, IVDU, pelvic/intra-abdominal abscesses who presents with progressive back pain with radiation down the leg over the past couple of weeks.  He has had bilateral numbness of his anterior thigh as well. He denies bowel/bladder sx or saddle anesthesia. He denies focal weakness. Per chart review, the patient is actively using heroin and did an injection prior to being taken in by EMS.      Post-Op Info:  Procedure(s) (LRB):  LOOP X FUSION, SPINE, LUMBAR (N/A)   4 Days Post-Op   Interval History:     Postop day 4  Drains in place  Intraop cultures growing staph   NAEON        Medications:  Continuous Infusions:  Scheduled Meds:   acetaminophen  1,000 mg Oral Q8H    cefTRIAXone (Rocephin) IV (PEDS and ADULTS)  2 g Intravenous Q24H    cloNIDine  0.1 mg Oral Q12H    enoxparin  40 mg Subcutaneous Daily    mirtazapine  15 mg Oral QHS    nicotine  1 patch Transdermal Daily    vancomycin (VANCOCIN) IV (PEDS and ADULTS)  20 mg/kg Intravenous Q12H     PRN Meds:acetaminophen, dextrose 10%, dextrose 10%, dicyclomine, glucagon (human recombinant), glucose, glucose, HYDROmorphone, hydrOXYzine HCL, ibuprofen, loperamide, melatonin, naloxone, oxyCODONE, oxyCODONE, promethazine, sodium chloride 0.9%, Pharmacy to dose Vancomycin consult **AND** vancomycin - pharmacy to dose     Review of Systems  Objective:     Weight: 76.7 kg (169 lb 1.5 oz)  Body mass index is 23.59 kg/m².  Vital Signs (Most Recent):  Temp: 98.7 °F (37.1 °C) (02/11/24 0609)  Pulse: 72 (02/11/24 0609)  Resp: 18 (02/11/24 0609)  BP: 119/63 (02/11/24 0609)  SpO2: 100 % (02/11/24 0609) Vital Signs (24h Range):  Temp:  [96.6 °F (35.9 °C)-99.4 °F (37.4 °C)] 98.7 °F (37.1 °C)  Pulse:  [65-75] 72  Resp:  [17-20] 18  SpO2:  [99 %-100 %] 100 %  BP: (113-132)/(52-63) 119/63          Physical Exam  General: well developed, well nourished, no  "distress.   Head: normocephalic, atraumatic  Mental Status: Awake, Alert, Oriented  Speech: Clear with content appropriate to conversation  Cranial nerves: face symmetric, CN II-XII grossly intact.   Sensory: intact to light touch throughout  Motor Strength: Motor Strength: Moves all extremities spontaneously with good tone. No abnormal movements seen. Pain limited weakness b/l hip flexors.   DF/EHL/PF 4 on R and  4+ L  Wound VAC in place  Hemovac in place      Significant Labs:  Recent Labs   Lab 02/10/24  0526   GLU 98      K 4.7      CO2 26   BUN 20   CREATININE 0.9   CALCIUM 8.8       Recent Labs   Lab 02/10/24  0526   WBC 6.92   HGB 9.6*   HCT 32.9*   *       No results for input(s): "LABPT", "INR", "APTT" in the last 48 hours.  Microbiology Results (last 7 days)       Procedure Component Value Units Date/Time    Blood culture #1 **CANNOT BE ORDERED STAT** [[id number]] Collected: 02/04/24 1853    Order Status: Completed Specimen: Blood from Peripheral, Forearm, Left Updated: 02/09/24 2012     Blood Culture, Routine No growth after 5 days.    Blood culture #2 **CANNOT BE ORDERED STAT** [[id number]] Collected: 02/04/24 1822    Order Status: Completed Specimen: Blood from Peripheral, Forearm, Left Updated: 02/09/24 2012     Blood Culture, Routine No growth after 5 days.    Aerobic culture [[id number]]  (Abnormal) Collected: 02/07/24 1423    Order Status: Completed Specimen: Abscess from Back Updated: 02/09/24 1138     Aerobic Bacterial Culture STAPHYLOCOCCUS AUREUS  Rare  Susceptibility pending      Narrative:      L psoas    Culture, Anaerobe [[id number]] Collected: 02/07/24 1423    Order Status: Completed Specimen: Abscess from Back Updated: 02/09/24 0934     Anaerobic Culture Culture in progress    Narrative:      L psoas    AFB Culture & Smear [[id number]] Collected: 02/07/24 1413    Order Status: Completed Specimen: Joint Fluid from Back Updated: 02/09/24 0927     AFB Culture & Smear " Culture in progress     AFB CULTURE STAIN No acid fast bacilli seen.    Aerobic culture [[id number]] Collected: 02/07/24 1412    Order Status: Completed Specimen: Bone from Back Updated: 02/09/24 0745     Aerobic Bacterial Culture No growth    Narrative:      Joint    Culture, Anaerobe [[id number]] Collected: 02/07/24 1411    Order Status: Completed Specimen: Joint Fluid from Back Updated: 02/09/24 0728     Anaerobic Culture Culture in progress    AFB Culture & Smear [[id number]] Collected: 02/07/24 1423    Order Status: Completed Specimen: Abscess from Back Updated: 02/08/24 2127     AFB Culture & Smear Culture in progress     AFB CULTURE STAIN No acid fast bacilli seen.    Narrative:      L psoas    Gram stain [[id number]] Collected: 02/07/24 1413    Order Status: Completed Specimen: Joint Fluid from Back Updated: 02/08/24 0041     Gram Stain Result No WBC's      No organisms seen    Fungus culture [[id number]] Collected: 02/07/24 1413    Order Status: Sent Specimen: Joint Fluid from Back Updated: 02/07/24 2110    Gram stain [[id number]] Collected: 02/07/24 1423    Order Status: Completed Specimen: Abscess from Back Updated: 02/07/24 1934     Gram Stain Result Few WBC's      No organisms seen    Narrative:      L psoas    Fungus culture [[id number]] Collected: 02/07/24 1423    Order Status: Sent Specimen: Abscess from Back Updated: 02/07/24 1733    Culture, Anaerobe [[id number]]     Order Status: No result Specimen: Abscess from Sacrum     Aerobic culture [[id number]]     Order Status: No result Specimen: Abscess from Sacrum     AFB Culture & Smear [[id number]]     Order Status: No result Specimen: Abscess from Sacrum     Gram stain [[id number]]     Order Status: No result Specimen: Abscess from Sacrum     Fungus culture [[id number]]     Order Status: No result Specimen: Abscess from Sacrum     Culture, Anaerobe [[id number]]     Order Status: No result Specimen: Abscess     Aerobic culture [[id number]]     Order  Status: No result Specimen: Abscess     Fungus culture [[id number]]     Order Status: No result Specimen: Abscess     AFB Culture & Smear [[id number]]     Order Status: No result Specimen: Abscess     Gram stain [[id number]]     Order Status: No result Specimen: Abscess           All pertinent labs from the last 24 hours have been reviewed.    Significant Diagnostics:  No results found in the last 24 hours  Assessment/Plan:     * Osteomyelitis of vertebra of lumbosacral region  28M w/ hx MRSA bactermia, IVDU, pelvic/intra-abdominal abscesses who presents with progressive back pain with radiation down the leg over the past couple of weeks.     - All pertinent labs and imaging reviewed  - 2/4 ESR 75, CRP 33.7. Blood cx NGTD.   - Back culture 2/7 with staph aureus  - MRI lumbar w/wo 2/4: L5-S1 osteodiscitis extending into the sacrum, left iliac bone, SI joint, facet joints. Anterolisthesis L5-S1, epidural abscess L5-S1 with canal stenosis. Left psoas and paraspinal muscle abscesses. No clinical evidence of cauda equina syndrome.   - CT lumbar obtained with destructive changes at L5-S1.    Now he is status post L4 to pelvis fusion  on 02/14/2024     Postop day 4      Continue floor care with vital and neurocheck per protocol  Multimodal pain medications.  He is seemed to be overmedicated, please wean IV medications  Continue HV in place to full suction  L spine xray satisfactory    Sating well at RA  Blood pressure goal normotensive  Advance diet as tolerated  H/H 7.6/25, CTM.  Blood transfusion per hospital medicine  Intraop cultures grew staph.  ID team following appreciate recs.  Continue antibiotic per ID team  SubQ heparin for DVT prophylaxis  PTOT  Please obtain DVT ultrasound for surveillance   LSO brace when out of bed  SW/CM for discharge planning and DME      Dispo: Likely IPR with long term IV abx        Allison Wallace MD  Neurosurgery  Aleksandr Bray - Neurosurgery ([facility])

## 2024-02-18 NOTE — SUBJECTIVE & OBJECTIVE
Interval History:  No chest pain, no shortness a breath.  Patient reports expected postop pain.  Denies any leg swelling or leg pains.    Review of Systems  Objective:     Vital Signs (Most Recent):  Temp: 98.5 °F (36.9 °C) (02/18/24 0746)  Pulse: 80 (02/18/24 0746)  Resp: 18 (02/18/24 0807)  BP: 118/61 (02/18/24 0746)  SpO2: 100 % (02/18/24 0746) Vital Signs (24h Range):  Temp:  [97.2 °F (36.2 °C)-98.5 °F (36.9 °C)] 98.5 °F (36.9 °C)  Pulse:  [80-93] 80  Resp:  [15-18] 18  SpO2:  [99 %-100 %] 100 %  BP: (117-133)/(56-67) 118/61     Weight: 77 kg (169 lb 12.1 oz)  Body mass index is 24.36 kg/m².    Intake/Output Summary (Last 24 hours) at 2/18/2024 1056  Last data filed at 2/17/2024 1408  Gross per 24 hour   Intake --   Output 325 ml   Net -325 ml         Physical Exam      Clear lungs bilaterally, unlabored breathing, on room air, no cyanosis  Heart sounds indicate a regular rate and rhythm  Awake alert, no acute distress  No facial droop, no slurred speech  5/5 proximal upper and lower extremity strength bilaterally including fist  and hip flexor strength   No obvious lower extremity edema

## 2024-02-18 NOTE — ASSESSMENT & PLAN NOTE
28M w/ hx MRSA bactermia, IVDU, pelvic/intra-abdominal abscesses who presents with progressive back pain with radiation down the leg over the past couple of weeks.     - All pertinent labs and imaging reviewed  - 2/4 ESR 75, CRP 33.7. Blood cx NGTD.   - Back culture 2/7 with staph aureus  - MRI lumbar w/wo 2/4: L5-S1 osteodiscitis extending into the sacrum, left iliac bone, SI joint, facet joints. Anterolisthesis L5-S1, epidural abscess L5-S1 with canal stenosis. Left psoas and paraspinal muscle abscesses. No clinical evidence of cauda equina syndrome.   - CT lumbar obtained with destructive changes at L5-S1.    Now he is status post L4 to pelvis fusion  on 02/14/2024     Postop day 4      Continue floor care with vital and neurocheck per protocol  Multimodal pain medications.  He is seemed to be overmedicated, please wean IV medications  Continue HV in place to full suction  L spine xray satisfactory    Sating well at RA  Blood pressure goal normotensive  Advance diet as tolerated  H/H 7.6/25, CTM.  Blood transfusion per hospital medicine  Intraop cultures grew staph.  ID team following appreciate recs.  Continue antibiotic per ID team  SubQ heparin for DVT prophylaxis  PTOT  Please obtain DVT ultrasound for surveillance   LSO brace when out of bed  SW/CM for discharge planning and DME      Dispo: Likely IPR with long term IV abx

## 2024-02-18 NOTE — SUBJECTIVE & OBJECTIVE
Interval History:     Postop day 4  Drains in place  Intraop cultures growing staph   NAEON        Medications:  Continuous Infusions:  Scheduled Meds:   acetaminophen  1,000 mg Oral Q8H    cefTRIAXone (Rocephin) IV (PEDS and ADULTS)  2 g Intravenous Q24H    cloNIDine  0.1 mg Oral Q12H    enoxparin  40 mg Subcutaneous Daily    mirtazapine  15 mg Oral QHS    nicotine  1 patch Transdermal Daily    vancomycin (VANCOCIN) IV (PEDS and ADULTS)  20 mg/kg Intravenous Q12H     PRN Meds:acetaminophen, dextrose 10%, dextrose 10%, dicyclomine, glucagon (human recombinant), glucose, glucose, HYDROmorphone, hydrOXYzine HCL, ibuprofen, loperamide, melatonin, naloxone, oxyCODONE, oxyCODONE, promethazine, sodium chloride 0.9%, Pharmacy to dose Vancomycin consult **AND** vancomycin - pharmacy to dose     Review of Systems  Objective:     Weight: 76.7 kg (169 lb 1.5 oz)  Body mass index is 23.59 kg/m².  Vital Signs (Most Recent):  Temp: 98.7 °F (37.1 °C) (02/11/24 0609)  Pulse: 72 (02/11/24 0609)  Resp: 18 (02/11/24 0609)  BP: 119/63 (02/11/24 0609)  SpO2: 100 % (02/11/24 0609) Vital Signs (24h Range):  Temp:  [96.6 °F (35.9 °C)-99.4 °F (37.4 °C)] 98.7 °F (37.1 °C)  Pulse:  [65-75] 72  Resp:  [17-20] 18  SpO2:  [99 %-100 %] 100 %  BP: (113-132)/(52-63) 119/63          Physical Exam  General: well developed, well nourished, no distress.   Head: normocephalic, atraumatic  Mental Status: Awake, Alert, Oriented  Speech: Clear with content appropriate to conversation  Cranial nerves: face symmetric, CN II-XII grossly intact.   Sensory: intact to light touch throughout  Motor Strength: Motor Strength: Moves all extremities spontaneously with good tone. No abnormal movements seen. Pain limited weakness b/l hip flexors.   DF/EHL/PF 4 on R and  4+ L  Wound VAC in place  Hemovac in place      Significant Labs:  Recent Labs   Lab 02/10/24  0526   GLU 98      K 4.7      CO2 26   BUN 20   CREATININE 0.9   CALCIUM 8.8       Recent Labs  "  Lab 02/10/24  0526   WBC 6.92   HGB 9.6*   HCT 32.9*   *       No results for input(s): "LABPT", "INR", "APTT" in the last 48 hours.  Microbiology Results (last 7 days)       Procedure Component Value Units Date/Time    Blood culture #1 **CANNOT BE ORDERED STAT** [[id number]] Collected: 02/04/24 1853    Order Status: Completed Specimen: Blood from Peripheral, Forearm, Left Updated: 02/09/24 2012     Blood Culture, Routine No growth after 5 days.    Blood culture #2 **CANNOT BE ORDERED STAT** [[id number]] Collected: 02/04/24 1822    Order Status: Completed Specimen: Blood from Peripheral, Forearm, Left Updated: 02/09/24 2012     Blood Culture, Routine No growth after 5 days.    Aerobic culture [[id number]]  (Abnormal) Collected: 02/07/24 1423    Order Status: Completed Specimen: Abscess from Back Updated: 02/09/24 1138     Aerobic Bacterial Culture STAPHYLOCOCCUS AUREUS  Rare  Susceptibility pending      Narrative:      L psoas    Culture, Anaerobe [[id number]] Collected: 02/07/24 1423    Order Status: Completed Specimen: Abscess from Back Updated: 02/09/24 0934     Anaerobic Culture Culture in progress    Narrative:      L psoas    AFB Culture & Smear [[id number]] Collected: 02/07/24 1413    Order Status: Completed Specimen: Joint Fluid from Back Updated: 02/09/24 0927     AFB Culture & Smear Culture in progress     AFB CULTURE STAIN No acid fast bacilli seen.    Aerobic culture [[id number]] Collected: 02/07/24 1412    Order Status: Completed Specimen: Bone from Back Updated: 02/09/24 0745     Aerobic Bacterial Culture No growth    Narrative:      Joint    Culture, Anaerobe [[id number]] Collected: 02/07/24 1411    Order Status: Completed Specimen: Joint Fluid from Back Updated: 02/09/24 0728     Anaerobic Culture Culture in progress    AFB Culture & Smear [[id number]] Collected: 02/07/24 1423    Order Status: Completed Specimen: Abscess from Back Updated: 02/08/24 2127     AFB Culture & Smear Culture in " progress     AFB CULTURE STAIN No acid fast bacilli seen.    Narrative:      L psoas    Gram stain [[id number]] Collected: 02/07/24 1413    Order Status: Completed Specimen: Joint Fluid from Back Updated: 02/08/24 0041     Gram Stain Result No WBC's      No organisms seen    Fungus culture [[id number]] Collected: 02/07/24 1413    Order Status: Sent Specimen: Joint Fluid from Back Updated: 02/07/24 2110    Gram stain [[id number]] Collected: 02/07/24 1423    Order Status: Completed Specimen: Abscess from Back Updated: 02/07/24 1934     Gram Stain Result Few WBC's      No organisms seen    Narrative:      L psoas    Fungus culture [[id number]] Collected: 02/07/24 1423    Order Status: Sent Specimen: Abscess from Back Updated: 02/07/24 1733    Culture, Anaerobe [[id number]]     Order Status: No result Specimen: Abscess from Sacrum     Aerobic culture [[id number]]     Order Status: No result Specimen: Abscess from Sacrum     AFB Culture & Smear [[id number]]     Order Status: No result Specimen: Abscess from Sacrum     Gram stain [[id number]]     Order Status: No result Specimen: Abscess from Sacrum     Fungus culture [[id number]]     Order Status: No result Specimen: Abscess from Sacrum     Culture, Anaerobe [[id number]]     Order Status: No result Specimen: Abscess     Aerobic culture [[id number]]     Order Status: No result Specimen: Abscess     Fungus culture [[id number]]     Order Status: No result Specimen: Abscess     AFB Culture & Smear [[id number]]     Order Status: No result Specimen: Abscess     Gram stain [[id number]]     Order Status: No result Specimen: Abscess           All pertinent labs from the last 24 hours have been reviewed.    Significant Diagnostics:  No results found in the last 24 hours

## 2024-02-19 LAB
ANION GAP SERPL CALC-SCNC: 6 MMOL/L (ref 8–16)
BACTERIA SPEC ANAEROBE CULT: NORMAL
BACTERIA SPEC ANAEROBE CULT: NORMAL
BASOPHILS # BLD AUTO: 0.03 K/UL (ref 0–0.2)
BASOPHILS NFR BLD: 0.4 % (ref 0–1.9)
BUN SERPL-MCNC: 18 MG/DL (ref 6–20)
CALCIUM SERPL-MCNC: 9.5 MG/DL (ref 8.7–10.5)
CHLORIDE SERPL-SCNC: 102 MMOL/L (ref 95–110)
CO2 SERPL-SCNC: 25 MMOL/L (ref 23–29)
CREAT SERPL-MCNC: 0.7 MG/DL (ref 0.5–1.4)
DIFFERENTIAL METHOD BLD: ABNORMAL
EOSINOPHIL # BLD AUTO: 0.1 K/UL (ref 0–0.5)
EOSINOPHIL NFR BLD: 1.5 % (ref 0–8)
ERYTHROCYTE [DISTWIDTH] IN BLOOD BY AUTOMATED COUNT: 20.2 % (ref 11.5–14.5)
EST. GFR  (NO RACE VARIABLE): >60 ML/MIN/1.73 M^2
GLUCOSE SERPL-MCNC: 97 MG/DL (ref 70–110)
HCT VFR BLD AUTO: 25.7 % (ref 40–54)
HGB BLD-MCNC: 7.7 G/DL (ref 14–18)
IMM GRANULOCYTES # BLD AUTO: 0.02 K/UL (ref 0–0.04)
IMM GRANULOCYTES NFR BLD AUTO: 0.3 % (ref 0–0.5)
LYMPHOCYTES # BLD AUTO: 1.9 K/UL (ref 1–4.8)
LYMPHOCYTES NFR BLD: 28.6 % (ref 18–48)
MCH RBC QN AUTO: 21.3 PG (ref 27–31)
MCHC RBC AUTO-ENTMCNC: 30 G/DL (ref 32–36)
MCV RBC AUTO: 71 FL (ref 82–98)
MONOCYTES # BLD AUTO: 0.6 K/UL (ref 0.3–1)
MONOCYTES NFR BLD: 8.8 % (ref 4–15)
NEUTROPHILS # BLD AUTO: 4 K/UL (ref 1.8–7.7)
NEUTROPHILS NFR BLD: 60.4 % (ref 38–73)
NRBC BLD-RTO: 0 /100 WBC
PLATELET # BLD AUTO: 359 K/UL (ref 150–450)
PMV BLD AUTO: 9.3 FL (ref 9.2–12.9)
POTASSIUM SERPL-SCNC: 4.4 MMOL/L (ref 3.5–5.1)
RBC # BLD AUTO: 3.62 M/UL (ref 4.6–6.2)
SODIUM SERPL-SCNC: 133 MMOL/L (ref 136–145)
WBC # BLD AUTO: 6.67 K/UL (ref 3.9–12.7)

## 2024-02-19 PROCEDURE — 97116 GAIT TRAINING THERAPY: CPT

## 2024-02-19 PROCEDURE — 97530 THERAPEUTIC ACTIVITIES: CPT | Mod: CO

## 2024-02-19 PROCEDURE — 63600175 PHARM REV CODE 636 W HCPCS: Performed by: HOSPITALIST

## 2024-02-19 PROCEDURE — 63600175 PHARM REV CODE 636 W HCPCS: Performed by: NURSE PRACTITIONER

## 2024-02-19 PROCEDURE — 25000003 PHARM REV CODE 250: Performed by: HOSPITALIST

## 2024-02-19 PROCEDURE — 80048 BASIC METABOLIC PNL TOTAL CA: CPT | Performed by: STUDENT IN AN ORGANIZED HEALTH CARE EDUCATION/TRAINING PROGRAM

## 2024-02-19 PROCEDURE — 63600175 PHARM REV CODE 636 W HCPCS: Performed by: STUDENT IN AN ORGANIZED HEALTH CARE EDUCATION/TRAINING PROGRAM

## 2024-02-19 PROCEDURE — 25000003 PHARM REV CODE 250: Performed by: STUDENT IN AN ORGANIZED HEALTH CARE EDUCATION/TRAINING PROGRAM

## 2024-02-19 PROCEDURE — S4991 NICOTINE PATCH NONLEGEND: HCPCS | Performed by: HOSPITALIST

## 2024-02-19 PROCEDURE — 85025 COMPLETE CBC W/AUTO DIFF WBC: CPT | Performed by: STUDENT IN AN ORGANIZED HEALTH CARE EDUCATION/TRAINING PROGRAM

## 2024-02-19 PROCEDURE — 11000001 HC ACUTE MED/SURG PRIVATE ROOM

## 2024-02-19 PROCEDURE — 25000003 PHARM REV CODE 250: Performed by: NURSE PRACTITIONER

## 2024-02-19 PROCEDURE — 97535 SELF CARE MNGMENT TRAINING: CPT | Mod: CO

## 2024-02-19 PROCEDURE — 36415 COLL VENOUS BLD VENIPUNCTURE: CPT | Performed by: STUDENT IN AN ORGANIZED HEALTH CARE EDUCATION/TRAINING PROGRAM

## 2024-02-19 PROCEDURE — 97530 THERAPEUTIC ACTIVITIES: CPT

## 2024-02-19 RX ADMIN — ENOXAPARIN SODIUM 40 MG: 40 INJECTION SUBCUTANEOUS at 04:02

## 2024-02-19 RX ADMIN — NICOTINE 1 PATCH: 14 PATCH, EXTENDED RELEASE TRANSDERMAL at 09:02

## 2024-02-19 RX ADMIN — ACETAMINOPHEN 1000 MG: 500 TABLET ORAL at 09:02

## 2024-02-19 RX ADMIN — DIAZEPAM 5 MG: 5 TABLET ORAL at 05:02

## 2024-02-19 RX ADMIN — ACETAMINOPHEN 1000 MG: 500 TABLET ORAL at 05:02

## 2024-02-19 RX ADMIN — OXYCODONE HYDROCHLORIDE 10 MG: 10 TABLET ORAL at 06:02

## 2024-02-19 RX ADMIN — HYDROMORPHONE HYDROCHLORIDE 1 MG: 1 INJECTION, SOLUTION INTRAMUSCULAR; INTRAVENOUS; SUBCUTANEOUS at 12:02

## 2024-02-19 RX ADMIN — CLONIDINE HYDROCHLORIDE 0.1 MG: 0.1 TABLET ORAL at 08:02

## 2024-02-19 RX ADMIN — OXYCODONE HYDROCHLORIDE 10 MG: 10 TABLET ORAL at 10:02

## 2024-02-19 RX ADMIN — HYDROMORPHONE HYDROCHLORIDE 1 MG: 1 INJECTION, SOLUTION INTRAMUSCULAR; INTRAVENOUS; SUBCUTANEOUS at 04:02

## 2024-02-19 RX ADMIN — METHOCARBAMOL 1000 MG: 100 INJECTION INTRAMUSCULAR; INTRAVENOUS at 05:02

## 2024-02-19 RX ADMIN — ACETAMINOPHEN 1000 MG: 500 TABLET ORAL at 02:02

## 2024-02-19 RX ADMIN — OXYCODONE HYDROCHLORIDE 10 MG: 10 TABLET ORAL at 05:02

## 2024-02-19 RX ADMIN — DIAZEPAM 5 MG: 5 TABLET ORAL at 09:02

## 2024-02-19 RX ADMIN — OXYCODONE HYDROCHLORIDE 10 MG: 10 TABLET ORAL at 02:02

## 2024-02-19 RX ADMIN — DIAZEPAM 5 MG: 5 TABLET ORAL at 02:02

## 2024-02-19 RX ADMIN — METHOCARBAMOL 1000 MG: 100 INJECTION INTRAMUSCULAR; INTRAVENOUS at 09:02

## 2024-02-19 RX ADMIN — HYDROMORPHONE HYDROCHLORIDE 1 MG: 1 INJECTION, SOLUTION INTRAMUSCULAR; INTRAVENOUS; SUBCUTANEOUS at 09:02

## 2024-02-19 RX ADMIN — ACETAMINOPHEN 1000 MG: 500 TABLET ORAL at 12:02

## 2024-02-19 RX ADMIN — CLONIDINE HYDROCHLORIDE 0.1 MG: 0.1 TABLET ORAL at 09:02

## 2024-02-19 RX ADMIN — DAPTOMYCIN 765 MG: 350 INJECTION, POWDER, LYOPHILIZED, FOR SOLUTION INTRAVENOUS at 04:02

## 2024-02-19 RX ADMIN — MIRTAZAPINE 15 MG: 15 TABLET, FILM COATED ORAL at 09:02

## 2024-02-19 RX ADMIN — METHOCARBAMOL 1000 MG: 100 INJECTION INTRAMUSCULAR; INTRAVENOUS at 02:02

## 2024-02-19 RX ADMIN — HYDROMORPHONE HYDROCHLORIDE 1 MG: 1 INJECTION, SOLUTION INTRAMUSCULAR; INTRAVENOUS; SUBCUTANEOUS at 08:02

## 2024-02-19 NOTE — SUBJECTIVE & OBJECTIVE
Interval History:  Afebrile.  Doing okay.  No chest pain, no shortness a breath.  Patient denies any leg pains.  No leg swelling.  Pending placement.    Review of Systems  Objective:     Vital Signs (Most Recent):  Temp: 98.4 °F (36.9 °C) (02/19/24 0752)  Pulse: 79 (02/19/24 0752)  Resp: 18 (02/19/24 1035)  BP: (!) 118/58 (02/19/24 0752)  SpO2: 100 % (02/19/24 0752) Vital Signs (24h Range):  Temp:  [97.2 °F (36.2 °C)-98.4 °F (36.9 °C)] 98.4 °F (36.9 °C)  Pulse:  [78-95] 79  Resp:  [16-18] 18  SpO2:  [96 %-100 %] 100 %  BP: (118-142)/(58-72) 118/58     Weight: 77 kg (169 lb 12.1 oz)  Body mass index is 24.36 kg/m².  No intake or output data in the 24 hours ending 02/19/24 1152      Physical Exam      Clear lungs bilaterally, unlabored breathing, on room air, no cyanosis   Heart sounds indicate a regular rate and rhythm  Awake alert, no acute distress  No facial droop, no slurred speech  5/5 fist  bilaterally  5/5 hip flexor strength bilaterally   No obvious lower extremity edema

## 2024-02-19 NOTE — ANESTHESIA POSTPROCEDURE EVALUATION
Anesthesia Post Evaluation    Patient: Ton Donovan    Procedure(s) Performed: Procedure(s) (LRB):  LOOP X FUSION, SPINE, LUMBAR (N/A)    Final Anesthesia Type: general      Patient location during evaluation: PACU  Patient participation: Yes- Able to Participate  Level of consciousness: awake and alert and oriented  Post-procedure vital signs: reviewed and stable  Pain management: adequate  Airway patency: patent    PONV status at discharge: No PONV  Anesthetic complications: no      Cardiovascular status: hemodynamically stable  Respiratory status: unassisted, spontaneous ventilation and room air  Hydration status: euvolemic  Follow-up not needed.              Vital signs stable, no issues at this time.  Event Time   Out of Recovery 02/14/2024 18:45:00

## 2024-02-19 NOTE — SUBJECTIVE & OBJECTIVE
Interval History: 2/19: NAEO. Denies any new complaints. Symptoms stable. HV drain in place, no output recordings x 3 days. Will plan to discontinue today. ID recommends Daptomycin through 4/10.    Medications:  Continuous Infusions:  Scheduled Meds:   acetaminophen  1,000 mg Oral Q8H    cloNIDine  0.1 mg Oral Q12H    DAPTOmycin (CUBICIN) IV (PEDS and ADULTS)  10 mg/kg Intravenous Q24H    diazePAM  5 mg Oral Q8H    enoxparin  40 mg Subcutaneous Q24H (prophylaxis, 1700)    methocarbamol (ROBAXIN) IVPB  1,000 mg Intravenous Q8H    mirtazapine  15 mg Oral QHS    nicotine  1 patch Transdermal Daily     PRN Meds:acetaminophen, dextrose 10%, dextrose 10%, dicyclomine, glucagon (human recombinant), glucose, glucose, HYDROmorphone, hydrOXYzine HCL, labetalol, loperamide, melatonin, naloxone, oxyCODONE, promethazine, sodium chloride 0.9%     Review of Systems  Objective:     Weight: 77 kg (169 lb 12.1 oz)  Body mass index is 24.36 kg/m².  Vital Signs (Most Recent):  Temp: 98.4 °F (36.9 °C) (02/19/24 0752)  Pulse: 79 (02/19/24 0752)  Resp: 18 (02/19/24 0825)  BP: (!) 118/58 (02/19/24 0752)  SpO2: 100 % (02/19/24 0752) Vital Signs (24h Range):  Temp:  [97.2 °F (36.2 °C)-98.4 °F (36.9 °C)] 98.4 °F (36.9 °C)  Pulse:  [78-95] 79  Resp:  [16-18] 18  SpO2:  [96 %-100 %] 100 %  BP: (110-142)/(58-72) 118/58          Closed/Suction Drain 02/14/24 1730 Tube - 1 Left;Posterior;Lateral Back Accordion 10 Fr. (Active)   Site Description Unable to view 02/19/24 0400   Dressing Type Transparent (Tegaderm) 02/19/24 0400   Dressing Status Clean;Dry;Intact 02/19/24 0400   Dressing Intervention Integrity maintained 02/19/24 0400   Drainage Sanguineous 02/19/24 0400   Status Other (Comment) 02/16/24 0800   Output (mL) 20 mL 02/17/24 0618            Closed/Suction Drain 02/14/24 1730 Tube - 2 Right;Posterior;Lateral Back Accordion 10 Fr. (Active)   Site Description Unable to view 02/19/24 0400   Dressing Type Transparent (Tegaderm) 02/19/24 0400  "  Dressing Status Clean;Dry;Intact 02/19/24 0400   Dressing Intervention Integrity maintained 02/19/24 0400   Drainage Sanguineous 02/19/24 0400   Status Other (Comment) 02/14/24 2130   Output (mL) 35 mL 02/17/24 0618      Physical Exam  General: well developed, well nourished, no distress.   Head: normocephalic, atraumatic  Mental Status: Awake, Alert, Oriented  Speech: Clear with content appropriate to conversation  Cranial nerves: face symmetric, CN II-XII grossly intact.   Sensory: intact to light touch throughout    Motor Strength:    Strength  Deltoids Triceps Biceps Wrist Extension Wrist Flexion Hand    Upper: R 5/5 5/5 5/5 5/5 5/5 5/5    L 5/5 5/5 5/5 5/5 5/5 5/5     Iliopsoas Quadriceps Knee  Flexion Tibialis  anterior Gastro- cnemius EHL   Lower: R 4+/5 4+/5 4+/5 4+/5 4/5 4/5    L 4+/5 4+/5 4+/5 4/5 4/5 4/5     Incision clean, dry intact.   Provena in place.   1 HV drain in place with minimal sanguinous contents     Significant Labs:  Recent Labs   Lab 02/18/24  0344 02/19/24  0442    97   * 133*   K 4.3 4.4    102   CO2 26 25   BUN 17 18   CREATININE 0.6 0.7   CALCIUM 9.0 9.5     Recent Labs   Lab 02/18/24  0344 02/19/24  0442   WBC 6.20 6.67   HGB 7.6* 7.7*   HCT 25.1* 25.7*    359     No results for input(s): "LABPT", "INR", "APTT" in the last 48 hours.  Microbiology Results (last 7 days)       Procedure Component Value Units Date/Time    Culture, Anaerobe [[id number]] Collected: 02/14/24 1712    Order Status: Completed Specimen: Incision site from Back Updated: 02/19/24 0959     Anaerobic Culture No anaerobes isolated    Narrative:      L5-S1 disc space    Culture, Anaerobe [[id number]] Collected: 02/14/24 1712    Order Status: Completed Specimen: Incision site from Back Updated: 02/19/24 0958     Anaerobic Culture No anaerobes isolated    Narrative:      Epidural infection culture lumbar spine    Aerobic culture [[id number]]  (Abnormal) Collected: 02/14/24 1712    Order " Status: Completed Specimen: Incision site from Back Updated: 02/19/24 0706     Aerobic Bacterial Culture STAPHYLOCOCCUS AUREUS  Few  Susceptibility pending      Narrative:      Epidural infection culture lumbar spine    Aerobic culture [[id number]]  (Abnormal)  (Susceptibility) Collected: 02/14/24 1712    Order Status: Completed Specimen: Incision site from Back Updated: 02/16/24 1110     Aerobic Bacterial Culture METHICILLIN RESISTANT STAPHYLOCOCCUS AUREUS  Few      Narrative:      L5-S1 disc space    AFB Culture & Smear [[id number]] Collected: 02/14/24 1712    Order Status: Completed Specimen: Incision site from Back Updated: 02/15/24 2127     AFB Culture & Smear Culture in progress     AFB CULTURE STAIN No acid fast bacilli seen.    Narrative:      L5-S1 disc space    AFB Culture & Smear [[id number]] Collected: 02/14/24 1712    Order Status: Completed Specimen: Incision site from Back Updated: 02/15/24 2127     AFB Culture & Smear Culture in progress     AFB CULTURE STAIN No acid fast bacilli seen.    Narrative:      Epidural infection culture lumbar spine    Gram stain [[id number]] Collected: 02/14/24 1712    Order Status: Completed Specimen: Incision site from Back Updated: 02/14/24 1955     Gram Stain Result Rare WBC's      No organisms seen    Narrative:      Epidural infection culture lumbar spine    Gram stain [[id number]] Collected: 02/14/24 1712    Order Status: Completed Specimen: Incision site from Back Updated: 02/14/24 1952     Gram Stain Result Rare WBC's      No organisms seen    Narrative:      L5-S1 disc space    Fungus culture [[id number]] Collected: 02/14/24 1712    Order Status: Sent Specimen: Incision site from Back Updated: 02/14/24 1739    Fungus culture [[id number]] Collected: 02/14/24 1712    Order Status: Sent Specimen: Incision site from Back Updated: 02/14/24 1738    Culture, Anaerobe [[id number]] Collected: 02/14/24 1712    Order Status: Canceled Specimen: Incision site from Back      Aerobic culture [[id number]] Collected: 02/14/24 1712    Order Status: Canceled Specimen: Incision site from Back     Fungus culture [[id number]] Collected: 02/14/24 1712    Order Status: Canceled Specimen: Incision site from Back     AFB Culture & Smear [[id number]] Collected: 02/14/24 1712    Order Status: Canceled Specimen: Incision site from Back     Gram stain [[id number]] Collected: 02/14/24 1712    Order Status: Canceled Specimen: Incision site from Back     Culture, Anaerobe [[id number]] Collected: 02/14/24 1712    Order Status: Canceled Specimen: Incision site from Back     Aerobic culture [[id number]] Collected: 02/14/24 1712    Order Status: Canceled Specimen: Incision site from Back     AFB Culture & Smear [[id number]] Collected: 02/14/24 1712    Order Status: Canceled Specimen: Incision site from Back     Gram stain [[id number]] Collected: 02/14/24 1712    Order Status: Canceled Specimen: Incision site from Back     Fungus culture [[id number]] Collected: 02/14/24 1712    Order Status: Canceled Specimen: Incision site from Back     Aerobic culture [[id number]]  (Abnormal)  (Susceptibility) Collected: 02/07/24 1423    Order Status: Completed Specimen: Abscess from Back Updated: 02/14/24 1243     Aerobic Bacterial Culture METHICILLIN RESISTANT STAPHYLOCOCCUS AUREUS  Rare      Narrative:      L psoas    Culture, Anaerobe [[id number]] Collected: 02/07/24 1423    Order Status: Completed Specimen: Abscess from Back Updated: 02/14/24 0922     Anaerobic Culture No anaerobes isolated    Narrative:      L psoas    Culture, Anaerobe [[id number]] Collected: 02/07/24 1411    Order Status: Completed Specimen: Joint Fluid from Back Updated: 02/14/24 0908     Anaerobic Culture No anaerobes isolated    Fungus culture [[id number]] Collected: 02/07/24 1423    Order Status: Completed Specimen: Abscess from Back Updated: 02/14/24 0547     Fungus (Mycology) Culture Culture in progress    Narrative:      L psoas    Fungus  culture [[id number]] Collected: 02/07/24 1413    Order Status: Completed Specimen: Joint Fluid from Back Updated: 02/14/24 0544     Fungus (Mycology) Culture Culture in progress          All pertinent labs from the last 24 hours have been reviewed.    Significant Diagnostics:  I have reviewed and interpreted all pertinent imaging results/findings within the past 24 hours.

## 2024-02-19 NOTE — ASSESSMENT & PLAN NOTE
28M w/hx MRSA bactermia, IVDU, pelvic/intra-abdominal abscesses who presents with progressive back pain with radiation down the leg over the past couple of weeks.     - All pertinent labs and imaging reviewed  - 2/4 ESR 75, CRP 33.7. Blood cx NGTD.   - Back culture 2/7 with staph aureus  - MRI lumbar w/wo 2/4: L5-S1 osteodiscitis extending into the sacrum, left iliac bone, SI joint, facet joints. Anterolisthesis L5-S1, epidural abscess L5-S1 with canal stenosis. Left psoas and paraspinal muscle abscesses. No clinical evidence of cauda equina syndrome.   - CT lumbar obtained with destructive changes at L5-S1.    Now he is status post L4 to pelvis fusion  on 02/14/2024     Postop day 5    Continue floor care with vital and neurocheck per protocol  Multimodal pain medications  Will discontinue HV drain today  L spine xray satisfactory    Intraop cultures grew staph. ID rec Daptomycin through 4/10/24  SubQ heparin for DVT prophylaxis  PTOT  LSO brace when out of bed  SW/CM for discharge planning and DME    Dispo: Pending LTAC

## 2024-02-19 NOTE — PROGRESS NOTES
"[facility] - Neurosurgery ([facility]  [facility] Medicine  Progress Note    Patient Name: Ton Donovan  MRN: [id number]  Patient Class: IP- Inpatient   Admission Date: 2/4/2024  Length of Stay: 14 days  Attending Physician: Reese Schaefer MD  Primary Care Provider: Emily, Primary Doctor        Subjective:     Principal Problem:Osteomyelitis of vertebra of lumbosacral region        HPI:  Ton Donovan is a 28-year-old male with PMHx of IVDU w/ assoc complications of MRSA bacteremia, multiple septic joints & abscesses, who presented 2/4/24 with progressive back pain with radiation down his legs b/l. Endorses bilateral numbness & neuropathic pain over the dorsum of his feet. This has been ongoing and is not necessarily a new problem, but has been worsening. Reports "pins & needles"/paresthesias down his legs that have been progressing, although laying on his stomach seems to provide significant relief. Admits to continued active IV heroin use; last used just prior to EMS arrival. Denies any fevers, chills, night sweats, cough, or shortness of breath. Denies any bowel or bladder incontinence/retention or saddle anesthesia.     On arrival, HDS & 0/4 SIRS. MRI showed osteomyelitis/discitis of L5-S1, osteomyelitis of sacrum & L iliac bone, probable septic arthritis of L SI joint & lower lumbar spine facet joints, worsening anterolisthesis of L5 with respect to S1 with increased size of epidural abscess at the level of L5-S1 and probable severe central canal stenosis at this level, along w/ additional abscesses in the L iliopsoas muscle, paraspinal muscles, pelvis, & L sacrum. Received single dose of Zosyn in ED on arrival, but given that pt HDS w/o sepsis, further abx held to optimize intra-op cx/pathology. Admitted to hospital medicine for further mgmt of osteomyelitis, septic joints & abscesses.    Overview/Hospital Course:  Addiction medicine, along w/ NSGY, orthopedic surgery, IR & ID consulted. Underwent aspiration of " psoas abscess per IR on 2/7. Aspiration of SI joint attempted but unable to aspirate hardly any fluid. Underwent LOOP X FUSION, SPINE, LUMBAR and epidural phlegmon debridement, intra-op cx's growing MRSA. Echo unremarkable    Interval History:  Afebrile.  Doing okay.  No chest pain, no shortness a breath.  Patient denies any leg pains.  No leg swelling.  Pending placement.    Review of Systems  Objective:     Vital Signs (Most Recent):  Temp: 98.4 °F (36.9 °C) (02/19/24 0752)  Pulse: 79 (02/19/24 0752)  Resp: 18 (02/19/24 1035)  BP: (!) 118/58 (02/19/24 0752)  SpO2: 100 % (02/19/24 0752) Vital Signs (24h Range):  Temp:  [97.2 °F (36.2 °C)-98.4 °F (36.9 °C)] 98.4 °F (36.9 °C)  Pulse:  [78-95] 79  Resp:  [16-18] 18  SpO2:  [96 %-100 %] 100 %  BP: (118-142)/(58-72) 118/58     Weight: 77 kg (169 lb 12.1 oz)  Body mass index is 24.36 kg/m².  No intake or output data in the 24 hours ending 02/19/24 1152      Physical Exam      Clear lungs bilaterally, unlabored breathing, on room air, no cyanosis   Heart sounds indicate a regular rate and rhythm  Awake alert, no acute distress  No facial droop, no slurred speech  5/5 fist  bilaterally  5/5 hip flexor strength bilaterally   No obvious lower extremity edema         Assessment/Plan:      * Osteomyelitis of vertebra of lumbosacral region  Osteomyelitis, discitis, & myositis of lumbosacral region  Septic arthritis of SI joint(s)  Epidural abscesses   Multiple abscesses of pelvis, sacrum, psoas, iliacus, & paraspinals  Lower back pain w/ b/l sciatica   Severe spinal canal stenosis   MRI: osteomyelitis/discitis of L5-S1, osteomyelitis of sacrum & L iliac bone, probable septic arthritis of L SI joint & lower lumbar spine facet joints, worsening anterolisthesis of L5 with respect to S1 with increased size of epidural abscess at the level of L5-S1 and probable severe central canal stenosis at this level, along w/ additional abscesses in the L iliopsoas muscle, paraspinal  muscles, pelvis, & L sacrum.    - Underwent  2/14/24 epidural abscess/phlegmon debridement and LOOP X FUSION, SPINE, LUMBAR w/   - Left sacroiliac joint aspiration  on 2/7 by IR growing MRSA  - Ortho consulted for mgmt of septic SI joint; appreciate recs/assistance  - MRSA growing from multiple sites;  --> switched to daptomycin 8 week course from surg date per ID; EOC 4/10/24     L5-S2 epidural phlegmon  See above; intra-op cx's growing MRSA      Housing insecurity  Previously worked at [employer] & held down job in the past but started using heroin last year following multiple family deaths (mother, aunt & grandmother) in a short period of time. Subsequently lost housing & has been living on streets since then.   - SW/CM    Substance or medication-induced depressive disorder  Pt endorses sx of depression, no SI/SA.   -per psych, mirtazapine 15mg QHS    Spinal stenosis of lumbar region without neurogenic claudication  See osteomyelitis      IVDU (intravenous drug user)  Heroin abuse w/ opioid withdrawal   PSDU  Tobacco use   Known hx of IVDU (heroin) w/ extensive assoc medical complications (discussed above). No recent UDS (only one on file from July 2023- positive for opiates & THC) and none collected on admission. Endorses daily IV heroin use (1g/day); last used just prior to calling EMS. Active tobacco use, but denies EtOH or other illicit substance use. Endorsing sx of opioid w/d.   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,   - fluid restriction; protein intake encouraged      VTE Risk Mitigation (From admission, onward)           Ordered      enoxaparin injection 40 mg  Every 24 hours         02/14/24 1851     Place sequential compression device  Until discontinued         02/05/24 0126     IP VTE HIGH RISK PATIENT  Once         02/05/24 0126                    Discharge Planning   SENA: 2/19/2024     Code Status: Full Code   Is the patient medically ready for discharge?: Yes    Reason for patient still in hospital (select all that apply): Pending disposition  Discharge Plan A: Long-term acute care facility (LTAC)   Discharge Delays: None known at this time              Reese Schaefer MD  Department of Hospital Medicine   [facility] - Neurosurgery ([facility])

## 2024-02-19 NOTE — PLAN OF CARE
Problem: Fall Injury Risk  Goal: Absence of Fall and Fall-Related Injury  Outcome: Ongoing, Progressing  Intervention: Promote Injury-Free Environment  Flowsheets (Taken 2/18/2024 7463)  Safety Promotion/Fall Prevention:   assistive device/personal item within reach   bed alarm set

## 2024-02-19 NOTE — PROGRESS NOTES
Aleksandr Bray - Neurosurgery ([facility])  Neurosurgery  Progress Note    Subjective:     History of Present Illness: 28M w/ hx MRSA bactermia, IVDU, pelvic/intra-abdominal abscesses who presents with progressive back pain with radiation down the leg over the past couple of weeks.  He has had bilateral numbness of his anterior thigh as well. He denies bowel/bladder sx or saddle anesthesia. He denies focal weakness. Per chart review, the patient is actively using heroin and did an injection prior to being taken in by EMS.      Post-Op Info:  Procedure(s) (LRB):  LOOP X FUSION, SPINE, LUMBAR (N/A)   5 Days Post-Op   Interval History: 2/19: NAEO. Denies any new complaints. Symptoms stable. HV drain in place, no output recordings x 3 days. Will plan to discontinue today. ID recommends Daptomycin through 4/10.    Medications:  Continuous Infusions:  Scheduled Meds:   acetaminophen  1,000 mg Oral Q8H    cloNIDine  0.1 mg Oral Q12H    DAPTOmycin (CUBICIN) IV (PEDS and ADULTS)  10 mg/kg Intravenous Q24H    diazePAM  5 mg Oral Q8H    enoxparin  40 mg Subcutaneous Q24H (prophylaxis, 1700)    methocarbamol (ROBAXIN) IVPB  1,000 mg Intravenous Q8H    mirtazapine  15 mg Oral QHS    nicotine  1 patch Transdermal Daily     PRN Meds:acetaminophen, dextrose 10%, dextrose 10%, dicyclomine, glucagon (human recombinant), glucose, glucose, HYDROmorphone, hydrOXYzine HCL, labetalol, loperamide, melatonin, naloxone, oxyCODONE, promethazine, sodium chloride 0.9%     Review of Systems  Objective:     Weight: 77 kg (169 lb 12.1 oz)  Body mass index is 24.36 kg/m².  Vital Signs (Most Recent):  Temp: 98.4 °F (36.9 °C) (02/19/24 0752)  Pulse: 79 (02/19/24 0752)  Resp: 18 (02/19/24 0825)  BP: (!) 118/58 (02/19/24 0752)  SpO2: 100 % (02/19/24 0752) Vital Signs (24h Range):  Temp:  [97.2 °F (36.2 °C)-98.4 °F (36.9 °C)] 98.4 °F (36.9 °C)  Pulse:  [78-95] 79  Resp:  [16-18] 18  SpO2:  [96 %-100 %] 100 %  BP: (110-142)/(58-72) 118/58          Closed/Suction  "Drain 02/14/24 1730 Tube - 1 Left;Posterior;Lateral Back Accordion 10 Fr. (Active)   Site Description Unable to view 02/19/24 0400   Dressing Type Transparent (Tegaderm) 02/19/24 0400   Dressing Status Clean;Dry;Intact 02/19/24 0400   Dressing Intervention Integrity maintained 02/19/24 0400   Drainage Sanguineous 02/19/24 0400   Status Other (Comment) 02/16/24 0800   Output (mL) 20 mL 02/17/24 0618            Closed/Suction Drain 02/14/24 1730 Tube - 2 Right;Posterior;Lateral Back Accordion 10 Fr. (Active)   Site Description Unable to view 02/19/24 0400   Dressing Type Transparent (Tegaderm) 02/19/24 0400   Dressing Status Clean;Dry;Intact 02/19/24 0400   Dressing Intervention Integrity maintained 02/19/24 0400   Drainage Sanguineous 02/19/24 0400   Status Other (Comment) 02/14/24 2130   Output (mL) 35 mL 02/17/24 0618      Physical Exam  General: well developed, well nourished, no distress.   Head: normocephalic, atraumatic  Mental Status: Awake, Alert, Oriented  Speech: Clear with content appropriate to conversation  Cranial nerves: face symmetric, CN II-XII grossly intact.   Sensory: intact to light touch throughout    Motor Strength:    Strength  Deltoids Triceps Biceps Wrist Extension Wrist Flexion Hand    Upper: R 5/5 5/5 5/5 5/5 5/5 5/5    L 5/5 5/5 5/5 5/5 5/5 5/5     Iliopsoas Quadriceps Knee  Flexion Tibialis  anterior Gastro- cnemius EHL   Lower: R 4+/5 4+/5 4+/5 4+/5 4/5 4/5    L 4+/5 4+/5 4+/5 4/5 4/5 4/5     Incision clean, dry intact.   Provena in place.   2 HV drains in place with minimal sanguinous contents     Significant Labs:  Recent Labs   Lab 02/18/24 0344 02/19/24  0442    97   * 133*   K 4.3 4.4    102   CO2 26 25   BUN 17 18   CREATININE 0.6 0.7   CALCIUM 9.0 9.5     Recent Labs   Lab 02/18/24 0344 02/19/24  0442   WBC 6.20 6.67   HGB 7.6* 7.7*   HCT 25.1* 25.7*    359     No results for input(s): "LABPT", "INR", "APTT" in the last 48 hours.  Microbiology " Results (last 7 days)       Procedure Component Value Units Date/Time    Culture, Anaerobe [[id number]] Collected: 02/14/24 1712    Order Status: Completed Specimen: Incision site from Back Updated: 02/19/24 0959     Anaerobic Culture No anaerobes isolated    Narrative:      L5-S1 disc space    Culture, Anaerobe [[id number]] Collected: 02/14/24 1712    Order Status: Completed Specimen: Incision site from Back Updated: 02/19/24 0958     Anaerobic Culture No anaerobes isolated    Narrative:      Epidural infection culture lumbar spine    Aerobic culture [[id number]]  (Abnormal) Collected: 02/14/24 1712    Order Status: Completed Specimen: Incision site from Back Updated: 02/19/24 0706     Aerobic Bacterial Culture STAPHYLOCOCCUS AUREUS  Few  Susceptibility pending      Narrative:      Epidural infection culture lumbar spine    Aerobic culture [[id number]]  (Abnormal)  (Susceptibility) Collected: 02/14/24 1712    Order Status: Completed Specimen: Incision site from Back Updated: 02/16/24 1110     Aerobic Bacterial Culture METHICILLIN RESISTANT STAPHYLOCOCCUS AUREUS  Few      Narrative:      L5-S1 disc space    AFB Culture & Smear [[id number]] Collected: 02/14/24 1712    Order Status: Completed Specimen: Incision site from Back Updated: 02/15/24 2127     AFB Culture & Smear Culture in progress     AFB CULTURE STAIN No acid fast bacilli seen.    Narrative:      L5-S1 disc space    AFB Culture & Smear [[id number]] Collected: 02/14/24 1712    Order Status: Completed Specimen: Incision site from Back Updated: 02/15/24 2127     AFB Culture & Smear Culture in progress     AFB CULTURE STAIN No acid fast bacilli seen.    Narrative:      Epidural infection culture lumbar spine    Gram stain [[id number]] Collected: 02/14/24 1712    Order Status: Completed Specimen: Incision site from Back Updated: 02/14/24 1955     Gram Stain Result Rare WBC's      No organisms seen    Narrative:      Epidural infection culture lumbar spine     Gram stain [[id number]] Collected: 02/14/24 1712    Order Status: Completed Specimen: Incision site from Back Updated: 02/14/24 1952     Gram Stain Result Rare WBC's      No organisms seen    Narrative:      L5-S1 disc space    Fungus culture [[id number]] Collected: 02/14/24 1712    Order Status: Sent Specimen: Incision site from Back Updated: 02/14/24 1739    Fungus culture [[id number]] Collected: 02/14/24 1712    Order Status: Sent Specimen: Incision site from Back Updated: 02/14/24 1738    Culture, Anaerobe [[id number]] Collected: 02/14/24 1712    Order Status: Canceled Specimen: Incision site from Back     Aerobic culture [[id number]] Collected: 02/14/24 1712    Order Status: Canceled Specimen: Incision site from Back     Fungus culture [[id number]] Collected: 02/14/24 1712    Order Status: Canceled Specimen: Incision site from Back     AFB Culture & Smear [[id number]] Collected: 02/14/24 1712    Order Status: Canceled Specimen: Incision site from Back     Gram stain [[id number]] Collected: 02/14/24 1712    Order Status: Canceled Specimen: Incision site from Back     Culture, Anaerobe [[id number]] Collected: 02/14/24 1712    Order Status: Canceled Specimen: Incision site from Back     Aerobic culture [[id number]] Collected: 02/14/24 1712    Order Status: Canceled Specimen: Incision site from Back     AFB Culture & Smear [[id number]] Collected: 02/14/24 1712    Order Status: Canceled Specimen: Incision site from Back     Gram stain [[id number]] Collected: 02/14/24 1712    Order Status: Canceled Specimen: Incision site from Back     Fungus culture [[id number]] Collected: 02/14/24 1712    Order Status: Canceled Specimen: Incision site from Back     Aerobic culture [[id number]]  (Abnormal)  (Susceptibility) Collected: 02/07/24 1423    Order Status: Completed Specimen: Abscess from Back Updated: 02/14/24 1243     Aerobic Bacterial Culture METHICILLIN RESISTANT STAPHYLOCOCCUS AUREUS  Rare      Narrative:      L psoas     Culture, Anaerobe [[id number]] Collected: 02/07/24 1423    Order Status: Completed Specimen: Abscess from Back Updated: 02/14/24 0922     Anaerobic Culture No anaerobes isolated    Narrative:      L psoas    Culture, Anaerobe [[id number]] Collected: 02/07/24 1411    Order Status: Completed Specimen: Joint Fluid from Back Updated: 02/14/24 0908     Anaerobic Culture No anaerobes isolated    Fungus culture [[id number]] Collected: 02/07/24 1423    Order Status: Completed Specimen: Abscess from Back Updated: 02/14/24 0547     Fungus (Mycology) Culture Culture in progress    Narrative:      L psoas    Fungus culture [[id number]] Collected: 02/07/24 1413    Order Status: Completed Specimen: Joint Fluid from Back Updated: 02/14/24 0544     Fungus (Mycology) Culture Culture in progress          All pertinent labs from the last 24 hours have been reviewed.    Significant Diagnostics:  I have reviewed and interpreted all pertinent imaging results/findings within the past 24 hours.  Assessment/Plan:     * Osteomyelitis of vertebra of lumbosacral region  28M w/hx MRSA bactermia, IVDU, pelvic/intra-abdominal abscesses who presents with progressive back pain with radiation down the leg over the past couple of weeks.     - All pertinent labs and imaging reviewed  - 2/4 ESR 75, CRP 33.7. Blood cx NGTD.   - Back culture 2/7 with staph aureus  - MRI lumbar w/wo 2/4: L5-S1 osteodiscitis extending into the sacrum, left iliac bone, SI joint, facet joints. Anterolisthesis L5-S1, epidural abscess L5-S1 with canal stenosis. Left psoas and paraspinal muscle abscesses. No clinical evidence of cauda equina syndrome.   - CT lumbar obtained with destructive changes at L5-S1.    Now he is status post L4 to pelvis fusion  on 02/14/2024     Postop day 5    Continue floor care with vital and neurocheck per protocol  Multimodal pain medications  Will discontinue HV drain today  L spine xray satisfactory    Intraop cultures grew staph. ID rec  Daptomycin through 4/10/24  SubQ heparin for DVT prophylaxis  PTOT  LSO brace when out of bed  SW/CM for discharge planning and DME    Dispo: Pending LTAC      Case and plan discussed with Dr. Nohelia Varela PA-C  Neurosurgery  Aleksandr Bray - Neurosurgery ([facility])

## 2024-02-19 NOTE — PT/OT/SLP PROGRESS
Physical Therapy Treatment    Patient Name: Ton Donovan   MRN: [id number]    Therapy tech utilized for session to maximize physical performance and safety  Recommendations:     Discharge Recommendations: High Intensity Therapy  Discharge Equipment Recommendations: walker, rolling, bedside commode  Barriers to discharge: Increased level of assist, Inaccessible home, and Decreased caregiver support    Assessment:     Ton Donovan is a 28 y.o. male admitted with a medical diagnosis of Osteomyelitis of vertebra of lumbosacral region. He presents with the following impairments/functional limitations: weakness, impaired endurance, impaired self care skills, impaired functional mobility, gait instability, impaired balance, impaired coordination, decreased lower extremity function, decreased safety awareness, pain, orthopedic precautions. Pt with good tolerance to therapy session on this date. Pt with improved activity tolerance and aerobic capacity as demonstrated by increased distance ambulated prior to requiring rest break. Pt with c/o notable back pain throughout session; however, pain was not limiting factor to mobility today. Pt continues to require increased assistance to safely ambulate 2/2 poor BLE clearance, poor RW management, and decreased dynamic postural stability. Pt remains appropriate for high intensity therapy following discharge once medically stable. Pt would continue to benefit from skilled acute PT in order to address current deficits and progress functional mobility.     Rehab Prognosis: Good; patient continues to benefit from acute skilled PT services to address these deficits and reach maximum level of function.  Recent Surgery: Procedure(s) (LRB):  LOOP X FUSION, SPINE, LUMBAR (N/A) 5 Days Post-Op    Plan:     During this hospitalization, patient to be seen 4 x/week to address the identified rehab impairments via gait training, therapeutic activities, therapeutic exercises, neuromuscular  "re-education and progress toward the following goals:    Plan of Care Expires:  03/15/24    Subjective     Chief Complaint: Post surgical back pain  Patient/Family Comments/Goals: "I'll get up if you get me two cereals, harry crackers, and milk."  Pain/Comfort:  Pain Rating 1: 10/10  Location - Orientation 1: generalized  Location 1: back  Pain Addressed 1: Reposition, Distraction, Pre-medicate for activity, Nurse notified  Pain Rating Post-Intervention 1: 10/10    Objective:     Communicated with RN prior to session. Patient found supine with telemetry, wound vac, hemovac upon PT entry to room.     General Precautions: Standard, fall  Orthopedic Precautions: spinal precautions  Braces: LSO; donned at EOB    Functional Mobility:  Bed Mobility:    Supine to Sit: moderate assistance for trunk management  Transfers:    Sit to Stand: contact guard assistance with rolling walker with cues for hand placement and foot placement  Gait: Patient ambulated 85' x 2 with standing rest break between trials with rolling walker and moderate assistance.   Patient demonstrates unsteady gait, decreased step length, narrow base of support, decreased weight shift, decreased foot clearance, ambulates outside RUPINDER of RW, heavy UE reliance on RW, increased B knee flexion, flexed posture, decreased samantha, inconsistent bilateral foot placement, decreased bilateral knee stability, and scissored gait.   Patient required cues for upright posture, gluteal activation, sequencing, rolling walker management, obstacle navigation, safe rolling walker usage, to ambulate within RUPINDER of RW, increased step size, foot placement, increased foot clearance, and increased RUPINDER.  All lines remained intact throughout ambulation trial, gait belt utilized, chair follow for patient safety.    AM-PAC 6 CLICK MOBILITY  Turning over in bed (including adjusting bedclothes, sheets and blankets)?: 3  Sitting down on and standing up from a chair with arms (e.g., " wheelchair, bedside commode, etc.): 3  Moving from lying on back to sitting on the side of the bed?: 3  Moving to and from a bed to a chair (including a wheelchair)?: 3  Need to walk in hospital room?: 2  Climbing 3-5 steps with a railing?: 1  Basic Mobility Total Score: 15     Therapeutic Activities and Exercises:  Patient educated on role of acute care PT and PT POC, safety while in hospital including calling nurse for mobility, and call light usage  Pt educated on the effects of bed rest and the importance of OOB activity. Pt encouraged to sit UIC majority of day as tolerated and continue daily transfers with nursing assist. Pt verbalized understanding.  Educated on spinal precautions including avoidance of bending, lifting, and twisting. Patient expresses understanding  Educated on log roll technique, performed to left  Pt educated on importance of maximal participation in therapy session in order to reduce negative effects of prolonged sedentary positioning.   Answered all questions within PT scope of practice and addressed functional mobility concerns.    Patient left up in chair with all lines intact, call button in reach, and RN notified.    GOALS:   Multidisciplinary Problems       Physical Therapy Goals          Problem: Physical Therapy    Goal Priority Disciplines Outcome Goal Variances Interventions   Physical Therapy Goal     PT, PT/OT Ongoing, Progressing     Description: Goals to be met by: 3/15/2024    Patient will increase functional independence with mobility by performin. Supine to sit with Stand-by Assistance  2. Sit to supine with Stand-by Assistance  3. Sit to stand transfer with Stand-by Assistance  4. Gait  x 100 feet with Contact Guard Assistance using LRAD.   5. Lower extremity exercise program x15 reps per handout, with independence                         Time Tracking:     PT Received On: 24  PT Start Time: 1058     PT Stop Time: 1122  PT Total Time (min): 24 min      Billable Minutes: Gait Training 12 and Therapeutic Activity 12      Treatment Type: Treatment  PT/PTA: PT     Number of PTA visits since last PT visit: 0     02/19/2024      delivery delivered

## 2024-02-19 NOTE — PT/OT/SLP PROGRESS
Occupational Therapy   Treatment    Name: Ton Donovan  MRN: [id number]  Admitting Diagnosis:  Osteomyelitis of vertebra of lumbosacral region  5 Days Post-Op  Procedure(s):  LOOP X FUSION, SPINE, LUMBAR   Recommendations:     Discharge Recommendations: High Intensity Therapy  Discharge Equipment Recommendations:  walker, rolling, bedside commode  Barriers to discharge:  None    Assessment:     Ton Donovan is a 28 y.o. male with a medical diagnosis of Osteomyelitis of vertebra of lumbosacral region.  He presents with the following performance deficits affecting function are weakness, impaired endurance, impaired self care skills, impaired functional mobility, decreased coordination, pain, decreased safety awareness, impaired balance, decreased lower extremity function, gait instability, impaired coordination, decreased ROM, orthopedic precautions. Patient primarily limited by c/o pain. Patient is demonstrating good  progress with functional mobility and standing activity tolerance. Patient has demonstrated sufficient progression to warrant high intensity therapy evidenced by objectives noted below.    Rehab Prognosis:  Good; patient would benefit from acute skilled OT services to address these deficits and reach maximum level of function.       Plan:     Patient to be seen 4 x/week to address the above listed problems via self-care/home management, therapeutic activities, therapeutic exercises, neuromuscular re-education  Plan of Care Expires: 03/07/24  Plan of Care Reviewed with: patient    Subjective     Chief Complaint: c/o back pain  Patient/Family Comments/goals: manage pain. Nurse notified  Pain/Comfort:  Pain Rating 1: 8/10  Location - Orientation 1: lower  Location 1: back  Pain Addressed 1: Reposition, Nurse notified, Distraction  Pain Rating Post-Intervention 1: 10/10    Objective:     Communicated with: nurse colon and OTR prior to session.  Patient found up in chair with telemetry, wound vac,  hemovac, peripheral IV upon OT entry to room.  A client care conference was completed by the OTR and the GARCIA prior to treatment by the GARCIA to discuss the patient's POC and current status.    General Precautions: Standard, fall    Orthopedic Precautions:spinal precautions  Braces: LSO  Respiratory Status: Room air     Occupational Performance:     Bed Mobility:    Patient completed Sit to Supine with moderate assistance , HOB flat, no bed rail, B LE management    Functional Mobility/Transfers:  Patient completed Sit <> Stand Transfer with moderate assistance  with  rolling walker   X3 trials: mod cues for appropriate LB muscle recruitment and proper body mechanics  Patient completed Toilet Transfer Step Transfer technique with minimum assistance with  rolling walker and 3:1 raised toilet commode  Functional Mobility: to/from bathroom ~12ft x2 using RW with Min(A)  Mod cues for foot placement and B knee extension, less UB engagement (patient heavily relies on UB and AD)    Activities of Daily Living:  Grooming: minimum assistance for decreased dynamic standing balance to complete facial and hand hygiene standing sink side  Patient tolerated ~3 min in standing, mod cues for weight shifting anteriorly and utilizing mirror as visual feedback for balance  Upper Body Dressing: contact guard assistance doff and don clean gown (patient donned LSO with (S))  Lower Body Dressing: CGA to adjust B socks seated in chair (instruction provided on technique to maintain spinal precautions)  Toileting: minimum assistance decreased dynamic standing balance to manage gown prior to BM. Patient completed perirectal hygiene seated with good adherence to spinal precautions       [facility] 6 Click ADL: 15    Treatment & Education:  Patient edu on OT POC, role of OT in acute care setting, and current progress. Patient recalled 3/3 spinal precautions on 1st attempt. Patient encouraged to ambulate to bathroom with nursing (A) of 1 person using RW  and 3:1 raised toilet commode for toileting to promote progressive mobility and increase (I) in ADLs. Patient verbalized understanding. Addressed all patient questions/concerns within GARCIA scope of practice.     Patient left HOB elevated with all lines intact, call button in reach, bed alarm on, and nurse notified    GOALS:   Multidisciplinary Problems       Occupational Therapy Goals          Problem: Occupational Therapy    Goal Priority Disciplines Outcome Interventions   Occupational Therapy Goal     OT, PT/OT Ongoing, Progressing    Description: Goals to be met by: 3/15/24     Patient will increase functional independence with ADLs by performing:    UE Dressing with Minimal Assistance.  LE Dressing with Minimal Assistance.  Grooming while standing with Minimal Assistance.  Toileting from toilet with Minimal Assistance for hygiene and clothing management.   Bathing from  shower chair/bench with Minimal Assistance.  Stand pivot transfers with Minimal Assistance.  Step transfer with Minimal Assistance  Toilet transfer to toilet with Minimal Assistance.                           Time Tracking:     OT Date of Treatment: 02/19/24  OT Start Time: 1323  OT Stop Time: 1404  OT Total Time (min): 41 min    Billable Minutes:Self Care/Home Management 30  Therapeutic Activity 11    OT/ASHLEY: ASHLEY     Number of ASHLEY visits since last OT visit: 1 2/19/2024

## 2024-02-20 ENCOUNTER — TELEPHONE (OUTPATIENT)
Dept: NEUROSURGERY | Facility: CLINIC | Age: 29
End: 2024-02-20
Payer: MEDICAID

## 2024-02-20 DIAGNOSIS — Z98.1 S/P LUMBAR SPINAL FUSION: Primary | ICD-10-CM

## 2024-02-20 LAB
ANION GAP SERPL CALC-SCNC: 8 MMOL/L (ref 8–16)
BASOPHILS # BLD AUTO: 0.03 K/UL (ref 0–0.2)
BASOPHILS NFR BLD: 0.5 % (ref 0–1.9)
BUN SERPL-MCNC: 18 MG/DL (ref 6–20)
CALCIUM SERPL-MCNC: 9.3 MG/DL (ref 8.7–10.5)
CHLORIDE SERPL-SCNC: 102 MMOL/L (ref 95–110)
CK SERPL-CCNC: 57 U/L (ref 20–200)
CO2 SERPL-SCNC: 27 MMOL/L (ref 23–29)
CREAT SERPL-MCNC: 0.8 MG/DL (ref 0.5–1.4)
DIFFERENTIAL METHOD BLD: ABNORMAL
EOSINOPHIL # BLD AUTO: 0.1 K/UL (ref 0–0.5)
EOSINOPHIL NFR BLD: 1.6 % (ref 0–8)
ERYTHROCYTE [DISTWIDTH] IN BLOOD BY AUTOMATED COUNT: 20.1 % (ref 11.5–14.5)
EST. GFR  (NO RACE VARIABLE): >60 ML/MIN/1.73 M^2
GLUCOSE SERPL-MCNC: 104 MG/DL (ref 70–110)
HCT VFR BLD AUTO: 25.7 % (ref 40–54)
HGB BLD-MCNC: 7.7 G/DL (ref 14–18)
IMM GRANULOCYTES # BLD AUTO: 0.02 K/UL (ref 0–0.04)
IMM GRANULOCYTES NFR BLD AUTO: 0.3 % (ref 0–0.5)
LYMPHOCYTES # BLD AUTO: 1.8 K/UL (ref 1–4.8)
LYMPHOCYTES NFR BLD: 28.1 % (ref 18–48)
MCH RBC QN AUTO: 21.2 PG (ref 27–31)
MCHC RBC AUTO-ENTMCNC: 30 G/DL (ref 32–36)
MCV RBC AUTO: 71 FL (ref 82–98)
MONOCYTES # BLD AUTO: 0.6 K/UL (ref 0.3–1)
MONOCYTES NFR BLD: 9.2 % (ref 4–15)
NEUTROPHILS # BLD AUTO: 3.9 K/UL (ref 1.8–7.7)
NEUTROPHILS NFR BLD: 60.3 % (ref 38–73)
NRBC BLD-RTO: 0 /100 WBC
PLATELET # BLD AUTO: 370 K/UL (ref 150–450)
PMV BLD AUTO: 8.7 FL (ref 9.2–12.9)
POTASSIUM SERPL-SCNC: 4.2 MMOL/L (ref 3.5–5.1)
RBC # BLD AUTO: 3.64 M/UL (ref 4.6–6.2)
SODIUM SERPL-SCNC: 137 MMOL/L (ref 136–145)
WBC # BLD AUTO: 6.43 K/UL (ref 3.9–12.7)

## 2024-02-20 PROCEDURE — 99024 POSTOP FOLLOW-UP VISIT: CPT | Mod: ,,, | Performed by: PHYSICIAN ASSISTANT

## 2024-02-20 PROCEDURE — 11000001 HC ACUTE MED/SURG PRIVATE ROOM

## 2024-02-20 PROCEDURE — 36415 COLL VENOUS BLD VENIPUNCTURE: CPT | Performed by: STUDENT IN AN ORGANIZED HEALTH CARE EDUCATION/TRAINING PROGRAM

## 2024-02-20 PROCEDURE — 97530 THERAPEUTIC ACTIVITIES: CPT | Mod: CO

## 2024-02-20 PROCEDURE — 25000003 PHARM REV CODE 250: Performed by: STUDENT IN AN ORGANIZED HEALTH CARE EDUCATION/TRAINING PROGRAM

## 2024-02-20 PROCEDURE — 63600175 PHARM REV CODE 636 W HCPCS: Performed by: STUDENT IN AN ORGANIZED HEALTH CARE EDUCATION/TRAINING PROGRAM

## 2024-02-20 PROCEDURE — 63600175 PHARM REV CODE 636 W HCPCS: Performed by: HOSPITALIST

## 2024-02-20 PROCEDURE — 63600175 PHARM REV CODE 636 W HCPCS: Performed by: NURSE PRACTITIONER

## 2024-02-20 PROCEDURE — 80048 BASIC METABOLIC PNL TOTAL CA: CPT | Performed by: STUDENT IN AN ORGANIZED HEALTH CARE EDUCATION/TRAINING PROGRAM

## 2024-02-20 PROCEDURE — 85025 COMPLETE CBC W/AUTO DIFF WBC: CPT | Performed by: STUDENT IN AN ORGANIZED HEALTH CARE EDUCATION/TRAINING PROGRAM

## 2024-02-20 PROCEDURE — 82550 ASSAY OF CK (CPK): CPT | Performed by: STUDENT IN AN ORGANIZED HEALTH CARE EDUCATION/TRAINING PROGRAM

## 2024-02-20 PROCEDURE — 97116 GAIT TRAINING THERAPY: CPT

## 2024-02-20 PROCEDURE — 25000003 PHARM REV CODE 250: Performed by: HOSPITALIST

## 2024-02-20 PROCEDURE — 25000003 PHARM REV CODE 250: Performed by: NURSE PRACTITIONER

## 2024-02-20 PROCEDURE — S4991 NICOTINE PATCH NONLEGEND: HCPCS | Performed by: HOSPITALIST

## 2024-02-20 PROCEDURE — 97530 THERAPEUTIC ACTIVITIES: CPT

## 2024-02-20 PROCEDURE — 25000003 PHARM REV CODE 250: Performed by: EMERGENCY MEDICINE

## 2024-02-20 RX ORDER — METHOCARBAMOL 500 MG/1
1000 TABLET, FILM COATED ORAL 4 TIMES DAILY
Status: DISCONTINUED | OUTPATIENT
Start: 2024-02-20 | End: 2024-02-22 | Stop reason: HOSPADM

## 2024-02-20 RX ORDER — HYDROMORPHONE HYDROCHLORIDE 1 MG/ML
1 INJECTION, SOLUTION INTRAMUSCULAR; INTRAVENOUS; SUBCUTANEOUS EVERY 8 HOURS PRN
Status: DISCONTINUED | OUTPATIENT
Start: 2024-02-20 | End: 2024-02-22 | Stop reason: HOSPADM

## 2024-02-20 RX ADMIN — HYDROMORPHONE HYDROCHLORIDE 1 MG: 1 INJECTION, SOLUTION INTRAMUSCULAR; INTRAVENOUS; SUBCUTANEOUS at 04:02

## 2024-02-20 RX ADMIN — DAPTOMYCIN 765 MG: 350 INJECTION, POWDER, LYOPHILIZED, FOR SOLUTION INTRAVENOUS at 03:02

## 2024-02-20 RX ADMIN — ACETAMINOPHEN 1000 MG: 500 TABLET ORAL at 05:02

## 2024-02-20 RX ADMIN — HYDROMORPHONE HYDROCHLORIDE 1 MG: 1 INJECTION, SOLUTION INTRAMUSCULAR; INTRAVENOUS; SUBCUTANEOUS at 09:02

## 2024-02-20 RX ADMIN — NICOTINE 1 PATCH: 14 PATCH, EXTENDED RELEASE TRANSDERMAL at 10:02

## 2024-02-20 RX ADMIN — CLONIDINE HYDROCHLORIDE 0.1 MG: 0.1 TABLET ORAL at 10:02

## 2024-02-20 RX ADMIN — OXYCODONE HYDROCHLORIDE 10 MG: 10 TABLET ORAL at 08:02

## 2024-02-20 RX ADMIN — MELATONIN TAB 3 MG 6 MG: 3 TAB at 02:02

## 2024-02-20 RX ADMIN — ACETAMINOPHEN 1000 MG: 500 TABLET ORAL at 10:02

## 2024-02-20 RX ADMIN — OXYCODONE 15 MG: 5 TABLET ORAL at 12:02

## 2024-02-20 RX ADMIN — HYDROMORPHONE HYDROCHLORIDE 1 MG: 1 INJECTION, SOLUTION INTRAMUSCULAR; INTRAVENOUS; SUBCUTANEOUS at 05:02

## 2024-02-20 RX ADMIN — MIRTAZAPINE 15 MG: 15 TABLET, FILM COATED ORAL at 10:02

## 2024-02-20 RX ADMIN — OXYCODONE HYDROCHLORIDE 10 MG: 10 TABLET ORAL at 02:02

## 2024-02-20 RX ADMIN — METHOCARBAMOL 1000 MG: 500 TABLET ORAL at 10:02

## 2024-02-20 RX ADMIN — CLONIDINE HYDROCHLORIDE 0.1 MG: 0.1 TABLET ORAL at 08:02

## 2024-02-20 RX ADMIN — ENOXAPARIN SODIUM 40 MG: 40 INJECTION SUBCUTANEOUS at 04:02

## 2024-02-20 RX ADMIN — METHOCARBAMOL 1000 MG: 500 TABLET ORAL at 09:02

## 2024-02-20 RX ADMIN — METHOCARBAMOL 1000 MG: 500 TABLET ORAL at 04:02

## 2024-02-20 RX ADMIN — DIAZEPAM 5 MG: 5 TABLET ORAL at 05:02

## 2024-02-20 RX ADMIN — OXYCODONE 15 MG: 5 TABLET ORAL at 10:02

## 2024-02-20 RX ADMIN — METHOCARBAMOL 1000 MG: 100 INJECTION INTRAMUSCULAR; INTRAVENOUS at 05:02

## 2024-02-20 RX ADMIN — OXYCODONE 15 MG: 5 TABLET ORAL at 04:02

## 2024-02-20 RX ADMIN — ACETAMINOPHEN 1000 MG: 500 TABLET ORAL at 03:02

## 2024-02-20 NOTE — ASSESSMENT & PLAN NOTE
28M w/hx MRSA bactermia, IVDU, pelvic/intra-abdominal abscesses who presents with progressive back pain with radiation down the leg over the past couple of weeks.     - All pertinent labs and imaging reviewed  - 2/4 ESR 75, CRP 33.7. Blood cx NGTD.   - Back culture 2/7 with staph aureus  - MRI lumbar w/wo 2/4: L5-S1 osteodiscitis extending into the sacrum, left iliac bone, SI joint, facet joints. Anterolisthesis L5-S1, epidural abscess L5-S1 with canal stenosis. Left psoas and paraspinal muscle abscesses. No clinical evidence of cauda equina syndrome.   - CT lumbar obtained with destructive changes at L5-S1.    Now he is status post L4 to pelvis fusion  on 02/14/2024     Continue floor care with vital and neurocheck per protocol  Multimodal pain medications  HV and WV removed. Leave incision open to air. Ok to shower. No submerging until 6 weeks post-op. Remove staples/sutures 3/1 at LTAC.  L spine xray satisfactory    Intraop cultures grew staph. ID rec Daptomycin through 4/10/24  LVX for DVT prophylaxis  PTOT  LSO brace when out of bed  SW/CM for discharge planning and DME    Dispo: Pending LTAC, stable from NSGY standpoint for discharge

## 2024-02-20 NOTE — PT/OT/SLP PROGRESS
"Occupational Therapy   Treatment    Name: Ton Donovan  MRN: [id number]  Admitting Diagnosis:  Osteomyelitis of vertebra of lumbosacral region  6 Days Post-Op    Recommendations:     Discharge Recommendations: High Intensity Therapy  Discharge Equipment Recommendations:  walker, rolling, bedside commode  Barriers to discharge:  None    Assessment:     Ton Donovan is a 28 y.o. male with a medical diagnosis of Osteomyelitis of vertebra of lumbosacral region.  He presents with the following performance deficits affecting function are weakness, impaired endurance, impaired self care skills, impaired functional mobility, gait instability, decreased lower extremity function, decreased coordination, impaired coordination, decreased ROM, orthopedic precautions, pain, decreased safety awareness, impaired balance. Patient with selective participation upon 2nd attempt 2/2 c/o pain. Patient has demonstrated sufficient progression to warrant high intensity therapy evidenced by objectives noted below.     Rehab Prognosis:  Good; patient would benefit from acute skilled OT services to address these deficits and reach maximum level of function.       Plan:     Patient to be seen 4 x/week to address the above listed problems via self-care/home management, therapeutic activities, therapeutic exercises, neuromuscular re-education  Plan of Care Expires: 03/07/24  Plan of Care Reviewed with: patient    Subjective     Chief Complaint: "They changed my medication schedule to every EIGHT HOURS!"  Patient/Family Comments/goals: patient agreeable to therapy  Pain/Comfort:  Pain Rating 1: 9/10  Location - Orientation 1: lower  Location 1: back  Pain Addressed 1: Reposition, Distraction  Pain Rating Post-Intervention 1: 9/10    Objective:     Communicated with: nurse colon prior to session.  Patient found sitting edge of bed with  (no active lines; LSO) upon OT entry to room.    General Precautions: Standard, fall    Orthopedic " Precautions:spinal precautions  Braces: LSO  Respiratory Status: Room air     Occupational Performance:     Bed Mobility:    Patient received OOB    Functional Mobility/Transfers:  Patient completed Sit <> Stand Transfer with contact guard assistance  with  no assistive device   Functional Mobility: to/from bathroom 15ft x2 with CGA, intermittent Min(A), no AD (patient encouraged to utilize RW and nursing (A) of 1 person 2/2 high fall risk)    Activities of Daily Living:  Lower Body Dressing: minimum assistance don B socks seated EOB      [facility] 6 Click ADL: 15    Treatment & Education:  Patient re-edu on importance of OOB activity and therapy participation. Addressed all patient questions/concerns within [facility] scope of practice.     Patient left sitting edge of bed with call button in reach    GOALS:   Multidisciplinary Problems       Occupational Therapy Goals          Problem: Occupational Therapy    Goal Priority Disciplines Outcome Interventions   Occupational Therapy Goal     OT, PT/OT Ongoing, Progressing    Description: Goals to be met by: 3/15/24     Patient will increase functional independence with ADLs by performing:    UE Dressing with Minimal Assistance.  LE Dressing with Minimal Assistance.  Grooming while standing with Minimal Assistance.  Toileting from toilet with Minimal Assistance for hygiene and clothing management.   Bathing from  shower chair/bench with Minimal Assistance.  Stand pivot transfers with Minimal Assistance.  Step transfer with Minimal Assistance  Toilet transfer to toilet with Minimal Assistance.                           Time Tracking:     OT Date of Treatment: 02/20/24  OT Start Time: 1355  OT Stop Time: 1405  OT Total Time (min): 10 min    Billable Minutes:Therapeutic Activity 10    OT/ASHLEY: ASHLEY     Number of ASHLEY visits since last OT visit: 2    2/20/2024

## 2024-02-20 NOTE — SUBJECTIVE & OBJECTIVE
Interval History: NAEON. Pain controlled. Voiding spontaneously. BM yesterday. HV and WV removed. Incision c/d/I. Exam stable.    Medications:  Continuous Infusions:  Scheduled Meds:   acetaminophen  1,000 mg Oral Q8H    cloNIDine  0.1 mg Oral Q12H    DAPTOmycin (CUBICIN) IV (PEDS and ADULTS)  10 mg/kg Intravenous Q24H    enoxparin  40 mg Subcutaneous Q24H (prophylaxis, 1700)    methocarbamoL  1,000 mg Oral QID    mirtazapine  15 mg Oral QHS    nicotine  1 patch Transdermal Daily     PRN Meds:acetaminophen, dextrose 10%, dextrose 10%, dicyclomine, glucagon (human recombinant), glucose, glucose, HYDROmorphone, hydrOXYzine HCL, labetalol, loperamide, melatonin, naloxone, oxyCODONE, promethazine, sodium chloride 0.9%     Review of Systems  Objective:     Weight: 77 kg (169 lb 12.1 oz)  Body mass index is 24.36 kg/m².  Vital Signs (Most Recent):  Temp: 98.4 °F (36.9 °C) (02/20/24 0804)  Pulse: 77 (02/20/24 0804)  Resp: 18 (02/20/24 0922)  BP: 123/61 (02/20/24 0804)  SpO2: 100 % (02/20/24 0804) Vital Signs (24h Range):  Temp:  [97.8 °F (36.6 °C)-98.7 °F (37.1 °C)] 98.4 °F (36.9 °C)  Pulse:  [75-94] 77  Resp:  [16-20] 18  SpO2:  [99 %-100 %] 100 %  BP: (114-130)/(56-61) 123/61          Closed/Suction Drain 02/14/24 1730 Tube - 1 Left;Posterior;Lateral Back Accordion 10 Fr. (Active)   Site Description Unable to view 02/19/24 0400   Dressing Type Transparent (Tegaderm) 02/19/24 0400   Dressing Status Clean;Dry;Intact 02/19/24 0400   Dressing Intervention Integrity maintained 02/19/24 0400   Drainage Sanguineous 02/19/24 0400   Status Other (Comment) 02/16/24 0800   Output (mL) 20 mL 02/17/24 0618            Closed/Suction Drain 02/14/24 1730 Tube - 2 Right;Posterior;Lateral Back Accordion 10 Fr. (Active)   Site Description Unable to view 02/19/24 0400   Dressing Type Transparent (Tegaderm) 02/19/24 0400   Dressing Status Clean;Dry;Intact 02/19/24 0400   Dressing Intervention Integrity maintained 02/19/24 0400   Drainage  "Sanguineous 02/19/24 0400   Status Other (Comment) 02/14/24 2130   Output (mL) 35 mL 02/17/24 0618      Physical Exam  General: well developed, well nourished, no distress.   Head: normocephalic, atraumatic  Mental Status: Awake, Alert, Oriented  Speech: Clear with content appropriate to conversation  Cranial nerves: face symmetric, CN II-XII grossly intact.   Sensory: intact to light touch throughout    Motor Strength:    Strength  Deltoids Triceps Biceps Wrist Extension Wrist Flexion Hand    Upper: R 5/5 5/5 5/5 5/5 5/5 5/5    L 5/5 5/5 5/5 5/5 5/5 5/5     Iliopsoas Quadriceps Knee  Flexion Tibialis  anterior Gastro- cnemius EHL   Lower: R 4+/5 4+/5 4+/5 4+/5 4/5 4/5    L 4+/5 4+/5 4+/5 4/5 4/5 4/5     Incision clean, dry intact with staples. No surrounding erythema or edema.        Significant Labs:  Recent Labs   Lab 02/19/24  0442 02/20/24  0414   GLU 97 104   * 137   K 4.4 4.2    102   CO2 25 27   BUN 18 18   CREATININE 0.7 0.8   CALCIUM 9.5 9.3       Recent Labs   Lab 02/19/24  0442 02/20/24  0415   WBC 6.67 6.43   HGB 7.7* 7.7*   HCT 25.7* 25.7*    370       No results for input(s): "LABPT", "INR", "APTT" in the last 48 hours.  Microbiology Results (last 7 days)       Procedure Component Value Units Date/Time    Aerobic culture [[id number]]  (Abnormal) Collected: 02/14/24 1712    Order Status: Completed Specimen: Incision site from Back Updated: 02/20/24 1028     Aerobic Bacterial Culture STAPHYLOCOCCUS AUREUS  Few  Susceptibility pending      Narrative:      Epidural infection culture lumbar spine    Culture, Anaerobe [[id number]] Collected: 02/14/24 1712    Order Status: Completed Specimen: Incision site from Back Updated: 02/19/24 0959     Anaerobic Culture No anaerobes isolated    Narrative:      L5-S1 disc space    Culture, Anaerobe [[id number]] Collected: 02/14/24 1712    Order Status: Completed Specimen: Incision site from Back Updated: 02/19/24 0958     Anaerobic Culture No " anaerobes isolated    Narrative:      Epidural infection culture lumbar spine    Aerobic culture [[id number]]  (Abnormal)  (Susceptibility) Collected: 02/14/24 1712    Order Status: Completed Specimen: Incision site from Back Updated: 02/16/24 1110     Aerobic Bacterial Culture METHICILLIN RESISTANT STAPHYLOCOCCUS AUREUS  Few      Narrative:      L5-S1 disc space    AFB Culture & Smear [[id number]] Collected: 02/14/24 1712    Order Status: Completed Specimen: Incision site from Back Updated: 02/15/24 2127     AFB Culture & Smear Culture in progress     AFB CULTURE STAIN No acid fast bacilli seen.    Narrative:      L5-S1 disc space    AFB Culture & Smear [[id number]] Collected: 02/14/24 1712    Order Status: Completed Specimen: Incision site from Back Updated: 02/15/24 2127     AFB Culture & Smear Culture in progress     AFB CULTURE STAIN No acid fast bacilli seen.    Narrative:      Epidural infection culture lumbar spine    Gram stain [[id number]] Collected: 02/14/24 1712    Order Status: Completed Specimen: Incision site from Back Updated: 02/14/24 1955     Gram Stain Result Rare WBC's      No organisms seen    Narrative:      Epidural infection culture lumbar spine    Gram stain [[id number]] Collected: 02/14/24 1712    Order Status: Completed Specimen: Incision site from Back Updated: 02/14/24 1952     Gram Stain Result Rare WBC's      No organisms seen    Narrative:      L5-S1 disc space    Fungus culture [[id number]] Collected: 02/14/24 1712    Order Status: Sent Specimen: Incision site from Back Updated: 02/14/24 1739    Fungus culture [[id number]] Collected: 02/14/24 1712    Order Status: Sent Specimen: Incision site from Back Updated: 02/14/24 1738    Culture, Anaerobe [[id number]] Collected: 02/14/24 1712    Order Status: Canceled Specimen: Incision site from Back     Aerobic culture [[id number]] Collected: 02/14/24 1712    Order Status: Canceled Specimen: Incision site from Back     Fungus culture  [[id number]] Collected: 02/14/24 1712    Order Status: Canceled Specimen: Incision site from Back     AFB Culture & Smear [[id number]] Collected: 02/14/24 1712    Order Status: Canceled Specimen: Incision site from Back     Gram stain [[id number]] Collected: 02/14/24 1712    Order Status: Canceled Specimen: Incision site from Back     Culture, Anaerobe [[id number]] Collected: 02/14/24 1712    Order Status: Canceled Specimen: Incision site from Back     Aerobic culture [[id number]] Collected: 02/14/24 1712    Order Status: Canceled Specimen: Incision site from Back     AFB Culture & Smear [[id number]] Collected: 02/14/24 1712    Order Status: Canceled Specimen: Incision site from Back     Gram stain [[id number]] Collected: 02/14/24 1712    Order Status: Canceled Specimen: Incision site from Back     Fungus culture [[id number]] Collected: 02/14/24 1712    Order Status: Canceled Specimen: Incision site from Back     Aerobic culture [[id number]]  (Abnormal)  (Susceptibility) Collected: 02/07/24 1423    Order Status: Completed Specimen: Abscess from Back Updated: 02/14/24 1243     Aerobic Bacterial Culture METHICILLIN RESISTANT STAPHYLOCOCCUS AUREUS  Rare      Narrative:      L psoas    Culture, Anaerobe [[id number]] Collected: 02/07/24 1423    Order Status: Completed Specimen: Abscess from Back Updated: 02/14/24 0922     Anaerobic Culture No anaerobes isolated    Narrative:      L psoas    Culture, Anaerobe [[id number]] Collected: 02/07/24 1411    Order Status: Completed Specimen: Joint Fluid from Back Updated: 02/14/24 0908     Anaerobic Culture No anaerobes isolated    Fungus culture [[id number]] Collected: 02/07/24 1423    Order Status: Completed Specimen: Abscess from Back Updated: 02/14/24 0547     Fungus (Mycology) Culture Culture in progress    Narrative:      L psoas    Fungus culture [[id number]] Collected: 02/07/24 1413    Order Status: Completed Specimen: Joint Fluid from Back Updated: 02/14/24 0544      Fungus (Mycology) Culture Culture in progress          All pertinent labs from the last 24 hours have been reviewed.    Significant Diagnostics:  I have reviewed and interpreted all pertinent imaging results/findings within the past 24 hours.

## 2024-02-20 NOTE — NURSING
Nurses Note -- 4 Eyes      2/20/2024   4:47 AM      Skin assessed during: Transfer      [] No Altered Skin Integrity Present    []Prevention Measures Documented      [x] Yes- Altered Skin Integrity Present or Discovered   [] LDA Added if Not in Epic (Describe Wound)   [x] New Altered Skin Integrity was Present on Admit and Documented in LDA   [] Wound Image Taken    Wound Care Consulted? No    Attending Nurse:  JIMY Medina     Second RN/Staff Member:  JIMY Villegas, OC

## 2024-02-20 NOTE — PLAN OF CARE
Problem: Adult Inpatient Plan of Care  Goal: Plan of Care Review  Outcome: Ongoing, Progressing  Goal: Patient-Specific Goal (Individualized)  Outcome: Ongoing, Progressing  Goal: Absence of Hospital-Acquired Illness or Injury  Outcome: Ongoing, Progressing  Goal: Optimal Comfort and Wellbeing  Outcome: Ongoing, Progressing  Goal: Readiness for Transition of Care  Outcome: Ongoing, Progressing     Problem: Impaired Wound Healing  Goal: Optimal Wound Healing  Outcome: Ongoing, Progressing     Problem: Skin Injury Risk Increased  Goal: Skin Health and Integrity  Outcome: Ongoing, Progressing     Problem: Infection  Goal: Absence of Infection Signs and Symptoms  Outcome: Ongoing, Progressing     Problem: Fall Injury Risk  Goal: Absence of Fall and Fall-Related Injury  Outcome: Ongoing, Progressing     Problem: Pain Acute  Goal: Acceptable Pain Control and Functional Ability  Outcome: Ongoing, Progressing     Problem: Skin or Soft Tissue Infection  Goal: Absence of Infection Signs and Symptoms  Outcome: Ongoing, Progressing       Pnt has been stable. Pnt requested dilaudid all evening. Pnt had an uneventful evening. Tolerated all meds without complications or compliants. Safety precautions in place. Will continue to monitor.

## 2024-02-20 NOTE — PROGRESS NOTES
Aleksandr Bray - Neurosurgery ([facility])  Neurosurgery  Progress Note    Subjective:     History of Present Illness: 28M w/ hx MRSA bactermia, IVDU, pelvic/intra-abdominal abscesses who presents with progressive back pain with radiation down the leg over the past couple of weeks.  He has had bilateral numbness of his anterior thigh as well. He denies bowel/bladder sx or saddle anesthesia. He denies focal weakness. Per chart review, the patient is actively using heroin and did an injection prior to being taken in by EMS.      Post-Op Info:  Procedure(s) (LRB):  LOOP X FUSION, SPINE, LUMBAR (N/A)   6 Days Post-Op   Interval History: NAEON. Pain controlled. Voiding spontaneously. BM yesterday. HV and WV removed. Incision c/d/I. Exam stable.    Medications:  Continuous Infusions:  Scheduled Meds:   acetaminophen  1,000 mg Oral Q8H    cloNIDine  0.1 mg Oral Q12H    DAPTOmycin (CUBICIN) IV (PEDS and ADULTS)  10 mg/kg Intravenous Q24H    enoxparin  40 mg Subcutaneous Q24H (prophylaxis, 1700)    methocarbamoL  1,000 mg Oral QID    mirtazapine  15 mg Oral QHS    nicotine  1 patch Transdermal Daily     PRN Meds:acetaminophen, dextrose 10%, dextrose 10%, dicyclomine, glucagon (human recombinant), glucose, glucose, HYDROmorphone, hydrOXYzine HCL, labetalol, loperamide, melatonin, naloxone, oxyCODONE, promethazine, sodium chloride 0.9%     Review of Systems  Objective:     Weight: 77 kg (169 lb 12.1 oz)  Body mass index is 24.36 kg/m².  Vital Signs (Most Recent):  Temp: 98.4 °F (36.9 °C) (02/20/24 0804)  Pulse: 77 (02/20/24 0804)  Resp: 18 (02/20/24 0922)  BP: 123/61 (02/20/24 0804)  SpO2: 100 % (02/20/24 0804) Vital Signs (24h Range):  Temp:  [97.8 °F (36.6 °C)-98.7 °F (37.1 °C)] 98.4 °F (36.9 °C)  Pulse:  [75-94] 77  Resp:  [16-20] 18  SpO2:  [99 %-100 %] 100 %  BP: (114-130)/(56-61) 123/61          Closed/Suction Drain 02/14/24 1730 Tube - 1 Left;Posterior;Lateral Back Accordion 10 Fr. (Active)   Site Description Unable to view  "02/19/24 0400   Dressing Type Transparent (Tegaderm) 02/19/24 0400   Dressing Status Clean;Dry;Intact 02/19/24 0400   Dressing Intervention Integrity maintained 02/19/24 0400   Drainage Sanguineous 02/19/24 0400   Status Other (Comment) 02/16/24 0800   Output (mL) 20 mL 02/17/24 0618            Closed/Suction Drain 02/14/24 1730 Tube - 2 Right;Posterior;Lateral Back Accordion 10 Fr. (Active)   Site Description Unable to view 02/19/24 0400   Dressing Type Transparent (Tegaderm) 02/19/24 0400   Dressing Status Clean;Dry;Intact 02/19/24 0400   Dressing Intervention Integrity maintained 02/19/24 0400   Drainage Sanguineous 02/19/24 0400   Status Other (Comment) 02/14/24 2130   Output (mL) 35 mL 02/17/24 0618     Physical Exam  General: well developed, well nourished, no distress.   Head: normocephalic, atraumatic  Mental Status: Awake, Alert, Oriented  Speech: Clear with content appropriate to conversation  Cranial nerves: face symmetric, CN II-XII grossly intact.   Sensory: intact to light touch throughout    Motor Strength:    Strength  Deltoids Triceps Biceps Wrist Extension Wrist Flexion Hand    Upper: R 5/5 5/5 5/5 5/5 5/5 5/5    L 5/5 5/5 5/5 5/5 5/5 5/5     Iliopsoas Quadriceps Knee  Flexion Tibialis  anterior Gastro- cnemius EHL   Lower: R 4+/5 4+/5 4+/5 4+/5 4/5 4/5    L 4+/5 4+/5 4+/5 4/5 4/5 4/5     Incision clean, dry intact with staples. No surrounding erythema or edema.        Significant Labs:  Recent Labs   Lab 02/19/24  0442 02/20/24  0414   GLU 97 104   * 137   K 4.4 4.2    102   CO2 25 27   BUN 18 18   CREATININE 0.7 0.8   CALCIUM 9.5 9.3       Recent Labs   Lab 02/19/24 0442 02/20/24  0415   WBC 6.67 6.43   HGB 7.7* 7.7*   HCT 25.7* 25.7*    370       No results for input(s): "LABPT", "INR", "APTT" in the last 48 hours.  Microbiology Results (last 7 days)       Procedure Component Value Units Date/Time    Aerobic culture [[id number]]  (Abnormal) Collected: 02/14/24 1712    " Order Status: Completed Specimen: Incision site from Back Updated: 02/20/24 1028     Aerobic Bacterial Culture STAPHYLOCOCCUS AUREUS  Few  Susceptibility pending      Narrative:      Epidural infection culture lumbar spine    Culture, Anaerobe [[id number]] Collected: 02/14/24 1712    Order Status: Completed Specimen: Incision site from Back Updated: 02/19/24 0959     Anaerobic Culture No anaerobes isolated    Narrative:      L5-S1 disc space    Culture, Anaerobe [[id number]] Collected: 02/14/24 1712    Order Status: Completed Specimen: Incision site from Back Updated: 02/19/24 0958     Anaerobic Culture No anaerobes isolated    Narrative:      Epidural infection culture lumbar spine    Aerobic culture [[id number]]  (Abnormal)  (Susceptibility) Collected: 02/14/24 1712    Order Status: Completed Specimen: Incision site from Back Updated: 02/16/24 1110     Aerobic Bacterial Culture METHICILLIN RESISTANT STAPHYLOCOCCUS AUREUS  Few      Narrative:      L5-S1 disc space    AFB Culture & Smear [[id number]] Collected: 02/14/24 1712    Order Status: Completed Specimen: Incision site from Back Updated: 02/15/24 2127     AFB Culture & Smear Culture in progress     AFB CULTURE STAIN No acid fast bacilli seen.    Narrative:      L5-S1 disc space    AFB Culture & Smear [[id number]] Collected: 02/14/24 1712    Order Status: Completed Specimen: Incision site from Back Updated: 02/15/24 2127     AFB Culture & Smear Culture in progress     AFB CULTURE STAIN No acid fast bacilli seen.    Narrative:      Epidural infection culture lumbar spine    Gram stain [[id number]] Collected: 02/14/24 1712    Order Status: Completed Specimen: Incision site from Back Updated: 02/14/24 1955     Gram Stain Result Rare WBC's      No organisms seen    Narrative:      Epidural infection culture lumbar spine    Gram stain [[id number]] Collected: 02/14/24 1712    Order Status: Completed Specimen: Incision site from Back Updated: 02/14/24 1952     Gram  Stain Result Rare WBC's      No organisms seen    Narrative:      L5-S1 disc space    Fungus culture [[id number]] Collected: 02/14/24 1712    Order Status: Sent Specimen: Incision site from Back Updated: 02/14/24 1739    Fungus culture [[id number]] Collected: 02/14/24 1712    Order Status: Sent Specimen: Incision site from Back Updated: 02/14/24 1738    Culture, Anaerobe [[id number]] Collected: 02/14/24 1712    Order Status: Canceled Specimen: Incision site from Back     Aerobic culture [[id number]] Collected: 02/14/24 1712    Order Status: Canceled Specimen: Incision site from Back     Fungus culture [[id number]] Collected: 02/14/24 1712    Order Status: Canceled Specimen: Incision site from Back     AFB Culture & Smear [[id number]] Collected: 02/14/24 1712    Order Status: Canceled Specimen: Incision site from Back     Gram stain [[id number]] Collected: 02/14/24 1712    Order Status: Canceled Specimen: Incision site from Back     Culture, Anaerobe [[id number]] Collected: 02/14/24 1712    Order Status: Canceled Specimen: Incision site from Back     Aerobic culture [[id number]] Collected: 02/14/24 1712    Order Status: Canceled Specimen: Incision site from Back     AFB Culture & Smear [[id number]] Collected: 02/14/24 1712    Order Status: Canceled Specimen: Incision site from Back     Gram stain [[id number]] Collected: 02/14/24 1712    Order Status: Canceled Specimen: Incision site from Back     Fungus culture [[id number]] Collected: 02/14/24 1712    Order Status: Canceled Specimen: Incision site from Back     Aerobic culture [[id number]]  (Abnormal)  (Susceptibility) Collected: 02/07/24 1423    Order Status: Completed Specimen: Abscess from Back Updated: 02/14/24 1243     Aerobic Bacterial Culture METHICILLIN RESISTANT STAPHYLOCOCCUS AUREUS  Rare      Narrative:      L psoas    Culture, Anaerobe [[id number]] Collected: 02/07/24 1423    Order Status: Completed Specimen: Abscess from Back Updated: 02/14/24 0923      Anaerobic Culture No anaerobes isolated    Narrative:      L psoas    Culture, Anaerobe [[id number]] Collected: 02/07/24 1411    Order Status: Completed Specimen: Joint Fluid from Back Updated: 02/14/24 0908     Anaerobic Culture No anaerobes isolated    Fungus culture [[id number]] Collected: 02/07/24 1423    Order Status: Completed Specimen: Abscess from Back Updated: 02/14/24 0547     Fungus (Mycology) Culture Culture in progress    Narrative:      L psoas    Fungus culture [[id number]] Collected: 02/07/24 1413    Order Status: Completed Specimen: Joint Fluid from Back Updated: 02/14/24 0544     Fungus (Mycology) Culture Culture in progress          All pertinent labs from the last 24 hours have been reviewed.    Significant Diagnostics:  I have reviewed and interpreted all pertinent imaging results/findings within the past 24 hours.    Assessment/Plan:     * Osteomyelitis of vertebra of lumbosacral region  28M w/hx MRSA bactermia, IVDU, pelvic/intra-abdominal abscesses who presents with progressive back pain with radiation down the leg over the past couple of weeks.     - All pertinent labs and imaging reviewed  - 2/4 ESR 75, CRP 33.7. Blood cx NGTD.   - Back culture 2/7 with staph aureus  - MRI lumbar w/wo 2/4: L5-S1 osteodiscitis extending into the sacrum, left iliac bone, SI joint, facet joints. Anterolisthesis L5-S1, epidural abscess L5-S1 with canal stenosis. Left psoas and paraspinal muscle abscesses. No clinical evidence of cauda equina syndrome.   - CT lumbar obtained with destructive changes at L5-S1.    Now he is status post L4 to pelvis fusion  on 02/14/2024     Continue floor care with vital and neurocheck per protocol  Multimodal pain medications  HV and WV removed. Leave incision open to air. Ok to shower. No submerging until 6 weeks post-op. Remove staples/sutures 3/1 at [facility].  L spine xray satisfactory    Intraop cultures grew staph. ID rec Daptomycin through 4/10/24  LVX for DVT  prophylaxis  PTOT  LSO brace when out of bed  SW/CM for discharge planning and DME    Dispo: Pending LT[facility], stable from NSGY standpoint for discharge        Joselyn Ventura PA-C  Neurosurgery  Aleks[facility]r Bray - Neurosurgery ([facility])

## 2024-02-20 NOTE — PLAN OF CARE
Goals reviewed and revised as appropriate. Continue POC.    Problem: Physical Therapy  Goal: Physical Therapy Goal  Description: Goals to be met by: 3/15/2024    Patient will increase functional independence with mobility by performin. Supine to sit with Stand-by Assistance - met 2024  Updated: modified independence  2. Sit to supine with Stand-by Assistance  3. Sit to stand transfer with Stand-by Assistance  4. Gait  x 100 feet with Contact Guard Assistance using LRAD.   5. Lower extremity exercise program x15 reps per handout, with independence    Outcome: Ongoing, Progressing     2024

## 2024-02-20 NOTE — PLAN OF CARE
Patient accepted by [facility].  Patient did not have any particular preference as this is an out of area facility.  [facility] had already submitted for auth.   02/20/24 0947   Post-Acute Status   Post-Acute Authorization Placement   Post-Acute Placement Status Pending payor review/awaiting authorization (if required)     2:25 NAM reviewed [facility] and [facility] who had advised they had submitted for auth has now stated in [facility] that the director declined acceptance of this patient.  NAM spoke with Odilia with [facility] who had accepted and will submit for auth.

## 2024-02-20 NOTE — PT/OT/SLP PROGRESS
"Physical Therapy Treatment    Patient Name: Ton Donovan   MRN: [id number]    Therapy tech utilized for session to maximize physical performance and safety  Recommendations:     Discharge Recommendations: High Intensity Therapy  Discharge Equipment Recommendations: walker, rolling, bedside commode  Barriers to discharge: Increased level of assist, Inaccessible home, and Decreased caregiver support    Assessment:     Ton Donovan is a 28 y.o. male admitted with a medical diagnosis of Osteomyelitis of vertebra of lumbosacral region. He presents with the following impairments/functional limitations: weakness, impaired endurance, impaired self care skills, impaired functional mobility, gait instability, impaired balance, impaired coordination, decreased lower extremity function, decreased safety awareness, pain, decreased ROM, orthopedic precautions. Pt with good tolerance to therapy session on this date. Pt demonstrating improved aerobic endurance as demonstrated by increased ambulation distance and number of gait trials. Pt continues to demonstrate notable gait deficits and postural instability requiring increased assistance to safely ambulate. Pt more receptive to verbal cueing and education provided as compared to previous sessions. Pt would continue to benefit from skilled acute PT in order to address current deficits and progress functional mobility.    Rehab Prognosis: Good; patient continues to benefit from acute skilled PT services to address these deficits and reach maximum level of function.  Recent Surgery: Procedure(s) (LRB):  LOOP X FUSION, SPINE, LUMBAR (N/A) 6 Days Post-Op    Plan:     During this hospitalization, patient to be seen 4 x/week to address the identified rehab impairments via gait training, therapeutic activities, therapeutic exercises, neuromuscular re-education and progress toward the following goals:    Plan of Care Expires:  03/15/24    Subjective     Chief Complaint: "Normal back " "pain"  Patient/Family Comments/Goals: "How am I doing? I tried to use my arms less when we walked."  Pain/Comfort:  Pain Rating 1: 8/10  Location - Orientation 1: generalized  Location 1: back  Pain Addressed 1: Reposition, Distraction, Nurse notified  Pain Rating Post-Intervention 1: 8/10    Objective:     Communicated with RN prior to session. Patient found supine with telemetry upon PT entry to room.     General Precautions: Standard, fall  Orthopedic Precautions: spinal precautions  Braces: LSO; donned at EOB    Functional Mobility:  Bed Mobility:  Verbal cues for sequencing and technique  Supine to Sit: stand by assistance  Transfers:    Sit to Stand: contact guard assistance with rolling walker with cues for hand placement and foot placement  Gait: Patient ambulated 75' x 4 with rolling walker and minimum assistance.   Patient demonstrates unsteady gait, decreased step length, narrow base of support, decreased weight shift, decreased foot clearance, ambulates outside RUPINDER of RW, flexed posture, decreased samantha, inconsistent bilateral foot placement, decreased bilateral knee stability, and scissored gait.   Patient required cues for upright posture, gluteal activation, sequencing, rolling walker management, obstacle navigation, safe rolling walker usage, to ambulate within RUPINDER of RW, increased step size, foot placement, increased foot clearance, and increased RUPINDER.  All lines remained intact throughout ambulation trial, gait belt utilized, chair follow for patient safety.    AM-PAC 6 CLICK MOBILITY  Turning over in bed (including adjusting bedclothes, sheets and blankets)?: 3  Sitting down on and standing up from a chair with arms (e.g., wheelchair, bedside commode, etc.): 3  Moving from lying on back to sitting on the side of the bed?: 3  Moving to and from a bed to a chair (including a wheelchair)?: 3  Need to walk in hospital room?: 2  Climbing 3-5 steps with a railing?: 2  Basic Mobility Total Score: " 16     Therapeutic Activities and Exercises:  Patient educated on role of acute care PT and PT POC, safety while in hospital including calling nurse for mobility, and call light usage  Pt educated on the effects of bed rest and the importance of OOB activity. Pt encouraged to sit UIC majority of day as tolerated and continue daily ambulation with nursing assist. Pt verbalized understanding.  Pt educated on importance of maximal participation in therapy session in order to reduce negative effects of prolonged sedentary positioning.   Answered all questions within PT scope of practice and addressed functional mobility concerns.  Pt with continent urine during ambulation trial. Urge incontinence noted and pt requiring urinalswiftly to void. RN notified.    Patient left up in chair with all lines intact, call button in reach, and RN notified.    GOALS:   Multidisciplinary Problems       Physical Therapy Goals          Problem: Physical Therapy    Goal Priority Disciplines Outcome Goal Variances Interventions   Physical Therapy Goal     PT, PT/OT Ongoing, Progressing     Description: Goals to be met by: 3/15/2024    Patient will increase functional independence with mobility by performin. Supine to sit with Stand-by Assistance - met 2024  Updated: modified independence  2. Sit to supine with Stand-by Assistance  3. Sit to stand transfer with Stand-by Assistance  4. Gait  x 100 feet with Contact Guard Assistance using LRAD.   5. Lower extremity exercise program x15 reps per handout, with independence                         Time Tracking:     PT Received On: 24  PT Start Time: 1131     PT Stop Time: 1155  PT Total Time (min): 24 min     Billable Minutes: Gait Training 14 and Therapeutic Activity 10      Treatment Type: Treatment  PT/PTA: PT     Number of PTA visits since last PT visit: 0     2024

## 2024-02-21 LAB
ANION GAP SERPL CALC-SCNC: 10 MMOL/L (ref 8–16)
BACTERIA SPEC AEROBE CULT: ABNORMAL
BASOPHILS # BLD AUTO: 0.02 K/UL (ref 0–0.2)
BASOPHILS NFR BLD: 0.4 % (ref 0–1.9)
BUN SERPL-MCNC: 19 MG/DL (ref 6–20)
CALCIUM SERPL-MCNC: 8.8 MG/DL (ref 8.7–10.5)
CHLORIDE SERPL-SCNC: 103 MMOL/L (ref 95–110)
CO2 SERPL-SCNC: 25 MMOL/L (ref 23–29)
CREAT SERPL-MCNC: 0.7 MG/DL (ref 0.5–1.4)
DIFFERENTIAL METHOD BLD: ABNORMAL
EOSINOPHIL # BLD AUTO: 0.1 K/UL (ref 0–0.5)
EOSINOPHIL NFR BLD: 2.3 % (ref 0–8)
ERYTHROCYTE [DISTWIDTH] IN BLOOD BY AUTOMATED COUNT: 20 % (ref 11.5–14.5)
EST. GFR  (NO RACE VARIABLE): >60 ML/MIN/1.73 M^2
GLUCOSE SERPL-MCNC: 124 MG/DL (ref 70–110)
HCT VFR BLD AUTO: 25.6 % (ref 40–54)
HGB BLD-MCNC: 7.7 G/DL (ref 14–18)
IMM GRANULOCYTES # BLD AUTO: 0.02 K/UL (ref 0–0.04)
IMM GRANULOCYTES NFR BLD AUTO: 0.4 % (ref 0–0.5)
LYMPHOCYTES # BLD AUTO: 2 K/UL (ref 1–4.8)
LYMPHOCYTES NFR BLD: 36.1 % (ref 18–48)
MCH RBC QN AUTO: 21.3 PG (ref 27–31)
MCHC RBC AUTO-ENTMCNC: 30.1 G/DL (ref 32–36)
MCV RBC AUTO: 71 FL (ref 82–98)
MONOCYTES # BLD AUTO: 0.6 K/UL (ref 0.3–1)
MONOCYTES NFR BLD: 10.1 % (ref 4–15)
NEUTROPHILS # BLD AUTO: 2.8 K/UL (ref 1.8–7.7)
NEUTROPHILS NFR BLD: 50.7 % (ref 38–73)
NRBC BLD-RTO: 0 /100 WBC
PLATELET # BLD AUTO: 367 K/UL (ref 150–450)
PMV BLD AUTO: 8.6 FL (ref 9.2–12.9)
POTASSIUM SERPL-SCNC: 4.2 MMOL/L (ref 3.5–5.1)
RBC # BLD AUTO: 3.62 M/UL (ref 4.6–6.2)
SODIUM SERPL-SCNC: 138 MMOL/L (ref 136–145)
WBC # BLD AUTO: 5.57 K/UL (ref 3.9–12.7)

## 2024-02-21 PROCEDURE — 99024 POSTOP FOLLOW-UP VISIT: CPT | Mod: ,,, | Performed by: PHYSICIAN ASSISTANT

## 2024-02-21 PROCEDURE — 25000003 PHARM REV CODE 250: Performed by: HOSPITALIST

## 2024-02-21 PROCEDURE — 63600175 PHARM REV CODE 636 W HCPCS: Performed by: STUDENT IN AN ORGANIZED HEALTH CARE EDUCATION/TRAINING PROGRAM

## 2024-02-21 PROCEDURE — 63600175 PHARM REV CODE 636 W HCPCS: Performed by: NURSE PRACTITIONER

## 2024-02-21 PROCEDURE — 63600175 PHARM REV CODE 636 W HCPCS: Performed by: HOSPITALIST

## 2024-02-21 PROCEDURE — 25000003 PHARM REV CODE 250: Performed by: STUDENT IN AN ORGANIZED HEALTH CARE EDUCATION/TRAINING PROGRAM

## 2024-02-21 PROCEDURE — 25000003 PHARM REV CODE 250: Performed by: NURSE PRACTITIONER

## 2024-02-21 PROCEDURE — 11000001 HC ACUTE MED/SURG PRIVATE ROOM

## 2024-02-21 PROCEDURE — 36415 COLL VENOUS BLD VENIPUNCTURE: CPT | Performed by: STUDENT IN AN ORGANIZED HEALTH CARE EDUCATION/TRAINING PROGRAM

## 2024-02-21 PROCEDURE — 85025 COMPLETE CBC W/AUTO DIFF WBC: CPT | Performed by: STUDENT IN AN ORGANIZED HEALTH CARE EDUCATION/TRAINING PROGRAM

## 2024-02-21 PROCEDURE — 80048 BASIC METABOLIC PNL TOTAL CA: CPT | Performed by: STUDENT IN AN ORGANIZED HEALTH CARE EDUCATION/TRAINING PROGRAM

## 2024-02-21 PROCEDURE — 25000003 PHARM REV CODE 250: Performed by: EMERGENCY MEDICINE

## 2024-02-21 PROCEDURE — S4991 NICOTINE PATCH NONLEGEND: HCPCS | Performed by: HOSPITALIST

## 2024-02-21 RX ADMIN — OXYCODONE 15 MG: 5 TABLET ORAL at 09:02

## 2024-02-21 RX ADMIN — METHOCARBAMOL 1000 MG: 500 TABLET ORAL at 06:02

## 2024-02-21 RX ADMIN — MELATONIN TAB 3 MG 6 MG: 3 TAB at 09:02

## 2024-02-21 RX ADMIN — MELATONIN TAB 3 MG 6 MG: 3 TAB at 03:02

## 2024-02-21 RX ADMIN — ENOXAPARIN SODIUM 40 MG: 40 INJECTION SUBCUTANEOUS at 06:02

## 2024-02-21 RX ADMIN — HYDROMORPHONE HYDROCHLORIDE 1 MG: 1 INJECTION, SOLUTION INTRAMUSCULAR; INTRAVENOUS; SUBCUTANEOUS at 07:02

## 2024-02-21 RX ADMIN — OXYCODONE 15 MG: 5 TABLET ORAL at 06:02

## 2024-02-21 RX ADMIN — ACETAMINOPHEN 1000 MG: 500 TABLET ORAL at 06:02

## 2024-02-21 RX ADMIN — HYDROMORPHONE HYDROCHLORIDE 1 MG: 1 INJECTION, SOLUTION INTRAMUSCULAR; INTRAVENOUS; SUBCUTANEOUS at 03:02

## 2024-02-21 RX ADMIN — NICOTINE 1 PATCH: 14 PATCH, EXTENDED RELEASE TRANSDERMAL at 09:02

## 2024-02-21 RX ADMIN — ACETAMINOPHEN 1000 MG: 500 TABLET ORAL at 02:02

## 2024-02-21 RX ADMIN — CLONIDINE HYDROCHLORIDE 0.1 MG: 0.1 TABLET ORAL at 09:02

## 2024-02-21 RX ADMIN — MIRTAZAPINE 15 MG: 15 TABLET, FILM COATED ORAL at 09:02

## 2024-02-21 RX ADMIN — OXYCODONE 15 MG: 5 TABLET ORAL at 02:02

## 2024-02-21 RX ADMIN — ACETAMINOPHEN 1000 MG: 500 TABLET ORAL at 09:02

## 2024-02-21 RX ADMIN — METHOCARBAMOL 1000 MG: 500 TABLET ORAL at 09:02

## 2024-02-21 RX ADMIN — HYDROMORPHONE HYDROCHLORIDE 1 MG: 1 INJECTION, SOLUTION INTRAMUSCULAR; INTRAVENOUS; SUBCUTANEOUS at 11:02

## 2024-02-21 RX ADMIN — OXYCODONE 15 MG: 5 TABLET ORAL at 10:02

## 2024-02-21 RX ADMIN — METHOCARBAMOL 1000 MG: 500 TABLET ORAL at 02:02

## 2024-02-21 RX ADMIN — DAPTOMYCIN 765 MG: 350 INJECTION, POWDER, LYOPHILIZED, FOR SOLUTION INTRAVENOUS at 02:02

## 2024-02-21 RX ADMIN — HYDROXYZINE HYDROCHLORIDE 50 MG: 25 TABLET, FILM COATED ORAL at 03:02

## 2024-02-21 NOTE — ASSESSMENT & PLAN NOTE
28M w/hx MRSA bactermia, IVDU, pelvic/intra-abdominal abscesses who presents with progressive back pain with radiation down the leg over the past couple of weeks.     - All pertinent labs and imaging reviewed  - 2/4 ESR 75, CRP 33.7. Blood cx NGTD.   - Back culture 2/7 with staph aureus  - MRI lumbar w/wo 2/4: L5-S1 osteodiscitis extending into the sacrum, left iliac bone, SI joint, facet joints. Anterolisthesis L5-S1, epidural abscess L5-S1 with canal stenosis. Left psoas and paraspinal muscle abscesses. No clinical evidence of cauda equina syndrome.   - CT lumbar obtained with destructive changes at L5-S1.    Now he is status post L4 to pelvis fusion  on 02/14/2024     Continue floor care with vital and neurocheck per protocol  Multimodal pain medications  Leave incision open to air. Ok to shower. No submerging until 6 weeks post-op. Remove staples/sutures 3/1 at LTAC.  L spine xray satisfactory    Intraop cultures grew staph. ID rec Daptomycin through 4/10/24  LVX for DVT prophylaxis  PTOT  LSO brace when out of bed  SW/CM for discharge planning and DME    Dispo: Pending LTAC, stable from NSGY standpoint for discharge

## 2024-02-21 NOTE — SUBJECTIVE & OBJECTIVE
Interval History: pt reports no chest pain, no shortness a breath.  Afebrile.    Review of Systems  Objective:     Vital Signs (Most Recent):  Temp: 98.5 °F (36.9 °C) (02/21/24 0737)  Pulse: 76 (02/21/24 0737)  Resp: 18 (02/21/24 1012)  BP: (!) 111/59 (02/21/24 0737)  SpO2: 100 % (02/21/24 0737) Vital Signs (24h Range):  Temp:  [97.7 °F (36.5 °C)-98.6 °F (37 °C)] 98.5 °F (36.9 °C)  Pulse:  [76-91] 76  Resp:  [16-20] 18  SpO2:  [96 %-100 %] 100 %  BP: (109-129)/(57-62) 111/59     Weight: 77 kg (169 lb 12.1 oz)  Body mass index is 24.36 kg/m².    Intake/Output Summary (Last 24 hours) at 2/21/2024 1049  Last data filed at 2/20/2024 2209  Gross per 24 hour   Intake --   Output 480 ml   Net -480 ml         Physical Exam        Clear lungs BL, unlabored breathing, on RA, no cyanosis  Hrt sounds RRR  AA, NAD  No LE edema  5/5 hip flexion and fist  BL  Pupils equal BL

## 2024-02-21 NOTE — PROGRESS NOTES
"[facility] - Neurosurgery ([facility])  [facility] Medicine  Progress Note    Patient Name: Ton Donovan  MRN: [id number]  Patient Class: IP- Inpatient   Admission Date: 2/4/2024  Length of Stay: 16 days  Attending Physician: Reese Schaefer MD  Primary Care Provider: Emily, Primary Doctor        Subjective:     Principal Problem:Osteomyelitis of vertebra of lumbosacral region        HPI:  Ton Donovan is a 28-year-old male with PMHx of IVDU w/ assoc complications of MRSA bacteremia, multiple septic joints & abscesses, who presented 2/4/24 with progressive back pain with radiation down his legs b/l. Endorses bilateral numbness & neuropathic pain over the dorsum of his feet. This has been ongoing and is not necessarily a new problem, but has been worsening. Reports "pins & needles"/paresthesias down his legs that have been progressing, although laying on his stomach seems to provide significant relief. Admits to continued active IV heroin use; last used just prior to EMS arrival. Denies any fevers, chills, night sweats, cough, or shortness of breath. Denies any bowel or bladder incontinence/retention or saddle anesthesia.     On arrival, HDS & 0/4 SIRS. MRI showed osteomyelitis/discitis of L5-S1, osteomyelitis of sacrum & L iliac bone, probable septic arthritis of L SI joint & lower lumbar spine facet joints, worsening anterolisthesis of L5 with respect to S1 with increased size of epidural abscess at the level of L5-S1 and probable severe central canal stenosis at this level, along w/ additional abscesses in the L iliopsoas muscle, paraspinal muscles, pelvis, & L sacrum. Received single dose of Zosyn in ED on arrival, but given that pt HDS w/o sepsis, further abx held to optimize intra-op cx/pathology. Admitted to hospital medicine for further mgmt of osteomyelitis, septic joints & abscesses.    Overview/Hospital Course:  Addiction medicine, along w/ NSGY, orthopedic surgery, IR & ID consulted. Underwent aspiration of " psoas abscess per IR on 2/7. Aspiration of SI joint attempted but unable to aspirate hardly any fluid. Underwent LOOP X FUSION, SPINE, LUMBAR and epidural phlegmon debridement, intra-op cx's growing MRSA. Echo unremarkable    Interval History: pt reports no chest pain, no shortness a breath.  Afebrile.    Review of Systems  Objective:     Vital Signs (Most Recent):  Temp: 98.5 °F (36.9 °C) (02/21/24 0737)  Pulse: 76 (02/21/24 0737)  Resp: 18 (02/21/24 1012)  BP: (!) 111/59 (02/21/24 0737)  SpO2: 100 % (02/21/24 0737) Vital Signs (24h Range):  Temp:  [97.7 °F (36.5 °C)-98.6 °F (37 °C)] 98.5 °F (36.9 °C)  Pulse:  [76-91] 76  Resp:  [16-20] 18  SpO2:  [96 %-100 %] 100 %  BP: (109-129)/(57-62) 111/59     Weight: 77 kg (169 lb 12.1 oz)  Body mass index is 24.36 kg/m².    Intake/Output Summary (Last 24 hours) at 2/21/2024 1049  Last data filed at 2/20/2024 2209  Gross per 24 hour   Intake --   Output 480 ml   Net -480 ml         Physical Exam        Clear lungs BL, unlabored breathing, on RA, no cyanosis  Hrt sounds RRR  AA, NAD  No LE edema  5/5 hip flexion and fist  BL  Pupils equal BL     Assessment/Plan:      * Osteomyelitis of vertebra of lumbosacral region  Osteomyelitis, discitis, & myositis of lumbosacral region  Septic arthritis of SI joint(s)  Epidural abscesses   Multiple abscesses of pelvis, sacrum, psoas, iliacus, & paraspinals  Lower back pain w/ b/l sciatica   Severe spinal canal stenosis   MRI: osteomyelitis/discitis of L5-S1, osteomyelitis of sacrum & L iliac bone, probable septic arthritis of L SI joint & lower lumbar spine facet joints, worsening anterolisthesis of L5 with respect to S1 with increased size of epidural abscess at the level of L5-S1 and probable severe central canal stenosis at this level, along w/ additional abscesses in the L iliopsoas muscle, paraspinal muscles, pelvis, & L sacrum.    - Underwent  2/14/24 epidural abscess/phlegmon debridement and LOOP X FUSION, SPINE, LUMBAR w/   -  Left sacroiliac joint aspiration  on 2/7 by IR growing MRSA  - Ortho consulted for mgmt of septic SI joint; appreciate recs/assistance  - MRSA growing from multiple sites;  --> switched to daptomycin + cefepime 8 week course from surg date per ID; EOC 4/10/24     L5-S2 epidural phlegmon  See above; intra-op cx's growing MRSA      Housing insecurity  Previously worked at [employer] & held down job in the past but started using heroin last year following multiple family deaths (mother, aunt & grandmother) in a short period of time. Subsequently lost housing & has been living on streets since then.   - SW/CM    Substance or medication-induced depressive disorder  Pt endorses sx of depression, no SI/SA.   -per psych, mirtazapine 15mg QHS    Spinal stenosis of lumbar region without neurogenic claudication  See osteomyelitis      IVDU (intravenous drug user)  Heroin abuse w/ opioid withdrawal   PSDU  Tobacco use   Known hx of IVDU (heroin) w/ extensive assoc medical complications (discussed above). No recent UDS (only one on file from July 2023- positive for opiates & THC) and none collected on admission. Endorses daily IV heroin use (1g/day); last used just prior to calling EMS. Active tobacco use, but denies EtOH or other illicit substance use. Endorsing sx of opioid w/d.   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,   - fluid restriction; protein intake encouraged      VTE Risk Mitigation (From admission, onward)           Ordered     enoxaparin injection 40 mg  Every 24 hours         02/14/24 9711     Place sequential compression device  Until discontinued          02/05/24 0126     IP VTE HIGH RISK PATIENT  Once         02/05/24 0126                    Discharge Planning   SENA: 2/21/2024     Code Status: Full Code   Is the patient medically ready for discharge?: Yes    Reason for patient still in hospital (select all that apply): Pending disposition  Discharge Plan A: Long-term acute care facility (LTAC)   Discharge Delays: None known at this time              Reese Schaefer MD  Department of Hospital Medicine   [facility] - Neurosurgery ([facility])

## 2024-02-21 NOTE — SUBJECTIVE & OBJECTIVE
Interval History: NAEON. Pain controlled. Incision c/d/I. Exam stable.    Medications:  Continuous Infusions:  Scheduled Meds:   acetaminophen  1,000 mg Oral Q8H    cloNIDine  0.1 mg Oral Q12H    DAPTOmycin (CUBICIN) IV (PEDS and ADULTS)  10 mg/kg Intravenous Q24H    enoxparin  40 mg Subcutaneous Q24H (prophylaxis, 1700)    methocarbamoL  1,000 mg Oral QID    mirtazapine  15 mg Oral QHS    nicotine  1 patch Transdermal Daily     PRN Meds:acetaminophen, dextrose 10%, dextrose 10%, dicyclomine, glucagon (human recombinant), glucose, glucose, HYDROmorphone, hydrOXYzine HCL, labetalol, loperamide, melatonin, naloxone, oxyCODONE, promethazine, sodium chloride 0.9%     Review of Systems  Objective:     Weight: 77 kg (169 lb 12.1 oz)  Body mass index is 24.36 kg/m².  Vital Signs (Most Recent):  Temp: 98.5 °F (36.9 °C) (02/21/24 0737)  Pulse: 76 (02/21/24 0737)  Resp: 18 (02/21/24 0737)  BP: (!) 111/59 (02/21/24 0737)  SpO2: 100 % (02/21/24 0737) Vital Signs (24h Range):  Temp:  [97.7 °F (36.5 °C)-98.6 °F (37 °C)] 98.5 °F (36.9 °C)  Pulse:  [76-91] 76  Resp:  [16-20] 18  SpO2:  [96 %-100 %] 100 %  BP: (109-129)/(57-62) 111/59          Closed/Suction Drain 02/14/24 1730 Tube - 1 Left;Posterior;Lateral Back Accordion 10 Fr. (Active)   Site Description Unable to view 02/19/24 0400   Dressing Type Transparent (Tegaderm) 02/19/24 0400   Dressing Status Clean;Dry;Intact 02/19/24 0400   Dressing Intervention Integrity maintained 02/19/24 0400   Drainage Sanguineous 02/19/24 0400   Status Other (Comment) 02/16/24 0800   Output (mL) 20 mL 02/17/24 0618            Closed/Suction Drain 02/14/24 1730 Tube - 2 Right;Posterior;Lateral Back Accordion 10 Fr. (Active)   Site Description Unable to view 02/19/24 0400   Dressing Type Transparent (Tegaderm) 02/19/24 0400   Dressing Status Clean;Dry;Intact 02/19/24 0400   Dressing Intervention Integrity maintained 02/19/24 0400   Drainage Sanguineous 02/19/24 0400   Status Other (Comment)  "02/14/24 2130   Output (mL) 35 mL 02/17/24 0618      Physical Exam  General: well developed, well nourished, no distress.   Head: normocephalic, atraumatic  Mental Status: Awake, Alert, Oriented  Speech: Clear with content appropriate to conversation  Cranial nerves: face symmetric, CN II-XII grossly intact.   Sensory: intact to light touch throughout    Motor Strength:    Strength  Deltoids Triceps Biceps Wrist Extension Wrist Flexion Hand    Upper: R 5/5 5/5 5/5 5/5 5/5 5/5    L 5/5 5/5 5/5 5/5 5/5 5/5     Iliopsoas Quadriceps Knee  Flexion Tibialis  anterior Gastro- cnemius EHL   Lower: R 4+/5 4+/5 4+/5 4+/5 4/5 4/5    L 4+/5 4+/5 4+/5 4/5 4/5 4/5     Incision clean, dry intact with staples. No surrounding erythema or edema.        Significant Labs:  Recent Labs   Lab 02/20/24  0414 02/21/24  0242    124*    138   K 4.2 4.2    103   CO2 27 25   BUN 18 19   CREATININE 0.8 0.7   CALCIUM 9.3 8.8       Recent Labs   Lab 02/20/24  0415 02/21/24  0242   WBC 6.43 5.57   HGB 7.7* 7.7*   HCT 25.7* 25.6*    367       No results for input(s): "LABPT", "INR", "APTT" in the last 48 hours.  Microbiology Results (last 7 days)       Procedure Component Value Units Date/Time    Aerobic culture [[id number]]  (Abnormal) Collected: 02/14/24 1712    Order Status: Completed Specimen: Incision site from Back Updated: 02/21/24 0743     Aerobic Bacterial Culture STAPHYLOCOCCUS AUREUS  Few  Susceptibility pending      Narrative:      Epidural infection culture lumbar spine    Culture, Anaerobe [[id number]] Collected: 02/14/24 1712    Order Status: Completed Specimen: Incision site from Back Updated: 02/19/24 0959     Anaerobic Culture No anaerobes isolated    Narrative:      L5-S1 disc space    Culture, Anaerobe [[id number]] Collected: 02/14/24 1712    Order Status: Completed Specimen: Incision site from Back Updated: 02/19/24 0958     Anaerobic Culture No anaerobes isolated    Narrative:      Epidural " infection culture lumbar spine    Aerobic culture [[id number]]  (Abnormal)  (Susceptibility) Collected: 02/14/24 1712    Order Status: Completed Specimen: Incision site from Back Updated: 02/16/24 1110     Aerobic Bacterial Culture METHICILLIN RESISTANT STAPHYLOCOCCUS AUREUS  Few      Narrative:      L5-S1 disc space    AFB Culture & Smear [[id number]] Collected: 02/14/24 1712    Order Status: Completed Specimen: Incision site from Back Updated: 02/15/24 2127     AFB Culture & Smear Culture in progress     AFB CULTURE STAIN No acid fast bacilli seen.    Narrative:      L5-S1 disc space    AFB Culture & Smear [[id number]] Collected: 02/14/24 1712    Order Status: Completed Specimen: Incision site from Back Updated: 02/15/24 2127     AFB Culture & Smear Culture in progress     AFB CULTURE STAIN No acid fast bacilli seen.    Narrative:      Epidural infection culture lumbar spine    Gram stain [[id number]] Collected: 02/14/24 1712    Order Status: Completed Specimen: Incision site from Back Updated: 02/14/24 1955     Gram Stain Result Rare WBC's      No organisms seen    Narrative:      Epidural infection culture lumbar spine    Gram stain [[id number]] Collected: 02/14/24 1712    Order Status: Completed Specimen: Incision site from Back Updated: 02/14/24 1952     Gram Stain Result Rare WBC's      No organisms seen    Narrative:      L5-S1 disc space    Fungus culture [[id number]] Collected: 02/14/24 1712    Order Status: Sent Specimen: Incision site from Back Updated: 02/14/24 1739    Fungus culture [[id number]] Collected: 02/14/24 1712    Order Status: Sent Specimen: Incision site from Back Updated: 02/14/24 1738    Culture, Anaerobe [[id number]] Collected: 02/14/24 1712    Order Status: Canceled Specimen: Incision site from Back     Aerobic culture [[id number]] Collected: 02/14/24 1712    Order Status: Canceled Specimen: Incision site from Back     Fungus culture [[id number]] Collected: 02/14/24 1712    Order Status:  Canceled Specimen: Incision site from Back     AFB Culture & Smear [[id number]] Collected: 02/14/24 1712    Order Status: Canceled Specimen: Incision site from Back     Gram stain [[id number]] Collected: 02/14/24 1712    Order Status: Canceled Specimen: Incision site from Back     Culture, Anaerobe [[id number]] Collected: 02/14/24 1712    Order Status: Canceled Specimen: Incision site from Back     Aerobic culture [[id number]] Collected: 02/14/24 1712    Order Status: Canceled Specimen: Incision site from Back     AFB Culture & Smear [[id number]] Collected: 02/14/24 1712    Order Status: Canceled Specimen: Incision site from Back     Gram stain [[id number]] Collected: 02/14/24 1712    Order Status: Canceled Specimen: Incision site from Back     Fungus culture [[id number]] Collected: 02/14/24 1712    Order Status: Canceled Specimen: Incision site from Back     Aerobic culture [[id number]]  (Abnormal)  (Susceptibility) Collected: 02/07/24 1423    Order Status: Completed Specimen: Abscess from Back Updated: 02/14/24 1243     Aerobic Bacterial Culture METHICILLIN RESISTANT STAPHYLOCOCCUS AUREUS  Rare      Narrative:      L psoas          All pertinent labs from the last 24 hours have been reviewed.    Significant Diagnostics:  I have reviewed and interpreted all pertinent imaging results/findings within the past 24 hours.

## 2024-02-21 NOTE — PROGRESS NOTES
"Aleksandr [facility]ray - Neurosurgery ([facility])  [facility] Medicine  Progress Note    Patient Name: Ton Donovan  MRN: [id number]  Patient Class: IP- Inpatient   Admission Date: 2/4/2024  Length of Stay: 16 days  Attending Physician: Reese Schaefer MD  Primary Care Provider: Emily, Primary Doctor        Subjective:     Principal Problem:Osteomyelitis of vertebra of lumbosacral region        HPI:  Ton Donovan is a 28-year-old male with PMHx of IVDU w/ assoc complications of MRSA bacteremia, multiple septic joints & abscesses, who presented 2/4/24 with progressive back pain with radiation down his legs b/l. Endorses bilateral numbness & neuropathic pain over the dorsum of his feet. This has been ongoing and is not necessarily a new problem, but has been worsening. Reports "pins & needles"/paresthesias down his legs that have been progressing, although laying on his stomach seems to provide significant relief. Admits to continued active IV heroin use; last used just prior to EMS arrival. Denies any fevers, chills, night sweats, cough, or shortness of breath. Denies any bowel or bladder incontinence/retention or saddle anesthesia.     On arrival, HDS & 0/4 SIRS. MRI showed osteomyelitis/discitis of L5-S1, osteomyelitis of sacrum & L iliac bone, probable septic arthritis of L SI joint & lower lumbar spine facet joints, worsening anterolisthesis of L5 with respect to S1 with increased size of epidural abscess at the level of L5-S1 and probable severe central canal stenosis at this level, along w/ additional abscesses in the L iliopsoas muscle, paraspinal muscles, pelvis, & L sacrum. Received single dose of Zosyn in ED on arrival, but given that pt HDS w/o sepsis, further abx held to optimize intra-op cx/pathology. Admitted to hospital medicine for further mgmt of osteomyelitis, septic joints & abscesses.    Overview/Hospital Course:  Addiction medicine, along w/ NSGY, orthopedic surgery, IR & ID consulted. Underwent aspiration of " psoas abscess per IR on 2/7. Aspiration of SI joint attempted but unable to aspirate hardly any fluid. Underwent LOOP X FUSION, SPINE, LUMBAR and epidural phlegmon debridement, intra-op cx's growing MRSA. Echo unremarkable    Interval History:  no CP; no SOB. Afebrile. No LE pain/swelling reported     Review of Systems  Objective:     Vital Signs (Most Recent):  Temp: 98.5 °F (36.9 °C) (02/21/24 0737)  Pulse: 76 (02/21/24 0737)  Resp: 18 (02/21/24 1012)  BP: (!) 111/59 (02/21/24 0737)  SpO2: 100 % (02/21/24 0737) Vital Signs (24h Range):  Temp:  [97.7 °F (36.5 °C)-98.6 °F (37 °C)] 98.5 °F (36.9 °C)  Pulse:  [76-91] 76  Resp:  [16-20] 18  SpO2:  [96 %-100 %] 100 %  BP: (109-129)/(57-62) 111/59     Weight: 77 kg (169 lb 12.1 oz)  Body mass index is 24.36 kg/m².    Intake/Output Summary (Last 24 hours) at 2/21/2024 1033  Last data filed at 2/20/2024 2209  Gross per 24 hour   Intake --   Output 480 ml   Net -480 ml         Physical Exam      Clear lungs BL, unlabored breathing, on RA, no cyanosis  Hrt sounds RRR  AA, NAD  No LE edema  5/5 hip flexion and fist  BL  Pupils equal BL     Assessment/Plan:      * Osteomyelitis of vertebra of lumbosacral region  Osteomyelitis, discitis, & myositis of lumbosacral region  Septic arthritis of SI joint(s)  Epidural abscesses   Multiple abscesses of pelvis, sacrum, psoas, iliacus, & paraspinals  Lower back pain w/ b/l sciatica   Severe spinal canal stenosis   MRI: osteomyelitis/discitis of L5-S1, osteomyelitis of sacrum & L iliac bone, probable septic arthritis of L SI joint & lower lumbar spine facet joints, worsening anterolisthesis of L5 with respect to S1 with increased size of epidural abscess at the level of L5-S1 and probable severe central canal stenosis at this level, along w/ additional abscesses in the L iliopsoas muscle, paraspinal muscles, pelvis, & L sacrum.    - Underwent  2/14/24 epidural abscess/phlegmon debridement and LOOP X FUSION, SPINE, LUMBAR w/   - Left  sacroiliac joint aspiration  on 2/7 by IR growing MRSA  - Ortho consulted for mgmt of septic SI joint; appreciate recs/assistance  - MRSA growing from multiple sites;  --> switched to daptomycin + cefepime 8 week course from surg date per ID; EOC 4/10/24     L5-S2 epidural phlegmon  See above; intra-op cx's growing MRSA      Housing insecurity  Previously worked at [employer] & held down job in the past but started using heroin last year following multiple family deaths (mother, aunt & grandmother) in a short period of time. Subsequently lost housing & has been living on streets since then.   - SW/CM    Substance or medication-induced depressive disorder  Pt endorses sx of depression, no SI/SA.   -per psych, mirtazapine 15mg QHS    Spinal stenosis of lumbar region without neurogenic claudication  See osteomyelitis      IVDU (intravenous drug user)  Heroin abuse w/ opioid withdrawal   PSDU  Tobacco use   Known hx of IVDU (heroin) w/ extensive assoc medical complications (discussed above). No recent UDS (only one on file from July 2023- positive for opiates & THC) and none collected on admission. Endorses daily IV heroin use (1g/day); last used just prior to calling EMS. Active tobacco use, but denies EtOH or other illicit substance use. Endorsing sx of opioid w/d.   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,   - fluid restriction; protein intake encouraged      VTE Risk Mitigation (From admission, onward)           Ordered     enoxaparin injection 40 mg  Every 24 hours         02/14/24 8481     Place sequential compression device  Until discontinued          02/05/24 0126     IP VTE HIGH RISK PATIENT  Once         02/05/24 0126                    Discharge Planning   SENA: 2/21/2024     Code Status: Full Code   Is the patient medically ready for discharge?: Yes    Reason for patient still in hospital (select all that apply): Pending disposition  Discharge Plan A: Long-term acute care facility (LTAC)   Discharge Delays: None known at this time              Reese Schaefer MD  Department of Hospital Medicine   [facility] ([facility])

## 2024-02-21 NOTE — ASSESSMENT & PLAN NOTE
Osteomyelitis, discitis, & myositis of lumbosacral region  Septic arthritis of SI joint(s)  Epidural abscesses   Multiple abscesses of pelvis, sacrum, psoas, iliacus, & paraspinals  Lower back pain w/ b/l sciatica   Severe spinal canal stenosis   MRI: osteomyelitis/discitis of L5-S1, osteomyelitis of sacrum & L iliac bone, probable septic arthritis of L SI joint & lower lumbar spine facet joints, worsening anterolisthesis of L5 with respect to S1 with increased size of epidural abscess at the level of L5-S1 and probable severe central canal stenosis at this level, along w/ additional abscesses in the L iliopsoas muscle, paraspinal muscles, pelvis, & L sacrum.    - Underwent  2/14/24 epidural abscess/phlegmon debridement and LOOP X FUSION, SPINE, LUMBAR w/   - Left sacroiliac joint aspiration  on 2/7 by IR growing MRSA  - Ortho consulted for mgmt of septic SI joint; appreciate recs/assistance  - MRSA growing from multiple sites;  --> switched to daptomycin + cefepime 8 week course from surg date per ID; EOC 4/10/24

## 2024-02-21 NOTE — PROGRESS NOTES
Aleksandr Bray - Neurosurgery ([facility])  Neurosurgery  Progress Note    Subjective:     History of Present Illness: 28M w/ hx MRSA bactermia, IVDU, pelvic/intra-abdominal abscesses who presents with progressive back pain with radiation down the leg over the past couple of weeks.  He has had bilateral numbness of his anterior thigh as well. He denies bowel/bladder sx or saddle anesthesia. He denies focal weakness. Per chart review, the patient is actively using heroin and did an injection prior to being taken in by EMS.      Post-Op Info:  Procedure(s) (LRB):  LOOP X FUSION, SPINE, LUMBAR (N/A)   7 Days Post-Op   Interval History: NAEON. Pain controlled. Incision c/d/I. Exam stable.    Medications:  Continuous Infusions:  Scheduled Meds:   acetaminophen  1,000 mg Oral Q8H    cloNIDine  0.1 mg Oral Q12H    DAPTOmycin (CUBICIN) IV (PEDS and ADULTS)  10 mg/kg Intravenous Q24H    enoxparin  40 mg Subcutaneous Q24H (prophylaxis, 1700)    methocarbamoL  1,000 mg Oral QID    mirtazapine  15 mg Oral QHS    nicotine  1 patch Transdermal Daily     PRN Meds:acetaminophen, dextrose 10%, dextrose 10%, dicyclomine, glucagon (human recombinant), glucose, glucose, HYDROmorphone, hydrOXYzine HCL, labetalol, loperamide, melatonin, naloxone, oxyCODONE, promethazine, sodium chloride 0.9%     Review of Systems  Objective:     Weight: 77 kg (169 lb 12.1 oz)  Body mass index is 24.36 kg/m².  Vital Signs (Most Recent):  Temp: 98.5 °F (36.9 °C) (02/21/24 0737)  Pulse: 76 (02/21/24 0737)  Resp: 18 (02/21/24 0737)  BP: (!) 111/59 (02/21/24 0737)  SpO2: 100 % (02/21/24 0737) Vital Signs (24h Range):  Temp:  [97.7 °F (36.5 °C)-98.6 °F (37 °C)] 98.5 °F (36.9 °C)  Pulse:  [76-91] 76  Resp:  [16-20] 18  SpO2:  [96 %-100 %] 100 %  BP: (109-129)/(57-62) 111/59          Closed/Suction Drain 02/14/24 6830 Tube - 1 Left;Posterior;Lateral Back Accordion 10 Fr. (Active)   Site Description Unable to view 02/19/24 0400   Dressing Type Transparent (Tegaderm)  "02/19/24 0400   Dressing Status Clean;Dry;Intact 02/19/24 0400   Dressing Intervention Integrity maintained 02/19/24 0400   Drainage Sanguineous 02/19/24 0400   Status Other (Comment) 02/16/24 0800   Output (mL) 20 mL 02/17/24 0618            Closed/Suction Drain 02/14/24 1730 Tube - 2 Right;Posterior;Lateral Back Accordion 10 Fr. (Active)   Site Description Unable to view 02/19/24 0400   Dressing Type Transparent (Tegaderm) 02/19/24 0400   Dressing Status Clean;Dry;Intact 02/19/24 0400   Dressing Intervention Integrity maintained 02/19/24 0400   Drainage Sanguineous 02/19/24 0400   Status Other (Comment) 02/14/24 2130   Output (mL) 35 mL 02/17/24 0618     Physical Exam  General: well developed, well nourished, no distress.   Head: normocephalic, atraumatic  Mental Status: Awake, Alert, Oriented  Speech: Clear with content appropriate to conversation  Cranial nerves: face symmetric, CN II-XII grossly intact.   Sensory: intact to light touch throughout    Motor Strength:    Strength  Deltoids Triceps Biceps Wrist Extension Wrist Flexion Hand    Upper: R 5/5 5/5 5/5 5/5 5/5 5/5    L 5/5 5/5 5/5 5/5 5/5 5/5     Iliopsoas Quadriceps Knee  Flexion Tibialis  anterior Gastro- cnemius EHL   Lower: R 4+/5 4+/5 4+/5 4+/5 4/5 4/5    L 4+/5 4+/5 4+/5 4/5 4/5 4/5     Incision clean, dry intact with staples. No surrounding erythema or edema.        Significant Labs:  Recent Labs   Lab 02/20/24  0414 02/21/24  0242    124*    138   K 4.2 4.2    103   CO2 27 25   BUN 18 19   CREATININE 0.8 0.7   CALCIUM 9.3 8.8       Recent Labs   Lab 02/20/24  0415 02/21/24  0242   WBC 6.43 5.57   HGB 7.7* 7.7*   HCT 25.7* 25.6*    367       No results for input(s): "LABPT", "INR", "APTT" in the last 48 hours.  Microbiology Results (last 7 days)       Procedure Component Value Units Date/Time    Aerobic culture [[id number]]  (Abnormal) Collected: 02/14/24 8653    Order Status: Completed Specimen: Incision site from " Back Updated: 02/21/24 0743     Aerobic Bacterial Culture STAPHYLOCOCCUS AUREUS  Few  Susceptibility pending      Narrative:      Epidural infection culture lumbar spine    Culture, Anaerobe [[id number]] Collected: 02/14/24 1712    Order Status: Completed Specimen: Incision site from Back Updated: 02/19/24 0959     Anaerobic Culture No anaerobes isolated    Narrative:      L5-S1 disc space    Culture, Anaerobe [[id number]] Collected: 02/14/24 1712    Order Status: Completed Specimen: Incision site from Back Updated: 02/19/24 0958     Anaerobic Culture No anaerobes isolated    Narrative:      Epidural infection culture lumbar spine    Aerobic culture [[id number]]  (Abnormal)  (Susceptibility) Collected: 02/14/24 1712    Order Status: Completed Specimen: Incision site from Back Updated: 02/16/24 1110     Aerobic Bacterial Culture METHICILLIN RESISTANT STAPHYLOCOCCUS AUREUS  Few      Narrative:      L5-S1 disc space    AFB Culture & Smear [[id number]] Collected: 02/14/24 1712    Order Status: Completed Specimen: Incision site from Back Updated: 02/15/24 2127     AFB Culture & Smear Culture in progress     AFB CULTURE STAIN No acid fast bacilli seen.    Narrative:      L5-S1 disc space    AFB Culture & Smear [[id number]] Collected: 02/14/24 1712    Order Status: Completed Specimen: Incision site from Back Updated: 02/15/24 2127     AFB Culture & Smear Culture in progress     AFB CULTURE STAIN No acid fast bacilli seen.    Narrative:      Epidural infection culture lumbar spine    Gram stain [[id number]] Collected: 02/14/24 1712    Order Status: Completed Specimen: Incision site from Back Updated: 02/14/24 1955     Gram Stain Result Rare WBC's      No organisms seen    Narrative:      Epidural infection culture lumbar spine    Gram stain [[id number]] Collected: 02/14/24 1712    Order Status: Completed Specimen: Incision site from Back Updated: 02/14/24 1952     Gram Stain Result Rare WBC's      No organisms seen     Narrative:      L5-S1 disc space    Fungus culture [[id number]] Collected: 02/14/24 1712    Order Status: Sent Specimen: Incision site from Back Updated: 02/14/24 1739    Fungus culture [[id number]] Collected: 02/14/24 1712    Order Status: Sent Specimen: Incision site from Back Updated: 02/14/24 1738    Culture, Anaerobe [[id number]] Collected: 02/14/24 1712    Order Status: Canceled Specimen: Incision site from Back     Aerobic culture [[id number]] Collected: 02/14/24 1712    Order Status: Canceled Specimen: Incision site from Back     Fungus culture [[id number]] Collected: 02/14/24 1712    Order Status: Canceled Specimen: Incision site from Back     AFB Culture & Smear [[id number]] Collected: 02/14/24 1712    Order Status: Canceled Specimen: Incision site from Back     Gram stain [[id number]] Collected: 02/14/24 1712    Order Status: Canceled Specimen: Incision site from Back     Culture, Anaerobe [[id number]] Collected: 02/14/24 1712    Order Status: Canceled Specimen: Incision site from Back     Aerobic culture [[id number]] Collected: 02/14/24 1712    Order Status: Canceled Specimen: Incision site from Back     AFB Culture & Smear [[id number]] Collected: 02/14/24 1712    Order Status: Canceled Specimen: Incision site from Back     Gram stain [[id number]] Collected: 02/14/24 1712    Order Status: Canceled Specimen: Incision site from Back     Fungus culture [[id number]] Collected: 02/14/24 1712    Order Status: Canceled Specimen: Incision site from Back     Aerobic culture [[id number]]  (Abnormal)  (Susceptibility) Collected: 02/07/24 1423    Order Status: Completed Specimen: Abscess from Back Updated: 02/14/24 1243     Aerobic Bacterial Culture METHICILLIN RESISTANT STAPHYLOCOCCUS AUREUS  Rare      Narrative:      L psoas          All pertinent labs from the last 24 hours have been reviewed.    Significant Diagnostics:  I have reviewed and interpreted all pertinent imaging results/findings within the past 24  hours.    Assessment/Plan:     * Osteomyelitis of vertebra of lumbosacral region  28M w/hx MRSA bactermia, IVDU, pelvic/intra-abdominal abscesses who presents with progressive back pain with radiation down the leg over the past couple of weeks.     - All pertinent labs and imaging reviewed  - 2/4 ESR 75, CRP 33.7. Blood cx NGTD.   - Back culture 2/7 with staph aureus  - MRI lumbar w/wo 2/4: L5-S1 osteodiscitis extending into the sacrum, left iliac bone, SI joint, facet joints. Anterolisthesis L5-S1, epidural abscess L5-S1 with canal stenosis. Left psoas and paraspinal muscle abscesses. No clinical evidence of cauda equina syndrome.   - CT lumbar obtained with destructive changes at L5-S1.    Now he is status post L4 to pelvis fusion  on 02/14/2024     Continue floor care with vital and neurocheck per protocol  Multimodal pain medications  Leave incision open to air. Ok to shower. No submerging until 6 weeks post-op. Remove staples/sutures 3/1 at LTAC.  L spine xray satisfactory    Intraop cultures grew staph. ID rec Daptomycin through 4/10/24  LVX for DVT prophylaxis  PTOT  LSO brace when out of bed  SW/CM for discharge planning and DME    Dispo: Pending LTAC, stable from NSGY standpoint for discharge        Joselyn Ventura PA-C  Neurosurgery  [facility] Bray - Neurosurgery ([facility])

## 2024-02-21 NOTE — SUBJECTIVE & OBJECTIVE
Interval History:  no CP; no SOB. Afebrile. No LE pain/swelling reported     Review of Systems  Objective:     Vital Signs (Most Recent):  Temp: 98.5 °F (36.9 °C) (02/21/24 0737)  Pulse: 76 (02/21/24 0737)  Resp: 18 (02/21/24 1012)  BP: (!) 111/59 (02/21/24 0737)  SpO2: 100 % (02/21/24 0737) Vital Signs (24h Range):  Temp:  [97.7 °F (36.5 °C)-98.6 °F (37 °C)] 98.5 °F (36.9 °C)  Pulse:  [76-91] 76  Resp:  [16-20] 18  SpO2:  [96 %-100 %] 100 %  BP: (109-129)/(57-62) 111/59     Weight: 77 kg (169 lb 12.1 oz)  Body mass index is 24.36 kg/m².    Intake/Output Summary (Last 24 hours) at 2/21/2024 1033  Last data filed at 2/20/2024 2209  Gross per 24 hour   Intake --   Output 480 ml   Net -480 ml         Physical Exam      Clear lungs BL, unlabored breathing, on RA, no cyanosis  Hrt sounds RRR  AA, NAD  No LE edema  5/5 hip flexion and fist  BL  Pupils equal BL

## 2024-02-22 VITALS
DIASTOLIC BLOOD PRESSURE: 57 MMHG | TEMPERATURE: 99 F | WEIGHT: 169.75 LBS | BODY MASS INDEX: 24.3 KG/M2 | RESPIRATION RATE: 18 BRPM | HEIGHT: 70 IN | OXYGEN SATURATION: 100 % | SYSTOLIC BLOOD PRESSURE: 122 MMHG | HEART RATE: 84 BPM

## 2024-02-22 LAB
ANION GAP SERPL CALC-SCNC: 8 MMOL/L (ref 8–16)
BUN SERPL-MCNC: 21 MG/DL (ref 6–20)
CALCIUM SERPL-MCNC: 9.4 MG/DL (ref 8.7–10.5)
CHLORIDE SERPL-SCNC: 104 MMOL/L (ref 95–110)
CO2 SERPL-SCNC: 23 MMOL/L (ref 23–29)
CREAT SERPL-MCNC: 0.7 MG/DL (ref 0.5–1.4)
EST. GFR  (NO RACE VARIABLE): >60 ML/MIN/1.73 M^2
GLUCOSE SERPL-MCNC: 94 MG/DL (ref 70–110)
POTASSIUM SERPL-SCNC: 5 MMOL/L (ref 3.5–5.1)
SODIUM SERPL-SCNC: 135 MMOL/L (ref 136–145)

## 2024-02-22 PROCEDURE — 63600175 PHARM REV CODE 636 W HCPCS: Performed by: NURSE PRACTITIONER

## 2024-02-22 PROCEDURE — 63600175 PHARM REV CODE 636 W HCPCS: Performed by: HOSPITALIST

## 2024-02-22 PROCEDURE — 97116 GAIT TRAINING THERAPY: CPT

## 2024-02-22 PROCEDURE — 25000003 PHARM REV CODE 250: Performed by: HOSPITALIST

## 2024-02-22 PROCEDURE — 25000003 PHARM REV CODE 250: Performed by: STUDENT IN AN ORGANIZED HEALTH CARE EDUCATION/TRAINING PROGRAM

## 2024-02-22 PROCEDURE — C1751 CATH, INF, PER/CENT/MIDLINE: HCPCS

## 2024-02-22 PROCEDURE — A4216 STERILE WATER/SALINE, 10 ML: HCPCS | Performed by: EMERGENCY MEDICINE

## 2024-02-22 PROCEDURE — A4216 STERILE WATER/SALINE, 10 ML: HCPCS | Performed by: STUDENT IN AN ORGANIZED HEALTH CARE EDUCATION/TRAINING PROGRAM

## 2024-02-22 PROCEDURE — 99024 POSTOP FOLLOW-UP VISIT: CPT | Mod: ,,, | Performed by: PHYSICIAN ASSISTANT

## 2024-02-22 PROCEDURE — 36415 COLL VENOUS BLD VENIPUNCTURE: CPT | Performed by: STUDENT IN AN ORGANIZED HEALTH CARE EDUCATION/TRAINING PROGRAM

## 2024-02-22 PROCEDURE — 25000003 PHARM REV CODE 250: Performed by: EMERGENCY MEDICINE

## 2024-02-22 PROCEDURE — 36573 INSJ PICC RS&I 5 YR+: CPT

## 2024-02-22 PROCEDURE — 76937 US GUIDE VASCULAR ACCESS: CPT

## 2024-02-22 PROCEDURE — 80048 BASIC METABOLIC PNL TOTAL CA: CPT | Performed by: STUDENT IN AN ORGANIZED HEALTH CARE EDUCATION/TRAINING PROGRAM

## 2024-02-22 PROCEDURE — 25000003 PHARM REV CODE 250: Performed by: NURSE PRACTITIONER

## 2024-02-22 RX ORDER — METHOCARBAMOL 1000 MG/1
1000 TABLET, COATED ORAL 4 TIMES DAILY PRN
Start: 2024-02-22 | End: 2024-03-03

## 2024-02-22 RX ORDER — SODIUM CHLORIDE 0.9 % (FLUSH) 0.9 %
10 SYRINGE (ML) INJECTION
Status: DISCONTINUED | OUTPATIENT
Start: 2024-02-22 | End: 2024-02-22 | Stop reason: HOSPADM

## 2024-02-22 RX ORDER — OXYCODONE HYDROCHLORIDE 15 MG/1
15 TABLET ORAL EVERY 4 HOURS PRN
Refills: 0
Start: 2024-02-22

## 2024-02-22 RX ORDER — CLONIDINE HYDROCHLORIDE 0.1 MG/1
0.1 TABLET ORAL EVERY 12 HOURS
Qty: 60 TABLET | Refills: 11
Start: 2024-02-22 | End: 2025-02-21

## 2024-02-22 RX ORDER — MIRTAZAPINE 15 MG/1
15 TABLET, FILM COATED ORAL NIGHTLY
Qty: 30 TABLET | Refills: 11
Start: 2024-02-22 | End: 2025-02-21

## 2024-02-22 RX ORDER — ACETAMINOPHEN 325 MG/1
650 TABLET ORAL EVERY 4 HOURS PRN
Refills: 0
Start: 2024-02-22

## 2024-02-22 RX ORDER — SODIUM CHLORIDE 0.9 % (FLUSH) 0.9 %
10 SYRINGE (ML) INJECTION EVERY 6 HOURS
Status: DISCONTINUED | OUTPATIENT
Start: 2024-02-22 | End: 2024-02-22 | Stop reason: HOSPADM

## 2024-02-22 RX ADMIN — OXYCODONE 15 MG: 5 TABLET ORAL at 06:02

## 2024-02-22 RX ADMIN — HYDROMORPHONE HYDROCHLORIDE 1 MG: 1 INJECTION, SOLUTION INTRAMUSCULAR; INTRAVENOUS; SUBCUTANEOUS at 03:02

## 2024-02-22 RX ADMIN — DAPTOMYCIN 765 MG: 350 INJECTION, POWDER, LYOPHILIZED, FOR SOLUTION INTRAVENOUS at 03:02

## 2024-02-22 RX ADMIN — METHOCARBAMOL 1000 MG: 500 TABLET ORAL at 01:02

## 2024-02-22 RX ADMIN — Medication 10 ML: at 11:02

## 2024-02-22 RX ADMIN — ACETAMINOPHEN 1000 MG: 500 TABLET ORAL at 01:02

## 2024-02-22 RX ADMIN — METHOCARBAMOL 1000 MG: 500 TABLET ORAL at 08:02

## 2024-02-22 RX ADMIN — METHOCARBAMOL 1000 MG: 500 TABLET ORAL at 05:02

## 2024-02-22 RX ADMIN — OXYCODONE 15 MG: 5 TABLET ORAL at 02:02

## 2024-02-22 RX ADMIN — OXYCODONE 15 MG: 5 TABLET ORAL at 10:02

## 2024-02-22 RX ADMIN — ACETAMINOPHEN 1000 MG: 500 TABLET ORAL at 06:02

## 2024-02-22 RX ADMIN — HYDROMORPHONE HYDROCHLORIDE 1 MG: 1 INJECTION, SOLUTION INTRAMUSCULAR; INTRAVENOUS; SUBCUTANEOUS at 11:02

## 2024-02-22 RX ADMIN — Medication 10 ML: at 05:02

## 2024-02-22 RX ADMIN — CLONIDINE HYDROCHLORIDE 0.1 MG: 0.1 TABLET ORAL at 08:02

## 2024-02-22 NOTE — CONSULTS
SHANNAN placed a Double lumen PICC in the Right brachial vein for 8 weeks of IV abx, end date 4/10/24, 39 cm in length and 0 cm exposed.  Arm circumference 34 cm lot #[id number].

## 2024-02-22 NOTE — PROCEDURES
"Ton Donovan is a 28 y.o. male patient.    Temp: 97.8 °F (36.6 °C) (02/22/24 0815)  Pulse: 76 (02/22/24 0815)  Resp: 18 (02/22/24 1025)  BP: 122/61 (02/22/24 0815)  SpO2: 99 % (02/22/24 0815)  Weight: 77 kg (169 lb 12.1 oz) (02/15/24 0000)  Height: 5' 10" (177.8 cm) (02/15/24 0000)    PICC  Date/Time: 2/22/2024 10:52 AM  Performed by: Shelley Hooker RN  Assisting provider: Yazmin Ratliff RN  Consent Done: Yes  Time out: Immediately prior to procedure a time out was called to verify the correct patient, procedure, equipment, support staff and site/side marked as required  Indications: med administration and vascular access  Anesthesia: local infiltration  Local anesthetic: lidocaine 1% without epinephrine  Anesthetic Total (mL): 4  Description of findings: PICC line placement  Preparation: skin prepped with ChloraPrep  Skin prep agent dried: skin prep agent completely dried prior to procedure  Sterile barriers: all five maximum sterile barriers used - cap, mask, sterile gown, sterile gloves, and large sterile sheet  Hand hygiene: hand hygiene performed prior to central venous catheter insertion  Location details: right brachial  Catheter type: double lumen  Catheter size: 5 Fr  Catheter Length: 39cm    Ultrasound guidance: yes  Vessel Caliber: medium and patent, compressibility normal  Needle advanced into vessel with real time Ultrasound guidance.  Guidewire confirmed in vessel.  Image recorded and saved.  Sterile sheath used.  Number of attempts: 1  Post-procedure: blood return through all ports, chlorhexidine patch and sterile dressing applied  Technical procedures used: 3 CG  Specimens: No  Implants: No  Assessment: placement verified by x-ray  Complications: none          Name WESLY Ratliff RN  2/22/2024    "

## 2024-02-22 NOTE — PT/OT/SLP PROGRESS
Physical Therapy Treatment    Patient Name: Ton Donovan   MRN: [id number]    Recommendations:     Discharge Recommendations: High Intensity Therapy  Discharge Equipment Recommendations: walker, rolling, bedside commode, shower chair  Barriers to discharge: Increased level of assist, Inaccessible home, and Decreased caregiver support    Assessment:     Ton Donovan is a 28 y.o. male admitted with a medical diagnosis of Osteomyelitis of vertebra of lumbosacral region. He presents with the following impairments/functional limitations: weakness, impaired endurance, impaired self care skills, impaired functional mobility, gait instability, impaired balance, decreased lower extremity function, decreased safety awareness, pain, impaired coordination, orthopedic precautions. Pt with good tolerance to therapy session on this date. Pt verbalizing happiness with progress made with functional independence and improved ambulation stability. Pt heavily educated on importance of LSO wear schedule, spinal precautions, and calling staff for assistance with mobility. Pt continues to require increased assistance during ambulation trials to aid in dynamic postural stability 2/2 poor BLE clearance, poor RW management, and heavy UE reliance on RW. Pt would continue to benefit from skilled acute PT in order to address current deficits and progress functional mobility.     Rehab Prognosis: Good; patient continues to benefit from acute skilled PT services to address these deficits and reach maximum level of function.  Recent Surgery: Procedure(s) (LRB):  LOOP X FUSION, SPINE, LUMBAR (N/A) 8 Days Post-Op    Plan:     During this hospitalization, patient to be seen 4 x/week to address the identified rehab impairments via gait training, therapeutic activities, therapeutic exercises, neuromuscular re-education and progress toward the following goals:    Plan of Care Expires:  03/15/24    Subjective     Chief Complaint: None  "verbalized  Patient/Family Comments/Goals: "Can I walk by myself?"  Pain/Comfort:  Pain Rating 1: 0/10    Objective:     Communicated with RN prior to session. Patient found supine with  (No active lines) upon PT entry to room.     General Precautions: Standard, fall  Orthopedic Precautions: spinal precautions  Braces: LSO; donned at EOB    Functional Mobility:  Bed Mobility:  Verbal cues for sequencing and technique  Supine to Sit: stand by assistance  Sit to Supine: stand by assistance  Transfers:    Sit to Stand: contact guard assistance with rolling walker with cues for hand placement and foot placement  Gait: Patient ambulated 60' x 2 with rolling walker and contact guard assistance.  Patient demonstrates occasional unsteady gait, decreased step length, narrow base of support, decreased weight shift, decreased foot clearance, ambulates outside RUPINDER of RW, flexed posture, decreased samantha, inconsistent bilateral foot placement, and decreased bilateral knee stability.  Patient required cues for upright posture, gluteal activation, sequencing, rolling walker management, obstacle navigation, safe rolling walker usage, to ambulate within RUPINDER of RW, increased step size, and increased foot clearance.  All lines remained intact throughout ambulation trial, gait belt utilized.    AM-PAC 6 CLICK MOBILITY  Turning over in bed (including adjusting bedclothes, sheets and blankets)?: 3  Sitting down on and standing up from a chair with arms (e.g., wheelchair, bedside commode, etc.): 3  Moving from lying on back to sitting on the side of the bed?: 3  Moving to and from a bed to a chair (including a wheelchair)?: 3  Need to walk in hospital room?: 3  Climbing 3-5 steps with a railing?: 2  Basic Mobility Total Score: 17     Therapeutic Activities and Exercises:  Patient educated on role of acute care PT and PT POC, safety while in hospital including calling nurse for mobility, and call light usage  Pt educated on the effects of " bed rest and the importance of OOB activity. Pt encouraged to sit UIC majority of day as tolerated and continue daily ambulation/transfers with nursing assist. Pt verbalized understanding.  Pt educated on importance of maximal participation in therapy session in order to reduce negative effects of prolonged sedentary positioning.   Answered all questions within PT scope of practice and addressed functional mobility concerns.    Patient left supine with all lines intact, call button in reach, RN notified, and X-ray staff present.    GOALS:   Multidisciplinary Problems       Physical Therapy Goals          Problem: Physical Therapy    Goal Priority Disciplines Outcome Goal Variances Interventions   Physical Therapy Goal     PT, PT/OT Ongoing, Progressing     Description: Goals to be met by: 3/15/2024    Patient will increase functional independence with mobility by performin. Supine to sit with Stand-by Assistance - met 2024  Updated: modified independence  2. Sit to supine with Stand-by Assistance  3. Sit to stand transfer with Stand-by Assistance  4. Gait  x 100 feet with Contact Guard Assistance using LRAD.   5. Lower extremity exercise program x15 reps per handout, with independence                         Time Tracking:     PT Received On: 24  PT Start Time: 1129     PT Stop Time: 1145  PT Total Time (min): 16 min     Billable Minutes: Gait Training 16      Treatment Type: Treatment  PT/PTA: PT     Number of PTA visits since last PT visit: 0     2024

## 2024-02-22 NOTE — PLAN OF CARE
Problem: Adult Inpatient Plan of Care  Goal: Plan of Care Review  Outcome: Met  Goal: Patient-Specific Goal (Individualized)  Outcome: Met  Goal: Absence of Hospital-Acquired Illness or Injury  Outcome: Met  Goal: Optimal Comfort and Wellbeing  Outcome: Met  Goal: Readiness for Transition of Care  Outcome: Met     Problem: Impaired Wound Healing  Goal: Optimal Wound Healing  Outcome: Met     Problem: Skin Injury Risk Increased  Goal: Skin Health and Integrity  Outcome: Met     Problem: Infection  Goal: Absence of Infection Signs and Symptoms  Outcome: Met     Problem: Fall Injury Risk  Goal: Absence of Fall and Fall-Related Injury  Outcome: Met     Problem: Pain Acute  Goal: Acceptable Pain Control and Functional Ability  Outcome: Met     Problem: Skin or Soft Tissue Infection  Goal: Absence of Infection Signs and Symptoms  Outcome: Met

## 2024-02-22 NOTE — SUBJECTIVE & OBJECTIVE
Interval History: NAEON. Incision c/d/I. Exam stable.    Medications:  Continuous Infusions:  Scheduled Meds:   acetaminophen  1,000 mg Oral Q8H    cloNIDine  0.1 mg Oral Q12H    DAPTOmycin (CUBICIN) IV (PEDS and ADULTS)  10 mg/kg Intravenous Q24H    enoxparin  40 mg Subcutaneous Q24H (prophylaxis, 1700)    methocarbamoL  1,000 mg Oral QID    mirtazapine  15 mg Oral QHS    nicotine  1 patch Transdermal Daily    sodium chloride 0.9%  10 mL Intravenous Q6H     PRN Meds:acetaminophen, dextrose 10%, dextrose 10%, dicyclomine, glucagon (human recombinant), glucose, glucose, HYDROmorphone, hydrOXYzine HCL, labetalol, loperamide, melatonin, naloxone, oxyCODONE, promethazine, sodium chloride 0.9%, Flushing PICC/Midline Protocol **AND** sodium chloride 0.9% **AND** sodium chloride 0.9%     Review of Systems  Objective:     Weight: 77 kg (169 lb 12.1 oz)  Body mass index is 24.36 kg/m².  Vital Signs (Most Recent):  Temp: 98.7 °F (37.1 °C) (02/22/24 1125)  Pulse: 84 (02/22/24 1125)  Resp: 18 (02/22/24 1149)  BP: (!) 122/57 (02/22/24 1125)  SpO2: 100 % (02/22/24 1125) Vital Signs (24h Range):  Temp:  [97.2 °F (36.2 °C)-98.9 °F (37.2 °C)] 98.7 °F (37.1 °C)  Pulse:  [73-93] 84  Resp:  [16-20] 18  SpO2:  [97 %-100 %] 100 %  BP: (118-126)/(57-63) 122/57          Closed/Suction Drain 02/14/24 1730 Tube - 1 Left;Posterior;Lateral Back Accordion 10 Fr. (Active)   Site Description Unable to view 02/19/24 0400   Dressing Type Transparent (Tegaderm) 02/19/24 0400   Dressing Status Clean;Dry;Intact 02/19/24 0400   Dressing Intervention Integrity maintained 02/19/24 0400   Drainage Sanguineous 02/19/24 0400   Status Other (Comment) 02/16/24 0800   Output (mL) 20 mL 02/17/24 0618            Closed/Suction Drain 02/14/24 1730 Tube - 2 Right;Posterior;Lateral Back Accordion 10 Fr. (Active)   Site Description Unable to view 02/19/24 0400   Dressing Type Transparent (Tegaderm) 02/19/24 0400   Dressing Status Clean;Dry;Intact 02/19/24 0400  "  Dressing Intervention Integrity maintained 02/19/24 0400   Drainage Sanguineous 02/19/24 0400   Status Other (Comment) 02/14/24 2130   Output (mL) 35 mL 02/17/24 0618      Physical Exam  General: well developed, well nourished, no distress.   Head: normocephalic, atraumatic  Mental Status: Awake, Alert, Oriented  Speech: Clear with content appropriate to conversation  Cranial nerves: face symmetric, CN II-XII grossly intact.   Sensory: intact to light touch throughout    Motor Strength:    Strength  Deltoids Triceps Biceps Wrist Extension Wrist Flexion Hand    Upper: R 5/5 5/5 5/5 5/5 5/5 5/5    L 5/5 5/5 5/5 5/5 5/5 5/5     Iliopsoas Quadriceps Knee  Flexion Tibialis  anterior Gastro- cnemius EHL   Lower: R 4+/5 4+/5 4+/5 4+/5 4/5 4/5    L 4+/5 4+/5 4+/5 4/5 4/5 4/5     Incision clean, dry intact with staples. No surrounding erythema or edema.        Significant Labs:  Recent Labs   Lab 02/21/24  0242 02/22/24  0459   * 94    135*   K 4.2 5.0    104   CO2 25 23   BUN 19 21*   CREATININE 0.7 0.7   CALCIUM 8.8 9.4       Recent Labs   Lab 02/21/24  0242   WBC 5.57   HGB 7.7*   HCT 25.6*          No results for input(s): "LABPT", "INR", "APTT" in the last 48 hours.  Microbiology Results (last 7 days)       Procedure Component Value Units Date/Time    Aerobic culture [[id number]]  (Abnormal)  (Susceptibility) Collected: 02/14/24 1712    Order Status: Completed Specimen: Incision site from Back Updated: 02/21/24 1328     Aerobic Bacterial Culture METHICILLIN RESISTANT STAPHYLOCOCCUS AUREUS  Few      Narrative:      Epidural infection culture lumbar spine    Fungus culture [[id number]] Collected: 02/14/24 1712    Order Status: Completed Specimen: Incision site from Back Updated: 02/21/24 1037     Fungus (Mycology) Culture Culture in progress    Narrative:      L5-S1 disc space    Fungus culture [[id number]] Collected: 02/14/24 1712    Order Status: Completed Specimen: Incision site from " Back Updated: 02/21/24 1037     Fungus (Mycology) Culture Culture in progress    Narrative:      Epidural infection culture lumbar spine    Culture, Anaerobe [[id number]] Collected: 02/14/24 1712    Order Status: Completed Specimen: Incision site from Back Updated: 02/19/24 0959     Anaerobic Culture No anaerobes isolated    Narrative:      L5-S1 disc space    Culture, Anaerobe [[id number]] Collected: 02/14/24 1712    Order Status: Completed Specimen: Incision site from Back Updated: 02/19/24 0958     Anaerobic Culture No anaerobes isolated    Narrative:      Epidural infection culture lumbar spine    Aerobic culture [[id number]]  (Abnormal)  (Susceptibility) Collected: 02/14/24 1712    Order Status: Completed Specimen: Incision site from Back Updated: 02/16/24 1110     Aerobic Bacterial Culture METHICILLIN RESISTANT STAPHYLOCOCCUS AUREUS  Few      Narrative:      L5-S1 disc space    AFB Culture & Smear [[id number]] Collected: 02/14/24 1712    Order Status: Completed Specimen: Incision site from Back Updated: 02/15/24 2127     AFB Culture & Smear Culture in progress     AFB CULTURE STAIN No acid fast bacilli seen.    Narrative:      L5-S1 disc space    AFB Culture & Smear [[id number]] Collected: 02/14/24 1712    Order Status: Completed Specimen: Incision site from Back Updated: 02/15/24 2127     AFB Culture & Smear Culture in progress     AFB CULTURE STAIN No acid fast bacilli seen.    Narrative:      Epidural infection culture lumbar spine          All pertinent labs from the last 24 hours have been reviewed.    Significant Diagnostics:  I have reviewed and interpreted all pertinent imaging results/findings within the past 24 hours.

## 2024-02-22 NOTE — PLAN OF CARE
NAM rec'd voicemail from Odilia with [employer] that auth has been rec'd.  NAM sent secure chat to md requesting orders.  Picc may need to be inserted.

## 2024-02-22 NOTE — ASSESSMENT & PLAN NOTE
28M w/hx MRSA bactermia, IVDU, pelvic/intra-abdominal abscesses who presents with progressive back pain with radiation down the leg over the past couple of weeks.     - All pertinent labs and imaging reviewed  - 2/4 ESR 75, CRP 33.7. Blood cx NGTD.   - Back culture 2/7 with staph aureus  - MRI lumbar w/wo 2/4: L5-S1 osteodiscitis extending into the sacrum, left iliac bone, SI joint, facet joints. Anterolisthesis L5-S1, epidural abscess L5-S1 with canal stenosis. Left psoas and paraspinal muscle abscesses. No clinical evidence of cauda equina syndrome.   - CT lumbar obtained with destructive changes at L5-S1.    Now he is status post L4 to pelvis fusion  on 02/14/2024     Continue floor care with vital and neurocheck per protocol  Multimodal pain medications  Leave incision open to air. Ok to shower. No submerging until 6 weeks post-op. Remove staples/sutures 3/1 at [facility].  L spine xray satisfactory    Intraop cultures grew staph. ID rec Daptomycin through 4/10/24  LVX for DVT prophylaxis  PTOT  LSO brace when out of bed  SW/CM for discharge planning and DME    Dispo: D/c to LTAC, stable from NSGY standpoint for discharge

## 2024-02-22 NOTE — PLAN OF CARE
[facility] ([facility])  Discharge Final Note    Primary Care Provider: No, Primary Doctor    Expected Discharge Date: 2/22/2024    [facility]  [facility]  [facility] to provide wheelchair transportation.      Nurse to call report to [phone].  Wheelchair transportation requested for 2:30 which is not a guaranteed arrival time.    Future Appointments   Date Time Provider Department Center   3/8/2024  3:00 PM Lashawn Segovia APRN, ANP [facility] ID [address]   3/28/2024 10:45 AM [facility]   3/28/2024 11:30 AM Boy Pozo MD [facility] NEUROS8 [address]   4/10/2024  2:30 PM Elpidio Paredes PA-C [facility] ID [facility]        Final Discharge Note (most recent)       Final Note - 02/22/24 1328          Final Note    Assessment Type Final Discharge Note     Anticipated Discharge Disposition Long Term Acute Care        Post-Acute Status    Post-Acute Authorization Placement     Post-Acute Placement Status Set-up Complete/Auth obtained     Discharge Delays None known at this time                     Important Message from Medicare

## 2024-02-22 NOTE — PLAN OF CARE
[facility]    FACILITY TRANSFER ORDERS      Patient Name: Ton Donovan  YOB: 1995    PCP: No, Primary Doctor   PCP Address: None  PCP Phone Number: None  PCP Fax: None    Encounter Date: 02/22/2024    Admit to: LT[facility]    Vital Signs:  Routine    Diagnoses:   Active Hospital Problems    Diagnosis  POA    *Osteomyelitis of vertebra of lumbosacral region [M46.27]  Yes    Sacroiliitis [M46.1]  Yes    Debility [R53.81]  Yes     Weak therapy recc high intensive therapy      Discitis of lumbosacral region [M46.47]  Yes     See primary problem      Sensory loss [R20.0]  Yes      Also notes numbness to the plantar surface.   See primary problem      Pain [R52]  Yes     Lying flat in bed 2/2 severe back and radiating leg pain down the posterior aspect with standing/walking.   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]  Yes     Chronic    Acute bilateral low back pain with bilateral sciatica [M54.42, M54.41]  Yes    IVDU (intravenous drug user) [F19.90]  Yes    L5-S2 epidural phlegmon [G06.2]  Yes     See primary problem      Septic arthritis of sacroiliac joint [M46.58]  No    Chronic hyponatremia [E87.1]  Yes    Drug withdrawal [F19.939]  Yes    Heroin use disorder, severe [F11.20]  Yes      Resolved Hospital Problems    Diagnosis Date Resolved POA    Prediabetes [R73.03] 02/15/2024 Yes    Pelvic abscess in male [K65.1] 02/15/2024 Yes    Anemia of infection and chronic disease [B99.9, D63.8] 02/15/2024 Yes     Chronic    Cigarette nicotine dependence without complication [F17.210] 02/15/2024 Yes       Allergies:Review of patient's allergies indicates:  No Known Allergies    Diet: regular diet    Activities: Activity as tolerated    Goals of Care Treatment Preferences:  Code Status: Full Code      Labs:   1)  Infection: MRSA osteomyelitis of Lumbar/Sacral spine, s/p lumbar-pelvis fusion     2) Discharge Antibiotics:     Intravenous antibiotics:  Daptomycin 10 mg/kg IV q 24 hours.   Will need to be followed by suppressive oral doxycycline after completion of IV daptomycin        3) Therapy Duration:  8 weeks     Estimated end date of IV antibiotics: 04/10/24     4) Outpatient Weekly Labs:     Order the following labs to be drawn on Mondays:   CBC  CMP   CRP  CPK (when on Daptomycin)        5) Fax Lab Results to Infectious Diseases Provider: Vidal Segovia NP     [facility] Fax Number: [phone]    CONSULTS:    Physical Therapy to evaluate and treat. , Occupational Therapy to evaluate and treat., and  to evaluate for community resources/long-range planning.    MISCELLANEOUS CARE:  Routine Skin for Bedridden Patients: Apply moisture barrier cream to all skin folds and wet areas in perineal area daily and after baths and all bowel movements.    WOUND CARE ORDERS  Leave incision open to air. Ok to shower. No submerging until 6 weeks post-op. Remove staples/sutures 3/1 at [facility]     Medications: Review discharge medications with patient and family and provide education.      Current Discharge Medication List        START taking these medications    Details   acetaminophen (TYLENOL) 325 MG tablet Take 2 tablets (650 mg total) by mouth every 4 (four) hours as needed.  Refills: 0      cloNIDine (CATAPRES) 0.1 MG tablet Take 1 tablet (0.1 mg total) by mouth every 12 (twelve) hours.  Qty: 60 tablet, Refills: 11    Comments: .      methocarbamoL 1,000 mg Tab Take 1,000 mg by mouth 4 (four) times daily as needed.      mirtazapine (REMERON) 15 MG tablet Take 1 tablet (15 mg total) by mouth every evening.  Qty: 30 tablet, Refills: 11      oxyCODONE (ROXICODONE) 15 MG Tab Take 1 tablet (15 mg total) by mouth every 4 (four) hours as needed for Pain.  Refills: 0    Comments: Quantity prescribed more than 7 day supply? No       sodium chloride 0.9% SolP 50 mL with DAPTOmycin 500 mg SolR 767 mg Inject 767 mg into the vein once daily.             _________________________________  Nagi Angel MD  02/22/2024

## 2024-02-22 NOTE — PROGRESS NOTES
Aleksandr Bray - Neurosurgery ([facility])  Neurosurgery  Progress Note    Subjective:     History of Present Illness: 28M w/ hx MRSA bactermia, IVDU, pelvic/intra-abdominal abscesses who presents with progressive back pain with radiation down the leg over the past couple of weeks.  He has had bilateral numbness of his anterior thigh as well. He denies bowel/bladder sx or saddle anesthesia. He denies focal weakness. Per chart review, the patient is actively using heroin and did an injection prior to being taken in by EMS.      Post-Op Info:  Procedure(s) (LRB):  LOOP X FUSION, SPINE, LUMBAR (N/A)   8 Days Post-Op   Interval History: NAEON. Incision c/d/I. Exam stable.    Medications:  Continuous Infusions:  Scheduled Meds:   acetaminophen  1,000 mg Oral Q8H    cloNIDine  0.1 mg Oral Q12H    DAPTOmycin (CUBICIN) IV (PEDS and ADULTS)  10 mg/kg Intravenous Q24H    enoxparin  40 mg Subcutaneous Q24H (prophylaxis, 1700)    methocarbamoL  1,000 mg Oral QID    mirtazapine  15 mg Oral QHS    nicotine  1 patch Transdermal Daily    sodium chloride 0.9%  10 mL Intravenous Q6H     PRN Meds:acetaminophen, dextrose 10%, dextrose 10%, dicyclomine, glucagon (human recombinant), glucose, glucose, HYDROmorphone, hydrOXYzine HCL, labetalol, loperamide, melatonin, naloxone, oxyCODONE, promethazine, sodium chloride 0.9%, Flushing PICC/Midline Protocol **AND** sodium chloride 0.9% **AND** sodium chloride 0.9%     Review of Systems  Objective:     Weight: 77 kg (169 lb 12.1 oz)  Body mass index is 24.36 kg/m².  Vital Signs (Most Recent):  Temp: 98.7 °F (37.1 °C) (02/22/24 1125)  Pulse: 84 (02/22/24 1125)  Resp: 18 (02/22/24 1149)  BP: (!) 122/57 (02/22/24 1125)  SpO2: 100 % (02/22/24 1125) Vital Signs (24h Range):  Temp:  [97.2 °F (36.2 °C)-98.9 °F (37.2 °C)] 98.7 °F (37.1 °C)  Pulse:  [73-93] 84  Resp:  [16-20] 18  SpO2:  [97 %-100 %] 100 %  BP: (118-126)/(57-63) 122/57          Closed/Suction Drain 02/14/24 1730 Tube - 1 Left;Posterior;Lateral  "Back Accordion 10 Fr. (Active)   Site Description Unable to view 02/19/24 0400   Dressing Type Transparent (Tegaderm) 02/19/24 0400   Dressing Status Clean;Dry;Intact 02/19/24 0400   Dressing Intervention Integrity maintained 02/19/24 0400   Drainage Sanguineous 02/19/24 0400   Status Other (Comment) 02/16/24 0800   Output (mL) 20 mL 02/17/24 0618            Closed/Suction Drain 02/14/24 1730 Tube - 2 Right;Posterior;Lateral Back Accordion 10 Fr. (Active)   Site Description Unable to view 02/19/24 0400   Dressing Type Transparent (Tegaderm) 02/19/24 0400   Dressing Status Clean;Dry;Intact 02/19/24 0400   Dressing Intervention Integrity maintained 02/19/24 0400   Drainage Sanguineous 02/19/24 0400   Status Other (Comment) 02/14/24 2130   Output (mL) 35 mL 02/17/24 0618     Physical Exam  General: well developed, well nourished, no distress.   Head: normocephalic, atraumatic  Mental Status: Awake, Alert, Oriented  Speech: Clear with content appropriate to conversation  Cranial nerves: face symmetric, CN II-XII grossly intact.   Sensory: intact to light touch throughout    Motor Strength:    Strength  Deltoids Triceps Biceps Wrist Extension Wrist Flexion Hand    Upper: R 5/5 5/5 5/5 5/5 5/5 5/5    L 5/5 5/5 5/5 5/5 5/5 5/5     Iliopsoas Quadriceps Knee  Flexion Tibialis  anterior Gastro- cnemius EHL   Lower: R 4+/5 4+/5 4+/5 4+/5 4/5 4/5    L 4+/5 4+/5 4+/5 4/5 4/5 4/5     Incision clean, dry intact with staples. No surrounding erythema or edema.        Significant Labs:  Recent Labs   Lab 02/21/24  0242 02/22/24  0459   * 94    135*   K 4.2 5.0    104   CO2 25 23   BUN 19 21*   CREATININE 0.7 0.7   CALCIUM 8.8 9.4       Recent Labs   Lab 02/21/24  0242   WBC 5.57   HGB 7.7*   HCT 25.6*          No results for input(s): "LABPT", "INR", "APTT" in the last 48 hours.  Microbiology Results (last 7 days)       Procedure Component Value Units Date/Time    Aerobic culture [[id number]]  (Abnormal) "  (Susceptibility) Collected: 02/14/24 1712    Order Status: Completed Specimen: Incision site from Back Updated: 02/21/24 1328     Aerobic Bacterial Culture METHICILLIN RESISTANT STAPHYLOCOCCUS AUREUS  Few      Narrative:      Epidural infection culture lumbar spine    Fungus culture [[id number]] Collected: 02/14/24 1712    Order Status: Completed Specimen: Incision site from Back Updated: 02/21/24 1037     Fungus (Mycology) Culture Culture in progress    Narrative:      L5-S1 disc space    Fungus culture [[id number]] Collected: 02/14/24 1712    Order Status: Completed Specimen: Incision site from Back Updated: 02/21/24 1037     Fungus (Mycology) Culture Culture in progress    Narrative:      Epidural infection culture lumbar spine    Culture, Anaerobe [[id number]] Collected: 02/14/24 1712    Order Status: Completed Specimen: Incision site from Back Updated: 02/19/24 0959     Anaerobic Culture No anaerobes isolated    Narrative:      L5-S1 disc space    Culture, Anaerobe [[id number]] Collected: 02/14/24 1712    Order Status: Completed Specimen: Incision site from Back Updated: 02/19/24 0958     Anaerobic Culture No anaerobes isolated    Narrative:      Epidural infection culture lumbar spine    Aerobic culture [[id number]]  (Abnormal)  (Susceptibility) Collected: 02/14/24 1712    Order Status: Completed Specimen: Incision site from Back Updated: 02/16/24 1110     Aerobic Bacterial Culture METHICILLIN RESISTANT STAPHYLOCOCCUS AUREUS  Few      Narrative:      L5-S1 disc space    AFB Culture & Smear [[id number]] Collected: 02/14/24 1712    Order Status: Completed Specimen: Incision site from Back Updated: 02/15/24 2127     AFB Culture & Smear Culture in progress     AFB CULTURE STAIN No acid fast bacilli seen.    Narrative:      L5-S1 disc space    AFB Culture & Smear [[id number]] Collected: 02/14/24 1712    Order Status: Completed Specimen: Incision site from Back Updated: 02/15/24 2127     AFB Culture & Smear Culture  in progress     AFB CULTURE STAIN No acid fast bacilli seen.    Narrative:      Epidural infection culture lumbar spine          All pertinent labs from the last 24 hours have been reviewed.    Significant Diagnostics:  I have reviewed and interpreted all pertinent imaging results/findings within the past 24 hours.    Assessment/Plan:     * Osteomyelitis of vertebra of lumbosacral region  28M w/hx MRSA bactermia, IVDU, pelvic/intra-abdominal abscesses who presents with progressive back pain with radiation down the leg over the past couple of weeks.     - All pertinent labs and imaging reviewed  - 2/4 ESR 75, CRP 33.7. Blood cx NGTD.   - Back culture 2/7 with staph aureus  - MRI lumbar w/wo 2/4: L5-S1 osteodiscitis extending into the sacrum, left iliac bone, SI joint, facet joints. Anterolisthesis L5-S1, epidural abscess L5-S1 with canal stenosis. Left psoas and paraspinal muscle abscesses. No clinical evidence of cauda equina syndrome.   - CT lumbar obtained with destructive changes at L5-S1.    Now he is status post L4 to pelvis fusion  on 02/14/2024     Continue floor care with vital and neurocheck per protocol  Multimodal pain medications  Leave incision open to air. Ok to shower. No submerging until 6 weeks post-op. Remove staples/sutures 3/1 at LT[facility].  L spine xray satisfactory    Intraop cultures grew staph. ID rec Daptomycin through 4/10/24  LVX for DVT prophylaxis  PTOT  LSO brace when out of bed  SW/CM for discharge planning and DME    Dispo: D/c to LTAC, stable from NSGY standpoint for discharge        Joselyn Ventura PA-C  Neurosurgery  Aleksandr Bray - Neurosurgery ([facility])

## 2024-02-23 NOTE — PROGRESS NOTES
Previous nurses on day shift completed discharge requirements and prepared patient for discharge. Wheel chair transport here at this time to escort patient to [facility].

## 2024-02-25 NOTE — DISCHARGE SUMMARY
"Aleksandr Bray - Neurosurgery ([facility])  [facility] Medicine  Discharge Summary      Patient Name: Ton Donovan  MRN: [id number]  BOZENA: [id number]  Patient Class: IP- Inpatient  Admission Date: 2/4/2024  Hospital Length of Stay: 17 days  Discharge Date and Time: 2/22/24  Attending Physician: Emily att. providers found   Discharging Provider: Nagi Angel MD  Primary Care Provider: Emily, Primary Doctor  [facility] Medicine Team: [facility]OSP MED N Nagi Angel MD  Primary Care Team: [facility] HOSP MED N    HPI:   Ton Donovan is a 28-year-old male with PMHx of IVDU w/ assoc complications of MRSA bacteremia, multiple septic joints & abscesses, who presented 2/4/24 with progressive back pain with radiation down his legs b/l. Endorses bilateral numbness & neuropathic pain over the dorsum of his feet. This has been ongoing and is not necessarily a new problem, but has been worsening. Reports "pins & needles"/paresthesias down his legs that have been progressing, although laying on his stomach seems to provide significant relief. Admits to continued active IV heroin use; last used just prior to EMS arrival. Denies any fevers, chills, night sweats, cough, or shortness of breath. Denies any bowel or bladder incontinence/retention or saddle anesthesia.     Procedure(s) (LRB):  LOOP X FUSION, SPINE, LUMBAR (N/A)      Hospital Course:   On arrival, HDS & 0/4 SIRS. MRI showed osteomyelitis/discitis of L5-S1, osteomyelitis of sacrum & L iliac bone, probable septic arthritis of L SI joint & lower lumbar spine facet joints, worsening anterolisthesis of L5 with respect to S1 with increased size of epidural abscess at the level of L5-S1 and probable severe central canal stenosis at this level, along w/ additional abscesses in the L iliopsoas muscle, paraspinal muscles, pelvis, & L sacrum. Received single dose of Zosyn in ED on arrival, but given that pt HDS w/o sepsis, further abx held to optimize intra-op cx/pathology. Admitted to " hospital medicine for further mgmt of osteomyelitis, septic joints & abscesses.    Addiction medicine, along w/ NSGY, orthopedic surgery, IR & ID consulted. Underwent aspiration of psoas abscess per IR on 2/7. Aspiration of SI joint attempted but unable to aspirate hardly any fluid. Underwent LOOP X FUSION, SPINE, LUMBAR and epidural phlegmon debridement, intra-op cx's growing MRSA. Echo unremarkable. Antibiotics per ID. Plan for discharge to LTAC to complete ABX. Patient deemed ready for discharge. Plan discussed with pt, who was agreeable and amenable; medications were discussed and reviewed, outpatient follow-up arranged, ER precautions were given, all questions were answered to the pt's satisfaction, and Ton Donovan  was subsequently discharged.       Goals of Care Treatment Preferences:  Code Status: Full Code      Consults:   Consults (From admission, onward)          Status Ordering Provider     Inpatient consult to PICC team ([facility])  Once        Provider:  (Not yet assigned)    Completed JESS DRAPER     Inpatient consult to Orthopedics  Once        Provider:  (Not yet assigned)    Completed SUZANNE KWON     Inpatient consult to Interventional Radiology  Once        Provider:  (Not yet assigned)    Completed SUZANNE KWON     Inpatient consult to Infectious Diseases  Once        Provider:  (Not yet assigned)    Completed SUZANNE KWON     Inpatient consult to Midline team  Once        Provider:  (Not yet assigned)    Completed SUZANNE KWON     Inpatient consult to Psychiatry  Once        Provider:  (Not yet assigned)    Completed SUZANNE KWON     Inpatient consult to Neurosurgery  Once        Provider:  (Not yet assigned)    Completed CLYDE MELCHOR            ID  * Osteomyelitis of vertebra of lumbosacral region  Osteomyelitis, discitis, & myositis of lumbosacral region  Septic arthritis of SI joint(s)  Epidural abscesses   Multiple abscesses of pelvis, sacrum, psoas, iliacus, &  paraspinals  Lower back pain w/ b/l sciatica   Severe spinal canal stenosis   MRI: osteomyelitis/discitis of L5-S1, osteomyelitis of sacrum & L iliac bone, probable septic arthritis of L SI joint & lower lumbar spine facet joints, worsening anterolisthesis of L5 with respect to S1 with increased size of epidural abscess at the level of L5-S1 and probable severe central canal stenosis at this level, along w/ additional abscesses in the L iliopsoas muscle, paraspinal muscles, pelvis, & L sacrum.    - Underwent  2/14/24 epidural abscess/phlegmon debridement and LOOP X FUSION, SPINE, LUMBAR w/   - Left sacroiliac joint aspiration  on 2/7 by IR growing MRSA  - Ortho consulted for mgmt of septic SI joint; appreciate recs/assistance  - MRSA growing from multiple sites;  --> switched to daptomycin + cefepime 8 week course from surg date per ID; EOC 4/10/24       Final Active Diagnoses:    Diagnosis Date Noted POA    PRINCIPAL PROBLEM:  Osteomyelitis of vertebra of lumbosacral region [M46.27] 02/05/2024 Yes    Sacroiliitis [M46.1] 02/07/2024 Yes    Debility [R53.81] 02/07/2024 Yes    Discitis of lumbosacral region [M46.47] 02/07/2024 Yes    Sensory loss [R20.0] 02/07/2024 Yes    Pain [R52] 02/07/2024 Yes    Housing insecurity [Z59.819] 02/06/2024 Not Applicable    Spinal stenosis of lumbar region without neurogenic claudication [M48.061] 02/05/2024 Yes    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] 02/05/2024 Yes     Chronic    Acute bilateral low back pain with bilateral sciatica [M54.42, M54.41] 02/05/2024 Yes    IVDU (intravenous drug user) [F19.90] 11/21/2023 Yes    L5-S2 epidural phlegmon [G06.2] 11/21/2023 Yes    Septic arthritis of sacroiliac joint [M46.58] 11/20/2023 No    Chronic hyponatremia [E87.1] 10/11/2023 Yes    Drug withdrawal [F19.939] 10/11/2023 Yes    Heroin use disorder, severe [F11.20] 07/06/2023 Yes      Problems Resolved During this Admission:    Diagnosis Date Noted Date Resolved POA     Prediabetes [R73.03] 02/06/2024 02/15/2024 Yes    Pelvic abscess in male [K65.1] 02/05/2024 02/15/2024 Yes    Anemia of infection and chronic disease [B99.9, D63.8] 10/11/2023 02/15/2024 Yes     Chronic    Cigarette nicotine dependence without complication [F17.210] 07/07/2023 02/15/2024 Yes       Discharged Condition: good    Disposition: Long Term Acute Care    Follow Up:    Patient Instructions:   No discharge procedures on file.    Significant Diagnostic Studies: N/A    Pending Diagnostic Studies:       None           Medications:  Reconciled Home Medications:      Medication List        START taking these medications      acetaminophen 325 MG tablet  Commonly known as: TYLENOL  Take 2 tablets (650 mg total) by mouth every 4 (four) hours as needed.     cloNIDine 0.1 MG tablet  Commonly known as: CATAPRES  Take 1 tablet (0.1 mg total) by mouth every 12 (twelve) hours.     methocarbamoL 1,000 mg Tab  Take 1,000 mg by mouth 4 (four) times daily as needed.     mirtazapine 15 MG tablet  Commonly known as: REMERON  Take 1 tablet (15 mg total) by mouth every evening.     oxyCODONE 15 MG Tab  Commonly known as: ROXICODONE  Take 1 tablet (15 mg total) by mouth every 4 (four) hours as needed for Pain.     sodium chloride 0.9% SolP 50 mL with DAPTOmycin 500 mg SolR 767 mg  Inject 767 mg into the vein once daily.            CONTINUE taking these medications      IMMUNE SUPPORT ORAL  Take 1 tablet by mouth once daily.     naloxone 4 mg/actuation Spry  Commonly known as: NARCAN  4 mg by Nasal route.              Indwelling Lines/Drains at time of discharge:   Lines/Drains/Airways       Peripherally Inserted Central Catheter Line  Duration             PICC Double Lumen 02/22/24 1057 right brachial 3 days                    Time spent on the discharge of patient: 35 minutes         Nagi Angel MD  Department of Hospital Medicine  [facility] - Neurosurgery ([facility])

## 2024-03-01 DIAGNOSIS — K68.12 PSOAS ABSCESS, LEFT: Primary | ICD-10-CM

## 2024-03-01 DIAGNOSIS — G06.2 EPIDURAL ABSCESS: ICD-10-CM

## 2024-03-11 LAB
FUNGUS SPEC CULT: NORMAL
FUNGUS SPEC CULT: NORMAL

## 2024-03-12 ENCOUNTER — HOSPITAL ENCOUNTER (EMERGENCY)
Facility: HOSPITAL | Age: 29
Discharge: ANOTHER HOSPITAL | End: 2024-03-13
Attending: EMERGENCY MEDICINE
Payer: MEDICAID

## 2024-03-12 DIAGNOSIS — M46.40 DISCITIS, UNSPECIFIED SPINAL REGION: ICD-10-CM

## 2024-03-12 DIAGNOSIS — A41.9 SEPSIS: ICD-10-CM

## 2024-03-12 DIAGNOSIS — M60.09 INFECTIVE MYOSITIS OF MULTIPLE SITES: ICD-10-CM

## 2024-03-12 DIAGNOSIS — M46.20 SPINAL ABSCESS: Primary | ICD-10-CM

## 2024-03-12 LAB
ALBUMIN SERPL BCP-MCNC: 3.2 G/DL (ref 3.5–5.2)
ALP SERPL-CCNC: 217 U/L (ref 55–135)
ALT SERPL W/O P-5'-P-CCNC: 208 U/L (ref 10–44)
ANION GAP SERPL CALC-SCNC: 9 MMOL/L (ref 8–16)
APAP SERPL-MCNC: <3 UG/ML (ref 10–20)
APTT PPP: 28.2 SEC (ref 21–32)
AST SERPL-CCNC: 364 U/L (ref 10–40)
BASOPHILS # BLD AUTO: 0.03 K/UL (ref 0–0.2)
BASOPHILS NFR BLD: 0.3 % (ref 0–1.9)
BILIRUB SERPL-MCNC: 0.6 MG/DL (ref 0.1–1)
BILIRUB UR QL STRIP: NEGATIVE
BUN SERPL-MCNC: 12 MG/DL (ref 6–20)
CALCIUM SERPL-MCNC: 9.4 MG/DL (ref 8.7–10.5)
CHLORIDE SERPL-SCNC: 101 MMOL/L (ref 95–110)
CLARITY UR: CLEAR
CO2 SERPL-SCNC: 24 MMOL/L (ref 23–29)
COLOR UR: YELLOW
CREAT SERPL-MCNC: 0.8 MG/DL (ref 0.5–1.4)
DIFFERENTIAL METHOD BLD: ABNORMAL
EOSINOPHIL # BLD AUTO: 0.1 K/UL (ref 0–0.5)
EOSINOPHIL NFR BLD: 0.5 % (ref 0–8)
ERYTHROCYTE [DISTWIDTH] IN BLOOD BY AUTOMATED COUNT: 20.6 % (ref 11.5–14.5)
ERYTHROCYTE [SEDIMENTATION RATE] IN BLOOD BY WESTERGREN METHOD: 59 MM/HR (ref 0–10)
EST. GFR  (NO RACE VARIABLE): >60 ML/MIN/1.73 M^2
GLUCOSE SERPL-MCNC: 93 MG/DL (ref 70–110)
GLUCOSE UR QL STRIP: NEGATIVE
HCT VFR BLD AUTO: 27.8 % (ref 40–54)
HGB BLD-MCNC: 8.5 G/DL (ref 14–18)
HGB UR QL STRIP: NEGATIVE
IMM GRANULOCYTES # BLD AUTO: 0.07 K/UL (ref 0–0.04)
IMM GRANULOCYTES NFR BLD AUTO: 0.7 % (ref 0–0.5)
INR PPP: 1.2 (ref 0.8–1.2)
KETONES UR QL STRIP: NEGATIVE
LACTATE SERPL-SCNC: 1 MMOL/L (ref 0.5–2.2)
LACTATE SERPL-SCNC: 1.1 MMOL/L (ref 0.5–2.2)
LEUKOCYTE ESTERASE UR QL STRIP: NEGATIVE
LYMPHOCYTES # BLD AUTO: 2.4 K/UL (ref 1–4.8)
LYMPHOCYTES NFR BLD: 25.2 % (ref 18–48)
MCH RBC QN AUTO: 22.1 PG (ref 27–31)
MCHC RBC AUTO-ENTMCNC: 30.6 G/DL (ref 32–36)
MCV RBC AUTO: 72 FL (ref 82–98)
MONOCYTES # BLD AUTO: 0.9 K/UL (ref 0.3–1)
MONOCYTES NFR BLD: 9.5 % (ref 4–15)
NEUTROPHILS # BLD AUTO: 6.1 K/UL (ref 1.8–7.7)
NEUTROPHILS NFR BLD: 63.8 % (ref 38–73)
NITRITE UR QL STRIP: NEGATIVE
NRBC BLD-RTO: 0 /100 WBC
PH UR STRIP: 6 [PH] (ref 5–8)
PLATELET # BLD AUTO: 340 K/UL (ref 150–450)
PMV BLD AUTO: 8.8 FL (ref 9.2–12.9)
POTASSIUM SERPL-SCNC: 4.7 MMOL/L (ref 3.5–5.1)
PROCALCITONIN SERPL IA-MCNC: 1.18 NG/ML
PROT SERPL-MCNC: 8.5 G/DL (ref 6–8.4)
PROT UR QL STRIP: NEGATIVE
PROTHROMBIN TIME: 13.6 SEC (ref 9–12.5)
RBC # BLD AUTO: 3.84 M/UL (ref 4.6–6.2)
SODIUM SERPL-SCNC: 134 MMOL/L (ref 136–145)
SP GR UR STRIP: 1.01 (ref 1–1.03)
URN SPEC COLLECT METH UR: NORMAL
UROBILINOGEN UR STRIP-ACNC: NEGATIVE EU/DL
WBC # BLD AUTO: 9.62 K/UL (ref 3.9–12.7)

## 2024-03-12 PROCEDURE — 84145 PROCALCITONIN (PCT): CPT | Performed by: EMERGENCY MEDICINE

## 2024-03-12 PROCEDURE — 80053 COMPREHEN METABOLIC PANEL: CPT | Performed by: EMERGENCY MEDICINE

## 2024-03-12 PROCEDURE — 87040 BLOOD CULTURE FOR BACTERIA: CPT | Mod: 59 | Performed by: EMERGENCY MEDICINE

## 2024-03-12 PROCEDURE — 25000003 PHARM REV CODE 250: Performed by: EMERGENCY MEDICINE

## 2024-03-12 PROCEDURE — 96374 THER/PROPH/DIAG INJ IV PUSH: CPT

## 2024-03-12 PROCEDURE — 85730 THROMBOPLASTIN TIME PARTIAL: CPT | Performed by: EMERGENCY MEDICINE

## 2024-03-12 PROCEDURE — 80143 DRUG ASSAY ACETAMINOPHEN: CPT | Performed by: EMERGENCY MEDICINE

## 2024-03-12 PROCEDURE — 81003 URINALYSIS AUTO W/O SCOPE: CPT | Performed by: EMERGENCY MEDICINE

## 2024-03-12 PROCEDURE — 85651 RBC SED RATE NONAUTOMATED: CPT | Performed by: EMERGENCY MEDICINE

## 2024-03-12 PROCEDURE — 93005 ELECTROCARDIOGRAM TRACING: CPT

## 2024-03-12 PROCEDURE — 63600175 PHARM REV CODE 636 W HCPCS: Performed by: EMERGENCY MEDICINE

## 2024-03-12 PROCEDURE — 85610 PROTHROMBIN TIME: CPT | Performed by: EMERGENCY MEDICINE

## 2024-03-12 PROCEDURE — 93010 ELECTROCARDIOGRAM REPORT: CPT | Mod: ,,, | Performed by: INTERNAL MEDICINE

## 2024-03-12 PROCEDURE — 36415 COLL VENOUS BLD VENIPUNCTURE: CPT | Performed by: EMERGENCY MEDICINE

## 2024-03-12 PROCEDURE — 80074 ACUTE HEPATITIS PANEL: CPT | Performed by: EMERGENCY MEDICINE

## 2024-03-12 PROCEDURE — 80307 DRUG TEST PRSMV CHEM ANLYZR: CPT | Performed by: EMERGENCY MEDICINE

## 2024-03-12 PROCEDURE — 85025 COMPLETE CBC W/AUTO DIFF WBC: CPT | Performed by: EMERGENCY MEDICINE

## 2024-03-12 PROCEDURE — 83605 ASSAY OF LACTIC ACID: CPT | Mod: 91 | Performed by: EMERGENCY MEDICINE

## 2024-03-12 PROCEDURE — 99285 EMERGENCY DEPT VISIT HI MDM: CPT | Mod: 25

## 2024-03-12 RX ORDER — HYDROMORPHONE HYDROCHLORIDE 2 MG/ML
1 INJECTION, SOLUTION INTRAMUSCULAR; INTRAVENOUS; SUBCUTANEOUS ONCE
Status: DISCONTINUED | OUTPATIENT
Start: 2024-03-12 | End: 2024-03-12

## 2024-03-12 RX ORDER — OXYCODONE HYDROCHLORIDE 5 MG/1
10 TABLET ORAL ONCE
Status: COMPLETED | OUTPATIENT
Start: 2024-03-12 | End: 2024-03-12

## 2024-03-12 RX ORDER — HYDROMORPHONE HYDROCHLORIDE 2 MG/ML
1 INJECTION, SOLUTION INTRAMUSCULAR; INTRAVENOUS; SUBCUTANEOUS
Status: COMPLETED | OUTPATIENT
Start: 2024-03-12 | End: 2024-03-13

## 2024-03-12 RX ORDER — HYDROMORPHONE HYDROCHLORIDE 2 MG/ML
1 INJECTION, SOLUTION INTRAMUSCULAR; INTRAVENOUS; SUBCUTANEOUS ONCE AS NEEDED
Status: COMPLETED | OUTPATIENT
Start: 2024-03-12 | End: 2024-03-12

## 2024-03-12 RX ADMIN — OXYCODONE HYDROCHLORIDE 10 MG: 5 TABLET ORAL at 08:03

## 2024-03-12 RX ADMIN — HYDROMORPHONE HYDROCHLORIDE 1 MG: 2 INJECTION INTRAMUSCULAR; INTRAVENOUS; SUBCUTANEOUS at 10:03

## 2024-03-12 NOTE — ED PROVIDER NOTES
Emergency Medicine Provider Note - 3/12/2024    SCRIBE #1 NOTE: I, Deng Marino Jr., am scribing for, and in the presence of, Maricruz Banks DO. I have scribed the following: HPI, ROS, and PE.     SCRIBE #2 NOTE: I, Kimberley Deluna, am scribing for, and in the presence of,  Marlo Johnson MD. I have scribed the remaining portions of the note not scribed by Scribe #1.      History     Chief Complaint   Patient presents with    Fever     Sent from [facility] for a fever. He is being treated for spinal abscesses with IV ABX. Facility reports that he spiked a fever today of 101.4. Dr. Guardados another septic workup and an MRI. He was given pain meds and Tylenol PTA       Allergies:  Review of patient's allergies indicates:  No Known Allergies     History of Present Illness   HPI    3/12/2024, 4:36 PM  The history is provided by the patient    Ton Donovan is a 28 y.o. male presenting to the ED for fever of 101.4 which onset gradually within the past day. Pt was recently d/c from after surgery on 2/22/24. Pt had surgery on back recently. Pt was sent from [facility]. Pt is being treated for spinal abscess with IV abx. Pt denies alcohol usage. Denies abd pain. Pt took pain medications and Tylenol PTA. Pt denies numbness, V/N/D, SOB, CP, and all other sxs at this time. Pt is anxious. Pt is on OxyContin 15 mg. Pt is on daptomycin and cefepime.      Arrival [facility]     PCP: No, Primary Doctor     Past Medical History:  Past Medical History:   Diagnosis Date    Hypertension     Unilateral inguinal hernia, without obstruction or gangrene, not specified as recurrent        Past Surgical History:  Past Surgical History:   Procedure Laterality Date    HAND ARTHROTOMY Right 7/7/2023    Procedure: ARTHROTOMY, HAND - R LF PIP;  Surgeon: Boy Lamb MD;  Location: [facility] OR [address] [place] [address];  Service: Orthopedics;  Laterality: Right;    INCISION AND DRAINAGE OF ABSCESS Right 10/12/2023    Procedure: INCISION AND  DRAINAGE, ABSCESS;  Surgeon: Steve Monteiro MD;  Location: [address];  Service: General;  Laterality: Right;  right chest    IRRIGATION AND DEBRIDEMENT OF UPPER EXTREMITY Bilateral 7/7/2023    Procedure: IRRIGATION AND DEBRIDEMENT, UPPER EXTREMITY - RIGHT HAND;  Surgeon: Boy Lamb MD;  Location: [address];  Service: Orthopedics;  Laterality: Bilateral;    LUMBAR FUSION N/A 2/14/2024    Procedure: LOOP X FUSION, SPINE, LUMBAR;  Surgeon: Boy Pozo MD;  Location: [address];  Service: Neurosurgery;  Laterality: N/A;  L4-Pelvis fusion; *LOOP-X* depuy, neuromonitoring    OLECRANON BURSECTOMY Left 7/7/2023    Procedure: BURSECTOMY, OLECRANON;  Surgeon: Boy Lamb MD;  Location: [address];  Service: Orthopedics;  Laterality: Left;         Family History:  No family history on file.    Social History:  Social History     Tobacco Use    Smoking status: Former     Types: Cigarettes    Smokeless tobacco: Never   Substance and Sexual Activity    Alcohol use: No    Drug use: Yes     Types: Marijuana, IV     Comment: 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    Sexual activity: Not on file        Review of Systems     Review of Systems   Constitutional:  Negative for chills and fever.   HENT:  Negative for congestion and sore throat.    Respiratory:  Negative for shortness of breath.    Cardiovascular:  Negative for chest pain.   Gastrointestinal:  Negative for abdominal pain, nausea and vomiting.   Genitourinary:  Negative for dysuria.   Musculoskeletal:  Positive for back pain.   Skin:  Negative for rash.   Neurological:  Negative for dizziness, weakness, light-headedness, numbness and headaches.   Hematological:  Does not bruise/bleed easily.   All other systems reviewed and are negative.     Physical Exam     Initial Vitals   BP Pulse Resp Temp SpO2   03/12/24 1609 03/12/24 1609 03/12/24 1609 03/12/24 1610 03/12/24 1609   (!) 142/61 108 18 99.6 °F (37.6 °C) 100 %      MAP       --                  Physical Exam  Nursing Notes and Vital Signs Reviewed.  Constitutional: Patient is in no acute distress. Well-developed and well-nourished.  Head: Atraumatic. Normocephalic.  Eyes: PERRL. EOM intact. Conjunctivae are not pale. No scleral icterus.  ENT: Mucous membranes are moist. Oropharynx is clear and symmetric.    Neck: Supple. Full ROM. No lymphadenopathy.  Cardiovascular: Regular rate. Regular rhythm. No murmurs, rubs, or gallops. Distal pulses are 2+ and symmetric. PICC line to LUE.  Pulmonary/Chest: No respiratory distress. Clear to auscultation bilaterally. No wheezing or rales.  Abdominal: Soft and non-distended.  There is no tenderness.  No rebound, guarding, or rigidity. Good bowel sounds.  Genitourinary: No CVA tenderness  Musculoskeletal: Moves all extremities. No obvious deformities. No edema. No calf tenderness.  Skin: Warm and dry.  Neurological:  Alert, awake, and appropriate.  Normal speech.  No acute focal neurological deficits are appreciated. Cranial nerves II-XII intact. Sensations intact.  Psychiatric: Normal affect. Good eye contact. Appropriate in content.     ED Course   ED Procedures:  Critical Care    Date/Time: 3/13/2024 3:21 AM    Performed by: Marlo Johnson Jr., MD  Authorized by: Marlo Johnson Jr., MD  Direct patient critical care time: 18 minutes  Additional history critical care time: 6 minutes  Ordering / reviewing critical care time: 12 minutes  Documentation critical care time: 7 minutes  Consulting other physicians critical care time: 13 minutes  Total critical care time (exclusive of procedural time) : 56 minutes  Critical care time was exclusive of separately billable procedures and treating other patients and teaching time.  Critical care was necessary to treat or prevent imminent or life-threatening deterioration of the following conditions: CNS failure or compromise (spinal abscess).  Critical care was time spent personally by me on the following  activities: blood draw for specimens, development of treatment plan with patient or surrogate, discussions with consultants, interpretation of cardiac output measurements, evaluation of patient's response to treatment, examination of patient, obtaining history from patient or surrogate, ordering and performing treatments and interventions, ordering and review of laboratory studies, ordering and review of radiographic studies, pulse oximetry, re-evaluation of patient's condition and review of old charts.          ED Vital Signs:  Vitals:    03/12/24 2202 03/12/24 2217 03/12/24 2231 03/12/24 2241   BP: 123/62 (!) 113/57 (!) 102/52    Pulse: 79 70 71    Resp: 14 12 15 18   Temp:       TempSrc:       SpO2: 100% 99% 99%    Weight:        03/12/24 2249 03/13/24 0107 03/13/24 0108 03/13/24 0109   BP: (!) 112/58 (!) 115/52     Pulse: 73 60 (!) 58    Resp:    18   Temp:       TempSrc:       SpO2: 97%  100%    Weight:        03/13/24 0117 03/13/24 0131 03/13/24 0147 03/13/24 0202   BP: (!) 113/57 (!) 113/56 (!) 113/54 (!) 117/56   Pulse: 64 63 (!) 58 63   Resp:       Temp:       TempSrc:       SpO2: 97% 96% 98% 97%   Weight:        03/13/24 0247 03/13/24 0317 03/13/24 0430   BP: (!) 115/54 (!) 107/54    Pulse: (!) 59 (!) 50    Resp:   18   Temp:      TempSrc:      SpO2: 97% 100%    Weight:          Abnormal Lab Results:  Labs Reviewed   CBC W/ AUTO DIFFERENTIAL - Abnormal; Notable for the following components:       Result Value    RBC 3.84 (*)     Hemoglobin 8.5 (*)     Hematocrit 27.8 (*)     MCV 72 (*)     MCH 22.1 (*)     MCHC 30.6 (*)     RDW 20.6 (*)     MPV 8.8 (*)     Immature Granulocytes 0.7 (*)     Immature Grans (Abs) 0.07 (*)     All other components within normal limits   COMPREHENSIVE METABOLIC PANEL - Abnormal; Notable for the following components:    Sodium 134 (*)     Total Protein 8.5 (*)     Albumin 3.2 (*)     Alkaline Phosphatase 217 (*)      (*)      (*)     All other components within  normal limits   PROCALCITONIN - Abnormal; Notable for the following components:    Procalcitonin 1.18 (*)     All other components within normal limits   SEDIMENTATION RATE - Abnormal; Notable for the following components:    Sed Rate 59 (*)     All other components within normal limits   PROTIME-INR - Abnormal; Notable for the following components:    Prothrombin Time 13.6 (*)     All other components within normal limits   HEPATITIS PANEL, ACUTE - Abnormal; Notable for the following components:    Hepatitis C Ab Reactive (*)     All other components within normal limits   ACETAMINOPHEN LEVEL - Abnormal; Notable for the following components:    Acetaminophen (Tylenol), Serum <3.0 (*)     All other components within normal limits   CULTURE, BLOOD   CULTURE, BLOOD   LACTIC ACID, PLASMA   LACTIC ACID, PLASMA   URINALYSIS, REFLEX TO URINE CULTURE    Narrative:     Specimen Source->Urine   ACETAMINOPHEN LEVEL   APTT        All Lab Results:  Results for orders placed or performed during the hospital encounter of 03/12/24   CBC auto differential   Result Value Ref Range    WBC 9.62 3.90 - 12.70 K/uL    RBC 3.84 (L) 4.60 - 6.20 M/uL    Hemoglobin 8.5 (L) 14.0 - 18.0 g/dL    Hematocrit 27.8 (L) 40.0 - 54.0 %    MCV 72 (L) 82 - 98 fL    MCH 22.1 (L) 27.0 - 31.0 pg    MCHC 30.6 (L) 32.0 - 36.0 g/dL    RDW 20.6 (H) 11.5 - 14.5 %    Platelets 340 150 - 450 K/uL    MPV 8.8 (L) 9.2 - 12.9 fL    Immature Granulocytes 0.7 (H) 0.0 - 0.5 %    Gran # (ANC) 6.1 1.8 - 7.7 K/uL    Immature Grans (Abs) 0.07 (H) 0.00 - 0.04 K/uL    Lymph # 2.4 1.0 - 4.8 K/uL    Mono # 0.9 0.3 - 1.0 K/uL    Eos # 0.1 0.0 - 0.5 K/uL    Baso # 0.03 0.00 - 0.20 K/uL    nRBC 0 0 /100 WBC    Gran % 63.8 38.0 - 73.0 %    Lymph % 25.2 18.0 - 48.0 %    Mono % 9.5 4.0 - 15.0 %    Eosinophil % 0.5 0.0 - 8.0 %    Basophil % 0.3 0.0 - 1.9 %    Differential Method Automated    Comprehensive metabolic panel   Result Value Ref Range    Sodium 134 (L) 136 - 145 mmol/L     Potassium 4.7 3.5 - 5.1 mmol/L    Chloride 101 95 - 110 mmol/L    CO2 24 23 - 29 mmol/L    Glucose 93 70 - 110 mg/dL    BUN 12 6 - 20 mg/dL    Creatinine 0.8 0.5 - 1.4 mg/dL    Calcium 9.4 8.7 - 10.5 mg/dL    Total Protein 8.5 (H) 6.0 - 8.4 g/dL    Albumin 3.2 (L) 3.5 - 5.2 g/dL    Total Bilirubin 0.6 0.1 - 1.0 mg/dL    Alkaline Phosphatase 217 (H) 55 - 135 U/L     (H) 10 - 40 U/L     (H) 10 - 44 U/L    eGFR >60 >60 mL/min/1.73 m^2    Anion Gap 9 8 - 16 mmol/L   Lactic acid, plasma #1   Result Value Ref Range    Lactate (Lactic Acid) 1.0 0.5 - 2.2 mmol/L   Lactic acid, plasma #2   Result Value Ref Range    Lactate (Lactic Acid) 1.1 0.5 - 2.2 mmol/L   Urinalysis, Reflex to Urine Culture Urine, Clean Catch    Specimen: Urine   Result Value Ref Range    Specimen UA Urine, Clean Catch     Color, UA Yellow Yellow, Straw, Elida    Appearance, UA Clear Clear    pH, UA 6.0 5.0 - 8.0    Specific Gravity, UA 1.010 1.005 - 1.030    Protein, UA Negative Negative    Glucose, UA Negative Negative    Ketones, UA Negative Negative    Bilirubin (UA) Negative Negative    Occult Blood UA Negative Negative    Nitrite, UA Negative Negative    Urobilinogen, UA Negative <2.0 EU/dL    Leukocytes, UA Negative Negative   Procalcitonin   Result Value Ref Range    Procalcitonin 1.18 (H) <0.25 ng/mL   Sedimentation rate   Result Value Ref Range    Sed Rate 59 (H) 0 - 10 mm/Hr   Protime-INR   Result Value Ref Range    Prothrombin Time 13.6 (H) 9.0 - 12.5 sec    INR 1.2 0.8 - 1.2   APTT   Result Value Ref Range    aPTT 28.2 21.0 - 32.0 sec   Hepatitis panel, acute   Result Value Ref Range    Hepatitis B Surface Ag Non-reactive Non-reactive    Hep B C IgM Non-reactive Non-reactive    Hep A IgM Non-reactive Non-reactive    Hepatitis C Ab Reactive (A) Non-reactive   Acetaminophen Level   Result Value Ref Range    Acetaminophen (Tylenol), Serum <3.0 (L) 10.0 - 20.0 ug/mL       Reviewed Prior Labs:  Results for orders placed or performed  during the hospital encounter of 03/12/24   CBC auto differential   Result Value Ref Range    WBC 9.62 3.90 - 12.70 K/uL    RBC 3.84 (L) 4.60 - 6.20 M/uL    Hemoglobin 8.5 (L) 14.0 - 18.0 g/dL    Hematocrit 27.8 (L) 40.0 - 54.0 %    MCV 72 (L) 82 - 98 fL    MCH 22.1 (L) 27.0 - 31.0 pg    MCHC 30.6 (L) 32.0 - 36.0 g/dL    RDW 20.6 (H) 11.5 - 14.5 %    Platelets 340 150 - 450 K/uL    MPV 8.8 (L) 9.2 - 12.9 fL    Immature Granulocytes 0.7 (H) 0.0 - 0.5 %    Gran # (ANC) 6.1 1.8 - 7.7 K/uL    Immature Grans (Abs) 0.07 (H) 0.00 - 0.04 K/uL    Lymph # 2.4 1.0 - 4.8 K/uL    Mono # 0.9 0.3 - 1.0 K/uL    Eos # 0.1 0.0 - 0.5 K/uL    Baso # 0.03 0.00 - 0.20 K/uL    nRBC 0 0 /100 WBC    Gran % 63.8 38.0 - 73.0 %    Lymph % 25.2 18.0 - 48.0 %    Mono % 9.5 4.0 - 15.0 %    Eosinophil % 0.5 0.0 - 8.0 %    Basophil % 0.3 0.0 - 1.9 %    Differential Method Automated    Comprehensive metabolic panel   Result Value Ref Range    Sodium 134 (L) 136 - 145 mmol/L    Potassium 4.7 3.5 - 5.1 mmol/L    Chloride 101 95 - 110 mmol/L    CO2 24 23 - 29 mmol/L    Glucose 93 70 - 110 mg/dL    BUN 12 6 - 20 mg/dL    Creatinine 0.8 0.5 - 1.4 mg/dL    Calcium 9.4 8.7 - 10.5 mg/dL    Total Protein 8.5 (H) 6.0 - 8.4 g/dL    Albumin 3.2 (L) 3.5 - 5.2 g/dL    Total Bilirubin 0.6 0.1 - 1.0 mg/dL    Alkaline Phosphatase 217 (H) 55 - 135 U/L     (H) 10 - 40 U/L     (H) 10 - 44 U/L    eGFR >60 >60 mL/min/1.73 m^2    Anion Gap 9 8 - 16 mmol/L   Lactic acid, plasma #1   Result Value Ref Range    Lactate (Lactic Acid) 1.0 0.5 - 2.2 mmol/L   Lactic acid, plasma #2   Result Value Ref Range    Lactate (Lactic Acid) 1.1 0.5 - 2.2 mmol/L   Urinalysis, Reflex to Urine Culture Urine, Clean Catch    Specimen: Urine   Result Value Ref Range    Specimen UA Urine, Clean Catch     Color, UA Yellow Yellow, Straw, Elida    Appearance, UA Clear Clear    pH, UA 6.0 5.0 - 8.0    Specific Gravity, UA 1.010 1.005 - 1.030    Protein, UA Negative Negative    Glucose,  UA Negative Negative    Ketones, UA Negative Negative    Bilirubin (UA) Negative Negative    Occult Blood UA Negative Negative    Nitrite, UA Negative Negative    Urobilinogen, UA Negative <2.0 EU/dL    Leukocytes, UA Negative Negative   Procalcitonin   Result Value Ref Range    Procalcitonin 1.18 (H) <0.25 ng/mL   Sedimentation rate   Result Value Ref Range    Sed Rate 59 (H) 0 - 10 mm/Hr   Protime-INR   Result Value Ref Range    Prothrombin Time 13.6 (H) 9.0 - 12.5 sec    INR 1.2 0.8 - 1.2   APTT   Result Value Ref Range    aPTT 28.2 21.0 - 32.0 sec   Hepatitis panel, acute   Result Value Ref Range    Hepatitis B Surface Ag Non-reactive Non-reactive    Hep B C IgM Non-reactive Non-reactive    Hep A IgM Non-reactive Non-reactive    Hepatitis C Ab Reactive (A) Non-reactive   Acetaminophen Level   Result Value Ref Range    Acetaminophen (Tylenol), Serum <3.0 (L) 10.0 - 20.0 ug/mL           The EKG was ordered, reviewed, and independently interpreted by the ED provider:  ECG Results              EKG 12-lead (Preliminary result)  Result time 03/12/24 19:11:53      Wet Read by Maricruz Banks DO (03/12/24 19:11:53, [facility] - Emergency Dept., Emergency Medicine)    Rate of 71 beats per minute.  Sinus rhythm.  Early refill.  No ST segment elevation.  No STEMI                                    Imaging Results:  Imaging Results              MRI Lumbar Spine With Contrast (Edited Result - FINAL)  Result time 03/13/24 01:52:20      Addendum (preliminary) 1 of 1 by Arturo Esquivel MD (03/13/24 01:52:20)      7 mm abscess of the left iliopsoas muscle, decreased from 23 mm.  See separately dictated MR pelvis.      Electronically signed by: Arturo Esquivel  Date:    03/13/2024  Time:    01:52                 Final result by Arturo Esquivel MD (03/13/24 01:42:18)                   Impression:      L5-S1 and S1-2 discitis osteomyelitis with abscess, as above.    Diffuse infectious myositis of the bilateral lower  lumbar paraspinal musculature and within the left psoas and iliopsoas muscles.  No discrete abscess.      Electronically signed by: Arturo Esquivel  Date:    03/13/2024  Time:    01:42               Narrative:    EXAMINATION:  MRI LUMBAR SPINE WITH CONTRAST    CLINICAL HISTORY:  infection;    TECHNIQUE:  Multisequence, multiplanar MR images of the lumbar spine with and without 7 cc Gadavist    COMPARISON:  Same day prior MR lumbar spine without contrast    FINDINGS:  Postsurgical changes of L4-5 posterior spinal fusion with bilateral sacroiliac extension screws.  With discitis osteomyelitis at L5-S1 and S1-2.  Extensive epidural infectious/inflammatory change posterior to the L5-S1 and S1-2 disc spaces.  Paravertebral/prevertebral abscess measuring 37 x 22 mm communicating with the L5-S1 disc space.    Diffuse infectious myositis of the bilateral lower lumbar paraspinal musculature and within the left psoas and iliopsoas muscles.  No discrete abscess.                                       MRI Pelvis W WO Contrast (Final result)  Result time 03/13/24 01:51:15      Final result by Arturo Esquivel MD (03/13/24 01:51:15)                   Impression:      As above.      Electronically signed by: Arturo Esquivel  Date:    03/13/2024  Time:    01:51               Narrative:    EXAMINATION:  MRI PELVIS W WO CONTRAST    CLINICAL HISTORY:  hx of psoas abscess;    TECHNIQUE:  Multisequence, multiplanar MR images of the pelvis with and without 7 cc Gadavist    COMPARISON:  02/04/2024    FINDINGS:  Diffuse infectious myositis of the left psoas and iliopsoas muscles with 7 mm abscess of the left iliopsoas muscle, decreased from 23 mm.    Persistent osteomyelitis and septic arthritis about the left SI joint.    Please see separately dictated MR lumbar spine.                                       MRI Thoracic Spine W WO Cont (Final result)  Result time 03/13/24 01:33:00      Final result by Arturo Esquivel MD  (03/13/24 01:33:00)                   Impression:      Unremarkable MRI thoracic spine.    Right lung base abnormality, as above.  Recommend dedicated chest CT.      Electronically signed by: Arturo Esquivel  Date:    03/13/2024  Time:    01:33               Narrative:    EXAMINATION:  MRI THORACIC SPINE W WO CONTRAST    CLINICAL HISTORY:  Osteomyelitis, thoracic;    TECHNIQUE:  Multiplanar, multisequence MRI of thoracic spine with and without 7 cc Gadavist    COMPARISON:  None    FINDINGS:  Alignment: Normal.    Vertebrae: Normal marrow signal. No fracture.    Discs: No significant disc abnormality.  Specifically, no discitis osteomyelitis of the thoracic spine    Cord: Normal morphology and signal.    Degenerative findings: No spinal canal stenosis or neural foraminal narrowing at any level.    Other: Small nodular T2 hyperintense signal foci  at the right lung base for which dedicated chest CT is advised.                                        MRI Lumbar Spine Without Contrast (Final result)  Result time 03/12/24 19:36:58      Final result by Carlo Tanner MD (03/12/24 19:36:58)                   Impression:      Findings concerning for continued osteomyelitis with suspected additional soft tissue infection along the left ileo psoas musculature and possibly dorsally as well throughout the paraspinal musculature.  Contrast enhanced exam would be recommended for better delineation.    Complete details as above.    This report was flagged in Epic as abnormal.      Electronically signed by: Carlo Tanner  Date:    03/12/2024  Time:    19:36               Narrative:    EXAMINATION:  MRI LUMBAR SPINE WITHOUT CONTRAST    CLINICAL HISTORY:  Low back pain, infection suspected;History of diskitis.  Worsening fever;    TECHNIQUE:  Multiplanar, multisequence MR images were acquired from the thoracolumbar junction to the sacrum without the administration of contrast.    COMPARISON:  Multiple  priors.    FINDINGS:  Prior lumbar fusion L4-5 with additional sacral screws.    Findings consistent with osteomyelitis with continued anterior abscess suspected relatively similar to the prior measuring 3 point 7 by 2.1 cm.  There is abnormal T1 hypointensity L5 through S2 with some associated STIR hyperintensity and fluid within the disc space at L5-S1 and within the rudimentary disc at S1-2 concerning for discitis.  Some anterior cortical marginal blurring concerning for osteomyelitis such as on series 15, image 15.    Extensive dorsal abnormal STIR and T2 hyperintensity throughout the musculature.  A component of underlying abscess within the musculature is difficult to exclude on this noncontrast exam.    Suspected component of epidural phlegmon or abscess about the L5-S1 level such as on series 10, image 13-15.  Postcontrast exam would be helpful for differentiation in confirmation.    In comparison to the prior exam suspected mass effect on the thecal sac is reduced.    Septic arthritis of the left sacroiliac joint or sequela should again be considered noting abnormal T2 and STIR signal such as on series 16 and 17, image 6.    Abnormal signal in the left paraspinal musculature concerning for infection.                                       X-Ray Chest AP Portable (Final result)  Result time 03/12/24 16:52:58      Final result by Maryann Bull MD (03/12/24 16:52:58)                   Impression:      No acute cardiopulmonary abnormality.      Electronically signed by: Maryann Bull  Date:    03/12/2024  Time:    16:52               Narrative:    EXAMINATION:  XR CHEST AP PORTABLE    CLINICAL HISTORY:  Sepsis;    TECHNIQUE:  Single frontal view of the chest was performed.    COMPARISON:  Chest radiograph 02/22/2024    FINDINGS:  ECG monitoring leads overlie the chest.  A right peripherally inserted central venous catheter terminates over the proximal SVC, slightly retracted since the prior exam.The  lungs are clear, with normal appearance of pulmonary vasculature and no pleural effusion or pneumothorax.    The cardiac silhouette is normal in size.    Bones are intact.                                            The Emergency Provider reviewed the vital signs and test results, which are outlined above.     ED Discussion     8:00 PM: Spoke with Dr. Sarabia who recommends MRI pelvis with and without contrast, MRI of T-spine with and without contrast, and MRI of head with contrast.     8:00 PM: Dr. Banks transfers care of patient to Dr. Johnson pending imaging results.    8:18 PM: Dr. Johnson agrees with Dr. Banks's assessment and plan of care. Evaluated pt. Pt is resting comfortably and is in no acute distress.  D/w pt all pertinent results. D/w pt any concerns expressed at this time. Answered all questions. Pt expresses understanding at this time.    2:35 AM: Discussed pt's case with Dr. Sarabia (Infectious Disease) who recommends transfer to [facility] for Neurosurgery evaluation.    4:51 AM  Patient accepted in transfer to [facility] by  with Neurosurgery.  Patient was aware    ED Medication(s):  Medications   vancomycin - pharmacy to dose (has no administration in time range)   vancomycin 2 g in dextrose 5 % 500 mL IVPB (has no administration in time range)   oxyCODONE immediate release tablet 10 mg (10 mg Oral Given 3/12/24 2021)   HYDROmorphone (PF) injection 1 mg (1 mg Intravenous Given 3/12/24 2241)   HYDROmorphone (PF) injection 1 mg (1 mg Intravenous Given 3/13/24 0109)   gadobutroL (GADAVIST) injection 10 mL (7 mLs Intravenous Given 3/13/24 0042)   ceFEPIme (MAXIPIME) 2 g in dextrose 5 % in water (D5W) 100 mL IVPB (MB+) (0 g Intravenous Stopped 3/13/24 0324)   HYDROmorphone (PF) injection 1 mg (1 mg Intravenous Given 3/13/24 0430)       ED Course as of 03/13/24 0451   Wed Mar 13, 2024   0224 I have attempted to secure chat Dr. Sarabia on 2 occasions without response.  I have attempted to call  both on-call numbers without response [RT]   0252 Disregard diagnosis of sepsis.  Patient does not meet SIRS criteria. [RT]      ED Course User Index  [RT] Marlo Johnson Jr., MD            MIPS Measures     Smoker? Former     Hypertension: History of Hypertension: The patient has elevated blood pressure (higher than 120/80) while being treated in the ED but has a history of hypertension.     Medical Decision Making                 Medical Decision Making  Differential diagnosis:  Epidural abscess, diskitis, psoas abscess, intra-abdominal abscess, back pain, fever    Patient with recent spinal surgeon for spinal epidural abscess presents with fever from his rehab facility.  Patient was evaluated with labs showing a reactive hep C normal INR normal lactate normal UA undetectable Tylenol sed rate elevated at 50 however procalcitonin 1.18 elevated LFTs and elevated sodium normal white count with a mild shift a hemoglobin of 8.5.  MRI was obtained of the lumbar spine showing findings concerning for continued osteomyelitis with suspected additional soft tissue infection along left iliopsoas musculature.  Chest x-ray was clear.  Consultation was obtained with the infectious disease DrGeronimo Sarabia.  Recommended MRIs of the lumbar spine with contract as well as the pelvis thoracic spine with contrast.  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  In re-consultation with him postprocedure recommended transfer back to his neurosurgeons in [place].    Amount and/or Complexity of Data Reviewed  External Data Reviewed: labs and notes.  Labs: ordered. Decision-making details documented in ED Course.  Radiology: ordered. Decision-making details documented in ED Course.  ECG/medicine tests: ordered and independent interpretation performed. Decision-making details documented in ED Course.    Risk  OTC drugs.  Prescription drug management.  Parenteral controlled substances.  Decision regarding  "hospitalization.  Diagnosis or treatment significantly limited by social determinants of health.  Emergency major surgery.  Risk Details: Consideration made for emergency surgery    Critical Care  Total time providing critical care: 56 minutes        Coding    Prescription Management: I performed a review of the patient's current Rx medication list as input by nursing staff.    Patient's Medications   New Prescriptions    No medications on file   Previous Medications    ACETAMINOPHEN (TYLENOL) 325 MG TABLET    Take 2 tablets (650 mg total) by mouth every 4 (four) hours as needed.    CLONIDINE (CATAPRES) 0.1 MG TABLET    Take 1 tablet (0.1 mg total) by mouth every 12 (twelve) hours.    MIRTAZAPINE (REMERON) 15 MG TABLET    Take 1 tablet (15 mg total) by mouth every evening.    MV-MIN/VIT C/GLUT/LYSINE/ (IMMUNE SUPPORT ORAL)    Take 1 tablet by mouth once daily.    NALOXONE (NARCAN) 4 MG/ACTUATION SPRY    4 mg by Nasal route.    OXYCODONE (ROXICODONE) 15 MG TAB    Take 1 tablet (15 mg total) by mouth every 4 (four) hours as needed for Pain.    SODIUM CHLORIDE 0.9% SOLP 50 ML WITH DAPTOMYCIN 500 MG SOLR 767 MG    Inject 767 mg into the vein once daily.   Modified Medications    No medications on file   Discontinued Medications    No medications on file          Portions of this note may have been created with voice recognition software. Occasional "wrong-word" or "sound-a-like" substitutions may have occurred due to the inherent limitations of voice recognition software. Please, read the note carefully and recognize, using context, where substitutions have occurred.          Clinical Impression       ICD-10-CM ICD-9-CM   1. Spinal abscess  M46.20 730.08   2. Sepsis  A41.9 038.9     995.91   3. Infective myositis of multiple sites  M60.09 728.0   4. Discitis, unspecified spinal region  M46.40 722.90         ED Disposition  Disposition:   Disposition: Transferred  Condition: Serious  Reason for referral: Continuity " care.  Spinous abscess  Accepted by Neurosurgery  at [facility] [place]        ED Follow-up        Scribe Attestation:   Scribe #1: I performed the above scribed service and the documentation accurately describes the services I performed. I attest to the accuracy of the note.     Attending:   Physician Attestation Statement for Scribe #1: I, Maricruz Banks DO, personally performed the services described in this documentation, as scribed by Deng Marino, in my presence, and it is both accurate and complete.       Scribe Attestation:   Scribe #2: I performed the above scribed service and the documentation accurately describes the services I performed. I attest to the accuracy of the note.    Attending Attestation:           Physician Attestation for Scribe:    Physician Attestation Statement for Scribe #2: I, Marlo Johnson MD, reviewed documentation, as scribed by Kimberley Deluna in my presence, and it is both accurate and complete. I also acknowledge and confirm the content of the note done by Scribe #1.                 Marlo Johnson Jr., MD  03/13/24 9893       Marlo Johnson Jr., MD  03/13/24 0873

## 2024-03-13 VITALS
RESPIRATION RATE: 17 BRPM | BODY MASS INDEX: 24.72 KG/M2 | WEIGHT: 172.31 LBS | OXYGEN SATURATION: 98 % | TEMPERATURE: 98 F | DIASTOLIC BLOOD PRESSURE: 58 MMHG | HEART RATE: 72 BPM | SYSTOLIC BLOOD PRESSURE: 117 MMHG

## 2024-03-13 PROBLEM — F12.90 MARIJUANA USE: Status: ACTIVE | Noted: 2024-03-13

## 2024-03-13 PROBLEM — I10 HTN (HYPERTENSION): Status: ACTIVE | Noted: 2024-03-13

## 2024-03-13 LAB
AMPHET+METHAMPHET UR QL: NEGATIVE
ANION GAP SERPL CALC-SCNC: 9 MMOL/L (ref 8–16)
ANISOCYTOSIS BLD QL SMEAR: SLIGHT
BARBITURATES UR QL SCN>200 NG/ML: NEGATIVE
BASOPHILS # BLD AUTO: 0.02 K/UL (ref 0–0.2)
BASOPHILS NFR BLD: 0.4 % (ref 0–1.9)
BENZODIAZ UR QL SCN>200 NG/ML: NEGATIVE
BUN SERPL-MCNC: 9 MG/DL (ref 6–20)
BZE UR QL SCN: NEGATIVE
CALCIUM SERPL-MCNC: 9.7 MG/DL (ref 8.7–10.5)
CANNABINOIDS UR QL SCN: ABNORMAL
CHLORIDE SERPL-SCNC: 102 MMOL/L (ref 95–110)
CO2 SERPL-SCNC: 23 MMOL/L (ref 23–29)
CREAT SERPL-MCNC: 0.8 MG/DL (ref 0.5–1.4)
CREAT UR-MCNC: 61 MG/DL (ref 23–375)
CRP SERPL-MCNC: 46.5 MG/L (ref 0–8.2)
DACRYOCYTES BLD QL SMEAR: ABNORMAL
DIFFERENTIAL METHOD BLD: ABNORMAL
EOSINOPHIL # BLD AUTO: 0.1 K/UL (ref 0–0.5)
EOSINOPHIL NFR BLD: 1.5 % (ref 0–8)
ERYTHROCYTE [DISTWIDTH] IN BLOOD BY AUTOMATED COUNT: 20.8 % (ref 11.5–14.5)
EST. GFR  (NO RACE VARIABLE): >60 ML/MIN/1.73 M^2
GLUCOSE SERPL-MCNC: 94 MG/DL (ref 70–110)
HAV IGM SERPL QL IA: ABNORMAL
HBV CORE IGM SERPL QL IA: ABNORMAL
HBV SURFACE AG SERPL QL IA: ABNORMAL
HCT VFR BLD AUTO: 29.8 % (ref 40–54)
HCV AB SERPL QL IA: REACTIVE
HGB BLD-MCNC: 9 G/DL (ref 14–18)
IMM GRANULOCYTES # BLD AUTO: 0.04 K/UL (ref 0–0.04)
IMM GRANULOCYTES NFR BLD AUTO: 0.8 % (ref 0–0.5)
LYMPHOCYTES # BLD AUTO: 1.9 K/UL (ref 1–4.8)
LYMPHOCYTES NFR BLD: 36.7 % (ref 18–48)
MCH RBC QN AUTO: 22.1 PG (ref 27–31)
MCHC RBC AUTO-ENTMCNC: 30.2 G/DL (ref 32–36)
MCV RBC AUTO: 73 FL (ref 82–98)
METHADONE UR QL SCN>300 NG/ML: NEGATIVE
MONOCYTES # BLD AUTO: 0.7 K/UL (ref 0.3–1)
MONOCYTES NFR BLD: 14.2 % (ref 4–15)
NEUTROPHILS # BLD AUTO: 2.4 K/UL (ref 1.8–7.7)
NEUTROPHILS NFR BLD: 46.4 % (ref 38–73)
NRBC BLD-RTO: 0 /100 WBC
OHS QRS DURATION: 72 MS
OHS QTC CALCULATION: 415 MS
OPIATES UR QL SCN: ABNORMAL
PCP UR QL SCN>25 NG/ML: NEGATIVE
PLATELET # BLD AUTO: 352 K/UL (ref 150–450)
PLATELET BLD QL SMEAR: ABNORMAL
PMV BLD AUTO: 9 FL (ref 9.2–12.9)
POTASSIUM SERPL-SCNC: 4.8 MMOL/L (ref 3.5–5.1)
RBC # BLD AUTO: 4.07 M/UL (ref 4.6–6.2)
SCHISTOCYTES BLD QL SMEAR: PRESENT
SODIUM SERPL-SCNC: 134 MMOL/L (ref 136–145)
TOXICOLOGY INFORMATION: ABNORMAL
WBC # BLD AUTO: 5.2 K/UL (ref 3.9–12.7)

## 2024-03-13 PROCEDURE — A9585 GADOBUTROL INJECTION: HCPCS | Performed by: EMERGENCY MEDICINE

## 2024-03-13 PROCEDURE — 96367 TX/PROPH/DG ADDL SEQ IV INF: CPT

## 2024-03-13 PROCEDURE — 96366 THER/PROPH/DIAG IV INF ADDON: CPT

## 2024-03-13 PROCEDURE — 96376 TX/PRO/DX INJ SAME DRUG ADON: CPT

## 2024-03-13 PROCEDURE — 63600175 PHARM REV CODE 636 W HCPCS: Performed by: EMERGENCY MEDICINE

## 2024-03-13 PROCEDURE — 96365 THER/PROPH/DIAG IV INF INIT: CPT

## 2024-03-13 PROCEDURE — 25000003 PHARM REV CODE 250: Performed by: EMERGENCY MEDICINE

## 2024-03-13 PROCEDURE — 25500020 PHARM REV CODE 255: Performed by: EMERGENCY MEDICINE

## 2024-03-13 PROCEDURE — 85025 COMPLETE CBC W/AUTO DIFF WBC: CPT | Performed by: EMERGENCY MEDICINE

## 2024-03-13 PROCEDURE — 80048 BASIC METABOLIC PNL TOTAL CA: CPT | Performed by: EMERGENCY MEDICINE

## 2024-03-13 PROCEDURE — 86140 C-REACTIVE PROTEIN: CPT | Performed by: EMERGENCY MEDICINE

## 2024-03-13 PROCEDURE — 96374 THER/PROPH/DIAG INJ IV PUSH: CPT | Mod: 59

## 2024-03-13 RX ORDER — HYDRALAZINE HYDROCHLORIDE 20 MG/ML
10 INJECTION INTRAMUSCULAR; INTRAVENOUS EVERY 6 HOURS PRN
Status: DISCONTINUED | OUTPATIENT
Start: 2024-03-13 | End: 2024-03-13 | Stop reason: HOSPADM

## 2024-03-13 RX ORDER — HYDROMORPHONE HYDROCHLORIDE 2 MG/ML
1 INJECTION, SOLUTION INTRAMUSCULAR; INTRAVENOUS; SUBCUTANEOUS
Status: COMPLETED | OUTPATIENT
Start: 2024-03-13 | End: 2024-03-13

## 2024-03-13 RX ORDER — GADOBUTROL 604.72 MG/ML
10 INJECTION INTRAVENOUS
Status: COMPLETED | OUTPATIENT
Start: 2024-03-13 | End: 2024-03-13

## 2024-03-13 RX ORDER — MIRTAZAPINE 15 MG/1
15 TABLET, FILM COATED ORAL NIGHTLY
Status: DISCONTINUED | OUTPATIENT
Start: 2024-03-13 | End: 2024-03-13 | Stop reason: HOSPADM

## 2024-03-13 RX ORDER — HYDROMORPHONE HYDROCHLORIDE 2 MG/ML
1 INJECTION, SOLUTION INTRAMUSCULAR; INTRAVENOUS; SUBCUTANEOUS
Status: DISCONTINUED | OUTPATIENT
Start: 2024-03-13 | End: 2024-03-13 | Stop reason: HOSPADM

## 2024-03-13 RX ADMIN — HYDROMORPHONE HYDROCHLORIDE 1 MG: 2 INJECTION INTRAMUSCULAR; INTRAVENOUS; SUBCUTANEOUS at 02:03

## 2024-03-13 RX ADMIN — HYDROMORPHONE HYDROCHLORIDE 1 MG: 2 INJECTION INTRAMUSCULAR; INTRAVENOUS; SUBCUTANEOUS at 07:03

## 2024-03-13 RX ADMIN — HYDROMORPHONE HYDROCHLORIDE 1 MG: 2 INJECTION INTRAMUSCULAR; INTRAVENOUS; SUBCUTANEOUS at 05:03

## 2024-03-13 RX ADMIN — HYDROMORPHONE HYDROCHLORIDE 1 MG: 2 INJECTION INTRAMUSCULAR; INTRAVENOUS; SUBCUTANEOUS at 01:03

## 2024-03-13 RX ADMIN — VANCOMYCIN HYDROCHLORIDE 2000 MG: 10 INJECTION, POWDER, LYOPHILIZED, FOR SOLUTION INTRAVENOUS at 05:03

## 2024-03-13 RX ADMIN — DEXTROSE MONOHYDRATE 1250 MG: 50 INJECTION, SOLUTION INTRAVENOUS at 05:03

## 2024-03-13 RX ADMIN — CEFEPIME 2 G: 2 INJECTION, POWDER, FOR SOLUTION INTRAVENOUS at 10:03

## 2024-03-13 RX ADMIN — HYDROMORPHONE HYDROCHLORIDE 1 MG: 2 INJECTION INTRAMUSCULAR; INTRAVENOUS; SUBCUTANEOUS at 04:03

## 2024-03-13 RX ADMIN — CEFEPIME 2 G: 2 INJECTION, POWDER, FOR SOLUTION INTRAVENOUS at 07:03

## 2024-03-13 RX ADMIN — HYDROMORPHONE HYDROCHLORIDE 1 MG: 2 INJECTION INTRAMUSCULAR; INTRAVENOUS; SUBCUTANEOUS at 08:03

## 2024-03-13 RX ADMIN — GADOBUTROL 7 ML: 604.72 INJECTION INTRAVENOUS at 12:03

## 2024-03-13 RX ADMIN — HYDROMORPHONE HYDROCHLORIDE 1 MG: 2 INJECTION INTRAMUSCULAR; INTRAVENOUS; SUBCUTANEOUS at 11:03

## 2024-03-13 RX ADMIN — CEFEPIME 2 G: 2 INJECTION, POWDER, FOR SOLUTION INTRAVENOUS at 02:03

## 2024-03-13 NOTE — ASSESSMENT & PLAN NOTE
-patient states that he stopped using cocaine at the start of his last admission.    -UDS was only positive for marijuana and opiates

## 2024-03-13 NOTE — SUBJECTIVE & OBJECTIVE
Past Medical History:   Diagnosis Date    Hypertension     Unilateral inguinal hernia, without obstruction or gangrene, not specified as recurrent        Past Surgical History:   Procedure Laterality Date    HAND ARTHROTOMY Right 7/7/2023    Procedure: ARTHROTOMY, HAND - R LF PIP;  Surgeon: Boy Lamb MD;  Location: [address];  Service: Orthopedics;  Laterality: Right;    INCISION AND DRAINAGE OF ABSCESS Right 10/12/2023    Procedure: INCISION AND DRAINAGE, ABSCESS;  Surgeon: Steve Monteiro MD;  Location: [address];  Service: General;  Laterality: Right;  right chest    IRRIGATION AND DEBRIDEMENT OF UPPER EXTREMITY Bilateral 7/7/2023    Procedure: IRRIGATION AND DEBRIDEMENT, UPPER EXTREMITY - RIGHT HAND;  Surgeon: Boy Lamb MD;  Location: [address];  Service: Orthopedics;  Laterality: Bilateral;    LUMBAR FUSION N/A 2/14/2024    Procedure: LOOP X FUSION, SPINE, LUMBAR;  Surgeon: Boy Pozo MD;  Location: [facility] OR [address] [place] [address];  Service: Neurosurgery;  Laterality: N/A;  L4-Pelvis fusion; *LOOP-X* depuy, neuromonitoring    OLECRANON BURSECTOMY Left 7/7/2023    Procedure: BURSECTOMY, OLECRANON;  Surgeon: Boy Lamb MD;  Location: [address];  Service: Orthopedics;  Laterality: Left;       Review of patient's allergies indicates:  No Known Allergies    No current facility-administered medications on file prior to encounter.     Current Outpatient Medications on File Prior to Encounter   Medication Sig    acetaminophen (TYLENOL) 325 MG tablet Take 2 tablets (650 mg total) by mouth every 4 (four) hours as needed.    cloNIDine (CATAPRES) 0.1 MG tablet Take 1 tablet (0.1 mg total) by mouth every 12 (twelve) hours.    mirtazapine (REMERON) 15 MG tablet Take 1 tablet (15 mg total) by mouth every evening.    mv-min/vit C/glut/lysine/hb124 (IMMUNE SUPPORT ORAL) Take 1 tablet by mouth once daily.    naloxone (NARCAN) 4 mg/actuation Spry 4 mg by Nasal route.    oxyCODONE  (ROXICODONE) 15 MG Tab Take 1 tablet (15 mg total) by mouth every 4 (four) hours as needed for Pain.    sodium chloride 0.9% SolP 50 mL with DAPTOmycin 500 mg SolR 767 mg Inject 767 mg into the vein once daily.     Family History    None       Tobacco Use    Smoking status: Former     Types: Cigarettes    Smokeless tobacco: Never   Substance and Sexual Activity    Alcohol use: No    Drug use: Yes     Types: Marijuana, IV     Comment: 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    Sexual activity: Not on file     Review of Systems   Constitutional:  Positive for fever. Negative for activity change, appetite change, chills and fatigue.   Respiratory:  Negative for apnea, cough, shortness of breath and stridor.    Cardiovascular:  Negative for chest pain, palpitations and leg swelling.   Gastrointestinal:  Negative for abdominal distention, constipation, diarrhea, nausea and vomiting.   Genitourinary:  Negative for difficulty urinating, dysuria, frequency, hematuria and urgency.   Musculoskeletal:  Positive for back pain. Negative for arthralgias, gait problem and joint swelling.   Neurological:  Negative for dizziness, seizures, weakness, light-headedness, numbness and headaches.   Psychiatric/Behavioral:  Negative for agitation, behavioral problems, confusion, decreased concentration, dysphoric mood and hallucinations.    All other systems reviewed and are negative.    Objective:     Vital Signs (Most Recent):  Temp: 99.5 °F (37.5 °C) (03/13/24 0515)  Pulse: (!) 57 (03/13/24 1300)  Resp: 18 (03/13/24 1300)  BP: 112/64 (03/13/24 1300)  SpO2: 98 % (03/13/24 1300) Vital Signs (24h Range):  Temp:  [99.5 °F (37.5 °C)-99.6 °F (37.6 °C)] 99.5 °F (37.5 °C)  Pulse:  [] 57  Resp:  [12-20] 18  SpO2:  [96 %-100 %] 98 %  BP: ()/(52-85) 112/64     Weight: 78.2 kg (172 lb 4.8 oz)  Body mass index is 24.72 kg/m².     Physical Exam  Vitals and nursing note reviewed.   Constitutional:       Appearance: Normal appearance.    HENT:      Head: Normocephalic and atraumatic.      Nose: Nose normal.      Mouth/Throat:      Mouth: Mucous membranes are moist.   Eyes:      Extraocular Movements: Extraocular movements intact.      Conjunctiva/sclera: Conjunctivae normal.   Cardiovascular:      Rate and Rhythm: Normal rate and regular rhythm.      Pulses: Normal pulses.      Heart sounds: Normal heart sounds.   Pulmonary:      Effort: Pulmonary effort is normal.      Breath sounds: Normal breath sounds.   Abdominal:      General: Abdomen is flat. Bowel sounds are normal.      Palpations: Abdomen is soft.   Musculoskeletal:         General: Normal range of motion.      Cervical back: Normal range of motion and neck supple.   Skin:     General: Skin is warm.      Capillary Refill: Capillary refill takes less than 2 seconds.   Neurological:      Mental Status: He is alert and oriented to person, place, and time. Mental status is at baseline.      Comments: Strength 5/5 in all 4 extremities   Psychiatric:         Mood and Affect: Mood normal.         Behavior: Behavior normal.                Significant Labs: All pertinent labs within the past 24 hours have been reviewed.  Recent Lab Results  (Last 5 results in the past 24 hours)        03/13/24  1050   03/12/24 2039   03/12/24  2038   03/12/24  1718   03/12/24  1714        Benzodiazepines         Negative       Methadone metabolites         Negative       Phencyclidine         Negative       Amphetamines, Urine         Negative       Anion Gap 9               Aniso Slight               Appearance, UA         Clear       PTT     28.2  Comment: Refer to local heparin nomogram for intensity/dose specific   therapeutic   range.             Barbituates, Urine         Negative       Baso # 0.02               Basophil % 0.4               Bilirubin (UA)         Negative       Blood Culture, Routine   No Growth to date  [P]             BUN 9               Calcium 9.7               Chloride 102                CO2 23               Cocaine, Urine         Negative       Color, UA         Yellow       Creatinine 0.8               Urine Creatinine         61.0       CRP 46.5               Differential Method Automated               eGFR >60               Eos # 0.1               Eos % 1.5               Glucose 94               Glucose, UA         Negative       Gran # (ANC) 2.4               Gran % 46.4               Hematocrit 29.8               Hemoglobin 9.0               Hep A IgM     Non-reactive           Hep B C IgM     Non-reactive           Hepatitis B Surface Ag     Non-reactive           Hepatitis C Ab     Reactive  Comment: Presumptive evidence of antibodies to HCV; recommend   supplemental testing HCV RNA Quantitative by PCR   ([id number]) if clinically indicated.             Immature Grans (Abs) 0.04  Comment: Mild elevation in immature granulocytes is non specific and   can be seen in a variety of conditions including stress response,   acute inflammation, trauma and pregnancy. Correlation with other   laboratory and clinical findings is essential.                 Immature Granulocytes 0.8               INR     1.2  Comment: Coumadin Therapy:  2.0 - 3.0 for INR for all indicators except mechanical heart valves  and antiphospholipid syndromes which should use 2.5 - 3.5.             Ketones, UA         Negative       Lactic Acid Level     1.1  Comment: Falsely low lactic acid results can be found in samples   containing >=13.0 mg/dL total bilirubin and/or >=3.5 mg/dL   direct bilirubin.             Leukocyte Esterase, UA         Negative       Lymph # 1.9               Lymph % 36.7               MCH 22.1               MCHC 30.2               MCV 73               Mono # 0.7               Mono % 14.2               MPV 9.0               NITRITE UA         Negative       nRBC 0               Blood, UA         Negative       QRS Duration       72         [facility] QTC Calculation       415         Opiates, Urine          Presumptive Positive       pH, UA         6.0       Platelet Estimate Appears normal               Platelet Count 352               Potassium 4.8               Protein, UA         Negative  Comment: Recommend a 24 hour urine protein or a urine   protein/creatinine ratio if globulin induced proteinuria is  clinically suspected.         PT     13.6           RBC 4.07               RDW 20.8               Schistocytes Present               Sodium 134               Specific [place], UA         1.010       Specimen UA         Urine, Clean Catch       Teardrop Cells Occasional               Marijuana (THC) Metabolite         Presumptive Positive       Toxicology Information         SEE COMMENT  Comment: This screen includes the following classes of drugs at the listed   cut-off:    Benzodiazepines 200 ng/ml  Methadone 300 ng/ml  Cocaine metabolite 300 ng/ml  Opiates 300 ng/ml  Barbiturates 200 ng/ml  Amphetamines 1000 ng/ml  Marijuana metabs (THC) 50 ng/ml  Phencyclidine (PCP) 25 ng/ml    This is a screening test. If results do not correlate with clinical   presentation, then a confirmatory send out test is advised.     This report is intended for use in clinical monitoring and management   of   patients. It is not intended for use in employment related drug   testing.         UROBILINOGEN UA         Negative       WBC 5.20                                       [P] - Preliminary Result               Significant Imaging:   MRI Lumbar Spine With Contrast   Final Result   Addendum (preliminary) 1 of 1      7 mm abscess of the left iliopsoas muscle, decreased from 23 mm.  See    separately dictated MR pelvis.         Electronically signed by: Arturo Esquivel   Date:    03/13/2024   Time:    01:52      Final      L5-S1 and S1-2 discitis osteomyelitis with abscess, as above.      Diffuse infectious myositis of the bilateral lower lumbar paraspinal musculature and within the left psoas and iliopsoas muscles.  No discrete  abscess.         Electronically signed by: Arturo Esquivel   Date:    03/13/2024   Time:    01:42      MRI Pelvis W WO Contrast   Final Result      As above.         Electronically signed by: Arturo Esquivel   Date:    03/13/2024   Time:    01:51      MRI Thoracic Spine W WO Cont   Final Result      Unremarkable MRI thoracic spine.      Right lung base abnormality, as above.  Recommend dedicated chest CT.         Electronically signed by: Arturo Esquivel   Date:    03/13/2024   Time:    01:33      MRI Lumbar Spine Without Contrast   Final Result   Abnormal      Findings concerning for continued osteomyelitis with suspected additional soft tissue infection along the left ileo psoas musculature and possibly dorsally as well throughout the paraspinal musculature.  Contrast enhanced exam would be recommended for better delineation.      Complete details as above.      This report was flagged in Epic as abnormal.         Electronically signed by: Carlo Tanner   Date:    03/12/2024   Time:    19:36      X-Ray Chest AP Portable   Final Result      No acute cardiopulmonary abnormality.         Electronically signed by: Maryann Bull   Date:    03/12/2024   Time:    16:52

## 2024-03-13 NOTE — HPI
Mr. Donovan is a 28-year-old male with history of IV drug abuse and hypertension recently admitted to [facility].  Patient was admitted to [facility] for 17 days and discharged on 02/22/2024.  He was seen by addiction medicine, Neurosurgery, Orthopedic surgery, IR and Infectious Disease.  He underwent aspiration of the psoas abscess by IR on 02/07 as well as LOOP X fusion spine and epidural phlegmon debridement which grew MRSA.  Patient was started on IV daptomycin to be followed with Infectious Disease and discharge to [facility] for completion of antibiotics. Patient was sent from [facility] for ongoing fevers.  Upon arrival he was noted to have a fever of 99.6, WBC 9.62, sed rate 59, CRP 46.5, , , procalcitonin 1.18, lactic acid 1.1, UDS positive for marijuana and opiates.  MRI of the L-spine showed continued L5-S1 and S1 to diskitis osteomyelitis with abscess, diffuse infectious myositis of the bilateral lower lumbar paraspinal musculatures and within the left psoas and iliopsoas muscle.  No discrete abscess.  Dr. Garcia with Neurosurgery was consulted at [facility] have recommended transfer.  [facility] transfer has been pending for the last 18 hours.  Hospital medicine was consulted for medical management.  Patient's only chronic medical issue is hypertension.  Currently blood pressure medicine is on hold due to hypotension.

## 2024-03-13 NOTE — PROGRESS NOTES
Pharmacokinetic Initial Assessment: IV Vancomycin    Assessment/Plan:    Initiate intravenous vancomycin with loading dose of 2000 mg once followed by a maintenance dose of vancomycin 1250 mg IV every 12 hours  Desired empiric serum trough concentration is 15 to 20 mcg/mL  Draw vancomycin trough level 60 min prior to fourth dose on 3/14/24 at approximately 1600.  Pharmacy will continue to follow and monitor vancomycin.      Please contact pharmacy at extension [phone] with any questions regarding this assessment.     Thank you for the consult,   Vladimir Pete       Patient brief summary:  Ton Donovan is a 28 y.o. male initiated on antimicrobial therapy with IV Vancomycin for treatment of suspected bone/joint infection    Drug Allergies:   Review of patient's allergies indicates:  No Known Allergies    Actual Body Weight:   78.2 kg    Renal Function:   Estimated Creatinine Clearance: 139.4 mL/min (based on SCr of 0.8 mg/dL).,     Dialysis Method (if applicable):  N/A    CBC (last 72 hours):  Recent Labs   Lab Result Units 03/12/24  1654   WBC K/uL 9.62   Hemoglobin g/dL 8.5*   Hematocrit % 27.8*   Platelets K/uL 340   Gran % % 63.8   Lymph % % 25.2   Mono % % 9.5   Eosinophil % % 0.5   Basophil % % 0.3   Differential Method  Automated       Metabolic Panel (last 72 hours):  Recent Labs   Lab Result Units 03/12/24  1654 03/12/24  1714   Sodium mmol/L 134*  --    Potassium mmol/L 4.7  --    Chloride mmol/L 101  --    CO2 mmol/L 24  --    Glucose mg/dL 93  --    Glucose, UA   --  Negative   BUN mg/dL 12  --    Creatinine mg/dL 0.8  --    Albumin g/dL 3.2*  --    Total Bilirubin mg/dL 0.6  --    Alkaline Phosphatase U/L 217*  --    AST U/L 364*  --    ALT U/L 208*  --        Microbiologic Results:  Microbiology Results (last 7 days)       Procedure Component Value Units Date/Time    Blood culture x two cultures. Draw prior to antibiotics. [[id number]] Collected: 03/12/24 1655    Order Status: Completed Specimen: Blood  from Peripheral, Antecubital, Left Updated: 03/13/24 0515     Blood Culture, Routine No Growth to date    Narrative:      Aerobic and anaerobic    Blood culture x two cultures. Draw prior to antibiotics. [[id number]] Collected: 03/12/24 2039    Order Status: Sent Specimen: Blood from Peripheral, Hand, Left Updated: 03/13/24 0258

## 2024-03-13 NOTE — ASSESSMENT & PLAN NOTE
Chronic, controlled. Latest blood pressure and vitals reviewed-     Temp:  [99.5 °F (37.5 °C)-99.6 °F (37.6 °C)]   Pulse:  []   Resp:  [12-20]   BP: ()/(52-85)   SpO2:  [96 %-100 %] .   Home meds for hypertension were reviewed and noted below.   Hypertension Medications               cloNIDine (CATAPRES) 0.1 MG tablet Take 1 tablet (0.1 mg total) by mouth every 12 (twelve) hours.            While in the hospital, will manage blood pressure as follows:  Hold home meds for now due to hypotension    Will utilize p.r.n. blood pressure medication only if patient's blood pressure greater than 160/100 and he develops symptoms such as worsening chest pain or shortness of breath.

## 2024-03-13 NOTE — PROVIDER PROGRESS NOTES - EMERGENCY DEPT.
Encounter Date: 3/12/2024    ED Physician Progress Notes            27 y/o male has been holding in the ER since early this morning waiting transfer to main campus for neurosurgery for known spinal abscess, osteomyelitis and myositis. He was recently there in February and eventually sent to [facility] where he is receiving daptomycin and cefepime, it appears he developed a fever yesterday and was sent to the ER for repeat cultures and imaging. I am just coming on and saw that he was holding in the ER and I don't see a medicine consult for medical management. Just spoke with transfer center and they can't give a timeline of when he will get transferred. I put a consult in for medical management per hospital medicine.  I have ordered repeat labs cbc, bmp, added CRP, he has been on daptomycin and cefepime IV since at [facility], he appears to have received IV vancomycin already and one dose of cefepime which I have ordered every 8 hours since last dose.  He remains stable, no gross neuro deficits, pain controlled. Vitals are stable.

## 2024-03-13 NOTE — ASSESSMENT & PLAN NOTE
-MRI of the spine images reviewed  -continue IV antibiotics  -Dr. Ayers, neurosurgery, has recommended transfer to [facility] as we do not have neurosurgery at [facility].  -pain control   -NPO (ER has contacted [facility] to determine if the patient is to go to surgery today.  If not we will start clear liquid diet)  -blood cultures from 03/12/2024-no growth to date

## 2024-03-13 NOTE — CONSULTS
[facility] Emergency Dept.  Hospital Medicine  Consult Note    Patient Name: Ton Donovan  MRN: [id number]  Admission Date: 3/12/2024  Hospital Length of Stay: 0 days  Attending Physician: Tanja Mariee MD   Primary Care Provider: Emily Primary Doctor           Patient information was obtained from patient, past medical records, and ER records.     Consults  Subjective:     Principal Problem: <principal problem not specified>    Chief Complaint:   Chief Complaint   Patient presents with    Fever     Sent from [facility] for a fever. He is being treated for spinal abscesses with IV ABX. Facility reports that he spiked a fever today of 101.4. Dr. Guardados another septic workup and an MRI. He was given pain meds and Tylenol PTA        HPI: Mr. Donovan is a 28-year-old male with history of IV drug abuse and hypertension recently admitted to [facility].  Patient was admitted to [facility] for 17 days and discharged on 02/22/2024.  He was seen by addiction medicine, Neurosurgery, Orthopedic surgery, IR and Infectious Disease.  He underwent aspiration of the psoas abscess by IR on 02/07 as well as LOOP X fusion spine and epidural phlegmon debridement which grew MRSA.  Patient was started on IV daptomycin to be followed with Infectious Disease and discharge to [facility] for completion of antibiotics. Patient was sent from [facility] for ongoing fevers.  Upon arrival he was noted to have a fever of 99.6, WBC 9.62, sed rate 59, CRP 46.5, , , procalcitonin 1.18, lactic acid 1.1, UDS positive for marijuana and opiates.  MRI of the L-spine showed continued L5-S1 and S1 to diskitis osteomyelitis with abscess, diffuse infectious myositis of the bilateral lower lumbar paraspinal musculatures and within the left psoas and iliopsoas muscle.  No discrete abscess.  Dr. Garcia with Neurosurgery was consulted at [facility] have recommended transfer.  [facility] transfer has been pending for the last 18 hours.  [facility]  medicine was consulted for medical management.  Patient's only chronic medical issue is hypertension.  Currently blood pressure medicine is on hold due to hypotension.    Past Medical History:   Diagnosis Date    Hypertension     Unilateral inguinal hernia, without obstruction or gangrene, not specified as recurrent        Past Surgical History:   Procedure Laterality Date    HAND ARTHROTOMY Right 7/7/2023    Procedure: ARTHROTOMY, HAND - R LF PIP;  Surgeon: Boy Lamb MD;  Location: [address];  Service: Orthopedics;  Laterality: Right;    INCISION AND DRAINAGE OF ABSCESS Right 10/12/2023    Procedure: INCISION AND DRAINAGE, ABSCESS;  Surgeon: Steve Monteiro MD;  Location: [address];  Service: General;  Laterality: Right;  right chest    IRRIGATION AND DEBRIDEMENT OF UPPER EXTREMITY Bilateral 7/7/2023    Procedure: IRRIGATION AND DEBRIDEMENT, UPPER EXTREMITY - RIGHT HAND;  Surgeon: Boy Lamb MD;  Location: [address];  Service: Orthopedics;  Laterality: Bilateral;    LUMBAR FUSION N/A 2/14/2024    Procedure: LOOP X FUSION, SPINE, LUMBAR;  Surgeon: Boy Pozo MD;  Location: [address];  Service: Neurosurgery;  Laterality: N/A;  L4-Pelvis fusion; *LOOP-X* depuy, neuromonitoring    OLECRANON BURSECTOMY Left 7/7/2023    Procedure: BURSECTOMY, OLECRANON;  Surgeon: Boy Lamb MD;  Location: [address];  Service: Orthopedics;  Laterality: Left;       Review of patient's allergies indicates:  No Known Allergies    No current facility-administered medications on file prior to encounter.     Current Outpatient Medications on File Prior to Encounter   Medication Sig    acetaminophen (TYLENOL) 325 MG tablet Take 2 tablets (650 mg total) by mouth every 4 (four) hours as needed.    cloNIDine (CATAPRES) 0.1 MG tablet Take 1 tablet (0.1 mg total) by mouth every 12 (twelve) hours.    mirtazapine (REMERON) 15 MG tablet Take 1 tablet (15 mg total) by mouth every evening.     mv-min/vit C/glut/lysine/hb124 (IMMUNE SUPPORT ORAL) Take 1 tablet by mouth once daily.    naloxone (NARCAN) 4 mg/actuation Spry 4 mg by Nasal route.    oxyCODONE (ROXICODONE) 15 MG Tab Take 1 tablet (15 mg total) by mouth every 4 (four) hours as needed for Pain.    sodium chloride 0.9% SolP 50 mL with DAPTOmycin 500 mg SolR 767 mg Inject 767 mg into the vein once daily.     Family History    None       Tobacco Use    Smoking status: Former     Types: Cigarettes    Smokeless tobacco: Never   Substance and Sexual Activity    Alcohol use: No    Drug use: Yes     Types: Marijuana, IV     Comment: 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    Sexual activity: Not on file     Review of Systems   Constitutional:  Positive for fever. Negative for activity change, appetite change, chills and fatigue.   Respiratory:  Negative for apnea, cough, shortness of breath and stridor.    Cardiovascular:  Negative for chest pain, palpitations and leg swelling.   Gastrointestinal:  Negative for abdominal distention, constipation, diarrhea, nausea and vomiting.   Genitourinary:  Negative for difficulty urinating, dysuria, frequency, hematuria and urgency.   Musculoskeletal:  Positive for back pain. Negative for arthralgias, gait problem and joint swelling.   Neurological:  Negative for dizziness, seizures, weakness, light-headedness, numbness and headaches.   Psychiatric/Behavioral:  Negative for agitation, behavioral problems, confusion, decreased concentration, dysphoric mood and hallucinations.    All other systems reviewed and are negative.    Objective:     Vital Signs (Most Recent):  Temp: 99.5 °F (37.5 °C) (03/13/24 0515)  Pulse: (!) 57 (03/13/24 1300)  Resp: 18 (03/13/24 1300)  BP: 112/64 (03/13/24 1300)  SpO2: 98 % (03/13/24 1300) Vital Signs (24h Range):  Temp:  [99.5 °F (37.5 °C)-99.6 °F (37.6 °C)] 99.5 °F (37.5 °C)  Pulse:  [] 57  Resp:  [12-20] 18  SpO2:  [96 %-100 %] 98 %  BP: ()/(52-85) 112/64     Weight: 78.2 kg  (172 lb 4.8 oz)  Body mass index is 24.72 kg/m².     Physical Exam  Vitals and nursing note reviewed.   Constitutional:       Appearance: Normal appearance.   HENT:      Head: Normocephalic and atraumatic.      Nose: Nose normal.      Mouth/Throat:      Mouth: Mucous membranes are moist.   Eyes:      Extraocular Movements: Extraocular movements intact.      Conjunctiva/sclera: Conjunctivae normal.   Cardiovascular:      Rate and Rhythm: Normal rate and regular rhythm.      Pulses: Normal pulses.      Heart sounds: Normal heart sounds.   Pulmonary:      Effort: Pulmonary effort is normal.      Breath sounds: Normal breath sounds.   Abdominal:      General: Abdomen is flat. Bowel sounds are normal.      Palpations: Abdomen is soft.   Musculoskeletal:         General: Normal range of motion.      Cervical back: Normal range of motion and neck supple.   Skin:     General: Skin is warm.      Capillary Refill: Capillary refill takes less than 2 seconds.   Neurological:      Mental Status: He is alert and oriented to person, place, and time. Mental status is at baseline.      Comments: Strength 5/5 in all 4 extremities   Psychiatric:         Mood and Affect: Mood normal.         Behavior: Behavior normal.                Significant Labs: All pertinent labs within the past 24 hours have been reviewed.  Recent Lab Results  (Last 5 results in the past 24 hours)        03/13/24  1050   03/12/24  2039   03/12/24 2038 03/12/24  1718   03/12/24  1714        Benzodiazepines         Negative       Methadone metabolites         Negative       Phencyclidine         Negative       Amphetamines, Urine         Negative       Anion Gap 9               Aniso Slight               Appearance, UA         Clear       PTT     28.2  Comment: Refer to local heparin nomogram for intensity/dose specific   therapeutic   range.             Barbituates, Urine         Negative       Baso # 0.02               Basophil % 0.4               Bilirubin  (UA)         Negative       Blood Culture, Routine   No Growth to date  [P]             BUN 9               Calcium 9.7               Chloride 102               CO2 23               Cocaine, Urine         Negative       Color, UA         Yellow       Creatinine 0.8               Urine Creatinine         61.0       CRP 46.5               Differential Method Automated               eGFR >60               Eos # 0.1               Eos % 1.5               Glucose 94               Glucose, UA         Negative       Gran # (ANC) 2.4               Gran % 46.4               Hematocrit 29.8               Hemoglobin 9.0               Hep A IgM     Non-reactive           Hep B C IgM     Non-reactive           Hepatitis B Surface Ag     Non-reactive           Hepatitis C Ab     Reactive  Comment: Presumptive evidence of antibodies to HCV; recommend   supplemental testing HCV RNA Quantitative by PCR   ([id number]) if clinically indicated.             Immature Grans (Abs) 0.04  Comment: Mild elevation in immature granulocytes is non specific and   can be seen in a variety of conditions including stress response,   acute inflammation, trauma and pregnancy. Correlation with other   laboratory and clinical findings is essential.                 Immature Granulocytes 0.8               INR     1.2  Comment: Coumadin Therapy:  2.0 - 3.0 for INR for all indicators except mechanical heart valves  and antiphospholipid syndromes which should use 2.5 - 3.5.             Ketones, UA         Negative       Lactic Acid Level     1.1  Comment: Falsely low lactic acid results can be found in samples   containing >=13.0 mg/dL total bilirubin and/or >=3.5 mg/dL   direct bilirubin.             Leukocyte Esterase, UA         Negative       Lymph # 1.9               Lymph % 36.7               MCH 22.1               MCHC 30.2               MCV 73               Mono # 0.7               Mono % 14.2               MPV 9.0               NITRITE UA          Negative       nRBC 0               Blood, UA         Negative       QRS Duration       72         O[facility] QTC Calculation       415         Opiates, Urine         Presumptive Positive       pH, UA         6.0       Platelet Estimate Appears normal               Platelet Count 352               Potassium 4.8               Protein, UA         Negative  Comment: Recommend a 24 hour urine protein or a urine   protein/creatinine ratio if globulin induced proteinuria is  clinically suspected.         PT     13.6           RBC 4.07               RDW 20.8               Schistocytes Present               Sodium 134               Specific [place], UA         1.010       Specimen UA         Urine, Clean Catch       Teardrop Cells Occasional               Marijuana (THC) Metabolite         Presumptive Positive       Toxicology Information         SEE COMMENT  Comment: This screen includes the following classes of drugs at the listed   cut-off:    Benzodiazepines 200 ng/ml  Methadone 300 ng/ml  Cocaine metabolite 300 ng/ml  Opiates 300 ng/ml  Barbiturates 200 ng/ml  Amphetamines 1000 ng/ml  Marijuana metabs (THC) 50 ng/ml  Phencyclidine (PCP) 25 ng/ml    This is a screening test. If results do not correlate with clinical   presentation, then a confirmatory send out test is advised.     This report is intended for use in clinical monitoring and management   of   patients. It is not intended for use in employment related drug   testing.         UROBILINOGEN UA         Negative       WBC 5.20                                       [P] - Preliminary Result               Significant Imaging:   MRI Lumbar Spine With Contrast   Final Result   Addendum (preliminary) 1 of 1      7 mm abscess of the left iliopsoas muscle, decreased from 23 mm.  See    separately dictated MR pelvis.         Electronically signed by: Arturo Esquivel   Date:    03/13/2024   Time:    01:52      Final      L5-S1 and S1-2 discitis osteomyelitis with abscess, as  above.      Diffuse infectious myositis of the bilateral lower lumbar paraspinal musculature and within the left psoas and iliopsoas muscles.  No discrete abscess.         Electronically signed by: Arturo Esquivel   Date:    03/13/2024   Time:    01:42      MRI Pelvis W WO Contrast   Final Result      As above.         Electronically signed by: Arturo Esquivel   Date:    03/13/2024   Time:    01:51      MRI Thoracic Spine W WO Cont   Final Result      Unremarkable MRI thoracic spine.      Right lung base abnormality, as above.  Recommend dedicated chest CT.         Electronically signed by: Arturo Esquivel   Date:    03/13/2024   Time:    01:33      MRI Lumbar Spine Without Contrast   Final Result   Abnormal      Findings concerning for continued osteomyelitis with suspected additional soft tissue infection along the left ileo psoas musculature and possibly dorsally as well throughout the paraspinal musculature.  Contrast enhanced exam would be recommended for better delineation.      Complete details as above.      This report was flagged in Epic as abnormal.         Electronically signed by: Carlo Tanner   Date:    03/12/2024   Time:    19:36      X-Ray Chest AP Portable   Final Result      No acute cardiopulmonary abnormality.         Electronically signed by: Maryann Bull   Date:    03/12/2024   Time:    16:52         Assessment/Plan:     Psoas abscess, left  -MRI of the spine images reviewed  -continue IV antibiotics  -Dr. Ayers, neurosurgery, has recommended transfer to [facility] as we do not have neurosurgery at [facility].  -pain control   -NPO (ER has contacted [facility] to determine if the patient is to go to surgery today.  If not we will start clear liquid diet)  -blood cultures from 03/12/2024-no growth to date      L5-S2 epidural phlegmon  -see above      Discitis of lumbosacral region  -see above      HTN (hypertension)  Chronic, controlled. Latest blood pressure and vitals  reviewed-     Temp:  [99.5 °F (37.5 °C)-99.6 °F (37.6 °C)]   Pulse:  []   Resp:  [12-20]   BP: ()/(52-85)   SpO2:  [96 %-100 %] .   Home meds for hypertension were reviewed and noted below.   Hypertension Medications               cloNIDine (CATAPRES) 0.1 MG tablet Take 1 tablet (0.1 mg total) by mouth every 12 (twelve) hours.            While in the hospital, will manage blood pressure as follows:  Hold home meds for now due to hypotension    Will utilize p.r.n. blood pressure medication only if patient's blood pressure greater than 160/100 and he develops symptoms such as worsening chest pain or shortness of breath.    Marijuana use  -patient denies drug use, but UDS positive for marijuana and opiates      Substance or medication-induced depressive disorder  -Continue home Remeron      Cocaine use disorder, severe, dependence  -patient states that he stopped using cocaine at the start of his last admission.    -UDS was only positive for marijuana and opiates        VTE Risk Mitigation (From admission, onward)      None                Thank you for your consult. I will follow-up with patient. Please contact us if you have any additional questions.    Tino Torre MD  Department of Hospital Medicine   [facility] - Emergency Dept.

## 2024-03-14 ENCOUNTER — HOSPITAL ENCOUNTER (INPATIENT)
Facility: HOSPITAL | Age: 29
LOS: 15 days | Discharge: ANOTHER HOSPITAL | DRG: 871 | End: 2024-03-29
Attending: STUDENT IN AN ORGANIZED HEALTH CARE EDUCATION/TRAINING PROGRAM | Admitting: STUDENT IN AN ORGANIZED HEALTH CARE EDUCATION/TRAINING PROGRAM
Payer: MEDICAID

## 2024-03-14 DIAGNOSIS — M46.20 SPINAL ABSCESS: ICD-10-CM

## 2024-03-14 DIAGNOSIS — M46.27 OSTEOMYELITIS OF VERTEBRA OF LUMBOSACRAL REGION: Primary | ICD-10-CM

## 2024-03-14 PROBLEM — R65.20 SEVERE SEPSIS: Status: ACTIVE | Noted: 2024-03-14

## 2024-03-14 PROBLEM — M00.9 SEPTIC ARTHRITIS: Status: ACTIVE | Noted: 2024-03-14

## 2024-03-14 PROBLEM — M60.9 MYOSITIS OF MULTIPLE SITES: Status: ACTIVE | Noted: 2024-03-14

## 2024-03-14 PROBLEM — F19.20 DRUG DEPENDENCE: Status: ACTIVE | Noted: 2024-03-14

## 2024-03-14 PROBLEM — E87.1 HYPONATREMIA: Status: ACTIVE | Noted: 2024-03-14

## 2024-03-14 PROBLEM — I50.32 CHRONIC DIASTOLIC HEART FAILURE: Status: ACTIVE | Noted: 2024-03-14

## 2024-03-14 PROBLEM — K75.9 HEPATITIS: Status: ACTIVE | Noted: 2024-03-14

## 2024-03-14 PROBLEM — A41.9 SEVERE SEPSIS: Status: ACTIVE | Noted: 2024-03-14

## 2024-03-14 LAB
ALBUMIN SERPL BCP-MCNC: 3 G/DL (ref 3.5–5.2)
ALP SERPL-CCNC: 251 U/L (ref 55–135)
ALT SERPL W/O P-5'-P-CCNC: 235 U/L (ref 10–44)
ANION GAP SERPL CALC-SCNC: 7 MMOL/L (ref 8–16)
AST SERPL-CCNC: 335 U/L (ref 10–40)
BILIRUB SERPL-MCNC: 0.8 MG/DL (ref 0.1–1)
BUN SERPL-MCNC: 17 MG/DL (ref 6–20)
CALCIUM SERPL-MCNC: 9.4 MG/DL (ref 8.7–10.5)
CHLORIDE SERPL-SCNC: 105 MMOL/L (ref 95–110)
CK SERPL-CCNC: 39 U/L (ref 20–200)
CO2 SERPL-SCNC: 23 MMOL/L (ref 23–29)
CREAT SERPL-MCNC: 0.9 MG/DL (ref 0.5–1.4)
CRP SERPL-MCNC: 30.3 MG/L (ref 0–8.2)
ERYTHROCYTE [SEDIMENTATION RATE] IN BLOOD BY PHOTOMETRIC METHOD: 15 MM/HR (ref 0–23)
EST. GFR  (NO RACE VARIABLE): >60 ML/MIN/1.73 M^2
GLUCOSE SERPL-MCNC: 124 MG/DL (ref 70–110)
HCV AB SERPL QL IA: REACTIVE
HIV 1+2 AB+HIV1 P24 AG SERPL QL IA: NORMAL
POTASSIUM SERPL-SCNC: 4 MMOL/L (ref 3.5–5.1)
PROT SERPL-MCNC: 8.1 G/DL (ref 6–8.4)
SODIUM SERPL-SCNC: 135 MMOL/L (ref 136–145)

## 2024-03-14 PROCEDURE — 85652 RBC SED RATE AUTOMATED: CPT | Performed by: STUDENT IN AN ORGANIZED HEALTH CARE EDUCATION/TRAINING PROGRAM

## 2024-03-14 PROCEDURE — 11000001 HC ACUTE MED/SURG PRIVATE ROOM

## 2024-03-14 PROCEDURE — 99223 1ST HOSP IP/OBS HIGH 75: CPT | Mod: ,,, | Performed by: INTERNAL MEDICINE

## 2024-03-14 PROCEDURE — 86803 HEPATITIS C AB TEST: CPT

## 2024-03-14 PROCEDURE — 25000003 PHARM REV CODE 250: Performed by: STUDENT IN AN ORGANIZED HEALTH CARE EDUCATION/TRAINING PROGRAM

## 2024-03-14 PROCEDURE — 87186 SC STD MICRODIL/AGAR DIL: CPT

## 2024-03-14 PROCEDURE — 94761 N-INVAS EAR/PLS OXIMETRY MLT: CPT

## 2024-03-14 PROCEDURE — 63600175 PHARM REV CODE 636 W HCPCS

## 2024-03-14 PROCEDURE — 87154 CUL TYP ID BLD PTHGN 6+ TRGT: CPT

## 2024-03-14 PROCEDURE — 80053 COMPREHEN METABOLIC PANEL: CPT | Performed by: STUDENT IN AN ORGANIZED HEALTH CARE EDUCATION/TRAINING PROGRAM

## 2024-03-14 PROCEDURE — 87522 HEPATITIS C REVRS TRNSCRPJ: CPT

## 2024-03-14 PROCEDURE — 87040 BLOOD CULTURE FOR BACTERIA: CPT | Mod: 59

## 2024-03-14 PROCEDURE — 99499 UNLISTED E&M SERVICE: CPT | Mod: ,,, | Performed by: STUDENT IN AN ORGANIZED HEALTH CARE EDUCATION/TRAINING PROGRAM

## 2024-03-14 PROCEDURE — 63600175 PHARM REV CODE 636 W HCPCS: Performed by: STUDENT IN AN ORGANIZED HEALTH CARE EDUCATION/TRAINING PROGRAM

## 2024-03-14 PROCEDURE — 25000003 PHARM REV CODE 250

## 2024-03-14 PROCEDURE — 87389 HIV-1 AG W/HIV-1&-2 AB AG IA: CPT

## 2024-03-14 PROCEDURE — 86140 C-REACTIVE PROTEIN: CPT

## 2024-03-14 PROCEDURE — 87040 BLOOD CULTURE FOR BACTERIA: CPT | Performed by: STUDENT IN AN ORGANIZED HEALTH CARE EDUCATION/TRAINING PROGRAM

## 2024-03-14 PROCEDURE — 82550 ASSAY OF CK (CPK): CPT

## 2024-03-14 PROCEDURE — 87077 CULTURE AEROBIC IDENTIFY: CPT

## 2024-03-14 PROCEDURE — 25000003 PHARM REV CODE 250: Performed by: NURSE PRACTITIONER

## 2024-03-14 RX ORDER — HYDROMORPHONE HYDROCHLORIDE 1 MG/ML
1 INJECTION, SOLUTION INTRAMUSCULAR; INTRAVENOUS; SUBCUTANEOUS
Status: DISCONTINUED | OUTPATIENT
Start: 2024-03-14 | End: 2024-03-14

## 2024-03-14 RX ORDER — OXYCODONE HCL 10 MG/1
10 TABLET, FILM COATED, EXTENDED RELEASE ORAL EVERY 12 HOURS
Status: DISCONTINUED | OUTPATIENT
Start: 2024-03-14 | End: 2024-03-29 | Stop reason: HOSPADM

## 2024-03-14 RX ORDER — AMOXICILLIN 250 MG
2 CAPSULE ORAL DAILY
Status: DISCONTINUED | OUTPATIENT
Start: 2024-03-14 | End: 2024-03-29 | Stop reason: HOSPADM

## 2024-03-14 RX ORDER — ACETAMINOPHEN 325 MG/1
650 TABLET ORAL EVERY 6 HOURS PRN
Status: DISCONTINUED | OUTPATIENT
Start: 2024-03-14 | End: 2024-03-14

## 2024-03-14 RX ORDER — OXYCODONE HYDROCHLORIDE 5 MG/1
5 TABLET ORAL EVERY 6 HOURS PRN
Status: DISCONTINUED | OUTPATIENT
Start: 2024-03-14 | End: 2024-03-14

## 2024-03-14 RX ORDER — OXYCODONE HYDROCHLORIDE 10 MG/1
10 TABLET ORAL EVERY 6 HOURS PRN
Status: DISCONTINUED | OUTPATIENT
Start: 2024-03-14 | End: 2024-03-14

## 2024-03-14 RX ORDER — MIRTAZAPINE 15 MG/1
15 TABLET, FILM COATED ORAL NIGHTLY
Status: DISCONTINUED | OUTPATIENT
Start: 2024-03-14 | End: 2024-03-29 | Stop reason: HOSPADM

## 2024-03-14 RX ORDER — AMOXICILLIN 250 MG
2 CAPSULE ORAL NIGHTLY PRN
Status: DISCONTINUED | OUTPATIENT
Start: 2024-03-14 | End: 2024-03-14

## 2024-03-14 RX ORDER — OXYCODONE HCL 10 MG/1
10 TABLET, FILM COATED, EXTENDED RELEASE ORAL EVERY 12 HOURS PRN
Status: DISCONTINUED | OUTPATIENT
Start: 2024-03-14 | End: 2024-03-14

## 2024-03-14 RX ORDER — ACETAMINOPHEN 325 MG/1
650 TABLET ORAL EVERY 4 HOURS PRN
Status: DISCONTINUED | OUTPATIENT
Start: 2024-03-14 | End: 2024-03-14

## 2024-03-14 RX ORDER — ACETAMINOPHEN 325 MG/1
650 TABLET ORAL EVERY 6 HOURS PRN
Status: DISCONTINUED | OUTPATIENT
Start: 2024-03-14 | End: 2024-03-29 | Stop reason: HOSPADM

## 2024-03-14 RX ORDER — POLYETHYLENE GLYCOL 3350 17 G/17G
17 POWDER, FOR SOLUTION ORAL DAILY
Status: DISCONTINUED | OUTPATIENT
Start: 2024-03-14 | End: 2024-03-29 | Stop reason: HOSPADM

## 2024-03-14 RX ORDER — IBUPROFEN 200 MG
24 TABLET ORAL
Status: DISCONTINUED | OUTPATIENT
Start: 2024-03-14 | End: 2024-03-29 | Stop reason: HOSPADM

## 2024-03-14 RX ORDER — HYDRALAZINE HYDROCHLORIDE 20 MG/ML
10 INJECTION INTRAMUSCULAR; INTRAVENOUS EVERY 6 HOURS PRN
Status: DISCONTINUED | OUTPATIENT
Start: 2024-03-14 | End: 2024-03-29 | Stop reason: HOSPADM

## 2024-03-14 RX ORDER — SODIUM CHLORIDE 9 MG/ML
INJECTION, SOLUTION INTRAVENOUS CONTINUOUS
Status: DISCONTINUED | OUTPATIENT
Start: 2024-03-14 | End: 2024-03-21

## 2024-03-14 RX ORDER — HYDROMORPHONE HYDROCHLORIDE 1 MG/ML
1 INJECTION, SOLUTION INTRAMUSCULAR; INTRAVENOUS; SUBCUTANEOUS EVERY 6 HOURS PRN
Status: DISCONTINUED | OUTPATIENT
Start: 2024-03-14 | End: 2024-03-14

## 2024-03-14 RX ORDER — IBUPROFEN 200 MG
16 TABLET ORAL
Status: DISCONTINUED | OUTPATIENT
Start: 2024-03-14 | End: 2024-03-29 | Stop reason: HOSPADM

## 2024-03-14 RX ORDER — CLONIDINE HYDROCHLORIDE 0.1 MG/1
0.1 TABLET ORAL EVERY 12 HOURS
Status: DISCONTINUED | OUTPATIENT
Start: 2024-03-14 | End: 2024-03-29 | Stop reason: HOSPADM

## 2024-03-14 RX ORDER — TALC
6 POWDER (GRAM) TOPICAL NIGHTLY PRN
Status: DISCONTINUED | OUTPATIENT
Start: 2024-03-14 | End: 2024-03-29 | Stop reason: HOSPADM

## 2024-03-14 RX ORDER — GLUCAGON 1 MG
1 KIT INJECTION
Status: DISCONTINUED | OUTPATIENT
Start: 2024-03-14 | End: 2024-03-29 | Stop reason: HOSPADM

## 2024-03-14 RX ORDER — METHOCARBAMOL 500 MG/1
500 TABLET, FILM COATED ORAL 4 TIMES DAILY
Status: DISCONTINUED | OUTPATIENT
Start: 2024-03-14 | End: 2024-03-29 | Stop reason: HOSPADM

## 2024-03-14 RX ORDER — ONDANSETRON HYDROCHLORIDE 2 MG/ML
4 INJECTION, SOLUTION INTRAVENOUS EVERY 6 HOURS PRN
Status: DISCONTINUED | OUTPATIENT
Start: 2024-03-14 | End: 2024-03-29 | Stop reason: HOSPADM

## 2024-03-14 RX ORDER — OXYCODONE HYDROCHLORIDE 5 MG/1
TABLET ORAL
Status: DISPENSED
Start: 2024-03-14 | End: 2024-03-14

## 2024-03-14 RX ORDER — MIRTAZAPINE 15 MG/1
15 TABLET, FILM COATED ORAL NIGHTLY
Status: DISCONTINUED | OUTPATIENT
Start: 2024-03-14 | End: 2024-03-14

## 2024-03-14 RX ORDER — OXYCODONE HYDROCHLORIDE 10 MG/1
10 TABLET ORAL EVERY 4 HOURS PRN
Status: DISCONTINUED | OUTPATIENT
Start: 2024-03-14 | End: 2024-03-14

## 2024-03-14 RX ORDER — IBUPROFEN 400 MG/1
400 TABLET ORAL EVERY 6 HOURS PRN
Status: DISCONTINUED | OUTPATIENT
Start: 2024-03-14 | End: 2024-03-14

## 2024-03-14 RX ADMIN — ACETAMINOPHEN 650 MG: 325 TABLET ORAL at 06:03

## 2024-03-14 RX ADMIN — CLONIDINE HYDROCHLORIDE 0.1 MG: 0.1 TABLET ORAL at 08:03

## 2024-03-14 RX ADMIN — DAPTOMYCIN 780 MG: 350 INJECTION, POWDER, LYOPHILIZED, FOR SOLUTION INTRAVENOUS at 09:03

## 2024-03-14 RX ADMIN — SODIUM CHLORIDE: 9 INJECTION, SOLUTION INTRAVENOUS at 07:03

## 2024-03-14 RX ADMIN — POLYETHYLENE GLYCOL 3350 17 G: 17 POWDER, FOR SOLUTION ORAL at 08:03

## 2024-03-14 RX ADMIN — METHOCARBAMOL 500 MG: 500 TABLET ORAL at 08:03

## 2024-03-14 RX ADMIN — OXYCODONE HYDROCHLORIDE 15 MG: 10 TABLET ORAL at 10:03

## 2024-03-14 RX ADMIN — OXYCODONE HYDROCHLORIDE 10 MG: 10 TABLET, FILM COATED, EXTENDED RELEASE ORAL at 08:03

## 2024-03-14 RX ADMIN — HYDROMORPHONE HYDROCHLORIDE 1 MG: 1 INJECTION, SOLUTION INTRAMUSCULAR; INTRAVENOUS; SUBCUTANEOUS at 01:03

## 2024-03-14 RX ADMIN — METHOCARBAMOL 500 MG: 500 TABLET ORAL at 04:03

## 2024-03-14 RX ADMIN — OXYCODONE HYDROCHLORIDE 15 MG: 10 TABLET ORAL at 08:03

## 2024-03-14 RX ADMIN — ACETAMINOPHEN 650 MG: 325 TABLET ORAL at 12:03

## 2024-03-14 RX ADMIN — MIRTAZAPINE 15 MG: 15 TABLET, FILM COATED ORAL at 08:03

## 2024-03-14 RX ADMIN — OXYCODONE HYDROCHLORIDE 15 MG: 10 TABLET ORAL at 06:03

## 2024-03-14 RX ADMIN — OXYCODONE HYDROCHLORIDE 15 MG: 10 TABLET ORAL at 02:03

## 2024-03-14 RX ADMIN — METHOCARBAMOL 500 MG: 500 TABLET ORAL at 12:03

## 2024-03-14 RX ADMIN — HYDROMORPHONE HYDROCHLORIDE 1 MG: 1 INJECTION, SOLUTION INTRAMUSCULAR; INTRAVENOUS; SUBCUTANEOUS at 07:03

## 2024-03-14 RX ADMIN — IBUPROFEN 400 MG: 400 TABLET ORAL at 04:03

## 2024-03-14 RX ADMIN — DOCUSATE SODIUM AND SENNOSIDES 2 TABLET: 8.6; 5 TABLET, FILM COATED ORAL at 08:03

## 2024-03-14 NOTE — H&P
[facility] Neurosurgery ([facility])  Neuorsurgery  History and Physical     Patient Name: Ton Donovan  MRN: [id number]  Admission Date: 3/14/2024  Attending Physician: Boy Pozo MD   Primary Care Physician: Steve Kelly DO    Patient information was obtained from ER records.     Subjective:     Chief Complaint/Reason for Admission: Fevers     HPI:  Mr. Donovan is a 28-year-old male with history of IV drug abuse and hypertension recently admitted to [facility]. Patient was admitted to [facility] for 17 days and discharged on 02/22/2024. He was seen by addiction medicine, Neurosurgery, Orthopedic surgery, IR and Infectious Disease. He underwent aspiration of the psoas abscess by IR on 02/07 as well as LOOP X fusion spine and epidural phlegmon debridement which grew MRSA. Patient was started on IV daptomycin to be followed with Infectious Disease and discharge to [facility] for completion of antibiotics. Patient was sent from [facility] for ongoing fevers. Upon arrival he was noted to have a fever of 99.6, WBC 9.62, sed rate 59, CRP 46.5, , , procalcitonin 1.18, lactic acid 1.1, UDS positive for marijuana and opiates. MRI of the L-spine showed continued L5-S1 and S1 to diskitis osteomyelitis with abscess, diffuse infectious myositis of the bilateral lower lumbar paraspinal musculatures and within the left psoas and iliopsoas muscle. No discrete abscess. Dr. Garcia with Neurosurgery was consulted at [facility] have recommended transfer. [facility] transfer has been pending for the last 18 hours. Hospital medicine was consulted for medical management. Patient's only chronic medical issue is hypertension. Currently blood pressure medicine is on hold due to hypotension.     Medications Prior to Admission   Medication Sig Dispense Refill Last Dose    acetaminophen (TYLENOL) 325 MG tablet Take 2 tablets (650 mg total) by mouth every 4 (four) hours as needed.  0     cloNIDine (CATAPRES) 0.1 MG  tablet Take 1 tablet (0.1 mg total) by mouth every 12 (twelve) hours. 60 tablet 11     mirtazapine (REMERON) 15 MG tablet Take 1 tablet (15 mg total) by mouth every evening. 30 tablet 11     mv-min/vit C/glut/lysine/hb124 (IMMUNE SUPPORT ORAL) Take 1 tablet by mouth once daily.       naloxone (NARCAN) 4 mg/actuation Spry 4 mg by Nasal route.       oxyCODONE (ROXICODONE) 15 MG Tab Take 1 tablet (15 mg total) by mouth every 4 (four) hours as needed for Pain.  0     sodium chloride 0.9% SolP 50 mL with DAPTOmycin 500 mg SolR 767 mg Inject 767 mg into the vein once daily.          Review of patient's allergies indicates:  No Known Allergies    Past Medical History:   Diagnosis Date    Hypertension     Unilateral inguinal hernia, without obstruction or gangrene, not specified as recurrent      Past Surgical History:   Procedure Laterality Date    HAND ARTHROTOMY Right 7/7/2023    Procedure: ARTHROTOMY, HAND - R LF PIP;  Surgeon: Boy Lamb MD;  Location: [address];  Service: Orthopedics;  Laterality: Right;    INCISION AND DRAINAGE OF ABSCESS Right 10/12/2023    Procedure: INCISION AND DRAINAGE, ABSCESS;  Surgeon: Steve Monteiro MD;  Location: [address];  Service: General;  Laterality: Right;  right chest    IRRIGATION AND DEBRIDEMENT OF UPPER EXTREMITY Bilateral 7/7/2023    Procedure: IRRIGATION AND DEBRIDEMENT, UPPER EXTREMITY - RIGHT HAND;  Surgeon: Boy Lamb MD;  Location: [address];  Service: Orthopedics;  Laterality: Bilateral;    LUMBAR FUSION N/A 2/14/2024    Procedure: LOOP X FUSION, SPINE, LUMBAR;  Surgeon: Boy Pozo MD;  Location: [address];  Service: Neurosurgery;  Laterality: N/A;  L4-Pelvis fusion; *LOOP-X* depuy, neuromonitoring    OLECRANON BURSECTOMY Left 7/7/2023    Procedure: BURSECTOMY, OLECRANON;  Surgeon: Boy Lamb MD;  Location: [address];  Service: Orthopedics;  Laterality: Left;     Family History    None       Tobacco Use    Smoking  status: Former     Types: Cigarettes    Smokeless tobacco: Never   Substance and Sexual Activity    Alcohol use: No    Drug use: Yes     Types: Marijuana, IV     Comment: 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    Sexual activity: Not on file     Review of Systems  Objective:     Weight: 78 kg (172 lb)  Body mass index is 24.68 kg/m².  Vital Signs (Most Recent):  Temp: 99 °F (37.2 °C) (03/14/24 0738)  Pulse: 60 (03/14/24 0738)  Resp: 17 (03/14/24 0826)  BP: 121/64 (03/14/24 0738)  SpO2: 98 % (03/14/24 0922) Vital Signs (24h Range):  Temp:  [97.5 °F (36.4 °C)-99 °F (37.2 °C)] 99 °F (37.2 °C)  Pulse:  [54-84] 60  Resp:  [16-18] 17  SpO2:  [97 %-100 %] 98 %  BP: (103-147)/(53-70) 121/64                                 Physical Exam         Neurosurgery Physical Exam  General: well developed, well nourished, no distress.   Head: normocephalic, atraumatic  Mental Status: Awake, Alert, Oriented  Speech: Clear with content appropriate to conversation  Cranial nerves: face symmetric, CN II-XII grossly intact.   Sensory: intact to light touch throughout     Motor Strength:     Strength   Deltoids Triceps Biceps Wrist Extension Wrist Flexion Hand    Upper: R 5/5 5/5 5/5 5/5 5/5 5/5     L 5/5 5/5 5/5 5/5 5/5 5/5       Iliopsoas Quadriceps Knee  Flexion Tibialis  anterior Gastro- cnemius EHL   Lower: R 5/5 5/5 5/5 5/5 4/5 4/5     L 5/5 5/5 5/5 5/5 4/5 4/5      Incision clean, dry intact with staples. No surrounding erythema or edema.   Significant Labs:  Recent Labs   Lab 03/12/24 1654 03/13/24  1050   GLU 93 94   * 134*   K 4.7 4.8    102   CO2 24 23   BUN 12 9   CREATININE 0.8 0.8   CALCIUM 9.4 9.7     Recent Labs   Lab 03/12/24  1654 03/13/24  1050   WBC 9.62 5.20   HGB 8.5* 9.0*   HCT 27.8* 29.8*    352     Recent Labs   Lab 03/12/24  2038   INR 1.2   APTT 28.2     Microbiology Results (last 7 days)       Procedure Component Value Units Date/Time    Blood culture [[id number]]     Order Status: Sent  Specimen: Blood           All pertinent labs from the last 24 hours have been reviewed.    Significant Diagnostics:  CT: No results found in the last 24 hours.  MRI: No results found in the last 24 hours.  I have reviewed all pertinent imaging results/findings within the past 24 hours.  Assessment and Plan:     Discitis of lumbosacral region  Mr. Donovan is a 28-year-old male with history of IV drug abuse and hypertension recently admitted to [facility]. Patient was admitted to [facility] for 17 days and discharged on 02/22/2024. Pt presents to [facility] because  ongoing fevers at [facility].     MRI of the L-spine showed continued L5-S1 and S1 to discitis osteomyelitis with abscess, diffuse infectious myositis of the bilateral lower lumbar paraspinal musculatures and within the left psoas and iliopsoas muscle.     Recommendations:  -NSGY Evaluated at bedside  -Consult medicine for comanagement  -Continue Abx  Further recs pending        Pepe Mandel MD  Neurosurgery  [facility] - Neurosurgery ([facility])

## 2024-03-14 NOTE — ASSESSMENT & PLAN NOTE
I have reviewed hospital notes from   service and other specialty providers. I have also reviewed CBC, CMP/BMP,  cultures and imaging with my interpretation as documented.      28M with h/o IVDU, MRSA bacteremia and prior disseminated MRSA infection with poor adherence with care (history of leaving AMA), readmitted 02/04 with progressive back pain. Imaging significant for lumbosacral osteomyelitis with associated epidural abscess, osteomyelitis of sacrum & L iliac bone, probable septic arthritis of L SI joint & lower lumbar spine facet joints, and left iliopsoas abscess, abscess in superior rectal/presacral region and micro-abscesses in paraspinal muscles, pelvis, and left sacrum. Blood cultures remained negative. TTE negative. Underwent IR aspiration of left SI joint and psoas on 02/07.  Cxs +MRSA. Abx held prior to IR bx; post-procedure started on empiric Iv-vanc / CTX.   S/p lumbar-pelvis debridement with fusion (02/14). Surgical cxs +MRSA. Pt discharged to [facility], to complete 6wks Iv-Dapto 10mg/kg Q24h, [place] 04/10/24. (Of note, switched vanc to IV daptomycin (d/t subtherapeutic vancomycin levels with q8hr dosing). Advised repeat imaging / neuro f/u near [place] 04/10; followed by abx suppression thereafter, likely lifelong.   However, pt presents 03/12 to [facility] [place] now d/t new onset of intermittent fevers (101.4F) at [facility] for past few days. On arrival, temp 99F, VSS, HDS, wbc nml, CRP 46,  / , , Cr 0.8.  CPK 39 nml.  Bld cxs 03/12 NGTD.    Repeat MRI L-spine / pelvis (03/13): Extensive epidural infectious/inflammatory change posterior to the L5-S1 and S1-2 disc spaces; with paravertebral / pre-vertebral abscess. Diffuse infectious myositis of the bilateral lower lumbar paraspinal musculature and within the left psoas and iliopsoas muscles, with 0.7 cm abscess (down from 2.3cm on prior 02/04). Persistent osteomyelitis and septic arthritis about the left SI joint.       Recommendations /  Plan:  Continue Iv-Daptomycin 10mg/kg Q24h  Follow up NSGY recs / plans.       -- Discussed with ID staff and primary team   -- ID will continue to follow w/ further recs.

## 2024-03-14 NOTE — CONSULTS
[facility]ks[facility]r [facility] - Neurosurgery ([facility])  [facility] Medicine  Consult Note    Patient Name: Ton Donovan  MRN: [id number]  Admission Date: 3/14/2024  Hospital Length of Stay: 0 days  Attending Physician: Boy Pozo MD   Primary Care Provider: Steve Kelly DO           Patient information was obtained from patient, past medical records, ER records, and primary team.     Inpatient consult to [facility] Medicine-General  Consult performed by: Shannon Andrade DO  Consult ordered by: Leeroy Pepe MD        Subjective:     Principal Problem: Osteomyelitis of vertebra of lumbosacral region    Chief Complaint: No chief complaint on file.       HPI: Mr. Ton Donovan is a 28 year-old Male with a PMHx of Hypertension, IV drug use who presented with fevers and ongoing back pain with radiation down legs.    Of note, he was recently admitted to [facility] for LOOP X lumbar spinal fusion, with course complicated by a psoas abscess and MRSA epidural abscess s/p epidural phlegmon debridement. He was started on IV Daptomycin and discharged on 2/22/24 to [facility] for completion of antibiotics. Ongoing fevers complicated his LTAC stay, and he returned to the [facility] for further evaluation. On arrival, WBC 9, ESR 59, CRP 46, AST 360s, ALT 200s. Procalcitonin was elevated at 1.18, lactate normal. MRI L spine concerning for continued L5-S1/S1 diskitis and osteomyelitis with abscess, diffuse infectious myositis of the lumbar paraspinal musculature bilaterally and left iliopsoas muscle without discrete abscess. He was then transferred to [facility] for Neurosurgerical evaluation.     At the time of this consult, temp 99F, HR 60, 120s/60s, saturating well on room air. Most recent labs with WBC 5, Hgb 9, Na 134.    Medicine consulted for medical co-management of epidural abscess    Past Medical History:   Diagnosis Date    Hypertension     Unilateral inguinal hernia, without obstruction or gangrene, not specified as  recurrent        Past Surgical History:   Procedure Laterality Date    HAND ARTHROTOMY Right 7/7/2023    Procedure: ARTHROTOMY, HAND - R LF PIP;  Surgeon: Boy Lamb MD;  [facility];  Service: Orthopedics;  Laterality: Right;    INCISION AND DRAINAGE OF ABSCESS Right 10/12/2023    Procedure: INCISION AND DRAINAGE, ABSCESS;  Surgeon: Steve Monteiro MD;  [facility];  Service: General;  Laterality: Right;  right chest    IRRIGATION AND DEBRIDEMENT OF UPPER EXTREMITY Bilateral 7/7/2023    Procedure: IRRIGATION AND DEBRIDEMENT, UPPER EXTREMITY - RIGHT HAND;  Surgeon: Boy Lamb MD;  [facility];  Service: Orthopedics;  Laterality: Bilateral;    LUMBAR FUSION N/A 2/14/2024    Procedure: LOOP X FUSION, SPINE, LUMBAR;  Surgeon: Boy Pozo MD;  [facility];  Service: Neurosurgery;  Laterality: N/A;  L4-Pelvis fusion; *LOOP-X* depuy, neuromonitoring    OLECRANON BURSECTOMY Left 7/7/2023    Procedure: BURSECTOMY, OLECRANON;  Surgeon: Boy Lamb MD;  Location[facility] [address] [place] [address];  Service: Orthopedics;  Laterality: Left;       Review of patient's allergies indicates:  No Known Allergies    Current Facility-Administered Medications on File Prior to Encounter   Medication    [DISCONTINUED] ceFEPIme (MAXIPIME) 2 g in dextrose 5 % in water (D5W) 100 mL IVPB (MB+)    [DISCONTINUED] hydrALAZINE injection 10 mg    [DISCONTINUED] HYDROmorphone (PF) injection 1 mg    [DISCONTINUED] mirtazapine tablet 15 mg    [DISCONTINUED] vancomycin - pharmacy to dose    [DISCONTINUED] vancomycin 1,250 mg in dextrose 5 % (D5W) 250 mL IVPB (Vial-Mate)     Current Outpatient Medications on File Prior to Encounter   Medication Sig    acetaminophen (TYLENOL) 325 MG tablet Take 2 tablets (650 mg total) by mouth every 4 (four) hours as needed.    cloNIDine (CATAPRES) 0.1 MG tablet Take 1 tablet (0.1 mg total) by mouth every 12 (twelve) hours.    mirtazapine (REMERON) 15 MG  tablet Take 1 tablet (15 mg total) by mouth every evening.    mv-min/vit C/glut/lysine/hb124 (IMMUNE SUPPORT ORAL) Take 1 tablet by mouth once daily.    naloxone (NARCAN) 4 mg/actuation Spry 4 mg by Nasal route.    oxyCODONE (ROXICODONE) 15 MG Tab Take 1 tablet (15 mg total) by mouth every 4 (four) hours as needed for Pain.    sodium chloride 0.9% SolP 50 mL with DAPTOmycin 500 mg SolR 767 mg Inject 767 mg into the vein once daily.     Family History    None       Tobacco Use    Smoking status: Former     Types: Cigarettes    Smokeless tobacco: Never   Substance and Sexual Activity    Alcohol use: No    Drug use: Yes     Types: Marijuana, IV     Comment: 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    Sexual activity: Not on file     Review of Systems   Constitutional:  Positive for activity change and fever. Negative for appetite change and fatigue.   HENT:  Positive for rhinorrhea.    Respiratory:  Negative for cough, shortness of breath and wheezing.    Cardiovascular:  Negative for chest pain and leg swelling.   Gastrointestinal:  Negative for abdominal pain, constipation, diarrhea, nausea and vomiting.   Genitourinary:  Negative for dysuria.   Musculoskeletal:  Positive for back pain (controlled), gait problem (improving) and myalgias (lower back).   Neurological:  Positive for weakness (improving). Negative for dizziness, light-headedness, numbness and headaches.        (-) saddle anesthesia  (-) bowel or bladder incontinence     Objective:     Vital Signs (Most Recent):  Temp: 99 °F (37.2 °C) (03/14/24 0738)  Pulse: 60 (03/14/24 0738)  Resp: 17 (03/14/24 0826)  BP: 121/64 (03/14/24 0738)  SpO2: 98 % (03/14/24 0922) Vital Signs (24h Range):  Temp:  [97.5 °F (36.4 °C)-99 °F (37.2 °C)] 99 °F (37.2 °C)  Pulse:  [54-84] 60  Resp:  [16-20] 17  SpO2:  [96 %-100 %] 98 %  BP: (103-147)/(53-70) 121/64     Weight: 78 kg (172 lb)  Body mass index is 24.68 kg/m².     Physical Exam  Vitals and nursing note reviewed.    Constitutional:       General: He is not in acute distress.     Appearance: Normal appearance. He is normal weight. He is not ill-appearing, toxic-appearing or diaphoretic.   HENT:      Head: Normocephalic and atraumatic.      Mouth/Throat:      Mouth: Mucous membranes are moist.   Eyes:      General: No scleral icterus.        Right eye: No discharge.         Left eye: No discharge.      Extraocular Movements: Extraocular movements intact.      Pupils: Pupils are equal, round, and reactive to light.   Cardiovascular:      Rate and Rhythm: Normal rate and regular rhythm.   Pulmonary:      Effort: Pulmonary effort is normal. No respiratory distress.      Breath sounds: No wheezing or rales.   Chest:      Chest wall: No tenderness.   Musculoskeletal:         General: No swelling.   Skin:     General: Skin is warm and dry.      Findings: Wound (back) present.   Neurological:      Mental Status: He is alert and oriented to person, place, and time.   Psychiatric:         Mood and Affect: Mood normal.         Behavior: Behavior normal.          Significant Labs: All pertinent labs within the past 24 hours have been reviewed.    Significant Imaging: I have reviewed all pertinent imaging results/findings within the past 24 hours.  Assessment/Plan:     * Osteomyelitis of vertebra of lumbosacral region  28M with hypertension, IV drug use (heroin) presenting from LTAC with ongoing fevers    He was recently admitted on 2/04/24 with osteomyelitis/discitis L5-S1, sacral and left illiac osteomyelitis, L5-S1 epidural abscess, and left iliopsoas abscess. He underwent L4-pelvic fusion on 2/14/24 with epidural phlegmon debridement, intraoperative cultures positive for MRSA. Infectious Disease was consulted and recommended IV Daptomycin for 8 week course (EOC 4/10/24) to be followed by oral suppressive doxycycline thereafter. He was discharged on 2/22/24 to [facility] to complete his antibiotic course. LTAC stay was complicated by  ongoing fevers and he represented to [facility] for further evaluation prior to transfer to [facility] for neurosurgical evaluation. On arrival, WBC 9, ESR 59, CRP 46, AST 360s, ALT 200s. Procalcitonin was elevated at 1.18, lactate normal. MRI L spine showed continued L5-S1/S1 diskitis/osteomyelitis with abscess, diffuse infectious myositis of the bilateral lumbar paraspinal musculature and left iliopsoas muscle.    MRI Lumbar Spine With Contrast   Final Result   Addendum (preliminary) 1 of 1       7 mm abscess of the left iliopsoas muscle, decreased from 23 mm.  See    separately dictated MR pelvis.         Electronically signed by:       Arturo Esquivel   Date:                                                03/13/2024   Time:                                                01:52       Final      L5-S1 and S1-2 discitis osteomyelitis with abscess, as above.       Diffuse infectious myositis of the bilateral lower lumbar paraspinal musculature and within the left psoas and iliopsoas muscles.  No discrete abscess.         Plan:  - Infectious Disease consulted, appreciate assistance and recommendations  - CT L spine without contrast ordered and pending  - Continue IV Daptomycin q24h  - Weekly CK level while on Daptomycin  - Multimodal pain control with tylenol, gabapentin, methocarbamol, oxy IR, oxy ER  - Follow blood cultures through maturity  - Daily CBC, CMP    Hepatitis  Most recent labs from OSH showed AST 360s, ALT 200s. Had recent hepatitis panel 3/12 with hep c    - Recent Hepatitis C screening resulted positive, previous Hep C screening 3 months ago negative. Check repeat Hep C screening and PCR  - Check HIV given history of IVDU and ongoing fever despite antibiotics  - RUQ ultrasound  - Daily CMP to trend transaminases  - Infectious Diseases consulted and following      Severe sepsis  This patient does have evidence of infective focus  My overall impression is sepsis.  Source: Skin and Soft Tissue  (location epidurial)  Antibiotics given-   Antibiotics (72h ago, onward)      Start     Stop Route Frequency Ordered    03/14/24 0900  DAPTOmycin (CUBICIN) 780 mg in sodium chloride 0.9% SolP 50 mL IVPB         -- IV Every 24 hours (non-standard times) 03/14/24 0755          Latest lactate reviewed-  Recent Labs   Lab 03/12/24 2038   LACTATE 1.1     Organ dysfunction indicated by Acute liver injury    Fluid challenge Not needed - patient is not hypotensive      Post- resuscitation assessment No - Post resuscitation assessment not needed       Will Not start Pressors- Levophed for MAP of 65  Source control achieved by: Broad spectrum antibiotics    Plan:  - Infectious Disease consulted  - Continue IV Daptomycin  - Follow blood cultures through maturity    HTN (hypertension)  Chronic, controlled. Latest blood pressure and vitals reviewed-     Temp:  [97.5 °F (36.4 °C)-99 °F (37.2 °C)]   Pulse:  [54-84]   Resp:  [16-20]   BP: ()/(52-70)   SpO2:  [96 %-100 %] .   Home meds for hypertension were reviewed and noted below.   Hypertension Medications               cloNIDine (CATAPRES) 0.1 MG tablet Take 1 tablet (0.1 mg total) by mouth every 12 (twelve) hours.          Plan:  - While in the hospital, will manage blood pressure as follows; Continue home antihypertensive regimen. Currently on Clonidine 0.1 PO BID  - Will utilize p.r.n. blood pressure medication only if patient's blood pressure greater than 180/110 and he develops symptoms such as worsening chest pain or shortness of breath.    Discitis of lumbosacral region  See Osteomyelitis for consolidated plan      Substance or medication-induced depressive disorder  - Continue Mirtazipine        VTE Risk Mitigation (From admission, onward)           Ordered     Place BRENNAN hose  Until discontinued         03/14/24 0730     IP VTE HIGH RISK PATIENT  Once         03/14/24 0730     Place sequential compression device  Until discontinued         03/14/24 0730                         Thank you for your consult. I will sign off. Please contact us if you have any additional questions.    Shannon Andrade DO  Department of Hospital Medicine   Aleksandr lakesha - Neurosurgery ([facility])

## 2024-03-14 NOTE — ASSESSMENT & PLAN NOTE
Chronic, controlled. Latest blood pressure and vitals reviewed-     Temp:  [97.5 °F (36.4 °C)-99 °F (37.2 °C)]   Pulse:  [54-84]   Resp:  [16-20]   BP: ()/(52-70)   SpO2:  [96 %-100 %] .   Home meds for hypertension were reviewed and noted below.   Hypertension Medications               cloNIDine (CATAPRES) 0.1 MG tablet Take 1 tablet (0.1 mg total) by mouth every 12 (twelve) hours.          Plan:  - While in the hospital, will manage blood pressure as follows; Continue home antihypertensive regimen. Currently on Clonidine 0.1 PO BID  - Will utilize p.r.n. blood pressure medication only if patient's blood pressure greater than 180/110 and he develops symptoms such as worsening chest pain or shortness of breath.

## 2024-03-14 NOTE — ASSESSMENT & PLAN NOTE
Mr. Donovan is a 28-year-old male with history of IV drug abuse and hypertension recently admitted to [facility]. Patient was admitted to [facility] for 17 days and discharged on 02/22/2024. Pt presents to [facility] because  ongoing fevers at LTAC.     MRI of the L-spine showed continued L5-S1 and S1 to discitis osteomyelitis with abscess, diffuse infectious myositis of the bilateral lower lumbar paraspinal musculatures and within the left psoas and iliopsoas muscle.     Recommendations:  -NSGY Evaluated at bedside  -Consult medicine for comanagement  -Continue Abx  Further recs pending

## 2024-03-14 NOTE — NURSING
Patient arrived via transport stretcher from outside facility, A&O X4, VSS, afebrile, no distress noted, oriented to hospital setting, no complaints of pain, call bell within reach will monitor

## 2024-03-14 NOTE — PLAN OF CARE
Problem: Adult Inpatient Plan of Care  Goal: Plan of Care Review  Outcome: Ongoing, Progressing  Goal: Patient-Specific Goal (Individualized)  Outcome: Ongoing, Progressing  Goal: Absence of Hospital-Acquired Illness or Injury  Outcome: Ongoing, Progressing  Goal: Optimal Comfort and Wellbeing  Outcome: Ongoing, Progressing  Goal: Readiness for Transition of Care  Outcome: Ongoing, Progressing     Problem: Infection  Goal: Absence of Infection Signs and Symptoms  Outcome: Ongoing, Progressing  Intervention: Prevent or Manage Infection  Flowsheets (Taken 3/14/2024 1726)  Fever Reduction/Comfort Measures:   lightweight clothing   lightweight bedding   fluid intake increased   ice pack(s) applied  Infection Management: aseptic technique maintained  Isolation Precautions: precautions maintained     Problem: Adjustment to Illness (Sepsis/Septic Shock)  Goal: Optimal Coping  Outcome: Ongoing, Progressing     Problem: Infection Progression (Sepsis/Septic Shock)  Goal: Absence of Infection Signs and Symptoms  Outcome: Ongoing, Progressing  Intervention: Initiate Sepsis Management  Flowsheets (Taken 3/14/2024 1726)  Infection Prevention:   hand hygiene promoted   rest/sleep promoted  Infection Management: aseptic technique maintained  Isolation Precautions: precautions maintained     Patient oriented to unit. Pain management adjusted per patients medications at LTAC. Patient febrile with tmax of 102, treated with tylenol and ibuprofen. Telemetry applied to patient, PICC line dressing changed, continuous IV fluids maintained. CT completed today. Blood cultures/labs drawn. Plan to make NPO at midnight for abdominal ultrasound tomorrow. Plan communicated to patient. Bed is karin din lowest position with call light in reach. Patient instructed to call for any needs. Patient verbalized understanding.

## 2024-03-14 NOTE — SUBJECTIVE & OBJECTIVE
Medications Prior to Admission   Medication Sig Dispense Refill Last Dose    acetaminophen (TYLENOL) 325 MG tablet Take 2 tablets (650 mg total) by mouth every 4 (four) hours as needed.  0     cloNIDine (CATAPRES) 0.1 MG tablet Take 1 tablet (0.1 mg total) by mouth every 12 (twelve) hours. 60 tablet 11     mirtazapine (REMERON) 15 MG tablet Take 1 tablet (15 mg total) by mouth every evening. 30 tablet 11     mv-min/vit C/glut/lysine/hb124 (IMMUNE SUPPORT ORAL) Take 1 tablet by mouth once daily.       naloxone (NARCAN) 4 mg/actuation Spry 4 mg by Nasal route.       oxyCODONE (ROXICODONE) 15 MG Tab Take 1 tablet (15 mg total) by mouth every 4 (four) hours as needed for Pain.  0     sodium chloride 0.9% SolP 50 mL with DAPTOmycin 500 mg SolR 767 mg Inject 767 mg into the vein once daily.          Review of patient's allergies indicates:  No Known Allergies    Past Medical History:   Diagnosis Date    Hypertension     Unilateral inguinal hernia, without obstruction or gangrene, not specified as recurrent      Past Surgical History:   Procedure Laterality Date    HAND ARTHROTOMY Right 7/7/2023    Procedure: ARTHROTOMY, HAND - R LF PIP;  Surgeon: Boy Lamb MD;  Location: [address];  Service: Orthopedics;  Laterality: Right;    INCISION AND DRAINAGE OF ABSCESS Right 10/12/2023    Procedure: INCISION AND DRAINAGE, ABSCESS;  Surgeon: Steve Monteiro MD;  Location: [address];  Service: General;  Laterality: Right;  right chest    IRRIGATION AND DEBRIDEMENT OF UPPER EXTREMITY Bilateral 7/7/2023    Procedure: IRRIGATION AND DEBRIDEMENT, UPPER EXTREMITY - RIGHT HAND;  Surgeon: Boy Lamb MD;  Location: [address];  Service: Orthopedics;  Laterality: Bilateral;    LUMBAR FUSION N/A 2/14/2024    Procedure: LOOP X FUSION, SPINE, LUMBAR;  Surgeon: Boy Pozo MD;  Location: [address];  Service: Neurosurgery;  Laterality: N/A;  L4-Pelvis fusion; *LOOP-X* depuy, neuromonitoring     OLECRANON BURSECTOMY Left 7/7/2023    Procedure: BURSECTOMY, OLECRANON;  Surgeon: Boy Lamb MD;  Location: [facility] OR [address] [place] [address];  Service: Orthopedics;  Laterality: Left;     Family History    None       Tobacco Use    Smoking status: Former     Types: Cigarettes    Smokeless tobacco: Never   Substance and Sexual Activity    Alcohol use: No    Drug use: Yes     Types: Marijuana, IV     Comment: 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    Sexual activity: Not on file     Review of Systems  Objective:     Weight: 78 kg (172 lb)  Body mass index is 24.68 kg/m².  Vital Signs (Most Recent):  Temp: 99 °F (37.2 °C) (03/14/24 0738)  Pulse: 60 (03/14/24 0738)  Resp: 17 (03/14/24 0826)  BP: 121/64 (03/14/24 0738)  SpO2: 98 % (03/14/24 0922) Vital Signs (24h Range):  Temp:  [97.5 °F (36.4 °C)-99 °F (37.2 °C)] 99 °F (37.2 °C)  Pulse:  [54-84] 60  Resp:  [16-18] 17  SpO2:  [97 %-100 %] 98 %  BP: (103-147)/(53-70) 121/64                                 Physical Exam         Neurosurgery Physical Exam  General: well developed, well nourished, no distress.   Head: normocephalic, atraumatic  Mental Status: Awake, Alert, Oriented  Speech: Clear with content appropriate to conversation  Cranial nerves: face symmetric, CN II-XII grossly intact.   Sensory: intact to light touch throughout     Motor Strength:     Strength   Deltoids Triceps Biceps Wrist Extension Wrist Flexion Hand    Upper: R 5/5 5/5 5/5 5/5 5/5 5/5     L 5/5 5/5 5/5 5/5 5/5 5/5       Iliopsoas Quadriceps Knee  Flexion Tibialis  anterior Gastro- cnemius EHL   Lower: R 5/5 5/5 5/5 5/5 4/5 4/5     L 5/5 5/5 5/5 5/5 4/5 4/5      Incision clean, dry intact with staples. No surrounding erythema or edema.   Significant Labs:  Recent Labs   Lab 03/12/24  1654 03/13/24  1050   GLU 93 94   * 134*   K 4.7 4.8    102   CO2 24 23   BUN 12 9   CREATININE 0.8 0.8   CALCIUM 9.4 9.7     Recent Labs   Lab 03/12/24  1654 03/13/24  1050   WBC 9.62 5.20   HGB 8.5* 9.0*    HCT 27.8* 29.8*    352     Recent Labs   Lab 03/12/24 2038   INR 1.2   APTT 28.2     Microbiology Results (last 7 days)       Procedure Component Value Units Date/Time    Blood culture [[id number]]     Order Status: Sent Specimen: Blood           All pertinent labs from the last 24 hours have been reviewed.    Significant Diagnostics:  CT: No results found in the last 24 hours.  MRI: No results found in the last 24 hours.  I have reviewed all pertinent imaging results/findings within the past 24 hours.

## 2024-03-14 NOTE — CONSULTS
Aleksandr Bray - Neurosurgery ([facility])  Infectious Disease  Consult Note    Patient Name: Ton Donovan  MRN: [id number]  Admission Date: 3/14/2024  Hospital Length of Stay: 0 days  Attending Physician: Boy Pozo MD  Primary Care Provider: Steve Kelly DO     Isolation Status: No active isolations    Patient information was obtained from patient and ER records.      Inpatient consult to Infectious Diseases  Consult performed by: Elpidio Paredes PA-C  Consult ordered by: Leeroy Pepe MD        Assessment/Plan:     ID  * Osteomyelitis of vertebra of lumbosacral region  I have reviewed hospital notes from   service and other specialty providers. I have also reviewed CBC, CMP/BMP,  cultures and imaging with my interpretation as documented.      28M with h/o IVDU, MRSA bacteremia and prior disseminated MRSA infection with poor adherence with care (history of leaving AMA), readmitted 02/04 with progressive back pain. Imaging significant for lumbosacral osteomyelitis with associated epidural abscess, osteomyelitis of sacrum & L iliac bone, probable septic arthritis of L SI joint & lower lumbar spine facet joints, and left iliopsoas abscess, abscess in superior rectal/presacral region and micro-abscesses in paraspinal muscles, pelvis, and left sacrum. Blood cultures remained negative. TTE negative. Underwent IR aspiration of left SI joint and psoas on 02/07.  Cxs +MRSA. Abx held prior to IR bx; post-procedure started on empiric Iv-vanc / CTX.   S/p lumbar-pelvis debridement with fusion (02/14). Surgical cxs +MRSA. Pt discharged to [facility], to complete 6wks Iv-Dapto 10mg/kg Q24h, [place] 04/10/24. (Of note, switched vanc to IV daptomycin (d/t subtherapeutic vancomycin levels with q8hr dosing). Advised repeat imaging / neuro f/u near [place] 04/10; followed by abx suppression thereafter, likely lifelong.   However, [place]12 [place] [facility] [place]/t new onset of intermittent fevers (101.4F) at [facility] for past  few days. On arrival, temp 99F, VSS, HDS, wbc nml, CRP 46,  / , , Cr 0.8.  CPK 39 nml.  Bld cxs 03/12 NGTD.    Repeat MRI L-spine / pelvis (03/13): Extensive epidural infectious/inflammatory change posterior to the L5-S1 and S1-2 disc spaces; with paravertebral / pre-vertebral abscess. Diffuse infectious myositis of the bilateral lower lumbar paraspinal musculature and within the left psoas and iliopsoas muscles, with 0.7 cm abscess (down from 2.3cm on prior 02/04). Persistent osteomyelitis and septic arthritis about the left SI joint.       Recommendations / Plan:  Continue Iv-Daptomycin 10mg/kg Q24h  Follow up NSGY recs / plans.       -- Discussed with ID staff and primary team   -- ID will continue to follow w/ further recs.          Thank you for your consult. I will follow-up with patient. Please contact us if you have any additional questions.    Elpidio Paredes PA-C  Infectious Disease  [facility] - Neurosurgery ([facility])    Subjective:     Principal Problem: Osteomyelitis of vertebra of lumbosacral region    HPI: 28M with h/o IVDU, MRSA bacteremia and prior disseminated MRSA infection with poor adherence with care (history of leaving AMA), readmitted 02/04 with progressive back pain. Imaging significant for lumbosacral osteomyelitis with associated epidural abscess, osteomyelitis of sacrum & L iliac bone, probable septic arthritis of L SI joint & lower lumbar spine facet joints, and left iliopsoas abscess, abscess in superior rectal/presacral region and micro-abscesses in paraspinal muscles, pelvis, and left sacrum. Blood cultures remained negative. TTE negative. Underwent IR aspiration of left SI joint and psoas on 02/07.  Cxs +MRSA. Abx held prior to IR bx; post-procedure started on empiric Iv-vanc / CTX.   S/p lumbar-pelvis debridement with fusion (02/14). Surgical cxs +MRSA. Pt discharged to [facility], to complete 6wks Iv-Dapto 10mg/kg Q24h, [place] 04/10/24. (Of note, switched vanc to IV  daptomycin (d/t subtherapeutic vancomycin levels with q8hr dosing). Advised repeat imaging / neuro f/u near [place] 04/10; followed by abx suppression thereafter, likely lifelong.   However, pt presents 03/12 to [facility] now d/t new onset of intermittent fevers (101.4F) at [facility] for past few days. On arrival, temp 99F, VSS, HDS, wbc nml, CRP 46,  / , , Cr 0.8.  CPK pending.     Repeat MRI L-spine / pelvis (03/13): Postsurgical changes of L4-5 posterior spinal fusion with bilateral sacroiliac extension screws. With discitis osteomyelitis at L5-S1 and S1-2. Extensive epidural infectious/inflammatory change posterior to the L5-S1 and S1-2 disc spaces. Paravertebral/prevertebral abscess measuring 3.7 x 2.2 cm communicating with the L5-S1 disc space.   Pelvis: Diffuse infectious myositis of the left psoas and iliopsoas muscles with 7 mm abscess of the left iliopsoas muscle, decreased from 23 mm. Persistent osteomyelitis and septic arthritis about the left SI joint.  T-spine: incidental finding of small nodular T2 hyperintense signal foci at Rt lung base; consider dedicated chest-CT.      Prior MRI L-spine / pelvis (02/04): showed osteomyelitis/discitis of L5-S1, osteomyelitis of sacrum & L iliac bone, probable septic arthritis of L SI joint & lower lumbar spine facet joints; worsening anterolisthesis of L5 with respect to S1 with increased size of epidural abscess at the level of L5-S1 and probable severe central canal stenosis at this level, along w/ additional abscesses in the L iliopsoas muscle, paraspinal muscles, pelvis, & L sacrum; -- the additional abscesses in the left iliopsoas muscle [3.4 x 1.7cm; with other micro-abscesses adjacent measuring 2.3cm), paraspinal muscles, pelvis, and left sacrum. Additional abscess in the superior rectal/presacral region measuring roughly 3.1 x 2.4 cm; and probable additional micro abscesses at left sacrum.            No new subjective & objective note has been  filed under this hospital service since the last note was generated.

## 2024-03-14 NOTE — HPI
Mr. Ton Donovan is a 28 year-old Male with a PMHx of Hypertension, IV drug use who presented with fevers and ongoing back pain with radiation down legs.    Of note, he was [place] [facility] [place] lumbar spinal fusion, with course complicated by a psoas abscess and MRSA epidural abscess s/p epidural phlegmon debridement. He was started on IV Daptomycin and discharged on 2/22/24 to [facility] for completion of antibiotics. Ongoing fevers complicated his LTAC stay, and he returned to the [facility] for further evaluation. On arrival, WBC 9, ESR 59, CRP 46, AST 360s, ALT 200s. Procalcitonin was elevated at 1.18, lactate normal. MRI L spine concerning for continued L5-S1/S1 diskitis and osteomyelitis with abscess, diffuse infectious myositis of the lumbar paraspinal musculature bilaterally and left iliopsoas muscle without discrete abscess. He was then transferred [place] [facility] [place].     At the time of this consult, temp 99F, HR 60, 120s/60s, saturating well on room air. Most recent labs with WBC 5, Hgb 9, Na 134.    Medicine consulted for medical co-management of epidural abscess

## 2024-03-14 NOTE — CARE UPDATE
I have reviewed the chart of Ton Donovan who is hospitalized for the following:    Active Hospital Problems    Diagnosis    *Osteomyelitis of vertebra of lumbosacral region    Severe sepsis    Hepatitis    Drug dependence     Drug screen + thc and opioids  Hx iv drug use      Hyponatremia     Monitor with labs      Myositis of multiple sites     Diffuse infectious myositis of the bilateral lower lumbar paraspinal musculature and within the left psoas and iliopsoas muscles       Septic arthritis     septic arthritis about the left SI joint.       Chronic diastolic heart failure     There is diastolic dysfunction but grade cannot be determined.       HTN (hypertension)    Discitis of lumbosacral region     See primary problem      Debility    Substance or medication-induced depressive disorder    Psoas abscess, left          Harleen Sam NP  Unit Based [facility]

## 2024-03-14 NOTE — ASSESSMENT & PLAN NOTE
This patient does have evidence of infective focus  My overall impression is sepsis.  Source: Skin and Soft Tissue (location epidurial)  Antibiotics given-   Antibiotics (72h ago, onward)      Start     Stop Route Frequency Ordered    03/14/24 0900  DAPTOmycin (CUBICIN) 780 mg in sodium chloride 0.9% SolP 50 mL IVPB         -- IV Every 24 hours (non-standard times) 03/14/24 0755          Latest lactate reviewed-  Recent Labs   Lab 03/12/24 2038   LACTATE 1.1     Organ dysfunction indicated by Acute liver injury    Fluid challenge Not needed - patient is not hypotensive      Post- resuscitation assessment No - Post resuscitation assessment not needed       Will Not start Pressors- Levophed for MAP of 65  Source control achieved by: Broad spectrum antibiotics    Plan:  - Infectious Disease consulted  - Continue IV Daptomycin  - Follow blood cultures through maturity

## 2024-03-14 NOTE — HPI
28M with h/o IVDU, MRSA bacteremia and prior disseminated MRSA infection with poor adherence with care (history of leaving AMA), readmitted 02/04 with progressive back pain. Imaging significant for lumbosacral osteomyelitis with associated epidural abscess, osteomyelitis of sacrum & L iliac bone, probable septic arthritis of L SI joint & lower lumbar spine facet joints, and left iliopsoas abscess, abscess in superior rectal/presacral region and micro-abscesses in paraspinal muscles, pelvis, and left sacrum. Blood cultures remained negative. TTE negative. Underwent IR aspiration of left SI joint and psoas on 02/07.  Cxs +MRSA. Abx held prior to IR bx; post-procedure started on empiric Iv-vanc / CTX.   S/p lumbar-pelvis debridement with fusion (02/14). Surgical cxs +MRSA. Pt discharged to [facility], to complete 6wks Iv-Dapto 10mg/kg Q24h, [place] 04/10/24. (Of note, switched vanc to IV daptomycin (d/t subtherapeutic vancomycin levels with q8hr dosing). Advised repeat imaging / neuro f/u near [place] 04/10; followed by abx suppression thereafter, likely lifelong.   However, [place] [facility] [place]/t new onset of intermittent fevers (101.4F) at [facility] for past few days. On arrival, temp 99F, VSS, HDS, wbc nml, CRP 46,  / , , Cr 0.8.  CPK pending.     Repeat MRI L-spine / pelvis (03/13): Postsurgical changes of L4-5 posterior spinal fusion with bilateral sacroiliac extension screws. With discitis osteomyelitis at L5-S1 and S1-2. Extensive epidural infectious/inflammatory change posterior to the L5-S1 and S1-2 disc spaces. Paravertebral/prevertebral abscess measuring 3.7 x 2.2 cm communicating with the L5-S1 disc space.   Pelvis: Diffuse infectious myositis of the left psoas and iliopsoas muscles with 7 mm abscess of the left iliopsoas muscle, decreased from 23 mm. Persistent osteomyelitis and septic arthritis about the left SI joint.  T-spine: incidental finding of small nodular T2 hyperintense  signal foci at Rt lung base; consider dedicated chest-CT.    [ For comparison; Prior MRI L-spine / pelvis (02/04): showed osteomyelitis/discitis of L5-S1, osteomyelitis of sacrum & L iliac bone, probable septic arthritis of L SI joint & lower lumbar spine facet joints; worsening anterolisthesis of L5 with respect to S1 with increased size of epidural abscess at the level of L5-S1 and probable severe central canal stenosis at this level, along w/ additional abscesses in the L iliopsoas muscle, paraspinal muscles, pelvis, & L sacrum; -- the additional abscesses in the left iliopsoas muscle [3.4 x 1.7cm; with other micro-abscesses adjacent measuring 2.3cm), paraspinal muscles, pelvis, and left sacrum. Additional abscess in the superior rectal/presacral region measuring roughly 3.1 x 2.4 cm; and probable additional micro abscesses at left sacrum. ]    IR consulted to evaluate if the fluid collection at posterior to L5-S1 which communicates with disc space to see if that can be drained.  IR, Dr. Pretty reviewed this pt's case. He says no discrete fluid collection seen within the paraspinal muscles on either the CT or the MRI, it appears he has swollen, infected paraspinal muscles more consistent with myositis. He may have some fluid in the disc space which we recommend reaching out to neuro IR - No fluid to be drained, infection limited to myositis.  -- 03/19: Per Neuro-IR, abscess is very deep to reach by IR means so it's not amenable to drain. Repeat cultures are neg.

## 2024-03-14 NOTE — ASSESSMENT & PLAN NOTE
28M with hypertension, IV drug use (heroin) presenting from LTAC with ongoing fevers    He was recently admitted on 2/04/24 with osteomyelitis/discitis L5-S1, sacral and left illiac osteomyelitis, L5-S1 epidural abscess, and left iliopsoas abscess. He underwent L4-pelvic fusion on 2/14/24 with epidural phlegmon debridement, intraoperative cultures positive for MRSA. Infectious Disease was consulted and recommended IV Daptomycin for 8 week course (EOC 4/10/24) to be followed by oral suppressive doxycycline thereafter. He was discharged on 2/22/24 to [facility] to complete his antibiotic course. LTAC stay was complicated by ongoing fevers and he represented to [facility] for further evaluation prior to [facility]. On arrival, WBC 9, ESR 59, CRP 46, AST 360s, ALT 200s. Procalcitonin was elevated at 1.18, lactate normal. MRI L spine showed continued L5-S1/S1 diskitis/osteomyelitis with abscess, diffuse infectious myositis of the bilateral lumbar paraspinal musculature and left iliopsoas muscle.    MRI Lumbar Spine With Contrast   Final Result   Addendum (preliminary) 1 of 1       7 mm abscess of the left iliopsoas muscle, decreased from 23 mm.  See    separately dictated MR pelvis.         Electronically signed by:       Arturo Esquivel   Date:                                                03/13/2024   Time:                                                01:52       Final      L5-S1 and S1-2 discitis osteomyelitis with abscess, as above.       Diffuse infectious myositis of the bilateral lower lumbar paraspinal musculature and within the left psoas and iliopsoas muscles.  No discrete abscess.         Plan:  - Infectious Disease consulted, appreciate assistance and recommendations  - CT L spine without contrast ordered and pending  - Continue IV Daptomycin q24h  - Weekly CK level while on Daptomycin  - Multimodal pain control with tylenol, gabapentin, methocarbamol, oxy IR,  oxy ER  - Follow blood cultures through maturity  - Daily CBC, CMP

## 2024-03-14 NOTE — PLAN OF CARE
Aleksandr Bray - Neurosurgery (Hospital)  Initial Discharge Assessment       Primary Care Provider: Steve Kelly DO    Admission Diagnosis: Spinal abscess [M46.20]    Admission Date: 3/14/2024  Expected Discharge Date:     Transition of Care Barriers: Homeless, Substance Abuse    Payor: MEDICAID / Plan: [place] [employer] / Product Type: Managed Medicaid /     Extended Emergency Contact Information  Primary Emergency Contact: Trisha Donovan  Home Phone: [phone]  Mobile Phone: [phone]  Relation: Sister  Preferred language: English   needed? No  Secondary Emergency Contact: Benjamin Morales  Home Phone: [phone]  Mobile Phone: [phone]  Relation: Brother  Preferred language: English   needed? No    Discharge Plan A: Long-term acute care facility (LTAC)  Discharge Plan B: Long-term acute care facility (LTAC)      [id number] - [place], [place] - [address]  [address] [place] [address]  Phone: [phone] Fax: [phone]    CM obtained discharge planning assessment with patient. Patient sent from [facility].  Initial Assessment (most recent)       Adult Discharge Assessment - 03/14/24 1600          Discharge Assessment    Assessment Type Discharge Planning Assessment     Confirmed/corrected address, phone number and insurance Yes     Source of Information patient     Communicated SENA with patient/caregiver Date not available/Unable to determine     Reason For Admission Osteomyelitis of vertebra of lumbosacral region     People in Home --   homeless    Do you expect to return to your current living situation? Other (see comments)   tbd    Do you have help at home or someone to help you manage your care at home? No     Prior to hospitilization cognitive status: Alert/Oriented     Current cognitive status: Alert/Oriented     Walking or Climbing Stairs Difficulty no     Dressing/Bathing Difficulty no     Equipment Currently Used at Home none      Readmission within 30 days? Yes     Patient currently being followed by outpatient case management? No     Do you currently have service(s) that help you manage your care at home? No     Do you take prescription medications? Yes     Do you have prescription coverage? Yes     Coverage [place] [facility] [place] [facility]     Do you have any problems affording any of your prescribed medications? TBD     Is the patient taking medications as prescribed? yes     Are you on dialysis? No     Do you take coumadin? No     Discharge Plan A Long-term acute care facility (LTAC)     Discharge Plan B Long-term acute care facility (LTAC)     DME Needed Upon Discharge  none     Discharge Plan discussed with: Patient     Transition of Care Barriers Homeless;Substance Abuse        Physical Activity    On average, how many days per week do you engage in moderate to strenuous exercise (like a brisk walk)? 0 days        Financial Resource Strain    How hard is it for you to pay for the very basics like food, housing, medical care, and heating? Very hard        Housing Stability    In the last 12 months, was there a time when you were not able to pay the mortgage or rent on time? Yes     In the last 12 months, was there a time when you did not have a steady place to sleep or slept in a shelter (including now)? Yes        Transportation Needs    In the past 12 months, has lack of transportation kept you from medical appointments or from getting medications? Yes     In the past 12 months, has lack of transportation kept you from meetings, work, or from getting things needed for daily living? Yes        Food Insecurity    Within the past 12 months, you worried that your food would run out before you got the money to buy more. Often true     Within the past 12 months, the food you bought just didn't last and you didn't have money to get more. Often true

## 2024-03-14 NOTE — NURSING
Nurses Note -- 4 Eyes      3/14/2024   5:11 PM      Skin assessed during: Admit      [] No Altered Skin Integrity Present    []Prevention Measures Documented      [x] Yes- Altered Skin Integrity Present or Discovered   [x] LDA Added if Not in Epic (Describe Wound)   Incision present on admission - wound care order placed    Wound Care Consulted? Yes    Attending Nurse:  Helen AHN    Second RN/Staff Member:  JIMY So

## 2024-03-14 NOTE — SUBJECTIVE & OBJECTIVE
Past Medical History:   Diagnosis Date    Hypertension     Unilateral inguinal hernia, without obstruction or gangrene, not specified as recurrent        Past Surgical History:   Procedure Laterality Date    HAND ARTHROTOMY Right 7/7/2023    Procedure: ARTHROTOMY, HAND - R LF PIP;  Surgeon: Boy Lamb MD;  Location: [facility] OR [address] [place] [address];  Service: Orthopedics;  Laterality: Right;    INCISION AND DRAINAGE OF ABSCESS Right 10/12/2023    Procedure: INCISION AND DRAINAGE, ABSCESS;  Surgeon: Steve Monteiro MD;  Location: [address];  Service: General;  Laterality: Right;  right chest    IRRIGATION AND DEBRIDEMENT OF UPPER EXTREMITY Bilateral 7/7/2023    Procedure: IRRIGATION AND DEBRIDEMENT, UPPER EXTREMITY - RIGHT HAND;  Surgeon: Boy Lamb MD;  Location: [address];  Service: Orthopedics;  Laterality: Bilateral;    LUMBAR FUSION N/A 2/14/2024    Procedure: LOOP X FUSION, SPINE, LUMBAR;  Surgeon: Boy Pozo MD;  Location: [facility] OR [address] [place] [address];  Service: Neurosurgery;  Laterality: N/A;  L4-Pelvis fusion; *LOOP-X* depuy, neuromonitoring    OLECRANON BURSECTOMY Left 7/7/2023    Procedure: BURSECTOMY, OLECRANON;  Surgeon: Boy Lamb MD;  Location: [address];  Service: Orthopedics;  Laterality: Left;       Review of patient's allergies indicates:  No Known Allergies    Current Facility-Administered Medications on File Prior to Encounter   Medication    [DISCONTINUED] ceFEPIme (MAXIPIME) 2 g in dextrose 5 % in water (D5W) 100 mL IVPB (MB+)    [DISCONTINUED] hydrALAZINE injection 10 mg    [DISCONTINUED] HYDROmorphone (PF) injection 1 mg    [DISCONTINUED] mirtazapine tablet 15 mg    [DISCONTINUED] vancomycin - pharmacy to dose    [DISCONTINUED] vancomycin 1,250 mg in dextrose 5 % (D5W) 250 mL IVPB (Vial-Mate)     Current Outpatient Medications on File Prior to Encounter   Medication Sig    acetaminophen (TYLENOL) 325 MG tablet Take 2 tablets (650 mg total) by mouth every 4 (four) hours as  needed.    cloNIDine (CATAPRES) 0.1 MG tablet Take 1 tablet (0.1 mg total) by mouth every 12 (twelve) hours.    mirtazapine (REMERON) 15 MG tablet Take 1 tablet (15 mg total) by mouth every evening.    mv-min/vit C/glut/lysine/hb124 (IMMUNE SUPPORT ORAL) Take 1 tablet by mouth once daily.    naloxone (NARCAN) 4 mg/actuation Spry 4 mg by Nasal route.    oxyCODONE (ROXICODONE) 15 MG Tab Take 1 tablet (15 mg total) by mouth every 4 (four) hours as needed for Pain.    sodium chloride 0.9% SolP 50 mL with DAPTOmycin 500 mg SolR 767 mg Inject 767 mg into the vein once daily.     Family History    None       Tobacco Use    Smoking status: Former     Types: Cigarettes    Smokeless tobacco: Never   Substance and Sexual Activity    Alcohol use: No    Drug use: Yes     Types: Marijuana, IV     Comment: 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    Sexual activity: Not on file     Review of Systems   Constitutional:  Positive for activity change and fever. Negative for appetite change and fatigue.   HENT:  Positive for rhinorrhea.    Respiratory:  Negative for cough, shortness of breath and wheezing.    Cardiovascular:  Negative for chest pain and leg swelling.   Gastrointestinal:  Negative for abdominal pain, constipation, diarrhea, nausea and vomiting.   Genitourinary:  Negative for dysuria.   Musculoskeletal:  Positive for back pain (controlled), gait problem (improving) and myalgias (lower back).   Neurological:  Positive for weakness (improving). Negative for dizziness, light-headedness, numbness and headaches.        (-) saddle anesthesia  (-) bowel or bladder incontinence     Objective:     Vital Signs (Most Recent):  Temp: 99 °F (37.2 °C) (03/14/24 0738)  Pulse: 60 (03/14/24 0738)  Resp: 17 (03/14/24 0826)  BP: 121/64 (03/14/24 0738)  SpO2: 98 % (03/14/24 0922) Vital Signs (24h Range):  Temp:  [97.5 °F (36.4 °C)-99 °F (37.2 °C)] 99 °F (37.2 °C)  Pulse:  [54-84] 60  Resp:  [16-20] 17  SpO2:  [96 %-100 %] 98 %  BP:  (103-147)/(53-70) 121/64     Weight: 78 kg (172 lb)  Body mass index is 24.68 kg/m².     Physical Exam  Vitals and nursing note reviewed.   Constitutional:       General: He is not in acute distress.     Appearance: Normal appearance. He is normal weight. He is not ill-appearing, toxic-appearing or diaphoretic.   HENT:      Head: Normocephalic and atraumatic.      Mouth/Throat:      Mouth: Mucous membranes are moist.   Eyes:      General: No scleral icterus.        Right eye: No discharge.         Left eye: No discharge.      Extraocular Movements: Extraocular movements intact.      Pupils: Pupils are equal, round, and reactive to light.   Cardiovascular:      Rate and Rhythm: Normal rate and regular rhythm.   Pulmonary:      Effort: Pulmonary effort is normal. No respiratory distress.      Breath sounds: No wheezing or rales.   Chest:      Chest wall: No tenderness.   Musculoskeletal:         General: No swelling.   Skin:     General: Skin is warm and dry.      Findings: Wound (back) present.   Neurological:      Mental Status: He is alert and oriented to person, place, and time.   Psychiatric:         Mood and Affect: Mood normal.         Behavior: Behavior normal.          Significant Labs: All pertinent labs within the past 24 hours have been reviewed.    Significant Imaging: I have reviewed all pertinent imaging results/findings within the past 24 hours.

## 2024-03-14 NOTE — HPI
Mr. Donovan is a 28-year-old male with history of IV drug abuse and hypertension recently admitted to [facility]. Patient was admitted to [facility] for 17 days and discharged on 02/22/2024. He was seen by addiction medicine, Neurosurgery, Orthopedic surgery, IR and Infectious Disease. He underwent aspiration of the psoas abscess by IR on 02/07 as well as LOOP X fusion spine and epidural phlegmon debridement which grew MRSA. Patient was started on IV daptomycin to be followed with Infectious Disease and discharge to [facility] for completion of antibiotics. Patient was sent from [facility] for ongoing fevers. Upon arrival he was noted to have a fever of 99.6, WBC 9.62, sed rate 59, CRP 46.5, , , procalcitonin 1.18, lactic acid 1.1, UDS positive for marijuana and opiates. MRI of the L-spine showed continued L5-S1 and S1 to diskitis osteomyelitis with abscess, diffuse infectious myositis of the bilateral lower lumbar paraspinal musculatures and within the left psoas and iliopsoas muscle. No discrete abscess. Dr. Garcia with Neurosurgery was consulted at [facility] have recommended transfer. [facility] transfer has been pending for the last 18 hours. Hospital medicine was consulted for medical management. Patient's only chronic medical issue is hypertension. Currently blood pressure medicine is on hold due to hypotension.

## 2024-03-14 NOTE — NURSING
Patient complains of pain 6/10, admin prn Oxycodone 5mg as ordered, patient in no distress, appears to be resting comfortably

## 2024-03-14 NOTE — ASSESSMENT & PLAN NOTE
Most recent labs from OSH showed AST 360s, ALT 200s. Had recent hepatitis panel 3/12 with hep c    - Recent Hepatitis C screening resulted positive, previous Hep C screening 3 months ago negative. Check repeat Hep C screening and PCR  - Check HIV given history of IVDU and ongoing fever despite antibiotics  - RUQ ultrasound  - Daily CMP to trend transaminases  - Infectious Diseases consulted and following

## 2024-03-14 NOTE — CARE UPDATE
Notified that patient bp 97/48 and temp of 102. Patient not due for tylenol. Ordered ibuprofen prn. 500cc bolus ordered. Nsgy notified.

## 2024-03-15 LAB
ACINETOBACTER CALCOACETICUS/BAUMANNII COMPLEX: NOT DETECTED
ANISOCYTOSIS BLD QL SMEAR: SLIGHT
BACTEROIDES FRAGILIS: NOT DETECTED
BASOPHILS # BLD AUTO: 0.06 K/UL (ref 0–0.2)
BASOPHILS NFR BLD: 0.5 % (ref 0–1.9)
CANDIDA ALBICANS: NOT DETECTED
CANDIDA AURIS: NOT DETECTED
CANDIDA GLABRATA: NOT DETECTED
CANDIDA KRUSEI: NOT DETECTED
CANDIDA PARAPSILOSIS: NOT DETECTED
CANDIDA TROPICALIS: NOT DETECTED
CRYPTOCOCCUS NEOFORMANS/GATTII: NOT DETECTED
CTX-M GENE (ESBL PRODUCER): NOT DETECTED
DIFFERENTIAL METHOD BLD: ABNORMAL
ENTEROBACTER CLOACAE COMPLEX: NOT DETECTED
ENTEROBACTERALES: ABNORMAL
ENTEROCOCCUS FAECALIS: NOT DETECTED
ENTEROCOCCUS FAECIUM: NOT DETECTED
EOSINOPHIL # BLD AUTO: 0.1 K/UL (ref 0–0.5)
EOSINOPHIL NFR BLD: 0.8 % (ref 0–8)
ERYTHROCYTE [DISTWIDTH] IN BLOOD BY AUTOMATED COUNT: 21 % (ref 11.5–14.5)
ESCHERICHIA COLI: NOT DETECTED
HAEMOPHILUS INFLUENZAE: NOT DETECTED
HCT VFR BLD AUTO: 29 % (ref 40–54)
HCV RNA SERPL NAA+PROBE-LOG IU: 6.96 LOGIU/ML
HCV RNA SERPL QL NAA+PROBE: DETECTED
HCV RNA SPEC NAA+PROBE-ACNC: ABNORMAL IU/ML
HGB BLD-MCNC: 8.6 G/DL (ref 14–18)
HYPOCHROMIA BLD QL SMEAR: ABNORMAL
IMM GRANULOCYTES # BLD AUTO: 0.03 K/UL (ref 0–0.04)
IMM GRANULOCYTES NFR BLD AUTO: 0.3 % (ref 0–0.5)
IMP GENE (CARBAPENEM RESISTANT): NOT DETECTED
KLEBSIELLA AEROGENES: DETECTED
KLEBSIELLA OXYTOCA: NOT DETECTED
KLEBSIELLA PNEUMONIAE GROUP: NOT DETECTED
KPC RESISTANCE GENE (CARBAPENEM): NOT DETECTED
LISTERIA MONOCYTOGENES: NOT DETECTED
LYMPHOCYTES # BLD AUTO: 3 K/UL (ref 1–4.8)
LYMPHOCYTES NFR BLD: 26.1 % (ref 18–48)
MCH RBC QN AUTO: 22 PG (ref 27–31)
MCHC RBC AUTO-ENTMCNC: 29.7 G/DL (ref 32–36)
MCR-1: NOT DETECTED
MCV RBC AUTO: 74 FL (ref 82–98)
MEC A/C AND MREJ (MRSA): ABNORMAL
MEC A/C: ABNORMAL
MONOCYTES # BLD AUTO: 1.5 K/UL (ref 0.3–1)
MONOCYTES NFR BLD: 12.7 % (ref 4–15)
NDM GENE (CARBAPENEM RESISTANT): NOT DETECTED
NEISSERIA MENINGITIDIS: NOT DETECTED
NEUTROPHILS # BLD AUTO: 6.9 K/UL (ref 1.8–7.7)
NEUTROPHILS NFR BLD: 59.6 % (ref 38–73)
NRBC BLD-RTO: 0 /100 WBC
OXA-48-LIKE (CARBAPENEM RESISTANT): NOT DETECTED
PLATELET # BLD AUTO: 334 K/UL (ref 150–450)
PMV BLD AUTO: 9.1 FL (ref 9.2–12.9)
POIKILOCYTOSIS BLD QL SMEAR: SLIGHT
POLYCHROMASIA BLD QL SMEAR: ABNORMAL
PROTEUS SPECIES: NOT DETECTED
PSEUDOMONAS AERUGINOSA: NOT DETECTED
RBC # BLD AUTO: 3.91 M/UL (ref 4.6–6.2)
SALMONELLA SP: NOT DETECTED
SCHISTOCYTES BLD QL SMEAR: ABNORMAL
SERRATIA MARCESCENS: NOT DETECTED
STAPHYLOCOCCUS AUREUS: NOT DETECTED
STAPHYLOCOCCUS EPIDERMIDIS: NOT DETECTED
STAPHYLOCOCCUS LUGDUNESIS: NOT DETECTED
STAPHYLOCOCCUS SPECIES: NOT DETECTED
STENOTROPHOMONAS MALTOPHILIA: NOT DETECTED
STREPTOCOCCUS AGALACTIAE: NOT DETECTED
STREPTOCOCCUS PNEUMONIAE: NOT DETECTED
STREPTOCOCCUS PYOGENES: NOT DETECTED
STREPTOCOCCUS SPECIES: NOT DETECTED
TARGETS BLD QL SMEAR: ABNORMAL
VAN A/B (VRE GENE): ABNORMAL
VIM GENE (CARBAPENEM RESISTANT): NOT DETECTED
WBC # BLD AUTO: 11.61 K/UL (ref 3.9–12.7)

## 2024-03-15 PROCEDURE — 99233 SBSQ HOSP IP/OBS HIGH 50: CPT | Mod: ,,, | Performed by: STUDENT IN AN ORGANIZED HEALTH CARE EDUCATION/TRAINING PROGRAM

## 2024-03-15 PROCEDURE — 85025 COMPLETE CBC W/AUTO DIFF WBC: CPT | Performed by: STUDENT IN AN ORGANIZED HEALTH CARE EDUCATION/TRAINING PROGRAM

## 2024-03-15 PROCEDURE — 63600175 PHARM REV CODE 636 W HCPCS

## 2024-03-15 PROCEDURE — 25000003 PHARM REV CODE 250

## 2024-03-15 PROCEDURE — 25000003 PHARM REV CODE 250: Performed by: INTERNAL MEDICINE

## 2024-03-15 PROCEDURE — 11000001 HC ACUTE MED/SURG PRIVATE ROOM

## 2024-03-15 PROCEDURE — 63600175 PHARM REV CODE 636 W HCPCS: Performed by: INTERNAL MEDICINE

## 2024-03-15 PROCEDURE — 97165 OT EVAL LOW COMPLEX 30 MIN: CPT

## 2024-03-15 PROCEDURE — 99024 POSTOP FOLLOW-UP VISIT: CPT | Mod: ,,, | Performed by: PHYSICIAN ASSISTANT

## 2024-03-15 PROCEDURE — 87040 BLOOD CULTURE FOR BACTERIA: CPT | Mod: 59 | Performed by: STUDENT IN AN ORGANIZED HEALTH CARE EDUCATION/TRAINING PROGRAM

## 2024-03-15 PROCEDURE — 25000003 PHARM REV CODE 250: Performed by: STUDENT IN AN ORGANIZED HEALTH CARE EDUCATION/TRAINING PROGRAM

## 2024-03-15 PROCEDURE — 97162 PT EVAL MOD COMPLEX 30 MIN: CPT

## 2024-03-15 PROCEDURE — 97535 SELF CARE MNGMENT TRAINING: CPT

## 2024-03-15 PROCEDURE — 97116 GAIT TRAINING THERAPY: CPT

## 2024-03-15 PROCEDURE — 94761 N-INVAS EAR/PLS OXIMETRY MLT: CPT

## 2024-03-15 PROCEDURE — 36415 COLL VENOUS BLD VENIPUNCTURE: CPT | Performed by: STUDENT IN AN ORGANIZED HEALTH CARE EDUCATION/TRAINING PROGRAM

## 2024-03-15 RX ADMIN — OXYCODONE HYDROCHLORIDE 15 MG: 10 TABLET ORAL at 04:03

## 2024-03-15 RX ADMIN — METHOCARBAMOL 500 MG: 500 TABLET ORAL at 09:03

## 2024-03-15 RX ADMIN — CLONIDINE HYDROCHLORIDE 0.1 MG: 0.1 TABLET ORAL at 09:03

## 2024-03-15 RX ADMIN — OXYCODONE HYDROCHLORIDE 15 MG: 10 TABLET ORAL at 08:03

## 2024-03-15 RX ADMIN — MEROPENEM 2 G: 1 INJECTION INTRAVENOUS at 02:03

## 2024-03-15 RX ADMIN — MEROPENEM 2 G: 1 INJECTION INTRAVENOUS at 10:03

## 2024-03-15 RX ADMIN — SODIUM CHLORIDE: 9 INJECTION, SOLUTION INTRAVENOUS at 04:03

## 2024-03-15 RX ADMIN — MIRTAZAPINE 15 MG: 15 TABLET, FILM COATED ORAL at 09:03

## 2024-03-15 RX ADMIN — METHOCARBAMOL 500 MG: 500 TABLET ORAL at 12:03

## 2024-03-15 RX ADMIN — OXYCODONE HYDROCHLORIDE 10 MG: 10 TABLET, FILM COATED, EXTENDED RELEASE ORAL at 09:03

## 2024-03-15 RX ADMIN — OXYCODONE HYDROCHLORIDE 15 MG: 10 TABLET ORAL at 09:03

## 2024-03-15 RX ADMIN — DAPTOMYCIN 780 MG: 350 INJECTION, POWDER, LYOPHILIZED, FOR SOLUTION INTRAVENOUS at 12:03

## 2024-03-15 RX ADMIN — METHOCARBAMOL 500 MG: 500 TABLET ORAL at 05:03

## 2024-03-15 RX ADMIN — OXYCODONE HYDROCHLORIDE 15 MG: 10 TABLET ORAL at 12:03

## 2024-03-15 NOTE — ASSESSMENT & PLAN NOTE
I have reviewed hospital notes from   service and other specialty providers. I have also reviewed CBC, CMP/BMP,  cultures and imaging with my interpretation as documented.      28M with h/o IVDU, MRSA bacteremia and prior disseminated MRSA infection with poor adherence with care (history of leaving AMA), readmitted 02/04 with progressive back pain. Imaging significant for lumbosacral osteomyelitis with associated epidural abscess, osteomyelitis of sacrum & L iliac bone, probable septic arthritis of L SI joint & lower lumbar spine facet joints, and left iliopsoas abscess, abscess in superior rectal/presacral region and micro-abscesses in paraspinal muscles, pelvis, and left sacrum. Blood cultures remained negative then, and TTE negative. Underwent IR aspiration of left SI joint and psoas on 02/07.  Cxs +MRSA. Abx held prior to IR bx; post-procedure started on empiric Iv-vanc / CTX.   S/p lumbar-pelvis debridement with fusion (02/14). Surgical cxs +MRSA. Pt discharged to [facility], to complete 6wks Iv-Dapto 10mg/kg Q24h, [facility] 04/10/24. (Of note, switched vanc to IV daptomycin (d/t subtherapeutic vancomycin levels with q8hr dosing). Advised repeat imaging / neuro f/u near [place] 04/10; followed by abx suppression thereafter, likely lifelong.     -- However, pt presents 03/12 to [facility] now d/t new onset of intermittent fevers (101.4F) at [facility] for past few days. On arrival, temp 99F, VSS, HDS, wbc nml, CRP 46,  / , , Cr 0.8.  CPK 39 nml.  Bld cxs 03/12 NGTD. Pt developed fever 03/14, repeat bld cxs 03/14 1 of 2 +GNR; BCID +Kleb aerogenes.     Repeat MRI L-spine / pelvis (03/13): Extensive epidural infectious/inflammatory change posterior to the L5-S1 and S1-2 disc spaces; with paravertebral / pre-vertebral abscess. Diffuse infectious myositis of the bilateral lower lumbar paraspinal musculature and within the left psoas and iliopsoas muscles, with 0.7 cm abscess (down from 2.3cm on prior 02/04).  Persistent osteomyelitis and septic arthritis about the left SI joint. -- Per NSGY, no role for surgical intervention at this time, favoring wound care and continuing Iv-abx.     Abx: 03/12 -03/13 vanc / cefepime; 03/14 dapto; 03/15 dapto + merrem. Favored merrem for kleb aerogenes bacteremia, as pt had received 2d of cefepime prior to these bld cxs.       Recommendations / Plan:  Continue Iv-Daptomycin 10mg/kg Q24h, with Iv-merrem 2g Q8h  Ordered repeat bld cxs 03/15  Will continue to trend fever, wbc, and clinical response  May likely continue Iv-merrem for remaining duration of Iv-dapto ([place] 04/10) for lumbar osteo-discitis with paravertebral / psoas myositis and sub centimeter abscesses -- d/t concern for possible further seeding of spine during subacute / chronic infection.       -- Discussed with ID staff and primary team   -- ID will continue to follow w/ further recs.

## 2024-03-15 NOTE — PLAN OF CARE
Problem: Physical Therapy  Goal: Physical Therapy Goal  Description: Goals to be met by: 04/15/24     Patient will increase functional independence with mobility by performin. Bed to chair transfer with [place] using No Assistive Device  2. Gait  x 200 feet with Supervision using No Assistive Device.   3. Stand for 10 minutes with [place] using No Assistive Device demonstrating no swayback posture.   4. Increased functional strength to WFL for hip abduction, core strength.  5. Lower extremity exercise program x15 reps per handout, with independence    Outcome: Ongoing, Progressing   PT eval completed and goals established. Pt will begin PT POC, progressing as tolerated.

## 2024-03-15 NOTE — ASSESSMENT & PLAN NOTE
Most recent labs from OSH showed AST 360s, ALT 200s. Had recent hepatitis panel 3/12 with hep c    - Recent Hepatitis C screening resulted positive, previous Hep C screening 3 months ago negative. Check repeat Hep C screening and PCR  - Check HIV given history of IVDU and ongoing fever despite antibiotics  - RUQ ultrasound- pending  - Daily CMP to trend transaminases:  335/235  - Infectious Diseases consulted and following

## 2024-03-15 NOTE — SUBJECTIVE & OBJECTIVE
Interval History: NAEON. Afebrile since 4 pm yesterday. Lying in bed, no acute distress. Exam stable. Wound care and ID following. No plan for neurosurgical intervention.    Medications:  Continuous Infusions:   sodium chloride 0.9% 100 mL/hr at 03/15/24 0439     Scheduled Meds:   cloNIDine  0.1 mg Oral Q12H    DAPTOmycin (CUBICIN) IV (PEDS and ADULTS)  10 mg/kg Intravenous Q24H    methocarbamoL  500 mg Oral QID    mirtazapine  15 mg Oral QHS    oxyCODONE  10 mg Oral Q12H    polyethylene glycol  17 g Oral Daily    senna-docusate 8.6-50 mg  2 tablet Oral Daily    sodium chloride 0.9%  500 mL Intravenous Once     PRN Meds:acetaminophen, dextrose 10%, dextrose 10%, glucagon (human recombinant), glucose, glucose, glucose, hydrALAZINE, melatonin, ondansetron, oxyCODONE     Review of Systems  Objective:     Weight: 78 kg (172 lb)  Body mass index is 24.68 kg/m².  Vital Signs (Most Recent):  Temp: 97.6 °F (36.4 °C) (03/15/24 1125)  Pulse: 98 (03/15/24 1155)  Resp: 20 (03/15/24 1125)  BP: (!) 108/59 (03/15/24 1125)  SpO2: 100 % (03/15/24 1125) Vital Signs (24h Range):  Temp:  [97.5 °F (36.4 °C)-102 °F (38.9 °C)] 97.6 °F (36.4 °C)  Pulse:  [] 98  Resp:  [17-20] 20  SpO2:  [98 %-100 %] 100 %  BP: ()/(48-62) 108/59                                 Physical Exam     Neurosurgery Physical Exam    General: well developed, well nourished, no distress.   Head: normocephalic  Neurologic: Alert and oriented. Thought content appropriate.  GCS: Motor: 6/Verbal: 5/Eyes: 4 GCS Total: 15  Mental Status: Awake, Alert, Oriented x 4  Language: No aphasia  Speech: No dysarthria  Cranial nerves: face symmetric, tongue midline, CN II-XII grossly intact.   Eyes: pupils equal, round, reactive to light with accomodation, EOMI.   Pulmonary: normal respirations, no signs of respiratory distress  Abdomen: soft, non-distended, not tender to palpation  Sensory: intact to light touch throughout  Motor Strength: Moves all extremities  "spontaneously with good tone. No abnormal movements seen.     Strength  Deltoids Triceps Biceps Wrist Extension Wrist Flexion Hand    Upper: R 5/5 5/5 5/5 5/5 5/5 5/5    L 5/5 5/5 5/5 5/5 5/5 5/5     Iliopsoas Quadriceps Knee  Flexion Tibialis  anterior Gastro- cnemius EHL   Lower: R 5/5 5/5 5/5 5/5 5/5 4+/5    L 5/5 5/5 5/5 5/5 5/5 5/5     Skin: Skin is warm, dry and intact.  Gluteal cleft: No sacral dimple, tonja of hair, or nearby areas of skin discoloration.     Incision with superficial dehiscence. Fibrous tissue in wound bed. No active drainage.         Significant Labs:  Recent Labs   Lab 03/14/24  1619   *   *   K 4.0      CO2 23   BUN 17   CREATININE 0.9   CALCIUM 9.4     Recent Labs   Lab 03/15/24  0713   WBC 11.61   HGB 8.6*   HCT 29.0*        No results for input(s): "LABPT", "INR", "APTT" in the last 48 hours.  Microbiology Results (last 7 days)       Procedure Component Value Units Date/Time    Rapid Organism ID by PCR (from Blood culture) [[id number]]  (Abnormal) Collected: 03/14/24 1623    Order Status: Completed Updated: 03/15/24 1119     Enterococcus faecalis Not Detected     Enterococcus faecium Not Detected     Listeria monocytogenes Not Detected     Staphylococcus spp. Not Detected     Staphylococcus aureus Not Detected     Staphylococcus epidermidis Not Detected     Staphylococcus lugdunensis Not Detected     Streptococcus species Not Detected     Streptococcus agalactiae Not Detected     Streptococcus pneumoniae Not Detected     Streptococcus pyogenes Not Detected     Acinetobacter calcoaceticus/baumannii complex Not Detected     Bacteroides fragilis Not Detected     Enterobacterales See species for ID     Enterobacter cloacae complex Not Detected     Escherichia coli Not Detected     Klebsiella aerogenes Detected     Klebsiella oxytoca Not Detected     Klebsiella pneumoniae group Not Detected     Proteus Not Detected     Salmonella sp Not Detected     Serratia " marcescens Not Detected     Haemophilus influenzae Not Detected     Neisseria meningtidis Not Detected     Pseudomonas aeruginosa Not Detected     Stenotrophomonas maltophilia Not Detected     Candida albicans Not Detected     Candida auris Not Detected     Candida glabrata Not Detected     Candida krusei Not Detected     Candida parapsilosis Not Detected     Candida tropicalis Not Detected     Cryptococcus neoformans/gattii Not Detected     CTX-M (ESBL ) Not Detected     IMP (Carbapenem resistant) Not Detected     KPC resistance gene (Carbapenem resistant) Not Detected     mcr-1  Not Detected     mec A/C  Test Not Applicable     mec A/C and MREJ (MRSA) gene Test Not Applicable     NDM (Carbapenem resistant) Not Detected     OXA-48-like (Carbapenem resistant) Not Detected     van A/B (VRE gene) Test Not Applicable     VIM (Carbapenem resistant) Not Detected    Blood culture [[id number]] Collected: 03/14/24 1623    Order Status: Completed Specimen: Blood Updated: 03/15/24 0846     Blood Culture, Routine Gram stain aer bottle: Gram negative rods      Gram stain manas bottle: Gram negative rods      Results called to and read back by:Solitario Quintanilla RN 03/15/2024      08:45    Narrative:      Collection has been rescheduled by [facility] at 03/14/2024 14:49 Reason:   Pt is in catlab. Will try back later. Nurse Cindi #[id number]  Collection has been rescheduled by [facility] at 03/14/2024 14:49 Reason:   Pt is in catlab. Will try back later. Nurse Cindi #[id number]    Blood culture [[id number]] Collected: 03/14/24 1618    Order Status: Completed Specimen: Blood Updated: 03/15/24 0115     Blood Culture, Routine No Growth to date    Narrative:      Collection has been rescheduled by [facility] at 03/14/2024 14:49 Reason:   Pt is in catlab. Will try back later. Nurse Cindi #[id number]  Collection has been rescheduled by [facility] at 03/14/2024 14:49 Reason:   Pt is in catlab. Will try back later. Nurse Cindi #[id number]          Recent Lab Results          03/15/24  0713   03/14/24  1623   03/14/24  1619   03/14/24  1618        Tali krusei   Not Detected           Salmonella sp   Not Detected           Acinetobacter calcoaceticus/baumannii complex   Not Detected           Albumin     3.0         ALP     251         ALT     235         Anion Gap     7         Aniso Slight             AST     335         Bacteroides fragilis   Not Detected           Baso # 0.06             Basophil % 0.5             BILIRUBIN TOTAL     0.8  Comment: For infants and newborns, interpretation of results should be based  on gestational age, weight and in agreement with clinical  observations.    Premature Infant recommended reference ranges:  Up to 24 hours.............<8.0 mg/dL  Up to 48 hours............<12.0 mg/dL  3-5 days..................<15.0 mg/dL  6-29 days.................<15.0 mg/dL           Blood Culture, Routine   Gram stain aer bottle: Gram negative rods  [P]     No Growth to date  [P]          Gram stain manas bottle: Gram negative rods  [P]              Results called to and read back by:Solitario Quintanilla RN 03/15/2024  [P]              08:45  [P]           BUN     17         Calcium     9.4         Candida albicans   Not Detected           Candida auris   Not Detected           Candida glabrata   Not Detected           Candida parapsilosis   Not Detected           Candida tropicalis   Not Detected           Chloride     105         CO2     23         CPK       39       Creatinine     0.9         CRP       30.3       Cryptococcus neoformans/gattii   Not Detected           CTX-M (ESBL )   Not Detected           Differential Method Automated             eGFR     >60.0         Enterobacter cloacae complex   Not Detected           Enterobacterales   See species for ID           Enterococcus faecalis   Not Detected           Enterococcus faecium   Not Detected           Eos # 0.1             Eos % 0.8             Escherichia coli   Not Detected           Fragmented  Cells Occasional             Glucose     124         Gran # (ANC) 6.9             Gran % 59.6             Haemophilus influenzae   Not Detected           Hematocrit 29.0             Hemoglobin 8.6             Hepatitis C Ab     Reactive  Comment: Presumptive evidence of antibodies to HCV; recommend   supplemental testing HCV RNA Quantitative by PCR   ([id number]) if clinically indicated.           HIV 1/2 Ag/Ab     Non-reactive         Hypo Occasional             Immature Grans (Abs) 0.03  Comment: Mild elevation in immature granulocytes is non specific and   can be seen in a variety of conditions including stress response,   acute inflammation, trauma and pregnancy. Correlation with other   laboratory and clinical findings is essential.               Immature Granulocytes 0.3             IMP (Carbapenem resistant)   Not Detected           Klebsiella aerogenes   Detected           Klebsiella oxytoca   Not Detected           Klebsiella pneumoniae group   Not Detected           KPC resistance gene (Carbapenem resistant)   Not Detected           Listeria monocytogenes   Not Detected           Lymph # 3.0             Lymph % 26.1             MCH 22.0             MCHC 29.7             mcr-1    Not Detected           MCV 74             mec A/C    Test Not Applicable           mec A/C and MREJ (MRSA) gene   Test Not Applicable           Mono # 1.5             Mono % 12.7             MPV 9.1             NDM (Carbapenem resistant)   Not Detected           Neisseria meningtidis   Not Detected           nRBC 0             OXA-48-like (Carbapenem resistant)   Not Detected           Platelet Count 334             Poikilocytosis Slight             Poly Occasional             Potassium     4.0         PROTEIN TOTAL     8.1         Proteus   Not Detected           Pseudomonas aeruginosa   Not Detected           RBC 3.91             RDW 21.0             Sed Rate       15       Serratia marcescens   Not Detected           Sodium      135         Staphylococcus aureus   Not Detected           Staphylococcus epidermidis   Not Detected           Staphylococcus lugdunensis   Not Detected           Staphylococcus spp.   Not Detected           Stenotrophomonas maltophilia   Not Detected           Streptococcus agalactiae   Not Detected           Streptococcus pneumoniae   Not Detected           Streptococcus pyogenes   Not Detected           Streptococcus species   Not Detected           Target Cells Occasional             van A/B (VRE gene)   Test Not Applicable           VIM (Carbapenem resistant)   Not Detected           WBC 11.61                      [P] - Preliminary Result             All pertinent labs from the last 24 hours have been reviewed.    Significant Diagnostics:  I have reviewed all pertinent imaging results/findings within the past 24 hours.

## 2024-03-15 NOTE — CONSULTS
Aleksandr Bray - Neurosurgery ([facility])  Wound Care    Patient Name:  Ton Donovan   MRN:  [id number]  Date: 3/15/2024  Diagnosis: Osteomyelitis of vertebra of lumbosacral region    History:     Past Medical History:   Diagnosis Date    Hypertension     Unilateral inguinal hernia, without obstruction or gangrene, not specified as recurrent        Social History     Socioeconomic History    Marital status: Single   Tobacco Use    Smoking status: Former     Types: Cigarettes    Smokeless tobacco: Never   Substance and Sexual Activity    Alcohol use: No    Drug use: Yes     Types: Marijuana, IV     Comment: 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     Social Determinants of Health     Financial Resource Strain: High Risk (3/14/2024)    Overall Financial Resource Strain (CARDIA)     Difficulty of Paying Living Expenses: Very hard   Food Insecurity: Food Insecurity Present (3/14/2024)    Hunger Vital Sign     Worried About Running Out of Food in the Last Year: Often true     Ran Out of Food in the Last Year: Often true   Transportation Needs: Unmet Transportation Needs (3/14/2024)    PRAPARE - Transportation     Lack of Transportation (Medical): Yes     Lack of Transportation (Non-Medical): Yes   Physical Activity: Inactive (3/14/2024)    Exercise Vital Sign     Days of Exercise per Week: 0 days     Minutes of Exercise per Session: 0 min   Stress: Stress Concern Present (2/6/2024)    [place] [place] [employer] - Occupational Stress Questionnaire     Feeling of Stress : Very much   Social Connections: Socially Isolated (11/21/2023)    Social Connection and Isolation Panel [NHANES]     Frequency of Communication with Friends and Family: Never     Frequency of Social Gatherings with Friends and Family: Never     Attends Muslim Services: Never     Active Member of Clubs or Organizations: No     Attends Club or Organization Meetings: Never     Marital Status: Never    Housing Stability: High Risk (3/14/2024)     Housing Stability Vital Sign     Unable to Pay for Housing in the Last Year: Yes     Number of Places Lived in the Last Year: 0     Unstable Housing in the Last Year: Yes       Precautions:     Allergies as of 03/13/2024    (No Known Allergies)       WO Assessment Details/Treatment     Patient seen for wound care consultation.   Reviewed chart for this encounter.   See Flow Sheet for findings.      RECOMMENDATIONS:Patient is Aox4 and agreeable to inpatient wound care. PA-C consult for surgical incision on back. Surgical incision noted to have slough at base of the wound. Wound is malodorous. Incision line is still intact in certain place. No tunneling or undermining noted. Cleanse back incision wound with vashe for antimicrobial properties and pat dry. Cut to fit aquacel silver for drainage absorption and antimicrobial properties and fit to wound bed. Cover with mepilex foam border dressing. Change every three days or prn if soiled. No other issues or concerns at this time. Due to proximity of wound, waffle mattress overlay ordered. No other issues or concerns at this time. Will follow up 3/15/2024 or sooner if needed.    Discussed POC with patient and primary nurse.   See EMR for orders & patient education.    Discussed nutrition and the role of protein in wound healing with the patient. Instructed patient to optimize protein for wound healing.    Bedside nursing to continue care & monitoring.  Bedside nursing to maintain pressure injury prevention interventions.            03/15/24 1005   WOCN Assessment   WOCN Total Time (mins) 30   Visit Date 03/15/24   Visit Time 1005   Consult Type New   WOCN Speciality Wound   Intervention assessed;changed;applied;chart review;coordination of care;orders   Teaching on-going        Incision/Site 02/14/24 1545 Lumbar spine posterior;midline vertical   Date First Assessed/Time First Assessed: 02/14/24 1545   Present Prior to Hospital Arrival?: No  Location: Lumbar spine   Orientation: posterior;midline  Incision Type: vertical  Closure Method: Sutures;Other (see comments);Staples  Additional Comments...   Wound Image       Incision WDL ex   Dressing Appearance Dried drainage;Moist drainage   Drainage Amount Scant   Drainage Characteristics/Odor Serosanguineous   Appearance Pink;Red;Tan   Wound Length (cm) 2 cm   Wound Width (cm) 0.5 cm   Wound Surface Area (cm^2) 1 cm^2   Care Cleansed with:;Antimicrobial agent   Dressing Applied;Foam   Dressing Change Due 03/15/24       Orders placed.   Carlo Moseley RN  03/15/2024

## 2024-03-15 NOTE — HOSPITAL COURSE
3/15-Admitted for NSGY evaluation of OM & discitis lumbar spine. Will let pt eat.   3/16- Does not need surgery. Appreciate NSGY eval. Wound care and ID following and need final recs. Pt is comfortable.   3/17- Klebsiella sens to everything,  On dapto and yue.  Pic line removed and culture tip was ordered, but not done.   3/18 per ID: Continue  Iv-cefepime 2g Q8h. (Bld cx +kleb aerogenes panS) & Continue Iv-Daptomycin 10mg/kg Q24h.  repeat bld cxs 03/15 and 03/17 (NGTD); picc cath tip culture was not sent down properly  Anticipate continuing both Iv-dapto and cefepime for remaining 2-3wks of full 6wk duration of Iv-abx(BRENNAN 04/10) for lumbar osteo-discitis with paravertebral / psoas myositis and sub centimeter abscesses   Will need new PIc line or mid line.  Waiting for cultures to be neg to ensure therapeutic window without bacteremia and presence of a line.   - IR consulted to evaluate if the fluid collection at posterior to L5-s1 which communicates with disc space to see if that can be drained.  IR, Dr. Pretty reviewed this pt's case. He says no discrete fluid collection seen within the paraspinal muscles on either the CT or the MRI, it appears he has swollen, infected paraspinal muscles more consistent with myositis. He may have some fluid in the disc space which we recommend reaching out to neuro IR - No fluid to be drained, infection limited to myositis.    3/19- Per Neuro IR, abscess is very deep to reach by IR means so it's not amenable to drain. Repeat cultures are neg.   3/20 replace pic and plan for LTAC.   ID final recs:   Continue Iv-cefepime 2g Q8h. (Bld cx +kleb aerogenes panS) x 2 weeks from 1st negative blood culture 3/15  Continue Iv-Daptomycin 10mg/kg Q24h  [CPK 39]. To complete 6 weeks  OK to replace PICC  Rec indefinite suppression with Doxycyline 100mg po bid once IV abx completed.  fu upon dc from LTAC  Pic line placed  No growth on repeat cultures. No leukocytosis  Ultrasound with thromboses  basilic and cephalic veins. Patient reports arm swelling and discomfort improving.  Patient had recurrent fevers. ID changed cefepime to ciprofloxacin for concern drug fevers. Repeated CT C/A/P.     Interval history:  3/28- appreciate ID recs:  Recommendations / Plan:  Continue oral Cipro 750mg po bid -  ( Use cefepime if pt not tolerating oral cipro)  Continue Iv-Daptomycin 10mg/kg Q24h  [CPK 39].   Suspect intermittent fevers related to pre-sacral fluid collection anterior to S1 (3 x 3 x 2 cm), noted on repeat Ct-a/p (03/26). Of note, pt found to have Left forearm Superficial VTs related to PIV, but w/o associated abscess or infection locally at left forearm at this time.   New TEDIv-Dapto and oral cipro until 05/01. Abx duration ultimately until fluid collections near resolved.   Prior to new BRENNAN 05/01, recommend repeat L-spine and pelvic imaging to re-assess for interval changes to of Lumbar osteo, myositis and associated multiple fluid collections of L-spine, pre-sacral (S1), and pelvis (iliopsoas).   If persistent fevers return prior to BRENNAN 05/01, recommend repeating this imaging earlier; especially if clinically relevant and no other obvious source of fever.  Once IV abx completed, anticipate indefinite suppression with Doxycyline 100mg po bid   EXPECT Fever to be persistent for weeks.    3/29- planning on dc to [facility] today.

## 2024-03-15 NOTE — PROGRESS NOTES
Aleksandr Bray - Neurosurgery ([facility])  Infectious Disease  Progress Note    Patient Name: Ton Donovan  MRN: [id number]  Admission Date: 3/14/2024  Length of Stay: 1 days  Attending Physician: Kimber Leiws MD  Primary Care Provider: Steve Kelly DO    Isolation Status: No active isolations  Assessment/Plan:      ID  * Osteomyelitis of vertebra of lumbosacral region  I have reviewed hospital notes from   service and other specialty providers. I have also reviewed CBC, CMP/BMP,  cultures and imaging with my interpretation as documented.      28M with h/o IVDU, MRSA bacteremia and prior disseminated MRSA infection with poor adherence with care (history of leaving AMA), readmitted 02/04 with progressive back pain. Imaging significant for lumbosacral osteomyelitis with associated epidural abscess, osteomyelitis of sacrum & L iliac bone, probable septic arthritis of L SI joint & lower lumbar spine facet joints, and left iliopsoas abscess, abscess in superior rectal/presacral region and micro-abscesses in paraspinal muscles, pelvis, and left sacrum. Blood cultures remained negative then, and TTE negative. Underwent IR aspiration of left SI joint and psoas on 02/07.  Cxs +MRSA. Abx held prior to IR bx; post-procedure started on empiric Iv-vanc / CTX.   S/p lumbar-pelvis debridement with fusion (02/14). Surgical cxs +MRSA. Pt discharged to [facility], to complete 6wks Iv-Dapto 10mg/kg Q24h, BRENNAN 04/10/24. (Of note, switched vanc to IV daptomycin (d/t subtherapeutic vancomycin levels with q8hr dosing). Advised repeat imaging / neuro f/u near BR[place]N 04/10; followed by abx suppression thereafter, likely lifelong.     -- However, pt presents 03/12 to [facility] now d/t new onset of intermittent fevers (101.4F) at [facility] for past few days. On arrival, temp 99F, VSS, HDS, wbc nml, CRP 46,  / , , Cr 0.8.  CPK 39 nml.  Bld cxs 03/12 NGTD. Pt developed fever 03/14, repeat bld cxs 03/14 1 of 2 +GNR; BCID +Kleb  aerogenes.     Repeat MRI L-spine / pelvis (03/13): Extensive epidural infectious/inflammatory change posterior to the L5-S1 and S1-2 disc spaces; with paravertebral / pre-vertebral abscess. Diffuse infectious myositis of the bilateral lower lumbar paraspinal musculature and within the left psoas and iliopsoas muscles, with 0.7 cm abscess (down from 2.3cm on prior 02/04). Persistent osteomyelitis and septic arthritis about the left SI joint. -- Per NSGY, no role for surgical intervention at this time, favoring wound care and continuing Iv-abx.     Abx: 03/12 -03/13 vanc / cefepime; 03/14 dapto; 03/15 dapto + merrem. Favored merrem for kleb aerogenes bacteremia, as pt had received 2d of cefepime prior to these bld cxs.       Recommendations / Plan:  Continue Iv-Daptomycin 10mg/kg Q24h, with Iv-merrem 2g Q8h  Ordered repeat bld cxs 03/15  Will continue to trend fever, wbc, and clinical response  May likely continue Iv-merrem for remaining duration of Iv-dapto (BRENNAN 04/10) for lumbar osteo-discitis with paravertebral / psoas myositis and sub centimeter abscesses -- d/t concern for possible further seeding of spine during subacute / chronic infection.       -- Discussed with ID staff and primary team   -- ID will continue to follow w/ further recs.        Thank you for your consult. I will follow-up with patient. Please contact us if you have any additional questions.    Elpidio Paredes PA-C  Infectious Disease  [facility] - Neurosurgery ([facility])    Subjective:     Principal Problem:Osteomyelitis of vertebra of lumbosacral region    HPI: 28M with h/o IVDU, MRSA bacteremia and prior disseminated MRSA infection with poor adherence with care (history of leaving AMA), readmitted 02/04 with progressive back pain. Imaging significant for lumbosacral osteomyelitis with associated epidural abscess, osteomyelitis of sacrum & L iliac bone, probable septic arthritis of L SI joint & lower lumbar spine facet joints, and left  iliopsoas abscess, abscess in superior rectal/presacral region and micro-abscesses in paraspinal muscles, pelvis, and left sacrum. Blood cultures remained negative. TTE negative. Underwent IR aspiration of left SI joint and psoas on 02/07.  Cxs +MRSA. Abx held prior to IR bx; post-procedure started on empiric Iv-vanc / CTX.   S/p lumbar-pelvis debridement with fusion (02/14). Surgical cxs +MRSA. Pt discharged to [facility], to complete 6wks Iv-Dapto 10mg/kg Q24h, BRENNAN 04/10/24. (Of note, switched vanc to IV daptomycin (d/t subtherapeutic vancomycin levels with q8hr dosing). Advised repeat imaging / neuro f/u near BRENNAN 04/10; followed by abx suppression thereafter, likely lifelong.   However, pt presents 03/12 to [facility] now d/t new onset of intermittent fevers (101.4F) at [facility] for past few days. On arrival, temp 99F, VSS, HDS, wbc nml, CRP 46,  / , , Cr 0.8.  CPK pending.     Repeat MRI L-spine / pelvis (03/13): Postsurgical changes of L4-5 posterior spinal fusion with bilateral sacroiliac extension screws. With discitis osteomyelitis at L5-S1 and S1-2. Extensive epidural infectious/inflammatory change posterior to the L5-S1 and S1-2 disc spaces. Paravertebral/prevertebral abscess measuring 3.7 x 2.2 cm communicating with the L5-S1 disc space.   Pelvis: Diffuse infectious myositis of the left psoas and iliopsoas muscles with 7 mm abscess of the left iliopsoas muscle, decreased from 23 mm. Persistent osteomyelitis and septic arthritis about the left SI joint.  T-spine: incidental finding of small nodular T2 hyperintense signal foci at Rt lung base; consider dedicated chest-CT.      Prior MRI L-spine / pelvis (02/04): showed osteomyelitis/discitis of L5-S1, osteomyelitis of sacrum & L iliac bone, probable septic arthritis of L SI joint & lower lumbar spine facet joints; worsening anterolisthesis of L5 with respect to S1 with increased size of epidural abscess at the level of L5-S1 and probable  severe central canal stenosis at this level, along w/ additional abscesses in the L iliopsoas muscle, paraspinal muscles, pelvis, & L sacrum; -- the additional abscesses in the left iliopsoas muscle [3.4 x 1.7cm; with other micro-abscesses adjacent measuring 2.3cm), paraspinal muscles, pelvis, and left sacrum. Additional abscess in the superior rectal/presacral region measuring roughly 3.1 x 2.4 cm; and probable additional micro abscesses at left sacrum.          Interval History: Febrile 102F yesterday, wbc 11 (climb), VSS, HDS. Bld cxs 03/14 1 of  2 +kleb aerogenes.   Repeat bld cxs pending. On dapto, and merrem added.     Review of Systems   Constitutional:  Negative for chills and fatigue.   HENT:  Negative for trouble swallowing.    Respiratory:  Negative for cough and shortness of breath.    Cardiovascular:  Negative for chest pain.   Gastrointestinal:  Negative for nausea and vomiting.   Genitourinary:  Negative for difficulty urinating, flank pain and hematuria.   Musculoskeletal:  Positive for back pain, gait problem and myalgias.   Skin:  Positive for wound. Negative for rash.   Neurological:  Positive for numbness. Negative for seizures.   Psychiatric/Behavioral:  Negative for confusion.        Objective:     Vital Signs (Most Recent):  Temp: 97.5 °F (36.4 °C) (03/15/24 1528)  Pulse: 69 (03/15/24 1528)  Resp: 19 (03/15/24 1618)  BP: (!) 99/51 (03/15/24 1528)  SpO2: 100 % (03/15/24 1528) Vital Signs (24h Range):  Temp:  [97.5 °F (36.4 °C)-98.3 °F (36.8 °C)] 97.5 °F (36.4 °C)  Pulse:  [59-98] 69  Resp:  [18-20] 19  SpO2:  [99 %-100 %] 100 %  BP: ()/(51-59) 99/51     Weight: 78 kg (172 lb)  Body mass index is 24.68 kg/m².    Estimated Creatinine Clearance: 123.9 mL/min (based on SCr of 0.9 mg/dL).     Physical Exam  Vitals and nursing note reviewed.   Constitutional:       General: He is not in acute distress.     Appearance: Normal appearance. He is normal weight. He is not ill-appearing,  toxic-appearing or diaphoretic.   HENT:      Nose: No congestion.      Mouth/Throat:      Pharynx: Oropharynx is clear.   Eyes:      General: No scleral icterus.     Conjunctiva/sclera: Conjunctivae normal.   Cardiovascular:      Rate and Rhythm: Normal rate.      Heart sounds: Normal heart sounds. No murmur heard.  Pulmonary:      Effort: No respiratory distress.      Breath sounds: Normal breath sounds.   Abdominal:      General: Bowel sounds are normal. There is no distension.      Palpations: Abdomen is soft.      Tenderness: There is no abdominal tenderness.   Musculoskeletal:         General: Tenderness present.   Skin:     General: Skin is warm and dry.      Findings: Erythema present. No rash.      Comments: Picc line RUE, c/d/I.    Neurological:      Mental Status: He is alert and oriented to person, place, and time. Mental status is at baseline.   Psychiatric:         Behavior: Behavior normal.         Thought Content: Thought content normal.                            Significant Labs: Blood Culture:   Recent Labs   Lab 02/04/24  1853 03/12/24  1655 03/12/24  2039 03/14/24  1618 03/14/24  1623   LABBLOO No growth after 5 days. No Growth to date  No Growth to date  No Growth to date No Growth to date  No Growth to date  No Growth to date No Growth to date Gram stain aer bottle: Gram negative rods  Gram stain manas bottle: Gram negative rods  Results called to and read back by:Solitario Quintanilla RN 03/15/2024  08:45     CBC:   Recent Labs   Lab 03/15/24  0713   WBC 11.61   HGB 8.6*   HCT 29.0*        CMP:   Recent Labs   Lab 03/14/24  1619   *   K 4.0      CO2 23   *   BUN 17   CREATININE 0.9   CALCIUM 9.4   PROT 8.1   ALBUMIN 3.0*   BILITOT 0.8   ALKPHOS 251*   *   *   ANIONGAP 7*     Microbiology Results (last 7 days)       Procedure Component Value Units Date/Time    Blood culture [[id number]]     Order Status: Sent Specimen: Blood     Blood culture  [[id number]]     Order Status: Sent Specimen: Blood     Rapid Organism ID by PCR (from Blood culture) [[id number]]  (Abnormal) Collected: 03/14/24 1623    Order Status: Completed Updated: 03/15/24 1119     Enterococcus faecalis Not Detected     Enterococcus faecium Not Detected     Listeria monocytogenes Not Detected     Staphylococcus spp. Not Detected     Staphylococcus aureus Not Detected     Staphylococcus epidermidis Not Detected     Staphylococcus lugdunensis Not Detected     Streptococcus species Not Detected     Streptococcus agalactiae Not Detected     Streptococcus pneumoniae Not Detected     Streptococcus pyogenes Not Detected     Acinetobacter calcoaceticus/baumannii complex Not Detected     Bacteroides fragilis Not Detected     Enterobacterales See species for ID     Enterobacter cloacae complex Not Detected     Escherichia coli Not Detected     Klebsiella aerogenes Detected     Klebsiella oxytoca Not Detected     Klebsiella pneumoniae group Not Detected     Proteus Not Detected     Salmonella sp Not Detected     Serratia marcescens Not Detected     Haemophilus influenzae Not Detected     Neisseria meningtidis Not Detected     Pseudomonas aeruginosa Not Detected     Stenotrophomonas maltophilia Not Detected     Candida albicans Not Detected     Candida auris Not Detected     Candida glabrata Not Detected     Candida krusei Not Detected     Candida parapsilosis Not Detected     Candida tropicalis Not Detected     Cryptococcus neoformans/gattii Not Detected     CTX-M (ESBL ) Not Detected     IMP (Carbapenem resistant) Not Detected     KPC resistance gene (Carbapenem resistant) Not Detected     mcr-1  Not Detected     mec A/C  Test Not Applicable     mec A/C and MREJ (MRSA) gene Test Not Applicable     NDM (Carbapenem resistant) Not Detected     OXA-48-like (Carbapenem resistant) Not Detected     van A/B (VRE gene) Test Not Applicable     VIM (Carbapenem resistant) Not Detected    Blood culture  [[id number]] Collected: 03/14/24 1623    Order Status: Completed Specimen: Blood Updated: 03/15/24 0846     Blood Culture, Routine Gram stain aer bottle: Gram negative rods      Gram stain manas bottle: Gram negative rods      Results called to and read back by:Solitario Quintanilla RN 03/15/2024      08:45    Narrative:      Collection has been rescheduled by [facility] at 03/14/2024 14:49 Reason:   Pt is in catlab. Will try back later. Nurse Cindi #[id number]  Collection has been rescheduled by [facility] at 03/14/2024 14:49 Reason:   Pt is in catlab. Will try back later. Nurse Cindi #[id number]    Blood culture [[id number]] Collected: 03/14/24 1618    Order Status: Completed Specimen: Blood Updated: 03/15/24 0115     Blood Culture, Routine No Growth to date    Narrative:      Collection has been rescheduled by [facility] at 03/14/2024 14:49 Reason:   Pt is in catlab. Will try back later. Nurse Cindi #[id number]  Collection has been rescheduled by [facility] at 03/14/2024 14:49 Reason:   Pt is in catlab. Will try back later. Nurse Cindi #[id number]          Wound Culture:   Recent Labs   Lab 10/12/23  1134 11/21/23  1041 02/07/24  1412 02/07/24  1423 02/14/24  1712   LABAERO METHICILLIN RESISTANT STAPHYLOCOCCUS AUREUS  Many  * METHICILLIN RESISTANT STAPHYLOCOCCUS AUREUS  Moderate  No other significant isolate  * No growth METHICILLIN RESISTANT STAPHYLOCOCCUS AUREUS  Rare  * METHICILLIN RESISTANT STAPHYLOCOCCUS AUREUS  Few  *  METHICILLIN RESISTANT STAPHYLOCOCCUS AUREUS  Few  *     All pertinent labs within the past 24 hours have been reviewed.    Significant Imaging: I have reviewed all pertinent imaging results/findings within the past 24 hours.    I have personally reviewed records / hospital notes from   service and other specialty providers. I have also reviewed CBC, CMP/BMP,  cultures and imaging with my interpretation as documented in my assessment / plan.    Patient is high risk for infectious complications given pt's age, multiple co-morbidities, and case  complexity.      Time: 75 minutes   50% of time spent on face-to-face counseling and coordination of care. Counseling included review of test results, diagnosis, and treatment plan with patient and/or family.

## 2024-03-15 NOTE — PLAN OF CARE
Problem: Occupational Therapy  Goal: Occupational Therapy Goal  Description: Goals to be met by: 3/29/24     Patient will increase functional independence with ADLs by performing:    UE Dressing with [place].  LE Dressing with [place].  Grooming while standing at sink with [place].  Toileting from toilet with [place] for hygiene and clothing management.   Toilet transfer to toilet with [place].    Outcome: Ongoing, Progressing

## 2024-03-15 NOTE — ASSESSMENT & PLAN NOTE
Mr. Donovan is a 28-year-old male with history of IV drug abuse and hypertension recently admitted to [facility]. Patient was admitted to [facility] for 17 days and discharged on 02/22/2024. Pt transferred for neurosurgical evaluation of fever in the setting of osteomyelitis.    - Neuro exam stable to improved from prior.   - All pertinent labs and imaging reviewed  - MRI of the L-spine showed continued L5-S1 and S1 to discitis osteomyelitis with abscess, diffuse infectious myositis of the bilateral lower lumbar paraspinal musculatures and within the left psoas and iliopsoas muscle. No drainable collection.  - CT without evidence of hardware failure  - No indication for neurosurgical intervention. Continue abx per ID. Will f/u as scheduled for routine post-op care. NSGY will sign off. Please call with questions or acute changes in neuro exam.  - Medical management per primary  - Discussed with Dr. Pozo.

## 2024-03-15 NOTE — ASSESSMENT & PLAN NOTE
Chronic, controlled. Latest blood pressure and vitals reviewed-     Temp:  [97.5 °F (36.4 °C)-102 °F (38.9 °C)]   Pulse:  []   Resp:  [17-19]   BP: ()/(48-62)   SpO2:  [98 %-100 %] .   Home meds for hypertension were reviewed and noted below.   Hypertension Medications               cloNIDine (CATAPRES) 0.1 MG tablet Take 1 tablet (0.1 mg total) by mouth every 12 (twelve) hours.          Plan:  - While in the hospital, will manage blood pressure as follows; Continue home antihypertensive regimen. Currently on Clonidine 0.1 PO BID  - Will utilize p.r.n. blood pressure medication only if patient's blood pressure greater than 180/110 and he develops symptoms such as worsening chest pain or shortness of breath.

## 2024-03-15 NOTE — PLAN OF CARE
Problem: Adult Inpatient Plan of Care  Goal: Optimal Comfort and Wellbeing  Outcome: Ongoing, Progressing     Problem: Adult Inpatient Plan of Care  Goal: Readiness for Transition of Care  Outcome: Ongoing, Progressing     Problem: Infection  Goal: Absence of Infection Signs and Symptoms  Outcome: Ongoing, Progressing     Problem: Adjustment to Illness (Sepsis/Septic Shock)  Goal: Optimal Coping  Outcome: Ongoing, Progressing     Problem: Bleeding (Sepsis/Septic Shock)  Goal: Absence of Bleeding  Outcome: Ongoing, Progressing     Problem: Glycemic Control Impaired (Sepsis/Septic Shock)  Goal: Blood Glucose Level Within Desired Range  Outcome: Ongoing, Progressing     Problem: Infection Progression (Sepsis/Septic Shock)  Goal: Absence of Infection Signs and Symptoms  Outcome: Ongoing, Progressing     Problem: Nutrition Impaired (Sepsis/Septic Shock)  Goal: Optimal Nutrition Intake  Outcome: Ongoing, Progressing

## 2024-03-15 NOTE — PT/OT/SLP EVAL
Physical Therapy Co-Evaluation with OT     Patient Name:  Ton Donovan   MRN:  [id number]    Co-evaluation with OT performed due to patient complexity and deficits, requiring two skilled therapists to appropriately and safely assess patient's strength and endurance while facilitating functional tasks in addition to accommodating for patient's activity tolerance.    Recommendations:     Discharge Recommendations: High Intensity Therapy   Discharge Equipment Recommendations: rollator, shower chair   Barriers to discharge: None    Assessment:     Ton Donovan is a 28 y.o. male admitted with a medical diagnosis of Osteomyelitis of vertebra of lumbosacral region.  He presents with the following impairments/functional limitations: weakness, impaired endurance, impaired sensation, impaired functional mobility, gait instability, impaired balance, decreased lower extremity function, pain.  Patient participated well in evaluation. He demonstrates good LE strength and required no assistance for bed mobility. He ambulated 100' with 2ww with SBA, and 20' with HHA with minimal assistance. He displays poor standing posture with swayback affecting standing balance, and poor hip abduction strength R<L leading to trendelenburg gait deviation.  Patient is safe to ambulate with nursing (2ww), encouraged to sit up in chair when able.      Rehab Prognosis: Good; patient would benefit from acute skilled PT services to address these deficits and reach maximum level of function.    Recent Surgery: * No surgery found *      Plan:     During this hospitalization, patient to be seen 3 x/week to address the identified rehab impairments via gait training, therapeutic activities, therapeutic exercises, neuromuscular re-education and progress toward the following goals:    Plan of Care Expires:  04/15/24    Subjective     Chief Complaint: pain  Patient/Family Comments/goals: To continue therapy  Pain/Comfort:  Pain Rating 1: 10/10  Location -  Side 1: Bilateral  Location - Orientation 1: lower  Location 1: back  Pain Addressed 1: Reposition, Distraction  Pain Rating Post-Intervention 1: 10/10    Patients cultural, spiritual, Congregation conflicts given the current situation: no    Living Environment:  Previous to current admission patient was at [facility].   Pt lives alone in an abandoned house in [place]. SSH with 4-5 MALINA, R HR. He states the bathroom is functional. Bathroom set-up: walk-in shower   Previous level of function: Pt reports his PLOF is (I) with functional mobility using no DME. Pt reports increased difficulty with ambulation due to R LE weakness, pain, and poor balance. He reports multiple near falls at home.  Upon discharge, patient will have assistance from unspecified.    Objective:     Communicated with RN prior to session.  Patient found HOB elevated with telemetry  upon PT entry to room. LSO donned during session.     General Precautions: Standard, fall  Orthopedic Precautions:spinal precautions   Braces: LSO  Respiratory Status: Room air    Exams:  Sensation:    -       Impaired  light/touch B feet, reported existing impairment   RLE ROM: WNL  RLE Strength: WNL  LLE ROM: WNL  LLE Strength: WNL  MMT: Knee extension, ankle DF/PF      Functional Mobility:  Bed Mobility:     Scooting: independence  Supine to Sit: independence  Transfers:     Sit to Stand:  stand by assistance with rolling walker  Gait:   Patient ambulated 100' with 2ww with SBA  Patient ambulated 20' with HHA with [place]ini  Deviations Noted: decreased gait speed, decreased step height R=L, decreased step length L<R, flat foot contact R, L, swayback posture, L trendelenburg with hip drop in RLE stance phase   Balance:   Sitting static: independent  Sitting dynamic: SBA during lower body dressing  Standing static: SBA with swayback posture noted  Standing dynamic: SBA        AM-PAC 6 CLICK MOBILITY  Total Score:21       Treatment & Education:  Education: patient educated on POC,  need for therapy, safety with mobility, discharge recommendations and equipment recommendations. Patient verbalized understanding of all topics.   Cues for increased step length on L, education and demonstration of gait deviations and associated mm weakness potentially causing deviation  Cues for core mm contraction to correct swayback posture, patient able to hold for several seconds prior to fatigue  Lower body dressing per OT note.      Patient left up in chair with all lines intact and call button in reach.    GOALS:   Multidisciplinary Problems       Physical Therapy Goals          Problem: Physical Therapy    Goal Priority Disciplines Outcome Goal Variances Interventions   Physical Therapy Goal     PT, PT/OT Ongoing, Progressing     Description: Goals to be met by: 04/15/24     Patient will increase functional independence with mobility by performin. Bed to chair transfer with [place] using No Assistive Device  2. Gait  x 200 feet with Supervision using No Assistive Device.   3. Stand for 10 minutes with [place] using No Assistive Device demonstrating no swayback posture.   4. Increased functional strength to WFL for hip abduction, core strength.  5. Lower extremity exercise program x15 reps per handout, with independence                         History:     Past Medical History:   Diagnosis Date    Hypertension     Unilateral inguinal hernia, without obstruction or gangrene, not specified as recurrent        Past Surgical History:   Procedure Laterality Date    HAND ARTHROTOMY Right 2023    Procedure: ARTHROTOMY, HAND - R LF PIP;  Surgeon: Boy Lamb MD;  Location: [facility] OR [address] [place] [address];  Service: Orthopedics;  Laterality: Right;    INCISION AND DRAINAGE OF ABSCESS Right 10/12/2023    Procedure: INCISION AND DRAINAGE, ABSCESS;  Surgeon: Steve Monteiro MD;  Location: [facility] OR [address] [place] [address];  Service: General;  Laterality: Right;  right chest    IRRIGATION AND DEBRIDEMENT OF UPPER EXTREMITY  Bilateral 7/7/2023    Procedure: IRRIGATION AND DEBRIDEMENT, UPPER EXTREMITY - RIGHT HAND;  Surgeon: Boy Lamb MD;  Location: [facility] OR [address] [place] [address];  Service: Orthopedics;  Laterality: Bilateral;    LUMBAR FUSION N/A 2/14/2024    Procedure: LOOP X FUSION, SPINE, LUMBAR;  Surgeon: Boy Pozo MD;  Location: [facility] OR [address] [place] [address];  Service: Neurosurgery;  Laterality: N/A;  L4-Pelvis fusion; *LOOP-X* depuy, neuromonitoring    OLECRANON BURSECTOMY Left 7/7/2023    Procedure: BURSECTOMY, OLECRANON;  Surgeon: Boy Lamb MD;  Location: [facility] OR [address] [place] [address];  Service: Orthopedics;  Laterality: Left;       Time Tracking:     PT Received On: 03/15/24  PT Start Time: 1140     PT Stop Time: 1200  PT Total Time (min): 20 min     Billable Minutes: Evaluation 10 minutes and Gait Training 10 minutes      03/15/2024

## 2024-03-15 NOTE — PT/OT/SLP EVAL
Occupational Therapy   Co-Evaluation/Treatment     Name: Ton Donovan  MRN: [id number]  Admitting Diagnosis: Osteomyelitis of vertebra of lumbosacral region  Recent Surgery: * No surgery found *      Recommendations:     Discharge Recommendations: High Intensity Therapy  Discharge Equipment Recommendations:  rollator, shower chair  Barriers to discharge:  Inaccessible home environment    Assessment:     Ton Donovan is a 28 y.o. male with a medical diagnosis of Osteomyelitis of vertebra of lumbosacral region.  He presents with the following performance deficits affecting function: weakness, impaired endurance, impaired self care skills, impaired functional mobility, gait instability, impaired balance, orthopedic precautions, decreased lower extremity function.      Pt agreeable to therapy and tolerated the session well. Pt with most difficulty performing LB dressing tasks at this time. Able to don LSO sitting EOB before participating in functional mobility. Pt able to ambulate with RW and CGA or Min A with HHA. Made a cup of coffee while in standing with CGA. Pt with poor dynamic standing balance at this time. Pt would benefit from continued skilled acute OT services during this admission in order to maximize independence and safety with ADLs and functional mobility to ensure safe return to PLOF in the least restrictive environment. Patient presents with good participation and motivation to return to prior level of function with high intensity therapy. The patient demonstrates appropriate endurance, strength, and pain control to participate in up to 3 hours or 15hrs of combined therapy post acute.      Rehab Prognosis: Good; patient would benefit from acute skilled OT services to address these deficits and reach maximum level of function.       Plan:     Patient to be seen 4 x/week to address the above listed problems via self-care/home management, therapeutic activities, therapeutic exercises, neuromuscular  "re-education  Plan of Care Expires: 04/15/24  Plan of Care Reviewed with: patient    Subjective     "I really want to be able to wash my feet"     Chief Complaint: Weakness   Patient/Family Comments/goals: To return to PLOF    Occupational Profile:  Living Environment: Pt lives alone in an abandoned house in [place]. SSH with 4-5 MALINA, R HR. He states the bathroom is functional. Bathroom set-up: walk-in shower   Previous level of function: Pt reports his PLOF is (I) with functional mobility using no DME. Pt reports increased difficulty with ambulation due to R LE weakness, pain, and poor balance. He reports multiple near falls at home.   Roles and Routines: Unknown   Equipment Used at Home: none  Assistance upon Discharge: Unknown     Pain/Comfort:  Pain Rating 1: 10/10  Location - Side 1: Bilateral  Location - Orientation 1: generalized  Location 1: back  Pain Addressed 1: Reposition, Distraction  Pain Rating Post-Intervention 1: 10/10    Patients cultural, spiritual, L[employer] conflicts given the current situation: no    Objective:     Co-evaluation/treatment performed due to patient's multiple deficits requiring two skilled therapists to appropriately and safely assess patient's strength and endurance while facilitating functional tasks in addition to accommodating for patient's activity tolerance.     Communicated with: RN prior to session.  Patient found HOB elevated with telemetry upon OT entry to room.    General Precautions: Standard, fall  Orthopedic Precautions: spinal precautions  Braces: LSO  Respiratory Status: Room air    Occupational Performance:    Bed Mobility:    Patient completed Rolling/Turning to Right with independence  Patient completed Scooting/Bridging with independence  Patient completed Supine to Sit with independence    Functional Mobility/Transfers:  Patient completed Sit <> Stand Transfer with stand by assistance  with  no assistive device   Functional Mobility: Pt engaging in " functional mobility to simulate household/community distances with SBA-Min A and utilizing RW-HHA in order to maximize functional activity tolerance and standing balance required for engagement in occupations of choice.  CGA with RW   Min A with HHA     Activities of Daily Living:  Feeding:  contact guard assistance : To prepare a cup of coffee while in standing   Lower Body Dressing: moderate assistance : To doff/don socks while sitting EOB with difficulty performing figure-4 position and to thread over toes      Cognitive/Visual Perceptual:  Cognitive/Psychosocial Skills:     -       Oriented to: Person, Place, Time, and Situation   -       Follows Commands/attention:Follows multistep  commands  -       Safety awareness/insight to disability: intact   -       Mood/Affect/Coping skills/emotional control: Appropriate to situation  Visual/Perceptual:      -Intact visual field     Physical Exam:   Balance:  Static Sitting   stand by assistance   Dynamic Sitting   stand by assistance   Static Standing   stand by assistance   Dynamic Standing   contact guard assistance     Upper Extremity Function:     Left UE Right UE   UE Edema None noted None noted   UE ROM WFL WFL   UE Strength WFL WFL    Strength WFL WFL   Sensation    -       Intact    -       Intact   Fine Motor Skills:     -       Intact    -       Intact   Gross Motor Skills:   WFL   WFL         AMPAC 6 Click ADL:  AMPAC Total Score: 19    Treatment & Education:  Therapist provided facilitation and instruction of proper body mechanics and fall prevention strategies during tasks listed above.  Instructed patient to sit in bedside chair daily to increase OOB/activity tolerance.  Instructed patient to use call light to have nursing staff assist with needs/transfers.  Discussed OT POC and answered all questions within OT scope of practice.  Whiteboard updated       Patient left up in chair with all lines intact and call button in reach    GOALS:    Multidisciplinary Problems       Occupational Therapy Goals          Problem: Occupational Therapy    Goal Priority Disciplines Outcome Interventions   Occupational Therapy Goal     OT, PT/OT Ongoing, Progressing    Description: Goals to be met by: 3/29/24     Patient will increase functional independence with ADLs by performing:    UE Dressing with [place].  LE Dressing with [place].  Grooming while standing at sink with [place].  Toileting from toilet with [place] for hygiene and clothing management.   Toilet transfer to toilet with [place].                         History:     Past Medical History:   Diagnosis Date    Hypertension     Unilateral inguinal hernia, without obstruction or gangrene, not specified as recurrent          Past Surgical History:   Procedure Laterality Date    HAND ARTHROTOMY Right 7/7/2023    Procedure: ARTHROTOMY, HAND - R LF PIP;  Surgeon: Boy Lamb MD;  Location: [facility] OR [address] [place] [address];  Service: Orthopedics;  Laterality: Right;    INCISION AND DRAINAGE OF ABSCESS Right 10/12/2023    Procedure: INCISION AND DRAINAGE, ABSCESS;  Surgeon: Steve Monteiro MD;  Location: [address];  Service: General;  Laterality: Right;  right chest    IRRIGATION AND DEBRIDEMENT OF UPPER EXTREMITY Bilateral 7/7/2023    Procedure: IRRIGATION AND DEBRIDEMENT, UPPER EXTREMITY - RIGHT HAND;  Surgeon: Boy Lamb MD;  Location: [address];  Service: Orthopedics;  Laterality: Bilateral;    LUMBAR FUSION N/A 2/14/2024    Procedure: LOOP X FUSION, SPINE, LUMBAR;  Surgeon: Boy Pozo MD;  Location: [address];  Service: Neurosurgery;  Laterality: N/A;  L4-Pelvis fusion; *LOOP-X* depuy, neuromonitoring    OLECRANON BURSECTOMY Left 7/7/2023    Procedure: BURSECTOMY, OLECRANON;  Surgeon: Boy Lamb MD;  Location: [address];  Service: Orthopedics;  Laterality: Left;       Time Tracking:     OT Date of Treatment: 03/15/24  OT Start Time: 1141  OT Stop  Time: 1200  OT Total Time (min): 19 min    Billable Minutes:Evaluation 10  Self Care/Home Management 9    3/15/2024

## 2024-03-15 NOTE — ASSESSMENT & PLAN NOTE
Patient with Acute on chronic debility due to other reduced mobility. Latest AMPAC and GEMS scores have been reviewed. Evaluation for etiology is complete. Plan includes progressive mobility protocol initated, PT/OT consulted, and may need surgey .

## 2024-03-15 NOTE — ASSESSMENT & PLAN NOTE
Patient has hyponatremia which is controlled,We will aim to correct the sodium by 4-6mEq in 24 hours. We will monitor sodium Daily. The hyponatremia is due to Dehydration/hypovolemia. We will obtain the following studies: see labsection. We will treat the hyponatremia with IV fluids. The patient's sodium results have been reviewed and are listed below.  Recent Labs   Lab 03/14/24  1619   *

## 2024-03-15 NOTE — SUBJECTIVE & OBJECTIVE
Interval History: see above    Review of Systems   Constitutional:  Positive for activity change. Negative for appetite change and fever.   HENT:  Negative for trouble swallowing.    Respiratory:  Negative for shortness of breath.    Cardiovascular:  Negative for chest pain.   Gastrointestinal:  Positive for constipation. Negative for abdominal pain, diarrhea and nausea.   Genitourinary:  Negative for difficulty urinating and frequency.   Musculoskeletal:  Positive for arthralgias, back pain and gait problem. Negative for neck stiffness.   Neurological:  Negative for headaches.   Psychiatric/Behavioral:  Negative for behavioral problems.      Objective:     Vital Signs (Most Recent):  Temp: 97.6 °F (36.4 °C) (03/15/24 0607)  Pulse: 60 (03/15/24 0607)  Resp: 18 (03/15/24 0607)  BP: (!) 100/51 (03/15/24 0607)  SpO2: 100 % (03/15/24 0607) Vital Signs (24h Range):  Temp:  [97.5 °F (36.4 °C)-102 °F (38.9 °C)] 97.6 °F (36.4 °C)  Pulse:  [] 60  Resp:  [17-19] 18  SpO2:  [98 %-100 %] 100 %  BP: ()/(48-62) 100/51     Weight: 78 kg (172 lb)  Body mass index is 24.68 kg/m².  No intake or output data in the 24 hours ending 03/15/24 0819      Physical Exam  Constitutional:       General: He is not in acute distress.     Appearance: Normal appearance. He is ill-appearing. He is not toxic-appearing or diaphoretic.   HENT:      Head: Normocephalic and atraumatic.      Nose: Nose normal.      Mouth/Throat:      Mouth: Mucous membranes are moist.      Pharynx: Oropharynx is clear.   Eyes:      General: No scleral icterus.     Extraocular Movements: Extraocular movements intact.      Conjunctiva/sclera: Conjunctivae normal.      Pupils: Pupils are equal, round, and reactive to light.   Cardiovascular:      Rate and Rhythm: Normal rate and regular rhythm.      Pulses: Normal pulses.   Pulmonary:      Effort: Pulmonary effort is normal. No respiratory distress.      Breath sounds: Normal breath sounds. No stridor. No  wheezing, rhonchi or rales.   Chest:      Chest wall: No tenderness.   Abdominal:      General: Abdomen is flat. Bowel sounds are normal. There is no distension.      Palpations: Abdomen is soft.      Tenderness: There is no abdominal tenderness. There is no right CVA tenderness, left CVA tenderness, guarding or rebound.   Musculoskeletal:         General: No swelling, tenderness, deformity or signs of injury. Normal range of motion.      Cervical back: Normal range of motion and neck supple. No rigidity or tenderness.      Right lower leg: No edema.      Left lower leg: No edema.   Skin:     General: Skin is warm and dry.      Coloration: Skin is not jaundiced.      Findings: No erythema or rash.   Neurological:      General: No focal deficit present.      Mental Status: He is alert and oriented to person, place, and time. Mental status is at baseline.      Motor: No weakness.      Gait: Gait abnormal.   Psychiatric:         Mood and Affect: Mood normal.         Behavior: Behavior normal.         Thought Content: Thought content normal.         Judgment: Judgment normal.             Significant Labs: All pertinent labs within the past 24 hours have been reviewed.  CBC:   Recent Labs   Lab 03/13/24  1050   WBC 5.20   HGB 9.0*   HCT 29.8*        CMP:   Recent Labs   Lab 03/13/24  1050 03/14/24  1619   * 135*   K 4.8 4.0    105   CO2 23 23   GLU 94 124*   BUN 9 17   CREATININE 0.8 0.9   CALCIUM 9.7 9.4   PROT  --  8.1   ALBUMIN  --  3.0*   BILITOT  --  0.8   ALKPHOS  --  251*   AST  --  335*   ALT  --  235*   ANIONGAP 9 7*       Significant Imaging: I have reviewed all pertinent imaging results/findings within the past 24 hours.

## 2024-03-15 NOTE — ASSESSMENT & PLAN NOTE
28M with hypertension, IV drug use (heroin) presenting from LTAC with ongoing fevers    He was recently admitted on 2/04/24 with osteomyelitis/discitis L5-S1, sacral and left illiac osteomyelitis, L5-S1 epidural abscess, and left iliopsoas abscess. He underwent L4-pelvic fusion on 2/14/24 with epidural phlegmon debridement, intraoperative cultures positive for MRSA. Infectious Disease was consulted and recommended IV Daptomycin for 8 week course (EOC 4/10/24) to be followed by oral suppressive doxycycline thereafter. He was discharged on 2/22/24 to [facility] to complete his antibiotic course. LTAC stay was complicated by ongoing fevers and he represented to [facility] for further evaluation prior to transfer to [facility] for neurosurgical evaluation. On arrival, WBC 9, ESR 59, CRP 46, AST 360s, ALT 200s. Procalcitonin was elevated at 1.18, lactate normal. MRI L spine showed continued L5-S1/S1 diskitis/osteomyelitis with abscess, diffuse infectious myositis of the bilateral lumbar paraspinal musculature and left iliopsoas muscle.      Plan:  - Infectious Disease consulted, appreciate assistance and recommendations  - CT L spine without contrast ordered and pending  - Continue IV Daptomycin q24h  - Weekly CK level while on Daptomycin  - Multimodal pain control with tylenol, gabapentin, methocarbamol, oxy IR, oxy ER  - Follow blood cultures through maturity  - Daily CBC, CMP  - Cardiac monitor  - Neurosurgery Consult: need surgery plan  - pt transferred to [facility]

## 2024-03-15 NOTE — ASSESSMENT & PLAN NOTE
"See " Substance induced depressive disroder"  - monitor for withdrawal and give opioid if needed   - pt has been in LTAC  - on oxycodone LA 10 bid for pain and dependence    "

## 2024-03-15 NOTE — NURSING
Heart rate dipping to the high 30s a few times while asleep, sustaining 40s-50s, notified MD, no new orders, told to wake him up when HR drops low.

## 2024-03-15 NOTE — SUBJECTIVE & OBJECTIVE
Interval History: Febrile 102F yesterday, wbc 11 (climb), VSS, HDS. Bld cxs 03/14 1 of  2 +kleb aerogenes.   Repeat bld cxs pending. On dapto, and merrem added.     Review of Systems   Constitutional:  Negative for chills and fatigue.   HENT:  Negative for trouble swallowing.    Respiratory:  Negative for cough and shortness of breath.    Cardiovascular:  Negative for chest pain.   Gastrointestinal:  Negative for nausea and vomiting.   Genitourinary:  Negative for difficulty urinating, flank pain and hematuria.   Musculoskeletal:  Positive for back pain, gait problem and myalgias.   Skin:  Positive for wound. Negative for rash.   Neurological:  Positive for numbness. Negative for seizures.   Psychiatric/Behavioral:  Negative for confusion.        Objective:     Vital Signs (Most Recent):  Temp: 97.5 °F (36.4 °C) (03/15/24 1528)  Pulse: 69 (03/15/24 1528)  Resp: 19 (03/15/24 1618)  BP: (!) 99/51 (03/15/24 1528)  SpO2: 100 % (03/15/24 1528) Vital Signs (24h Range):  Temp:  [97.5 °F (36.4 °C)-98.3 °F (36.8 °C)] 97.5 °F (36.4 °C)  Pulse:  [59-98] 69  Resp:  [18-20] 19  SpO2:  [99 %-100 %] 100 %  BP: ()/(51-59) 99/51     Weight: 78 kg (172 lb)  Body mass index is 24.68 kg/m².    Estimated Creatinine Clearance: 123.9 mL/min (based on SCr of 0.9 mg/dL).     Physical Exam  Vitals and nursing note reviewed.   Constitutional:       General: He is not in acute distress.     Appearance: Normal appearance. He is normal weight. He is not ill-appearing, toxic-appearing or diaphoretic.   HENT:      Nose: No congestion.      Mouth/Throat:      Pharynx: Oropharynx is clear.   Eyes:      General: No scleral icterus.     Conjunctiva/sclera: Conjunctivae normal.   Cardiovascular:      Rate and Rhythm: Normal rate.      Heart sounds: Normal heart sounds. No murmur heard.  Pulmonary:      Effort: No respiratory distress.      Breath sounds: Normal breath sounds.   Abdominal:      General: Bowel sounds are normal. There is no  distension.      Palpations: Abdomen is soft.      Tenderness: There is no abdominal tenderness.   Musculoskeletal:         General: Tenderness present.   Skin:     General: Skin is warm and dry.      Findings: Erythema present. No rash.      Comments: Picc line RUE, c/d/I.    Neurological:      Mental Status: He is alert and oriented to person, place, and time. Mental status is at baseline.   Psychiatric:         Behavior: Behavior normal.         Thought Content: Thought content normal.                            Significant Labs: Blood Culture:   Recent Labs   Lab 02/04/24  1853 03/12/24  1655 03/12/24  2039 03/14/24  1618 03/14/24  1623   LABBLOO No growth after 5 days. No Growth to date  No Growth to date  No Growth to date No Growth to date  No Growth to date  No Growth to date No Growth to date Gram stain aer bottle: Gram negative rods  Gram stain manas bottle: Gram negative rods  Results called to and read back by:Solitario Quintanilla RN 03/15/2024  08:45     CBC:   Recent Labs   Lab 03/15/24  0713   WBC 11.61   HGB 8.6*   HCT 29.0*        CMP:   Recent Labs   Lab 03/14/24  1619   *   K 4.0      CO2 23   *   BUN 17   CREATININE 0.9   CALCIUM 9.4   PROT 8.1   ALBUMIN 3.0*   BILITOT 0.8   ALKPHOS 251*   *   *   ANIONGAP 7*     Microbiology Results (last 7 days)       Procedure Component Value Units Date/Time    Blood culture [[id number]]     Order Status: Sent Specimen: Blood     Blood culture [[id number]]     Order Status: Sent Specimen: Blood     Rapid Organism ID by PCR (from Blood culture) [[id number]]  (Abnormal) Collected: 03/14/24 1623    Order Status: Completed Updated: 03/15/24 1119     Enterococcus faecalis Not Detected     Enterococcus faecium Not Detected     Listeria monocytogenes Not Detected     Staphylococcus spp. Not Detected     Staphylococcus aureus Not Detected     Staphylococcus epidermidis Not Detected     Staphylococcus lugdunensis Not Detected      Streptococcus species Not Detected     Streptococcus agalactiae Not Detected     Streptococcus pneumoniae Not Detected     Streptococcus pyogenes Not Detected     Acinetobacter calcoaceticus/baumannii complex Not Detected     Bacteroides fragilis Not Detected     Enterobacterales See species for ID     Enterobacter cloacae complex Not Detected     Escherichia coli Not Detected     Klebsiella aerogenes Detected     Klebsiella oxytoca Not Detected     Klebsiella pneumoniae group Not Detected     Proteus Not Detected     Salmonella sp Not Detected     Serratia marcescens Not Detected     Haemophilus influenzae Not Detected     Neisseria meningtidis Not Detected     Pseudomonas aeruginosa Not Detected     Stenotrophomonas maltophilia Not Detected     Candida albicans Not Detected     Candida auris Not Detected     Candida glabrata Not Detected     Candida krusei Not Detected     Candida parapsilosis Not Detected     Candida tropicalis Not Detected     Cryptococcus neoformans/gattii Not Detected     CTX-M (ESBL ) Not Detected     IMP (Carbapenem resistant) Not Detected     KPC resistance gene (Carbapenem resistant) Not Detected     mcr-1  Not Detected     mec A/C  Test Not Applicable     mec A/C and MREJ (MRSA) gene Test Not Applicable     NDM (Carbapenem resistant) Not Detected     OXA-48-like (Carbapenem resistant) Not Detected     van A/B (VRE gene) Test Not Applicable     VIM (Carbapenem resistant) Not Detected    Blood culture [[id number]] Collected: 03/14/24 1623    Order Status: Completed Specimen: Blood Updated: 03/15/24 0846     Blood Culture, Routine Gram stain aer bottle: Gram negative rods      Gram stain manas bottle: Gram negative rods      Results called to and read back by:Solitario Quintanilla RN 03/15/2024      08:45    Narrative:      Collection has been rescheduled by [facility] at 03/14/2024 14:49 Reason:   Pt is in catlab. Will try back later. Nurse Cindi #[id number]  Collection has been rescheduled by  12 at 03/14/2024 14:49 Reason:   Pt is in catlab. Will try back later. Nurse Cindi #[id number]    Blood culture [[id number]] Collected: 03/14/24 1618    Order Status: Completed Specimen: Blood Updated: 03/15/24 0115     Blood Culture, Routine No Growth to date    Narrative:      Collection has been rescheduled by [facility] at 03/14/2024 14:49 Reason:   Pt is in catlab. Will try back later. Nurse Cindi #[id number]  Collection has been rescheduled by [facility] at 03/14/2024 14:49 Reason:   Pt is in catlab. Will try back later. Nurse Cindi #[id number]          Wound Culture:   Recent Labs   Lab 10/12/23  1134 11/21/23  1041 02/07/24  1412 02/07/24  1423 02/14/24  1712   LABAERO METHICILLIN RESISTANT STAPHYLOCOCCUS AUREUS  Many  * METHICILLIN RESISTANT STAPHYLOCOCCUS AUREUS  Moderate  No other significant isolate  * No growth METHICILLIN RESISTANT STAPHYLOCOCCUS AUREUS  Rare  * METHICILLIN RESISTANT STAPHYLOCOCCUS AUREUS  Few  *  METHICILLIN RESISTANT STAPHYLOCOCCUS AUREUS  Few  *     All pertinent labs within the past 24 hours have been reviewed.    Significant Imaging: I have reviewed all pertinent imaging results/findings within the past 24 hours.    I have personally reviewed records / hospital notes from   service and other specialty providers. I have also reviewed CBC, CMP/BMP,  cultures and imaging with my interpretation as documented in my assessment / plan.    Patient is high risk for infectious complications given pt's age, multiple co-morbidities, and case complexity.      Time: 75 minutes   50% of time spent on face-to-face counseling and coordination of care. Counseling included review of test results, diagnosis, and treatment plan with patient and/or family.

## 2024-03-15 NOTE — ASSESSMENT & PLAN NOTE
"L5-S1/S1 diskitis/osteomyelitis with abscess, diffuse infectious myositis of the bilateral lumbar paraspinal musculature and left iliopsoas muscle.  - see " Osteomeylitis" above    "

## 2024-03-15 NOTE — PROGRESS NOTES
Aleksandr Bray - Neurosurgery ([facility])  Neurosurgery  Progress Note    Subjective:     History of Present Illness: Mr. Donovan is a 28-year-old male with history of IV drug abuse and hypertension recently admitted to [facility]. Patient was admitted to [facility] for 17 days and discharged on 02/22/2024. He was seen by addiction medicine, Neurosurgery, Orthopedic surgery, IR and Infectious Disease. He underwent aspiration of the psoas abscess by IR on 02/07 as well as LOOP X fusion spine and epidural phlegmon debridement which grew MRSA. Patient was started on IV daptomycin to be followed with Infectious Disease and discharge to [facility] for completion of antibiotics. Patient was sent from [facility] for ongoing fevers. Upon arrival he was noted to have a fever of 99.6, WBC 9.62, sed rate 59, CRP 46.5, , , procalcitonin 1.18, lactic acid 1.1, UDS positive for marijuana and opiates. MRI of the L-spine showed continued L5-S1 and S1 to diskitis osteomyelitis with abscess, diffuse infectious myositis of the bilateral lower lumbar paraspinal musculatures and within the left psoas and iliopsoas muscle. No discrete abscess. Dr. Garcia with Neurosurgery was consulted at [facility] have recommended transfer. [facility] transfer has been pending for the last 18 hours. Hospital medicine was consulted for medical management. Patient's only chronic medical issue is hypertension. Currently blood pressure medicine is on hold due to hypotension.     Post-Op Info:  * No surgery found *       Interval History: NAEON. Afebrile since 4 pm yesterday. Lying in bed, no acute distress. Exam stable. Wound care and ID following. No plan for neurosurgical intervention.    Medications:  Continuous Infusions:   sodium chloride 0.9% 100 mL/hr at 03/15/24 8282     Scheduled Meds:   cloNIDine  0.1 mg Oral Q12H    DAPTOmycin (CUBICIN) IV (PEDS and ADULTS)  10 mg/kg Intravenous Q24H    methocarbamoL  500 mg Oral QID    mirtazapine  15  mg Oral QHS    oxyCODONE  10 mg Oral Q12H    polyethylene glycol  17 g Oral Daily    senna-docusate 8.6-50 mg  2 tablet Oral Daily    sodium chloride 0.9%  500 mL Intravenous Once     PRN Meds:acetaminophen, dextrose 10%, dextrose 10%, glucagon (human recombinant), glucose, glucose, glucose, hydrALAZINE, melatonin, ondansetron, oxyCODONE     Review of Systems  Objective:     Weight: 78 kg (172 lb)  Body mass index is 24.68 kg/m².  Vital Signs (Most Recent):  Temp: 97.6 °F (36.4 °C) (03/15/24 1125)  Pulse: 98 (03/15/24 1155)  Resp: 20 (03/15/24 1125)  BP: (!) 108/59 (03/15/24 1125)  SpO2: 100 % (03/15/24 1125) Vital Signs (24h Range):  Temp:  [97.5 °F (36.4 °C)-102 °F (38.9 °C)] 97.6 °F (36.4 °C)  Pulse:  [] 98  Resp:  [17-20] 20  SpO2:  [98 %-100 %] 100 %  BP: ()/(48-62) 108/59                                 Physical Exam     Neurosurgery Physical Exam    General: well developed, well nourished, no distress.   Head: normocephalic  Neurologic: Alert and oriented. Thought content appropriate.  GCS: Motor: 6/Verbal: 5/Eyes: 4 GCS Total: 15  Mental Status: Awake, Alert, Oriented x 4  Language: No aphasia  Speech: No dysarthria  Cranial nerves: face symmetric, tongue midline, CN II-XII grossly intact.   Eyes: pupils equal, round, reactive to light with accomodation, EOMI.   Pulmonary: normal respirations, no signs of respiratory distress  Abdomen: soft, non-distended, not tender to palpation  Sensory: intact to light touch throughout  Motor Strength: Moves all extremities spontaneously with good tone. No abnormal movements seen.     Strength  Deltoids Triceps Biceps Wrist Extension Wrist Flexion Hand    Upper: R 5/5 5/5 5/5 5/5 5/5 5/5    L 5/5 5/5 5/5 5/5 5/5 5/5     Iliopsoas Quadriceps Knee  Flexion Tibialis  anterior Gastro- cnemius EHL   Lower: R 5/5 5/5 5/5 5/5 5/5 4+/5    L 5/5 5/5 5/5 5/5 5/5 5/5     Skin: Skin is warm, dry and intact.  Gluteal cleft: No sacral dimple, tonja of hair, or nearby  "areas of skin discoloration.     Incision with superficial dehiscence. Fibrous tissue in wound bed. No active drainage.         Significant Labs:  Recent Labs   Lab 03/14/24  1619   *   *   K 4.0      CO2 23   BUN 17   CREATININE 0.9   CALCIUM 9.4     Recent Labs   Lab 03/15/24  0713   WBC 11.61   HGB 8.6*   HCT 29.0*        No results for input(s): "LABPT", "INR", "APTT" in the last 48 hours.  Microbiology Results (last 7 days)       Procedure Component Value Units Date/Time    Rapid Organism ID by PCR (from Blood culture) [[id number]]  (Abnormal) Collected: 03/14/24 1623    Order Status: Completed Updated: 03/15/24 1119     Enterococcus faecalis Not Detected     Enterococcus faecium Not Detected     Listeria monocytogenes Not Detected     Staphylococcus spp. Not Detected     Staphylococcus aureus Not Detected     Staphylococcus epidermidis Not Detected     Staphylococcus lugdunensis Not Detected     Streptococcus species Not Detected     Streptococcus agalactiae Not Detected     Streptococcus pneumoniae Not Detected     Streptococcus pyogenes Not Detected     Acinetobacter calcoaceticus/baumannii complex Not Detected     Bacteroides fragilis Not Detected     Enterobacterales See species for ID     Enterobacter cloacae complex Not Detected     Escherichia coli Not Detected     Klebsiella aerogenes Detected     Klebsiella oxytoca Not Detected     Klebsiella pneumoniae group Not Detected     Proteus Not Detected     Salmonella sp Not Detected     Serratia marcescens Not Detected     Haemophilus influenzae Not Detected     Neisseria meningtidis Not Detected     Pseudomonas aeruginosa Not Detected     Stenotrophomonas maltophilia Not Detected     Candida albicans Not Detected     Candida auris Not Detected     Candida glabrata Not Detected     Candida krusei Not Detected     Candida parapsilosis Not Detected     Candida tropicalis Not Detected     Cryptococcus neoformans/gattii Not Detected    "  CTX-M (ESBL ) Not Detected     IMP (Carbapenem resistant) Not Detected     KPC resistance gene (Carbapenem resistant) Not Detected     mcr-1  Not Detected     mec A/C  Test Not Applicable     mec A/C and MREJ (MRSA) gene Test Not Applicable     NDM (Carbapenem resistant) Not Detected     OXA-48-like (Carbapenem resistant) Not Detected     van A/B (VRE gene) Test Not Applicable     VIM (Carbapenem resistant) Not Detected    Blood culture [[id number]] Collected: 03/14/24 1623    Order Status: Completed Specimen: Blood Updated: 03/15/24 0846     Blood Culture, Routine Gram stain aer bottle: Gram negative rods      Gram stain manas bottle: Gram negative rods      Results called to and read back by:Solitario Quintanilla RN 03/15/2024      08:45    Narrative:      Collection has been rescheduled by [facility] at 03/14/2024 14:49 Reason:   Pt is in catlab. Will try back later. Nurse Cindi #[id number]  Collection has been rescheduled by [facility] at 03/14/2024 14:49 Reason:   Pt is in catlab. Will try back later. Nurse Cindi #[id number]    Blood culture [[id number]] Collected: 03/14/24 1618    Order Status: Completed Specimen: Blood Updated: 03/15/24 0115     Blood Culture, Routine No Growth to date    Narrative:      Collection has been rescheduled by [facility] at 03/14/2024 14:49 Reason:   Pt is in catlab. Will try back later. Nurse Cindi #[id number]  Collection has been rescheduled by [facility] at 03/14/2024 14:49 Reason:   Pt is in catlab. Will try back later. Nurse Cindi #[id number]          Recent Lab Results         03/15/24  0713   03/14/24  1623   03/14/24  1619   03/14/24  1618        Tali krusei   Not Detected           Salmonella sp   Not Detected           Acinetobacter calcoaceticus/baumannii complex   Not Detected           Albumin     3.0         ALP     251         ALT     235         Anion Gap     7         Aniso Slight             AST     335         Bacteroides fragilis   Not Detected           Baso # 0.06             Basophil % 0.5              BILIRUBIN TOTAL     0.8  Comment: For infants and newborns, interpretation of results should be based  on gestational age, weight and in agreement with clinical  observations.    Premature Infant recommended reference ranges:  Up to 24 hours.............<8.0 mg/dL  Up to 48 hours............<12.0 mg/dL  3-5 days..................<15.0 mg/dL  6-29 days.................<15.0 mg/dL           Blood Culture, Routine   Gram stain aer bottle: Gram negative rods  [P]     No Growth to date  [P]          Gram stain manas bottle: Gram negative rods  [P]              Results called to and read back by:Solitario Quintanilla RN 03/15/2024  [P]              08:45  [P]           BUN     17         Calcium     9.4         Candida albicans   Not Detected           Candida auris   Not Detected           Candida glabrata   Not Detected           Candida parapsilosis   Not Detected           Candida tropicalis   Not Detected           Chloride     105         CO2     23         CPK       39       Creatinine     0.9         CRP       30.3       Cryptococcus neoformans/gattii   Not Detected           CTX-M (ESBL )   Not Detected           Differential Method Automated             eGFR     >60.0         Enterobacter cloacae complex   Not Detected           Enterobacterales   See species for ID           Enterococcus faecalis   Not Detected           Enterococcus faecium   Not Detected           Eos # 0.1             Eos % 0.8             Escherichia coli   Not Detected           Fragmented Cells Occasional             Glucose     124         Gran # (ANC) 6.9             Gran % 59.6             Haemophilus influenzae   Not Detected           Hematocrit 29.0             Hemoglobin 8.6             Hepatitis C Ab     Reactive  Comment: Presumptive evidence of antibodies to HCV; recommend   supplemental testing HCV RNA Quantitative by PCR   ([id number]) if clinically indicated.           HIV 1/2 Ag/Ab     Non-reactive         Hypo  Occasional             Immature Grans (Abs) 0.03  Comment: Mild elevation in immature granulocytes is non specific and   can be seen in a variety of conditions including stress response,   acute inflammation, trauma and pregnancy. Correlation with other   laboratory and clinical findings is essential.               Immature Granulocytes 0.3             IMP (Carbapenem resistant)   Not Detected           Klebsiella aerogenes   Detected           Klebsiella oxytoca   Not Detected           Klebsiella pneumoniae group   Not Detected           KPC resistance gene (Carbapenem resistant)   Not Detected           Listeria monocytogenes   Not Detected           Lymph # 3.0             Lymph % 26.1             MCH 22.0             MCHC 29.7             mcr-1    Not Detected           MCV 74             mec A/C    Test Not Applicable           mec A/C and MREJ (MRSA) gene   Test Not Applicable           Mono # 1.5             Mono % 12.7             MPV 9.1             NDM (Carbapenem resistant)   Not Detected           Neisseria meningtidis   Not Detected           nRBC 0             OXA-48-like (Carbapenem resistant)   Not Detected           Platelet Count 334             Poikilocytosis Slight             Poly Occasional             Potassium     4.0         PROTEIN TOTAL     8.1         Proteus   Not Detected           Pseudomonas aeruginosa   Not Detected           RBC 3.91             RDW 21.0             Sed Rate       15       Serratia marcescens   Not Detected           Sodium     135         Staphylococcus aureus   Not Detected           Staphylococcus epidermidis   Not Detected           Staphylococcus lugdunensis   Not Detected           Staphylococcus spp.   Not Detected           Stenotrophomonas maltophilia   Not Detected           Streptococcus agalactiae   Not Detected           Streptococcus pneumoniae   Not Detected           Streptococcus pyogenes   Not Detected           Streptococcus species   Not  Detected           Target Cells Occasional             van A/B (VRE gene)   Test Not Applicable           VIM (Carbapenem resistant)   Not Detected           WBC 11.61                      [P] - Preliminary Result             All pertinent labs from the last 24 hours have been reviewed.    Significant Diagnostics:  I have reviewed all pertinent imaging results/findings within the past 24 hours.  Assessment/Plan:     Discitis of lumbosacral region  Mr. Donovan is a 28-year-old male with history of IV drug abuse and hypertension recently admitted to [facility]. Patient was admitted to [facility] for 17 days and discharged on 02/22/2024. Pt transferred for neurosurgical evaluation of fever in the setting of osteomyelitis.    - Neuro exam stable to improved from prior.   - All pertinent labs and imaging reviewed  - MRI of the L-spine showed continued L5-S1 and S1 to discitis osteomyelitis with abscess, diffuse infectious myositis of the bilateral lower lumbar paraspinal musculatures and within the left psoas and iliopsoas muscle. No drainable collection.  - CT without evidence of hardware failure  - No indication for neurosurgical intervention. Continue abx per ID. F/u wound care recs for wound dehiscence. Will f/u as scheduled for routine post-op care. NSGY will sign off. Please call with questions or acute changes in neuro exam.  - Medical management per primary  - Discussed with Dr. Pozo.        Joselyn Ventura PA-C  Neurosurgery  Aleksandr Bray - Neurosurgery ([facility])

## 2024-03-15 NOTE — PLAN OF CARE
Patient was at [facility] for ivab therapy, patient with history of iv drug use and is homeless.  Referral sent for patient to return to [place] once medically ready.   03/15/24 1121   Post-Acute Status   Post-Acute Authorization Placement   Post-Acute Placement Status Referrals Sent

## 2024-03-15 NOTE — ASSESSMENT & PLAN NOTE
ECHO 2/13/2024  Left Ventricle: The left ventricle is normal in size. Normal wall thickness. Normal wall motion. There is normal systolic function with a visually estimated ejection fraction of 60 - 65%. Ejection fraction by visual approximation is 65%. There is diastolic dysfunction but grade cannot be determined.    Right Ventricle: Normal right ventricular cavity size. Wall thickness is normal. Right ventricle wall motion  is normal. Systolic function is normal.    IVC/SVC: Normal venous pressure at 3 mmHg.    Pericardium: There is a trivial posterior effusion just base.    STABLE

## 2024-03-15 NOTE — ASSESSMENT & PLAN NOTE
This patient does have evidence of infective focus - continued L5-S1/S1 diskitis/osteomyelitis with abscess, diffuse infectious myositis of the bilateral lumbar paraspinal musculature and left iliopsoas muscle.  My overall impression is sepsis.  Source: Skin and Soft Tissue (location epidurial)  Antibiotics given-   Antibiotics (72h ago, onward)      Start     Stop Route Frequency Ordered    03/14/24 0900  DAPTOmycin (CUBICIN) 780 mg in sodium chloride 0.9% SolP 50 mL IVPB         -- IV Every 24 hours (non-standard times) 03/14/24 0755          Latest lactate reviewed-  Recent Labs   Lab 03/12/24  2038   LACTATE 1.1       Organ dysfunction indicated by Acute liver injury    Fluid challenge Not needed - patient is not hypotensive      Post- resuscitation assessment No - Post resuscitation assessment not needed       Will Not start Pressors- Levophed for MAP of 65  Source control achieved by: Broad spectrum antibiotics    Plan:  - Infectious Disease consulted  - Continue IV Daptomycin  - Follow blood cultures through maturity

## 2024-03-15 NOTE — PROGRESS NOTES
Aleksandr Bray - Neurosurgery ([facility])  [facility] Medicine  Progress Note    Patient Name: Ton Donovan  MRN: [id number]  Patient Class: IP- Inpatient   Admission Date: 3/14/2024  Length of Stay: 1 days  Attending Physician: Kimber Lewis MD  Primary Care Provider: Steve Kelly DO        Subjective:     Principal Problem:Osteomyelitis of vertebra of lumbosacral region        HPI:  Mr. Ton Donovan is a 28 year-old Male with a PMHx of Hypertension, IV drug use who presented with fevers and ongoing back pain with radiation down legs.    Of note, he was recently admitted to [facility] for LOOP X lumbar spinal fusion, with course complicated by a psoas abscess and MRSA epidural abscess s/p epidural phlegmon debridement. He was started on IV Daptomycin and discharged on 2/22/24 to [facility] for completion of antibiotics. Ongoing fevers complicated his LTAC stay, and he returned to the [facility] for further evaluation. On arrival, WBC 9, ESR 59, CRP 46, AST 360s, ALT 200s. Procalcitonin was elevated at 1.18, lactate normal. MRI L spine concerning for continued L5-S1/S1 diskitis and osteomyelitis with abscess, diffuse infectious myositis of the lumbar paraspinal musculature bilaterally and left iliopsoas muscle without discrete abscess. He was then transferred to [facility] for Neurosurgerical evaluation.     At the time of this consult, temp 99F, HR 60, 120s/60s, saturating well on room air. Most recent labs with WBC 5, Hgb 9, Na 134.    Medicine consulted for medical co-management of epidural abscess    Overview/Hospital Course:  3/`15-Admitted for NSGY evaluation of OM & discitis lumbar spine. Will let pt eat.     Interval History: see above    Review of Systems   Constitutional:  Positive for activity change. Negative for appetite change and fever.   HENT:  Negative for trouble swallowing.    Respiratory:  Negative for shortness of breath.    Cardiovascular:  Negative for chest pain.   Gastrointestinal:   Positive for constipation. Negative for abdominal pain, diarrhea and nausea.   Genitourinary:  Negative for difficulty urinating and frequency.   Musculoskeletal:  Positive for arthralgias, back pain and gait problem. Negative for neck stiffness.   Neurological:  Negative for headaches.   Psychiatric/Behavioral:  Negative for behavioral problems.      Objective:     Vital Signs (Most Recent):  Temp: 97.6 °F (36.4 °C) (03/15/24 0607)  Pulse: 60 (03/15/24 0607)  Resp: 18 (03/15/24 0607)  BP: (!) 100/51 (03/15/24 0607)  SpO2: 100 % (03/15/24 0607) Vital Signs (24h Range):  Temp:  [97.5 °F (36.4 °C)-102 °F (38.9 °C)] 97.6 °F (36.4 °C)  Pulse:  [] 60  Resp:  [17-19] 18  SpO2:  [98 %-100 %] 100 %  BP: ()/(48-62) 100/51     Weight: 78 kg (172 lb)  Body mass index is 24.68 kg/m².  No intake or output data in the 24 hours ending 03/15/24 0819      Physical Exam  Constitutional:       General: He is not in acute distress.     Appearance: Normal appearance. He is ill-appearing. He is not toxic-appearing or diaphoretic.   HENT:      Head: Normocephalic and atraumatic.      Nose: Nose normal.      Mouth/Throat:      Mouth: Mucous membranes are moist.      Pharynx: Oropharynx is clear.   Eyes:      General: No scleral icterus.     Extraocular Movements: Extraocular movements intact.      Conjunctiva/sclera: Conjunctivae normal.      Pupils: Pupils are equal, round, and reactive to light.   Cardiovascular:      Rate and Rhythm: Normal rate and regular rhythm.      Pulses: Normal pulses.   Pulmonary:      Effort: Pulmonary effort is normal. No respiratory distress.      Breath sounds: Normal breath sounds. No stridor. No wheezing, rhonchi or rales.   Chest:      Chest wall: No tenderness.   Abdominal:      General: Abdomen is flat. Bowel sounds are normal. There is no distension.      Palpations: Abdomen is soft.      Tenderness: There is no abdominal tenderness. There is no right CVA tenderness, left CVA tenderness,  guarding or rebound.   Musculoskeletal:         General: No swelling, tenderness, deformity or signs of injury. Normal range of motion.      Cervical back: Normal range of motion and neck supple. No rigidity or tenderness.      Right lower leg: No edema.      Left lower leg: No edema.   Skin:     General: Skin is warm and dry.      Coloration: Skin is not jaundiced.      Findings: No erythema or rash.   Neurological:      General: No focal deficit present.      Mental Status: He is alert and oriented to person, place, and time. Mental status is at baseline.      Motor: No weakness.      Gait: Gait abnormal.   Psychiatric:         Mood and Affect: Mood normal.         Behavior: Behavior normal.         Thought Content: Thought content normal.         Judgment: Judgment normal.             Significant Labs: All pertinent labs within the past 24 hours have been reviewed.  CBC:   Recent Labs   Lab 03/13/24  1050   WBC 5.20   HGB 9.0*   HCT 29.8*        CMP:   Recent Labs   Lab 03/13/24  1050 03/14/24  1619   * 135*   K 4.8 4.0    105   CO2 23 23   GLU 94 124*   BUN 9 17   CREATININE 0.8 0.9   CALCIUM 9.7 9.4   PROT  --  8.1   ALBUMIN  --  3.0*   BILITOT  --  0.8   ALKPHOS  --  251*   AST  --  335*   ALT  --  235*   ANIONGAP 9 7*       Significant Imaging: I have reviewed all pertinent imaging results/findings within the past 24 hours.    Assessment/Plan:      * Osteomyelitis of vertebra of lumbosacral region  28M with hypertension, IV drug use (heroin) presenting from LTAC with ongoing fevers    He was recently admitted on 2/04/24 with osteomyelitis/discitis L5-S1, sacral and left illiac osteomyelitis, L5-S1 epidural abscess, and left iliopsoas abscess. He underwent L4-pelvic fusion on 2/14/24 with epidural phlegmon debridement, intraoperative cultures positive for MRSA. Infectious Disease was consulted and recommended IV Daptomycin for 8 week course (EOC 4/10/24) to be followed by oral suppressive  "doxycycline thereafter. He was discharged on 2/22/24 to [facility] to complete his antibiotic course. LTAC stay was complicated by ongoing fevers and he represented to [facility] for further evaluation prior to transfer to [facility] for neurosurgical evaluation. On arrival, WBC 9, ESR 59, CRP 46, AST 360s, ALT 200s. Procalcitonin was elevated at 1.18, lactate normal. MRI L spine showed continued L5-S1/S1 diskitis/osteomyelitis with abscess, diffuse infectious myositis of the bilateral lumbar paraspinal musculature and left iliopsoas muscle.      Plan:  - Infectious Disease consulted, appreciate assistance and recommendations  - CT L spine without contrast ordered and pending  - Continue IV Daptomycin q24h  - Weekly CK level while on Daptomycin  - Multimodal pain control with tylenol, gabapentin, methocarbamol, oxy IR, oxy ER  - Follow blood cultures through maturity  - Daily CBC, CMP  - Cardiac monitor  - Neurosurgery Consult: need surgery plan  - pt transferred to [facility]    Psoas abscess, left  L5-S1/S1 diskitis/osteomyelitis with abscess, diffuse infectious myositis of the bilateral lumbar paraspinal musculature and left iliopsoas muscle.  - see " Osteomeylitis" above      Discitis of lumbosacral region  See Osteomyelitis for consolidated plan      Septic arthritis  See " Osteomyelitis"      Myositis of multiple sites  See " Osteomeylitis" for plan      Substance or medication-induced depressive disorder  - Continue Mirtazipine  - Hx of heroin, THC, cociane      Severe sepsis  This patient does have evidence of infective focus - continued L5-S1/S1 diskitis/osteomyelitis with abscess, diffuse infectious myositis of the bilateral lumbar paraspinal musculature and left iliopsoas muscle.  My overall impression is sepsis.  Source: Skin and Soft Tissue (location epidurial)  Antibiotics given-   Antibiotics (72h ago, onward)      Start     Stop Route Frequency Ordered    03/14/24 0900  DAPTOmycin (CUBICIN) 780 mg in " "sodium chloride 0.9% SolP 50 mL IVPB         -- IV Every 24 hours (non-standard times) 03/14/24 0755          Latest lactate reviewed-  Recent Labs   Lab 03/12/24 2038   LACTATE 1.1       Organ dysfunction indicated by Acute liver injury    Fluid challenge Not needed - patient is not hypotensive      Post- resuscitation assessment No - Post resuscitation assessment not needed       Will Not start Pressors- Levophed for MAP of 65  Source control achieved by: Broad spectrum antibiotics    Plan:  - Infectious Disease consulted  - Continue IV Daptomycin  - Follow blood cultures through maturity    HTN (hypertension)  Chronic, controlled. Latest blood pressure and vitals reviewed-     Temp:  [97.5 °F (36.4 °C)-102 °F (38.9 °C)]   Pulse:  []   Resp:  [17-19]   BP: ()/(48-62)   SpO2:  [98 %-100 %] .   Home meds for hypertension were reviewed and noted below.   Hypertension Medications               cloNIDine (CATAPRES) 0.1 MG tablet Take 1 tablet (0.1 mg total) by mouth every 12 (twelve) hours.          Plan:  - While in the hospital, will manage blood pressure as follows; Continue home antihypertensive regimen. Currently on Clonidine 0.1 PO BID  - Will utilize p.r.n. blood pressure medication only if patient's blood pressure greater than 180/110 and he develops symptoms such as worsening chest pain or shortness of breath.    Hepatitis  Most recent labs from OSH showed AST 360s, ALT 200s. Had recent hepatitis panel 3/12 with hep c    - Recent Hepatitis C screening resulted positive, previous Hep C screening 3 months ago negative. Check repeat Hep C screening and PCR  - Check HIV given history of IVDU and ongoing fever despite antibiotics  - RUQ ultrasound- pending  - Daily CMP to trend transaminases:  335/235  - Infectious Diseases consulted and following      Drug dependence  See " Substance induced depressive disroder"  - monitor for withdrawal and give opioid if needed   - pt has been in LTAC  - on " "oxycodone LA 10 bid for pain and dependence      Heroin use disorder, severe  See " Drug Dependence"       Hyponatremia  Patient has hyponatremia which is controlled,We will aim to correct the sodium by 4-6mEq in 24 hours. We will monitor sodium Daily. The hyponatremia is due to Dehydration/hypovolemia. We will obtain the following studies: see labsection. We will treat the hyponatremia with IV fluids. The patient's sodium results have been reviewed and are listed below.  Recent Labs   Lab 03/14/24  1619   *       Chronic diastolic heart failure  ECHO 2/13/2024  Left Ventricle: The left ventricle is normal in size. Normal wall thickness. Normal wall motion. There is normal systolic function with a visually estimated ejection fraction of 60 - 65%. Ejection fraction by visual approximation is 65%. There is diastolic dysfunction but grade cannot be determined.    Right Ventricle: Normal right ventricular cavity size. Wall thickness is normal. Right ventricle wall motion  is normal. Systolic function is normal.    IVC/SVC: Normal venous pressure at 3 mmHg.    Pericardium: There is a trivial posterior effusion just base.    STABLE      Debility  Patient with Acute on chronic debility due to other reduced mobility. Latest AMPAC and GEMS scores have been reviewed. Evaluation for etiology is complete. Plan includes progressive mobility protocol initated, PT/OT consulted, and may need surgey .      VTE Risk Mitigation (From admission, onward)           Ordered     Place BRENNAN hose  Until discontinued         03/14/24 0730     IP VTE HIGH RISK PATIENT  Once         03/14/24 0730     Place sequential compression device  Until discontinued         03/14/24 0730                    Discharge Planning   SENA: 3/18/2024     Code Status: Full Code   Is the patient medically ready for discharge?: No    Reason for patient still in hospital (select all that apply): Patient trending condition  Discharge Plan A: Long-term acute care " facility (LTAC)          Kimber Lewis MD  Department of Hospital Medicine   [facility] - Neurosurgery [facility])

## 2024-03-16 LAB
ALBUMIN SERPL BCP-MCNC: 3 G/DL (ref 3.5–5.2)
ALP SERPL-CCNC: 231 U/L (ref 55–135)
ALT SERPL W/O P-5'-P-CCNC: 326 U/L (ref 10–44)
ANION GAP SERPL CALC-SCNC: 7 MMOL/L (ref 8–16)
AST SERPL-CCNC: 536 U/L (ref 10–40)
BASOPHILS # BLD AUTO: 0.03 K/UL (ref 0–0.2)
BASOPHILS NFR BLD: 0.3 % (ref 0–1.9)
BILIRUB SERPL-MCNC: 0.7 MG/DL (ref 0.1–1)
BUN SERPL-MCNC: 10 MG/DL (ref 6–20)
CALCIUM SERPL-MCNC: 9.4 MG/DL (ref 8.7–10.5)
CHLORIDE SERPL-SCNC: 106 MMOL/L (ref 95–110)
CO2 SERPL-SCNC: 25 MMOL/L (ref 23–29)
CREAT SERPL-MCNC: 0.7 MG/DL (ref 0.5–1.4)
CRP SERPL-MCNC: 37.8 MG/L (ref 0–8.2)
DIFFERENTIAL METHOD BLD: ABNORMAL
EOSINOPHIL # BLD AUTO: 0 K/UL (ref 0–0.5)
EOSINOPHIL NFR BLD: 0.4 % (ref 0–8)
ERYTHROCYTE [DISTWIDTH] IN BLOOD BY AUTOMATED COUNT: 21.4 % (ref 11.5–14.5)
EST. GFR  (NO RACE VARIABLE): >60 ML/MIN/1.73 M^2
GLUCOSE SERPL-MCNC: 99 MG/DL (ref 70–110)
HCT VFR BLD AUTO: 30.1 % (ref 40–54)
HGB BLD-MCNC: 8.9 G/DL (ref 14–18)
IMM GRANULOCYTES # BLD AUTO: 0.05 K/UL (ref 0–0.04)
IMM GRANULOCYTES NFR BLD AUTO: 0.5 % (ref 0–0.5)
LYMPHOCYTES # BLD AUTO: 1.8 K/UL (ref 1–4.8)
LYMPHOCYTES NFR BLD: 17.4 % (ref 18–48)
MCH RBC QN AUTO: 22 PG (ref 27–31)
MCHC RBC AUTO-ENTMCNC: 29.6 G/DL (ref 32–36)
MCV RBC AUTO: 74 FL (ref 82–98)
MONOCYTES # BLD AUTO: 0.7 K/UL (ref 0.3–1)
MONOCYTES NFR BLD: 6.6 % (ref 4–15)
NEUTROPHILS # BLD AUTO: 7.5 K/UL (ref 1.8–7.7)
NEUTROPHILS NFR BLD: 74.8 % (ref 38–73)
NRBC BLD-RTO: 0 /100 WBC
PLATELET # BLD AUTO: 336 K/UL (ref 150–450)
PMV BLD AUTO: 10.5 FL (ref 9.2–12.9)
POTASSIUM SERPL-SCNC: 4.5 MMOL/L (ref 3.5–5.1)
PROT SERPL-MCNC: 8.2 G/DL (ref 6–8.4)
RBC # BLD AUTO: 4.05 M/UL (ref 4.6–6.2)
SODIUM SERPL-SCNC: 138 MMOL/L (ref 136–145)
WBC # BLD AUTO: 10.07 K/UL (ref 3.9–12.7)

## 2024-03-16 PROCEDURE — 63600175 PHARM REV CODE 636 W HCPCS

## 2024-03-16 PROCEDURE — 99499 UNLISTED E&M SERVICE: CPT | Mod: ,,, | Performed by: STUDENT IN AN ORGANIZED HEALTH CARE EDUCATION/TRAINING PROGRAM

## 2024-03-16 PROCEDURE — 25000003 PHARM REV CODE 250: Performed by: INTERNAL MEDICINE

## 2024-03-16 PROCEDURE — 85025 COMPLETE CBC W/AUTO DIFF WBC: CPT | Performed by: STUDENT IN AN ORGANIZED HEALTH CARE EDUCATION/TRAINING PROGRAM

## 2024-03-16 PROCEDURE — 11000001 HC ACUTE MED/SURG PRIVATE ROOM

## 2024-03-16 PROCEDURE — 36415 COLL VENOUS BLD VENIPUNCTURE: CPT | Mod: XB | Performed by: STUDENT IN AN ORGANIZED HEALTH CARE EDUCATION/TRAINING PROGRAM

## 2024-03-16 PROCEDURE — 25000003 PHARM REV CODE 250

## 2024-03-16 PROCEDURE — 63600175 PHARM REV CODE 636 W HCPCS: Performed by: INTERNAL MEDICINE

## 2024-03-16 PROCEDURE — 86140 C-REACTIVE PROTEIN: CPT | Performed by: STUDENT IN AN ORGANIZED HEALTH CARE EDUCATION/TRAINING PROGRAM

## 2024-03-16 PROCEDURE — 80053 COMPREHEN METABOLIC PANEL: CPT | Performed by: STUDENT IN AN ORGANIZED HEALTH CARE EDUCATION/TRAINING PROGRAM

## 2024-03-16 PROCEDURE — 36415 COLL VENOUS BLD VENIPUNCTURE: CPT | Performed by: STUDENT IN AN ORGANIZED HEALTH CARE EDUCATION/TRAINING PROGRAM

## 2024-03-16 PROCEDURE — 25000003 PHARM REV CODE 250: Performed by: STUDENT IN AN ORGANIZED HEALTH CARE EDUCATION/TRAINING PROGRAM

## 2024-03-16 RX ADMIN — METHOCARBAMOL 500 MG: 500 TABLET ORAL at 08:03

## 2024-03-16 RX ADMIN — OXYCODONE HYDROCHLORIDE 15 MG: 10 TABLET ORAL at 11:03

## 2024-03-16 RX ADMIN — OXYCODONE HYDROCHLORIDE 10 MG: 10 TABLET, FILM COATED, EXTENDED RELEASE ORAL at 08:03

## 2024-03-16 RX ADMIN — METHOCARBAMOL 500 MG: 500 TABLET ORAL at 01:03

## 2024-03-16 RX ADMIN — ACETAMINOPHEN 650 MG: 325 TABLET ORAL at 02:03

## 2024-03-16 RX ADMIN — METHOCARBAMOL 500 MG: 500 TABLET ORAL at 05:03

## 2024-03-16 RX ADMIN — OXYCODONE HYDROCHLORIDE 15 MG: 10 TABLET ORAL at 01:03

## 2024-03-16 RX ADMIN — OXYCODONE HYDROCHLORIDE 15 MG: 10 TABLET ORAL at 05:03

## 2024-03-16 RX ADMIN — CLONIDINE HYDROCHLORIDE 0.1 MG: 0.1 TABLET ORAL at 09:03

## 2024-03-16 RX ADMIN — MEROPENEM 2 G: 1 INJECTION INTRAVENOUS at 09:03

## 2024-03-16 RX ADMIN — METHOCARBAMOL 500 MG: 500 TABLET ORAL at 09:03

## 2024-03-16 RX ADMIN — MEROPENEM 2 G: 1 INJECTION INTRAVENOUS at 05:03

## 2024-03-16 RX ADMIN — OXYCODONE HYDROCHLORIDE 15 MG: 10 TABLET ORAL at 12:03

## 2024-03-16 RX ADMIN — DAPTOMYCIN 780 MG: 350 INJECTION, POWDER, LYOPHILIZED, FOR SOLUTION INTRAVENOUS at 09:03

## 2024-03-16 RX ADMIN — OXYCODONE HYDROCHLORIDE 10 MG: 10 TABLET, FILM COATED, EXTENDED RELEASE ORAL at 09:03

## 2024-03-16 RX ADMIN — MIRTAZAPINE 15 MG: 15 TABLET, FILM COATED ORAL at 08:03

## 2024-03-16 RX ADMIN — MEROPENEM 2 G: 1 INJECTION INTRAVENOUS at 01:03

## 2024-03-16 NOTE — ASSESSMENT & PLAN NOTE
I have reviewed hospital notes from   service and other specialty providers. I have also reviewed CBC, CMP/BMP,  cultures and imaging with my interpretation as documented.      28M with h/o IVDU, MRSA bacteremia and prior disseminated MRSA infection with poor adherence with care (history of leaving AMA), readmitted 02/04 with progressive back pain. Imaging significant for lumbosacral osteomyelitis with associated epidural abscess, osteomyelitis of sacrum & L iliac bone, probable septic arthritis of L SI joint & lower lumbar spine facet joints, and left iliopsoas abscess, abscess in superior rectal/presacral region and micro-abscesses in paraspinal muscles, pelvis, and left sacrum. Blood cultures remained negative then, and TTE negative. Underwent IR aspiration of left SI joint and psoas on 02/07.  Cxs +MRSA. Abx held prior to IR bx; post-procedure started on empiric Iv-vanc / CTX.   S/p lumbar-pelvis debridement with fusion (02/14). Surgical cxs +MRSA. Pt discharged to [facility], to complete 6wks Iv-Dapto 10mg/kg Q24h, BRENNAN 04/10/24. (Of note, switched vanc to IV daptomycin (d/t subtherapeutic vancomycin levels with q8hr dosing). Advised repeat imaging / neuro f/u near BRENNAN 04/10; followed by abx suppression thereafter, likely lifelong.     -- However, [place]12 [place] [facility] [place] d/t new onset of intermittent fevers (101.4F) at [facility] for past few days. On arrival, temp 99F, VSS, HDS, wbc nml, CRP 46,  / , , Cr 0.8.  CPK 39 nml.  Bld cxs 03/12 NGTD. Pt developed fever 03/14, repeat bld cxs 03/14 1 of 2 +GNR; BCID +Kleb aerogenes.     Repeat MRI L-spine / pelvis (03/13): Extensive epidural infectious/inflammatory change posterior to the L5-S1 and S1-2 disc spaces; with paravertebral / pre-vertebral abscess. Diffuse infectious myositis of the bilateral lower lumbar paraspinal musculature and within the left psoas and iliopsoas muscles, with 0.7 cm abscess (down from 2.3cm on prior 02/04).  Persistent osteomyelitis and septic arthritis about the left SI joint. -- Per NSGY, no role for surgical intervention at this time, favoring wound care and continuing Iv-abx.     Abx: 03/12 -03/13 vanc / cefepime; 03/14 dapto; 03/15 dapto + merrem. Favored merrem for kleb aerogenes bacteremia, as pt had received 2d of cefepime prior to these bld cxs. UDS +MJ on arrival from LTAC. Suspect picc line may be source of new GNR bacteremia; vs. Lumbar surgical wound.      Recommendations / Plan:  Recommend removal of picc line, with cath tip sent for culture.  Continue Iv-Daptomycin 10mg/kg Q24h, with Iv-merrem 2g Q8h -- via peripheral IV.   Follow-up repeat bld cxs 03/15 (NGTD) and cx of removed picc line / cath   Will continue to trend fever, wbc, and clinical response  May likely continue Iv-merrem for remaining duration of Iv-dapto (BRENNAN 04/10) for lumbar osteo-discitis with paravertebral / psoas myositis and sub centimeter abscesses -- d/t concern for possible further seeding of spine during subacute / chronic infection.       -- Discussed with ID staff and primary team   -- ID will continue to follow w/ further recs.

## 2024-03-16 NOTE — ASSESSMENT & PLAN NOTE
28M with hypertension, IV drug use (heroin) presenting from LTAC with ongoing fevers    He was recently admitted on 2/04/24 with osteomyelitis/discitis L5-S1, sacral and left illiac osteomyelitis, L5-S1 epidural abscess, and left iliopsoas abscess. He underwent L4-pelvic fusion on 2/14/24 with epidural phlegmon debridement, intraoperative cultures positive for MRSA. Infectious Disease was consulted and recommended IV Daptomycin for 8 week course (EOC 4/10/24) to be followed by oral suppressive doxycycline thereafter. He was discharged on 2/22/24 to [facility] to complete his antibiotic course. LTAC stay was complicated by ongoing fevers and he represented to [facility] for further evaluation prior to transfer to [facility] for neurosurgical evaluation. On arrival, WBC 9, ESR 59, CRP 46, AST 360s, ALT 200s. Procalcitonin was elevated at 1.18, lactate normal. MRI L spine showed continued L5-S1/S1 diskitis/osteomyelitis with abscess, diffuse infectious myositis of the bilateral lumbar paraspinal musculature and left iliopsoas muscle.  - Infectious Disease consulted, appreciate assistance and recommendations  - CT L spine without contrast noted  - Continue IV Daptomycin q24h  - Weekly CK level while on Daptomycin  - Multimodal pain control with tylenol, gabapentin, methocarbamol, oxy IR, oxy ER  - Follow blood cultures through maturity  - Daily CBC, CMP  - Cardiac monitor  - Neurosurgery Consult: he does not need surgery.  - pt transferred to [facility]    3/16- ID final recs pending. Wound care consulted pic line may be source of infection

## 2024-03-16 NOTE — PROGRESS NOTES
Aleksandr Bray - Neurosurgery ([facility])  [facility] Medicine  Progress Note    Patient Name: Ton Donovan  MRN: [id number]  Patient Class: IP- Inpatient   Admission Date: 3/14/2024  Length of Stay: 2 days  Attending Physician: Kimber Lewis MD  Primary Care Provider: Steve Kelly DO        Subjective:     Principal Problem:Osteomyelitis of vertebra of lumbosacral region        HPI:  Mr. Ton Donovan is a 28 year-old Male with a PMHx of Hypertension, IV drug use who presented with fevers and ongoing back pain with radiation down legs.    Of note, he was recently admitted to [facility] [place] [facility] for LOOP X lumbar spinal fusion, with course complicated by a psoas abscess and MRSA epidural abscess s/p epidural phlegmon debridement. He was started on IV Daptomycin and discharged on 2/22/24 to [facility] for completion of antibiotics. Ongoing fevers complicated his LTAC stay, and he returned to the [facility] Emergency Department for further evaluation. On arrival, WBC 9, ESR 59, CRP 46, AST 360s, ALT 200s. Procalcitonin was elevated at 1.18, lactate normal. MRI L spine concerning for continued L5-S1/S1 diskitis and osteomyelitis with abscess, diffuse infectious myositis of the lumbar paraspinal musculature bilaterally and left iliopsoas muscle without discrete abscess. He was then transferred to [facility] for Neurosurgerical evaluation.     At the time of this consult, temp 99F, HR 60, 120s/60s, saturating well on room air. Most recent labs with WBC 5, Hgb 9, Na 134.    Medicine consulted for medical co-management of epidural abscess    Overview/Hospital Course:  3/15-Admitted for NSGY evaluation of OM & discitis lumbar spine. Will let pt eat.   3/16- Does not need surgery. Appreciate NSGY eval. Wound care and ID following and need final recs. Pt is comfortable.  Pic line removal and culture tip.     Interval History: see above    Review of Systems   Constitutional:  Positive for activity change. Negative for appetite change and fever.    HENT:  Negative for trouble swallowing.    Respiratory:  Negative for shortness of breath.    Cardiovascular:  Negative for chest pain.   Gastrointestinal:  Positive for constipation. Negative for abdominal pain, diarrhea and nausea.   Genitourinary:  Negative for difficulty urinating and frequency.   Musculoskeletal:  Positive for arthralgias, back pain and gait problem. Negative for neck stiffness.   Neurological:  Negative for headaches.   Psychiatric/Behavioral:  Negative for behavioral problems.      Objective:     Vital Signs (Most Recent):  Temp: 97.9 °F (36.6 °C) (03/16/24 0754)  Pulse: (!) 59 (03/16/24 0754)  Resp: 18 (03/16/24 0754)  BP: (!) 100/56 (03/16/24 0754)  SpO2: 100 % (03/16/24 0754) Vital Signs (24h Range):  Temp:  [97.5 °F (36.4 °C)-101.5 °F (38.6 °C)] 97.9 °F (36.6 °C)  Pulse:  [59-98] 59  Resp:  [18-20] 18  SpO2:  [97 %-100 %] 100 %  BP: ()/(51-59) 100/56     Weight: 78 kg (172 lb)  Body mass index is 24.68 kg/m².    Intake/Output Summary (Last 24 hours) at 3/16/2024 0922  Last data filed at 3/15/2024 1654  Gross per 24 hour   Intake 850 ml   Output 800 ml   Net 50 ml         Physical Exam  Constitutional:       General: He is not in acute distress.     Appearance: Normal appearance. He is ill-appearing. He is not toxic-appearing or diaphoretic.   HENT:      Head: Normocephalic and atraumatic.      Nose: Nose normal.      Mouth/Throat:      Mouth: Mucous membranes are moist.      Pharynx: Oropharynx is clear.   Eyes:      General: No scleral icterus.     Extraocular Movements: Extraocular movements intact.      Conjunctiva/sclera: Conjunctivae normal.      Pupils: Pupils are equal, round, and reactive to light.   Cardiovascular:      Rate and Rhythm: Normal rate and regular rhythm.      Pulses: Normal pulses.   Pulmonary:      Effort: Pulmonary effort is normal. No respiratory distress.      Breath sounds: Normal breath sounds. No stridor. No wheezing, rhonchi or rales.   Chest:       Chest wall: No tenderness.   Abdominal:      General: Abdomen is flat. Bowel sounds are normal. There is no distension.      Palpations: Abdomen is soft.      Tenderness: There is no abdominal tenderness. There is no right CVA tenderness, left CVA tenderness, guarding or rebound.   Musculoskeletal:         General: No swelling, tenderness, deformity or signs of injury. Normal range of motion.      Cervical back: Normal range of motion and neck supple. No rigidity or tenderness.      Right lower leg: No edema.      Left lower leg: No edema.   Skin:     General: Skin is warm and dry.      Coloration: Skin is not jaundiced.      Findings: No erythema or rash.   Neurological:      General: No focal deficit present.      Mental Status: He is alert and oriented to person, place, and time. Mental status is at baseline.      Motor: No weakness.      Gait: Gait abnormal.   Psychiatric:         Mood and Affect: Mood normal.         Behavior: Behavior normal.         Thought Content: Thought content normal.         Judgment: Judgment normal.             Significant Labs: All pertinent labs within the past 24 hours have been reviewed.  CBC:   Recent Labs   Lab 03/15/24  0713 03/16/24  0721   WBC 11.61 10.07   HGB 8.6* 8.9*   HCT 29.0* 30.1*    336       CMP:   Recent Labs   Lab 03/14/24  1619   *   K 4.0      CO2 23   *   BUN 17   CREATININE 0.9   CALCIUM 9.4   PROT 8.1   ALBUMIN 3.0*   BILITOT 0.8   ALKPHOS 251*   *   *   ANIONGAP 7*         Significant Imaging: I have reviewed all pertinent imaging results/findings within the past 24 hours.    Assessment/Plan:      * Osteomyelitis of vertebra of lumbosacral region  28M with hypertension, IV drug use (heroin) presenting from LTAC with ongoing fevers    He was recently admitted on 2/04/24 with osteomyelitis/discitis L5-S1, sacral and left illiac osteomyelitis, L5-S1 epidural abscess, and left iliopsoas abscess. He underwent L4-pelvic  fusion on 2/14/24 with epidural phlegmon debridement, intraoperative cultures positive for MRSA. Infectious Disease was consulted and recommended IV Daptomycin for 8 week course (EOC 4/10/24) to be followed by oral suppressive doxycycline thereafter. He was discharged on 2/22/24 to [facility] to complete his antibiotic course. LTAC stay was complicated by ongoing fevers and he represented to [facility] for further evaluation prior to transfer to [facility] for neurosurgical evaluation. On arrival, WBC 9, ESR 59, CRP 46, AST 360s, ALT 200s. Procalcitonin was elevated at 1.18, lactate normal. MRI L spine showed continued L5-S1/S1 diskitis/osteomyelitis with abscess, diffuse infectious myositis of the bilateral lumbar paraspinal musculature and left iliopsoas muscle.  - Infectious Disease consulted, appreciate assistance and recommendations  - CT L spine without contrast noted  - Continue IV Daptomycin q24h  - Weekly CK level while on Daptomycin  - Multimodal pain control with tylenol, gabapentin, methocarbamol, oxy IR, oxy ER  - Follow blood cultures through maturity  - Daily CBC, CMP  - Cardiac monitor  - Neurosurgery Consult: he does not need surgery.  - pt transferred to [facility]    3/16- ID final recs pending. Wound care consulted pic line may be source of infection      Severe sepsis  This patient does have evidence of infective focus - continued L5-S1/S1 diskitis/osteomyelitis with abscess, diffuse infectious myositis of the bilateral lumbar paraspinal musculature and left iliopsoas muscle.  My overall impression is sepsis.  Source: Skin and Soft Tissue (location epidurial)  Antibiotics given-   Antibiotics (72h ago, onward)      Start     Stop Route Frequency Ordered    03/15/24 1345  meropenem (MERREM) 2 g in sodium chloride 0.9% 100 mL IVPB         -- IV Every 8 hours (non-standard times) 03/15/24 1243    03/14/24 0900  DAPTOmycin (CUBICIN) 780 mg in sodium chloride 0.9% SolP 50 mL IVPB         -- IV Every 24  "hours (non-standard times) 03/14/24 0755          Latest lactate reviewed-  No results for input(s): "LACTATE", "POCLAC" in the last 72 hours.    Organ dysfunction indicated by Acute liver injury    Fluid challenge Not needed - patient is not hypotensive      Post- resuscitation assessment No - Post resuscitation assessment not needed       Will Not start Pressors- Levophed for MAP of 65  Source control achieved by: Broad spectrum antibiotics    - Infectious Disease consulted  - Continue IV Daptomycin and meropenum, Need end date  - Follow blood cultures through maturity. Klebsiella and enterobacteraies per PCR.    Psoas abscess, left  L5-S1/S1 diskitis/osteomyelitis with abscess, diffuse infectious myositis of the bilateral lumbar paraspinal musculature and left iliopsoas muscle.  - see " Osteomeylitis" above      Discitis of lumbosacral region  See Osteomyelitis for consolidated plan      Septic arthritis  See " Osteomyelitis"      Myositis of multiple sites  See " Osteomeylitis" for plan      Substance or medication-induced depressive disorder  - Continue Mirtazipine  - Hx of heroin, THC, cociane      HTN (hypertension)  Chronic, controlled. Latest blood pressure and vitals reviewed-     Temp:  [97.5 °F (36.4 °C)-101.5 °F (38.6 °C)]   Pulse:  [59-98]   Resp:  [18-20]   BP: ()/(51-59)   SpO2:  [97 %-100 %] .   Home meds for hypertension were reviewed and noted below.   Hypertension Medications               cloNIDine (CATAPRES) 0.1 MG tablet Take 1 tablet (0.1 mg total) by mouth every 12 (twelve) hours.          Plan:  - While in the hospital, will manage blood pressure as follows; Continue home antihypertensive regimen. Currently on Clonidine 0.1 PO BID  - Will utilize p.r.n. blood pressure medication only if patient's blood pressure greater than 180/110 and he develops symptoms such as worsening chest pain or shortness of breath.    Hepatitis  Most recent labs from OSH showed AST 360s, ALT 200s. Had recent " "hepatitis panel 3/12 with hep c    - Recent Hepatitis C screening resulted positive, previous Hep C screening 3 months ago negative. Check repeat Hep C screening and PCR  - Check HIV given history of IVDU and ongoing fever despite antibiotics  - RUQ ultrasound- pending  - Daily CMP to trend transaminases:  335/235  - Infectious Diseases consulted and following      Drug dependence  See " Substance induced depressive disroder"  - monitor for withdrawal and give opioid if needed   - pt has been in LTAC  - on oxycodone LA 10 bid for pain and dependence      Heroin use disorder, severe  See " Drug Dependence"       Hyponatremia  Patient has hyponatremia which is controlled,We will aim to correct the sodium by 4-6mEq in 24 hours. We will monitor sodium Daily. The hyponatremia is due to Dehydration/hypovolemia. We will obtain the following studies: see labsection. We will treat the hyponatremia with IV fluids. The patient's sodium results have been reviewed and are listed below.  No results for input(s): "NA" in the last 24 hours.      Chronic diastolic heart failure  ECHO 2/13/2024  Left Ventricle: The left ventricle is normal in size. Normal wall thickness. Normal wall motion. There is normal systolic function with a visually estimated ejection fraction of 60 - 65%. Ejection fraction by visual approximation is 65%. There is diastolic dysfunction but grade cannot be determined.    Right Ventricle: Normal right ventricular cavity size. Wall thickness is normal. Right ventricle wall motion  is normal. Systolic function is normal.    IVC/SVC: Normal venous pressure at 3 mmHg.    Pericardium: There is a trivial posterior effusion just base.    STABLE      Debility  Patient with Acute on chronic debility due to other reduced mobility. Latest AMPAC and GEMS scores have been reviewed. Evaluation for etiology is complete. Plan includes progressive mobility protocol initated, PT/OT consulted, and may need surgey .      VTE Risk " Mitigation (From admission, onward)           Ordered     Place BRENNAN hose  Until discontinued         03/14/24 0730     IP VTE HIGH RISK PATIENT  Once         03/14/24 0730     Place sequential compression device  Until discontinued         03/14/24 0730                    Discharge Planning   SENA: 3/18/2024     Code Status: Full Code   Is the patient medically ready for discharge?: No    Reason for patient still in hospital (select all that apply): Patient trending condition  Discharge Plan A: Long-term acute care facility (LTAC)          Kimber Lewis MD  Department of Hospital Medicine   [facility] - Neurosurgery ([facility])

## 2024-03-16 NOTE — ASSESSMENT & PLAN NOTE
Chronic, controlled. Latest blood pressure and vitals reviewed-     Temp:  [97.5 °F (36.4 °C)-101.5 °F (38.6 °C)]   Pulse:  [59-98]   Resp:  [18-20]   BP: ()/(51-59)   SpO2:  [97 %-100 %] .   Home meds for hypertension were reviewed and noted below.   Hypertension Medications               cloNIDine (CATAPRES) 0.1 MG tablet Take 1 tablet (0.1 mg total) by mouth every 12 (twelve) hours.          Plan:  - While in the hospital, will manage blood pressure as follows; Continue home antihypertensive regimen. Currently on Clonidine 0.1 PO BID  - Will utilize p.r.n. blood pressure medication only if patient's blood pressure greater than 180/110 and he develops symptoms such as worsening chest pain or shortness of breath.

## 2024-03-16 NOTE — ASSESSMENT & PLAN NOTE
"Patient has hyponatremia which is controlled,We will aim to correct the sodium by 4-6mEq in 24 hours. We will monitor sodium Daily. The hyponatremia is due to Dehydration/hypovolemia. We will obtain the following studies: see labsection. We will treat the hyponatremia with IV fluids. The patient's sodium results have been reviewed and are listed below.  No results for input(s): "NA" in the last 24 hours.    "

## 2024-03-16 NOTE — SUBJECTIVE & OBJECTIVE
Interval History: see above    Review of Systems   Constitutional:  Positive for activity change. Negative for appetite change and fever.   HENT:  Negative for trouble swallowing.    Respiratory:  Negative for shortness of breath.    Cardiovascular:  Negative for chest pain.   Gastrointestinal:  Positive for constipation. Negative for abdominal pain, diarrhea and nausea.   Genitourinary:  Negative for difficulty urinating and frequency.   Musculoskeletal:  Positive for arthralgias, back pain and gait problem. Negative for neck stiffness.   Neurological:  Negative for headaches.   Psychiatric/Behavioral:  Negative for behavioral problems.      Objective:     Vital Signs (Most Recent):  Temp: 97.9 °F (36.6 °C) (03/16/24 0754)  Pulse: (!) 59 (03/16/24 0754)  Resp: 18 (03/16/24 0754)  BP: (!) 100/56 (03/16/24 0754)  SpO2: 100 % (03/16/24 0754) Vital Signs (24h Range):  Temp:  [97.5 °F (36.4 °C)-101.5 °F (38.6 °C)] 97.9 °F (36.6 °C)  Pulse:  [59-98] 59  Resp:  [18-20] 18  SpO2:  [97 %-100 %] 100 %  BP: ()/(51-59) 100/56     Weight: 78 kg (172 lb)  Body mass index is 24.68 kg/m².    Intake/Output Summary (Last 24 hours) at 3/16/2024 0922  Last data filed at 3/15/2024 1654  Gross per 24 hour   Intake 850 ml   Output 800 ml   Net 50 ml         Physical Exam  Constitutional:       General: He is not in acute distress.     Appearance: Normal appearance. He is ill-appearing. He is not toxic-appearing or diaphoretic.   HENT:      Head: Normocephalic and atraumatic.      Nose: Nose normal.      Mouth/Throat:      Mouth: Mucous membranes are moist.      Pharynx: Oropharynx is clear.   Eyes:      General: No scleral icterus.     Extraocular Movements: Extraocular movements intact.      Conjunctiva/sclera: Conjunctivae normal.      Pupils: Pupils are equal, round, and reactive to light.   Cardiovascular:      Rate and Rhythm: Normal rate and regular rhythm.      Pulses: Normal pulses.   Pulmonary:      Effort: Pulmonary  effort is normal. No respiratory distress.      Breath sounds: Normal breath sounds. No stridor. No wheezing, rhonchi or rales.   Chest:      Chest wall: No tenderness.   Abdominal:      General: Abdomen is flat. Bowel sounds are normal. There is no distension.      Palpations: Abdomen is soft.      Tenderness: There is no abdominal tenderness. There is no right CVA tenderness, left CVA tenderness, guarding or rebound.   Musculoskeletal:         General: No swelling, tenderness, deformity or signs of injury. Normal range of motion.      Cervical back: Normal range of motion and neck supple. No rigidity or tenderness.      Right lower leg: No edema.      Left lower leg: No edema.   Skin:     General: Skin is warm and dry.      Coloration: Skin is not jaundiced.      Findings: No erythema or rash.   Neurological:      General: No focal deficit present.      Mental Status: He is alert and oriented to person, place, and time. Mental status is at baseline.      Motor: No weakness.      Gait: Gait abnormal.   Psychiatric:         Mood and Affect: Mood normal.         Behavior: Behavior normal.         Thought Content: Thought content normal.         Judgment: Judgment normal.             Significant Labs: All pertinent labs within the past 24 hours have been reviewed.  CBC:   Recent Labs   Lab 03/15/24  0713 03/16/24  0721   WBC 11.61 10.07   HGB 8.6* 8.9*   HCT 29.0* 30.1*    336       CMP:   Recent Labs   Lab 03/14/24  1619   *   K 4.0      CO2 23   *   BUN 17   CREATININE 0.9   CALCIUM 9.4   PROT 8.1   ALBUMIN 3.0*   BILITOT 0.8   ALKPHOS 251*   *   *   ANIONGAP 7*         Significant Imaging: I have reviewed all pertinent imaging results/findings within the past 24 hours.

## 2024-03-16 NOTE — PROGRESS NOTES
Aleksandr Bray - Neurosurgery ([facility])  Infectious Disease  Progress Note    Patient Name: Ton Donovan  MRN: [id number]  Admission Date: 3/14/2024  Length of Stay: 2 days  Attending Physician: Kimber Lewis MD  Primary Care Provider: Steve Kelly DO    Isolation Status: No active isolations  Assessment/Plan:      ID  * Osteomyelitis of vertebra of lumbosacral region  I have reviewed hospital notes from   service and other specialty providers. I have also reviewed CBC, CMP/BMP,  cultures and imaging with my interpretation as documented.      28M with h/o IVDU, MRSA bacteremia and prior disseminated MRSA infection with poor adherence with care (history of leaving AMA), readmitted 02/04 with progressive back pain. Imaging significant for lumbosacral osteomyelitis with associated epidural abscess, osteomyelitis of sacrum & L iliac bone, probable septic arthritis of L SI joint & lower lumbar spine facet joints, and left iliopsoas abscess, abscess in superior rectal/presacral region and micro-abscesses in paraspinal muscles, pelvis, and left sacrum. Blood cultures remained negative then, and TTE negative. Underwent IR aspiration of left SI joint and psoas on 02/07.  Cxs +MRSA. Abx held prior to IR bx; post-procedure started on empiric Iv-vanc / CTX.   S/p lumbar-pelvis debridement with fusion (02/14). Surgical cxs +MRSA. Pt discharged to [facility], to complete 6wks Iv-Dapto 10mg/kg Q24h, [place] 04/10/24. (Of note, switched vanc to IV daptomycin (d/t subtherapeutic vancomycin levels with q8hr dosing). Advised repeat imaging / neuro f/u near [place] 04/10; followed by abx suppression thereafter, likely lifelong.     -- However, pt presents 03/12 to [facility] now d/t new onset of intermittent fevers (101.4F) at [facility] for past few days. On arrival, temp 99F, VSS, HDS, wbc nml, CRP 46,  / , , Cr 0.8.  CPK 39 nml.  Bld cxs 03/12 NGTD. Pt developed fever 03/14, repeat bld cxs 03/14 1 of 2 +GNR; BCID +Kleb  aerogenes.     Repeat MRI L-spine / pelvis (03/13): Extensive epidural infectious/inflammatory change posterior to the L5-S1 and S1-2 disc spaces; with paravertebral / pre-vertebral abscess. Diffuse infectious myositis of the bilateral lower lumbar paraspinal musculature and within the left psoas and iliopsoas muscles, with 0.7 cm abscess (down from 2.3cm on prior 02/04). Persistent osteomyelitis and septic arthritis about the left SI joint. -- Per NSGY, no role for surgical intervention at this time, favoring wound care and continuing Iv-abx.     Abx: 03/12 -03/13 vanc / cefepime; 03/14 dapto; 03/15 dapto + merrem. Favored merrem for kleb aerogenes bacteremia, as pt had received 2d of cefepime prior to these bld cxs. UDS +MJ on arrival from [facility]. Suspect picc line may be source of new GNR bacteremia; vs. Lumbar surgical wound.      Recommendations / Plan:  Recommend removal of picc line, with cath tip sent for culture.  Continue Iv-Daptomycin 10mg/kg Q24h, with Iv-merrem 2g Q8h -- via peripheral IV.   Follow-up repeat bld cxs 03/15 (NGTD) and cx of removed picc line / cath   Will continue to trend fever, wbc, and clinical response  May likely continue Iv-merrem for remaining duration of Iv-dapto ([place] 04/10) for lumbar osteo-discitis with paravertebral / psoas myositis and sub centimeter abscesses -- d/t concern for possible further seeding of spine during subacute / chronic infection.       -- Discussed with ID staff and primary team   -- ID will continue to follow w/ further recs.        Thank you for your consult. I will follow-up with patient. Please contact us if you have any additional questions.    Elpidio Paredes PA-C  Infectious Disease  [facility] - Neurosurgery ([facility])    Subjective:     Principal Problem:Osteomyelitis of vertebra of lumbosacral region    HPI: 28M with h/o IVDU, MRSA bacteremia and prior disseminated MRSA infection with poor adherence with care (history of leaving AMA), readmitted 02/04  with progressive back pain. Imaging significant for lumbosacral osteomyelitis with associated epidural abscess, osteomyelitis of sacrum & L iliac bone, probable septic arthritis of L SI joint & lower lumbar spine facet joints, and left iliopsoas abscess, abscess in superior rectal/presacral region and micro-abscesses in paraspinal muscles, pelvis, and left sacrum. Blood cultures remained negative. TTE negative. Underwent IR aspiration of left SI joint and psoas on 02/07.  Cxs +MRSA. Abx held prior to IR bx; post-procedure started on empiric Iv-vanc / CTX.   S/p lumbar-pelvis debridement with fusion (02/14). Surgical cxs +MRSA. Pt discharged to [facility], to complete 6wks Iv-Dapto 10mg/kg Q24h, [place] 04/10/24. (Of note, switched vanc to IV daptomycin (d/t subtherapeutic vancomycin levels with q8hr dosing). Advised repeat imaging / neuro f/u near [place] 04/10; followed by abx suppression thereafter, likely lifelong.   However, pt presents 03/12 to [facility] [place] now d/t new onset of intermittent fevers (101.4F) at [facility] for past few days. On arrival, temp 99F, VSS, HDS, wbc nml, CRP 46,  / , , Cr 0.8.  CPK pending.     Repeat MRI L-spine / pelvis (03/13): Postsurgical changes of L4-5 posterior spinal fusion with bilateral sacroiliac extension screws. With discitis osteomyelitis at L5-S1 and S1-2. Extensive epidural infectious/inflammatory change posterior to the L5-S1 and S1-2 disc spaces. Paravertebral/prevertebral abscess measuring 3.7 x 2.2 cm communicating with the L5-S1 disc space.   Pelvis: Diffuse infectious myositis of the left psoas and iliopsoas muscles with 7 mm abscess of the left iliopsoas muscle, decreased from 23 mm. Persistent osteomyelitis and septic arthritis about the left SI joint.  T-spine: incidental finding of small nodular T2 hyperintense signal foci at Rt lung base; consider dedicated chest-CT.      Prior MRI L-spine / pelvis (02/04): showed osteomyelitis/discitis of L5-S1,  osteomyelitis of sacrum & L iliac bone, probable septic arthritis of L SI joint & lower lumbar spine facet joints; worsening anterolisthesis of L5 with respect to S1 with increased size of epidural abscess at the level of L5-S1 and probable severe central canal stenosis at this level, along w/ additional abscesses in the L iliopsoas muscle, paraspinal muscles, pelvis, & L sacrum; -- the additional abscesses in the left iliopsoas muscle [3.4 x 1.7cm; with other micro-abscesses adjacent measuring 2.3cm), paraspinal muscles, pelvis, and left sacrum. Additional abscess in the superior rectal/presacral region measuring roughly 3.1 x 2.4 cm; and probable additional micro abscesses at left sacrum.          No new subjective & objective note has been filed under this hospital service since the last note was generated.

## 2024-03-16 NOTE — ASSESSMENT & PLAN NOTE
ECHO 2/13/2024  Left Ventricle: The left ventricle is normal in size. Normal wall thickness. Normal wall motion. There is normal systolic function with a visually estimated ejection fraction of 60 - 65%. Ejection fraction by visual approximation is 65%. There is diastolic dysfunction but grade cannot be determined.    Right Ventricle: Normal right ventricular cavity size. Wall thickness is normal. Right ventricle wall motion  is normal. Systolic function is normal.    IVC/SVC: Normal venous pressure at 3 mmHg.    Pericardium: There is a trivial posterior effusion just base.    STABLE

## 2024-03-16 NOTE — ASSESSMENT & PLAN NOTE
"This patient does have evidence of infective focus - continued L5-S1/S1 diskitis/osteomyelitis with abscess, diffuse infectious myositis of the bilateral lumbar paraspinal musculature and left iliopsoas muscle.  My overall impression is sepsis.  Source: Skin and Soft Tissue (location epidurial)  Antibiotics given-   Antibiotics (72h ago, onward)      Start     Stop Route Frequency Ordered    03/15/24 1345  meropenem (MERREM) 2 g in sodium chloride 0.9% 100 mL IVPB         -- IV Every 8 hours (non-standard times) 03/15/24 1243    03/14/24 0900  DAPTOmycin (CUBICIN) 780 mg in sodium chloride 0.9% SolP 50 mL IVPB         -- IV Every 24 hours (non-standard times) 03/14/24 0755          Latest lactate reviewed-  No results for input(s): "LACTATE", "POCLAC" in the last 72 hours.    Organ dysfunction indicated by Acute liver injury    Fluid challenge Not needed - patient is not hypotensive      Post- resuscitation assessment No - Post resuscitation assessment not needed       Will Not start Pressors- Levophed for MAP of 65  Source control achieved by: Broad spectrum antibiotics    - Infectious Disease consulted  - Continue IV Daptomycin and meropenum, Need end date  - Follow blood cultures through maturity. Klebsiella and enterobacteraies per PCR.  "

## 2024-03-17 LAB
BACTERIA BLD CULT: ABNORMAL
BACTERIA BLD CULT: NORMAL
BASOPHILS # BLD AUTO: 0.03 K/UL (ref 0–0.2)
BASOPHILS NFR BLD: 0.5 % (ref 0–1.9)
DIFFERENTIAL METHOD BLD: ABNORMAL
EOSINOPHIL # BLD AUTO: 0.1 K/UL (ref 0–0.5)
EOSINOPHIL NFR BLD: 1.1 % (ref 0–8)
ERYTHROCYTE [DISTWIDTH] IN BLOOD BY AUTOMATED COUNT: 21.8 % (ref 11.5–14.5)
HCT VFR BLD AUTO: 32.2 % (ref 40–54)
HGB BLD-MCNC: 9.5 G/DL (ref 14–18)
IMM GRANULOCYTES # BLD AUTO: 0.02 K/UL (ref 0–0.04)
IMM GRANULOCYTES NFR BLD AUTO: 0.3 % (ref 0–0.5)
LYMPHOCYTES # BLD AUTO: 2.2 K/UL (ref 1–4.8)
LYMPHOCYTES NFR BLD: 35.2 % (ref 18–48)
MCH RBC QN AUTO: 22.4 PG (ref 27–31)
MCHC RBC AUTO-ENTMCNC: 29.5 G/DL (ref 32–36)
MCV RBC AUTO: 76 FL (ref 82–98)
MONOCYTES # BLD AUTO: 0.8 K/UL (ref 0.3–1)
MONOCYTES NFR BLD: 13.5 % (ref 4–15)
NEUTROPHILS # BLD AUTO: 3.1 K/UL (ref 1.8–7.7)
NEUTROPHILS NFR BLD: 49.4 % (ref 38–73)
NRBC BLD-RTO: 0 /100 WBC
PLATELET # BLD AUTO: 283 K/UL (ref 150–450)
PMV BLD AUTO: 9.8 FL (ref 9.2–12.9)
RBC # BLD AUTO: 4.25 M/UL (ref 4.6–6.2)
WBC # BLD AUTO: 6.23 K/UL (ref 3.9–12.7)

## 2024-03-17 PROCEDURE — 85025 COMPLETE CBC W/AUTO DIFF WBC: CPT | Performed by: STUDENT IN AN ORGANIZED HEALTH CARE EDUCATION/TRAINING PROGRAM

## 2024-03-17 PROCEDURE — 25000003 PHARM REV CODE 250: Performed by: STUDENT IN AN ORGANIZED HEALTH CARE EDUCATION/TRAINING PROGRAM

## 2024-03-17 PROCEDURE — 63600175 PHARM REV CODE 636 W HCPCS: Performed by: STUDENT IN AN ORGANIZED HEALTH CARE EDUCATION/TRAINING PROGRAM

## 2024-03-17 PROCEDURE — 63600175 PHARM REV CODE 636 W HCPCS: Performed by: INTERNAL MEDICINE

## 2024-03-17 PROCEDURE — 25000003 PHARM REV CODE 250

## 2024-03-17 PROCEDURE — 25000003 PHARM REV CODE 250: Performed by: INTERNAL MEDICINE

## 2024-03-17 PROCEDURE — 63600175 PHARM REV CODE 636 W HCPCS

## 2024-03-17 PROCEDURE — 36415 COLL VENOUS BLD VENIPUNCTURE: CPT | Performed by: STUDENT IN AN ORGANIZED HEALTH CARE EDUCATION/TRAINING PROGRAM

## 2024-03-17 PROCEDURE — 36415 COLL VENOUS BLD VENIPUNCTURE: CPT | Mod: XB | Performed by: HOSPITALIST

## 2024-03-17 PROCEDURE — 11000001 HC ACUTE MED/SURG PRIVATE ROOM

## 2024-03-17 PROCEDURE — 99233 SBSQ HOSP IP/OBS HIGH 50: CPT | Mod: ,,, | Performed by: STUDENT IN AN ORGANIZED HEALTH CARE EDUCATION/TRAINING PROGRAM

## 2024-03-17 PROCEDURE — 87040 BLOOD CULTURE FOR BACTERIA: CPT | Performed by: HOSPITALIST

## 2024-03-17 RX ADMIN — DAPTOMYCIN 780 MG: 350 INJECTION, POWDER, LYOPHILIZED, FOR SOLUTION INTRAVENOUS at 10:03

## 2024-03-17 RX ADMIN — CLONIDINE HYDROCHLORIDE 0.1 MG: 0.1 TABLET ORAL at 10:03

## 2024-03-17 RX ADMIN — MEROPENEM 2 G: 1 INJECTION INTRAVENOUS at 05:03

## 2024-03-17 RX ADMIN — OXYCODONE HYDROCHLORIDE 10 MG: 10 TABLET, FILM COATED, EXTENDED RELEASE ORAL at 10:03

## 2024-03-17 RX ADMIN — METHOCARBAMOL 500 MG: 500 TABLET ORAL at 01:03

## 2024-03-17 RX ADMIN — OXYCODONE HYDROCHLORIDE 15 MG: 10 TABLET ORAL at 01:03

## 2024-03-17 RX ADMIN — MEROPENEM 2 G: 1 INJECTION INTRAVENOUS at 01:03

## 2024-03-17 RX ADMIN — METHOCARBAMOL 500 MG: 500 TABLET ORAL at 05:03

## 2024-03-17 RX ADMIN — OXYCODONE HYDROCHLORIDE 15 MG: 10 TABLET ORAL at 03:03

## 2024-03-17 RX ADMIN — SODIUM CHLORIDE: 9 INJECTION, SOLUTION INTRAVENOUS at 10:03

## 2024-03-17 RX ADMIN — METHOCARBAMOL 500 MG: 500 TABLET ORAL at 10:03

## 2024-03-17 RX ADMIN — CEFEPIME 2 G: 2 INJECTION, POWDER, FOR SOLUTION INTRAVENOUS at 10:03

## 2024-03-17 RX ADMIN — OXYCODONE HYDROCHLORIDE 15 MG: 10 TABLET ORAL at 05:03

## 2024-03-17 RX ADMIN — MIRTAZAPINE 15 MG: 15 TABLET, FILM COATED ORAL at 10:03

## 2024-03-17 RX ADMIN — OXYCODONE HYDROCHLORIDE 15 MG: 10 TABLET ORAL at 07:03

## 2024-03-17 NOTE — ASSESSMENT & PLAN NOTE
Patient has hyponatremia which is controlled,We will aim to correct the sodium by 4-6mEq in 24 hours. We will monitor sodium Daily. The hyponatremia is due to Dehydration/hypovolemia. We will obtain the following studies: see labsection. We will treat the hyponatremia with IV fluids. The patient's sodium results have been reviewed and are listed below.  Recent Labs   Lab 03/16/24  1039

## 2024-03-17 NOTE — ASSESSMENT & PLAN NOTE
Chronic, controlled. Latest blood pressure and vitals reviewed-     Temp:  [97.2 °F (36.2 °C)-99.1 °F (37.3 °C)]   Pulse:  [63-84]   Resp:  [16-18]   BP: (100-122)/(53-65)   SpO2:  [96 %-100 %] .   Home meds for hypertension were reviewed and noted below.   Hypertension Medications               cloNIDine (CATAPRES) 0.1 MG tablet Take 1 tablet (0.1 mg total) by mouth every 12 (twelve) hours.          Plan:  - While in the hospital, will manage blood pressure as follows; Continue home antihypertensive regimen. Currently on Clonidine 0.1 PO BID  - Will utilize p.r.n. blood pressure medication only if patient's blood pressure greater than 180/110 and he develops symptoms such as worsening chest pain or shortness of breath.

## 2024-03-17 NOTE — ASSESSMENT & PLAN NOTE
28M with h/o IVDU, MRSA bacteremia and prior disseminated MRSA infection with poor adherence with care (history of leaving AMA), readmitted 02/04 with progressive back pain. Imaging significant for lumbosacral osteomyelitis with associated epidural abscess, osteomyelitis of sacrum & L iliac bone, probable septic arthritis of L SI joint & lower lumbar spine facet joints, and left iliopsoas abscess, abscess in superior rectal/presacral region and micro-abscesses in paraspinal muscles, pelvis, and left sacrum. Blood cultures remained negative then, and TTE negative. Underwent IR aspiration of left SI joint and psoas on 02/07.  Cxs +MRSA. Abx held prior to IR bx; post-procedure started on empiric Iv-vanc / CTX.   S/p lumbar-pelvis debridement with fusion (02/14). Surgical cxs +MRSA. Pt discharged to [facility], to complete 6wks Iv-Dapto 10mg/kg Q24h, [place] 04/10/24. (Of note, switched vanc to IV daptomycin (d/t subtherapeutic vancomycin levels with q8hr dosing). Advised repeat imaging / neuro f/u near [place] 04/10; followed by abx suppression thereafter, likely lifelong.     [place] [facility] [place] d/t new onset of intermittent fevers (101.4F) at [facility] for past few days. On arrival, temp 99F, VSS, HDS, wbc nml, CRP 46, Cr 0.8.  CPK 39 nml, with climbing transaminitis found to have HCV+ (RNA 9-million), HBV Ag negative, HIV negative, U/S +hepatomegaly, GB wnl.  Bld cxs 03/12 NGTD.   Pt developed fever 03/14, repeat bld cxs 03/14 1 of 2 +Kleb aerogenes (panS).  Repeat Bld cxs 03/15 and 03/17 NGTD.  Picc line removed 03/16, with picc cath tip sent for cx.   -- Of note; UDS +MJ on arrival from [facility].  Suspect picc line may be source of new Kleb bacteremia (denies injecting at LT); vs. Lumbar surgical wound, vs. GI translocation in setting of hepatomegaly w/ chronic HCV (RNA+ 9-million copies).    Repeat MRI L-spine / pelvis (03/13): Extensive epidural infectious/inflammatory change posterior to the L5-S1 and S1-2  disc spaces; with paravertebral / pre-vertebral abscess. Diffuse infectious myositis of the bilateral lower lumbar paraspinal musculature and within the left psoas and iliopsoas muscles, with 0.7 cm abscess (down from 2.3cm on prior 02/04). Persistent osteomyelitis and septic arthritis about the left SI joint. -- Per NSGY, no role for surgical intervention at this time, favoring wound care and continuing Iv-abx.     3/18/23: Patient now afebrile and WBC WNL.  Denying any systemic sign of infection or back pain. CRP remains elevated 37.8.  On Daptomycin and Cefepime.  PIC remove and repeat BCXs NGTD - suspect due to line infection as blood cultures cleared quickly with PICC removal. Imaging reviewed and possible small abscess posterior to L5-S1 extending into disc space.  Primary team discussed with IR who felt no drainable fluid there there or in psoas.      Recommendations / Plan:  Continue Iv-cefepime 2g Q8h. (Bld cx +kleb aerogenes panS)  Continue Iv-Daptomycin 10mg/kg Q24h  [CPK 39].  Follow-up repeat bld cxs 03/15 and 03/17 (NGTD)  Will continue to trend fever, wbc, and clinical response and crp  Anticipate continuing both Iv-dapto and cefepime for remaining 2-3wks of full 6wk duration of Iv-abx([place] 04/10) for lumbar osteo-discitis with paravertebral / psoas myositis and sub centimeter abscesses -- d/t concern for possible further seeding of spine during new Kleb bacteremia, suspected picc line as source >> lumbar surgical incision.   Rec repeat MRI Lspine and sacrum/pelvis on 4/9 prior to stopping abx  OK to replace PICC  Rec indefinite suppression with Doxycyline 100mg po bid    -- Discussed with ID staff and primary team   -- ID will continue to follow w/ further recs.      Outpatient Antibiotic Therapy Plan:    Please send referral to [facility].    1) Infection :   lumbar osteo-discitis with paravertebral / psoas myositis and sub centimeter abscesses (c/b Kleb bacteremia  03/14)    2) Discharge Antibiotics:     Intravenous antibiotics:  IV - Daptomycin 10mg/kg Q24h  Iv - Cefepime 2g Q8h    PO Antibiotics:  Rec indefinite suppression with Doxycyline 100mg po bid onc IV ABX ultimately completed    3) Therapy Duration:   6wks     Estimated end date of IV antibiotics:  [facility] EOC 04/10/24    4) Outpatient Weekly Labs:    Order the following labs to be drawn on Mondays:   CBC  CMP   CRP  CPK (when on Daptomycin)    5) Fax Lab Results to Infectious Diseases Provider:  Elpidio Paredes PA-C    [facility] Fax Number: [phone]    6) Outpatient Infectious Diseases Follow-up    Follow-up appointment will be arranged by the ID clinic and will be found in the patient's appointments tab.    Prior to discharge, please ensure the patient's follow-up appt has been scheduled with ID-Clinic -- for patient convenience and continuity of care.  If there is still no follow-up scheduled prior to discharge, please send an EPIC message to  Carol Gann LPN  in Infectious Diseases.

## 2024-03-17 NOTE — ASSESSMENT & PLAN NOTE
28M with hypertension, IV drug use (heroin) presenting from LTAC with ongoing fevers    He was recently admitted on 2/04/24 with osteomyelitis/discitis L5-S1, sacral and left illiac osteomyelitis, L5-S1 epidural abscess, and left iliopsoas abscess. He underwent L4-pelvic fusion on 2/14/24 with epidural phlegmon debridement, intraoperative cultures positive for MRSA. Infectious Disease was consulted and recommended IV Daptomycin for 8 week course (EOC 4/10/24) to be followed by oral suppressive doxycycline thereafter. He was discharged on 2/22/24 to [facility] to complete his antibiotic course. LTAC stay was complicated by ongoing fevers and he represented to [facility] for further evaluation prior to transfer to [facility] for neurosurgical evaluation. On arrival, WBC 9, ESR 59, CRP 46, AST 360s, ALT 200s. Procalcitonin was elevated at 1.18, lactate normal. MRI L spine showed continued L5-S1/S1 diskitis/osteomyelitis with abscess, diffuse infectious myositis of the bilateral lumbar paraspinal musculature and left iliopsoas muscle.  - Infectious Disease consulted, appreciate assistance and recommendations  - CT L spine without contrast noted  - Continue IV Daptomycin q24h  - Weekly CK level while on Daptomycin  - Multimodal pain control with tylenol, gabapentin, methocarbamol, oxy IR, oxy ER  - Follow blood cultures through maturity  - Daily CBC, CMP  - Cardiac monitor  - Neurosurgery Consult: he does not need surgery.  - pt transferred to [facility]    3/17 - ID final recs pending. Has Klebsiella in blood sens to everything.  On dapto and yue. Wound care consulted . pic line may be source of infection

## 2024-03-17 NOTE — PROGRESS NOTES
Aleksandr Bray - Neurosurgery ([facility])  Hospital Medicine  Progress Note    Patient Name: Ton Donovan  MRN: [id number]  Patient Class: IP- Inpatient   Admission Date: 3/14/2024  Length of Stay: 3 days  Attending Physician: Kimber Lewis MD  Primary Care Provider: Steve Kelly DO        Subjective:     Principal Problem:Osteomyelitis of vertebra of lumbosacral region        HPI:  Mr. Ton Donovan is a 28 year-old Male with a PMHx of Hypertension, IV drug use who presented with fevers and ongoing back pain with radiation down legs.    Of note, [place] [facility] [place], with course complicated by a psoas abscess and MRSA epidural abscess s/p epidural phlegmon debridement. He was started on IV Daptomycin and discharged on 2/22/24 to [facility] for completion of antibiotics. Ongoing fevers complicated his LTAC stay, and he returned to the [facility] Emergency Department for further evaluation. On arrival, WBC 9, ESR 59, CRP 46, AST 360s, ALT 200s. Procalcitonin was elevated at 1.18, lactate normal. MRI L spine concerning for continued L5-S1/S1 diskitis and osteomyelitis with abscess, diffuse infectious myositis of the lumbar paraspinal musculature bilaterally and left iliopsoas muscle without discrete abscess. [place] was then [place] [facility] [place].     At the time of this consult, temp 99F, HR 60, 120s/60s, saturating well on room air. Most recent labs with WBC 5, Hgb 9, Na 134.    Medicine consulted for medical co-management of epidural abscess    Overview/Hospital Course:  3/15-Admitted for NSGY evaluation of OM & discitis lumbar spine. Will let pt eat.   3/16- Does not need surgery. Appreciate NSGY eval. Wound care and ID following and need final recs. Pt is comfortable.   3/17- Klebsiella sens to everything,  On dapto and yue.  Pic line removed and culture tip was ordered, but not done.     Interval History: see above    Review of Systems    Constitutional:  Positive for activity change. Negative for appetite change and fever.   HENT:  Negative for trouble swallowing.    Respiratory:  Negative for shortness of breath.    Cardiovascular:  Negative for chest pain.   Gastrointestinal:  Positive for constipation. Negative for abdominal pain, diarrhea and nausea.   Genitourinary:  Negative for difficulty urinating and frequency.   Musculoskeletal:  Positive for arthralgias, back pain and gait problem. Negative for neck stiffness.   Neurological:  Negative for headaches.   Psychiatric/Behavioral:  Negative for behavioral problems.      Objective:     Vital Signs (Most Recent):  Temp: 99.1 °F (37.3 °C) (03/17/24 0733)  Pulse: 73 (03/17/24 0733)  Resp: 18 (03/17/24 0749)  BP: 120/65 (03/17/24 0733)  SpO2: 98 % (03/17/24 0733) Vital Signs (24h Range):  Temp:  [97.2 °F (36.2 °C)-99.1 °F (37.3 °C)] 99.1 °F (37.3 °C)  Pulse:  [63-84] 73  Resp:  [16-18] 18  SpO2:  [96 %-100 %] 98 %  BP: (100-122)/(53-65) 120/65     Weight: 78 kg (172 lb)  Body mass index is 24.68 kg/m².  No intake or output data in the 24 hours ending 03/17/24 0907        Physical Exam  Constitutional:       General: He is not in acute distress.     Appearance: Normal appearance. He is ill-appearing. He is not toxic-appearing or diaphoretic.   HENT:      Head: Normocephalic and atraumatic.      Nose: Nose normal.      Mouth/Throat:      Mouth: Mucous membranes are moist.      Pharynx: Oropharynx is clear.   Eyes:      General: No scleral icterus.     Extraocular Movements: Extraocular movements intact.      Conjunctiva/sclera: Conjunctivae normal.      Pupils: Pupils are equal, round, and reactive to light.   Cardiovascular:      Rate and Rhythm: Normal rate and regular rhythm.      Pulses: Normal pulses.   Pulmonary:      Effort: Pulmonary effort is normal. No respiratory distress.      Breath sounds: Normal breath sounds. No stridor. No wheezing, rhonchi or rales.   Chest:      Chest wall: No  tenderness.   Abdominal:      General: Abdomen is flat. Bowel sounds are normal. There is no distension.      Palpations: Abdomen is soft.      Tenderness: There is no abdominal tenderness. There is no right CVA tenderness, left CVA tenderness, guarding or rebound.   Musculoskeletal:         General: No swelling, tenderness, deformity or signs of injury. Normal range of motion.      Cervical back: Normal range of motion and neck supple. No rigidity or tenderness.      Right lower leg: No edema.      Left lower leg: No edema.   Skin:     General: Skin is warm and dry.      Coloration: Skin is not jaundiced.      Findings: No erythema or rash.   Neurological:      General: No focal deficit present.      Mental Status: He is alert and oriented to person, place, and time. Mental status is at baseline.      Motor: No weakness.      Gait: Gait abnormal.   Psychiatric:         Mood and Affect: Mood normal.         Behavior: Behavior normal.         Thought Content: Thought content normal.         Judgment: Judgment normal.             Significant Labs: All pertinent labs within the past 24 hours have been reviewed.  CBC:   Recent Labs   Lab 03/16/24  0721 03/17/24  0320   WBC 10.07 6.23   HGB 8.9* 9.5*   HCT 30.1* 32.2*    283       CMP:   Recent Labs   Lab 03/16/24  1039      K 4.5      CO2 25   GLU 99   BUN 10   CREATININE 0.7   CALCIUM 9.4   PROT 8.2   ALBUMIN 3.0*   BILITOT 0.7   ALKPHOS 231*   *   *   ANIONGAP 7*         Significant Imaging: I have reviewed all pertinent imaging results/findings within the past 24 hours.    Assessment/Plan:      * Osteomyelitis of vertebra of lumbosacral region  28M with hypertension, IV drug use (heroin) presenting from LTAC with ongoing fevers    He was recently admitted on 2/04/24 with osteomyelitis/discitis L5-S1, sacral and left illiac osteomyelitis, L5-S1 epidural abscess, and left iliopsoas abscess. He underwent L4-pelvic fusion on 2/14/24 with  epidural phlegmon debridement, intraoperative cultures positive for MRSA. Infectious Disease was consulted and recommended IV Daptomycin for 8 week course (EOC 4/10/24) to be followed by oral suppressive doxycycline thereafter. He was discharged on 2/22/24 to [facility] to complete his antibiotic course. LTAC stay was complicated by ongoing fevers and he represented to [facility] for further evaluation prior [place] [facility] [place] On arrival, WBC 9, ESR 59, CRP 46, AST 360s, ALT 200s. Procalcitonin was elevated at 1.18, lactate normal. MRI L spine showed continued L5-S1/S1 diskitis/osteomyelitis with abscess, diffuse infectious myositis of the bilateral lumbar paraspinal musculature and left iliopsoas muscle.  - Infectious Disease consulted, appreciate assistance and recommendations  - CT L spine without contrast noted  - Continue IV Daptomycin q24h  - Weekly CK level while on Daptomycin  - Multimodal pain control with tylenol, gabapentin, methocarbamol, oxy IR, oxy ER  - Follow blood cultures through [facility]  - Daily CBC, CMP  - Cardiac monitor  - Neurosurgery Consult: he does not need surgery.  - pt transferred to [facility]    3/17 - ID final recs pending. Has Klebsiella in blood sens to everything.  On dapto and yue. Wound care consulted . pic line may be source of infection      Severe sepsis  This patient does have evidence of infective focus - continued L5-S1/S1 diskitis/osteomyelitis with abscess, diffuse infectious myositis of the bilateral lumbar paraspinal musculature and left iliopsoas muscle.  My overall impression is sepsis.  Source: Skin and Soft Tissue (location epidurial)  Antibiotics given-   Antibiotics (72h ago, onward)      Start     Stop Route Frequency Ordered    03/15/24 1345  meropenem (MERREM) 2 g in sodium chloride 0.9% 100 mL IVPB         -- IV Every 8 hours (non-standard times) 03/15/24 1243    03/14/24 0900  DAPTOmycin (CUBICIN) 780 mg in sodium chloride  "0.9% SolP 50 mL IVPB         -- IV Every 24 hours (non-standard times) 03/14/24 0755          Latest lactate reviewed-  No results for input(s): "LACTATE", "POCLAC" in the last 72 hours.    Organ dysfunction indicated by Acute liver injury    Fluid challenge Not needed - patient is not hypotensive      Post- resuscitation assessment No - Post resuscitation assessment not needed       Will Not start Pressors- Levophed for MAP of 65  Source control achieved by: Broad spectrum antibiotics    - Infectious Disease consulted  - Continue IV Daptomycin and meropenum, Need end date  - Follow blood cultures through [facility]. Klebsiella and enterobacteraies per PCR.- & sens to everything    Psoas abscess, left  L5-S1/S1 diskitis/osteomyelitis with abscess, diffuse infectious myositis of the bilateral lumbar paraspinal musculature and left iliopsoas muscle.  - see " Osteomeylitis" above      Discitis of lumbosacral region  See Osteomyelitis for consolidated plan      Septic arthritis  See " Osteomyelitis"      Myositis of multiple sites  See " Osteomeylitis" for plan      Substance or medication-induced depressive disorder  - Continue Mirtazipine  - Hx of heroin, THC, cociane      HTN (hypertension)  Chronic, controlled. Latest blood pressure and vitals reviewed-     Temp:  [97.2 °F (36.2 °C)-99.1 °F (37.3 °C)]   Pulse:  [63-84]   Resp:  [16-18]   BP: (100-122)/(53-65)   SpO2:  [96 %-100 %] .   Home meds for hypertension were reviewed and noted below.   Hypertension Medications               cloNIDine (CATAPRES) 0.1 MG tablet Take 1 tablet (0.1 mg total) by mouth every 12 (twelve) hours.          Plan:  - While in the hospital, will manage blood pressure as follows; Continue home antihypertensive regimen. Currently on Clonidine 0.1 PO BID  - Will utilize p.r.n. blood pressure medication only if patient's blood pressure greater than 180/110 and he develops symptoms such as worsening chest pain or shortness of " "breath.    Hepatitis  Most recent labs from OSH showed AST 360s, ALT 200s. Had recent hepatitis panel 3/12 with hep c    - Recent Hepatitis C screening resulted positive, previous Hep C screening 3 months ago negative. Check repeat Hep C screening and PCR  - Check HIV given history of IVDU and ongoing fever despite antibiotics  - RUQ ultrasound- pending  - Daily CMP to trend transaminases:  335/235  - Infectious Diseases consulted and following      Drug dependence  See " Substance induced depressive disroder"  - monitor for withdrawal and give opioid if needed   - pt has been in LTAC  - on oxycodone LA 10 bid for pain and dependence      Heroin use disorder, severe  See " Drug Dependence"       Hyponatremia  Patient has hyponatremia which is controlled,We will aim to correct the sodium by 4-6mEq in 24 hours. We will monitor sodium Daily. The hyponatremia is due to Dehydration/hypovolemia. We will obtain the following studies: see labsection. We will treat the hyponatremia with IV fluids. The patient's sodium results have been reviewed and are listed below.  Recent Labs   Lab 03/16/24  1039            Chronic diastolic heart failure  ECHO 2/13/2024  Left Ventricle: The left ventricle is normal in size. Normal wall thickness. Normal wall motion. There is normal systolic function with a visually estimated ejection fraction of 60 - 65%. Ejection fraction by visual approximation is 65%. There is diastolic dysfunction but grade cannot be determined.    Right Ventricle: Normal right ventricular cavity size. Wall thickness is normal. Right ventricle wall motion  is normal. Systolic function is normal.    IVC/SVC: Normal venous pressure at 3 mmHg.    Pericardium: There is a trivial posterior effusion just base.    STABLE      Debility  Patient with Acute on chronic debility due to other reduced mobility. Latest AMPAC and GEMS scores have been reviewed. Evaluation for etiology is complete. Plan includes progressive " mobility protocol initated, PT/OT consulted, and may need surgey .      VTE Risk Mitigation (From admission, onward)           Ordered     Place BRENNAN hose  Until discontinued         03/14/24 0730     IP VTE HIGH RISK PATIENT  Once         03/14/24 0730     Place sequential compression device  Until discontinued         03/14/24 0730                    Discharge Planning   SENA: 3/18/2024     Code Status: Full Code   Is the patient medically ready for discharge?: No    Reason for patient still in hospital (select all that apply): Patient trending condition  Discharge Plan A: Long-term acute care facility (LTAC)          Kimber Lewis MD  Department of Hospital Medicine   [facility] - Neurosurgery ([facility])

## 2024-03-17 NOTE — PLAN OF CARE
Problem: Infection  Goal: Absence of Infection Signs and Symptoms  Outcome: Ongoing, Progressing  Intervention: Prevent or Manage Infection  Flowsheets (Taken 3/16/2024 1922)  Isolation Precautions: precautions maintained

## 2024-03-17 NOTE — SUBJECTIVE & OBJECTIVE
Interval History: see above    Review of Systems   Constitutional:  Positive for activity change. Negative for appetite change and fever.   HENT:  Negative for trouble swallowing.    Respiratory:  Negative for shortness of breath.    Cardiovascular:  Negative for chest pain.   Gastrointestinal:  Positive for constipation. Negative for abdominal pain, diarrhea and nausea.   Genitourinary:  Negative for difficulty urinating and frequency.   Musculoskeletal:  Positive for arthralgias, back pain and gait problem. Negative for neck stiffness.   Neurological:  Negative for headaches.   Psychiatric/Behavioral:  Negative for behavioral problems.      Objective:     Vital Signs (Most Recent):  Temp: 99.1 °F (37.3 °C) (03/17/24 0733)  Pulse: 73 (03/17/24 0733)  Resp: 18 (03/17/24 0749)  BP: 120/65 (03/17/24 0733)  SpO2: 98 % (03/17/24 0733) Vital Signs (24h Range):  Temp:  [97.2 °F (36.2 °C)-99.1 °F (37.3 °C)] 99.1 °F (37.3 °C)  Pulse:  [63-84] 73  Resp:  [16-18] 18  SpO2:  [96 %-100 %] 98 %  BP: (100-122)/(53-65) 120/65     Weight: 78 kg (172 lb)  Body mass index is 24.68 kg/m².  No intake or output data in the 24 hours ending 03/17/24 0907        Physical Exam  Constitutional:       General: He is not in acute distress.     Appearance: Normal appearance. He is ill-appearing. He is not toxic-appearing or diaphoretic.   HENT:      Head: Normocephalic and atraumatic.      Nose: Nose normal.      Mouth/Throat:      Mouth: Mucous membranes are moist.      Pharynx: Oropharynx is clear.   Eyes:      General: No scleral icterus.     Extraocular Movements: Extraocular movements intact.      Conjunctiva/sclera: Conjunctivae normal.      Pupils: Pupils are equal, round, and reactive to light.   Cardiovascular:      Rate and Rhythm: Normal rate and regular rhythm.      Pulses: Normal pulses.   Pulmonary:      Effort: Pulmonary effort is normal. No respiratory distress.      Breath sounds: Normal breath sounds. No stridor. No wheezing,  rhonchi or rales.   Chest:      Chest wall: No tenderness.   Abdominal:      General: Abdomen is flat. Bowel sounds are normal. There is no distension.      Palpations: Abdomen is soft.      Tenderness: There is no abdominal tenderness. There is no right CVA tenderness, left CVA tenderness, guarding or rebound.   Musculoskeletal:         General: No swelling, tenderness, deformity or signs of injury. Normal range of motion.      Cervical back: Normal range of motion and neck supple. No rigidity or tenderness.      Right lower leg: No edema.      Left lower leg: No edema.   Skin:     General: Skin is warm and dry.      Coloration: Skin is not jaundiced.      Findings: No erythema or rash.   Neurological:      General: No focal deficit present.      Mental Status: He is alert and oriented to person, place, and time. Mental status is at baseline.      Motor: No weakness.      Gait: Gait abnormal.   Psychiatric:         Mood and Affect: Mood normal.         Behavior: Behavior normal.         Thought Content: Thought content normal.         Judgment: Judgment normal.             Significant Labs: All pertinent labs within the past 24 hours have been reviewed.  CBC:   Recent Labs   Lab 03/16/24  0721 03/17/24  0320   WBC 10.07 6.23   HGB 8.9* 9.5*   HCT 30.1* 32.2*    283       CMP:   Recent Labs   Lab 03/16/24  1039      K 4.5      CO2 25   GLU 99   BUN 10   CREATININE 0.7   CALCIUM 9.4   PROT 8.2   ALBUMIN 3.0*   BILITOT 0.7   ALKPHOS 231*   *   *   ANIONGAP 7*         Significant Imaging: I have reviewed all pertinent imaging results/findings within the past 24 hours.

## 2024-03-17 NOTE — SUBJECTIVE & OBJECTIVE
Interval History: fever resolved today, VSS, Hds, wbc nml. Continuing on Iv-dapto and empiric Iv-merrem. Bld cxs 03/14 1of2 + kleb aerogenes (panS). Repeat bld cxs 03/15 and 03/17 NGTD. Picc line removed 03/16, cath tip sent for cx; suspect picc line possible source of kleb bacteremia > lumbar incision wound.  Pt doing okay at this time; pain stable / tolerable.     Review of Systems   Constitutional:  Negative for chills and fatigue.   HENT:  Negative for trouble swallowing.    Respiratory:  Negative for cough and shortness of breath.    Cardiovascular:  Negative for chest pain.   Gastrointestinal:  Negative for nausea and vomiting.   Genitourinary:  Negative for difficulty urinating, flank pain and hematuria.   Musculoskeletal:  Positive for back pain, gait problem and myalgias.   Skin:  Positive for wound. Negative for rash.   Neurological:  Positive for numbness. Negative for seizures.   Psychiatric/Behavioral:  Negative for confusion.      Objective:     Vital Signs (Most Recent):  Temp: 97.2 °F (36.2 °C) (03/17/24 1152)  Pulse: 82 (03/17/24 1152)  Resp: 18 (03/17/24 1152)  BP: (!) 116/51 (03/17/24 1152)  SpO2: (!) 92 % (03/17/24 1152) Vital Signs (24h Range):  Temp:  [97.2 °F (36.2 °C)-99.1 °F (37.3 °C)] 97.2 °F (36.2 °C)  Pulse:  [63-84] 82  Resp:  [16-18] 18  SpO2:  [92 %-100 %] 92 %  BP: (100-122)/(51-65) 116/51     Weight: 78 kg (172 lb)  Body mass index is 24.68 kg/m².    Estimated Creatinine Clearance: 159.3 mL/min (based on SCr of 0.7 mg/dL).     Physical Exam  Vitals and nursing note reviewed.   Constitutional:       General: He is not in acute distress.     Appearance: He is normal weight. He is not toxic-appearing or diaphoretic.   HENT:      Nose: No congestion.      Mouth/Throat:      Pharynx: Oropharynx is clear.   Eyes:      General: No scleral icterus.     Conjunctiva/sclera: Conjunctivae normal.   Cardiovascular:      Rate and Rhythm: Normal rate.      Heart sounds: Normal heart sounds. No  murmur heard.  Pulmonary:      Effort: No respiratory distress.      Breath sounds: Normal breath sounds.   Abdominal:      General: Bowel sounds are normal. There is no distension.      Palpations: Abdomen is soft.      Tenderness: There is no abdominal tenderness.   Musculoskeletal:         General: Tenderness present.   Skin:     General: Skin is warm and dry.      Findings: Erythema present. No rash.      Comments: Picc line RUE, c/d/I.    Neurological:      Mental Status: He is alert and oriented to person, place, and time. Mental status is at baseline.   Psychiatric:         Behavior: Behavior normal.         Thought Content: Thought content normal.          Significant Labs: Blood Culture:   Recent Labs   Lab 03/12/24 2039 03/14/24  1618 03/14/24  1623 03/15/24  1842 03/15/24  1843   LABBLOO No Growth to date  No Growth to date  No Growth to date  No Growth to date  No Growth to date No Growth to date  No Growth to date  No Growth to date Gram stain aer bottle: Gram negative rods  Gram stain manas bottle: Gram negative rods  Results called to and read back by:Solitario Quintanilla RN 03/15/2024  08:45  KLEBSIELLA AEROGENES* No Growth to date  No Growth to date No Growth to date  No Growth to date     CBC:   Recent Labs   Lab 03/16/24  0721 03/17/24  0320   WBC 10.07 6.23   HGB 8.9* 9.5*   HCT 30.1* 32.2*    283     CMP:   Recent Labs   Lab 03/16/24  1039      K 4.5      CO2 25   GLU 99   BUN 10   CREATININE 0.7   CALCIUM 9.4   PROT 8.2   ALBUMIN 3.0*   BILITOT 0.7   ALKPHOS 231*   *   *   ANIONGAP 7*     Microbiology Results (last 7 days)       Procedure Component Value Units Date/Time    Blood culture [[id number]] Collected: 03/17/24 0959    Order Status: Sent Specimen: Blood from Peripheral, Antecubital, Left Updated: 03/17/24 1126    Blood culture [[id number]]  (Abnormal)  (Susceptibility) Collected: 03/14/24 1623    Order Status: Completed Specimen: Blood Updated:  03/17/24 0757     Blood Culture, Routine Gram stain aer bottle: Gram negative rods      Gram stain manas bottle: Gram negative rods      Results called to and read back by:Solitario Quintanilla RN 03/15/2024      08:45      KLEBSIELLA AEROGENES    Narrative:      Collection has been rescheduled by [facility] at 03/14/2024 14:49 Reason:   Pt is in catlab. Will try back later. Nurse Cindi #[id number]  Collection has been rescheduled by [facility] at 03/14/2024 14:49 Reason:   Pt is in catlab. Will try back later. Nurse Cindi #[id number]    Blood culture [[id number]] Collected: 03/15/24 1843    Order Status: Completed Specimen: Blood Updated: 03/16/24 2022     Blood Culture, Routine No Growth to date      No Growth to date    Narrative:      Please obtain blood cultures x2 from two different sites, and  at-least 30mins apart. Pls and thank you.    Blood culture [[id number]] Collected: 03/15/24 1842    Order Status: Completed Specimen: Blood Updated: 03/16/24 2022     Blood Culture, Routine No Growth to date      No Growth to date    Narrative:      Please obtain blood cultures x2 from two different sites, and  at-least 30mins apart. Pls and thank you.    Blood culture [[id number]] Collected: 03/14/24 1618    Order Status: Completed Specimen: Blood Updated: 03/16/24 2012     Blood Culture, Routine No Growth to date      No Growth to date      No Growth to date    Narrative:      Collection has been rescheduled by [facility] at 03/14/2024 14:49 Reason:   Pt is in catlab. Will try back later. Nurse Cindi #[id number]  Collection has been rescheduled by [facility] at 03/14/2024 14:49 Reason:   Pt is in catlab. Will try back later. Nurse Cindi #[id number]    IV catheter culture [[id number]] Collected: 03/16/24 1738    Order Status: Canceled Specimen: Catheter Tip, PICC     Rapid Organism ID by PCR (from Blood culture) [[id number]]  (Abnormal) Collected: 03/14/24 1623    Order Status: Completed Updated: 03/15/24 1119     Enterococcus faecalis Not Detected     Enterococcus faecium  Not Detected     Listeria monocytogenes Not Detected     Staphylococcus spp. Not Detected     Staphylococcus aureus Not Detected     Staphylococcus epidermidis Not Detected     Staphylococcus lugdunensis Not Detected     Streptococcus species Not Detected     Streptococcus agalactiae Not Detected     Streptococcus pneumoniae Not Detected     Streptococcus pyogenes Not Detected     Acinetobacter calcoaceticus/baumannii complex Not Detected     Bacteroides fragilis Not Detected     Enterobacterales See species for ID     Enterobacter cloacae complex Not Detected     Escherichia coli Not Detected     Klebsiella aerogenes Detected     Klebsiella oxytoca Not Detected     Klebsiella pneumoniae group Not Detected     Proteus Not Detected     Salmonella sp Not Detected     Serratia marcescens Not Detected     Haemophilus influenzae Not Detected     Neisseria meningtidis Not Detected     Pseudomonas aeruginosa Not Detected     Stenotrophomonas maltophilia Not Detected     Candida albicans Not Detected     Candida auris Not Detected     Candida glabrata Not Detected     Candida krusei Not Detected     Candida parapsilosis Not Detected     Candida tropicalis Not Detected     Cryptococcus neoformans/gattii Not Detected     CTX-M (ESBL ) Not Detected     IMP (Carbapenem resistant) Not Detected     KPC resistance gene (Carbapenem resistant) Not Detected     mcr-1  Not Detected     mec A/C  Test Not Applicable     mec A/C and MREJ (MRSA) gene Test Not Applicable     NDM (Carbapenem resistant) Not Detected     OXA-48-like (Carbapenem resistant) Not Detected     van A/B (VRE gene) Test Not Applicable     VIM (Carbapenem resistant) Not Detected          Wound Culture:   Recent Labs   Lab 10/12/23  1134 11/21/23  1041 02/07/24  1412 02/07/24  1423 02/14/24  1712   LABAERO METHICILLIN RESISTANT STAPHYLOCOCCUS AUREUS  Many  * METHICILLIN RESISTANT STAPHYLOCOCCUS AUREUS  Moderate  No other significant isolate  * No growth  METHICILLIN RESISTANT STAPHYLOCOCCUS AUREUS  Rare  * METHICILLIN RESISTANT STAPHYLOCOCCUS AUREUS  Few  *  METHICILLIN RESISTANT STAPHYLOCOCCUS AUREUS  Few  *     All pertinent labs within the past 24 hours have been reviewed.    Significant Imaging: I have reviewed all pertinent imaging results/findings within the past 24 hours.    I have personally reviewed records / hospital notes from   service and other specialty providers. I have also reviewed CBC, CMP/BMP,  cultures and imaging with my interpretation as documented in my assessment / plan.    Patient is high risk for infectious complications given pt's age, multiple co-morbidities, and case complexity.      Time: 50 minutes   50% of time spent on face-to-face counseling and coordination of care. Counseling included review of test results, diagnosis, and treatment plan with patient and/or family.

## 2024-03-17 NOTE — ASSESSMENT & PLAN NOTE
"This patient does have evidence of infective focus - continued L5-S1/S1 diskitis/osteomyelitis with abscess, diffuse infectious myositis of the bilateral lumbar paraspinal musculature and left iliopsoas muscle.  My overall impression is sepsis.  Source: Skin and Soft Tissue (location epidurial)  Antibiotics given-   Antibiotics (72h ago, onward)      Start     Stop Route Frequency Ordered    03/15/24 1345  meropenem (MERREM) 2 g in sodium chloride 0.9% 100 mL IVPB         -- IV Every 8 hours (non-standard times) 03/15/24 1243    03/14/24 0900  DAPTOmycin (CUBICIN) 780 mg in sodium chloride 0.9% SolP 50 mL IVPB         -- IV Every 24 hours (non-standard times) 03/14/24 0755          Latest lactate reviewed-  No results for input(s): "LACTATE", "POCLAC" in the last 72 hours.    Organ dysfunction indicated by Acute liver injury    Fluid challenge Not needed - patient is not hypotensive      Post- resuscitation assessment No - Post resuscitation assessment not needed       Will Not start Pressors- Levophed for MAP of 65  Source control achieved by: Broad spectrum antibiotics    - Infectious Disease consulted  - Continue IV Daptomycin and meropenum, Need end date  - Follow blood cultures through maturity. Klebsiella and enterobacteraies per PCR.- & sens to everything  "

## 2024-03-17 NOTE — PROGRESS NOTES
Aleksandr Bray - Neurosurgery ([facility])  Infectious Disease  Progress Note    Patient Name: Ton Donovan  MRN: [id number]  Admission Date: 3/14/2024  Length of Stay: 3 days  Attending Physician: Kimber Lewis MD  Primary Care Provider: Steve Kelly DO    Isolation Status: No active isolations  Assessment/Plan:      ID  * Osteomyelitis of vertebra of lumbosacral region  I have reviewed hospital notes from   service and other specialty providers. I have also reviewed CBC, CMP/BMP,  cultures and imaging with my interpretation as documented.      28M with h/o IVDU, MRSA bacteremia and prior disseminated MRSA infection with poor adherence with care (history of leaving AMA), readmitted 02/04 with progressive back pain. Imaging significant for lumbosacral osteomyelitis with associated epidural abscess, osteomyelitis of sacrum & L iliac bone, probable septic arthritis of L SI joint & lower lumbar spine facet joints, and left iliopsoas abscess, abscess in superior rectal/presacral region and micro-abscesses in paraspinal muscles, pelvis, and left sacrum. Blood cultures remained negative then, and TTE negative. Underwent IR aspiration of left SI joint and psoas on 02/07.  Cxs +MRSA. Abx held prior to IR bx; post-procedure started on empiric Iv-vanc / CTX.   S/p lumbar-pelvis debridement with fusion (02/14). Surgical cxs +MRSA. Pt discharged to [facility], to complete 6wks Iv-Dapto 10mg/kg Q24h, [place] 04/10/24. (Of note, switched vanc to IV daptomycin (d/t subtherapeutic vancomycin levels with q8hr dosing). Advised repeat imaging / neuro f/u near [place] 04/10; followed by abx suppression thereafter, likely lifelong.     -- However, pt presents 03/12 to [facility] now d/t new onset of intermittent fevers (101.4F) at [facility] for past few days. On arrival, temp 99F, VSS, HDS, wbc nml, CRP 46, Cr 0.8.  CPK 39 nml, with climbing transaminitis found to have HCV+ (RNA 9-million), HBV Ag negative, HIV negative, U/S +hepatomegaly, GB  wnl.  Bld cxs 03/12 NGTD.   Pt developed fever 03/14, repeat bld cxs 03/14 1 of 2 +Kleb aerogenes (panS).  Repeat Bld cxs 03/15 and 03/17 NGTD.  Picc line removed 03/16, with picc cath tip sent for cx.   -- Of note; UDS +MJ [facility] from [facility].  Suspect picc line may be source of new Kleb bacteremia (denies injecting at [facility]); vs. Lumbar surgical wound, vs. GI translocation in setting of hepatomegaly w/ chronic HCV (RNA+ 9-million copies).    Repeat MRI L-spine / pelvis (03/13): Extensive epidural infectious/inflammatory change posterior to the L5-S1 and S1-2 disc spaces; with paravertebral / pre-vertebral abscess. Diffuse infectious myositis of the bilateral lower lumbar paraspinal musculature and within the left psoas and iliopsoas muscles, with 0.7 cm abscess (down from 2.3cm on prior 02/04). Persistent osteomyelitis and septic arthritis about the left SI joint. -- Per NSGY, no role for surgical intervention at this time, favoring wound care and continuing Iv-abx.       Recommendations / Plan:  Discontinue empiric Iv-merrem.  To start Iv-cefepime 2g Q8h. (Bld cx +kleb aerogenes panS)  Continue Iv-Daptomycin 10mg/kg Q24h  Follow-up repeat bld cxs 03/15 and 03/17 (NGTD); and picc cath tip (03/16) NGTD.  Will continue to trend fever, wbc, and clinical response  Anticipate continuing both Iv-dapto and cefepime for remaining 2-3wks of full 6wk duration of Iv-abx([place] 04/10) for lumbar osteo-discitis with paravertebral / psoas myositis and sub centimeter abscesses -- d/t concern for possible further seeding of spine during new Kleb bacteremia, suspected picc line as source > lumbar surgical incision.     -- Discussed with ID staff and primary team   -- ID will continue to follow w/ further recs.          Anticipated Disposition: Return to LT[facility] to continue IV-abx and PT/OT.    Thank you for your consult. I will follow-up with patient. Please contact us if you have any additional questions.    Elpidio Paredes,  NASIMA  Infectious Disease  [facility] - Neurosurgery ([facility])    Subjective:     Principal Problem:Osteomyelitis of vertebra of lumbosacral region    HPI: 28M with h/o IVDU, MRSA bacteremia and prior disseminated MRSA infection with poor adherence with care (history of leaving AMA), readmitted 02/04 with progressive back pain. Imaging significant for lumbosacral osteomyelitis with associated epidural abscess, osteomyelitis of sacrum & L iliac bone, probable septic arthritis of L SI joint & lower lumbar spine facet joints, and left iliopsoas abscess, abscess in superior rectal/presacral region and micro-abscesses in paraspinal muscles, pelvis, and left sacrum. Blood cultures remained negative. TTE negative. Underwent IR aspiration of left SI joint and psoas on 02/07.  Cxs +MRSA. Abx held prior to IR bx; post-procedure started on empiric Iv-vanc / CTX.   S/p lumbar-pelvis debridement with fusion (02/14). Surgical cxs +MRSA. Pt discharged to [facility], to complete 6wks Iv-Dapto 10mg/kg Q24h, [place] 04/10/24. (Of note, switched vanc to IV daptomycin (d/t subtherapeutic vancomycin levels with q8hr dosing). Advised repeat imaging / neuro f/u near [place] 04/10; followed by abx suppression thereafter, likely lifelong.   However, pt presents 03/12 to [facility] now d/t new onset of intermittent fevers (101.4F) at [facility] for past few days. On arrival, temp 99F, VSS, HDS, wbc nml, CRP 46,  / , , Cr 0.8.  CPK pending.     Repeat MRI L-spine / pelvis (03/13): Postsurgical changes of L4-5 posterior spinal fusion with bilateral sacroiliac extension screws. With discitis osteomyelitis at L5-S1 and S1-2. Extensive epidural infectious/inflammatory change posterior to the L5-S1 and S1-2 disc spaces. Paravertebral/prevertebral abscess measuring 3.7 x 2.2 cm communicating with the L5-S1 disc space.   Pelvis: Diffuse infectious myositis of the left psoas and iliopsoas muscles with 7 mm abscess of the left iliopsoas muscle,  decreased from 23 mm. Persistent osteomyelitis and septic arthritis about the left SI joint.  T-spine: incidental finding of small nodular T2 hyperintense signal foci at Rt lung base; consider dedicated chest-CT.      Prior MRI L-spine / pelvis (02/04): showed osteomyelitis/discitis of L5-S1, osteomyelitis of sacrum & L iliac bone, probable septic arthritis of L SI joint & lower lumbar spine facet joints; worsening anterolisthesis of L5 with respect to S1 with increased size of epidural abscess at the level of L5-S1 and probable severe central canal stenosis at this level, along w/ additional abscesses in the L iliopsoas muscle, paraspinal muscles, pelvis, & L sacrum; -- the additional abscesses in the left iliopsoas muscle [3.4 x 1.7cm; with other micro-abscesses adjacent measuring 2.3cm), paraspinal muscles, pelvis, and left sacrum. Additional abscess in the superior rectal/presacral region measuring roughly 3.1 x 2.4 cm; and probable additional micro abscesses at left sacrum.          Interval History: fever resolved today, VSS, Hds, wbc nml. Continuing on Iv-dapto and empiric Iv-merrem. Bld cxs 03/14 1of2 + kleb aerogenes (panS). Repeat bld cxs 03/15 and 03/17 NGTD. Picc line removed 03/16, cath tip sent for cx; suspect picc line possible source of kleb bacteremia > lumbar incision wound.  Pt doing okay at this time; pain stable / tolerable.     Review of Systems   Constitutional:  Negative for chills and fatigue.   HENT:  Negative for trouble swallowing.    Respiratory:  Negative for cough and shortness of breath.    Cardiovascular:  Negative for chest pain.   Gastrointestinal:  Negative for nausea and vomiting.   Genitourinary:  Negative for difficulty urinating, flank pain and hematuria.   Musculoskeletal:  Positive for back pain, gait problem and myalgias.   Skin:  Positive for wound. Negative for rash.   Neurological:  Positive for numbness. Negative for seizures.   Psychiatric/Behavioral:  Negative for  confusion.      Objective:     Vital Signs (Most Recent):  Temp: 97.2 °F (36.2 °C) (03/17/24 1152)  Pulse: 82 (03/17/24 1152)  Resp: 18 (03/17/24 1152)  BP: (!) 116/51 (03/17/24 1152)  SpO2: (!) 92 % (03/17/24 1152) Vital Signs (24h Range):  Temp:  [97.2 °F (36.2 °C)-99.1 °F (37.3 °C)] 97.2 °F (36.2 °C)  Pulse:  [63-84] 82  Resp:  [16-18] 18  SpO2:  [92 %-100 %] 92 %  BP: (100-122)/(51-65) 116/51     Weight: 78 kg (172 lb)  Body mass index is 24.68 kg/m².    Estimated Creatinine Clearance: 159.3 mL/min (based on SCr of 0.7 mg/dL).     Physical Exam  Vitals and nursing note reviewed.   Constitutional:       General: He is not in acute distress.     Appearance: He is normal weight. He is not toxic-appearing or diaphoretic.   HENT:      Nose: No congestion.      Mouth/Throat:      Pharynx: Oropharynx is clear.   Eyes:      General: No scleral icterus.     Conjunctiva/sclera: Conjunctivae normal.   Cardiovascular:      Rate and Rhythm: Normal rate.      Heart sounds: Normal heart sounds. No murmur heard.  Pulmonary:      Effort: No respiratory distress.      Breath sounds: Normal breath sounds.   Abdominal:      General: Bowel sounds are normal. There is no distension.      Palpations: Abdomen is soft.      Tenderness: There is no abdominal tenderness.   Musculoskeletal:         General: Tenderness present.   Skin:     General: Skin is warm and dry.      Findings: Erythema present. No rash.      Comments: Picc line RUE, c/d/I.    Neurological:      Mental Status: He is alert and oriented to person, place, and time. Mental status is at baseline.   Psychiatric:         Behavior: Behavior normal.         Thought Content: Thought content normal.          Significant Labs: Blood Culture:   Recent Labs   Lab 03/12/24 2039 03/14/24  1618 03/14/24  1623 03/15/24  1842 03/15/24  1843   LABBLOO No Growth to date  No Growth to date  No Growth to date  No Growth to date  No Growth to date No Growth to date  No Growth to date   No Growth to date Gram stain aer bottle: Gram negative rods  Gram stain manas bottle: Gram negative rods  Results called to and read back by:Solitario Quintanilla RN 03/15/2024  08:45  KLEBSIELLA AEROGENES* No Growth to date  No Growth to date No Growth to date  No Growth to date     CBC:   Recent Labs   Lab 03/16/24  0721 03/17/24  0320   WBC 10.07 6.23   HGB 8.9* 9.5*   HCT 30.1* 32.2*    283     CMP:   Recent Labs   Lab 03/16/24  1039      K 4.5      CO2 25   GLU 99   BUN 10   CREATININE 0.7   CALCIUM 9.4   PROT 8.2   ALBUMIN 3.0*   BILITOT 0.7   ALKPHOS 231*   *   *   ANIONGAP 7*     Microbiology Results (last 7 days)       Procedure Component Value Units Date/Time    Blood culture [[id number]] Collected: 03/17/24 0959    Order Status: Sent Specimen: Blood from Peripheral, Antecubital, Left Updated: 03/17/24 1126    Blood culture [[id number]]  (Abnormal)  (Susceptibility) Collected: 03/14/24 1623    Order Status: Completed Specimen: Blood Updated: 03/17/24 0757     Blood Culture, Routine Gram stain aer bottle: Gram negative rods      Gram stain manas bottle: Gram negative rods      Results called to and read back by:Solitario Quintanilla RN 03/15/2024      08:45      KLEBSIELLA AEROGENES    Narrative:      Collection has been rescheduled by [facility] at 03/14/2024 14:49 Reason:   Pt is in catlab. Will try back later. Nurse Cindi #[id number]  Collection has been rescheduled by 12 at 03/14/2024 14:49 Reason:   Pt is in catlab. Will try back later. Nurse Cindi #[id number]    Blood culture [[id number]] Collected: 03/15/24 1843    Order Status: Completed Specimen: Blood Updated: 03/16/24 2022     Blood Culture, Routine No Growth to date      No Growth to date    Narrative:      Please obtain blood cultures x2 from two different sites, and  at-least 30mins apart. Pls and thank you.    Blood culture [[id number]] Collected: 03/15/24 1842    Order Status: Completed Specimen: Blood Updated: 03/16/24 2022      Blood Culture, Routine No Growth to date      No Growth to date    Narrative:      Please obtain blood cultures x2 from two different sites, and  at-least 30mins apart. Pls and thank you.    Blood culture [[id number]] Collected: 03/14/24 1618    Order Status: Completed Specimen: Blood Updated: 03/16/24 2012     Blood Culture, Routine No Growth to date      No Growth to date      No Growth to date    Narrative:      Collection has been rescheduled by [email or web address] at 03/14/2024 14:49 Reason:   Pt is in catlab. Will try back later. Nurse Cindi #[id number]  Collection has been rescheduled by [email or web address] at 03/14/2024 14:49 Reason:   Pt is in catlab. Will try back later. Nurse Cindi #[id number]    IV catheter culture [[id number]] Collected: 03/16/24 1738    Order Status: Canceled Specimen: Catheter Tip, PICC     Rapid Organism ID by PCR (from Blood culture) [[id number]]  (Abnormal) Collected: 03/14/24 1623    Order Status: Completed Updated: 03/15/24 1119     Enterococcus faecalis Not Detected     Enterococcus faecium Not Detected     Listeria monocytogenes Not Detected     Staphylococcus spp. Not Detected     Staphylococcus aureus Not Detected     Staphylococcus epidermidis Not Detected     Staphylococcus lugdunensis Not Detected     Streptococcus species Not Detected     Streptococcus agalactiae Not Detected     Streptococcus pneumoniae Not Detected     Streptococcus pyogenes Not Detected     Acinetobacter calcoaceticus/baumannii complex Not Detected     Bacteroides fragilis Not Detected     Enterobacterales See species for ID     Enterobacter cloacae complex Not Detected     Escherichia coli Not Detected     Klebsiella aerogenes Detected     Klebsiella oxytoca Not Detected     Klebsiella pneumoniae group Not Detected     Proteus Not Detected     Salmonella sp Not Detected     Serratia marcescens Not Detected     Haemophilus influenzae Not Detected     Neisseria meningtidis Not Detected     Pseudomonas aeruginosa Not Detected      Stenotrophomonas maltophilia Not Detected     Candida albicans Not Detected     Candida auris Not Detected     Candida glabrata Not Detected     Candida krusei Not Detected     Candida parapsilosis Not Detected     Candida tropicalis Not Detected     Cryptococcus neoformans/gattii Not Detected     CTX-M (ESBL ) Not Detected     IMP (Carbapenem resistant) Not Detected     KPC resistance gene (Carbapenem resistant) Not Detected     mcr-1  Not Detected     mec A/C  Test Not Applicable     mec A/C and MREJ (MRSA) gene Test Not Applicable     NDM (Carbapenem resistant) Not Detected     OXA-48-like (Carbapenem resistant) Not Detected     van A/B (VRE gene) Test Not Applicable     VIM (Carbapenem resistant) Not Detected          Wound Culture:   Recent Labs   Lab 10/12/23  1134 11/21/23  1041 02/07/24  1412 02/07/24  1423 02/14/24  1712   LABAERO METHICILLIN RESISTANT STAPHYLOCOCCUS AUREUS  Many  * METHICILLIN RESISTANT STAPHYLOCOCCUS AUREUS  Moderate  No other significant isolate  * No growth METHICILLIN RESISTANT STAPHYLOCOCCUS AUREUS  Rare  * METHICILLIN RESISTANT STAPHYLOCOCCUS AUREUS  Few  *  METHICILLIN RESISTANT STAPHYLOCOCCUS AUREUS  Few  *     All pertinent labs within the past 24 hours have been reviewed.    Significant Imaging: I have reviewed all pertinent imaging results/findings within the past 24 hours.    I have personally reviewed records / hospital notes from   service and other specialty providers. I have also reviewed CBC, CMP/BMP,  cultures and imaging with my interpretation as documented in my assessment / plan.    Patient is high risk for infectious complications given pt's age, multiple co-morbidities, and case complexity.      Time: 50 minutes   50% of time spent on face-to-face counseling and coordination of care. Counseling included review of test results, diagnosis, and treatment plan with patient and/or family.

## 2024-03-17 NOTE — ASSESSMENT & PLAN NOTE
I have reviewed hospital notes from   service and other specialty providers. I have also reviewed CBC, CMP/BMP,  cultures and imaging with my interpretation as documented.      28M with h/o IVDU, MRSA bacteremia and prior disseminated MRSA infection with poor adherence with care (history of leaving AMA), readmitted 02/04 with progressive back pain. Imaging significant for lumbosacral osteomyelitis with associated epidural abscess, osteomyelitis of sacrum & L iliac bone, probable septic arthritis of L SI joint & lower lumbar spine facet joints, and left iliopsoas abscess, abscess in superior rectal/presacral region and micro-abscesses in paraspinal muscles, pelvis, and left sacrum. Blood cultures remained negative then, and TTE negative. Underwent IR aspiration of left SI joint and psoas on 02/07.  Cxs +MRSA. Abx held prior to IR bx; post-procedure started on empiric Iv-vanc / CTX.   S/p lumbar-pelvis debridement with fusion (02/14). Surgical cxs +MRSA. Pt discharged to [facility], to complete 6wks Iv-Dapto 10mg/kg Q24h, [place] 04/10/24. (Of note, switched vanc to IV daptomycin (d/t subtherapeutic vancomycin levels with q8hr dosing). Advised repeat imaging / neuro f/u near [place] 04/10; followed by abx suppression thereafter, likely lifelong.     -- However, pt presents 03/12 to [facility] now d/t new onset of intermittent fevers (101.4F) at [facility] for past few days. On arrival, temp 99F, VSS, HDS, wbc nml, CRP 46, Cr 0.8.  CPK 39 nml, with climbing transaminitis found to have HCV+ (RNA 9-million), HBV Ag negative, HIV negative, U/S +hepatomegaly, GB wnl.  Bld cxs 03/12 NGTD.   Pt developed fever 03/14, repeat bld cxs 03/14 1 of 2 +Kleb aerogenes (panS).  Repeat Bld cxs 03/15 and 03/17 NGTD.  Picc line removed 03/16, with picc cath tip sent for cx.   -- Of note; UDS +MJ on arrival from [facility].  Suspect picc line may be source of new Kleb bacteremia (denies injecting at [facility]); vs. Lumbar surgical wound, vs. GI translocation in  setting of hepatomegaly w/ chronic HCV (RNA+ 9-million copies).    Repeat MRI L-spine / pelvis (03/13): Extensive epidural infectious/inflammatory change posterior to the L5-S1 and S1-2 disc spaces; with paravertebral / pre-vertebral abscess. Diffuse infectious myositis of the bilateral lower lumbar paraspinal musculature and within the left psoas and iliopsoas muscles, with 0.7 cm abscess (down from 2.3cm on prior 02/04). Persistent osteomyelitis and septic arthritis about the left SI joint. -- Per NSGY, no role for surgical intervention at this time, favoring wound care and continuing Iv-abx.       Recommendations / Plan:  Discontinue empiric Iv-merrem.  To start Iv-cefepime 2g Q8h. (Bld cx +kleb aerogenes panS)  Continue Iv-Daptomycin 10mg/kg Q24h  Follow-up repeat bld cxs 03/15 and 03/17 (NGTD); and picc cath tip (03/16) NGTD.  Will continue to trend fever, wbc, and clinical response  Anticipate continuing both Iv-dapto and cefepime for remaining 2-3wks of full 6wk duration of Iv-abx([place] 04/10) for lumbar osteo-discitis with paravertebral / psoas myositis and sub centimeter abscesses -- d/t concern for possible further seeding of spine during new Kleb bacteremia, suspected picc line as source > lumbar surgical incision.     -- Discussed with ID staff and primary team   -- ID will continue to follow w/ further recs.

## 2024-03-17 NOTE — PLAN OF CARE
Problem: Infection  Goal: Absence of Infection Signs and Symptoms  Outcome: Ongoing, Progressing  Intervention: Prevent or Manage Infection  Flowsheets (Taken 3/17/2024 1803)  Isolation Precautions: precautions maintained

## 2024-03-18 LAB
BACTERIA BLD CULT: NORMAL
BASOPHILS # BLD AUTO: 0.02 K/UL (ref 0–0.2)
BASOPHILS NFR BLD: 0.3 % (ref 0–1.9)
DIFFERENTIAL METHOD BLD: ABNORMAL
EOSINOPHIL # BLD AUTO: 0.2 K/UL (ref 0–0.5)
EOSINOPHIL NFR BLD: 2.4 % (ref 0–8)
ERYTHROCYTE [DISTWIDTH] IN BLOOD BY AUTOMATED COUNT: 21.1 % (ref 11.5–14.5)
FUNGUS SPEC CULT: NORMAL
FUNGUS SPEC CULT: NORMAL
HCT VFR BLD AUTO: 28 % (ref 40–54)
HGB BLD-MCNC: 8.5 G/DL (ref 14–18)
IMM GRANULOCYTES # BLD AUTO: 0.03 K/UL (ref 0–0.04)
IMM GRANULOCYTES NFR BLD AUTO: 0.4 % (ref 0–0.5)
LYMPHOCYTES # BLD AUTO: 2.1 K/UL (ref 1–4.8)
LYMPHOCYTES NFR BLD: 29.9 % (ref 18–48)
MCH RBC QN AUTO: 21.6 PG (ref 27–31)
MCHC RBC AUTO-ENTMCNC: 30.4 G/DL (ref 32–36)
MCV RBC AUTO: 71 FL (ref 82–98)
MONOCYTES # BLD AUTO: 1 K/UL (ref 0.3–1)
MONOCYTES NFR BLD: 14.4 % (ref 4–15)
NEUTROPHILS # BLD AUTO: 3.7 K/UL (ref 1.8–7.7)
NEUTROPHILS NFR BLD: 52.6 % (ref 38–73)
NRBC BLD-RTO: 0 /100 WBC
PLATELET # BLD AUTO: 304 K/UL (ref 150–450)
PMV BLD AUTO: 9.9 FL (ref 9.2–12.9)
RBC # BLD AUTO: 3.94 M/UL (ref 4.6–6.2)
WBC # BLD AUTO: 6.99 K/UL (ref 3.9–12.7)

## 2024-03-18 PROCEDURE — 63600175 PHARM REV CODE 636 W HCPCS: Performed by: STUDENT IN AN ORGANIZED HEALTH CARE EDUCATION/TRAINING PROGRAM

## 2024-03-18 PROCEDURE — 25000003 PHARM REV CODE 250

## 2024-03-18 PROCEDURE — 25000003 PHARM REV CODE 250: Performed by: STUDENT IN AN ORGANIZED HEALTH CARE EDUCATION/TRAINING PROGRAM

## 2024-03-18 PROCEDURE — 36415 COLL VENOUS BLD VENIPUNCTURE: CPT | Performed by: STUDENT IN AN ORGANIZED HEALTH CARE EDUCATION/TRAINING PROGRAM

## 2024-03-18 PROCEDURE — 99233 SBSQ HOSP IP/OBS HIGH 50: CPT | Mod: ,,, | Performed by: PHYSICIAN ASSISTANT

## 2024-03-18 PROCEDURE — 11000001 HC ACUTE MED/SURG PRIVATE ROOM

## 2024-03-18 PROCEDURE — 85025 COMPLETE CBC W/AUTO DIFF WBC: CPT | Performed by: STUDENT IN AN ORGANIZED HEALTH CARE EDUCATION/TRAINING PROGRAM

## 2024-03-18 PROCEDURE — 97116 GAIT TRAINING THERAPY: CPT | Mod: CQ

## 2024-03-18 PROCEDURE — 97530 THERAPEUTIC ACTIVITIES: CPT

## 2024-03-18 PROCEDURE — 63600175 PHARM REV CODE 636 W HCPCS

## 2024-03-18 PROCEDURE — 97535 SELF CARE MNGMENT TRAINING: CPT

## 2024-03-18 RX ADMIN — CEFEPIME 2 G: 2 INJECTION, POWDER, FOR SOLUTION INTRAVENOUS at 10:03

## 2024-03-18 RX ADMIN — CLONIDINE HYDROCHLORIDE 0.1 MG: 0.1 TABLET ORAL at 09:03

## 2024-03-18 RX ADMIN — OXYCODONE HYDROCHLORIDE 15 MG: 10 TABLET ORAL at 09:03

## 2024-03-18 RX ADMIN — OXYCODONE HYDROCHLORIDE 10 MG: 10 TABLET, FILM COATED, EXTENDED RELEASE ORAL at 09:03

## 2024-03-18 RX ADMIN — METHOCARBAMOL 500 MG: 500 TABLET ORAL at 09:03

## 2024-03-18 RX ADMIN — METHOCARBAMOL 500 MG: 500 TABLET ORAL at 04:03

## 2024-03-18 RX ADMIN — MIRTAZAPINE 15 MG: 15 TABLET, FILM COATED ORAL at 09:03

## 2024-03-18 RX ADMIN — OXYCODONE HYDROCHLORIDE 15 MG: 10 TABLET ORAL at 12:03

## 2024-03-18 RX ADMIN — DOCUSATE SODIUM AND SENNOSIDES 2 TABLET: 8.6; 5 TABLET, FILM COATED ORAL at 09:03

## 2024-03-18 RX ADMIN — OXYCODONE HYDROCHLORIDE 15 MG: 10 TABLET ORAL at 05:03

## 2024-03-18 RX ADMIN — OXYCODONE HYDROCHLORIDE 15 MG: 10 TABLET ORAL at 01:03

## 2024-03-18 RX ADMIN — METHOCARBAMOL 500 MG: 500 TABLET ORAL at 01:03

## 2024-03-18 RX ADMIN — CEFEPIME 2 G: 2 INJECTION, POWDER, FOR SOLUTION INTRAVENOUS at 01:03

## 2024-03-18 RX ADMIN — DAPTOMYCIN 780 MG: 350 INJECTION, POWDER, LYOPHILIZED, FOR SOLUTION INTRAVENOUS at 09:03

## 2024-03-18 RX ADMIN — OXYCODONE HYDROCHLORIDE 15 MG: 10 TABLET ORAL at 04:03

## 2024-03-18 RX ADMIN — CEFEPIME 2 G: 2 INJECTION, POWDER, FOR SOLUTION INTRAVENOUS at 04:03

## 2024-03-18 NOTE — SUBJECTIVE & OBJECTIVE
Interval History: see above    Review of Systems   Constitutional:  Positive for activity change. Negative for appetite change and fever.   HENT:  Negative for trouble swallowing.    Respiratory:  Negative for shortness of breath.    Cardiovascular:  Negative for chest pain.   Gastrointestinal:  Positive for constipation. Negative for abdominal pain, diarrhea and nausea.   Genitourinary:  Negative for difficulty urinating and frequency.   Musculoskeletal:  Positive for arthralgias, back pain and gait problem. Negative for neck stiffness.   Neurological:  Negative for headaches.   Psychiatric/Behavioral:  Negative for behavioral problems.      Objective:     Vital Signs (Most Recent):  Temp: 98.7 °F (37.1 °C) (03/18/24 0844)  Pulse: 75 (03/18/24 0844)  Resp: 16 (03/18/24 0844)  BP: (!) 106/56 (03/18/24 0844)  SpO2: 100 % (03/18/24 0844) Vital Signs (24h Range):  Temp:  [97.2 °F (36.2 °C)-100 °F (37.8 °C)] 98.7 °F (37.1 °C)  Pulse:  [69-87] 75  Resp:  [16-18] 16  SpO2:  [92 %-100 %] 100 %  BP: (106-140)/(51-67) 106/56     Weight: 78 kg (172 lb)  Body mass index is 24.68 kg/m².    Intake/Output Summary (Last 24 hours) at 3/18/2024 0902  Last data filed at 3/18/2024 0502  Gross per 24 hour   Intake 1369.34 ml   Output 400 ml   Net 969.34 ml           Physical Exam  Constitutional:       General: He is not in acute distress.     Appearance: Normal appearance. He is ill-appearing. He is not toxic-appearing or diaphoretic.   HENT:      Head: Normocephalic and atraumatic.      Nose: Nose normal.      Mouth/Throat:      Mouth: Mucous membranes are moist.      Pharynx: Oropharynx is clear.   Eyes:      General: No scleral icterus.     Extraocular Movements: Extraocular movements intact.      Conjunctiva/sclera: Conjunctivae normal.      Pupils: Pupils are equal, round, and reactive to light.   Cardiovascular:      Rate and Rhythm: Normal rate and regular rhythm.      Pulses: Normal pulses.   Pulmonary:      Effort: Pulmonary  effort is normal. No respiratory distress.      Breath sounds: Normal breath sounds. No stridor. No wheezing, rhonchi or rales.   Chest:      Chest wall: No tenderness.   Abdominal:      General: Abdomen is flat. Bowel sounds are normal. There is no distension.      Palpations: Abdomen is soft.      Tenderness: There is no abdominal tenderness. There is no right CVA tenderness, left CVA tenderness, guarding or rebound.   Musculoskeletal:         General: No swelling, tenderness, deformity or signs of injury. Normal range of motion.      Cervical back: Normal range of motion and neck supple. No rigidity or tenderness.      Right lower leg: No edema.      Left lower leg: No edema.   Skin:     General: Skin is warm and dry.      Coloration: Skin is not jaundiced.      Findings: No erythema or rash.   Neurological:      General: No focal deficit present.      Mental Status: He is alert and oriented to person, place, and time. Mental status is at baseline.      Motor: No weakness.      Gait: Gait abnormal.   Psychiatric:         Mood and Affect: Mood normal.         Behavior: Behavior normal.         Thought Content: Thought content normal.         Judgment: Judgment normal.             Significant Labs: All pertinent labs within the past 24 hours have been reviewed.  CBC:   Recent Labs   Lab 03/17/24  0320 03/18/24  0605   WBC 6.23 6.99   HGB 9.5* 8.5*   HCT 32.2* 28.0*    304       CMP:   Recent Labs   Lab 03/16/24  1039      K 4.5      CO2 25   GLU 99   BUN 10   CREATININE 0.7   CALCIUM 9.4   PROT 8.2   ALBUMIN 3.0*   BILITOT 0.7   ALKPHOS 231*   *   *   ANIONGAP 7*         Significant Imaging: I have reviewed all pertinent imaging results/findings within the past 24 hours.

## 2024-03-18 NOTE — PT/OT/SLP PROGRESS
Physical Therapy Treatment    Patient Name:  Ton Donovan   MRN:  [id number]    Recommendations:     Discharge Recommendations: High Intensity Therapy  Discharge Equipment Recommendations: shower chair, rollator  Barriers to discharge: Inaccessible home and Decreased caregiver support    Assessment:     Ton Donovan is a 28 y.o. male admitted with a medical diagnosis of Osteomyelitis of vertebra of lumbosacral region.  He presents with the following impairments/functional limitations: weakness, impaired endurance, impaired self care skills, impaired functional mobility, gait instability, decreased lower extremity function, pain, decreased safety awareness, decreased coordination, decreased ROM, orthopedic precautions . Patient initially required coaxing to participate and for safety, as patient was trying to go to the bathroom without a RW and no LSO. Patient showed good mobility, but noted decreased strength and R heel strike during gait training.    Rehab Prognosis: Good; patient would benefit from acute skilled PT services to address these deficits and reach maximum level of function.    Recent Surgery: * No surgery found *      Plan:     During this hospitalization, patient to be seen 3 x/week to address the identified rehab impairments via gait training, therapeutic activities, therapeutic exercises, neuromuscular re-education and progress toward the following goals:    Plan of Care Expires:  04/15/24    Subjective     Chief Complaint: fatigue  Patient/Family Comments/goals: to gain strength  Pain/Comfort:  Pain Rating 1: 0/10  Location - Side 1: Bilateral  Location - Orientation 1: generalized  Location 1: back  Pain Addressed 1: Reposition, Distraction  Pain Rating Post-Intervention 1: 3/10      Objective:     Communicated with NSG prior to session.  Patient found left sidelying with peripheral IV upon PT entry to room.     General Precautions: Standard, fall  Orthopedic Precautions: spinal  precautions  Braces: LSO  Respiratory Status: Room air     Functional Mobility:  Bed Mobility:     Rolling Left:  modified independence  Rolling Right: modified independence  Scooting: modified independence  Supine to Sit: modified independence  Sit to Supine: modified independence  Transfers:     Sit to Stand:  stand by assistance with rolling walker  Gait: ~200 ft with RW and SBA.      AM-PAC 6 CLICK MOBILITY  Turning over in bed (including adjusting bedclothes, sheets and blankets)?: 4  Sitting down on and standing up from a chair with arms (e.g., wheelchair, bedside commode, etc.): 4  Moving from lying on back to sitting on the side of the bed?: 4  Moving to and from a bed to a chair (including a wheelchair)?: 4  Need to walk in hospital room?: 3  Climbing 3-5 steps with a railing?: 2  Basic Mobility Total Score: 21       Treatment & Education:  Donned/Doffed LSO. Assisted patient to the bathroom. Educated on the importance of mobility.    Patient left left sidelying with all lines intact and call button in reach..    GOALS:   Multidisciplinary Problems       Physical Therapy Goals          Problem: Physical Therapy    Goal Priority Disciplines Outcome Goal Variances Interventions   Physical Therapy Goal     PT, PT/OT Ongoing, Progressing     Description: Goals to be met by: 04/15/24     Patient will increase functional independence with mobility by performin. Bed to chair transfer with Gibson using No Assistive Device  2. Gait  x 200 feet with Supervision using No Assistive Device.   3. Stand for 10 minutes with Gibson using No Assistive Device demonstrating no swayback posture.   4. Increased functional strength to WFL for hip abduction, core strength.  5. Lower extremity exercise program x15 reps per handout, with independence                         Time Tracking:     PT Received On: 24  PT Start Time: 1531     PT Stop Time: 1548  PT Total Time (min): 17 min     Billable Minutes:  Gait Training 17    Treatment Type: Treatment  PT/PTA: PTA     Number of PTA visits since last PT visit: 1 03/18/2024

## 2024-03-18 NOTE — ASSESSMENT & PLAN NOTE
"This patient does have evidence of infective focus - continued L5-S1/S1 diskitis/osteomyelitis with abscess, diffuse infectious myositis of the bilateral lumbar paraspinal musculature and left iliopsoas muscle.  My overall impression is sepsis.  Source: Skin and Soft Tissue (location epidurial)  Antibiotics given-   Antibiotics (72h ago, onward)    Start     Stop Route Frequency Ordered    03/17/24 2130  ceFEPIme (MAXIPIME) 2 g in dextrose 5 % in water (D5W) 100 mL IVPB (MB+)         -- IV Every 8 hours (non-standard times) 03/17/24 1452    03/14/24 0900  DAPTOmycin (CUBICIN) 780 mg in sodium chloride 0.9% SolP 50 mL IVPB         -- IV Every 24 hours (non-standard times) 03/14/24 0755        Latest lactate reviewed-  No results for input(s): "LACTATE", "POCLAC" in the last 72 hours.    Organ dysfunction indicated by Acute liver injury    Fluid challenge Not needed - patient is not hypotensive      Post- resuscitation assessment No - Post resuscitation assessment not needed       Will Not start Pressors- Levophed for MAP of 65  Source control achieved by: Broad spectrum antibiotics    - Infectious Disease consulted  - Continue IV Daptomycin and meropenum, Need end date  - Follow blood cultures through maturity. Klebsiella and enterobacteraies per PCR.- & sens to everything  "

## 2024-03-18 NOTE — PLAN OF CARE
Problem: Adult Inpatient Plan of Care  Goal: Plan of Care Review  Outcome: Ongoing, Progressing  Goal: Patient-Specific Goal (Individualized)  Outcome: Ongoing, Progressing  Goal: Absence of Hospital-Acquired Illness or Injury  Outcome: Ongoing, Progressing  Goal: Optimal Comfort and Wellbeing  Outcome: Ongoing, Progressing  Goal: Readiness for Transition of Care  Outcome: Ongoing, Progressing   Pt slept well this shift. A&Ox4. VSS. Pain treated per Mar, Safety precautions in place. Call light in reach. No further concerns noted at this time. Plan of care ongoing.

## 2024-03-18 NOTE — PROGRESS NOTES
[facility] - Neurosurgery ([facility])  Infectious Disease  Progress Note    Patient Name: Ton Donovan  MRN: [id number]  Admission Date: 3/14/2024  Length of Stay: 4 days  Attending Physician: Kimber Lewis MD  Primary Care Provider: Steve Kelly DO    Isolation Status: No active isolations  Assessment/Plan:      ID  * Osteomyelitis of vertebra of lumbosacral region      28M with h/o IVDU, MRSA bacteremia and prior disseminated MRSA infection with poor adherence with care (history of leaving AMA), readmitted 02/04 with progressive back pain. Imaging significant for lumbosacral osteomyelitis with associated epidural abscess, osteomyelitis of sacrum & L iliac bone, probable septic arthritis of L SI joint & lower lumbar spine facet joints, and left iliopsoas abscess, abscess in superior rectal/presacral region and micro-abscesses in paraspinal muscles, pelvis, and left sacrum. Blood cultures remained negative then, and TTE negative. Underwent IR aspiration of left SI joint and psoas on 02/07.  Cxs +MRSA. Abx held prior to IR bx; post-procedure started on empiric Iv-vanc / CTX.   S/p lumbar-pelvis debridement with fusion (02/14). Surgical cxs +MRSA. Pt discharged to [facility], to complete 6wks Iv-Dapto 10mg/kg Q24h, [facility] 04/10/24. (Of note, switched vanc to IV daptomycin (d/t subtherapeutic vancomycin levels with q8hr dosing). Advised repeat imaging / neuro f/u near [place] 04/10; followed by abx suppression thereafter, likely lifelong.     Pt presented 03/12 to [facility] now d/t new onset of intermittent fevers (101.4F) at [facility] for past few days. On arrival, temp 99F, VSS, HDS, wbc nml, CRP 46, Cr 0.8.  CPK 39 nml, with climbing transaminitis found to have HCV+ (RNA 9-million), HBV Ag negative, HIV negative, U/S +hepatomegaly, GB wnl.  Bld cxs 03/12 NGTD.   Pt developed fever 03/14, repeat bld cxs 03/14 1 of 2 +Kleb aerogenes (panS).  Repeat Bld cxs 03/15 and 03/17 NGTD.  Picc line removed 03/16, with picc cath tip  sent for cx.   -- Of note; UDS +MJ on arrival from [facility].  Suspect picc line may be source of new Kleb bacteremia (denies injecting at LTAC); vs. Lumbar surgical wound, vs. GI translocation in setting of hepatomegaly w/ chronic HCV (RNA+ 9-million copies).    Repeat MRI L-spine / pelvis (03/13): Extensive epidural infectious/inflammatory change posterior to the L5-S1 and S1-2 disc spaces; with paravertebral / pre-vertebral abscess. Diffuse infectious myositis of the bilateral lower lumbar paraspinal musculature and within the left psoas and iliopsoas muscles, with 0.7 cm abscess (down from 2.3cm on prior 02/04). Persistent osteomyelitis and septic arthritis about the left SI joint. -- Per NSGY, no role for surgical intervention at this time, favoring wound care and continuing Iv-abx.     3/18/23: Patient now afebrile and WBC WNL.  Denying any systemic sign of infection or back pain. CRP remains elevated 37.8.  On Daptomycin and Cefepime.  PIC remove and repeat BCXs NGTD - suspect due to line infection as blood cultures cleared quickly with PICC removal. Imaging reviewed and possible small abscess posterior to L5-S1 extending into disc space.  Primary team discussed with IR who felt no drainable fluid there there or in psoas.      Recommendations / Plan:  Continue Iv-cefepime 2g Q8h. (Bld cx +kleb aerogenes panS)  Continue Iv-Daptomycin 10mg/kg Q24h  [CPK 39].  Follow-up repeat bld cxs 03/15 and 03/17 (NGTD)  Will continue to trend fever, wbc, and clinical response and crp  Anticipate continuing both Iv-dapto and cefepime for remaining 2-3wks of full 6wk duration of Iv-abx([facility] 04/10) for lumbar osteo-discitis with paravertebral / psoas myositis and sub centimeter abscesses -- d/t concern for possible further seeding of spine during new Kleb bacteremia, suspected picc line as source >> lumbar surgical incision.   Rec repeat MRI Lspine and sacrum/pelvis on 4/9 prior to stopping abx  OK to replace PICC  Rec indefinite  suppression with Doxycyline 100mg po bid    -- Discussed with ID staff and primary team   -- ID will continue to follow w/ further recs.      Outpatient Antibiotic Therapy Plan:    Please send referral to [facility].    1) Infection :   lumbar osteo-discitis with paravertebral / psoas myositis and sub centimeter abscesses (c/b Kleb bacteremia 03/14)    2) Discharge Antibiotics:     Intravenous antibiotics:  IV - Daptomycin 10mg/kg Q24h  Iv - Cefepime 2g Q8h    PO Antibiotics:  Rec indefinite suppression with Doxycyline 100mg po bid onc IV ABX ultimately completed    3) Therapy Duration:   6wks     Estimated end date of IV antibiotics:  [facility] 04/10/24    4) Outpatient Weekly Labs:    Order the following labs to be drawn on Mondays:   CBC  CMP   CRP  CPK (when on Daptomycin)    5) Fax Lab Results to Infectious Diseases Provider:  Elpidio Paredes PA-C    [facility] Fax Number: [phone]    6) Outpatient Infectious Diseases Follow-up    Follow-up appointment will be arranged by the ID clinic and will be found in the patient's appointments tab.    Prior to discharge, please ensure the patient's follow-up appt has been scheduled with ID-Clinic -- for patient convenience and continuity of care.  If there is still no follow-up scheduled prior to discharge, please send an EPIC message to  Carol Gann LPN  in Infectious Diseases.              Anticipated Disposition: tbd    Thank you for your consult. I will follow-up with patient. Please contact us if you have any additional questions.    LEROY Ruggiero  Infectious Disease  [facility] - Neurosurgery (Hospital)    Subjective:     Principal Problem:Osteomyelitis of vertebra of lumbosacral region    HPI: 28M with h/o IVDU, MRSA bacteremia and prior disseminated MRSA infection with poor adherence with care (history of leaving AMA), readmitted 02/04 with progressive back pain. Imaging significant for lumbosacral  osteomyelitis with associated epidural abscess, osteomyelitis of sacrum & L iliac bone, probable septic arthritis of L SI joint & lower lumbar spine facet joints, and left iliopsoas abscess, abscess in superior rectal/presacral region and micro-abscesses in paraspinal muscles, pelvis, and left sacrum. Blood cultures remained negative. TTE negative. Underwent IR aspiration of left SI joint and psoas on 02/07.  Cxs +MRSA. Abx held prior to IR bx; post-procedure started on empiric Iv-vanc / CTX.   S/p lumbar-pelvis debridement with fusion (02/14). Surgical cxs +MRSA. Pt discharged to [facility], to complete 6wks Iv-Dapto 10mg/kg Q24h, [facility] 04/10/24. (Of note, switched vanc to IV daptomycin (d/t subtherapeutic vancomycin levels with q8hr dosing). Advised repeat imaging / neuro f/u near [place] 04/10; followed by abx suppression thereafter, likely lifelong.   However, pt presents 03/12 to [facility] now d/t new onset of intermittent fevers (101.4F) at [facility] for past few days. On arrival, temp 99F, VSS, HDS, wbc nml, CRP 46,  / , , Cr 0.8.  CPK pending.     Repeat MRI L-spine / pelvis (03/13): Postsurgical changes of L4-5 posterior spinal fusion with bilateral sacroiliac extension screws. With discitis osteomyelitis at L5-S1 and S1-2. Extensive epidural infectious/inflammatory change posterior to the L5-S1 and S1-2 disc spaces. Paravertebral/prevertebral abscess measuring 3.7 x 2.2 cm communicating with the L5-S1 disc space.   Pelvis: Diffuse infectious myositis of the left psoas and iliopsoas muscles with 7 mm abscess of the left iliopsoas muscle, decreased from 23 mm. Persistent osteomyelitis and septic arthritis about the left SI joint.  T-spine: incidental finding of small nodular T2 hyperintense signal foci at Rt lung base; consider dedicated chest-CT.      Prior MRI L-spine / pelvis (02/04): showed osteomyelitis/discitis of L5-S1, osteomyelitis of sacrum & L iliac bone, probable septic arthritis of L SI  joint & lower lumbar spine facet joints; worsening anterolisthesis of L5 with respect to S1 with increased size of epidural abscess at the level of L5-S1 and probable severe central canal stenosis at this level, along w/ additional abscesses in the L iliopsoas muscle, paraspinal muscles, pelvis, & L sacrum; -- the additional abscesses in the left iliopsoas muscle [3.4 x 1.7cm; with other micro-abscesses adjacent measuring 2.3cm), paraspinal muscles, pelvis, and left sacrum. Additional abscess in the superior rectal/presacral region measuring roughly 3.1 x 2.4 cm; and probable additional micro abscesses at left sacrum.          Interval History:   No acute events  AF and WBC WNL  Repeat BCXs NGTD  Denies significant back pain, fever, chills, or sweats.      Review of Systems   Constitutional:  Negative for chills, diaphoresis and fever.   Respiratory:  Negative for shortness of breath.    Cardiovascular:  Negative for chest pain.   Gastrointestinal:  Negative for abdominal pain, diarrhea, nausea and vomiting.   Genitourinary:  Negative for dysuria and hematuria.     Objective:     Vital Signs (Most Recent):  Temp: 99.2 °F (37.3 °C) (03/18/24 1116)  Pulse: 84 (03/18/24 1116)  Resp: 18 (03/18/24 1314)  BP: (!) 104/57 (03/18/24 1116)  SpO2: 96 % (03/18/24 1116) Vital Signs (24h Range):  Temp:  [97.2 °F (36.2 °C)-100 °F (37.8 °C)] 99.2 °F (37.3 °C)  Pulse:  [69-87] 84  Resp:  [16-18] 18  SpO2:  [96 %-100 %] 96 %  BP: (104-140)/(56-67) 104/57     Weight: 78 kg (172 lb)  Body mass index is 24.68 kg/m².    Estimated Creatinine Clearance: 159.3 mL/min (based on SCr of 0.7 mg/dL).     Physical Exam  Constitutional:       General: He is not in acute distress.     Appearance: Normal appearance. He is well-developed. He is not ill-appearing, toxic-appearing or diaphoretic.       HENT:      Head: Normocephalic and atraumatic.   Cardiovascular:      Rate and Rhythm: Normal rate and regular rhythm.      Heart sounds: Normal heart  "sounds. No murmur heard.     No friction rub. No gallop.   Pulmonary:      Effort: Pulmonary effort is normal. No respiratory distress.      Breath sounds: Normal breath sounds. No wheezing or rales.   Abdominal:      General: Bowel sounds are normal. There is no distension.      Palpations: Abdomen is soft. There is no mass.      Tenderness: There is no abdominal tenderness. There is no guarding or rebound.   Skin:     General: Skin is warm and dry.   Neurological:      Mental Status: He is alert and oriented to person, place, and time.   Psychiatric:         Behavior: Behavior normal.          Significant Labs: Blood Culture:   Recent Labs   Lab 03/14/24  1618 03/14/24  1623 03/15/24  1842 03/15/24  1843 03/17/24  0959   LABBLOO No Growth to date  No Growth to date  No Growth to date  No Growth to date Gram stain aer bottle: Gram negative rods  Gram stain manas bottle: Gram negative rods  Results called to and read back by:Solitario Quintanilla RN 03/15/2024  08:45  KLEBSIELLA AEROGENES* No Growth to date  No Growth to date  No Growth to date No Growth to date  No Growth to date  No Growth to date No Growth to date     CBC:   Recent Labs   Lab 03/17/24  0320 03/18/24  0605   WBC 6.23 6.99   HGB 9.5* 8.5*   HCT 32.2* 28.0*    304     CMP: No results for input(s): "NA", "K", "CL", "CO2", "GLU", "BUN", "CREATININE", "CALCIUM", "PROT", "ALBUMIN", "BILITOT", "ALKPHOS", "AST", "ALT", "ANIONGAP", "EGFRNONAA" in the last 48 hours.    Invalid input(s): "ESTGFAFRICA"  Wound Culture:   Recent Labs   Lab 10/12/23  1134 11/21/23  1041 02/07/24  1412 02/07/24  1423 02/14/24  1712   LABAERO METHICILLIN RESISTANT STAPHYLOCOCCUS AUREUS  Many  * METHICILLIN RESISTANT STAPHYLOCOCCUS AUREUS  Moderate  No other significant isolate  * No growth METHICILLIN RESISTANT STAPHYLOCOCCUS AUREUS  Rare  * METHICILLIN RESISTANT STAPHYLOCOCCUS AUREUS  Few  *  METHICILLIN RESISTANT STAPHYLOCOCCUS AUREUS  Few  *     All pertinent " labs within the past 24 hours have been reviewed.    Significant Imaging: I have reviewed all pertinent imaging results/findings within the past 24 hours.  Procedure Component Value Units Date/Time   US Abdomen Limited [[id number]] Resulted: 03/15/24 0842   Order Status: Completed Updated: 03/15/24 0844   Narrative:     EXAMINATION:  US ABDOMEN LIMITED    CLINICAL HISTORY:  transaminitis;.    TECHNIQUE:  Limited ultrasound of the right upper quadrant of the abdomen including pancreas, liver, gallbladder, common bile duct was performed.    COMPARISON:  CT abdomen pelvis from 11/20/2023    FINDINGS:  Liver: Enlarged in size measuring 20.6 cm. Homogeneous echotexture. No focal hepatic lesions.    Gallbladder: 2 biliary crystals measuring up to 1-2 mm.  There is no gallbladder wall thickening or pericholecystic fluid.  No sonographic Lozoya's sign.    Biliary system: The common duct is not dilated, measuring 2 mm.  No intrahepatic ductal dilatation.    Spleen: Normal in size with a homogeneous echotexture, measuring 9.8 x 3.6 cm.    Pancreas: The visualized portions of pancreas appear normal.  Prominent peripancreatic lymph node measuring 2.1 x 0.9 x 2.6 cm.    Miscellaneous: No ascites.   Impression:       Hepatomegaly.    A few biliary crystals.    Mildly prominent peripancreatic lymph node.      Electronically signed by: Carlo Garcia MD  Date: 03/15/2024  Time: 08:42   CT Lumbar Spine Without Contrast [[id number]] Resulted: 03/14/24 1649   Order Status: Completed Updated: 03/14/24 1652   Narrative:     EXAMINATION:  CT LUMBAR SPINE WITHOUT CONTRAST    CLINICAL HISTORY:  Osteomyelitis, lumbar;    TECHNIQUE:  Low-dose axial, sagittal and coronal reformations are obtained through the lumbar spine.  Contrast was not administered.    COMPARISON:  MRI lumbar spine 03/12/2024; CT lumbar spine 02/05/2024    FINDINGS:  Postoperative changes from L4-pelvis posterior decompression and instrumented fusion.  Hardware is  intact and in satisfactory position.  No perihardware fractures.  Unchanged alignment.  There is a 10.1 x 9.3 x 4.8 cm region of hypoattenuation in the paraspinal muscles at the operative level, corresponding to extensive intramuscular edema on recent MRI.  A fluid collection cannot be excluded.    ALIGNMENT: Grade 1 anterolisthesis at L5-S1.    BONE: No acute fracture.  There are endplate erosions, sclerotic changes, and periostitis at L5-S1 and S1-2, compatible with sequela of spondylo discitis.  There are erosions and sclerotic changes involving the left sacroiliac joint and left shan sacrum as well, with new ankylosis.    JOINT: Remaining disc heights and facet joint spaces are preserved.    SPINAL CANAL: Partially obscured at the operative level by metal artifact.  The conus medullaris and cauda equina nerve roots are difficult to visualize.  An epidural collection is difficult to exclude by CT.    PARASPINAL SOFT TISSUES: There is a 1.2 cm solid pulmonary nodule in the right lower lobe, new from prior studies.  There is prevertebral soft tissue edema at the lumbosacral junction.  Organized paraspinal collections are difficult to exclude by CT.   Impression:       Sequela of spondylodiscitis at L5-S1 and S1-2, and septic arthritis at the left sacroiliac joint.    Postoperative changes from L4-pelvis decompression and fusion.  No hardware complication.    10.1 cm region of hypoattenuation in the paraspinal muscles at the operative level, corresponding to extensive intramuscular edema on recent MRI.  Organized paraspinal collections and epidural collections are difficult to exclude by noncontrast CT.  Refer to recent MRI for additional details.    New 1.2 cm solid pulmonary nodule in the right lower lobe, likely inflammatory or infectious.  Follow-up chest CT in 3 months is recommended.      Electronically signed by: Saqib Branham  Date: 03/14/2024  Time: 16:49     Imaging History    2024    Date Procedure Name  Study Review Link PACS Link Status Accession Number Location   03/15/24 08:06 AM US Abdomen Limited Study Review  [id number]   03/14/24 03:01 PM CT Lumbar Spine Without Contrast Study Review  Images Final [id number] [facility]   03/13/24 12:48 AM MRI Lumbar Spine With Contrast Study Review  Images Final [id number] [facility]   03/13/24 12:45 AM MRI Pelvis W WO Contrast Study Review  Images Final [id number] [facility]   03/13/24 12:41 AM MRI Thoracic Spine W WO Cont Study Review  Images Final [id number] [facility]   03/12/24 06:14 PM MRI Lumbar Spine Without Contrast Study Review  [id number]   03/12/24 04:48 PM X-Ray Chest AP Portable Study Review  Images Final [id number] [facility]   02/22/24 12:03 PM X-Ray Chest 1 View S/P PICC Line by Nursing Study Review  Images Final [id number] [facility]   03/17/24 02:01 AM CARDIAC MONITORING STRIPS Study Review  Final     03/16/24 07:22 PM CARDIAC MONITORING STRIPS Study Review  Final     03/16/24 02:41 AM CARDIAC MONITORING STRIPS Study Review  Final     03/15/24 09:32 AM CARDIAC MONITORING STRIPS Study Review  Final     03/14/24 11:02 PM CARDIAC MONITORING STRIPS Study Review  Final

## 2024-03-18 NOTE — ASSESSMENT & PLAN NOTE
28M with hypertension, IV drug use (heroin) presenting from LTAC with ongoing fevers    He was recently admitted on 2/04/24 with osteomyelitis/discitis L5-S1, sacral and left illiac osteomyelitis, L5-S1 epidural abscess, and left iliopsoas abscess. He underwent L4-pelvic fusion on 2/14/24 with epidural phlegmon debridement, intraoperative cultures positive for MRSA. Infectious Disease was consulted and recommended IV Daptomycin for 8 week course (EOC 4/10/24) to be followed by oral suppressive doxycycline thereafter. He was discharged on 2/22/24 to [facility] (formerly [facility] to complete his antibiotic course. LTAC stay was complicated by ongoing fevers and he represented to [facility] for further evaluation prior to transfer to [facility] for neurosurgical evaluation. On arrival, WBC 9, ESR 59, CRP 46, AST 360s, ALT 200s. Procalcitonin was elevated at 1.18, lactate normal. MRI L spine showed continued L5-S1/S1 diskitis/osteomyelitis with abscess, diffuse infectious myositis of the bilateral lumbar paraspinal musculature and left iliopsoas muscle.  - Infectious Disease consulted, appreciate assistance and recommendations  - CT L spine without contrast noted  - Continue IV Daptomycin q24h  - Weekly CK level while on Daptomycin  - Multimodal pain control with tylenol, gabapentin, methocarbamol, oxy IR, oxy ER  - Follow blood cultures through maturity  - Daily CBC, CMP  - Cardiac monitor  - Neurosurgery Consult: he does not need surgery.  - pt transferred to [facility]    3/18 - ID final recs pending. Has Klebsiella in blood sens to everything.  Wound care consulted . pic line may have been source of infection    Continue  Iv-cefepime 2g Q8h. (Bld cx +kleb aerogenes panS) & Continue Iv-Daptomycin 10mg/kg Q24h.  repeat bld cxs 03/15 and 03/17 (NGTD); picc cath tip culture was not sent down properly  Anticipate continuing both Iv-dapto and cefepime for remaining 2-3wks of full 6wk duration of Iv-abx(BRENNAN 04/10) for lumbar osteo-discitis with  paravertebral / psoas myositis and sub centimeter abscesses   Will need new PIc line or mid line.  Waiting for cultures to be neg to ensure therapeutic window without bacteremia and presence of a line.

## 2024-03-18 NOTE — PLAN OF CARE
Aleksandr Bray - Neurosurgery ([facility])  Discharge Reassessment    Primary Care Provider: Steve Kelly DO    Expected Discharge Date: 3/19/2024    Patient is not medically ready for discharge.  Patient came from [facility] where he was for longterm ivab therapy.  Patient is pending final ID rec for auth to be submitted for in order for patient to return to [place].    Discharge Plan A and Plan B have been determined by review of patient's clinical status, future medical and therapeutic needs, and coverage/benefits for post-acute care in coordination with multidisciplinary team members.     Reassessment (most recent)       Discharge Reassessment - 03/18/24 1201          Discharge Reassessment    Assessment Type Discharge Planning Reassessment     Did the patient's condition or plan change since previous assessment? No     Discharge Plan discussed with: Patient     Communicated SENA with patient/caregiver Date not available/Unable to determine     Discharge Plan A Long-term acute care facility (LTAC)     Discharge Plan B Long-term acute care facility (LTAC)     DME Needed Upon Discharge  none     Transition of Care Barriers None     Why the patient remains in the hospital Requires continued medical care        Post-Acute Status    Post-Acute Authorization Placement     Post-Acute Placement Status Referrals Sent     Discharge Delays None known at this time

## 2024-03-18 NOTE — SUBJECTIVE & OBJECTIVE
Interval History:   No acute events  AF and WBC WNL  Repeat BCXs NGTD  Denies significant back pain, fever, chills, or sweats.      Review of Systems   Constitutional:  Negative for chills, diaphoresis and fever.   Respiratory:  Negative for shortness of breath.    Cardiovascular:  Negative for chest pain.   Gastrointestinal:  Negative for abdominal pain, diarrhea, nausea and vomiting.   Genitourinary:  Negative for dysuria and hematuria.     Objective:     Vital Signs (Most Recent):  Temp: 99.2 °F (37.3 °C) (03/18/24 1116)  Pulse: 84 (03/18/24 1116)  Resp: 18 (03/18/24 1314)  BP: (!) 104/57 (03/18/24 1116)  SpO2: 96 % (03/18/24 1116) Vital Signs (24h Range):  Temp:  [97.2 °F (36.2 °C)-100 °F (37.8 °C)] 99.2 °F (37.3 °C)  Pulse:  [69-87] 84  Resp:  [16-18] 18  SpO2:  [96 %-100 %] 96 %  BP: (104-140)/(56-67) 104/57     Weight: 78 kg (172 lb)  Body mass index is 24.68 kg/m².    Estimated Creatinine Clearance: 159.3 mL/min (based on SCr of 0.7 mg/dL).     Physical Exam  Constitutional:       General: He is not in acute distress.     Appearance: Normal appearance. He is well-developed. He is not ill-appearing, toxic-appearing or diaphoretic.       HENT:      Head: Normocephalic and atraumatic.   Cardiovascular:      Rate and Rhythm: Normal rate and regular rhythm.      Heart sounds: Normal heart sounds. No murmur heard.     No friction rub. No gallop.   Pulmonary:      Effort: Pulmonary effort is normal. No respiratory distress.      Breath sounds: Normal breath sounds. No wheezing or rales.   Abdominal:      General: Bowel sounds are normal. There is no distension.      Palpations: Abdomen is soft. There is no mass.      Tenderness: There is no abdominal tenderness. There is no guarding or rebound.   Skin:     General: Skin is warm and dry.   Neurological:      Mental Status: He is alert and oriented to person, place, and time.   Psychiatric:         Behavior: Behavior normal.          Significant Labs: Blood Culture:  "  Recent Labs   Lab 03/14/24  1618 03/14/24  1623 03/15/24  1842 03/15/24  1843 03/17/24  0959   LABBLOO No Growth to date  No Growth to date  No Growth to date  No Growth to date Gram stain aer bottle: Gram negative rods  Gram stain manas bottle: Gram negative rods  Results called to and read back by:Solitario Quintanilla RN 03/15/2024  08:45  KLEBSIELLA AEROGENES* No Growth to date  No Growth to date  No Growth to date No Growth to date  No Growth to date  No Growth to date No Growth to date     CBC:   Recent Labs   Lab 03/17/24  0320 03/18/24  0605   WBC 6.23 6.99   HGB 9.5* 8.5*   HCT 32.2* 28.0*    304     CMP: No results for input(s): "NA", "K", "CL", "CO2", "GLU", "BUN", "CREATININE", "CALCIUM", "PROT", "ALBUMIN", "BILITOT", "ALKPHOS", "AST", "ALT", "ANIONGAP", "EGFRNONAA" in the last 48 hours.    Invalid input(s): "ESTGFAFRICA"  Wound Culture:   Recent Labs   Lab 10/12/23  1134 11/21/23  1041 02/07/24  1412 02/07/24  1423 02/14/24  1712   LABAERO METHICILLIN RESISTANT STAPHYLOCOCCUS AUREUS  Many  * METHICILLIN RESISTANT STAPHYLOCOCCUS AUREUS  Moderate  No other significant isolate  * No growth METHICILLIN RESISTANT STAPHYLOCOCCUS AUREUS  Rare  * METHICILLIN RESISTANT STAPHYLOCOCCUS AUREUS  Few  *  METHICILLIN RESISTANT STAPHYLOCOCCUS AUREUS  Few  *     All pertinent labs within the past 24 hours have been reviewed.    Significant Imaging: I have reviewed all pertinent imaging results/findings within the past 24 hours.  Procedure Component Value Units Date/Time   US Abdomen Limited [[id number]] Resulted: 03/15/24 0842   Order Status: Completed Updated: 03/15/24 0844   Narrative:     EXAMINATION:  US ABDOMEN LIMITED    CLINICAL HISTORY:  transaminitis;.    TECHNIQUE:  Limited ultrasound of the right upper quadrant of the abdomen including pancreas, liver, gallbladder, common bile duct was performed.    COMPARISON:  CT abdomen pelvis from 11/20/2023    FINDINGS:  Liver: Enlarged in size measuring " 20.6 cm. Homogeneous echotexture. No focal hepatic lesions.    Gallbladder: 2 biliary crystals measuring up to 1-2 mm.  There is no gallbladder wall thickening or pericholecystic fluid.  No sonographic Lozoya's sign.    Biliary system: The common duct is not dilated, measuring 2 mm.  No intrahepatic ductal dilatation.    Spleen: Normal in size with a homogeneous echotexture, measuring 9.8 x 3.6 cm.    Pancreas: The visualized portions of pancreas appear normal.  Prominent peripancreatic lymph node measuring 2.1 x 0.9 x 2.6 cm.    Miscellaneous: No ascites.   Impression:       Hepatomegaly.    A few biliary crystals.    Mildly prominent peripancreatic lymph node.      Electronically signed by: Carlo Garcia MD  Date: 03/15/2024  Time: 08:42   CT Lumbar Spine Without Contrast [[id number]] Resulted: 03/14/24 1649   Order Status: Completed Updated: 03/14/24 1652   Narrative:     EXAMINATION:  CT LUMBAR SPINE WITHOUT CONTRAST    CLINICAL HISTORY:  Osteomyelitis, lumbar;    TECHNIQUE:  Low-dose axial, sagittal and coronal reformations are obtained through the lumbar spine.  Contrast was not administered.    COMPARISON:  MRI lumbar spine 03/12/2024; CT lumbar spine 02/05/2024    FINDINGS:  Postoperative changes from L4-pelvis posterior decompression and instrumented fusion.  Hardware is intact and in satisfactory position.  No perihardware fractures.  Unchanged alignment.  There is a 10.1 x 9.3 x 4.8 cm region of hypoattenuation in the paraspinal muscles at the operative level, corresponding to extensive intramuscular edema on recent MRI.  A fluid collection cannot be excluded.    ALIGNMENT: Grade 1 anterolisthesis at L5-S1.    BONE: No acute fracture.  There are endplate erosions, sclerotic changes, and periostitis at L5-S1 and S1-2, compatible with sequela of spondylo discitis.  There are erosions and sclerotic changes involving the left sacroiliac joint and left shan sacrum as well, with new ankylosis.    JOINT:  Remaining disc heights and facet joint spaces are preserved.    SPINAL CANAL: Partially obscured at the operative level by metal artifact.  The conus medullaris and cauda equina nerve roots are difficult to visualize.  An epidural collection is difficult to exclude by CT.    PARASPINAL SOFT TISSUES: There is a 1.2 cm solid pulmonary nodule in the right lower lobe, new from prior studies.  There is prevertebral soft tissue edema at the lumbosacral junction.  Organized paraspinal collections are difficult to exclude by CT.   Impression:       Sequela of spondylodiscitis at L5-S1 and S1-2, and septic arthritis at the left sacroiliac joint.    Postoperative changes from L4-pelvis decompression and fusion.  No hardware complication.    10.1 cm region of hypoattenuation in the paraspinal muscles at the operative level, corresponding to extensive intramuscular edema on recent MRI.  Organized paraspinal collections and epidural collections are difficult to exclude by noncontrast CT.  Refer to recent MRI for additional details.    New 1.2 cm solid pulmonary nodule in the right lower lobe, likely inflammatory or infectious.  Follow-up chest CT in 3 months is recommended.      Electronically signed by: Saqib Branham  Date: 03/14/2024  Time: 16:49     Imaging History    2024    Date Procedure Name Study Review Link PACS Link Status Accession Number Location   03/15/24 08:06 AM US Abdomen Limited Study Review  Images Final [id number] [facility]   03/14/24 03:01 PM CT Lumbar Spine Without Contrast Study Review  Images Final [id number] [facility]   03/13/24 12:48 AM MRI Lumbar Spine With Contrast Study Review  Images Final [id number] [facility]   03/13/24 12:45 AM MRI Pelvis W WO Contrast Study Review  Images Final [id number] [facility]   03/13/24 12:41 AM MRI Thoracic Spine W WO Cont Study Review  Images Final [id number] [facility]   03/12/24 06:14 PM MRI Lumbar Spine Without Contrast Study Review  Images Final [id number] [facility]   03/12/24 04:48 PM X-Ray Chest AP  Portable Study Review  Images Final [id number] [facility]   02/22/24 12:03 PM X-Ray Chest 1 View S/P PICC Line by Nursing Study Review  Images Final [id number]   03/17/24 02:01 AM CARDIAC MONITORING STRIPS Study Review  Final     03/16/24 07:22 PM CARDIAC MONITORING STRIPS Study Review  Final     03/16/24 02:41 AM CARDIAC MONITORING STRIPS Study Review  Final     03/15/24 09:32 AM CARDIAC MONITORING STRIPS Study Review  Final     03/14/24 11:02 PM CARDIAC MONITORING STRIPS Study Review  Final

## 2024-03-18 NOTE — PROGRESS NOTES
Aleksandr Bray - Neurosurgery ([facility])  [facility] Medicine  Progress Note    Patient Name: Ton Donovan  MRN: [id number]  Patient Class: IP- Inpatient   Admission Date: 3/14/2024  Length of Stay: 4 days  Attending Physician: Kimber Lewis MD  Primary Care Provider: Steve Kelly DO        Subjective:     Principal Problem:Osteomyelitis of vertebra of lumbosacral region        HPI:  Mr. Ton Donovan is a 28 year-old Male with a PMHx of Hypertension, IV drug use who presented with fevers and ongoing back pain with radiation down legs.    Of note, he was recently admitted to [facility] for LOOP X lumbar spinal fusion, with course complicated by a psoas abscess and MRSA epidural abscess s/p epidural phlegmon debridement. He was started on IV Daptomycin and discharged on 2/22/24 to [facility] for completion of antibiotics. Ongoing fevers complicated his LTAC stay, and he returned to the [facility] for further evaluation. On arrival, WBC 9, ESR 59, CRP 46, AST 360s, ALT 200s. Procalcitonin was elevated at 1.18, lactate normal. MRI L spine concerning for continued L5-S1/S1 diskitis and osteomyelitis with abscess, diffuse infectious myositis of the lumbar paraspinal musculature bilaterally and left iliopsoas muscle without discrete abscess. He was then transferred to [facility] for Neurosurgerical evaluation.     At the time of this consult, temp 99F, HR 60, 120s/60s, saturating well on room air. Most recent labs with WBC 5, Hgb 9, Na 134.    Medicine consulted for medical co-management of epidural abscess    Overview/Hospital Course:  3/15-Admitted for NSGY evaluation of OM & discitis lumbar spine. Will let pt eat.   3/16- Does not need surgery. Appreciate NSGY eval. Wound care and ID following and need final recs. Pt is comfortable.   3/17- Klebsiella sens to everything,  On dapto and yue.  Pic line removed and culture tip was ordered, but not done.   3/18 per ID: Continue  Iv-cefepime 2g Q8h. (Bld cx +kleb  aerogenes panS) & Continue Iv-Daptomycin 10mg/kg Q24h.  repeat bld cxs 03/15 and 03/17 (NGTD); picc cath tip culture was not sent down properly  Anticipate continuing both Iv-dapto and cefepime for remaining 2-3wks of full 6wk duration of Iv-abx(BRENNAN 04/10) for lumbar osteo-discitis with paravertebral / psoas myositis and sub centimeter abscesses   Will need new PIc line or mid line.  Waiting for cultures to be neg to ensure therapeutic window without bacteremia and presence of a line.   - IR consulted to evaluate if the fluid collection at posterior to L5-s1 which communicates with disc space to see if taht can be drained     Interval History: see above    Review of Systems   Constitutional:  Positive for activity change. Negative for appetite change and fever.   HENT:  Negative for trouble swallowing.    Respiratory:  Negative for shortness of breath.    Cardiovascular:  Negative for chest pain.   Gastrointestinal:  Positive for constipation. Negative for abdominal pain, diarrhea and nausea.   Genitourinary:  Negative for difficulty urinating and frequency.   Musculoskeletal:  Positive for arthralgias, back pain and gait problem. Negative for neck stiffness.   Neurological:  Negative for headaches.   Psychiatric/Behavioral:  Negative for behavioral problems.      Objective:     Vital Signs (Most Recent):  Temp: 98.7 °F (37.1 °C) (03/18/24 0844)  Pulse: 75 (03/18/24 0844)  Resp: 16 (03/18/24 0844)  BP: (!) 106/56 (03/18/24 0844)  SpO2: 100 % (03/18/24 0844) Vital Signs (24h Range):  Temp:  [97.2 °F (36.2 °C)-100 °F (37.8 °C)] 98.7 °F (37.1 °C)  Pulse:  [69-87] 75  Resp:  [16-18] 16  SpO2:  [92 %-100 %] 100 %  BP: (106-140)/(51-67) 106/56     Weight: 78 kg (172 lb)  Body mass index is 24.68 kg/m².    Intake/Output Summary (Last 24 hours) at 3/18/2024 0902  Last data filed at 3/18/2024 0502  Gross per 24 hour   Intake 1369.34 ml   Output 400 ml   Net 969.34 ml           Physical Exam  Constitutional:       General: He  is not in acute distress.     Appearance: Normal appearance. He is ill-appearing. He is not toxic-appearing or diaphoretic.   HENT:      Head: Normocephalic and atraumatic.      Nose: Nose normal.      Mouth/Throat:      Mouth: Mucous membranes are moist.      Pharynx: Oropharynx is clear.   Eyes:      General: No scleral icterus.     Extraocular Movements: Extraocular movements intact.      Conjunctiva/sclera: Conjunctivae normal.      Pupils: Pupils are equal, round, and reactive to light.   Cardiovascular:      Rate and Rhythm: Normal rate and regular rhythm.      Pulses: Normal pulses.   Pulmonary:      Effort: Pulmonary effort is normal. No respiratory distress.      Breath sounds: Normal breath sounds. No stridor. No wheezing, rhonchi or rales.   Chest:      Chest wall: No tenderness.   Abdominal:      General: Abdomen is flat. Bowel sounds are normal. There is no distension.      Palpations: Abdomen is soft.      Tenderness: There is no abdominal tenderness. There is no right CVA tenderness, left CVA tenderness, guarding or rebound.   Musculoskeletal:         General: No swelling, tenderness, deformity or signs of injury. Normal range of motion.      Cervical back: Normal range of motion and neck supple. No rigidity or tenderness.      Right lower leg: No edema.      Left lower leg: No edema.   Skin:     General: Skin is warm and dry.      Coloration: Skin is not jaundiced.      Findings: No erythema or rash.   Neurological:      General: No focal deficit present.      Mental Status: He is alert and oriented to person, place, and time. Mental status is at baseline.      Motor: No weakness.      Gait: Gait abnormal.   Psychiatric:         Mood and Affect: Mood normal.         Behavior: Behavior normal.         Thought Content: Thought content normal.         Judgment: Judgment normal.             Significant Labs: All pertinent labs within the past 24 hours have been reviewed.  CBC:   Recent Labs   Lab  03/17/24  0320 03/18/24  0605   WBC 6.23 6.99   HGB 9.5* 8.5*   HCT 32.2* 28.0*    304       CMP:   Recent Labs   Lab 03/16/24  1039      K 4.5      CO2 25   GLU 99   BUN 10   CREATININE 0.7   CALCIUM 9.4   PROT 8.2   ALBUMIN 3.0*   BILITOT 0.7   ALKPHOS 231*   *   *   ANIONGAP 7*         Significant Imaging: I have reviewed all pertinent imaging results/findings within the past 24 hours.    Assessment/Plan:      * Osteomyelitis of vertebra of lumbosacral region  28M with hypertension, IV drug use (heroin) presenting from LTAC with ongoing fevers    He was recently admitted on 2/04/24 with osteomyelitis/discitis L5-S1, sacral and left illiac osteomyelitis, L5-S1 epidural abscess, and left iliopsoas abscess. He underwent L4-pelvic fusion on 2/14/24 with epidural phlegmon debridement, intraoperative cultures positive for MRSA. Infectious Disease was consulted and recommended IV Daptomycin for 8 week course (EOC 4/10/24) to be followed by oral suppressive doxycycline thereafter. He was discharged on 2/22/24 to [facility] to complete his antibiotic course. LTAC stay was complicated by ongoing fevers and he represented to [facility] for further evaluation prior to transfer to [facility] for neurosurgical evaluation. On arrival, WBC 9, ESR 59, CRP 46, AST 360s, ALT 200s. Procalcitonin was elevated at 1.18, lactate normal. MRI L spine showed continued L5-S1/S1 diskitis/osteomyelitis with abscess, diffuse infectious myositis of the bilateral lumbar paraspinal musculature and left iliopsoas muscle.  - Infectious Disease consulted, appreciate assistance and recommendations  - CT L spine without contrast noted  - Continue IV Daptomycin q24h  - Weekly CK level while on Daptomycin  - Multimodal pain control with tylenol, gabapentin, methocarbamol, oxy IR, oxy ER  - Follow blood cultures through [facility]  - Daily CBC, CMP  - Cardiac monitor  - Neurosurgery Consult: he does not need surgery.  - pt  "transferred to [facility]    3/18 - ID final recs pending. Has Klebsiella in blood sens to everything.  Wound care consulted . pic line may have been source of infection    Continue  Iv-cefepime 2g Q8h. (Bld cx +kleb aerogenes panS) & Continue Iv-Daptomycin 10mg/kg Q24h.  repeat bld cxs 03/15 and 03/17 (NGTD); picc cath tip culture was not sent down properly  Anticipate continuing both Iv-dapto and cefepime for remaining 2-3wks of full 6wk duration of Iv-abx(BRENNAN 04/10) for lumbar osteo-discitis with paravertebral / psoas myositis and sub centimeter abscesses   Will need new PIc line or mid line.  Waiting for cultures to be neg to ensure therapeutic window without bacteremia and presence of a line.       Severe sepsis  This patient does have evidence of infective focus - continued L5-S1/S1 diskitis/osteomyelitis with abscess, diffuse infectious myositis of the bilateral lumbar paraspinal musculature and left iliopsoas muscle.  My overall impression is sepsis.  Source: Skin and Soft Tissue (location epidurial)  Antibiotics given-   Antibiotics (72h ago, onward)      Start     Stop Route Frequency Ordered    03/17/24 2130  ceFEPIme (MAXIPIME) 2 g in dextrose 5 % in water (D5W) 100 mL IVPB (MB+)         -- IV Every 8 hours (non-standard times) 03/17/24 1452    03/14/24 0900  DAPTOmycin (CUBICIN) 780 mg in sodium chloride 0.9% SolP 50 mL IVPB         -- IV Every 24 hours (non-standard times) 03/14/24 0755          Latest lactate reviewed-  No results for input(s): "LACTATE", "POCLAC" in the last 72 hours.    Organ dysfunction indicated by Acute liver injury    Fluid challenge Not needed - patient is not hypotensive      Post- resuscitation assessment No - Post resuscitation assessment not needed       Will Not start Pressors- Levophed for MAP of 65  Source control achieved by: Broad spectrum antibiotics    - Infectious Disease consulted  - Continue IV Daptomycin and meropenum, Need end date  - Follow blood cultures through " "[facility]. Klebsiella and enterobacteraies per PCR.- & sens to everything    Psoas abscess, left  L5-S1/S1 diskitis/osteomyelitis with abscess, diffuse infectious myositis of the bilateral lumbar paraspinal musculature and left iliopsoas muscle.  - see " Osteomeylitis" above      Discitis of lumbosacral region  See Osteomyelitis for consolidated plan      Septic arthritis  See " Osteomyelitis"      Myositis of multiple sites  See " Osteomeylitis" for plan      Substance or medication-induced depressive disorder  - Continue Mirtazipine  - Hx of heroin, THC, cociane      HTN (hypertension)  Chronic, controlled. Latest blood pressure and vitals reviewed-     Temp:  [97.2 °F (36.2 °C)-100 °F (37.8 °C)]   Pulse:  [69-87]   Resp:  [16-18]   BP: (106-140)/(51-67)   SpO2:  [92 %-100 %] .   Home meds for hypertension were reviewed and noted below.   Hypertension Medications               cloNIDine (CATAPRES) 0.1 MG tablet Take 1 tablet (0.1 mg total) by mouth every 12 (twelve) hours.          Plan:  - While in the hospital, will manage blood pressure as follows; Continue home antihypertensive regimen. Currently on Clonidine 0.1 PO BID  - Will utilize p.r.n. blood pressure medication only if patient's blood pressure greater than 180/110 and he develops symptoms such as worsening chest pain or shortness of breath.    Hepatitis  Most recent labs from OSH showed AST 360s, ALT 200s. Had recent hepatitis panel 3/12 with hep c    - Recent Hepatitis C screening resulted positive, previous Hep C screening 3 months ago negative. Check repeat Hep C screening and PCR  - Check HIV given history of IVDU and ongoing fever despite antibiotics  - RUQ ultrasound- pending  - Daily CMP to trend transaminases:  335/235  - Infectious Diseases consulted and following      Drug dependence  See " Substance induced depressive disroder"  - monitor for withdrawal and give opioid if needed   - pt has been in LTAC  - on oxycodone LA 10 bid for pain and " "dependence  STABLE      Heroin use disorder, severe  See " Drug Dependence"       Hyponatremia  Patient has hyponatremia which is controlled,We will aim to correct the sodium by 4-6mEq in 24 hours. We will monitor sodium Daily. The hyponatremia is due to Dehydration/hypovolemia. We will obtain the following studies: see labsection. We will treat the hyponatremia with IV fluids. The patient's sodium results have been reviewed and are listed below.  No results for input(s): "NA" in the last 24 hours.      Chronic diastolic heart failure  ECHO 2/13/2024  Left Ventricle: The left ventricle is normal in size. Normal wall thickness. Normal wall motion. There is normal systolic function with a visually estimated ejection fraction of 60 - 65%. Ejection fraction by visual approximation is 65%. There is diastolic dysfunction but grade cannot be determined.    Right Ventricle: Normal right ventricular cavity size. Wall thickness is normal. Right ventricle wall motion  is normal. Systolic function is normal.    IVC/SVC: Normal venous pressure at 3 mmHg.    Pericardium: There is a trivial posterior effusion just base.    STABLE      Debility  Patient with Acute on chronic debility due to other reduced mobility. Latest AMPAC and GEMS scores have been reviewed. Evaluation for etiology is complete. Plan includes progressive mobility protocol initated, PT/OT consulted, and may need surgey .      VTE Risk Mitigation (From admission, onward)           Ordered     Place BRENNAN hose  Until discontinued         03/14/24 0730     IP VTE HIGH RISK PATIENT  Once         03/14/24 0730     Place sequential compression device  Until discontinued         03/14/24 0730                    Discharge Planning   SENA: 3/19/2024     Code Status: Full Code   Is the patient medically ready for discharge?: No    Reason for patient still in hospital (select all that apply): Patient trending condition  Discharge Plan A: Long-term acute care facility (LTAC) "   Discharge Delays: None known at this time      Kimber Lewis MD  Department of Hospital Medicine   [facility] - Neurosurgery ([facility])

## 2024-03-18 NOTE — ASSESSMENT & PLAN NOTE
Chronic, controlled. Latest blood pressure and vitals reviewed-     Temp:  [97.2 °F (36.2 °C)-100 °F (37.8 °C)]   Pulse:  [69-87]   Resp:  [16-18]   BP: (106-140)/(51-67)   SpO2:  [92 %-100 %] .   Home meds for hypertension were reviewed and noted below.   Hypertension Medications               cloNIDine (CATAPRES) 0.1 MG tablet Take 1 tablet (0.1 mg total) by mouth every 12 (twelve) hours.          Plan:  - While in the hospital, will manage blood pressure as follows; Continue home antihypertensive regimen. Currently on Clonidine 0.1 PO BID  - Will utilize p.r.n. blood pressure medication only if patient's blood pressure greater than 180/110 and he develops symptoms such as worsening chest pain or shortness of breath.

## 2024-03-18 NOTE — ASSESSMENT & PLAN NOTE
"See " Substance induced depressive disroder"  - monitor for withdrawal and give opioid if needed   - pt has been in LTAC  - on oxycodone LA 10 bid for pain and dependence  STABLE    "

## 2024-03-18 NOTE — PT/OT/SLP PROGRESS
Occupational Therapy   Treatment    Name: Ton Donovan  MRN: [id number]  Admitting Diagnosis:  Osteomyelitis of vertebra of lumbosacral region       Recommendations:     Discharge Recommendations: High Intensity Therapy  Discharge Equipment Recommendations:  rollator, shower chair  Barriers to discharge:  Inaccessible home environment    Assessment:     Ton Donovan is a 28 y.o. male with a medical diagnosis of Osteomyelitis of vertebra of lumbosacral region.  He presents with the following performance deficits affecting function:  weakness, impaired endurance, impaired self care skills, impaired functional mobility, gait instability, impaired balance, decreased upper extremity function, decreased lower extremity function, orthopedic precautions, impaired coordination, decreased coordination.     Pt agreeable to therapy and tolerated the session well this date. Worked on stretches into figure-4 position to allow for improvements with LE dressing. Pt able to ambulate into the bathroom and stand to urinate with SBA. Lastly, pt participated in functional mobility with CGA-SBA and use of RW within the hallway. Pt requires reminders for safety awareness and body mechanics throughout the session. Pt would benefit from continued skilled acute OT services during this admission in order to maximize independence and safety with ADLs and functional mobility to ensure safe return to PLOF in the least restrictive environment. Patient presents with good participation and motivation to return to prior level of function with high intensity therapy. The patient demonstrates appropriate endurance and strength to participate in up to 3 hours or 15hrs of combined therapy post acute.    Rehab Prognosis:  Good; patient would benefit from acute skilled OT services to address these deficits and reach maximum level of function.       Plan:     Patient to be seen 4 x/week to address the above listed problems via self-care/home management,  therapeutic activities, therapeutic exercises, neuromuscular re-education  Plan of Care Expires: 04/15/24  Plan of Care Reviewed with: patient    Subjective     Chief Complaint: None stated   Patient/Family Comments/goals: To return to PLOF  Pain/Comfort:  Pain Rating 1: other (see comments) (not rated)  Location - Side 1: Bilateral  Location - Orientation 1: generalized  Location 1: back  Pain Addressed 1: Reposition, Distraction  Pain Rating Post-Intervention 1: other (see comments) (not rated)    Objective:     Communicated with: RN prior to session.  Patient found HOB elevated with Other (comments) (no active lines) upon OT entry to room.    General Precautions: Standard, fall    Orthopedic Precautions:spinal precautions  Braces: LSO  Respiratory Status: Room air     Occupational Performance:     Bed Mobility:    Patient completed Rolling/Turning to Right with stand by assistance  Patient completed Scooting/Bridging with stand by assistance  Patient completed Supine to Sit with stand by assistance     Functional Mobility/Transfers:  Patient completed Sit <> Stand Transfer with stand by assistance  with  rolling walker   Functional Mobility: Pt engaging in functional mobility to simulate household/community distances with CGA-SBA and utilizing RW in order to maximize functional activity tolerance and standing balance required for engagement in occupations of choice.  Cues for walker management and upright posture     Activities of Daily Living:  Upper Body Dressing: stand by assistance : to don LSO while sitting EOB   Lower Body Dressing: Performed BLE stretches into figure-4 position to assist with LB dressing tasks with spinal precautions     Toileting: stand by assistance : For a void in standing     [facility] 6 Click ADL: 20    Treatment & Education:  Therapist provided facilitation and instruction of proper body mechanics and fall prevention strategies during tasks listed above.  Instructed patient to sit in  bedside chair daily to increase OOB/activity tolerance.  Instructed patient to use call light to have nursing staff assist with needs/transfers.  Discussed OT POC and answered all questions within OT scope of practice.  Whiteboard updated       Patient left up in chair with all lines intact and call button in reach    GOALS:   Multidisciplinary Problems       Occupational Therapy Goals          Problem: Occupational Therapy    Goal Priority Disciplines Outcome Interventions   Occupational Therapy Goal     OT, PT/OT Ongoing, Progressing    Description: Goals to be met by: 3/29/24     Patient will increase functional independence with ADLs by performing:    UE Dressing with Towner.  LE Dressing with Towner.  Grooming while standing at sink with Towner.  Toileting from toilet with Towner for hygiene and clothing management.   Toilet transfer to toilet with Towner.                         Time Tracking:     OT Date of Treatment: 03/18/24  OT Start Time: 1007  OT Stop Time: 1030  OT Total Time (min): 23 min    Billable Minutes:Self Care/Home Management 10  Therapeutic Activity 13    OT/ASHLEY: OT          3/18/2024

## 2024-03-19 LAB
BACTERIA BLD CULT: NORMAL
BACTERIA SPEC AEROBE CULT: ABNORMAL
BASOPHILS # BLD AUTO: 0.02 K/UL (ref 0–0.2)
BASOPHILS NFR BLD: 0.2 % (ref 0–1.9)
CRP SERPL-MCNC: 26.5 MG/L (ref 0–8.2)
DIFFERENTIAL METHOD BLD: ABNORMAL
EOSINOPHIL # BLD AUTO: 0.2 K/UL (ref 0–0.5)
EOSINOPHIL NFR BLD: 2 % (ref 0–8)
ERYTHROCYTE [DISTWIDTH] IN BLOOD BY AUTOMATED COUNT: 21.5 % (ref 11.5–14.5)
HCT VFR BLD AUTO: 30 % (ref 40–54)
HGB BLD-MCNC: 9.3 G/DL (ref 14–18)
IMM GRANULOCYTES # BLD AUTO: 0.05 K/UL (ref 0–0.04)
IMM GRANULOCYTES NFR BLD AUTO: 0.5 % (ref 0–0.5)
LYMPHOCYTES # BLD AUTO: 2.5 K/UL (ref 1–4.8)
LYMPHOCYTES NFR BLD: 27 % (ref 18–48)
MCH RBC QN AUTO: 22 PG (ref 27–31)
MCHC RBC AUTO-ENTMCNC: 31 G/DL (ref 32–36)
MCV RBC AUTO: 71 FL (ref 82–98)
MONOCYTES # BLD AUTO: 1.3 K/UL (ref 0.3–1)
MONOCYTES NFR BLD: 13.9 % (ref 4–15)
NEUTROPHILS # BLD AUTO: 5.2 K/UL (ref 1.8–7.7)
NEUTROPHILS NFR BLD: 56.4 % (ref 38–73)
NRBC BLD-RTO: 0 /100 WBC
PLATELET # BLD AUTO: 313 K/UL (ref 150–450)
PMV BLD AUTO: 9.5 FL (ref 9.2–12.9)
RBC # BLD AUTO: 4.23 M/UL (ref 4.6–6.2)
WBC # BLD AUTO: 9.15 K/UL (ref 3.9–12.7)

## 2024-03-19 PROCEDURE — 25000003 PHARM REV CODE 250

## 2024-03-19 PROCEDURE — 86140 C-REACTIVE PROTEIN: CPT | Performed by: PHYSICIAN ASSISTANT

## 2024-03-19 PROCEDURE — 63600175 PHARM REV CODE 636 W HCPCS: Performed by: STUDENT IN AN ORGANIZED HEALTH CARE EDUCATION/TRAINING PROGRAM

## 2024-03-19 PROCEDURE — 11000001 HC ACUTE MED/SURG PRIVATE ROOM

## 2024-03-19 PROCEDURE — 36415 COLL VENOUS BLD VENIPUNCTURE: CPT | Performed by: PHYSICIAN ASSISTANT

## 2024-03-19 PROCEDURE — 99233 SBSQ HOSP IP/OBS HIGH 50: CPT | Mod: ,,, | Performed by: PHYSICIAN ASSISTANT

## 2024-03-19 PROCEDURE — 25000003 PHARM REV CODE 250: Performed by: STUDENT IN AN ORGANIZED HEALTH CARE EDUCATION/TRAINING PROGRAM

## 2024-03-19 PROCEDURE — 97530 THERAPEUTIC ACTIVITIES: CPT

## 2024-03-19 PROCEDURE — 85025 COMPLETE CBC W/AUTO DIFF WBC: CPT | Performed by: STUDENT IN AN ORGANIZED HEALTH CARE EDUCATION/TRAINING PROGRAM

## 2024-03-19 PROCEDURE — 97116 GAIT TRAINING THERAPY: CPT

## 2024-03-19 PROCEDURE — 63600175 PHARM REV CODE 636 W HCPCS

## 2024-03-19 RX ADMIN — MIRTAZAPINE 15 MG: 15 TABLET, FILM COATED ORAL at 09:03

## 2024-03-19 RX ADMIN — CEFEPIME 2 G: 2 INJECTION, POWDER, FOR SOLUTION INTRAVENOUS at 01:03

## 2024-03-19 RX ADMIN — OXYCODONE HYDROCHLORIDE 15 MG: 10 TABLET ORAL at 02:03

## 2024-03-19 RX ADMIN — OXYCODONE HYDROCHLORIDE 15 MG: 10 TABLET ORAL at 12:03

## 2024-03-19 RX ADMIN — METHOCARBAMOL 500 MG: 500 TABLET ORAL at 01:03

## 2024-03-19 RX ADMIN — CEFEPIME 2 G: 2 INJECTION, POWDER, FOR SOLUTION INTRAVENOUS at 09:03

## 2024-03-19 RX ADMIN — OXYCODONE HYDROCHLORIDE 15 MG: 10 TABLET ORAL at 11:03

## 2024-03-19 RX ADMIN — CLONIDINE HYDROCHLORIDE 0.1 MG: 0.1 TABLET ORAL at 09:03

## 2024-03-19 RX ADMIN — CEFEPIME 2 G: 2 INJECTION, POWDER, FOR SOLUTION INTRAVENOUS at 06:03

## 2024-03-19 RX ADMIN — DAPTOMYCIN 780 MG: 350 INJECTION, POWDER, LYOPHILIZED, FOR SOLUTION INTRAVENOUS at 08:03

## 2024-03-19 RX ADMIN — OXYCODONE HYDROCHLORIDE 10 MG: 10 TABLET, FILM COATED, EXTENDED RELEASE ORAL at 09:03

## 2024-03-19 RX ADMIN — CLONIDINE HYDROCHLORIDE 0.1 MG: 0.1 TABLET ORAL at 08:03

## 2024-03-19 RX ADMIN — OXYCODONE HYDROCHLORIDE 15 MG: 10 TABLET ORAL at 06:03

## 2024-03-19 RX ADMIN — METHOCARBAMOL 500 MG: 500 TABLET ORAL at 06:03

## 2024-03-19 RX ADMIN — OXYCODONE HYDROCHLORIDE 15 MG: 10 TABLET ORAL at 04:03

## 2024-03-19 RX ADMIN — OXYCODONE HYDROCHLORIDE 10 MG: 10 TABLET, FILM COATED, EXTENDED RELEASE ORAL at 08:03

## 2024-03-19 RX ADMIN — SODIUM CHLORIDE: 9 INJECTION, SOLUTION INTRAVENOUS at 06:03

## 2024-03-19 RX ADMIN — METHOCARBAMOL 500 MG: 500 TABLET ORAL at 08:03

## 2024-03-19 RX ADMIN — METHOCARBAMOL 500 MG: 500 TABLET ORAL at 09:03

## 2024-03-19 RX ADMIN — DOCUSATE SODIUM AND SENNOSIDES 2 TABLET: 8.6; 5 TABLET, FILM COATED ORAL at 08:03

## 2024-03-19 RX ADMIN — OXYCODONE HYDROCHLORIDE 15 MG: 10 TABLET ORAL at 10:03

## 2024-03-19 NOTE — ASSESSMENT & PLAN NOTE
Chronic, controlled. Latest blood pressure and vitals reviewed-     Temp:  [99 °F (37.2 °C)-100.4 °F (38 °C)]   Pulse:  [84-97]   Resp:  [16-18]   BP: (104-134)/(53-92)   SpO2:  [96 %-99 %] .   Home meds for hypertension were reviewed and noted below.   Hypertension Medications               cloNIDine (CATAPRES) 0.1 MG tablet Take 1 tablet (0.1 mg total) by mouth every 12 (twelve) hours.          Plan:  - While in the hospital, will manage blood pressure as follows; Continue home antihypertensive regimen. Currently on Clonidine 0.1 PO BID  - Will utilize p.r.n. blood pressure medication only if patient's blood pressure greater than 180/110 and he develops symptoms such as worsening chest pain or shortness of breath.

## 2024-03-19 NOTE — ASSESSMENT & PLAN NOTE
28M with h/o IVDU, MRSA bacteremia and prior disseminated MRSA infection with poor adherence with care (history of leaving AMA), readmitted 02/04 with progressive back pain. Imaging significant for lumbosacral osteomyelitis with associated epidural abscess, osteomyelitis of sacrum & L iliac bone, probable septic arthritis of L SI joint & lower lumbar spine facet joints, and left iliopsoas abscess, abscess in superior rectal/presacral region and micro-abscesses in paraspinal muscles, pelvis, and left sacrum. Blood cultures remained negative then, and TTE negative. Underwent IR aspiration of left SI joint and psoas on 02/07.  Cxs +MRSA. Abx held prior to IR bx; post-procedure started on empiric Iv-vanc / CTX.   S/p lumbar-pelvis debridement with fusion (02/14). Surgical cxs +MRSA. Pt discharged to [facility], to complete 6wks Iv-Dapto 10mg/kg Q24h, [place] 04/10/24. (Of note, switched vanc to IV daptomycin (d/t subtherapeutic vancomycin levels with q8hr dosing). Advised repeat imaging / neuro f/u near [place] 04/10; followed by abx suppression thereafter, likely lifelong.     Pt [place] 03/12 to [facility] now d/t new onset of intermittent fevers (101.4F) at [facility] for past few days. On arrival, temp 99F, VSS, HDS, wbc nml, CRP 46, Cr 0.8.  CPK 39 nml, with climbing transaminitis found to have HCV+ (RNA 9-million), HBV Ag negative, HIV negative, U/S +hepatomegaly, GB wnl.  Bld cxs 03/12 NGTD.   Pt developed fever 03/14, repeat bld cxs 03/14 1 of 2 +Kleb aerogenes (panS).  Repeat Bld cxs 03/15 and 03/17 NGTD.  Picc line removed 03/16, with picc cath tip sent for cx.   -- Of note; UDS +MJ on arrival from [facility].  Suspect picc line may be source of new Kleb bacteremia (denies injecting at LT); vs. Lumbar surgical wound, vs. GI translocation in setting of hepatomegaly w/ chronic HCV (RNA+ 9-million copies).    Repeat MRI L-spine / pelvis (03/13): Extensive epidural infectious/inflammatory change posterior to the L5-S1 and S1-2  disc spaces; with paravertebral / pre-vertebral abscess. Diffuse infectious myositis of the bilateral lower lumbar paraspinal musculature and within the left psoas and iliopsoas muscles, with 0.7 cm abscess (down from 2.3cm on prior 02/04). Persistent osteomyelitis and septic arthritis about the left SI joint. -- Per NSGY, no role for surgical intervention at this time, favoring wound care and continuing Iv-abx.     3/18/24: Patient now afebrile and WBC WNL.  Denying any systemic sign of infection or back pain. CRP remains elevated 37.8.  On Daptomycin and Cefepime.  PIC remove and repeat BCXs NGTD - suspect due to line infection as blood cultures cleared quickly with PICC removal. Imaging reviewed and possible small abscess posterior to L5-S1 extending into disc space.  Primary team discussed with IR who felt no drainable fluid there there or in psoas.      3/19/24: Doing well.  Tmax 100.4 but no subjective fever.  IR body and neuro plans no intervention. Repeat blood cultures NGTD.  CRP improved from 46 to 26    Recommendations / Plan:  Continue Iv-cefepime 2g Q8h. (Bld cx +kleb aerogenes panS) x 2 weeks from 1st negative blood culture 3/15  Continue Iv-Daptomycin 10mg/kg Q24h  [CPK 39]. To complete 6 weeks  OK to replace PICC  Rec indefinite suppression with Doxycyline 100mg po bid once IV abx completed  Will sign off - fu upon dc from LTAC    -- Discussed with ID staff      Outpatient Antibiotic Therapy Plan:    Please send referral to [facility].    1) Infection :   lumbar osteo-discitis with paravertebral / psoas myositis and sub centimeter abscesses (c/b Kleb bacteremia 03/14)    2) Discharge Antibiotics:     Intravenous antibiotics:  IV - Daptomycin 10mg/kg Q24h  Iv - Cefepime 2g Q8h    PO Antibiotics:  Rec indefinite suppression with Doxycyline 100mg po bid onc IV ABX ultimately completed    3) Therapy Duration:   1) Daptomycin 6 wks 2) Cefepime 2 weeks     Estimated end  date of IV antibiotics:    Daptomycin - EOC 04/10/24  Cefepime - EOC 3/30/24    4) Outpatient Weekly Labs:    Order the following labs to be drawn on Mondays:   CBC  CMP   CRP  CPK (when on Daptomycin)    5) Fax Lab Results to Infectious Diseases Provider:  Elpidio Paredes PA-C/Brenton Jauregui PA-C    [facility] Fax Number: [phone]    6) Outpatient Infectious Diseases Follow-up    Follow-up appointment will be arranged by the ID clinic and will be found in the patient's appointments tab.    Prior to discharge, please ensure the patient's follow-up appt has been scheduled with ID-Clinic -- for patient convenience and continuity of care.  If there is still no follow-up scheduled prior to discharge, please send an EPIC message to  Carol Gann LPN  in Infectious Diseases.

## 2024-03-19 NOTE — ASSESSMENT & PLAN NOTE
28M with hypertension, IV drug use (heroin) presenting from LTAC with ongoing fevers    He was recently admitted on 2/04/24 with osteomyelitis/discitis L5-S1, sacral and left illiac osteomyelitis, L5-S1 epidural abscess, and left iliopsoas abscess. He underwent L4-pelvic fusion on 2/14/24 with epidural phlegmon debridement, intraoperative cultures positive for MRSA. Infectious Disease was consulted and recommended IV Daptomycin for 8 week course (EOC 4/10/24) to be followed by oral suppressive doxycycline thereafter. He was discharged on 2/22/24 to [facility] to complete his antibiotic course. LTAC stay was complicated by ongoing fevers and he represented to [facility] for further evaluation prior to transfer to [facility] for neurosurgical evaluation. On arrival, WBC 9, ESR 59, CRP 46, AST 360s, ALT 200s. Procalcitonin was elevated at 1.18, lactate normal. MRI L spine showed continued L5-S1/S1 diskitis/osteomyelitis with abscess, diffuse infectious myositis of the bilateral lumbar paraspinal musculature and left iliopsoas muscle.  - Infectious Disease consulted, appreciate assistance and recommendations  - CT L spine without contrast noted  - Continue IV Daptomycin q24h  - Weekly CK level while on Daptomycin  - Multimodal pain control with tylenol, gabapentin, methocarbamol, oxy IR, oxy ER  - Follow blood cultures through maturity  - Daily CBC, CMP  - Cardiac monitor  - Neurosurgery Consult: he does not need surgery.  - pt transferred to [facility]    3/18 - ID final recs pending. Has Klebsiella in blood sens to everything.  Wound care consulted . pic line may have been source of infection    Continue  Iv-cefepime 2g Q8h. (Bld cx +kleb aerogenes panS) & Continue Iv-Daptomycin 10mg/kg Q24h.  repeat bld cxs 03/15 and 03/17 (NGTD); picc cath tip culture was not sent down properly  Anticipate continuing both Iv-dapto and cefepime for remaining 2-3wks of full 6wk duration of Iv-abx([place] 04/10) for lumbar osteo-discitis with  paravertebral / psoas myositis and sub centimeter abscesses   Will need new PIc line or mid line.  Waiting for cultures to be neg to ensure therapeutic window without bacteremia and presence of a line.

## 2024-03-19 NOTE — PROGRESS NOTES
[facility] - Neurosurgery ([facility])  Infectious Disease  Progress Note    Patient Name: Ton Donovan  MRN: [id number]  Admission Date: 3/14/2024  Length of Stay: 5 days  Attending Physician: Kimber Lewis MD  Primary Care Provider: Steve Kelly DO    Isolation Status: No active isolations  Assessment/Plan:      ID  * Osteomyelitis of vertebra of lumbosacral region      28M with h/o IVDU, MRSA bacteremia and prior disseminated MRSA infection with poor adherence with care (history of leaving AMA), readmitted 02/04 with progressive back pain. Imaging significant for lumbosacral osteomyelitis with associated epidural abscess, osteomyelitis of sacrum & L iliac bone, probable septic arthritis of L SI joint & lower lumbar spine facet joints, and left iliopsoas abscess, abscess in superior rectal/presacral region and micro-abscesses in paraspinal muscles, pelvis, and left sacrum. Blood cultures remained negative then, and TTE negative. Underwent IR aspiration of left SI joint and psoas on 02/07.  Cxs +MRSA. Abx held prior to IR bx; post-procedure started on empiric Iv-vanc / CTX.   S/p lumbar-pelvis debridement with fusion (02/14). Surgical cxs +MRSA. Pt discharged to [facility], to complete 6wks Iv-Dapto 10mg/kg Q24h, [place] 04/10/24. (Of note, switched vanc to IV daptomycin (d/t subtherapeutic vancomycin levels with q8hr dosing). Advised repeat imaging / neuro f/u near [place] 04/10; followed by abx suppression thereafter, likely lifelong.     Pt presented 03/12 to [facility] now d/t new onset of intermittent fevers (101.4F) at [facility] for past few days. On arrival, temp 99F, VSS, HDS, wbc nml, CRP 46, Cr 0.8.  CPK 39 nml, with climbing transaminitis found to have HCV+ (RNA 9-million), HBV Ag negative, HIV negative, U/S +hepatomegaly, GB wnl.  Bld cxs 03/12 NGTD.   Pt developed fever 03/14, repeat bld cxs 03/14 1 of 2 +Kleb aerogenes (panS).  Repeat Bld cxs 03/15 and 03/17 NGTD.  Picc line removed 03/16, with picc cath tip  sent for cx.   -- Of note; UDS +MJ on arrival from LTAC.  Suspect picc line may be source of new Kleb bacteremia (denies injecting at LTAC); vs. Lumbar surgical wound, vs. GI translocation in setting of hepatomegaly w/ chronic HCV (RNA+ 9-million copies).    Repeat MRI L-spine / pelvis (03/13): Extensive epidural infectious/inflammatory change posterior to the L5-S1 and S1-2 disc spaces; with paravertebral / pre-vertebral abscess. Diffuse infectious myositis of the bilateral lower lumbar paraspinal musculature and within the left psoas and iliopsoas muscles, with 0.7 cm abscess (down from 2.3cm on prior 02/04). Persistent osteomyelitis and septic arthritis about the left SI joint. -- Per NSGY, no role for surgical intervention at this time, favoring wound care and continuing Iv-abx.     3/18/24: Patient now afebrile and WBC WNL.  Denying any systemic sign of infection or back pain. CRP remains elevated 37.8.  On Daptomycin and Cefepime.  PIC remove and repeat BCXs NGTD - suspect due to line infection as blood cultures cleared quickly with PICC removal. Imaging reviewed and possible small abscess posterior to L5-S1 extending into disc space.  Primary team discussed with IR who felt no drainable fluid there there or in psoas.      3/19/24: Doing well.  Tmax 100.4 but no subjective fever.  IR body and neuro plans no intervention. Repeat blood cultures NGTD.  CRP improved from 46 to 26    Recommendations / Plan:  Continue Iv-cefepime 2g Q8h. (Bld cx +kleb aerogenes panS) x 2 weeks from 1st negative blood culture 3/15  Continue Iv-Daptomycin 10mg/kg Q24h  [CPK 39]. To complete 6 weeks  OK to replace PICC  Rec indefinite suppression with Doxycyline 100mg po bid once IV abx completed  Will sign off - fu upon dc from LTAC    -- Discussed with ID staff      Outpatient Antibiotic Therapy Plan:    Please send referral to [facility].    1) Infection :   lumbar osteo-discitis with paravertebral  / psoas myositis and sub centimeter abscesses (c/b Kleb bacteremia 03/14)    2) Discharge Antibiotics:     Intravenous antibiotics:  IV - Daptomycin 10mg/kg Q24h  Iv - Cefepime 2g Q8h    PO Antibiotics:  Rec indefinite suppression with Doxycyline 100mg po bid onc IV ABX ultimately completed    3) Therapy Duration:   1) Daptomycin 6 wks 2) Cefepime 2 weeks     Estimated end date of IV antibiotics:    Daptomycin - EOC 04/10/24  Cefepime - EOC 3/30/24    4) Outpatient Weekly Labs:    Order the following labs to be drawn on Mondays:   CBC  CMP   CRP  CPK (when on Daptomycin)    5) Fax Lab Results to Infectious Diseases Provider:  Elpidio Paredes PA-C/Brenton Jauregui PA-C    [facility] Fax Number: [phone]    6) Outpatient Infectious Diseases Follow-up    Follow-up appointment will be arranged by the ID clinic and will be found in the patient's appointments tab.    Prior to discharge, please ensure the patient's follow-up appt has been scheduled with ID-Clinic -- for patient convenience and continuity of care.  If there is still no follow-up scheduled prior to discharge, please send an EPIC message to  Carol Gann LPN  in Infectious Diseases.              Anticipated Disposition: tbd    Thank you for your consult. I will sign off. Please contact us if you have any additional questions.    LEROY Ruggiero  Infectious Disease  [facility] - Neurosurgery (Hospital)    Subjective:     Principal Problem:Osteomyelitis of vertebra of lumbosacral region    HPI: 28M with h/o IVDU, MRSA bacteremia and prior disseminated MRSA infection with poor adherence with care (history of leaving AMA), readmitted 02/04 with progressive back pain. Imaging significant for lumbosacral osteomyelitis with associated epidural abscess, osteomyelitis of sacrum & L iliac bone, probable septic arthritis of L SI joint & lower lumbar spine facet joints, and left iliopsoas abscess, abscess in superior rectal/presacral region and  micro-abscesses in paraspinal muscles, pelvis, and left sacrum. Blood cultures remained negative. TTE negative. Underwent IR aspiration of left SI joint and psoas on 02/07.  Cxs +MRSA. Abx held prior to IR bx; post-procedure started on empiric Iv-vanc / CTX.   S/p lumbar-pelvis debridement with fusion (02/14). Surgical cxs +MRSA. Pt discharged to [facility], to complete 6wks Iv-Dapto 10mg/kg Q24h, [place] 04/10/24. (Of note, switched vanc to IV daptomycin (d/t subtherapeutic vancomycin levels with q8hr dosing). Advised repeat imaging / neuro f/u near [place] 04/10; followed by abx suppression thereafter, likely lifelong.   However, pt presents 03/12 to [facility] now d/t new onset of intermittent fevers (101.4F) at [facility] for past few days. On arrival, temp 99F, VSS, HDS, wbc nml, CRP 46,  / , , Cr 0.8.  CPK pending.     Repeat MRI L-spine / pelvis (03/13): Postsurgical changes of L4-5 posterior spinal fusion with bilateral sacroiliac extension screws. With discitis osteomyelitis at L5-S1 and S1-2. Extensive epidural infectious/inflammatory change posterior to the L5-S1 and S1-2 disc spaces. Paravertebral/prevertebral abscess measuring 3.7 x 2.2 cm communicating with the L5-S1 disc space.   Pelvis: Diffuse infectious myositis of the left psoas and iliopsoas muscles with 7 mm abscess of the left iliopsoas muscle, decreased from 23 mm. Persistent osteomyelitis and septic arthritis about the left SI joint.  T-spine: incidental finding of small nodular T2 hyperintense signal foci at Rt lung base; consider dedicated chest-CT.      Prior MRI L-spine / pelvis (02/04): showed osteomyelitis/discitis of L5-S1, osteomyelitis of sacrum & L iliac bone, probable septic arthritis of L SI joint & lower lumbar spine facet joints; worsening anterolisthesis of L5 with respect to S1 with increased size of epidural abscess at the level of L5-S1 and probable severe central canal stenosis at this level, along w/ additional  abscesses in the L iliopsoas muscle, paraspinal muscles, pelvis, & L sacrum; -- the additional abscesses in the left iliopsoas muscle [3.4 x 1.7cm; with other micro-abscesses adjacent measuring 2.3cm), paraspinal muscles, pelvis, and left sacrum. Additional abscess in the superior rectal/presacral region measuring roughly 3.1 x 2.4 cm; and probable additional micro abscesses at left sacrum.          Interval History:   No acute events  Tmax 100.4/Tcurrent 97.6  WBC 9.16  Repeat BCXs NGTD  Denies significant back pain, fever, chills, or sweats.      Review of Systems   Constitutional:  Negative for chills, diaphoresis and fever.   Respiratory:  Negative for shortness of breath.    Cardiovascular:  Negative for chest pain.   Gastrointestinal:  Negative for abdominal pain, diarrhea, nausea and vomiting.   Genitourinary:  Negative for dysuria and hematuria.     Objective:     Vital Signs (Most Recent):  Temp: 100 °F (37.8 °C) (03/19/24 0741)  Pulse: 88 (03/19/24 0741)  Resp: 17 (03/19/24 0811)  BP: (!) 110/55 (03/19/24 0741)  SpO2: 98 % (03/19/24 0741) Vital Signs (24h Range):  Temp:  [99 °F (37.2 °C)-100.4 °F (38 °C)] 100 °F (37.8 °C)  Pulse:  [84-97] 88  Resp:  [16-18] 17  SpO2:  [96 %-99 %] 98 %  BP: (104-134)/(53-92) 110/55     Weight: 78 kg (172 lb)  Body mass index is 24.68 kg/m².    Estimated Creatinine Clearance: 159.3 mL/min (based on SCr of 0.7 mg/dL).     Physical Exam  Constitutional:       General: He is not in acute distress.     Appearance: Normal appearance. He is well-developed. He is not ill-appearing, toxic-appearing or diaphoretic.       HENT:      Head: Normocephalic and atraumatic.   Cardiovascular:      Rate and Rhythm: Normal rate and regular rhythm.      Heart sounds: Normal heart sounds. No murmur heard.     No friction rub. No gallop.   Pulmonary:      Effort: Pulmonary effort is normal. No respiratory distress.      Breath sounds: Normal breath sounds. No wheezing or rales.   Abdominal:       "General: Bowel sounds are normal. There is no distension.      Palpations: Abdomen is soft. There is no mass.      Tenderness: There is no abdominal tenderness. There is no guarding or rebound.   Skin:     General: Skin is warm and dry.   Neurological:      Mental Status: He is alert and oriented to person, place, and time.   Psychiatric:         Behavior: Behavior normal.          Significant Labs: Blood Culture:   Recent Labs   Lab 03/14/24  1618 03/14/24  1623 03/15/24  1842 03/15/24  1843 03/17/24  0959   LABBLOO No Growth to date  No Growth to date  No Growth to date  No Growth to date  No Growth to date Gram stain aer bottle: Gram negative rods  Gram stain manas bottle: Gram negative rods  Results called to and read back by:Solitario Quintanilla RN 03/15/2024  08:45  KLEBSIELLA AEROGENES* No Growth to date  No Growth to date  No Growth to date  No Growth to date No Growth to date  No Growth to date  No Growth to date  No Growth to date No Growth to date  No Growth to date       CBC:   Recent Labs   Lab 03/18/24  0605 03/19/24  0419   WBC 6.99 9.15   HGB 8.5* 9.3*   HCT 28.0* 30.0*    313       CMP: No results for input(s): "NA", "K", "CL", "CO2", "GLU", "BUN", "CREATININE", "CALCIUM", "PROT", "ALBUMIN", "BILITOT", "ALKPHOS", "AST", "ALT", "ANIONGAP", "EGFRNONAA" in the last 48 hours.    Invalid input(s): "ESTGFAFRICA"  Wound Culture:   Recent Labs   Lab 11/21/23  1041 02/07/24  1412 02/07/24  1423 02/14/24  1612 02/14/24  1712   LABAERO METHICILLIN RESISTANT STAPHYLOCOCCUS AUREUS  Moderate  No other significant isolate  * No growth METHICILLIN RESISTANT STAPHYLOCOCCUS AUREUS  Rare  * METHICILLIN RESISTANT STAPHYLOCOCCUS AUREUS  Few  * METHICILLIN RESISTANT STAPHYLOCOCCUS AUREUS  Few  *       All pertinent labs within the past 24 hours have been reviewed.    Significant Imaging: I have reviewed all pertinent imaging results/findings within the past 24 hours.  Procedure Component Value Units " Date/Time   US Abdomen Limited [[id number]] Resulted: 03/15/24 0842   Order Status: Completed Updated: 03/15/24 0844   Narrative:     EXAMINATION:  US ABDOMEN LIMITED    CLINICAL HISTORY:  transaminitis;.    TECHNIQUE:  Limited ultrasound of the right upper quadrant of the abdomen including pancreas, liver, gallbladder, common bile duct was performed.    COMPARISON:  CT abdomen pelvis from 11/20/2023    FINDINGS:  Liver: Enlarged in size measuring 20.6 cm. Homogeneous echotexture. No focal hepatic lesions.    Gallbladder: 2 biliary crystals measuring up to 1-2 mm.  There is no gallbladder wall thickening or pericholecystic fluid.  No sonographic Lozoya's sign.    Biliary system: The common duct is not dilated, measuring 2 mm.  No intrahepatic ductal dilatation.    Spleen: Normal in size with a homogeneous echotexture, measuring 9.8 x 3.6 cm.    Pancreas: The visualized portions of pancreas appear normal.  Prominent peripancreatic lymph node measuring 2.1 x 0.9 x 2.6 cm.    Miscellaneous: No ascites.   Impression:       Hepatomegaly.    A few biliary crystals.    Mildly prominent peripancreatic lymph node.      Electronically signed by: Carlo Garcia MD  Date: 03/15/2024  Time: 08:42   CT Lumbar Spine Without Contrast [[id number]] Resulted: 03/14/24 1649   Order Status: Completed Updated: 03/14/24 1652   Narrative:     EXAMINATION:  CT LUMBAR SPINE WITHOUT CONTRAST    CLINICAL HISTORY:  Osteomyelitis, lumbar;    TECHNIQUE:  Low-dose axial, sagittal and coronal reformations are obtained through the lumbar spine.  Contrast was not administered.    COMPARISON:  MRI lumbar spine 03/12/2024; CT lumbar spine 02/05/2024    FINDINGS:  Postoperative changes from L4-pelvis posterior decompression and instrumented fusion.  Hardware is intact and in satisfactory position.  No perihardware fractures.  Unchanged alignment.  There is a 10.1 x 9.3 x 4.8 cm region of hypoattenuation in the paraspinal muscles at the operative  level, corresponding to extensive intramuscular edema on recent MRI.  A fluid collection cannot be excluded.    ALIGNMENT: Grade 1 anterolisthesis at L5-S1.    BONE: No acute fracture.  There are endplate erosions, sclerotic changes, and periostitis at L5-S1 and S1-2, compatible with sequela of spondylo discitis.  There are erosions and sclerotic changes involving the left sacroiliac joint and left shan sacrum as well, with new ankylosis.    JOINT: Remaining disc heights and facet joint spaces are preserved.    SPINAL CANAL: Partially obscured at the operative level by metal artifact.  The conus medullaris and cauda equina nerve roots are difficult to visualize.  An epidural collection is difficult to exclude by CT.    PARASPINAL SOFT TISSUES: There is a 1.2 cm solid pulmonary nodule in the right lower lobe, new from prior studies.  There is prevertebral soft tissue edema at the lumbosacral junction.  Organized paraspinal collections are difficult to exclude by CT.   Impression:       Sequela of spondylodiscitis at L5-S1 and S1-2, and septic arthritis at the left sacroiliac joint.    Postoperative changes from L4-pelvis decompression and fusion.  No hardware complication.    10.1 cm region of hypoattenuation in the paraspinal muscles at the operative level, corresponding to extensive intramuscular edema on recent MRI.  Organized paraspinal collections and epidural collections are difficult to exclude by noncontrast CT.  Refer to recent MRI for additional details.    New 1.2 cm solid pulmonary nodule in the right lower lobe, likely inflammatory or infectious.  Follow-up chest CT in 3 months is recommended.      Electronically signed by: Saqib Branham  Date: 03/14/2024  Time: 16:49     Imaging History    2024    Date Procedure Name Study Review Link PACS Link Status Accession Number Location   03/15/24 08:06 AM US Abdomen Limited Study Review  Images Final [id number] [facility]   03/14/24 03:01 PM CT Lumbar Spine Without  Contrast Study Review  Images Final [id number] [facility]   03/13/24 12:48 AM MRI Lumbar Spine With Contrast Study Review  Images Final [id number] [facility]   03/13/24 12:45 AM MRI Pelvis W WO Contrast Study Review  Images Final [id number] [facility]   03/13/24 12:41 AM MRI Thoracic Spine W WO Cont Study Review  Images Final [id number] [facility]   03/12/24 06:14 PM MRI Lumbar Spine Without Contrast Study Review  [id number]   03/12/24 04:48 PM X-Ray Chest AP Portable Study Review  [id number] [facility]   02/22/24 12:03 PM X-Ray Chest 1 View S/P PICC Line by Nursing Study Review  Images Final [id number] [facility]   03/17/24 02:01 AM CARDIAC MONITORING STRIPS Study Review  Final     03/16/24 07:22 PM CARDIAC MONITORING STRIPS Study Review  Final     03/16/24 02:41 AM CARDIAC MONITORING STRIPS Study Review  Final     03/15/24 09:32 AM CARDIAC MONITORING STRIPS Study Review  Final     03/14/24 11:02 PM CARDIAC MONITORING STRIPS Study Review  Final

## 2024-03-19 NOTE — SUBJECTIVE & OBJECTIVE
Interval History: see above    Review of Systems   Constitutional:  Positive for activity change. Negative for appetite change and fever.   HENT:  Negative for trouble swallowing.    Respiratory:  Negative for shortness of breath.    Cardiovascular:  Negative for chest pain.   Gastrointestinal:  Positive for constipation. Negative for abdominal pain, diarrhea and nausea.   Genitourinary:  Negative for difficulty urinating and frequency.   Musculoskeletal:  Positive for arthralgias, back pain and gait problem. Negative for neck stiffness.   Neurological:  Negative for headaches.   Psychiatric/Behavioral:  Negative for behavioral problems.      Objective:     Vital Signs (Most Recent):  Temp: 100 °F (37.8 °C) (03/19/24 0741)  Pulse: 88 (03/19/24 0741)  Resp: 17 (03/19/24 0811)  BP: (!) 110/55 (03/19/24 0741)  SpO2: 98 % (03/19/24 0741) Vital Signs (24h Range):  Temp:  [99 °F (37.2 °C)-100.4 °F (38 °C)] 100 °F (37.8 °C)  Pulse:  [84-97] 88  Resp:  [16-18] 17  SpO2:  [96 %-99 %] 98 %  BP: (104-134)/(53-92) 110/55     Weight: 78 kg (172 lb)  Body mass index is 24.68 kg/m².  No intake or output data in the 24 hours ending 03/19/24 0927        Physical Exam  Constitutional:       General: He is not in acute distress.     Appearance: Normal appearance. He is ill-appearing. He is not toxic-appearing or diaphoretic.   HENT:      Head: Normocephalic and atraumatic.      Nose: Nose normal.      Mouth/Throat:      Mouth: Mucous membranes are moist.      Pharynx: Oropharynx is clear.   Eyes:      General: No scleral icterus.     Extraocular Movements: Extraocular movements intact.      Conjunctiva/sclera: Conjunctivae normal.      Pupils: Pupils are equal, round, and reactive to light.   Cardiovascular:      Rate and Rhythm: Normal rate and regular rhythm.      Pulses: Normal pulses.   Pulmonary:      Effort: Pulmonary effort is normal. No respiratory distress.      Breath sounds: Normal breath sounds. No stridor. No wheezing,  "rhonchi or rales.   Chest:      Chest wall: No tenderness.   Abdominal:      General: Abdomen is flat. Bowel sounds are normal. There is no distension.      Palpations: Abdomen is soft.      Tenderness: There is no abdominal tenderness. There is no right CVA tenderness, left CVA tenderness, guarding or rebound.   Musculoskeletal:         General: No swelling, tenderness, deformity or signs of injury. Normal range of motion.      Cervical back: Normal range of motion and neck supple. No rigidity or tenderness.      Right lower leg: No edema.      Left lower leg: No edema.   Skin:     General: Skin is warm and dry.      Coloration: Skin is not jaundiced.      Findings: No erythema or rash.   Neurological:      General: No focal deficit present.      Mental Status: He is alert and oriented to person, place, and time. Mental status is at baseline.      Motor: No weakness.      Gait: Gait abnormal.   Psychiatric:         Mood and Affect: Mood normal.         Behavior: Behavior normal.         Thought Content: Thought content normal.         Judgment: Judgment normal.             Significant Labs: All pertinent labs within the past 24 hours have been reviewed.  CBC:   Recent Labs   Lab 03/18/24  0605 03/19/24  0419   WBC 6.99 9.15   HGB 8.5* 9.3*   HCT 28.0* 30.0*    313       CMP:   No results for input(s): "NA", "K", "CL", "CO2", "GLU", "BUN", "CREATININE", "CALCIUM", "PROT", "ALBUMIN", "BILITOT", "ALKPHOS", "AST", "ALT", "ANIONGAP", "EGFRNONAA" in the last 48 hours.    Invalid input(s): "[place]"      Significant Imaging: I have reviewed all pertinent imaging results/findings within the past 24 hours.  "

## 2024-03-19 NOTE — SUBJECTIVE & OBJECTIVE
Interval History:   No acute events  Tmax 100.4/Tcurrent 97.6  WBC 9.16  Repeat BCXs NGTD  Denies significant back pain, fever, chills, or sweats.      Review of Systems   Constitutional:  Negative for chills, diaphoresis and fever.   Respiratory:  Negative for shortness of breath.    Cardiovascular:  Negative for chest pain.   Gastrointestinal:  Negative for abdominal pain, diarrhea, nausea and vomiting.   Genitourinary:  Negative for dysuria and hematuria.     Objective:     Vital Signs (Most Recent):  Temp: 100 °F (37.8 °C) (03/19/24 0741)  Pulse: 88 (03/19/24 0741)  Resp: 17 (03/19/24 0811)  BP: (!) 110/55 (03/19/24 0741)  SpO2: 98 % (03/19/24 0741) Vital Signs (24h Range):  Temp:  [99 °F (37.2 °C)-100.4 °F (38 °C)] 100 °F (37.8 °C)  Pulse:  [84-97] 88  Resp:  [16-18] 17  SpO2:  [96 %-99 %] 98 %  BP: (104-134)/(53-92) 110/55     Weight: 78 kg (172 lb)  Body mass index is 24.68 kg/m².    Estimated Creatinine Clearance: 159.3 mL/min (based on SCr of 0.7 mg/dL).     Physical Exam  Constitutional:       General: He is not in acute distress.     Appearance: Normal appearance. He is well-developed. He is not ill-appearing, toxic-appearing or diaphoretic.       HENT:      Head: Normocephalic and atraumatic.   Cardiovascular:      Rate and Rhythm: Normal rate and regular rhythm.      Heart sounds: Normal heart sounds. No murmur heard.     No friction rub. No gallop.   Pulmonary:      Effort: Pulmonary effort is normal. No respiratory distress.      Breath sounds: Normal breath sounds. No wheezing or rales.   Abdominal:      General: Bowel sounds are normal. There is no distension.      Palpations: Abdomen is soft. There is no mass.      Tenderness: There is no abdominal tenderness. There is no guarding or rebound.   Skin:     General: Skin is warm and dry.   Neurological:      Mental Status: He is alert and oriented to person, place, and time.   Psychiatric:         Behavior: Behavior normal.          Significant Labs:  "Blood Culture:   Recent Labs   Lab 03/14/24  1618 03/14/24  1623 03/15/24  1842 03/15/24  1843 03/17/24  0959   LABBLOO No Growth to date  No Growth to date  No Growth to date  No Growth to date  No Growth to date Gram stain aer bottle: Gram negative rods  Gram stain manas bottle: Gram negative rods  Results called to and read back by:Solitario Quintanilla RN 03/15/2024  08:45  KLEBSIELLA AEROGENES* No Growth to date  No Growth to date  No Growth to date  No Growth to date No Growth to date  No Growth to date  No Growth to date  No Growth to date No Growth to date  No Growth to date       CBC:   Recent Labs   Lab 03/18/24  0605 03/19/24  0419   WBC 6.99 9.15   HGB 8.5* 9.3*   HCT 28.0* 30.0*    313       CMP: No results for input(s): "NA", "K", "CL", "CO2", "GLU", "BUN", "CREATININE", "CALCIUM", "PROT", "ALBUMIN", "BILITOT", "ALKPHOS", "AST", "ALT", "ANIONGAP", "EGFRNONAA" in the last 48 hours.    Invalid input(s): "[place]"  Wound Culture:   Recent Labs   Lab 11/21/23  1041 02/07/24  1412 02/07/24  1423 02/14/24  1612 02/14/24  1712   LABAERO METHICILLIN RESISTANT STAPHYLOCOCCUS AUREUS  Moderate  No other significant isolate  * No growth METHICILLIN RESISTANT STAPHYLOCOCCUS AUREUS  Rare  * METHICILLIN RESISTANT STAPHYLOCOCCUS AUREUS  Few  * METHICILLIN RESISTANT STAPHYLOCOCCUS AUREUS  Few  *       All pertinent labs within the past 24 hours have been reviewed.    Significant Imaging: I have reviewed all pertinent imaging results/findings within the past 24 hours.  Procedure Component Value Units Date/Time   US Abdomen Limited [[id number]] Resulted: 03/15/24 0842   Order Status: Completed Updated: 03/15/24 0844   Narrative:     EXAMINATION:  US ABDOMEN LIMITED    CLINICAL HISTORY:  transaminitis;.    TECHNIQUE:  Limited ultrasound of the right upper quadrant of the abdomen including pancreas, liver, gallbladder, common bile duct was performed.    COMPARISON:  CT abdomen pelvis from " 11/20/2023    FINDINGS:  Liver: Enlarged in size measuring 20.6 cm. Homogeneous echotexture. No focal hepatic lesions.    Gallbladder: 2 biliary crystals measuring up to 1-2 mm.  There is no gallbladder wall thickening or pericholecystic fluid.  No sonographic Lozoya's sign.    Biliary system: The common duct is not dilated, measuring 2 mm.  No intrahepatic ductal dilatation.    Spleen: Normal in size with a homogeneous echotexture, measuring 9.8 x 3.6 cm.    Pancreas: The visualized portions of pancreas appear normal.  Prominent peripancreatic lymph node measuring 2.1 x 0.9 x 2.6 cm.    Miscellaneous: No ascites.   Impression:       Hepatomegaly.    A few biliary crystals.    Mildly prominent peripancreatic lymph node.      Electronically signed by: Carlo Garcia MD  Date: 03/15/2024  Time: 08:42   CT Lumbar Spine Without Contrast [[id number]] Resulted: 03/14/24 1649   Order Status: Completed Updated: 03/14/24 1652   Narrative:     EXAMINATION:  CT LUMBAR SPINE WITHOUT CONTRAST    CLINICAL HISTORY:  Osteomyelitis, lumbar;    TECHNIQUE:  Low-dose axial, sagittal and coronal reformations are obtained through the lumbar spine.  Contrast was not administered.    COMPARISON:  MRI lumbar spine 03/12/2024; CT lumbar spine 02/05/2024    FINDINGS:  Postoperative changes from L4-pelvis posterior decompression and instrumented fusion.  Hardware is intact and in satisfactory position.  No perihardware fractures.  Unchanged alignment.  There is a 10.1 x 9.3 x 4.8 cm region of hypoattenuation in the paraspinal muscles at the operative level, corresponding to extensive intramuscular edema on recent MRI.  A fluid collection cannot be excluded.    ALIGNMENT: Grade 1 anterolisthesis at L5-S1.    BONE: No acute fracture.  There are endplate erosions, sclerotic changes, and periostitis at L5-S1 and S1-2, compatible with sequela of spondylo discitis.  There are erosions and sclerotic changes involving the left sacroiliac joint and  left shan sacrum as well, with new ankylosis.    JOINT: Remaining disc heights and facet joint spaces are preserved.    SPINAL CANAL: Partially obscured at the operative level by metal artifact.  The conus medullaris and cauda equina nerve roots are difficult to visualize.  An epidural collection is difficult to exclude by CT.    PARASPINAL SOFT TISSUES: There is a 1.2 cm solid pulmonary nodule in the right lower lobe, new from prior studies.  There is prevertebral soft tissue edema at the lumbosacral junction.  Organized paraspinal collections are difficult to exclude by CT.   Impression:       Sequela of spondylodiscitis at L5-S1 and S1-2, and septic arthritis at the left sacroiliac joint.    Postoperative changes from L4-pelvis decompression and fusion.  No hardware complication.    10.1 cm region of hypoattenuation in the paraspinal muscles at the operative level, corresponding to extensive intramuscular edema on recent MRI.  Organized paraspinal collections and epidural collections are difficult to exclude by noncontrast CT.  Refer to recent MRI for additional details.    New 1.2 cm solid pulmonary nodule in the right lower lobe, likely inflammatory or infectious.  Follow-up chest CT in 3 months is recommended.      Electronically signed by: Saqib Branham  Date: 03/14/2024  Time: 16:49     Imaging History    2024    Date Procedure Name Study Review Link PACS Link Status Accession Number Location   03/15/24 08:06 AM US Abdomen Limited Study Review  Images Final [id number] [facility]   03/14/24 03:01 PM CT Lumbar Spine Without Contrast Study Review  Images Final [id number] [facility]   03/13/24 12:48 AM MRI Lumbar Spine With Contrast Study Review  [id number]   03/13/24 12:45 AM MRI Pelvis W WO Contrast Study Review  [id number] [facility]   03/13/24 12:41 AM MRI Thoracic Spine W WO Cont Study Review  Images Final [id number] [facility]   03/12/24 06:14 PM MRI Lumbar Spine Without Contrast Study Review  Images Final  [id number] [facility]   03/12/24 04:48 PM X-Ray Chest AP Portable Study Review  Images Final [id number] [facility]   02/22/24 12:03 PM X-Ray Chest 1 View S/P PICC Line by Nursing Study Review  Images Final [id number] [facility]   03/17/24 02:01 AM CARDIAC MONITORING STRIPS Study Review  Final     03/16/24 07:22 PM CARDIAC MONITORING STRIPS Study Review  Final     03/16/24 02:41 AM CARDIAC MONITORING STRIPS Study Review  Final     03/15/24 09:32 AM CARDIAC MONITORING STRIPS Study Review  Final     03/14/24 11:02 PM CARDIAC MONITORING STRIPS Study Review  Final

## 2024-03-19 NOTE — PT/OT/SLP PROGRESS
Physical Therapy Treatment    Patient Name:  Ton Donovan   MRN:  [id number]  Admit Date: 3/14/2024  Admitting Diagnosis:  Osteomyelitis of vertebra of lumbosacral region   Length of Stay: 5 days  Recent Surgery: * No surgery found *      Recommendations:     Discharge Recommendations:  Low Intensity Therapy   Discharge Equipment Recommendations: shower chair, rollator     Plan:     During this hospitalization, patient to be seen 3 x/week to address the listed problems via gait training, therapeutic activities, therapeutic exercises, neuromuscular re-education  Plan of Care Expires:  24  Plan of Care Reviewed with: patient    Assessment:     Ton Donovan is a 28 y.o. male admitted with a medical diagnosis of Osteomyelitis of vertebra of lumbosacral region.  Pt found asleep with HOB elevated. He is still independent with bed mobility while following spinal precautions. Pt was able to increase gait distance in the hagen and work on standing LE exercises. Overall, pt tolerated treatment well. PT plan to continue to work on gait mechanics and gait stability next session.     Problem List: weakness, impaired endurance, impaired functional mobility, impaired self care skills, impaired balance, pain, orthopedic precautions, impaired coordination, decreased lower extremity function.  Rehab Prognosis: Good     GOALS:   Multidisciplinary Problems       Physical Therapy Goals          Problem: Physical Therapy    Goal Priority Disciplines Outcome Goal Variances Interventions   Physical Therapy Goal     PT, PT/OT Ongoing, Progressing     Description: Goals to be met by: 04/15/24     Patient will increase functional independence with mobility by performin. Bed to chair transfer with [place] using No Assistive Device  2. Gait  x 200 feet with Supervision using No Assistive Device.   3. Stand for 10 minutes with [place] using No Assistive Device demonstrating no swayback posture.   4. Increased functional  "strength to WFL for hip abduction, core strength.  5. Lower extremity exercise program x15 reps per handout, with independence                         Subjective   Communicated with RN prior to session.  Patient found HOB elevated upon PT entry to room, agreeable to evaluation. Ton Wrgus's alone present during session.    Chief Complaint: P! In back   Patient/Family Comments/goals: Patient wishes to be discharged home.   Pain/Comfort:  Pain Rating 1: 7/10  Location 1:  (back)  Pain Addressed 1: Reposition, Distraction  Pain Rating Post-Intervention 1: 7/10    Objective:   Patient found with: peripheral IV   General Precautions: Standard, Cardiac fall   Orthopedic Precautions:spinal precautions   Braces: LSO   Oxygen Device: Room Air  Vitals: BP (!) 113/57   Pulse 85   Temp 99.1 °F (37.3 °C)   Resp 18   Ht 5' 10" (1.778 m)   Wt 78 kg (172 lb)   SpO2 95%   BMI 24.68 kg/m²     Outcome Measures:  AM-PAC 6 CLICK MOBILITY  Turning over in bed (including adjusting bedclothes, sheets and blankets)?: 4  Sitting down on and standing up from a chair with arms (e.g., wheelchair, bedside commode, etc.): 4  Moving from lying on back to sitting on the side of the bed?: 4  Moving to and from a bed to a chair (including a wheelchair)?: 4  Need to walk in hospital room?: 3  Climbing 3-5 steps with a railing?: 2  Basic Mobility Total Score: 21       Functional Mobility:  Additional staff present: Student PT and Supervising PT  Bed Mobility:   Rolling/Turning to Right: independence  Supine to Sit: independence; HOB elevated  Scooting anteriorly to EOB to have both feet planted on floor: independence  Comments: Pt was able to follow spinal precautions. He required some cueing for bed mobility sequencing.     Sitting Balance at Edge of Bed:  Assistance Level Required: [place]  Time: ~ 30 seconds   Postural deviations noted: slouched posture  Comments: Pt sat EOB to don LSO brace independently.      Transfers:   Sit <> " Stand Transfer: modified independence with rolling walker   Comments: Pt was able to perform transfer with no assist or LOB.       Gait:  Patient ambulated: ~150 ft +150 ft   Upon ambulating, Pt performed 2 trials of standing LE exercises using RW for bilateral UE support for balance   Mini squats x10   High Knee x10 each leg   Patient required: standy by assistance  Patient used: rolling walker  Gait Pattern observed:  Step-to  Gait Deviation(s): unsteady gait, R heel strike, flexed posture, knee flexion in stance phase  Impairments due to: impaired balance, pain, decreased endurance, and impaired coordination  Comments: Pt required verbal/manual  cueing to extend knee during stance phase of gait. Pt required verbal cueing to stand up right and practice deep breathing.         Therapeutic Activities, Exercises, and Education:   Pt educated on PT Role/POC.  PT educated on spinal precautions.   PT verbalized understanding.     Gait:   Pt ambulated in hagen to work on normal gait mechanics and upright posture using RW     Therapeutic Activities:   Pt performed standing LE exercises as mentioned above to work on LE functional strength and mobility.           Patient left sitting edge of bed with all lines intact, call button in reach, and RN notified..    Time Tracking:     PT Received On: 03/19/24  PT Start Time: 1344     PT Stop Time: 1407  PT Total Time (min): 23 min     Billable Minutes:   Gait Training 15 and Therapeutic Activity 8    Treatment Type: Treatment  PT/PTA: PT

## 2024-03-19 NOTE — PROGRESS NOTES
Aleksandr Bray - Neurosurgery ([facility])  [facility] Medicine  Progress Note    Patient Name: Ton Donovan  MRN: [id number]  Patient Class: IP- Inpatient   Admission Date: 3/14/2024  Length of Stay: 5 days  Attending Physician: Kimber Lewis MD  Primary Care Provider: tSeve Kelly DO        Subjective:     Principal Problem:Osteomyelitis of vertebra of lumbosacral region        HPI:  Mr. Ton Donovan is a 28 year-old Male with a PMHx of Hypertension, IV drug use who presented with fevers and ongoing back pain with radiation down legs.    Of note, he was recently admitted to [facility] for LOOP X lumbar spinal fusion, with course complicated by a psoas abscess and MRSA epidural abscess s/p epidural phlegmon debridement. He was started on IV Daptomycin and discharged on 2/22/24 to [facility] for completion of antibiotics. Ongoing fevers complicated his LTAC stay, and he returned to the [facility] for further evaluation. On arrival, WBC 9, ESR 59, CRP 46, AST 360s, ALT 200s. Procalcitonin was elevated at 1.18, lactate normal. MRI L spine concerning for continued L5-S1/S1 diskitis and osteomyelitis with abscess, diffuse infectious myositis of the lumbar paraspinal musculature bilaterally and left iliopsoas muscle without discrete abscess. He was then transferred to [facility] for Neurosurgerical evaluation.     At the time of this consult, temp 99F, HR 60, 120s/60s, saturating well on room air. Most recent labs with WBC 5, Hgb 9, Na 134.    Medicine consulted for medical co-management of epidural abscess    Overview/Hospital Course:  3/15-Admitted for NSGY evaluation of OM & discitis lumbar spine. Will let pt eat.   3/16- Does not need surgery. Appreciate NSGY eval. Wound care and ID following and need final recs. Pt is comfortable.   3/17- Klebsiella sens to everything,  On dapto and yue.  Pic line removed and culture tip was ordered, but not done.   3/18 per ID: Continue  Iv-cefepime 2g Q8h. (Bld cx +kleb  aerogenes panS) & Continue Iv-Daptomycin 10mg/kg Q24h.  repeat bld cxs 03/15 and 03/17 (NGTD); picc cath tip culture was not sent down properly  Anticipate continuing both Iv-dapto and cefepime for remaining 2-3wks of full 6wk duration of Iv-abx(BRENNAN 04/10) for lumbar osteo-discitis with paravertebral / psoas myositis and sub centimeter abscesses   Will need new PIc line or mid line.  Waiting for cultures to be neg to ensure therapeutic window without bacteremia and presence of a line.   - IR consulted to evaluate if the fluid collection at posterior to L5-s1 which communicates with disc space to see if that can be drained.  IR, Dr. Pretty reviewed this pt's case. He says no discrete fluid collection seen within the paraspinal muscles on either the CT or the MRI, it appears he has swollen, infected paraspinal muscles more consistent with myositis. He may have some fluid in the disc space which we recommend reaching out to neuro IR - No fluid to be drained, infection limited to myositis.    3/19- Per Neuro IR, abscess is very deep to reach by IR means so it's not amenable to drain. Repeat cultures are neg.     Interval History: see above    Review of Systems   Constitutional:  Positive for activity change. Negative for appetite change and fever.   HENT:  Negative for trouble swallowing.    Respiratory:  Negative for shortness of breath.    Cardiovascular:  Negative for chest pain.   Gastrointestinal:  Positive for constipation. Negative for abdominal pain, diarrhea and nausea.   Genitourinary:  Negative for difficulty urinating and frequency.   Musculoskeletal:  Positive for arthralgias, back pain and gait problem. Negative for neck stiffness.   Neurological:  Negative for headaches.   Psychiatric/Behavioral:  Negative for behavioral problems.      Objective:     Vital Signs (Most Recent):  Temp: 100 °F (37.8 °C) (03/19/24 0741)  Pulse: 88 (03/19/24 0741)  Resp: 17 (03/19/24 0811)  BP: (!) 110/55 (03/19/24  0741)  SpO2: 98 % (03/19/24 0741) Vital Signs (24h Range):  Temp:  [99 °F (37.2 °C)-100.4 °F (38 °C)] 100 °F (37.8 °C)  Pulse:  [84-97] 88  Resp:  [16-18] 17  SpO2:  [96 %-99 %] 98 %  BP: (104-134)/(53-92) 110/55     Weight: 78 kg (172 lb)  Body mass index is 24.68 kg/m².  No intake or output data in the 24 hours ending 03/19/24 0927        Physical Exam  Constitutional:       General: He is not in acute distress.     Appearance: Normal appearance. He is ill-appearing. He is not toxic-appearing or diaphoretic.   HENT:      Head: Normocephalic and atraumatic.      Nose: Nose normal.      Mouth/Throat:      Mouth: Mucous membranes are moist.      Pharynx: Oropharynx is clear.   Eyes:      General: No scleral icterus.     Extraocular Movements: Extraocular movements intact.      Conjunctiva/sclera: Conjunctivae normal.      Pupils: Pupils are equal, round, and reactive to light.   Cardiovascular:      Rate and Rhythm: Normal rate and regular rhythm.      Pulses: Normal pulses.   Pulmonary:      Effort: Pulmonary effort is normal. No respiratory distress.      Breath sounds: Normal breath sounds. No stridor. No wheezing, rhonchi or rales.   Chest:      Chest wall: No tenderness.   Abdominal:      General: Abdomen is flat. Bowel sounds are normal. There is no distension.      Palpations: Abdomen is soft.      Tenderness: There is no abdominal tenderness. There is no right CVA tenderness, left CVA tenderness, guarding or rebound.   Musculoskeletal:         General: No swelling, tenderness, deformity or signs of injury. Normal range of motion.      Cervical back: Normal range of motion and neck supple. No rigidity or tenderness.      Right lower leg: No edema.      Left lower leg: No edema.   Skin:     General: Skin is warm and dry.      Coloration: Skin is not jaundiced.      Findings: No erythema or rash.   Neurological:      General: No focal deficit present.      Mental Status: He is alert and oriented to person,  "place, and time. Mental status is at baseline.      Motor: No weakness.      Gait: Gait abnormal.   Psychiatric:         Mood and Affect: Mood normal.         Behavior: Behavior normal.         Thought Content: Thought content normal.         Judgment: Judgment normal.             Significant Labs: All pertinent labs within the past 24 hours have been reviewed.  CBC:   Recent Labs   Lab 03/18/24  0605 03/19/24  0419   WBC 6.99 9.15   HGB 8.5* 9.3*   HCT 28.0* 30.0*    313       CMP:   No results for input(s): "NA", "K", "CL", "CO2", "GLU", "BUN", "CREATININE", "CALCIUM", "PROT", "ALBUMIN", "BILITOT", "ALKPHOS", "AST", "ALT", "ANIONGAP", "EGFRNONAA" in the last 48 hours.    Invalid input(s): "ESTGFAFR[place]"      Significant Imaging: I have reviewed all pertinent imaging results/findings within the past 24 hours.    Assessment/Plan:      * Osteomyelitis of vertebra of lumbosacral region  28M with hypertension, IV drug use (heroin) presenting from LTAC with ongoing fevers    He was recently admitted on 2/04/24 with osteomyelitis/discitis L5-S1, sacral and left illiac osteomyelitis, L5-S1 epidural abscess, and left iliopsoas abscess. He underwent L4-pelvic fusion on 2/14/24 with epidural phlegmon debridement, intraoperative cultures positive for MRSA. Infectious Disease was consulted and recommended IV Daptomycin for 8 week course (EOC 4/10/24) to be followed by oral suppressive doxycycline thereafter. He was discharged on 2/22/24 to [facility] to complete his antibiotic course. LTAC stay was complicated by ongoing fevers and he represented to [facility] for further evaluation prior to [place] [facility] [place]. On arrival, WBC 9, ESR 59, CRP 46, AST 360s, ALT 200s. Procalcitonin was elevated at 1.18, lactate normal. MRI L spine showed continued L5-S1/S1 diskitis/osteomyelitis with abscess, diffuse infectious myositis of the bilateral lumbar paraspinal musculature and left iliopsoas " "muscle.  - Infectious Disease consulted, appreciate assistance and recommendations  - CT L spine without contrast noted  - Continue IV Daptomycin q24h  - Weekly CK level while on Daptomycin  - Multimodal pain control with tylenol, gabapentin, methocarbamol, oxy IR, oxy ER  - Follow blood cultures through maturity  - Daily CBC, CMP  - Cardiac monitor  - Neurosurgery Consult: he does not need surgery.  - pt transferred to [facility]    3/18 - ID final recs pending. Has Klebsiella in blood sens to everything.  Wound care consulted . pic line may have been source of infection    Continue  Iv-cefepime 2g Q8h. (Bld cx +kleb aerogenes panS) & Continue Iv-Daptomycin 10mg/kg Q24h.  repeat bld cxs 03/15 and 03/17 (NGTD); picc cath tip culture was not sent down properly  Anticipate continuing both Iv-dapto and cefepime for remaining 2-3wks of full 6wk duration of Iv-abx(BRENNAN 04/10) for lumbar osteo-discitis with paravertebral / psoas myositis and sub centimeter abscesses   Will need new PIc line or mid line.  Waiting for cultures to be neg to ensure therapeutic window without bacteremia and presence of a line.       Severe sepsis  This patient does have evidence of infective focus - continued L5-S1/S1 diskitis/osteomyelitis with abscess, diffuse infectious myositis of the bilateral lumbar paraspinal musculature and left iliopsoas muscle.  My overall impression is sepsis.  Source: Skin and Soft Tissue (location epidurial)  Antibiotics given-   Antibiotics (72h ago, onward)      Start     Stop Route Frequency Ordered    03/17/24 2130  ceFEPIme (MAXIPIME) 2 g in dextrose 5 % in water (D5W) 100 mL IVPB (MB+)         -- IV Every 8 hours (non-standard times) 03/17/24 1452    03/14/24 0900  DAPTOmycin (CUBICIN) 780 mg in sodium chloride 0.9% SolP 50 mL IVPB         -- IV Every 24 hours (non-standard times) 03/14/24 0755          Latest lactate reviewed-  No results for input(s): "LACTATE", "POCLAC" in the last 72 hours.    Organ dysfunction " "indicated by Acute liver injury    Fluid challenge Not needed - patient is not hypotensive      Post- resuscitation assessment No - Post resuscitation assessment not needed       Will Not start Pressors- Levophed for MAP of 65  Source control achieved by: Broad spectrum antibiotics    - Infectious Disease consulted  - Continue IV Daptomycin and meropenum, Need end date  - Follow blood cultures through maturity. Klebsiella and enterobacteraies per PCR.- & sens to everything    Psoas abscess, left  L5-S1/S1 diskitis/osteomyelitis with abscess, diffuse infectious myositis of the bilateral lumbar paraspinal musculature and left iliopsoas muscle.  - see " Osteomeylitis" above      Discitis of lumbosacral region  See Osteomyelitis for consolidated plan      Septic arthritis  See " Osteomyelitis"      Myositis of multiple sites  See " Osteomeylitis" for plan      Substance or medication-induced depressive disorder  - Continue Mirtazipine  - Hx of heroin, THC, cociane      HTN (hypertension)  Chronic, controlled. Latest blood pressure and vitals reviewed-     Temp:  [99 °F (37.2 °C)-100.4 °F (38 °C)]   Pulse:  [84-97]   Resp:  [16-18]   BP: (104-134)/(53-92)   SpO2:  [96 %-99 %] .   Home meds for hypertension were reviewed and noted below.   Hypertension Medications               cloNIDine (CATAPRES) 0.1 MG tablet Take 1 tablet (0.1 mg total) by mouth every 12 (twelve) hours.          Plan:  - While in the hospital, will manage blood pressure as follows; Continue home antihypertensive regimen. Currently on Clonidine 0.1 PO BID  - Will utilize p.r.n. blood pressure medication only if patient's blood pressure greater than 180/110 and he develops symptoms such as worsening chest pain or shortness of breath.    Hepatitis  Most recent labs from OSH showed AST 360s, ALT 200s. Had recent hepatitis panel 3/12 with hep c    - Recent Hepatitis C screening resulted positive, previous Hep C screening 3 months ago negative. Check repeat " "Hep C screening and PCR  - Check HIV given history of IVDU and ongoing fever despite antibiotics  - RUQ ultrasound- pending  - Daily CMP to trend transaminases:  335/235  - Infectious Diseases consulted and following      Drug dependence  See " Substance induced depressive disroder"  - monitor for withdrawal and give opioid if needed   - pt has been in LTAC  - on oxycodone LA 10 bid for pain and dependence  STABLE      Heroin use disorder, severe  See " Drug Dependence"       Hyponatremia  Patient has hyponatremia which is controlled,We will aim to correct the sodium by 4-6mEq in 24 hours. We will monitor sodium Daily. The hyponatremia is due to Dehydration/hypovolemia. We will obtain the following studies: see labsection. We will treat the hyponatremia with IV fluids. The patient's sodium results have been reviewed and are listed below.  No results for input(s): "NA" in the last 24 hours.      Chronic diastolic heart failure  ECHO 2/13/2024  Left Ventricle: The left ventricle is normal in size. Normal wall thickness. Normal wall motion. There is normal systolic function with a visually estimated ejection fraction of 60 - 65%. Ejection fraction by visual approximation is 65%. There is diastolic dysfunction but grade cannot be determined.    Right Ventricle: Normal right ventricular cavity size. Wall thickness is normal. Right ventricle wall motion  is normal. Systolic function is normal.    IVC/SVC: Normal venous pressure at 3 mmHg.    Pericardium: There is a trivial posterior effusion just base.    STABLE      Debility  Patient with Acute on chronic debility due to other reduced mobility. Latest AMPAC and GEMS scores have been reviewed. Evaluation for etiology is complete. Plan includes progressive mobility protocol initated, PT/OT consulted, and may need surgey .      VTE Risk Mitigation (From admission, onward)           Ordered     Place BRENNAN hose  Until discontinued         03/14/24 0730     IP VTE HIGH RISK " PATIENT  Once         03/14/24 0730     Place sequential compression device  Until discontinued         03/14/24 0730                    Discharge Planning   SENA: 3/19/2024     Code Status: Full Code   Is the patient medically ready for discharge?: No    Reason for patient still in hospital (select all that apply): Patient trending condition  Discharge Plan A: Long-term acute care facility (LTAC)   Discharge Delays: None known at this time        Kimber Lewis MD  Department of Hospital Medicine   [facility] - Neurosurgery ([facility])

## 2024-03-20 PROBLEM — K59.00 CONSTIPATION: Status: ACTIVE | Noted: 2024-03-20

## 2024-03-20 PROBLEM — B19.20 HEPATITIS C: Status: ACTIVE | Noted: 2024-03-20

## 2024-03-20 LAB
BACTERIA BLD CULT: NORMAL
BACTERIA BLD CULT: NORMAL
BASOPHILS # BLD AUTO: 0.05 K/UL (ref 0–0.2)
BASOPHILS NFR BLD: 0.4 % (ref 0–1.9)
DIFFERENTIAL METHOD BLD: ABNORMAL
EOSINOPHIL # BLD AUTO: 0.2 K/UL (ref 0–0.5)
EOSINOPHIL NFR BLD: 1.7 % (ref 0–8)
ERYTHROCYTE [DISTWIDTH] IN BLOOD BY AUTOMATED COUNT: 21.6 % (ref 11.5–14.5)
HCT VFR BLD AUTO: 28.9 % (ref 40–54)
HGB BLD-MCNC: 9 G/DL (ref 14–18)
IMM GRANULOCYTES # BLD AUTO: 0.1 K/UL (ref 0–0.04)
IMM GRANULOCYTES NFR BLD AUTO: 0.8 % (ref 0–0.5)
LYMPHOCYTES # BLD AUTO: 3 K/UL (ref 1–4.8)
LYMPHOCYTES NFR BLD: 25 % (ref 18–48)
MCH RBC QN AUTO: 22.6 PG (ref 27–31)
MCHC RBC AUTO-ENTMCNC: 31.1 G/DL (ref 32–36)
MCV RBC AUTO: 72 FL (ref 82–98)
MONOCYTES # BLD AUTO: 1.8 K/UL (ref 0.3–1)
MONOCYTES NFR BLD: 14.6 % (ref 4–15)
NEUTROPHILS # BLD AUTO: 6.9 K/UL (ref 1.8–7.7)
NEUTROPHILS NFR BLD: 57.5 % (ref 38–73)
NRBC BLD-RTO: 0 /100 WBC
PLATELET # BLD AUTO: 269 K/UL (ref 150–450)
PMV BLD AUTO: 10 FL (ref 9.2–12.9)
RBC # BLD AUTO: 3.99 M/UL (ref 4.6–6.2)
WBC # BLD AUTO: 12.09 K/UL (ref 3.9–12.7)

## 2024-03-20 PROCEDURE — A4216 STERILE WATER/SALINE, 10 ML: HCPCS | Performed by: HOSPITALIST

## 2024-03-20 PROCEDURE — 25000003 PHARM REV CODE 250: Performed by: STUDENT IN AN ORGANIZED HEALTH CARE EDUCATION/TRAINING PROGRAM

## 2024-03-20 PROCEDURE — 36573 INSJ PICC RS&I 5 YR+: CPT

## 2024-03-20 PROCEDURE — 97530 THERAPEUTIC ACTIVITIES: CPT | Mod: CQ

## 2024-03-20 PROCEDURE — 11000001 HC ACUTE MED/SURG PRIVATE ROOM

## 2024-03-20 PROCEDURE — 02HV33Z INSERTION OF INFUSION DEVICE INTO SUPERIOR VENA CAVA, PERCUTANEOUS APPROACH: ICD-10-PCS | Performed by: HOSPITALIST

## 2024-03-20 PROCEDURE — 63600175 PHARM REV CODE 636 W HCPCS: Performed by: STUDENT IN AN ORGANIZED HEALTH CARE EDUCATION/TRAINING PROGRAM

## 2024-03-20 PROCEDURE — 25000003 PHARM REV CODE 250

## 2024-03-20 PROCEDURE — 85025 COMPLETE CBC W/AUTO DIFF WBC: CPT | Performed by: STUDENT IN AN ORGANIZED HEALTH CARE EDUCATION/TRAINING PROGRAM

## 2024-03-20 PROCEDURE — 36415 COLL VENOUS BLD VENIPUNCTURE: CPT | Performed by: STUDENT IN AN ORGANIZED HEALTH CARE EDUCATION/TRAINING PROGRAM

## 2024-03-20 PROCEDURE — 97110 THERAPEUTIC EXERCISES: CPT | Mod: CO

## 2024-03-20 PROCEDURE — 76937 US GUIDE VASCULAR ACCESS: CPT

## 2024-03-20 PROCEDURE — 25000003 PHARM REV CODE 250: Performed by: HOSPITALIST

## 2024-03-20 PROCEDURE — C1751 CATH, INF, PER/CENT/MIDLINE: HCPCS

## 2024-03-20 RX ORDER — SODIUM CHLORIDE 0.9 % (FLUSH) 0.9 %
10 SYRINGE (ML) INJECTION
Status: DISCONTINUED | OUTPATIENT
Start: 2024-03-20 | End: 2024-03-29 | Stop reason: HOSPADM

## 2024-03-20 RX ORDER — SODIUM CHLORIDE 0.9 % (FLUSH) 0.9 %
10 SYRINGE (ML) INJECTION EVERY 6 HOURS
Status: DISCONTINUED | OUTPATIENT
Start: 2024-03-20 | End: 2024-03-29 | Stop reason: HOSPADM

## 2024-03-20 RX ADMIN — OXYCODONE HYDROCHLORIDE 10 MG: 10 TABLET, FILM COATED, EXTENDED RELEASE ORAL at 09:03

## 2024-03-20 RX ADMIN — DOCUSATE SODIUM AND SENNOSIDES 2 TABLET: 8.6; 5 TABLET, FILM COATED ORAL at 09:03

## 2024-03-20 RX ADMIN — METHOCARBAMOL 500 MG: 500 TABLET ORAL at 01:03

## 2024-03-20 RX ADMIN — METHOCARBAMOL 500 MG: 500 TABLET ORAL at 09:03

## 2024-03-20 RX ADMIN — OXYCODONE HYDROCHLORIDE 15 MG: 10 TABLET ORAL at 11:03

## 2024-03-20 RX ADMIN — CLONIDINE HYDROCHLORIDE 0.1 MG: 0.1 TABLET ORAL at 09:03

## 2024-03-20 RX ADMIN — CEFEPIME 2 G: 2 INJECTION, POWDER, FOR SOLUTION INTRAVENOUS at 09:03

## 2024-03-20 RX ADMIN — OXYCODONE HYDROCHLORIDE 15 MG: 10 TABLET ORAL at 04:03

## 2024-03-20 RX ADMIN — SODIUM CHLORIDE: 9 INJECTION, SOLUTION INTRAVENOUS at 09:03

## 2024-03-20 RX ADMIN — Medication 10 ML: at 06:03

## 2024-03-20 RX ADMIN — OXYCODONE HYDROCHLORIDE 15 MG: 10 TABLET ORAL at 05:03

## 2024-03-20 RX ADMIN — Medication 10 ML: at 11:03

## 2024-03-20 RX ADMIN — METHOCARBAMOL 500 MG: 500 TABLET ORAL at 05:03

## 2024-03-20 RX ADMIN — MIRTAZAPINE 15 MG: 15 TABLET, FILM COATED ORAL at 09:03

## 2024-03-20 RX ADMIN — CEFEPIME 2 G: 2 INJECTION, POWDER, FOR SOLUTION INTRAVENOUS at 04:03

## 2024-03-20 NOTE — SUBJECTIVE & OBJECTIVE
Interval History: see above    Review of Systems   Constitutional:  Positive for activity change. Negative for appetite change and fever.   HENT:  Negative for trouble swallowing.    Respiratory:  Negative for shortness of breath.    Cardiovascular:  Negative for chest pain.   Gastrointestinal:  Positive for constipation. Negative for abdominal pain, diarrhea and nausea.   Genitourinary:  Negative for difficulty urinating and frequency.   Musculoskeletal:  Positive for arthralgias, back pain and gait problem. Negative for neck stiffness.   Neurological:  Negative for headaches.   Psychiatric/Behavioral:  Negative for behavioral problems.      Objective:     Vital Signs (Most Recent):  Temp: 98.5 °F (36.9 °C) (03/20/24 0707)  Pulse: 95 (03/20/24 0707)  Resp: 16 (03/20/24 0707)  BP: (!) 94/54 (03/20/24 0707)  SpO2: 97 % (03/20/24 0707) Vital Signs (24h Range):  Temp:  [97.6 °F (36.4 °C)-99.4 °F (37.4 °C)] 98.5 °F (36.9 °C)  Pulse:  [] 95  Resp:  [16-18] 16  SpO2:  [92 %-98 %] 97 %  BP: ()/(54-65) 94/54     Weight: 78 kg (172 lb)  Body mass index is 24.68 kg/m².  No intake or output data in the 24 hours ending 03/20/24 0921        Physical Exam  Constitutional:       General: He is not in acute distress.     Appearance: Normal appearance. He is ill-appearing. He is not toxic-appearing or diaphoretic.   HENT:      Head: Normocephalic and atraumatic.      Nose: Nose normal.      Mouth/Throat:      Mouth: Mucous membranes are moist.      Pharynx: Oropharynx is clear.   Eyes:      General: No scleral icterus.     Extraocular Movements: Extraocular movements intact.      Conjunctiva/sclera: Conjunctivae normal.      Pupils: Pupils are equal, round, and reactive to light.   Cardiovascular:      Rate and Rhythm: Normal rate and regular rhythm.      Pulses: Normal pulses.   Pulmonary:      Effort: Pulmonary effort is normal. No respiratory distress.      Breath sounds: Normal breath sounds. No stridor. No wheezing,  "rhonchi or rales.   Chest:      Chest wall: No tenderness.   Abdominal:      General: Abdomen is flat. Bowel sounds are normal. There is no distension.      Palpations: Abdomen is soft.      Tenderness: There is no abdominal tenderness. There is no right CVA tenderness, left CVA tenderness, guarding or rebound.   Musculoskeletal:         General: No swelling, tenderness, deformity or signs of injury. Normal range of motion.      Cervical back: Normal range of motion and neck supple. No rigidity or tenderness.      Right lower leg: No edema.      Left lower leg: No edema.   Skin:     General: Skin is warm and dry.      Coloration: Skin is not jaundiced.      Findings: No erythema or rash.   Neurological:      General: No focal deficit present.      Mental Status: He is alert and oriented to person, place, and time. Mental status is at baseline.      Motor: No weakness.      Gait: Gait abnormal.   Psychiatric:         Mood and Affect: Mood normal.         Behavior: Behavior normal.         Thought Content: Thought content normal.         Judgment: Judgment normal.             Significant Labs: All pertinent labs within the past 24 hours have been reviewed.  CBC:   Recent Labs   Lab 03/19/24  0419 03/20/24  0354   WBC 9.15 12.09   HGB 9.3* 9.0*   HCT 30.0* 28.9*    269       CMP:   No results for input(s): "NA", "K", "CL", "CO2", "GLU", "BUN", "CREATININE", "CALCIUM", "PROT", "ALBUMIN", "BILITOT", "ALKPHOS", "AST", "ALT", "ANIONGAP", "EGFRNONAA" in the last 48 hours.    Invalid input(s): "ESTGFAFR[place]"      Significant Imaging: I have reviewed all pertinent imaging results/findings within the past 24 hours.  "

## 2024-03-20 NOTE — CARE UPDATE
I have reviewed the chart of Ton Donovan who is hospitalized for the following:    Active Hospital Problems    Diagnosis    *Osteomyelitis of vertebra of lumbosacral region    Constipation     On miralax and senna      Hepatitis C     HCV +ve       Severe sepsis    Hepatitis    Drug dependence     Drug screen + thc and opioids  Hx iv drug use      Hyponatremia     Monitor with labs      Myositis of multiple sites     Diffuse infectious myositis of the bilateral lower lumbar paraspinal musculature and within the left psoas and iliopsoas muscles       Septic arthritis     septic arthritis about the left SI joint.       Chronic diastolic heart failure     There is diastolic dysfunction but grade cannot be determined.       HTN (hypertension)    Discitis of lumbosacral region     See primary problem      Debility    Pain     Prn oxy        Substance or medication-induced depressive disorder    Psoas abscess, left    Heroin use disorder, severe          Harleen Sam NP  [facility]

## 2024-03-20 NOTE — CONSULTS
Double lumen PICC to Right Basilic vein.  39 cm in length, 35 cm arm circumference and 0 cm exposed.   Lot # [id number].

## 2024-03-20 NOTE — PLAN OF CARE
[facility] - Neurosurgery (Hospital)  Facility Transfer Orders        Admit to: LTAC    Diagnoses:   Active Hospital Problems    Diagnosis  POA    *Osteomyelitis of vertebra of lumbosacral region [M46.27]  Yes     Priority: 1 - High    Severe sepsis [A41.9, R65.20]  Yes     Priority: 2     Psoas abscess, left [K68.12]  Yes     Priority: 2     Discitis of lumbosacral region [M46.47]  Yes     Priority: 3      See primary problem      Myositis of multiple sites [M60.9]  Yes     Priority: 4      Diffuse infectious myositis of the bilateral lower lumbar paraspinal musculature and within the left psoas and iliopsoas muscles       Septic arthritis [M00.9]  Yes     Priority: 4      septic arthritis about the left SI joint.       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       Debility [R53.81]  Yes     Priority: 13       Resolved Hospital Problems   No resolved problems to display.     Allergies: Review of patient's allergies indicates:  No Known Allergies    Code Status: FULL    Vitals: Routine       Diet: regular diet    Activity: Up in a chair each morning as tolerated, May ambulate independently, and Activity as tolerated    Nursing Precautions: Fall and Pressure ulcer prevention    Bed/Surface: Low Air Loss    Consults: PT to evaluate and treat- 5 times a week, OT to evaluate and treat- 5 times a week, and Wound Care        Dialysis: Patient is not on dialysis.           Pending Diagnostic Studies:       None             utpatient Antibiotic Therapy Plan:     Please send referral to [facility].     1) Infection :   lumbar osteo-discitis with paravertebral / psoas myositis and sub centimeter abscesses (c/b Kleb bacteremia 03/14)     2) Discharge Antibiotics:      Intravenous antibiotics:  IV - Daptomycin 10mg/kg Q24h  Iv - Cefepime 2g Q8h     PO Antibiotics:  Rec indefinite suppression with Doxycyline 100mg po bid onc IV ABX ultimately completed     3) Therapy Duration:   1) Daptomycin 6 wks 2) Cefepime 2 weeks      Estimated end date of IV antibiotics:    Daptomycin - EOC 04/10/24  Cefepime - EOC 3/30/24     4) Outpatient Weekly Labs:     Order the following labs to be drawn on Mondays:   CBC  CMP   CRP  CPK (when on Daptomycin)     5) Fax Lab Results to Infectious Diseases Provider:  Elpidio Paredes PA-C/Brenton Jauregui PA-C     [facility] Fax Number: [phone]     6) Outpatient Infectious Diseases Follow-up     Follow-up appointment will be arranged by the ID clinic and will be found in the patient's appointments tab.       Miscellaneous Care:   Routine Skin for Bedridden Patients:  Apply moisture barrier cream to all  Wound Care: yes:  Surgical Wound:  Location: back     Consult ET nurse        Apply the following to wound:    Cleanse back incision wound with vashe for antimicrobial properties and pat dry. Cut to fit aquacel silver for drainage absorption and antimicrobial properties and fit to wound bed. Cover with mepilex foam border dressing. Change every three days or prn if soiled.      IV Access: PICC    Picc line care         Medications: Discontinue all previous medication orders, if any. See new list below.  Current Discharge Medication List        START taking these medications    Details   dextrose 5 % in water (D5W) PgBk 100 mL with ceFEPIme 2 gram SolR 2 g Inject 2 g into the vein every 8 (eight) hours. for 16 days    Comments: Stop day 4/5/24           CONTINUE these  medications which have CHANGED    Details   sodium chloride 0.9% SolP 50 mL with DAPTOmycin 500 mg SolR 780 mg Inject 780 mg into the vein once daily.    Comments: Stop day 4/10 24  F/u with ID for indefinite suppression with Doxycyline 100mg po bid once IV abx completed.  fu upon dc from LTAC           CONTINUE these medications which have NOT CHANGED    Details   acetaminophen (TYLENOL) 325 MG tablet Take 2 tablets (650 mg total) by mouth every 4 (four) hours as needed.  Refills: 0      cloNIDine (CATAPRES) 0.1 MG tablet Take 1 tablet (0.1 mg total) by mouth every 12 (twelve) hours.  Qty: 60 tablet, Refills: 11    Comments: .      mirtazapine (REMERON) 15 MG tablet Take 1 tablet (15 mg total) by mouth every evening.  Qty: 30 tablet, Refills: 11      mv-min/vit C/glut/lysine/hb124 (IMMUNE SUPPORT ORAL) Take 1 tablet by mouth once daily.      naloxone (NARCAN) 4 mg/actuation Spry 4 mg by Nasal route.      oxyCODONE (ROXICODONE) 15 MG Tab Take 1 tablet (15 mg total) by mouth every 4 (four) hours as needed for Pain.  Refills: 0    Comments: Quantity prescribed more than 7 day supply? No           Follow up:       Immunizations Administered as of 3/20/2024       No immunizations on file.                     Kimber Lewis MD

## 2024-03-20 NOTE — PROGRESS NOTES
[facility]  [facility]  Progress Note    Patient Name: Ton Donovan  MRN: [id number]  Patient Class: IP- Inpatient   Admission Date: 3/14/2024  Length of Stay: 6 days  Attending Physician: Kimber Lewis MD  Primary Care Provider: Steve Kelly DO        Subjective:     Principal Problem:Osteomyelitis of vertebra of lumbosacral region        HPI:  Mr. Ton Donovan is a 28 year-old Male with a PMHx of Hypertension, IV drug use who presented with fevers and ongoing back pain with radiation down legs.    Of note, he was recently admitted to [facility] for LOOP X lumbar spinal fusion, with course complicated by a psoas abscess and MRSA epidural abscess s/p epidural phlegmon debridement. He was started on IV Daptomycin and discharged on 2/22/24 to [facility] for completion of antibiotics. Ongoing fevers complicated his LTAC stay, and he returned to the [facility] for further evaluation. On arrival, WBC 9, ESR 59, CRP 46, AST 360s, ALT 200s. Procalcitonin was elevated at 1.18, lactate normal. MRI L spine concerning for continued L5-S1/S1 diskitis and osteomyelitis with abscess, diffuse infectious myositis of the lumbar paraspinal musculature bilaterally and left iliopsoas muscle without discrete abscess. He was then transferred to [facility] for Neurosurgerical evaluation.     At the time of this consult, temp 99F, HR 60, 120s/60s, saturating well on room air. Most recent labs with WBC 5, Hgb 9, Na 134.    Medicine consulted for medical co-management of epidural abscess    Overview/Hospital Course:  3/15-Admitted for NSGY evaluation of OM & discitis lumbar spine. Will let pt eat.   3/16- Does not need surgery. Appreciate NSGY eval. Wound care and ID following and need final recs. Pt is comfortable.   3/17- Klebsiella sens to everything,  On dapto and yue.  Pic line removed and culture tip was ordered, but not done.   3/18 per ID: Continue  Iv-cefepime 2g Q8h. (Bld cx +kleb  aerogenes panS) & Continue Iv-Daptomycin 10mg/kg Q24h.  repeat bld cxs 03/15 and 03/17 (NGTD); picc cath tip culture was not sent down properly  Anticipate continuing both Iv-dapto and cefepime for remaining 2-3wks of full 6wk duration of Iv-abx(BRENNAN 04/10) for lumbar osteo-discitis with paravertebral / psoas myositis and sub centimeter abscesses   Will need new PIc line or mid line.  Waiting for cultures to be neg to ensure therapeutic window without bacteremia and presence of a line.   - IR consulted to evaluate if the fluid collection at posterior to L5-s1 which communicates with disc space to see if that can be drained.  IR, Dr. Pretty reviewed this pt's case. He says no discrete fluid collection seen within the paraspinal muscles on either the CT or the MRI, it appears he has swollen, infected paraspinal muscles more consistent with myositis. He may have some fluid in the disc space which we recommend reaching out to neuro IR - No fluid to be drained, infection limited to myositis.    3/19- Per Neuro IR, abscess is very deep to reach by IR means so it's not amenable to drain. Repeat cultures are neg.   3/20 replace pic and plan for LTAC.   ID final recs:   Continue Iv-cefepime 2g Q8h. (Bld cx +kleb aerogenes panS) x 2 weeks from 1st negative blood culture 3/15  Continue Iv-Daptomycin 10mg/kg Q24h  [CPK 39]. To complete 6 weeks  OK to replace PICC  Rec indefinite suppression with Doxycyline 100mg po bid once IV abx completed.  fu upon dc from LTAC  Pic line placed, waiting on LTACplacement    Interval History: see above    Review of Systems   Constitutional:  Positive for activity change. Negative for appetite change and fever.   HENT:  Negative for trouble swallowing.    Respiratory:  Negative for shortness of breath.    Cardiovascular:  Negative for chest pain.   Gastrointestinal:  Positive for constipation. Negative for abdominal pain, diarrhea and nausea.   Genitourinary:  Negative for difficulty urinating  and frequency.   Musculoskeletal:  Positive for arthralgias, back pain and gait problem. Negative for neck stiffness.   Neurological:  Negative for headaches.   Psychiatric/Behavioral:  Negative for behavioral problems.      Objective:     Vital Signs (Most Recent):  Temp: 98.5 °F (36.9 °C) (03/20/24 0707)  Pulse: 95 (03/20/24 0707)  Resp: 16 (03/20/24 0707)  BP: (!) 94/54 (03/20/24 0707)  SpO2: 97 % (03/20/24 0707) Vital Signs (24h Range):  Temp:  [97.6 °F (36.4 °C)-99.4 °F (37.4 °C)] 98.5 °F (36.9 °C)  Pulse:  [] 95  Resp:  [16-18] 16  SpO2:  [92 %-98 %] 97 %  BP: ()/(54-65) 94/54     Weight: 78 kg (172 lb)  Body mass index is 24.68 kg/m².  No intake or output data in the 24 hours ending 03/20/24 0921        Physical Exam  Constitutional:       General: He is not in acute distress.     Appearance: Normal appearance. He is ill-appearing. He is not toxic-appearing or diaphoretic.   HENT:      Head: Normocephalic and atraumatic.      Nose: Nose normal.      Mouth/Throat:      Mouth: Mucous membranes are moist.      Pharynx: Oropharynx is clear.   Eyes:      General: No scleral icterus.     Extraocular Movements: Extraocular movements intact.      Conjunctiva/sclera: Conjunctivae normal.      Pupils: Pupils are equal, round, and reactive to light.   Cardiovascular:      Rate and Rhythm: Normal rate and regular rhythm.      Pulses: Normal pulses.   Pulmonary:      Effort: Pulmonary effort is normal. No respiratory distress.      Breath sounds: Normal breath sounds. No stridor. No wheezing, rhonchi or rales.   Chest:      Chest wall: No tenderness.   Abdominal:      General: Abdomen is flat. Bowel sounds are normal. There is no distension.      Palpations: Abdomen is soft.      Tenderness: There is no abdominal tenderness. There is no right CVA tenderness, left CVA tenderness, guarding or rebound.   Musculoskeletal:         General: No swelling, tenderness, deformity or signs of injury. Normal range of  "motion.      Cervical back: Normal range of motion and neck supple. No rigidity or tenderness.      Right lower leg: No edema.      Left lower leg: No edema.   Skin:     General: Skin is warm and dry.      Coloration: Skin is not jaundiced.      Findings: No erythema or rash.   Neurological:      General: No focal deficit present.      Mental Status: He is alert and oriented to person, place, and time. Mental status is at baseline.      Motor: No weakness.      Gait: Gait abnormal.   Psychiatric:         Mood and Affect: Mood normal.         Behavior: Behavior normal.         Thought Content: Thought content normal.         Judgment: Judgment normal.             Significant Labs: All pertinent labs within the past 24 hours have been reviewed.  CBC:   Recent Labs   Lab 03/19/24  0419 03/20/24  0354   WBC 9.15 12.09   HGB 9.3* 9.0*   HCT 30.0* 28.9*    269       CMP:   No results for input(s): "NA", "K", "CL", "CO2", "GLU", "BUN", "CREATININE", "CALCIUM", "PROT", "ALBUMIN", "BILITOT", "ALKPHOS", "AST", "ALT", "ANIONGAP", "EGFRNONAA" in the last 48 hours.    Invalid input(s): "ESTGFAFRICA"      Significant Imaging: I have reviewed all pertinent imaging results/findings within the past 24 hours.    Assessment/Plan:      * Osteomyelitis of vertebra of lumbosacral region  28M with hypertension, IV drug use (heroin) presenting from LTAC with ongoing fevers    He was recently admitted on 2/04/24 with osteomyelitis/discitis L5-S1, sacral and left illiac osteomyelitis, L5-S1 epidural abscess, and left iliopsoas abscess. He underwent L4-pelvic fusion on 2/14/24 with epidural phlegmon debridement, intraoperative cultures positive for MRSA. Infectious Disease was consulted and recommended IV Daptomycin for 8 week course (EOC 4/10/24) to be followed by oral suppressive doxycycline thereafter. He was discharged on 2/22/24 to [place] [facility] to complete his antibiotic course. LTAC stay was complicated by ongoing fevers and he " represented to [facility] for further evaluation prior to transfer to [facility] for neurosurgical evaluation. On arrival, WBC 9, ESR 59, CRP 46, AST 360s, ALT 200s. Procalcitonin was elevated at 1.18, lactate normal. MRI L spine showed continued L5-S1/S1 diskitis/osteomyelitis with abscess, diffuse infectious myositis of the bilateral lumbar paraspinal musculature and left iliopsoas muscle.  - Infectious Disease consulted, appreciate assistance and recommendations  - CT L spine without contrast noted  - Continue IV Daptomycin q24h  - Weekly CK level while on Daptomycin  - Multimodal pain control with tylenol, gabapentin, methocarbamol, oxy IR, oxy ER  - Follow blood cultures through maturity  - Daily CBC, CMP  - Cardiac monitor  - Neurosurgery Consult: he does not need surgery.  - pt transferred to [facility]    3/18 - ID final recs pending. Has Klebsiella in blood sens to everything.  Wound care consulted . pic line may have been source of infection  3/20-   ID final recs:   Continue Iv-cefepime 2g Q8h. (Bld cx +kleb aerogenes panS) x 2 weeks from 1st negative blood culture 3/15 ( stop day 4/5/24)  Continue Iv-Daptomycin 10mg/kg Q24h  [CPK 39]. To complete 6 weeks( stop day 4/10/24_  OK to replace PICC  Rec indefinite suppression with Doxycyline 100mg po bid once IV abx completed.  fu with ID upon dc from LTAC      Severe sepsis  This patient does have evidence of infective focus - continued L5-S1/S1 diskitis/osteomyelitis with abscess, diffuse infectious myositis of the bilateral lumbar paraspinal musculature and left iliopsoas muscle.  My overall impression is sepsis.  Source: Skin and Soft Tissue (location epidurial)  Antibiotics given-   Antibiotics (72h ago, onward)      Start     Stop Route Frequency Ordered    03/17/24 2130  ceFEPIme (MAXIPIME) 2 g in dextrose 5 % in water (D5W) 100 mL IVPB (MB+)         -- IV Every 8 hours (non-standard times) 03/17/24 1452    03/14/24 0900  DAPTOmycin (CUBICIN) 780 mg in  "sodium chloride 0.9% SolP 50 mL IVPB         -- IV Every 24 hours (non-standard times) 03/14/24 0755          Latest lactate reviewed-  No results for input(s): "LACTATE", "POCLAC" in the last 72 hours.    Organ dysfunction indicated by Acute liver injury    Fluid challenge Not needed - patient is not hypotensive      Post- resuscitation assessment No - Post resuscitation assessment not needed       Will Not start Pressors- Levophed for MAP of 65  Source control achieved by: Broad spectrum antibiotics    - Infectious Disease consulted  - Continue IV Daptomycin and meropenum, Need end date  - Follow blood cultures through maturity. Klebsiella and enterobacteraies per PCR.- & sens to everything    Psoas abscess, left  L5-S1/S1 diskitis/osteomyelitis with abscess, diffuse infectious myositis of the bilateral lumbar paraspinal musculature and left iliopsoas muscle.  - see " Osteomeylitis" above      Discitis of lumbosacral region  See Osteomyelitis for consolidated plan      Septic arthritis  See " Osteomyelitis"      Myositis of multiple sites  See " Osteomeylitis" for plan      Substance or medication-induced depressive disorder  - Continue Mirtazipine  - Hx of heroin, THC, cociane      HTN (hypertension)  Chronic, controlled. Latest blood pressure and vitals reviewed-     Temp:  [97.6 °F (36.4 °C)-99.4 °F (37.4 °C)]   Pulse:  []   Resp:  [16-18]   BP: ()/(54-65)   SpO2:  [92 %-98 %] .   Home meds for hypertension were reviewed and noted below.   Hypertension Medications               cloNIDine (CATAPRES) 0.1 MG tablet Take 1 tablet (0.1 mg total) by mouth every 12 (twelve) hours.          Plan:  - While in the hospital, will manage blood pressure as follows; Continue home antihypertensive regimen. Currently on Clonidine 0.1 PO BID  - Will utilize p.r.n. blood pressure medication only if patient's blood pressure greater than 180/110 and he develops symptoms such as worsening chest pain or shortness of " "breath.    Hepatitis  Most recent labs from OSH showed AST 360s, ALT 200s. Had recent hepatitis panel 3/12 with hep c    - Recent Hepatitis C screening resulted positive, previous Hep C screening 3 months ago negative. Check repeat Hep C screening and PCR  - Check HIV given history of IVDU and ongoing fever despite antibiotics  - RUQ ultrasound- pending  - Daily CMP to trend transaminases:  335/235  - Infectious Diseases consulted and following      Drug dependence  See " Substance induced depressive disroder"  - monitor for withdrawal and give opioid if needed   - pt has been in LTAC  - on oxycodone LA 10 bid for pain and dependence  STABLE      Heroin use disorder, severe  See " Drug Dependence"       Hyponatremia  Patient has hyponatremia which is controlled,We will aim to correct the sodium by 4-6mEq in 24 hours. We will monitor sodium Daily. The hyponatremia is due to Dehydration/hypovolemia. We will obtain the following studies: see labsection. We will treat the hyponatremia with IV fluids. The patient's sodium results have been reviewed and are listed below.  No results for input(s): "NA" in the last 24 hours.      Chronic diastolic heart failure  ECHO 2/13/2024  Left Ventricle: The left ventricle is normal in size. Normal wall thickness. Normal wall motion. There is normal systolic function with a visually estimated ejection fraction of 60 - 65%. Ejection fraction by visual approximation is 65%. There is diastolic dysfunction but grade cannot be determined.    Right Ventricle: Normal right ventricular cavity size. Wall thickness is normal. Right ventricle wall motion  is normal. Systolic function is normal.    IVC/SVC: Normal venous pressure at 3 mmHg.    Pericardium: There is a trivial posterior effusion just base.    STABLE      Debility  Patient with Acute on chronic debility due to other reduced mobility. Latest AMPAC and GEMS scores have been reviewed. Evaluation for etiology is complete. Plan includes " progressive mobility protocol initated, PT/OT consulted, and may need surgey .      VTE Risk Mitigation (From admission, onward)           Ordered     Place BRENNAN hose  Until discontinued         03/14/24 0730     IP VTE HIGH RISK PATIENT  Once         03/14/24 0730     Place sequential compression device  Until discontinued         03/14/24 0730                    Discharge Planning   SENA: 3/21/2024     Code Status: Full Code   Is the patient medically ready for discharge?: Yes    Reason for patient still in hospital (select all that apply): Patient trending condition  Discharge Plan A: Long-term acute care facility (LTAC)   Discharge Delays: None known at this time      Kimber Lewis MD  Department of Hospital Medicine   [facility] - Neurosurgery ([facility])

## 2024-03-20 NOTE — PT/OT/SLP PROGRESS
Physical Therapy Treatment    Patient Name:  Ton Donovan   MRN:  [id number]    Recommendations:     Discharge Recommendations: Low Intensity Therapy  Discharge Equipment Recommendations: shower chair, rollator  Barriers to discharge: Inaccessible home and Decreased caregiver support    Assessment:     Ton Donovan is a 28 y.o. male admitted with a medical diagnosis of Osteomyelitis of vertebra of lumbosacral region.  He presents with the following impairments/functional limitations: weakness, impaired endurance, impaired functional mobility, gait instability, decreased lower extremity function, decreased safety awareness.  Pt was agreeable and tolerated session well. Pt completed bed mobility, transfer, gait with no issues. Pt reported more calf tightness at beginning of session, but improved with gait. Pt is progressing well and continues to demonstrate the need for low intensity therapy on a scheduled basis exhibited by decreased independence with functional mobility.    Rehab Prognosis: Good; patient would benefit from acute skilled PT services to address these deficits and reach maximum level of function.    Recent Surgery: * No surgery found *      Plan:     During this hospitalization, patient to be seen 3 x/week to address the identified rehab impairments via gait training, therapeutic activities, therapeutic exercises, neuromuscular re-education and progress toward the following goals:    Plan of Care Expires:  04/14/24    Subjective     Chief Complaint: calf tightness  Patient/Family Comments/goals: pt reported laying in bed all morning   Pain/Comfort:  Pain Rating 1: 0/10  Pain Rating Post-Intervention 1: 0/10      Objective:     Communicated with RN prior to session.  Patient found right sidelying with  (no active lines) upon PT entry to room.     General Precautions: Standard, fall  Orthopedic Precautions: spinal precautions  Braces: LSO  Respiratory Status: Room air     Functional  Mobility:  Additional staff present: N/A  Bed Mobility:   Supine to Sit: supervision; HOB flat  Completed via long roll  Transfers:   Sit <> Stand Transfer: supervision with rolling walker   Cues to push up from bed   Gait:  Pt ambulated 2x ~150 ft with stand by assistance and rolling walker.  standing rest break and no LOB  All lines remained intact throughout ambulation trial, gait belt utilized.  Gait Deviation(s): mostly steady gait with B knee flexion, decrease step length (L>R), and increasing more fluid gait.   Verbal/tactile cues for pacing and increase heel strike/knee ext     AM-PAC 6 CLICK MOBILITY  Turning over in bed (including adjusting bedclothes, sheets and blankets)?: 4  Sitting down on and standing up from a chair with arms (e.g., wheelchair, bedside commode, etc.): 4  Moving from lying on back to sitting on the side of the bed?: 4  Moving to and from a bed to a chair (including a wheelchair)?: 4  Need to walk in hospital room?: 3  Climbing 3-5 steps with a railing?: 2  Basic Mobility Total Score: 21       Treatment & Education:  Pt able to donned LSO sitting EOB and recall 3/3 spinal precautions.   Time included assisting with toileting activity.   Seated BLE therex x10 reps: toe raises and LAQ  Standing BLE therex x10 reps: marches and mini squats  Patient educated on role of therapy, goals of session, and benefits of out of bed mobility.   Instructed on use of call button and importance of calling nursing staff for assistance with mobility   Questions/concerns addressed within PTA scope of practice  Pt verbalized understanding.    Patient left sitting edge of bed with all lines intact, call button in reach, and nurse aware and notified. LSO maintain donned.    GOALS:   Multidisciplinary Problems       Physical Therapy Goals          Problem: Physical Therapy    Goal Priority Disciplines Outcome Goal Variances Interventions   Physical Therapy Goal     PT, PT/OT Ongoing, Progressing      Description: Goals to be met by: 04/15/24     Patient will increase functional independence with mobility by performin. Bed to chair transfer with [place] using No Assistive Device  2. Gait  x 200 feet with Supervision using No Assistive Device.   3. Stand for 10 minutes with [place] using No Assistive Device demonstrating no swayback posture.   4. Increased functional strength to WFL for hip abduction, core strength.  5. Lower extremity exercise program x15 reps per handout, with independence                         Time Tracking:     PT Received On: 24  PT Start Time: 1403     PT Stop Time: 1420  PT Total Time (min): 17 min     Billable Minutes: Therapeutic Activity 17    Treatment Type: Treatment  PT/PTA: PTA     Number of PTA visits since last PT visit: 2024

## 2024-03-20 NOTE — PT/OT/SLP PROGRESS
"Occupational Therapy   Treatment    Name: Ton Donovan  MRN: [id number]  Admitting Diagnosis:  Osteomyelitis of vertebra of lumbosacral region       Recommendations:     Discharge Recommendations: High Intensity Therapy  Discharge Equipment Recommendations:  rollator, shower chair  Barriers to discharge:  Inaccessible home environment    Assessment:     Ton Donovan is a 28 y.o. male with a medical diagnosis of Osteomyelitis of vertebra of lumbosacral region.  He presents with the following performance deficits affecting function are weakness, impaired endurance, impaired self care skills, impaired functional mobility, gait instability, impaired balance, pain, decreased lower extremity function, decreased upper extremity function, decreased safety awareness, decreased ROM, orthopedic precautions. Patient received fatigued with patient selectively participating secondary to having ambulated shortly prior throughout hospital. Patient has demonstrated sufficient progression to warrant high intensity therapy evidenced by objectives noted below.    Rehab Prognosis:  Good; patient would benefit from acute skilled OT services to address these deficits and reach maximum level of function.       Plan:     Patient to be seen 4 x/week to address the above listed problems via self-care/home management, therapeutic activities, therapeutic exercises, neuromuscular re-education  Plan of Care Expires: 04/15/24  Plan of Care Reviewed with: patient    Subjective     Chief Complaint: "I'm tired."  Patient/Family Comments/goals: patient agreeable to therapy  Pain/Comfort:  Pain Rating 1: 9/10  Location 1: back  Pain Addressed 1: Reposition, Cessation of Activity  Pain Rating Post-Intervention 1: 9/10    Objective:     Communicated with: nurse colon and OTR prior to session.  Patient found sitting edge of bed with  (no active lines) upon OT entry to room.  A client care conference was completed by the OTR and the GARCIA prior to " treatment by the GARCIA to discuss the patient's POC and current status.    General Precautions: Standard, fall    Orthopedic Precautions:spinal precautions  Braces: LSO  Respiratory Status: Room air     Occupational Performance:     Bed Mobility:    Patient received OOB    Functional Mobility/Transfers:  Functional Mobility: Patient noted to be ambulating in hallways with Mod(I) using RW with LSO donned    Activities of Daily Living:  Upper Body Dressing: modified independence doffed LSO seated EOB  Lower Body Dressing: contact guard assistance (simulating donning/doffing B socks via fig 4 technique)      [facility] 6 Click ADL: 20    Treatment & Education:  Patient edu on OT POC, goals, and current progress.   Patient instructed to use call button to notify nurse for (A) and edu on fall prevention strategies as patient eloped from unit floor upon GARCIA 1st attempt   Therapeutic Exercise:  Patient participated in B UE/LE AROM/AAROM seated EOB with focus on improving muscular endurance and restoring muscular strength required for increased activity tolerance and (I) during ADL tasks. Instruction and facilitation provided to maintain proper form and joint integrity required to maximize appropriate muscle recruitment.  Patient completed: scapular retractions and elevations, hip external rotation supported with 3 sec hold at stretched end range, and straight arm raises. Patient instructed on knee to chest stretch when in supine. Patient verbalized understanding.   Addressed all patient questions/concerns within GARCIA scope of practice.   Patient left sitting edge of bed with all lines intact, call button in reach, and nurse notified    GOALS:   Multidisciplinary Problems       Occupational Therapy Goals          Problem: Occupational Therapy    Goal Priority Disciplines Outcome Interventions   Occupational Therapy Goal     OT, PT/OT Ongoing, Progressing    Description: Goals to be met by: 3/29/24     Patient will increase  functional independence with ADLs by performing:    UE Dressing with [place].  LE Dressing with [place].  Grooming while standing at sink with [place].  Toileting from toilet with [place] for hygiene and clothing management.   Toilet transfer to toilet with [place].                         Time Tracking:     OT Date of Treatment: 03/20/24  OT Start Time: 1507  OT Stop Time: 1518  OT Total Time (min): 11 min    Billable Minutes:Therapeutic Exercise 11    OT/ASHLEY: ASHLEY     Number of ASHLEY visits since last OT visit: 1    3/20/2024

## 2024-03-20 NOTE — ASSESSMENT & PLAN NOTE
28M with hypertension, IV drug use (heroin) presenting from LTAC with ongoing fevers    He was recently admitted on 2/04/24 with osteomyelitis/discitis L5-S1, sacral and left illiac osteomyelitis, L5-S1 epidural abscess, and left iliopsoas abscess. He underwent L4-pelvic fusion on 2/14/24 with epidural phlegmon debridement, intraoperative cultures positive for MRSA. Infectious Disease was consulted and recommended IV Daptomycin for 8 week course (EOC 4/10/24) to be followed by oral suppressive doxycycline thereafter. He was discharged on 2/22/24 to [facility] to complete his antibiotic course. LTAC stay was complicated by ongoing fevers and he represented to [facility] for further evaluation prior to transfer to [facility] for neurosurgical evaluation. On arrival, WBC 9, ESR 59, CRP 46, AST 360s, ALT 200s. Procalcitonin was elevated at 1.18, lactate normal. MRI L spine showed continued L5-S1/S1 diskitis/osteomyelitis with abscess, diffuse infectious myositis of the bilateral lumbar paraspinal musculature and left iliopsoas muscle.  - Infectious Disease consulted, appreciate assistance and recommendations  - CT L spine without contrast noted  - Continue IV Daptomycin q24h  - Weekly CK level while on Daptomycin  - Multimodal pain control with tylenol, gabapentin, methocarbamol, oxy IR, oxy ER  - Follow blood cultures through maturity  - Daily CBC, CMP  - Cardiac monitor  - Neurosurgery Consult: he does not need surgery.  - pt transferred to [facility]    3/18 - ID final recs pending. Has Klebsiella in blood sens to everything.  Wound care consulted . pic line may have been source of infection  3/20-   ID final recs:   Continue Iv-cefepime 2g Q8h. (Bld cx +kleb aerogenes panS) x 2 weeks from 1st negative blood culture 3/15 ( stop day 4/5/24)  Continue Iv-Daptomycin 10mg/kg Q24h  [CPK 39]. To complete 6 weeks( stop day 4/10/24_  OK to replace PICC  Rec indefinite suppression with Doxycyline 100mg po bid once IV abx completed.   fu with ID upon dc from [facility]

## 2024-03-20 NOTE — ASSESSMENT & PLAN NOTE
Chronic, controlled. Latest blood pressure and vitals reviewed-     Temp:  [97.6 °F (36.4 °C)-99.4 °F (37.4 °C)]   Pulse:  []   Resp:  [16-18]   BP: ()/(54-65)   SpO2:  [92 %-98 %] .   Home meds for hypertension were reviewed and noted below.   Hypertension Medications               cloNIDine (CATAPRES) 0.1 MG tablet Take 1 tablet (0.1 mg total) by mouth every 12 (twelve) hours.          Plan:  - While in the hospital, will manage blood pressure as follows; Continue home antihypertensive regimen. Currently on Clonidine 0.1 PO BID  - Will utilize p.r.n. blood pressure medication only if patient's blood pressure greater than 180/110 and he develops symptoms such as worsening chest pain or shortness of breath.

## 2024-03-20 NOTE — PROCEDURES
"Ton Donovan is a 28 y.o. male patient.    Temp: 97.2 °F (36.2 °C) (03/20/24 1228)  Pulse: 92 (03/20/24 1228)  Resp: 18 (03/20/24 1228)  BP: (!) 102/59 (03/20/24 1228)  SpO2: 98 % (03/20/24 1228)  Weight: 78 kg (172 lb) (03/14/24 1200)  Height: 5' 10" (177.8 cm) (03/14/24 1200)    PICC  Date/Time: 3/20/2024 4:02 PM  Performed by: Joaquín Nava RN  Assisting provider: Sandra Alfaro RN  Consent Done: Yes  Time out: Immediately prior to procedure a time out was called to verify the correct patient, procedure, equipment, support staff and site/side marked as required  Indications: med administration  Anesthesia: local infiltration  Local anesthetic: lidocaine 1% without epinephrine  Anesthetic Total (mL): 3  Description of findings: PICC placement  Preparation: skin prepped with ChloraPrep  Skin prep agent dried: skin prep agent completely dried prior to procedure  Sterile barriers: all five maximum sterile barriers used - cap, mask, sterile gown, sterile gloves, and large sterile sheet  Hand hygiene: hand hygiene performed prior to central venous catheter insertion  Location details: right basilic  Catheter type: double lumen  Catheter size: 5 Fr  Catheter Length: 39cm    Ultrasound guidance: yes  Vessel Caliber: large and patent, compressibility normal  Needle advanced into vessel with real time Ultrasound guidance.  Guidewire confirmed in vessel.  Image recorded and saved.  Sterile sheath used.  Number of attempts: 1  Post-procedure: blood return through all ports, chlorhexidine patch and sterile dressing applied  Technical procedures used: 3cg  Estimated blood loss (mL): 0  Specimens: No    Assessment: placement verified by x-ray  Complications: none          Name K JIMY Alfaro  3/20/2024    "

## 2024-03-21 LAB
ANION GAP SERPL CALC-SCNC: 6 MMOL/L (ref 8–16)
ANION GAP SERPL CALC-SCNC: 8 MMOL/L (ref 8–16)
BASOPHILS # BLD AUTO: 0.02 K/UL (ref 0–0.2)
BASOPHILS NFR BLD: 0.2 % (ref 0–1.9)
BUN SERPL-MCNC: 12 MG/DL (ref 6–20)
BUN SERPL-MCNC: 12 MG/DL (ref 6–20)
CALCIUM SERPL-MCNC: 9.2 MG/DL (ref 8.7–10.5)
CALCIUM SERPL-MCNC: 9.4 MG/DL (ref 8.7–10.5)
CHLORIDE SERPL-SCNC: 101 MMOL/L (ref 95–110)
CHLORIDE SERPL-SCNC: 101 MMOL/L (ref 95–110)
CO2 SERPL-SCNC: 23 MMOL/L (ref 23–29)
CO2 SERPL-SCNC: 24 MMOL/L (ref 23–29)
CREAT SERPL-MCNC: 0.7 MG/DL (ref 0.5–1.4)
CREAT SERPL-MCNC: 0.7 MG/DL (ref 0.5–1.4)
DIFFERENTIAL METHOD BLD: ABNORMAL
EOSINOPHIL # BLD AUTO: 0.2 K/UL (ref 0–0.5)
EOSINOPHIL NFR BLD: 2.9 % (ref 0–8)
ERYTHROCYTE [DISTWIDTH] IN BLOOD BY AUTOMATED COUNT: 21.5 % (ref 11.5–14.5)
EST. GFR  (NO RACE VARIABLE): >60 ML/MIN/1.73 M^2
EST. GFR  (NO RACE VARIABLE): >60 ML/MIN/1.73 M^2
GLUCOSE SERPL-MCNC: 121 MG/DL (ref 70–110)
GLUCOSE SERPL-MCNC: 148 MG/DL (ref 70–110)
HCT VFR BLD AUTO: 27.3 % (ref 40–54)
HGB BLD-MCNC: 8.4 G/DL (ref 14–18)
IMM GRANULOCYTES # BLD AUTO: 0.08 K/UL (ref 0–0.04)
IMM GRANULOCYTES NFR BLD AUTO: 1 % (ref 0–0.5)
LYMPHOCYTES # BLD AUTO: 1.9 K/UL (ref 1–4.8)
LYMPHOCYTES NFR BLD: 22.7 % (ref 18–48)
MCH RBC QN AUTO: 22.5 PG (ref 27–31)
MCHC RBC AUTO-ENTMCNC: 30.8 G/DL (ref 32–36)
MCV RBC AUTO: 73 FL (ref 82–98)
MONOCYTES # BLD AUTO: 1.2 K/UL (ref 0.3–1)
MONOCYTES NFR BLD: 14.4 % (ref 4–15)
NEUTROPHILS # BLD AUTO: 4.8 K/UL (ref 1.8–7.7)
NEUTROPHILS NFR BLD: 58.8 % (ref 38–73)
NRBC BLD-RTO: 0 /100 WBC
PLATELET # BLD AUTO: 255 K/UL (ref 150–450)
PMV BLD AUTO: 10.4 FL (ref 9.2–12.9)
POTASSIUM SERPL-SCNC: 4 MMOL/L (ref 3.5–5.1)
POTASSIUM SERPL-SCNC: 4.2 MMOL/L (ref 3.5–5.1)
RBC # BLD AUTO: 3.73 M/UL (ref 4.6–6.2)
SODIUM SERPL-SCNC: 131 MMOL/L (ref 136–145)
SODIUM SERPL-SCNC: 132 MMOL/L (ref 136–145)
WBC # BLD AUTO: 8.2 K/UL (ref 3.9–12.7)

## 2024-03-21 PROCEDURE — 25000003 PHARM REV CODE 250

## 2024-03-21 PROCEDURE — 63600175 PHARM REV CODE 636 W HCPCS

## 2024-03-21 PROCEDURE — 80048 BASIC METABOLIC PNL TOTAL CA: CPT | Mod: 91 | Performed by: HOSPITALIST

## 2024-03-21 PROCEDURE — 80048 BASIC METABOLIC PNL TOTAL CA: CPT

## 2024-03-21 PROCEDURE — 25000003 PHARM REV CODE 250: Performed by: STUDENT IN AN ORGANIZED HEALTH CARE EDUCATION/TRAINING PROGRAM

## 2024-03-21 PROCEDURE — 25000003 PHARM REV CODE 250: Performed by: HOSPITALIST

## 2024-03-21 PROCEDURE — 97110 THERAPEUTIC EXERCISES: CPT | Mod: CQ

## 2024-03-21 PROCEDURE — 36415 COLL VENOUS BLD VENIPUNCTURE: CPT

## 2024-03-21 PROCEDURE — 63600175 PHARM REV CODE 636 W HCPCS: Performed by: STUDENT IN AN ORGANIZED HEALTH CARE EDUCATION/TRAINING PROGRAM

## 2024-03-21 PROCEDURE — 85025 COMPLETE CBC W/AUTO DIFF WBC: CPT | Performed by: STUDENT IN AN ORGANIZED HEALTH CARE EDUCATION/TRAINING PROGRAM

## 2024-03-21 PROCEDURE — 11000001 HC ACUTE MED/SURG PRIVATE ROOM

## 2024-03-21 PROCEDURE — A4216 STERILE WATER/SALINE, 10 ML: HCPCS | Performed by: HOSPITALIST

## 2024-03-21 PROCEDURE — 36415 COLL VENOUS BLD VENIPUNCTURE: CPT | Mod: XB | Performed by: HOSPITALIST

## 2024-03-21 PROCEDURE — 97530 THERAPEUTIC ACTIVITIES: CPT | Mod: CQ

## 2024-03-21 PROCEDURE — 97535 SELF CARE MNGMENT TRAINING: CPT | Mod: CO

## 2024-03-21 PROCEDURE — 97110 THERAPEUTIC EXERCISES: CPT | Mod: CO

## 2024-03-21 RX ADMIN — MIRTAZAPINE 15 MG: 15 TABLET, FILM COATED ORAL at 08:03

## 2024-03-21 RX ADMIN — OXYCODONE HYDROCHLORIDE 15 MG: 10 TABLET ORAL at 09:03

## 2024-03-21 RX ADMIN — CLONIDINE HYDROCHLORIDE 0.1 MG: 0.1 TABLET ORAL at 08:03

## 2024-03-21 RX ADMIN — METHOCARBAMOL 500 MG: 500 TABLET ORAL at 08:03

## 2024-03-21 RX ADMIN — METHOCARBAMOL 500 MG: 500 TABLET ORAL at 05:03

## 2024-03-21 RX ADMIN — OXYCODONE HYDROCHLORIDE 10 MG: 10 TABLET, FILM COATED, EXTENDED RELEASE ORAL at 10:03

## 2024-03-21 RX ADMIN — DOCUSATE SODIUM AND SENNOSIDES 2 TABLET: 8.6; 5 TABLET, FILM COATED ORAL at 08:03

## 2024-03-21 RX ADMIN — OXYCODONE HYDROCHLORIDE 15 MG: 10 TABLET ORAL at 04:03

## 2024-03-21 RX ADMIN — CEFEPIME 2 G: 2 INJECTION, POWDER, FOR SOLUTION INTRAVENOUS at 10:03

## 2024-03-21 RX ADMIN — OXYCODONE HYDROCHLORIDE 10 MG: 10 TABLET, FILM COATED, EXTENDED RELEASE ORAL at 08:03

## 2024-03-21 RX ADMIN — OXYCODONE HYDROCHLORIDE 15 MG: 10 TABLET ORAL at 02:03

## 2024-03-21 RX ADMIN — OXYCODONE HYDROCHLORIDE 15 MG: 10 TABLET ORAL at 07:03

## 2024-03-21 RX ADMIN — DAPTOMYCIN 780 MG: 350 INJECTION, POWDER, LYOPHILIZED, FOR SOLUTION INTRAVENOUS at 08:03

## 2024-03-21 RX ADMIN — METHOCARBAMOL 500 MG: 500 TABLET ORAL at 12:03

## 2024-03-21 RX ADMIN — CEFEPIME 2 G: 2 INJECTION, POWDER, FOR SOLUTION INTRAVENOUS at 05:03

## 2024-03-21 RX ADMIN — CEFEPIME 2 G: 2 INJECTION, POWDER, FOR SOLUTION INTRAVENOUS at 12:03

## 2024-03-21 RX ADMIN — Medication 10 ML: at 12:03

## 2024-03-21 RX ADMIN — Medication 10 ML: at 05:03

## 2024-03-21 RX ADMIN — SODIUM CHLORIDE: 9 INJECTION, SOLUTION INTRAVENOUS at 08:03

## 2024-03-21 NOTE — SUBJECTIVE & OBJECTIVE
Interval History: Tm 100.6 this am  Patient feels unchanged    Review of Systems  Objective:     Vital Signs (Most Recent):  Temp: 99.5 °F (37.5 °C) (03/21/24 1147)  Pulse: 86 (03/21/24 1147)  Resp: 16 (03/21/24 1147)  BP: (!) 125/58 (03/21/24 1147)  SpO2: 98 % (03/21/24 1147) Vital Signs (24h Range):  Temp:  [97.2 °F (36.2 °C)-100.6 °F (38.1 °C)] 99.5 °F (37.5 °C)  Pulse:  [] 86  Resp:  [16-18] 16  SpO2:  [97 %-100 %] 98 %  BP: (106-125)/(49-58) 125/58     Weight: 78 kg (172 lb)  Body mass index is 24.68 kg/m².    Intake/Output Summary (Last 24 hours) at 3/21/2024 1418  Last data filed at 3/21/2024 1319  Gross per 24 hour   Intake 1340 ml   Output 725 ml   Net 615 ml         Physical Exam  Constitutional:       General: He is not in acute distress.     Appearance: Normal appearance. He is ill-appearing. He is not toxic-appearing or diaphoretic.   HENT:      Head: Normocephalic and atraumatic.      Nose: Nose normal.      Mouth/Throat:      Mouth: Mucous membranes are moist.      Pharynx: Oropharynx is clear.   Eyes:      General: No scleral icterus.     Extraocular Movements: Extraocular movements intact.      Conjunctiva/sclera: Conjunctivae normal.      Pupils: Pupils are equal, round, and reactive to light.   Cardiovascular:      Rate and Rhythm: Normal rate and regular rhythm.      Pulses: Normal pulses.   Pulmonary:      Effort: Pulmonary effort is normal. No respiratory distress.      Breath sounds: Normal breath sounds. No stridor. No wheezing, rhonchi or rales.   Chest:      Chest wall: No tenderness.   Abdominal:      General: Abdomen is flat. Bowel sounds are normal. There is no distension.      Palpations: Abdomen is soft.      Tenderness: There is no abdominal tenderness. There is no right CVA tenderness, left CVA tenderness, guarding or rebound.   Musculoskeletal:         General: No swelling, tenderness, deformity or signs of injury. Normal range of motion.      Cervical back: Normal range of  motion and neck supple. No rigidity or tenderness.      Right lower leg: No edema.      Left lower leg: No edema.   Skin:     General: Skin is warm and dry.      Coloration: Skin is not jaundiced.      Findings: No erythema or rash.   Neurological:      General: No focal deficit present.      Mental Status: He is alert and oriented to person, place, and time. Mental status is at baseline.      Motor: No weakness.      Gait: Gait abnormal.   Psychiatric:         Mood and Affect: Mood normal.         Behavior: Behavior normal.         Thought Content: Thought content normal.         Judgment: Judgment normal.             Significant Labs: All pertinent labs within the past 24 hours have been reviewed.    Significant Imaging: I have reviewed all pertinent imaging results/findings within the past 24 hours.

## 2024-03-21 NOTE — PT/OT/SLP PROGRESS
"Physical Therapy Treatment    Patient Name:  Ton Donovan   MRN:  [id number]    Recommendations:     Discharge Recommendations: Low Intensity Therapy  Discharge Equipment Recommendations: rollator, shower chair  Barriers to discharge: Inaccessible home and Decreased caregiver support    Assessment:     Ton Donovan is a 28 y.o. male admitted with a medical diagnosis of Osteomyelitis of vertebra of lumbosacral region.  He presents with the following impairments/functional limitations: weakness, impaired endurance, impaired self care skills, impaired functional mobility, gait instability, decreased lower extremity function, decreased safety awareness, decreased ROM, orthopedic precautions.    Rehab Prognosis: Good; patient would benefit from acute skilled PT services to address these deficits and reach maximum level of function.    Recent Surgery: * No surgery found *      Plan:     During this hospitalization, patient to be seen 3 x/week to address the identified rehab impairments via gait training, therapeutic activities, therapeutic exercises, neuromuscular re-education and progress toward the following goals:    Plan of Care Expires:  04/14/24    Subjective     Chief Complaint: pain in low back and calf tightness  Patient/Family Comments/goals: "I feel like I'm sliding back over here. At LTAC I could get up and go walk around or to the TV room to be up more."  Pain/Comfort:  Pain Rating 1: other (see comments) (unrated)  Location - Orientation 1: lower  Location 1: back  Pain Addressed 1: Distraction  Pain Rating Post-Intervention 1: other (see comments) (unrated)      Objective:     Communicated with RN prior to session.  Patient found right sidelying with  (no active lines attached) upon PTA entry to room.     General Precautions: Standard, fall  Orthopedic Precautions: spinal precautions  Braces: LSO  Respiratory Status: Room air     Functional Mobility:  Bed Mobility:     Supine to Sit: modified " independence  Transfers:     Sit to Stand:  modified independence with rolling walker  Gait: Pt ambulates greater than 500 ft with RW and Mod-I. Pt with heavy reliance on BUE support, hip hike for BLE clearance, and ankle eversion on toe off. Steady with no overt LOB despite his gait deficits.       AM-PAC 6 CLICK MOBILITY  Turning over in bed (including adjusting bedclothes, sheets and blankets)?: 4  Sitting down on and standing up from a chair with arms (e.g., wheelchair, bedside commode, etc.): 4  Moving from lying on back to sitting on the side of the bed?: 4  Moving to and from a bed to a chair (including a wheelchair)?: 4  Need to walk in hospital room?: 4  Climbing 3-5 steps with a railing?: 3  Basic Mobility Total Score: 23       Treatment & Education:  Prolonged distance for functional exercise. Education provided on increasing frequency of mobilization, positioning of B ankles to address ankle muscular restrictions, and pt encouraged to sit up EOB/up in chair more often.     Patient left sitting edge of bed with all lines intact, call button in reach, and Charge RN notified pt is safe to mobilize with RW Mod-I on nursing unit.     GOALS:   Multidisciplinary Problems       Physical Therapy Goals          Problem: Physical Therapy    Goal Priority Disciplines Outcome Goal Variances Interventions   Physical Therapy Goal     PT, PT/OT Ongoing, Progressing     Description: Goals to be met by: 04/15/24     Patient will increase functional independence with mobility by performin. Bed to chair transfer with [place] using No Assistive Device  2. Gait  x 200 feet with Supervision using No Assistive Device.   3. Stand for 10 minutes with [place] using No Assistive Device demonstrating no swayback posture.   4. Increased functional strength to WFL for hip abduction, core strength.  5. Lower extremity exercise program x15 reps per handout, with independence                         Time Tracking:     PT  Received On: 03/21/24  PT Start Time: 1601     PT Stop Time: 1624  PT Total Time (min): 23 min     Billable Minutes: Therapeutic Activity 10 and Therapeutic Exercise 13    Treatment Type: Treatment  PT/PTA: PTA     Number of PTA visits since last PT visit: 2     03/21/2024

## 2024-03-21 NOTE — PT/OT/SLP PROGRESS
Occupational Therapy   Treatment    Name: Ton Donovan  MRN: [id number]  Admitting Diagnosis:  Osteomyelitis of vertebra of lumbosacral region       Recommendations:     Discharge Recommendations: High Intensity Therapy  Discharge Equipment Recommendations:  rollator, shower chair  Barriers to discharge:  Inaccessible home environment    Assessment:     Ton Donovan is a 28 y.o. male with a medical diagnosis of Osteomyelitis of vertebra of lumbosacral region.  He presents with the following performance deficits affecting function are weakness, impaired endurance, impaired self care skills, impaired functional mobility, gait instability, decreased safety awareness, decreased lower extremity function, decreased ROM, orthopedic precautions. Patient is progressing well with OT intervention. Patient has demonstrated sufficient progression to warrant high intensity therapy evidenced by objectives noted below.    Rehab Prognosis:  Good; patient would benefit from acute skilled OT services to address these deficits and reach maximum level of function.       Plan:     Patient to be seen 4 x/week to address the above listed problems via self-care/home management, therapeutic activities, therapeutic exercises, neuromuscular re-education  Plan of Care Expires: 04/15/24  Plan of Care Reviewed with: patient    Subjective     Chief Complaint: none  Patient/Family Comments/goals: patient agreeable to therapy  Pain/Comfort:  Pain Rating 1: 0/10  Pain Rating Post-Intervention 1: 0/10    Objective:     Communicated with: nurse hernandez prior to session.  Patient found right sidelying with  (no active lines) upon OT entry to room.    General Precautions: Standard, fall    Orthopedic Precautions:spinal precautions  Braces: LSO  Respiratory Status: Room air     Occupational Performance:     Bed Mobility:    Patient completed Supine to Sit with modified independence     Functional Mobility/Transfers:  Patient completed Sit <> Stand  Transfer with modified independence  with  rolling walker   Patient tolerated ~10 min standing sink side, no LOB, no UE support  Functional Mobility: patient ambulated to/from bathroom 14ft x2 using RW with (S) progressed to Mod(I)    Activities of Daily Living:  Grooming: modified independence oral and facial hygiene standing sink side with RW  Toileting: modified independence manage gown and void in standing, no AD  Upper Body Dressing: don gown as a robe and LSO seated     Therapeutic Exercise:  Patient completed the following for restorative core strength, manage pain from overcompensation/musculoskeletal imbalances, and improve muscular endurance required for increased (I) in functional mobility/transfers and ADLs  Seated/box squats bed elevated with progression to lowered bed to challenge (1 set x10 reps, 1 set x5 reps, 1 set x 1 rep)   Standing calf raises using RW for stability (2 sets x15 reps)  Calf stretches   Reviewed HEP from prior session    [facility] 6 Click ADL: 20    Treatment & Education:  Patient edu on OT POC, goals, and current progress. Education on positioning techniques to maintain joint integrity 2/2 patient preferring side sleeping but c/o hip pain. Addressed all patient questions/concerns within GARCIA scope of practice.    Patient left sitting edge of bed with all lines intact and call button in reach    GOALS:   Multidisciplinary Problems       Occupational Therapy Goals          Problem: Occupational Therapy    Goal Priority Disciplines Outcome Interventions   Occupational Therapy Goal     OT, PT/OT Ongoing, Progressing    Description: Goals to be met by: 3/29/24     Patient will increase functional independence with ADLs by performing:    UE Dressing with [place].  LE Dressing with [place].  Grooming while standing at sink with [place].  Toileting from toilet with [place] for hygiene and clothing management.   Toilet transfer to toilet with [place].                          Time Tracking:     OT Date of Treatment: 03/21/24  OT Start Time: 1253  OT Stop Time: 1332  OT Total Time (min): 39 min    Billable Minutes:Self Care/Home Management 16  Therapeutic Exercise 23    OT/ASHLEY: ASHLEY     Number of ASHLEY visits since last OT visit: 2    3/21/2024

## 2024-03-21 NOTE — PLAN OF CARE
Problem: Adult Inpatient Plan of Care  Goal: Patient-Specific Goal (Individualized)  Description: Pt will maintain sbp below 160  Outcome: Ongoing, Progressing  Flowsheets (Taken 3/21/2024 0516)  Anxieties, Fears or Concerns: none  Individualized Care Needs: pain management  Goal: Optimal Comfort and Wellbeing  Outcome: Ongoing, Progressing  Intervention: Monitor Pain and Promote Comfort  Flowsheets (Taken 3/21/2024 0516)  Pain Management Interventions: care clustered  Intervention: Provide Person-Centered Care  Flowsheets (Taken 3/21/2024 0516)  Trust Relationship/Rapport:   care explained   choices provided   PRN oxycodone given x 2 for pain. VSS. Bed in lowest position and call light in reach. Side railsx3.

## 2024-03-21 NOTE — PROGRESS NOTES
[facility]r [facility] - Neurosurgery ([facility])  [facility] Medicine  Progress Note    Patient Name: Ton Donovan  MRN: [id number]  Patient Class: IP- Inpatient   Admission Date: 3/14/2024  Length of Stay: 7 days  Attending Physician: Nagi Angel*  Primary Care Provider: Steve Kelly DO        Subjective:     Principal Problem:Osteomyelitis of vertebra of lumbosacral region        HPI:  Mr. Ton Donovan is a 28 year-old Male with a PMHx of Hypertension, IV drug use who presented with fevers and ongoing back pain with radiation down legs.    Of note, he was recently admitted to [facility] for LOOP X lumbar spinal fusion, with course complicated by a psoas abscess and MRSA epidural abscess s/p epidural phlegmon debridement. He was started on IV Daptomycin and discharged on 2/22/24 to [facility] for completion of antibiotics. Ongoing fevers complicated his LTAC stay, and he returned to the [facility] for further evaluation. On arrival, WBC 9, ESR 59, CRP 46, AST 360s, ALT 200s. Procalcitonin was elevated at 1.18, lactate normal. MRI L spine concerning for continued L5-S1/S1 diskitis and osteomyelitis with abscess, diffuse infectious myositis of the lumbar paraspinal musculature bilaterally and left iliopsoas muscle without discrete abscess. He was then transferred to [facility] for Neurosurgerical evaluation.     At the time of this consult, temp 99F, HR 60, 120s/60s, saturating well on room air. Most recent labs with WBC 5, Hgb 9, Na 134.    Medicine consulted for medical co-management of epidural abscess    Overview/Hospital Course:  3/15-Admitted for NSGY evaluation of OM & discitis lumbar spine. Will let pt eat.   3/16- Does not need surgery. Appreciate NSGY eval. Wound care and ID following and need final recs. Pt is comfortable.   3/17- Klebsiella sens to everything,  On dapto and yue.  Pic line removed and culture tip was ordered, but not done.   3/18 per ID: Continue  Iv-cefepime 2g Q8h. (Bld cx  +kleb aerogenes panS) & Continue Iv-Daptomycin 10mg/kg Q24h.  repeat bld cxs 03/15 and 03/17 (NGTD); picc cath tip culture was not sent down properly  Anticipate continuing both Iv-dapto and cefepime for remaining 2-3wks of full 6wk duration of Iv-abx(BRENNAN 04/10) for lumbar osteo-discitis with paravertebral / psoas myositis and sub centimeter abscesses   Will need new PIc line or mid line.  Waiting for cultures to be neg to ensure therapeutic window without bacteremia and presence of a line.   - IR consulted to evaluate if the fluid collection at posterior to L5-s1 which communicates with disc space to see if that can be drained.  IR, Dr. Pretty reviewed this pt's case. He says no discrete fluid collection seen within the paraspinal muscles on either the CT or the MRI, it appears he has swollen, infected paraspinal muscles more consistent with myositis. He may have some fluid in the disc space which we recommend reaching out to neuro IR - No fluid to be drained, infection limited to myositis.    3/19- Per Neuro IR, abscess is very deep to reach by IR means so it's not amenable to drain. Repeat cultures are neg.   3/20 replace pic and plan for LTAC.   ID final recs:   Continue Iv-cefepime 2g Q8h. (Bld cx +kleb aerogenes panS) x 2 weeks from 1st negative blood culture 3/15  Continue Iv-Daptomycin 10mg/kg Q24h  [CPK 39]. To complete 6 weeks  OK to replace PICC  Rec indefinite suppression with Doxycyline 100mg po bid once IV abx completed.  fu upon dc from LTAC  Pic line placed, waiting on LTACplacement    Interval History: Tm 100.6 this am  Patient feels unchanged    Review of Systems  Objective:     Vital Signs (Most Recent):  Temp: 99.5 °F (37.5 °C) (03/21/24 1147)  Pulse: 86 (03/21/24 1147)  Resp: 16 (03/21/24 1147)  BP: (!) 125/58 (03/21/24 1147)  SpO2: 98 % (03/21/24 1147) Vital Signs (24h Range):  Temp:  [97.2 °F (36.2 °C)-100.6 °F (38.1 °C)] 99.5 °F (37.5 °C)  Pulse:  [] 86  Resp:  [16-18] 16  SpO2:  [97  %-100 %] 98 %  BP: (106-125)/(49-58) 125/58     Weight: 78 kg (172 lb)  Body mass index is 24.68 kg/m².    Intake/Output Summary (Last 24 hours) at 3/21/2024 1418  Last data filed at 3/21/2024 1319  Gross per 24 hour   Intake 1340 ml   Output 725 ml   Net 615 ml         Physical Exam  Constitutional:       General: He is not in acute distress.     Appearance: Normal appearance. He is ill-appearing. He is not toxic-appearing or diaphoretic.   HENT:      Head: Normocephalic and atraumatic.      Nose: Nose normal.      Mouth/Throat:      Mouth: Mucous membranes are moist.      Pharynx: Oropharynx is clear.   Eyes:      General: No scleral icterus.     Extraocular Movements: Extraocular movements intact.      Conjunctiva/sclera: Conjunctivae normal.      Pupils: Pupils are equal, round, and reactive to light.   Cardiovascular:      Rate and Rhythm: Normal rate and regular rhythm.      Pulses: Normal pulses.   Pulmonary:      Effort: Pulmonary effort is normal. No respiratory distress.      Breath sounds: Normal breath sounds. No stridor. No wheezing, rhonchi or rales.   Chest:      Chest wall: No tenderness.   Abdominal:      General: Abdomen is flat. Bowel sounds are normal. There is no distension.      Palpations: Abdomen is soft.      Tenderness: There is no abdominal tenderness. There is no right CVA tenderness, left CVA tenderness, guarding or rebound.   Musculoskeletal:         General: No swelling, tenderness, deformity or signs of injury. Normal range of motion.      Cervical back: Normal range of motion and neck supple. No rigidity or tenderness.      Right lower leg: No edema.      Left lower leg: No edema.   Skin:     General: Skin is warm and dry.      Coloration: Skin is not jaundiced.      Findings: No erythema or rash.   Neurological:      General: No focal deficit present.      Mental Status: He is alert and oriented to person, place, and time. Mental status is at baseline.      Motor: No weakness.       Gait: Gait abnormal.   Psychiatric:         Mood and Affect: Mood normal.         Behavior: Behavior normal.         Thought Content: Thought content normal.         Judgment: Judgment normal.             Significant Labs: All pertinent labs within the past 24 hours have been reviewed.    Significant Imaging: I have reviewed all pertinent imaging results/findings within the past 24 hours.    Assessment/Plan:      * Osteomyelitis of vertebra of lumbosacral region  28M with hypertension, IV drug use (heroin) presenting from LTAC with ongoing fevers    He was recently admitted on 2/04/24 with osteomyelitis/discitis L5-S1, sacral and left illiac osteomyelitis, L5-S1 epidural abscess, and left iliopsoas abscess. He underwent L4-pelvic fusion on 2/14/24 with epidural phlegmon debridement, intraoperative cultures positive for MRSA. Infectious Disease was consulted and recommended IV Daptomycin for 8 week course (EOC 4/10/24) to be followed by oral suppressive doxycycline thereafter. He was discharged on 2/22/24 to [facility] to complete his antibiotic course. LTAC stay was complicated by ongoing fevers and he represented to [facility] for further evaluation prior to transfer to [facility] for neurosurgical evaluation. On arrival, WBC 9, ESR 59, CRP 46, AST 360s, ALT 200s. Procalcitonin was elevated at 1.18, lactate normal. MRI L spine showed continued L5-S1/S1 diskitis/osteomyelitis with abscess, diffuse infectious myositis of the bilateral lumbar paraspinal musculature and left iliopsoas muscle.  - Infectious Disease consulted, appreciate assistance and recommendations  - CT L spine without contrast noted  - Continue IV Daptomycin q24h  - Weekly CK level while on Daptomycin  - Multimodal pain control with tylenol, gabapentin, methocarbamol, oxy IR, oxy ER  - Follow blood cultures through maturity  - Daily CBC, CMP  - Cardiac monitor  - Neurosurgery Consult: he does not need surgery.  - pt transferred to [facility]    3/18 - ID  "final recs pending. Has Klebsiella in blood sens to everything.  Wound care consulted . pic line may have been source of infection  3/20-   ID final recs:   Continue Iv-cefepime 2g Q8h. (Bld cx +kleb aerogenes panS) x 2 weeks from 1st negative blood culture 3/15 ( stop day 4/5/24)  Continue Iv-Daptomycin 10mg/kg Q24h  [CPK 39]. To complete 6 weeks( stop day 4/10/24_  OK to replace PICC  Rec indefinite suppression with Doxycyline 100mg po bid once IV abx completed.  fu with ID upon dc from LTAC      Chronic diastolic heart failure  ECHO 2/13/2024  Left Ventricle: The left ventricle is normal in size. Normal wall thickness. Normal wall motion. There is normal systolic function with a visually estimated ejection fraction of 60 - 65%. Ejection fraction by visual approximation is 65%. There is diastolic dysfunction but grade cannot be determined.    Right Ventricle: Normal right ventricular cavity size. Wall thickness is normal. Right ventricle wall motion  is normal. Systolic function is normal.    IVC/SVC: Normal venous pressure at 3 mmHg.    Pericardium: There is a trivial posterior effusion just base.    STABLE      Septic arthritis  See " Osteomyelitis"      Myositis of multiple sites  See " Osteomeylitis" for plan      Hyponatremia  Patient has hyponatremia which is controlled,We will aim to correct the sodium by 4-6mEq in 24 hours. We will monitor sodium Daily. The hyponatremia is due to Dehydration/hypovolemia. We will obtain the following studies: see labsection. We will treat the hyponatremia with IV fluids. The patient's sodium results have been reviewed and are listed below.  No results for input(s): "NA" in the last 24 hours.      Drug dependence  See " Substance induced depressive disroder"  - monitor for withdrawal and give opioid if needed   - pt has been in LTAC  - on oxycodone LA 10 bid for pain and dependence  STABLE      Hepatitis  Most recent labs from OSH showed AST 360s, ALT 200s. Had recent " "hepatitis panel 3/12 with hep c    - Recent Hepatitis C screening resulted positive, previous Hep C screening 3 months ago negative. Check repeat Hep C screening and PCR  - Check HIV given history of IVDU and ongoing fever despite antibiotics  - RUQ ultrasound- pending  - Daily CMP to trend transaminases:  335/235  - Infectious Diseases consulted and following      Severe sepsis  This patient does have evidence of infective focus - continued L5-S1/S1 diskitis/osteomyelitis with abscess, diffuse infectious myositis of the bilateral lumbar paraspinal musculature and left iliopsoas muscle.  My overall impression is sepsis.  Source: Skin and Soft Tissue (location epidurial)  Antibiotics given-   Antibiotics (72h ago, onward)      Start     Stop Route Frequency Ordered    03/17/24 2130  ceFEPIme (MAXIPIME) 2 g in dextrose 5 % in water (D5W) 100 mL IVPB (MB+)         -- IV Every 8 hours (non-standard times) 03/17/24 1452    03/14/24 0900  DAPTOmycin (CUBICIN) 780 mg in sodium chloride 0.9% SolP 50 mL IVPB         -- IV Every 24 hours (non-standard times) 03/14/24 0755          Latest lactate reviewed-  No results for input(s): "LACTATE", "POCLAC" in the last 72 hours.    Organ dysfunction indicated by Acute liver injury    Fluid challenge Not needed - patient is not hypotensive      Post- resuscitation assessment No - Post resuscitation assessment not needed       Will Not start Pressors- Levophed for MAP of 65  Source control achieved by: Broad spectrum antibiotics    - Infectious Disease consulted  - Continue IV Daptomycin and meropenum, Need end date  - Follow blood cultures through maturity. Klebsiella and enterobacteraies per PCR.- & sens to everything    HTN (hypertension)  Chronic, controlled. Latest blood pressure and vitals reviewed-     Temp:  [97.6 °F (36.4 °C)-99.4 °F (37.4 °C)]   Pulse:  []   Resp:  [16-18]   BP: ()/(54-65)   SpO2:  [92 %-98 %] .   Home meds for hypertension were reviewed and " "noted below.   Hypertension Medications               cloNIDine (CATAPRES) 0.1 MG tablet Take 1 tablet (0.1 mg total) by mouth every 12 (twelve) hours.          Plan:  - While in the hospital, will manage blood pressure as follows; Continue home antihypertensive regimen. Currently on Clonidine 0.1 PO BID  - Will utilize p.r.n. blood pressure medication only if patient's blood pressure greater than 180/110 and he develops symptoms such as worsening chest pain or shortness of breath.    Discitis of lumbosacral region  See Osteomyelitis for consolidated plan      Debility  Patient with Acute on chronic debility due to other reduced mobility. Latest AMPAC and GEMS scores have been reviewed. Evaluation for etiology is complete. Plan includes progressive mobility protocol initated, PT/OT consulted, and may need surgey .    Substance or medication-induced depressive disorder  - Continue Mirtazipine  - Hx of heroin, THC, cociane      Psoas abscess, left  L5-S1/S1 diskitis/osteomyelitis with abscess, diffuse infectious myositis of the bilateral lumbar paraspinal musculature and left iliopsoas muscle.  - see " Osteomeylitis" above      Heroin use disorder, severe  See " Drug Dependence"         VTE Risk Mitigation (From admission, onward)           Ordered     Place BRENNAN hose  Until discontinued         03/14/24 0730     IP VTE HIGH RISK PATIENT  Once         03/14/24 0730     Place sequential compression device  Until discontinued         03/14/24 0730                    Discharge Planning   SENA: 3/22/2024     Code Status: Full Code   Is the patient medically ready for discharge?: No    Reason for patient still in hospital (select all that apply): Patient trending condition  Discharge Plan A: Long-term acute care facility (LTAC)   Discharge Delays: None known at this time      Nagi Angel MD  Department of Hospital Medicine   [facility] - Neurosurgery ([facility])    "

## 2024-03-22 PROBLEM — I82.409 ACUTE DVT (DEEP VENOUS THROMBOSIS): Status: ACTIVE | Noted: 2024-03-22

## 2024-03-22 LAB
ALBUMIN SERPL BCP-MCNC: 2.8 G/DL (ref 3.5–5.2)
ALP SERPL-CCNC: 227 U/L (ref 55–135)
ALT SERPL W/O P-5'-P-CCNC: 297 U/L (ref 10–44)
ANION GAP SERPL CALC-SCNC: 7 MMOL/L (ref 8–16)
AST SERPL-CCNC: 512 U/L (ref 10–40)
BACTERIA BLD CULT: NORMAL
BASOPHILS # BLD AUTO: 0.03 K/UL (ref 0–0.2)
BASOPHILS NFR BLD: 0.5 % (ref 0–1.9)
BILIRUB SERPL-MCNC: 1.2 MG/DL (ref 0.1–1)
BUN SERPL-MCNC: 10 MG/DL (ref 6–20)
CALCIUM SERPL-MCNC: 8.9 MG/DL (ref 8.7–10.5)
CHLORIDE SERPL-SCNC: 101 MMOL/L (ref 95–110)
CK SERPL-CCNC: 67 U/L (ref 20–200)
CO2 SERPL-SCNC: 26 MMOL/L (ref 23–29)
CREAT SERPL-MCNC: 0.7 MG/DL (ref 0.5–1.4)
CRP SERPL-MCNC: 61.4 MG/L (ref 0–8.2)
DIFFERENTIAL METHOD BLD: ABNORMAL
EOSINOPHIL # BLD AUTO: 0.2 K/UL (ref 0–0.5)
EOSINOPHIL NFR BLD: 3.7 % (ref 0–8)
ERYTHROCYTE [DISTWIDTH] IN BLOOD BY AUTOMATED COUNT: 21.4 % (ref 11.5–14.5)
EST. GFR  (NO RACE VARIABLE): >60 ML/MIN/1.73 M^2
GLUCOSE SERPL-MCNC: 91 MG/DL (ref 70–110)
HCT VFR BLD AUTO: 25.6 % (ref 40–54)
HGB BLD-MCNC: 8.1 G/DL (ref 14–18)
HYPOCHROMIA BLD QL SMEAR: ABNORMAL
IMM GRANULOCYTES # BLD AUTO: 0.05 K/UL (ref 0–0.04)
IMM GRANULOCYTES NFR BLD AUTO: 0.8 % (ref 0–0.5)
LYMPHOCYTES # BLD AUTO: 2 K/UL (ref 1–4.8)
LYMPHOCYTES NFR BLD: 31 % (ref 18–48)
MCH RBC QN AUTO: 22.7 PG (ref 27–31)
MCHC RBC AUTO-ENTMCNC: 31.6 G/DL (ref 32–36)
MCV RBC AUTO: 72 FL (ref 82–98)
MONOCYTES # BLD AUTO: 1 K/UL (ref 0.3–1)
MONOCYTES NFR BLD: 15.8 % (ref 4–15)
NEUTROPHILS # BLD AUTO: 3.2 K/UL (ref 1.8–7.7)
NEUTROPHILS NFR BLD: 48.2 % (ref 38–73)
NRBC BLD-RTO: 0 /100 WBC
PLATELET # BLD AUTO: 224 K/UL (ref 150–450)
PLATELET BLD QL SMEAR: ABNORMAL
PMV BLD AUTO: 10 FL (ref 9.2–12.9)
POTASSIUM SERPL-SCNC: 4.5 MMOL/L (ref 3.5–5.1)
PROT SERPL-MCNC: 7.5 G/DL (ref 6–8.4)
RBC # BLD AUTO: 3.57 M/UL (ref 4.6–6.2)
SODIUM SERPL-SCNC: 134 MMOL/L (ref 136–145)
WBC # BLD AUTO: 6.52 K/UL (ref 3.9–12.7)

## 2024-03-22 PROCEDURE — 63600175 PHARM REV CODE 636 W HCPCS

## 2024-03-22 PROCEDURE — 86140 C-REACTIVE PROTEIN: CPT

## 2024-03-22 PROCEDURE — 25000003 PHARM REV CODE 250: Performed by: HOSPITALIST

## 2024-03-22 PROCEDURE — 11000001 HC ACUTE MED/SURG PRIVATE ROOM

## 2024-03-22 PROCEDURE — A4216 STERILE WATER/SALINE, 10 ML: HCPCS | Performed by: HOSPITALIST

## 2024-03-22 PROCEDURE — 36415 COLL VENOUS BLD VENIPUNCTURE: CPT

## 2024-03-22 PROCEDURE — 85025 COMPLETE CBC W/AUTO DIFF WBC: CPT | Performed by: STUDENT IN AN ORGANIZED HEALTH CARE EDUCATION/TRAINING PROGRAM

## 2024-03-22 PROCEDURE — 36415 COLL VENOUS BLD VENIPUNCTURE: CPT | Mod: XB | Performed by: STUDENT IN AN ORGANIZED HEALTH CARE EDUCATION/TRAINING PROGRAM

## 2024-03-22 PROCEDURE — 94761 N-INVAS EAR/PLS OXIMETRY MLT: CPT

## 2024-03-22 PROCEDURE — 87040 BLOOD CULTURE FOR BACTERIA: CPT | Performed by: STUDENT IN AN ORGANIZED HEALTH CARE EDUCATION/TRAINING PROGRAM

## 2024-03-22 PROCEDURE — 25000003 PHARM REV CODE 250: Performed by: STUDENT IN AN ORGANIZED HEALTH CARE EDUCATION/TRAINING PROGRAM

## 2024-03-22 PROCEDURE — 25000003 PHARM REV CODE 250

## 2024-03-22 PROCEDURE — 97535 SELF CARE MNGMENT TRAINING: CPT

## 2024-03-22 PROCEDURE — 82550 ASSAY OF CK (CPK): CPT

## 2024-03-22 PROCEDURE — 97530 THERAPEUTIC ACTIVITIES: CPT

## 2024-03-22 PROCEDURE — 63600175 PHARM REV CODE 636 W HCPCS: Performed by: STUDENT IN AN ORGANIZED HEALTH CARE EDUCATION/TRAINING PROGRAM

## 2024-03-22 PROCEDURE — 80053 COMPREHEN METABOLIC PANEL: CPT | Performed by: STUDENT IN AN ORGANIZED HEALTH CARE EDUCATION/TRAINING PROGRAM

## 2024-03-22 RX ADMIN — OXYCODONE HYDROCHLORIDE 15 MG: 10 TABLET ORAL at 04:03

## 2024-03-22 RX ADMIN — OXYCODONE HYDROCHLORIDE 15 MG: 10 TABLET ORAL at 03:03

## 2024-03-22 RX ADMIN — Medication 10 ML: at 05:03

## 2024-03-22 RX ADMIN — DAPTOMYCIN 780 MG: 350 INJECTION, POWDER, LYOPHILIZED, FOR SOLUTION INTRAVENOUS at 09:03

## 2024-03-22 RX ADMIN — OXYCODONE HYDROCHLORIDE 15 MG: 10 TABLET ORAL at 07:03

## 2024-03-22 RX ADMIN — METHOCARBAMOL 500 MG: 500 TABLET ORAL at 04:03

## 2024-03-22 RX ADMIN — OXYCODONE HYDROCHLORIDE 15 MG: 10 TABLET ORAL at 12:03

## 2024-03-22 RX ADMIN — CEFEPIME 2 G: 2 INJECTION, POWDER, FOR SOLUTION INTRAVENOUS at 12:03

## 2024-03-22 RX ADMIN — CLONIDINE HYDROCHLORIDE 0.1 MG: 0.1 TABLET ORAL at 09:03

## 2024-03-22 RX ADMIN — OXYCODONE HYDROCHLORIDE 10 MG: 10 TABLET, FILM COATED, EXTENDED RELEASE ORAL at 08:03

## 2024-03-22 RX ADMIN — OXYCODONE HYDROCHLORIDE 15 MG: 10 TABLET ORAL at 11:03

## 2024-03-22 RX ADMIN — Medication 10 ML: at 11:03

## 2024-03-22 RX ADMIN — ACETAMINOPHEN 650 MG: 325 TABLET ORAL at 03:03

## 2024-03-22 RX ADMIN — DOCUSATE SODIUM AND SENNOSIDES 2 TABLET: 8.6; 5 TABLET, FILM COATED ORAL at 08:03

## 2024-03-22 RX ADMIN — CEFEPIME 2 G: 2 INJECTION, POWDER, FOR SOLUTION INTRAVENOUS at 05:03

## 2024-03-22 RX ADMIN — METHOCARBAMOL 500 MG: 500 TABLET ORAL at 12:03

## 2024-03-22 RX ADMIN — Medication 10 ML: at 12:03

## 2024-03-22 RX ADMIN — METHOCARBAMOL 500 MG: 500 TABLET ORAL at 08:03

## 2024-03-22 RX ADMIN — Medication 10 ML: at 06:03

## 2024-03-22 RX ADMIN — POLYETHYLENE GLYCOL 3350 17 G: 17 POWDER, FOR SOLUTION ORAL at 08:03

## 2024-03-22 RX ADMIN — CLONIDINE HYDROCHLORIDE 0.1 MG: 0.1 TABLET ORAL at 08:03

## 2024-03-22 RX ADMIN — OXYCODONE HYDROCHLORIDE 15 MG: 10 TABLET ORAL at 09:03

## 2024-03-22 RX ADMIN — OXYCODONE HYDROCHLORIDE 10 MG: 10 TABLET, FILM COATED, EXTENDED RELEASE ORAL at 09:03

## 2024-03-22 RX ADMIN — MIRTAZAPINE 15 MG: 15 TABLET, FILM COATED ORAL at 09:03

## 2024-03-22 RX ADMIN — METHOCARBAMOL 500 MG: 500 TABLET ORAL at 09:03

## 2024-03-22 RX ADMIN — CEFEPIME 2 G: 2 INJECTION, POWDER, FOR SOLUTION INTRAVENOUS at 09:03

## 2024-03-22 NOTE — PLAN OF CARE
Plan of care:    Notified by primary team of recurrent fever - Isolated temp 102.3  Returned to see patient but was off floor.    Chart reviewed:  Now afebrile and WBC continues to decrease and in normal range without granulocyte elevation.  Prior blood cultures 3/17 NG x 5 days  Repeat BCXs sent.  Remains on Daptomycin for prior disseminated MRSA infection and cefepime for recent suspected line associated Kleb Aerogenes bacteremia.    New US Upper extremity L: shows occlusive thrombus in basilic vein below elbow and cephalic vein.      Assessment/Plan:  Fever likely due to identified L UE thrombus given WBC normal  Anticoagulation per primary as indicated  Continue current ABX  FU blood cultures to ensure negative  Monitor fever  Will follow up in am     Brenton Jauregui PA-C

## 2024-03-22 NOTE — PROGRESS NOTES
Aleksandr Bray - Neurosurgery ([facility])  [facility] Medicine  Progress Note    Patient Name: Ton Donovan  MRN: [id number]  Patient Class: IP- Inpatient   Admission Date: 3/14/2024  Length of Stay: 8 days  Attending Physician: Nagi Angel*  Primary Care Provider: Steve Kelly DO        Subjective:     Principal Problem:Osteomyelitis of vertebra of lumbosacral region        HPI:  Mr. Ton Donovan is a 28 year-old Male with a PMHx of Hypertension, IV drug use who presented with fevers and ongoing back pain with radiation down legs.    Of note, he was recently admitted to [facility] [place] [facility] for LOOP X lumbar spinal fusion, with course complicated by a psoas abscess and MRSA epidural abscess s/p epidural phlegmon debridement. He was started on IV Daptomycin and discharged on 2/22/24 to [facility] for completion of antibiotics. Ongoing fevers complicated his LTAC stay, and he [facility] Emergency Department for further evaluation. On arrival, WBC 9, ESR 59, CRP 46, AST 360s, ALT 200s. Procalcitonin was elevated at 1.18, lactate normal. MRI L spine concerning for continued L5-S1/S1 diskitis and osteomyelitis with abscess, diffuse infectious myositis of the lumbar paraspinal musculature bilaterally and left iliopsoas muscle without discrete abscess. He was then transferred to [facility] for Neurosurgerical evaluation.     At the time of this consult, temp 99F, HR 60, 120s/60s, saturating well on room air. Most recent labs with WBC 5, Hgb 9, Na 134.    Medicine consulted for medical co-management of epidural abscess    Overview/Hospital Course:  3/15-Admitted for NSGY evaluation of OM & discitis lumbar spine. Will let pt eat.   3/16- Does not need surgery. Appreciate NSGY eval. Wound care and ID following and need final recs. Pt is comfortable.   3/17- Klebsiella sens to everything,  On dapto and yue.  Pic line removed and culture tip was ordered, but not done.   3/18 per ID: Continue  Iv-cefepime 2g Q8h. (Bld cx  +kleb aerogenes panS) & Continue Iv-Daptomycin 10mg/kg Q24h.  repeat bld cxs 03/15 and 03/17 (NGTD); picc cath tip culture was not sent down properly  Anticipate continuing both Iv-dapto and cefepime for remaining 2-3wks of full 6wk duration of Iv-abx(BRENNAN 04/10) for lumbar osteo-discitis with paravertebral / psoas myositis and sub centimeter abscesses   Will need new PIc line or mid line.  Waiting for cultures to be neg to ensure therapeutic window without bacteremia and presence of a line.   - IR consulted to evaluate if the fluid collection at posterior to L5-s1 which communicates with disc space to see if that can be drained.  IR, Dr. Pretty reviewed this pt's case. He says no discrete fluid collection seen within the paraspinal muscles on either the CT or the MRI, it appears he has swollen, infected paraspinal muscles more consistent with myositis. He may have some fluid in the disc space which we recommend reaching out to neuro IR - No fluid to be drained, infection limited to myositis.    3/19- Per Neuro IR, abscess is very deep to reach by IR means so it's not amenable to drain. Repeat cultures are neg.   3/20 replace pic and plan for LTAC.   ID final recs:   Continue Iv-cefepime 2g Q8h. (Bld cx +kleb aerogenes panS) x 2 weeks from 1st negative blood culture 3/15  Continue Iv-Daptomycin 10mg/kg Q24h  [CPK 39]. To complete 6 weeks  OK to replace PICC  Rec indefinite suppression with Doxycyline 100mg po bid once IV abx completed.  fu upon dc from LTAC  Pic line placed    Interval History: Febrile overnight 102. Pt states he did not feel feverish. No worsening pain. Back wound does not appear worse  Repeating blood cultures  Ultrasound LUE    Review of Systems  Objective:     Vital Signs (Most Recent):  Temp: 98.7 °F (37.1 °C) (03/22/24 1218)  Pulse: 97 (03/22/24 1218)  Resp: 18 (03/22/24 1218)  BP: 122/68 (03/22/24 1218)  SpO2: 99 % (03/22/24 1218) Vital Signs (24h Range):  Temp:  [98.4 °F (36.9 °C)-102.3 °F  (39.1 °C)] 98.7 °F (37.1 °C)  Pulse:  [] 97  Resp:  [16-18] 18  SpO2:  [95 %-99 %] 99 %  BP: (101-129)/(51-77) 122/68     Weight: 78 kg (172 lb)  Body mass index is 24.68 kg/m².  No intake or output data in the 24 hours ending 03/22/24 1407      Physical Exam  Constitutional:       General: He is not in acute distress.     Appearance: Normal appearance. He is not toxic-appearing or diaphoretic.   HENT:      Head: Normocephalic and atraumatic.      Mouth/Throat:      Mouth: Mucous membranes are moist.      Pharynx: Oropharynx is clear.   Eyes:      Extraocular Movements: Extraocular movements intact.      Pupils: Pupils are equal, round, and reactive to light.   Cardiovascular:      Rate and Rhythm: Normal rate and regular rhythm.      Pulses: Normal pulses.   Pulmonary:      Effort: Pulmonary effort is normal. No respiratory distress.      Breath sounds: Normal breath sounds. No stridor. No wheezing, rhonchi or rales.   Chest:      Chest wall: No tenderness.   Abdominal:      General: Abdomen is flat. Bowel sounds are normal. There is no distension.      Palpations: Abdomen is soft.      Tenderness: There is no abdominal tenderness. There is no right CVA tenderness, left CVA tenderness, guarding or rebound.   Musculoskeletal:         General: No swelling, tenderness, deformity or signs of injury. Normal range of motion.      Cervical back: Normal range of motion and neck supple. No rigidity or tenderness.      Right lower leg: No edema.      Left lower leg: No edema.   Skin:     General: Skin is warm and dry.      Coloration: Skin is not jaundiced.      Findings: No erythema or rash.   Neurological:      General: No focal deficit present.      Mental Status: He is alert and oriented to person, place, and time. Mental status is at baseline.      Motor: No weakness.      Gait: Gait abnormal.             Significant Labs: All pertinent labs within the past 24 hours have been reviewed.    Significant Imaging: I  have reviewed all pertinent imaging results/findings within the past 24 hours.    Assessment/Plan:      * Osteomyelitis of vertebra of lumbosacral region  28M with hypertension, IV drug use (heroin) presenting from LTAC with ongoing fevers    He was recently admitted on 2/04/24 with osteomyelitis/discitis L5-S1, sacral and left illiac osteomyelitis, L5-S1 epidural abscess, and left iliopsoas abscess. He underwent L4-pelvic fusion on 2/14/24 with epidural phlegmon debridement, intraoperative cultures positive for MRSA. Infectious Disease was consulted and recommended IV Daptomycin for 8 week course (EOC 4/10/24) to be followed by oral suppressive doxycycline thereafter. He was discharged on 2/22/24 to [facility] to complete his antibiotic course. LTAC stay was complicated by ongoing fevers and he represented to [facility] for further evaluation prior to [place] [facility] [place]. On arrival, WBC 9, ESR 59, CRP 46, AST 360s, ALT 200s. Procalcitonin was elevated at 1.18, lactate normal. MRI L spine showed continued L5-S1/S1 diskitis/osteomyelitis with abscess, diffuse infectious myositis of the bilateral lumbar paraspinal musculature and left iliopsoas muscle.  - Infectious Disease consulted, appreciate assistance and recommendations  - CT L spine without contrast noted  - Continue IV Daptomycin q24h  - Weekly CK level while on Daptomycin  - Multimodal pain control with tylenol, gabapentin, methocarbamol, oxy IR, oxy ER  - Follow blood cultures through maturity  - Daily CBC, CMP  - Cardiac monitor  - Neurosurgery Consult: he does not need surgery.  - pt transferred to [facility]    3/18 - ID final recs pending. Has Klebsiella in blood sens to everything.  Wound care consulted . pic line may have been source of infection  3/20-   ID final recs:   Continue Iv-cefepime 2g Q8h. (Bld cx +kleb aerogenes panS) x 2 weeks from 1st negative blood culture 3/15 ( stop day 4/5/24)  Continue Iv-Daptomycin 10mg/kg  "Q24h  [CPK 39]. To complete 6 weeks( stop day 4/10/24_  OK to replace PICC  Rec indefinite suppression with Doxycyline 100mg po bid once IV abx completed.  fu with ID upon dc from LTAC      Chronic diastolic heart failure  ECHO 2/13/2024  Left Ventricle: The left ventricle is normal in size. Normal wall thickness. Normal wall motion. There is normal systolic function with a visually estimated ejection fraction of 60 - 65%. Ejection fraction by visual approximation is 65%. There is diastolic dysfunction but grade cannot be determined.    Right Ventricle: Normal right ventricular cavity size. Wall thickness is normal. Right ventricle wall motion  is normal. Systolic function is normal.    IVC/SVC: Normal venous pressure at 3 mmHg.    Pericardium: There is a trivial posterior effusion just base.    STABLE      Septic arthritis  See " Osteomyelitis"      Myositis of multiple sites  See " Osteomeylitis" for plan      Hyponatremia  Patient has hyponatremia which is controlled,We will aim to correct the sodium by 4-6mEq in 24 hours. We will monitor sodium Daily. The hyponatremia is due to Dehydration/hypovolemia. We will obtain the following studies: see labsection. We will treat the hyponatremia with IV fluids. The patient's sodium results have been reviewed and are listed below.  No results for input(s): "NA" in the last 24 hours.      Drug dependence  See " Substance induced depressive disroder"  - monitor for withdrawal and give opioid if needed   - pt has been in LTAC  - on oxycodone LA 10 bid for pain and dependence  STABLE      Hepatitis  Most recent labs from OSH showed AST 360s, ALT 200s. Had recent hepatitis panel 3/12 with hep c    - Recent Hepatitis C screening resulted positive, previous Hep C screening 3 months ago negative. Check repeat Hep C screening and PCR  - Check HIV given history of IVDU and ongoing fever despite antibiotics  - RUQ ultrasound- pending  - Daily CMP to trend transaminases:  335/235  - " "Infectious Diseases consulted and following      Severe sepsis  This patient does have evidence of infective focus - continued L5-S1/S1 diskitis/osteomyelitis with abscess, diffuse infectious myositis of the bilateral lumbar paraspinal musculature and left iliopsoas muscle.  My overall impression is sepsis.  Source: Skin and Soft Tissue (location epidurial)  Antibiotics given-   Antibiotics (72h ago, onward)      Start     Stop Route Frequency Ordered    03/17/24 2130  ceFEPIme (MAXIPIME) 2 g in dextrose 5 % in water (D5W) 100 mL IVPB (MB+)         -- IV Every 8 hours (non-standard times) 03/17/24 1452    03/14/24 0900  DAPTOmycin (CUBICIN) 780 mg in sodium chloride 0.9% SolP 50 mL IVPB         -- IV Every 24 hours (non-standard times) 03/14/24 0755          Latest lactate reviewed-  No results for input(s): "LACTATE", "POCLAC" in the last 72 hours.    Organ dysfunction indicated by Acute liver injury    Fluid challenge Not needed - patient is not hypotensive      Post- resuscitation assessment No - Post resuscitation assessment not needed       Will Not start Pressors- Levophed for MAP of 65  Source control achieved by: Broad spectrum antibiotics    - Infectious Disease consulted  - Continue IV Daptomycin and meropenum, Need end date  - Follow blood cultures through maturity. Klebsiella and enterobacteraies per PCR.- & sens to everything    HTN (hypertension)  Chronic, controlled. Latest blood pressure and vitals reviewed-     Temp:  [97.6 °F (36.4 °C)-99.4 °F (37.4 °C)]   Pulse:  []   Resp:  [16-18]   BP: ()/(54-65)   SpO2:  [92 %-98 %] .   Home meds for hypertension were reviewed and noted below.   Hypertension Medications               cloNIDine (CATAPRES) 0.1 MG tablet Take 1 tablet (0.1 mg total) by mouth every 12 (twelve) hours.          Plan:  - While in the hospital, will manage blood pressure as follows; Continue home antihypertensive regimen. Currently on Clonidine 0.1 PO BID  - Will utilize " "p.r.n. blood pressure medication only if patient's blood pressure greater than 180/110 and he develops symptoms such as worsening chest pain or shortness of breath.    Discitis of lumbosacral region  See Osteomyelitis for consolidated plan      Debility  Patient with Acute on chronic debility due to other reduced mobility. Latest AMPAC and GEMS scores have been reviewed. Evaluation for etiology is complete. Plan includes progressive mobility protocol initated, PT/OT consulted, and may need surgey .    Substance or medication-induced depressive disorder  - Continue Mirtazipine  - Hx of heroin, THC, cociane      Psoas abscess, left  L5-S1/S1 diskitis/osteomyelitis with abscess, diffuse infectious myositis of the bilateral lumbar paraspinal musculature and left iliopsoas muscle.  - see " Osteomeylitis" above      Heroin use disorder, severe  See " Drug Dependence"         VTE Risk Mitigation (From admission, onward)           Ordered     Place BRENNAN hose  Until discontinued         03/14/24 0730     IP VTE HIGH RISK PATIENT  Once         03/14/24 0730     Place sequential compression device  Until discontinued         03/14/24 0730                    Discharge Planning   SENA: 3/25/2024     Code Status: Full Code   Is the patient medically ready for discharge?: No    Reason for patient still in hospital (select all that apply): Patient trending condition, Treatment, and Consult recommendations  Discharge Plan A: Long-term acute care facility (LTAC)   Discharge Delays: None known at this time      Nagi Angel MD  Department of Hospital Medicine   [facility] - Neurosurgery ([facility])    "

## 2024-03-22 NOTE — PROGRESS NOTES
Aleksandr Bray - Neurosurgery ([facility])  Wound Care    Patient Name:  Ton Donovan   MRN:  [id number]  Date: 3/22/2024  Diagnosis: Osteomyelitis of vertebra of lumbosacral region    History:     Past Medical History:   Diagnosis Date    Hypertension     Unilateral inguinal hernia, without obstruction or gangrene, not specified as recurrent        Social History     Socioeconomic History    Marital status: Single   Tobacco Use    Smoking status: Former     Types: Cigarettes    Smokeless tobacco: Never   Substance and Sexual Activity    Alcohol use: No    Drug use: Yes     Types: Marijuana, IV     Comment: 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     Social Determinants of Health     Financial Resource Strain: High Risk (3/15/2024)    Overall Financial Resource Strain (CARDIA)     Difficulty of Paying Living Expenses: Very hard   Food Insecurity: Food Insecurity Present (3/15/2024)    Hunger Vital Sign     Worried About Running Out of Food in the Last Year: Often true     Ran Out of Food in the Last Year: Often true   Transportation Needs: Unmet Transportation Needs (3/15/2024)    PRAPARE - Transportation     Lack of Transportation (Medical): Yes     Lack of Transportation (Non-Medical): Yes   Physical Activity: Inactive (3/14/2024)    Exercise Vital Sign     Days of Exercise per Week: 0 days     Minutes of Exercise per Session: 0 min   Stress: Stress Concern Present (3/15/2024)    [employer] - Occupational Stress Questionnaire     Feeling of Stress : Very much   Social Connections: Socially Isolated (11/21/2023)    Social Connection and Isolation Panel [NHANES]     Frequency of Communication with Friends and Family: Never     Frequency of Social Gatherings with Friends and Family: Never     Attends Sabianism Services: Never     Active Member of Clubs or Organizations: No     Attends Club or Organization Meetings: Never     Marital Status: Never    Housing Stability: High Risk (3/15/2024)     Housing Stability Vital Sign     Unable to Pay for Housing in the Last Year: Yes     Number of Places Lived in the Last Year: 0     Unstable Housing in the Last Year: Yes       Precautions:     Allergies as of 03/13/2024    (No Known Allergies)       WO Assessment Details/Treatment     Patient seen for wound care consultation.   Reviewed chart for this encounter.   See Flow Sheet for findings.      RECOMMENDATIONS: Patient is Aox4 and agreeable to inpatient wound care. Follow up for back incisional wound. Wound shows healthy tissue and less slough at the base of the wound. Continue current wound care orders. No other needs or concerns at this time. Will follow up 3/29/2024 or sooner if needed.    Discussed POC with patient and primary nurse.   See EMR for orders & patient education.    Discussed nutrition and the role of protein in wound healing with the patient. Instructed patient to optimize protein for wound healing.    Bedside nursing to continue care & monitoring.  Bedside nursing to maintain pressure injury prevention interventions.            03/22/24 1145   WOCN Assessment   WOCN Total Time (mins) 30   Visit Date 03/22/24   Visit Time 1145   Consult Type Follow Up   WOCN Speciality Wound   Intervention assessed;changed;applied;chart review;coordination of care;orders   Teaching on-going        Incision/Site 02/14/24 1545 Lumbar spine posterior;midline vertical   Date First Assessed/Time First Assessed: 02/14/24 1545   Present Prior to Hospital Arrival?: No  Location: Lumbar spine  Orientation: posterior;midline  Incision Type: vertical  Closure Method: Sutures;Other (see comments);Staples  Additional Comments...   Wound Image    Incision WDL ex   Dressing Appearance Dry;Intact;Clean   Drainage Amount None   Drainage Characteristics/Odor No odor   Appearance Pink;Yellow;White   Care Cleansed with:;Antimicrobial agent   Dressing Applied;Changed;Silver;Foam   Dressing Change Due 03/23/24       Orders  placed.   Carlo Moseley RN  03/22/2024

## 2024-03-22 NOTE — CARE UPDATE
I have reviewed the chart of Ton Donovan who is hospitalized for the following:    Active Hospital Problems    Diagnosis    *Osteomyelitis of vertebra of lumbosacral region    Acute DVT (deep venous thrombosis)     Occlusive thrombus in the left basilic and cephalic veins.       Constipation     On miralax and senna      Hepatitis C     HCV +ve       Severe sepsis    Hepatitis    Drug dependence     Drug screen + thc and opioids  Hx iv drug use      Hyponatremia     Monitor with labs      Myositis of multiple sites     Diffuse infectious myositis of the bilateral lower lumbar paraspinal musculature and within the left psoas and iliopsoas muscles       Septic arthritis     septic arthritis about the left SI joint.       Chronic diastolic heart failure     There is diastolic dysfunction but grade cannot be determined.       HTN (hypertension)    Discitis of lumbosacral region     See primary problem      Debility    Pain     Prn oxy        Substance or medication-induced depressive disorder    Psoas abscess, left    Heroin use disorder, severe          Harleen Cecy, NP  [facility]

## 2024-03-22 NOTE — SUBJECTIVE & OBJECTIVE
Interval History: Febrile overnight 102. Pt states he did not feel feverish. No worsening pain. Back wound does not appear worse  Repeating blood cultures  Ultrasound LUE    Review of Systems  Objective:     Vital Signs (Most Recent):  Temp: 98.7 °F (37.1 °C) (03/22/24 1218)  Pulse: 97 (03/22/24 1218)  Resp: 18 (03/22/24 1218)  BP: 122/68 (03/22/24 1218)  SpO2: 99 % (03/22/24 1218) Vital Signs (24h Range):  Temp:  [98.4 °F (36.9 °C)-102.3 °F (39.1 °C)] 98.7 °F (37.1 °C)  Pulse:  [] 97  Resp:  [16-18] 18  SpO2:  [95 %-99 %] 99 %  BP: (101-129)/(51-77) 122/68     Weight: 78 kg (172 lb)  Body mass index is 24.68 kg/m².  No intake or output data in the 24 hours ending 03/22/24 1407      Physical Exam  Constitutional:       General: He is not in acute distress.     Appearance: Normal appearance. He is not toxic-appearing or diaphoretic.   HENT:      Head: Normocephalic and atraumatic.      Mouth/Throat:      Mouth: Mucous membranes are moist.      Pharynx: Oropharynx is clear.   Eyes:      Extraocular Movements: Extraocular movements intact.      Pupils: Pupils are equal, round, and reactive to light.   Cardiovascular:      Rate and Rhythm: Normal rate and regular rhythm.      Pulses: Normal pulses.   Pulmonary:      Effort: Pulmonary effort is normal. No respiratory distress.      Breath sounds: Normal breath sounds. No stridor. No wheezing, rhonchi or rales.   Chest:      Chest wall: No tenderness.   Abdominal:      General: Abdomen is flat. Bowel sounds are normal. There is no distension.      Palpations: Abdomen is soft.      Tenderness: There is no abdominal tenderness. There is no right CVA tenderness, left CVA tenderness, guarding or rebound.   Musculoskeletal:         General: No swelling, tenderness, deformity or signs of injury. Normal range of motion.      Cervical back: Normal range of motion and neck supple. No rigidity or tenderness.      Right lower leg: No edema.      Left lower leg: No edema.    Skin:     General: Skin is warm and dry.      Coloration: Skin is not jaundiced.      Findings: No erythema or rash.   Neurological:      General: No focal deficit present.      Mental Status: He is alert and oriented to person, place, and time. Mental status is at baseline.      Motor: No weakness.      Gait: Gait abnormal.             Significant Labs: All pertinent labs within the past 24 hours have been reviewed.    Significant Imaging: I have reviewed all pertinent imaging results/findings within the past 24 hours.

## 2024-03-22 NOTE — PLAN OF CARE
Patient with temp overnight on Wednesday.  Per MD, patient was to be held.  Auth rec'd.  Patient to transfer to [facility] once cleared by MD.   03/22/24 0935   Post-Acute Status   Post-Acute Authorization Placement   Post-Acute Placement Status Pending medical clearance/testing

## 2024-03-22 NOTE — PLAN OF CARE
Problem: Occupational Therapy  Goal: Occupational Therapy Goal  Description: Goals to be met by: 4/5/24     Patient will increase functional independence with ADLs by performing:    UE Dressing with [place]. - Met 3/22  LE Dressing with [place].  Grooming while standing at sink with [place].  Toileting from toilet with [place] for hygiene and clothing management.   Toilet transfer to toilet with [place].    Outcome: Ongoing, Progressing

## 2024-03-22 NOTE — PT/OT/SLP PROGRESS
Occupational Therapy   Treatment    Name: Ton Donovan  MRN: [id number]  Admitting Diagnosis:  Osteomyelitis of vertebra of lumbosacral region       Recommendations:     Discharge Recommendations: Low Intensity Therapy  Discharge Equipment Recommendations:  rollator, shower chair  Barriers to discharge:  Inaccessible home environment    Assessment:     Ton Donovan is a 28 y.o. male with a medical diagnosis of Osteomyelitis of vertebra of lumbosacral region.  He presents with the following performance deficits affecting function: weakness, impaired endurance, impaired self care skills, impaired functional mobility, gait instability, impaired balance, orthopedic precautions.     Pt agreeable to therapy and tolerated the session well. Pt ambulated ~ 200 ft with SBA and RW. While in standing by the window, pt able to participate in various and stretches translating to increased independence with dressing and bathing tasks. Pt is now able to perform figure-4 technique to assist with LB dressing. Overall, he has progressed towards his goals. Pt would benefit from continued skilled acute OT services during this admission in order to maximize independence and safety with ADLs and functional mobility to ensure safe return to PLOF in the least restrictive environment. Patient currently demonstrates a need for low intensity therapy post acute for increased independence in ADLs and functional mobility.       Rehab Prognosis:  Good; patient would benefit from acute skilled OT services to address these deficits and reach maximum level of function.       Plan:     Patient to be seen 3 x/week to address the above listed problems via self-care/home management, therapeutic activities, therapeutic exercises, neuromuscular re-education  Plan of Care Expires: 04/15/24  Plan of Care Reviewed with: patient    Subjective     Chief Complaint: Tightness   Patient/Family Comments/goals: To return to PLOF  Pain/Comfort:  Pain Rating 1:  0/10    Objective:     Communicated with: RN prior to session.  Patient found HOB elevated with Other (comments) (no active lines) upon OT entry to room.    General Precautions: Standard, fall    Orthopedic Precautions:spinal precautions  Braces: LSO  Respiratory Status: Room air     Occupational Performance:     Bed Mobility:    Patient completed Rolling/Turning to Right with supervision  Patient completed Scooting/Bridging with supervision  Patient completed Supine to Sit with supervision     Functional Mobility/Transfers:  Patient completed Sit <> Stand Transfer with stand by assistance  with  no assistive device   Functional Mobility: Pt engaging in functional mobility to simulate household/community distances approx 200 ft with SBA and utilizing RW in order to maximize functional activity tolerance and standing balance required for engagement in occupations of choice.    Activities of Daily Living:  Upper Body Dressing: independence : To don LSO sitting EOB   Lower Body Dressing: stand by assistance : To simulate LB dressing with figure -4     Therapeutic Exercise:   Calf raises  Marches   10 sit to stands   Calf stretches   Figure-4 stretch   Shoulder flexion     AMPAC 6 Click ADL: 23    Treatment & Education:  Therapist provided facilitation and instruction of proper body mechanics and fall prevention strategies during tasks listed above.  Instructed patient to sit in bedside chair daily to increase OOB/activity tolerance.  Instructed patient to use call light to have nursing staff assist with needs/transfers.  Discussed OT POC and answered all questions within OT scope of practice.  Whiteboard updated       Patient left sitting edge of bed with all lines intact and call button in reach    GOALS:   Multidisciplinary Problems       Occupational Therapy Goals          Problem: Occupational Therapy    Goal Priority Disciplines Outcome Interventions   Occupational Therapy Goal     OT, PT/OT Ongoing, Progressing     Description: Goals to be met by: 4/5/24     Patient will increase functional independence with ADLs by performing:    UE Dressing with Elrod. - Met 3/22  LE Dressing with Elrod.  Grooming while standing at sink with Elrod.  Toileting from toilet with Elrod for hygiene and clothing management.   Toilet transfer to toilet with Elrod.                         Time Tracking:     OT Date of Treatment: 03/22/24  OT Start Time: 1149  OT Stop Time: 1202  OT Total Time (min): 13 min    Billable Minutes:Therapeutic Activity 13    OT/ASHLEY: OT     Number of ASHLEY visits since last OT visit: 2    3/22/2024

## 2024-03-23 LAB
ANISOCYTOSIS BLD QL SMEAR: SLIGHT
BASOPHILS # BLD AUTO: 0.02 K/UL (ref 0–0.2)
BASOPHILS NFR BLD: 0.3 % (ref 0–1.9)
DIFFERENTIAL METHOD BLD: ABNORMAL
EOSINOPHIL # BLD AUTO: 0.3 K/UL (ref 0–0.5)
EOSINOPHIL NFR BLD: 4.5 % (ref 0–8)
ERYTHROCYTE [DISTWIDTH] IN BLOOD BY AUTOMATED COUNT: 21 % (ref 11.5–14.5)
HCT VFR BLD AUTO: 25.6 % (ref 40–54)
HGB BLD-MCNC: 8 G/DL (ref 14–18)
HYPOCHROMIA BLD QL SMEAR: ABNORMAL
IMM GRANULOCYTES # BLD AUTO: 0.02 K/UL (ref 0–0.04)
IMM GRANULOCYTES NFR BLD AUTO: 0.3 % (ref 0–0.5)
LYMPHOCYTES # BLD AUTO: 2.1 K/UL (ref 1–4.8)
LYMPHOCYTES NFR BLD: 32.9 % (ref 18–48)
MCH RBC QN AUTO: 22.5 PG (ref 27–31)
MCHC RBC AUTO-ENTMCNC: 31.3 G/DL (ref 32–36)
MCV RBC AUTO: 72 FL (ref 82–98)
MONOCYTES # BLD AUTO: 1 K/UL (ref 0.3–1)
MONOCYTES NFR BLD: 14.8 % (ref 4–15)
NEUTROPHILS # BLD AUTO: 3.1 K/UL (ref 1.8–7.7)
NEUTROPHILS NFR BLD: 47.2 % (ref 38–73)
NRBC BLD-RTO: 0 /100 WBC
PLATELET # BLD AUTO: 217 K/UL (ref 150–450)
PLATELET BLD QL SMEAR: ABNORMAL
PMV BLD AUTO: 10.6 FL (ref 9.2–12.9)
POIKILOCYTOSIS BLD QL SMEAR: SLIGHT
POLYCHROMASIA BLD QL SMEAR: ABNORMAL
RBC # BLD AUTO: 3.56 M/UL (ref 4.6–6.2)
TARGETS BLD QL SMEAR: ABNORMAL
WBC # BLD AUTO: 6.5 K/UL (ref 3.9–12.7)

## 2024-03-23 PROCEDURE — 25000003 PHARM REV CODE 250: Performed by: STUDENT IN AN ORGANIZED HEALTH CARE EDUCATION/TRAINING PROGRAM

## 2024-03-23 PROCEDURE — 25000003 PHARM REV CODE 250

## 2024-03-23 PROCEDURE — A4216 STERILE WATER/SALINE, 10 ML: HCPCS | Performed by: HOSPITALIST

## 2024-03-23 PROCEDURE — 25000003 PHARM REV CODE 250: Performed by: HOSPITALIST

## 2024-03-23 PROCEDURE — 63600175 PHARM REV CODE 636 W HCPCS: Performed by: STUDENT IN AN ORGANIZED HEALTH CARE EDUCATION/TRAINING PROGRAM

## 2024-03-23 PROCEDURE — 99233 SBSQ HOSP IP/OBS HIGH 50: CPT | Mod: ,,, | Performed by: PHYSICIAN ASSISTANT

## 2024-03-23 PROCEDURE — 63600175 PHARM REV CODE 636 W HCPCS

## 2024-03-23 PROCEDURE — 11000001 HC ACUTE MED/SURG PRIVATE ROOM

## 2024-03-23 PROCEDURE — 94761 N-INVAS EAR/PLS OXIMETRY MLT: CPT

## 2024-03-23 PROCEDURE — 85025 COMPLETE CBC W/AUTO DIFF WBC: CPT | Performed by: STUDENT IN AN ORGANIZED HEALTH CARE EDUCATION/TRAINING PROGRAM

## 2024-03-23 RX ADMIN — METHOCARBAMOL 500 MG: 500 TABLET ORAL at 08:03

## 2024-03-23 RX ADMIN — CEFEPIME 2 G: 2 INJECTION, POWDER, FOR SOLUTION INTRAVENOUS at 04:03

## 2024-03-23 RX ADMIN — OXYCODONE HYDROCHLORIDE 10 MG: 10 TABLET, FILM COATED, EXTENDED RELEASE ORAL at 09:03

## 2024-03-23 RX ADMIN — CLONIDINE HYDROCHLORIDE 0.1 MG: 0.1 TABLET ORAL at 08:03

## 2024-03-23 RX ADMIN — OXYCODONE HYDROCHLORIDE 15 MG: 10 TABLET ORAL at 05:03

## 2024-03-23 RX ADMIN — OXYCODONE HYDROCHLORIDE 15 MG: 10 TABLET ORAL at 10:03

## 2024-03-23 RX ADMIN — Medication 10 ML: at 06:03

## 2024-03-23 RX ADMIN — OXYCODONE HYDROCHLORIDE 15 MG: 10 TABLET ORAL at 08:03

## 2024-03-23 RX ADMIN — OXYCODONE HYDROCHLORIDE 15 MG: 10 TABLET ORAL at 12:03

## 2024-03-23 RX ADMIN — CLONIDINE HYDROCHLORIDE 0.1 MG: 0.1 TABLET ORAL at 09:03

## 2024-03-23 RX ADMIN — DOCUSATE SODIUM AND SENNOSIDES 2 TABLET: 8.6; 5 TABLET, FILM COATED ORAL at 08:03

## 2024-03-23 RX ADMIN — MIRTAZAPINE 15 MG: 15 TABLET, FILM COATED ORAL at 09:03

## 2024-03-23 RX ADMIN — OXYCODONE HYDROCHLORIDE 15 MG: 10 TABLET ORAL at 03:03

## 2024-03-23 RX ADMIN — Medication 10 ML: at 12:03

## 2024-03-23 RX ADMIN — CEFEPIME 2 G: 2 INJECTION, POWDER, FOR SOLUTION INTRAVENOUS at 09:03

## 2024-03-23 RX ADMIN — METHOCARBAMOL 500 MG: 500 TABLET ORAL at 09:03

## 2024-03-23 RX ADMIN — DAPTOMYCIN 780 MG: 350 INJECTION, POWDER, LYOPHILIZED, FOR SOLUTION INTRAVENOUS at 08:03

## 2024-03-23 RX ADMIN — CEFEPIME 2 G: 2 INJECTION, POWDER, FOR SOLUTION INTRAVENOUS at 12:03

## 2024-03-23 RX ADMIN — METHOCARBAMOL 500 MG: 500 TABLET ORAL at 12:03

## 2024-03-23 RX ADMIN — METHOCARBAMOL 500 MG: 500 TABLET ORAL at 05:03

## 2024-03-23 NOTE — PLAN OF CARE
Problem: Adult Inpatient Plan of Care  Goal: Plan of Care Review  Outcome: Ongoing, Progressing  Goal: Patient-Specific Goal (Individualized)  Description: Pt will maintain sbp below 160  Outcome: Ongoing, Progressing  Goal: Absence of Hospital-Acquired Illness or Injury  Outcome: Ongoing, Progressing  Goal: Optimal Comfort and Wellbeing  Outcome: Ongoing, Progressing  Goal: Readiness for Transition of Care  Outcome: Ongoing, Progressing     Problem: Infection  Goal: Absence of Infection Signs and Symptoms  Outcome: Ongoing, Progressing     Problem: Adjustment to Illness (Sepsis/Septic Shock)  Goal: Optimal Coping  Outcome: Ongoing, Progressing     Problem: Bleeding (Sepsis/Septic Shock)  Goal: Absence of Bleeding  Outcome: Ongoing, Progressing     Problem: Glycemic Control Impaired (Sepsis/Septic Shock)  Goal: Blood Glucose Level Within Desired Range  Outcome: Ongoing, Progressing     Problem: Infection Progression (Sepsis/Septic Shock)  Goal: Absence of Infection Signs and Symptoms  Outcome: Ongoing, Progressing     Problem: Nutrition Impaired (Sepsis/Septic Shock)  Goal: Optimal Nutrition Intake  Outcome: Ongoing, Progressing     Problem: Fall Injury Risk  Goal: Absence of Fall and Fall-Related Injury  Outcome: Ongoing, Progressing

## 2024-03-23 NOTE — SUBJECTIVE & OBJECTIVE
Interval History: No acute events. No growth on repeat cultures. No leukocytosis  Ultrasound with thromboses basilic and cephalic veins  Patient reports arm swelling and discomfort improving    Review of Systems  Objective:     Vital Signs (Most Recent):  Temp: 99.1 °F (37.3 °C) (03/23/24 1106)  Pulse: 82 (03/23/24 1314)  Resp: 18 (03/23/24 1236)  BP: 130/60 (03/23/24 1106)  SpO2: 96 % (03/23/24 1314) Vital Signs (24h Range):  Temp:  [98.2 °F (36.8 °C)-100.4 °F (38 °C)] 99.1 °F (37.3 °C)  Pulse:  [] 82  Resp:  [16-20] 18  SpO2:  [94 %-99 %] 96 %  BP: (115-140)/(55-78) 130/60     Weight: 78 kg (172 lb)  Body mass index is 24.68 kg/m².    Intake/Output Summary (Last 24 hours) at 3/23/2024 1417  Last data filed at 3/23/2024 0823  Gross per 24 hour   Intake 240 ml   Output 1300 ml   Net -1060 ml         Physical Exam  Constitutional:       General: He is not in acute distress.     Appearance: Normal appearance. He is not toxic-appearing or diaphoretic.   HENT:      Head: Normocephalic and atraumatic.      Mouth/Throat:      Mouth: Mucous membranes are moist.      Pharynx: Oropharynx is clear.   Eyes:      Extraocular Movements: Extraocular movements intact.      Pupils: Pupils are equal, round, and reactive to light.   Cardiovascular:      Rate and Rhythm: Normal rate and regular rhythm.      Pulses: Normal pulses.   Pulmonary:      Effort: Pulmonary effort is normal. No respiratory distress.      Breath sounds: Normal breath sounds. No stridor. No wheezing, rhonchi or rales.   Chest:      Chest wall: No tenderness.   Abdominal:      General: Abdomen is flat. Bowel sounds are normal. There is no distension.      Palpations: Abdomen is soft.      Tenderness: There is no abdominal tenderness. There is no right CVA tenderness, left CVA tenderness, guarding or rebound.   Musculoskeletal:         General: No swelling, tenderness, deformity or signs of injury. Normal range of motion.      Cervical back: Normal range of  motion and neck supple. No rigidity or tenderness.      Right lower leg: No edema.      Left lower leg: No edema.   Skin:     General: Skin is warm and dry.      Coloration: Skin is not jaundiced.      Findings: No erythema or rash.   Neurological:      General: No focal deficit present.      Mental Status: He is alert and oriented to person, place, and time. Mental status is at baseline.      Motor: No weakness.      Gait: Gait abnormal.             Significant Labs: All pertinent labs within the past 24 hours have been reviewed.    Significant Imaging: I have reviewed all pertinent imaging results/findings within the past 24 hours.

## 2024-03-23 NOTE — PLAN OF CARE
Problem: Infection  Goal: Absence of Infection Signs and Symptoms  Outcome: Ongoing, Progressing     Problem: Bleeding (Sepsis/Septic Shock)  Goal: Absence of Bleeding  Outcome: Ongoing, Progressing

## 2024-03-23 NOTE — PROGRESS NOTES
[facility] - Neurosurgery ([facility])  [facility] Medicine  Progress Note    Patient Name: Ton Donovan  MRN: [id number]  Patient Class: IP- Inpatient   Admission Date: 3/14/2024  Length of Stay: 9 days  Attending Physician: Nagi Angel*  Primary Care Provider: Steve Kelly DO        Subjective:     Principal Problem:Osteomyelitis of vertebra of lumbosacral region        HPI:  Mr. Ton Donovan is a 28 year-old Male with a PMHx of Hypertension, IV drug use who presented with fevers and ongoing back pain with radiation down legs.    Of note, he was recently admitted to [facility] for LOOP X lumbar spinal fusion, with course complicated by a psoas abscess and MRSA epidural abscess s/p epidural phlegmon debridement. He was started on IV Daptomycin and discharged on 2/22/24 to [facility] for completion of antibiotics. Ongoing fevers complicated his LTAC stay, and he returned to the [facility] for further evaluation. On arrival, WBC 9, ESR 59, CRP 46, AST 360s, ALT 200s. Procalcitonin was elevated at 1.18, lactate normal. MRI L spine concerning for continued L5-S1/S1 diskitis and osteomyelitis with abscess, diffuse infectious myositis of the lumbar paraspinal musculature bilaterally and left iliopsoas muscle without discrete abscess. He was then transferred to [facility] for Neurosurgerical evaluation.     At the time of this consult, temp 99F, HR 60, 120s/60s, saturating well on room air. Most recent labs with WBC 5, Hgb 9, Na 134.    Medicine consulted for medical co-management of epidural abscess    Overview/Hospital Course:  3/15-Admitted for NSGY evaluation of OM & discitis lumbar spine. Will let pt eat.   3/16- Does not need surgery. Appreciate NSGY eval. Wound care and ID following and need final recs. Pt is comfortable.   3/17- Klebsiella sens to everything,  On dapto and yue.  Pic line removed and culture tip was ordered, but not done.   3/18 per ID: Continue  Iv-cefepime 2g Q8h. (Bld cx  +kleb aerogenes panS) & Continue Iv-Daptomycin 10mg/kg Q24h.  repeat bld cxs 03/15 and 03/17 (NGTD); picc cath tip culture was not sent down properly  Anticipate continuing both Iv-dapto and cefepime for remaining 2-3wks of full 6wk duration of Iv-abx(BRENNAN 04/10) for lumbar osteo-discitis with paravertebral / psoas myositis and sub centimeter abscesses   Will need new PIc line or mid line.  Waiting for cultures to be neg to ensure therapeutic window without bacteremia and presence of a line.   - IR consulted to evaluate if the fluid collection at posterior to L5-s1 which communicates with disc space to see if that can be drained.  IR, Dr. Pretty reviewed this pt's case. He says no discrete fluid collection seen within the paraspinal muscles on either the CT or the MRI, it appears he has swollen, infected paraspinal muscles more consistent with myositis. He may have some fluid in the disc space which we recommend reaching out to neuro IR - No fluid to be drained, infection limited to myositis.    3/19- Per Neuro IR, abscess is very deep to reach by IR means so it's not amenable to drain. Repeat cultures are neg.   3/20 replace pic and plan for LTAC.   ID final recs:   Continue Iv-cefepime 2g Q8h. (Bld cx +kleb aerogenes panS) x 2 weeks from 1st negative blood culture 3/15  Continue Iv-Daptomycin 10mg/kg Q24h  [CPK 39]. To complete 6 weeks  OK to replace PICC  Rec indefinite suppression with Doxycyline 100mg po bid once IV abx completed.  fu upon dc from LTAC  Pic line placed    Interval History: No acute events. No growth on repeat cultures. No leukocytosis  Ultrasound with thromboses basilic and cephalic veins  Patient reports arm swelling and discomfort improving    Review of Systems  Objective:     Vital Signs (Most Recent):  Temp: 99.1 °F (37.3 °C) (03/23/24 1106)  Pulse: 82 (03/23/24 1314)  Resp: 18 (03/23/24 1236)  BP: 130/60 (03/23/24 1106)  SpO2: 96 % (03/23/24 1314) Vital Signs (24h Range):  Temp:  [98.2 °F  (36.8 °C)-100.4 °F (38 °C)] 99.1 °F (37.3 °C)  Pulse:  [] 82  Resp:  [16-20] 18  SpO2:  [94 %-99 %] 96 %  BP: (115-140)/(55-78) 130/60     Weight: 78 kg (172 lb)  Body mass index is 24.68 kg/m².    Intake/Output Summary (Last 24 hours) at 3/23/2024 1417  Last data filed at 3/23/2024 0823  Gross per 24 hour   Intake 240 ml   Output 1300 ml   Net -1060 ml         Physical Exam  Constitutional:       General: He is not in acute distress.     Appearance: Normal appearance. He is not toxic-appearing or diaphoretic.   HENT:      Head: Normocephalic and atraumatic.      Mouth/Throat:      Mouth: Mucous membranes are moist.      Pharynx: Oropharynx is clear.   Eyes:      Extraocular Movements: Extraocular movements intact.      Pupils: Pupils are equal, round, and reactive to light.   Cardiovascular:      Rate and Rhythm: Normal rate and regular rhythm.      Pulses: Normal pulses.   Pulmonary:      Effort: Pulmonary effort is normal. No respiratory distress.      Breath sounds: Normal breath sounds. No stridor. No wheezing, rhonchi or rales.   Chest:      Chest wall: No tenderness.   Abdominal:      General: Abdomen is flat. Bowel sounds are normal. There is no distension.      Palpations: Abdomen is soft.      Tenderness: There is no abdominal tenderness. There is no right CVA tenderness, left CVA tenderness, guarding or rebound.   Musculoskeletal:         General: No swelling, tenderness, deformity or signs of injury. Normal range of motion.      Cervical back: Normal range of motion and neck supple. No rigidity or tenderness.      Right lower leg: No edema.      Left lower leg: No edema.   Skin:     General: Skin is warm and dry.      Coloration: Skin is not jaundiced.      Findings: No erythema or rash.   Neurological:      General: No focal deficit present.      Mental Status: He is alert and oriented to person, place, and time. Mental status is at baseline.      Motor: No weakness.      Gait: Gait abnormal.              Significant Labs: All pertinent labs within the past 24 hours have been reviewed.    Significant Imaging: I have reviewed all pertinent imaging results/findings within the past 24 hours.    Assessment/Plan:      * Osteomyelitis of vertebra of lumbosacral region  28M with hypertension, IV drug use (heroin) presenting from LTAC with ongoing fevers    He was recently admitted on 2/04/24 with osteomyelitis/discitis L5-S1, sacral and left illiac osteomyelitis, L5-S1 epidural abscess, and left iliopsoas abscess. He underwent L4-pelvic fusion on 2/14/24 with epidural phlegmon debridement, intraoperative cultures positive for MRSA. Infectious Disease was consulted and recommended IV Daptomycin for 8 week course (EOC 4/10/24) to be followed by oral suppressive doxycycline thereafter. He was discharged on 2/22/24 to [facility] to complete his antibiotic course. LTAC stay was complicated by ongoing fevers and he represented to [facility] for further evaluation prior to transfer to [facility] for neurosurgical evaluation. On arrival, WBC 9, ESR 59, CRP 46, AST 360s, ALT 200s. Procalcitonin was elevated at 1.18, lactate normal. MRI L spine showed continued L5-S1/S1 diskitis/osteomyelitis with abscess, diffuse infectious myositis of the bilateral lumbar paraspinal musculature and left iliopsoas muscle.  - Infectious Disease consulted, appreciate assistance and recommendations  - CT L spine without contrast noted  - Continue IV Daptomycin q24h  - Weekly CK level while on Daptomycin  - Multimodal pain control with tylenol, gabapentin, methocarbamol, oxy IR, oxy ER  - Follow blood cultures through maturity  - Daily CBC, CMP  - Cardiac monitor  - Neurosurgery Consult: he does not need surgery.  - pt transferred to [facility]    3/18 - ID final recs pending. Has Klebsiella in blood sens to everything.  Wound care consulted . pic line may have been source of infection  3/20-   ID final recs:   Continue Iv-cefepime 2g Q8h. (Bld cx  "+kleb aerogenes panS) x 2 weeks from 1st negative blood culture 3/15 ( stop day 4/5/24)  Continue Iv-Daptomycin 10mg/kg Q24h  [CPK 39]. To complete 6 weeks( stop day 4/10/24_  OK to replace PICC  Rec indefinite suppression with Doxycyline 100mg po bid once IV abx completed.  fu with ID upon dc from LTAC      Chronic diastolic heart failure  ECHO 2/13/2024  Left Ventricle: The left ventricle is normal in size. Normal wall thickness. Normal wall motion. There is normal systolic function with a visually estimated ejection fraction of 60 - 65%. Ejection fraction by visual approximation is 65%. There is diastolic dysfunction but grade cannot be determined.    Right Ventricle: Normal right ventricular cavity size. Wall thickness is normal. Right ventricle wall motion  is normal. Systolic function is normal.    IVC/SVC: Normal venous pressure at 3 mmHg.    Pericardium: There is a trivial posterior effusion just base.    STABLE      Septic arthritis  See " Osteomyelitis"      Myositis of multiple sites  See " Osteomeylitis" for plan      Hyponatremia  Patient has hyponatremia which is controlled,We will aim to correct the sodium by 4-6mEq in 24 hours. We will monitor sodium Daily. The hyponatremia is due to Dehydration/hypovolemia. We will obtain the following studies: see labsection. We will treat the hyponatremia with IV fluids. The patient's sodium results have been reviewed and are listed below.  No results for input(s): "NA" in the last 24 hours.      Drug dependence  See " Substance induced depressive disroder"  - monitor for withdrawal and give opioid if needed   - pt has been in LTAC  - on oxycodone LA 10 bid for pain and dependence  STABLE      Hepatitis  Most recent labs from OSH showed AST 360s, ALT 200s. Had recent hepatitis panel 3/12 with hep c    - Recent Hepatitis C screening resulted positive, previous Hep C screening 3 months ago negative. Check repeat Hep C screening and PCR  - Check HIV given history of " "IVDU and ongoing fever despite antibiotics  - RUQ ultrasound- pending  - Daily CMP to trend transaminases:  335/235  - Infectious Diseases consulted and following      Severe sepsis  This patient does have evidence of infective focus - continued L5-S1/S1 diskitis/osteomyelitis with abscess, diffuse infectious myositis of the bilateral lumbar paraspinal musculature and left iliopsoas muscle.  My overall impression is sepsis.  Source: Skin and Soft Tissue (location epidurial)  Antibiotics given-   Antibiotics (72h ago, onward)      Start     Stop Route Frequency Ordered    03/17/24 2130  ceFEPIme (MAXIPIME) 2 g in dextrose 5 % in water (D5W) 100 mL IVPB (MB+)         -- IV Every 8 hours (non-standard times) 03/17/24 1452    03/14/24 0900  DAPTOmycin (CUBICIN) 780 mg in sodium chloride 0.9% SolP 50 mL IVPB         -- IV Every 24 hours (non-standard times) 03/14/24 0755          Latest lactate reviewed-  No results for input(s): "LACTATE", "POCLAC" in the last 72 hours.    Organ dysfunction indicated by Acute liver injury    Fluid challenge Not needed - patient is not hypotensive      Post- resuscitation assessment No - Post resuscitation assessment not needed       Will Not start Pressors- Levophed for MAP of 65  Source control achieved by: Broad spectrum antibiotics    - Infectious Disease consulted  - Continue IV Daptomycin and meropenum, Need end date  - Follow blood cultures through maturity. Klebsiella and enterobacteraies per PCR.- & sens to everything    HTN (hypertension)  Chronic, controlled. Latest blood pressure and vitals reviewed-     Temp:  [97.6 °F (36.4 °C)-99.4 °F (37.4 °C)]   Pulse:  []   Resp:  [16-18]   BP: ()/(54-65)   SpO2:  [92 %-98 %] .   Home meds for hypertension were reviewed and noted below.   Hypertension Medications               cloNIDine (CATAPRES) 0.1 MG tablet Take 1 tablet (0.1 mg total) by mouth every 12 (twelve) hours.          Plan:  - While in the hospital, will manage " "blood pressure as follows; Continue home antihypertensive regimen. Currently on Clonidine 0.1 PO BID  - Will utilize p.r.n. blood pressure medication only if patient's blood pressure greater than 180/110 and he develops symptoms such as worsening chest pain or shortness of breath.    Discitis of lumbosacral region  See Osteomyelitis for consolidated plan      Debility  Patient with Acute on chronic debility due to other reduced mobility. Latest AMPAC and GEMS scores have been reviewed. Evaluation for etiology is complete. Plan includes progressive mobility protocol initated, PT/OT consulted, and may need surgey .    Substance or medication-induced depressive disorder  - Continue Mirtazipine  - Hx of heroin, THC, cociane      Psoas abscess, left  L5-S1/S1 diskitis/osteomyelitis with abscess, diffuse infectious myositis of the bilateral lumbar paraspinal musculature and left iliopsoas muscle.  - see " Osteomeylitis" above      Heroin use disorder, severe  See " Drug Dependence"         VTE Risk Mitigation (From admission, onward)           Ordered     Place BRENNAN hose  Until discontinued         03/14/24 0730     IP VTE HIGH RISK PATIENT  Once         03/14/24 0730     Place sequential compression device  Until discontinued         03/14/24 0730                    Discharge Planning   SENA: 3/25/2024     Code Status: Full Code   Is the patient medically ready for discharge?: No    Reason for patient still in hospital (select all that apply): Patient trending condition and Treatment  Discharge Plan A: Long-term acute care facility (LTAC)   Discharge Delays: None known at this time      Nagi Angel MD  Department of Hospital Medicine   [facility] - Neurosurgery ([facility])    "

## 2024-03-24 LAB
ALBUMIN SERPL BCP-MCNC: 2.8 G/DL (ref 3.5–5.2)
ALP SERPL-CCNC: 233 U/L (ref 55–135)
ALT SERPL W/O P-5'-P-CCNC: 270 U/L (ref 10–44)
ANION GAP SERPL CALC-SCNC: 4 MMOL/L (ref 8–16)
AST SERPL-CCNC: 425 U/L (ref 10–40)
BASOPHILS # BLD AUTO: 0.03 K/UL (ref 0–0.2)
BASOPHILS NFR BLD: 0.4 % (ref 0–1.9)
BILIRUB SERPL-MCNC: 0.8 MG/DL (ref 0.1–1)
BUN SERPL-MCNC: 9 MG/DL (ref 6–20)
CALCIUM SERPL-MCNC: 9.3 MG/DL (ref 8.7–10.5)
CHLORIDE SERPL-SCNC: 99 MMOL/L (ref 95–110)
CO2 SERPL-SCNC: 27 MMOL/L (ref 23–29)
CREAT SERPL-MCNC: 0.7 MG/DL (ref 0.5–1.4)
CRP SERPL-MCNC: 35.1 MG/L (ref 0–8.2)
DIFFERENTIAL METHOD BLD: ABNORMAL
EOSINOPHIL # BLD AUTO: 0.4 K/UL (ref 0–0.5)
EOSINOPHIL NFR BLD: 6.4 % (ref 0–8)
ERYTHROCYTE [DISTWIDTH] IN BLOOD BY AUTOMATED COUNT: 21 % (ref 11.5–14.5)
EST. GFR  (NO RACE VARIABLE): >60 ML/MIN/1.73 M^2
GLUCOSE SERPL-MCNC: 100 MG/DL (ref 70–110)
HCT VFR BLD AUTO: 27.8 % (ref 40–54)
HGB BLD-MCNC: 8.6 G/DL (ref 14–18)
IMM GRANULOCYTES # BLD AUTO: 0.04 K/UL (ref 0–0.04)
IMM GRANULOCYTES NFR BLD AUTO: 0.6 % (ref 0–0.5)
LYMPHOCYTES # BLD AUTO: 2.2 K/UL (ref 1–4.8)
LYMPHOCYTES NFR BLD: 32.8 % (ref 18–48)
MCH RBC QN AUTO: 22.9 PG (ref 27–31)
MCHC RBC AUTO-ENTMCNC: 30.9 G/DL (ref 32–36)
MCV RBC AUTO: 74 FL (ref 82–98)
MONOCYTES # BLD AUTO: 0.8 K/UL (ref 0.3–1)
MONOCYTES NFR BLD: 11.9 % (ref 4–15)
NEUTROPHILS # BLD AUTO: 3.2 K/UL (ref 1.8–7.7)
NEUTROPHILS NFR BLD: 47.9 % (ref 38–73)
NRBC BLD-RTO: 0 /100 WBC
PLATELET # BLD AUTO: 215 K/UL (ref 150–450)
PMV BLD AUTO: 10.7 FL (ref 9.2–12.9)
POTASSIUM SERPL-SCNC: 4.4 MMOL/L (ref 3.5–5.1)
PROCALCITONIN SERPL IA-MCNC: 0.18 NG/ML
PROT SERPL-MCNC: 7.9 G/DL (ref 6–8.4)
RBC # BLD AUTO: 3.75 M/UL (ref 4.6–6.2)
SODIUM SERPL-SCNC: 130 MMOL/L (ref 136–145)
WBC # BLD AUTO: 6.71 K/UL (ref 3.9–12.7)

## 2024-03-24 PROCEDURE — A4216 STERILE WATER/SALINE, 10 ML: HCPCS | Performed by: HOSPITALIST

## 2024-03-24 PROCEDURE — 36415 COLL VENOUS BLD VENIPUNCTURE: CPT | Performed by: PHYSICIAN ASSISTANT

## 2024-03-24 PROCEDURE — 25000003 PHARM REV CODE 250

## 2024-03-24 PROCEDURE — 84145 PROCALCITONIN (PCT): CPT | Performed by: PHYSICIAN ASSISTANT

## 2024-03-24 PROCEDURE — 99233 SBSQ HOSP IP/OBS HIGH 50: CPT | Mod: ,,, | Performed by: PHYSICIAN ASSISTANT

## 2024-03-24 PROCEDURE — 11000001 HC ACUTE MED/SURG PRIVATE ROOM

## 2024-03-24 PROCEDURE — 63600175 PHARM REV CODE 636 W HCPCS: Performed by: STUDENT IN AN ORGANIZED HEALTH CARE EDUCATION/TRAINING PROGRAM

## 2024-03-24 PROCEDURE — 25000003 PHARM REV CODE 250: Performed by: STUDENT IN AN ORGANIZED HEALTH CARE EDUCATION/TRAINING PROGRAM

## 2024-03-24 PROCEDURE — 25000003 PHARM REV CODE 250: Performed by: HOSPITALIST

## 2024-03-24 PROCEDURE — 63600175 PHARM REV CODE 636 W HCPCS

## 2024-03-24 PROCEDURE — 25000003 PHARM REV CODE 250: Performed by: PHYSICIAN ASSISTANT

## 2024-03-24 PROCEDURE — 80053 COMPREHEN METABOLIC PANEL: CPT | Performed by: STUDENT IN AN ORGANIZED HEALTH CARE EDUCATION/TRAINING PROGRAM

## 2024-03-24 PROCEDURE — 86140 C-REACTIVE PROTEIN: CPT | Performed by: PHYSICIAN ASSISTANT

## 2024-03-24 PROCEDURE — 85025 COMPLETE CBC W/AUTO DIFF WBC: CPT | Performed by: STUDENT IN AN ORGANIZED HEALTH CARE EDUCATION/TRAINING PROGRAM

## 2024-03-24 RX ORDER — CIPROFLOXACIN 750 MG/1
750 TABLET, FILM COATED ORAL EVERY 12 HOURS
Status: DISCONTINUED | OUTPATIENT
Start: 2024-03-24 | End: 2024-03-29 | Stop reason: HOSPADM

## 2024-03-24 RX ADMIN — Medication 10 ML: at 06:03

## 2024-03-24 RX ADMIN — DAPTOMYCIN 780 MG: 350 INJECTION, POWDER, LYOPHILIZED, FOR SOLUTION INTRAVENOUS at 08:03

## 2024-03-24 RX ADMIN — CLONIDINE HYDROCHLORIDE 0.1 MG: 0.1 TABLET ORAL at 08:03

## 2024-03-24 RX ADMIN — ACETAMINOPHEN 650 MG: 325 TABLET ORAL at 04:03

## 2024-03-24 RX ADMIN — METHOCARBAMOL 500 MG: 500 TABLET ORAL at 08:03

## 2024-03-24 RX ADMIN — OXYCODONE HYDROCHLORIDE 15 MG: 10 TABLET ORAL at 04:03

## 2024-03-24 RX ADMIN — OXYCODONE HYDROCHLORIDE 10 MG: 10 TABLET, FILM COATED, EXTENDED RELEASE ORAL at 09:03

## 2024-03-24 RX ADMIN — OXYCODONE HYDROCHLORIDE 15 MG: 10 TABLET ORAL at 03:03

## 2024-03-24 RX ADMIN — CEFEPIME 2 G: 2 INJECTION, POWDER, FOR SOLUTION INTRAVENOUS at 12:03

## 2024-03-24 RX ADMIN — MIRTAZAPINE 15 MG: 15 TABLET, FILM COATED ORAL at 09:03

## 2024-03-24 RX ADMIN — CLONIDINE HYDROCHLORIDE 0.1 MG: 0.1 TABLET ORAL at 09:03

## 2024-03-24 RX ADMIN — OXYCODONE HYDROCHLORIDE 15 MG: 10 TABLET ORAL at 08:03

## 2024-03-24 RX ADMIN — METHOCARBAMOL 500 MG: 500 TABLET ORAL at 09:03

## 2024-03-24 RX ADMIN — Medication 10 ML: at 12:03

## 2024-03-24 RX ADMIN — OXYCODONE HYDROCHLORIDE 15 MG: 10 TABLET ORAL at 12:03

## 2024-03-24 RX ADMIN — OXYCODONE HYDROCHLORIDE 10 MG: 10 TABLET, FILM COATED, EXTENDED RELEASE ORAL at 10:03

## 2024-03-24 RX ADMIN — CEFEPIME 2 G: 2 INJECTION, POWDER, FOR SOLUTION INTRAVENOUS at 05:03

## 2024-03-24 RX ADMIN — OXYCODONE HYDROCHLORIDE 15 MG: 10 TABLET ORAL at 10:03

## 2024-03-24 RX ADMIN — METHOCARBAMOL 500 MG: 500 TABLET ORAL at 04:03

## 2024-03-24 RX ADMIN — CIPROFLOXACIN 750 MG: 750 TABLET, FILM COATED ORAL at 09:03

## 2024-03-24 RX ADMIN — METHOCARBAMOL 500 MG: 500 TABLET ORAL at 12:03

## 2024-03-24 NOTE — ASSESSMENT & PLAN NOTE
28M with h/o IVDU, MRSA bacteremia and prior disseminated MRSA infection with poor adherence with care (history of leaving AMA), readmitted 02/04 with progressive back pain. Imaging significant for lumbosacral osteomyelitis with associated epidural abscess, osteomyelitis of sacrum & L iliac bone, probable septic arthritis of L SI joint & lower lumbar spine facet joints, and left iliopsoas abscess, abscess in superior rectal/presacral region and micro-abscesses in paraspinal muscles, pelvis, and left sacrum. Blood cultures remained negative then, and TTE negative. Underwent IR aspiration of left SI joint and psoas on 02/07.  Cxs +MRSA. Abx held prior to IR bx; post-procedure started on empiric Iv-vanc / CTX.   S/p lumbar-pelvis debridement with fusion (02/14). Surgical cxs +MRSA. Pt discharged to [facility], to complete 6wks Iv-Dapto 10mg/kg Q24h, [place] 04/10/24. (Of note, switched vanc to IV daptomycin (d/t subtherapeutic vancomycin levels with q8hr dosing). Advised repeat imaging / neuro f/u near [place] 04/10; followed by abx suppression thereafter, likely lifelong.     [place]12 [place] [facility] [place] d/t new onset of intermittent fevers (101.4F) at [facility] for past few days. On arrival, temp 99F, VSS, HDS, wbc nml, CRP 46, Cr 0.8.  CPK 39 nml, with climbing transaminitis found to have HCV+ (RNA 9-million), HBV Ag negative, HIV negative, U/S +hepatomegaly, GB wnl.  Bld cxs 03/12 NGTD.   Pt developed fever 03/14, repeat bld cxs 03/14 1 of 2 +Kleb aerogenes (panS).  Repeat Bld cxs 03/15 and 03/17 NGTD.  Picc line removed 03/16, with picc cath tip sent for cx.   -- Of note; UDS +MJ on arrival from [facility].  Suspect picc line may be source of new Kleb bacteremia (denies injecting at LT); vs. Lumbar surgical wound, vs. GI translocation in setting of hepatomegaly w/ chronic HCV (RNA+ 9-million copies).    Repeat MRI L-spine / pelvis (03/13): Extensive epidural infectious/inflammatory change posterior to the L5-S1 and S1-2  disc spaces; with paravertebral / pre-vertebral abscess. Diffuse infectious myositis of the bilateral lower lumbar paraspinal musculature and within the left psoas and iliopsoas muscles, with 0.7 cm abscess (down from 2.3cm on prior 02/04). Persistent osteomyelitis and septic arthritis about the left SI joint. -- Per NSGY, no role for surgical intervention at this time, favoring wound care and continuing Iv-abx.     3/18/24: Patient now afebrile and WBC WNL.  Denying any systemic sign of infection or back pain. CRP remains elevated 37.8.  On Daptomycin and Cefepime.  PIC remove and repeat BCXs NGTD - suspect due to line infection as blood cultures cleared quickly with PICC removal. Imaging reviewed and possible small abscess posterior to L5-S1 extending into disc space.  Primary team discussed with IR who felt no drainable fluid there there or in psoas.      3/19/24: Doing well.  Tmax 100.4 but no subjective fever.  IR body and neuro plans no intervention. Repeat blood cultures NGTD.  CRP improved from 46 to 26    3/23/24: Tmax 100.4 and asymptomatic.  L UE thrombus in basilic and cephalic veins - suspected cause of fever as blood cultures NGTD and WBC WNL.    Recommendations / Plan:  Continue Iv-cefepime 2g Q8h. (Bld cx +kleb aerogenes panS) x 2 weeks from 1st negative blood culture 3/15  Continue Iv-Daptomycin 10mg/kg Q24h  [CPK 39]. To complete 6 weeks  OK to replace PICC  Rec indefinite suppression with Doxycyline 100mg po bid once IV abx completed  Monitor for now    -- Discussed with ID staff

## 2024-03-24 NOTE — PROGRESS NOTES
[facility] - Neurosurgery ([facility])  Infectious Disease  Progress Note    Patient Name: Ton Donovan  MRN: [id number]  Admission Date: 3/14/2024  Length of Stay: 9 days  Attending Physician: Nagi Angel*  Primary Care Provider: Steve Kelly DO    Isolation Status: No active isolations  Assessment/Plan:      ID  * Osteomyelitis of vertebra of lumbosacral region      28M with h/o IVDU, MRSA bacteremia and prior disseminated MRSA infection with poor adherence with care (history of leaving AMA), readmitted 02/04 with progressive back pain. Imaging significant for lumbosacral osteomyelitis with associated epidural abscess, osteomyelitis of sacrum & L iliac bone, probable septic arthritis of L SI joint & lower lumbar spine facet joints, and left iliopsoas abscess, abscess in superior rectal/presacral region and micro-abscesses in paraspinal muscles, pelvis, and left sacrum. Blood cultures remained negative then, and TTE negative. Underwent IR aspiration of left SI joint and psoas on 02/07.  Cxs +MRSA. Abx held prior to IR bx; post-procedure started on empiric Iv-vanc / CTX.   S/p lumbar-pelvis debridement with fusion (02/14). Surgical cxs +MRSA. Pt discharged to [facility], to complete 6wks Iv-Dapto 10mg/kg Q24h, [place] 04/10/24. (Of note, switched vanc to IV daptomycin (d/t subtherapeutic vancomycin levels with q8hr dosing). Advised repeat imaging / neuro f/u near [place] 04/10; followed by abx suppression thereafter, likely lifelong.     Pt presented 03/12 to [facility] [place] now d/t new onset of intermittent fevers (101.4F) at [facility] for past few days. On arrival, temp 99F, VSS, HDS, wbc nml, CRP 46, Cr 0.8.  CPK 39 nml, with climbing transaminitis found to have HCV+ (RNA 9-million), HBV Ag negative, HIV negative, U/S +hepatomegaly, GB wnl.  Bld cxs 03/12 NGTD.   Pt developed fever 03/14, repeat bld cxs 03/14 1 of 2 +Kleb aerogenes (panS).  Repeat Bld cxs 03/15 and 03/17 NGTD.  Picc line removed 03/16, with picc cath  tip sent for cx.   -- Of note; UDS +MJ on arrival from [facility].  Suspect picc line may be source of new Kleb bacteremia (denies injecting at LTAC); vs. Lumbar surgical wound, vs. GI translocation in setting of hepatomegaly w/ chronic HCV (RNA+ 9-million copies).    Repeat MRI L-spine / pelvis (03/13): Extensive epidural infectious/inflammatory change posterior to the L5-S1 and S1-2 disc spaces; with paravertebral / pre-vertebral abscess. Diffuse infectious myositis of the bilateral lower lumbar paraspinal musculature and within the left psoas and iliopsoas muscles, with 0.7 cm abscess (down from 2.3cm on prior 02/04). Persistent osteomyelitis and septic arthritis about the left SI joint. -- Per NSGY, no role for surgical intervention at this time, favoring wound care and continuing Iv-abx.     3/18/24: Patient now afebrile and WBC WNL.  Denying any systemic sign of infection or back pain. CRP remains elevated 37.8.  On Daptomycin and Cefepime.  PIC remove and repeat BCXs NGTD - suspect due to line infection as blood cultures cleared quickly with PICC removal. Imaging reviewed and possible small abscess posterior to L5-S1 extending into disc space.  Primary team discussed with IR who felt no drainable fluid there there or in psoas.      3/19/24: Doing well.  Tmax 100.4 but no subjective fever.  IR body and neuro plans no intervention. Repeat blood cultures NGTD.  CRP improved from 46 to 26    3/23/24: Tmax 100.4 and asymptomatic.  L UE thrombus in basilic and cephalic veins - suspected cause of fever as blood cultures NGTD and WBC WNL.    Recommendations / Plan:  Continue Iv-cefepime 2g Q8h. (Bld cx +kleb aerogenes panS) x 2 weeks from 1st negative blood culture 3/15  Continue Iv-Daptomycin 10mg/kg Q24h  [CPK 39]. To complete 6 weeks  OK to replace PICC  Rec indefinite suppression with Doxycyline 100mg po bid once IV abx completed  Monitor for now    -- Discussed with ID staff              Anticipated Disposition:  james    Thank you for your consult. I will follow-up with patient. Please contact us if you have any additional questions.    LEROY Ruggiero  Infectious Disease  [facility] - Neurosurgery (Hospital)    Subjective:     Principal Problem:Osteomyelitis of vertebra of lumbosacral region    HPI: 28M with h/o IVDU, MRSA bacteremia and prior disseminated MRSA infection with poor adherence with care (history of leaving AMA), readmitted 02/04 with progressive back pain. Imaging significant for lumbosacral osteomyelitis with associated epidural abscess, osteomyelitis of sacrum & L iliac bone, probable septic arthritis of L SI joint & lower lumbar spine facet joints, and left iliopsoas abscess, abscess in superior rectal/presacral region and micro-abscesses in paraspinal muscles, pelvis, and left sacrum. Blood cultures remained negative. TTE negative. Underwent IR aspiration of left SI joint and psoas on 02/07.  Cxs +MRSA. Abx held prior to IR bx; post-procedure started on empiric Iv-vanc / CTX.   S/p lumbar-pelvis debridement with fusion (02/14). Surgical cxs +MRSA. Pt discharged to [facility], to complete 6wks Iv-Dapto 10mg/kg Q24h, [place] 04/10/24. (Of note, switched vanc to IV daptomycin (d/t subtherapeutic vancomycin levels with q8hr dosing). Advised repeat imaging / neuro f/u near [place] 04/10; followed by abx suppression thereafter, likely lifelong.   However, pt [place] 03/12 to [facility] [place] now d/t new onset of intermittent fevers (101.4F) at [facility] for past few days. On arrival, temp 99F, VSS, HDS, wbc nml, CRP 46,  / , , Cr 0.8.  CPK pending.     Repeat MRI L-spine / pelvis (03/13): Postsurgical changes of L4-5 posterior spinal fusion with bilateral sacroiliac extension screws. With discitis osteomyelitis at L5-S1 and S1-2. Extensive epidural infectious/inflammatory change posterior to the L5-S1 and S1-2 disc spaces. Paravertebral/prevertebral abscess measuring 3.7 x 2.2 cm communicating with the L5-S1  disc space.   Pelvis: Diffuse infectious myositis of the left psoas and iliopsoas muscles with 7 mm abscess of the left iliopsoas muscle, decreased from 23 mm. Persistent osteomyelitis and septic arthritis about the left SI joint.  T-spine: incidental finding of small nodular T2 hyperintense signal foci at Rt lung base; consider dedicated chest-CT.      Prior MRI L-spine / pelvis (02/04): showed osteomyelitis/discitis of L5-S1, osteomyelitis of sacrum & L iliac bone, probable septic arthritis of L SI joint & lower lumbar spine facet joints; worsening anterolisthesis of L5 with respect to S1 with increased size of epidural abscess at the level of L5-S1 and probable severe central canal stenosis at this level, along w/ additional abscesses in the L iliopsoas muscle, paraspinal muscles, pelvis, & L sacrum; -- the additional abscesses in the left iliopsoas muscle [3.4 x 1.7cm; with other micro-abscesses adjacent measuring 2.3cm), paraspinal muscles, pelvis, and left sacrum. Additional abscess in the superior rectal/presacral region measuring roughly 3.1 x 2.4 cm; and probable additional micro abscesses at left sacrum.          Interval History:   No acute events  Tmax 100.4/Tcurrent 99.5  Repeat BCXs NGTD  Denies significant back pain or any fever, chills, or sweats.  Remains on Dapto/Cefepime      Review of Systems   Constitutional:  Negative for chills, diaphoresis and fever.   Respiratory:  Negative for shortness of breath.    Cardiovascular:  Negative for chest pain.   Gastrointestinal:  Negative for abdominal pain, diarrhea, nausea and vomiting.   Genitourinary:  Negative for dysuria and hematuria.     Objective:     Vital Signs (Most Recent):  Temp: 99.5 °F (37.5 °C) (03/23/24 1907)  Pulse: 107 (03/23/24 1907)  Resp: 17 (03/23/24 1907)  BP: (!) 155/71 (03/23/24 1907)  SpO2: 97 % (03/23/24 1907) Vital Signs (24h Range):  Temp:  [99.1 °F (37.3 °C)-100.4 °F (38 °C)] 99.5 °F (37.5 °C)  Pulse:  [] 107  Resp:   [16-20] 17  SpO2:  [94 %-99 %] 97 %  BP: (115-155)/(56-71) 155/71     Weight: 78 kg (172 lb)  Body mass index is 24.68 kg/m².    Estimated Creatinine Clearance: 159.3 mL/min (based on SCr of 0.7 mg/dL).     Physical Exam  Constitutional:       General: He is not in acute distress.     Appearance: Normal appearance. He is well-developed. He is not ill-appearing, toxic-appearing or diaphoretic.       HENT:      Head: Normocephalic and atraumatic.   Cardiovascular:      Rate and Rhythm: Normal rate and regular rhythm.      Heart sounds: Normal heart sounds. No murmur heard.     No friction rub. No gallop.   Pulmonary:      Effort: Pulmonary effort is normal. No respiratory distress.      Breath sounds: Normal breath sounds. No wheezing or rales.   Abdominal:      General: Bowel sounds are normal. There is no distension.      Palpations: Abdomen is soft. There is no mass.      Tenderness: There is no abdominal tenderness. There is no guarding or rebound.   Skin:     General: Skin is warm and dry.   Neurological:      Mental Status: He is alert and oriented to person, place, and time.   Psychiatric:         Behavior: Behavior normal.          Significant Labs: Blood Culture:   Recent Labs   Lab 03/15/24  1842 03/15/24  1843 03/17/24  0959 03/22/24  1050 03/22/24  1051   LABBLOO No growth after 5 days. No growth after 5 days. No growth after 5 days. No Growth to date  No Growth to date No Growth to date  No Growth to date       CBC:   Recent Labs   Lab 03/22/24  0540 03/23/24  0400   WBC 6.52 6.50   HGB 8.1* 8.0*   HCT 25.6* 25.6*    217       CMP:   Recent Labs   Lab 03/22/24  1050   *   K 4.5      CO2 26   GLU 91   BUN 10   CREATININE 0.7   CALCIUM 8.9   PROT 7.5   ALBUMIN 2.8*   BILITOT 1.2*   ALKPHOS 227*   *   *   ANIONGAP 7*     Wound Culture:   Recent Labs   Lab 11/21/23  1041 02/07/24  1412 02/07/24  1423 02/14/24  1612 02/14/24  1712   LABAERO METHICILLIN RESISTANT  STAPHYLOCOCCUS AUREUS  Moderate  No other significant isolate  * No growth METHICILLIN RESISTANT STAPHYLOCOCCUS AUREUS  Rare  * METHICILLIN RESISTANT STAPHYLOCOCCUS AUREUS  Few  * METHICILLIN RESISTANT STAPHYLOCOCCUS AUREUS  Few  *       All pertinent labs within the past 24 hours have been reviewed.    Significant Imaging: I have reviewed all pertinent imaging results/findings within the past 24 hours.  Procedure Component Value Units Date/Time   US Abdomen Limited [[id number]] Resulted: 03/15/24 0842   Order Status: Completed Updated: 03/15/24 0844   Narrative:     EXAMINATION:  US ABDOMEN LIMITED    CLINICAL HISTORY:  transaminitis;.    TECHNIQUE:  Limited ultrasound of the right upper quadrant of the abdomen including pancreas, liver, gallbladder, common bile duct was performed.    COMPARISON:  CT abdomen pelvis from 11/20/2023    FINDINGS:  Liver: Enlarged in size measuring 20.6 cm. Homogeneous echotexture. No focal hepatic lesions.    Gallbladder: 2 biliary crystals measuring up to 1-2 mm.  There is no gallbladder wall thickening or pericholecystic fluid.  No sonographic Lozoya's sign.    Biliary system: The common duct is not dilated, measuring 2 mm.  No intrahepatic ductal dilatation.    Spleen: Normal in size with a homogeneous echotexture, measuring 9.8 x 3.6 cm.    Pancreas: The visualized portions of pancreas appear normal.  Prominent peripancreatic lymph node measuring 2.1 x 0.9 x 2.6 cm.    Miscellaneous: No ascites.   Impression:       Hepatomegaly.    A few biliary crystals.    Mildly prominent peripancreatic lymph node.      Electronically signed by: Carlo Garcia MD  Date: 03/15/2024  Time: 08:42   CT Lumbar Spine Without Contrast [[id number]] Resulted: 03/14/24 1649   Order Status: Completed Updated: 03/14/24 1652   Narrative:     EXAMINATION:  CT LUMBAR SPINE WITHOUT CONTRAST    CLINICAL HISTORY:  Osteomyelitis, lumbar;    TECHNIQUE:  Low-dose axial, sagittal and coronal reformations are  obtained through the lumbar spine.  Contrast was not administered.    COMPARISON:  MRI lumbar spine 03/12/2024; CT lumbar spine 02/05/2024    FINDINGS:  Postoperative changes from L4-pelvis posterior decompression and instrumented fusion.  Hardware is intact and in satisfactory position.  No perihardware fractures.  Unchanged alignment.  There is a 10.1 x 9.3 x 4.8 cm region of hypoattenuation in the paraspinal muscles at the operative level, corresponding to extensive intramuscular edema on recent MRI.  A fluid collection cannot be excluded.    ALIGNMENT: Grade 1 anterolisthesis at L5-S1.    BONE: No acute fracture.  There are endplate erosions, sclerotic changes, and periostitis at L5-S1 and S1-2, compatible with sequela of spondylo discitis.  There are erosions and sclerotic changes involving the left sacroiliac joint and left shan sacrum as well, with new ankylosis.    JOINT: Remaining disc heights and facet joint spaces are preserved.    SPINAL CANAL: Partially obscured at the operative level by metal artifact.  The conus medullaris and cauda equina nerve roots are difficult to visualize.  An epidural collection is difficult to exclude by CT.    PARASPINAL SOFT TISSUES: There is a 1.2 cm solid pulmonary nodule in the right lower lobe, new from prior studies.  There is prevertebral soft tissue edema at the lumbosacral junction.  Organized paraspinal collections are difficult to exclude by CT.   Impression:       Sequela of spondylodiscitis at L5-S1 and S1-2, and septic arthritis at the left sacroiliac joint.    Postoperative changes from L4-pelvis decompression and fusion.  No hardware complication.    10.1 cm region of hypoattenuation in the paraspinal muscles at the operative level, corresponding to extensive intramuscular edema on recent MRI.  Organized paraspinal collections and epidural collections are difficult to exclude by noncontrast CT.  Refer to recent MRI for additional details.    New 1.2 cm solid  pulmonary nodule in the right lower lobe, likely inflammatory or infectious.  Follow-up chest CT in 3 months is recommended.      Electronically signed by: Saqib Branham  Date: 03/14/2024  Time: 16:49     Imaging History    2024    Date Procedure Name Study Review Link PACS Link Status Accession Number Location   03/15/24 08:06 AM US Abdomen Limited Study Review  Images Final [id number] [facility]   03/14/24 03:01 PM CT Lumbar Spine Without Contrast Study Review  Images Final [id number] [facility]   03/13/24 12:48 AM MRI Lumbar Spine With Contrast Study Review  Images Final [id number] [facility]   03/13/24 12:45 AM MRI Pelvis W WO Contrast Study Review  Images Final [id number] [facility]   03/13/24 12:41 AM MRI Thoracic Spine W WO Cont Study Review  Images Final [id number] [facility]   03/12/24 06:14 PM MRI Lumbar Spine Without Contrast Study Review  Images Final [id number] [facility]   03/12/24 04:48 PM X-Ray Chest AP Portable Study Review  Images Final [id number] [facility]   02/22/24 12:03 PM X-Ray Chest 1 View S/P PICC Line by Nursing Study Review  Images Final [id number] [facility]   03/17/24 02:01 AM CARDIAC MONITORING STRIPS Study Review  Final     03/16/24 07:22 PM CARDIAC MONITORING STRIPS Study Review  Final     03/16/24 02:41 AM CARDIAC MONITORING STRIPS Study Review  Final     03/15/24 09:32 AM CARDIAC MONITORING STRIPS Study Review  Final     03/14/24 11:02 PM CARDIAC MONITORING STRIPS Study Review  Final

## 2024-03-24 NOTE — SUBJECTIVE & OBJECTIVE
Interval History:   No acute events  Tmax/Tcurrent 101.3  Repeat BCXs NGTD  Denies significant back pain or any fever, chills, or sweats.  Remains on Dapto/Cefepime      Review of Systems   Constitutional:  Negative for chills, diaphoresis and fever.   Respiratory:  Negative for shortness of breath.    Cardiovascular:  Negative for chest pain.   Gastrointestinal:  Negative for abdominal pain, diarrhea, nausea and vomiting.   Genitourinary:  Negative for dysuria and hematuria.   Musculoskeletal:  Positive for back pain (soreness).     Objective:     Vital Signs (Most Recent):  Temp: (!) 101.3 °F (38.5 °C) (03/24/24 1641)  Pulse: 100 (03/24/24 1641)  Resp: 20 (03/24/24 1643)  BP: 132/70 (03/24/24 1641)  SpO2: 97 % (03/24/24 1641) Vital Signs (24h Range):  Temp:  [98.7 °F (37.1 °C)-101.3 °F (38.5 °C)] 101.3 °F (38.5 °C)  Pulse:  [] 100  Resp:  [16-20] 20  SpO2:  [94 %-98 %] 97 %  BP: (103-155)/(54-71) 132/70     Weight: 78 kg (172 lb)  Body mass index is 24.68 kg/m².    Estimated Creatinine Clearance: 159.3 mL/min (based on SCr of 0.7 mg/dL).     Physical Exam  Constitutional:       General: He is not in acute distress.     Appearance: Normal appearance. He is well-developed. He is not ill-appearing, toxic-appearing or diaphoretic.       HENT:      Head: Normocephalic and atraumatic.   Cardiovascular:      Rate and Rhythm: Normal rate and regular rhythm.      Heart sounds: Normal heart sounds. No murmur heard.     No friction rub. No gallop.   Pulmonary:      Effort: Pulmonary effort is normal. No respiratory distress.      Breath sounds: Normal breath sounds. No wheezing or rales.   Abdominal:      General: Bowel sounds are normal. There is no distension.      Palpations: Abdomen is soft. There is no mass.      Tenderness: There is no abdominal tenderness. There is no guarding or rebound.   Skin:     General: Skin is warm and dry.   Neurological:      Mental Status: He is alert and oriented to person, place, and  time.   Psychiatric:         Behavior: Behavior normal.          Significant Labs: Blood Culture:   Recent Labs   Lab 03/15/24  1842 03/15/24  1843 03/17/24  0959 03/22/24  1050 03/22/24  1051   LABBLOO No growth after 5 days. No growth after 5 days. No growth after 5 days. No Growth to date  No Growth to date  No Growth to date No Growth to date  No Growth to date  No Growth to date       CBC:   Recent Labs   Lab 03/23/24  0400 03/24/24  0414   WBC 6.50 6.71   HGB 8.0* 8.6*   HCT 25.6* 27.8*    215       CMP:   Recent Labs   Lab 03/24/24  0414   *   K 4.4   CL 99   CO2 27      BUN 9   CREATININE 0.7   CALCIUM 9.3   PROT 7.9   ALBUMIN 2.8*   BILITOT 0.8   ALKPHOS 233*   *   *   ANIONGAP 4*       Wound Culture:   Recent Labs   Lab 11/21/23  1041 02/07/24  1412 02/07/24  1423 02/14/24  1612 02/14/24  1712   LABAERO METHICILLIN RESISTANT STAPHYLOCOCCUS AUREUS  Moderate  No other significant isolate  * No growth METHICILLIN RESISTANT STAPHYLOCOCCUS AUREUS  Rare  * METHICILLIN RESISTANT STAPHYLOCOCCUS AUREUS  Few  * METHICILLIN RESISTANT STAPHYLOCOCCUS AUREUS  Few  *       All pertinent labs within the past 24 hours have been reviewed.    Significant Imaging: I have reviewed all pertinent imaging results/findings within the past 24 hours.  Procedure Component Value Units Date/Time   US Abdomen Limited [[id number]] Resulted: 03/15/24 0842   Order Status: Completed Updated: 03/15/24 0844   Narrative:     EXAMINATION:  US ABDOMEN LIMITED    CLINICAL HISTORY:  transaminitis;.    TECHNIQUE:  Limited ultrasound of the right upper quadrant of the abdomen including pancreas, liver, gallbladder, common bile duct was performed.    COMPARISON:  CT abdomen pelvis from 11/20/2023    FINDINGS:  Liver: Enlarged in size measuring 20.6 cm. Homogeneous echotexture. No focal hepatic lesions.    Gallbladder: 2 biliary crystals measuring up to 1-2 mm.  There is no gallbladder wall thickening or  pericholecystic fluid.  No sonographic Lozoya's sign.    Biliary system: The common duct is not dilated, measuring 2 mm.  No intrahepatic ductal dilatation.    Spleen: Normal in size with a homogeneous echotexture, measuring 9.8 x 3.6 cm.    Pancreas: The visualized portions of pancreas appear normal.  Prominent peripancreatic lymph node measuring 2.1 x 0.9 x 2.6 cm.    Miscellaneous: No ascites.   Impression:       Hepatomegaly.    A few biliary crystals.    Mildly prominent peripancreatic lymph node.      Electronically signed by: Carlo Garcia MD  Date: 03/15/2024  Time: 08:42   CT Lumbar Spine Without Contrast [[id number]] Resulted: 03/14/24 1649   Order Status: Completed Updated: 03/14/24 1652   Narrative:     EXAMINATION:  CT LUMBAR SPINE WITHOUT CONTRAST    CLINICAL HISTORY:  Osteomyelitis, lumbar;    TECHNIQUE:  Low-dose axial, sagittal and coronal reformations are obtained through the lumbar spine.  Contrast was not administered.    COMPARISON:  MRI lumbar spine 03/12/2024; CT lumbar spine 02/05/2024    FINDINGS:  Postoperative changes from L4-pelvis posterior decompression and instrumented fusion.  Hardware is intact and in satisfactory position.  No perihardware fractures.  Unchanged alignment.  There is a 10.1 x 9.3 x 4.8 cm region of hypoattenuation in the paraspinal muscles at the operative level, corresponding to extensive intramuscular edema on recent MRI.  A fluid collection cannot be excluded.    ALIGNMENT: Grade 1 anterolisthesis at L5-S1.    BONE: No acute fracture.  There are endplate erosions, sclerotic changes, and periostitis at L5-S1 and S1-2, compatible with sequela of spondylo discitis.  There are erosions and sclerotic changes involving the left sacroiliac joint and left shan sacrum as well, with new ankylosis.    JOINT: Remaining disc heights and facet joint spaces are preserved.    SPINAL CANAL: Partially obscured at the operative level by metal artifact.  The conus medullaris and  cauda equina nerve roots are difficult to visualize.  An epidural collection is difficult to exclude by CT.    PARASPINAL SOFT TISSUES: There is a 1.2 cm solid pulmonary nodule in the right lower lobe, new from prior studies.  There is prevertebral soft tissue edema at the lumbosacral junction.  Organized paraspinal collections are difficult to exclude by CT.   Impression:       Sequela of spondylodiscitis at L5-S1 and S1-2, and septic arthritis at the left sacroiliac joint.    Postoperative changes from L4-pelvis decompression and fusion.  No hardware complication.    10.1 cm region of hypoattenuation in the paraspinal muscles at the operative level, corresponding to extensive intramuscular edema on recent MRI.  Organized paraspinal collections and epidural collections are difficult to exclude by noncontrast CT.  Refer to recent MRI for additional details.    New 1.2 cm solid pulmonary nodule in the right lower lobe, likely inflammatory or infectious.  Follow-up chest CT in 3 months is recommended.      Electronically signed by: Saqib Branham  Date: 03/14/2024  Time: 16:49     Imaging History    2024    Date Procedure Name Study Review Link PACS Link Status Accession Number Location   03/15/24 08:06 AM US Abdomen Limited Study Review  Images Final [id number] [facility]   03/14/24 03:01 PM CT Lumbar Spine Without Contrast Study Review  Images Final [id number] [facility]   03/13/24 12:48 AM MRI Lumbar Spine With Contrast Study Review  Images Final [id number] [facility]   03/13/24 12:45 AM MRI Pelvis W WO Contrast Study Review  Images Final [id number] [facility]   03/13/24 12:41 AM MRI Thoracic Spine W WO Cont Study Review  Images Final [id number] [facility]   03/12/24 06:14 PM MRI Lumbar Spine Without Contrast Study Review  Images Final [id number] [facility]   03/12/24 04:48 PM X-Ray Chest AP Portable Study Review  Images Final [id number] [facility]   02/22/24 12:03 PM X-Ray Chest 1 View S/P PICC Line by Nursing Study Review  Images Final [id number] [facility]   03/17/24  02:01 AM CARDIAC MONITORING STRIPS Study Review  Final     03/16/24 07:22 PM CARDIAC MONITORING STRIPS Study Review  Final     03/16/24 02:41 AM CARDIAC MONITORING STRIPS Study Review  Final     03/15/24 09:32 AM CARDIAC MONITORING STRIPS Study Review  Final     03/14/24 11:02 PM CARDIAC MONITORING STRIPS Study Review  Final

## 2024-03-24 NOTE — PLAN OF CARE
Problem: Adult Inpatient Plan of Care  Goal: Plan of Care Review  Outcome: Ongoing, Progressing  Goal: Patient-Specific Goal (Individualized)  Description: Pt will maintain sbp below 160  Outcome: Ongoing, Progressing  Goal: Absence of Hospital-Acquired Illness or Injury  Outcome: Ongoing, Progressing  Goal: Optimal Comfort and Wellbeing  Outcome: Ongoing, Progressing  Goal: Readiness for Transition of Care  Outcome: Ongoing, Progressing     Problem: Infection  Goal: Absence of Infection Signs and Symptoms  Outcome: Ongoing, Progressing

## 2024-03-24 NOTE — SUBJECTIVE & OBJECTIVE
Interval History:   No acute events  Tmax 100.4/Tcurrent 99.5  Repeat BCXs NGTD  Denies significant back pain or any fever, chills, or sweats.  Remains on Dapto/Cefepime      Review of Systems   Constitutional:  Negative for chills, diaphoresis and fever.   Respiratory:  Negative for shortness of breath.    Cardiovascular:  Negative for chest pain.   Gastrointestinal:  Negative for abdominal pain, diarrhea, nausea and vomiting.   Genitourinary:  Negative for dysuria and hematuria.     Objective:     Vital Signs (Most Recent):  Temp: 99.5 °F (37.5 °C) (03/23/24 1907)  Pulse: 107 (03/23/24 1907)  Resp: 17 (03/23/24 1907)  BP: (!) 155/71 (03/23/24 1907)  SpO2: 97 % (03/23/24 1907) Vital Signs (24h Range):  Temp:  [99.1 °F (37.3 °C)-100.4 °F (38 °C)] 99.5 °F (37.5 °C)  Pulse:  [] 107  Resp:  [16-20] 17  SpO2:  [94 %-99 %] 97 %  BP: (115-155)/(56-71) 155/71     Weight: 78 kg (172 lb)  Body mass index is 24.68 kg/m².    Estimated Creatinine Clearance: 159.3 mL/min (based on SCr of 0.7 mg/dL).     Physical Exam  Constitutional:       General: He is not in acute distress.     Appearance: Normal appearance. He is well-developed. He is not ill-appearing, toxic-appearing or diaphoretic.       HENT:      Head: Normocephalic and atraumatic.   Cardiovascular:      Rate and Rhythm: Normal rate and regular rhythm.      Heart sounds: Normal heart sounds. No murmur heard.     No friction rub. No gallop.   Pulmonary:      Effort: Pulmonary effort is normal. No respiratory distress.      Breath sounds: Normal breath sounds. No wheezing or rales.   Abdominal:      General: Bowel sounds are normal. There is no distension.      Palpations: Abdomen is soft. There is no mass.      Tenderness: There is no abdominal tenderness. There is no guarding or rebound.   Skin:     General: Skin is warm and dry.   Neurological:      Mental Status: He is alert and oriented to person, place, and time.   Psychiatric:         Behavior: Behavior  normal.          Significant Labs: Blood Culture:   Recent Labs   Lab 03/15/24  1842 03/15/24  1843 03/17/24  0959 03/22/24  1050 03/22/24  1051   LABBLOO No growth after 5 days. No growth after 5 days. No growth after 5 days. No Growth to date  No Growth to date No Growth to date  No Growth to date       CBC:   Recent Labs   Lab 03/22/24  0540 03/23/24  0400   WBC 6.52 6.50   HGB 8.1* 8.0*   HCT 25.6* 25.6*    217       CMP:   Recent Labs   Lab 03/22/24  1050   *   K 4.5      CO2 26   GLU 91   BUN 10   CREATININE 0.7   CALCIUM 8.9   PROT 7.5   ALBUMIN 2.8*   BILITOT 1.2*   ALKPHOS 227*   *   *   ANIONGAP 7*     Wound Culture:   Recent Labs   Lab 11/21/23  1041 02/07/24  1412 02/07/24  1423 02/14/24  1612 02/14/24  1712   LABAERO METHICILLIN RESISTANT STAPHYLOCOCCUS AUREUS  Moderate  No other significant isolate  * No growth METHICILLIN RESISTANT STAPHYLOCOCCUS AUREUS  Rare  * METHICILLIN RESISTANT STAPHYLOCOCCUS AUREUS  Few  * METHICILLIN RESISTANT STAPHYLOCOCCUS AUREUS  Few  *       All pertinent labs within the past 24 hours have been reviewed.    Significant Imaging: I have reviewed all pertinent imaging results/findings within the past 24 hours.  Procedure Component Value Units Date/Time   US Abdomen Limited [[id number]] Resulted: 03/15/24 0842   Order Status: Completed Updated: 03/15/24 0844   Narrative:     EXAMINATION:  US ABDOMEN LIMITED    CLINICAL HISTORY:  transaminitis;.    TECHNIQUE:  Limited ultrasound of the right upper quadrant of the abdomen including pancreas, liver, gallbladder, common bile duct was performed.    COMPARISON:  CT abdomen pelvis from 11/20/2023    FINDINGS:  Liver: Enlarged in size measuring 20.6 cm. Homogeneous echotexture. No focal hepatic lesions.    Gallbladder: 2 biliary crystals measuring up to 1-2 mm.  There is no gallbladder wall thickening or pericholecystic fluid.  No sonographic Lozoya's sign.    Biliary system: The common duct is not  dilated, measuring 2 mm.  No intrahepatic ductal dilatation.    Spleen: Normal in size with a homogeneous echotexture, measuring 9.8 x 3.6 cm.    Pancreas: The visualized portions of pancreas appear normal.  Prominent peripancreatic lymph node measuring 2.1 x 0.9 x 2.6 cm.    Miscellaneous: No ascites.   Impression:       Hepatomegaly.    A few biliary crystals.    Mildly prominent peripancreatic lymph node.      Electronically signed by: Carlo Garcia MD  Date: 03/15/2024  Time: 08:42   CT Lumbar Spine Without Contrast [[id number]] Resulted: 03/14/24 1649   Order Status: Completed Updated: 03/14/24 1652   Narrative:     EXAMINATION:  CT LUMBAR SPINE WITHOUT CONTRAST    CLINICAL HISTORY:  Osteomyelitis, lumbar;    TECHNIQUE:  Low-dose axial, sagittal and coronal reformations are obtained through the lumbar spine.  Contrast was not administered.    COMPARISON:  MRI lumbar spine 03/12/2024; CT lumbar spine 02/05/2024    FINDINGS:  Postoperative changes from L4-pelvis posterior decompression and instrumented fusion.  Hardware is intact and in satisfactory position.  No perihardware fractures.  Unchanged alignment.  There is a 10.1 x 9.3 x 4.8 cm region of hypoattenuation in the paraspinal muscles at the operative level, corresponding to extensive intramuscular edema on recent MRI.  A fluid collection cannot be excluded.    ALIGNMENT: Grade 1 anterolisthesis at L5-S1.    BONE: No acute fracture.  There are endplate erosions, sclerotic changes, and periostitis at L5-S1 and S1-2, compatible with sequela of spondylo discitis.  There are erosions and sclerotic changes involving the left sacroiliac joint and left shan sacrum as well, with new ankylosis.    JOINT: Remaining disc heights and facet joint spaces are preserved.    SPINAL CANAL: Partially obscured at the operative level by metal artifact.  The conus medullaris and cauda equina nerve roots are difficult to visualize.  An epidural collection is difficult to  exclude by CT.    PARASPINAL SOFT TISSUES: There is a 1.2 cm solid pulmonary nodule in the right lower lobe, new from prior studies.  There is prevertebral soft tissue edema at the lumbosacral junction.  Organized paraspinal collections are difficult to exclude by CT.   Impression:       Sequela of spondylodiscitis at L5-S1 and S1-2, and septic arthritis at the left sacroiliac joint.    Postoperative changes from L4-pelvis decompression and fusion.  No hardware complication.    10.1 cm region of hypoattenuation in the paraspinal muscles at the operative level, corresponding to extensive intramuscular edema on recent MRI.  Organized paraspinal collections and epidural collections are difficult to exclude by noncontrast CT.  Refer to recent MRI for additional details.    New 1.2 cm solid pulmonary nodule in the right lower lobe, likely inflammatory or infectious.  Follow-up chest CT in 3 months is recommended.      Electronically signed by: Saqib Branham  Date: 03/14/2024  Time: 16:49     Imaging History    2024    Date Procedure Name Study Review Link PACS Link Status Accession Number Location   03/15/24 08:06 AM US Abdomen Limited Study Review  Images Final [id number] [facility]   03/14/24 03:01 PM CT Lumbar Spine Without Contrast Study Review  Images Final [id number] [facility]   03/13/24 12:48 AM MRI Lumbar Spine With Contrast Study Review  Images Final [id number] [facility]   03/13/24 12:45 AM MRI Pelvis W WO Contrast Study Review  Images Final [id number] [facility]   03/13/24 12:41 AM MRI Thoracic Spine W WO Cont Study Review  Images Final [id number] [facility]   03/12/24 06:14 PM MRI Lumbar Spine Without Contrast Study Review  Images Final [id number] [facility]   03/12/24 04:48 PM X-Ray Chest AP Portable Study Review  Images Final [id number] [facility]   02/22/24 12:03 PM X-Ray Chest 1 View S/P PICC Line by Nursing Study Review  Images Final [id number] [facility]   03/17/24 02:01 AM CARDIAC MONITORING STRIPS Study Review  Final     03/16/24 07:22 PM CARDIAC MONITORING  STRIPS Study Review  Final     03/16/24 02:41 AM CARDIAC MONITORING STRIPS Study Review  Final     03/15/24 09:32 AM CARDIAC MONITORING STRIPS Study Review  Final     03/14/24 11:02 PM CARDIAC MONITORING STRIPS Study Review  Final

## 2024-03-24 NOTE — PROGRESS NOTES
Aleksandr Bray - Neurosurgery ([facility])  [facility]  Progress Note    Patient Name: Ton Donovan  MRN: [id number]  Patient Class: IP- Inpatient   Admission Date: 3/14/2024  Length of Stay: 10 days  Attending Physician: Nagi Angel*  Primary Care Provider: Steve Kelly DO        Subjective:     Principal Problem:Osteomyelitis of vertebra of lumbosacral region        HPI:  Mr. Ton Donovan is a 28 year-old Male with a PMHx of Hypertension, IV drug use who presented with fevers and ongoing back pain with radiation down legs.    Of note, he was recently admitted to [facility] for LOOP X lumbar spinal fusion, with course complicated by a psoas abscess and MRSA epidural abscess s/p epidural phlegmon debridement. He was started on IV Daptomycin and discharged on 2/22/24 to [facility] for completion of antibiotics. Ongoing fevers complicated his LTAC stay, and he returned to the [facility] Emergency Department for further evaluation. On arrival, WBC 9, ESR 59, CRP 46, AST 360s, ALT 200s. Procalcitonin was elevated at 1.18, lactate normal. MRI L spine concerning for continued L5-S1/S1 diskitis and osteomyelitis with abscess, diffuse infectious myositis of the lumbar paraspinal musculature bilaterally and left iliopsoas muscle without discrete abscess. He was then transferred to [facility] for Neurosurgerical evaluation.     At the time of this consult, temp 99F, HR 60, 120s/60s, saturating well on room air. Most recent labs with WBC 5, Hgb 9, Na 134.    Medicine consulted for medical co-management of epidural abscess    Overview/Hospital Course:  3/15-Admitted for NSGY evaluation of OM & discitis lumbar spine. Will let pt eat.   3/16- Does not need surgery. Appreciate NSGY eval. Wound care and ID following and need final recs. Pt is comfortable.   3/17- Klebsiella sens to everything,  On dapto and yue.  Pic line removed and culture tip was ordered, but not done.   3/18 per ID: Continue  Iv-cefepime 2g Q8h. (Bld cx  +kleb aerogenes panS) & Continue Iv-Daptomycin 10mg/kg Q24h.  repeat bld cxs 03/15 and 03/17 (NGTD); picc cath tip culture was not sent down properly  Anticipate continuing both Iv-dapto and cefepime for remaining 2-3wks of full 6wk duration of Iv-abx(BRENNAN 04/10) for lumbar osteo-discitis with paravertebral / psoas myositis and sub centimeter abscesses   Will need new PIc line or mid line.  Waiting for cultures to be neg to ensure therapeutic window without bacteremia and presence of a line.   - IR consulted to evaluate if the fluid collection at posterior to L5-s1 which communicates with disc space to see if that can be drained.  IR, Dr. Pretty reviewed this pt's case. He says no discrete fluid collection seen within the paraspinal muscles on either the CT or the MRI, it appears he has swollen, infected paraspinal muscles more consistent with myositis. He may have some fluid in the disc space which we recommend reaching out to neuro IR - No fluid to be drained, infection limited to myositis.    3/19- Per Neuro IR, abscess is very deep to reach by IR means so it's not amenable to drain. Repeat cultures are neg.   3/20 replace pic and plan for LTAC.   ID final recs:   Continue Iv-cefepime 2g Q8h. (Bld cx +kleb aerogenes panS) x 2 weeks from 1st negative blood culture 3/15  Continue Iv-Daptomycin 10mg/kg Q24h  [CPK 39]. To complete 6 weeks  OK to replace PICC  Rec indefinite suppression with Doxycyline 100mg po bid once IV abx completed.  fu upon dc from LTAC  Pic line placed  No growth on repeat cultures. No leukocytosis  Ultrasound with thromboses basilic and cephalic veins  Patient reports arm swelling and discomfort improving    Interval History: No acute events. No appears afebrile    Review of Systems  Objective:     Vital Signs (Most Recent):  Temp: 99.2 °F (37.3 °C) (03/24/24 1147)  Pulse: 85 (03/24/24 1147)  Resp: 18 (03/24/24 1245)  BP: 132/71 (03/24/24 1147)  SpO2: 96 % (03/24/24 1147) Vital Signs (24h  Range):  Temp:  [98.7 °F (37.1 °C)-99.5 °F (37.5 °C)] 99.2 °F (37.3 °C)  Pulse:  [] 85  Resp:  [16-20] 18  SpO2:  [94 %-98 %] 96 %  BP: (103-155)/(54-71) 132/71     Weight: 78 kg (172 lb)  Body mass index is 24.68 kg/m².    Intake/Output Summary (Last 24 hours) at 3/24/2024 1313  Last data filed at 3/24/2024 0533  Gross per 24 hour   Intake --   Output 1600 ml   Net -1600 ml         Physical Exam  Constitutional:       General: He is not in acute distress.     Appearance: Normal appearance. He is not toxic-appearing or diaphoretic.   HENT:      Head: Normocephalic and atraumatic.      Mouth/Throat:      Mouth: Mucous membranes are moist.      Pharynx: Oropharynx is clear.   Eyes:      Extraocular Movements: Extraocular movements intact.      Pupils: Pupils are equal, round, and reactive to light.   Cardiovascular:      Rate and Rhythm: Normal rate and regular rhythm.      Pulses: Normal pulses.   Pulmonary:      Effort: Pulmonary effort is normal. No respiratory distress.      Breath sounds: Normal breath sounds. No stridor. No wheezing, rhonchi or rales.   Chest:      Chest wall: No tenderness.   Abdominal:      General: Abdomen is flat. Bowel sounds are normal. There is no distension.      Palpations: Abdomen is soft.      Tenderness: There is no abdominal tenderness. There is no right CVA tenderness, left CVA tenderness, guarding or rebound.   Musculoskeletal:         General: No swelling, tenderness, deformity or signs of injury. Normal range of motion.      Cervical back: Normal range of motion and neck supple. No rigidity or tenderness.      Right lower leg: No edema.      Left lower leg: No edema.   Skin:     General: Skin is warm and dry.      Coloration: Skin is not jaundiced.      Findings: No erythema or rash.   Neurological:      General: No focal deficit present.      Mental Status: He is alert and oriented to person, place, and time. Mental status is at baseline.      Motor: No weakness.       Gait: Gait abnormal.             Significant Labs: All pertinent labs within the past 24 hours have been reviewed.    Significant Imaging: I have reviewed all pertinent imaging results/findings within the past 24 hours.    Assessment/Plan:      * Osteomyelitis of vertebra of lumbosacral region  28M with hypertension, IV drug use (heroin) presenting from LTAC with ongoing fevers    He was recently admitted on 2/04/24 with osteomyelitis/discitis L5-S1, sacral and left illiac osteomyelitis, L5-S1 epidural abscess, and left iliopsoas abscess. He underwent L4-pelvic fusion on 2/14/24 with epidural phlegmon debridement, intraoperative cultures positive for MRSA. Infectious Disease was consulted and recommended IV Daptomycin for 8 week course (EOC 4/10/24) to be followed by oral suppressive doxycycline thereafter. He was discharged on 2/22/24 to [facility] to complete his antibiotic course. LTAC stay was complicated by ongoing fevers and he represented to [facility] for further evaluation prior [place] [facility] [place]. On arrival, WBC 9, ESR 59, CRP 46, AST 360s, ALT 200s. Procalcitonin was elevated at 1.18, lactate normal. MRI L spine showed continued L5-S1/S1 diskitis/osteomyelitis with abscess, diffuse infectious myositis of the bilateral lumbar paraspinal musculature and left iliopsoas muscle.  - Infectious Disease consulted, appreciate assistance and recommendations  - CT L spine without contrast noted  - Continue IV Daptomycin q24h  - Weekly CK level while on Daptomycin  - Multimodal pain control with tylenol, gabapentin, methocarbamol, oxy IR, oxy ER  - Follow blood cultures through maturity  - Daily CBC, CMP  - Cardiac monitor  - Neurosurgery Consult: he does not need surgery.  - pt transferred to [facility]    3/18 - ID final recs pending. Has Klebsiella in blood sens to everything.  Wound care consulted . pic line may have been source of infection  3/20-   ID final recs:   Continue  "Iv-cefepime 2g Q8h. (Bld cx +kleb aerogenes panS) x 2 weeks from 1st negative blood culture 3/15 ( stop day 4/5/24)  Continue Iv-Daptomycin 10mg/kg Q24h  [CPK 39]. To complete 6 weeks( stop day 4/10/24_  OK to replace PICC  Rec indefinite suppression with Doxycyline 100mg po bid once IV abx completed.  fu with ID upon dc from LTAC      Chronic diastolic heart failure  ECHO 2/13/2024  Left Ventricle: The left ventricle is normal in size. Normal wall thickness. Normal wall motion. There is normal systolic function with a visually estimated ejection fraction of 60 - 65%. Ejection fraction by visual approximation is 65%. There is diastolic dysfunction but grade cannot be determined.    Right Ventricle: Normal right ventricular cavity size. Wall thickness is normal. Right ventricle wall motion  is normal. Systolic function is normal.    IVC/SVC: Normal venous pressure at 3 mmHg.    Pericardium: There is a trivial posterior effusion just base.    STABLE      Septic arthritis  See " Osteomyelitis"      Myositis of multiple sites  See " Osteomeylitis" for plan      Hyponatremia  Patient has hyponatremia which is controlled,We will aim to correct the sodium by 4-6mEq in 24 hours. We will monitor sodium Daily. The hyponatremia is due to Dehydration/hypovolemia. We will obtain the following studies: see labsection. We will treat the hyponatremia with IV fluids. The patient's sodium results have been reviewed and are listed below.  No results for input(s): "NA" in the last 24 hours.      Drug dependence  See " Substance induced depressive disroder"  - monitor for withdrawal and give opioid if needed   - pt has been in LTAC  - on oxycodone LA 10 bid for pain and dependence  STABLE      Hepatitis  Most recent labs from OSH showed AST 360s, ALT 200s. Had recent hepatitis panel 3/12 with hep c    - Recent Hepatitis C screening resulted positive, previous Hep C screening 3 months ago negative. Check repeat Hep C screening and PCR  - " "Check HIV given history of IVDU and ongoing fever despite antibiotics  - RUQ ultrasound- pending  - Daily CMP to trend transaminases:  335/235  - Infectious Diseases consulted and following      Severe sepsis  This patient does have evidence of infective focus - continued L5-S1/S1 diskitis/osteomyelitis with abscess, diffuse infectious myositis of the bilateral lumbar paraspinal musculature and left iliopsoas muscle.  My overall impression is sepsis.  Source: Skin and Soft Tissue (location epidurial)  Antibiotics given-   Antibiotics (72h ago, onward)      Start     Stop Route Frequency Ordered    03/17/24 2130  ceFEPIme (MAXIPIME) 2 g in dextrose 5 % in water (D5W) 100 mL IVPB (MB+)         -- IV Every 8 hours (non-standard times) 03/17/24 1452    03/14/24 0900  DAPTOmycin (CUBICIN) 780 mg in sodium chloride 0.9% SolP 50 mL IVPB         -- IV Every 24 hours (non-standard times) 03/14/24 0755          Latest lactate reviewed-  No results for input(s): "LACTATE", "POCLAC" in the last 72 hours.    Organ dysfunction indicated by Acute liver injury    Fluid challenge Not needed - patient is not hypotensive      Post- resuscitation assessment No - Post resuscitation assessment not needed       Will Not start Pressors- Levophed for MAP of 65  Source control achieved by: Broad spectrum antibiotics    - Infectious Disease consulted  - Continue IV Daptomycin and meropenum, Need end date  - Follow blood cultures through maturity. Klebsiella and enterobacteraies per PCR.- & sens to everything    HTN (hypertension)  Chronic, controlled. Latest blood pressure and vitals reviewed-     Temp:  [97.6 °F (36.4 °C)-99.4 °F (37.4 °C)]   Pulse:  []   Resp:  [16-18]   BP: ()/(54-65)   SpO2:  [92 %-98 %] .   Home meds for hypertension were reviewed and noted below.   Hypertension Medications               cloNIDine (CATAPRES) 0.1 MG tablet Take 1 tablet (0.1 mg total) by mouth every 12 (twelve) hours.          Plan:  - While " "in the hospital, will manage blood pressure as follows; Continue home antihypertensive regimen. Currently on Clonidine 0.1 PO BID  - Will utilize p.r.n. blood pressure medication only if patient's blood pressure greater than 180/110 and he develops symptoms such as worsening chest pain or shortness of breath.    Discitis of lumbosacral region  See Osteomyelitis for consolidated plan      Debility  Patient with Acute on chronic debility due to other reduced mobility. Latest AMPAC and GEMS scores have been reviewed. Evaluation for etiology is complete. Plan includes progressive mobility protocol initated, PT/OT consulted, and may need surgey .    Substance or medication-induced depressive disorder  - Continue Mirtazipine  - Hx of heroin, THC, cociane      Psoas abscess, left  L5-S1/S1 diskitis/osteomyelitis with abscess, diffuse infectious myositis of the bilateral lumbar paraspinal musculature and left iliopsoas muscle.  - see " Osteomeylitis" above      Heroin use disorder, severe  See " Drug Dependence"         VTE Risk Mitigation (From admission, onward)           Ordered     Place BRENNAN hose  Until discontinued         03/14/24 0730     IP VTE HIGH RISK PATIENT  Once         03/14/24 0730     Place sequential compression device  Until discontinued         03/14/24 0730                    Discharge Planning   SENA: 3/25/2024     Code Status: Full Code   Is the patient medically ready for discharge?: No    Reason for patient still in hospital (select all that apply): Patient trending condition and Treatment  Discharge Plan A: Long-term acute care facility (LTAC)   Discharge Delays: None known at this time      Nagi Angel MD  Department of Hospital Medicine   [facility] - Neurosurgery ([facility])    "

## 2024-03-24 NOTE — PLAN OF CARE
Problem: Infection  Goal: Absence of Infection Signs and Symptoms  Outcome: Ongoing, Progressing     Problem: Bleeding (Sepsis/Septic Shock)  Goal: Absence of Bleeding  Outcome: Ongoing, Progressing     Problem: Infection Progression (Sepsis/Septic Shock)  Goal: Absence of Infection Signs and Symptoms  Outcome: Ongoing, Progressing

## 2024-03-24 NOTE — SUBJECTIVE & OBJECTIVE
Interval History: No acute events. No appears afebrile    Review of Systems  Objective:     Vital Signs (Most Recent):  Temp: 99.2 °F (37.3 °C) (03/24/24 1147)  Pulse: 85 (03/24/24 1147)  Resp: 18 (03/24/24 1245)  BP: 132/71 (03/24/24 1147)  SpO2: 96 % (03/24/24 1147) Vital Signs (24h Range):  Temp:  [98.7 °F (37.1 °C)-99.5 °F (37.5 °C)] 99.2 °F (37.3 °C)  Pulse:  [] 85  Resp:  [16-20] 18  SpO2:  [94 %-98 %] 96 %  BP: (103-155)/(54-71) 132/71     Weight: 78 kg (172 lb)  Body mass index is 24.68 kg/m².    Intake/Output Summary (Last 24 hours) at 3/24/2024 1313  Last data filed at 3/24/2024 0533  Gross per 24 hour   Intake --   Output 1600 ml   Net -1600 ml         Physical Exam  Constitutional:       General: He is not in acute distress.     Appearance: Normal appearance. He is not toxic-appearing or diaphoretic.   HENT:      Head: Normocephalic and atraumatic.      Mouth/Throat:      Mouth: Mucous membranes are moist.      Pharynx: Oropharynx is clear.   Eyes:      Extraocular Movements: Extraocular movements intact.      Pupils: Pupils are equal, round, and reactive to light.   Cardiovascular:      Rate and Rhythm: Normal rate and regular rhythm.      Pulses: Normal pulses.   Pulmonary:      Effort: Pulmonary effort is normal. No respiratory distress.      Breath sounds: Normal breath sounds. No stridor. No wheezing, rhonchi or rales.   Chest:      Chest wall: No tenderness.   Abdominal:      General: Abdomen is flat. Bowel sounds are normal. There is no distension.      Palpations: Abdomen is soft.      Tenderness: There is no abdominal tenderness. There is no right CVA tenderness, left CVA tenderness, guarding or rebound.   Musculoskeletal:         General: No swelling, tenderness, deformity or signs of injury. Normal range of motion.      Cervical back: Normal range of motion and neck supple. No rigidity or tenderness.      Right lower leg: No edema.      Left lower leg: No edema.   Skin:     General: Skin  is warm and dry.      Coloration: Skin is not jaundiced.      Findings: No erythema or rash.   Neurological:      General: No focal deficit present.      Mental Status: He is alert and oriented to person, place, and time. Mental status is at baseline.      Motor: No weakness.      Gait: Gait abnormal.             Significant Labs: All pertinent labs within the past 24 hours have been reviewed.    Significant Imaging: I have reviewed all pertinent imaging results/findings within the past 24 hours.

## 2024-03-24 NOTE — PROGRESS NOTES
Aleksandr Bray - Neurosurgery ([facility])  Infectious Disease  Progress Note    Patient Name: Ton Donovan  MRN: [id number]  Admission Date: 3/14/2024  Length of Stay: 10 days  Attending Physician: Nagi Angel*  Primary Care Provider: Steve Kelly DO    Isolation Status: No active isolations  Assessment/Plan:      ID  * Osteomyelitis of vertebra of lumbosacral region  28M with h/o IVDU, MRSA bacteremia and prior disseminated MRSA infection with poor adherence with care (history of leaving AMA), readmitted 02/04 with progressive back pain. Imaging significant for lumbosacral osteomyelitis with associated epidural abscess, osteomyelitis of sacrum & L iliac bone, probable septic arthritis of L SI joint & lower lumbar spine facet joints, and left iliopsoas abscess, abscess in superior rectal/presacral region and micro-abscesses in paraspinal muscles, pelvis, and left sacrum. Blood cultures remained negative then, and TTE negative. Underwent IR aspiration of left SI joint and psoas on 02/07.  Cxs +MRSA. Abx held prior to IR bx; post-procedure started on empiric Iv-vanc / CTX.   S/p lumbar-pelvis debridement with fusion (02/14). Surgical cxs +MRSA. Pt discharged to [facility], to complete 6wks Iv-Dapto 10mg/kg Q24h, [place] 04/10/24. (Of note, switched vanc to IV daptomycin (d/t subtherapeutic vancomycin levels with q8hr dosing). Advised repeat imaging / neuro f/u near [place] 04/10; followed by abx suppression thereafter, likely lifelong.     Pt presented 03/12 to [facility] now d/t new onset of intermittent fevers (101.4F) at [facility] for past few days. On arrival, temp 99F, VSS, HDS, wbc nml, CRP 46, Cr 0.8.  CPK 39 nml, with climbing transaminitis found to have HCV+ (RNA 9-million), HBV Ag negative, HIV negative, U/S +hepatomegaly, GB wnl.  Bld cxs 03/12 NGTD.   Pt developed fever 03/14, repeat bld cxs 03/14 1 of 2 +Kleb aerogenes (panS).  Repeat Bld cxs 03/15 and 03/17 NGTD.  Picc line removed 03/16, with picc cath  tip sent for cx.   -- Of note; UDS +MJ on arrival from [facility].  Suspect picc line may be source of new Kleb bacteremia (denies injecting at LTAC); vs. Lumbar surgical wound, vs. GI translocation in setting of hepatomegaly w/ chronic HCV (RNA+ 9-million copies).    Repeat MRI L-spine / pelvis (03/13): Extensive epidural infectious/inflammatory change posterior to the L5-S1 and S1-2 disc spaces; with paravertebral / pre-vertebral abscess. Diffuse infectious myositis of the bilateral lower lumbar paraspinal musculature and within the left psoas and iliopsoas muscles, with 0.7 cm abscess (down from 2.3cm on prior 02/04). Persistent osteomyelitis and septic arthritis about the left SI joint. -- Per NSGY, no role for surgical intervention at this time, favoring wound care and continuing Iv-abx.     3/18/24: Patient now afebrile and WBC WNL.  Denying any systemic sign of infection or back pain. CRP remains elevated 37.8.  On Daptomycin and Cefepime.  PIC remove and repeat BCXs NGTD - suspect due to line infection as blood cultures cleared quickly with PICC removal. Imaging reviewed and possible small abscess posterior to L5-S1 extending into disc space.  Primary team discussed with IR who felt no drainable fluid there there or in psoas.      3/19/24: Doing well.  Tmax 100.4 but no subjective fever.  IR body and neuro plans no intervention. Repeat blood cultures NGTD.  CRP improved from 46 to 26    3/23/24: Tmax 100.4 and asymptomatic.  L UE thrombus in basilic and cephalic veins - suspected cause of fever as blood cultures NGTD and WBC WNL.    3/24/24: Tmax 101.3. Seen prior to that and had no symptoms.  No apparent pattern or cause to fever recurrence though.    Recommendations / Plan:  DC Iv-cefepime given fever -? 2/2 beta lactam?   (Bld cx +kleb aerogenes panS) - start Cipro 750mg po bid to completed 2 weeks from 1st negative blood culture 3/15  Repeat procalcitonin and CRP - if CRP increasing then may need to consider  reimaging L spine and Pelvis - messaged primary team  Continue Iv-Daptomycin 10mg/kg Q24h  [CPK 39]. To complete 6 weeks  Monitor fever  Rec indefinite suppression with Doxycyline 100mg po bid once IV abx completed  Monitor for now                Anticipated Disposition: tbd    Thank you for your consult. I will follow-up with patient. Please contact us if you have any additional questions.    LEROY Ruggiero  Infectious Disease  [facility] - Neurosurgery (Hospital)    Subjective:     Principal Problem:Osteomyelitis of vertebra of lumbosacral region    HPI: 28M with h/o IVDU, MRSA bacteremia and prior disseminated MRSA infection with poor adherence with care (history of leaving AMA), readmitted 02/04 with progressive back pain. Imaging significant for lumbosacral osteomyelitis with associated epidural abscess, osteomyelitis of sacrum & L iliac bone, probable septic arthritis of L SI joint & lower lumbar spine facet joints, and left iliopsoas abscess, abscess in superior rectal/presacral region and micro-abscesses in paraspinal muscles, pelvis, and left sacrum. Blood cultures remained negative. TTE negative. Underwent IR aspiration of left SI joint and psoas on 02/07.  Cxs +MRSA. Abx held prior to IR bx; post-procedure started on empiric Iv-vanc / CTX.   S/p lumbar-pelvis debridement with fusion (02/14). Surgical cxs +MRSA. Pt discharged to [facility], to complete 6wks Iv-Dapto 10mg/kg Q24h, [place] 04/10/24. (Of note, switched vanc to IV daptomycin (d/t subtherapeutic vancomycin levels with q8hr dosing). Advised repeat imaging / neuro f/u near [place] 04/10; followed by abx suppression thereafter, likely lifelong.   However, pt presents 03/12 to [facility] now d/t new onset of intermittent fevers (101.4F) at [facility] for past few days. On arrival, temp 99F, VSS, HDS, wbc nml, CRP 46,  / , , Cr 0.8.  CPK pending.     Repeat MRI L-spine / pelvis (03/13): Postsurgical changes of L4-5 posterior spinal fusion  with bilateral sacroiliac extension screws. With discitis osteomyelitis at L5-S1 and S1-2. Extensive epidural infectious/inflammatory change posterior to the L5-S1 and S1-2 disc spaces. Paravertebral/prevertebral abscess measuring 3.7 x 2.2 cm communicating with the L5-S1 disc space.   Pelvis: Diffuse infectious myositis of the left psoas and iliopsoas muscles with 7 mm abscess of the left iliopsoas muscle, decreased from 23 mm. Persistent osteomyelitis and septic arthritis about the left SI joint.  T-spine: incidental finding of small nodular T2 hyperintense signal foci at Rt lung base; consider dedicated chest-CT.      Prior MRI L-spine / pelvis (02/04): showed osteomyelitis/discitis of L5-S1, osteomyelitis of sacrum & L iliac bone, probable septic arthritis of L SI joint & lower lumbar spine facet joints; worsening anterolisthesis of L5 with respect to S1 with increased size of epidural abscess at the level of L5-S1 and probable severe central canal stenosis at this level, along w/ additional abscesses in the L iliopsoas muscle, paraspinal muscles, pelvis, & L sacrum; -- the additional abscesses in the left iliopsoas muscle [3.4 x 1.7cm; with other micro-abscesses adjacent measuring 2.3cm), paraspinal muscles, pelvis, and left sacrum. Additional abscess in the superior rectal/presacral region measuring roughly 3.1 x 2.4 cm; and probable additional micro abscesses at left sacrum.          Interval History:   No acute events  Tmax/Tcurrent 101.3  Repeat BCXs NGTD  Denies significant back pain or any fever, chills, or sweats.  Remains on Dapto/Cefepime      Review of Systems   Constitutional:  Negative for chills, diaphoresis and fever.   Respiratory:  Negative for shortness of breath.    Cardiovascular:  Negative for chest pain.   Gastrointestinal:  Negative for abdominal pain, diarrhea, nausea and vomiting.   Genitourinary:  Negative for dysuria and hematuria.   Musculoskeletal:  Positive for back pain (soreness).      Objective:     Vital Signs (Most Recent):  Temp: (!) 101.3 °F (38.5 °C) (03/24/24 1641)  Pulse: 100 (03/24/24 1641)  Resp: 20 (03/24/24 1643)  BP: 132/70 (03/24/24 1641)  SpO2: 97 % (03/24/24 1641) Vital Signs (24h Range):  Temp:  [98.7 °F (37.1 °C)-101.3 °F (38.5 °C)] 101.3 °F (38.5 °C)  Pulse:  [] 100  Resp:  [16-20] 20  SpO2:  [94 %-98 %] 97 %  BP: (103-155)/(54-71) 132/70     Weight: 78 kg (172 lb)  Body mass index is 24.68 kg/m².    Estimated Creatinine Clearance: 159.3 mL/min (based on SCr of 0.7 mg/dL).     Physical Exam  Constitutional:       General: He is not in acute distress.     Appearance: Normal appearance. He is well-developed. He is not ill-appearing, toxic-appearing or diaphoretic.       HENT:      Head: Normocephalic and atraumatic.   Cardiovascular:      Rate and Rhythm: Normal rate and regular rhythm.      Heart sounds: Normal heart sounds. No murmur heard.     No friction rub. No gallop.   Pulmonary:      Effort: Pulmonary effort is normal. No respiratory distress.      Breath sounds: Normal breath sounds. No wheezing or rales.   Abdominal:      General: Bowel sounds are normal. There is no distension.      Palpations: Abdomen is soft. There is no mass.      Tenderness: There is no abdominal tenderness. There is no guarding or rebound.   Skin:     General: Skin is warm and dry.   Neurological:      Mental Status: He is alert and oriented to person, place, and time.   Psychiatric:         Behavior: Behavior normal.          Significant Labs: Blood Culture:   Recent Labs   Lab 03/15/24  1842 03/15/24  1843 03/17/24  0959 03/22/24  1050 03/22/24  1051   LABBLOO No growth after 5 days. No growth after 5 days. No growth after 5 days. No Growth to date  No Growth to date  No Growth to date No Growth to date  No Growth to date  No Growth to date       CBC:   Recent Labs   Lab 03/23/24  0400 03/24/24  0414   WBC 6.50 6.71   HGB 8.0* 8.6*   HCT 25.6* 27.8*    215       CMP:   Recent  Labs   Lab 03/24/24  0414   *   K 4.4   CL 99   CO2 27      BUN 9   CREATININE 0.7   CALCIUM 9.3   PROT 7.9   ALBUMIN 2.8*   BILITOT 0.8   ALKPHOS 233*   *   *   ANIONGAP 4*       Wound Culture:   Recent Labs   Lab 11/21/23  1041 02/07/24  1412 02/07/24  1423 02/14/24  1612 02/14/24  1712   LABAERO METHICILLIN RESISTANT STAPHYLOCOCCUS AUREUS  Moderate  No other significant isolate  * No growth METHICILLIN RESISTANT STAPHYLOCOCCUS AUREUS  Rare  * METHICILLIN RESISTANT STAPHYLOCOCCUS AUREUS  Few  * METHICILLIN RESISTANT STAPHYLOCOCCUS AUREUS  Few  *       All pertinent labs within the past 24 hours have been reviewed.    Significant Imaging: I have reviewed all pertinent imaging results/findings within the past 24 hours.  Procedure Component Value Units Date/Time   US Abdomen Limited [[id number]] Resulted: 03/15/24 0842   Order Status: Completed Updated: 03/15/24 0844   Narrative:     EXAMINATION:  US ABDOMEN LIMITED    CLINICAL HISTORY:  transaminitis;.    TECHNIQUE:  Limited ultrasound of the right upper quadrant of the abdomen including pancreas, liver, gallbladder, common bile duct was performed.    COMPARISON:  CT abdomen pelvis from 11/20/2023    FINDINGS:  Liver: Enlarged in size measuring 20.6 cm. Homogeneous echotexture. No focal hepatic lesions.    Gallbladder: 2 biliary crystals measuring up to 1-2 mm.  There is no gallbladder wall thickening or pericholecystic fluid.  No sonographic Lozoya's sign.    Biliary system: The common duct is not dilated, measuring 2 mm.  No intrahepatic ductal dilatation.    Spleen: Normal in size with a homogeneous echotexture, measuring 9.8 x 3.6 cm.    Pancreas: The visualized portions of pancreas appear normal.  Prominent peripancreatic lymph node measuring 2.1 x 0.9 x 2.6 cm.    Miscellaneous: No ascites.   Impression:       Hepatomegaly.    A few biliary crystals.    Mildly prominent peripancreatic lymph node.      Electronically signed by:  Carlo Garcia MD  Date: 03/15/2024  Time: 08:42   CT Lumbar Spine Without Contrast [[id number]] Resulted: 03/14/24 1649   Order Status: Completed Updated: 03/14/24 1652   Narrative:     EXAMINATION:  CT LUMBAR SPINE WITHOUT CONTRAST    CLINICAL HISTORY:  Osteomyelitis, lumbar;    TECHNIQUE:  Low-dose axial, sagittal and coronal reformations are obtained through the lumbar spine.  Contrast was not administered.    COMPARISON:  MRI lumbar spine 03/12/2024; CT lumbar spine 02/05/2024    FINDINGS:  Postoperative changes from L4-pelvis posterior decompression and instrumented fusion.  Hardware is intact and in satisfactory position.  No perihardware fractures.  Unchanged alignment.  There is a 10.1 x 9.3 x 4.8 cm region of hypoattenuation in the paraspinal muscles at the operative level, corresponding to extensive intramuscular edema on recent MRI.  A fluid collection cannot be excluded.    ALIGNMENT: Grade 1 anterolisthesis at L5-S1.    BONE: No acute fracture.  There are endplate erosions, sclerotic changes, and periostitis at L5-S1 and S1-2, compatible with sequela of spondylo discitis.  There are erosions and sclerotic changes involving the left sacroiliac joint and left shan sacrum as well, with new ankylosis.    JOINT: Remaining disc heights and facet joint spaces are preserved.    SPINAL CANAL: Partially obscured at the operative level by metal artifact.  The conus medullaris and cauda equina nerve roots are difficult to visualize.  An epidural collection is difficult to exclude by CT.    PARASPINAL SOFT TISSUES: There is a 1.2 cm solid pulmonary nodule in the right lower lobe, new from prior studies.  There is prevertebral soft tissue edema at the lumbosacral junction.  Organized paraspinal collections are difficult to exclude by CT.   Impression:       Sequela of spondylodiscitis at L5-S1 and S1-2, and septic arthritis at the left sacroiliac joint.    Postoperative changes from L4-pelvis decompression and  fusion.  No hardware complication.    10.1 cm region of hypoattenuation in the paraspinal muscles at the operative level, corresponding to extensive intramuscular edema on recent MRI.  Organized paraspinal collections and epidural collections are difficult to exclude by noncontrast CT.  Refer to recent MRI for additional details.    New 1.2 cm solid pulmonary nodule in the right lower lobe, likely inflammatory or infectious.  Follow-up chest CT in 3 months is recommended.      Electronically signed by: Saqib Branham  Date: 03/14/2024  Time: 16:49     Imaging History    2024    Date Procedure Name Study Review Link PACS Link Status Accession Number Location   03/15/24 08:06 AM US Abdomen Limited Study Review  Images Final [id number] [facility]   03/14/24 03:01 PM CT Lumbar Spine Without Contrast Study Review  Images Final [id number] [facility]   03/13/24 12:48 AM MRI Lumbar Spine With Contrast Study Review  Images Final [id number] [facility]   03/13/24 12:45 AM MRI Pelvis W WO Contrast Study Review  [id number]   03/13/24 12:41 AM MRI Thoracic Spine W WO Cont Study Review  Images Final [id number] [facility]   03/12/24 06:14 PM MRI Lumbar Spine Without Contrast Study Review  Images Final [id number] [facility]   03/12/24 04:48 PM X-Ray Chest AP Portable Study Review  Images Final [id number] [facility]   02/22/24 12:03 PM X-Ray Chest 1 View S/P PICC Line by Nursing Study Review  Images Final [id number] [facility]   03/17/24 02:01 AM CARDIAC MONITORING STRIPS Study Review  Final     03/16/24 07:22 PM CARDIAC MONITORING STRIPS Study Review  Final     03/16/24 02:41 AM CARDIAC MONITORING STRIPS Study Review  Final     03/15/24 09:32 AM CARDIAC MONITORING STRIPS Study Review  Final     03/14/24 11:02 PM CARDIAC MONITORING STRIPS Study Review  Final

## 2024-03-24 NOTE — ASSESSMENT & PLAN NOTE
28M with h/o IVDU, MRSA bacteremia and prior disseminated MRSA infection with poor adherence with care (history of leaving AMA), readmitted 02/04 with progressive back pain. Imaging significant for lumbosacral osteomyelitis with associated epidural abscess, osteomyelitis of sacrum & L iliac bone, probable septic arthritis of L SI joint & lower lumbar spine facet joints, and left iliopsoas abscess, abscess in superior rectal/presacral region and micro-abscesses in paraspinal muscles, pelvis, and left sacrum. Blood cultures remained negative then, and TTE negative. Underwent IR aspiration of left SI joint and psoas on 02/07.  Cxs +MRSA. Abx held prior to IR bx; post-procedure started on empiric Iv-vanc / CTX.   S/p lumbar-pelvis debridement with fusion (02/14). Surgical cxs +MRSA. Pt discharged to [facility], to complete 6wks Iv-Dapto 10mg/kg Q24h, [place] 04/10/24. (Of note, switched vanc to IV daptomycin (d/t subtherapeutic vancomycin levels with q8hr dosing). Advised repeat imaging / neuro f/u near [place] 04/10; followed by abx suppression thereafter, likely lifelong.     [place]12 [place] [facility] [place] d/t new onset of intermittent fevers (101.4F) at [facility] for past few days. On arrival, temp 99F, VSS, HDS, wbc nml, CRP 46, Cr 0.8.  CPK 39 nml, with climbing transaminitis found to have HCV+ (RNA 9-million), HBV Ag negative, HIV negative, U/S +hepatomegaly, GB wnl.  Bld cxs 03/12 NGTD.   Pt developed fever 03/14, repeat bld cxs 03/14 1 of 2 +Kleb aerogenes (panS).  Repeat Bld cxs 03/15 and 03/17 NGTD.  Picc line removed 03/16, with picc cath tip sent for cx.   -- Of note; UDS +MJ on arrival from [facility].  Suspect picc line may be source of new Kleb bacteremia (denies injecting at LT); vs. Lumbar surgical wound, vs. GI translocation in setting of hepatomegaly w/ chronic HCV (RNA+ 9-million copies).    Repeat MRI L-spine / pelvis (03/13): Extensive epidural infectious/inflammatory change posterior to the L5-S1 and S1-2 disc  spaces; with paravertebral / pre-vertebral abscess. Diffuse infectious myositis of the bilateral lower lumbar paraspinal musculature and within the left psoas and iliopsoas muscles, with 0.7 cm abscess (down from 2.3cm on prior 02/04). Persistent osteomyelitis and septic arthritis about the left SI joint. -- Per NSGY, no role for surgical intervention at this time, favoring wound care and continuing Iv-abx.     3/18/24: Patient now afebrile and WBC WNL.  Denying any systemic sign of infection or back pain. CRP remains elevated 37.8.  On Daptomycin and Cefepime.  PIC remove and repeat BCXs NGTD - suspect due to line infection as blood cultures cleared quickly with PICC removal. Imaging reviewed and possible small abscess posterior to L5-S1 extending into disc space.  Primary team discussed with IR who felt no drainable fluid there there or in psoas.      3/19/24: Doing well.  Tmax 100.4 but no subjective fever.  IR body and neuro plans no intervention. Repeat blood cultures NGTD.  CRP improved from 46 to 26    3/23/24: Tmax 100.4 and asymptomatic.  L UE thrombus in basilic and cephalic veins - suspected cause of fever as blood cultures NGTD and WBC WNL.    3/24/24: Tmax 101.3. Seen prior to that and had no symptoms.  No apparent pattern or cause to fever recurrence though.    Recommendations / Plan:  DC Iv-cefepime given fever -? 2/2 beta lactam?   (Bld cx +kleb aerogenes panS) - start Cipro 750mg po bid to completed 2 weeks from 1st negative blood culture 3/15  Repeat procalcitonin and CRP - if CRP increasing then may need to consider reimaging L spine and Pelvis - messaged primary team  Continue Iv-Daptomycin 10mg/kg Q24h  [CPK 39]. To complete 6 weeks  Monitor fever  Rec indefinite suppression with Doxycyline 100mg po bid once IV abx completed  Monitor for now

## 2024-03-25 PROCEDURE — 97116 GAIT TRAINING THERAPY: CPT | Mod: CQ

## 2024-03-25 PROCEDURE — 25000003 PHARM REV CODE 250: Performed by: STUDENT IN AN ORGANIZED HEALTH CARE EDUCATION/TRAINING PROGRAM

## 2024-03-25 PROCEDURE — 63600175 PHARM REV CODE 636 W HCPCS

## 2024-03-25 PROCEDURE — 11000001 HC ACUTE MED/SURG PRIVATE ROOM

## 2024-03-25 PROCEDURE — 25000003 PHARM REV CODE 250: Performed by: HOSPITALIST

## 2024-03-25 PROCEDURE — 25000003 PHARM REV CODE 250

## 2024-03-25 PROCEDURE — A4216 STERILE WATER/SALINE, 10 ML: HCPCS | Performed by: HOSPITALIST

## 2024-03-25 PROCEDURE — 97535 SELF CARE MNGMENT TRAINING: CPT

## 2024-03-25 PROCEDURE — 25000003 PHARM REV CODE 250: Performed by: PHYSICIAN ASSISTANT

## 2024-03-25 PROCEDURE — 99233 SBSQ HOSP IP/OBS HIGH 50: CPT | Mod: ,,, | Performed by: STUDENT IN AN ORGANIZED HEALTH CARE EDUCATION/TRAINING PROGRAM

## 2024-03-25 RX ADMIN — CIPROFLOXACIN 750 MG: 750 TABLET, FILM COATED ORAL at 10:03

## 2024-03-25 RX ADMIN — OXYCODONE HYDROCHLORIDE 15 MG: 10 TABLET ORAL at 07:03

## 2024-03-25 RX ADMIN — Medication 10 ML: at 12:03

## 2024-03-25 RX ADMIN — Medication 10 ML: at 06:03

## 2024-03-25 RX ADMIN — CLONIDINE HYDROCHLORIDE 0.1 MG: 0.1 TABLET ORAL at 08:03

## 2024-03-25 RX ADMIN — MIRTAZAPINE 15 MG: 15 TABLET, FILM COATED ORAL at 08:03

## 2024-03-25 RX ADMIN — OXYCODONE HYDROCHLORIDE 10 MG: 10 TABLET, FILM COATED, EXTENDED RELEASE ORAL at 08:03

## 2024-03-25 RX ADMIN — DOCUSATE SODIUM AND SENNOSIDES 2 TABLET: 8.6; 5 TABLET, FILM COATED ORAL at 10:03

## 2024-03-25 RX ADMIN — OXYCODONE HYDROCHLORIDE 15 MG: 10 TABLET ORAL at 04:03

## 2024-03-25 RX ADMIN — OXYCODONE HYDROCHLORIDE 10 MG: 10 TABLET, FILM COATED, EXTENDED RELEASE ORAL at 10:03

## 2024-03-25 RX ADMIN — METHOCARBAMOL 500 MG: 500 TABLET ORAL at 12:03

## 2024-03-25 RX ADMIN — CIPROFLOXACIN 750 MG: 750 TABLET, FILM COATED ORAL at 08:03

## 2024-03-25 RX ADMIN — CLONIDINE HYDROCHLORIDE 0.1 MG: 0.1 TABLET ORAL at 10:03

## 2024-03-25 RX ADMIN — METHOCARBAMOL 500 MG: 500 TABLET ORAL at 10:03

## 2024-03-25 RX ADMIN — OXYCODONE HYDROCHLORIDE 15 MG: 10 TABLET ORAL at 12:03

## 2024-03-25 RX ADMIN — OXYCODONE HYDROCHLORIDE 15 MG: 10 TABLET ORAL at 10:03

## 2024-03-25 RX ADMIN — METHOCARBAMOL 500 MG: 500 TABLET ORAL at 04:03

## 2024-03-25 RX ADMIN — OXYCODONE HYDROCHLORIDE 15 MG: 10 TABLET ORAL at 02:03

## 2024-03-25 RX ADMIN — METHOCARBAMOL 500 MG: 500 TABLET ORAL at 08:03

## 2024-03-25 RX ADMIN — POLYETHYLENE GLYCOL 3350 17 G: 17 POWDER, FOR SOLUTION ORAL at 10:03

## 2024-03-25 RX ADMIN — DAPTOMYCIN 780 MG: 350 INJECTION, POWDER, LYOPHILIZED, FOR SOLUTION INTRAVENOUS at 10:03

## 2024-03-25 NOTE — PLAN OF CARE
Aleksandr Bray - Neurosurgery ([facility])  Discharge Reassessment    Primary Care Provider: Steve Kelly DO    Expected Discharge Date: 3/25/2024    Patient is not medically ready for discharge.  Patient continues with fever of unknown origin.  [facility] had rec'd LT[facility] auth.  Cm to communicate with [facility] to extend auth for when patient is medically ready.    CM in communication with Odilia with [facility] to advise patient is not redy.  Per Odilia, insurance should be good when we are ready but give a notice of a day to ensure auth is still current or if it needs to be resubmitted.    Discharge Plan A and Plan B have been determined by review of patient's clinical status, future medical and therapeutic needs, and coverage/benefits for post-acute care in coordination with multidisciplinary team members.     Reassessment (most recent)       Discharge Reassessment - 03/25/24 1212          Discharge Reassessment    Assessment Type Discharge Planning Reassessment     Did the patient's condition or plan change since previous assessment? Yes     Discharge Plan discussed with: Patient     Communicated [facility] with patient/caregiver Date not available/Unable to determine     Discharge Plan A Long-term acute care facility (LTAC)     Discharge Plan B Long-term acute care facility (LTAC)     DME Needed Upon Discharge  none     Transition of Care Barriers None     Why the patient remains in the hospital Requires continued medical care        Post-Acute Status    Post-Acute Authorization Placement     Post-Acute Placement Status Pending medical clearance/testing     Discharge Delays None known at this time

## 2024-03-25 NOTE — SUBJECTIVE & OBJECTIVE
Interval History: Fever noted yesterday afternoon 101.3  Pt feels well and has no new complaints. States he does not feel feverish  Reports L arm swelling improving  Unclear etiology of recurrent fevers - obtain repeat CT    Review of Systems  Objective:     Vital Signs (Most Recent):  Temp: 98.7 °F (37.1 °C) (03/25/24 1206)  Pulse: 91 (03/25/24 1206)  Resp: 18 (03/25/24 1206)  BP: (!) 145/67 (03/25/24 1206)  SpO2: 99 % (03/25/24 1206) Vital Signs (24h Range):  Temp:  [98.6 °F (37 °C)-101.3 °F (38.5 °C)] 98.7 °F (37.1 °C)  Pulse:  [] 91  Resp:  [17-20] 18  SpO2:  [96 %-99 %] 99 %  BP: (102-150)/(59-72) 145/67     Weight: 78 kg (172 lb)  Body mass index is 24.68 kg/m².    Intake/Output Summary (Last 24 hours) at 3/25/2024 1325  Last data filed at 3/25/2024 0600  Gross per 24 hour   Intake --   Output 1200 ml   Net -1200 ml         Physical Exam  Constitutional:       General: He is not in acute distress.     Appearance: Normal appearance. He is not toxic-appearing or diaphoretic.   HENT:      Head: Normocephalic and atraumatic.      Mouth/Throat:      Mouth: Mucous membranes are moist.      Pharynx: Oropharynx is clear.   Eyes:      Extraocular Movements: Extraocular movements intact.      Pupils: Pupils are equal, round, and reactive to light.   Cardiovascular:      Rate and Rhythm: Normal rate and regular rhythm.      Pulses: Normal pulses.   Pulmonary:      Effort: Pulmonary effort is normal. No respiratory distress.      Breath sounds: Normal breath sounds. No stridor. No wheezing, rhonchi or rales.   Chest:      Chest wall: No tenderness.   Abdominal:      General: Abdomen is flat. Bowel sounds are normal. There is no distension.      Palpations: Abdomen is soft.      Tenderness: There is no abdominal tenderness. There is no right CVA tenderness, left CVA tenderness, guarding or rebound.   Musculoskeletal:         General: No swelling, tenderness, deformity or signs of injury. Normal range of motion.       Cervical back: Normal range of motion and neck supple. No rigidity or tenderness.      Right lower leg: No edema.      Left lower leg: No edema.   Skin:     General: Skin is warm and dry.      Coloration: Skin is not jaundiced.      Findings: No erythema or rash.   Neurological:      General: No focal deficit present.      Mental Status: He is alert and oriented to person, place, and time. Mental status is at baseline.      Motor: No weakness.      Gait: Gait abnormal.             Significant Labs: All pertinent labs within the past 24 hours have been reviewed.    Significant Imaging: I have reviewed all pertinent imaging results/findings within the past 24 hours.

## 2024-03-25 NOTE — PT/OT/SLP PROGRESS
"Occupational Therapy   Treatment    Name: Ton Donovan  MRN: [id number]  Admitting Diagnosis:  Osteomyelitis of vertebra of lumbosacral region       Recommendations:     Discharge Recommendations: Low Intensity Therapy  Discharge Equipment Recommendations:  shower chair, rollator  Barriers to discharge:  Inaccessible home environment    Assessment:     Ton Donovan is a 28 y.o. male with a medical diagnosis of Osteomyelitis of vertebra of lumbosacral region.  He presents with the following performance deficits affecting function: weakness, impaired endurance, impaired self care skills, impaired functional mobility, impaired balance, orthopedic precautions, gait instability. Pt willing to participate and tolerated well overall. Pt reported mild back pain at beginning of session but able to perform all tasks assessed s/ assistance.     Rehab Prognosis:  Good; patient would benefit from acute skilled OT services to address these deficits and reach maximum level of function.       Plan:     Patient to be seen 3 x/week to address the above listed problems via self-care/home management, therapeutic activities, therapeutic exercises, neuromuscular re-education  Plan of Care Expires: 04/15/24  Plan of Care Reviewed with: patient    Subjective     Chief Complaint: back pain  Patient/Family Comments/goals: "I want to get up and move more I feel like I'm doing less than what I was doing at [facility]."  Pain/Comfort:  Pain Rating 1: 3/10  Location - Orientation 1: lower  Location 1: back  Pain Addressed 1: Reposition  Pain Rating Post-Intervention 1:  (not rated)    Objective:     Communicated with: RN prior to session.  Patient found supine with  (no active lines) upon OT entry to room.    General Precautions: Standard, fall    Orthopedic Precautions:spinal precautions  Braces: LSO  Respiratory Status: Room air     Occupational Performance:     Bed Mobility:    Patient completed Rolling/Turning to Right with supervision  Patient " completed Scooting/Bridging with supervision  Patient completed Supine to Sit with supervision  Patient completed Sit to Supine with supervision     Functional Mobility/Transfers:  Patient completed Sit <> Stand Transfer with Modified [place]  with  rolling walker   Functional Mobility: from bed to toilet to sink back to bed; Mod I    Activities of Daily Living:  Grooming: modified independence standing at sink, pt combed hair, used wipes to clean body and washed hands;   Upper Body Dressing: pt donned gown c/ setup assistance    Toileting: modified independence pt urinated in standing at toilet; able to manage gown appropriately      [facility] 6 Click ADL: 23    Treatment & Education:  Pt edu on role of OT, POC, safety when performing self care tasks , benefit of performing OOB activity, and safety when performing functional transfers and mobility.  - White board updated  - Self care tasks completed-- as noted above    -Pt educated on importance and need for LSO when OOB  -Educated on spinal precautions    Patient left supine with call button in reach and RN notified    GOALS:   Multidisciplinary Problems       Occupational Therapy Goals          Problem: Occupational Therapy    Goal Priority Disciplines Outcome Interventions   Occupational Therapy Goal     OT, PT/OT Ongoing, Progressing    Description: Goals to be met by: 4/5/24     Patient will increase functional independence with ADLs by performing:    UE Dressing with [place]. - Met 3/22  LE Dressing with [place].  Grooming while standing at sink with [place].  Toileting from toilet with [place] for hygiene and clothing management.   Toilet transfer to toilet with [place].                         Time Tracking:     OT Date of Treatment: 03/25/24  OT Start Time: 1458  OT Stop Time: 1527  OT Total Time (min): 29 min    Billable Minutes:Self Care/Home Management 29    OT/ASHLEY: OT     Number of ASHLEY visits since last OT visit:  2    3/25/2024

## 2024-03-25 NOTE — PROGRESS NOTES
[facility] - Neurosurgery ([facility])  Infectious Disease  Progress Note    Patient Name: Ton Donovan  MRN: [id number]  Admission Date: 3/14/2024  Length of Stay: 11 days  Attending Physician: Nagi Angel*  Primary Care Provider: Steve Kelly DO    Isolation Status: No active isolations  Assessment/Plan:      ID  * Osteomyelitis of vertebra of lumbosacral region  28M with h/o IVDU, MRSA bacteremia and prior disseminated MRSA infection with poor adherence with care (history of leaving AMA), readmitted 02/04 with progressive back pain. Imaging significant for lumbosacral osteomyelitis with associated epidural abscess, osteomyelitis of sacrum & L iliac bone, probable septic arthritis of L SI joint & lower lumbar spine facet joints, and left iliopsoas abscess, abscess in superior rectal/presacral region and micro-abscesses in paraspinal muscles, pelvis, and left sacrum. Blood cultures remained negative then, and TTE negative. Underwent IR aspiration of left SI joint and psoas on 02/07.  Cxs +MRSA. Abx held prior to IR bx; post-procedure started on empiric Iv-vanc / CTX.   S/p lumbar-pelvis debridement with fusion (02/14). Surgical cxs +MRSA. Pt discharged to [facility], to complete 6wks Iv-Dapto 10mg/kg Q24h, [place] 04/10/24. (Of note, switched vanc to IV daptomycin (d/t subtherapeutic vancomycin levels with q8hr dosing). Advised repeat imaging / neuro f/u near [place] 04/10; followed by abx suppression thereafter, likely lifelong.     [facility] now d/t new onset of intermittent fevers (101.4F) at [facility] for past few days. On arrival, temp 99F, VSS, HDS, wbc nml, CRP 46, Cr 0.8.  CPK 39 nml, with climbing transaminitis found to have HCV+ (RNA 9-million), HBV Ag negative, HIV negative, U/S +hepatomegaly, GB wnl.  Bld cxs 03/12 NGTD.   Pt developed fever 03/14, repeat bld cxs 03/14 1 of 2 +Kleb aerogenes (panS).  Repeat Bld cxs 03/15 and 03/17 NGTD.  Picc line removed 03/16, with picc cath  tip sent for cx.   -- Of note; UDS +MJ on arrival from LT[facility].  Suspect picc line may be source of new Kleb bacteremia (denies injecting at LTAC); vs. Lumbar surgical wound, vs. GI translocation in setting of hepatomegaly w/ chronic HCV (RNA+ 9-million copies).    Repeat MRI L-spine / pelvis (03/13): Extensive epidural infectious/inflammatory change posterior to the L5-S1 and S1-2 disc spaces; with paravertebral / pre-vertebral abscess. Diffuse infectious myositis of the bilateral lower lumbar paraspinal musculature and within the left psoas and iliopsoas muscles, with 0.7 cm abscess (down from 2.3cm on prior 02/04). Persistent osteomyelitis and septic arthritis about the left SI joint. -- Per NSGY, no role for surgical intervention at this time, favoring wound care and continuing Iv-abx.     3/18/24: Patient now afebrile and WBC WNL.  Denying any systemic sign of infection or back pain. CRP remains elevated 37.8.  On Daptomycin and Cefepime.  PIC remove and repeat BCXs NGTD - suspect due to line infection as blood cultures cleared quickly with PICC removal. Imaging reviewed and possible small abscess posterior to L5-S1 extending into disc space.  Primary team discussed with IR who felt no drainable fluid there there or in psoas.      3/19/24: Doing well.  Tmax 100.4 but no subjective fever.  IR body and neuro plans no intervention. Repeat blood cultures NGTD.  CRP improved from 46 to 26    3/23/24: Tmax 100.4 and asymptomatic.  L UE thrombus in basilic and cephalic veins - possible cause of fever as blood cultures remain NGTD and WBC WNL.    3/24/24: Tmax 101.3. Seen prior to that and had no symptoms.  No apparent pattern or cause to fever recurrence though. -- Discontinued Iv-cefepime, in case of possible drug fever 2/2 beta-lactam; started oral cipro instead (Bld cx +kleb aerogenes panS). CRP 30s (downtrend). Wbc nml.     Recommendations / Plan:  Continue oral Cipro 750mg po bid, to complete 2 weeks s/p picc line  removal 03/16; [place] 03/30.  Continue Iv-Daptomycin 10mg/kg Q24h  [CPK 39]. To complete 6 weeks; [place] 04/10.  Continue to monitor fever curve.   Once IV abx completed, anticipate indefinite suppression with Doxycyline 100mg po bid   Recommend CT-chest and And/pelvis; with contrast if possible. -- to further eval for other sources of fever.                Thank you for your consult. I will follow-up with patient. Please contact us if you have any additional questions.    Elpidio Paredes PA-C  Infectious Disease  [facility] - Neurosurgery (Hospital)    Subjective:     Principal Problem:Osteomyelitis of vertebra of lumbosacral region    HPI: 28M with h/o IVDU, MRSA bacteremia and prior disseminated MRSA infection with poor adherence with care (history of leaving AMA), readmitted 02/04 with progressive back pain. Imaging significant for lumbosacral osteomyelitis with associated epidural abscess, osteomyelitis of sacrum & L iliac bone, probable septic arthritis of L SI joint & lower lumbar spine facet joints, and left iliopsoas abscess, abscess in superior rectal/presacral region and micro-abscesses in paraspinal muscles, pelvis, and left sacrum. Blood cultures remained negative. TTE negative. Underwent IR aspiration of left SI joint and psoas on 02/07.  Cxs +MRSA. Abx held prior to IR bx; post-procedure started on empiric Iv-vanc / CTX.   S/p lumbar-pelvis debridement with fusion (02/14). Surgical cxs +MRSA. Pt discharged to [facility], to complete 6wks Iv-Dapto 10mg/kg Q24h, [place] 04/10/24. (Of note, switched vanc to IV daptomycin (d/t subtherapeutic vancomycin levels with q8hr dosing). Advised repeat imaging / neuro f/u near [place] 04/10; followed by abx suppression thereafter, likely lifelong.   However, pt presents 03/12 to [facility] now d/t new onset of intermittent fevers (101.4F) at [facility] for past few days. On arrival, temp 99F, VSS, HDS, wbc nml, CRP 46,  / , , Cr 0.8.  CPK pending.     Repeat MRI  L-spine / pelvis (03/13): Postsurgical changes of L4-5 posterior spinal fusion with bilateral sacroiliac extension screws. With discitis osteomyelitis at L5-S1 and S1-2. Extensive epidural infectious/inflammatory change posterior to the L5-S1 and S1-2 disc spaces. Paravertebral/prevertebral abscess measuring 3.7 x 2.2 cm communicating with the L5-S1 disc space.   Pelvis: Diffuse infectious myositis of the left psoas and iliopsoas muscles with 7 mm abscess of the left iliopsoas muscle, decreased from 23 mm. Persistent osteomyelitis and septic arthritis about the left SI joint.  T-spine: incidental finding of small nodular T2 hyperintense signal foci at Rt lung base; consider dedicated chest-CT.      Prior MRI L-spine / pelvis (02/04): showed osteomyelitis/discitis of L5-S1, osteomyelitis of sacrum & L iliac bone, probable septic arthritis of L SI joint & lower lumbar spine facet joints; worsening anterolisthesis of L5 with respect to S1 with increased size of epidural abscess at the level of L5-S1 and probable severe central canal stenosis at this level, along w/ additional abscesses in the L iliopsoas muscle, paraspinal muscles, pelvis, & L sacrum; -- the additional abscesses in the left iliopsoas muscle [3.4 x 1.7cm; with other micro-abscesses adjacent measuring 2.3cm), paraspinal muscles, pelvis, and left sacrum. Additional abscess in the superior rectal/presacral region measuring roughly 3.1 x 2.4 cm; and probable additional micro abscesses at left sacrum.          Interval History: Still with intermittent fevers, wbc nml, crp downtrend thus far; unclear source of fevers; LUE DVTs vs. L-spine discitis vs. Drug fever vs. Other.  For further eval, rec obtaining Ct-chest / A/P.     Review of Systems   Respiratory:  Negative for cough and shortness of breath.    Cardiovascular:  Negative for leg swelling.   Gastrointestinal:  Negative for abdominal distention, abdominal pain, diarrhea, nausea and vomiting.    Musculoskeletal:  Positive for back pain and myalgias.   Skin:  Positive for wound.   All other systems reviewed and are negative.      Objective:     Vital Signs (Most Recent):  Temp: 98.7 °F (37.1 °C) (03/25/24 1206)  Pulse: 91 (03/25/24 1206)  Resp: 18 (03/25/24 1206)  BP: (!) 145/67 (03/25/24 1206)  SpO2: 99 % (03/25/24 1206) Vital Signs (24h Range):  Temp:  [98.6 °F (37 °C)-101.3 °F (38.5 °C)] 98.7 °F (37.1 °C)  Pulse:  [] 91  Resp:  [17-20] 18  SpO2:  [96 %-99 %] 99 %  BP: (102-150)/(59-72) 145/67     Weight: 78 kg (172 lb)  Body mass index is 24.68 kg/m².    Estimated Creatinine Clearance: 159.3 mL/min (based on SCr of 0.7 mg/dL).     Physical Exam  Vitals and nursing note reviewed.   Constitutional:       General: He is not in acute distress.     Appearance: He is not ill-appearing.   HENT:      Nose: No congestion.      Mouth/Throat:      Comments: Poor dentition  Eyes:      General: No scleral icterus.     Conjunctiva/sclera: Conjunctivae normal.   Cardiovascular:      Rate and Rhythm: Normal rate.      Heart sounds: Normal heart sounds. No murmur heard.  Pulmonary:      Effort: No respiratory distress.      Breath sounds: Normal breath sounds.   Abdominal:      General: Bowel sounds are normal. There is no distension.      Palpations: Abdomen is soft.   Musculoskeletal:         General: Swelling and tenderness present.      Right lower leg: No edema.      Left lower leg: No edema.      Comments: Rt forearm thrombophlebitis.   Skin:     Findings: Erythema and lesion present.      Comments: L-spine wound (superficial - subQ depth) no active drainage; mild surrounding TTP, but no gross fluctuance or erythema.    Neurological:      Mental Status: He is oriented to person, place, and time. Mental status is at baseline.   Psychiatric:         Behavior: Behavior normal.         Thought Content: Thought content normal.          Significant Labs: Blood Culture:   Recent Labs   Lab 03/15/24  9791  03/15/24  1843 03/17/24  0959 03/22/24  1050 03/22/24  1051   LABBLOO No growth after 5 days. No growth after 5 days. No growth after 5 days. No Growth to date  No Growth to date  No Growth to date  No Growth to date No Growth to date  No Growth to date  No Growth to date  No Growth to date     CBC:   Recent Labs   Lab 03/24/24  0414   WBC 6.71   HGB 8.6*   HCT 27.8*        CMP:   Recent Labs   Lab 03/24/24  0414   *   K 4.4   CL 99   CO2 27      BUN 9   CREATININE 0.7   CALCIUM 9.3   PROT 7.9   ALBUMIN 2.8*   BILITOT 0.8   ALKPHOS 233*   *   *   ANIONGAP 4*     Microbiology Results (last 7 days)       Procedure Component Value Units Date/Time    Blood culture [[id number]] Collected: 03/22/24 1051    Order Status: Completed Specimen: Blood Updated: 03/25/24 1212     Blood Culture, Routine No Growth to date      No Growth to date      No Growth to date      No Growth to date    Blood culture [[id number]] Collected: 03/22/24 1050    Order Status: Completed Specimen: Blood Updated: 03/25/24 1212     Blood Culture, Routine No Growth to date      No Growth to date      No Growth to date      No Growth to date    Blood culture [[id number]] Collected: 03/17/24 0959    Order Status: Completed Specimen: Blood from Peripheral, Antecubital, Left Updated: 03/22/24 1612     Blood Culture, Routine No growth after 5 days.    Blood culture [[id number]]     Order Status: Completed Specimen: Blood     Blood culture [[id number]] Collected: 03/15/24 1842    Order Status: Completed Specimen: Blood Updated: 03/20/24 2022     Blood Culture, Routine No growth after 5 days.    Narrative:      Please obtain blood cultures x2 from two different sites, and  at-least 30mins apart. Pls and thank you.    Blood culture [[id number]] Collected: 03/15/24 1843    Order Status: Completed Specimen: Blood Updated: 03/20/24 2022     Blood Culture, Routine No growth after 5 days.    Narrative:      Please obtain  blood cultures x2 from two different sites, and  at-least 30mins apart. Pls and thank you.    Blood culture [[id number]] Collected: 03/14/24 1618    Order Status: Completed Specimen: Blood Updated: 03/19/24 2012     Blood Culture, Routine No growth after 5 days.    Narrative:      Collection has been rescheduled by [email or web address] at 03/14/2024 14:49 Reason:   Pt is in catlab. Will try back later. Nurse Cindi #[id number]  Collection has been rescheduled by [email or web address] at 03/14/2024 14:49 Reason:   Pt is in catlab. Will try back later. Nurse Cindi #[id number]          Wound Culture:   Recent Labs   Lab 11/21/23  1041 02/07/24  1412 02/07/24  1423 02/14/24  1612 02/14/24  1712   LABAERO METHICILLIN RESISTANT STAPHYLOCOCCUS AUREUS  Moderate  No other significant isolate  * No growth METHICILLIN RESISTANT STAPHYLOCOCCUS AUREUS  Rare  * METHICILLIN RESISTANT STAPHYLOCOCCUS AUREUS  Few  * METHICILLIN RESISTANT STAPHYLOCOCCUS AUREUS  Few  *     All pertinent labs within the past 24 hours have been reviewed.    Significant Imaging: I have reviewed all pertinent imaging results/findings within the past 24 hours.    I have personally reviewed records / hospital notes from   service and other specialty providers. I have also reviewed CBC, CMP/BMP,  cultures and imaging with my interpretation as documented in my assessment / plan.    Patient is high risk for infectious complications given pt's age, multiple co-morbidities, and case complexity.      Time: 50 minutes   50% of time spent on face-to-face counseling and coordination of care. Counseling included review of test results, diagnosis, and treatment plan with patient and/or family.

## 2024-03-25 NOTE — PROGRESS NOTES
Aleksandr Bray - Neurosurgery ([facility])  [facility] Medicine  Progress Note    Patient Name: Ton Donovan  MRN: [id number]  Patient Class: IP- Inpatient   Admission Date: 3/14/2024  Length of Stay: 11 days  Attending Physician: Nagi Angel*  Primary Care Provider: Steve Kelly DO        Subjective:     Principal Problem:Osteomyelitis of vertebra of lumbosacral region        HPI:  Mr. Ton Donovan is a 28 year-old Male with a PMHx of Hypertension, IV drug use who presented with fevers and ongoing back pain with radiation down legs.    Of note, he was recently admitted to [facility] for LOOP X lumbar spinal fusion, with course complicated by a psoas abscess and MRSA epidural abscess s/p epidural phlegmon debridement. He was started on IV Daptomycin and discharged on 2/22/24 to [facility] for completion of antibiotics. Ongoing fevers complicated his LTAC stay, and he returned to the [facility] for further evaluation. On arrival, WBC 9, ESR 59, CRP 46, AST 360s, ALT 200s. Procalcitonin was elevated at 1.18, lactate normal. MRI L spine concerning for continued L5-S1/S1 diskitis and osteomyelitis with abscess, diffuse infectious myositis of the lumbar paraspinal musculature bilaterally and left iliopsoas muscle without discrete abscess. He was then transferred to [facility] for Neurosurgerical evaluation.     At the time of this consult, temp 99F, HR 60, 120s/60s, saturating well on room air. Most recent labs with WBC 5, Hgb 9, Na 134.    Medicine consulted for medical co-management of epidural abscess    Overview/Hospital Course:  3/15-Admitted for NSGY evaluation of OM & discitis lumbar spine. Will let pt eat.   3/16- Does not need surgery. Appreciate NSGY eval. Wound care and ID following and need final recs. Pt is comfortable.   3/17- Klebsiella sens to everything,  On dapto and yue.  Pic line removed and culture tip was ordered, but not done.   3/18 per ID: Continue  Iv-cefepime 2g Q8h. (Bld cx  +kleb aerogenes panS) & Continue Iv-Daptomycin 10mg/kg Q24h.  repeat bld cxs 03/15 and 03/17 (NGTD); picc cath tip culture was not sent down properly  Anticipate continuing both Iv-dapto and cefepime for remaining 2-3wks of full 6wk duration of Iv-abx(BRENNAN 04/10) for lumbar osteo-discitis with paravertebral / psoas myositis and sub centimeter abscesses   Will need new PIc line or mid line.  Waiting for cultures to be neg to ensure therapeutic window without bacteremia and presence of a line.   - IR consulted to evaluate if the fluid collection at posterior to L5-s1 which communicates with disc space to see if that can be drained.  IR, Dr. Pretty reviewed this pt's case. He says no discrete fluid collection seen within the paraspinal muscles on either the CT or the MRI, it appears he has swollen, infected paraspinal muscles more consistent with myositis. He may have some fluid in the disc space which we recommend reaching out to neuro IR - No fluid to be drained, infection limited to myositis.    3/19- Per Neuro IR, abscess is very deep to reach by IR means so it's not amenable to drain. Repeat cultures are neg.   3/20 replace pic and plan for LTAC.   ID final recs:   Continue Iv-cefepime 2g Q8h. (Bld cx +kleb aerogenes panS) x 2 weeks from 1st negative blood culture 3/15  Continue Iv-Daptomycin 10mg/kg Q24h  [CPK 39]. To complete 6 weeks  OK to replace PICC  Rec indefinite suppression with Doxycyline 100mg po bid once IV abx completed.  fu upon dc from LTAC  Pic line placed  No growth on repeat cultures. No leukocytosis  Ultrasound with thromboses basilic and cephalic veins  Patient reports arm swelling and discomfort improving    Interval History: Fever noted yesterday afternoon 101.3  Pt feels well and has no new complaints. States he does not feel feverish  Reports L arm swelling improving  Unclear etiology of recurrent fevers - obtain repeat CT  Cefepime switched to cipro to see if causing fevers    Review of  Systems  Objective:     Vital Signs (Most Recent):  Temp: 98.7 °F (37.1 °C) (03/25/24 1206)  Pulse: 91 (03/25/24 1206)  Resp: 18 (03/25/24 1206)  BP: (!) 145/67 (03/25/24 1206)  SpO2: 99 % (03/25/24 1206) Vital Signs (24h Range):  Temp:  [98.6 °F (37 °C)-101.3 °F (38.5 °C)] 98.7 °F (37.1 °C)  Pulse:  [] 91  Resp:  [17-20] 18  SpO2:  [96 %-99 %] 99 %  BP: (102-150)/(59-72) 145/67     Weight: 78 kg (172 lb)  Body mass index is 24.68 kg/m².    Intake/Output Summary (Last 24 hours) at 3/25/2024 1325  Last data filed at 3/25/2024 0600  Gross per 24 hour   Intake --   Output 1200 ml   Net -1200 ml         Physical Exam  Constitutional:       General: He is not in acute distress.     Appearance: Normal appearance. He is not toxic-appearing or diaphoretic.   HENT:      Head: Normocephalic and atraumatic.      Mouth/Throat:      Mouth: Mucous membranes are moist.      Pharynx: Oropharynx is clear.   Eyes:      Extraocular Movements: Extraocular movements intact.      Pupils: Pupils are equal, round, and reactive to light.   Cardiovascular:      Rate and Rhythm: Normal rate and regular rhythm.      Pulses: Normal pulses.   Pulmonary:      Effort: Pulmonary effort is normal. No respiratory distress.      Breath sounds: Normal breath sounds. No stridor. No wheezing, rhonchi or rales.   Chest:      Chest wall: No tenderness.   Abdominal:      General: Abdomen is flat. Bowel sounds are normal. There is no distension.      Palpations: Abdomen is soft.      Tenderness: There is no abdominal tenderness. There is no right CVA tenderness, left CVA tenderness, guarding or rebound.   Musculoskeletal:         General: No swelling, tenderness, deformity or signs of injury. Normal range of motion.      Cervical back: Normal range of motion and neck supple. No rigidity or tenderness.      Right lower leg: No edema.      Left lower leg: No edema.   Skin:     General: Skin is warm and dry.      Coloration: Skin is not jaundiced.       Findings: No erythema or rash.   Neurological:      General: No focal deficit present.      Mental Status: He is alert and oriented to person, place, and time. Mental status is at baseline.      Motor: No weakness.      Gait: Gait abnormal.             Significant Labs: All pertinent labs within the past 24 hours have been reviewed.    Significant Imaging: I have reviewed all pertinent imaging results/findings within the past 24 hours.    Assessment/Plan:      * Osteomyelitis of vertebra of lumbosacral region  28M with hypertension, IV drug use (heroin) presenting from LTAC with ongoing fevers    He was recently admitted on 2/04/24 with osteomyelitis/discitis L5-S1, sacral and left illiac osteomyelitis, L5-S1 epidural abscess, and left iliopsoas abscess. He underwent L4-pelvic fusion on 2/14/24 with epidural phlegmon debridement, intraoperative cultures positive for MRSA. Infectious Disease was consulted and recommended IV Daptomycin for 8 week course (EOC 4/10/24) to be followed by oral suppressive doxycycline thereafter. He was discharged on 2/22/24 to [facility] to complete his antibiotic course. LTAC stay was complicated by ongoing fevers and he represented to [facility] for further evaluation prior [place] [facility] [place]. On arrival, WBC 9, ESR 59, CRP 46, AST 360s, ALT 200s. Procalcitonin was elevated at 1.18, lactate normal. MRI L spine showed continued L5-S1/S1 diskitis/osteomyelitis with abscess, diffuse infectious myositis of the bilateral lumbar paraspinal musculature and left iliopsoas muscle.  - Infectious Disease consulted, appreciate assistance and recommendations  - CT L spine without contrast noted  - Continue IV Daptomycin q24h  - Weekly CK level while on Daptomycin  - Multimodal pain control with tylenol, gabapentin, methocarbamol, oxy IR, oxy ER  - Follow blood cultures through maturity  - Daily CBC, CMP  - Cardiac monitor  - Neurosurgery Consult: he does not need  "surgery.  - pt transferred to [facility]    3/18 - ID final recs pending. Has Klebsiella in blood sens to everything.  Wound care consulted . pic line may have been source of infection  3/20-   ID final recs:   Continue Iv-cefepime 2g Q8h. (Bld cx +kleb aerogenes panS) x 2 weeks from 1st negative blood culture 3/15 ( stop day 4/5/24)  Continue Iv-Daptomycin 10mg/kg Q24h  [CPK 39]. To complete 6 weeks( stop day 4/10/24_  OK to replace PICC  Rec indefinite suppression with Doxycyline 100mg po bid once IV abx completed.  fu with ID upon dc from LTAC      Chronic diastolic heart failure  ECHO 2/13/2024  Left Ventricle: The left ventricle is normal in size. Normal wall thickness. Normal wall motion. There is normal systolic function with a visually estimated ejection fraction of 60 - 65%. Ejection fraction by visual approximation is 65%. There is diastolic dysfunction but grade cannot be determined.    Right Ventricle: Normal right ventricular cavity size. Wall thickness is normal. Right ventricle wall motion  is normal. Systolic function is normal.    IVC/SVC: Normal venous pressure at 3 mmHg.    Pericardium: There is a trivial posterior effusion just base.    STABLE      Septic arthritis  See " Osteomyelitis"      Myositis of multiple sites  See " Osteomeylitis" for plan      Hyponatremia  Patient has hyponatremia which is controlled,We will aim to correct the sodium by 4-6mEq in 24 hours. We will monitor sodium Daily. The hyponatremia is due to Dehydration/hypovolemia. We will obtain the following studies: see labsection. We will treat the hyponatremia with IV fluids. The patient's sodium results have been reviewed and are listed below.  No results for input(s): "NA" in the last 24 hours.      Drug dependence  See " Substance induced depressive disroder"  - monitor for withdrawal and give opioid if needed   - pt has been in LTAC  - on oxycodone LA 10 bid for pain and dependence  STABLE      Hepatitis  Most recent labs from " "OSH showed AST 360s, ALT 200s. Had recent hepatitis panel 3/12 with hep c    - Recent Hepatitis C screening resulted positive, previous Hep C screening 3 months ago negative. Check repeat Hep C screening and PCR  - Check HIV given history of IVDU and ongoing fever despite antibiotics  - RUQ ultrasound- pending  - Daily CMP to trend transaminases:  335/235  - Infectious Diseases consulted and following      Severe sepsis  This patient does have evidence of infective focus - continued L5-S1/S1 diskitis/osteomyelitis with abscess, diffuse infectious myositis of the bilateral lumbar paraspinal musculature and left iliopsoas muscle.  My overall impression is sepsis.  Source: Skin and Soft Tissue (location epidurial)  Antibiotics given-   Antibiotics (72h ago, onward)      Start     Stop Route Frequency Ordered    03/17/24 2130  ceFEPIme (MAXIPIME) 2 g in dextrose 5 % in water (D5W) 100 mL IVPB (MB+)         -- IV Every 8 hours (non-standard times) 03/17/24 1452    03/14/24 0900  DAPTOmycin (CUBICIN) 780 mg in sodium chloride 0.9% SolP 50 mL IVPB         -- IV Every 24 hours (non-standard times) 03/14/24 0755          Latest lactate reviewed-  No results for input(s): "LACTATE", "POCLAC" in the last 72 hours.    Organ dysfunction indicated by Acute liver injury    Fluid challenge Not needed - patient is not hypotensive      Post- resuscitation assessment No - Post resuscitation assessment not needed       Will Not start Pressors- Levophed for MAP of 65  Source control achieved by: Broad spectrum antibiotics    - Infectious Disease consulted  - Continue IV Daptomycin and meropenum, Need end date  - Follow blood cultures through maturity. Klebsiella and enterobacteraies per PCR.- & sens to everything    HTN (hypertension)  Chronic, controlled. Latest blood pressure and vitals reviewed-     Temp:  [97.6 °F (36.4 °C)-99.4 °F (37.4 °C)]   Pulse:  []   Resp:  [16-18]   BP: ()/(54-65)   SpO2:  [92 %-98 %] .   Home " "meds for hypertension were reviewed and noted below.   Hypertension Medications               cloNIDine (CATAPRES) 0.1 MG tablet Take 1 tablet (0.1 mg total) by mouth every 12 (twelve) hours.          Plan:  - While in the hospital, will manage blood pressure as follows; Continue home antihypertensive regimen. Currently on Clonidine 0.1 PO BID  - Will utilize p.r.n. blood pressure medication only if patient's blood pressure greater than 180/110 and he develops symptoms such as worsening chest pain or shortness of breath.    Discitis of lumbosacral region  See Osteomyelitis for consolidated plan      Debility  Patient with Acute on chronic debility due to other reduced mobility. Latest AMPAC and GEMS scores have been reviewed. Evaluation for etiology is complete. Plan includes progressive mobility protocol initated, PT/OT consulted, and may need surgey .    Substance or medication-induced depressive disorder  - Continue Mirtazipine  - Hx of heroin, THC, cociane      Psoas abscess, left  L5-S1/S1 diskitis/osteomyelitis with abscess, diffuse infectious myositis of the bilateral lumbar paraspinal musculature and left iliopsoas muscle.  - see " Osteomeylitis" above      Heroin use disorder, severe  See " Drug Dependence"         VTE Risk Mitigation (From admission, onward)           Ordered     Place BRENNAN hose  Until discontinued         03/14/24 0730     IP VTE HIGH RISK PATIENT  Once         03/14/24 0730     Place sequential compression device  Until discontinued         03/14/24 0730                    Discharge Planning   SENA: 3/25/2024     Code Status: Full Code   Is the patient medically ready for discharge?: No    Reason for patient still in hospital (select all that apply): Patient trending condition, Treatment, and Consult recommendations  Discharge Plan A: Long-term acute care facility (LTAC)   Discharge Delays: None known at this time    Nagi Angel MD  Department of Hospital Medicine   Aleksandr Bray - " Neurosurgery ([facility])

## 2024-03-25 NOTE — PLAN OF CARE
Problem: Adult Inpatient Plan of Care  Goal: Patient-Specific Goal (Individualized)  Description: Pt will maintain sbp below 160  Outcome: Ongoing, Progressing  Flowsheets (Taken 3/25/2024 0536)  Anxieties, Fears or Concerns: none  Individualized Care Needs: pain management     Problem: Adult Inpatient Plan of Care  Goal: Patient-Specific Goal (Individualized)  Description: Pt will maintain sbp below 160  Outcome: Ongoing, Progressing  Flowsheets (Taken 3/25/2024 0536)  Anxieties, Fears or Concerns: none  Individualized Care Needs: pain management  Goal: Optimal Comfort and Wellbeing  3/25/2024 0538 by Carol Rivers RN  Outcome: Ongoing, Progressing  3/25/2024 0538 by Carol Rivers RN  Outcome: Ongoing, Progressing  Intervention: Monitor Pain and Promote Comfort  3/25/2024 0538 by Carol Rivers RN  Flowsheets (Taken 3/25/2024 0538)  Pain Management Interventions: care clustered  3/25/2024 0537 by Carol Rivers RN  Flowsheets (Taken 3/25/2024 0537)  Pain Management Interventions: care clustered  Intervention: Provide Person-Centered Care  3/25/2024 0538 by Carol Rivers RN  Flowsheets (Taken 3/25/2024 0538)  Trust Relationship/Rapport:   care explained   choices provided  3/25/2024 0537 by Carol Rivers RN  Flowsheets (Taken 3/25/2024 0537)  Trust Relationship/Rapport:   care explained   choices provided     PRN oxycodone given x2 for pain. VSS. Bed in lowest position, side railsx3, call light in use.

## 2024-03-25 NOTE — PT/OT/SLP PROGRESS
"Physical Therapy Treatment    Patient Name:  Ton Donovan   MRN:  [id number]    Recommendations:     Discharge Recommendations: Low Intensity Therapy  Discharge Equipment Recommendations: shower chair, rollator  Barriers to discharge: Inaccessible home and Decreased caregiver support    Assessment:     Ton Donovan is a 28 y.o. male admitted with a medical diagnosis of Osteomyelitis of vertebra of lumbosacral region.  He presents with the following impairments/functional limitations: weakness, impaired endurance, impaired self care skills, impaired functional mobility, gait instability, impaired balance, orthopedic precautions.    Pt tolerates short session with focus on introduction of quad cane to begin addressing use of less restrictive assistive device aid per pts goals. Pt Mod-I with RW at this time but CGA with significant increase in severity of his known gait deficits when using quad cane for the first time today. Pt will continue to benefit from therapy services to address impairments listed above.     Rehab Prognosis: Good; patient would benefit from acute skilled PT services to address these deficits and reach maximum level of function.    Recent Surgery: * No surgery found *      Plan:     During this hospitalization, patient to be seen 3 x/week to address the identified rehab impairments via gait training, therapeutic activities, therapeutic exercises, neuromuscular re-education and progress toward the following goals:    Plan of Care Expires:  04/14/24    Subjective     Chief Complaint: fatigue  Patient/Family Comments/goals: "I'm just tired man. I've been running fevers and I'm just feeling like I don't have the energy." - agreeable to short session to trial quad cane  Pain/Comfort:  Pain Rating 1: 0/10  Pain Rating Post-Intervention 1: 0/10      Objective:     Communicated with RN prior to session.  Patient found right sidelying with  (no lines attached) upon PTA entry to room.     General " Precautions: Standard, fall  Orthopedic Precautions: spinal precautions  Braces: LSO  Respiratory Status: Room air     Functional Mobility:  Bed Mobility:     Supine to Sit: modified independence  Transfers:     Sit to Stand:  independence with no AD  Gait: Pt ambulates ~146 ft with quad cane and CGA. Pt cued on proper gait sequencing with device throughout. Quad cane managed in RUE d/t most significant gait impairment on L side. Hip hike for BLE clearance continues with increased severity of known ankle eversion on toe off. Sway back pronounced throughout. No overt LOB but pt unstable and at fall risk with less restrictive device.        AM-PAC 6 CLICK MOBILITY  Turning over in bed (including adjusting bedclothes, sheets and blankets)?: 4  Sitting down on and standing up from a chair with arms (e.g., wheelchair, bedside commode, etc.): 4  Moving from lying on back to sitting on the side of the bed?: 4  Moving to and from a bed to a chair (including a wheelchair)?: 4  Need to walk in hospital room?: 4  Climbing 3-5 steps with a railing?: 3  Basic Mobility Total Score: 23     Patient left sitting edge of bed with all lines intact and call button in reach.    GOALS:   Multidisciplinary Problems       Physical Therapy Goals          Problem: Physical Therapy    Goal Priority Disciplines Outcome Goal Variances Interventions   Physical Therapy Goal     PT, PT/OT Ongoing, Progressing     Description: Goals to be met by: 04/15/24     Patient will increase functional independence with mobility by performin. Bed to chair transfer with [place] using No Assistive Device  2. Gait  x 200 feet with Supervision using No Assistive Device.   3. Stand for 10 minutes with [place] using No Assistive Device demonstrating no swayback posture.   4. Increased functional strength to WFL for hip abduction, core strength.  5. Lower extremity exercise program x15 reps per handout, with independence                         Time  Tracking:     PT Received On: 03/25/24  PT Start Time: 1610     PT Stop Time: 1622  PT Total Time (min): 12 min     Billable Minutes: Gait Training 12    Treatment Type: Treatment  PT/PTA: PTA     Number of PTA visits since last PT visit: 3     03/25/2024

## 2024-03-25 NOTE — PLAN OF CARE
Problem: Adult Inpatient Plan of Care  Goal: Plan of Care Review  Outcome: Ongoing, Progressing  Goal: Patient-Specific Goal (Individualized)  Description: Pt will maintain sbp below 160  Outcome: Ongoing, Progressing  Goal: Absence of Hospital-Acquired Illness or Injury  Outcome: Ongoing, Progressing  Goal: Optimal Comfort and Wellbeing  Outcome: Ongoing, Progressing  Goal: Readiness for Transition of Care  Outcome: Ongoing, Progressing     Problem: Infection  Goal: Absence of Infection Signs and Symptoms  Outcome: Ongoing, Progressing     Problem: Glycemic Control Impaired (Sepsis/Septic Shock)  Goal: Blood Glucose Level Within Desired Range  Outcome: Ongoing, Progressing

## 2024-03-25 NOTE — ASSESSMENT & PLAN NOTE
28M with h/o IVDU, MRSA bacteremia and prior disseminated MRSA infection with poor adherence with care (history of leaving AMA), readmitted 02/04 with progressive back pain. Imaging significant for lumbosacral osteomyelitis with associated epidural abscess, osteomyelitis of sacrum & L iliac bone, probable septic arthritis of L SI joint & lower lumbar spine facet joints, and left iliopsoas abscess, abscess in superior rectal/presacral region and micro-abscesses in paraspinal muscles, pelvis, and left sacrum. Blood cultures remained negative then, and TTE negative. Underwent IR aspiration of left SI joint and psoas on 02/07.  Cxs +MRSA. Abx held prior to IR bx; post-procedure started on empiric Iv-vanc / CTX.   S/p lumbar-pelvis debridement with fusion (02/14). Surgical cxs +MRSA. Pt discharged to [facility], to complete 6wks Iv-Dapto 10mg/kg Q24h, BRENNAN 04/10/24. (Of note, switched vanc to IV daptomycin (d/t subtherapeutic vancomycin levels with q8hr dosing). Advised repeat imaging / neuro f/u near BRENNAN 04/10; followed by abx suppression thereafter, likely lifelong.     Pt presented 03/12 to [facility] now d/t new onset of intermittent fevers (101.4F) at [facility] for past few days. On arrival, temp 99F, VSS, HDS, wbc nml, CRP 46, Cr 0.8.  CPK 39 nml, with climbing transaminitis found to have HCV+ (RNA 9-million), HBV Ag negative, HIV negative, U/S +hepatomegaly, GB wnl.  Bld cxs 03/12 NGTD.   Pt developed fever 03/14, repeat bld cxs 03/14 1 of 2 +Kleb aerogenes (panS).  Repeat Bld cxs 03/15 and 03/17 NGTD.  Picc line removed 03/16, with picc cath tip sent for cx.   -- Of note; UDS +MJ on arrival from [facility].  Suspect picc line may be source of new Kleb bacteremia (denies injecting at LT); vs. Lumbar surgical wound, vs. GI translocation in setting of hepatomegaly w/ chronic HCV (RNA+ 9-million copies).    Repeat MRI L-spine / pelvis (03/13): Extensive epidural infectious/inflammatory change posterior to the L5-S1 and S1-2 disc  spaces; with paravertebral / pre-vertebral abscess. Diffuse infectious myositis of the bilateral lower lumbar paraspinal musculature and within the left psoas and iliopsoas muscles, with 0.7 cm abscess (down from 2.3cm on prior 02/04). Persistent osteomyelitis and septic arthritis about the left SI joint. -- Per NSGY, no role for surgical intervention at this time, favoring wound care and continuing Iv-abx.     3/18/24: Patient now afebrile and WBC WNL.  Denying any systemic sign of infection or back pain. CRP remains elevated 37.8.  On Daptomycin and Cefepime.  PIC remove and repeat BCXs NGTD - suspect due to line infection as blood cultures cleared quickly with PICC removal. Imaging reviewed and possible small abscess posterior to L5-S1 extending into disc space.  Primary team discussed with IR who felt no drainable fluid there there or in psoas.      3/19/24: Doing well.  Tmax 100.4 but no subjective fever.  IR body and neuro plans no intervention. Repeat blood cultures NGTD.  CRP improved from 46 to 26    3/23/24: Tmax 100.4 and asymptomatic.  L UE thrombus in basilic and cephalic veins - possible cause of fever as blood cultures remain NGTD and WBC WNL.    3/24/24: Tmax 101.3. Seen prior to that and had no symptoms.  No apparent pattern or cause to fever recurrence though. -- Discontinued Iv-cefepime, in case of possible drug fever 2/2 beta-lactam; started oral cipro instead (Bld cx +kleb aerogenes panS). CRP 30s (downtrend). Wbc nml.     Recommendations / Plan:  Continue oral Cipro 750mg po bid, to complete 2 weeks s/p picc line removal 03/16; BRENNAN 03/30.  Continue Iv-Daptomycin 10mg/kg Q24h  [CPK 39]. To complete 6 weeks; BRENNAN 04/10.  Continue to monitor fever curve.   Once IV abx completed, anticipate indefinite suppression with Doxycyline 100mg po bid   Recommend CT-chest and And/pelvis; with contrast if possible. -- to further eval for other sources of fever.

## 2024-03-25 NOTE — SUBJECTIVE & OBJECTIVE
Interval History: Still with intermittent fevers, wbc nml, crp downtrend thus far; unclear source of fevers; LUE DVTs vs. L-spine discitis vs. Drug fever vs. Other.  For further eval, rec obtaining Ct-chest / A/P.     Review of Systems   Respiratory:  Negative for cough and shortness of breath.    Cardiovascular:  Negative for leg swelling.   Gastrointestinal:  Negative for abdominal distention, abdominal pain, diarrhea, nausea and vomiting.   Musculoskeletal:  Positive for back pain and myalgias.   Skin:  Positive for wound.   All other systems reviewed and are negative.      Objective:     Vital Signs (Most Recent):  Temp: 98.7 °F (37.1 °C) (03/25/24 1206)  Pulse: 91 (03/25/24 1206)  Resp: 18 (03/25/24 1206)  BP: (!) 145/67 (03/25/24 1206)  SpO2: 99 % (03/25/24 1206) Vital Signs (24h Range):  Temp:  [98.6 °F (37 °C)-101.3 °F (38.5 °C)] 98.7 °F (37.1 °C)  Pulse:  [] 91  Resp:  [17-20] 18  SpO2:  [96 %-99 %] 99 %  BP: (102-150)/(59-72) 145/67     Weight: 78 kg (172 lb)  Body mass index is 24.68 kg/m².    Estimated Creatinine Clearance: 159.3 mL/min (based on SCr of 0.7 mg/dL).     Physical Exam  Vitals and nursing note reviewed.   Constitutional:       General: He is not in acute distress.     Appearance: He is not ill-appearing.   HENT:      Nose: No congestion.      Mouth/Throat:      Comments: Poor dentition  Eyes:      General: No scleral icterus.     Conjunctiva/sclera: Conjunctivae normal.   Cardiovascular:      Rate and Rhythm: Normal rate.      Heart sounds: Normal heart sounds. No murmur heard.  Pulmonary:      Effort: No respiratory distress.      Breath sounds: Normal breath sounds.   Abdominal:      General: Bowel sounds are normal. There is no distension.      Palpations: Abdomen is soft.   Musculoskeletal:         General: Swelling and tenderness present.      Right lower leg: No edema.      Left lower leg: No edema.      Comments: Rt forearm thrombophlebitis.   Skin:     Findings: Erythema and  lesion present.      Comments: L-spine wound (superficial - subQ depth) no active drainage; mild surrounding TTP, but no gross fluctuance or erythema.    Neurological:      Mental Status: He is oriented to person, place, and time. Mental status is at baseline.   Psychiatric:         Behavior: Behavior normal.         Thought Content: Thought content normal.          Significant Labs: Blood Culture:   Recent Labs   Lab 03/15/24  1842 03/15/24  1843 03/17/24  0959 03/22/24  1050 03/22/24  1051   LABBLOO No growth after 5 days. No growth after 5 days. No growth after 5 days. No Growth to date  No Growth to date  No Growth to date  No Growth to date No Growth to date  No Growth to date  No Growth to date  No Growth to date     CBC:   Recent Labs   Lab 03/24/24  0414   WBC 6.71   HGB 8.6*   HCT 27.8*        CMP:   Recent Labs   Lab 03/24/24  0414   *   K 4.4   CL 99   CO2 27      BUN 9   CREATININE 0.7   CALCIUM 9.3   PROT 7.9   ALBUMIN 2.8*   BILITOT 0.8   ALKPHOS 233*   *   *   ANIONGAP 4*     Microbiology Results (last 7 days)       Procedure Component Value Units Date/Time    Blood culture [[id number]] Collected: 03/22/24 1051    Order Status: Completed Specimen: Blood Updated: 03/25/24 1212     Blood Culture, Routine No Growth to date      No Growth to date      No Growth to date      No Growth to date    Blood culture [[id number]] Collected: 03/22/24 1050    Order Status: Completed Specimen: Blood Updated: 03/25/24 1212     Blood Culture, Routine No Growth to date      No Growth to date      No Growth to date      No Growth to date    Blood culture [[id number]] Collected: 03/17/24 0959    Order Status: Completed Specimen: Blood from Peripheral, Antecubital, Left Updated: 03/22/24 1612     Blood Culture, Routine No growth after 5 days.    Blood culture [[id number]]     Order Status: Completed Specimen: Blood     Blood culture [[id number]] Collected: 03/15/24 1842    Order  Status: Completed Specimen: Blood Updated: 03/20/24 2022     Blood Culture, Routine No growth after 5 days.    Narrative:      Please obtain blood cultures x2 from two different sites, and  at-least 30mins apart. Pls and thank you.    Blood culture [[id number]] Collected: 03/15/24 1843    Order Status: Completed Specimen: Blood Updated: 03/20/24 2022     Blood Culture, Routine No growth after 5 days.    Narrative:      Please obtain blood cultures x2 from two different sites, and  at-least 30mins apart. Pls and thank you.    Blood culture [[id number]] Collected: 03/14/24 1618    Order Status: Completed Specimen: Blood Updated: 03/19/24 2012     Blood Culture, Routine No growth after 5 days.    Narrative:      Collection has been rescheduled by [email or web address] at 03/14/2024 14:49 Reason:   Pt is in catlab. Will try back later. Nurse Cindi #[id number]  Collection has been rescheduled by 12 at 03/14/2024 14:49 Reason:   Pt is in catlab. Will try back later. Nurse Cindi #[id number]          Wound Culture:   Recent Labs   Lab 11/21/23  1041 02/07/24  1412 02/07/24  1423 02/14/24  1612 02/14/24  1712   LABAERO METHICILLIN RESISTANT STAPHYLOCOCCUS AUREUS  Moderate  No other significant isolate  * No growth METHICILLIN RESISTANT STAPHYLOCOCCUS AUREUS  Rare  * METHICILLIN RESISTANT STAPHYLOCOCCUS AUREUS  Few  * METHICILLIN RESISTANT STAPHYLOCOCCUS AUREUS  Few  *     All pertinent labs within the past 24 hours have been reviewed.    Significant Imaging: I have reviewed all pertinent imaging results/findings within the past 24 hours.    I have personally reviewed records / hospital notes from   service and other specialty providers. I have also reviewed CBC, CMP/BMP,  cultures and imaging with my interpretation as documented in my assessment / plan.    Patient is high risk for infectious complications given pt's age, multiple co-morbidities, and case complexity.      Time: 50 minutes   50% of time spent on face-to-face counseling and coordination  of care. Counseling included review of test results, diagnosis, and treatment plan with patient and/or family.

## 2024-03-26 LAB
ALBUMIN SERPL BCP-MCNC: 2.8 G/DL (ref 3.5–5.2)
ALP SERPL-CCNC: 246 U/L (ref 55–135)
ALT SERPL W/O P-5'-P-CCNC: 277 U/L (ref 10–44)
ANION GAP SERPL CALC-SCNC: 6 MMOL/L (ref 8–16)
AST SERPL-CCNC: 458 U/L (ref 10–40)
BASOPHILS # BLD AUTO: 0.03 K/UL (ref 0–0.2)
BASOPHILS NFR BLD: 0.5 % (ref 0–1.9)
BILIRUB SERPL-MCNC: 0.6 MG/DL (ref 0.1–1)
BUN SERPL-MCNC: 13 MG/DL (ref 6–20)
CALCIUM SERPL-MCNC: 9.2 MG/DL (ref 8.7–10.5)
CHLORIDE SERPL-SCNC: 100 MMOL/L (ref 95–110)
CO2 SERPL-SCNC: 27 MMOL/L (ref 23–29)
CREAT SERPL-MCNC: 0.7 MG/DL (ref 0.5–1.4)
CRP SERPL-MCNC: 26.6 MG/L (ref 0–8.2)
DIFFERENTIAL METHOD BLD: ABNORMAL
EOSINOPHIL # BLD AUTO: 0.6 K/UL (ref 0–0.5)
EOSINOPHIL NFR BLD: 9.4 % (ref 0–8)
ERYTHROCYTE [DISTWIDTH] IN BLOOD BY AUTOMATED COUNT: 21.2 % (ref 11.5–14.5)
EST. GFR  (NO RACE VARIABLE): >60 ML/MIN/1.73 M^2
GLUCOSE SERPL-MCNC: 119 MG/DL (ref 70–110)
HCT VFR BLD AUTO: 27.5 % (ref 40–54)
HGB BLD-MCNC: 8.7 G/DL (ref 14–18)
IMM GRANULOCYTES # BLD AUTO: 0.03 K/UL (ref 0–0.04)
IMM GRANULOCYTES NFR BLD AUTO: 0.5 % (ref 0–0.5)
LYMPHOCYTES # BLD AUTO: 1.7 K/UL (ref 1–4.8)
LYMPHOCYTES NFR BLD: 26.8 % (ref 18–48)
MCH RBC QN AUTO: 22.9 PG (ref 27–31)
MCHC RBC AUTO-ENTMCNC: 31.6 G/DL (ref 32–36)
MCV RBC AUTO: 72 FL (ref 82–98)
MONOCYTES # BLD AUTO: 0.7 K/UL (ref 0.3–1)
MONOCYTES NFR BLD: 11.5 % (ref 4–15)
NEUTROPHILS # BLD AUTO: 3.3 K/UL (ref 1.8–7.7)
NEUTROPHILS NFR BLD: 51.3 % (ref 38–73)
NRBC BLD-RTO: 0 /100 WBC
PLATELET # BLD AUTO: 204 K/UL (ref 150–450)
PMV BLD AUTO: 10.3 FL (ref 9.2–12.9)
POTASSIUM SERPL-SCNC: 4.6 MMOL/L (ref 3.5–5.1)
PROT SERPL-MCNC: 7.5 G/DL (ref 6–8.4)
RBC # BLD AUTO: 3.8 M/UL (ref 4.6–6.2)
SODIUM SERPL-SCNC: 133 MMOL/L (ref 136–145)
WBC # BLD AUTO: 6.41 K/UL (ref 3.9–12.7)

## 2024-03-26 PROCEDURE — 25000003 PHARM REV CODE 250: Performed by: STUDENT IN AN ORGANIZED HEALTH CARE EDUCATION/TRAINING PROGRAM

## 2024-03-26 PROCEDURE — 85025 COMPLETE CBC W/AUTO DIFF WBC: CPT | Performed by: STUDENT IN AN ORGANIZED HEALTH CARE EDUCATION/TRAINING PROGRAM

## 2024-03-26 PROCEDURE — 25000003 PHARM REV CODE 250: Performed by: HOSPITALIST

## 2024-03-26 PROCEDURE — 25000003 PHARM REV CODE 250

## 2024-03-26 PROCEDURE — 25000003 PHARM REV CODE 250: Performed by: PHYSICIAN ASSISTANT

## 2024-03-26 PROCEDURE — 99233 SBSQ HOSP IP/OBS HIGH 50: CPT | Mod: ,,, | Performed by: STUDENT IN AN ORGANIZED HEALTH CARE EDUCATION/TRAINING PROGRAM

## 2024-03-26 PROCEDURE — 97116 GAIT TRAINING THERAPY: CPT

## 2024-03-26 PROCEDURE — 25500020 PHARM REV CODE 255: Performed by: STUDENT IN AN ORGANIZED HEALTH CARE EDUCATION/TRAINING PROGRAM

## 2024-03-26 PROCEDURE — A4216 STERILE WATER/SALINE, 10 ML: HCPCS | Performed by: HOSPITALIST

## 2024-03-26 PROCEDURE — 80053 COMPREHEN METABOLIC PANEL: CPT | Performed by: STUDENT IN AN ORGANIZED HEALTH CARE EDUCATION/TRAINING PROGRAM

## 2024-03-26 PROCEDURE — 86140 C-REACTIVE PROTEIN: CPT | Performed by: STUDENT IN AN ORGANIZED HEALTH CARE EDUCATION/TRAINING PROGRAM

## 2024-03-26 PROCEDURE — 11000001 HC ACUTE MED/SURG PRIVATE ROOM

## 2024-03-26 PROCEDURE — 63600175 PHARM REV CODE 636 W HCPCS

## 2024-03-26 RX ADMIN — OXYCODONE HYDROCHLORIDE 10 MG: 10 TABLET, FILM COATED, EXTENDED RELEASE ORAL at 09:03

## 2024-03-26 RX ADMIN — CIPROFLOXACIN 750 MG: 750 TABLET, FILM COATED ORAL at 09:03

## 2024-03-26 RX ADMIN — METHOCARBAMOL 500 MG: 500 TABLET ORAL at 09:03

## 2024-03-26 RX ADMIN — METHOCARBAMOL 500 MG: 500 TABLET ORAL at 04:03

## 2024-03-26 RX ADMIN — Medication 10 ML: at 12:03

## 2024-03-26 RX ADMIN — Medication 10 ML: at 05:03

## 2024-03-26 RX ADMIN — MIRTAZAPINE 15 MG: 15 TABLET, FILM COATED ORAL at 09:03

## 2024-03-26 RX ADMIN — Medication 10 ML: at 01:03

## 2024-03-26 RX ADMIN — OXYCODONE HYDROCHLORIDE 15 MG: 10 TABLET ORAL at 06:03

## 2024-03-26 RX ADMIN — OXYCODONE HYDROCHLORIDE 15 MG: 10 TABLET ORAL at 10:03

## 2024-03-26 RX ADMIN — OXYCODONE HYDROCHLORIDE 15 MG: 10 TABLET ORAL at 11:03

## 2024-03-26 RX ADMIN — METHOCARBAMOL 500 MG: 500 TABLET ORAL at 01:03

## 2024-03-26 RX ADMIN — OXYCODONE HYDROCHLORIDE 15 MG: 10 TABLET ORAL at 02:03

## 2024-03-26 RX ADMIN — CLONIDINE HYDROCHLORIDE 0.1 MG: 0.1 TABLET ORAL at 09:03

## 2024-03-26 RX ADMIN — Medication 10 ML: at 06:03

## 2024-03-26 RX ADMIN — DAPTOMYCIN 780 MG: 350 INJECTION, POWDER, LYOPHILIZED, FOR SOLUTION INTRAVENOUS at 09:03

## 2024-03-26 RX ADMIN — IOHEXOL 100 ML: 350 INJECTION, SOLUTION INTRAVENOUS at 10:03

## 2024-03-26 RX ADMIN — DOCUSATE SODIUM AND SENNOSIDES 2 TABLET: 8.6; 5 TABLET, FILM COATED ORAL at 09:03

## 2024-03-26 NOTE — PROGRESS NOTES
Aleksandr Bray - Neurosurgery ([facility])  [facility] Medicine  Progress Note    Patient Name: Ton Donovan  MRN: [id number]  Patient Class: IP- Inpatient   Admission Date: 3/14/2024  Length of Stay: 12 days  Attending Physician: Nagi Angel*  Primary Care Provider: Steve Kelly DO        Subjective:     Principal Problem:Osteomyelitis of vertebra of lumbosacral region        HPI:  Mr. Ton Donovan is a 28 year-old Male with a PMHx of Hypertension, IV drug use who presented with fevers and ongoing back pain with radiation down legs.    Of note, he was [place] [facility] [place], with course complicated by a psoas abscess and MRSA epidural abscess s/p epidural phlegmon debridement. He was started on IV Daptomycin and discharged on 2/22/24 to [facility] for completion of antibiotics. Ongoing fevers complicated his LTAC stay, and he returned to the [facility] Department for further evaluation. On arrival, WBC 9, ESR 59, CRP 46, AST 360s, ALT 200s. Procalcitonin was elevated at 1.18, lactate normal. MRI L spine concerning for continued L5-S1/S1 diskitis and osteomyelitis with abscess, diffuse infectious myositis of the lumbar paraspinal musculature bilaterally and left iliopsoas muscle without discrete abscess. He was then [place] [facility] [place].     At the time of this consult, temp 99F, HR 60, 120s/60s, saturating well on room air. Most recent labs with WBC 5, Hgb 9, Na 134.    Medicine consulted for medical co-management of epidural abscess    Overview/Hospital Course:  3/15-Admitted for NSGY evaluation of OM & discitis lumbar spine. Will let pt eat.   3/16- Does not need surgery. Appreciate NSGY eval. Wound care and ID following and need final recs. Pt is comfortable.   3/17- Klebsiella sens to everything,  On dapto and yue.  Pic line removed and culture tip was ordered, but not done.   3/18 per ID: Continue  Iv-cefepime 2g Q8h. (Bld cx  +kleb aerogenes panS) & Continue Iv-Daptomycin 10mg/kg Q24h.  repeat bld cxs 03/15 and 03/17 (NGTD); picc cath tip culture was not sent down properly  Anticipate continuing both Iv-dapto and cefepime for remaining 2-3wks of full 6wk duration of Iv-abx(BRENNAN 04/10) for lumbar osteo-discitis with paravertebral / psoas myositis and sub centimeter abscesses   Will need new PIc line or mid line.  Waiting for cultures to be neg to ensure therapeutic window without bacteremia and presence of a line.   - IR consulted to evaluate if the fluid collection at posterior to L5-s1 which communicates with disc space to see if that can be drained.  IR, Dr. Pretty reviewed this pt's case. He says no discrete fluid collection seen within the paraspinal muscles on either the CT or the MRI, it appears he has swollen, infected paraspinal muscles more consistent with myositis. He may have some fluid in the disc space which we recommend reaching out to neuro IR - No fluid to be drained, infection limited to myositis.    3/19- Per Neuro IR, abscess is very deep to reach by IR means so it's not amenable to drain. Repeat cultures are neg.   3/20 replace pic and plan for LTAC.   ID final recs:   Continue Iv-cefepime 2g Q8h. (Bld cx +kleb aerogenes panS) x 2 weeks from 1st negative blood culture 3/15  Continue Iv-Daptomycin 10mg/kg Q24h  [CPK 39]. To complete 6 weeks  OK to replace PICC  Rec indefinite suppression with Doxycyline 100mg po bid once IV abx completed.  fu upon dc from LTAC  Pic line placed  No growth on repeat cultures. No leukocytosis  Ultrasound with thromboses basilic and cephalic veins  Patient reports arm swelling and discomfort improving    Interval History: Repeating CT c/a/p for recurrent fevers  Pt has no new complaints    Review of Systems  Objective:     Vital Signs (Most Recent):  Temp: 99.5 °F (37.5 °C) (03/26/24 1148)  Pulse: 80 (03/26/24 1148)  Resp: 18 (03/26/24 1148)  BP: (!) 103/52 (03/26/24 1148)  SpO2: 98 %  (03/26/24 1148) Vital Signs (24h Range):  Temp:  [98.2 °F (36.8 °C)-100.3 °F (37.9 °C)] 99.5 °F (37.5 °C)  Pulse:  [80-91] 80  Resp:  [16-18] 18  SpO2:  [94 %-99 %] 98 %  BP: (103-154)/(52-73) 103/52     Weight: 78 kg (172 lb)  Body mass index is 24.68 kg/m².    Intake/Output Summary (Last 24 hours) at 3/26/2024 1333  Last data filed at 3/25/2024 1709  Gross per 24 hour   Intake --   Output 1000 ml   Net -1000 ml         Physical Exam  Constitutional:       General: He is not in acute distress.     Appearance: Normal appearance. He is not toxic-appearing or diaphoretic.   HENT:      Head: Normocephalic and atraumatic.      Mouth/Throat:      Mouth: Mucous membranes are moist.      Pharynx: Oropharynx is clear.   Eyes:      Extraocular Movements: Extraocular movements intact.      Pupils: Pupils are equal, round, and reactive to light.   Cardiovascular:      Rate and Rhythm: Normal rate and regular rhythm.      Pulses: Normal pulses.   Pulmonary:      Effort: Pulmonary effort is normal. No respiratory distress.      Breath sounds: Normal breath sounds. No stridor. No wheezing, rhonchi or rales.   Chest:      Chest wall: No tenderness.   Abdominal:      General: Abdomen is flat. Bowel sounds are normal. There is no distension.      Palpations: Abdomen is soft.      Tenderness: There is no abdominal tenderness. There is no right CVA tenderness, left CVA tenderness, guarding or rebound.   Musculoskeletal:         General: No swelling, tenderness, deformity or signs of injury. Normal range of motion.      Cervical back: Normal range of motion and neck supple. No rigidity or tenderness.      Right lower leg: No edema.      Left lower leg: No edema.   Skin:     General: Skin is warm and dry.      Coloration: Skin is not jaundiced.      Findings: No erythema or rash.   Neurological:      General: No focal deficit present.      Mental Status: He is alert and oriented to person, place, and time. Mental status is at baseline.       Motor: No weakness.      Gait: Gait abnormal.             Significant Labs: All pertinent labs within the past 24 hours have been reviewed.    Significant Imaging: I have reviewed all pertinent imaging results/findings within the past 24 hours.    Assessment/Plan:      * Osteomyelitis of vertebra of lumbosacral region  28M with hypertension, IV drug use (heroin) presenting from LTAC with ongoing fevers    He was recently admitted on 2/04/24 with osteomyelitis/discitis L5-S1, sacral and left illiac osteomyelitis, L5-S1 epidural abscess, and left iliopsoas abscess. He underwent L4-pelvic fusion on 2/14/24 with epidural phlegmon debridement, intraoperative cultures positive for MRSA. Infectious Disease was consulted and recommended IV Daptomycin for 8 week course (EOC 4/10/24) to be followed by oral suppressive doxycycline thereafter. He was discharged on 2/22/24 to [facility] to complete his antibiotic course. LTAC stay was complicated by ongoing fevers and he represented to [facility] for further evaluation prior to transfer to [facility] for neurosurgical evaluation. On arrival, WBC 9, ESR 59, CRP 46, AST 360s, ALT 200s. Procalcitonin was elevated at 1.18, lactate normal. MRI L spine showed continued L5-S1/S1 diskitis/osteomyelitis with abscess, diffuse infectious myositis of the bilateral lumbar paraspinal musculature and left iliopsoas muscle.  - Infectious Disease consulted, appreciate assistance and recommendations  - CT L spine without contrast noted  - Continue IV Daptomycin q24h  - Weekly CK level while on Daptomycin  - Multimodal pain control with tylenol, gabapentin, methocarbamol, oxy IR, oxy ER  - Follow blood cultures through maturity  - Daily CBC, CMP  - Cardiac monitor  - Neurosurgery Consult: he does not need surgery.  - pt [facility]    3/18 - ID final recs pending. Has Klebsiella in blood sens to everything.  Wound care consulted . pic line may have been source of infection  3/20-   ID  "recs:   Continue Iv-cefepime 2g Q8h. (Bld cx +kleb aerogenes panS) x 2 weeks from 1st negative blood culture 3/15 ( stop day 4/5/24)  Continue Iv-Daptomycin 10mg/kg Q24h  [CPK 39]. To complete 6 weeks( stop day 4/10/24_  OK to replace PICC  Rec indefinite suppression with Doxycyline 100mg po bid once IV abx completed.  fu with ID upon dc from LTAC      Chronic diastolic heart failure  ECHO 2/13/2024  Left Ventricle: The left ventricle is normal in size. Normal wall thickness. Normal wall motion. There is normal systolic function with a visually estimated ejection fraction of 60 - 65%. Ejection fraction by visual approximation is 65%. There is diastolic dysfunction but grade cannot be determined.    Right Ventricle: Normal right ventricular cavity size. Wall thickness is normal. Right ventricle wall motion  is normal. Systolic function is normal.    IVC/SVC: Normal venous pressure at 3 mmHg.    Pericardium: There is a trivial posterior effusion just base.    STABLE      Septic arthritis  See " Osteomyelitis"      Myositis of multiple sites  See " Osteomeylitis" for plan      Hyponatremia  Patient has hyponatremia which is controlled,We will aim to correct the sodium by 4-6mEq in 24 hours. We will monitor sodium Daily. The hyponatremia is due to Dehydration/hypovolemia. We will obtain the following studies: see labsection. We will treat the hyponatremia with IV fluids. The patient's sodium results have been reviewed and are listed below.  No results for input(s): "NA" in the last 24 hours.      Drug dependence  See " Substance induced depressive disroder"  - monitor for withdrawal and give opioid if needed   - pt has been in LTAC  - on oxycodone LA 10 bid for pain and dependence  STABLE      Hepatitis  Most recent labs from OSH showed AST 360s, ALT 200s. Had recent hepatitis panel 3/12 with hep c    - Recent Hepatitis C screening resulted positive, previous Hep C screening 3 months ago negative. Check repeat Hep C " "screening and PCR  - Check HIV given history of IVDU and ongoing fever despite antibiotics  - RUQ ultrasound- pending  - Daily CMP to trend transaminases:  335/235  - Infectious Diseases consulted and following      Severe sepsis  This patient does have evidence of infective focus - continued L5-S1/S1 diskitis/osteomyelitis with abscess, diffuse infectious myositis of the bilateral lumbar paraspinal musculature and left iliopsoas muscle.  My overall impression is sepsis.  Source: Skin and Soft Tissue (location epidurial)  Antibiotics given-   Antibiotics (72h ago, onward)      Start     Stop Route Frequency Ordered    03/17/24 2130  ceFEPIme (MAXIPIME) 2 g in dextrose 5 % in water (D5W) 100 mL IVPB (MB+)         -- IV Every 8 hours (non-standard times) 03/17/24 1452    03/14/24 0900  DAPTOmycin (CUBICIN) 780 mg in sodium chloride 0.9% SolP 50 mL IVPB         -- IV Every 24 hours (non-standard times) 03/14/24 0755          Latest lactate reviewed-  No results for input(s): "LACTATE", "POCLAC" in the last 72 hours.    Organ dysfunction indicated by Acute liver injury    Fluid challenge Not needed - patient is not hypotensive      Post- resuscitation assessment No - Post resuscitation assessment not needed       Will Not start Pressors- Levophed for MAP of 65  Source control achieved by: Broad spectrum antibiotics    - Infectious Disease consulted  - Continue IV Daptomycin and meropenum, Need end date  - Follow blood cultures through maturity. Klebsiella and enterobacteraies per PCR.- & sens to everything    HTN (hypertension)  Chronic, controlled. Latest blood pressure and vitals reviewed-     Temp:  [97.6 °F (36.4 °C)-99.4 °F (37.4 °C)]   Pulse:  []   Resp:  [16-18]   BP: ()/(54-65)   SpO2:  [92 %-98 %] .   Home meds for hypertension were reviewed and noted below.   Hypertension Medications               cloNIDine (CATAPRES) 0.1 MG tablet Take 1 tablet (0.1 mg total) by mouth every 12 (twelve) hours.      " "    Plan:  - While in the hospital, will manage blood pressure as follows; Continue home antihypertensive regimen. Currently on Clonidine 0.1 PO BID  - Will utilize p.r.n. blood pressure medication only if patient's blood pressure greater than 180/110 and he develops symptoms such as worsening chest pain or shortness of breath.    Discitis of lumbosacral region  See Osteomyelitis for consolidated plan      Debility  Patient with Acute on chronic debility due to other reduced mobility. Latest AMPAC and GEMS scores have been reviewed. Evaluation for etiology is complete. Plan includes progressive mobility protocol initated, PT/OT consulted, and may need surgey .    Substance or medication-induced depressive disorder  - Continue Mirtazipine  - Hx of heroin, THC, cociane      Psoas abscess, left  L5-S1/S1 diskitis/osteomyelitis with abscess, diffuse infectious myositis of the bilateral lumbar paraspinal musculature and left iliopsoas muscle.  - see " Osteomeylitis" above      Heroin use disorder, severe  See " Drug Dependence"         VTE Risk Mitigation (From admission, onward)           Ordered     Place BRENNAN hose  Until discontinued         03/14/24 0730     IP VTE HIGH RISK PATIENT  Once         03/14/24 0730     Place sequential compression device  Until discontinued         03/14/24 0730                    Discharge Planning   SENA: 3/27/2024     Code Status: Full Code   Is the patient medically ready for discharge?: No    Reason for patient still in hospital (select all that apply): Imaging and Consult recommendations  Discharge Plan A: Long-term acute care facility (LTAC)   Discharge Delays: None known at this time      Nagi Angel MD  Department of Hospital Medicine   [facility] - Neurosurgery ([facility])    "

## 2024-03-26 NOTE — PT/OT/SLP PROGRESS
Physical Therapy Treatment    Patient Name:  Ton Donovan   MRN:  [id number]    Recent Surgery: * No surgery found *      Recommendations:     Discharge Recommendations:   Low intensity therapy   Discharge Equipment Recommendations: shower chair, rollator   Barriers to discharge: None    Highest Level of Mobility: Gait 122'  Assistance Required: CGA w/ QC in R UE    Assessment:     Ton Donovan is a 28 y.o. male admitted with a medical diagnosis of Osteomyelitis of vertebra of lumbosacral region.    Pt met with HOB elevated and agreeable to PT treatment. Today's PT treatment focus was on continued gait training with LRAD and eventual progression to no AD to improve function. Pt completed 2 gait trials today with CGA. During the first trial, QC was used, however pt expressed difficulty coordinating AD. PT assisted pt with an additional gait trial using no AD and frequent cues to improve gait mechanics. Pt continues to demonstrate gait deviations, most notably on L side including hip hike and sway back.     Pt is progressing towards acute PT goals appropriately and continues to benefit from acute PT sessions.     Rehab Prognosis: Good; patient would benefit from acute skilled PT services to address these deficits and reach maximum level of function.      Plan:     During this hospitalization, patient to be seen 3 x/week to address the identified rehab impairments via gait training, therapeutic activities, therapeutic exercises, neuromuscular re-education and progress toward the following goals:    Plan of Care Expires:  04/14/24    This plan of care has been discussed with the patient/caregiver, who was included in its development and is in agreement with the identified goals and treatment plan.     Subjective     Communicated with RN prior to session.  Patient agreeable to participate.     Pain/Comfort:  Pain Rating 1: 7/10  Location - Orientation 1: lower  Location 1: back  Pain Addressed 1: Reposition,  "Distraction  Pain Rating Post-Intervention 1: 7/10    Chief Complaint: Osteomyelitis of vertebra   Patient/Family Comments/goals: "It's hard to rest in here, they come to check vitals all the time when I'm asleep"      Objective:     Patient found HOB elevated with  (no active lines)  upon PT entry to room.    General Precautions: Standard, fall   Orthopedic Precautions:spinal precautions   Braces: LSO         Exams:    Cognition:  Patient is oriented to Person, Place, Time, Situation  Follows multistep  commands  Insight to deficits/safety awareness: impaired    Functional Mobility:    Bed Mobility:  Supine to Sit: Modified Independent on R side of bed  Pt donned LSO in sitting  Sit to Supine: Modified Independent  Scooting anteriorly to EOB to plant feet on floor: Modified Independent    Transfers:   Sit to Stand Transfer: Modified Independent  from EOB with QC on R             Gait:  Patient received gait training in hallway:  122 feet with Contact Guard Assistance and  QC on R  65 feet with CGA and no AD  Gait Assessment: unsteady gait, decreased step length, decreased weight shift, decreased foot clearance, decreased samantha, hip hiking, inconsistent left foot placement, decreased left knee stability, and sway back  Gait Pattern Observed: Step-through reciprocal gait  Comments: All lines remained intact throughout ambulation trial, gait belt utilized. PT provided cues for sequencing with QC. Pt expressed frustration with coordinating QC, so additional gait trial was performed with no AD. PT provided frequent cues for improvement of gait mechanics, notable emphasis on L LE knee extension in stance phase. Pt remains a high fall risk at this time.    Balance:  Static Sit:   Independent at EOB   Static Stand:   Contact-Guard Assist with Quad cane  Dynamic Stand:  Contact-Guard Assist with Quad cane      Therapeutic Activities/Exercises     Patient assisted with functional mobility as noted above  Hallway gait trial " x2  Patient educated on the importance of early mobility to prevent functional decline during hospital stay  Patient was instructed to utilize staff assistance for mobility/transfers.  Patient is appropriate to transfer with CGA and RN/PCT assist  Patient educated on PT POC and role of PT in acute care  White board updated regarding patient's safest level of mobility with staff assistance, RN also updated.     AM-PAC 6 CLICK MOBILITY  Turning over in bed (including adjusting bedclothes, sheets and blankets)?: 4  Sitting down on and standing up from a chair with arms (e.g., wheelchair, bedside commode, etc.): 4  Moving from lying on back to sitting on the side of the bed?: 4  Moving to and from a bed to a chair (including a wheelchair)?: 4  Need to walk in hospital room?: 3  Climbing 3-5 steps with a railing?: 3  Basic Mobility Total Score: 22     Patient left HOB elevated with all lines intact, call button in reach, bed alarm on, and rn notified.        History/Goals:     PAST MEDICAL HISTORY:  Past Medical History:   Diagnosis Date    Hypertension     Unilateral inguinal hernia, without obstruction or gangrene, not specified as recurrent        Past Surgical History:   Procedure Laterality Date    HAND ARTHROTOMY Right 7/7/2023    Procedure: ARTHROTOMY, HAND - R LF PIP;  Surgeon: Boy Lamb MD;  Location: [address];  Service: Orthopedics;  Laterality: Right;    INCISION AND DRAINAGE OF ABSCESS Right 10/12/2023    Procedure: INCISION AND DRAINAGE, ABSCESS;  Surgeon: Steve Monteiro MD;  Location: [address];  Service: General;  Laterality: Right;  right chest    IRRIGATION AND DEBRIDEMENT OF UPPER EXTREMITY Bilateral 7/7/2023    Procedure: IRRIGATION AND DEBRIDEMENT, UPPER EXTREMITY - RIGHT HAND;  Surgeon: Boy Lamb MD;  Location: [address];  Service: Orthopedics;  Laterality: Bilateral;    LUMBAR FUSION N/A 2/14/2024    Procedure: LOOP X FUSION, SPINE, LUMBAR;  Surgeon: Nohelia  MD Boy;  Location: [facility] OR [address] [place] [address];  Service: Neurosurgery;  Laterality: N/A;  L4-Pelvis fusion; *LOOP-X* depuy, neuromonitoring    OLECRANON BURSECTOMY Left 2023    Procedure: BURSECTOMY, OLECRANON;  Surgeon: Boy Lamb MD;  Location: [facility] OR [address] [place] [address];  Service: Orthopedics;  Laterality: Left;       GOALS:   Multidisciplinary Problems       Physical Therapy Goals          Problem: Physical Therapy    Goal Priority Disciplines Outcome Goal Variances Interventions   Physical Therapy Goal     PT, PT/OT Ongoing, Progressing     Description: Goals to be met by: 04/15/24     Patient will increase functional independence with mobility by performin. Bed to chair transfer with [place] using No Assistive Device  2. Gait  x 200 feet with Supervision using No Assistive Device.   3. Stand for 10 minutes with [place] using No Assistive Device demonstrating no swayback posture.   4. Increased functional strength to WFL for hip abduction, core strength.  5. Lower extremity exercise program x15 reps per handout, with independence                         Time Tracking:     PT Received On: 24  PT Start Time: 1355     PT Stop Time: 1418  PT Total Time (min): 23 min     Billable Minutes: Gait Training 23      Cathi Sanchez, PT  2024  Pager# [phone]

## 2024-03-26 NOTE — ASSESSMENT & PLAN NOTE
I have reviewed hospital notes from   service and other specialty providers. I have also reviewed CBC, CMP/BMP,  cultures and imaging with my interpretation as documented.      28M with h/o IVDU, MRSA bacteremia and prior disseminated MRSA infection with poor adherence with care (history of leaving AMA), readmitted 02/04 with progressive back pain. Imaging significant for lumbosacral osteomyelitis with associated epidural abscess, osteomyelitis of sacrum & L iliac bone, probable septic arthritis of L SI joint & lower lumbar spine facet joints, and left iliopsoas abscess, abscess in superior rectal/presacral region and micro-abscesses in paraspinal muscles, pelvis, and left sacrum. Blood cultures remained negative then, and TTE negative. Underwent IR aspiration of left SI joint and psoas on 02/07.  Cxs +MRSA. Abx held prior to IR bx; post-procedure started on empiric Iv-vanc / CTX.   S/p lumbar-pelvis debridement with fusion (02/14). Surgical cxs +MRSA. Pt discharged to [facility], to complete 6wks Iv-Dapto 10mg/kg Q24h, BRENNAN 04/10/24. (Of note, switched vanc to IV daptomycin (d/t subtherapeutic vancomycin levels with q8hr dosing). Advised repeat imaging / neuro f/u near [facility] 04/10; followed by abx suppression thereafter, likely lifelong.     [place] [facility]/t new onset of intermittent fevers (101.4F) at [facility] for past few days. On arrival, temp 99F, VSS, HDS, wbc nml, CRP 46, Cr 0.8.  CPK 39 nml, with climbing transaminitis found to have HCV+ (RNA 9-million), HBV Ag negative, HIV negative, U/S +hepatomegaly, GB wnl.  Bld cxs 03/12 NGTD.   Pt developed fever 03/14, repeat bld cxs 03/14 1 of 2 +Kleb aerogenes (panS).  Repeat Bld cxs 03/15 and 03/17 NGTD.  Picc line removed 03/16, with picc cath tip sent for cx.   -- Of note; UDS +MJ on arrival from [facility].  Suspect picc line may be source of new Kleb bacteremia (denies injecting at LTAC); vs. Lumbar surgical wound, vs. GI translocation in setting of  hepatomegaly w/ chronic HCV (RNA+ 9-million copies).    Repeat MRI L-spine / pelvis (03/13): Extensive epidural infectious/inflammatory change posterior to the L5-S1 and S1-2 disc spaces; with paravertebral / pre-vertebral abscess. Diffuse infectious myositis of the bilateral lower lumbar paraspinal musculature and within the left psoas and iliopsoas muscles, with 0.7 cm abscess (down from 2.3cm on prior 02/04). Persistent osteomyelitis and septic arthritis about the left SI joint. -- Per NSGY, no role for surgical intervention at this time, favoring wound care and continuing Iv-abx.     03/26/24 -- Continued intermittent fevers with fluctuating tachycardia; although fever curve appears to be reducing slowly, temps 99-100F. Wbc nml; CRP 26 (downtrend from 61), procalc 0.18.  Pt complains of TTP at Lumbar / sacrum area, and Left forarm. U/S venous Left forearm: shows occlusive thrombus of left basilic and cephalic veins. [2/2 peripheral IV at left hand?].   -- Ct-chest/A-P: showed instrumented fusion of L4-S1; with progression of erosive changes at the L5-S1 and S1-S2 disc spaces; with left sacroiliitis and S1 abscess (3 x 3 x 2 cm, anterior to S1).       Recommendations / Plan:  Continue oral Cipro 750mg po bid, to complete 2 weeks s/p picc line removal 03/16; BRENNAN 03/30.   Continue Iv-Daptomycin 10mg/kg Q24h  [CPK 39]. To complete 6 weeks; BRENNAN 04/10.  Continue to monitor fever curve. (Ongoing fevers 2/2  LUE DVTs vs. Progression of Lumbar/sacral discitis w/ pre-sacral abscess vs. Other)  Once IV abx completed, anticipate indefinite suppression with Doxycyline 100mg po bid     -- Discussed with ID staff and primary team   -- ID will continue to follow w/ further recs.

## 2024-03-26 NOTE — SUBJECTIVE & OBJECTIVE
Interval History: Still with intermittent fevers, wbc nml, crp downtrend thus far; unclear source of fevers; LUE DVTs vs. L-spine discitis vs. Drug fever vs. Other.      Review of Systems   Respiratory:  Negative for cough and shortness of breath.    Cardiovascular:  Negative for leg swelling.   Gastrointestinal:  Negative for abdominal distention, abdominal pain, diarrhea, nausea and vomiting.   Musculoskeletal:  Positive for back pain and myalgias.   Skin:  Positive for wound.   All other systems reviewed and are negative.      Objective:     Vital Signs (Most Recent):  Temp: 98.6 °F (37 °C) (03/26/24 0528)  Pulse: 88 (03/26/24 0528)  Resp: 18 (03/26/24 0633)  BP: (!) 119/56 (03/26/24 0528)  SpO2: 96 % (03/26/24 0528) Vital Signs (24h Range):  Temp:  [98.2 °F (36.8 °C)-100.3 °F (37.9 °C)] 98.6 °F (37 °C)  Pulse:  [80-91] 88  Resp:  [16-18] 18  SpO2:  [95 %-99 %] 96 %  BP: (118-154)/(56-73) 119/56     Weight: 78 kg (172 lb)  Body mass index is 24.68 kg/m².    Estimated Creatinine Clearance: 159.3 mL/min (based on SCr of 0.7 mg/dL).     Physical Exam  Vitals and nursing note reviewed.   Constitutional:       General: He is not in acute distress.     Appearance: He is not ill-appearing.   HENT:      Nose: No congestion.      Mouth/Throat:      Comments: Poor dentition  Eyes:      General: No scleral icterus.     Conjunctiva/sclera: Conjunctivae normal.   Cardiovascular:      Rate and Rhythm: Normal rate.      Heart sounds: Normal heart sounds. No murmur heard.  Pulmonary:      Effort: No respiratory distress.      Breath sounds: Normal breath sounds.   Abdominal:      General: Bowel sounds are normal. There is no distension.      Palpations: Abdomen is soft.   Musculoskeletal:         General: Swelling and tenderness present.      Right lower leg: No edema.      Left lower leg: No edema.      Comments: Rt forearm thrombophlebitis.   Skin:     Findings: Erythema and lesion present.      Comments: L-spine wound  "(superficial - subQ depth) no active drainage; mild surrounding TTP, but no gross fluctuance or erythema.    Neurological:      Mental Status: He is oriented to person, place, and time. Mental status is at baseline.   Psychiatric:         Behavior: Behavior normal.         Thought Content: Thought content normal.          Significant Labs: Blood Culture:   Recent Labs   Lab 03/15/24  1842 03/15/24  1843 03/17/24  0959 03/22/24  1050 03/22/24  1051   LABBLOO No growth after 5 days. No growth after 5 days. No growth after 5 days. No Growth to date  No Growth to date  No Growth to date  No Growth to date No Growth to date  No Growth to date  No Growth to date  No Growth to date       CBC:   No results for input(s): "WBC", "HGB", "HCT", "PLT" in the last 48 hours.    CMP:   No results for input(s): "NA", "K", "CL", "CO2", "GLU", "BUN", "CREATININE", "CALCIUM", "PROT", "ALBUMIN", "BILITOT", "ALKPHOS", "AST", "ALT", "ANIONGAP", "EGFRNONAA" in the last 48 hours.    Invalid input(s): "ESTGFAFRICA"    Microbiology Results (last 7 days)       Procedure Component Value Units Date/Time    Blood culture [[id number]] Collected: 03/22/24 1051    Order Status: Completed Specimen: Blood Updated: 03/25/24 1212     Blood Culture, Routine No Growth to date      No Growth to date      No Growth to date      No Growth to date    Blood culture [[id number]] Collected: 03/22/24 1050    Order Status: Completed Specimen: Blood Updated: 03/25/24 1212     Blood Culture, Routine No Growth to date      No Growth to date      No Growth to date      No Growth to date    Blood culture [[id number]] Collected: 03/17/24 0959    Order Status: Completed Specimen: Blood from Peripheral, Antecubital, Left Updated: 03/22/24 1612     Blood Culture, Routine No growth after 5 days.    Blood culture [[id number]]     Order Status: Completed Specimen: Blood     Blood culture [[id number]] Collected: 03/15/24 1842    Order Status: Completed Specimen: Blood " Updated: 03/20/24 2022     Blood Culture, Routine No growth after 5 days.    Narrative:      Please obtain blood cultures x2 from two different sites, and  at-least 30mins apart. Pls and thank you.    Blood culture [[id number]] Collected: 03/15/24 1843    Order Status: Completed Specimen: Blood Updated: 03/20/24 2022     Blood Culture, Routine No growth after 5 days.    Narrative:      Please obtain blood cultures x2 from two different sites, and  at-least 30mins apart. Pls and thank you.    Blood culture [[id number]] Collected: 03/14/24 1618    Order Status: Completed Specimen: Blood Updated: 03/19/24 2012     Blood Culture, Routine No growth after 5 days.    Narrative:      Collection has been rescheduled by [email or web address] at 03/14/2024 14:49 Reason:   Pt is in catlab. Will try back later. Nurse Cindi #[id number]  Collection has been rescheduled by 12 at 03/14/2024 14:49 Reason:   Pt is in catlab. Will try back later. Nurse Cindi #[id number]          Wound Culture:   Recent Labs   Lab 11/21/23  1041 02/07/24  1412 02/07/24  1423 02/14/24  1612 02/14/24  1712   LABAERO METHICILLIN RESISTANT STAPHYLOCOCCUS AUREUS  Moderate  No other significant isolate  * No growth METHICILLIN RESISTANT STAPHYLOCOCCUS AUREUS  Rare  * METHICILLIN RESISTANT STAPHYLOCOCCUS AUREUS  Few  * METHICILLIN RESISTANT STAPHYLOCOCCUS AUREUS  Few  *       All pertinent labs within the past 24 hours have been reviewed.    Significant Imaging: I have reviewed all pertinent imaging results/findings within the past 24 hours.    I have personally reviewed records / hospital notes from   service and other specialty providers. I have also reviewed CBC, CMP/BMP,  cultures and imaging with my interpretation as documented in my assessment / plan.    Patient is high risk for infectious complications given pt's age, multiple co-morbidities, and case complexity.      Time: 50 minutes   50% of time spent on face-to-face counseling and coordination of care. Counseling included  review of test results, diagnosis, and treatment plan with patient and/or family.

## 2024-03-26 NOTE — PT/OT/SLP PROGRESS
Occupational Therapy      Patient Name:  Ton Donovan   MRN:  [id number]    Patient not seen today secondary to Testing/imaging (xray/CT/MRI) at 9:50. OT unable to make second attempt. Will follow-up as able.    3/26/2024

## 2024-03-26 NOTE — ASSESSMENT & PLAN NOTE
28M with hypertension, IV drug use (heroin) presenting from LTAC with ongoing fevers    He was recently admitted on 2/04/24 with osteomyelitis/discitis L5-S1, sacral and left illiac osteomyelitis, L5-S1 epidural abscess, and left iliopsoas abscess. He underwent L4-pelvic fusion on 2/14/24 with epidural phlegmon debridement, intraoperative cultures positive for MRSA. Infectious Disease was consulted and recommended IV Daptomycin for 8 week course (EOC 4/10/24) to be followed by oral suppressive doxycycline thereafter. He was discharged on 2/22/24 to [facility] to complete his antibiotic course. LTAC stay was complicated by ongoing fevers and he represented to [facility] for further evaluation prior [place] [facility]. On arrival, WBC 9, ESR 59, CRP 46, AST 360s, ALT 200s. Procalcitonin was elevated at 1.18, lactate normal. MRI L spine showed continued L5-S1/S1 diskitis/osteomyelitis with abscess, diffuse infectious myositis of the bilateral lumbar paraspinal musculature and left iliopsoas muscle.  - Infectious Disease consulted, appreciate assistance and recommendations  - CT L spine without contrast noted  - Continue IV Daptomycin q24h  - Weekly CK level while on Daptomycin  - Multimodal pain control with tylenol, gabapentin, methocarbamol, oxy IR, oxy ER  - Follow blood cultures through maturity  - Daily CBC, CMP  - Cardiac monitor  - Neurosurgery Consult: he does not need surgery.  - pt transferred to [facility]    3/18 - ID final recs pending. Has Klebsiella in blood sens to everything.  Wound care consulted . pic line may have been source of infection  3/20-   ID recs:   Continue Iv-cefepime 2g Q8h. (Bld cx +kleb aerogenes panS) x 2 weeks from 1st negative blood culture 3/15 ( stop day 4/5/24)  Continue Iv-Daptomycin 10mg/kg Q24h  [CPK 39]. To complete 6 weeks( stop day 4/10/24_  OK to replace PICC  Rec indefinite suppression with Doxycyline 100mg po bid once IV abx completed.  fu  with ID upon dc from [facility]

## 2024-03-26 NOTE — PLAN OF CARE
Problem: Adult Inpatient Plan of Care  Goal: Plan of Care Review  Outcome: Ongoing, Progressing     Problem: Infection  Goal: Absence of Infection Signs and Symptoms  Outcome: Ongoing, Progressing     Problem: Adjustment to Illness (Sepsis/Septic Shock)  Goal: Optimal Coping  Outcome: Ongoing, Progressing     Pt had an uneventful night, temp high of 100.3 orally, no symptoms reported, pain treated per Mar (see flowsheet). Safety measures in place, call light in reach. Plan of care ongoing.

## 2024-03-26 NOTE — PROGRESS NOTES
Aleksandr [facility] - Neurosurgery ([facility])  Infectious Disease  Progress Note    Patient Name: Ton Donovan  MRN: [id number]  Admission Date: 3/14/2024  Length of Stay: 12 days  Attending Physician: Nagi Angel*  Primary Care Provider: Steve Kelly DO    Isolation Status: No active isolations  Assessment/Plan:      ID  * Osteomyelitis of vertebra of lumbosacral region  I have reviewed hospital notes from   service and other specialty providers. I have also reviewed CBC, CMP/BMP,  cultures and imaging with my interpretation as documented.      28M with h/o IVDU, MRSA bacteremia and prior disseminated MRSA infection with poor adherence with care (history of leaving AMA), readmitted 02/04 with progressive back pain. Imaging significant for lumbosacral osteomyelitis with associated epidural abscess, osteomyelitis of sacrum & L iliac bone, probable septic arthritis of L SI joint & lower lumbar spine facet joints, and left iliopsoas abscess, abscess in superior rectal/presacral region and micro-abscesses in paraspinal muscles, pelvis, and left sacrum. Blood cultures remained negative then, and TTE negative. Underwent IR aspiration of left SI joint and psoas on 02/07.  Cxs +MRSA. Abx held prior to IR bx; post-procedure started on empiric Iv-vanc / CTX.   S/p lumbar-pelvis debridement with fusion (02/14). Surgical cxs +MRSA. Pt discharged to [facility], to complete 6wks Iv-Dapto 10mg/kg Q24h, [place] 04/10/24. (Of note, switched vanc to IV daptomycin (d/t subtherapeutic vancomycin levels with q8hr dosing). Advised repeat imaging / neuro f/u near [place] 04/10; followed by abx suppression thereafter, likely lifelong.     Pt presented 03/12 to [facility] now d/t new onset of intermittent fevers (101.4F) at [facility] for past few days. On arrival, temp 99F, VSS, HDS, wbc nml, CRP 46, Cr 0.8.  CPK 39 nml, with climbing transaminitis found to have HCV+ (RNA 9-million), HBV Ag negative, HIV negative, U/S +hepatomegaly, GB wnl.   Bld cxs 03/12 NGTD.   Pt developed fever 03/14, repeat bld cxs 03/14 1 of 2 +Kleb aerogenes (panS).  Repeat Bld cxs 03/15 and 03/17 NGTD.  Picc line removed 03/16, with picc cath tip sent for cx.   -- Of note; UDS +MJ on arrival from [facility].  Suspect picc line may be source of new Kleb bacteremia (denies injecting at LTAC); vs. Lumbar surgical wound, vs. GI translocation in setting of hepatomegaly w/ chronic HCV (RNA+ 9-million copies).    Repeat MRI L-spine / pelvis (03/13): Extensive epidural infectious/inflammatory change posterior to the L5-S1 and S1-2 disc spaces, which communicate with paravertebral / pre-vertebral abscess. Diffuse infectious myositis of the bilateral lower lumbar paraspinal musculature and within the left psoas and iliopsoas muscles, without discrete abscess. Persistent osteomyelitis and septic arthritis about the left SI joint.   -- Per NSGY, no role for surgical intervention at this time, favoring wound care and continuing Iv-abx.  Neuro-IR also consulted 03/19; but deep fluid collection with challenging window, so not amendable to drainage.    03/26/24 -- Continued intermittent fevers with fluctuating tachycardia; although fever curve appears to be reducing slowly, temps 99-100F. Wbc nml; CRP 26 (downtrend from 61), procalc 0.18.  Pt complains of TTP at Lumbar / sacrum area, and Left forarm. U/S venous Left forearm: shows occlusive thrombus of left basilic and cephalic veins. [2/2 peripheral IV at left hand?].   -- Ct-chest/A-P: showed instrumented fusion of L4-S1; with progression of erosive changes at the L5-S1 and S1-S2 disc spaces; with left sacroiliitis and S1 abscess (3 x 3 x 2 cm, anterior to S1).       Recommendations / Plan:  Continue oral Cipro 750mg po bid, to complete 2 weeks s/p picc line removal 03/16; [place] 03/30.   Continue Iv-Daptomycin 10mg/kg Q24h  [CPK 39]. To complete 6 weeks; [place] 04/10.  Continue to monitor fever curve. (Ongoing fevers 2/2  LUE DVTs vs. Progression of  Lumbar/sacral discitis w/ pre-sacral abscess vs. Other)  Once IV abx completed, anticipate indefinite suppression with Doxycyline 100mg po bid     -- Discussed with ID staff and primary team   -- ID will continue to follow w/ further recs.        Thank you for your consult. I will follow-up with patient. Please contact us if you have any additional questions.    Elpidio Paredes PA-C  Infectious Disease  [facility] - Neurosurgery (Hospital)    Subjective:     Principal Problem:Osteomyelitis of vertebra of lumbosacral region    HPI: 28M with h/o IVDU, MRSA bacteremia and prior disseminated MRSA infection with poor adherence with care (history of leaving AMA), readmitted 02/04 with progressive back pain. Imaging significant for lumbosacral osteomyelitis with associated epidural abscess, osteomyelitis of sacrum & L iliac bone, probable septic arthritis of L SI joint & lower lumbar spine facet joints, and left iliopsoas abscess, abscess in superior rectal/presacral region and micro-abscesses in paraspinal muscles, pelvis, and left sacrum. Blood cultures remained negative. TTE negative. Underwent IR aspiration of left SI joint and psoas on 02/07.  Cxs +MRSA. Abx held prior to IR bx; post-procedure started on empiric Iv-vanc / CTX.   S/p lumbar-pelvis debridement with fusion (02/14). Surgical cxs +MRSA. Pt discharged to [facility], to complete 6wks Iv-Dapto 10mg/kg Q24h, [place] 04/10/24. (Of note, switched vanc to IV daptomycin (d/t subtherapeutic vancomycin levels with q8hr dosing). Advised repeat imaging / neuro f/u near [place] 04/10; followed by abx suppression thereafter, likely lifelong.   However, pt presents 03/12 to [facility] now d/t new onset of intermittent fevers (101.4F) at [facility] for past few days. On arrival, temp 99F, VSS, HDS, wbc nml, CRP 46,  / , , Cr 0.8.  CPK pending.     Repeat MRI L-spine / pelvis (03/13): Postsurgical changes of L4-5 posterior spinal fusion with bilateral sacroiliac  extension screws. With discitis osteomyelitis at L5-S1 and S1-2. Extensive epidural infectious/inflammatory change posterior to the L5-S1 and S1-2 disc spaces. Paravertebral/prevertebral abscess measuring 3.7 x 2.2 cm communicating with the L5-S1 disc space.   Pelvis: Diffuse infectious myositis of the left psoas and iliopsoas muscles with 7 mm abscess of the left iliopsoas muscle, decreased from 23 mm. Persistent osteomyelitis and septic arthritis about the left SI joint.  T-spine: incidental finding of small nodular T2 hyperintense signal foci at Rt lung base; consider dedicated chest-CT.    [ For comparison; Prior MRI L-spine / pelvis (02/04): showed osteomyelitis/discitis of L5-S1, osteomyelitis of sacrum & L iliac bone, probable septic arthritis of L SI joint & lower lumbar spine facet joints; worsening anterolisthesis of L5 with respect to S1 with increased size of epidural abscess at the level of L5-S1 and probable severe central canal stenosis at this level, along w/ additional abscesses in the L iliopsoas muscle, paraspinal muscles, pelvis, & L sacrum; -- the additional abscesses in the left iliopsoas muscle [3.4 x 1.7cm; with other micro-abscesses adjacent measuring 2.3cm), paraspinal muscles, pelvis, and left sacrum. Additional abscess in the superior rectal/presacral region measuring roughly 3.1 x 2.4 cm; and probable additional micro abscesses at left sacrum. ]    IR consulted to evaluate if the fluid collection at posterior to L5-S1 which communicates with disc space to see if that can be drained.  IR, Dr. Pretty reviewed this pt's case. He says no discrete fluid collection seen within the paraspinal muscles on either the CT or the MRI, it appears he has swollen, infected paraspinal muscles more consistent with myositis. He may have some fluid in the disc space which we recommend reaching out to neuro IR - No fluid to be drained, infection limited to myositis.  -- 03/19: Per Neuro-IR, abscess is very  deep to reach by IR means so it's not amenable to drain. Repeat cultures are neg.       Interval History: Still with intermittent fevers, wbc nml, crp downtrend thus far; unclear source of fevers; LUE DVTs vs. L-spine discitis vs. Drug fever vs. Other.      Review of Systems   Respiratory:  Negative for cough and shortness of breath.    Cardiovascular:  Negative for leg swelling.   Gastrointestinal:  Negative for abdominal distention, abdominal pain, diarrhea, nausea and vomiting.   Musculoskeletal:  Positive for back pain and myalgias.   Skin:  Positive for wound.   All other systems reviewed and are negative.      Objective:     Vital Signs (Most Recent):  Temp: 98.6 °F (37 °C) (03/26/24 0528)  Pulse: 88 (03/26/24 0528)  Resp: 18 (03/26/24 0633)  BP: (!) 119/56 (03/26/24 0528)  SpO2: 96 % (03/26/24 0528) Vital Signs (24h Range):  Temp:  [98.2 °F (36.8 °C)-100.3 °F (37.9 °C)] 98.6 °F (37 °C)  Pulse:  [80-91] 88  Resp:  [16-18] 18  SpO2:  [95 %-99 %] 96 %  BP: (118-154)/(56-73) 119/56     Weight: 78 kg (172 lb)  Body mass index is 24.68 kg/m².    Estimated Creatinine Clearance: 159.3 mL/min (based on SCr of 0.7 mg/dL).     Physical Exam  Vitals and nursing note reviewed.   Constitutional:       General: He is not in acute distress.     Appearance: He is not ill-appearing.   HENT:      Nose: No congestion.      Mouth/Throat:      Comments: Poor dentition  Eyes:      General: No scleral icterus.     Conjunctiva/sclera: Conjunctivae normal.   Cardiovascular:      Rate and Rhythm: Normal rate.      Heart sounds: Normal heart sounds. No murmur heard.  Pulmonary:      Effort: No respiratory distress.      Breath sounds: Normal breath sounds.   Abdominal:      General: Bowel sounds are normal. There is no distension.      Palpations: Abdomen is soft.   Musculoskeletal:         General: Swelling and tenderness present.      Right lower leg: No edema.      Left lower leg: No edema.      Comments: Rt forearm thrombophlebitis.  "  Skin:     Findings: Erythema and lesion present.      Comments: L-spine wound (superficial - subQ depth) no active drainage; mild surrounding TTP, but no gross fluctuance or erythema.    Neurological:      Mental Status: He is oriented to person, place, and time. Mental status is at baseline.   Psychiatric:         Behavior: Behavior normal.         Thought Content: Thought content normal.          Significant Labs: Blood Culture:   Recent Labs   Lab 03/15/24  1842 03/15/24  1843 03/17/24  0959 03/22/24  1050 03/22/24  1051   LABBLOO No growth after 5 days. No growth after 5 days. No growth after 5 days. No Growth to date  No Growth to date  No Growth to date  No Growth to date No Growth to date  No Growth to date  No Growth to date  No Growth to date       CBC:   No results for input(s): "WBC", "HGB", "HCT", "PLT" in the last 48 hours.    CMP:   No results for input(s): "NA", "K", "CL", "CO2", "GLU", "BUN", "CREATININE", "CALCIUM", "PROT", "ALBUMIN", "BILITOT", "ALKPHOS", "AST", "ALT", "ANIONGAP", "EGFRNONAA" in the last 48 hours.    Invalid input(s): "ESTGFAFRICA"    Microbiology Results (last 7 days)       Procedure Component Value Units Date/Time    Blood culture [[id number]] Collected: 03/22/24 1051    Order Status: Completed Specimen: Blood Updated: 03/25/24 1212     Blood Culture, Routine No Growth to date      No Growth to date      No Growth to date      No Growth to date    Blood culture [[id number]] Collected: 03/22/24 1050    Order Status: Completed Specimen: Blood Updated: 03/25/24 1212     Blood Culture, Routine No Growth to date      No Growth to date      No Growth to date      No Growth to date    Blood culture [[id number]] Collected: 03/17/24 0959    Order Status: Completed Specimen: Blood from Peripheral, Antecubital, Left Updated: 03/22/24 1612     Blood Culture, Routine No growth after 5 days.    Blood culture [[id number]]     Order Status: Completed Specimen: Blood     Blood culture " [[id number]] Collected: 03/15/24 1842    Order Status: Completed Specimen: Blood Updated: 03/20/24 2022     Blood Culture, Routine No growth after 5 days.    Narrative:      Please obtain blood cultures x2 from two different sites, and  at-least 30mins apart. Pls and thank you.    Blood culture [[id number]] Collected: 03/15/24 1843    Order Status: Completed Specimen: Blood Updated: 03/20/24 2022     Blood Culture, Routine No growth after 5 days.    Narrative:      Please obtain blood cultures x2 from two different sites, and  at-least 30mins apart. Pls and thank you.    Blood culture [[id number]] Collected: 03/14/24 1618    Order Status: Completed Specimen: Blood Updated: 03/19/24 2012     Blood Culture, Routine No growth after 5 days.    Narrative:      Collection has been rescheduled by 12 at 03/14/2024 14:49 Reason:   Pt is in catlab. Will try back later. Nurse Cindi #[id number]  Collection has been rescheduled by 12 at 03/14/2024 14:49 Reason:   Pt is in catlab. Will try back later. Nurse Cindi #[id number]          Wound Culture:   Recent Labs   Lab 11/21/23  1041 02/07/24  1412 02/07/24  1423 02/14/24  1612 02/14/24  1712   LABAERO METHICILLIN RESISTANT STAPHYLOCOCCUS AUREUS  Moderate  No other significant isolate  * No growth METHICILLIN RESISTANT STAPHYLOCOCCUS AUREUS  Rare  * METHICILLIN RESISTANT STAPHYLOCOCCUS AUREUS  Few  * METHICILLIN RESISTANT STAPHYLOCOCCUS AUREUS  Few  *       All pertinent labs within the past 24 hours have been reviewed.    Significant Imaging: I have reviewed all pertinent imaging results/findings within the past 24 hours.    I have personally reviewed records / hospital notes from   service and other specialty providers. I have also reviewed CBC, CMP/BMP,  cultures and imaging with my interpretation as documented in my assessment / plan.    Patient is high risk for infectious complications given pt's age, multiple co-morbidities, and case complexity.      Time: 50 minutes   50% of time  spent on face-to-face counseling and coordination of care. Counseling included review of test results, diagnosis, and treatment plan with patient and/or family.

## 2024-03-26 NOTE — PLAN OF CARE
Problem: Infection  Goal: Absence of Infection Signs and Symptoms  Outcome: Ongoing, Progressing     Problem: Adult Inpatient Plan of Care  Goal: Plan of Care Review  Outcome: Ongoing, Progressing  Goal: Patient-Specific Goal (Individualized)  Description: Pt will maintain sbp below 160  Outcome: Ongoing, Progressing  Goal: Absence of Hospital-Acquired Illness or Injury  Outcome: Ongoing, Progressing  Goal: Optimal Comfort and Wellbeing  Outcome: Ongoing, Progressing  Goal: Readiness for Transition of Care  Outcome: Ongoing, Progressing

## 2024-03-26 NOTE — NURSING
Nurses Note -- 4 Eyes      3/26/2024   0700am      Skin assessed during: Q Shift Change      [] No Altered Skin Integrity Present    []Prevention Measures Documented      [x] Yes- Altered Skin Integrity Present or Discovered   [x] LDA Added if Not in Epic (Describe Wound)   [] New Altered Skin Integrity was Present on Admit and Documented in LDA   [] Wound Image Taken    Wound Care Consulted? No- surgery wound    Attending Nurse:  JIMY Gill    Second RN/Staff Member:  JIMY Scott

## 2024-03-26 NOTE — SUBJECTIVE & OBJECTIVE
Interval History: Repeating CT c/a/p for recurrent fevers  Pt has no new complaints    Review of Systems  Objective:     Vital Signs (Most Recent):  Temp: 99.5 °F (37.5 °C) (03/26/24 1148)  Pulse: 80 (03/26/24 1148)  Resp: 18 (03/26/24 1148)  BP: (!) 103/52 (03/26/24 1148)  SpO2: 98 % (03/26/24 1148) Vital Signs (24h Range):  Temp:  [98.2 °F (36.8 °C)-100.3 °F (37.9 °C)] 99.5 °F (37.5 °C)  Pulse:  [80-91] 80  Resp:  [16-18] 18  SpO2:  [94 %-99 %] 98 %  BP: (103-154)/(52-73) 103/52     Weight: 78 kg (172 lb)  Body mass index is 24.68 kg/m².    Intake/Output Summary (Last 24 hours) at 3/26/2024 1333  Last data filed at 3/25/2024 1709  Gross per 24 hour   Intake --   Output 1000 ml   Net -1000 ml         Physical Exam  Constitutional:       General: He is not in acute distress.     Appearance: Normal appearance. He is not toxic-appearing or diaphoretic.   HENT:      Head: Normocephalic and atraumatic.      Mouth/Throat:      Mouth: Mucous membranes are moist.      Pharynx: Oropharynx is clear.   Eyes:      Extraocular Movements: Extraocular movements intact.      Pupils: Pupils are equal, round, and reactive to light.   Cardiovascular:      Rate and Rhythm: Normal rate and regular rhythm.      Pulses: Normal pulses.   Pulmonary:      Effort: Pulmonary effort is normal. No respiratory distress.      Breath sounds: Normal breath sounds. No stridor. No wheezing, rhonchi or rales.   Chest:      Chest wall: No tenderness.   Abdominal:      General: Abdomen is flat. Bowel sounds are normal. There is no distension.      Palpations: Abdomen is soft.      Tenderness: There is no abdominal tenderness. There is no right CVA tenderness, left CVA tenderness, guarding or rebound.   Musculoskeletal:         General: No swelling, tenderness, deformity or signs of injury. Normal range of motion.      Cervical back: Normal range of motion and neck supple. No rigidity or tenderness.      Right lower leg: No edema.      Left lower leg: No  edema.   Skin:     General: Skin is warm and dry.      Coloration: Skin is not jaundiced.      Findings: No erythema or rash.   Neurological:      General: No focal deficit present.      Mental Status: He is alert and oriented to person, place, and time. Mental status is at baseline.      Motor: No weakness.      Gait: Gait abnormal.             Significant Labs: All pertinent labs within the past 24 hours have been reviewed.    Significant Imaging: I have reviewed all pertinent imaging results/findings within the past 24 hours.

## 2024-03-27 PROBLEM — M46.47 DISCITIS OF LUMBOSACRAL REGION: Chronic | Status: ACTIVE | Noted: 2024-02-07

## 2024-03-27 LAB
ACID FAST MOD KINY STN SPEC: NORMAL
BACTERIA BLD CULT: NORMAL
BACTERIA BLD CULT: NORMAL
MYCOBACTERIUM SPEC QL CULT: NORMAL

## 2024-03-27 PROCEDURE — 25000003 PHARM REV CODE 250: Performed by: PHYSICIAN ASSISTANT

## 2024-03-27 PROCEDURE — 97535 SELF CARE MNGMENT TRAINING: CPT | Mod: CO

## 2024-03-27 PROCEDURE — 97530 THERAPEUTIC ACTIVITIES: CPT | Mod: CQ

## 2024-03-27 PROCEDURE — 97116 GAIT TRAINING THERAPY: CPT | Mod: CQ

## 2024-03-27 PROCEDURE — 11000001 HC ACUTE MED/SURG PRIVATE ROOM

## 2024-03-27 PROCEDURE — A4216 STERILE WATER/SALINE, 10 ML: HCPCS | Performed by: HOSPITALIST

## 2024-03-27 PROCEDURE — 25000003 PHARM REV CODE 250: Performed by: STUDENT IN AN ORGANIZED HEALTH CARE EDUCATION/TRAINING PROGRAM

## 2024-03-27 PROCEDURE — 25000003 PHARM REV CODE 250

## 2024-03-27 PROCEDURE — 25000003 PHARM REV CODE 250: Performed by: HOSPITALIST

## 2024-03-27 PROCEDURE — 63600175 PHARM REV CODE 636 W HCPCS

## 2024-03-27 PROCEDURE — 99233 SBSQ HOSP IP/OBS HIGH 50: CPT | Mod: ,,, | Performed by: STUDENT IN AN ORGANIZED HEALTH CARE EDUCATION/TRAINING PROGRAM

## 2024-03-27 RX ADMIN — Medication 10 ML: at 12:03

## 2024-03-27 RX ADMIN — CIPROFLOXACIN 750 MG: 750 TABLET, FILM COATED ORAL at 08:03

## 2024-03-27 RX ADMIN — OXYCODONE HYDROCHLORIDE 15 MG: 10 TABLET ORAL at 08:03

## 2024-03-27 RX ADMIN — METHOCARBAMOL 500 MG: 500 TABLET ORAL at 08:03

## 2024-03-27 RX ADMIN — METHOCARBAMOL 500 MG: 500 TABLET ORAL at 04:03

## 2024-03-27 RX ADMIN — DOCUSATE SODIUM AND SENNOSIDES 2 TABLET: 8.6; 5 TABLET, FILM COATED ORAL at 08:03

## 2024-03-27 RX ADMIN — CLONIDINE HYDROCHLORIDE 0.1 MG: 0.1 TABLET ORAL at 08:03

## 2024-03-27 RX ADMIN — OXYCODONE HYDROCHLORIDE 15 MG: 10 TABLET ORAL at 04:03

## 2024-03-27 RX ADMIN — OXYCODONE HYDROCHLORIDE 15 MG: 10 TABLET ORAL at 12:03

## 2024-03-27 RX ADMIN — METHOCARBAMOL 500 MG: 500 TABLET ORAL at 12:03

## 2024-03-27 RX ADMIN — OXYCODONE HYDROCHLORIDE 10 MG: 10 TABLET, FILM COATED, EXTENDED RELEASE ORAL at 11:03

## 2024-03-27 RX ADMIN — MIRTAZAPINE 15 MG: 15 TABLET, FILM COATED ORAL at 08:03

## 2024-03-27 RX ADMIN — Medication 10 ML: at 06:03

## 2024-03-27 RX ADMIN — DAPTOMYCIN 780 MG: 350 INJECTION, POWDER, LYOPHILIZED, FOR SOLUTION INTRAVENOUS at 08:03

## 2024-03-27 RX ADMIN — OXYCODONE HYDROCHLORIDE 10 MG: 10 TABLET, FILM COATED, EXTENDED RELEASE ORAL at 09:03

## 2024-03-27 NOTE — ASSESSMENT & PLAN NOTE
"This patient does have evidence of infective focus - continued L5-S1/S1 diskitis/osteomyelitis with abscess, diffuse infectious myositis of the bilateral lumbar paraspinal musculature and left iliopsoas muscle.  My overall impression is sepsis.  Source: Skin and Soft Tissue (location epidurial)  Antibiotics given-   Antibiotics (72h ago, onward)      Start     Stop Route Frequency Ordered    03/24/24 2100  ciprofloxacin HCl tablet 750 mg         -- Oral Every 12 hours 03/24/24 1713    03/14/24 0900  DAPTOmycin (CUBICIN) 780 mg in sodium chloride 0.9% SolP 50 mL IVPB         -- IV Every 24 hours (non-standard times) 03/14/24 0755          Latest lactate reviewed-  No results for input(s): "LACTATE", "POCLAC" in the last 72 hours.    Organ dysfunction indicated by Acute liver injury    Fluid challenge Not needed - patient is not hypotensive      Post- resuscitation assessment No - Post resuscitation assessment not needed       Will Not start Pressors- Levophed for MAP of 65  Source control achieved by: Broad spectrum antibiotics    - Infectious Disease consulted    "

## 2024-03-27 NOTE — PROGRESS NOTES
Aleksandr Bray - Neurosurgery ([facility])  Infectious Disease  Progress Note    Patient Name: Ton Donovan  MRN: [id number]  Admission Date: 3/14/2024  Length of Stay: 13 days  Attending Physician: Nagi Angel*  Primary Care Provider: Steve Kelly DO    Isolation Status: No active isolations  Assessment/Plan:      ID  * Osteomyelitis of vertebra of lumbosacral region  I have reviewed hospital notes from   service and other specialty providers. I have also reviewed CBC, CMP/BMP,  cultures and imaging with my interpretation as documented.      28M with h/o IVDU, MRSA bacteremia and prior disseminated MRSA infection with poor adherence with care (history of leaving AMA), readmitted 02/04 with progressive back pain. Imaging significant for lumbosacral osteomyelitis with associated epidural abscess, osteomyelitis of sacrum & L iliac bone, probable septic arthritis of L SI joint & lower lumbar spine facet joints, and left iliopsoas abscess, abscess in superior rectal/presacral region and micro-abscesses in paraspinal muscles, pelvis, and left sacrum. Blood cultures remained negative then, and TTE negative. Underwent IR aspiration of left SI joint and psoas on 02/07.  Cxs +MRSA. Abx held prior to IR bx; post-procedure started on empiric Iv-vanc / CTX.   S/p lumbar-pelvis debridement with fusion (02/14). Surgical cxs +MRSA. Pt discharged to [facility], to complete 6wks Iv-Dapto 10mg/kg Q24h, BRENNAN 04/10/24. (Of note, switched vanc to IV daptomycin (d/t subtherapeutic vancomycin levels with q8hr dosing). Advised repeat imaging / neuro f/u near [place] 04/10; followed by abx suppression thereafter, likely lifelong.     Pt presented 03/12 to [facility] now d/t new onset of intermittent fevers (101.4F) at [facility] for past few days. On arrival, temp 99F, VSS, HDS, wbc nml, CRP 46, Cr 0.8.  CPK 39 nml, with climbing transaminitis found to have HCV+ (RNA 9-million), HBV Ag negative, HIV negative, U/S +hepatomegaly, GB wnl.   Bld cxs 03/12 NGTD.   Pt developed fever 03/14, repeat bld cxs 03/14 1 of 2 +Kleb aerogenes (panS).  Repeat Bld cxs 03/15 and 03/17 NGTD.  Picc line removed 03/16, with picc cath tip sent for cx.   -- Of note; UDS +MJ on arrival from LT[facility].  Suspect picc line may be source of new Kleb bacteremia (denies injecting at LTAC); vs. Lumbar surgical wound, vs. GI translocation in setting of hepatomegaly w/ chronic HCV (RNA+ 9-million copies).    Repeat MRI L-spine / pelvis (03/13): Extensive epidural infectious/inflammatory change posterior to the L5-S1 and S1-2 disc spaces; with paravertebral / pre-vertebral abscess. Diffuse infectious myositis of the bilateral lower lumbar paraspinal musculature and within the left psoas and iliopsoas muscles, with 0.7 cm abscess (down from 2.3cm on prior 02/04). Persistent osteomyelitis and septic arthritis about the left SI joint. -- Per NSGY, no role for surgical intervention at this time, favoring wound care and continuing Iv-abx.     03/26/24 -- Continued intermittent fevers with fluctuating tachycardia; although fever curve appears to be reducing slowly, temps 99-100F. Wbc nml; CRP 26 (downtrend from 61), procalc 0.18.  Pt complains of TTP at Lumbar / sacrum area, and Left forarm. U/S venous Left forearm: shows occlusive thrombus of left basilic and cephalic veins. [2/2 peripheral IV at left hand?].   -- Ct-chest/A-P: showed instrumented fusion of L4-S1; with progression of erosive changes at the L5-S1 and S1-S2 disc spaces; with left sacroiliitis and S1 abscess (3 x 3 x 2 cm, anterior to S1).       Recommendations / Plan:  Continue oral Cipro 750mg po bid -- (pt previously was on Iv-cefepime for Kleb bacteremia, but switched to FQ in case ongoing fevers were drug related (beta-lactam). Otherwise, no obvious C/I to re-trialing Iv- cefepime if pt not tolerating oral cipro)  Continue Iv-Daptomycin 10mg/kg Q24h  [CPK 39].   Suspect intermittent fevers related to pre-sacral fluid  collection anterior to S1 (3 x 3 x 2 cm), noted on repeat Ct-a/p (03/26). Of note, pt found to have Left forearm DVTs related to PIV, but w/o associated abscess or infection locally at left forearm at this time.   Previous BRENNAN for 6wks course for Iv-abx was 04/10; but because interval complications of Kleb bacteremia and S1 anterior fluid collection (location not amendable to neuro-IR drain; too deep) -- favor extending Iv-Dapto and oral cipro until 05/01. Abx duration ultimately until fluid collections near resolved.   Prior to new BRENNAN 05/01, recommend repeat L-spine and pelvic imaging to re-assess for interval changes to of Lumbar osteo, myositis and associated multiple fluid collections of L-spine, pre-sacral (S1), and pelvis (iliopsoas). -- But if persistent fevers return prior to BRENNAN 05/01, recommend repeating this imaging earlier; especially if clinically relevant and no other obvious source of fever.  Once IV abx completed, anticipate indefinite suppression with Doxycyline 100mg po bid     -- ID will schedule pt for EOC f/u appt ~05/01. ID at [facility] to follow in interim.   -- Discussed with ID staff and primary team   -- ID will sign-off at this time.       Outpatient Antibiotic Therapy Plan:    Please send referral to [facility].    1) Infection :   L-spine osteo-discitis with epidural, para-spinal, iliopsoas and S1 abscess (+MRSA); c/b Kleb complicated bacteremia (suspect picc line source).     2) Discharge Antibiotics:     Intravenous antibiotics:  IV - Daptomycin 10mg/kg Q24h    Oral antibiotics:  Oral Cipro 750mg BID    3) Therapy Duration:    ~6wks s/p clrd bld cxs 03/16 (complicated bacteremia; c/f for possible seeding of spinal fluid collections w/ incomplete source control)    Estimated end date of IV antibiotics:  05/01/24    4) Outpatient Weekly Labs:    Order the following labs to be drawn on Mondays:   CBC  CMP   CRP  CPK (when on Daptomycin)    5) Fax Lab  Results to Infectious Diseases Provider:  Elpidio Paredes PA-C    [facility] Fax Number: [phone]    6) Outpatient Infectious Diseases Follow-up    Follow-up appointment will be arranged by the ID clinic and will be found in the patient's appointments tab.    Prior to discharge, please ensure the patient's follow-up appt has been scheduled with ID-Clinic -- for patient convenience and continuity of care.  If there is still no follow-up scheduled prior to discharge, please send an EPIC message to  Carol Gann LPN  in Infectious Diseases.              Thank you for your consult. I will sign off. Please contact us if you have any additional questions.    Elpidio Paredes PA-C  Infectious Disease  [facility] - Neurosurgery ([facility])    Subjective:     Principal Problem:Osteomyelitis of vertebra of lumbosacral region    HPI: 28M with h/o IVDU, MRSA bacteremia and prior disseminated MRSA infection with poor adherence with care (history of leaving AMA), readmitted 02/04 with progressive back pain. Imaging significant for lumbosacral osteomyelitis with associated epidural abscess, osteomyelitis of sacrum & L iliac bone, probable septic arthritis of L SI joint & lower lumbar spine facet joints, and left iliopsoas abscess, abscess in superior rectal/presacral region and micro-abscesses in paraspinal muscles, pelvis, and left sacrum. Blood cultures remained negative. TTE negative. Underwent IR aspiration of left SI joint and psoas on 02/07.  Cxs +MRSA. Abx held prior to IR bx; post-procedure started on empiric Iv-vanc / CTX.   S/p lumbar-pelvis debridement with fusion (02/14). Surgical cxs +MRSA. Pt discharged to [facility], to complete 6wks Iv-Dapto 10mg/kg Q24h, BRENNAN 04/10/24. (Of note, switched vanc to IV daptomycin (d/t subtherapeutic vancomycin levels with q8hr dosing). Advised repeat imaging / neuro f/u near [place] 04/10; followed by abx suppression thereafter, likely lifelong.   However, pt presents 03/12 to [facility] [place]  now d/t new onset of intermittent fevers (101.4F) at [facility] for past few days. On arrival, temp 99F, VSS, HDS, wbc nml, CRP 46,  / , , Cr 0.8.  CPK pending.     Repeat MRI L-spine / pelvis (03/13): Postsurgical changes of L4-5 posterior spinal fusion with bilateral sacroiliac extension screws. With discitis osteomyelitis at L5-S1 and S1-2. Extensive epidural infectious/inflammatory change posterior to the L5-S1 and S1-2 disc spaces. Paravertebral/prevertebral abscess measuring 3.7 x 2.2 cm communicating with the L5-S1 disc space.   Pelvis: Diffuse infectious myositis of the left psoas and iliopsoas muscles with 7 mm abscess of the left iliopsoas muscle, decreased from 23 mm. Persistent osteomyelitis and septic arthritis about the left SI joint.  T-spine: incidental finding of small nodular T2 hyperintense signal foci at Rt lung base; consider dedicated chest-CT.    [ For comparison; Prior MRI L-spine / pelvis (02/04): showed osteomyelitis/discitis of L5-S1, osteomyelitis of sacrum & L iliac bone, probable septic arthritis of L SI joint & lower lumbar spine facet joints; worsening anterolisthesis of L5 with respect to S1 with increased size of epidural abscess at the level of L5-S1 and probable severe central canal stenosis at this level, along w/ additional abscesses in the L iliopsoas muscle, paraspinal muscles, pelvis, & L sacrum; -- the additional abscesses in the left iliopsoas muscle [3.4 x 1.7cm; with other micro-abscesses adjacent measuring 2.3cm), paraspinal muscles, pelvis, and left sacrum. Additional abscess in the superior rectal/presacral region measuring roughly 3.1 x 2.4 cm; and probable additional micro abscesses at left sacrum. ]    IR consulted to evaluate if the fluid collection at posterior to L5-S1 which communicates with disc space to see if that can be drained.  IR, Dr. Pretty reviewed this pt's case. He says no discrete fluid collection seen within the paraspinal  muscles on either the CT or the MRI, it appears he has swollen, infected paraspinal muscles more consistent with myositis. He may have some fluid in the disc space which we recommend reaching out to neuro IR - No fluid to be drained, infection limited to myositis.  -- 03/19: Per Neuro-IR, abscess is very deep to reach by IR means so it's not amenable to drain. Repeat cultures are neg.       Interval History: intermittent fevers, seems to be improving some. Wbc nml, crp 25 (down from 35, and 60). Pt otherwise clinically stable. Suspect spinal abscesses source of fevers. Neuro-IR unable to drain pre-sacral fluid collection.     Review of Systems   Respiratory:  Negative for cough and shortness of breath.    Cardiovascular:  Negative for leg swelling.   Gastrointestinal:  Negative for abdominal distention, abdominal pain, diarrhea, nausea and vomiting.   Musculoskeletal:  Positive for back pain and myalgias.   Skin:  Positive for wound.   All other systems reviewed and are negative.      Objective:     Vital Signs (Most Recent):  Temp: 98.9 °F (37.2 °C) (03/27/24 1152)  Pulse: 91 (03/27/24 1307)  Resp: 17 (03/27/24 1205)  BP: 128/67 (03/27/24 1307)  SpO2: 100 % (03/27/24 1300) Vital Signs (24h Range):  Temp:  [98 °F (36.7 °C)-100.3 °F (37.9 °C)] 98.9 °F (37.2 °C)  Pulse:  [81-96] 91  Resp:  [17-20] 17  SpO2:  [96 %-100 %] 100 %  BP: (106-160)/() 128/67     Weight: 78 kg (172 lb)  Body mass index is 24.68 kg/m².    Estimated Creatinine Clearance: 159.3 mL/min (based on SCr of 0.7 mg/dL).     Physical Exam  Vitals and nursing note reviewed.   Constitutional:       General: He is not in acute distress.     Appearance: He is not ill-appearing.   HENT:      Nose: No congestion.      Mouth/Throat:      Comments: Poor dentition  Eyes:      General: No scleral icterus.     Conjunctiva/sclera: Conjunctivae normal.   Cardiovascular:      Rate and Rhythm: Normal rate.      Heart sounds: Normal heart sounds. No murmur  heard.  Pulmonary:      Effort: No respiratory distress.      Breath sounds: Normal breath sounds.   Abdominal:      General: Bowel sounds are normal. There is no distension.      Palpations: Abdomen is soft.   Musculoskeletal:         General: Swelling and tenderness present.      Right lower leg: No edema.      Left lower leg: No edema.      Comments: Rt forearm thrombophlebitis.   Skin:     Findings: Erythema and lesion present.      Comments: L-spine wound (superficial - subQ depth) no active drainage; mild surrounding TTP, but no gross fluctuance or erythema.    Neurological:      Mental Status: He is oriented to person, place, and time. Mental status is at baseline.   Psychiatric:         Behavior: Behavior normal.         Thought Content: Thought content normal.          Significant Labs: Blood Culture:   Recent Labs   Lab 03/15/24  1842 03/15/24  1843 03/17/24  0959 03/22/24  1050 03/22/24  1051   LABBLOO No growth after 5 days. No growth after 5 days. No growth after 5 days. No growth after 5 days. No growth after 5 days.       CBC:   Recent Labs   Lab 03/26/24  0853   WBC 6.41   HGB 8.7*   HCT 27.5*          CMP:   Recent Labs   Lab 03/26/24  0853   *   K 4.6      CO2 27   *   BUN 13   CREATININE 0.7   CALCIUM 9.2   PROT 7.5   ALBUMIN 2.8*   BILITOT 0.6   ALKPHOS 246*   *   *   ANIONGAP 6*       Microbiology Results (last 7 days)       Procedure Component Value Units Date/Time    Blood culture [[id number]] Collected: 03/22/24 1051    Order Status: Completed Specimen: Blood Updated: 03/27/24 1212     Blood Culture, Routine No growth after 5 days.    Blood culture [[id number]] Collected: 03/22/24 1050    Order Status: Completed Specimen: Blood Updated: 03/27/24 1212     Blood Culture, Routine No growth after 5 days.    Blood culture [[id number]] Collected: 03/17/24 0959    Order Status: Completed Specimen: Blood from Peripheral, Antecubital, Left Updated: 03/22/24 1612      Blood Culture, Routine No growth after 5 days.    Blood culture [[id number]]     Order Status: Completed Specimen: Blood     Blood culture [[id number]] Collected: 03/15/24 1842    Order Status: Completed Specimen: Blood Updated: 03/20/24 2022     Blood Culture, Routine No growth after 5 days.    Narrative:      Please obtain blood cultures x2 from two different sites, and  at-least 30mins apart. Pls and thank you.    Blood culture [[id number]] Collected: 03/15/24 1843    Order Status: Completed Specimen: Blood Updated: 03/20/24 2022     Blood Culture, Routine No growth after 5 days.    Narrative:      Please obtain blood cultures x2 from two different sites, and  at-least 30mins apart. Pls and thank you.          Wound Culture:   Recent Labs   Lab 11/21/23  1041 02/07/24  1412 02/07/24  1423 02/14/24  1612 02/14/24  1712   LABAERO METHICILLIN RESISTANT STAPHYLOCOCCUS AUREUS  Moderate  No other significant isolate  * No growth METHICILLIN RESISTANT STAPHYLOCOCCUS AUREUS  Rare  * METHICILLIN RESISTANT STAPHYLOCOCCUS AUREUS  Few  * METHICILLIN RESISTANT STAPHYLOCOCCUS AUREUS  Few  *       All pertinent labs within the past 24 hours have been reviewed.    Significant Imaging: I have reviewed all pertinent imaging results/findings within the past 24 hours.    I have personally reviewed records / hospital notes from   service and other specialty providers. I have also reviewed CBC, CMP/BMP,  cultures and imaging with my interpretation as documented in my assessment / plan.    Patient is high risk for infectious complications given pt's age, multiple co-morbidities, and case complexity.      Time: 50 minutes   50% of time spent on face-to-face counseling and coordination of care. Counseling included review of test results, diagnosis, and treatment plan with patient and/or family.

## 2024-03-27 NOTE — PROGRESS NOTES
Aleksandr Bray - Neurosurgery ([facility])  [facility] Medicine  Progress Note    Patient Name: Ton Donovan  MRN: [id number]  Patient Class: IP- Inpatient   Admission Date: 3/14/2024  Length of Stay: 13 days  Attending Physician: Nagi Angel*  Primary Care Provider: Steve Kelly DO        Subjective:     Principal Problem:Osteomyelitis of vertebra of lumbosacral region        HPI:  Mr. Ton Donovan is a 28 year-old Male with a PMHx of Hypertension, IV drug use who presented with fevers and ongoing back pain with radiation down legs.    Of note, he was recently admitted to [facility] for LOOP X lumbar spinal fusion, with course complicated by a psoas abscess and MRSA epidural abscess s/p epidural phlegmon debridement. He was started on IV Daptomycin and discharged on 2/22/24 to [facility] for completion of antibiotics. Ongoing fevers complicated his LTAC stay, and he returned to the [facility] for further evaluation. On arrival, WBC 9, ESR 59, CRP 46, AST 360s, ALT 200s. Procalcitonin was elevated at 1.18, lactate normal. MRI L spine concerning for continued L5-S1/S1 diskitis and osteomyelitis with abscess, diffuse infectious myositis of the lumbar paraspinal musculature bilaterally and left iliopsoas muscle without discrete abscess. He was then transferred to [facility] for Neurosurgerical evaluation.     At the time of this consult, temp 99F, HR 60, 120s/60s, saturating well on room air. Most recent labs with WBC 5, Hgb 9, Na 134.    Medicine consulted for medical co-management of epidural abscess    Overview/Hospital Course:  3/15-Admitted for NSGY evaluation of OM & discitis lumbar spine. Will let pt eat.   3/16- Does not need surgery. Appreciate NSGY eval. Wound care and ID following and need final recs. Pt is comfortable.   3/17- Klebsiella sens to everything,  On dapto and yue.  Pic line removed and culture tip was ordered, but not done.   3/18 per ID: Continue  Iv-cefepime 2g Q8h. (Bld cx  +kleb aerogenes panS) & Continue Iv-Daptomycin 10mg/kg Q24h.  repeat bld cxs 03/15 and 03/17 (NGTD); picc cath tip culture was not sent down properly  Anticipate continuing both Iv-dapto and cefepime for remaining 2-3wks of full 6wk duration of Iv-abx(BRENNAN 04/10) for lumbar osteo-discitis with paravertebral / psoas myositis and sub centimeter abscesses   Will need new PIc line or mid line.  Waiting for cultures to be neg to ensure therapeutic window without bacteremia and presence of a line.   - IR consulted to evaluate if the fluid collection at posterior to L5-s1 which communicates with disc space to see if that can be drained.  IR, Dr. Pretty reviewed this pt's case. He says no discrete fluid collection seen within the paraspinal muscles on either the CT or the MRI, it appears he has swollen, infected paraspinal muscles more consistent with myositis. He may have some fluid in the disc space which we recommend reaching out to neuro IR - No fluid to be drained, infection limited to myositis.    3/19- Per Neuro IR, abscess is very deep to reach by IR means so it's not amenable to drain. Repeat cultures are neg.   3/20 replace pic and plan for LTAC.   ID final recs:   Continue Iv-cefepime 2g Q8h. (Bld cx +kleb aerogenes panS) x 2 weeks from 1st negative blood culture 3/15  Continue Iv-Daptomycin 10mg/kg Q24h  [CPK 39]. To complete 6 weeks  OK to replace PICC  Rec indefinite suppression with Doxycyline 100mg po bid once IV abx completed.  fu upon dc from LTAC  Pic line placed  No growth on repeat cultures. No leukocytosis  Ultrasound with thromboses basilic and cephalic veins. Patient reports arm swelling and discomfort improving.  Patient had recurrent fevers. ID changed cefepime to ciprofloxacin for concern drug fevers. Repeated CT C/A/P.     Interval History: Appears has been afebrile now    Review of Systems  Objective:     Vital Signs (Most Recent):  Temp: 98.9 °F (37.2 °C) (03/27/24 1152)  Pulse: 96 (03/27/24  1152)  Resp: 17 (03/27/24 1205)  BP: (!) 160/119 (03/27/24 1152)  SpO2: 100 % (03/27/24 1152) Vital Signs (24h Range):  Temp:  [98 °F (36.7 °C)-100.3 °F (37.9 °C)] 98.9 °F (37.2 °C)  Pulse:  [81-96] 96  Resp:  [17-20] 17  SpO2:  [96 %-100 %] 100 %  BP: (106-160)/() 160/119     Weight: 78 kg (172 lb)  Body mass index is 24.68 kg/m².  No intake or output data in the 24 hours ending 03/27/24 1220      Physical Exam  Constitutional:       General: He is not in acute distress.     Appearance: Normal appearance. He is not toxic-appearing or diaphoretic.   HENT:      Head: Normocephalic and atraumatic.      Mouth/Throat:      Mouth: Mucous membranes are moist.      Pharynx: Oropharynx is clear.   Eyes:      Extraocular Movements: Extraocular movements intact.      Pupils: Pupils are equal, round, and reactive to light.   Cardiovascular:      Rate and Rhythm: Normal rate and regular rhythm.      Pulses: Normal pulses.   Pulmonary:      Effort: Pulmonary effort is normal. No respiratory distress.      Breath sounds: Normal breath sounds. No stridor. No wheezing, rhonchi or rales.   Chest:      Chest wall: No tenderness.   Abdominal:      General: Abdomen is flat. Bowel sounds are normal. There is no distension.      Palpations: Abdomen is soft.      Tenderness: There is no abdominal tenderness. There is no right CVA tenderness, left CVA tenderness, guarding or rebound.   Musculoskeletal:         General: No swelling, tenderness, deformity or signs of injury. Normal range of motion.      Cervical back: Normal range of motion and neck supple. No rigidity or tenderness.      Right lower leg: No edema.      Left lower leg: No edema.   Skin:     General: Skin is warm and dry.      Coloration: Skin is not jaundiced.      Findings: No erythema or rash.   Neurological:      General: No focal deficit present.      Mental Status: He is alert and oriented to person, place, and time. Mental status is at baseline.      Motor: No  weakness.      Gait: Gait abnormal.             Significant Labs: All pertinent labs within the past 24 hours have been reviewed.    Significant Imaging: I have reviewed all pertinent imaging results/findings within the past 24 hours.    Assessment/Plan:      * Osteomyelitis of vertebra of lumbosacral region  28M with hypertension, IV drug use (heroin) presenting from LTAC with ongoing fevers    He was recently admitted on 2/04/24 with osteomyelitis/discitis L5-S1, sacral and left illiac osteomyelitis, L5-S1 epidural abscess, and left iliopsoas abscess. He underwent L4-pelvic fusion on 2/14/24 with epidural phlegmon debridement, intraoperative cultures positive for MRSA. Infectious Disease was consulted and recommended IV Daptomycin for 8 week course (EOC 4/10/24) to be followed by oral suppressive doxycycline thereafter. He was discharged on 2/22/24 to [facility] to complete his antibiotic course. LTAC stay was complicated by ongoing fevers and he represented to [facility] for further evaluation prior to transfer to [facility] for neurosurgical evaluation. On arrival, WBC 9, ESR 59, CRP 46, AST 360s, ALT 200s. Procalcitonin was elevated at 1.18, lactate normal. MRI L spine showed continued L5-S1/S1 diskitis/osteomyelitis with abscess, diffuse infectious myositis of the bilateral lumbar paraspinal musculature and left iliopsoas muscle.  - Infectious Disease consulted, appreciate assistance and recommendations  - CT L spine without contrast noted  - Continue IV Daptomycin q24h  - Weekly CK level while on Daptomycin  - Multimodal pain control with tylenol, gabapentin, methocarbamol, oxy IR, oxy ER  - Follow blood cultures through maturity  - Daily CBC, CMP  - Cardiac monitor  - Neurosurgery Consult: he does not need surgery.  - pt transferred to [facility]  3/18 - ID final recs pending. Has Klebsiella in blood sens to everything.  Wound care consulted . pic line may have been source of infection    Repeat Ct c/a/p due to  "recurrent fevers  Infectious disease consulted.       Chronic diastolic heart failure  ECHO 2/13/2024  Left Ventricle: The left ventricle is normal in size. Normal wall thickness. Normal wall motion. There is normal systolic function with a visually estimated ejection fraction of 60 - 65%. Ejection fraction by visual approximation is 65%. There is diastolic dysfunction but grade cannot be determined.    Right Ventricle: Normal right ventricular cavity size. Wall thickness is normal. Right ventricle wall motion  is normal. Systolic function is normal.    IVC/SVC: Normal venous pressure at 3 mmHg.    Pericardium: There is a trivial posterior effusion just base.    STABLE      Septic arthritis  See " Osteomyelitis"      Myositis of multiple sites  See " Osteomeylitis" for plan      Hyponatremia  Patient has hyponatremia which is controlled,We will aim to correct the sodium by 4-6mEq in 24 hours. We will monitor sodium Daily. The hyponatremia is due to Dehydration/hypovolemia. We will obtain the following studies: see labsection. We will treat the hyponatremia with IV fluids. The patient's sodium results have been reviewed and are listed below.  No results for input(s): "NA" in the last 24 hours.      Drug dependence  See " Substance induced depressive disroder"  - monitor for withdrawal and give opioid if needed   - pt has been in LTAC  - on oxycodone LA 10 bid for pain and dependence  STABLE      Hepatitis  Most recent labs from OSH showed AST 360s, ALT 200s. Had recent hepatitis panel 3/12 with hep c    - Recent Hepatitis C screening resulted positive, previous Hep C screening 3 months ago negative. Check repeat Hep C screening and PCR  - Check HIV given history of IVDU and ongoing fever despite antibiotics  - RUQ ultrasound- pending  - Daily CMP to trend transaminases:  335/235  - Infectious Diseases consulted and following      Severe sepsis  This patient does have evidence of infective focus - continued L5-S1/S1 " "diskitis/osteomyelitis with abscess, diffuse infectious myositis of the bilateral lumbar paraspinal musculature and left iliopsoas muscle.  My overall impression is sepsis.  Source: Skin and Soft Tissue (location epidurial)  Antibiotics given-   Antibiotics (72h ago, onward)      Start     Stop Route Frequency Ordered    03/24/24 2100  ciprofloxacin HCl tablet 750 mg         -- Oral Every 12 hours 03/24/24 1713    03/14/24 0900  DAPTOmycin (CUBICIN) 780 mg in sodium chloride 0.9% SolP 50 mL IVPB         -- IV Every 24 hours (non-standard times) 03/14/24 0755          Latest lactate reviewed-  No results for input(s): "LACTATE", "POCLAC" in the last 72 hours.    Organ dysfunction indicated by Acute liver injury    Fluid challenge Not needed - patient is not hypotensive      Post- resuscitation assessment No - Post resuscitation assessment not needed       Will Not start Pressors- Levophed for MAP of 65  Source control achieved by: Broad spectrum antibiotics    - Infectious Disease consulted      HTN (hypertension)  Chronic, controlled. Latest blood pressure and vitals reviewed-     Temp:  [97.6 °F (36.4 °C)-99.4 °F (37.4 °C)]   Pulse:  []   Resp:  [16-18]   BP: ()/(54-65)   SpO2:  [92 %-98 %] .   Home meds for hypertension were reviewed and noted below.   Hypertension Medications               cloNIDine (CATAPRES) 0.1 MG tablet Take 1 tablet (0.1 mg total) by mouth every 12 (twelve) hours.          Plan:  - While in the hospital, will manage blood pressure as follows; Continue home antihypertensive regimen. Currently on Clonidine 0.1 PO BID  - Will utilize p.r.n. blood pressure medication only if patient's blood pressure greater than 180/110 and he develops symptoms such as worsening chest pain or shortness of breath.    Discitis of lumbosacral region  See Osteomyelitis for consolidated plan      Debility  Patient with Acute on chronic debility due to other reduced mobility. Latest AMPAC and GEMS scores " "have been reviewed. Evaluation for etiology is complete. Plan includes progressive mobility protocol initated, PT/OT consulted, and may need surgey .    Substance or medication-induced depressive disorder  - Continue Mirtazipine  - Hx of heroin, THC, cociane      Psoas abscess, left  L5-S1/S1 diskitis/osteomyelitis with abscess, diffuse infectious myositis of the bilateral lumbar paraspinal musculature and left iliopsoas muscle.  - see " Osteomeylitis" above      Heroin use disorder, severe  See " Drug Dependence"         VTE Risk Mitigation (From admission, onward)           Ordered     Place BRENNAN hose  Until discontinued         03/14/24 0730     IP VTE HIGH RISK PATIENT  Once         03/14/24 0730     Place sequential compression device  Until discontinued         03/14/24 0730                    Discharge Planning   SENA: 3/27/2024     Code Status: Full Code   Is the patient medically ready for discharge?: No    Reason for patient still in hospital (select all that apply): Consult recommendations  Discharge Plan A: Long-term acute care facility (LTAC)   Discharge Delays: None known at this time      Nagi Angel MD  Department of Hospital Medicine   [facility] - Neurosurgery ([facility])    "

## 2024-03-27 NOTE — PLAN OF CARE
Recommendations    1. Continue Regular diet with double portion      2. RD to monitor and follow    Goals: Meet % EEN, EPN by RD f/u  Nutrition Goal Status: new  Communication of RD Recs:  (POC)

## 2024-03-27 NOTE — PLAN OF CARE
Problem: Glycemic Control Impaired (Sepsis/Septic Shock)  Goal: Blood Glucose Level Within Desired Range  Outcome: Ongoing, Progressing     Problem: Infection Progression (Sepsis/Septic Shock)  Goal: Absence of Infection Signs and Symptoms  Outcome: Ongoing, Progressing     Problem: Fall Injury Risk  Goal: Absence of Fall and Fall-Related Injury  Outcome: Ongoing, Progressing

## 2024-03-27 NOTE — SUBJECTIVE & OBJECTIVE
Interval History: Appears has been afebrile now    Review of Systems  Objective:     Vital Signs (Most Recent):  Temp: 98.9 °F (37.2 °C) (03/27/24 1152)  Pulse: 96 (03/27/24 1152)  Resp: 17 (03/27/24 1205)  BP: (!) 160/119 (03/27/24 1152)  SpO2: 100 % (03/27/24 1152) Vital Signs (24h Range):  Temp:  [98 °F (36.7 °C)-100.3 °F (37.9 °C)] 98.9 °F (37.2 °C)  Pulse:  [81-96] 96  Resp:  [17-20] 17  SpO2:  [96 %-100 %] 100 %  BP: (106-160)/() 160/119     Weight: 78 kg (172 lb)  Body mass index is 24.68 kg/m².  No intake or output data in the 24 hours ending 03/27/24 1220      Physical Exam  Constitutional:       General: He is not in acute distress.     Appearance: Normal appearance. He is not toxic-appearing or diaphoretic.   HENT:      Head: Normocephalic and atraumatic.      Mouth/Throat:      Mouth: Mucous membranes are moist.      Pharynx: Oropharynx is clear.   Eyes:      Extraocular Movements: Extraocular movements intact.      Pupils: Pupils are equal, round, and reactive to light.   Cardiovascular:      Rate and Rhythm: Normal rate and regular rhythm.      Pulses: Normal pulses.   Pulmonary:      Effort: Pulmonary effort is normal. No respiratory distress.      Breath sounds: Normal breath sounds. No stridor. No wheezing, rhonchi or rales.   Chest:      Chest wall: No tenderness.   Abdominal:      General: Abdomen is flat. Bowel sounds are normal. There is no distension.      Palpations: Abdomen is soft.      Tenderness: There is no abdominal tenderness. There is no right CVA tenderness, left CVA tenderness, guarding or rebound.   Musculoskeletal:         General: No swelling, tenderness, deformity or signs of injury. Normal range of motion.      Cervical back: Normal range of motion and neck supple. No rigidity or tenderness.      Right lower leg: No edema.      Left lower leg: No edema.   Skin:     General: Skin is warm and dry.      Coloration: Skin is not jaundiced.      Findings: No erythema or rash.    Neurological:      General: No focal deficit present.      Mental Status: He is alert and oriented to person, place, and time. Mental status is at baseline.      Motor: No weakness.      Gait: Gait abnormal.             Significant Labs: All pertinent labs within the past 24 hours have been reviewed.    Significant Imaging: I have reviewed all pertinent imaging results/findings within the past 24 hours.

## 2024-03-27 NOTE — PLAN OF CARE
Spoke with Odilia ([facility]) [phone] - updated her that patient will be medically ready to return tomorrow as long as he remains afebrile - they will submit for auth

## 2024-03-27 NOTE — PT/OT/SLP PROGRESS
Physical Therapy Treatment    Patient Name:  Ton Donovan   MRN:  [id number]    Recommendations:     Discharge Recommendations: Low Intensity Therapy  Discharge Equipment Recommendations: rollator, shower chairBarriers to discharge: None    Assessment:     Ton Donovan is a 28 y.o. male admitted with a medical diagnosis of Osteomyelitis of vertebra of lumbosacral region.  He presents with the following impairments/functional limitations: weakness, impaired endurance, impaired sensation, impaired self care skills, impaired functional mobility, gait instability, impaired balance, decreased coordination, decreased upper extremity function, decreased lower extremity function, decreased safety awareness, impaired fine motor, orthopedic precautions requiring CG assistance and verbal cues for sit < > stand transitions and gait to prevent falls due to weakness, lateral sway of trunk, lumbar lordosis, decreased B knee control, post lean.   Pt remains motivated to participate in PT session and will cont to benefit from skilled PT intervention..  .    Rehab Prognosis: Good; patient would benefit from acute skilled PT services to address these deficits and reach maximum level of function.    Recent Surgery: * No surgery found *      Plan:     During this hospitalization, patient to be seen 3 x/week to address the identified rehab impairments via gait training, therapeutic activities, therapeutic exercises, neuromuscular re-education and progress toward the following goals:    Plan of Care Expires:  04/14/24    Subjective     Chief Complaint: B LE numbness  Pain/Comfort:  Pain Rating 1: 0/10  Pain Rating Post-Intervention 1: 0/10      Objective:     Communicated with nurse (Jairo) prior to session.  Patient found  in R side lying with HOB elevated  with  (no active lines, no family present) upon PT entry to room.     General Precautions: Standard, fall  Orthopedic Precautions: spinal precautions  Braces: LSO  Respiratory  Status: Room air     Functional Mobility:  Bed Mobility:     Scooting: anteriorly to the EOB with sup  Supine to Sit: mod I, HOB flat, with use of rail  Sit to Supine: mod I, HOB flat, with use of rail  Transfers:     Sit to Stand:  from EOB with CGA with no AD  Gait: no AD CGA for balance and safety 140ft and then 100ft x2 trials (multiple rest breaks in standing).  Pt demonstrates lumbar lordosis, lateral sway of trunk, post lean, decreased B knee control, no terminal knee ext during stance phase  Balance: sitting at the EOB with close sup (PTA assisted pt with donning of LSO while seated at the EOB)  Pt was instructed on how to perform pelvic tilts while sup in bed to improve postural control      AM-PAC 6 CLICK MOBILITY  Turning over in bed (including adjusting bedclothes, sheets and blankets)?: 4  Sitting down on and standing up from a chair with arms (e.g., wheelchair, bedside commode, etc.): 3  Moving from lying on back to sitting on the side of the bed?: 3  Moving to and from a bed to a chair (including a wheelchair)?: 3  Need to walk in hospital room?: 3  Climbing 3-5 steps with a railing?: 2  Basic Mobility Total Score: 18       Treatment & Education:  Patient provided with daily orientation and goals of this PT session. They were educated to call for assistance and to transfer with hospital staff only.  Also, pt was educated on the effects of prolonged immobility and the importance of performing OOB activity and exercises to promote healing and reduce recovery time    Patient left supine with call button in reach..    GOALS:   Multidisciplinary Problems       Physical Therapy Goals          Problem: Physical Therapy    Goal Priority Disciplines Outcome Goal Variances Interventions   Physical Therapy Goal     PT, PT/OT Ongoing, Progressing     Description: Goals to be met by: 04/15/24     Patient will increase functional independence with mobility by performin. Bed to chair transfer with  [place] using No Assistive Device  2. Gait  x 200 feet with Supervision using No Assistive Device.   3. Stand for 10 minutes with [place] using No Assistive Device demonstrating no swayback posture.   4. Increased functional strength to WFL for hip abduction, core strength.  5. Lower extremity exercise program x15 reps per handout, with independence                         Time Tracking:     PT Received On: 03/27/24  PT Start Time: 1446     PT Stop Time: 1509  PT Total Time (min): 23 min     Billable Minutes: Gait Training 15 and Therapeutic Activity 8    Treatment Type: Treatment  PT/PTA: PTA     Number of PTA visits since last PT visit: 1 03/27/2024

## 2024-03-27 NOTE — NURSING
Nurses Note -- 4 Eyes      3/27/2024   0700am      Skin assessed during: Q Shift Change      [] No Altered Skin Integrity Present    []Prevention Measures Documented      [x] Yes- Altered Skin Integrity Present or Discovered   [x] LDA Added if Not in Epic (Describe Wound)   [] New Altered Skin Integrity was Present on Admit and Documented in LDA   [] Wound Image Taken    Wound Care Consulted? No-surgery wound    Attending Nurse:  JIMY Gill    Second RN/Staff Member:  JIMY Montes

## 2024-03-27 NOTE — ASSESSMENT & PLAN NOTE
28M with hypertension, IV drug use (heroin) presenting from LTAC with ongoing fevers    He was recently admitted on 2/04/24 with osteomyelitis/discitis L5-S1, sacral and left illiac osteomyelitis, L5-S1 epidural abscess, and left iliopsoas abscess. He underwent L4-pelvic fusion on 2/14/24 with epidural phlegmon debridement, intraoperative cultures positive for MRSA. Infectious Disease was consulted and recommended IV Daptomycin for 8 week course (EOC 4/10/24) to be followed by oral suppressive doxycycline thereafter. He was discharged on 2/22/24 to [facility] to complete his antibiotic course. LTAC stay was complicated by ongoing fevers and he represented to [facility] for further evaluation prior to transfer to [facility] for neurosurgical evaluation. On arrival, WBC 9, ESR 59, CRP 46, AST 360s, ALT 200s. Procalcitonin was elevated at 1.18, lactate normal. MRI L spine showed continued L5-S1/S1 diskitis/osteomyelitis with abscess, diffuse infectious myositis of the bilateral lumbar paraspinal musculature and left iliopsoas muscle.  - Infectious Disease consulted, appreciate assistance and recommendations  - CT L spine without contrast noted  - Continue IV Daptomycin q24h  - Weekly CK level while on Daptomycin  - Multimodal pain control with tylenol, gabapentin, methocarbamol, oxy IR, oxy ER  - Follow blood cultures through maturity  - Daily CBC, CMP  - Cardiac monitor  - Neurosurgery Consult: he does not need surgery.  - pt [facility]  3/18 - ID final recs pending. Has Klebsiella in blood sens to everything.  Wound care consulted . pic line may have been source of infection    Repeat Ct c/a/p due to recurrent fevers  Infectious disease consulted.

## 2024-03-27 NOTE — ASSESSMENT & PLAN NOTE
I have reviewed hospital notes from   service and other specialty providers. I have also reviewed CBC, CMP/BMP,  cultures and imaging with my interpretation as documented.      28M with h/o IVDU, MRSA bacteremia and prior disseminated MRSA infection with poor adherence with care (history of leaving AMA), readmitted 02/04 with progressive back pain. Imaging significant for lumbosacral osteomyelitis with associated epidural abscess, osteomyelitis of sacrum & L iliac bone, probable septic arthritis of L SI joint & lower lumbar spine facet joints, and left iliopsoas abscess, abscess in superior rectal/presacral region and micro-abscesses in paraspinal muscles, pelvis, and left sacrum. Blood cultures remained negative then, and TTE negative. Underwent IR aspiration of left SI joint and psoas on 02/07.  Cxs +MRSA. Abx held prior to IR bx; post-procedure started on empiric Iv-vanc / CTX.   S/p lumbar-pelvis debridement with fusion (02/14). Surgical cxs +MRSA. Pt discharged to [facility], to complete 6wks Iv-Dapto 10mg/kg Q24h, BRENNAN 04/10/24. (Of note, switched vanc to IV daptomycin (d/t subtherapeutic vancomycin levels with q8hr dosing). Advised repeat imaging / neuro f/u near BRENNAN 04/10; followed by abx suppression thereafter, likely lifelong.     Pt presented 03/12 to [facility] [place] [facility] now d/t new onset of intermittent fevers (101.4F) at [facility] for past few days. On arrival, temp 99F, VSS, HDS, wbc nml, CRP 46, Cr 0.8.  CPK 39 nml, with climbing transaminitis found to have HCV+ (RNA 9-million), HBV Ag negative, HIV negative, U/S +hepatomegaly, GB wnl.  Bld cxs 03/12 NGTD.   Pt developed fever 03/14, repeat bld cxs 03/14 1 of 2 +Kleb aerogenes (panS).  Repeat Bld cxs 03/15 and 03/17 NGTD.  Picc line removed 03/16, with picc cath tip sent for cx.   -- Of note; UDS +MJ on arrival from [facility].  Suspect picc line may be source of new Kleb bacteremia (denies injecting at LT[facility]); vs. Lumbar surgical wound, vs. GI translocation in setting of  hepatomegaly w/ chronic HCV (RNA+ 9-million copies).    Repeat MRI L-spine / pelvis (03/13): Extensive epidural infectious/inflammatory change posterior to the L5-S1 and S1-2 disc spaces; with paravertebral / pre-vertebral abscess. Diffuse infectious myositis of the bilateral lower lumbar paraspinal musculature and within the left psoas and iliopsoas muscles, with 0.7 cm abscess (down from 2.3cm on prior 02/04). Persistent osteomyelitis and septic arthritis about the left SI joint. -- Per NSGY, no role for surgical intervention at this time, favoring wound care and continuing Iv-abx.     03/26/24 -- Continued intermittent fevers with fluctuating tachycardia; although fever curve appears to be reducing slowly, temps 99-100F. Wbc nml; CRP 26 (downtrend from 61), procalc 0.18.  Pt complains of TTP at Lumbar / sacrum area, and Left forarm. U/S venous Left forearm: shows occlusive thrombus of left basilic and cephalic veins. [2/2 peripheral IV at left hand?].   -- Ct-chest/A-P: showed instrumented fusion of L4-S1; with progression of erosive changes at the L5-S1 and S1-S2 disc spaces; with left sacroiliitis and S1 abscess (3 x 3 x 2 cm, anterior to S1).       Recommendations / Plan:  Continue oral Cipro 750mg po bid -- (pt previously was on Iv-cefepime for Kleb bacteremia, but switched to FQ in case ongoing fevers were drug related (beta-lactam). Otherwise, no obvious C/I to re-trialing Iv- cefepime if pt not tolerating oral cipro)  Continue Iv-Daptomycin 10mg/kg Q24h  [CPK 39].   Suspect intermittent fevers related to pre-sacral fluid collection anterior to S1 (3 x 3 x 2 cm), noted on repeat Ct-a/p (03/26). Of note, pt found to have Left forearm DVTs related to PIV, but w/o associated abscess or infection locally at left forearm at this time.   Previous BRENNAN for 6wks course for Iv-abx was 04/10; but because interval complications of Kleb bacteremia and S1 anterior fluid collection (location not amendable to neuro-IR  drain; too deep) -- favor extending Iv-Dapto and oral cipro until 05/01.   Prior to new BRENNAN 05/01, recommend repeat L-spine and pelvic imaging to re-assess for interval changes to of Lumbar osteo, myositis and associated multiple fluid collections of L-spine, pre-sacral (S1), and pelvis (iliopsoas).   If persistent fevers return prior to BRENNAN 05/01, recommend repeating this imaging earlier; especially if clinically relevant and no other obvious source of fever.  Once IV abx completed, anticipate indefinite suppression with Doxycyline 100mg po bid     -- ID will schedule pt for EOC f/u appt ~05/01. ID at [facility] to follow in interim.   -- Discussed with ID staff and primary team   -- ID will sign-off at this time.       Outpatient Antibiotic Therapy Plan:    Please send referral to [facility].    1) Infection :   L-spine osteo-discitis with epidural, para-spinal, iliopsoas and S1 abscess (+MRSA); c/b Kleb complicated bacteremia (suspect picc line source).     2) Discharge Antibiotics:     Intravenous antibiotics:  IV - Daptomycin 10mg/kg Q24h    Oral antibiotics:  Oral Cipro 750mg BID    3) Therapy Duration:    ~6wks s/p clrd bld cxs 03/16 (complicated bacteremia; c/f for possible seeding of spinal fluid collections w/ incomplete source control)    Estimated end date of IV antibiotics:  05/01/24    4) Outpatient Weekly Labs:    Order the following labs to be drawn on Mondays:   CBC  CMP   CRP  CPK (when on Daptomycin)    5) Fax Lab Results to Infectious Diseases Provider:  Elpidio Paredes PA-C    [facility] Fax Number: [phone]    6) Outpatient Infectious Diseases Follow-up    Follow-up appointment will be arranged by the ID clinic and will be found in the patient's appointments tab.    Prior to discharge, please ensure the patient's follow-up appt has been scheduled with ID-Clinic -- for patient convenience and continuity of care.  If there is still no follow-up scheduled  prior to discharge, please send an EPIC message to  Carol Gann LPN  in Infectious Diseases.

## 2024-03-27 NOTE — PROGRESS NOTES
"Aleksandr Bray - Neurosurgery ([facility])  Adult Nutrition  Progress Note    SUMMARY       Recommendations    1. Continue Regular diet with double portion      2. RD to monitor and follow    Goals: Meet % EEN, EPN by RD f/u  Nutrition Goal Status: new  Communication of RD Recs:  (POC)    Assessment and Plan    Nutrition Problem  Increased nutrient needs     Related to (etiology):   Increased demand for nutrient due to wound healing/ prolonged catabolic illness/ chronic infection    Signs and Symptoms (as evidenced by):   osteomyelitis    Interventions/Recommendations (treatment strategy):  Collaboration with other providers    Nutrition Diagnosis Status:   New     Reason for Assessment    Reason For Assessment: length of stay  Diagnosis:  (Osteomyelitis of vertebra of lumbosacral region)  Relevant Medical History: heroin use disorder, HTN, severe sepsis, hepatitis, drug dependence, DVT  Interdisciplinary Rounds: did not attend    General Information Comments:   LOS 13 days. Pt with hx of HTN, IV drug use present for fevers, ongoing back pain. Pt reports good appetite. Tolerating 100% of meals per chart. On double portion diet. Denies N/V/D/C, chewing/swallowing difficulties. Wt stable. Pt appears nourished, no indicator of malnutrition.     Nutrition Discharge Planning: Adequate PO intake    Nutrition Risk Screen    Nutrition Risk Screen: no indicators present    Nutrition/Diet History    Spiritual, Cultural Beliefs, Advent Practices, Values that Affect Care: no    Anthropometrics    Temp: 98.9 °F (37.2 °C)  Height: 5' 10" (177.8 cm)  Height (inches): 70 in  Weight Method: Bed Scale  Weight: 78 kg (172 lb)  Weight (lb): 172 lb  Ideal Body Weight (IBW), Male: 166 lb  % Ideal Body Weight, Male (lb): 103.61 %  BMI (Calculated): 24.7       Lab/Procedures/Meds    Pertinent Labs Reviewed: reviewed  Pertinent Labs Comments: CRP 26.6, Na 133, glucose 119, albumin 2.8, , ,   Pertinent Medications " Reviewed: reviewed  Pertinent Medications Comments: .    Physical Findings/Assessment         Estimated/Assessed Needs    Weight Used For Calorie Calculations: 78 kg (171 lb 15.3 oz)  Energy Calorie Requirements (kcal): 6690-3772 kcal  Energy Need Method: Kcal/kg (25-30)  Protein Requirements: 94g  (1.2-g/kg )  Weight Used For Protein Calculations: 78 kg (171 lb 15.3 oz)  Fluid Requirements (mL): 1ml/kcal or per MD  Estimated Fluid Requirement Method: RDA Method  RDA Method (mL): 1950         Nutrition Prescription Ordered    Current Diet Order: Regular, double portions    Evaluation of Received Nutrient/Fluid Intake    I/O: - 3.4 L since admit  Energy Calories Required: meeting needs  Protein Required: meeting needs  Tolerance: tolerating  % Intake of Estimated Energy Needs: 75 - 100 %  % Meal Intake: 75 - 100 %    Nutrition Risk    Level of Risk/Frequency of Follow-up:  (1x/week)     Monitor and Evaluation    Food and Nutrient Intake: energy intake, food and beverage intake  Food and Nutrient Adminstration: diet order  Knowledge/Beliefs/Attitudes: food and nutrition knowledge/skill  Physical Activity and Function: nutrition-related ADLs and IADLs  Anthropometric Measurements: weight change, height/length, weight, body mass index  Biochemical Data, Medical Tests and Procedures: electrolyte and renal panel, glucose/endocrine profile, gastrointestinal profile, inflammatory profile, lipid profile  Nutrition-Focused Physical Findings: overall appearance     Nutrition Follow-Up    RD Follow-up?: Yes

## 2024-03-27 NOTE — SUBJECTIVE & OBJECTIVE
Interval History: intermittent fevers, seems to be improving some. Wbc nml, crp 25 (down from 35, and 60). Pt otherwise clinically stable. Suspect spinal abscesses source of fevers. Neuro-IR unable to drain pre-sacral fluid collection.     Review of Systems   Respiratory:  Negative for cough and shortness of breath.    Cardiovascular:  Negative for leg swelling.   Gastrointestinal:  Negative for abdominal distention, abdominal pain, diarrhea, nausea and vomiting.   Musculoskeletal:  Positive for back pain and myalgias.   Skin:  Positive for wound.   All other systems reviewed and are negative.      Objective:     Vital Signs (Most Recent):  Temp: 98.9 °F (37.2 °C) (03/27/24 1152)  Pulse: 91 (03/27/24 1307)  Resp: 17 (03/27/24 1205)  BP: 128/67 (03/27/24 1307)  SpO2: 100 % (03/27/24 1300) Vital Signs (24h Range):  Temp:  [98 °F (36.7 °C)-100.3 °F (37.9 °C)] 98.9 °F (37.2 °C)  Pulse:  [81-96] 91  Resp:  [17-20] 17  SpO2:  [96 %-100 %] 100 %  BP: (106-160)/() 128/67     Weight: 78 kg (172 lb)  Body mass index is 24.68 kg/m².    Estimated Creatinine Clearance: 159.3 mL/min (based on SCr of 0.7 mg/dL).     Physical Exam  Vitals and nursing note reviewed.   Constitutional:       General: He is not in acute distress.     Appearance: He is not ill-appearing.   HENT:      Nose: No congestion.      Mouth/Throat:      Comments: Poor dentition  Eyes:      General: No scleral icterus.     Conjunctiva/sclera: Conjunctivae normal.   Cardiovascular:      Rate and Rhythm: Normal rate.      Heart sounds: Normal heart sounds. No murmur heard.  Pulmonary:      Effort: No respiratory distress.      Breath sounds: Normal breath sounds.   Abdominal:      General: Bowel sounds are normal. There is no distension.      Palpations: Abdomen is soft.   Musculoskeletal:         General: Swelling and tenderness present.      Right lower leg: No edema.      Left lower leg: No edema.      Comments: Rt forearm thrombophlebitis.   Skin:      Findings: Erythema and lesion present.      Comments: L-spine wound (superficial - subQ depth) no active drainage; mild surrounding TTP, but no gross fluctuance or erythema.    Neurological:      Mental Status: He is oriented to person, place, and time. Mental status is at baseline.   Psychiatric:         Behavior: Behavior normal.         Thought Content: Thought content normal.          Significant Labs: Blood Culture:   Recent Labs   Lab 03/15/24  1842 03/15/24  1843 03/17/24  0959 03/22/24  1050 03/22/24  1051   LABBLOO No growth after 5 days. No growth after 5 days. No growth after 5 days. No growth after 5 days. No growth after 5 days.       CBC:   Recent Labs   Lab 03/26/24  0853   WBC 6.41   HGB 8.7*   HCT 27.5*          CMP:   Recent Labs   Lab 03/26/24  0853   *   K 4.6      CO2 27   *   BUN 13   CREATININE 0.7   CALCIUM 9.2   PROT 7.5   ALBUMIN 2.8*   BILITOT 0.6   ALKPHOS 246*   *   *   ANIONGAP 6*       Microbiology Results (last 7 days)       Procedure Component Value Units Date/Time    Blood culture [[id number]] Collected: 03/22/24 1051    Order Status: Completed Specimen: Blood Updated: 03/27/24 1212     Blood Culture, Routine No growth after 5 days.    Blood culture [[id number]] Collected: 03/22/24 1050    Order Status: Completed Specimen: Blood Updated: 03/27/24 1212     Blood Culture, Routine No growth after 5 days.    Blood culture [[id number]] Collected: 03/17/24 0959    Order Status: Completed Specimen: Blood from Peripheral, Antecubital, Left Updated: 03/22/24 1612     Blood Culture, Routine No growth after 5 days.    Blood culture [[id number]]     Order Status: Completed Specimen: Blood     Blood culture [[id number]] Collected: 03/15/24 1842    Order Status: Completed Specimen: Blood Updated: 03/20/24 2022     Blood Culture, Routine No growth after 5 days.    Narrative:      Please obtain blood cultures x2 from two different sites, and  at-least 30mins  apart. Pls and thank you.    Blood culture [[id number]] Collected: 03/15/24 1843    Order Status: Completed Specimen: Blood Updated: 03/20/24 2022     Blood Culture, Routine No growth after 5 days.    Narrative:      Please obtain blood cultures x2 from two different sites, and  at-least 30mins apart. Pls and thank you.          Wound Culture:   Recent Labs   Lab 11/21/23  1041 02/07/24  1412 02/07/24  1423 02/14/24  1612 02/14/24  1712   LABAERO METHICILLIN RESISTANT STAPHYLOCOCCUS AUREUS  Moderate  No other significant isolate  * No growth METHICILLIN RESISTANT STAPHYLOCOCCUS AUREUS  Rare  * METHICILLIN RESISTANT STAPHYLOCOCCUS AUREUS  Few  * METHICILLIN RESISTANT STAPHYLOCOCCUS AUREUS  Few  *       All pertinent labs within the past 24 hours have been reviewed.    Significant Imaging: I have reviewed all pertinent imaging results/findings within the past 24 hours.    I have personally reviewed records / hospital notes from   service and other specialty providers. I have also reviewed CBC, CMP/BMP,  cultures and imaging with my interpretation as documented in my assessment / plan.    Patient is high risk for infectious complications given pt's age, multiple co-morbidities, and case complexity.      Time: 50 minutes   50% of time spent on face-to-face counseling and coordination of care. Counseling included review of test results, diagnosis, and treatment plan with patient and/or family.

## 2024-03-27 NOTE — CARE UPDATE
I have reviewed the chart of Ton Donovan who is hospitalized for the following:    Active Hospital Problems    Diagnosis    *Osteomyelitis of vertebra of lumbosacral region    Acute DVT (deep venous thrombosis)     Occlusive thrombus in the left basilic and cephalic veins.       Constipation     On miralax and senna      Hepatitis C     HCV +ve       Severe sepsis    Hepatitis    Drug dependence     Drug screen + thc and opioids  Hx iv drug use      Hyponatremia     Monitor with labs      Myositis of multiple sites     Diffuse infectious myositis of the bilateral lower lumbar paraspinal musculature and within the left psoas and iliopsoas muscles       Septic arthritis     septic arthritis about the left SI joint.       Chronic diastolic heart failure     There is diastolic dysfunction but grade cannot be determined.       HTN (hypertension)    Discitis of lumbosacral region     See primary problem      Debility    Pain     Prn oxy        Sacroiliitis    Substance or medication-induced depressive disorder    Psoas abscess, left    Heroin use disorder, severe          Harleen Cecy, NP  Unit Based [facility]

## 2024-03-27 NOTE — PT/OT/SLP PROGRESS
Occupational Therapy   Treatment    Name: Ton Donovan  MRN: [id number]  Admitting Diagnosis:  Osteomyelitis of vertebra of lumbosacral region       Recommendations:     Discharge Recommendations: Low Intensity Therapy  Discharge Equipment Recommendations:  shower chair, rollator  Barriers to discharge:  Inaccessible home environment    Assessment:     Ton Donovan is a 28 y.o. male with a medical diagnosis of Osteomyelitis of vertebra of lumbosacral region.  He presents with the following performance deficits affecting function are weakness, impaired endurance, impaired functional mobility, impaired self care skills, impaired balance, gait instability, decreased upper extremity function, decreased lower extremity function, decreased safety awareness, decreased coordination, impaired coordination, decreased ROM, impaired fine motor. Patient participated in LB shower with focus on ADL re-training and is progressing well with OT intervention.  Patient continues to demonstrate the need for low intensity therapy on a scheduled basis exhibited by decreased independence with self-care and functional mobility    Rehab Prognosis:  Good; patient would benefit from acute skilled OT services to address these deficits and reach maximum level of function.       Plan:     Patient to be seen 3 x/week to address the above listed problems via self-care/home management, therapeutic activities, therapeutic exercises, neuromuscular re-education  Plan of Care Expires: 04/15/24  Plan of Care Reviewed with: patient    Subjective     Chief Complaint: patient c/o compromised skin B feet; nurse notified  Patient/Family Comments/goals: patient agreeable to therapy  Pain/Comfort:  Pain Rating 1: 0/10  Pain Rating Post-Intervention 1: 0/10    Objective:     Communicated with: nurse deepak prior to session.  Patient found right sidelying with  (no active lines) upon OT entry to room.    General Precautions: Standard, fall    Orthopedic  Precautions:spinal precautions  Braces: LSO  Respiratory Status: Room air     Occupational Performance:     Bed Mobility:    Patient completed Supine to Sit with independence and HOB flat with no bed rail via log roll technique      Functional Mobility/Transfers:  Patient completed Sit <> Stand Transfer with modified independence  with  no assistive device   Patient completed  Shower Transfer Step Transfer technique with stand by assistance with grab bars and shower bench  Functional Mobility: to/from bathroom 12ft x2 with Mod(I), no AD    Activities of Daily Living:  Grooming: independence facial and hand hygiene standing sink side (patient retrieved items and took to bathroom)  Bathing: stand by assistance LB shower seated on bath bench and grab bar via fig 4 technique  Upper Body Dressing: independence doff and don clean gown; donned LSO seated EOB  Lower Body Dressing: stand by assistance doffed old socks and don clean B socks seated via fig 4 technique  Toileting: independence manage gown and void in standing      [facility] 6 Click ADL: 23    Treatment & Education:  Patient edu on OT POC, goals, and current progress. Addressed all patient questions/concerns within GARCIA scope of practice.     Patient left sitting edge of bed with all lines intact, call button in reach, and nurse notified    GOALS:   Multidisciplinary Problems       Occupational Therapy Goals          Problem: Occupational Therapy    Goal Priority Disciplines Outcome Interventions   Occupational Therapy Goal     OT, PT/OT Ongoing, Progressing    Description: Goals to be met by: 4/5/24     Patient will increase functional independence with ADLs by performing:    UE Dressing with [place]. - Met 3/22  LE Dressing with [place].  Grooming while standing at sink with [place].  Toileting from toilet with [place] for hygiene and clothing management.   Toilet transfer to toilet with [place].                         Time Tracking:      OT Date of Treatment: 03/27/24  OT Start Time: 1050  OT Stop Time: 1144  OT Total Time (min): 54 min    Billable Minutes:Self Care/Home Management 54    OT/ASHLEY: ASHLEY     Number of ASHLEY visits since last OT visit: 1    3/27/2024

## 2024-03-28 PROBLEM — I82.890 SUPERFICIAL VEIN THROMBOSIS: Status: ACTIVE | Noted: 2024-03-22

## 2024-03-28 PROBLEM — R50.9 FEVER: Status: ACTIVE | Noted: 2024-03-28

## 2024-03-28 LAB
ACID FAST MOD KINY STN SPEC: NORMAL
MYCOBACTERIUM SPEC QL CULT: NORMAL

## 2024-03-28 PROCEDURE — 63600175 PHARM REV CODE 636 W HCPCS

## 2024-03-28 PROCEDURE — 25000003 PHARM REV CODE 250

## 2024-03-28 PROCEDURE — A4216 STERILE WATER/SALINE, 10 ML: HCPCS | Performed by: HOSPITALIST

## 2024-03-28 PROCEDURE — 25000003 PHARM REV CODE 250: Performed by: HOSPITALIST

## 2024-03-28 PROCEDURE — 25000003 PHARM REV CODE 250: Performed by: PHYSICIAN ASSISTANT

## 2024-03-28 PROCEDURE — 25000003 PHARM REV CODE 250: Performed by: STUDENT IN AN ORGANIZED HEALTH CARE EDUCATION/TRAINING PROGRAM

## 2024-03-28 PROCEDURE — 11000001 HC ACUTE MED/SURG PRIVATE ROOM

## 2024-03-28 RX ORDER — CIPROFLOXACIN 750 MG/1
750 TABLET, FILM COATED ORAL EVERY 12 HOURS
Start: 2024-03-28 | End: 2024-05-10

## 2024-03-28 RX ORDER — CIPROFLOXACIN 750 MG/1
750 TABLET, FILM COATED ORAL EVERY 12 HOURS
Status: CANCELLED | OUTPATIENT
Start: 2024-03-28 | End: 2024-03-30

## 2024-03-28 RX ADMIN — CLONIDINE HYDROCHLORIDE 0.1 MG: 0.1 TABLET ORAL at 09:03

## 2024-03-28 RX ADMIN — DAPTOMYCIN 780 MG: 350 INJECTION, POWDER, LYOPHILIZED, FOR SOLUTION INTRAVENOUS at 09:03

## 2024-03-28 RX ADMIN — OXYCODONE HYDROCHLORIDE 15 MG: 10 TABLET ORAL at 10:03

## 2024-03-28 RX ADMIN — Medication 10 ML: at 06:03

## 2024-03-28 RX ADMIN — CIPROFLOXACIN 750 MG: 750 TABLET, FILM COATED ORAL at 09:03

## 2024-03-28 RX ADMIN — MIRTAZAPINE 15 MG: 15 TABLET, FILM COATED ORAL at 09:03

## 2024-03-28 RX ADMIN — METHOCARBAMOL 500 MG: 500 TABLET ORAL at 09:03

## 2024-03-28 RX ADMIN — METHOCARBAMOL 500 MG: 500 TABLET ORAL at 01:03

## 2024-03-28 RX ADMIN — OXYCODONE HYDROCHLORIDE 15 MG: 10 TABLET ORAL at 05:03

## 2024-03-28 RX ADMIN — Medication 10 ML: at 12:03

## 2024-03-28 RX ADMIN — OXYCODONE HYDROCHLORIDE 15 MG: 10 TABLET ORAL at 04:03

## 2024-03-28 RX ADMIN — OXYCODONE HYDROCHLORIDE 10 MG: 10 TABLET, FILM COATED, EXTENDED RELEASE ORAL at 09:03

## 2024-03-28 RX ADMIN — Medication 10 ML: at 11:03

## 2024-03-28 RX ADMIN — OXYCODONE HYDROCHLORIDE 15 MG: 10 TABLET ORAL at 12:03

## 2024-03-28 RX ADMIN — ACETAMINOPHEN 650 MG: 325 TABLET ORAL at 01:03

## 2024-03-28 RX ADMIN — METHOCARBAMOL 500 MG: 500 TABLET ORAL at 05:03

## 2024-03-28 RX ADMIN — DOCUSATE SODIUM AND SENNOSIDES 2 TABLET: 8.6; 5 TABLET, FILM COATED ORAL at 09:03

## 2024-03-28 NOTE — ASSESSMENT & PLAN NOTE
Chronic, controlled. Latest blood pressure and vitals reviewed-     Temp:  [98 °F (36.7 °C)-101.7 °F (38.7 °C)]   Pulse:  [65-96]   Resp:  [17-19]   BP: (113-160)/()   SpO2:  [96 %-100 %] .   Home meds for hypertension were reviewed and noted below.   Hypertension Medications               cloNIDine (CATAPRES) 0.1 MG tablet Take 1 tablet (0.1 mg total) by mouth every 12 (twelve) hours.          Plan:  - While in the hospital, will manage blood pressure as follows; Continue home antihypertensive regimen. Currently on Clonidine 0.1 PO BID  - Will utilize p.r.n. blood pressure medication only if patient's blood pressure greater than 180/110 and he develops symptoms such as worsening chest pain or shortness of breath.

## 2024-03-28 NOTE — PROGRESS NOTES
Aleksandr Bray - Neurosurgery ([facility])  Wound Care    Patient Name:  Ton Donovan   MRN:  [id number]  Date: 3/28/2024  Diagnosis: Osteomyelitis of vertebra of lumbosacral region    History:     Past Medical History:   Diagnosis Date    Hypertension     Unilateral inguinal hernia, without obstruction or gangrene, not specified as recurrent        Social History     Socioeconomic History    Marital status: Single   Tobacco Use    Smoking status: Former     Types: Cigarettes    Smokeless tobacco: Never   Substance and Sexual Activity    Alcohol use: No    Drug use: Yes     Types: Marijuana, IV     Comment: 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     Social Determinants of Health     Financial Resource Strain: High Risk (3/15/2024)    Overall Financial Resource Strain (CARDIA)     Difficulty of Paying Living Expenses: Very hard   Food Insecurity: Food Insecurity Present (3/15/2024)    Hunger Vital Sign     Worried About Running Out of Food in the Last Year: Often true     Ran Out of Food in the Last Year: Often true   Transportation Needs: Unmet Transportation Needs (3/15/2024)    PRAPARE - Transportation     Lack of Transportation (Medical): Yes     Lack of Transportation (Non-Medical): Yes   Physical Activity: Inactive (3/14/2024)    Exercise Vital Sign     Days of Exercise per Week: 0 days     Minutes of Exercise per Session: 0 min   Stress: Stress Concern Present (3/15/2024)    [employer] [place] [employer] - Occupational Stress Questionnaire     Feeling of Stress : Very much   Social Connections: Socially Isolated (11/21/2023)    Social Connection and Isolation Panel [NHANES]     Frequency of Communication with Friends and Family: Never     Frequency of Social Gatherings with Friends and Family: Never     Attends Restorationist Services: Never     Active Member of Clubs or Organizations: No     Attends Club or Organization Meetings: Never     Marital Status: Never    Housing Stability: High Risk (3/15/2024)     Housing Stability Vital Sign     Unable to Pay for Housing in the Last Year: Yes     Number of Places Lived in the Last Year: 0     Unstable Housing in the Last Year: Yes       Precautions:     Allergies as of 03/13/2024    (No Known Allergies)       WO Assessment Details/Treatment     Patient seen for wound care consultation.   Reviewed chart for this encounter.   See Flow Sheet for findings.      RECOMMENDATIONS:Patient is Aox4 and agreeable to inpatient wound care. Follow up for lower back surgical incision. Wound is clean and dry with no odor or drainage. Pink tissue noted with no slough at wound bed. Continue current wound care orders. No other issues or concerns at this time. Will follow up 4/4/2024 or sooner if needed.    Discussed POC with patient and primary nurse.   See EMR for orders & patient education.    Discussed nutrition and the role of protein in wound healing with the patient. Instructed patient to optimize protein for wound healing.    Bedside nursing to continue care & monitoring.  Bedside nursing to maintain pressure injury prevention interventions.            03/28/24 1020   WOCN Assessment   WOCN Total Time (mins) 30   Visit Date 03/28/24   Visit Time 1020   Consult Type Follow Up   Intervention assessed;changed;applied;chart review;coordination of care;orders   Teaching on-going        Incision/Site 02/14/24 1545 Lumbar spine posterior;midline vertical   Date First Assessed/Time First Assessed: 02/14/24 1545   Present Prior to Hospital Arrival?: No  Location: Lumbar spine  Orientation: posterior;midline  Incision Type: vertical  Closure Method: Sutures;Other (see comments);Staples  Additional Comments...   Wound Image    Incision WDL WDL   Dressing Appearance Dry;Intact;Clean   Drainage Amount None   Drainage Characteristics/Odor No odor   Appearance Pink   Care Cleansed with:;Antimicrobial agent   Dressing Applied;Silver;Foam   Dressing Change Due 03/28/24       Orders placed.   Carlo  Pradip AHN  03/28/2024

## 2024-03-28 NOTE — PT/OT/SLP PROGRESS
"Physical Therapy      Patient Name:  Ton Donovan   MRN:  [id number]    Patient not seen today secondary to Other (Comment) (2 attempts for tx session: 1. pt found sleeping in bed and requesting for PTA to return at a later time (10:37 am); 2. Pt declines session stating "I don't feel well" (2:08 pm)). Will follow-up on next scheduled visit.    "

## 2024-03-28 NOTE — ASSESSMENT & PLAN NOTE
"  This patient does have evidence of infective focus - continued L5-S1/S1 diskitis/osteomyelitis with abscess, diffuse infectious myositis of the bilateral lumbar paraspinal musculature and left iliopsoas muscle.  My overall impression is sepsis.  Source: Skin and Soft Tissue (location epidurial)  Antibiotics given-   Antibiotics (72h ago, onward)      Start     Stop Route Frequency Ordered    03/24/24 2100  ciprofloxacin HCl tablet 750 mg         -- Oral Every 12 hours 03/24/24 1713    03/14/24 0900  DAPTOmycin (CUBICIN) 780 mg in sodium chloride 0.9% SolP 50 mL IVPB         -- IV Every 24 hours (non-standard times) 03/14/24 0755          Latest lactate reviewed-  No results for input(s): "LACTATE", "POCLAC" in the last 72 hours.    Organ dysfunction indicated by Acute liver injury    Fluid challenge Not needed - patient is not hypotensive      Post- resuscitation assessment No - Post resuscitation assessment not needed       Will Not start Pressors- Levophed for MAP of 65  Source control achieved by: Broad spectrum antibiotics    - Infectious Disease consulted  See above    EXPECT Fever to be persistent for weeks.  - need to evaluate with other SIRS criteria    "

## 2024-03-28 NOTE — PROGRESS NOTES
Aleksandr Bray - Neurosurgery ([facility])  [facility] Medicine  Progress Note    Patient Name: Ton Donovan  MRN: [id number]  Patient Class: IP- Inpatient   Admission Date: 3/14/2024  Length of Stay: 14 days  Attending Physician: Kimber Lewis MD  Primary Care Provider: Steve Kelly DO        Subjective:     Principal Problem:Osteomyelitis of vertebra of lumbosacral region        HPI:  Mr. Ton Donovan is a 28 year-old Male with a PMHx of Hypertension, IV drug use who presented with fevers and ongoing back pain with radiation down legs.    Of note, he was recently admitted to [facility] for LOOP X lumbar spinal fusion, with course complicated by a psoas abscess and MRSA epidural abscess s/p epidural phlegmon debridement. He was started on IV Daptomycin and discharged on 2/22/24 to [facility] for completion of antibiotics. Ongoing fevers complicated his LTAC stay, and he returned to the [facility] [facility] for further evaluation. On arrival, WBC 9, ESR 59, CRP 46, AST 360s, ALT 200s. Procalcitonin was elevated at 1.18, lactate normal. MRI L spine concerning for continued L5-S1/S1 diskitis and osteomyelitis with abscess, diffuse infectious myositis of the lumbar paraspinal musculature bilaterally and left iliopsoas muscle without discrete abscess. He was then transferred to [facility] for Neurosurgerical evaluation.     At the time of this consult, temp 99F, HR 60, 120s/60s, saturating well on room air. Most recent labs with WBC 5, Hgb 9, Na 134.    Medicine consulted for medical co-management of epidural abscess    Overview/Hospital Course:  3/15-Admitted for NSGY evaluation of OM & discitis lumbar spine. Will let pt eat.   3/16- Does not need surgery. Appreciate NSGY eval. Wound care and ID following and need final recs. Pt is comfortable.   3/17- Klebsiella sens to everything,  On dapto and yue.  Pic line removed and culture tip was ordered, but not done.   3/18 per ID: Continue  Iv-cefepime 2g Q8h. (Bld cx +kleb  aerogenes panS) & Continue Iv-Daptomycin 10mg/kg Q24h.  repeat bld cxs 03/15 and 03/17 (NGTD); picc cath tip culture was not sent down properly  Anticipate continuing both Iv-dapto and cefepime for remaining 2-3wks of full 6wk duration of Iv-abx(BRENNAN 04/10) for lumbar osteo-discitis with paravertebral / psoas myositis and sub centimeter abscesses   Will need new PIc line or mid line.  Waiting for cultures to be neg to ensure therapeutic window without bacteremia and presence of a line.   - IR consulted to evaluate if the fluid collection at posterior to L5-s1 which communicates with disc space to see if that can be drained.  IR, Dr. Pretty reviewed this pt's case. He says no discrete fluid collection seen within the paraspinal muscles on either the CT or the MRI, it appears he has swollen, infected paraspinal muscles more consistent with myositis. He may have some fluid in the disc space which we recommend reaching out to neuro IR - No fluid to be drained, infection limited to myositis.    3/19- Per Neuro IR, abscess is very deep to reach by IR means so it's not amenable to drain. Repeat cultures are neg.   3/20 replace pic and plan for LTAC.   ID final recs:   Continue Iv-cefepime 2g Q8h. (Bld cx +kleb aerogenes panS) x 2 weeks from 1st negative blood culture 3/15  Continue Iv-Daptomycin 10mg/kg Q24h  [CPK 39]. To complete 6 weeks  OK to replace PICC  Rec indefinite suppression with Doxycyline 100mg po bid once IV abx completed.  fu upon dc from LTAC  Pic line placed  No growth on repeat cultures. No leukocytosis  Ultrasound with thromboses basilic and cephalic veins. Patient reports arm swelling and discomfort improving.  Patient had recurrent fevers. ID changed cefepime to ciprofloxacin for concern drug fevers. Repeated CT C/A/P.     Interval history:  3/28- appreciate ID recs:  Recommendations / Plan:  Continue oral Cipro 750mg po bid -  ( Use cefepime if pt not tolerating oral cipro)  Continue Iv-Daptomycin  10mg/kg Q24h  [CPK 39].   Suspect intermittent fevers related to pre-sacral fluid collection anterior to S1 (3 x 3 x 2 cm), noted on repeat Ct-a/p (03/26). Of note, pt found to have Left forearm DVTs related to PIV, but w/o associated abscess or infection locally at left forearm at this time.   New TEDIv-Dapto and oral cipro until 05/01. Abx duration ultimately until fluid collections near resolved.   Prior to new BRENNAN 05/01, recommend repeat L-spine and pelvic imaging to re-assess for interval changes to of Lumbar osteo, myositis and associated multiple fluid collections of L-spine, pre-sacral (S1), and pelvis (iliopsoas).   If persistent fevers return prior to BRENNAN 05/01, recommend repeating this imaging earlier; especially if clinically relevant and no other obvious source of fever.  Once IV abx completed, anticipate indefinite suppression with Doxycyline 100mg po bid   EXPECT Fever to be persistent for weeks.      Interval History: see above    Review of Systems   Constitutional:  Positive for activity change. Negative for appetite change and fever.   HENT:  Negative for trouble swallowing.    Respiratory:  Negative for shortness of breath.    Cardiovascular:  Negative for chest pain.   Gastrointestinal:  Positive for constipation. Negative for abdominal pain, diarrhea and nausea.   Genitourinary:  Negative for difficulty urinating and frequency.   Musculoskeletal:  Positive for arthralgias, back pain and gait problem. Negative for neck stiffness.   Neurological:  Negative for headaches.   Psychiatric/Behavioral:  Negative for behavioral problems.      Objective:     Vital Signs (Most Recent):  Temp: 98.5 °F (36.9 °C) (03/28/24 0725)  Pulse: 69 (03/28/24 0725)  Resp: 18 (03/28/24 0725)  BP: 127/60 (03/28/24 0725)  SpO2: 98 % (03/28/24 0725) Vital Signs (24h Range):  Temp:  [98 °F (36.7 °C)-101.7 °F (38.7 °C)] 98.5 °F (36.9 °C)  Pulse:  [65-96] 69  Resp:  [17-19] 18  SpO2:  [96 %-100 %] 98 %  BP: (113-160)/()  127/60     Weight: 78 kg (172 lb)  Body mass index is 24.68 kg/m².  No intake or output data in the 24 hours ending 03/28/24 0800        Physical Exam  Constitutional:       General: He is not in acute distress.     Appearance: Normal appearance. He is ill-appearing. He is not toxic-appearing or diaphoretic.   HENT:      Head: Normocephalic and atraumatic.      Nose: Nose normal.      Mouth/Throat:      Mouth: Mucous membranes are moist.      Pharynx: Oropharynx is clear.   Eyes:      General: No scleral icterus.     Extraocular Movements: Extraocular movements intact.      Conjunctiva/sclera: Conjunctivae normal.      Pupils: Pupils are equal, round, and reactive to light.   Cardiovascular:      Rate and Rhythm: Normal rate and regular rhythm.      Pulses: Normal pulses.   Pulmonary:      Effort: Pulmonary effort is normal. No respiratory distress.      Breath sounds: Normal breath sounds. No stridor. No wheezing, rhonchi or rales.   Chest:      Chest wall: No tenderness.   Abdominal:      General: Abdomen is flat. Bowel sounds are normal. There is no distension.      Palpations: Abdomen is soft.      Tenderness: There is no abdominal tenderness. There is no right CVA tenderness, left CVA tenderness, guarding or rebound.   Musculoskeletal:         General: No swelling, tenderness, deformity or signs of injury. Normal range of motion.      Cervical back: Normal range of motion and neck supple. No rigidity or tenderness.      Right lower leg: No edema.      Left lower leg: No edema.   Skin:     General: Skin is warm and dry.      Coloration: Skin is not jaundiced.      Findings: No erythema or rash.   Neurological:      General: No focal deficit present.      Mental Status: He is alert and oriented to person, place, and time. Mental status is at baseline.      Motor: No weakness.      Gait: Gait abnormal.   Psychiatric:         Mood and Affect: Mood normal.         Behavior: Behavior normal.         Thought Content:  Thought content normal.         Judgment: Judgment normal.             Significant Labs: All pertinent labs within the past 24 hours have been reviewed.  CBC:   Recent Labs   Lab 03/26/24  0853   WBC 6.41   HGB 8.7*   HCT 27.5*          CMP:   Recent Labs   Lab 03/26/24  0853   *   K 4.6      CO2 27   *   BUN 13   CREATININE 0.7   CALCIUM 9.2   PROT 7.5   ALBUMIN 2.8*   BILITOT 0.6   ALKPHOS 246*   *   *   ANIONGAP 6*         Significant Imaging: I have reviewed all pertinent imaging results/findings within the past 24 hours.    Assessment/Plan:      * Osteomyelitis of vertebra of lumbosacral region  28M with hypertension, IV drug use (heroin) presenting from LTAC with ongoing fevers    He was recently admitted on 2/04/24 with osteomyelitis/discitis L5-S1, sacral and left illiac osteomyelitis, L5-S1 epidural abscess, and left iliopsoas abscess. He underwent L4-pelvic fusion on 2/14/24 with epidural phlegmon debridement, intraoperative cultures positive for MRSA. Infectious Disease was consulted and recommended IV Daptomycin for 8 week course (EOC 4/10/24) to be followed by oral suppressive doxycycline thereafter. He was discharged on 2/22/24 to [facility] to complete his antibiotic course. LTAC stay was complicated by ongoing fevers and he represented to [facility] for further evaluation prior to transfer to [facility] for neurosurgical evaluation. On arrival, WBC 9, ESR 59, CRP 46, AST 360s, ALT 200s. Procalcitonin was elevated at 1.18, lactate normal. MRI L spine showed continued L5-S1/S1 diskitis/osteomyelitis with abscess, diffuse infectious myositis of the bilateral lumbar paraspinal musculature and left iliopsoas muscle.  - Infectious Disease consulted, appreciate assistance and recommendations  - CT L spine without contrast noted  - Continue IV Daptomycin q24h  - Weekly CK level while on Daptomycin  - Multimodal pain control with tylenol, gabapentin, methocarbamol, oxy  IR, oxy ER  - Follow blood cultures through maturity  - Daily CBC, CMP  - Cardiac monitor  - Neurosurgery Consult: he does not need surgery.  - pt transferred to N  Repeat Ct c/a/p due to recurrent fevers  Infectious disease consulted.     3/28- appreciate ID recs:  Recommendations / Plan:  Continue oral Cipro 750mg po bid -  ( Use cefepime if pt not tolerating oral cipro)  Continue Iv-Daptomycin 10mg/kg Q24h  [CPK 39].   Suspect intermittent fevers related to pre-sacral fluid collection anterior to S1 (3 x 3 x 2 cm), noted on repeat Ct-a/p (03/26). Of note, pt found to have Left forearm DVTs related to PIV, but w/o associated abscess or infection locally at left forearm at this time.   New TEDIv-Dapto and oral cipro until 05/01. Abx duration ultimately until fluid collections near resolved.   Prior to new BRENNAN 05/01, recommend repeat L-spine and pelvic imaging to re-assess for interval changes to of Lumbar osteo, myositis and associated multiple fluid collections of L-spine, pre-sacral (S1), and pelvis (iliopsoas).   If persistent fevers return prior to BRENNAN 05/01, recommend repeating this imaging earlier; especially if clinically relevant and no other obvious source of fever.  Once IV abx completed, anticipate indefinite suppression with Doxycyline 100mg po bid      -- ID will schedule pt for EOC f/u appt ~05/01. ID at [facility] to follow in interim.       Fever  Related to infection that is currently under treatment.  See antibiotics above.   EXPECT Fever to be persistent for weeks.  - need to evaluate with other SIRS criteria.   3/28 - wbc 6, /50   Pulse 78  SpO2 97% room air, MS-stable.      Severe sepsis    This patient does have evidence of infective focus - continued L5-S1/S1 diskitis/osteomyelitis with abscess, diffuse infectious myositis of the bilateral lumbar paraspinal musculature and left iliopsoas muscle.  My overall impression is sepsis.  Source: Skin and Soft Tissue (location  "epidurial)  Antibiotics given-   Antibiotics (72h ago, onward)      Start     Stop Route Frequency Ordered    03/24/24 2100  ciprofloxacin HCl tablet 750 mg         -- Oral Every 12 hours 03/24/24 1713    03/14/24 0900  DAPTOmycin (CUBICIN) 780 mg in sodium chloride 0.9% SolP 50 mL IVPB         -- IV Every 24 hours (non-standard times) 03/14/24 0755          Latest lactate reviewed-  No results for input(s): "LACTATE", "POCLAC" in the last 72 hours.    Organ dysfunction indicated by Acute liver injury    Fluid challenge Not needed - patient is not hypotensive      Post- resuscitation assessment No - Post resuscitation assessment not needed       Will Not start Pressors- Levophed for MAP of 65  Source control achieved by: Broad spectrum antibiotics    - Infectious Disease consulted  See above    EXPECT Fever to be persistent for weeks.  - need to evaluate with other SIRS criteria      Psoas abscess, left  L5-S1/S1 diskitis/osteomyelitis with abscess, diffuse infectious myositis of the bilateral lumbar paraspinal musculature and left iliopsoas muscle.  - see " Osteomeylitis" above      Discitis of lumbosacral region  See Osteomyelitis for consolidated plan      Septic arthritis  See " Osteomyelitis"      Myositis of multiple sites  See " Osteomeylitis" for plan      Substance or medication-induced depressive disorder  - Continue Mirtazipine  - Hx of heroin, THC, cociane      HTN (hypertension)  Chronic, controlled. Latest blood pressure and vitals reviewed-     Temp:  [98 °F (36.7 °C)-101.7 °F (38.7 °C)]   Pulse:  [65-96]   Resp:  [17-19]   BP: (113-160)/()   SpO2:  [96 %-100 %] .   Home meds for hypertension were reviewed and noted below.   Hypertension Medications               cloNIDine (CATAPRES) 0.1 MG tablet Take 1 tablet (0.1 mg total) by mouth every 12 (twelve) hours.          Plan:  - While in the hospital, will manage blood pressure as follows; Continue home antihypertensive regimen. Currently on " "Clonidine 0.1 PO BID  - Will utilize p.r.n. blood pressure medication only if patient's blood pressure greater than 180/110 and he develops symptoms such as worsening chest pain or shortness of breath.    Hepatitis  Most recent labs from OSH showed AST 360s, ALT 200s. Had recent hepatitis panel 3/12 with hep c    - Recent Hepatitis C screening resulted positive, previous Hep C screening 3 months ago negative. Check repeat Hep C screening and PCR  - Check HIV given history of IVDU and ongoing fever despite antibiotics  - RUQ ultrasound- pending  - Daily CMP to trend transaminases:  335/235  - Infectious Diseases consulted and following      Drug dependence  See " Substance induced depressive disroder"  - monitor for withdrawal and give opioid if needed   - pt has been in LTAC  - on oxycodone LA 10 bid for pain and dependence  STABLE      Heroin use disorder, severe  See " Drug Dependence"       Hyponatremia  Patient has hyponatremia which is controlled,We will aim to correct the sodium by 4-6mEq in 24 hours. We will monitor sodium Daily. The hyponatremia is due to Dehydration/hypovolemia. We will obtain the following studies: see labsection. We will treat the hyponatremia with IV fluids. The patient's sodium results have been reviewed and are listed below.  No results for input(s): "NA" in the last 24 hours.      Chronic diastolic heart failure  ECHO 2/13/2024  Left Ventricle: The left ventricle is normal in size. Normal wall thickness. Normal wall motion. There is normal systolic function with a visually estimated ejection fraction of 60 - 65%. Ejection fraction by visual approximation is 65%. There is diastolic dysfunction but grade cannot be determined.    Right Ventricle: Normal right ventricular cavity size. Wall thickness is normal. Right ventricle wall motion  is normal. Systolic function is normal.    IVC/SVC: Normal venous pressure at 3 mmHg.    Pericardium: There is a trivial posterior effusion just " base.    STABLE      Debility  Patient with Acute on chronic debility due to other reduced mobility. Latest AMPAC and GEMS scores have been reviewed. Evaluation for etiology is complete. Plan includes progressive mobility protocol initated, PT/OT consulted, and may need surgey .    Superficial vein thrombosis  Occlusive thrombus in the left basilic and cephalic veins.   These are superficial veins that do not need anticoagulation.       Hepatitis C  F/u ID and Hepatology in clinic      Constipation  On senna a and miralax      Pain  Stable  Hs of ELLY  Narcan prn  Scheduled:  oxycodone LA 1 12, remeron  PRN: tylenol, oxycodone 15 q 4 prn.    Inpatient Morphine Milligram Equivalents Per Day 3/26 - 3/29    Values displayed are in units of MME/Day     Order Start / End Date 3/26 Yesterday Today Tomorrow     oxyCODONE immediate release tablet 15 mg 3/14 - No end date 135 of 135 112.5 of 135 45 of 135 0 of 135     oxyCODONE 12 hr tablet 10 mg 3/14 - No end date 30 of 30 30 of 30 0 of 30 0 of 30     Daily Totals  165 of 165 142.5 of 165 45 of 165 0 of 165                Sacroiliitis  stable        VTE Risk Mitigation (From admission, onward)           Ordered     Place BRENNAN hose  Until discontinued         03/14/24 0730     IP VTE HIGH RISK PATIENT  Once         03/14/24 0730     Place sequential compression device  Until discontinued         03/14/24 0730                    Discharge Planning   SENA: 3/28/2024     Code Status: Full Code   Is the patient medically ready for discharge?: No    Reason for patient still in hospital (select all that apply): Patient trending condition  Discharge Plan A: Long-term acute care facility (LTAC)   Discharge Delays: None known at this time      Kimber Lewis MD  Department of Hospital Medicine   [facility] - Neurosurgery ([facility])

## 2024-03-28 NOTE — ASSESSMENT & PLAN NOTE
Chronic, controlled. Latest blood pressure and vitals reviewed-     Temp:  [98 °F (36.7 °C)-101.7 °F (38.7 °C)]   Pulse:  [65-89]   Resp:  [17-19]   BP: (102-130)/(50-77)   SpO2:  [97 %-99 %] .   Home meds for hypertension were reviewed and noted below.   Hypertension Medications               cloNIDine (CATAPRES) 0.1 MG tablet Take 1 tablet (0.1 mg total) by mouth every 12 (twelve) hours.          Plan:  - While in the hospital, will manage blood pressure as follows; Continue home antihypertensive regimen. Currently on Clonidine 0.1 PO BID  - Will utilize p.r.n. blood pressure medication only if patient's blood pressure greater than 180/110 and he develops symptoms such as worsening chest pain or shortness of breath.

## 2024-03-28 NOTE — PLAN OF CARE
[facility] (Hospital)  Facility Transfer Orders        Admit to: LTAC    Diagnoses:   Active Hospital Problems    Diagnosis  POA    *Osteomyelitis of vertebra of lumbosacral region [M46.27]  Yes     Priority: 1 - High    Fever [R50.9]  Unknown     Priority: 2     Severe sepsis [A41.9, R65.20]  Yes     Priority: 2     Psoas abscess, left [K68.12]  Yes     Priority: 2     Discitis of lumbosacral region [M46.47]  Yes     Priority: 3      Chronic     See primary problem      Myositis of multiple sites [M60.9]  Yes     Priority: 4      Diffuse infectious myositis of the bilateral lower lumbar paraspinal musculature and within the left psoas and iliopsoas muscles       Septic arthritis [M00.9]  Yes     Priority: 4      septic arthritis about the left SI joint.       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       Debility [R53.81]  Yes     Priority: 13     Superficial vein thrombosis [I82.890]  No    Constipation [K59.00]  Yes     On miralax and senna      Hepatitis C [B19.20]  Yes     HCV +ve       Pain [R52]  Yes     Prn oxy        Sacroiliitis [M46.1]  Yes      Resolved Hospital Problems   No resolved problems to display.     Allergies: Review of patient's allergies indicates:  No Known Allergies    Code Status: FULL    Vitals: Routine       Diet: regular diet    Activity: Up in a chair each morning as tolerated and Activity as tolerated    Nursing Precautions: Fall and Pressure ulcer  prevention    Bed/Surface: Low Air Loss    Consults: PT to evaluate and treat- 5 times a week, OT to evaluate and treat- 5 times a week, and Wound Care back surgical wound      Dialysis: Patient is not on dialysis.         Pending Diagnostic Studies:       None              Miscellaneous Care:   Routine Skin for Bedridden Patients:  Apply moisture barrier cream to all  PICC Line Care: PICC Line Use/Care Instructions:  Scrub the Hub: Prior to accessing the line, always perform a 30 second alcohol scrub  Each lumen of the central line is to be flushed at least daily with 10 mL Normal Saline and 3 mL Heparin flush (10 units/mL)  Skilled Nurse (SN) may draw blood from IV access  Blood Draw Procedure:  - Aspirate at least 5 mL of blood  - Discard  - Obtain specimen  - Change injection cap  - Flush with 20 mL Normal Saline followed by a 3-5 mL Heparin flush (10 units/mL)    Central :  - Sterile dressing changes are done weekly and as needed.  - Use chlor-hexadine scrub to cleanse site, apply Biopatch to insertion site, apply securement device dressing  - Injection caps are changed weekly and after EVERY lab draw.  - If sterile gauze is under dressing to control oozing, dressing change must be performed every 24 hours until gauze is not needed.    IV Access: PICC     ID recs:  Recommendations / Plan:  Continue oral Cipro 750mg po bid -  ( Use cefepime if pt not tolerating oral cipro)  Continue Iv-Daptomycin 10mg/kg Q24h  [CPK 39].   Suspect intermittent fevers related to pre-sacral fluid collection anterior to S1 (3 x 3 x 2 cm), noted on repeat Ct-a/p (03/26). Of note, pt found to have Left forearm DVTs related to PIV, but w/o associated abscess or infection locally at left forearm at this time.   New TEDIv-Dapto and oral cipro until 05/01. Abx duration ultimately until fluid collections near resolved.   Prior to new BRENNAN 05/01, recommend repeat L-spine and pelvic imaging to re-assess for interval changes to  of Lumbar osteo, myositis and associated multiple fluid collections of L-spine, pre-sacral (S1), and pelvis (iliopsoas).   If persistent fevers return prior to BRENNAN 05/01, recommend repeating this imaging earlier; especially if clinically relevant and no other obvious source of fever.  Once IV abx completed, anticipate indefinite suppression with Doxycyline 100mg po bid   EXPECT Fever to be persistent for weeks.        Medications: Discontinue all previous medication orders, if any. See new list below.  Current Discharge Medication List        START taking these medications    Details   ciprofloxacin HCl (CIPRO) 750 MG tablet Take 1 tablet (750 mg total) by mouth every 12 (twelve) hours.    Comments: Estimated stop day 5/1/24      dextrose 5 % in water (D5W) PgBk 100 mL with ceFEPIme 2 gram SolR 2 g Inject 2 g into the vein every 8 (eight) hours. for 16 days    Comments: Stop day 5/10/24           CONTINUE these medications which have CHANGED    Details   sodium chloride 0.9% SolP 50 mL with DAPTOmycin 500 mg SolR 780 mg Inject 780 mg into the vein once daily.    Comments: Stop day 5/1/24.  F/u with ID for indefinite suppression with Doxycyline 100mg po bid once IV abx completed.  fu upon dc from LTAC           CONTINUE these medications which have NOT CHANGED    Details   acetaminophen (TYLENOL) 325 MG tablet Take 2 tablets (650 mg total) by mouth every 4 (four) hours as needed.  Refills: 0      cloNIDine (CATAPRES) 0.1 MG tablet Take 1 tablet (0.1 mg total) by mouth every 12 (twelve) hours.  Qty: 60 tablet, Refills: 11    Comments: .      mirtazapine (REMERON) 15 MG tablet Take 1 tablet (15 mg total) by mouth every evening.  Qty: 30 tablet, Refills: 11      mv-min/vit C/glut/lysine/hb124 (IMMUNE SUPPORT ORAL) Take 1 tablet by mouth once daily.      naloxone (NARCAN) 4 mg/actuation Spry 4 mg by Nasal route.      oxyCODONE (ROXICODONE) 15 MG Tab Take 1 tablet (15 mg total) by mouth every 4 (four) hours as needed for  Pain.  Refills: 0    Comments: Quantity prescribed more than 7 day supply? No           Follow up:     ID      Immunizations Administered as of 3/28/2024       No immunizations on file.              Kimber Lewis MD

## 2024-03-28 NOTE — PLAN OF CARE
[facility] (Hospital)  Discharge Final Note    SW observed d/c orders for patient. Ordered [facility] transport via wheelchair van for today at 1715 to [facility] Phone: [phone]. Report can be called to [phone]. SW informed patient/family of transport ETA and they are agreeable. SW left printed transport packet at nurse's station with unit secretary. All questions answered.    Primary Care Provider: Steve Kelly DO    Expected Discharge Date: 3/28/2024    Final Discharge Note (most recent)       Final Note - 03/28/24 1414          Final Note    Assessment Type Final Discharge Note     Anticipated Discharge Disposition Long Term Acute Care     What phone number can be called within the next 1-3 days to see how you are doing after discharge? [phone]        Post-Acute Status    Post-Acute Authorization Placement     Post-Acute Placement Status Set-up Complete/Auth obtained     Coverage MEDICAID - [place] [facility] -     Discharge Delays None known at this time                     Important Message from Medicare           Previous CM met with patient to review discharge recommendation of LTAC and was agreeable to plan. Patient came from [facility] and will return when medically ready.    Notified that return referral sent to below listed facilities from in-network list based on proximity to home/family support:     [facility] Phone: [phone]      Patient/family instructed to identify preference.    Preferred Facility: (if more than 1, listed in order of descending preference)    [facility] Phone: [phone]      If an additional preferred facility not listed above is identified, additional referral to be sent. If above facilities unable to accept, will send additional referrals to in-network providers.     Juliette Bee, DEANN, LMSW    Case Management Department  [facility]

## 2024-03-28 NOTE — ASSESSMENT & PLAN NOTE
28M with hypertension, IV drug use (heroin) presenting from LTAC with ongoing fevers    He was recently admitted on 2/04/24 with osteomyelitis/discitis L5-S1, sacral and left illiac osteomyelitis, L5-S1 epidural abscess, and left iliopsoas abscess. He underwent L4-pelvic fusion on 2/14/24 with epidural phlegmon debridement, intraoperative cultures positive for MRSA. Infectious Disease was consulted and recommended IV Daptomycin for 8 week course (EOC 4/10/24) to be followed by oral suppressive doxycycline thereafter. He was discharged on 2/22/24 to [facility] to complete his antibiotic course. LTAC stay was complicated by ongoing fevers and he represented to [facility] for further evaluation prior to [place] [facility] [place]. On arrival, WBC 9, ESR 59, CRP 46, AST 360s, ALT 200s. Procalcitonin was elevated at 1.18, lactate normal. MRI L spine showed continued L5-S1/S1 diskitis/osteomyelitis with abscess, diffuse infectious myositis of the bilateral lumbar paraspinal musculature and left iliopsoas muscle.  - Infectious Disease consulted, appreciate assistance and recommendations  - CT L spine without contrast noted  - Continue IV Daptomycin q24h  - Weekly CK level while on Daptomycin  - Multimodal pain control with tylenol, gabapentin, methocarbamol, oxy IR, oxy ER  - Follow blood cultures through maturity  - Daily CBC, CMP  - Cardiac monitor  - Neurosurgery Consult: he does not need surgery.  - pt transferred to [facility]  Repeat Ct c/a/p due to recurrent fevers  Infectious disease consulted.     3/28- appreciate ID recs:  Recommendations / Plan:  Continue oral Cipro 750mg po bid -  ( Use cefepime if pt not tolerating oral cipro)  Continue Iv-Daptomycin 10mg/kg Q24h  [CPK 39].   Suspect intermittent fevers related to pre-sacral fluid collection anterior to S1 (3 x 3 x 2 cm), noted on repeat Ct-a/p (03/26). Of note, pt found to have Left forearm DVTs related to PIV, but w/o associated abscess or  infection locally at left forearm at this time.   New TEDIv-Dapto and oral cipro until 05/01. Abx duration ultimately until fluid collections near resolved.   Prior to new BRENNAN 05/01, recommend repeat L-spine and pelvic imaging to re-assess for interval changes to of Lumbar osteo, myositis and associated multiple fluid collections of L-spine, pre-sacral (S1), and pelvis (iliopsoas).   If persistent fevers return prior to BRENNAN 05/01, recommend repeating this imaging earlier; especially if clinically relevant and no other obvious source of fever.  Once IV abx completed, anticipate indefinite suppression with Doxycyline 100mg po bid      -- ID will schedule pt for EOC f/u appt ~05/01. ID at [facility] to follow in interim.

## 2024-03-28 NOTE — ASSESSMENT & PLAN NOTE
Stable  [facility]  Narcan prn  Scheduled:  oxycodone [place] 1 12, remeron  PRN: tylenol, oxycodone 15 q 4 prn.    Inpatient Morphine Milligram Equivalents Per Day 3/26 - 3/29    Values displayed are in units of MME/Day     Order Start / End Date 3/26 Yesterday Today Tomorrow     oxyCODONE immediate release tablet 15 mg 3/14 - No end date 135 of 135 112.5 of 135 45 of 135 0 of 135     oxyCODONE 12 hr tablet 10 mg 3/14 - No end date 30 of 30 30 of 30 0 of 30 0 of 30     Daily Totals  165 of 165 142.5 of 165 45 of 165 0 of 165

## 2024-03-28 NOTE — ASSESSMENT & PLAN NOTE
Related to infection that is currently under treatment.  See antibiotics above.   EXPECT Fever to be persistent for weeks.  - need to evaluate with other SIRS criteria.   3/28 - wbc 6, /50   Pulse 78  SpO2 97% room air, MS-stable.

## 2024-03-28 NOTE — PLAN OF CARE
Problem: Occupational Therapy  Goal: Occupational Therapy Goal  Description: Goals to be met by: 4/5/24     Patient will increase functional independence with ADLs by performing:    UE Dressing with [place]. - Met 3/22  LE Dressing with [place].   Grooming while standing at sink with [place]. MET 3/27  Toileting from toilet with [place] for hygiene and clothing management. MET 3/27  Toilet transfer to toilet with [place]. MET- 3/27    Outcome: Ongoing, Progressing

## 2024-03-28 NOTE — SUBJECTIVE & OBJECTIVE
Interval History: see above    Review of Systems   Constitutional:  Positive for activity change. Negative for appetite change and fever.   HENT:  Negative for trouble swallowing.    Respiratory:  Negative for shortness of breath.    Cardiovascular:  Negative for chest pain.   Gastrointestinal:  Positive for constipation. Negative for abdominal pain, diarrhea and nausea.   Genitourinary:  Negative for difficulty urinating and frequency.   Musculoskeletal:  Positive for arthralgias, back pain and gait problem. Negative for neck stiffness.   Neurological:  Negative for headaches.   Psychiatric/Behavioral:  Negative for behavioral problems.      Objective:     Vital Signs (Most Recent):  Temp: 98.5 °F (36.9 °C) (03/28/24 0725)  Pulse: 69 (03/28/24 0725)  Resp: 18 (03/28/24 0725)  BP: 127/60 (03/28/24 0725)  SpO2: 98 % (03/28/24 0725) Vital Signs (24h Range):  Temp:  [98 °F (36.7 °C)-101.7 °F (38.7 °C)] 98.5 °F (36.9 °C)  Pulse:  [65-96] 69  Resp:  [17-19] 18  SpO2:  [96 %-100 %] 98 %  BP: (113-160)/() 127/60     Weight: 78 kg (172 lb)  Body mass index is 24.68 kg/m².  No intake or output data in the 24 hours ending 03/28/24 0800        Physical Exam  Constitutional:       General: He is not in acute distress.     Appearance: Normal appearance. He is ill-appearing. He is not toxic-appearing or diaphoretic.   HENT:      Head: Normocephalic and atraumatic.      Nose: Nose normal.      Mouth/Throat:      Mouth: Mucous membranes are moist.      Pharynx: Oropharynx is clear.   Eyes:      General: No scleral icterus.     Extraocular Movements: Extraocular movements intact.      Conjunctiva/sclera: Conjunctivae normal.      Pupils: Pupils are equal, round, and reactive to light.   Cardiovascular:      Rate and Rhythm: Normal rate and regular rhythm.      Pulses: Normal pulses.   Pulmonary:      Effort: Pulmonary effort is normal. No respiratory distress.      Breath sounds: Normal breath sounds. No stridor. No wheezing,  rhonchi or rales.   Chest:      Chest wall: No tenderness.   Abdominal:      General: Abdomen is flat. Bowel sounds are normal. There is no distension.      Palpations: Abdomen is soft.      Tenderness: There is no abdominal tenderness. There is no right CVA tenderness, left CVA tenderness, guarding or rebound.   Musculoskeletal:         General: No swelling, tenderness, deformity or signs of injury. Normal range of motion.      Cervical back: Normal range of motion and neck supple. No rigidity or tenderness.      Right lower leg: No edema.      Left lower leg: No edema.   Skin:     General: Skin is warm and dry.      Coloration: Skin is not jaundiced.      Findings: No erythema or rash.   Neurological:      General: No focal deficit present.      Mental Status: He is alert and oriented to person, place, and time. Mental status is at baseline.      Motor: No weakness.      Gait: Gait abnormal.   Psychiatric:         Mood and Affect: Mood normal.         Behavior: Behavior normal.         Thought Content: Thought content normal.         Judgment: Judgment normal.             Significant Labs: All pertinent labs within the past 24 hours have been reviewed.  CBC:   Recent Labs   Lab 03/26/24  0853   WBC 6.41   HGB 8.7*   HCT 27.5*          CMP:   Recent Labs   Lab 03/26/24  0853   *   K 4.6      CO2 27   *   BUN 13   CREATININE 0.7   CALCIUM 9.2   PROT 7.5   ALBUMIN 2.8*   BILITOT 0.6   ALKPHOS 246*   *   *   ANIONGAP 6*         Significant Imaging: I have reviewed all pertinent imaging results/findings within the past 24 hours.

## 2024-03-28 NOTE — PLAN OF CARE
Problem: Adult Inpatient Plan of Care  Goal: Absence of Hospital-Acquired Illness or Injury  Outcome: Ongoing, Progressing     Problem: Infection  Goal: Absence of Infection Signs and Symptoms  Outcome: Ongoing, Progressing     Problem: Bleeding (Sepsis/Septic Shock)  Goal: Absence of Bleeding  Outcome: Ongoing, Progressing     Problem: Infection Progression (Sepsis/Septic Shock)  Goal: Absence of Infection Signs and Symptoms  Outcome: Ongoing, Progressing     Problem: Fall Injury Risk  Goal: Absence of Fall and Fall-Related Injury  Outcome: Ongoing, Progressing

## 2024-03-28 NOTE — ASSESSMENT & PLAN NOTE
28M with hypertension, IV drug use (heroin) presenting from LTAC with ongoing fevers    He was recently admitted on 2/04/24 with osteomyelitis/discitis L5-S1, sacral and left illiac osteomyelitis, L5-S1 epidural abscess, and left iliopsoas abscess. He underwent L4-pelvic fusion on 2/14/24 with epidural phlegmon debridement, intraoperative cultures positive for MRSA. Infectious Disease was consulted and recommended IV Daptomycin for 8 week course (EOC 4/10/24) to be followed by oral suppressive doxycycline thereafter. He was discharged on 2/22/24 to [facility] to complete his antibiotic course. LTAC stay was complicated by ongoing fevers and he represented to [facility] for further evaluation prior to [place] [facility] [place]. On arrival, WBC 9, ESR 59, CRP 46, AST 360s, ALT 200s. Procalcitonin was elevated at 1.18, lactate normal. MRI L spine showed continued L5-S1/S1 diskitis/osteomyelitis with abscess, diffuse infectious myositis of the bilateral lumbar paraspinal musculature and left iliopsoas muscle.  - Infectious Disease consulted, appreciate assistance and recommendations  - CT L spine without contrast noted  - Continue IV Daptomycin q24h  - Weekly CK level while on Daptomycin  - Multimodal pain control with tylenol, gabapentin, methocarbamol, oxy IR, oxy ER  - Follow blood cultures through maturity  - Daily CBC, CMP  - Cardiac monitor  - Neurosurgery Consult: he does not need surgery.  - pt transferred to [facility]  Repeat Ct c/a/p due to recurrent fevers  Infectious disease consulted.     3/28- appreciate ID recs:  Recommendations / Plan:  Continue oral Cipro 750mg po bid -  ( Use cefepime if pt not tolerating oral cipro)  Continue Iv-Daptomycin 10mg/kg Q24h  [CPK 39].   Suspect intermittent fevers related to pre-sacral fluid collection anterior to S1 (3 x 3 x 2 cm), noted on repeat Ct-a/p (03/26). Of note, pt found to have Left forearm DVTs related to PIV, but w/o associated abscess or  infection locally at left forearm at this time.   New TEDIv-Dapto and oral cipro until 05/01. Abx duration ultimately until fluid collections near resolved.   Prior to new BRENNAN 05/01, recommend repeat L-spine and pelvic imaging to re-assess for interval changes to of Lumbar osteo, myositis and associated multiple fluid collections of L-spine, pre-sacral (S1), and pelvis (iliopsoas).   If persistent fevers return prior to BRENNAN 05/01, recommend repeating this imaging earlier; especially if clinically relevant and no other obvious source of fever.  Once IV abx completed, anticipate indefinite suppression with Doxycyline 100mg po bid      -- ID will schedule pt for EOC f/u appt ~05/01. ID at [facility] to follow in interim.

## 2024-03-28 NOTE — ASSESSMENT & PLAN NOTE
Occlusive thrombus in the left basilic and cephalic veins.   These are superficial veins that do not need anticoagulation.

## 2024-03-28 NOTE — PLAN OF CARE
Problem: Fall Injury Risk  Goal: Absence of Fall and Fall-Related Injury  Outcome: Ongoing, Progressing  Intervention: Promote Injury-Free Environment  Flowsheets (Taken 3/28/2024 0282)  Safety Promotion/Fall Prevention:   bed alarm set   Fall Risk reviewed with patient/family

## 2024-03-29 VITALS
RESPIRATION RATE: 19 BRPM | HEART RATE: 82 BPM | TEMPERATURE: 99 F | SYSTOLIC BLOOD PRESSURE: 144 MMHG | OXYGEN SATURATION: 98 % | BODY MASS INDEX: 24.62 KG/M2 | DIASTOLIC BLOOD PRESSURE: 69 MMHG | HEIGHT: 70 IN | WEIGHT: 172 LBS

## 2024-03-29 PROCEDURE — 25000003 PHARM REV CODE 250

## 2024-03-29 PROCEDURE — 25000003 PHARM REV CODE 250: Performed by: STUDENT IN AN ORGANIZED HEALTH CARE EDUCATION/TRAINING PROGRAM

## 2024-03-29 PROCEDURE — 25000003 PHARM REV CODE 250: Performed by: HOSPITALIST

## 2024-03-29 PROCEDURE — 25000003 PHARM REV CODE 250: Performed by: PHYSICIAN ASSISTANT

## 2024-03-29 PROCEDURE — 63600175 PHARM REV CODE 636 W HCPCS

## 2024-03-29 PROCEDURE — A4216 STERILE WATER/SALINE, 10 ML: HCPCS | Performed by: HOSPITALIST

## 2024-03-29 RX ADMIN — CIPROFLOXACIN 750 MG: 750 TABLET, FILM COATED ORAL at 08:03

## 2024-03-29 RX ADMIN — DAPTOMYCIN 780 MG: 350 INJECTION, POWDER, LYOPHILIZED, FOR SOLUTION INTRAVENOUS at 08:03

## 2024-03-29 RX ADMIN — OXYCODONE HYDROCHLORIDE 15 MG: 10 TABLET ORAL at 06:03

## 2024-03-29 RX ADMIN — OXYCODONE HYDROCHLORIDE 10 MG: 10 TABLET, FILM COATED, EXTENDED RELEASE ORAL at 08:03

## 2024-03-29 RX ADMIN — ACETAMINOPHEN 650 MG: 325 TABLET ORAL at 08:03

## 2024-03-29 RX ADMIN — METHOCARBAMOL 500 MG: 500 TABLET ORAL at 08:03

## 2024-03-29 RX ADMIN — OXYCODONE HYDROCHLORIDE 15 MG: 10 TABLET ORAL at 09:03

## 2024-03-29 RX ADMIN — OXYCODONE HYDROCHLORIDE 15 MG: 10 TABLET ORAL at 02:03

## 2024-03-29 RX ADMIN — DOCUSATE SODIUM AND SENNOSIDES 2 TABLET: 8.6; 5 TABLET, FILM COATED ORAL at 08:03

## 2024-03-29 RX ADMIN — CLONIDINE HYDROCHLORIDE 0.1 MG: 0.1 TABLET ORAL at 08:03

## 2024-03-29 RX ADMIN — Medication 10 ML: at 05:03

## 2024-03-29 NOTE — SUBJECTIVE & OBJECTIVE
Interval History: see above    Review of Systems   Constitutional:  Positive for activity change. Negative for appetite change and fever.   HENT:  Negative for trouble swallowing.    Respiratory:  Negative for shortness of breath.    Cardiovascular:  Negative for chest pain.   Gastrointestinal:  Positive for constipation. Negative for abdominal pain, diarrhea and nausea.   Genitourinary:  Negative for difficulty urinating and frequency.   Musculoskeletal:  Positive for arthralgias, back pain and gait problem. Negative for neck stiffness.   Neurological:  Negative for headaches.   Psychiatric/Behavioral:  Negative for behavioral problems.      Objective:     Vital Signs (Most Recent):  Temp: 98.8 °F (37.1 °C) (03/29/24 0428)  Pulse: 79 (03/29/24 0428)  Resp: 17 (03/29/24 0631)  BP: 127/62 (03/29/24 0428)  SpO2: 97 % (03/29/24 0428) Vital Signs (24h Range):  Temp:  [97.6 °F (36.4 °C)-99.8 °F (37.7 °C)] 98.8 °F (37.1 °C)  Pulse:  [78-92] 79  Resp:  [17-18] 17  SpO2:  [93 %-98 %] 97 %  BP: (102-142)/(50-67) 127/62     Weight: 78 kg (172 lb)  Body mass index is 24.68 kg/m².    Intake/Output Summary (Last 24 hours) at 3/29/2024 0753  Last data filed at 3/29/2024 0200  Gross per 24 hour   Intake --   Output 1100 ml   Net -1100 ml           Physical Exam  Constitutional:       General: He is not in acute distress.     Appearance: Normal appearance. He is ill-appearing. He is not toxic-appearing or diaphoretic.   HENT:      Head: Normocephalic and atraumatic.      Nose: Nose normal.      Mouth/Throat:      Mouth: Mucous membranes are moist.      Pharynx: Oropharynx is clear.   Eyes:      General: No scleral icterus.     Extraocular Movements: Extraocular movements intact.      Conjunctiva/sclera: Conjunctivae normal.      Pupils: Pupils are equal, round, and reactive to light.   Cardiovascular:      Rate and Rhythm: Normal rate and regular rhythm.      Pulses: Normal pulses.   Pulmonary:      Effort: Pulmonary effort is  "normal. No respiratory distress.      Breath sounds: Normal breath sounds. No stridor. No wheezing, rhonchi or rales.   Chest:      Chest wall: No tenderness.   Abdominal:      General: Abdomen is flat. Bowel sounds are normal. There is no distension.      Palpations: Abdomen is soft.      Tenderness: There is no abdominal tenderness. There is no right CVA tenderness, left CVA tenderness, guarding or rebound.   Musculoskeletal:         General: No swelling, tenderness, deformity or signs of injury. Normal range of motion.      Cervical back: Normal range of motion and neck supple. No rigidity or tenderness.      Right lower leg: No edema.      Left lower leg: No edema.   Skin:     General: Skin is warm and dry.      Coloration: Skin is not jaundiced.      Findings: No erythema or rash.   Neurological:      General: No focal deficit present.      Mental Status: He is alert and oriented to person, place, and time. Mental status is at baseline.      Motor: No weakness.      Gait: Gait abnormal.   Psychiatric:         Mood and Affect: Mood normal.         Behavior: Behavior normal.         Thought Content: Thought content normal.         Judgment: Judgment normal.             Significant Labs: All pertinent labs within the past 24 hours have been reviewed.  CBC:   No results for input(s): "WBC", "HGB", "HCT", "PLT" in the last 48 hours.    CMP:   No results for input(s): "NA", "K", "CL", "CO2", "GLU", "BUN", "CREATININE", "CALCIUM", "PROT", "ALBUMIN", "BILITOT", "ALKPHOS", "AST", "ALT", "ANIONGAP", "EGFRNONAA" in the last 48 hours.    Invalid input(s): "ESTGF[place]"      Significant Imaging: I have reviewed all pertinent imaging results/findings within the past 24 hours.  "

## 2024-03-29 NOTE — PLAN OF CARE
SW charted Avoidable Day due to [facility] transportation delay and escalated patient's case to CM leadership.     DEANN Hernandez, LMSW    Case Management Department  [facility]

## 2024-03-29 NOTE — PROGRESS NOTES
Aleksandr Bray - Neurosurgery ([facility])  [facility] Medicine  Progress Note    Patient Name: Ton Donovan  MRN: [id number]  Patient Class: IP- Inpatient   Admission Date: 3/14/2024  Length of Stay: 15 days  Attending Physician: Kimber Lewis MD  Primary Care Provider: Steve Kelly DO        Subjective:     Principal Problem:Osteomyelitis of vertebra of lumbosacral region        HPI:  Mr. Ton Donovan is a 28 year-old Male with a PMHx of Hypertension, IV drug use who presented with fevers and ongoing back pain with radiation down legs.    Of note, he was recently admitted to [facility] for LOOP X lumbar spinal fusion, with course complicated by a psoas abscess and MRSA epidural abscess s/p epidural phlegmon debridement. He was started on IV Daptomycin and discharged on 2/22/24 to [facility] for completion of antibiotics. Ongoing fevers complicated his LTAC stay, and he returned to the [facility] for further evaluation. On arrival, WBC 9, ESR 59, CRP 46, AST 360s, ALT 200s. Procalcitonin was elevated at 1.18, lactate normal. MRI L spine concerning for continued L5-S1/S1 diskitis and osteomyelitis with abscess, diffuse infectious myositis of the lumbar paraspinal musculature bilaterally and left iliopsoas muscle without discrete abscess. He was then transferred to [facility] for Neurosurgerical evaluation.     At the time of this consult, temp 99F, HR 60, 120s/60s, saturating well on room air. Most recent labs with WBC 5, Hgb 9, Na 134.    Medicine consulted for medical co-management of epidural abscess    Overview/Hospital Course:  3/15-Admitted for NSGY evaluation of OM & discitis lumbar spine. Will let pt eat.   3/16- Does not need surgery. Appreciate NSGY eval. Wound care and ID following and need final recs. Pt is comfortable.   3/17- Klebsiella sens to everything,  On dapto and yue.  Pic line removed and culture tip was ordered, but not done.   3/18 per ID: Continue  Iv-cefepime 2g Q8h. (Bld cx +kleb  aerogenes panS) & Continue Iv-Daptomycin 10mg/kg Q24h.  repeat bld cxs 03/15 and 03/17 (NGTD); picc cath tip culture was not sent down properly  Anticipate continuing both Iv-dapto and cefepime for remaining 2-3wks of full 6wk duration of Iv-abx(BRENNAN 04/10) for lumbar osteo-discitis with paravertebral / psoas myositis and sub centimeter abscesses   Will need new PIc line or mid line.  Waiting for cultures to be neg to ensure therapeutic window without bacteremia and presence of a line.   - IR consulted to evaluate if the fluid collection at posterior to L5-s1 which communicates with disc space to see if that can be drained.  IR, Dr. Pretty reviewed this pt's case. He says no discrete fluid collection seen within the paraspinal muscles on either the CT or the MRI, it appears he has swollen, infected paraspinal muscles more consistent with myositis. He may have some fluid in the disc space which we recommend reaching out to neuro IR - No fluid to be drained, infection limited to myositis.    3/19- Per Neuro IR, abscess is very deep to reach by IR means so it's not amenable to drain. Repeat cultures are neg.   3/20 replace pic and plan for LTAC.   ID final recs:   Continue Iv-cefepime 2g Q8h. (Bld cx +kleb aerogenes panS) x 2 weeks from 1st negative blood culture 3/15  Continue Iv-Daptomycin 10mg/kg Q24h  [CPK 39]. To complete 6 weeks  OK to replace PICC  Rec indefinite suppression with Doxycyline 100mg po bid once IV abx completed.  fu upon dc from LTAC  Pic line placed  No growth on repeat cultures. No leukocytosis  Ultrasound with thromboses basilic and cephalic veins. Patient reports arm swelling and discomfort improving.  Patient had recurrent fevers. ID changed cefepime to ciprofloxacin for concern drug fevers. Repeated CT C/A/P.     Interval history:  3/28- appreciate ID recs:  Recommendations / Plan:  Continue oral Cipro 750mg po bid -  ( Use cefepime if pt not tolerating oral cipro)  Continue Iv-Daptomycin  10mg/kg Q24h  [CPK 39].   Suspect intermittent fevers related to pre-sacral fluid collection anterior to S1 (3 x 3 x 2 cm), noted on repeat Ct-a/p (03/26). Of note, pt found to have Left forearm Superficial VTs related to PIV, but w/o associated abscess or infection locally at left forearm at this time.   New TEDIv-Dapto and oral cipro until 05/01. Abx duration ultimately until fluid collections near resolved.   Prior to new BRENNAN 05/01, recommend repeat L-spine and pelvic imaging to re-assess for interval changes to of Lumbar osteo, myositis and associated multiple fluid collections of L-spine, pre-sacral (S1), and pelvis (iliopsoas).   If persistent fevers return prior to BRENNAN 05/01, recommend repeating this imaging earlier; especially if clinically relevant and no other obvious source of fever.  Once IV abx completed, anticipate indefinite suppression with Doxycyline 100mg po bid   EXPECT Fever to be persistent for weeks.    3/29- planning on dc to [facility] today.    Interval History: see above    Review of Systems   Constitutional:  Positive for activity change. Negative for appetite change and fever.   HENT:  Negative for trouble swallowing.    Respiratory:  Negative for shortness of breath.    Cardiovascular:  Negative for chest pain.   Gastrointestinal:  Positive for constipation. Negative for abdominal pain, diarrhea and nausea.   Genitourinary:  Negative for difficulty urinating and frequency.   Musculoskeletal:  Positive for arthralgias, back pain and gait problem. Negative for neck stiffness.   Neurological:  Negative for headaches.   Psychiatric/Behavioral:  Negative for behavioral problems.      Objective:     Vital Signs (Most Recent):  Temp: 98.8 °F (37.1 °C) (03/29/24 0428)  Pulse: 79 (03/29/24 0428)  Resp: 17 (03/29/24 0631)  BP: 127/62 (03/29/24 0428)  SpO2: 97 % (03/29/24 0428) Vital Signs (24h Range):  Temp:  [97.6 °F (36.4 °C)-99.8 °F (37.7 °C)] 98.8 °F (37.1 °C)  Pulse:  [78-92] 79  Resp:   [17-18] 17  SpO2:  [93 %-98 %] 97 %  BP: (102-142)/(50-67) 127/62     Weight: 78 kg (172 lb)  Body mass index is 24.68 kg/m².    Intake/Output Summary (Last 24 hours) at 3/29/2024 0753  Last data filed at 3/29/2024 0200  Gross per 24 hour   Intake --   Output 1100 ml   Net -1100 ml           Physical Exam  Constitutional:       General: He is not in acute distress.     Appearance: Normal appearance. He is ill-appearing. He is not toxic-appearing or diaphoretic.   HENT:      Head: Normocephalic and atraumatic.      Nose: Nose normal.      Mouth/Throat:      Mouth: Mucous membranes are moist.      Pharynx: Oropharynx is clear.   Eyes:      General: No scleral icterus.     Extraocular Movements: Extraocular movements intact.      Conjunctiva/sclera: Conjunctivae normal.      Pupils: Pupils are equal, round, and reactive to light.   Cardiovascular:      Rate and Rhythm: Normal rate and regular rhythm.      Pulses: Normal pulses.   Pulmonary:      Effort: Pulmonary effort is normal. No respiratory distress.      Breath sounds: Normal breath sounds. No stridor. No wheezing, rhonchi or rales.   Chest:      Chest wall: No tenderness.   Abdominal:      General: Abdomen is flat. Bowel sounds are normal. There is no distension.      Palpations: Abdomen is soft.      Tenderness: There is no abdominal tenderness. There is no right CVA tenderness, left CVA tenderness, guarding or rebound.   Musculoskeletal:         General: No swelling, tenderness, deformity or signs of injury. Normal range of motion.      Cervical back: Normal range of motion and neck supple. No rigidity or tenderness.      Right lower leg: No edema.      Left lower leg: No edema.   Skin:     General: Skin is warm and dry.      Coloration: Skin is not jaundiced.      Findings: No erythema or rash.   Neurological:      General: No focal deficit present.      Mental Status: He is alert and oriented to person, place, and time. Mental status is at baseline.       "Motor: No weakness.      Gait: Gait abnormal.   Psychiatric:         Mood and Affect: Mood normal.         Behavior: Behavior normal.         Thought Content: Thought content normal.         Judgment: Judgment normal.             Significant Labs: All pertinent labs within the past 24 hours have been reviewed.  CBC:   No results for input(s): "WBC", "HGB", "HCT", "PLT" in the last 48 hours.    CMP:   No results for input(s): "NA", "K", "CL", "CO2", "GLU", "BUN", "CREATININE", "CALCIUM", "PROT", "ALBUMIN", "BILITOT", "ALKPHOS", "AST", "ALT", "ANIONGAP", "EGFRNONAA" in the last 48 hours.    Invalid input(s): "ESTGFAFRICA"      Significant Imaging: I have reviewed all pertinent imaging results/findings within the past 24 hours.    Assessment/Plan:      * Osteomyelitis of vertebra of lumbosacral region  28M with hypertension, IV drug use (heroin) presenting from LTAC with ongoing fevers    He was recently admitted on 2/04/24 with osteomyelitis/discitis L5-S1, sacral and left illiac osteomyelitis, L5-S1 epidural abscess, and left iliopsoas abscess. He underwent L4-pelvic fusion on 2/14/24 with epidural phlegmon debridement, intraoperative cultures positive for MRSA. Infectious Disease was consulted and recommended IV Daptomycin for 8 week course (EOC 4/10/24) to be followed by oral suppressive doxycycline thereafter. He was discharged on 2/22/24 to [facility] to complete his antibiotic course. LTAC stay was complicated by ongoing fevers and he represented to [facility] for further evaluation prior to transfer to [facility] for neurosurgical evaluation. On arrival, WBC 9, ESR 59, CRP 46, AST 360s, ALT 200s. Procalcitonin was elevated at 1.18, lactate normal. MRI L spine showed continued L5-S1/S1 diskitis/osteomyelitis with abscess, diffuse infectious myositis of the bilateral lumbar paraspinal musculature and left iliopsoas muscle.  - Infectious Disease consulted, appreciate assistance and recommendations  - CT L spine " without contrast noted  - Continue IV Daptomycin q24h  - Weekly CK level while on Daptomycin  - Multimodal pain control with tylenol, gabapentin, methocarbamol, oxy IR, oxy ER  - Follow blood cultures through maturity  - Daily CBC, CMP  - Cardiac monitor  - Neurosurgery Consult: he does not need surgery.  - pt transferred to [facility]3]  Repeat Ct c/a/p due to recurrent fevers  Infectious disease consulted.     3/28- appreciate ID recs:  Recommendations / Plan:  Continue oral Cipro 750mg po bid -  ( Use cefepime if pt not tolerating oral cipro)  Continue Iv-Daptomycin 10mg/kg Q24h  [CPK 39].   Suspect intermittent fevers related to pre-sacral fluid collection anterior to S1 (3 x 3 x 2 cm), noted on repeat Ct-a/p (03/26). Of note, pt found to have Left forearm DVTs related to PIV, but w/o associated abscess or infection locally at left forearm at this time.   New TEDIv-Dapto and oral cipro until 05/01. Abx duration ultimately until fluid collections near resolved.   Prior to new BRENNAN 05/01, recommend repeat L-spine and pelvic imaging to re-assess for interval changes to of Lumbar osteo, myositis and associated multiple fluid collections of L-spine, pre-sacral (S1), and pelvis (iliopsoas).   If persistent fevers return prior to BRENNAN 05/01, recommend repeating this imaging earlier; especially if clinically relevant and no other obvious source of fever.  Once IV abx completed, anticipate indefinite suppression with Doxycyline 100mg po bid      -- ID will schedule pt for EOC f/u appt ~05/01. ID at [facility] to follow in interim.       Fever  Related to infection that is currently under treatment.  See antibiotics above.   EXPECT Fever to be persistent for weeks.  - need to evaluate with other SIRS criteria.   3/28 - wbc 6, /50   Pulse 78  SpO2 97% room air, MS-stable.      Severe sepsis    This patient does have evidence of infective focus - continued L5-S1/S1 diskitis/osteomyelitis with abscess, diffuse infectious  "myositis of the bilateral lumbar paraspinal musculature and left iliopsoas muscle.  My overall impression is sepsis.  Source: Skin and Soft Tissue (location epidurial)  Antibiotics given-   Antibiotics (72h ago, onward)      Start     Stop Route Frequency Ordered    03/24/24 2100  ciprofloxacin HCl tablet 750 mg         -- Oral Every 12 hours 03/24/24 1713    03/14/24 0900  DAPTOmycin (CUBICIN) 780 mg in sodium chloride 0.9% SolP 50 mL IVPB         -- IV Every 24 hours (non-standard times) 03/14/24 0755          Latest lactate reviewed-  No results for input(s): "LACTATE", "POCLAC" in the last 72 hours.    Organ dysfunction indicated by Acute liver injury    Fluid challenge Not needed - patient is not hypotensive      Post- resuscitation assessment No - Post resuscitation assessment not needed       Will Not start Pressors- Levophed for MAP of 65  Source control achieved by: Broad spectrum antibiotics    - Infectious Disease consulted  See above    EXPECT Fever to be persistent for weeks.  - need to evaluate with other SIRS criteria      Psoas abscess, left  L5-S1/S1 diskitis/osteomyelitis with abscess, diffuse infectious myositis of the bilateral lumbar paraspinal musculature and left iliopsoas muscle.  - see " Osteomeylitis" above      Discitis of lumbosacral region  See Osteomyelitis for consolidated plan      Septic arthritis  See " Osteomyelitis"      Myositis of multiple sites  See " Osteomeylitis" for plan      Substance or medication-induced depressive disorder  - Continue Mirtazipine  - Hx of heroin, THC, cociane      HTN (hypertension)  Chronic, controlled. Latest blood pressure and vitals reviewed-     Temp:  [98 °F (36.7 °C)-101.7 °F (38.7 °C)]   Pulse:  [65-89]   Resp:  [17-19]   BP: (102-130)/(50-77)   SpO2:  [97 %-99 %] .   Home meds for hypertension were reviewed and noted below.   Hypertension Medications               cloNIDine (CATAPRES) 0.1 MG tablet Take 1 tablet (0.1 mg total) by mouth every 12 " "(twelve) hours.          Plan:  - While in the hospital, will manage blood pressure as follows; Continue home antihypertensive regimen. Currently on Clonidine 0.1 PO BID  - Will utilize p.r.n. blood pressure medication only if patient's blood pressure greater than 180/110 and he develops symptoms such as worsening chest pain or shortness of breath.    Hepatitis  Most recent labs from OSH showed AST 360s, ALT 200s. Had recent hepatitis panel 3/12 with hep c    - Recent Hepatitis C screening resulted positive, previous Hep C screening 3 months ago negative. Check repeat Hep C screening and PCR  - Check HIV given history of IVDU and ongoing fever despite antibiotics  - RUQ ultrasound- pending  - Daily CMP to trend transaminases:  335/235  - Infectious Diseases consulted and following      Drug dependence  See " Substance induced depressive disroder"  - monitor for withdrawal and give opioid if needed   - pt has been in LTAC  - on oxycodone LA 10 bid for pain and dependence  STABLE      Heroin use disorder, severe  See " Drug Dependence"       Hyponatremia  Patient has hyponatremia which is controlled,We will aim to correct the sodium by 4-6mEq in 24 hours. We will monitor sodium Daily. The hyponatremia is due to Dehydration/hypovolemia. We will obtain the following studies: see labsection. We will treat the hyponatremia with IV fluids. The patient's sodium results have been reviewed and are listed below.  No results for input(s): "NA" in the last 24 hours.      Chronic diastolic heart failure  ECHO 2/13/2024  Left Ventricle: The left ventricle is normal in size. Normal wall thickness. Normal wall motion. There is normal systolic function with a visually estimated ejection fraction of 60 - 65%. Ejection fraction by visual approximation is 65%. There is diastolic dysfunction but grade cannot be determined.    Right Ventricle: Normal right ventricular cavity size. Wall thickness is normal. Right ventricle wall motion  is " normal. Systolic function is normal.    IVC/SVC: Normal venous pressure at 3 mmHg.    Pericardium: There is a trivial posterior effusion just base.    STABLE      Debility  Patient with Acute on chronic debility due to other reduced mobility. Latest AMPAC and GEMS scores have been reviewed. Evaluation for etiology is complete. Plan includes progressive mobility protocol initated, PT/OT consulted, and may need surgey .    Superficial vein thrombosis  Occlusive thrombus in the left basilic and cephalic veins.   These are superficial veins that do not need anticoagulation.       Hepatitis C  F/u ID and Hepatology in clinic      Constipation  On senna a and miralax      Pain  Stable  Hs of ELLY  Narcan prn  Scheduled:  oxycodone LA 1 12, remeron  PRN: tylenol, oxycodone 15 q 4 prn.    Inpatient Morphine Milligram Equivalents Per Day 3/26 - 3/29    Values displayed are in units of MME/Day     Order Start / End Date 3/26 Yesterday Today Tomorrow     oxyCODONE immediate release tablet 15 mg 3/14 - No end date 135 of 135 112.5 of 135 45 of 135 0 of 135     oxyCODONE 12 hr tablet 10 mg 3/14 - No end date 30 of 30 30 of 30 0 of 30 0 of 30     Daily Totals  165 of 165 142.5 of 165 45 of 165 0 of 165                Sacroiliitis  stable        VTE Risk Mitigation (From admission, onward)           Ordered     Place BRENNAN hose  Until discontinued         03/14/24 0730     IP VTE HIGH RISK PATIENT  Once         03/14/24 0730     Place sequential compression device  Until discontinued         03/14/24 0730                    Discharge Planning   SENA: 3/28/2024     Code Status: Full Code   Is the patient medically ready for discharge?: No    Reason for patient still in hospital (select all that apply): Patient trending condition  Discharge Plan A: Long-term acute care facility (LTAC)   Discharge Delays: None known at this time      Kimber Lewis MD  Department of Hospital Medicine   [facility] - Neurosurgery ([facility])

## 2024-03-29 NOTE — PT/OT/SLP PROGRESS
Occupational Therapy      Patient Name:  Ton Donovan   MRN:  [id number]    Patient not seen today secondary to Patient discharged prior to initiation of evaluation. Thank you for your consult.    3/29/2024

## 2024-03-29 NOTE — PLAN OF CARE
Charge nurse approached SW about patient not dcing yesterday. SW looked into it and looks like transportation rescheduled to  patient at 10:00 am today on 3/29. SW reached out to [facility] and confirmed transportation is picking up patient for 10am. SW reached out to [facility], [facility] confirmed they will still take patient today. SW gave nurse number to call report. SW following for any further needs.      ANDERS Matamoros  [facility] CM  [phone]

## 2024-03-29 NOTE — PLAN OF CARE
Problem: Adult Inpatient Plan of Care  Goal: Plan of Care Review  3/29/2024 0953 by Aydee Back RN  Outcome: Met  3/29/2024 0841 by Aydee Back RN  Outcome: Ongoing, Progressing  3/29/2024 0840 by Aydee Back RN  Outcome: Ongoing, Progressing  Goal: Patient-Specific Goal (Individualized)  Description: Pt will maintain sbp below 160  3/29/2024 0953 by Aydee Back RN  Outcome: Met  3/29/2024 0841 by Aydee Back RN  Outcome: Ongoing, Progressing  3/29/2024 0840 by Aydee Back RN  Outcome: Ongoing, Progressing  Goal: Absence of Hospital-Acquired Illness or Injury  3/29/2024 0953 by Aydee Back RN  Outcome: Met  3/29/2024 0841 by Aydee Back RN  Outcome: Ongoing, Progressing  3/29/2024 0840 by Aydee Back RN  Outcome: Ongoing, Progressing  Goal: Optimal Comfort and Wellbeing  3/29/2024 0953 by Aydee Back RN  Outcome: Met  3/29/2024 0841 by Aydee Back RN  Outcome: Ongoing, Progressing  3/29/2024 0840 by Aydee Back RN  Outcome: Ongoing, Progressing  Goal: Readiness for Transition of Care  3/29/2024 0953 by Aydee Back RN  Outcome: Met  3/29/2024 0841 by Aydee Back RN  Outcome: Ongoing, Progressing  3/29/2024 0840 by Aydee Back RN  Outcome: Ongoing, Progressing     Problem: Infection  Goal: Absence of Infection Signs and Symptoms  3/29/2024 0953 by Aydee Back RN  Outcome: Met  3/29/2024 0841 by Aydee Back RN  Outcome: Ongoing, Progressing  3/29/2024 0840 by Aydee Bakc RN  Outcome: Ongoing, Progressing     Problem: Adjustment to Illness (Sepsis/Septic Shock)  Goal: Optimal Coping  3/29/2024 0953 by Aydee Back, RN  Outcome: Met  3/29/2024 0841 by Aydee Back, RN  Outcome: Ongoing, Progressing  3/29/2024 0840 by Aydee Back, RN  Outcome: Ongoing, Progressing     Problem:  Bleeding (Sepsis/Septic Shock)  Goal: Absence of Bleeding  3/29/2024 0953 by Aydee Back RN  Outcome: Met  3/29/2024 0841 by Aydee Back RN  Outcome: Ongoing, Progressing  3/29/2024 0840 by Aydee Back RN  Outcome: Ongoing, Progressing     Problem: Glycemic Control Impaired (Sepsis/Septic Shock)  Goal: Blood Glucose Level Within Desired Range  3/29/2024 0953 by Aydee Back RN  Outcome: Met  3/29/2024 0841 by Aydee Back RN  Outcome: Ongoing, Progressing  3/29/2024 0840 by Aydee Back RN  Outcome: Ongoing, Progressing     Problem: Infection Progression (Sepsis/Septic Shock)  Goal: Absence of Infection Signs and Symptoms  3/29/2024 0953 by Aydee Back RN  Outcome: Met  3/29/2024 0841 by Aydee Back RN  Outcome: Ongoing, Progressing  3/29/2024 0840 by Aydee Back RN  Outcome: Ongoing, Progressing     Problem: Nutrition Impaired (Sepsis/Septic Shock)  Goal: Optimal Nutrition Intake  3/29/2024 0953 by Aydee Back RN  Outcome: Met  3/29/2024 0841 by Aydee Back RN  Outcome: Ongoing, Progressing  3/29/2024 0840 by Aydee Back RN  Outcome: Ongoing, Progressing     Problem: Fall Injury Risk  Goal: Absence of Fall and Fall-Related Injury  3/29/2024 0953 by Aydee Back RN  Outcome: Met  3/29/2024 0841 by Aydee Back RN  Outcome: Ongoing, Progressing  3/29/2024 0840 by Aydee Back RN  Outcome: Ongoing, Progressing

## 2024-03-29 NOTE — PLAN OF CARE
Problem: Adult Inpatient Plan of Care  Goal: Patient-Specific Goal (Individualized)  Description: Pt will maintain sbp below 160  3/29/2024 0126 by Carol Rivers, RN  Outcome: Ongoing, Progressing  3/29/2024 0115 by Carol Rivers, RN  Outcome: Ongoing, Progressing  Goal: Optimal Comfort and Wellbeing  Outcome: Ongoing, Progressing  Intervention: Monitor Pain and Promote Comfort  Flowsheets (Taken 3/29/2024 0126)  Pain Management Interventions: care clustered  Intervention: Provide Person-Centered Care  Flowsheets (Taken 3/29/2024 0126)  Trust Relationship/Rapport:   care explained   choices provided   PRN Oxycodone x3 given for pain. VSS. Bed in lowest position, side railsx3, call light in use. Pt. awaiting discharge.

## 2024-03-29 NOTE — DISCHARGE SUMMARY
Aleksandr Bray - Neurosurgery ([facility])  [facility] Medicine  Discharge Summary      Patient Name: Ton Donovan  MRN: [id number]  BOZENA: [id number]  Patient Class: IP- Inpatient  Admission Date: 3/14/2024  Hospital Length of Stay: 15 days  Discharge Date and Time: No discharge date for patient encounter.  Attending Physician: Kimber Lewis MD   Discharging Provider: Kimber Lewis MD  Primary Care Provider: Steve Kelly DO  [facility] Medicine Team: [facility] HOSP MED  Kimber Lewis MD  Primary Care Team: [facility]    HPI:   Mr. Ton Donovan is a 28 year-old Male with a PMHx of Hypertension, IV drug use who presented with fevers and ongoing back pain with radiation down legs.    Of note, he was recently admitted to [facility] for LOOP X lumbar spinal fusion, with course complicated by a psoas abscess and MRSA epidural abscess s/p epidural phlegmon debridement. He was started on IV Daptomycin and discharged on 2/22/24 to [facility] for completion of antibiotics. Ongoing fevers complicated his LTAC stay, and he returned to the [facility] Emergency Department for further evaluation. On arrival, WBC 9, ESR 59, CRP 46, AST 360s, ALT 200s. Procalcitonin was elevated at 1.18, lactate normal. MRI L spine concerning for continued L5-S1/S1 diskitis and osteomyelitis with abscess, diffuse infectious myositis of the lumbar paraspinal musculature bilaterally and left iliopsoas muscle without discrete abscess. He was then transferred to [facility] for Neurosurgerical evaluation.     At the time of this consult, temp 99F, HR 60, 120s/60s, saturating well on room air. Most recent labs with WBC 5, Hgb 9, Na 134.    Medicine consulted for medical co-management of epidural abscess    * No surgery found *      Hospital Course:   3/15-Admitted for NSGY evaluation of OM & discitis lumbar spine. Will let pt eat.   3/16- Does not need surgery. Appreciate NSGY eval. Wound care and ID following and need final recs. Pt is comfortable.   3/17-  Klebsiella sens to everything,  On dapto and yue.  Pic line removed and culture tip was ordered, but not done.   3/18 per ID: Continue  Iv-cefepime 2g Q8h. (Bld cx +kleb aerogenes panS) & Continue Iv-Daptomycin 10mg/kg Q24h.  repeat bld cxs 03/15 and 03/17 (NGTD); picc cath tip culture was not sent down properly  Anticipate continuing both Iv-dapto and cefepime for remaining 2-3wks of full 6wk duration of Iv-abx(BRENNAN 04/10) for lumbar osteo-discitis with paravertebral / psoas myositis and sub centimeter abscesses   Will need new PIc line or mid line.  Waiting for cultures to be neg to ensure therapeutic window without bacteremia and presence of a line.   - IR consulted to evaluate if the fluid collection at posterior to L5-s1 which communicates with disc space to see if that can be drained.  IR, Dr. Pretty reviewed this pt's case. He says no discrete fluid collection seen within the paraspinal muscles on either the CT or the MRI, it appears he has swollen, infected paraspinal muscles more consistent with myositis. He may have some fluid in the disc space which we recommend reaching out to neuro IR - No fluid to be drained, infection limited to myositis.    3/19- Per Neuro IR, abscess is very deep to reach by IR means so it's not amenable to drain. Repeat cultures are neg.   3/20 replace pic and plan for LTAC.   ID final recs:   Continue Iv-cefepime 2g Q8h. (Bld cx +kleb aerogenes panS) x 2 weeks from 1st negative blood culture 3/15  Continue Iv-Daptomycin 10mg/kg Q24h  [CPK 39]. To complete 6 weeks  OK to replace PICC  Rec indefinite suppression with Doxycyline 100mg po bid once IV abx completed.  fu upon dc from LTAC  Pic line placed  No growth on repeat cultures. No leukocytosis  Ultrasound with thromboses basilic and cephalic veins. Patient reports arm swelling and discomfort improving.  Patient had recurrent fevers. ID changed cefepime to ciprofloxacin for concern drug fevers. Repeated CT C/A/P.     Interval  "history:  3/28- appreciate ID recs:  Recommendations / Plan:  Continue oral Cipro 750mg po bid -  ( Use cefepime if pt not tolerating oral cipro)  Continue Iv-Daptomycin 10mg/kg Q24h  [CPK 39].   Suspect intermittent fevers related to pre-sacral fluid collection anterior to S1 (3 x 3 x 2 cm), noted on repeat Ct-a/p (03/26). Of note, pt found to have Left forearm Superficial VTs related to PIV, but w/o associated abscess or infection locally at left forearm at this time.   New TEDIv-Dapto and oral cipro until 05/01. Abx duration ultimately until fluid collections near resolved.   Prior to new BRENNAN 05/01, recommend repeat L-spine and pelvic imaging to re-assess for interval changes to of Lumbar osteo, myositis and associated multiple fluid collections of L-spine, pre-sacral (S1), and pelvis (iliopsoas).   If persistent fevers return prior to BRENNAN 05/01, recommend repeating this imaging earlier; especially if clinically relevant and no other obvious source of fever.  Once IV abx completed, anticipate indefinite suppression with Doxycyline 100mg po bid   EXPECT Fever to be persistent for weeks.    3/29- planning on dc to [facility] today.     Goals of Care Treatment Preferences:  Code Status: Full Code      Consults:   Consults (From admission, onward)          Status Ordering Provider     [facility]  Once        Provider:  (Not yet assigned)    Completed IRASEMA PITT     Inpatient [facility] team [facility])  Once        Provider:  (Not yet assigned)    Completed ENRICO CONWAY     Inpatient consult to Infectious Diseases  Once        Provider:  (Not yet assigned)    Completed GABE REA     Inpatient consult to Hospital Medicine-General  Once        Provider:  (Not yet assigned)    Completed GABE REA            Psychiatric  Drug dependence  See " Substance induced depressive disroder"  - monitor for withdrawal and give opioid if needed   - pt has been in " "LTAC  - on oxycodone LA 10 bid for pain and dependence  STABLE      Heroin use disorder, severe  See " Drug Dependence"       Substance or medication-induced depressive disorder  - Continue Mirtazipine  - Hx of heroin, THC, cociane      Cardiac/Vascular  Chronic diastolic heart failure  ECHO 2/13/2024  Left Ventricle: The left ventricle is normal in size. Normal wall thickness. Normal wall motion. There is normal systolic function with a visually estimated ejection fraction of 60 - 65%. Ejection fraction by visual approximation is 65%. There is diastolic dysfunction but grade cannot be determined.    Right Ventricle: Normal right ventricular cavity size. Wall thickness is normal. Right ventricle wall motion  is normal. Systolic function is normal.    IVC/SVC: Normal venous pressure at 3 mmHg.    Pericardium: There is a trivial posterior effusion just base.    STABLE      HTN (hypertension)  Chronic, controlled. Latest blood pressure and vitals reviewed-     Temp:  [98 °F (36.7 °C)-101.7 °F (38.7 °C)]   Pulse:  [65-89]   Resp:  [17-19]   BP: (102-130)/(50-77)   SpO2:  [97 %-99 %] .   Home meds for hypertension were reviewed and noted below.   Hypertension Medications               cloNIDine (CATAPRES) 0.1 MG tablet Take 1 tablet (0.1 mg total) by mouth every 12 (twelve) hours.          Plan:  - While in the hospital, will manage blood pressure as follows; Continue home antihypertensive regimen. Currently on Clonidine 0.1 PO BID  - Will utilize p.r.n. blood pressure medication only if patient's blood pressure greater than 180/110 and he develops symptoms such as worsening chest pain or shortness of breath.    ID  * Osteomyelitis of vertebra of lumbosacral region  28M with hypertension, IV drug use (heroin) presenting from LTAC with ongoing fevers    He was recently admitted on 2/04/24 with osteomyelitis/discitis L5-S1, sacral and left illiac osteomyelitis, L5-S1 epidural abscess, and left iliopsoas abscess. He " underwent L4-pelvic fusion on 2/14/24 with epidural phlegmon debridement, intraoperative cultures positive for MRSA. Infectious Disease was consulted and recommended IV Daptomycin for 8 week course (EOC 4/10/24) to be followed by oral suppressive doxycycline thereafter. He was discharged on 2/22/24 to [facility] to complete his antibiotic course. LTAC stay was complicated by ongoing fevers and he represented to [facility] for further evaluation prior to transfer to [facility] for neurosurgical evaluation. On arrival, WBC 9, ESR 59, CRP 46, AST 360s, ALT 200s. Procalcitonin was elevated at 1.18, lactate normal. MRI L spine showed continued L5-S1/S1 diskitis/osteomyelitis with abscess, diffuse infectious myositis of the bilateral lumbar paraspinal musculature and left iliopsoas muscle.  - Infectious Disease consulted, appreciate assistance and recommendations  - CT L spine without contrast noted  - Continue IV Daptomycin q24h  - Weekly CK level while on Daptomycin  - Multimodal pain control with tylenol, gabapentin, methocarbamol, oxy IR, oxy ER  - Follow blood cultures through maturity  - Daily CBC, CMP  - Cardiac monitor  - Neurosurgery Consult: he does not need surgery.  - pt transferred to [facility]  Repeat Ct c/a/p due to recurrent fevers  Infectious disease consulted.     3/28- appreciate ID recs:  Recommendations / Plan:  Continue oral Cipro 750mg po bid -  ( Use cefepime if pt not tolerating oral cipro)  Continue Iv-Daptomycin 10mg/kg Q24h  [CPK 39].   Suspect intermittent fevers related to pre-sacral fluid collection anterior to S1 (3 x 3 x 2 cm), noted on repeat Ct-a/p (03/26). Of note, pt found to have Left forearm DVTs related to PIV, but w/o associated abscess or infection locally at left forearm at this time.   New TEDIv-Dapto and oral cipro until 05/01. Abx duration ultimately until fluid collections near resolved.   Prior to new BRENNAN 05/01, recommend repeat L-spine and pelvic imaging to re-assess for  "interval changes to of Lumbar osteo, myositis and associated multiple fluid collections of L-spine, pre-sacral (S1), and pelvis (iliopsoas).   If persistent fevers return prior to BRENNAN 05/01, recommend repeating this imaging earlier; especially if clinically relevant and no other obvious source of fever.  Once IV abx completed, anticipate indefinite suppression with Doxycyline 100mg po bid      -- ID will schedule pt for EOC f/u appt ~05/01. ID at [facility] to follow in interim.       Septic arthritis  See " Osteomyelitis"      Discitis of lumbosacral region  See Osteomyelitis for consolidated plan      Severe sepsis    This patient does have evidence of infective focus - continued L5-S1/S1 diskitis/osteomyelitis with abscess, diffuse infectious myositis of the bilateral lumbar paraspinal musculature and left iliopsoas muscle.  My overall impression is sepsis.  Source: Skin and Soft Tissue (location epidurial)  Antibiotics given-   Antibiotics (72h ago, onward)      Start     Stop Route Frequency Ordered    03/24/24 2100  ciprofloxacin HCl tablet 750 mg         -- Oral Every 12 hours 03/24/24 1713    03/14/24 0900  DAPTOmycin (CUBICIN) 780 mg in sodium chloride 0.9% SolP 50 mL IVPB         -- IV Every 24 hours (non-standard times) 03/14/24 0755          Latest lactate reviewed-  No results for input(s): "LACTATE", "POCLAC" in the last 72 hours.    Organ dysfunction indicated by Acute liver injury    Fluid challenge Not needed - patient is not hypotensive      Post- resuscitation assessment No - Post resuscitation assessment not needed       Will Not start Pressors- Levophed for MAP of 65  Source control achieved by: Broad spectrum antibiotics    - Infectious Disease consulted  See above    EXPECT Fever to be persistent for weeks.  - need to evaluate with other SIRS criteria      Hematology  Superficial vein thrombosis  Occlusive thrombus in the left basilic and cephalic veins.   These are superficial veins that do " "not need anticoagulation.       Endocrine  Hyponatremia  Patient has hyponatremia which is controlled,We will aim to correct the sodium by 4-6mEq in 24 hours. We will monitor sodium Daily. The hyponatremia is due to Dehydration/hypovolemia. We will obtain the following studies: see labsection. We will treat the hyponatremia with IV fluids. The patient's sodium results have been reviewed and are listed below.  No results for input(s): "NA" in the last 24 hours.      GI  Hepatitis C  F/u ID and Hepatology in clinic      Constipation  On senna a and miralax      Hepatitis  Most recent labs from OSH showed AST 360s, ALT 200s. Had recent hepatitis panel 3/12 with hep c    - Recent Hepatitis C screening resulted positive, previous Hep C screening 3 months ago negative. Check repeat Hep C screening and PCR  - Check HIV given history of IVDU and ongoing fever despite antibiotics  - RUQ ultrasound- pending  - Daily CMP to trend transaminases:  335/235  - Infectious Diseases consulted and following      Psoas abscess, left  L5-S1/S1 diskitis/osteomyelitis with abscess, diffuse infectious myositis of the bilateral lumbar paraspinal musculature and left iliopsoas muscle.  - see " Osteomeylitis" above      Orthopedic  Sacroiliitis  stable      Myositis of multiple sites  See " Osteomeylitis" for plan      Other  Pain  Stable  Hs of ELLY  Narcan prn  Scheduled:  oxycodone LA 1 12, remeron  PRN: tylenol, oxycodone 15 q 4 prn.    Inpatient Morphine Milligram Equivalents Per Day 3/26 - 3/29    Values displayed are in units of MME/Day     Order Start / End Date 3/26 Yesterday Today Tomorrow     oxyCODONE immediate release tablet 15 mg 3/14 - No end date 135 of 135 112.5 of 135 45 of 135 0 of 135     oxyCODONE 12 hr tablet 10 mg 3/14 - No end date 30 of 30 30 of 30 0 of 30 0 of 30     Daily Totals  165 of 165 142.5 of 165 45 of 165 0 of 165                Debility  Patient with Acute on chronic debility due to other reduced mobility. " "[facility]. Evaluation for etiology is complete. Plan includes progressive mobility protocol initated, PT/OT consulted, and may need surgey .    Fever  Related to infection that is currently under treatment.  See antibiotics above.   EXPECT Fever to be persistent for weeks.  - need to evaluate with other SIRS criteria.   3/28 - wbc 6, /50   Pulse 78  SpO2 97% room air, MS-stable.        Final Active Diagnoses:    Diagnosis Date Noted POA    PRINCIPAL PROBLEM:  Osteomyelitis of vertebra of lumbosacral region [M46.27] 02/05/2024 Yes    Fever [R50.9] 03/28/2024 Yes    Severe sepsis [A41.9, R65.20] 03/14/2024 Yes    Psoas abscess, left [K68.12] 11/20/2023 Yes    Discitis of lumbosacral region [M46.47] 02/07/2024 Yes     Chronic    Myositis of multiple sites [M60.9] 03/14/2024 Yes    Septic arthritis [M00.9] 03/14/2024 Yes    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] 02/05/2024 Yes     Chronic    HTN (hypertension) [I10] 03/13/2024 Yes    Hepatitis [K75.9] 03/14/2024 Yes    Drug dependence [F19.20] 03/14/2024 Yes    Heroin use disorder, severe [F11.20] 07/06/2023 Yes    Hyponatremia [E87.1] 03/14/2024 Yes    Chronic diastolic heart failure [I50.32] 03/14/2024 Yes    Debility [R53.81] 02/07/2024 Yes    Superficial vein thrombosis [I82.890] 03/22/2024 No    Constipation [K59.00] 03/20/2024 Yes    Hepatitis C [B19.20] 03/20/2024 Yes    Pain [R52] 02/07/2024 Yes    Sacroiliitis [M46.1] 02/07/2024 Yes      Problems Resolved During this Admission:       Discharged Condition: good    Disposition: Another Health Care Inst*    Follow Up:    Patient Instructions:   No discharge procedures on file.    Significant Diagnostic Studies: Labs: CMP No results for input(s): "NA", "K", "CL", "CO2", "GLU", "BUN", "CREATININE", "CALCIUM", "PROT", "ALBUMIN", "BILITOT", "ALKPHOS", "AST", "ALT", "ANIONGAP", "ESTGFRAFRICA", "EGFRNONAA" in the last 48 hours. and CBC No results for " "input(s): "WBC", "HGB", "HCT", "PLT" in the last 48 hours.    Pending Diagnostic Studies:       None           Medications:  Reconciled Home Medications:      Medication List        START taking these medications      ciprofloxacin HCl 750 MG tablet  Commonly known as: CIPRO  Take 1 tablet (750 mg total) by mouth every 12 (twelve) hours.     dextrose 5 % in water (D5W) PgBk 100 mL with ceFEPIme 2 gram SolR 2 g  Inject 2 g into the vein every 8 (eight) hours. for 16 days     sodium chloride 0.9% SolP 50 mL with DAPTOmycin 500 mg SolR 780 mg  Inject 780 mg into the vein once daily.  Replaces: sodium chloride 0.9% SolP 50 mL with DAPTOmycin 500 mg SolR 767 mg            CONTINUE taking these medications      acetaminophen 325 MG tablet  Commonly known as: TYLENOL  Take 2 tablets (650 mg total) by mouth every 4 (four) hours as needed.     cloNIDine 0.1 MG tablet  Commonly known as: CATAPRES  Take 1 tablet (0.1 mg total) by mouth every 12 (twelve) hours.     IMMUNE SUPPORT ORAL  Take 1 tablet by mouth once daily.     mirtazapine 15 MG tablet  Commonly known as: REMERON  Take 1 tablet (15 mg total) by mouth every evening.     naloxone 4 mg/actuation Spry  Commonly known as: NARCAN  4 mg by Nasal route.     oxyCODONE 15 MG Tab  Commonly known as: ROXICODONE  Take 1 tablet (15 mg total) by mouth every 4 (four) hours as needed for Pain.            STOP taking these medications      sodium chloride 0.9% SolP 50 mL with DAPTOmycin 500 mg SolR 767 mg  Replaced by: sodium chloride 0.9% SolP 50 mL with DAPTOmycin 500 mg SolR 780 mg              Indwelling Lines/Drains at time of discharge:   Lines/Drains/Airways       Peripherally Inserted Central Catheter Line  Duration             PICC Double Lumen 03/20/24 1605 right basilic 8 days                    Time spent on the discharge of patient: 35 minutes         Kimber Lewis MD  Department of Hospital Medicine  [facility] Neurosurgery ([facility])  "

## 2024-04-03 LAB
ACID FAST MOD KINY STN SPEC: NORMAL
ACID FAST MOD KINY STN SPEC: NORMAL
MYCOBACTERIUM SPEC QL CULT: NORMAL
MYCOBACTERIUM SPEC QL CULT: NORMAL

## 2024-05-06 PROBLEM — Z00.8 MEDICAL CLEARANCE FOR PSYCHIATRIC ADMISSION: Status: RESOLVED | Noted: 2019-11-13 | Resolved: 2024-05-06

## 2024-05-08 NOTE — SUBJECTIVE & OBJECTIVE
Interval History: To OR today  Afebrile    Review of Systems  Objective:     Vital Signs (Most Recent):  Temp: 97.7 °F (36.5 °C) (02/14/24 1328)  Pulse: 69 (02/14/24 1328)  Resp: 16 (02/14/24 1328)  BP: 111/68 (02/14/24 1329)  SpO2: 100 % (02/14/24 1328) Vital Signs (24h Range):  Temp:  [97.7 °F (36.5 °C)-98.5 °F (36.9 °C)] 97.7 °F (36.5 °C)  Pulse:  [60-71] 69  Resp:  [12-17] 16  SpO2:  [98 %-100 %] 100 %  BP: (110-119)/(55-68) 111/68     Weight: 76.7 kg (169 lb 1.5 oz)  Body mass index is 24.26 kg/m².    Intake/Output Summary (Last 24 hours) at 2/14/2024 1538  Last data filed at 2/14/2024 1300  Gross per 24 hour   Intake 300 ml   Output 1475 ml   Net -1175 ml         Physical Exam  Constitutional:       Appearance: He is normal weight. He is not toxic-appearing.   HENT:      Head: Normocephalic and atraumatic.   Eyes:      Conjunctiva/sclera: Conjunctivae normal.   Cardiovascular:      Rate and Rhythm: Normal rate and regular rhythm.      Heart sounds: Normal heart sounds.   Pulmonary:      Effort: Pulmonary effort is normal. No respiratory distress.      Breath sounds: Normal breath sounds. No wheezing.   Abdominal:      General: Bowel sounds are normal. There is no distension.      Palpations: Abdomen is soft.      Tenderness: There is no abdominal tenderness. There is no guarding.   Skin:     General: Skin is warm and dry.      Findings: No rash.   Neurological:      Mental Status: He is alert and oriented to person, place, and time.      Sensory: Sensory deficit present.      Motor: No weakness.   Psychiatric:         Mood and Affect: Mood normal.         Behavior: Behavior normal.             Significant Labs: All pertinent labs within the past 24 hours have been reviewed.    Significant Imaging: I have reviewed all pertinent imaging results/findings within the past 24 hours.   Opt out

## 2024-05-13 ENCOUNTER — TELEPHONE (OUTPATIENT)
Dept: NEUROSURGERY | Facility: CLINIC | Age: 29
End: 2024-05-13
Payer: MEDICAID

## 2024-05-13 NOTE — TELEPHONE ENCOUNTER
Attempted to call patient back from message in portal. Number given has staff that does not know the whereabouts of the patient.  Patient main line is for another individual neither related to nor in proximity of patient.  Secondary line not in service.    Unable to reach patient.

## 2024-05-16 ENCOUNTER — TELEPHONE (OUTPATIENT)
Dept: NEUROSURGERY | Facility: CLINIC | Age: 29
End: 2024-05-16
Payer: MEDICAID

## 2024-05-16 NOTE — TELEPHONE ENCOUNTER
----- Message from Cherelle Mercado MA sent at 5/9/2024  3:39 PM CDT -----  Regarding: FW: apt  Contact: [phone] ext [phone]    ----- Message -----  From: Sera Proctor NP  Sent: 5/9/2024   2:52 PM CDT  To: Cherelle Mercado MA  Subject: FW: apt                                            ----- Message -----  From: Elsi Kay  Sent: 5/9/2024   2:38 PM CDT  To: Esthela TOLLIVER Staff  Subject: apt                                              Caller Victorina  calling in to reschedule post apt that was schedule on 3/28/24  please call to discuss Further

## 2024-06-17 PROBLEM — R65.20 SEVERE SEPSIS: Status: RESOLVED | Noted: 2024-03-14 | Resolved: 2024-06-17

## 2024-06-17 PROBLEM — A41.9 SEVERE SEPSIS: Status: RESOLVED | Noted: 2024-03-14 | Resolved: 2024-06-17

## 2024-08-15 NOTE — ASSESSMENT & PLAN NOTE
1205 pt arrived back to [facility] from their procedure, iv removed, and discharge instructions reviewed   Osteomyelitis, discitis, & myositis of lumbosacral region  Septic arthritis of SI joint(s)  Epidural abscesses   Multiple abscesses of pelvis, sacrum, psoas, iliacus, & paraspinals  Lower back pain w/ b/l sciatica   Severe spinal canal stenosis   MRI: osteomyelitis/discitis of L5-S1, osteomyelitis of sacrum & L iliac bone, probable septic arthritis of L SI joint & lower lumbar spine facet joints, worsening anterolisthesis of L5 with respect to S1 with increased size of epidural abscess at the level of L5-S1 and probable severe central canal stenosis at this level, along w/ additional abscesses in the L iliopsoas muscle, paraspinal muscles, pelvis, & L sacrum.    - Underwent  2/14/24 epidural abscess/phlegmon debridement and LOOP X FUSION, SPINE, LUMBAR w/   - Left sacroiliac joint aspiration  on 2/7 by IR growing MRSA  - Ortho consulted for mgmt of septic SI joint; appreciate recs/assistance  - MRSA growing from multiple sites;  --> switched to daptomycin 8 week course from surg date per ID; EOC 4/10/24

## 2025-04-02 ENCOUNTER — TELEPHONE (OUTPATIENT)
Dept: NEUROSURGERY | Facility: CLINIC | Age: 30
End: 2025-04-02
Payer: MEDICAID

## 2025-05-06 NOTE — PLAN OF CARE
Problem: Adult Inpatient Plan of Care  Goal: Plan of Care Review  Outcome: Ongoing, Progressing  Goal: Patient-Specific Goal (Individualized)  Outcome: Ongoing, Progressing  Goal: Absence of Hospital-Acquired Illness or Injury  Outcome: Ongoing, Progressing  Goal: Optimal Comfort and Wellbeing  Outcome: Ongoing, Progressing  Goal: Readiness for Transition of Care  Outcome: Ongoing, Progressing     Problem: Impaired Wound Healing  Goal: Optimal Wound Healing  Outcome: Ongoing, Progressing   Recently return to his room from going to ultrasound. Received schedule dilaudid as well as Prn oxycodone this shift. Pt pulled HIs IV out and MD on call was informed. MD placed ordered for a Midline consult. Dressing was placed to Rt chest drain site and Lt FA ulcer. No fall    Writer called and spoke to patient directly regarding results. Patient verbalizes understanding. Writer sent iron order over to [facility] in [place]. Call ends.

## (undated) DEVICE — GELATIN SURGIPOWDER ABSORBABLE

## (undated) DEVICE — DRAPE HAND STERILE

## (undated) DEVICE — DRESSING TRANS 4X4 TEGADERM

## (undated) DEVICE — DRAPE STERI-DRAPE 1000 17X11IN

## (undated) DEVICE — ELECTRODE REM PLYHSV RETURN 9

## (undated) DEVICE — DRESSING AQUACEL SACRAL 9 X 9

## (undated) DEVICE — MARKER SKIN STND TIP BLUE BARR

## (undated) DEVICE — SPONGE COTTON TRAY 4X4IN

## (undated) DEVICE — GLOVE GAMMEX SURG LF PI SZ 7.5

## (undated) DEVICE — STAPLER SKIN PROXIMATE WIDE

## (undated) DEVICE — SUT SILK 3-0 BLK BR SH 30IN

## (undated) DEVICE — TIP YANKAUERS BULB NO VENT

## (undated) DEVICE — Device

## (undated) DEVICE — KIT SURGIFLO HEMOSTATIC MATRIX

## (undated) DEVICE — DRAPE STERI INSTRUMENT 1018

## (undated) DEVICE — SPONGE LAP 4X18 PREWASHED

## (undated) DEVICE — DRAPE INCISE IOBAN 2 23X17IN

## (undated) DEVICE — DRAPE C-ARM ELAS CLIP 42X120IN

## (undated) DEVICE — COVER BACK TBL HD 2-TIER 72IN

## (undated) DEVICE — DRAPE TOP 53X102IN

## (undated) DEVICE — DRAPE THREE-QUARTER 53X77IN

## (undated) DEVICE — KIT EVACUATOR 3-SPRING 1/8 DRN

## (undated) DEVICE — GOWN AERO CHROME W/ TOWEL XL

## (undated) DEVICE — SPHERE MARKER REFLECTIVE DISP

## (undated) DEVICE — TRAY CATH FOL SIL URIMTR 16FR

## (undated) DEVICE — DRAPE LAP T SHT W/ INSTR PAD

## (undated) DEVICE — DRAPE THREE-QTR REINF 53X77IN

## (undated) DEVICE — SUT CTD VICRYL 2-0 CR/CT-2

## (undated) DEVICE — NDL SPINAL 18GX3.5 SPINOCAN

## (undated) DEVICE — TAP 8MM SPINAL POWER

## (undated) DEVICE — TAPE CLOTH SOFT MEDIPORE 4IN

## (undated) DEVICE — TAPE SURG DURAPORE 2 X10YD

## (undated) DEVICE — TRAY MINOR GEN SURG OMC

## (undated) DEVICE — CHLORAPREP W TINT 26ML APPL

## (undated) DEVICE — KIT PREVENA PLUS

## (undated) DEVICE — DRAPE U SPLIT SHEET 54X76IN

## (undated) DEVICE — CLIP MED TICALL

## (undated) DEVICE — SPONGE GAUZE 16PLY 4X4

## (undated) DEVICE — BUR BONE CUT MICRO TPS 3X3.8MM

## (undated) DEVICE — ADHESIVE MASTISOL VIAL 48/BX

## (undated) DEVICE — GOWN POLY REINF BRTH SLV XL

## (undated) DEVICE — DRAPE C-ARMOR EQUIPMENT COVER

## (undated) DEVICE — DRAPE STERI U-SHAPED 47X51IN

## (undated) DEVICE — DRESSING AQUACEL FOAM 5 X 5

## (undated) DEVICE — SEALANT ADHERUS AUTOSPRY DURAL

## (undated) DEVICE — DRESSING SURGICAL 1/2X1/2

## (undated) DEVICE — TRAY NEURO OMC

## (undated) DEVICE — DRAPE ABDOMINAL TIBURON 14X11

## (undated) DEVICE — DRESSING AQUACEL FOAM 4 X 12

## (undated) DEVICE — DRESSING MEPILEX BORDER 4 X 4

## (undated) DEVICE — SYR IRRIGATION BULB STER 60ML

## (undated) DEVICE — CANISTER INFOV.A.C WOUND 500ML

## (undated) DEVICE — TUBE FRAZIER 5MM 2FT SOFT TIP

## (undated) DEVICE — DRESSING AQUACEL FOAM 3 X 3

## (undated) DEVICE — TOWEL OR DISP STRL BLUE 4/PK

## (undated) DEVICE — CORD BIPOLAR 12 FOOT

## (undated) DEVICE — BOVIE SUCTION

## (undated) DEVICE — BLADE SURG CARBON STEEL SZ11

## (undated) DEVICE — SEALER AQUAMANTYS 2.3 BIPOLAR

## (undated) DEVICE — FRAZIER 18FR

## (undated) DEVICE — BLADE 4IN EDGE INSULATED

## (undated) DEVICE — SUT STRATAFIX PDS PLUS VIO

## (undated) DEVICE — SUT VICRYL+ 1 CT1 18IN

## (undated) DEVICE — SET IRR URLGY 2LINE UNIV SPIKE

## (undated) DEVICE — APPLICATOR CHLORAPREP ORN 26ML

## (undated) DEVICE — CAUTERY BOVIE PENCIL

## (undated) DEVICE — TRAY MINOR ORTHO OMC

## (undated) DEVICE — KIT SPINAL PATIENT CARE JACK

## (undated) DEVICE — PAD ABD 8X10 STERILE